# Patient Record
Sex: FEMALE | Race: WHITE | NOT HISPANIC OR LATINO | Employment: UNEMPLOYED | URBAN - NONMETROPOLITAN AREA
[De-identification: names, ages, dates, MRNs, and addresses within clinical notes are randomized per-mention and may not be internally consistent; named-entity substitution may affect disease eponyms.]

---

## 2017-01-13 ENCOUNTER — OFFICE VISIT (OUTPATIENT)
Dept: INTERNAL MEDICINE | Facility: CLINIC | Age: 27
End: 2017-01-13

## 2017-01-13 VITALS
BODY MASS INDEX: 36 KG/M2 | SYSTOLIC BLOOD PRESSURE: 120 MMHG | OXYGEN SATURATION: 96 % | WEIGHT: 203.19 LBS | HEART RATE: 106 BPM | TEMPERATURE: 99.7 F | DIASTOLIC BLOOD PRESSURE: 80 MMHG | RESPIRATION RATE: 16 BRPM | HEIGHT: 63 IN

## 2017-01-13 DIAGNOSIS — J02.0 STREP PHARYNGITIS: Primary | ICD-10-CM

## 2017-01-13 LAB
EXPIRATION DATE: ABNORMAL
EXPIRATION DATE: NORMAL
FLUAV AG NPH QL: NORMAL
FLUBV AG NPH QL: NORMAL
INTERNAL CONTROL: ABNORMAL
INTERNAL CONTROL: NORMAL
Lab: ABNORMAL
Lab: NORMAL
S PYO AG THROAT QL: POSITIVE

## 2017-01-13 PROCEDURE — 99213 OFFICE O/P EST LOW 20 MIN: CPT | Performed by: PHYSICIAN ASSISTANT

## 2017-01-13 PROCEDURE — 87804 INFLUENZA ASSAY W/OPTIC: CPT | Performed by: PHYSICIAN ASSISTANT

## 2017-01-13 PROCEDURE — 87880 STREP A ASSAY W/OPTIC: CPT | Performed by: PHYSICIAN ASSISTANT

## 2017-01-13 RX ORDER — FLUCONAZOLE 150 MG/1
150 TABLET ORAL ONCE
Qty: 1 TABLET | Refills: 0 | Status: SHIPPED | OUTPATIENT
Start: 2017-01-13 | End: 2017-01-13

## 2017-01-13 RX ORDER — CEPHALEXIN 500 MG/1
500 CAPSULE ORAL 2 TIMES DAILY
Qty: 20 CAPSULE | Refills: 0 | Status: SHIPPED | OUTPATIENT
Start: 2017-01-13 | End: 2017-01-20

## 2017-01-13 NOTE — MR AVS SNAPSHOT
Marilu Godoy   1/13/2017 1:45 PM   Office Visit    Provider:  STEVE Toribio   Department:  [facility] PRIMARY CARE   Dept Phone:  [phone]                Your Full Care Plan              Today's Medication Changes          These changes are accurate as of: 1/13/17  3:06 PM.  If you have any questions, ask your nurse or doctor.               New Medication(s)Ordered:     cephalexin 500 MG capsule   Commonly known as:  KEFLEX   Take 1 capsule by mouth 2 (Two) Times a Day for 10 days.   Started by:  STEVE Toribio            Where to Get Your Medications      These medications were sent to [id number] - [place], [place] - [address] - [phone]  - [phone] FX  [address] [place] [address]     Phone:  [phone]     cephalexin 500 MG capsule                  Your Updated Medication List          This list is accurate as of: 1/13/17  3:06 PM.  Always use your most recent med list.                apixaban 5 MG tablet tablet   Commonly known as:  ELIQUIS   Take 1 tablet by mouth Every 12 (Twelve) Hours for 180 days.       cephalexin 500 MG capsule   Commonly known as:  KEFLEX   Take 1 capsule by mouth 2 (Two) Times a Day for 10 days.       cyclobenzaprine 10 MG tablet   Commonly known as:  FLEXERIL       midodrine 5 MG tablet   Commonly known as:  PROAMATINE   Take 1 tablet by mouth 3 (Three) Times a Day.               You Were Diagnosed With        Codes Comments    Strep pharyngitis    -  Primary ICD-10-CM: J02.0  ICD-9-CM: 034.0       Instructions     None    Patient Instructions History      MyChart Signup     Our records indicate that your [facility] [employer] account has been deactivated. If you would like to reactivate your account, please email [email or web address] or call [phone] to talk to our [employer] staff.             Other Info from Your Visit           Your Appointments     Jan 27,  "2017 10:45 AM EST   New Patient with Art Duvall MD   [facility] PULMONARY CRITICAL CARE AND SLEEP (--)    [address]  [facility] [address] [phone]           Bring all previous medical records and films, along with current medications and insurance information.            Feb 06, 2017 11:00 AM EST   Consult with Omar Koo DO   [facility] CARDIOLOGY (--)    [address]  [facility] [address] [phone]           Please bring all current insurance documents. Bring list of current medications.            Feb 21, 2017  9:00 AM EST   Follow Up with Deidre Barker MD   [facility] PRIMARY CARE (--)    [address]  [facility] [address] [phone]           Arrive 15 minutes prior to appointment.            Jun 14, 2017 10:45 AM EDT   FOLLOW UP with Jenifer Calderon MD   [facility] HEMATOLOGY  AND ONCOLOGY ([place])    [address]  [facility] [address] [phone]              Allergies     Amoxicillin  Rash      Reason for Visit     Fever up to 101, off and on x 2 weeks    Cough     Vomiting     Shortness of Breath     Diarrhea           Vital Signs     Blood Pressure Pulse Temperature Respirations Height Weight    120/80 106 99.7 °F (37.6 °C) 16 63\" (160 cm) 203 lb 3 oz (92.2 kg)    Oxygen Saturation Body Mass Index Smoking Status             96% 35.99 kg/m2 Never Smoker         Problems and Diagnoses Noted     Strep throat      "

## 2017-01-13 NOTE — PROGRESS NOTES
Marilu Godoy is a 26 y.o. female.     Subjective   History of Present Illness   Fever off and on for 2 weeks with diarrhea, abdominal bloating, nausea with vomiting a few days ago, and cough with SOA. Fever up to 101 most days. Abdominal pain worse after eating most of the time. Appetite decreased. Drinking plenty of fluids. Also reports postnasal drip and sore throat. Last antibiotic use in November. No recent travel.       The following portions of the patient's history were reviewed and updated as appropriate: allergies, current medications, past family history, past medical history, past social history, past surgical history and problem list.    Review of Systems    Constitutional: Fever.  Negative for appetite change, chills, fatigue and unexpected weight change.   HENT:postnasal drip, rhinorrhea, sore throat, congestion.  Negative for ear pain, hearing loss, nosebleeds, tinnitus and trouble swallowing.    Eyes: Negative for photophobia, discharge and visual disturbance.   Respiratory: Negative for cough, chest tightness, shortness of breath and wheezing.    Cardiovascular: Negative for chest pain, palpitations and leg swelling.   Gastrointestinal:  Abdominal pain, bloating, diarrhea, nausea and vomiting. Negative for blood in stool, constipation  Endocrine: Negative for cold intolerance, heat intolerance, polydipsia, polyphagia and polyuria.   Musculoskeletal: Negative for arthralgias, back pain, joint swelling, myalgias, neck pain and neck stiffness.   Skin: Negative for color change, pallor, rash and wound.   Allergic/Immunologic: Negative for environmental allergies, food allergies and immunocompromised state.   Neurological: Negative for dizziness, tremors, seizures, weakness, numbness and headaches.   Hematological: Negative for adenopathy. Does not bruise/bleed easily.   Psychiatric/Behavioral: Negative for agitation, behavioral problems, confusion, hallucinations, self-injury and suicidal ideas.  "The patient is not nervous/anxious.      Objective    Physical Exam  Constitutional: Oriented to person, place, and time. Appears well-developed and well-nourished.   HENT: Moderate OP erythema without exudate. visible white PND, bilateral TM effusions.   Head: Maxillary sinus tenderness bilaterally. Normocephalic and atraumatic.   Eyes: EOM are normal. Pupils are equal, round, and reactive to light.   Neck: Normal range of motion. Neck supple.   Cardiovascular: Normal rate, regular rhythm and normal heart sounds.    Pulmonary/Chest: Effort normal and breath sounds normal. No respiratory distress.  Has no wheezes or rales. Exhibits no chest wall tenderness.   Abdominal: Periumbilical abdominal tenderness. Soft. Bowel sounds are normal. Exhibits no distension and no mass.   Musculoskeletal: Normal range of motion. Exhibits no tenderness.   Neurological: Alert and oriented to person, place, and time.   Skin: Skin is warm and dry.   Psychiatric: Has a normal mood and affect. Behavior is normal. Judgment and thought content normal.       Visit Vitals   • /80   • Pulse 106   • Temp 99.7 °F (37.6 °C)   • Resp 16   • Ht 63\" (160 cm)   • Wt 203 lb 3 oz (92.2 kg)   • SpO2 96%   • BMI 35.99 kg/m2       Nursing note and vitals reviewed.          Assessment/Plan   Marilu was seen today for fever, cough, vomiting, shortness of breath and diarrhea.    Diagnoses and all orders for this visit:    Strep pharyngitis  -     cephalexin (KEFLEX) 500 MG capsule; Take 1 capsule by mouth 2 (Two) Times a Day for 10 days.  -     POC Influenza A / B        Rapid strep positive.     Recommended daily probiotic for 1 month. If diarrhea worsens or fails to resolve return for evaluation. Suspect related to duration of strep infection.        "

## 2017-01-20 ENCOUNTER — OFFICE VISIT (OUTPATIENT)
Dept: INTERNAL MEDICINE | Facility: CLINIC | Age: 27
End: 2017-01-20

## 2017-01-20 ENCOUNTER — TELEPHONE (OUTPATIENT)
Dept: INTERNAL MEDICINE | Facility: CLINIC | Age: 27
End: 2017-01-20

## 2017-01-20 ENCOUNTER — HOSPITAL ENCOUNTER (OUTPATIENT)
Dept: GENERAL RADIOLOGY | Facility: HOSPITAL | Age: 27
Discharge: HOME | End: 2017-01-20
Admitting: PHYSICIAN ASSISTANT

## 2017-01-20 VITALS
HEART RATE: 96 BPM | BODY MASS INDEX: 36.39 KG/M2 | TEMPERATURE: 99.4 F | DIASTOLIC BLOOD PRESSURE: 72 MMHG | OXYGEN SATURATION: 98 % | SYSTOLIC BLOOD PRESSURE: 106 MMHG | RESPIRATION RATE: 16 BRPM | HEIGHT: 63 IN | WEIGHT: 205.38 LBS

## 2017-01-20 DIAGNOSIS — R05.3 PERSISTENT COUGH: ICD-10-CM

## 2017-01-20 DIAGNOSIS — R50.9 FEVER, UNSPECIFIED FEVER CAUSE: Primary | ICD-10-CM

## 2017-01-20 DIAGNOSIS — R19.7 DIARRHEA OF PRESUMED INFECTIOUS ORIGIN: ICD-10-CM

## 2017-01-20 LAB
EXPIRATION DATE: NORMAL
HETEROPH AB SER QL LA: NEGATIVE
INTERNAL CONTROL: NORMAL
Lab: NORMAL

## 2017-01-20 PROCEDURE — 99213 OFFICE O/P EST LOW 20 MIN: CPT | Performed by: PHYSICIAN ASSISTANT

## 2017-01-20 PROCEDURE — 87046 STOOL CULTR AEROBIC BACT EA: CPT | Performed by: PHYSICIAN ASSISTANT

## 2017-01-20 PROCEDURE — 71020 HC CHEST PA AND LATERAL: CPT

## 2017-01-20 PROCEDURE — 87493 C DIFF AMPLIFIED PROBE: CPT | Performed by: PHYSICIAN ASSISTANT

## 2017-01-20 PROCEDURE — 87045 FECES CULTURE AEROBIC BACT: CPT | Performed by: PHYSICIAN ASSISTANT

## 2017-01-20 PROCEDURE — 87329 GIARDIA AG IA: CPT | Performed by: PHYSICIAN ASSISTANT

## 2017-01-20 PROCEDURE — 86308 HETEROPHILE ANTIBODY SCREEN: CPT | Performed by: PHYSICIAN ASSISTANT

## 2017-01-20 RX ORDER — AZITHROMYCIN 250 MG/1
TABLET, FILM COATED ORAL
Qty: 6 TABLET | Refills: 0 | Status: SHIPPED | OUTPATIENT
Start: 2017-01-20 | End: 2017-02-06

## 2017-01-20 NOTE — PROGRESS NOTES
Marilu Godoy is a 26 y.o. female.     Subjective   History of Present Illness   Here today with continued complaint of fatigue, diarrhea RUQ abdominal pain, headaches, cough productive of white sputum and some SOA. Has continued to have fevers of about 100 daily. Diarrhea occurring 6-7 times per day. RUQ pain sometimes worse after eating but unsure if caused by any certain foods. Currently being treated with Keflex due to positive strep 1 week ago. Denies sore throat today. Diarrhea has not worsened since being on Keflex. Cholecystectomy nearly 2 years ago. Diarrhea fluctuates from watery to loose.           The following portions of the patient's history were reviewed and updated as appropriate: allergies, current medications, past family history, past medical history, past social history, past surgical history and problem list.    Review of Systems    Constitutional:  fatigue, feverNegative for appetite change, chills and unexpected weight change.   HENT: Negative for congestion, ear pain, hearing loss, nosebleeds, postnasal drip, rhinorrhea, sore throat, tinnitus and trouble swallowing.    Eyes: Negative for photophobia, discharge and visual disturbance.   Respiratory: SOA, cough. Negative for chest tightness and wheezing.    Cardiovascular: Negative for chest pain, palpitations and leg swelling.   Gastrointestinal: RUQ abdominal pain, diarrhea.Negative for abdominal distention, blood in stool, constipation, nausea and vomiting.   Endocrine: Negative for cold intolerance, heat intolerance, polydipsia, polyphagia and polyuria.   Musculoskeletal: Negative for arthralgias, back pain, joint swelling, myalgias, neck pain and neck stiffness.   Skin: Negative for color change, pallor, rash and wound.   Allergic/Immunologic: Negative for environmental allergies, food allergies and immunocompromised state.   Neurological: Negative for dizziness, tremors, seizures, weakness, numbness and headaches.   Hematological:  "Negative for adenopathy. Does not bruise/bleed easily.   Psychiatric/Behavioral: Negative for agitation, behavioral problems, confusion, hallucinations, self-injury and suicidal ideas. The patient is not nervous/anxious.      Objective    Physical Exam  Constitutional: Oriented to person, place, and time. Appears well-developed and well-nourished.   HENT: OP normal. TMs normal.   Head: No sinus tenderness. Normocephalic and atraumatic.   Eyes: EOM are normal. Pupils are equal, round, and reactive to light.   Neck: Normal range of motion. Neck supple.   Cardiovascular: Normal rate, regular rhythm and normal heart sounds.    Pulmonary/Chest: Effort normal and breath sounds normal. No respiratory distress.  Has no wheezes or rales. Exhibits no chest wall tenderness.   Abdominal: RUQ mild tenderness. Soft. Bowel sounds are normal. Exhibits no distension and no mass.   Musculoskeletal: Normal range of motion. Exhibits no tenderness.   Neurological: Alert and oriented to person, place, and time.   Skin: Skin is warm and dry.   Psychiatric: Has a normal mood and affect. Behavior is normal. Judgment and thought content normal.       Visit Vitals   • /72   • Pulse 96   • Temp 99.4 °F (37.4 °C)   • Resp 16   • Ht 63\" (160 cm)   • Wt 205 lb 6 oz (93.2 kg)   • SpO2 98%   • BMI 36.38 kg/m2       Nursing note and vitals reviewed.        Assessment/Plan   Marilu was seen today for fever, cough, diarrhea and shortness of breath.    Diagnoses and all orders for this visit:    Fever, unspecified fever cause  -     Dao-Barr Virus VCA Antibody Panel  -     Hepatitis Panel, Acute  -     Stool Culture With Yersinia  -     Clostridium Difficile Toxin, PCR  -     CBC & Differential  -     Comprehensive Metabolic Panel  -     TSH  -     Giardia Antigen    Persistent cough bilateral lower lobe atelectasis noted during abdominal CT on 1/7)  -     XR Chest PA & Lateral    Diarrhea of presumed infectious origin  -     Dao-Myers " Virus VCA Antibody Panel  -     Hepatitis Panel, Acute  -     Stool Culture With Yersinia  -     Clostridium Difficile Toxin, PCR  -     CBC & Differential  -     Comprehensive Metabolic Panel  -     TSH  -     Giardia Antigen  Abdominal CT done on 1/7 in ER. Negative other than right ovarian cyst.   S/p cholecystectomy in 2015.     Continue Keflex for strep. Continue daily probiotic.     Mono spot: negative.

## 2017-01-20 NOTE — TELEPHONE ENCOUNTER
----- Message from STEVE Toribio sent at 1/20/2017  1:12 PM EST -----  Please let her know that her chest x-ray was negative but I would like her to switch to Zithromax as well and have sent it to [facility]. Stop Keflex.

## 2017-01-20 NOTE — MR AVS SNAPSHOT
Marilu Godoy   1/20/2017 11:30 AM   Office Visit    Provider:  STEVE Toribio   Department:  [facility] PRIMARY CARE   Dept Phone:  [phone]                Your Full Care Plan              Your Updated Medication List          This list is accurate as of: 1/20/17 12:18 PM.  Always use your most recent med list.                apixaban 5 MG tablet tablet   Commonly known as:  ELIQUIS   Take 1 tablet by mouth Every 12 (Twelve) Hours for 180 days.       cephalexin 500 MG capsule   Commonly known as:  KEFLEX   Take 1 capsule by mouth 2 (Two) Times a Day for 10 days.       cyclobenzaprine 10 MG tablet   Commonly known as:  FLEXERIL       midodrine 5 MG tablet   Commonly known as:  PROAMATINE   Take 1 tablet by mouth 3 (Three) Times a Day.               We Performed the Following     CBC & Differential     Comprehensive Metabolic Panel     Dao-Barr Virus VCA Antibody Panel     Hepatitis Panel, Acute     POC Infectious Mononucleosis Antibody     TSH     XR Chest PA & Lateral       You Were Diagnosed With        Codes Comments    Fever, unspecified fever cause    -  Primary ICD-10-CM: R50.9  ICD-9-CM: 780.60     Persistent cough     ICD-10-CM: R05  ICD-9-CM: 786.2     Diarrhea of presumed infectious origin     ICD-10-CM: A09  ICD-9-CM: 009.3       Instructions     None    Patient Instructions History      MyChart Signup     Our records indicate that you have declined [facility] MyChart signup. If you would like to sign up for [employer], please email [email or web address] or call [phone] to obtain an activation code.             Other Info from Your Visit           Your Appointments     Jan 27, 2017 10:45 AM EST   New Patient with Art Duvall MD   [facility] PULMONARY CRITICAL CARE AND SLEEP (--)    [address]  [address]  [facility] [address] [phone]           Bring all previous medical records and  "films, along with current medications and insurance information.            Feb 06, 2017 11:00 AM EST   Consult with Omar Koo DO   [facility] CARDIOLOGY (--)    [address]  [facility] [address] [phone]           Please bring all current insurance documents. Bring list of current medications.            Feb 21, 2017  9:00 AM EST   Follow Up with Deidre Barker MD   [facility] PRIMARY CARE (--)    [address] [place] [address] [address]  [facility] [address] [phone]           Arrive 15 minutes prior to appointment.            Jun 14, 2017 10:45 AM EDT   FOLLOW UP with Jenifer Calderon MD   [facility] HEMATOLOGY  AND ONCOLOGY ([place])    [address]  [facility] [address] [phone]              Allergies     Amoxicillin  Rash      Reason for Visit     Fever 100-101    Cough productive, white mucus    Diarrhea     Shortness of Breath           Vital Signs     Blood Pressure Pulse Temperature Respirations Height Weight    106/72 96 99.4 °F (37.4 °C) 16 63\" (160 cm) 205 lb 6 oz (93.2 kg)    Oxygen Saturation Body Mass Index Smoking Status             98% 36.38 kg/m2 Never Smoker         Problems and Diagnoses Noted     Diarrhea    Fever    -  Primary    Persistent cough          Results     POC Infectious Mononucleosis Antibody      Component Value Standard Range & Units    Monospot Negative Negative    Internal Control Passed Passed    Lot Number [id number]     Expiration Date 3/31/18                   "

## 2017-01-23 LAB
BACTERIA STL CULT: NORMAL
C DIFF TOX GENS STL QL NAA+PROBE: NOT DETECTED
G LAMBLIA AG STL QL IA: NEGATIVE

## 2017-01-26 DIAGNOSIS — R11.0 NAUSEA: ICD-10-CM

## 2017-01-26 DIAGNOSIS — R19.7 DIARRHEA, UNSPECIFIED TYPE: Primary | ICD-10-CM

## 2017-01-27 ENCOUNTER — OFFICE VISIT (OUTPATIENT)
Dept: PULMONOLOGY | Facility: CLINIC | Age: 27
End: 2017-01-27

## 2017-01-27 VITALS
BODY MASS INDEX: 36.14 KG/M2 | DIASTOLIC BLOOD PRESSURE: 58 MMHG | HEART RATE: 103 BPM | SYSTOLIC BLOOD PRESSURE: 118 MMHG | HEIGHT: 63 IN | WEIGHT: 204 LBS | RESPIRATION RATE: 18 BRPM | OXYGEN SATURATION: 99 %

## 2017-01-27 DIAGNOSIS — I26.99 OTHER PULMONARY EMBOLISM WITHOUT ACUTE COR PULMONALE, UNSPECIFIED CHRONICITY (HCC): Primary | ICD-10-CM

## 2017-01-27 DIAGNOSIS — G47.33 OBSTRUCTIVE SLEEP APNEA: ICD-10-CM

## 2017-01-27 DIAGNOSIS — G47.19 EXCESSIVE DAYTIME SLEEPINESS: ICD-10-CM

## 2017-01-27 DIAGNOSIS — R06.83 SNORING: ICD-10-CM

## 2017-01-27 PROCEDURE — 99245 OFF/OP CONSLTJ NEW/EST HI 55: CPT | Performed by: INTERNAL MEDICINE

## 2017-02-06 ENCOUNTER — CONSULT (OUTPATIENT)
Dept: CARDIOLOGY | Facility: CLINIC | Age: 27
End: 2017-02-06

## 2017-02-06 ENCOUNTER — DOCUMENTATION (OUTPATIENT)
Dept: CARDIOLOGY | Facility: CLINIC | Age: 27
End: 2017-02-06

## 2017-02-06 VITALS
SYSTOLIC BLOOD PRESSURE: 130 MMHG | HEART RATE: 107 BPM | HEIGHT: 63 IN | DIASTOLIC BLOOD PRESSURE: 74 MMHG | BODY MASS INDEX: 35.93 KG/M2 | WEIGHT: 202.8 LBS

## 2017-02-06 DIAGNOSIS — R00.0 SINUS TACHYCARDIA: Primary | ICD-10-CM

## 2017-02-06 DIAGNOSIS — I47.1 SVT (SUPRAVENTRICULAR TACHYCARDIA) (HCC): Primary | ICD-10-CM

## 2017-02-06 DIAGNOSIS — I26.99 PULMONARY EMBOLISM ON RIGHT (HCC): ICD-10-CM

## 2017-02-06 PROCEDURE — 99243 OFF/OP CNSLTJ NEW/EST LOW 30: CPT | Performed by: INTERNAL MEDICINE

## 2017-02-06 PROCEDURE — 93270 REMOTE 30 DAY ECG REV/REPORT: CPT | Performed by: INTERNAL MEDICINE

## 2017-02-06 PROCEDURE — 93000 ELECTROCARDIOGRAM COMPLETE: CPT | Performed by: INTERNAL MEDICINE

## 2017-02-06 RX ORDER — MIDODRINE HYDROCHLORIDE 5 MG/1
7.5 TABLET ORAL 3 TIMES DAILY
Qty: 140 TABLET | Refills: 6 | Status: SHIPPED | OUTPATIENT
Start: 2017-02-06 | End: 2017-07-14

## 2017-02-06 RX ORDER — MIDODRINE HYDROCHLORIDE 5 MG/1
5 TABLET ORAL 3 TIMES DAILY
Qty: 90 TABLET | Refills: 3 | Status: SHIPPED | OUTPATIENT
Start: 2017-02-06 | End: 2017-02-14

## 2017-02-06 NOTE — PROGRESS NOTES
Marilu Godoy  1990  [phone]      02/06/2017    [facility] CARDIOLOGY    Deidre Barker MD  [address] [place], [place] [id number]    REFERRING DOCTOR : Marc Byrnes MD         Patient ID: Marilu Godoy is a 26 y.o. female.    Chief Complaint:   Chief Complaint   Patient presents with   • Palpitations   • Rapid Heart Rate       Allergies   Allergen Reactions   • Amoxicillin Rash       Current Outpatient Prescriptions:   •  apixaban (ELIQUIS) 5 MG tablet tablet, Take 1 tablet by mouth Every 12 (Twelve) Hours for 180 days., Disp: 60 tablet, Rfl: 4  •  midodrine (PROAMATINE) 5 MG tablet, Take 1 tablet by mouth 3 (Three) Times a Day., Disp: 90 tablet, Rfl: 3    History of Present Illness  Patient presents today for consultation referred by Dr. Byrnes regarding tachycardia. She has fairly consistently had rates over 100 and sometimes resting rates into the 150s. She wore a 24 hour holter that only showed sinus tachycardia. She was attempted on low dose BB but had hypotension and presyncope, even with initiation of BP support. She had palpitations and racing heart prior to her blood clot, but it is significantly worse now and rates always seem to be in the 90s to 100s.  Mostly in the 100s to 120 range.  Her blood pressure is been variable from 50 systolic on telemetry up to 190s however recently more in the low 100s and high 90s.  She's also having shortness of breath she says mostly when she lays flat.  His been treated still of blood thinners for her pulmonary embolus.. Previously, she had intermittent episodes of tachypalpitations that came on suddenly and would stop suddenly after about 15 minutes. Since her PE, she stated her heart is always going fast and will race more often. She is very soa and fatigued on a daily a basis. She has a normal LVEF. No chest pain, edema, pnd, orthopnea, PND, fevers, chills.     The following portions of the patient's  history were reviewed and updated as appropriate: allergies, current medications, past family history, past medical history, past social history, past surgical history and problem list.    Past Medical History   Diagnosis Date   • Asthma    • Clotting disorder      factor 5   • Gallstone    • H/O blood clots    • Hypertension    • Kidney infection    • Obesity    • Ovarian cyst    • Protein S deficiency 2016     labs from hospitalization for PE   • Pulmonary embolism    • Tattoo          Past Surgical History   Procedure Laterality Date   • Cholecystectomy     •  section        and    • Upper gastrointestinal endoscopy         Social History     Social History   • Marital status:      Spouse name: N/A   • Number of children: N/A   • Years of education: N/A     Occupational History   • Not on file.     Social History Main Topics   • Smoking status: Never Smoker   • Smokeless tobacco: Never Used   • Alcohol use No   • Drug use: No   • Sexual activity: Yes     Partners: Male     Birth control/ protection: None      Comment: PE while on birth control patch     Other Topics Concern   • Not on file     Social History Narrative     Family History   Problem Relation Age of Onset   • Arthritis Mother    • COPD Mother    • Asthma Mother    • Arthritis Father    • Diabetes Father    • Hypertension Father    • Hyperlipidemia Father    • Kidney disease Father    • Heart attack Father    • No Known Problems Son    • Colon cancer Neg Hx    • Liver cancer Neg Hx    • Liver disease Neg Hx    • Stomach cancer Neg Hx    • Esophageal cancer Neg Hx          REVIEW OF SYSTEMS:   CONSTITUTIONAL: No weight loss, fever, chills  HEENT: Eyes: No visual loss, blurred vision, double vision or yellow sclerae. Ears, Nose, Throat: No hearing loss, sneezing, congestion, runny nose or sore throat.   SKIN: No rash or itching.     RESPIRATORY: No hemoptysis, cough or sputum.   GASTROINTESTINAL: No anorexia, nausea,  "vomiting or diarrhea. No abdominal pain, bright red blood per rectum or melena.  GENITOURINARY: No burning on urination, hematuria or increased frequency.  NEUROLOGICAL: No headache, dizziness, syncope, paralysis, ataxia, numbness or tingling in the extremities. No change in bowel or bladder control.   MUSCULOSKELETAL: No muscle, back pain, joint pain or stiffness.   HEMATOLOGIC: No anemia, bleeding or bruising.   LYMPHATICS: No enlarged nodes. No history of splenectomy.   PSYCHIATRIC: No history of depression, anxiety, hallucinations.   ENDOCRINOLOGIC: No reports of sweating, cold or heat intolerance. No polyuria or polydipsia.   Ext: No edema or bruising      The patient's old records including ambulatory rhythm recordings (ECGs, Holter/event monitor) were reviewed and discussed.           Objective:       Vitals:    02/06/17 1050   BP: 130/74   BP Location: Left arm   Patient Position: Sitting   Pulse: 107   Weight: 202 lb 12.8 oz (92 kg)   Height: 63\" (160 cm)       Constitutional: oriented to person, place, and time.  well-developed and well-nourished. No distress.   HENT: Normocephalic.   Eyes: Conjunctivae are normal. No scleral icterus.   Neck: Normal carotid pulses, no hepatojugular reflux and no JVD present. Carotid bruit is not present. No tracheal deviation, no edema and no erythema present. No thyromegaly present.   Cardiovascular: Normal rate, regular rhythm, S1 normal, S2 normal, normal heart sounds and intact distal pulses.   No extrasystoles are present. PMI is not displaced.  Exam reveals no gallop, no distant heart sounds and no friction rub.    No murmur heard.  Pulses:       Radial pulses are 2+ on the right side, and 2+ on the left side.       Dorsalis pedis pulses are 2+ on the right side, and 2+ on the left side.   Pulmonary/Chest: Effort normal and breath sounds normal. No respiratory distress. She has no decreased breath sounds.  no wheezes,  Rhonchi or rales.  no tenderness.   Abdominal: " Soft. Bowel sounds are normal. She exhibits no distension and no mass. There is no hepatosplenomegaly. There is no tenderness. There is no rebound and no guarding.   Musculoskeletal:  exhibits no edema, tenderness or deformity.   Neurological: is alert and oriented to person, place, and time.   Skin: Skin is warm and dry. No rash noted. No diaphoretic. No cyanosis or erythema. No pallor. Nails show no clubbing.   Psychiatric: Normal mood and affect.Speech is normal and behavior is normal.    Lab Review:         ECG 12 Lead  Date/Time: 2/6/2017 11:54 AM  Performed by: JUDAH ZARAGOZA  Authorized by: JUDAH ZARAGOZA   Comparison: compared with previous ECG   Rhythm: sinus tachycardia  Rate: tachycardic  BPM: 107  Conduction: conduction normal  ST Segments: ST segments normal  T Waves: T waves normal  QRS axis: normal  Other: no other findings  Clinical impression: non-specific ECG  Comments: Short HI          bpm        Diagnosis:   1. Sinus tachycardia  2. Pulmonary embolism on right  3. Labile BP  4. SOB luis while laying flat      Assessment & Plan:     1. Sinus tachycardia (? If truly inappropriate) that has worsened since diagnosis of PE. She wore a 24 hour holter that revealed only sinus tach.  Patient seems to be having a sinus tachycardia at all times and I'm not sure truly if this is an appropriate nature with the patient's history and recent pulmonary embolus.  Seems that all of her sinus tachycardia got worse after pulmonary embolus and also now she's doing with shortness of breath especially at night with lying flat which seems to be worsen.  She is unable to tolerate AV aubree agents due to her variable blood pressure and also fatigue.  I think she also still needs to see Dr. delgado for these worsening short venous of breath and labile blood pressure.  We will get the patient a 30 day event monitor to further rule out any arrhythmia like SVT. Would recommend continuing to treat PE as this has  exacerbated her symptoms and also will increase her Midodrine to 7.5 mg TID and encouraged increased PO intake and salt tablets.  Seems her pressures recently have been high 90s to low 100s systolic and at times highest 128 systolic in the past couple weeks.  This is a complex case however I do think the pulmonary embolus has made a lot of her symptoms worse. Normal EF.  Once her acute issues are resolved and still having tachycardia and thought that the sinus tachycardia was inappropriate and at that time consideration for a sinus node modification.  However at the present time I think she has some reasons to have some increasing sinus tachycardia with her symptoms of shortness of breath on a daily basis and fatigue ever since her pulmonary embolus.  Will continue follow-up pulmonology Dr. delgado and myself.    2. PE on Eliquis following with pulm with worsening SOB luis at night laying flat.       3. Labile BP --> I think it be important to patient follow-up with Dr. delgado for these labile blood pressures however now her better with pressures mostly in the range of 90 to 120s systolic.  We'll adjust Midrin as noted.    4. Follow up in 3 months    CC: Dr Marc Fu acting as a Scribe for STEVE Campos  02/06/17  11:51 AM    I, Omar Koo DO, personally performed the services described in this documentation as scribed by the above named individual in my presence, and it is both accurate and complete.  2/6/2017  12:15 PM    Omar Koo DO  12:15 PM  02/06/17          EMR Dragon/Transcription disclaimer:  Much of this encounter note is an electronic transcription/translation of spoken language to printed text. Electronic translation of spoken language may permit erroneous, or at times, nonsensical words or phrases to be inadvertently transcribed. Although I have reviewed the note for such errors, some may still exist.

## 2017-02-06 NOTE — PROGRESS NOTES
I called [facility] in [place], [place] to make sure they received the dosing of Midodrine as 7.5 mg TID. They did.They d'cd the 5 mg dosing.

## 2017-02-13 ENCOUNTER — HOSPITAL ENCOUNTER (OUTPATIENT)
Dept: SLEEP MEDICINE | Facility: HOSPITAL | Age: 27
Setting detail: THERAPIES SERIES
End: 2017-02-13
Attending: INTERNAL MEDICINE

## 2017-02-13 DIAGNOSIS — G47.33 OBSTRUCTIVE SLEEP APNEA: ICD-10-CM

## 2017-02-13 DIAGNOSIS — G47.19 EXCESSIVE DAYTIME SLEEPINESS: ICD-10-CM

## 2017-02-13 PROCEDURE — 95806 SLEEP STUDY UNATT&RESP EFFT: CPT | Performed by: INTERNAL MEDICINE

## 2017-02-13 PROCEDURE — 95806 SLEEP STUDY UNATT&RESP EFFT: CPT

## 2017-02-14 ENCOUNTER — OFFICE VISIT (OUTPATIENT)
Dept: INTERNAL MEDICINE | Facility: CLINIC | Age: 27
End: 2017-02-14

## 2017-02-14 VITALS
OXYGEN SATURATION: 95 % | TEMPERATURE: 98.2 F | HEART RATE: 117 BPM | SYSTOLIC BLOOD PRESSURE: 130 MMHG | DIASTOLIC BLOOD PRESSURE: 85 MMHG | HEIGHT: 63 IN | WEIGHT: 204 LBS | BODY MASS INDEX: 36.14 KG/M2

## 2017-02-14 DIAGNOSIS — R68.89 FLU-LIKE SYMPTOMS: Primary | ICD-10-CM

## 2017-02-14 LAB
EXPIRATION DATE: NORMAL
FLUAV AG NPH QL: NEGATIVE
FLUBV AG NPH QL: NEGATIVE
INTERNAL CONTROL: NORMAL
Lab: NORMAL

## 2017-02-14 PROCEDURE — 87804 INFLUENZA ASSAY W/OPTIC: CPT | Performed by: FAMILY MEDICINE

## 2017-02-14 PROCEDURE — 99213 OFFICE O/P EST LOW 20 MIN: CPT | Performed by: FAMILY MEDICINE

## 2017-02-14 RX ORDER — APIXABAN 5 MG/1
TABLET, FILM COATED ORAL
Refills: 4 | COMMUNITY
Start: 2017-02-06 | End: 2017-02-23

## 2017-02-15 NOTE — PROGRESS NOTES
Manny Godoy is a 26 y.o. female.     History of Present Illness   Sick since Friday (4 days) with sore throat, body aches, nasal and chest congestion. Has also had some diarrhea and nausea. Son has same symptoms, they both became ill around same time. Daughter and  not. No shortness of air.    The following portions of the patient's history were reviewed and updated as appropriate: allergies, current medications, past family history, past medical history, past social history, past surgical history and problem list.    Review of Systems   Constitutional: Positive for activity change, appetite change, chills, fatigue and fever.   HENT: Positive for congestion.    Respiratory: Positive for cough. Negative for wheezing.        Objective   Physical Exam   Constitutional: She is oriented to person, place, and time. Vital signs are normal. She appears well-developed and well-nourished. She does not have a sickly appearance. She appears ill.   HENT:   Head: Normocephalic and atraumatic.   Right Ear: Hearing, tympanic membrane, external ear and ear canal normal.   Left Ear: Hearing, tympanic membrane, external ear and ear canal normal.   Nose: Rhinorrhea present.   Mouth/Throat: Uvula is midline, oropharynx is clear and moist and mucous membranes are normal. Normal dentition.   Eyes: EOM and lids are normal. Pupils are equal, round, and reactive to light.   Neck: Neck supple.   Cardiovascular: Normal rate, regular rhythm and normal heart sounds.    Pulmonary/Chest: Effort normal and breath sounds normal. No stridor.   Neurological: She is alert and oriented to person, place, and time. No cranial nerve deficit.   Skin: Skin is warm. She is not diaphoretic.   Psychiatric: She has a normal mood and affect. Her speech is normal and behavior is normal.   Nursing note and vitals reviewed.    Office Visit on 02/14/2017   Component Date Value Ref Range Status   • Rapid Influenza A Ag 02/14/2017 negative    Final   • Rapid Influenza B Ag 02/14/2017 negative   Final   • Internal Control 02/14/2017 Passed  Passed Final   • Lot Number 02/14/2017 [id number]   Final   • Expiration Date 02/14/2017 10/30/2017   Final       Assessment/Plan   Marilu was seen today for sore throat, fever, nasal congestion, diarrhea and nausea.    Diagnoses and all orders for this visit:    Flu-like symptoms  -     POCT Influenza A/B    outside of window for Tamiflu and test negative, though I cannot rule out that this is late course of flu. Conservative management, fluids, rest.

## 2017-02-16 ENCOUNTER — TELEPHONE (OUTPATIENT)
Dept: INTERNAL MEDICINE | Facility: CLINIC | Age: 27
End: 2017-02-16

## 2017-02-16 DIAGNOSIS — G47.33 OSA (OBSTRUCTIVE SLEEP APNEA): Primary | ICD-10-CM

## 2017-02-17 ENCOUNTER — TELEPHONE (OUTPATIENT)
Dept: CARDIOLOGY | Facility: CLINIC | Age: 27
End: 2017-02-17

## 2017-02-18 ENCOUNTER — APPOINTMENT (OUTPATIENT)
Dept: CT IMAGING | Facility: HOSPITAL | Age: 27
End: 2017-02-18

## 2017-02-18 ENCOUNTER — HOSPITAL ENCOUNTER (EMERGENCY)
Facility: HOSPITAL | Age: 27
Discharge: HOME | End: 2017-02-18
Attending: EMERGENCY MEDICINE | Admitting: EMERGENCY MEDICINE

## 2017-02-18 VITALS
BODY MASS INDEX: 36.14 KG/M2 | HEART RATE: 93 BPM | TEMPERATURE: 98.3 F | HEIGHT: 63 IN | RESPIRATION RATE: 20 BRPM | DIASTOLIC BLOOD PRESSURE: 85 MMHG | SYSTOLIC BLOOD PRESSURE: 119 MMHG | OXYGEN SATURATION: 99 % | WEIGHT: 204 LBS

## 2017-02-18 DIAGNOSIS — R06.00 DYSPNEA, UNSPECIFIED TYPE: Primary | ICD-10-CM

## 2017-02-18 LAB
ANION GAP SERPL CALCULATED.3IONS-SCNC: 15.1 MMOL/L
BASOPHILS # BLD AUTO: 0.05 10*3/MM3 (ref 0–0.2)
BASOPHILS NFR BLD AUTO: 0.7 % (ref 0–2.5)
BUN BLD-MCNC: 9 MG/DL (ref 7–20)
BUN/CREAT SERPL: 12.9 (ref 7.1–23.5)
CALCIUM SPEC-SCNC: 9.8 MG/DL (ref 8.4–10.2)
CHLORIDE SERPL-SCNC: 106 MMOL/L (ref 98–107)
CO2 SERPL-SCNC: 29 MMOL/L (ref 26–30)
CREAT BLD-MCNC: 0.7 MG/DL (ref 0.6–1.3)
DEPRECATED RDW RBC AUTO: 40 FL (ref 37–54)
EOSINOPHIL # BLD AUTO: 0.18 10*3/MM3 (ref 0–0.7)
EOSINOPHIL NFR BLD AUTO: 2.4 % (ref 0–7)
ERYTHROCYTE [DISTWIDTH] IN BLOOD BY AUTOMATED COUNT: 12 % (ref 11.5–14.5)
GFR SERPL CREATININE-BSD FRML MDRD: 101 ML/MIN/1.73
GLUCOSE BLD-MCNC: 96 MG/DL (ref 74–98)
HCT VFR BLD AUTO: 42.7 % (ref 37–47)
HGB BLD-MCNC: 14.5 G/DL (ref 12–16)
HOLD SPECIMEN: NORMAL
HOLD SPECIMEN: NORMAL
IMM GRANULOCYTES # BLD: 0.01 10*3/MM3 (ref 0–0.06)
IMM GRANULOCYTES NFR BLD: 0.1 % (ref 0–0.6)
LYMPHOCYTES # BLD AUTO: 2.39 10*3/MM3 (ref 0.6–3.4)
LYMPHOCYTES NFR BLD AUTO: 32.4 % (ref 10–50)
MCH RBC QN AUTO: 30.7 PG (ref 27–31)
MCHC RBC AUTO-ENTMCNC: 34 G/DL (ref 30–37)
MCV RBC AUTO: 90.5 FL (ref 81–99)
MONOCYTES # BLD AUTO: 0.29 10*3/MM3 (ref 0–0.9)
MONOCYTES NFR BLD AUTO: 3.9 % (ref 0–12)
NEUTROPHILS # BLD AUTO: 4.45 10*3/MM3 (ref 2–6.9)
NEUTROPHILS NFR BLD AUTO: 60.5 % (ref 37–80)
NRBC BLD MANUAL-RTO: 0 /100 WBC (ref 0–0)
PLATELET # BLD AUTO: 363 10*3/MM3 (ref 130–400)
PMV BLD AUTO: 9.7 FL (ref 6–12)
POTASSIUM BLD-SCNC: 4.1 MMOL/L (ref 3.5–5.1)
RBC # BLD AUTO: 4.72 10*6/MM3 (ref 4.2–5.4)
SODIUM BLD-SCNC: 146 MMOL/L (ref 137–145)
TROPONIN I SERPL-MCNC: <0.012 NG/ML (ref 0–0.03)
WBC NRBC COR # BLD: 7.37 10*3/MM3 (ref 4.8–10.8)
WHOLE BLOOD HOLD SPECIMEN: NORMAL
WHOLE BLOOD HOLD SPECIMEN: NORMAL

## 2017-02-18 PROCEDURE — 96360 HYDRATION IV INFUSION INIT: CPT

## 2017-02-18 PROCEDURE — 0 IOPAMIDOL 61 % SOLUTION: Performed by: EMERGENCY MEDICINE

## 2017-02-18 PROCEDURE — 93005 ELECTROCARDIOGRAM TRACING: CPT | Performed by: PHYSICIAN ASSISTANT

## 2017-02-18 PROCEDURE — 99284 EMERGENCY DEPT VISIT MOD MDM: CPT

## 2017-02-18 PROCEDURE — 96361 HYDRATE IV INFUSION ADD-ON: CPT

## 2017-02-18 PROCEDURE — 93005 ELECTROCARDIOGRAM TRACING: CPT

## 2017-02-18 PROCEDURE — 71275 CT ANGIOGRAPHY CHEST: CPT

## 2017-02-18 PROCEDURE — 84484 ASSAY OF TROPONIN QUANT: CPT | Performed by: PHYSICIAN ASSISTANT

## 2017-02-18 PROCEDURE — 80048 BASIC METABOLIC PNL TOTAL CA: CPT | Performed by: PHYSICIAN ASSISTANT

## 2017-02-18 PROCEDURE — 85025 COMPLETE CBC W/AUTO DIFF WBC: CPT | Performed by: PHYSICIAN ASSISTANT

## 2017-02-18 RX ORDER — SODIUM CHLORIDE 0.9 % (FLUSH) 0.9 %
10 SYRINGE (ML) INJECTION AS NEEDED
Status: DISCONTINUED | OUTPATIENT
Start: 2017-02-18 | End: 2017-02-18 | Stop reason: HOSPADM

## 2017-02-18 RX ADMIN — SODIUM CHLORIDE 1000 ML: 9 INJECTION, SOLUTION INTRAVENOUS at 16:13

## 2017-02-18 RX ADMIN — IOPAMIDOL 100 ML: 612 INJECTION, SOLUTION INTRAVENOUS at 18:21

## 2017-02-18 NOTE — ED PROVIDER NOTES
Subjective   HPI Comments: 26-year-old female with a history of a pulmonary embolism presents with chest pain.  She is on Eliquis.  She has a history of factor V and protein S deficiency.  She is diagnosed with a pulmonary embolism last year.  She states that she has sharp chest pain on the right.  Radiates into her mid back at times.  Worse with a deep breath.    Patient is a 26 y.o. female presenting with chest pain.   History provided by:  Patient   used: No    Chest Pain   Pain location:  R chest  Pain quality: sharp    Pain radiates to:  Mid back  Pain severity:  Mild  Onset quality:  Sudden  Duration:  1 day  Timing:  Constant  Progression:  Unchanged  Chronicity:  Recurrent  Context comment:  H/o Pulmonary embolism  Relieved by:  Nothing  Worsened by:  Deep breathing  Ineffective treatments:  None tried      Review of Systems   Cardiovascular: Positive for chest pain.   All other systems reviewed and are negative.      Past Medical History   Diagnosis Date   • Asthma    • Clotting disorder      factor 5   • Gallstone    • H/O blood clots    • Hypertension    • Kidney infection    • Obesity    • Ovarian cyst    • Protein S deficiency 2016     labs from hospitalization for PE   • Pulmonary embolism    • Tattoo        Allergies   Allergen Reactions   • Amoxicillin Rash       Past Surgical History   Procedure Laterality Date   • Cholecystectomy     •  section        and    • Upper gastrointestinal endoscopy         Family History   Problem Relation Age of Onset   • Arthritis Mother    • COPD Mother    • Asthma Mother    • Arthritis Father    • Diabetes Father    • Hypertension Father    • Hyperlipidemia Father    • Kidney disease Father    • Heart attack Father    • No Known Problems Son    • Colon cancer Neg Hx    • Liver cancer Neg Hx    • Liver disease Neg Hx    • Stomach cancer Neg Hx    • Esophageal cancer Neg Hx        Social History     Social History   •  Marital status:      Spouse name: N/A   • Number of children: N/A   • Years of education: N/A     Social History Main Topics   • Smoking status: Never Smoker   • Smokeless tobacco: Never Used   • Alcohol use No   • Drug use: No   • Sexual activity: Yes     Partners: Male     Birth control/ protection: None      Comment: PE while on birth control patch     Other Topics Concern   • None     Social History Narrative           Objective   Physical Exam   Constitutional: She is oriented to person, place, and time. She appears well-developed and well-nourished.   Eyes: EOM are normal.   Neck: Normal range of motion. Neck supple.   Cardiovascular: Normal rate and regular rhythm.    Pulmonary/Chest: Effort normal and breath sounds normal.   Abdominal: Soft. Bowel sounds are normal.   Musculoskeletal: Normal range of motion.   Neurological: She is alert and oriented to person, place, and time. She has normal reflexes.   Skin: Skin is warm and dry.   Psychiatric: She has a normal mood and affect.   Nursing note and vitals reviewed.      Procedures         ED Course  ED Course                  MDM  Number of Diagnoses or Management Options  Dyspnea, unspecified type: established and improving     Amount and/or Complexity of Data Reviewed  Clinical lab tests: ordered and reviewed  Tests in the radiology section of CPT®: ordered and reviewed  Review and summarize past medical records: yes    Patient Progress  Patient progress: stable      Final diagnoses:   Dyspnea, unspecified type            Calvin Monzon Jr., PA-C  02/18/17 1933

## 2017-02-20 ENCOUNTER — TELEPHONE (OUTPATIENT)
Dept: INTERNAL MEDICINE | Facility: CLINIC | Age: 27
End: 2017-02-20

## 2017-02-20 NOTE — TELEPHONE ENCOUNTER
----- Message from Nataliia Wade sent at 2/20/2017  9:08 AM EST -----  Contact: Patient   Patient went to [facility] ER this past Saturday night/ She said that she went for shortness of breath and feeling tight in her chest. She said that she was needing to schedule an ER follow up tomorrow and it has to be before 3:30 and I was unable to see a spot for 30 min to put her. Is there any way that we could see her tomorrow. Her number is [phone].

## 2017-02-21 ENCOUNTER — OFFICE VISIT (OUTPATIENT)
Dept: GASTROENTEROLOGY | Facility: CLINIC | Age: 27
End: 2017-02-21

## 2017-02-21 VITALS
DIASTOLIC BLOOD PRESSURE: 87 MMHG | TEMPERATURE: 99.1 F | RESPIRATION RATE: 20 BRPM | HEART RATE: 95 BPM | HEIGHT: 63 IN | SYSTOLIC BLOOD PRESSURE: 133 MMHG | WEIGHT: 204 LBS | BODY MASS INDEX: 36.14 KG/M2

## 2017-02-21 DIAGNOSIS — R10.33 PERIUMBILICAL ABDOMINAL PAIN: ICD-10-CM

## 2017-02-21 DIAGNOSIS — R79.89 ABNORMAL LIVER FUNCTION TESTS: ICD-10-CM

## 2017-02-21 DIAGNOSIS — R19.7 DIARRHEA, UNSPECIFIED TYPE: Primary | ICD-10-CM

## 2017-02-21 DIAGNOSIS — R11.0 NAUSEA: ICD-10-CM

## 2017-02-21 PROCEDURE — 99215 OFFICE O/P EST HI 40 MIN: CPT | Performed by: INTERNAL MEDICINE

## 2017-02-21 NOTE — PROGRESS NOTES
[address] [facility] [address]    (H) [phone]  (W)     Chief Complaint   Patient presents with   • Follow-up       History of Present Illness  The patient came back for follow visit today.  She is been having recurrent bouts of diarrhea, abdominal pain and nausea.    The patient has history of diarrhea off-and-on for the last several years perhaps 12-15 years.  However, more recently for the last 1 year her symptoms have worsened.  The diarrhea is moderate to severe.  Frequency of bowel movements is perhaps 5-6 times per day.  The stools are described as loose and watery.  There is history of tenesmus.  The patient also gets up at night with diarrheal stools.  She denies bright red blood per rectum.  The patient denies recent antibiotics.    There is history of nausea off and on for the last 2 years or so.  Nausea is described as mild.  Frequency being several times a week.  The patient feels nauseated specially in the morning.  There is no associated emesis.  Nauseous feeling may last for several minutes to an hour or so.  There is no association of nausea with eating.    There is history of right periumbilical and adjacent lower abdominal pain off and on for the last year or so.  The pain is sudden in onset and described as mild to moderate cramping and aching sensation.  Frequency being several times a week.  The pain at times improved after a bowel movement.  There are no definite aggravating factors of abdominal pain.    There is no history of overt GI bleed (Hematemesis, melena or hematochezia).  The patient denies reflux.  There is no dysphagia or odynophagia.  There is no recent weight loss.  The patient was noted to have abnormal liver function tests about 6 months or so ago.  The patient had pulmonary embolism last year which required hospitalization.  Currently the patient is on Eliquis twice a day.  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  This  has necessitated an indefinite commitment for anticoagulation.    Review of Systems   Constitutional: Positive for fatigue. Negative for appetite change, chills, fever and unexpected weight change.   HENT: Negative for mouth sores, nosebleeds and trouble swallowing.    Eyes: Negative for discharge and redness.   Respiratory: Negative for apnea, cough and shortness of breath.    Cardiovascular: Negative for chest pain, palpitations and leg swelling.   Gastrointestinal: Positive for abdominal pain, diarrhea and nausea. Negative for abdominal distention, anal bleeding, blood in stool, constipation and vomiting.   Endocrine: Negative for cold intolerance, heat intolerance and polydipsia.   Genitourinary: Negative for dysuria, hematuria and urgency.   Musculoskeletal: Negative for arthralgias, joint swelling and myalgias.   Skin: Negative for rash.   Allergic/Immunologic: Negative for food allergies and immunocompromised state.   Neurological: Positive for dizziness and headaches. Negative for seizures and syncope.   Hematological: Negative for adenopathy. Bruises/bleeds easily.   Psychiatric/Behavioral: Negative for dysphoric mood. The patient is not nervous/anxious and is not hyperactive.      Patient Active Problem List   Diagnosis   • Abnormal CT scan, colon   • Elevated liver function tests   • Diarrhea   • Right upper quadrant pain   • Nausea   • Sinus tachycardia   • Pulmonary embolism on right   • Obesity (BMI 30-39.9)   • Heterozygous factor V Leiden mutation   • Protein S deficiency   • Strep pharyngitis   • Labile hypertension   • Excessive daytime sleepiness   • Obstructive sleep apnea     Past Medical History   Diagnosis Date   • Asthma 2015   • Clotting disorder      factor 5   • Gallstone    • H/O blood clots    • Hypertension    • Kidney infection    • Obesity    • Ovarian cyst    • Protein S deficiency 11/14/2016     labs from hospitalization for PE   • Pulmonary embolism    • Sleep apnea    • Tattoo   "    Past Surgical History   Procedure Laterality Date   • Cholecystectomy     •  section        and    • Upper gastrointestinal endoscopy       Family History   Problem Relation Age of Onset   • Arthritis Mother    • COPD Mother    • Asthma Mother    • Arthritis Father    • Diabetes Father    • Hypertension Father    • Hyperlipidemia Father    • Kidney disease Father    • Heart attack Father    • No Known Problems Son    • Colon cancer Neg Hx    • Liver cancer Neg Hx    • Liver disease Neg Hx    • Stomach cancer Neg Hx    • Esophageal cancer Neg Hx      Social History   Substance Use Topics   • Smoking status: Never Smoker   • Smokeless tobacco: Never Used   • Alcohol use No       Current Outpatient Prescriptions:   •  ELIQUIS 5 MG tablet tablet, TAKE 1 TABLET BY MOUTH EVERY TWELVE HOURS, Disp: , Rfl: 4  •  midodrine (PROAMATINE) 5 MG tablet, Take 1.5 tablets by mouth 3 (Three) Times a Day., Disp: 140 tablet, Rfl: 6  Allergies   Allergen Reactions   • Amoxicillin Rash     Visit Vitals   • /87   • Pulse 95   • Temp 99.1 °F (37.3 °C)   • Resp 20   • Ht 63\" (160 cm)   • Wt 204 lb (92.5 kg)   • LMP 2017   • BMI 36.14 kg/m2     Physical Exam   Constitutional: She is oriented to person, place, and time. She appears well-developed and well-nourished. No distress.   HENT:   Head: Normocephalic and atraumatic.   Right Ear: Hearing and external ear normal.   Left Ear: Hearing and external ear normal.   Nose: Nose normal.   Mouth/Throat: Oropharynx is clear and moist and mucous membranes are normal. Mucous membranes are not pale, not dry and not cyanotic. No oral lesions. No oropharyngeal exudate.   Eyes: Conjunctivae and EOM are normal. Right eye exhibits no discharge. Left eye exhibits no discharge. No scleral icterus.   Neck: Trachea normal. Neck supple. No JVD present. No edema present. No thyroid mass and no thyromegaly present.   Cardiovascular: Normal rate, regular rhythm, S2 normal and " normal heart sounds.  Exam reveals no gallop, no S3 and no friction rub.    No murmur heard.  Pulmonary/Chest: Effort normal and breath sounds normal. No respiratory distress. She has no wheezes. She has no rales. She exhibits no tenderness.   Abdominal: Soft. Normal appearance and bowel sounds are normal. She exhibits no distension, no ascites and no mass. There is no splenomegaly or hepatomegaly. There is tenderness in the right lower quadrant. There is no rigidity, no rebound and no guarding. No hernia.   Musculoskeletal: She exhibits no tenderness or deformity.       Vascular Status -  Her exam exhibits no right foot edema. Her exam exhibits no left foot edema.  Lymphadenopathy:     She has no cervical adenopathy.        Left: No supraclavicular adenopathy present.   Neurological: She is alert and oriented to person, place, and time. She has normal strength. No cranial nerve deficit or sensory deficit. She exhibits normal muscle tone. Coordination normal.   Skin: No rash noted. She is not diaphoretic. No cyanosis. No pallor. Nails show no clubbing.   Psychiatric: She has a normal mood and affect. Her behavior is normal. Judgment and thought content normal.   Nursing note and vitals reviewed.    Laboratory Tests:    Upon review of medical records:    Dated 8/31/2016 sodium 146 potassium 3.5 chloride 105 CO2 24 BUN 11 creatinine 0.7 glucose 98 calcium 9.7 total bilirubin 0.7 AST 1:15  albumin 4.5 alkaline phosphatase 92 lipase 75 WBC 6.0 hemoglobin 15.0 hematocrit 43 platelet count 288 MCV 88.7  Urine nitrite negative, leukocyte esterase urine negative, bacteria 2+ urine   Stool for C. difficile: Negative for Clostridium difficile toxin A/B  Stool for ONP: No ova, cysts, or parasites seen. Fecal leukocytes: No WBCs seen. Stool culture: No enteric pathogens isolated. No yersinia enterocolitica, Campylobacter jejuni, or Escherichia coli isolated.    Dated September 22, 2016 sodium 141 potassium 4.4 chloride  102 CO2 26 BUN 13 serum creatinine 0.7 glucose 80.  Calcium 9.6.  Albumin 4.5.  T bili 0.8.  AST 19 ALT 35 alkaline phosphatase 81.  WBC 10.1 hemoglobin 14.3 hematocrit 42 MCV 91.5 and platelet count 291.  Hepatitis A IgM negative.  Hepatitis A total antibody negative.  Hepatitis B surface antigen negative.  Hepatitis B core IgM negative.  Hepatitis B core total antibody negative.  Hepatitis C antibody negative at < 0.1.     Dated November 13, 2016 sodium 140 potassium 3.6 chloride 102 CO2 27 BUN 12 serum creatinine 0.8 glucose 96.  Calcium 9.6.  Albumin 4.2.  T bili 0.5.  AST 21 ALT 26 alkaline phosphatase 76.  Lipase 68.  Magnesium 1.9.  TSH 1.49.  PT 11.4.  INR 1.0.  PTT 26.  WBC 10.2 hemoglobin 13.8 hematocrit 40 MCV 89.8 and platelet count 306.  Dated November 14, 2016 total cholesterol 204.  Triglycerides 285.    Dated February 18, 2017 sodium 146 potassium 4.1 chloride 106 CO2 29 BUN 9 serum creatinine 0.70 and glucose 96.  Calcium 9.8.  WBC 7.37 hemoglobin 14.5 hematocrit 42.7 MCV 90.5 and platelet count 363.     Abdominal Imaging:   Upon review of records:     X-ray of abdomen 2 view reveals flat and upright views of the abdomen demonstrate an unremarkable bowel gas pattern. There is no free air or bowel dilatation. Note is made of prior cholecystectomy.     CT of abdomen and pelvis with contrast dated 8/31/2016 reveals bilateral lower lobe atelectasis. The liver, spleen, pancreas, adrenal glands and kidneys are unremarkable. The bowel gas pattern is nonobstructive. The appendix appears normal. There is long segment adenomatous wall thickening of the ileum suggesting inflammatory bowel disease. Uterus and ovaries are unremarkable with minimal free fluid in the cul-de-sac. There are shotty mesenteric lymph nodes.    Dated January 7, 2017 the patient underwent a CT of the abdomen and pelvis with contrast which revealed: Bilateral lower lobe atelectasis.  Liver, spleen, pancreas, adrenal glands and kidneys are  unremarkable.  Bowel gas pattern is nonobstructive.  There is no wall thickening or mesenteric inflammatory stranding.  The appendix appears normal.  Uterus and left ovary are unremarkable.  There is a 2 cm partially collapsed right ovarian cyst.  The bladder is normal in contour.  There is no free fluid, free air or adenopathy.     Notes:     1. No history of IVDA.  2. There is a history of tattoos.  3. No history of blood transfusions.  4. No history of alcohol use.      Assessment and Plan:      Marilu was seen today for follow-up.    Diagnoses and all orders for this visit:    Diarrhea, unspecified type  Comments:  Recurrent chronic diarrhea associated with tenesmus.  Bile acid diarrhea is a possibility, however with abnormal CT Crohn's disease is of concern.      Periumbilical abdominal pain  Comments:  Likely secondary to terminal ileitis.    Nausea  Comments:  Likely multifactorial  Including terminal ileitis.    Abnormal liver function tests  Comments:  History of significant elevation of AST and ALT in 200 range in the past.  More recent laboratory tests are normal.  Passage of her small stone is a possibility          Discussion:       Plan     Patient Instructions       Low lactose-low-fat-low residue diet.  It is recommended that the patient should undergo endoscopic GI evaluation including colonoscopy and upper endoscopy.    Colonoscopy and upper endoscopy: Description of the procedures, risks benefits alternatives and options including non-operative options were discussed with the patient and her  in detail.  She will need to hold Eliquis (Apixaban) for at least 24 hours prior to and perhaps sometime after the procedures specially after the biopsies.  We will discuss with pulmonary service regarding the issue folding the anticoagulation and whether if a bridge to undergo a short acting medications such as heparin is needed.  The patient has an appointment with Dr. Duvall from pulmonary service  in the future.  The patient has been advised to call back in one week if she does not hear from us.       Patient Care Team:  Deidre Barker MD as PCP - General (Family Medicine)    Jignesh Selby MD

## 2017-02-22 ENCOUNTER — TELEPHONE (OUTPATIENT)
Dept: INTERNAL MEDICINE | Facility: CLINIC | Age: 27
End: 2017-02-22

## 2017-02-22 NOTE — TELEPHONE ENCOUNTER
SPOKE WITH PATIENT'S INSURANCE ABOUT PA FOR ELIQUIS, ADVISED THEM THIS WAS URGENT. THEY HAVE MARKED IT AS URGENT, AND STILL TURN AROUND TIME COULD BE 24HOURS. WE DO NOT HAVE SAMPLES. OBTAINED SAMPLES FROM CARDIOLOGY OF ELIQUIS 5MG (2 BOXES - 28 TABLETS).    PATIENT NOTIFIED. SHE IS GOING TO SEE IF HER  CAN LEAVE WORK EARLY ([facility]) AND  THE SAMPLES BEFORE 5.

## 2017-02-22 NOTE — TELEPHONE ENCOUNTER
----- Message from Alana Bojorquez sent at 2/22/2017 12:12 PM EST -----  Contact: PATIENT  Patient called today stating that the pharmacy is requiring a PA for her Eloquis and they sent over a fax for this a few minutes ago. Patient states she will be out of it tonight. Patient would like an update, when possible.[phone]

## 2017-02-22 NOTE — PATIENT INSTRUCTIONS
Low lactose-low-fat-low residue diet.  It is recommended that the patient should undergo endoscopic GI evaluation including colonoscopy and upper endoscopy.    Colonoscopy and upper endoscopy: Description of the procedures, risks benefits alternatives and options including non-operative options were discussed with the patient and her  in detail.  She will need to hold Eliquis (Apixaban) for at least 24 hours prior to and perhaps sometime after the procedures specially after the biopsies.  We will discuss with pulmonary service regarding the issue folding the anticoagulation and whether if a bridge to undergo a short acting medications such as heparin is needed.  The patient has an appointment with Dr. Duvall from pulmonary service in the future.  The patient has been advised to call back in one week if she does not hear from us.

## 2017-02-23 ENCOUNTER — TELEPHONE (OUTPATIENT)
Dept: INTERNAL MEDICINE | Facility: CLINIC | Age: 27
End: 2017-02-23

## 2017-02-23 NOTE — TELEPHONE ENCOUNTER
Please let this patient know that her insurance denied the PA for Eliquis, they prefer Xarelto. I've sent that to her pharmacy.

## 2017-02-24 ENCOUNTER — OFFICE VISIT (OUTPATIENT)
Dept: INTERNAL MEDICINE | Facility: CLINIC | Age: 27
End: 2017-02-24

## 2017-02-24 VITALS
OXYGEN SATURATION: 96 % | DIASTOLIC BLOOD PRESSURE: 80 MMHG | BODY MASS INDEX: 35.65 KG/M2 | HEIGHT: 63 IN | HEART RATE: 92 BPM | TEMPERATURE: 98.8 F | WEIGHT: 201.2 LBS | SYSTOLIC BLOOD PRESSURE: 132 MMHG

## 2017-02-24 DIAGNOSIS — R93.3 ABNORMAL CT SCAN, COLON: ICD-10-CM

## 2017-02-24 DIAGNOSIS — R06.02 SHORTNESS OF BREATH: ICD-10-CM

## 2017-02-24 DIAGNOSIS — D68.59 PROTEIN S DEFICIENCY (HCC): Primary | ICD-10-CM

## 2017-02-24 DIAGNOSIS — R09.89 LABILE HYPERTENSION: ICD-10-CM

## 2017-02-24 PROCEDURE — 99214 OFFICE O/P EST MOD 30 MIN: CPT | Performed by: FAMILY MEDICINE

## 2017-02-24 NOTE — PROGRESS NOTES
Subjective   Marilu Godoy is a 26 y.o. female.     History of Present Illness   Patient here to follow-up.  Since seen her she has been evaluated by gastroenterology who is wanting to do a colonoscopy with biopsies to assess for Crohn's disease.  That specialist is awaiting word from pulmonary whether patient needs to be bridged for anticoagulation.  Patient unsure she wants to proceed with biopsy.  She was in the ER recently for chest pain.  She also had some shortness of breath.  She felt a tightness in her chest.  She underwent scan for blood clot which was negative.  She was told she may need breathing studies done.  While she is at the ER and placed on oxygen she felt her breathing was much improved.  Since discharge from the ER she continues to have shortness of breath at times.  She continues to take Midrin to keep her blood pressure up.  She is taking Eliquis with samples, insurance is requiring a PA for Xarelto even though that is the desired drug for them.  She is frustrated that she has to change and still have a prior authorization.     The following portions of the patient's history were reviewed and updated as appropriate: allergies, current medications, past family history, past medical history, past social history, past surgical history and problem list.    Review of Systems   Constitutional: Negative for activity change.   Respiratory: Positive for shortness of breath.    Cardiovascular: Positive for chest pain.       Objective   Physical Exam   Constitutional: She is oriented to person, place, and time. Vital signs are normal. She appears well-developed and well-nourished. She is active. She does not appear ill.   Appears stated age. Well groomed. Obese     HENT:   Head: Normocephalic and atraumatic.   Right Ear: Hearing normal.   Left Ear: Hearing normal.   Nose: Nose normal.   Eyes: EOM and lids are normal. Pupils are equal, round, and reactive to light.   Cardiovascular: Normal rate,  regular rhythm and normal heart sounds.    Pulmonary/Chest: Effort normal and breath sounds normal.   Neurological: She is alert and oriented to person, place, and time. She displays no tremor. No cranial nerve deficit. Gait normal.   Skin: Skin is warm. No bruising noted. She is not diaphoretic. No cyanosis.   Psychiatric: She has a normal mood and affect. Her speech is normal and behavior is normal. Judgment and thought content normal.   Nursing note and vitals reviewed.      I personally reviewed her lab work and CT PE protocol from her ER visit in the last week.  CT PE protocol was negative.  Troponin negative.    Assessment/Plan   Marilu was seen today for chest pain.    Diagnoses and all orders for this visit:    Protein S deficiency    Shortness of breath    Abnormal CT scan, colon    Labile hypertension      Discussed patient's conditions with her.  Needs to stay on medicine similar to several toe lifelong due to protein S deficiency.  She is also heterozygous for factor V Leiden deficiency.  Discussed with her Crohn's disease and the biopsy is required to formally diagnose.  She is scheduled to see Dr. Duvall in April, I'm going to ask if we can get her in sooner as she is having some breathing issues.  Continue Eliquis samples, Xarelto prior authorization will be completed.  Monitor for side effects.  Continue pressor for blood pressure she continues to have episodes of dropping.

## 2017-03-01 ENCOUNTER — TELEPHONE (OUTPATIENT)
Dept: INTERNAL MEDICINE | Facility: CLINIC | Age: 27
End: 2017-03-01

## 2017-03-03 ENCOUNTER — OFFICE VISIT (OUTPATIENT)
Dept: INTERNAL MEDICINE | Facility: CLINIC | Age: 27
End: 2017-03-03

## 2017-03-03 VITALS
BODY MASS INDEX: 35.9 KG/M2 | OXYGEN SATURATION: 98 % | HEART RATE: 103 BPM | SYSTOLIC BLOOD PRESSURE: 122 MMHG | TEMPERATURE: 98.8 F | HEIGHT: 63 IN | DIASTOLIC BLOOD PRESSURE: 72 MMHG | WEIGHT: 202.6 LBS

## 2017-03-03 DIAGNOSIS — J02.9 SORE THROAT: Primary | ICD-10-CM

## 2017-03-03 DIAGNOSIS — J02.0 STREP PHARYNGITIS: ICD-10-CM

## 2017-03-03 LAB
EXPIRATION DATE: ABNORMAL
INTERNAL CONTROL: ABNORMAL
Lab: ABNORMAL
S PYO AG THROAT QL: POSITIVE

## 2017-03-03 PROCEDURE — 99213 OFFICE O/P EST LOW 20 MIN: CPT | Performed by: FAMILY MEDICINE

## 2017-03-03 PROCEDURE — 87880 STREP A ASSAY W/OPTIC: CPT | Performed by: FAMILY MEDICINE

## 2017-03-03 RX ORDER — FLUCONAZOLE 150 MG/1
150 TABLET ORAL ONCE
Qty: 1 TABLET | Refills: 1 | Status: SHIPPED | OUTPATIENT
Start: 2017-03-03 | End: 2017-03-03

## 2017-03-03 RX ORDER — AZITHROMYCIN 250 MG/1
TABLET, FILM COATED ORAL
Qty: 6 TABLET | Refills: 0 | Status: SHIPPED | OUTPATIENT
Start: 2017-03-03 | End: 2017-03-10

## 2017-03-03 RX ORDER — OFLOXACIN 3 MG/ML
5 SOLUTION AURICULAR (OTIC) DAILY
Qty: 5 ML | Refills: 0 | Status: SHIPPED | OUTPATIENT
Start: 2017-03-03 | End: 2017-03-08

## 2017-03-03 NOTE — PROGRESS NOTES
Subjective   Marilu Godoy is a 26 y.o. female.     Sore Throat    This is a recurrent problem. The current episode started yesterday. The problem has been gradually worsening. The pain is worse on the left side. The maximum temperature recorded prior to her arrival was 102 - 102.9 F. The fever has been present for 1 to 2 days. The pain is at a severity of 7/10. Associated symptoms include coughing and headaches. She has had exposure to strep. She has had no exposure to mono.   Fatigue   Associated symptoms include coughing, fatigue, headaches and a sore throat.   Cough   Associated symptoms include headaches and a sore throat.        The following portions of the patient's history were reviewed and updated as appropriate: allergies, current medications, past family history, past medical history, past social history, past surgical history and problem list.    Review of Systems   Constitutional: Positive for fatigue.   HENT: Positive for sore throat.    Respiratory: Positive for cough.    Neurological: Positive for headaches.       Objective   Physical Exam   Constitutional: She is oriented to person, place, and time. Vital signs are normal. She appears well-developed and well-nourished. She appears ill.   HENT:   Head: Normocephalic and atraumatic.   Right Ear: Hearing, tympanic membrane, external ear and ear canal normal.   Left Ear: Hearing, external ear and ear canal normal. There is drainage (debris in canal). Tympanic membrane is retracted.   Nose: Nose normal.   Mouth/Throat: Uvula is midline and mucous membranes are normal. Normal dentition. Posterior oropharyngeal erythema (petechiae) present. No oropharyngeal exudate.   Eyes: EOM and lids are normal. Pupils are equal, round, and reactive to light.   Neck: Neck supple.   Cardiovascular: Normal rate, regular rhythm and normal heart sounds.    Pulmonary/Chest: Effort normal and breath sounds normal. No stridor.   Lymphadenopathy:     She has cervical  adenopathy.        Left cervical: Superficial cervical (tender) adenopathy present.   Neurological: She is alert and oriented to person, place, and time. No cranial nerve deficit.   Skin: Skin is warm. She is not diaphoretic.   Psychiatric: She has a normal mood and affect. Her speech is normal and behavior is normal.   Nursing note and vitals reviewed.    Office Visit on 03/03/2017   Component Date Value Ref Range Status   • Rapid Strep A Screen 03/03/2017 Positive* Negative, VALID, INVALID, Not Performed Final   • Internal Control 03/03/2017 Passed  Passed Final   • Lot Number 03/03/2017 [id number]   Final   • Expiration Date 03/03/2017 6/2018   Final         Assessment/Plan   Marilu was seen today for sore throat, fatigue and cough.    Diagnoses and all orders for this visit:    Sore throat  -     POCT rapid strep A    Strep pharyngitis  -     azithromycin (ZITHROMAX) 250 MG tablet; Take 2 tablets the first day, then 1 tablet daily for 4 days.  -     fluconazole (DIFLUCAN) 150 MG tablet; Take 1 tablet by mouth 1 (One) Time for 1 dose.  -     ofloxacin (FLOXIN OTIC) 0.3 % otic solution; Administer 5 drops into the left ear Daily for 5 days.      I encourage patient to consider ear, nose, throat referral.  Discussed the problem of her anticoagulation, would have to, off for tonsillectomy and likely would require hospitalization.  She will consider.

## 2017-03-10 ENCOUNTER — TELEPHONE (OUTPATIENT)
Dept: INTERNAL MEDICINE | Facility: CLINIC | Age: 27
End: 2017-03-10

## 2017-03-10 ENCOUNTER — OFFICE VISIT (OUTPATIENT)
Dept: INTERNAL MEDICINE | Facility: CLINIC | Age: 27
End: 2017-03-10

## 2017-03-10 ENCOUNTER — HOSPITAL ENCOUNTER (OUTPATIENT)
Dept: CT IMAGING | Facility: HOSPITAL | Age: 27
Discharge: HOME | End: 2017-03-10
Admitting: FAMILY MEDICINE

## 2017-03-10 VITALS
OXYGEN SATURATION: 98 % | HEART RATE: 105 BPM | HEIGHT: 63 IN | TEMPERATURE: 98.9 F | BODY MASS INDEX: 35.86 KG/M2 | SYSTOLIC BLOOD PRESSURE: 122 MMHG | DIASTOLIC BLOOD PRESSURE: 85 MMHG | WEIGHT: 202.4 LBS

## 2017-03-10 DIAGNOSIS — R06.02 SHORTNESS OF BREATH: Primary | ICD-10-CM

## 2017-03-10 DIAGNOSIS — R07.81 PLEURITIC PAIN: ICD-10-CM

## 2017-03-10 DIAGNOSIS — I26.99 PULMONARY EMBOLISM ON RIGHT (HCC): ICD-10-CM

## 2017-03-10 DIAGNOSIS — D68.51 HETEROZYGOUS FACTOR V LEIDEN MUTATION (HCC): ICD-10-CM

## 2017-03-10 DIAGNOSIS — R93.3 ABNORMAL CT SCAN, COLON: ICD-10-CM

## 2017-03-10 DIAGNOSIS — R19.7 DIARRHEA, UNSPECIFIED TYPE: ICD-10-CM

## 2017-03-10 DIAGNOSIS — R06.02 SHORTNESS OF BREATH: ICD-10-CM

## 2017-03-10 DIAGNOSIS — D68.59 PROTEIN S DEFICIENCY (HCC): ICD-10-CM

## 2017-03-10 DIAGNOSIS — R11.0 NAUSEA: ICD-10-CM

## 2017-03-10 DIAGNOSIS — R10.11 RIGHT UPPER QUADRANT PAIN: ICD-10-CM

## 2017-03-10 PROCEDURE — 0 IOPAMIDOL 61 % SOLUTION: Performed by: FAMILY MEDICINE

## 2017-03-10 PROCEDURE — 99214 OFFICE O/P EST MOD 30 MIN: CPT | Performed by: FAMILY MEDICINE

## 2017-03-10 PROCEDURE — 71275 CT ANGIOGRAPHY CHEST: CPT

## 2017-03-10 PROCEDURE — 93000 ELECTROCARDIOGRAM COMPLETE: CPT | Performed by: FAMILY MEDICINE

## 2017-03-10 RX ORDER — APIXABAN 5 MG/1
5 TABLET, FILM COATED ORAL EVERY 12 HOURS SCHEDULED
Qty: 60 TABLET | Refills: 4 | Status: SHIPPED | OUTPATIENT
Start: 2017-03-10 | End: 2017-03-24

## 2017-03-10 RX ORDER — METHYLPREDNISOLONE 4 MG/1
TABLET ORAL
Qty: 1 EACH | Refills: 0 | Status: SHIPPED | OUTPATIENT
Start: 2017-03-10 | End: 2017-03-24

## 2017-03-10 RX ADMIN — IOPAMIDOL 95 ML: 612 INJECTION, SOLUTION INTRAVENOUS at 17:15

## 2017-03-10 NOTE — PROGRESS NOTES
Subjective   Marilu Godoy is a 26 y.o. female.     Chest Pain    This is a recurrent problem. The current episode started 1 to 4 weeks ago. The problem has been gradually worsening. The quality of the pain is described as burning. The pain radiates to the mid back. Associated symptoms include abdominal pain, a cough, a fever, headaches and shortness of breath. The cough is non-productive.   Her past medical history is significant for PE. Prior diagnostic workup includes echocardiogram.   Sore Throat    This is a recurrent problem. The current episode started 1 to 4 weeks ago. The problem has been unchanged. The pain is worse on the left side. The maximum temperature recorded prior to her arrival was 100 - 100.9 F. The fever has been present for 5 days or more. The pain is at a severity of 6/10. Associated symptoms include abdominal pain, coughing, diarrhea, headaches, neck pain and shortness of breath. She has had exposure to strep. She has had no exposure to mono. Treatments tried: azithromycin. The treatment provided no relief.   Fever    This is a recurrent problem. The problem has been waxing and waning. The maximum temperature noted was 101 to 101.9 F. Associated symptoms include abdominal pain, chest pain, coughing, diarrhea, headaches and a sore throat.   Risk factors: no immunosuppression    Diarrhea    This is a recurrent problem. Diarrhea characteristics: stools loosser than normal. Associated symptoms include abdominal pain, coughing, a fever and headaches. Risk factors include recent antibiotic use.    patient worried as symptoms are the same as when she was diagnosed with a pulmonary embolism.  Patient developed PE while using birth control patch but was found to have a protein S deficiency and is heterozygous for factor V Leiden mutation.  Was on Eliquis but insurance would not pay for it so it was switched to Xarelto.  Since switching to Xarelto she has had more upset stomach and diarrhea and  feels more tired.  Patient was seen in the ER on February 18 for similar symptoms but they have worsened since then.  She was not having the burning between her shoulder blades as she is now.  Has a history of asthma and is not on inhalers at this time.  In the last year so she was treated for bronchitis with albuterol, it helped with that symptoms are different at this time.  She is also having a lot of pain on the right side of her abdomen.  She is status post cholecystectomy.  No late periods, due to start her period next week.  She was supposed to be set up for a colonoscopy to check for inflammatory bowel disease but has been reluctant to do so due to her anticoagulate use, need for admission.    The following portions of the patient's history were reviewed and updated as appropriate: allergies, current medications, past family history, past medical history, past social history, past surgical history and problem list.    Review of Systems   Constitutional: Positive for fever.   HENT: Positive for sore throat.    Respiratory: Positive for cough and shortness of breath.    Cardiovascular: Positive for chest pain. Negative for leg swelling.   Gastrointestinal: Positive for abdominal pain and diarrhea. Negative for blood in stool.   Musculoskeletal: Positive for neck pain.   Neurological: Positive for headaches.       Objective     Physical Exam   Constitutional: She is oriented to person, place, and time. Vital signs are normal. She appears well-developed and well-nourished. She is active. She does not appear ill.   Appears stated age. Well groomed. Obese     HENT:   Head: Normocephalic and atraumatic.   Right Ear: Hearing normal.   Left Ear: Hearing normal.   Nose: Nose normal.   Eyes: EOM and lids are normal. Pupils are equal, round, and reactive to light.   Cardiovascular: Normal rate, regular rhythm and normal heart sounds.  Exam reveals no distant heart sounds.    No murmur heard.  No murmur appreciated with  patient sitting and lying.  No pitting edema noted to the legs.   Pulmonary/Chest: Effort normal and breath sounds normal.   Abdominal: Soft. Bowel sounds are normal. She exhibits no mass. There is no hepatosplenomegaly. There is tenderness in the right upper quadrant and right lower quadrant. There is no rigidity, no rebound and no guarding.   Neurological: She is alert and oriented to person, place, and time. She displays no tremor. No cranial nerve deficit. Gait normal.   Skin: Skin is warm. No bruising noted. She is not diaphoretic. No cyanosis.   Psychiatric: Her speech is normal and behavior is normal. Judgment and thought content normal. Her mood appears anxious.   Nursing note and vitals reviewed.      ECG 12 Lead  Date/Time: 3/10/2017 2:33 PM  Performed by: THANH GIRON  Authorized by: THANH GIRON   Comparison: compared with previous ECG from 2/18/2017  Comparison to previous ECG: Little change.  On our EKG today aVF does not have as higher voltage that this could be lead placement.  Rhythm: sinus rhythm  Rate: normal  BPM: 96  Conduction: conduction normal  ST Segments: ST segments normal  T Waves: T waves normal  QRS axis: normal  Other: no other findings  Clinical impression: normal ECG  Comments: No Q waves, no changes that support pulmonary embolism.            Assessment/Plan   Marilu was seen today for chest pain, sore throat, fever, diarrhea and follow-up.    Diagnoses and all orders for this visit:    Shortness of breath  -     CT Chest Pulmonary Embolism With Contrast; Future    Diarrhea, unspecified type    Nausea    Right upper quadrant pain    Heterozygous factor V Leiden mutation  -     CT Chest Pulmonary Embolism With Contrast; Future  -     ELIQUIS 5 MG tablet tablet; Take 1 tablet by mouth Every 12 (Twelve) Hours.    Protein S deficiency  -     CT Chest Pulmonary Embolism With Contrast; Future  -     ELIQUIS 5 MG tablet tablet; Take 1 tablet by mouth Every 12 (Twelve)  Hours.    Abnormal CT scan, colon    Pleuritic pain  -     CT Chest Pulmonary Embolism With Contrast; Future    Pulmonary embolism on right  -     ELIQUIS 5 MG tablet tablet; Take 1 tablet by mouth Every 12 (Twelve) Hours.      Discussed situation with patient and .  Concerning that her symptoms are the exact same as when she was diagnosed with her pulmonary embolism.  Although she was scanned for PE on February 18, her symptoms have worsened.  She expresses desire to have scan to rule out ulnar embolism, will feel more comfortable having ruled out.  She is on Xarelto as insurance would not cover Eliquis.  Since starting Xarelto she admits she has not felt this good and possible she would like to go back to Eliquis.  Abdominal issues may be related to possible inflammatory bowel disease.  She has been seen by Dr. Sebly who was going to discuss with Dr. Duvall the ability to stop anticoagulation for scope.  Patient has not heard anything from Dr. Selby's office and I encouraged her to give them a call.

## 2017-03-10 NOTE — TELEPHONE ENCOUNTER
CT PE protocol negative.  I called patient to let her know this.  I'm wondering if her burning could be pleurisy.  Expanders no test for this.  She cannot do NSAIDs so I'm sending a Medrol Dosepak for her to try.  She is to keep me informed if she is not getting better.

## 2017-03-13 ENCOUNTER — OFFICE VISIT (OUTPATIENT)
Dept: CARDIOLOGY | Facility: CLINIC | Age: 27
End: 2017-03-13

## 2017-03-13 DIAGNOSIS — R00.0 SINUS TACHYCARDIA: ICD-10-CM

## 2017-03-13 PROCEDURE — 93272 ECG/REVIEW INTERPRET ONLY: CPT | Performed by: INTERNAL MEDICINE

## 2017-03-15 ENCOUNTER — OFFICE VISIT (OUTPATIENT)
Dept: PULMONOLOGY | Facility: CLINIC | Age: 27
End: 2017-03-15

## 2017-03-15 VITALS
BODY MASS INDEX: 35.79 KG/M2 | RESPIRATION RATE: 16 BRPM | WEIGHT: 202 LBS | HEART RATE: 89 BPM | DIASTOLIC BLOOD PRESSURE: 66 MMHG | OXYGEN SATURATION: 99 % | SYSTOLIC BLOOD PRESSURE: 112 MMHG | HEIGHT: 63 IN

## 2017-03-15 DIAGNOSIS — G47.33 OSA (OBSTRUCTIVE SLEEP APNEA): Primary | ICD-10-CM

## 2017-03-15 DIAGNOSIS — E66.9 OBESITY (BMI 30-39.9): ICD-10-CM

## 2017-03-15 DIAGNOSIS — R09.1 PLEURISY: ICD-10-CM

## 2017-03-15 DIAGNOSIS — I26.99 OTHER PULMONARY EMBOLISM WITHOUT ACUTE COR PULMONALE, UNSPECIFIED CHRONICITY (HCC): ICD-10-CM

## 2017-03-15 PROCEDURE — 99214 OFFICE O/P EST MOD 30 MIN: CPT | Performed by: INTERNAL MEDICINE

## 2017-03-15 NOTE — PROGRESS NOTES
"Chief Complaint   Patient presents with   • Follow-up         Subjective   Marilu Godoy is a 26 y.o. female.     History of Present Illness   Comes in today before her scheduled appointment due to issues with \"burning sensation on deep breathing\".  She actually developed symptoms late in February and came to the emergency room where a CT of the chest was performed which is negative for pulmonary embolism.  She later on went to her primary care provider with the same symptoms and another CT of the chest was performed which was once again negative for pulmonary embolism.    She describes her pain as 2 in the shoulder blades which is usually present on deep inspiration.  She also describes significant improvement in her symptoms since she was started on Medrol Dosepak by her primary care provider.  She actually just finished the Dosepak today.    Patient comes back today to discuss the results of Sleep study.     I discussed the results of sleep study with the patient, in detail. I have told the patient that the she had mild sleep apnea based on the home study.    Patient does report some improvement in daytime sleepiness and tiredness since she started using the auto Pap with nasal mask, although she is taking the mask off in her sleep.    The following portions of the patient's history were reviewed and updated as appropriate: allergies, current medications, past family history, past medical history, past social history and past surgical history.    Review of Systems   HENT: Negative for sinus pressure, sneezing and sore throat.    Respiratory: Positive for chest tightness. Negative for cough, shortness of breath and wheezing.    Cardiovascular: Negative for palpitations and leg swelling.       Objective   Visit Vitals   • /66   • Pulse 89   • Resp 16   • Ht 63\" (160 cm)   • Wt 202 lb (91.6 kg)   • LMP 02/18/2017   • SpO2 99%   • BMI 35.78 kg/m2     Physical Exam  Constitutional:           General: No " acute distress noted.     ENT:           Mallempati III/IV. Crowded oropharynx.            Tonsils not erythematous or enlarged.    Neck:           Increased adipose tissue noted.     Cardiovascular:              S1 + S2.  Regular    Respiratory:          Respiratory effort was normal          Normal to percussion.          Good Air Entry Bilaterally with no wheezing or crackles heard.    Musculoskeletal:           Normal Gait.            No significant edema noted     Neurologic:           AAO x 3.            Affect was appropriate      Assessment/Plan   Marilu was seen today for follow-up.    Diagnoses and all orders for this visit:    AMABR (obstructive sleep apnea)    Other pulmonary embolism without acute cor pulmonale, unspecified chronicity    Pleurisy  -     KIERA Screen; Future    Obesity (BMI 30-39.9)         Return in about 6 weeks (around 4/26/2017) for Recheck, Labs.    DISCUSSION (if any):  I had a very long discussion with the patient and reviewed the CT scan images with her and her family members.  It is quite evident based on the CT reviews, that there is no further identification of pulmonary embolism.  She was advised to continue her current anticoagulant therapy.    Her symptoms of pleurisy could be from multiple etiologies including viral infection versus autoimmune process.  Although she had some basic workup in November, I would repeat KIERA with reflex.    I told the patient to call this office if she develops any further episodes of pleurisy.  I also instructed the patient to try NSAIDs if the symptoms recur.  She was advised to drink plenty of water while taking NSAIDs.    She might need another prednisone taper, if she has symptoms which are refractory to NSAIDs.    She clearly has obstructive sleep apnea along with symptoms and therefore will benefit from use of auto Pap.  Advised her to try to use the nasal mask during the daytime, so as to get used to it.    As far as the lung nodule is  concerned, it is only 5 mm and she is a low risk patient and if necessary, a repeat CT can be considered in 1 year time.  It should be noted however, that a CT scan only 20 days ago, did not reveal any lung nodule whatsoever.       Errors in dictation may reflect use of voice recognition software and not all errors in transcription may have been detected prior to signing    This document was electronically signed by Art Duvall MD March 15, 2017  11:05 AM

## 2017-03-16 ENCOUNTER — TELEPHONE (OUTPATIENT)
Dept: INTERNAL MEDICINE | Facility: CLINIC | Age: 27
End: 2017-03-16

## 2017-03-24 ENCOUNTER — OFFICE VISIT (OUTPATIENT)
Dept: INTERNAL MEDICINE | Facility: CLINIC | Age: 27
End: 2017-03-24

## 2017-03-24 VITALS
HEART RATE: 116 BPM | WEIGHT: 202 LBS | BODY MASS INDEX: 35.79 KG/M2 | HEIGHT: 63 IN | SYSTOLIC BLOOD PRESSURE: 110 MMHG | TEMPERATURE: 98.7 F | DIASTOLIC BLOOD PRESSURE: 68 MMHG | OXYGEN SATURATION: 100 %

## 2017-03-24 DIAGNOSIS — K21.9 GASTROESOPHAGEAL REFLUX DISEASE, ESOPHAGITIS PRESENCE NOT SPECIFIED: Primary | ICD-10-CM

## 2017-03-24 DIAGNOSIS — R10.31 RIGHT LOWER QUADRANT ABDOMINAL PAIN: ICD-10-CM

## 2017-03-24 DIAGNOSIS — R10.13 INTERMITTENT EPIGASTRIC ABDOMINAL PAIN: ICD-10-CM

## 2017-03-24 PROCEDURE — 99213 OFFICE O/P EST LOW 20 MIN: CPT | Performed by: PHYSICIAN ASSISTANT

## 2017-03-24 RX ORDER — PANTOPRAZOLE SODIUM 40 MG/1
40 TABLET, DELAYED RELEASE ORAL DAILY
Qty: 30 TABLET | Refills: 5 | Status: SHIPPED | OUTPATIENT
Start: 2017-03-24 | End: 2017-10-14 | Stop reason: SDUPTHER

## 2017-03-24 NOTE — PROGRESS NOTES
Marilu Godoy is a 26 y.o. female.     Subjective   History of Present Illness   Here today with concern of nausea, diarrhea, abdominal pain and pain in the chest which radiates to the back between her shoulder blades for the past month or more. Also reports fatigue. Burning in chest is worsening because is becoming more frequent but not worsening in intensity. Has appointment with Dr. Selby next month. Treated for acid reflux in the past.         The following portions of the patient's history were reviewed and updated as appropriate: allergies, current medications, past family history, past medical history, past social history, past surgical history and problem list.    Review of Systems    Constitutional: fatigue. Negative for appetite change, chills, fever and unexpected weight change.   HENT: Negative for congestion, ear pain, hearing loss, nosebleeds, postnasal drip, rhinorrhea, sore throat, tinnitus and trouble swallowing.    Eyes: Negative for photophobia, discharge and visual disturbance.   Respiratory: Negative for cough, chest tightness, shortness of breath and wheezing.    Cardiovascular: chest pain radiating to the back. Negative for palpitations and leg swelling.   Gastrointestinal: abdominal pain, nausea, diarrhea. Negative for abdominal distention, blood in stool, constipation.  Endocrine: Negative for cold intolerance, heat intolerance, polydipsia, polyphagia and polyuria.   Musculoskeletal: Negative for arthralgias, back pain, joint swelling, myalgias, neck pain and neck stiffness.   Skin: Negative for color change, pallor, rash and wound.   Allergic/Immunologic: Negative for environmental allergies, food allergies and immunocompromised state.   Neurological: Negative for dizziness, tremors, seizures, weakness, numbness and headaches.   Hematological: Negative for adenopathy. Does not bruise/bleed easily.   Psychiatric/Behavioral: Negative for sleep disturbances, agitation, behavioral problems,  "confusion, hallucinations, self-injury and suicidal ideas. The patient is not nervous/anxious.      Objective    Physical Exam  Constitutional: Oriented to person, place, and time. Appears well-developed and well-nourished.   HENT:   Head: Normocephalic and atraumatic.   Eyes: EOM are normal. Pupils are equal, round, and reactive to light.   Neck: Normal range of motion. Neck supple.   Cardiovascular: Normal rate, regular rhythm and normal heart sounds.    Pulmonary/Chest: Effort normal and breath sounds normal. No respiratory distress.  Has no wheezes or rales. Exhibits no chest wall tenderness.   Abdominal: right lower abdominal tenderness, epigastric tenderness. Soft. Bowel sounds are normal. Exhibits no distension and no mass.   Musculoskeletal: Normal range of motion. Exhibits no tenderness.   Neurological: Alert and oriented to person, place, and time.   Skin: Skin is warm and dry.   Psychiatric: Has a normal mood and affect. Behavior is normal. Judgment and thought content normal.       /68  Pulse 116  Temp 98.7 °F (37.1 °C)  Ht 63\" (160 cm)  Wt 202 lb (91.6 kg)  SpO2 100%  BMI 35.78 kg/m2    Nursing note and vitals reviewed.        Assessment/Plan   Marilu was seen today for fatigue, nausea, diarrhea, gi problem and burning between shoulder blades to chest.    Diagnoses and all orders for this visit:    Gastroesophageal reflux disease, esophagitis presence not specified  -     pantoprazole (PROTONIX) 40 MG EC tablet; Take 1 tablet by mouth Daily.    Right lower quadrant abdominal pain    Intermittent epigastric abdominal pain  -     pantoprazole (PROTONIX) 40 MG EC tablet; Take 1 tablet by mouth Daily.        Follow up with Dr. Selby as scheduled.            "

## 2017-04-05 ENCOUNTER — PREP FOR SURGERY (OUTPATIENT)
Dept: GASTROENTEROLOGY | Facility: CLINIC | Age: 27
End: 2017-04-05

## 2017-04-05 RX ORDER — SODIUM CHLORIDE 0.9 % (FLUSH) 0.9 %
1-10 SYRINGE (ML) INJECTION AS NEEDED
Status: CANCELLED | OUTPATIENT
Start: 2017-04-05

## 2017-04-05 RX ORDER — SODIUM CHLORIDE 9 MG/ML
70 INJECTION, SOLUTION INTRAVENOUS CONTINUOUS PRN
Status: CANCELLED | OUTPATIENT
Start: 2017-04-20

## 2017-04-05 RX ORDER — SODIUM CHLORIDE 0.9 % (FLUSH) 0.9 %
1-10 SYRINGE (ML) INJECTION AS NEEDED
Status: CANCELLED | OUTPATIENT
Start: 2017-04-20

## 2017-04-10 ENCOUNTER — OFFICE VISIT (OUTPATIENT)
Dept: CARDIOLOGY | Facility: CLINIC | Age: 27
End: 2017-04-10

## 2017-04-10 ENCOUNTER — OFFICE VISIT (OUTPATIENT)
Dept: INTERNAL MEDICINE | Facility: CLINIC | Age: 27
End: 2017-04-10

## 2017-04-10 VITALS
SYSTOLIC BLOOD PRESSURE: 130 MMHG | HEIGHT: 63 IN | BODY MASS INDEX: 39.87 KG/M2 | WEIGHT: 225 LBS | DIASTOLIC BLOOD PRESSURE: 70 MMHG | OXYGEN SATURATION: 98 % | HEART RATE: 108 BPM | TEMPERATURE: 99.1 F

## 2017-04-10 VITALS
HEART RATE: 104 BPM | WEIGHT: 205.8 LBS | SYSTOLIC BLOOD PRESSURE: 128 MMHG | DIASTOLIC BLOOD PRESSURE: 84 MMHG | HEIGHT: 63 IN | BODY MASS INDEX: 36.46 KG/M2

## 2017-04-10 DIAGNOSIS — G47.33 OBSTRUCTIVE SLEEP APNEA: ICD-10-CM

## 2017-04-10 DIAGNOSIS — J45.21 MILD INTERMITTENT ASTHMA WITH ACUTE EXACERBATION: ICD-10-CM

## 2017-04-10 DIAGNOSIS — R00.0 SINUS TACHYCARDIA: Primary | ICD-10-CM

## 2017-04-10 DIAGNOSIS — R06.02 SHORTNESS OF BREATH: Primary | ICD-10-CM

## 2017-04-10 DIAGNOSIS — G90.3 NEUROGENIC ORTHOSTATIC HYPOTENSION (HCC): ICD-10-CM

## 2017-04-10 DIAGNOSIS — N92.6 ABNORMAL MENSES: ICD-10-CM

## 2017-04-10 DIAGNOSIS — I26.99 PULMONARY EMBOLISM ON RIGHT (HCC): ICD-10-CM

## 2017-04-10 PROBLEM — I95.9 HYPOTENSION: Status: ACTIVE | Noted: 2017-04-10

## 2017-04-10 LAB — HCG INTACT+B SERPL-ACNC: <2.39 MIU/ML

## 2017-04-10 PROCEDURE — 99214 OFFICE O/P EST MOD 30 MIN: CPT | Performed by: FAMILY MEDICINE

## 2017-04-10 PROCEDURE — 99213 OFFICE O/P EST LOW 20 MIN: CPT | Performed by: INTERNAL MEDICINE

## 2017-04-10 RX ORDER — FLUCONAZOLE 150 MG/1
TABLET ORAL
Qty: 2 TABLET | Refills: 0 | OUTPATIENT
Start: 2017-04-10 | End: 2017-04-18

## 2017-04-10 RX ORDER — FLUDROCORTISONE ACETATE 0.1 MG/1
0.1 TABLET ORAL DAILY
Qty: 30 TABLET | Refills: 5 | Status: SHIPPED | OUTPATIENT
Start: 2017-04-10 | End: 2017-04-17

## 2017-04-10 RX ORDER — METHYLPREDNISOLONE 4 MG/1
TABLET ORAL
Qty: 21 TABLET | Refills: 0 | Status: SHIPPED | OUTPATIENT
Start: 2017-04-10 | End: 2017-04-17

## 2017-04-10 RX ORDER — MONTELUKAST SODIUM 10 MG/1
10 TABLET ORAL NIGHTLY
Qty: 30 TABLET | Refills: 3 | Status: SHIPPED | OUTPATIENT
Start: 2017-04-10 | End: 2017-08-04 | Stop reason: SDUPTHER

## 2017-04-10 RX ORDER — ALBUTEROL SULFATE 90 UG/1
2 AEROSOL, METERED RESPIRATORY (INHALATION) EVERY 6 HOURS PRN
Qty: 1 INHALER | Refills: 3 | Status: SHIPPED | OUTPATIENT
Start: 2017-04-10 | End: 2018-05-15

## 2017-04-10 NOTE — PROGRESS NOTES
Subjective:   Marilu Godoy  1990  [phone]      04/10/2017    [facility] CARDIOLOGY    Deidre Barker MD  [address] [place], [place] [id number]    REFERRING DOCTOR: Dr Byrnes      Patient ID: Marilu Godoy is a 26 y.o. female.    Chief Complaint: Sinus Tachycardia (IAST), Hypotension    Problem List:    Allergies   Allergen Reactions   • Amoxicillin Rash       Current Outpatient Prescriptions:   •  albuterol (PROVENTIL HFA;VENTOLIN HFA) 108 (90 BASE) MCG/ACT inhaler, Inhale 2 puffs Every 6 (Six) Hours As Needed for Wheezing or Shortness of Air., Disp: 1 inhaler, Rfl: 3  •  apixaban (ELIQUIS) 5 MG tablet tablet, Take 5 mg by mouth 2 (Two) Times a Day., Disp: , Rfl:   •  fluconazole (DIFLUCAN) 150 MG tablet, TAKE ONE TAB PO X ONCE, CAN REPEAT IN 4 DAYS IF NEEDED, Disp: 2 tablet, Rfl: 0  •  MethylPREDNISolone (MEDROL, MILLY,) 4 MG tablet, Take as directed on package instructions., Disp: 21 tablet, Rfl: 0  •  midodrine (PROAMATINE) 5 MG tablet, Take 1.5 tablets by mouth 3 (Three) Times a Day., Disp: 140 tablet, Rfl: 6  •  montelukast (SINGULAIR) 10 MG tablet, Take 1 tablet by mouth Every Night for 30 days., Disp: 30 tablet, Rfl: 3  •  pantoprazole (PROTONIX) 40 MG EC tablet, Take 1 tablet by mouth Daily., Disp: 30 tablet, Rfl: 5    History of Present Illness  Patient is a [age] female here today for follow-up visit for her inappropriate sinus tachycardia and hypotension.  Patient has been tried on Lopressor in the past but intolerable due to hypotension.  She also had being treated for pulmonary embolus.  She was recently treated for some asthma exacerbation.  She is currently been maintained on Midrin 7.5 mg 3 times a day.  Overall states she's feeling dealing with issues of lightheadedness at times especially with blood pressure going down into the 70-80s and also dealing with some palpitations heart rate of about 130 bpm at times.  Today heart rate 105-110  "bpm.    No issues with chest pain, shortness of breath, fevers, chills, night sweats, PND, orthopnea or palpitations. No recent ER visits or hospital stays.      The following portions of the patient's history were reviewed and updated as appropriate: allergies, current medications, past family history, past medical history, past social history, past surgical history and problem list.    ROS   14 point ROS negative except as outlined in problem list, HPI and other parts of the note.    Procedures       Objective:       Vitals:    04/10/17 1550   BP: 128/84   BP Location: Left arm   Patient Position: Sitting   Pulse: 104   Weight: 205 lb 12.8 oz (93.4 kg)   Height: 63\" (160 cm)       GENERAL: Well-developed, well-nourished patient in no acute distress.  HEENT: Normocephalic, atraumatic, PERRLA. Moist mucous membranes.  NECK: No JVD present at 30°. No carotid bruits auscultated.  LUNGS: Clear to auscultation.  CARDIOVASCULAR: reg tachy No murmurs, gallops or rubs noted.   ABDOMEN: Soft, nontender. Positive bowel sounds.  MUSCULOSKELETAL: No gross deformities. No clubbing, cyanosis, or lower extremity edema.  SKIN: Pink, warm  Neuro: Nonfocal exam. Gait intact  Ext: No edema or bruising    The patient's old records including ambulatory rhythm recordings (ECGs, Holter/event monitor) were reviewed and discussed.      Lab Review:   Results for orders placed or performed in visit on 03/03/17   POCT rapid strep A   Result Value Ref Range    Rapid Strep A Screen Positive (A) Negative, VALID, INVALID, Not Performed    Internal Control Passed Passed    Lot Number [id number]     Expiration Date 6/2018            Diagnosis:   1. Sinus tachycardia (IAST)  2. Obstructive sleep apnea  3. Pulmonary embolism on right  4. Neurogenic orthostatic hypotension      Assessment & Plan:   1.  Midodrine will be decreased to 5 mg 3 times a day and add Florinef 0.1 mg daily.  2.  Long discussion with the patient about life style changes " including increasing fluid intake.  She currently drinks 3 bottles a lot of the day but needs to deathly increase to at least 7 possibly also using Gatorade.  3.  Unable to use any AV aubree agents for now due to some hypotension.  I think some of the inappropriate sinus tachycardia may be due to some volume depletion as well.  4.  If in the long run no benefit then at that time consideration for sending to Dr. Boyle however only as last resort.  5. Follow up in 3-4 mths         CC: Dr Marc Koo,   04/10/17  4:50 PM      EMR Dragon/Transcription disclaimer:  Much of this encounter note is an electronic transcription/translation of spoken language to printed text. Electronic translation of spoken language may permit erroneous, or at times, nonsensical words or phrases to be inadvertently transcribed. Although I have reviewed the note for such errors, some may still exist.

## 2017-04-10 NOTE — PROGRESS NOTES
Subjective   Marilu Godoy is a 26 y.o. female.     History of Present Illness   Patient comes in as she is feeling badly again.  She is having some pain with breathing and it feels like something is squeezing her lungs.  Symptoms had resolved for a time after seeing Dr. Duvall but now have returned last few days.  She has a history of asthma but does not know where her inhaler is.  Has a history of allergies but typically they don't bother her much.  She is never tried Singulair.  Continues to take Eliquis due to her coagulopathy.  Mentions her periods are normally 4-5 days, very brief, this time she's had abnormal bleeding in between periods that lasted longer than a period stopped briefly then spotted.  She did the same thing when she was pregnant with her son.  She took a pregnancy test this morning it was negative.  She is not due to start until the 15th, but due to the medicine she is on she would like to go ahead and do a blood test to make sure she is not pregnant.    The following portions of the patient's history were reviewed and updated as appropriate: allergies, current medications, past family history, past medical history, past social history, past surgical history and problem list.    Review of Systems   Constitutional: Positive for activity change. Negative for fever.   Respiratory: Positive for chest tightness and shortness of breath. Negative for wheezing.    Cardiovascular: Positive for chest pain.   Genitourinary: Positive for menstrual problem and vaginal bleeding.   Allergic/Immunologic: Positive for environmental allergies.       Objective   Physical Exam   Constitutional: She is oriented to person, place, and time. Vital signs are normal. She appears well-developed and well-nourished. She is active. She does not have a sickly appearance. She does not appear ill.   Appears stated age. Well groomed. Obese     HENT:   Head: Normocephalic and atraumatic.   Right Ear: Hearing normal.   Left  Ear: Hearing normal.   Nose: Nose normal.   Eyes: EOM and lids are normal. Pupils are equal, round, and reactive to light. No scleral icterus.   Neck: Phonation normal. Neck supple.   Cardiovascular: Normal rate, regular rhythm and normal heart sounds.    Pulmonary/Chest: Effort normal and breath sounds normal.   Neurological: She is alert and oriented to person, place, and time. No cranial nerve deficit. Gait normal.   Skin: No bruising and no ecchymosis noted. She is not diaphoretic. No cyanosis. Nails show no clubbing.   Psychiatric: She has a normal mood and affect. Her behavior is normal. Judgment and thought content normal.   Nursing note and vitals reviewed.    Reviewed amaya ordered by dr. Duvall, negative    Assessment/Plan   Marilu was seen today for cough.    Diagnoses and all orders for this visit:    Shortness of breath  -     MethylPREDNISolone (MEDROL, MILLY,) 4 MG tablet; Take as directed on package instructions.  -     montelukast (SINGULAIR) 10 MG tablet; Take 1 tablet by mouth Every Night for 30 days.  -     albuterol (PROVENTIL HFA;VENTOLIN HFA) 108 (90 BASE) MCG/ACT inhaler; Inhale 2 puffs Every 6 (Six) Hours As Needed for Wheezing or Shortness of Air.    Mild intermittent asthma with acute exacerbation  -     MethylPREDNISolone (MEDROL, MILLY,) 4 MG tablet; Take as directed on package instructions.  -     montelukast (SINGULAIR) 10 MG tablet; Take 1 tablet by mouth Every Night for 30 days.  -     albuterol (PROVENTIL HFA;VENTOLIN HFA) 108 (90 BASE) MCG/ACT inhaler; Inhale 2 puffs Every 6 (Six) Hours As Needed for Wheezing or Shortness of Air.    Abnormal menses  -     HCG, B-subunit, Quantitative    Other orders  -     fluconazole (DIFLUCAN) 150 MG tablet; TAKE ONE TAB PO X ONCE, CAN REPEAT IN 4 DAYS IF NEEDED

## 2017-04-11 ENCOUNTER — HOSPITAL ENCOUNTER (EMERGENCY)
Facility: HOSPITAL | Age: 27
Discharge: HOME | End: 2017-04-11
Attending: EMERGENCY MEDICINE | Admitting: EMERGENCY MEDICINE

## 2017-04-11 ENCOUNTER — APPOINTMENT (OUTPATIENT)
Dept: GENERAL RADIOLOGY | Facility: HOSPITAL | Age: 27
End: 2017-04-11

## 2017-04-11 VITALS
TEMPERATURE: 98.4 F | BODY MASS INDEX: 35.79 KG/M2 | RESPIRATION RATE: 20 BRPM | SYSTOLIC BLOOD PRESSURE: 122 MMHG | HEART RATE: 112 BPM | HEIGHT: 63 IN | WEIGHT: 202 LBS | DIASTOLIC BLOOD PRESSURE: 76 MMHG | OXYGEN SATURATION: 95 %

## 2017-04-11 DIAGNOSIS — G90.A POTS (POSTURAL ORTHOSTATIC TACHYCARDIA SYNDROME): ICD-10-CM

## 2017-04-11 DIAGNOSIS — R00.0 TACHYCARDIA: Primary | ICD-10-CM

## 2017-04-11 LAB
ALBUMIN SERPL-MCNC: 5.1 G/DL (ref 3.5–5)
ALBUMIN/GLOB SERPL: 1.3 G/DL (ref 1–2)
ALP SERPL-CCNC: 71 U/L (ref 38–126)
ALT SERPL W P-5'-P-CCNC: 25 U/L (ref 13–69)
ANION GAP SERPL CALCULATED.3IONS-SCNC: 17.8 MMOL/L
AST SERPL-CCNC: 24 U/L (ref 15–46)
BASOPHILS # BLD AUTO: 0.04 10*3/MM3 (ref 0–0.2)
BASOPHILS NFR BLD AUTO: 0.3 % (ref 0–2.5)
BILIRUB SERPL-MCNC: 1 MG/DL (ref 0.2–1.3)
BUN BLD-MCNC: 7 MG/DL (ref 7–20)
BUN/CREAT SERPL: 10 (ref 7.1–23.5)
CALCIUM SPEC-SCNC: 9.8 MG/DL (ref 8.4–10.2)
CHLORIDE SERPL-SCNC: 105 MMOL/L (ref 98–107)
CO2 SERPL-SCNC: 24 MMOL/L (ref 26–30)
CREAT BLD-MCNC: 0.7 MG/DL (ref 0.6–1.3)
DEPRECATED RDW RBC AUTO: 40.5 FL (ref 37–54)
EOSINOPHIL # BLD AUTO: 0.02 10*3/MM3 (ref 0–0.7)
EOSINOPHIL NFR BLD AUTO: 0.1 % (ref 0–7)
ERYTHROCYTE [DISTWIDTH] IN BLOOD BY AUTOMATED COUNT: 12.3 % (ref 11.5–14.5)
GFR SERPL CREATININE-BSD FRML MDRD: 101 ML/MIN/1.73
GLOBULIN UR ELPH-MCNC: 4 GM/DL
GLUCOSE BLD-MCNC: 107 MG/DL (ref 74–98)
HCG SERPL QL: NEGATIVE
HCT VFR BLD AUTO: 43.2 % (ref 37–47)
HGB BLD-MCNC: 14.9 G/DL (ref 12–16)
IMM GRANULOCYTES # BLD: 0.05 10*3/MM3 (ref 0–0.06)
IMM GRANULOCYTES NFR BLD: 0.4 % (ref 0–0.6)
LYMPHOCYTES # BLD AUTO: 1.97 10*3/MM3 (ref 0.6–3.4)
LYMPHOCYTES NFR BLD AUTO: 14.6 % (ref 10–50)
MCH RBC QN AUTO: 31.3 PG (ref 27–31)
MCHC RBC AUTO-ENTMCNC: 34.5 G/DL (ref 30–37)
MCV RBC AUTO: 90.8 FL (ref 81–99)
MONOCYTES # BLD AUTO: 0.74 10*3/MM3 (ref 0–0.9)
MONOCYTES NFR BLD AUTO: 5.5 % (ref 0–12)
NEUTROPHILS # BLD AUTO: 10.63 10*3/MM3 (ref 2–6.9)
NEUTROPHILS NFR BLD AUTO: 79.1 % (ref 37–80)
NRBC BLD MANUAL-RTO: 0 /100 WBC (ref 0–0)
PLATELET # BLD AUTO: 379 10*3/MM3 (ref 130–400)
PMV BLD AUTO: 10 FL (ref 6–12)
POTASSIUM BLD-SCNC: 3.8 MMOL/L (ref 3.5–5.1)
PROT SERPL-MCNC: 9.1 G/DL (ref 6.3–8.2)
RBC # BLD AUTO: 4.76 10*6/MM3 (ref 4.2–5.4)
SODIUM BLD-SCNC: 143 MMOL/L (ref 137–145)
TROPONIN I SERPL-MCNC: 0 NG/ML (ref 0–0.05)
TROPONIN I SERPL-MCNC: <0.012 NG/ML (ref 0–0.03)
WBC NRBC COR # BLD: 13.45 10*3/MM3 (ref 4.8–10.8)

## 2017-04-11 PROCEDURE — 93005 ELECTROCARDIOGRAM TRACING: CPT | Performed by: EMERGENCY MEDICINE

## 2017-04-11 PROCEDURE — 99283 EMERGENCY DEPT VISIT LOW MDM: CPT

## 2017-04-11 PROCEDURE — 71010 HC CHEST PA OR AP: CPT

## 2017-04-11 PROCEDURE — 80053 COMPREHEN METABOLIC PANEL: CPT | Performed by: EMERGENCY MEDICINE

## 2017-04-11 PROCEDURE — 84703 CHORIONIC GONADOTROPIN ASSAY: CPT | Performed by: EMERGENCY MEDICINE

## 2017-04-11 PROCEDURE — 85025 COMPLETE CBC W/AUTO DIFF WBC: CPT | Performed by: EMERGENCY MEDICINE

## 2017-04-11 PROCEDURE — 84484 ASSAY OF TROPONIN QUANT: CPT | Performed by: EMERGENCY MEDICINE

## 2017-04-11 RX ORDER — SODIUM CHLORIDE 0.9 % (FLUSH) 0.9 %
10 SYRINGE (ML) INJECTION AS NEEDED
Status: DISCONTINUED | OUTPATIENT
Start: 2017-04-11 | End: 2017-04-12 | Stop reason: HOSPADM

## 2017-04-12 ENCOUNTER — TELEPHONE (OUTPATIENT)
Dept: INTERNAL MEDICINE | Facility: CLINIC | Age: 27
End: 2017-04-12

## 2017-04-12 LAB
WHOLE BLOOD HOLD SPECIMEN: NORMAL
WHOLE BLOOD HOLD SPECIMEN: NORMAL

## 2017-04-12 NOTE — ED PROVIDER NOTES
Subjective   HPI Comments: 26-year-old female presents to the emergency department with rapid heart beat chest pain and elevated blood pressure.  She has a history of pots syndrome.  She has been on Midrin for some time now was recently started on fludrocortisone and has taken 1 dose.  She states that she began to have elevated blood pressures and a fast heart rate today.  She states that she feels some chest palpitations and discomfort with these symptoms.      History provided by:  Patient   used: No        Review of Systems   Cardiovascular: Positive for chest pain and palpitations.   All other systems reviewed and are negative.      Past Medical History:   Diagnosis Date   • Allergic    • Asthma    • Clotting disorder     factor 5   • Factor 5 Leiden mutation, heterozygous    • Gallstone    • H/O blood clots    • Hypertension    • Kidney infection    • Obesity    • Orthostatic hypotension    • Ovarian cyst    • Protein S deficiency 2016    labs from hospitalization for PE   • Pulmonary embolism    • Sleep apnea    • Tattoo        Allergies   Allergen Reactions   • Amoxicillin Rash       Past Surgical History:   Procedure Laterality Date   •  SECTION       and    • CHOLECYSTECTOMY     • UPPER GASTROINTESTINAL ENDOSCOPY         Family History   Problem Relation Age of Onset   • Arthritis Mother    • COPD Mother    • Asthma Mother    • Arthritis Father    • Diabetes Father    • Hypertension Father    • Hyperlipidemia Father    • Kidney disease Father    • Heart attack Father    • No Known Problems Son    • Colon cancer Neg Hx    • Liver cancer Neg Hx    • Liver disease Neg Hx    • Stomach cancer Neg Hx    • Esophageal cancer Neg Hx        Social History     Social History   • Marital status:      Spouse name: N/A   • Number of children: N/A   • Years of education: N/A     Social History Main Topics   • Smoking status: Never Smoker   • Smokeless tobacco: Never  Used   • Alcohol use No   • Drug use: No   • Sexual activity: Yes     Partners: Male     Birth control/ protection: None      Comment: PE while on birth control patch     Other Topics Concern   • None     Social History Narrative   • None           Objective   Physical Exam   Constitutional: She is oriented to person, place, and time. She appears well-developed and well-nourished.   Eyes: EOM are normal.   Neck: Normal range of motion. Neck supple.   Cardiovascular: Normal rate.    Tachy     Pulmonary/Chest: Effort normal and breath sounds normal.   Abdominal: Soft. Bowel sounds are normal.   Musculoskeletal: Normal range of motion.   Neurological: She is alert and oriented to person, place, and time. She has normal reflexes.   Skin: Skin is warm and dry.   Psychiatric: She has a normal mood and affect. Her behavior is normal. Judgment and thought content normal.   Nursing note and vitals reviewed.      ECG 12 Lead    Date/Time: 4/11/2017 10:03 PM  Performed by: HERACLIO SWANSON JR  Authorized by: CLARISA ESPINOSA   Interpreted by physician  Comparison: compared with previous ECG   Rhythm: sinus tachycardia  Rate: tachycardic  QRS axis: normal  Conduction: conduction normal  ST Segments: ST segments normal  T Waves: T waves normal  Clinical impression comment: sinus tachy                 ED Course  ED Course   Comment By Time   Discussed with Dr. Lizama he is a partner of Dr. Monge he recommended holding the fludrocortisone observing.  Would not recommend any beta blockers Heraclio Swanson Jr., PA-C 04/11 2153                  [facility]    Final diagnoses:   Tachycardia   POTS (postural orthostatic tachycardia syndrome)            Heraclio Swanson Jr., PA-C  04/11/17 4703

## 2017-04-12 NOTE — ED NOTES
Paged cardiology at [facility] per Calvin Monzon.     April Jonna Brito  04/11/17 0401

## 2017-04-14 ENCOUNTER — OFFICE VISIT (OUTPATIENT)
Dept: INTERNAL MEDICINE | Facility: CLINIC | Age: 27
End: 2017-04-14

## 2017-04-14 VITALS
HEART RATE: 103 BPM | TEMPERATURE: 98.7 F | WEIGHT: 206.8 LBS | HEIGHT: 63 IN | OXYGEN SATURATION: 98 % | DIASTOLIC BLOOD PRESSURE: 70 MMHG | SYSTOLIC BLOOD PRESSURE: 122 MMHG | BODY MASS INDEX: 36.64 KG/M2

## 2017-04-14 DIAGNOSIS — J02.9 SORE THROAT: ICD-10-CM

## 2017-04-14 DIAGNOSIS — K04.7 TOOTH ABSCESS: ICD-10-CM

## 2017-04-14 DIAGNOSIS — J10.1 INFLUENZA B: Primary | ICD-10-CM

## 2017-04-14 LAB
EXPIRATION DATE: NORMAL
EXPIRATION DATE: NORMAL
FLUAV AG NPH QL: NEGATIVE
FLUBV AG NPH QL: POSITIVE
INTERNAL CONTROL: NORMAL
INTERNAL CONTROL: NORMAL
Lab: NORMAL
Lab: NORMAL
S PYO AG THROAT QL: NEGATIVE

## 2017-04-14 PROCEDURE — 87804 INFLUENZA ASSAY W/OPTIC: CPT | Performed by: FAMILY MEDICINE

## 2017-04-14 PROCEDURE — 99213 OFFICE O/P EST LOW 20 MIN: CPT | Performed by: FAMILY MEDICINE

## 2017-04-14 PROCEDURE — 87880 STREP A ASSAY W/OPTIC: CPT | Performed by: FAMILY MEDICINE

## 2017-04-14 RX ORDER — OSELTAMIVIR PHOSPHATE 75 MG/1
75 CAPSULE ORAL 2 TIMES DAILY
Qty: 10 CAPSULE | Refills: 0 | Status: SHIPPED | OUTPATIENT
Start: 2017-04-14 | End: 2017-04-17

## 2017-04-14 RX ORDER — HYDROCODONE BITARTRATE AND ACETAMINOPHEN 5; 325 MG/1; MG/1
1 TABLET ORAL EVERY 8 HOURS PRN
Qty: 10 TABLET | Refills: 0 | Status: SHIPPED | OUTPATIENT
Start: 2017-04-14 | End: 2017-05-04

## 2017-04-14 RX ORDER — DOXYCYCLINE HYCLATE 100 MG/1
100 CAPSULE ORAL 2 TIMES DAILY
Qty: 20 CAPSULE | Refills: 0 | Status: SHIPPED | OUTPATIENT
Start: 2017-04-14 | End: 2017-04-14

## 2017-04-14 RX ORDER — CLINDAMYCIN HYDROCHLORIDE 300 MG/1
300 CAPSULE ORAL 3 TIMES DAILY
Qty: 30 CAPSULE | Refills: 0 | Status: SHIPPED | OUTPATIENT
Start: 2017-04-14 | End: 2017-04-24

## 2017-04-17 ENCOUNTER — HOSPITAL ENCOUNTER (OUTPATIENT)
Dept: ULTRASOUND IMAGING | Facility: HOSPITAL | Age: 27
Discharge: HOME | End: 2017-04-17
Admitting: FAMILY MEDICINE

## 2017-04-17 ENCOUNTER — OFFICE VISIT (OUTPATIENT)
Dept: INTERNAL MEDICINE | Facility: CLINIC | Age: 27
End: 2017-04-17

## 2017-04-17 VITALS
OXYGEN SATURATION: 98 % | TEMPERATURE: 98.9 F | DIASTOLIC BLOOD PRESSURE: 80 MMHG | SYSTOLIC BLOOD PRESSURE: 128 MMHG | HEART RATE: 120 BPM | BODY MASS INDEX: 36.71 KG/M2 | HEIGHT: 63 IN | WEIGHT: 207.2 LBS

## 2017-04-17 DIAGNOSIS — R06.01 ORTHOPNEA: Primary | ICD-10-CM

## 2017-04-17 DIAGNOSIS — G89.29 CHRONIC PAIN OF BOTH KNEES: ICD-10-CM

## 2017-04-17 DIAGNOSIS — R77.8 ABNORMAL SERUM PROTEIN TEST: ICD-10-CM

## 2017-04-17 DIAGNOSIS — R60.0 BILATERAL EDEMA OF LOWER EXTREMITY: ICD-10-CM

## 2017-04-17 DIAGNOSIS — M25.562 CHRONIC PAIN OF BOTH KNEES: ICD-10-CM

## 2017-04-17 DIAGNOSIS — M25.561 CHRONIC PAIN OF BOTH KNEES: ICD-10-CM

## 2017-04-17 DIAGNOSIS — R06.01 ORTHOPNEA: ICD-10-CM

## 2017-04-17 PROCEDURE — 99214 OFFICE O/P EST MOD 30 MIN: CPT | Performed by: FAMILY MEDICINE

## 2017-04-17 PROCEDURE — 93970 EXTREMITY STUDY: CPT

## 2017-04-18 LAB — C DIFF TOX GENS STL QL NAA+PROBE: NEGATIVE

## 2017-04-18 NOTE — PROGRESS NOTES
Subjective   Marilu Godoy is a 26 y.o. female.     History of Present Illness   Patient comes in due to worsening pain in both legs. Legs feel swollen from knees down. Patient worries about clots based on past experience with PE though she's on her anticoagulant. Having pain in knee joints as well as ankles. No known RA but mother and grandmother had arthritic condition, possibly auto-immune. Patient's KIERA previously tested negative when assessed recently by Dr. Duvall. She continues to have some shortness of breath which is worse when she lies down. She feels like she's smothering. She has to use three pillows, gets short of breath when she's flat. Denies waking suddenly short of breath at night. No known heart failure. Scheduled for colonoscopy later this week due to suspicious for inflammatory bowel disease.    The following portions of the patient's history were reviewed and updated as appropriate: allergies, current medications, past family history, past medical history, past social history, past surgical history and problem list.    Review of Systems   Constitutional: Positive for activity change and fatigue.   HENT: Positive for congestion.    Respiratory: Positive for shortness of breath.    Cardiovascular: Positive for leg swelling.   Musculoskeletal: Positive for arthralgias, gait problem and joint swelling.   Hematological: Bruises/bleeds easily.       Objective   Physical Exam   Constitutional: She is oriented to person, place, and time. Vital signs are normal. She appears well-developed and well-nourished. She is active. She does not have a sickly appearance. She does not appear ill.   Appears stated age. Well groomed. Obese     HENT:   Head: Normocephalic and atraumatic.   Right Ear: Hearing normal.   Left Ear: Hearing normal.   Nose: Nose normal.   Eyes: EOM and lids are normal. Pupils are equal, round, and reactive to light.   Neck: Neck supple.   Cardiovascular: Normal rate, regular rhythm and  normal heart sounds.    Pulmonary/Chest: Effort normal and breath sounds normal. No stridor. She has no rales.   Musculoskeletal: She exhibits edema (bilateral lower extremities, trace, to mid-lower leg). She exhibits no deformity.   Neurological: She is alert and oriented to person, place, and time. No cranial nerve deficit. Gait normal.   Skin: She is not diaphoretic. No cyanosis. Nails show no clubbing.   Psychiatric: Her speech is normal and behavior is normal. Judgment and thought content normal. Her mood appears anxious. Cognition and memory are normal.   Nursing note and vitals reviewed.       KIERA tested at outside lab 3/15/17 by Dr. Duvall, see scanned result, but found to be negative.    CMP from 4/11/17 discussed. Total protein was high at 9.1, albumin 5.10 (I explained to patient that normally with edema the abnormality with protein would be low levels.)    ECHO done 12/14/16. All left ventricular wall segments contracted normally with LVSF estimated 61%. No pericardial effusion noted.    Assessment/Plan   Marilu was seen today for leg swelling.    Diagnoses and all orders for this visit:    Orthopnea  -     US venous doppler lower extremity bilateral (duplex); Future  -     BNP    Bilateral edema of lower extremity  -     CBC & Differential  -     Comprehensive Metabolic Panel  -     US venous doppler lower extremity bilateral (duplex); Future    Chronic pain of both knees  -     Rheumatoid Factor, Quant  -     Cyclic Citrul Peptide Antibody, IgG / IgA  -     Sedimentation Rate  -     C-reactive Protein    Abnormal serum protein test  -     Protein Elec + Interp, Serum    Extremely low risk for clot but not zero chance, for reassurance will image. Needs to follow up with cardiology and pulmonary, follow through with scheduled scope this week as vital for diagnosis of likely IBD. Testing a bit further for auto-immune issues, but if she indeed has IBD she could have some joint manifestations from that.  Symptoms somewhat worrisome for CHF, may need new ECHO, but will start with BNP.

## 2017-04-19 ENCOUNTER — PREP FOR SURGERY (OUTPATIENT)
Dept: GASTROENTEROLOGY | Facility: CLINIC | Age: 27
End: 2017-04-19

## 2017-04-19 DIAGNOSIS — R11.0 NAUSEA: Primary | ICD-10-CM

## 2017-04-19 DIAGNOSIS — R10.33 PERIUMBILICAL ABDOMINAL PAIN: ICD-10-CM

## 2017-04-19 DIAGNOSIS — R19.7 DIARRHEA, UNSPECIFIED TYPE: Primary | ICD-10-CM

## 2017-04-19 LAB
ALBUMIN SERPL ELPH-MCNC: 4.1 G/DL (ref 2.9–4.4)
ALBUMIN SERPL-MCNC: 4.6 G/DL (ref 3.5–5)
ALBUMIN/GLOB SERPL: 1.1 {RATIO} (ref 0.7–1.7)
ALBUMIN/GLOB SERPL: 1.4 G/DL (ref 1–2)
ALP SERPL-CCNC: 70 U/L (ref 38–126)
ALPHA1 GLOB SERPL ELPH-MCNC: 0.3 G/DL (ref 0–0.4)
ALPHA2 GLOB SERPL ELPH-MCNC: 1 G/DL (ref 0.4–1)
ALT SERPL-CCNC: 31 U/L (ref 13–69)
AST SERPL-CCNC: 16 U/L (ref 15–46)
B-GLOBULIN SERPL ELPH-MCNC: 1.5 G/DL (ref 0.7–1.3)
BASOPHILS # BLD AUTO: 0.05 10*3/MM3 (ref 0–0.2)
BASOPHILS NFR BLD AUTO: 0.6 % (ref 0–2.5)
BILIRUB SERPL-MCNC: 0.8 MG/DL (ref 0.2–1.3)
BNP SERPL-MCNC: 21.2 PG/ML (ref 0–100)
BUN SERPL-MCNC: 9 MG/DL (ref 7–20)
BUN/CREAT SERPL: 12.9 (ref 7.1–23.5)
CALCIUM SERPL-MCNC: 9.7 MG/DL (ref 8.4–10.2)
CCP IGA+IGG SERPL IA-ACNC: 9 UNITS (ref 0–19)
CHLORIDE SERPL-SCNC: 104 MMOL/L (ref 98–107)
CO2 SERPL-SCNC: 27 MMOL/L (ref 26–30)
CREAT SERPL-MCNC: 0.7 MG/DL (ref 0.6–1.3)
CRP SERPL-MCNC: 0.9 MG/DL (ref 0–1)
EOSINOPHIL # BLD AUTO: 0.08 10*3/MM3 (ref 0–0.7)
EOSINOPHIL NFR BLD AUTO: 0.9 % (ref 0–7)
ERYTHROCYTE [DISTWIDTH] IN BLOOD BY AUTOMATED COUNT: 12.4 % (ref 11.5–14.5)
ERYTHROCYTE [SEDIMENTATION RATE] IN BLOOD BY WESTERGREN METHOD: 22 MM/HR (ref 0–20)
GAMMA GLOB SERPL ELPH-MCNC: 1.1 G/DL (ref 0.4–1.8)
GLOBULIN SER CALC-MCNC: 3.4 GM/DL
GLOBULIN SER CALC-MCNC: 3.9 G/DL (ref 2.2–3.9)
GLUCOSE SERPL-MCNC: 98 MG/DL (ref 74–98)
HCT VFR BLD AUTO: 37.8 % (ref 37–47)
HGB BLD-MCNC: 12.8 G/DL (ref 12–16)
IMM GRANULOCYTES # BLD: 0.03 10*3/MM3 (ref 0–0.06)
IMM GRANULOCYTES NFR BLD: 0.3 % (ref 0–0.6)
LYMPHOCYTES # BLD AUTO: 1.82 10*3/MM3 (ref 0.6–3.4)
LYMPHOCYTES NFR BLD AUTO: 21 % (ref 10–50)
Lab: ABNORMAL
M PROTEIN SERPL ELPH-MCNC: ABNORMAL G/DL
MCH RBC QN AUTO: 31 PG (ref 27–31)
MCHC RBC AUTO-ENTMCNC: 33.9 G/DL (ref 30–37)
MCV RBC AUTO: 91.5 FL (ref 81–99)
MONOCYTES # BLD AUTO: 0.48 10*3/MM3 (ref 0–0.9)
MONOCYTES NFR BLD AUTO: 5.5 % (ref 0–12)
NEUTROPHILS # BLD AUTO: 6.22 10*3/MM3 (ref 2–6.9)
NEUTROPHILS NFR BLD AUTO: 71.7 % (ref 37–80)
NRBC BLD AUTO-RTO: 0 /100 WBC (ref 0–0)
PLATELET # BLD AUTO: 328 10*3/MM3 (ref 130–400)
POTASSIUM SERPL-SCNC: 4.3 MMOL/L (ref 3.5–5.1)
PROT PATTERN SERPL ELPH-IMP: ABNORMAL
PROT SERPL-MCNC: 8 G/DL (ref 6.3–8.2)
RBC # BLD AUTO: 4.13 10*6/MM3 (ref 4.2–5.4)
RHEUMATOID FACT SERPL-ACNC: <10 IU/ML (ref 0–13.9)
SODIUM SERPL-SCNC: 142 MMOL/L (ref 137–145)
WBC # BLD AUTO: 8.68 10*3/MM3 (ref 4.8–10.8)

## 2017-04-19 RX ORDER — BISACODYL 5 MG/1
TABLET, DELAYED RELEASE ORAL
Qty: 5 TABLET | Refills: 0 | OUTPATIENT
Start: 2017-04-19

## 2017-04-19 RX ORDER — SODIUM CHLORIDE 9 MG/ML
70 INJECTION, SOLUTION INTRAVENOUS CONTINUOUS PRN
Status: CANCELLED | OUTPATIENT
Start: 2017-04-19

## 2017-04-19 RX ORDER — SODIUM CHLORIDE 0.9 % (FLUSH) 0.9 %
1-10 SYRINGE (ML) INJECTION AS NEEDED
Status: CANCELLED | OUTPATIENT
Start: 2017-04-19

## 2017-04-20 ENCOUNTER — ANESTHESIA (OUTPATIENT)
Dept: GASTROENTEROLOGY | Facility: HOSPITAL | Age: 27
End: 2017-04-20

## 2017-04-20 ENCOUNTER — ANESTHESIA EVENT (OUTPATIENT)
Dept: GASTROENTEROLOGY | Facility: HOSPITAL | Age: 27
End: 2017-04-20

## 2017-04-20 ENCOUNTER — HOSPITAL ENCOUNTER (OUTPATIENT)
Facility: HOSPITAL | Age: 27
Setting detail: HOSPITAL OUTPATIENT SURGERY
Discharge: HOME | End: 2017-04-20
Attending: INTERNAL MEDICINE | Admitting: INTERNAL MEDICINE

## 2017-04-20 VITALS
HEART RATE: 85 BPM | BODY MASS INDEX: 35.79 KG/M2 | DIASTOLIC BLOOD PRESSURE: 80 MMHG | HEIGHT: 63 IN | WEIGHT: 202 LBS | OXYGEN SATURATION: 98 % | SYSTOLIC BLOOD PRESSURE: 115 MMHG | TEMPERATURE: 97.8 F | RESPIRATION RATE: 16 BRPM

## 2017-04-20 DIAGNOSIS — R10.33 PERIUMBILICAL ABDOMINAL PAIN: ICD-10-CM

## 2017-04-20 DIAGNOSIS — R11.0 NAUSEA: ICD-10-CM

## 2017-04-20 DIAGNOSIS — R19.7 DIARRHEA, UNSPECIFIED TYPE: ICD-10-CM

## 2017-04-20 LAB — B-HCG UR QL: NEGATIVE

## 2017-04-20 PROCEDURE — 45380 COLONOSCOPY AND BIOPSY: CPT | Performed by: INTERNAL MEDICINE

## 2017-04-20 PROCEDURE — 25010000002 FENTANYL CITRATE (PF) 100 MCG/2ML SOLUTION: Performed by: NURSE ANESTHETIST, CERTIFIED REGISTERED

## 2017-04-20 PROCEDURE — 25010000002 PROPOFOL 10 MG/ML EMULSION: Performed by: NURSE ANESTHETIST, CERTIFIED REGISTERED

## 2017-04-20 PROCEDURE — 43239 EGD BIOPSY SINGLE/MULTIPLE: CPT | Performed by: INTERNAL MEDICINE

## 2017-04-20 PROCEDURE — 81025 URINE PREGNANCY TEST: CPT | Performed by: NURSE PRACTITIONER

## 2017-04-20 PROCEDURE — 45382 COLONOSCOPY W/CONTROL BLEED: CPT | Performed by: INTERNAL MEDICINE

## 2017-04-20 PROCEDURE — S0260 H&P FOR SURGERY: HCPCS | Performed by: INTERNAL MEDICINE

## 2017-04-20 PROCEDURE — 25010000002 MIDAZOLAM PER 1 MG: Performed by: NURSE ANESTHETIST, CERTIFIED REGISTERED

## 2017-04-20 RX ORDER — SODIUM CHLORIDE 9 MG/ML
70 INJECTION, SOLUTION INTRAVENOUS CONTINUOUS PRN
Status: DISCONTINUED | OUTPATIENT
Start: 2017-04-20 | End: 2017-04-20 | Stop reason: HOSPADM

## 2017-04-20 RX ORDER — CHOLESTYRAMINE LIGHT 4 G/5.7G
4 POWDER, FOR SUSPENSION ORAL DAILY
Qty: 239.4 G | Refills: 1 | Status: SHIPPED | OUTPATIENT
Start: 2017-04-20 | End: 2017-04-24 | Stop reason: SINTOL

## 2017-04-20 RX ORDER — SODIUM CHLORIDE 0.9 % (FLUSH) 0.9 %
1-10 SYRINGE (ML) INJECTION AS NEEDED
Status: DISCONTINUED | OUTPATIENT
Start: 2017-04-20 | End: 2017-04-20 | Stop reason: HOSPADM

## 2017-04-20 RX ORDER — FENTANYL CITRATE 50 UG/ML
INJECTION, SOLUTION INTRAMUSCULAR; INTRAVENOUS AS NEEDED
Status: DISCONTINUED | OUTPATIENT
Start: 2017-04-20 | End: 2017-04-20 | Stop reason: SURG

## 2017-04-20 RX ORDER — MIDAZOLAM HYDROCHLORIDE 1 MG/ML
INJECTION INTRAMUSCULAR; INTRAVENOUS AS NEEDED
Status: DISCONTINUED | OUTPATIENT
Start: 2017-04-20 | End: 2017-04-20 | Stop reason: SURG

## 2017-04-20 RX ORDER — PROPOFOL 10 MG/ML
VIAL (ML) INTRAVENOUS AS NEEDED
Status: DISCONTINUED | OUTPATIENT
Start: 2017-04-20 | End: 2017-04-20 | Stop reason: SURG

## 2017-04-20 RX ADMIN — PROPOFOL 20 MG: 10 INJECTION, EMULSION INTRAVENOUS at 09:21

## 2017-04-20 RX ADMIN — PROPOFOL 50 MG: 10 INJECTION, EMULSION INTRAVENOUS at 09:06

## 2017-04-20 RX ADMIN — PROPOFOL 50 MG: 10 INJECTION, EMULSION INTRAVENOUS at 09:17

## 2017-04-20 RX ADMIN — PROPOFOL 50 MG: 10 INJECTION, EMULSION INTRAVENOUS at 09:27

## 2017-04-20 RX ADMIN — PROPOFOL 50 MG: 10 INJECTION, EMULSION INTRAVENOUS at 09:00

## 2017-04-20 RX ADMIN — PROPOFOL 20 MG: 10 INJECTION, EMULSION INTRAVENOUS at 08:53

## 2017-04-20 RX ADMIN — LIDOCAINE HYDROCHLORIDE 50 MG: 20 INJECTION, SOLUTION INTRAVENOUS at 09:28

## 2017-04-20 RX ADMIN — PROPOFOL 30 MG: 10 INJECTION, EMULSION INTRAVENOUS at 08:55

## 2017-04-20 RX ADMIN — PROPOFOL 50 MG: 10 INJECTION, EMULSION INTRAVENOUS at 09:12

## 2017-04-20 RX ADMIN — SODIUM CHLORIDE 70 ML/HR: 9 INJECTION, SOLUTION INTRAVENOUS at 08:35

## 2017-04-20 RX ADMIN — MIDAZOLAM HYDROCHLORIDE 2 MG: 1 INJECTION, SOLUTION INTRAMUSCULAR; INTRAVENOUS at 08:50

## 2017-04-20 RX ADMIN — FENTANYL CITRATE 50 MCG: 50 INJECTION, SOLUTION INTRAMUSCULAR; INTRAVENOUS at 08:53

## 2017-04-20 RX ADMIN — FENTANYL CITRATE 50 MCG: 50 INJECTION, SOLUTION INTRAMUSCULAR; INTRAVENOUS at 08:51

## 2017-04-20 RX ADMIN — LIDOCAINE HYDROCHLORIDE 50 MG: 20 INJECTION, SOLUTION INTRAVENOUS at 09:21

## 2017-04-20 NOTE — OP NOTE
PROCEDURE:  Upper Endoscopy with biopsies.    DATE OF PROCEDURE: April 20, 2017.    REFERRING PROVIDER:  Deidre Barker MD.     INSTRUMENT:   Olympus GIF H180 J video endoscope.      INDICATIONS OF THE PROCEDURE:   This is a 26-year-old white female with history of recurrent nausea, and upper abdominal pain.  Currently undergoing upper endoscopy for further evaluation.      BIOPSIES: Second portion of duodenum.  Gastric antrum, angularis and body of the stomach.  5 colon biopsy polypectomies were performed at the body and the fundus of the stomach.     MEDICATIONS:  MAC.     PHOTOGRAPHS:  Photographs were included in the medical records.     CONSENT/PREPROCEDURE EVALUATION:  Risks, benefits, alternatives and options of the procedure including risks of anesthesia/sedation were discussed and informed consent was obtained prior to the procedure. History and physical examination were performed and nothing precluded the test.     REPORT:  The patient was placed in left lateral decubitus position. Once under the influence of IV sedation, the instrument was inserted into the mouth and esophagus was intubated under direct vision without difficulty.     Esophagus:  Z line was noted to be around  36  cm.  A small sliding hiatal hernia less than 3 cm was noted.  No Chacko's esophagus was seen.     Stomach:  Antrum:  Erythematous-erosive gastritis.  Angulus, lesser and greater curves: Erythematous-erosive.  Retroflex examination: Sliding hiatal hernia.  Cardia and fundus:  Normal.     Body of the stomach: Erythematous-erosive gastritis.  Evidence of healed ulceration was seen.  Small 3-4 mm sessile polyps were noted 3 at the body of the stomach and 2 at the fundus were removed with cold biopsy forceps.  Good distensibility of the stomach was achieved no giant folds were noted.   Biopsies were obtained from the gastric antrum, angularis and body of the stomach.    Pylorus and pyloric channel:  normal.     Duodenum:  Bulb:  Normal.  Second portion: normal.  No scalloping was seen in the second portion of duodenum.  Biopsies were obtained from the second portion of duodenum.    Intervention:  None.       The upper GI tract was decompressed and the scope was pulled out of the patient. The patient tolerated the procedure well.     DIAGNOSES:     Small sliding hiatal hernia less than 3 cm.  Erythematous-erosive gastritis with evidence of old healed ulceration.  Small sessile gastric polyps.      RECOMMENDATIONS:  1.  Dietary instructions.  2.  Pantoprazole 40 mg 1 p.o. q.a.m. 1/2 hour before breakfast.  3.  Follow biopsies.  4.  Follow up in office.  5.  Instructions regarding Eliquis (Apixaban).  The patient will be bridged by Lovenox.     Thank you very much for letting me participate in the care of this patient. Please do not hesitate to call me if you have any questions.

## 2017-04-20 NOTE — PLAN OF CARE
Problem: GI Endoscopy (Adult)  Goal: Signs and Symptoms of Listed Potential Problems Will be Absent or Manageable (GI Endoscopy)  Outcome: Ongoing (interventions implemented as appropriate)    04/20/17 0822   GI Endoscopy   Problems Assessed (GI Endoscopy) pain   Problems Present (GI Endoscopy) none

## 2017-04-20 NOTE — ANESTHESIA POSTPROCEDURE EVALUATION
Patient: Marilu Godoy    Procedure Summary     Date Anesthesia Start Anesthesia Stop Room / Location    04/20/17 0846  [facility] ENDOSCOPY 2 / [facility] ENDOSCOPY       Procedure Diagnosis Surgeon Provider    COLONOSCOPY WITH BIOPSIES AND ARGON THERMAL ABLATION (N/A Anus); ESOPHAGOGASTRODUODENOSCOPY WITH BIOPSIES AND COLD BIOPSY POLYPECTOMIES (N/A Esophagus) Periumbilical abdominal pain; Diarrhea, unspecified type  (Periumbilical abdominal pain [R10.33]; Diarrhea, unspecified type [R19.7]) MD Evin Medrano CRNA          Anesthesia Type: MAC  Last vitals  BP      Temp      Pulse    Resp      SpO2        Post Anesthesia Care and Evaluation    Patient location during evaluation: PHASE II  Patient participation: complete - patient participated  Level of consciousness: awake  Pain score: 0  Pain management: adequate  Airway patency: patent  Anesthetic complications: No anesthetic complications  PONV Status: none  Cardiovascular status: acceptable  Respiratory status: acceptable and nasal cannula  Hydration status: acceptable

## 2017-04-20 NOTE — ANESTHESIA PREPROCEDURE EVALUATION
Anesthesia Evaluation     Patient summary reviewed and Nursing notes reviewed   no history of anesthetic complications:  NPO Status: > 8 hours   Airway   possible difficult intubation  Dental      Pulmonary    (+) pulmonary embolism, asthma, shortness of breath, sleep apnea, decreased breath sounds,   Cardiovascular - normal exam  Exercise tolerance: poor (<4 METS)    (+) hypertension, dysrhythmias Tachycardia, orthopnea, SMITH, PVD ( le edema),       Neuro/Psych  (+) psychiatric history Anxiety,    GI/Hepatic/Renal/Endo    (+) obesity, morbid obesity, GERD, liver disease (inc lft), chronic renal disease,     Musculoskeletal     (+) arthralgias, back pain, chronic pain, myalgias,   Abdominal   (+) obese,    Substance History      OB/GYN    (-)  Pregnant ( neg urine preg)        Other   (+) arthritis     ROS/Med Hx Other: Protein s deficiency, heterozygous factor v leiden mutation                          Anesthesia Plan    ASA 3     MAC   (Discussed risk with pt., pt extremely high intraop and postop risk for cv, resp, and neuro events,Risks and benefits discussed including risk of aspiration, recall and dental damage. All patient questions answered. Will continue with POC.)  intravenous induction   Anesthetic plan and risks discussed with patient.    Plan discussed with CRNA.

## 2017-04-20 NOTE — OP NOTE
PROCEDURE:  Colonoscopy to the terminal ileum with  thermal ablation therapy using argon plasma coagulator of colonic vascular ectasia, and biopsies.    DATE OF PROCEDURE:  April 20, 2017    REFERRING PROVIDER:  Deidre Barker MD     INSTRUMENT USED:  Olympus PCF H180 AL videocolonoscope.      INDICATIONS OF THE PROCEDURE:   This is a 26-year-old white female with history of recurrent diarrhea and abdominal pain.  Currently undergoing colonoscopy for further evaluation especially for inflammatory bowel disease.     BIOPSIES:  Biopsies were obtained from the terminal ileum.  Random biopsies were obtained from the colon including rectum.      PHOTOGRAPHS:  Photographs were included in the medical records.     MEDICATIONS:  MAC.       CONSENT/PREPROCEDURE EVALUATION:  Risks, benefits, alternatives and options of the procedure including risks of sedation/anesthesia were discussed with the patient and informed consent was obtained prior to the procedure.  History and physical examination were performed and nothing precluded the test.      REPORT:  The patient was placed in left lateral decubitus position and a digital examination was performed.  Once under the influence of IV sedation, the instrument was inserted into the rectum and advanced under direct vision to cecum which was identified by the ileocecal valve, triradiate folds and appendiceal orifice. The scope was then maneuvered into the terminal ileum.        FINDINGS:      Digital rectal examination:  Good anal tone.  No perianal pathology.  No mass.        Terminal ileum:  7-8 cm.  Mucosa was noted to be rather flat.  This was biopsied.     Cecum and ascending colon: Nonbleeding Vascular ectasias were noted involving the ascending colon.  These were treated with thermal ablation using argon plasma coagulator(Colon APC mode/ERBE/Side firing probe).     Hepatic flexure, transverse colon, splenic flexure:  Normal.         Descending colon, sigmoid colon and  rectum: Scant early diverticular change was seen. No endoscopic evidence of colitis was seen.  Random biopsies were obtained from the colon upon withdrawal of the scope. A retroflex examination within the rectum revealed internal hemorrhoids.        The scope was then straightened, the lower GI tract was decompressed, and the scope was pulled out of the patient.  The patient tolerated the procedure well.  There were no immediate complications and the patient was transferred in stable condition for post procedure observation.      TECHNICAL DATA:   1. [place] prep score: 8 (3+2+3).     2. Difficulty of examination:  Average.     3. Withdrawal time: 8 min.    4. Retroflex examination in right colon: Yes.    5. Second look Rectum to cecum with decompression: Yes.    DIAGNOSES:   1. 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  Of note THE patient does not have anemia however of interest she is on anticoagulation and is at increased risk for bleeding from vascular ectasias.  2. Scant early diverticular change in the left colon.    3. Internal hemorrhoids.  4. No endoscopic evidence of ileitis or colitis was seen.  Random biopsies were obtained from the colon upon withdrawal of the scope.    RECOMMENDATIONS:     1. Follow biopsies.    2. Follow-up:  In 3-4 weeks.    3. Followup colonoscopy: Not applicable.       4. Dietary instructions.     Thank you very much for letting me participate in the care of this patient. Please do not hesitate to call me if you have any questions.

## 2017-04-20 NOTE — DISCHARGE INSTRUCTIONS
Follow the instructions below marked X upon discharge.    Diet:     x___  Avoid Dairy Products.  x___  Fiber One Cereal-40% Nutty Clusters 1/4 cup daily with Soy or [place] milk.  x___  Loretta's All Bran-Bran Buds 1/4 cup daily.    Blood Thinner Directions:    x___  Avoid blood thinners-Eliquis (Apixaban) for 7 days if no bleeding or black color stools the patient may resumeEliquis (Apixaban) on 4/27/201 in7..     x___  if no bleeding the patient may start Lovenox- 80 mg subcutaneous daily starting 4/21/2017 for 6 days.      x___  Other Instructions:    Call [facility] at [phone] or come to the Emergency   Department if you experience the following: Chest pain, abdominal pain, bleeding  (vomiting of blood or coffee colored material, black stools or junaid blood in stools),   fever/chills, nausea and vomiting or dizziness.      Follow-up:  DR. TODD HERRERA in 2-3 weeks.Office phone # [phone].

## 2017-04-20 NOTE — H&P
History of Present Illness     The patient has history of diarrhea off-and-on for the last several years perhaps 12-15 years. However, more recently for the last 1 year her symptoms have worsened. The diarrhea is moderate to severe. Frequency of bowel movements is perhaps 5-6 times per day. The stools are described as loose and watery. There is history of tenesmus. The patient also gets up at night with diarrheal stools. She denies bright red blood per rectum. The patient denies recent antibiotics.     There is history of nausea off and on for the last 2 years or so. Nausea is described as mild. Frequency being several times a week. The patient feels nauseated specially in the morning. There is no associated emesis. Nauseous feeling may last for several minutes to an hour or so. There is no association of nausea with eating.     There is history of right periumbilical and adjacent lower abdominal pain off and on for the last year or so. The pain is sudden in onset and described as mild to moderate cramping and aching sensation. Frequency being several times a week. The pain at times improved after a bowel movement. There are no definite aggravating factors of abdominal pain.     There is no history of overt GI bleed (Hematemesis, melena or hematochezia). The patient denies reflux. There is no dysphagia or odynophagia. There is no recent weight loss. The patient was noted to have abnormal liver function tests about 6 months or so ago. The patient had pulmonary embolism last year which required hospitalization. Currently the patient is on Eliquis twice a day. 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 This has necessitated an indefinite commitment for anticoagulation.     Review of Systems   Constitutional: Positive for fatigue. Negative for appetite change, chills, fever and unexpected weight change.   HENT: Negative for mouth sores, nosebleeds and trouble swallowing.    Eyes: Negative for discharge and redness.   Respiratory: Negative for apnea, cough and shortness of breath.   Cardiovascular: Negative for chest pain, palpitations and leg swelling.   Gastrointestinal: Positive for abdominal pain, diarrhea and nausea. Negative for abdominal distention, anal bleeding, blood in stool, constipation and vomiting.   Endocrine: Negative for cold intolerance, heat intolerance and polydipsia.   Genitourinary: Negative for dysuria, hematuria and urgency.   Musculoskeletal: Negative for arthralgias, joint swelling and myalgias.   Skin: Negative for rash.   Allergic/Immunologic: Negative for food allergies and immunocompromised state.   Neurological: Positive for dizziness and headaches. Negative for seizures and syncope.   Hematological: Negative for adenopathy. Bruises/bleeds easily.   Psychiatric/Behavioral: Negative for dysphoric mood. The patient is not nervous/anxious and is not hyperactive.          Patient Active Problem List   Diagnosis   • Abnormal CT scan, colon   • Elevated liver function tests   • Diarrhea   • Right upper quadrant pain   • Nausea   • Sinus tachycardia   • Pulmonary embolism on right   • Obesity (BMI 30-39.9)   • Heterozygous factor V Leiden mutation   • Protein S deficiency   • Strep pharyngitis   • Labile hypertension   • Excessive daytime sleepiness   • Obstructive sleep apnea       Medical History          Past Medical History   Diagnosis Date   • Asthma    • Clotting disorder         factor 5   • Gallstone     • H/O blood clots     • Hypertension     • Kidney infection     • Obesity     • Ovarian cyst     • Protein S deficiency 2016       labs from hospitalization for PE   • Pulmonary embolism     • Sleep apnea     • Tattoo            Surgical History           Past Surgical History   Procedure Laterality Date   • Cholecystectomy      •  section            and    • Upper gastrointestinal endoscopy                   Family  "History   Problem Relation Age of Onset   • Arthritis Mother     • COPD Mother     • Asthma Mother     • Arthritis Father     • Diabetes Father     • Hypertension Father     • Hyperlipidemia Father     • Kidney disease Father     • Heart attack Father     • No Known Problems Son     • Colon cancer Neg Hx     • Liver cancer Neg Hx     • Liver disease Neg Hx     • Stomach cancer Neg Hx     • Esophageal cancer Neg Hx             Social History   Substance Use Topics   • Smoking status: Never Smoker   • Smokeless tobacco: Never Used   • Alcohol use No         Current Outpatient Prescriptions:   • ELIQUIS 5 MG tablet tablet, TAKE 1 TABLET BY MOUTH EVERY TWELVE HOURS, Disp: , Rfl: 4  • midodrine (PROAMATINE) 5 MG tablet, Take 1.5 tablets by mouth 3 (Three) Times a Day., Disp: 140 tablet, Rfl: 6       Allergies   Allergen Reactions   • Amoxicillin Rash     Blood pressure 155/92, pulse (!) 122, temperature 97.9 °F (36.6 °C), temperature source Temporal Artery , resp. rate 16, height 63\" (160 cm), weight 202 lb (91.6 kg), last menstrual period 04/18/2017, SpO2 98 %.       Physical Exam   Constitutional: She is oriented to person, place, and time. She appears well-developed and well-nourished. No distress.   HENT:   Head: Normocephalic and atraumatic.   Right Ear: Hearing and external ear normal.   Left Ear: Hearing and external ear normal.   Nose: Nose normal.   Mouth/Throat: Oropharynx is clear and moist and mucous membranes are normal. Mucous membranes are not pale, not dry and not cyanotic. No oral lesions. No oropharyngeal exudate.   Eyes: Conjunctivae and EOM are normal. Right eye exhibits no discharge. Left eye exhibits no discharge. No scleral icterus.   Neck: Trachea normal. Neck supple. No JVD present. No edema present. No thyroid mass and no thyromegaly present.   Cardiovascular: Normal rate, regular rhythm, S2 normal and normal heart sounds. Exam reveals no gallop, no S3 and no friction rub.   No murmur " heard.  Pulmonary/Chest: Effort normal and breath sounds normal. No respiratory distress. She has no wheezes. She has no rales. She exhibits no tenderness.   Abdominal: Soft. Normal appearance and bowel sounds are normal. She exhibits no distension, no ascites and no mass. There is no splenomegaly or hepatomegaly. There is tenderness in the right lower quadrant. There is no rigidity, no rebound and no guarding. No hernia.   Musculoskeletal: She exhibits no tenderness or deformity.     Vascular Status - Her exam exhibits no right foot edema. Her exam exhibits no left foot edema.  Lymphadenopathy:   She has no cervical adenopathy.   Left: No supraclavicular adenopathy present.   Neurological: She is alert and oriented to person, place, and time. She has normal strength. No cranial nerve deficit or sensory deficit. She exhibits normal muscle tone. Coordination normal.   Skin: No rash noted. She is not diaphoretic. No cyanosis. No pallor. Nails show no clubbing.   Psychiatric: She has a normal mood and affect. Her behavior is normal. Judgment and thought content normal.   Nursing note and vitals reviewed.     Laboratory Tests:   Upon review of medical records:     Dated 8/31/2016 sodium 146 potassium 3.5 chloride 105 CO2 24 BUN 11 creatinine 0.7 glucose 98 calcium 9.7 total bilirubin 0.7 AST 1:15  albumin 4.5 alkaline phosphatase 92 lipase 75 WBC 6.0 hemoglobin 15.0 hematocrit 43 platelet count 288 MCV 88.7  Urine nitrite negative, leukocyte esterase urine negative, bacteria 2+ urine   Stool for C. difficile: Negative for Clostridium difficile toxin A/B  Stool for ONP: No ova, cysts, or parasites seen. Fecal leukocytes: No WBCs seen. Stool culture: No enteric pathogens isolated. No yersinia enterocolitica, Campylobacter jejuni, or Escherichia coli isolated.     Dated September 22, 2016 sodium 141 potassium 4.4 chloride 102 CO2 26 BUN 13 serum creatinine 0.7 glucose 80. Calcium 9.6. Albumin 4.5. T bili 0.8. AST 19  ALT 35 alkaline phosphatase 81. WBC 10.1 hemoglobin 14.3 hematocrit 42 MCV 91.5 and platelet count 291.  Hepatitis A IgM negative. Hepatitis A total antibody negative. Hepatitis B surface antigen negative. Hepatitis B core IgM negative. Hepatitis B core total antibody negative. Hepatitis C antibody negative at < 0.1.      Dated November 13, 2016 sodium 140 potassium 3.6 chloride 102 CO2 27 BUN 12 serum creatinine 0.8 glucose 96. Calcium 9.6. Albumin 4.2. T bili 0.5. AST 21 ALT 26 alkaline phosphatase 76. Lipase 68. Magnesium 1.9. TSH 1.49. PT 11.4. INR 1.0. PTT 26. WBC 10.2 hemoglobin 13.8 hematocrit 40 MCV 89.8 and platelet count 306.  Dated November 14, 2016 total cholesterol 204. Triglycerides 285.     Dated February 18, 2017 sodium 146 potassium 4.1 chloride 106 CO2 29 BUN 9 serum creatinine 0.70 and glucose 96. Calcium 9.8. WBC 7.37 hemoglobin 14.5 hematocrit 42.7 MCV 90.5 and platelet count 363.     Abdominal Imaging:   Upon review of records:     X-ray of abdomen 2 view reveals flat and upright views of the abdomen demonstrate an unremarkable bowel gas pattern. There is no free air or bowel dilatation. Note is made of prior cholecystectomy.     CT of abdomen and pelvis with contrast dated 8/31/2016 reveals bilateral lower lobe atelectasis. The liver, spleen, pancreas, adrenal glands and kidneys are unremarkable. The bowel gas pattern is nonobstructive. The appendix appears normal. There is long segment adenomatous wall thickening of the ileum suggesting inflammatory bowel disease. Uterus and ovaries are unremarkable with minimal free fluid in the cul-de-sac. There are shotty mesenteric lymph nodes.     Dated January 7, 2017 the patient underwent a CT of the abdomen and pelvis with contrast which revealed: Bilateral lower lobe atelectasis. Liver, spleen, pancreas, adrenal glands and kidneys are unremarkable. Bowel gas pattern is nonobstructive. There is no wall thickening or mesenteric inflammatory stranding.  The appendix appears normal. Uterus and left ovary are unremarkable. There is a 2 cm partially collapsed right ovarian cyst. The bladder is normal in contour. There is no free fluid, free air or adenopathy.     Notes:      1. No history of IVDA.  2. There is a history of tattoos.  3. No history of blood transfusions.  4. No history of alcohol use.        Assessment and Plan:      Marilu was seen today for follow-up.     Diagnoses and all orders for this visit:     Diarrhea, unspecified type  Comments:  Recurrent chronic diarrhea associated with tenesmus. Bile acid diarrhea is a possibility, however with abnormal CT Crohn's disease is of concern.      Periumbilical abdominal pain  Comments:  Likely secondary to terminal ileitis.     Nausea  Comments:  Likely multifactorial Including terminal ileitis.     Abnormal liver function tests  Comments:  History of significant elevation of AST and ALT in 200 range in the past. More recent laboratory tests are normal. Passage of her small stone is a possibility          Recommendations:    1.  Colonoscopy and EGD: Description of the procedures, risks benefits alternatives and options including non-operative options were discussed with the patient in detail.  The patient understood and wished to proceed.

## 2017-04-24 ENCOUNTER — OFFICE VISIT (OUTPATIENT)
Dept: INTERNAL MEDICINE | Facility: CLINIC | Age: 27
End: 2017-04-24

## 2017-04-24 ENCOUNTER — PATIENT MESSAGE (OUTPATIENT)
Dept: INTERNAL MEDICINE | Facility: CLINIC | Age: 27
End: 2017-04-24

## 2017-04-24 VITALS
TEMPERATURE: 99 F | SYSTOLIC BLOOD PRESSURE: 112 MMHG | WEIGHT: 208.2 LBS | HEIGHT: 63 IN | OXYGEN SATURATION: 99 % | BODY MASS INDEX: 36.89 KG/M2 | HEART RATE: 123 BPM | DIASTOLIC BLOOD PRESSURE: 72 MMHG

## 2017-04-24 DIAGNOSIS — R00.0 SINUS TACHYCARDIA: ICD-10-CM

## 2017-04-24 DIAGNOSIS — G89.29 CHRONIC MIDLINE THORACIC BACK PAIN: ICD-10-CM

## 2017-04-24 DIAGNOSIS — R19.7 DIARRHEA, UNSPECIFIED TYPE: Primary | ICD-10-CM

## 2017-04-24 DIAGNOSIS — R77.8 ABNORMAL SPEP: ICD-10-CM

## 2017-04-24 DIAGNOSIS — R07.81 PLEURITIC PAIN: ICD-10-CM

## 2017-04-24 DIAGNOSIS — M54.6 CHRONIC MIDLINE THORACIC BACK PAIN: ICD-10-CM

## 2017-04-24 DIAGNOSIS — D68.59 PROTEIN S DEFICIENCY (HCC): ICD-10-CM

## 2017-04-24 LAB
BASOPHILS # BLD AUTO: 0.04 10*3/MM3 (ref 0–0.2)
BASOPHILS NFR BLD AUTO: 0.4 % (ref 0–2.5)
EOSINOPHIL # BLD AUTO: 0.08 10*3/MM3 (ref 0–0.7)
EOSINOPHIL NFR BLD AUTO: 0.8 % (ref 0–7)
ERYTHROCYTE [DISTWIDTH] IN BLOOD BY AUTOMATED COUNT: 12.1 % (ref 11.5–14.5)
HCT VFR BLD AUTO: 43 % (ref 37–47)
HGB BLD-MCNC: 14.4 G/DL (ref 12–16)
IMM GRANULOCYTES # BLD: 0.02 10*3/MM3 (ref 0–0.06)
IMM GRANULOCYTES NFR BLD: 0.2 % (ref 0–0.6)
LYMPHOCYTES # BLD AUTO: 1.94 10*3/MM3 (ref 0.6–3.4)
LYMPHOCYTES NFR BLD AUTO: 20.2 % (ref 10–50)
MCH RBC QN AUTO: 31 PG (ref 27–31)
MCHC RBC AUTO-ENTMCNC: 33.5 G/DL (ref 30–37)
MCV RBC AUTO: 92.7 FL (ref 81–99)
MONOCYTES # BLD AUTO: 0.52 10*3/MM3 (ref 0–0.9)
MONOCYTES NFR BLD AUTO: 5.4 % (ref 0–12)
NEUTROPHILS # BLD AUTO: 7 10*3/MM3 (ref 2–6.9)
NEUTROPHILS NFR BLD AUTO: 73 % (ref 37–80)
NRBC BLD AUTO-RTO: 0 /100 WBC (ref 0–0)
PLATELET # BLD AUTO: 359 10*3/MM3 (ref 130–400)
RBC # BLD AUTO: 4.64 10*6/MM3 (ref 4.2–5.4)
WBC # BLD AUTO: 9.6 10*3/MM3 (ref 4.8–10.8)

## 2017-04-24 PROCEDURE — 99214 OFFICE O/P EST MOD 30 MIN: CPT | Performed by: FAMILY MEDICINE

## 2017-04-24 RX ORDER — APIXABAN 5 MG/1
1 TABLET, FILM COATED ORAL 2 TIMES DAILY
Refills: 4 | COMMUNITY
Start: 2017-04-07 | End: 2017-05-04

## 2017-04-24 RX ORDER — MONTELUKAST SODIUM 4 MG/1
2 TABLET, CHEWABLE ORAL DAILY
Qty: 60 TABLET | Refills: 0 | Status: SHIPPED | OUTPATIENT
Start: 2017-04-24 | End: 2017-05-04

## 2017-04-25 DIAGNOSIS — N83.201 CYST OF RIGHT OVARY: ICD-10-CM

## 2017-04-25 DIAGNOSIS — E05.90 SUBCLINICAL HYPERTHYROIDISM: Primary | ICD-10-CM

## 2017-04-25 PROBLEM — G89.29 CHRONIC MIDLINE THORACIC BACK PAIN: Status: ACTIVE | Noted: 2017-04-25

## 2017-04-25 PROBLEM — M54.6 CHRONIC MIDLINE THORACIC BACK PAIN: Status: ACTIVE | Noted: 2017-04-25

## 2017-04-25 LAB
HBA1C MFR BLD: 4.9 %
T4 FREE SERPL-MCNC: 1.04 NG/DL (ref 0.78–2.19)
THYROGLOB AB SERPL-ACNC: <1 IU/ML (ref 0–0.9)
THYROPEROXIDASE AB SERPL-ACNC: 11 IU/ML (ref 0–34)
TSH SERPL DL<=0.005 MIU/L-ACNC: 0.38 MIU/ML (ref 0.47–4.68)

## 2017-04-26 ENCOUNTER — RESULTS ENCOUNTER (OUTPATIENT)
Dept: INTERNAL MEDICINE | Facility: CLINIC | Age: 27
End: 2017-04-26

## 2017-04-26 DIAGNOSIS — E05.90 SUBCLINICAL HYPERTHYROIDISM: ICD-10-CM

## 2017-04-26 LAB
LAB AP CASE REPORT: NORMAL
Lab: NORMAL
PATH REPORT.FINAL DX SPEC: NORMAL

## 2017-04-26 NOTE — PROGRESS NOTES
Subjective   Marilu Godoy is a 26 y.o. female.     History of Present Illness     Patient comes in to follow up. Notes she's still on Lovenox from her EGD/colonoscopy last week, will call GI to see if she can resume normal anticoagulant. Mentions hematology is questioning accuracy of protein S testing, plans to recheck, thinks she may be able to come off Eliquis but Dr. Duvall of pulmonary disagrees, she says. Has not been told results of GI studies, has follow up with GI next week. She was given an Rx for a powder after the procedure but it was not explained to her. She is not tolerating it well, makes her sick, and she does not feel it's helped her at all. She continues to have pain with breathing and shortness of breath with exertion. Has burning pain in between shoulder blades. Has pain on left side of chest. Cough, not productive. Achy all over.     The following portions of the patient's history were reviewed and updated as appropriate: allergies, current medications, past family history, past medical history, past social history, past surgical history and problem list.    Review of Systems   Constitutional: Positive for activity change, appetite change and fatigue.   Respiratory: Positive for chest tightness and shortness of breath.    Cardiovascular: Positive for chest pain.   Gastrointestinal: Positive for abdominal pain and diarrhea.   Musculoskeletal: Positive for arthralgias, back pain and myalgias.   Neurological: Positive for headaches.       Objective   Physical Exam   Constitutional: She is oriented to person, place, and time. Vital signs are normal. She appears well-developed and well-nourished. She is active. She does not have a sickly appearance. She does not appear ill.   Appears stated age. Well groomed. Obese. Appears uncomfortable.     HENT:   Head: Normocephalic and atraumatic. Hair is normal.   Right Ear: Hearing normal.   Left Ear: Hearing normal.   Nose: Nose normal.   Mouth/Throat:  Oropharynx is clear and moist.   Eyes: EOM and lids are normal. Pupils are equal, round, and reactive to light. No scleral icterus.   Neck: Phonation normal. Neck supple. No thyroid mass and no thyromegaly present.   Cardiovascular: Regular rhythm and normal heart sounds.  Tachycardia present.    Pulmonary/Chest: Effort normal and breath sounds normal. No stridor. She has no rales.   Musculoskeletal: She exhibits no deformity.        Thoracic back: She exhibits no tenderness and no bony tenderness.   Neurological: She is alert and oriented to person, place, and time. No cranial nerve deficit. Gait normal.   Skin: She is not diaphoretic. No cyanosis. Nails show no clubbing.   Psychiatric: Her speech is normal and behavior is normal. Judgment and thought content normal. Her mood appears anxious. Cognition and memory are normal.   Nursing note and vitals reviewed.    Reviewed labs. Negative pregnancy test again on 4/20/17. Reviewed pathology for scopes dated 4/20, see under labs, but no evidence of celiac, H pylori, metaplasia. Had some changes to colon that could bleed but no active bleeding noted. No obvious signs of inflammatory bowel.     BNP on 4/17 was normal. SPEP showed elevation in beta globulin which requires further testing as I discussed with patient in My Chart, see patient message history.     Previously negative KIERA, tested further for autoimmune conditions on 4/17 and RF normal, low anti-CCP. ESR slightly elevated at 22.    Protein S activity low at 42% (normal %) on 11/14/16.   On 11/14/16 found to be heterozygous for Factor V Leiden.    CT PE protocol on 3/10/17 shows no thoracic aortic aneurysm or dissection on top of negative result for PE.       Assessment/Plan   Marilu was seen today for cough, muscle pain, nausea and headache.    Diagnoses and all orders for this visit:    Diarrhea, unspecified type  -     colestipol (COLESTID) 1 G tablet; Take 2 tablets by mouth Daily.    Protein S  deficiency  -     Protein S Panel; Future    Abnormal SPEP  -     TSH  -     T4, Free  -     Thyroid Antibodies  -     Iron Profile; Future  -     Hemoglobin A1c    Sinus tachycardia  -     CBC & Differential    Pleuritic pain    Chronic midline thoracic back pain    Likely GI is wanting to treat bile acid diarrhea, possibly secondary to cholecystectomy, changed to a more tolerable agent. Needs to follow up with GI as scheduled, discuss anticoagulation with them. Will consider testing with MRI T spine if symptoms persist, could be radicular pain. Patient notes steroids did not help her much.

## 2017-04-28 ENCOUNTER — HOSPITAL ENCOUNTER (OUTPATIENT)
Dept: ULTRASOUND IMAGING | Facility: HOSPITAL | Age: 27
Discharge: HOME | End: 2017-04-28
Attending: FAMILY MEDICINE | Admitting: FAMILY MEDICINE

## 2017-04-28 DIAGNOSIS — N83.201 CYST OF RIGHT OVARY: ICD-10-CM

## 2017-04-28 PROCEDURE — 76830 TRANSVAGINAL US NON-OB: CPT

## 2017-04-29 ENCOUNTER — RESULTS ENCOUNTER (OUTPATIENT)
Dept: INTERNAL MEDICINE | Facility: CLINIC | Age: 27
End: 2017-04-29

## 2017-04-29 DIAGNOSIS — D68.59 PROTEIN S DEFICIENCY (HCC): ICD-10-CM

## 2017-04-29 DIAGNOSIS — R77.8 ABNORMAL SPEP: ICD-10-CM

## 2017-04-29 LAB
T3 SERPL-MCNC: 149 NG/DL (ref 71–180)
T3FREE SERPL-MCNC: 3.2 PG/ML (ref 2–4.4)
T4 FREE SERPL-MCNC: 0.99 NG/DL (ref 0.78–2.19)
TSH SERPL DL<=0.005 MIU/L-ACNC: 0.68 MIU/ML (ref 0.47–4.68)

## 2017-05-01 DIAGNOSIS — G89.29 CHRONIC MIDLINE THORACIC BACK PAIN: Primary | ICD-10-CM

## 2017-05-01 DIAGNOSIS — M54.6 CHRONIC MIDLINE THORACIC BACK PAIN: Primary | ICD-10-CM

## 2017-05-01 LAB
IRON SATN MFR SERPL: 28 % (ref 11–46)
IRON SERPL-MCNC: 104 MCG/DL (ref 37–181)
PROT S ACT/NOR PPP: 45 % (ref 63–140)
PROT S AG ACT/NOR PPP IA: 111 % (ref 60–150)
PROT S FREE AG ACT/NOR PPP IA: 109 % (ref 57–157)
TIBC SERPL-MCNC: 374 MCG/DL (ref 261–497)
UIBC SERPL-MCNC: 270 MCG/DL

## 2017-05-04 ENCOUNTER — OFFICE VISIT (OUTPATIENT)
Dept: INTERNAL MEDICINE | Facility: CLINIC | Age: 27
End: 2017-05-04

## 2017-05-04 ENCOUNTER — HOSPITAL ENCOUNTER (OUTPATIENT)
Dept: MRI IMAGING | Facility: HOSPITAL | Age: 27
Discharge: HOME | End: 2017-05-04
Admitting: FAMILY MEDICINE

## 2017-05-04 ENCOUNTER — OFFICE VISIT (OUTPATIENT)
Dept: GASTROENTEROLOGY | Facility: CLINIC | Age: 27
End: 2017-05-04

## 2017-05-04 VITALS
OXYGEN SATURATION: 96 % | BODY MASS INDEX: 36.86 KG/M2 | WEIGHT: 208 LBS | SYSTOLIC BLOOD PRESSURE: 124 MMHG | HEART RATE: 119 BPM | TEMPERATURE: 99.7 F | DIASTOLIC BLOOD PRESSURE: 80 MMHG | HEIGHT: 63 IN | RESPIRATION RATE: 12 BRPM

## 2017-05-04 VITALS
TEMPERATURE: 99 F | HEART RATE: 110 BPM | HEIGHT: 63 IN | BODY MASS INDEX: 36.86 KG/M2 | RESPIRATION RATE: 12 BRPM | WEIGHT: 208 LBS | SYSTOLIC BLOOD PRESSURE: 131 MMHG | DIASTOLIC BLOOD PRESSURE: 82 MMHG

## 2017-05-04 DIAGNOSIS — R11.0 NAUSEA: ICD-10-CM

## 2017-05-04 DIAGNOSIS — M54.6 CHRONIC MIDLINE THORACIC BACK PAIN: ICD-10-CM

## 2017-05-04 DIAGNOSIS — R19.7 DIARRHEA, UNSPECIFIED TYPE: Primary | ICD-10-CM

## 2017-05-04 DIAGNOSIS — R10.11 RIGHT UPPER QUADRANT PAIN: ICD-10-CM

## 2017-05-04 DIAGNOSIS — K55.20 ANGIODYSPLASIA OF THE COLON: ICD-10-CM

## 2017-05-04 DIAGNOSIS — G89.29 CHRONIC MIDLINE THORACIC BACK PAIN: ICD-10-CM

## 2017-05-04 DIAGNOSIS — K29.60 EROSIVE GASTRITIS: ICD-10-CM

## 2017-05-04 DIAGNOSIS — J02.9 PHARYNGITIS, UNSPECIFIED ETIOLOGY: Primary | ICD-10-CM

## 2017-05-04 DIAGNOSIS — E66.3 OVER WEIGHT: ICD-10-CM

## 2017-05-04 DIAGNOSIS — R79.89 ABNORMAL LIVER FUNCTION TESTS: ICD-10-CM

## 2017-05-04 LAB
EXPIRATION DATE: NORMAL
INTERNAL CONTROL: NORMAL
Lab: NORMAL
S PYO AG THROAT QL: NEGATIVE

## 2017-05-04 PROCEDURE — 99213 OFFICE O/P EST LOW 20 MIN: CPT | Performed by: NURSE PRACTITIONER

## 2017-05-04 PROCEDURE — 72146 MRI CHEST SPINE W/O DYE: CPT

## 2017-05-04 PROCEDURE — 99214 OFFICE O/P EST MOD 30 MIN: CPT | Performed by: INTERNAL MEDICINE

## 2017-05-04 PROCEDURE — 87880 STREP A ASSAY W/OPTIC: CPT | Performed by: NURSE PRACTITIONER

## 2017-05-04 RX ORDER — AZITHROMYCIN 250 MG/1
TABLET, FILM COATED ORAL
Qty: 6 TABLET | Refills: 0 | Status: SHIPPED | OUTPATIENT
Start: 2017-05-04 | End: 2017-05-16

## 2017-05-05 ENCOUNTER — HOSPITAL ENCOUNTER (OUTPATIENT)
Dept: CT IMAGING | Facility: HOSPITAL | Age: 27
Discharge: HOME | End: 2017-05-05
Admitting: PHYSICIAN ASSISTANT

## 2017-05-05 ENCOUNTER — OFFICE VISIT (OUTPATIENT)
Dept: INTERNAL MEDICINE | Facility: CLINIC | Age: 27
End: 2017-05-05

## 2017-05-05 VITALS
TEMPERATURE: 98.4 F | OXYGEN SATURATION: 98 % | WEIGHT: 208 LBS | DIASTOLIC BLOOD PRESSURE: 90 MMHG | HEIGHT: 63 IN | BODY MASS INDEX: 36.86 KG/M2 | HEART RATE: 87 BPM | SYSTOLIC BLOOD PRESSURE: 128 MMHG

## 2017-05-05 DIAGNOSIS — R05.9 COUGH: ICD-10-CM

## 2017-05-05 DIAGNOSIS — Z86.711 HISTORY OF PULMONARY EMBOLISM: ICD-10-CM

## 2017-05-05 DIAGNOSIS — D68.51 FACTOR V LEIDEN (HCC): ICD-10-CM

## 2017-05-05 DIAGNOSIS — R06.02 SHORTNESS OF BREATH: ICD-10-CM

## 2017-05-05 DIAGNOSIS — R07.81 PLEURITIC CHEST PAIN: Primary | ICD-10-CM

## 2017-05-05 PROCEDURE — 99214 OFFICE O/P EST MOD 30 MIN: CPT | Performed by: PHYSICIAN ASSISTANT

## 2017-05-05 PROCEDURE — 0 IOPAMIDOL 61 % SOLUTION: Performed by: PHYSICIAN ASSISTANT

## 2017-05-05 PROCEDURE — 71275 CT ANGIOGRAPHY CHEST: CPT

## 2017-05-05 RX ADMIN — IOPAMIDOL 100 ML: 612 INJECTION, SOLUTION INTRAVENOUS at 12:45

## 2017-05-16 ENCOUNTER — OFFICE VISIT (OUTPATIENT)
Dept: INTERNAL MEDICINE | Facility: CLINIC | Age: 27
End: 2017-05-16

## 2017-05-16 ENCOUNTER — OFFICE VISIT (OUTPATIENT)
Dept: PULMONOLOGY | Facility: CLINIC | Age: 27
End: 2017-05-16

## 2017-05-16 ENCOUNTER — OFFICE VISIT (OUTPATIENT)
Dept: CARDIOLOGY | Facility: CLINIC | Age: 27
End: 2017-05-16

## 2017-05-16 VITALS
BODY MASS INDEX: 37.07 KG/M2 | WEIGHT: 209.2 LBS | HEART RATE: 98 BPM | DIASTOLIC BLOOD PRESSURE: 72 MMHG | SYSTOLIC BLOOD PRESSURE: 118 MMHG | HEIGHT: 63 IN | RESPIRATION RATE: 16 BRPM | OXYGEN SATURATION: 98 %

## 2017-05-16 VITALS
HEIGHT: 63 IN | WEIGHT: 208.4 LBS | OXYGEN SATURATION: 98 % | HEART RATE: 86 BPM | TEMPERATURE: 99.2 F | BODY MASS INDEX: 36.93 KG/M2 | SYSTOLIC BLOOD PRESSURE: 124 MMHG | DIASTOLIC BLOOD PRESSURE: 82 MMHG

## 2017-05-16 VITALS
HEIGHT: 63 IN | DIASTOLIC BLOOD PRESSURE: 78 MMHG | OXYGEN SATURATION: 98 % | RESPIRATION RATE: 14 BRPM | SYSTOLIC BLOOD PRESSURE: 122 MMHG | HEART RATE: 94 BPM | WEIGHT: 208.2 LBS | BODY MASS INDEX: 36.89 KG/M2

## 2017-05-16 DIAGNOSIS — J45.40 MODERATE PERSISTENT ASTHMA WITHOUT COMPLICATION: ICD-10-CM

## 2017-05-16 DIAGNOSIS — J30.89 OTHER ALLERGIC RHINITIS: ICD-10-CM

## 2017-05-16 DIAGNOSIS — G90.3 NEUROGENIC ORTHOSTATIC HYPOTENSION (HCC): ICD-10-CM

## 2017-05-16 DIAGNOSIS — G47.33 OSA (OBSTRUCTIVE SLEEP APNEA): Primary | ICD-10-CM

## 2017-05-16 DIAGNOSIS — D68.51 HETEROZYGOUS FACTOR V LEIDEN MUTATION (HCC): ICD-10-CM

## 2017-05-16 DIAGNOSIS — R00.0 SINUS TACHYCARDIA: Primary | ICD-10-CM

## 2017-05-16 DIAGNOSIS — R07.2 PRECORDIAL PAIN: ICD-10-CM

## 2017-05-16 DIAGNOSIS — M54.5 ACUTE LOW BACK PAIN, UNSPECIFIED BACK PAIN LATERALITY, WITH SCIATICA PRESENCE UNSPECIFIED: ICD-10-CM

## 2017-05-16 DIAGNOSIS — H61.899 DEBRIS IN EAR CANAL: ICD-10-CM

## 2017-05-16 DIAGNOSIS — D68.59 PROTEIN S DEFICIENCY (HCC): ICD-10-CM

## 2017-05-16 DIAGNOSIS — J02.9 SORE THROAT: Primary | ICD-10-CM

## 2017-05-16 DIAGNOSIS — I26.99 OTHER PULMONARY EMBOLISM WITHOUT ACUTE COR PULMONALE, UNSPECIFIED CHRONICITY (HCC): ICD-10-CM

## 2017-05-16 LAB
B-HCG UR QL: NEGATIVE
EXPIRATION DATE: NORMAL
INTERNAL CONTROL: NORMAL
INTERNAL NEGATIVE CONTROL: NEGATIVE
INTERNAL POSITIVE CONTROL: POSITIVE
Lab: NORMAL
Lab: NORMAL
S PYO AG THROAT QL: NEGATIVE

## 2017-05-16 PROCEDURE — 95012 NITRIC OXIDE EXP GAS DETER: CPT | Performed by: INTERNAL MEDICINE

## 2017-05-16 PROCEDURE — 81025 URINE PREGNANCY TEST: CPT | Performed by: FAMILY MEDICINE

## 2017-05-16 PROCEDURE — 87880 STREP A ASSAY W/OPTIC: CPT | Performed by: FAMILY MEDICINE

## 2017-05-16 PROCEDURE — 99213 OFFICE O/P EST LOW 20 MIN: CPT | Performed by: FAMILY MEDICINE

## 2017-05-16 PROCEDURE — 99214 OFFICE O/P EST MOD 30 MIN: CPT | Performed by: INTERNAL MEDICINE

## 2017-05-16 RX ORDER — TIZANIDINE 4 MG/1
4 TABLET ORAL EVERY 8 HOURS PRN
Qty: 30 TABLET | Refills: 1 | Status: SHIPPED | OUTPATIENT
Start: 2017-05-16 | End: 2017-07-17 | Stop reason: SDUPTHER

## 2017-05-16 RX ORDER — MONTELUKAST SODIUM 10 MG/1
10 TABLET ORAL NIGHTLY
COMMUNITY
End: 2017-08-04

## 2017-05-16 RX ORDER — FLUNISOLIDE 0.25 MG/ML
1 SOLUTION NASAL EVERY 12 HOURS
Qty: 1 BOTTLE | Refills: 5 | Status: SHIPPED | OUTPATIENT
Start: 2017-05-16 | End: 2018-05-15

## 2017-05-16 RX ORDER — AZELASTINE 1 MG/ML
1 SPRAY, METERED NASAL 2 TIMES DAILY PRN
Qty: 1 EACH | Refills: 5 | Status: SHIPPED | OUTPATIENT
Start: 2017-05-16 | End: 2018-05-15

## 2017-05-19 LAB
BACTERIA SPEC RESP CULT: ABNORMAL
BACTERIA SPEC RESP CULT: ABNORMAL

## 2017-05-22 RX ORDER — FLUCONAZOLE 150 MG/1
TABLET ORAL
Qty: 2 TABLET | Refills: 0 | Status: SHIPPED | OUTPATIENT
Start: 2017-05-22 | End: 2017-06-09

## 2017-05-24 ENCOUNTER — TELEPHONE (OUTPATIENT)
Dept: INTERNAL MEDICINE | Facility: CLINIC | Age: 27
End: 2017-05-24

## 2017-05-24 ENCOUNTER — OFFICE VISIT (OUTPATIENT)
Dept: INTERNAL MEDICINE | Facility: CLINIC | Age: 27
End: 2017-05-24

## 2017-05-24 ENCOUNTER — HOSPITAL ENCOUNTER (OUTPATIENT)
Dept: CT IMAGING | Facility: HOSPITAL | Age: 27
Discharge: HOME | End: 2017-05-24
Attending: FAMILY MEDICINE | Admitting: FAMILY MEDICINE

## 2017-05-24 VITALS
WEIGHT: 204 LBS | HEART RATE: 99 BPM | OXYGEN SATURATION: 98 % | BODY MASS INDEX: 36.14 KG/M2 | TEMPERATURE: 98.3 F | SYSTOLIC BLOOD PRESSURE: 122 MMHG | HEIGHT: 63 IN | DIASTOLIC BLOOD PRESSURE: 82 MMHG

## 2017-05-24 DIAGNOSIS — R10.31 RLQ ABDOMINAL PAIN: ICD-10-CM

## 2017-05-24 DIAGNOSIS — R10.31 RLQ ABDOMINAL PAIN: Primary | ICD-10-CM

## 2017-05-24 DIAGNOSIS — A49.1 GROUP C STREPTOCOCCAL INFECTION: ICD-10-CM

## 2017-05-24 LAB
B-HCG UR QL: NEGATIVE
INTERNAL NEGATIVE CONTROL: NEGATIVE
INTERNAL POSITIVE CONTROL: POSITIVE
Lab: NORMAL

## 2017-05-24 PROCEDURE — 74176 CT ABD & PELVIS W/O CONTRAST: CPT

## 2017-05-24 PROCEDURE — 96372 THER/PROPH/DIAG INJ SC/IM: CPT | Performed by: FAMILY MEDICINE

## 2017-05-24 PROCEDURE — 99214 OFFICE O/P EST MOD 30 MIN: CPT | Performed by: FAMILY MEDICINE

## 2017-05-24 PROCEDURE — 81025 URINE PREGNANCY TEST: CPT | Performed by: FAMILY MEDICINE

## 2017-05-24 RX ORDER — CEFTRIAXONE 1 G/1
1 INJECTION, POWDER, FOR SOLUTION INTRAMUSCULAR; INTRAVENOUS EVERY 24 HOURS
Status: DISCONTINUED | OUTPATIENT
Start: 2017-05-24 | End: 2017-06-09

## 2017-05-24 RX ORDER — TRAMADOL HYDROCHLORIDE 50 MG/1
50 TABLET ORAL EVERY 8 HOURS PRN
Qty: 15 TABLET | Refills: 0 | Status: SHIPPED | OUTPATIENT
Start: 2017-05-24 | End: 2017-08-18 | Stop reason: SDUPTHER

## 2017-05-24 RX ADMIN — CEFTRIAXONE 1 G: 1 INJECTION, POWDER, FOR SOLUTION INTRAMUSCULAR; INTRAVENOUS at 11:28

## 2017-05-26 ENCOUNTER — HOSPITAL ENCOUNTER (OUTPATIENT)
Dept: CT IMAGING | Facility: HOSPITAL | Age: 27
Discharge: HOME | End: 2017-05-26
Attending: INTERNAL MEDICINE | Admitting: INTERNAL MEDICINE

## 2017-05-26 ENCOUNTER — TELEPHONE (OUTPATIENT)
Dept: INTERNAL MEDICINE | Facility: CLINIC | Age: 27
End: 2017-05-26

## 2017-05-26 VITALS
OXYGEN SATURATION: 99 % | HEIGHT: 63 IN | TEMPERATURE: 98.1 F | RESPIRATION RATE: 18 BRPM | WEIGHT: 205.91 LBS | SYSTOLIC BLOOD PRESSURE: 110 MMHG | HEART RATE: 89 BPM | DIASTOLIC BLOOD PRESSURE: 72 MMHG | BODY MASS INDEX: 36.48 KG/M2

## 2017-05-26 PROCEDURE — 0 IOPAMIDOL PER 1 ML: Performed by: INTERNAL MEDICINE

## 2017-05-26 PROCEDURE — 75572 CT HRT W/3D IMAGE: CPT

## 2017-05-26 RX ORDER — SODIUM CHLORIDE 0.9 % (FLUSH) 0.9 %
1-10 SYRINGE (ML) INJECTION AS NEEDED
Status: DISCONTINUED | OUTPATIENT
Start: 2017-05-26 | End: 2017-05-26 | Stop reason: HOSPADM

## 2017-05-26 RX ORDER — METOPROLOL TARTRATE 50 MG/1
50 TABLET, FILM COATED ORAL ONCE AS NEEDED
Status: COMPLETED | OUTPATIENT
Start: 2017-05-26 | End: 2017-05-26

## 2017-05-26 RX ORDER — PROMETHAZINE HYDROCHLORIDE 25 MG/1
25 TABLET ORAL EVERY 6 HOURS PRN
Qty: 20 TABLET | Refills: 0 | Status: SHIPPED | OUTPATIENT
Start: 2017-05-26 | End: 2017-10-04 | Stop reason: SDUPTHER

## 2017-05-26 RX ORDER — ALPRAZOLAM 0.5 MG/1
0.5 TABLET ORAL
Status: DISCONTINUED | OUTPATIENT
Start: 2017-05-26 | End: 2017-05-26 | Stop reason: HOSPADM

## 2017-05-26 RX ORDER — NITROGLYCERIN 0.4 MG/1
0.4 TABLET SUBLINGUAL
Status: COMPLETED | OUTPATIENT
Start: 2017-05-26 | End: 2017-05-26

## 2017-05-26 RX ADMIN — NITROGLYCERIN 0.4 MG: 0.4 TABLET SUBLINGUAL at 11:24

## 2017-05-26 RX ADMIN — IOPAMIDOL 65 ML: 755 INJECTION, SOLUTION INTRAVENOUS at 11:25

## 2017-05-26 RX ADMIN — METOPROLOL TARTRATE 50 MG: 50 TABLET ORAL at 09:48

## 2017-05-26 RX ADMIN — ALPRAZOLAM 0.5 MG: 0.5 TABLET ORAL at 09:45

## 2017-05-30 ENCOUNTER — TELEPHONE (OUTPATIENT)
Dept: INTERNAL MEDICINE | Facility: CLINIC | Age: 27
End: 2017-05-30

## 2017-05-30 ENCOUNTER — PATIENT MESSAGE (OUTPATIENT)
Dept: CARDIOLOGY | Facility: CLINIC | Age: 27
End: 2017-05-30

## 2017-05-30 ENCOUNTER — TELEPHONE (OUTPATIENT)
Dept: INFUSION THERAPY | Facility: HOSPITAL | Age: 27
End: 2017-05-30

## 2017-06-02 ENCOUNTER — APPOINTMENT (OUTPATIENT)
Dept: LAB | Facility: HOSPITAL | Age: 27
End: 2017-06-02

## 2017-06-02 PROCEDURE — 86235 NUCLEAR ANTIGEN ANTIBODY: CPT | Performed by: FAMILY MEDICINE

## 2017-06-02 PROCEDURE — 86225 DNA ANTIBODY NATIVE: CPT | Performed by: FAMILY MEDICINE

## 2017-06-02 PROCEDURE — 87040 BLOOD CULTURE FOR BACTERIA: CPT | Performed by: FAMILY MEDICINE

## 2017-06-02 PROCEDURE — 85598 HEXAGNAL PHOSPH PLTLT NEUTRL: CPT | Performed by: FAMILY MEDICINE

## 2017-06-02 PROCEDURE — 82550 ASSAY OF CK (CPK): CPT | Performed by: FAMILY MEDICINE

## 2017-06-02 PROCEDURE — 85613 RUSSELL VIPER VENOM DILUTED: CPT | Performed by: FAMILY MEDICINE

## 2017-06-02 PROCEDURE — 85732 THROMBOPLASTIN TIME PARTIAL: CPT | Performed by: FAMILY MEDICINE

## 2017-06-02 PROCEDURE — 36415 COLL VENOUS BLD VENIPUNCTURE: CPT | Performed by: FAMILY MEDICINE

## 2017-06-02 PROCEDURE — 85610 PROTHROMBIN TIME: CPT | Performed by: FAMILY MEDICINE

## 2017-06-02 PROCEDURE — 85730 THROMBOPLASTIN TIME PARTIAL: CPT | Performed by: FAMILY MEDICINE

## 2017-06-02 PROCEDURE — 83516 IMMUNOASSAY NONANTIBODY: CPT | Performed by: FAMILY MEDICINE

## 2017-06-02 PROCEDURE — 85670 THROMBIN TIME PLASMA: CPT | Performed by: FAMILY MEDICINE

## 2017-06-05 ENCOUNTER — TELEPHONE (OUTPATIENT)
Dept: CARDIOLOGY | Facility: CLINIC | Age: 27
End: 2017-06-05

## 2017-06-05 NOTE — TELEPHONE ENCOUNTER
----- Message from Marilu Godoy sent at 5/30/2017 11:11 AM EDT -----  Regarding: Test Results Question  Contact: [phone]  Have the results from the CT scan I had done Friday came back yet?

## 2017-06-07 RX ORDER — DROXIDOPA 100 MG/1
1 CAPSULE ORAL 3 TIMES DAILY
Qty: 450 CAPSULE | Refills: 0 | COMMUNITY
Start: 2017-06-07 | End: 2017-07-07 | Stop reason: SDUPTHER

## 2017-06-07 NOTE — TELEPHONE ENCOUNTER
Patient notified that [facility] Would be calling her. To get the medication shipped.Patient states understanding.

## 2017-06-09 ENCOUNTER — OFFICE VISIT (OUTPATIENT)
Dept: INTERNAL MEDICINE | Facility: CLINIC | Age: 27
End: 2017-06-09

## 2017-06-09 VITALS
BODY MASS INDEX: 36.32 KG/M2 | HEART RATE: 94 BPM | TEMPERATURE: 99.4 F | DIASTOLIC BLOOD PRESSURE: 80 MMHG | SYSTOLIC BLOOD PRESSURE: 110 MMHG | WEIGHT: 205 LBS | HEIGHT: 63 IN | OXYGEN SATURATION: 99 %

## 2017-06-09 DIAGNOSIS — R50.9 FEVER, UNSPECIFIED FEVER CAUSE: ICD-10-CM

## 2017-06-09 DIAGNOSIS — J02.9 ACUTE PHARYNGITIS, UNSPECIFIED ETIOLOGY: Primary | ICD-10-CM
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
## 2017-06-09 DIAGNOSIS — M25.50 ARTHRALGIA, UNSPECIFIED JOINT: ICD-10-CM

## 2017-06-09 PROCEDURE — 99213 OFFICE O/P EST LOW 20 MIN: CPT | Performed by: PHYSICIAN ASSISTANT

## 2017-06-09 RX ORDER — CEPHALEXIN 500 MG/1
500 CAPSULE ORAL 3 TIMES DAILY
Qty: 30 CAPSULE | Refills: 0 | Status: SHIPPED | OUTPATIENT
Start: 2017-06-09 | End: 2017-06-14

## 2017-06-09 RX ORDER — KETOCONAZOLE 20 MG/G
CREAM TOPICAL
Refills: 0 | COMMUNITY
Start: 2017-06-05 | End: 2017-06-16

## 2017-06-09 RX ORDER — FLUCONAZOLE 150 MG/1
150 TABLET ORAL ONCE
Qty: 1 TABLET | Refills: 0 | Status: SHIPPED | OUTPATIENT
Start: 2017-06-09 | End: 2017-06-09

## 2017-06-09 NOTE — PROGRESS NOTES
Marilu Godoy is a 26 y.o. female.     Subjective   History of Present Illness   Here today with concern of sore throat, cough, SOA, congestion, headaches and fatigue for the last several days. Temperature has been running 100-101 for the last 102 days. Taking Tylenol which helps headache and fever only. Cough is not productive. Has also had some nausea but no vomiting. Has had some diarrhea. Reports some soreness in neck and shoulder muscles but denies stiffness.     Bit by a tick about 10 days ago and was treated with a single dose of Doxycycline 200 mg by urgent care.   Throat culture from 3 weeks ago was positive for strep group C and was treated with a single dose of Rocephin IM. She reports sore throat never fully resolved.         The following portions of the patient's history were reviewed and updated as appropriate: allergies, current medications, past family history, past medical history, past social history, past surgical history and problem list.    Review of Systems    Constitutional: fatigue, fever. Negative for appetite change, chills and unexpected weight change.   HENT: congestion, sore thraot. Negative for ear pain, hearing loss, nosebleeds, postnasal drip, rhinorrhea, tinnitus and trouble swallowing.    Eyes: Negative for photophobia, discharge and visual disturbance.   Respiratory: cough. Negative for chest tightness, shortness of breath and wheezing.    Cardiovascular: Negative for chest pain, palpitations and leg swelling.   Gastrointestinal: diarrhea, nausea.   Negative for abdominal distention, abdominal pain, blood in stool, constipation and vomiting.   Endocrine: Negative for cold intolerance, heat intolerance, polydipsia, polyphagia and polyuria.   Musculoskeletal: neck and shoulder myalgias. Negative for arthralgias, back pain, joint swelling and neck stiffness.   Skin: Negative for color change, pallor, rash and wound.   Allergic/Immunologic: Negative for environmental allergies,  "food allergies and immunocompromised state.   Neurological: headaches.  Negative for dizziness, tremors, seizures, weakness, numbness.  Hematological: Negative for adenopathy. Does not bruise/bleed easily.   Psychiatric/Behavioral: Negative for sleep disturbances, agitation, behavioral problems, confusion, hallucinations, self-injury and suicidal ideas. The patient is not nervous/anxious.      Objective    Physical Exam  Constitutional: Oriented to person, place, and time. Appears well-developed and well-nourished.   HENT: mild OP erythema, TMs normal.   Head: no sinus tenderness. Normocephalic and atraumatic.   Eyes: EOM are normal. Pupils are equal, round, and reactive to light.   Neck: Normal range of motion. Neck supple.   Cardiovascular: Normal rate, regular rhythm and normal heart sounds.    Pulmonary/Chest: Effort normal and breath sounds normal. No respiratory distress.  Has no wheezes or rales. Exhibits no chest wall tenderness.   Abdominal: Soft. Bowel sounds are normal. Exhibits no distension and no mass. There is no tenderness.   Musculoskeletal: Normal range of motion. Exhibits no tenderness.   Neurological: CNII-XII intact. Alert and oriented to person, place, and time.   Skin: Skin is warm and dry.   Psychiatric: Has a normal mood and affect. Behavior is normal. Judgment and thought content normal.       /80  Pulse 94  Temp 99.4 °F (37.4 °C)  Ht 63\" (160 cm)  Wt 205 lb (93 kg)  LMP 04/22/2017  SpO2 99%  BMI 36.31 kg/m2    Nursing note and vitals reviewed.         Assessment/Plan   Marilu was seen today for sore throat, cough, nasal congestion and fatigue.    Diagnoses and all orders for this visit:    Acute pharyngitis, unspecified etiology  -     Lyme, Total Antibody Test / Reflex  -     Dao-Barr Virus VCA Antibody Panel  -     cephalexin (KEFLEX) 500 MG capsule; Take 1 capsule by mouth 3 (Three) Times a Day for 10 days.    Tick bite, subsequent encounter  -     Lyme, Total " Antibody Test / Reflex    Fever, unspecified fever cause  -     Lyme, Total Antibody Test / Reflex  -     Dao-Barr Virus VCA Antibody Panel    Arthralgia, unspecified joint  -     Lyme, Total Antibody Test / Reflex  -     Dao-Barr Virus VCA Antibody Panel    Other orders  -     fluconazole (DIFLUCAN) 150 MG tablet; Take 1 tablet by mouth 1 (One) Time for 1 dose.

## 2017-06-10 LAB
B BURGDOR IGG+IGM SER-ACNC: <0.91 ISR (ref 0–0.9)
EBV EA IGG SER-ACNC: 11.5 U/ML (ref 0–8.9)
EBV NA IGG SER IA-ACNC: 201 U/ML (ref 0–17.9)
EBV VCA IGG SER IA-ACNC: 213 U/ML (ref 0–17.9)
EBV VCA IGM SER IA-ACNC: <36 U/ML (ref 0–35.9)
SERVICE CMNT-IMP: ABNORMAL

## 2017-06-14 ENCOUNTER — OFFICE VISIT (OUTPATIENT)
Dept: ONCOLOGY | Facility: CLINIC | Age: 27
End: 2017-06-14

## 2017-06-14 VITALS
HEART RATE: 104 BPM | BODY MASS INDEX: 36.49 KG/M2 | DIASTOLIC BLOOD PRESSURE: 72 MMHG | WEIGHT: 206 LBS | TEMPERATURE: 97.7 F | SYSTOLIC BLOOD PRESSURE: 131 MMHG | RESPIRATION RATE: 18 BRPM

## 2017-06-14 DIAGNOSIS — I26.99 PULMONARY EMBOLISM ON RIGHT (HCC): ICD-10-CM

## 2017-06-14 DIAGNOSIS — D68.51 HETEROZYGOUS FACTOR V LEIDEN MUTATION (HCC): Primary | ICD-10-CM

## 2017-06-14 PROCEDURE — 99213 OFFICE O/P EST LOW 20 MIN: CPT | Performed by: NURSE PRACTITIONER

## 2017-06-14 NOTE — PROGRESS NOTES
DATE OF VISIT: 6/14/2017    REASON FOR VISIT: Followup for Right pulmonary embolism      HISTORY OF PRESENT ILLNESS: The patient is a very pleasant 26 y.o. Female who was in her usual state of health until approximately 1 month. The patient presented with chest pain, right lower chest, radiating to her back, gait worse with deep breath, improves with rest, never had this pain before, comes and goes, started to become more persistent, 7 out of 10 severity. Patient denied any chest trauma denied any fever or chills no recent travel or ill contacts. Never had similar pain before. Patient went to local emergency room at [facility] November 13, 2016 she had a CAT scan chest that revealed right lower lobe pulmonary embolism. Patient had venous Dopplers bilateral lower extremities that failed to show any evidence of deep or superficial venous thrombosis. She had thrombophilia workup that revealed heterozygous factor V Leiden and low protein S activity at 42%. Patient was discharged home on Eliquis. Patient was referred to me for father recommendations. Patient is here today for follow up.     SUBJECTIVE: The patient has been doing fairly well. she was able to tolerate Eliquis  without any serious side effects. she denied any fever or chills, no night sweats, denied any headaches. Denies any chest pain and bleeding    PAST MEDICAL HISTORY/SOCIAL HISTORY/FAMILY HISTORY: Unchanged from my prior documentation done on 12/14/2016    Review of Systems      Current Outpatient Prescriptions:   •  albuterol (PROVENTIL HFA;VENTOLIN HFA) 108 (90 BASE) MCG/ACT inhaler, Inhale 2 puffs Every 6 (Six) Hours As Needed for Wheezing or Shortness of Air., Disp: 1 inhaler, Rfl: 3  •  apixaban (ELIQUIS) 5 MG tablet tablet, Take 5 mg by mouth 2 (Two) Times a Day., Disp: , Rfl:   •  azelastine (ASTELIN) 0.1 % nasal spray, 1 spray into each nostril 2 (Two) Times a Day As Needed for Rhinitis or Allergies. Use in each nostril as  directed, Disp: 1 each, Rfl: 5  •  Droxidopa (NORTHERA) 100 MG capsule, Take 1 tablet by mouth 3 (Three) Times a Day. titration up on the dose as instructed., Disp: 450 capsule, Rfl: 0  •  flunisolide (NASALIDE) 25 MCG/ACT (0.025%) solution nasal spray, Inhale 1 spray Every 12 (Twelve) Hours., Disp: 1 bottle, Rfl: 5  •  ketoconazole (NIZORAL) 2 % cream, apply to both ears externally three times a day for 10 days, Disp: , Rfl: 0  •  midodrine (PROAMATINE) 5 MG tablet, Take 1.5 tablets by mouth 3 (Three) Times a Day. (Patient taking differently: Take 5 mg by mouth 3 (Three) Times a Day.), Disp: 140 tablet, Rfl: 6  •  mometasone-formoterol (DULERA 200) 200-5 MCG/ACT inhaler, Inhale 2 puffs 2 (Two) Times a Day. Rinse mouth with water after use., Disp: 1 g, Rfl: 5  •  montelukast (SINGULAIR) 10 MG tablet, Take 10 mg by mouth Every Night., Disp: , Rfl:   •  pantoprazole (PROTONIX) 40 MG EC tablet, Take 1 tablet by mouth Daily., Disp: 30 tablet, Rfl: 5  •  promethazine (PHENERGAN) 25 MG tablet, Take 1 tablet by mouth Every 6 (Six) Hours As Needed for Nausea or Vomiting., Disp: 20 tablet, Rfl: 0  •  tiZANidine (ZANAFLEX) 4 MG tablet, Take 1 tablet by mouth Every 8 (Eight) Hours As Needed for Muscle Spasms., Disp: 30 tablet, Rfl: 1  •  traMADol (ULTRAM) 50 MG tablet, Take 1 tablet by mouth Every 8 (Eight) Hours As Needed for Severe Pain (7-10)., Disp: 15 tablet, Rfl: 0    PHYSICAL EXAMINATION:   /72  Pulse 104  Temp 97.7 °F (36.5 °C) (Temporal Artery )   Resp 18  Wt 206 lb (93.4 kg)  LMP 04/22/2017  BMI 36.49 kg/m2   ECOG Performance Status: 0 - Asymptomatic  General Appearance:  alert, cooperative, no apparent distress and appears stated age   Neurologic/Psychiatric: A&O x 3, gait steady, appropriate affect, strength 5/5 in all muscle groups   HEENT:  Normocephalic, without obvious abnormality, mucous membranes moist   Neck: Supple, symmetrical, trachea midline, no adenopathy;  No thyromegaly, masses, or  tenderness   Lungs:   Clear to auscultation bilaterally; respirations regular, even, and unlabored bilaterally   Heart:  Regular rate and rhythm, no murmurs appreciated   Abdomen:   Soft, non-tender, non-distended and no organomegaly   Lymph nodes: No cervical, supraclavicular, inguinal or axillary adenopathy noted   Extremities: Normal, atraumatic; no clubbing, cyanosis, or edema    Skin: No rashes, ulcers, or suspicious lesions noted     Office Visit on 06/09/2017   Component Date Value Ref Range Status   • Lyme Ab IgG/IgM 06/09/2017 <0.91  0.00 - 0.90 ISR Final    Comment:                                 Negative         <0.91                                  Equivocal  0.91 - 1.09                                  Positive         >1.09     • EBV VCA IgM 06/09/2017 <36.0  0.0 - 35.9 U/mL Final    Comment:                                  Negative        <36.0                                   Equivocal 36.0 - 43.9                                   Positive        >43.9     • EBV Early Antigen Ab, IgG 06/09/2017 11.5* 0.0 - 8.9 U/mL Final    Comment: Hepatitis A, Hepatitis C and HIV antibodies may cross-react  with this assay.                                   Negative        < 9.0                                   Equivocal  9.0 - 10.9                                   Positive        >10.9     • EBV VCA IgG 06/09/2017 213.0* 0.0 - 17.9 U/mL Final    Comment:                                  Negative        <18.0                                   Equivocal 18.0 - 21.9                                   Positive        >21.9     • EBV Nuclear Antigen Ab, IgG 06/09/2017 201.0* 0.0 - 17.9 U/mL Final    Comment:                                  Negative        <18.0                                   Equivocal 18.0 - 21.9                                   Positive        >21.9     • Interpretation 06/09/2017 Comment   Final    Comment:                EBV Interpretation Chart  Interpretation   EBV-IgM  EA(D)-IgG   VCA-IgG  EBNA-IgG  EBV Seronegative    -        -         -          -  Early Phase         +        -         -          -  Acute Primary       +       +or-       +          -  Infection  Convalescence/Past  -       +or-       +          +  Infection  Reactivated        +or-      +         +          +  Infection         + Antibody Present      - Antibody Absent          Ct Abdomen Pelvis Without Contrast    Result Date: 5/24/2017  Narrative: EXAMINATION: CT ABDOMEN AND PELVIS WO CONTRAST-  INDICATION: Anorexia, pain worse with movement, riding in car; R10.31-Right lower quadrant pain.  TECHNIQUE: 5 mm unenhanced images through the abdomen and pelvis.  The radiation dose reduction device was turned on For each scan per the ALARA (As Low as Reasonably Achievable) protocol.  COMPARISON: No previous abdominal scans. Previous pelvic ultrasound of 04/28/2017.  FINDINGS: By report, previous pelvic ultrasound was unremarkable. History today indicates right-sided abdominal pain, nausea and decreased appetite.  The included lower lungs appear grossly clear. No pleural effusion is seen. Clips are seen in the gallbladder fossa. No significant abnormalities are noted of the liver, spleen, pancreas, adrenal glands, or kidneys for a non-IV contrast enhanced exam. No upper abdominal free air, ascites, adenopathy or acute inflammatory focus is seen. Large and small bowel loops are normal in caliber and normal in appearance.  Regarding the lower abdomen and pelvis, the uterus is mildly prominent, between 6.5 and 7.0 cm in diameter. No uterine masses are seen. Both ovaries are rather elongated in appearance.  The left ovary may contain a 15 mm cyst. No ascites, adenopathy or acute inflammatory focus is seen. The terminal ileum, cecum and appendix appear grossly normal. Incidental note is made of subcutaneous fat stranding and a few air bubbles in the right upper gluteal region, presumably recent medicine injection.      Impression:  1. Mildly prominent size of the uterus and probable 15 mm left ovarian cyst. By history, pelvic ultrasound was performed on April 28th. 2. No evidence of acute inflammatory process or other acute intraabdominal or intrapelvic disease is seen. 3. Air bubbles and fat stranding in the right upper gluteal subcutaneous fat presumably due to medicine injection..  D:  05/24/2017 E:  05/24/2017   This report was finalized on 5/24/2017 10:05 PM by DR. Hany Eldridge MD.      Ct Cardiac Morph With 3d Image (performed At External Facility)    Result Date: 5/27/2017  Narrative: EXAMINATION: CT CARDIAC MORPHOLOGY WITH 3D IMAGE-05/26/2017:  INDICATION: Angina with orthostatic hypotension; R00.0-Tachycardia, unspecified; R07.2-Precordial pain; D68.51-Activated protein C resistance; D68.59-Other primary thrombophilia; G90.3-Multi-system degeneration of the autonomic nervous system, chest pain.  TECHNIQUE: Serial axial CT imaging was obtained of the heart at 3 mm thickness without contrast for calcium scoring. Subsequently following intravenous contrast, serial axial CT imaging was obtained of the heart at 1 mm thickness utilizing retrospective gating. Images were obtained after premedication with 50 mg of Metoprolol by mouth. Immediately prior to the contrast portion of the study a sublingual single nitroglycerin spray was administered. Full field-of-view reconstructed images were used for evaluation of the extracardiac tissues.  The radiation dose reduction device was turned on for each scan per the ALARA (As Low as Reasonably Achievable) protocol.  COMPARISON: NONE.  FINDINGS: Total calcium score was 10.2. This correlates to minimal identifiable calcification.  CTA of the coronary vessels reveals excellent visualization of the left main, LAD, diagonal branches, circumflex and right coronary artery. The patient is right-sided dominant.  The left main reveals no soft tissue plaque or stenosis.  The left anterior descending artery  reveals calcification present after the origin of the first diagonal branch. The calcified plaque reveals no significant stenosis of the vessel. The circumflex reveals no soft tissue plaque or stenosis.  The right coronary artery reveals no soft tissue plaque or stenosis. Both the PDA and lateral branches are well opacified with contrast without evidence of stenosis.  The myocardium is homogeneous in appearance with no evidence of apical thinning. There is a tricuspid aortic valve. No chamber enlargement. No pericardial effusion.  No myocardial bridging is identified.  Extracardiac tissues reveal mediastinum to be grossly unremarkable. No mediastinal mass or adenopathy. Cardiac chambers are within normal limits. No pericardial effusion. No bulky hilar adenopathy. The visualized lung parenchyma is grossly clear. Minimal degenerative changes seen within the spine.      Impression: 1.  Small calcified plaque identified just beyond the origin of the first diagonal branch revealing no evidence of occlusion. The remainder of the CTA reveals no soft tissue plaque or stenosis. 2.  Total calcium score is 10.2 correlating to minimal identifiable calcification.  D:  05/26/2017 E:  05/26/2017    This report was finalized on 5/27/2017 10:29 AM by Dr. Sandra Cruz MD.        ASSESSMENT: The patient is a very pleasant 26 y.o. female with a history of right pulmonary embolism    PROBLEM LIST:  1. Right pulmonary embolism diagnosed November 13, 2016  2. On Eliquis 5 mg by mouth twice a day  3. Inherited thrombophilia with heterozygous factor V Leiden and low protein S activity at 42%.  4.  Multiple acquired risk factors for Venothrombotic events including obesity and immobility.    PLAN:  1. Patient was readvised to exercise and lose weight as she has multiple acquired risks, such as immobility and obesity. However, the patient does have inherited risk factors, her thrombophilia workup showed heterozygous state for factor V 5  Leiden as well as low protein S activity. I reviewed with the patient and her  that her protein S activity could be falsely low secondary to acute clot. We will repeat this prior to return.  4. Regarding treatment of the pulmonary embolism: We would like to decrease her Eliquis to 2.5mg because studies show after 6 months of 5mg Eliquis, 2.5 mg has the same efficacy and has less chance of bleeding.  Patient was told she needs to stay on the 5mg BID dose, we will send our note and our recommendations to  Dr. Barker.  5. Regarding how long  treatment: Patient will need to be treated for at least 1 year and maybe lifelong if she does have two inherited risk factors on the other and if her repeat blood work shows only heterozygous factor V Leiden and her protein S activity was back to normal and she does eliminate her acquired risk factors patient might be able to come off blood thinners.   6. Patient come back and see me in 6 month with repeat labs.    Nataliia Daugherty, APRN   6/14/2017

## 2017-06-16 ENCOUNTER — OFFICE VISIT (OUTPATIENT)
Dept: CARDIOLOGY | Facility: CLINIC | Age: 27
End: 2017-06-16

## 2017-06-16 ENCOUNTER — OFFICE VISIT (OUTPATIENT)
Dept: INTERNAL MEDICINE | Facility: CLINIC | Age: 27
End: 2017-06-16

## 2017-06-16 VITALS
BODY MASS INDEX: 36.14 KG/M2 | TEMPERATURE: 99.7 F | WEIGHT: 204 LBS | HEART RATE: 98 BPM | OXYGEN SATURATION: 98 % | HEIGHT: 63 IN | SYSTOLIC BLOOD PRESSURE: 110 MMHG | DIASTOLIC BLOOD PRESSURE: 72 MMHG

## 2017-06-16 VITALS
OXYGEN SATURATION: 96 % | RESPIRATION RATE: 16 BRPM | BODY MASS INDEX: 36.25 KG/M2 | WEIGHT: 204.6 LBS | DIASTOLIC BLOOD PRESSURE: 68 MMHG | HEART RATE: 98 BPM | SYSTOLIC BLOOD PRESSURE: 102 MMHG | HEIGHT: 63 IN

## 2017-06-16 DIAGNOSIS — D68.51 FACTOR V LEIDEN (HCC): ICD-10-CM

## 2017-06-16 DIAGNOSIS — J01.40 ACUTE NON-RECURRENT PANSINUSITIS: Primary | ICD-10-CM

## 2017-06-16 DIAGNOSIS — D68.51 HETEROZYGOUS FACTOR V LEIDEN MUTATION (HCC): ICD-10-CM

## 2017-06-16 DIAGNOSIS — R09.89 LABILE HYPERTENSION: ICD-10-CM

## 2017-06-16 DIAGNOSIS — R00.2 PALPITATIONS: ICD-10-CM

## 2017-06-16 DIAGNOSIS — D68.59 PROTEIN S DEFICIENCY (HCC): ICD-10-CM

## 2017-06-16 DIAGNOSIS — R53.82 CHRONIC FATIGUE: ICD-10-CM

## 2017-06-16 DIAGNOSIS — I26.99 PULMONARY EMBOLISM ON RIGHT (HCC): Primary | ICD-10-CM

## 2017-06-16 DIAGNOSIS — L29.9 PRURITUS: ICD-10-CM

## 2017-06-16 DIAGNOSIS — I25.10 CORONARY ARTERY DISEASE INVOLVING NATIVE CORONARY ARTERY OF NATIVE HEART WITHOUT ANGINA PECTORIS: ICD-10-CM

## 2017-06-16 DIAGNOSIS — G90.3 NEUROGENIC ORTHOSTATIC HYPOTENSION (HCC): ICD-10-CM

## 2017-06-16 DIAGNOSIS — R07.2 PRECORDIAL PAIN: ICD-10-CM

## 2017-06-16 DIAGNOSIS — M79.10 MYALGIA: ICD-10-CM

## 2017-06-16 DIAGNOSIS — E05.90 SUBCLINICAL HYPERTHYROIDISM: ICD-10-CM

## 2017-06-16 PROCEDURE — 99214 OFFICE O/P EST MOD 30 MIN: CPT | Performed by: INTERNAL MEDICINE

## 2017-06-16 PROCEDURE — 99213 OFFICE O/P EST LOW 20 MIN: CPT | Performed by: PHYSICIAN ASSISTANT

## 2017-06-16 PROCEDURE — 99395 PREV VISIT EST AGE 18-39: CPT | Performed by: PHYSICIAN ASSISTANT

## 2017-06-16 RX ORDER — ATORVASTATIN CALCIUM 80 MG/1
80 TABLET, FILM COATED ORAL DAILY
Qty: 30 TABLET | Refills: 11 | Status: SHIPPED | OUTPATIENT
Start: 2017-06-16 | End: 2018-05-11 | Stop reason: SDUPTHER

## 2017-06-16 RX ORDER — CEFDINIR 300 MG/1
300 CAPSULE ORAL 2 TIMES DAILY
Qty: 14 CAPSULE | Refills: 0 | Status: SHIPPED | OUTPATIENT
Start: 2017-06-16 | End: 2017-06-23

## 2017-06-16 NOTE — PROGRESS NOTES
Subjective   Marilu Godoy is a 26 y.o. female and is here for a comprehensive physical exam. The patient reports problems - sore throat, fever, pleuritic chest pain, headaches, nausea.    Continues to have sore throat, fever up to 102, pleuritic substernal pain with little SOA, frontal headaches, nausea and some cough. Symptoms vary in intensity throughout the day. Cough is worse in the morning and at night.  Denies rhinorrhea or postnasal drip.  Was seen here on 6/9 and started on Keflex despite negative strep test which was discontinued once mono test resulted.  Mono testing showed probable reactivated mono. Had mild myalgias in neck and shoulders prior to recent illness which have become worse and constant. Had intermittent weakness in legs and feet since having pulmonary embolism in November 2016 which has worsened and become nearly constant with current illness.  Has had a little bit of bilateral ankle edema new in the last 3-4 months which does not seem to be worsening. Denies rash but has been itching frequently for the last 2 months. Denies any other symptoms. Using Flonase daily and Astelin prn for allergies. Feels her allergies have been worse than usual recently.     Saw Dr. Ramirez office 2 days ago who intended to reduce her Eliquis dosing to 2.5 mg bid which she was resistant to based on her discussions with Dr. Barker and Dr. Duvall. Dr. Duvall's last note indicates he is in favor of reducing Eliquis dose to 2.5 mg bid which she is apprehensive about doing.     Saw Dr. Byrnes today who started her on ASA 81 mg daily and Lipitor 80 mg nightly due to CAD.       Do you take any herbs or supplements that were not prescribed by a doctor? no  Are you taking calcium supplements? No  Are you taking aspirin daily? Yes, starting today as directed by Dr. Byrnes.      History:  LMP: Patient's last menstrual period was 04/22/2017.  Menopause: No  Last pap date: December, 2016  Abnormal pap? yes, HPV  positive.         OB History    Para Term  AB SAB TAB Ectopic Multiple Living   3    2 2          # Outcome Date GA Lbr Omkar/2nd Weight Sex Delivery Anes PTL Lv   3             2 SAB            1 SAB                     The following portions of the patient's history were reviewed and updated as appropriate: allergies, current medications, past family history, past medical history, past social history, past surgical history and problem list.    Review of Systems  Do you have pain that bothers you in your daily life? yes, myalgias    Review of Systems   Constitutional: Positive for fatigue and fever. Negative for appetite change, chills and unexpected weight change.   HENT: Positive for sinus pressure and sore throat. Negative for congestion, ear pain, hearing loss, nosebleeds, postnasal drip, rhinorrhea, tinnitus and trouble swallowing.    Eyes: Negative for photophobia, discharge and visual disturbance.   Respiratory: Positive for cough and shortness of breath. Negative for chest tightness and wheezing.    Cardiovascular: Positive for chest pain (substernal. pleuritic) and leg swelling. Negative for palpitations.   Gastrointestinal: Positive for nausea. Negative for abdominal distention, abdominal pain, blood in stool, constipation, diarrhea and vomiting.   Endocrine: Negative for cold intolerance, heat intolerance, polydipsia, polyphagia and polyuria.   Genitourinary: Negative.    Musculoskeletal: Positive for myalgias. Negative for arthralgias, back pain, joint swelling, neck pain and neck stiffness.   Skin: Negative for color change, pallor, rash and wound.   Allergic/Immunologic: Positive for environmental allergies. Negative for food allergies and immunocompromised state.   Neurological: Positive for weakness, numbness (arms and legs with prolonged sitting) and headaches. Negative for dizziness, tremors, seizures, syncope and speech difficulty.   Hematological: Negative for adenopathy. Does  "not bruise/bleed easily.   Psychiatric/Behavioral: Negative for agitation, behavioral problems, confusion, hallucinations, self-injury and suicidal ideas. The patient is not nervous/anxious.          Objective   /72  Pulse 98  Temp 99.7 °F (37.6 °C)  Ht 63\" (160 cm)  Wt 204 lb (92.5 kg)  LMP 04/22/2017  SpO2 98%  BMI 36.14 kg/m2    Physical Exam   Constitutional: She is oriented to person, place, and time. She appears well-developed and well-nourished. No distress.   HENT:   Head: Normocephalic and atraumatic.   Nose: Nose normal.   Mild OP erythema, right TM bulging with effusion.   Mild frontal and maxillary sinus tenderness.    Eyes: EOM are normal. Pupils are equal, round, and reactive to light. No scleral icterus.   Neck: Normal range of motion. Neck supple. No thyromegaly present.   Cardiovascular: Normal rate, regular rhythm, normal heart sounds and intact distal pulses.  Exam reveals no gallop and no friction rub.    No murmur heard.  Trace bilateral non-pitting edema of ankles     Pulmonary/Chest: Effort normal and breath sounds normal. No respiratory distress. She has no wheezes. She has no rales. She exhibits no tenderness.   Abdominal: Soft. Bowel sounds are normal. She exhibits no distension and no mass. There is no tenderness. There is no rebound and no guarding. No hernia.   Musculoskeletal: Normal range of motion. She exhibits no edema, tenderness or deformity.   Lymphadenopathy:     She has no cervical adenopathy.   Neurological: She is alert and oriented to person, place, and time. She has normal reflexes. She displays normal reflexes. No cranial nerve deficit. She exhibits normal muscle tone. Coordination normal.   Skin: Skin is warm and dry. No rash noted. She is not diaphoretic.   Psychiatric: She has a normal mood and affect. Her behavior is normal. Judgment and thought content normal.   Nursing note and vitals reviewed.         Assessment/Plan   Healthy female exam.     1. 1. " Acute non-recurrent pansinusitis  - cefdinir (OMNICEF) 300 MG capsule; Take 1 capsule by mouth 2 (Two) Times a Day for 7 days.  Dispense: 14 capsule; Refill: 0    2. Protein S deficiency  - Protein S, Total & Free  - CBC & Differential  - Peripheral Blood Smear    3. Factor V Leiden  - CBC & Differential  - Peripheral Blood Smear    4. Chronic fatigue  - CBC & Differential  - Peripheral Blood Smear  - Comprehensive Metabolic Panel  - Ferritin  - TSH  - KIERA  - Lupus Anticoagulant    5. Subclinical hyperthyroidism  - TSH  - Thyroid Stimulating Immunoglobulin  - T4  - T3, free  - Thyroid Peroxidase Antibody    6. Myalgia  - KIERA  - Lupus Anticoagulant    7. Pruritus  - CBC & Differential  - Peripheral Blood Smear  - Ferritin  - Lupus Anticoagulant    2. Patient Counseling:  --Nutrition: Stressed importance of moderation in sodium/caffeine intake, saturated fat and cholesterol, caloric balance, sufficient intake of fresh fruits, vegetables, fiber, calcium, iron, and 1 g folate supplementation if of childbearing age.   --Discussed the issue of calcium supplement, and the daily use of baby aspirin if applicable.  --Exercise: Stressed the importance of regular exercise.   --Substance Abuse: Discussed cessation/primary prevention of tobacco (if applicable), alcohol, or other drug use (if applicable); driving or other dangerous activities under the influence; availability of treatment for abuse.    --Sexuality: Discussed sexually transmitted diseases, partner selection, use of condoms, avoidance of unintended pregnancy  and contraceptive alternatives.   --Injury prevention: Discussed safety belts, safety helmets, smoke detector, smoking near bedding or upholstery.   --Dental health: Discussed importance of regular tooth brushing, flossing, and dental visits.  --Immunizations reviewed.  --Discussed benefits of screening colonoscopy (if applicable).  --After hours service discussed with patient.    3. Discussed the patient's  BMI with her.  The BMI is above average; BMI management plan is completed  4. Follow up next physical in 1 year

## 2017-06-20 LAB — SPECIMEN STATUS: NORMAL

## 2017-06-21 LAB — Lab: NORMAL

## 2017-06-22 LAB
APTT HEX PL PPP: 4 SEC
APTT IMM NP PPP: NORMAL SEC
APTT PPP 1:1 SALINE: NORMAL SEC
APTT PPP: 26.9 SEC
B2 GLYCOPROT1 IGA SER-ACNC: <10 SAU
B2 GLYCOPROT1 IGG SER-ACNC: <10 SGU
B2 GLYCOPROT1 IGM SER-ACNC: <10 SMU
BACTERIA BLD CULT: NORMAL
BACTERIA BLD CULT: NORMAL
CARDIOLIPIN IGG SER IA-ACNC: <10 GPL
CARDIOLIPIN IGM SER IA-ACNC: <10 MPL
CK SERPL-CCNC: 42 U/L (ref 30–170)
CONFIRM DRVVT: NORMAL SEC
DRVVT SCREEN TO CONFIRM RATIO: NORMAL RATIO
DSDNA AB SER-ACNC: <1 IU/ML (ref 0–9)
EJ AB SER QL: NEGATIVE
ENA JO1 AB SER QL: NEGATIVE
ENA PM/SCL AB SER QL: NEGATIVE
ENA PM/SCL AB SER QL: NEGATIVE
ENA RNP AB SER-ACNC: 107.5 EU/ML
ENA RNP AB SER-ACNC: <0.2 AI (ref 0–0.9)
ENA SM AB SER-ACNC: <0.2 AI (ref 0–0.9)
ENA SS-A AB SER-ACNC: <0.2 AI (ref 0–0.9)
ENA SS-B AB SER-ACNC: <0.2 AI (ref 0–0.9)
INR PPP: 1 RATIO
KU AB SER QL: NEGATIVE
LA PPP-IMP: NORMAL
MI2 AB SER QL: NEGATIVE
OJ AB SER QL: NEGATIVE
PL12 AB SER QL: NEGATIVE
PL7 AB SER QL: NEGATIVE
PROTHROMBIN TIME: 10.8 SEC
RIBOSOMAL P AB SER-ACNC: <0.2 AI (ref 0–0.9)
SCREEN DRVVT: 42.3 SEC
SJOGRENS SYNDROME-A EXTRACTABLE NUCLEAR 52KD AB [PRESENCE] IN SERUM: NEGATIVE
SRP AB SERPL QL: NEGATIVE
THROMBIN TIME: 17.8 SEC

## 2017-06-23 ENCOUNTER — OFFICE VISIT (OUTPATIENT)
Dept: INTERNAL MEDICINE | Facility: CLINIC | Age: 27
End: 2017-06-23

## 2017-06-23 VITALS
TEMPERATURE: 99.1 F | SYSTOLIC BLOOD PRESSURE: 102 MMHG | HEIGHT: 63 IN | BODY MASS INDEX: 36.5 KG/M2 | OXYGEN SATURATION: 98 % | HEART RATE: 95 BPM | DIASTOLIC BLOOD PRESSURE: 70 MMHG | WEIGHT: 206 LBS

## 2017-06-23 DIAGNOSIS — R07.81 PLEURITIC PAIN: ICD-10-CM

## 2017-06-23 DIAGNOSIS — R76.8 ANTI-RNP ANTIBODIES PRESENT: ICD-10-CM

## 2017-06-23 DIAGNOSIS — D68.59 PROTEIN S DEFICIENCY (HCC): ICD-10-CM

## 2017-06-23 DIAGNOSIS — R19.7 WATERY DIARRHEA: ICD-10-CM

## 2017-06-23 DIAGNOSIS — Z86.19 FREQUENT INFECTIONS: Primary | ICD-10-CM

## 2017-06-23 DIAGNOSIS — R39.9 URINARY SYMPTOM OR SIGN: ICD-10-CM

## 2017-06-23 LAB
BASOPHILS # BLD AUTO: 0.04 10*3/MM3 (ref 0–0.2)
BASOPHILS NFR BLD AUTO: 0.6 % (ref 0–2.5)
BILIRUB BLD-MCNC: NEGATIVE MG/DL
CLARITY, POC: CLEAR
COLOR UR: YELLOW
EOSINOPHIL # BLD AUTO: 0.09 10*3/MM3 (ref 0–0.7)
EOSINOPHIL NFR BLD AUTO: 1.4 % (ref 0–7)
ERYTHROCYTE [DISTWIDTH] IN BLOOD BY AUTOMATED COUNT: 12.1 % (ref 11.5–14.5)
GLUCOSE UR STRIP-MCNC: NEGATIVE MG/DL
HCT VFR BLD AUTO: 38.9 % (ref 37–47)
HGB BLD-MCNC: 12.8 G/DL (ref 12–16)
IMM GRANULOCYTES # BLD: 0.01 10*3/MM3 (ref 0–0.06)
IMM GRANULOCYTES NFR BLD: 0.2 % (ref 0–0.6)
KETONES UR QL: NEGATIVE
LEUKOCYTE EST, POC: NEGATIVE
LYMPHOCYTES # BLD AUTO: 1.56 10*3/MM3 (ref 0.6–3.4)
LYMPHOCYTES NFR BLD AUTO: 23.9 % (ref 10–50)
MCH RBC QN AUTO: 29.9 PG (ref 27–31)
MCHC RBC AUTO-ENTMCNC: 32.9 G/DL (ref 30–37)
MCV RBC AUTO: 90.9 FL (ref 81–99)
MONOCYTES # BLD AUTO: 0.33 10*3/MM3 (ref 0–0.9)
MONOCYTES NFR BLD AUTO: 5.1 % (ref 0–12)
NEUTROPHILS # BLD AUTO: 4.49 10*3/MM3 (ref 2–6.9)
NEUTROPHILS NFR BLD AUTO: 68.8 % (ref 37–80)
NITRITE UR-MCNC: NEGATIVE MG/ML
NRBC BLD AUTO-RTO: 0 /100 WBC (ref 0–0)
PATH REV BLD -IMP: NORMAL
PATHOLOGIST NAME: NORMAL
PH UR: 6 [PH] (ref 5–8)
PLATELET # BLD AUTO: 294 10*3/MM3 (ref 130–400)
PROT UR STRIP-MCNC: NEGATIVE MG/DL
RBC # BLD AUTO: 4.28 10*6/MM3 (ref 4.2–5.4)
RBC # UR STRIP: ABNORMAL /UL
SP GR UR: 1.02 (ref 1–1.03)
UROBILINOGEN UR QL: NORMAL
WBC # BLD AUTO: 6.52 10*3/MM3 (ref 4.8–10.8)

## 2017-06-23 PROCEDURE — 81003 URINALYSIS AUTO W/O SCOPE: CPT | Performed by: FAMILY MEDICINE

## 2017-06-23 PROCEDURE — 99214 OFFICE O/P EST MOD 30 MIN: CPT | Performed by: FAMILY MEDICINE

## 2017-06-23 NOTE — PROGRESS NOTES
Manny Godoy is a 26 y.o. female.     History of Present Illness   Finishing up Cefdinir rx but has recurrence of sore throat, nasal congestion, cough with diarrhea and vomiting. Diarrhea is watery and occurs 3-4 times a day. No well water nor stream water intake.  and son are not having symptoms. No blood in stool nor black stools. Stools flush easily. She continues to have joint pains and back pain. No rashes noted. Ears are itchy. She sometimes has some burning with urination. On period at this time.    The following portions of the patient's history were reviewed and updated as appropriate: allergies, current medications, past family history, past medical history, past social history, past surgical history and problem list.    Review of Systems   Constitutional: Positive for activity change, appetite change, fatigue and fever.   HENT: Positive for congestion and sore throat.    Respiratory: Positive for cough.    Gastrointestinal: Positive for abdominal pain and diarrhea. Negative for blood in stool.   Genitourinary: Negative for menstrual problem (on period at this time).   Musculoskeletal: Positive for arthralgias.   Skin: Negative for rash.   Neurological: Positive for numbness and headaches.       Vitals:    06/23/17 1151   BP: 102/70   Pulse: 95   Temp: 99.1 °F (37.3 °C)   SpO2: 98%       Objective   Physical Exam   Constitutional: She is oriented to person, place, and time. Vital signs are normal. She appears well-developed and well-nourished. She is active.  Non-toxic appearance. She does not have a sickly appearance. She appears ill.   Appears stated age. Well groomed. Obese. Appears tired.     HENT:   Head: Normocephalic and atraumatic. Hair is normal.   Right Ear: Hearing, external ear and ear canal normal. Tympanic membrane is erythematous and bulging.   Left Ear: Hearing, external ear and ear canal normal. Tympanic membrane is bulging.   Nose: Nose normal.   Mouth/Throat:  Uvula is midline, oropharynx is clear and moist and mucous membranes are normal.   Eyes: Conjunctivae, EOM and lids are normal. Pupils are equal, round, and reactive to light.   Neck: Phonation normal. Neck supple. No thyromegaly present.   Cardiovascular: Normal rate, regular rhythm and normal heart sounds.    No murmur heard.  Pulmonary/Chest: Effort normal and breath sounds normal.   Abdominal: Soft. Bowel sounds are normal. She exhibits no distension and no mass. There is generalized tenderness. There is no rebound and no guarding.   Lymphadenopathy:        Head (right side): Submandibular adenopathy present.        Head (left side): No submandibular adenopathy present.     She has no cervical adenopathy.        Right cervical: No posterior cervical adenopathy present.       Left cervical: No posterior cervical adenopathy present.   Neurological: She is alert and oriented to person, place, and time. She has normal strength. She displays no tremor. No cranial nerve deficit. Gait normal.   Skin: Skin is warm and dry. No rash noted. She is not diaphoretic. No cyanosis. Nails show no clubbing.   Psychiatric: Her speech is normal and behavior is normal. Judgment and thought content normal. Cognition and memory are normal. She exhibits a depressed mood.   Nursing note and vitals reviewed.       Office Visit on 06/23/2017   Component Date Value Ref Range Status   • Color 06/23/2017 Yellow  Yellow, Straw, Dark Yellow, Adele Final   • Clarity, UA 06/23/2017 Clear  Clear Final   • Glucose, UA 06/23/2017 Negative  Negative, 1000 mg/dL (3+) mg/dL Final   • Bilirubin 06/23/2017 Negative  Negative Final   • Ketones, UA 06/23/2017 Negative  Negative Final   • Specific Gravity  06/23/2017 1.020  1.005 - 1.030 Final   • Blood, UA 06/23/2017 250 Iggy/ul* Negative Final   • pH, Urine 06/23/2017 6.0  5.0 - 8.0 Final   • Protein, POC 06/23/2017 Negative  Negative mg/dL Final   • Urobilinogen, UA 06/23/2017 Normal  Normal Final   •  "Leukocytes 06/23/2017 Negative  Negative Final   • Nitrite, UA 06/23/2017 Negative  Negative Final     Patient has had an extensive lab work up to try to solve her puzzling diagnosis. On 6/16/17 her KIERA was retested and was again negatlve as it was when Dr. Duvall checked it.  All thyroid tests from that day were negative/normal though thyroid stimulating immunoglobulin is still pending.  Protein S function again low at 52%, protein S Ag free normal and protein S Ag total normal. Ribosomal P antibodies negative. Negative lupus anticoagulant profile. Negative Lyme antibody on 6/9/17. Blood cultures negative x 2 6/2/17     Her myositis panel was accidentally redrawn 6/16/17 due to lab confusion but this time showed an abnormal RNP, elevated at 107.5, normal is <20. \"Antibodies to RNP occur in 35-45% of SLE patients and in over 95% of patients with Mixed Connective Tissue Disease.\"    EGD 4/20/17:  DIAGNOSES:   Small sliding hiatal hernia less than 3 cm.  Erythematous-erosive gastritis with evidence of old healed ulceration.  Small sessile gastric polyps.    Colonoscopy 4/20/17:  DIAGNOSES:   1. 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 Of note THE patient does not have anemia however of interest she is on anticoagulation and is at increased risk for bleeding from vascular ectasias.  2. Scant early diverticular change in the left colon.   3. Internal hemorrhoids.  4. No endoscopic evidence of ileitis or colitis was seen. Random biopsies were obtained from the colon upon withdrawal of the scope.      Pathology negative for metaplasia, findings of IBD, celiac, H pylroi    Assessment/Plan   Marilu was seen today for diarrhea, vomiting, nasal congestion, cough and sore throat.    Diagnoses and all orders for this visit:    Frequent infections  -     IgG, IgA, IgM  -     IgE  -     POC Urinalysis Dipstick, Automated  -     Ambulatory Referral to " Rheumatology    Watery diarrhea  -     Tropheryma Whipplei PCR  -     POC Urinalysis Dipstick, Automated    Urinary symptom or sign  -     Urine Culture    Anti-RNP antibodies present  -     Ambulatory Referral to Rheumatology    Protein S deficiency  -     Ambulatory Referral to Rheumatology    Pleuritic pain  -     Ambulatory Referral to Rheumatology    Not treating for URI with antibiotic as patient is finishing up Cefdinir, possibly viral illness. I explained to Mrs. Godoy it is difficult to know when she is having issues with her underlying undiagnosed condition vs. Commonplace viral/bacterial illnesses. Push fluids, rest.            Deidre Barker MD

## 2017-06-24 ENCOUNTER — APPOINTMENT (OUTPATIENT)
Dept: CT IMAGING | Facility: HOSPITAL | Age: 27
End: 2017-06-24

## 2017-06-24 ENCOUNTER — HOSPITAL ENCOUNTER (EMERGENCY)
Facility: HOSPITAL | Age: 27
Discharge: HOME | End: 2017-06-24
Attending: EMERGENCY MEDICINE | Admitting: EMERGENCY MEDICINE

## 2017-06-24 VITALS
SYSTOLIC BLOOD PRESSURE: 106 MMHG | DIASTOLIC BLOOD PRESSURE: 75 MMHG | HEIGHT: 63 IN | OXYGEN SATURATION: 99 % | WEIGHT: 206 LBS | RESPIRATION RATE: 18 BRPM | HEART RATE: 94 BPM | BODY MASS INDEX: 36.5 KG/M2 | TEMPERATURE: 98.8 F

## 2017-06-24 DIAGNOSIS — R07.9 CHEST PAIN, UNSPECIFIED TYPE: Primary | ICD-10-CM

## 2017-06-24 PROBLEM — R76.8 ANTI-RNP ANTIBODIES PRESENT: Status: ACTIVE | Noted: 2017-06-24

## 2017-06-24 PROBLEM — H61.899 DEBRIS IN EAR CANAL: Status: RESOLVED | Noted: 2017-05-16 | Resolved: 2017-06-24

## 2017-06-24 LAB
ALBUMIN SERPL-MCNC: 4.7 G/DL (ref 3.5–5)
ALBUMIN/GLOB SERPL: 1.3 G/DL (ref 1–2)
ALP SERPL-CCNC: 86 U/L (ref 38–126)
ALT SERPL W P-5'-P-CCNC: 44 U/L (ref 13–69)
ANION GAP SERPL CALCULATED.3IONS-SCNC: 15.1 MMOL/L
AST SERPL-CCNC: 29 U/L (ref 15–46)
BASOPHILS # BLD AUTO: 0.03 10*3/MM3 (ref 0–0.2)
BASOPHILS NFR BLD AUTO: 0.3 % (ref 0–2.5)
BILIRUB SERPL-MCNC: 0.9 MG/DL (ref 0.2–1.3)
BUN BLD-MCNC: 10 MG/DL (ref 7–20)
BUN/CREAT SERPL: 14.3 (ref 7.1–23.5)
CALCIUM SPEC-SCNC: 9.7 MG/DL (ref 8.4–10.2)
CHLORIDE SERPL-SCNC: 107 MMOL/L (ref 98–107)
CO2 SERPL-SCNC: 27 MMOL/L (ref 26–30)
CREAT BLD-MCNC: 0.7 MG/DL (ref 0.6–1.3)
DEPRECATED RDW RBC AUTO: 38.3 FL (ref 37–54)
EOSINOPHIL # BLD AUTO: 0.11 10*3/MM3 (ref 0–0.7)
EOSINOPHIL NFR BLD AUTO: 1.2 % (ref 0–7)
ERYTHROCYTE [DISTWIDTH] IN BLOOD BY AUTOMATED COUNT: 11.9 % (ref 11.5–14.5)
GFR SERPL CREATININE-BSD FRML MDRD: 101 ML/MIN/1.73
GLOBULIN UR ELPH-MCNC: 3.6 GM/DL
GLUCOSE BLD-MCNC: 98 MG/DL (ref 74–98)
HCG SERPL QL: NEGATIVE
HCT VFR BLD AUTO: 40.4 % (ref 37–47)
HGB BLD-MCNC: 14 G/DL (ref 12–16)
IMM GRANULOCYTES # BLD: 0.02 10*3/MM3 (ref 0–0.06)
IMM GRANULOCYTES NFR BLD: 0.2 % (ref 0–0.6)
LYMPHOCYTES # BLD AUTO: 2.52 10*3/MM3 (ref 0.6–3.4)
LYMPHOCYTES NFR BLD AUTO: 26.7 % (ref 10–50)
MCH RBC QN AUTO: 31 PG (ref 27–31)
MCHC RBC AUTO-ENTMCNC: 34.7 G/DL (ref 30–37)
MCV RBC AUTO: 89.4 FL (ref 81–99)
MONOCYTES # BLD AUTO: 0.57 10*3/MM3 (ref 0–0.9)
MONOCYTES NFR BLD AUTO: 6 % (ref 0–12)
NEUTROPHILS # BLD AUTO: 6.19 10*3/MM3 (ref 2–6.9)
NEUTROPHILS NFR BLD AUTO: 65.6 % (ref 37–80)
NRBC BLD MANUAL-RTO: 0 /100 WBC (ref 0–0)
PLATELET # BLD AUTO: 315 10*3/MM3 (ref 130–400)
PMV BLD AUTO: 10.1 FL (ref 6–12)
POTASSIUM BLD-SCNC: 4.1 MMOL/L (ref 3.5–5.1)
PROT SERPL-MCNC: 8.3 G/DL (ref 6.3–8.2)
RBC # BLD AUTO: 4.52 10*6/MM3 (ref 4.2–5.4)
SODIUM BLD-SCNC: 145 MMOL/L (ref 137–145)
TROPONIN I SERPL-MCNC: 0 NG/ML (ref 0–0.05)
TROPONIN I SERPL-MCNC: <0.012 NG/ML (ref 0–0.03)
TROPONIN I SERPL-MCNC: <0.012 NG/ML (ref 0–0.03)
WBC NRBC COR # BLD: 9.44 10*3/MM3 (ref 4.8–10.8)
WHOLE BLOOD HOLD SPECIMEN: NORMAL
WHOLE BLOOD HOLD SPECIMEN: NORMAL

## 2017-06-24 PROCEDURE — 84484 ASSAY OF TROPONIN QUANT: CPT | Performed by: EMERGENCY MEDICINE

## 2017-06-24 PROCEDURE — 25010000002 MORPHINE PER 10 MG: Performed by: EMERGENCY MEDICINE

## 2017-06-24 PROCEDURE — 71275 CT ANGIOGRAPHY CHEST: CPT

## 2017-06-24 PROCEDURE — 85025 COMPLETE CBC W/AUTO DIFF WBC: CPT | Performed by: EMERGENCY MEDICINE

## 2017-06-24 PROCEDURE — 99284 EMERGENCY DEPT VISIT MOD MDM: CPT

## 2017-06-24 PROCEDURE — 84703 CHORIONIC GONADOTROPIN ASSAY: CPT | Performed by: EMERGENCY MEDICINE

## 2017-06-24 PROCEDURE — 0 IOPAMIDOL 61 % SOLUTION: Performed by: EMERGENCY MEDICINE

## 2017-06-24 PROCEDURE — 96361 HYDRATE IV INFUSION ADD-ON: CPT

## 2017-06-24 PROCEDURE — 96374 THER/PROPH/DIAG INJ IV PUSH: CPT

## 2017-06-24 PROCEDURE — 93005 ELECTROCARDIOGRAM TRACING: CPT | Performed by: EMERGENCY MEDICINE

## 2017-06-24 PROCEDURE — 80053 COMPREHEN METABOLIC PANEL: CPT | Performed by: EMERGENCY MEDICINE

## 2017-06-24 RX ORDER — SODIUM CHLORIDE 0.9 % (FLUSH) 0.9 %
10 SYRINGE (ML) INJECTION AS NEEDED
Status: DISCONTINUED | OUTPATIENT
Start: 2017-06-24 | End: 2017-06-24 | Stop reason: HOSPADM

## 2017-06-24 RX ORDER — MORPHINE SULFATE 4 MG/ML
4 INJECTION, SOLUTION INTRAMUSCULAR; INTRAVENOUS ONCE
Status: COMPLETED | OUTPATIENT
Start: 2017-06-24 | End: 2017-06-24

## 2017-06-24 RX ORDER — ASPIRIN 325 MG
325 TABLET ORAL ONCE
Status: DISCONTINUED | OUTPATIENT
Start: 2017-06-24 | End: 2017-06-24

## 2017-06-24 RX ORDER — ASPIRIN 81 MG/1
243 TABLET, CHEWABLE ORAL ONCE
Status: COMPLETED | OUTPATIENT
Start: 2017-06-24 | End: 2017-06-24

## 2017-06-24 RX ADMIN — SODIUM CHLORIDE 1000 ML: 9 INJECTION, SOLUTION INTRAVENOUS at 17:09

## 2017-06-24 RX ADMIN — MORPHINE SULFATE 4 MG: 4 INJECTION, SOLUTION INTRAMUSCULAR; INTRAVENOUS at 17:09

## 2017-06-24 RX ADMIN — ASPIRIN 81 MG 243 MG: 81 TABLET ORAL at 17:09

## 2017-06-24 RX ADMIN — IOPAMIDOL 100 ML: 612 INJECTION, SOLUTION INTRAVENOUS at 18:23

## 2017-06-24 NOTE — ED PROVIDER NOTES
Subjective   History of Present Illness   26F w/ hx of factor V Leiden disease w/ prior PE on eliquis, suspicion for CAD p/w chest pain. Describes 4 hours of sharp, left-sided pain with intermittent tightness and associated shortness of breath.  Denies other symptoms including fevers, chills, productive cough, leg swelling.  States she has not missed any doses of her eliquis.    Review of Systems   Respiratory: Positive for cough (dry) and shortness of breath.    Cardiovascular: Positive for chest pain.   All other systems reviewed and are negative.      Past Medical History:   Diagnosis Date   • Abdominal pain    • Allergic    • Asthma    • Chest pain    • Clotting disorder     factor 5   • Coronary artery disease involving native coronary artery of native heart without angina pectoris 2017   • Diarrhea    • Factor 5 Leiden mutation, heterozygous    • Gallstone    • GERD (gastroesophageal reflux disease)    • H/O blood clots    • Headache    • Hypertension    • Kidney infection    • Nausea & vomiting    • Obesity    • Orthostatic hypotension    • Ovarian cyst    • Protein S deficiency 2016    labs from hospitalization for PE   • Pulmonary embolism    • Sleep apnea    • Subclinical hyperthyroidism    • Tattoo        Allergies   Allergen Reactions   • Amoxicillin Rash       Past Surgical History:   Procedure Laterality Date   •  SECTION       and    • CHOLECYSTECTOMY     • COLONOSCOPY N/A 2017    Procedure: COLONOSCOPY WITH BIOPSIES AND ARGON THERMAL ABLATION;  Surgeon: Jignesh Selby MD;  Location: [facility] ENDOSCOPY;  Service:    • ENDOSCOPY N/A 2017    Procedure: ESOPHAGOGASTRODUODENOSCOPY WITH BIOPSIES AND COLD BIOPSY POLYPECTOMIES;  Surgeon: Jignesh Selby MD;  Location: [facility] ENDOSCOPY;  Service:        Family History   Problem Relation Age of Onset   • Arthritis Mother    • COPD Mother    • Asthma Mother    • Arthritis Father    • Diabetes Father    • Hypertension  Father    • Hyperlipidemia Father    • Kidney disease Father    • Heart attack Father    • No Known Problems Son    • Colon cancer Neg Hx    • Liver cancer Neg Hx    • Liver disease Neg Hx    • Stomach cancer Neg Hx    • Esophageal cancer Neg Hx        Social History     Social History   • Marital status:      Spouse name: N/A   • Number of children: N/A   • Years of education: N/A     Social History Main Topics   • Smoking status: Never Smoker   • Smokeless tobacco: Never Used   • Alcohol use No   • Drug use: No   • Sexual activity: Yes     Partners: Male     Birth control/ protection: None      Comment: PE while on birth control patch     Other Topics Concern   • None     Social History Narrative           Objective   Physical Exam   Constitutional: She is oriented to person, place, and time. She appears well-developed and well-nourished. No distress.   HENT:   Head: Normocephalic.   Mouth/Throat: Oropharynx is clear and moist. No oropharyngeal exudate.   Eyes: Conjunctivae are normal. No scleral icterus.   Neck: Neck supple. No tracheal deviation present.   Cardiovascular: Regular rhythm, normal heart sounds and intact distal pulses.  Exam reveals no gallop and no friction rub.    No murmur heard.  tachycardic   Pulmonary/Chest: Effort normal and breath sounds normal. No stridor. No respiratory distress. She has no wheezes. She has no rales. She exhibits no tenderness.   Abdominal: Soft. She exhibits no distension and no mass. There is no tenderness. There is no rebound and no guarding. No hernia.   Musculoskeletal: She exhibits no edema or deformity.   Neurological: She is alert and oriented to person, place, and time.   Skin: Skin is warm and dry. She is not diaphoretic. No erythema. No pallor.   Psychiatric: She has a normal mood and affect. Her behavior is normal.   Nursing note and vitals reviewed.      Procedures         ED Course  ED Course                  MDM   26-year-old female here with chest  pain.  Afebrile, tachycardic with vital signs otherwise stable.  Lungs sound clear, no swelling of the legs suggestive of DVT.  High concern though for the given her prior history of PE though she is on out was.  Otherwise, some concern for ACS though this is an atypical pain pattern for ACS.  Will get cardiac enzymes, CBC, CMP, chest x-ray, EKG, chest x-ray, CT scan of the chest further evaluate for PE.  If reassuring will repeat cardiac enzymes.    6:05 PM Labs reassuring. Awaiting CT PE. Will repeat troponin at 730pm.     6:54 PM Awaiting CT PE and repeat troponin. Discussed with oncoming team who will assume care of patient.      Final diagnoses:   Chest pain, unspecified type            Gómez Brown MD  06/24/17 1915

## 2017-06-25 LAB
BACTERIA UR CULT: NORMAL
BACTERIA UR CULT: NORMAL

## 2017-06-25 NOTE — ED PROVIDER NOTES
8:30 PM I received patient in sign out.  Repeat troponin and CT scan are both negative.  We'll discharge home with cardiology follow-up as discussed in sign out.       Francois Kline MD  06/24/17 2030

## 2017-06-28 ENCOUNTER — OFFICE VISIT (OUTPATIENT)
Dept: INTERNAL MEDICINE | Facility: CLINIC | Age: 27
End: 2017-06-28

## 2017-06-28 VITALS
BODY MASS INDEX: 36.25 KG/M2 | SYSTOLIC BLOOD PRESSURE: 110 MMHG | HEART RATE: 94 BPM | OXYGEN SATURATION: 98 % | DIASTOLIC BLOOD PRESSURE: 80 MMHG | TEMPERATURE: 99.3 F | WEIGHT: 204.6 LBS | HEIGHT: 63 IN

## 2017-06-28 DIAGNOSIS — F32.0 MILD SINGLE CURRENT EPISODE OF MAJOR DEPRESSIVE DISORDER (HCC): ICD-10-CM

## 2017-06-28 DIAGNOSIS — G89.29 CHRONIC JOINT PAIN: ICD-10-CM

## 2017-06-28 DIAGNOSIS — M25.50 CHRONIC JOINT PAIN: ICD-10-CM

## 2017-06-28 DIAGNOSIS — I73.9 CLAUDICATION (HCC): ICD-10-CM

## 2017-06-28 DIAGNOSIS — R76.8 ELEVATED IGE LEVEL: ICD-10-CM

## 2017-06-28 DIAGNOSIS — R09.89 DECREASED PEDAL PULSES: Primary | ICD-10-CM

## 2017-06-28 LAB
ALBUMIN SERPL-MCNC: 4.5 G/DL (ref 3.5–5)
ALBUMIN/GLOB SERPL: 1.5 G/DL (ref 1–2)
ALP SERPL-CCNC: 79 U/L (ref 38–126)
ALT SERPL-CCNC: 28 U/L (ref 13–69)
ANA SER QL: NEGATIVE
AST SERPL-CCNC: 19 U/L (ref 15–46)
BILIRUB SERPL-MCNC: 0.6 MG/DL (ref 0.2–1.3)
BUN SERPL-MCNC: 7 MG/DL (ref 7–20)
BUN/CREAT SERPL: 10 (ref 7.1–23.5)
CALCIUM SERPL-MCNC: 9.9 MG/DL (ref 8.4–10.2)
CHLORIDE SERPL-SCNC: 106 MMOL/L (ref 98–107)
CO2 SERPL-SCNC: 21 MMOL/L (ref 26–30)
CREAT SERPL-MCNC: 0.7 MG/DL (ref 0.6–1.3)
FERRITIN SERPL-MCNC: 30.7 NG/ML (ref 6.24–137)
GLOBULIN SER CALC-MCNC: 3.1 GM/DL
GLUCOSE SERPL-MCNC: 99 MG/DL (ref 74–98)
POTASSIUM SERPL-SCNC: 4.4 MMOL/L (ref 3.5–5.1)
PROT S ACT/NOR PPP: 52 % (ref 63–140)
PROT S AG ACT/NOR PPP IA: 125 % (ref 60–150)
PROT S FREE AG ACT/NOR PPP IA: 116 % (ref 57–157)
PROT SERPL-MCNC: 7.6 G/DL (ref 6.3–8.2)
SODIUM SERPL-SCNC: 142 MMOL/L (ref 137–145)
T3FREE SERPL-MCNC: 3.6 PG/ML (ref 2–4.4)
T4 SERPL-MCNC: 7.7 UG/DL (ref 4.5–12)
THYROPEROXIDASE AB SERPL-ACNC: 10 IU/ML (ref 0–34)
TSH SERPL DL<=0.005 MIU/L-ACNC: 1.25 MIU/ML (ref 0.47–4.68)
TSI ACT/NOR SER: 50 % (ref 0–139)
WRITTEN AUTHORIZATION: NORMAL

## 2017-06-28 PROCEDURE — 96127 BRIEF EMOTIONAL/BEHAV ASSMT: CPT | Performed by: FAMILY MEDICINE

## 2017-06-28 PROCEDURE — 99214 OFFICE O/P EST MOD 30 MIN: CPT | Performed by: FAMILY MEDICINE

## 2017-06-28 RX ORDER — DULOXETIN HYDROCHLORIDE 20 MG/1
20 CAPSULE, DELAYED RELEASE ORAL DAILY
Qty: 30 CAPSULE | Refills: 1 | Status: SHIPPED | OUTPATIENT
Start: 2017-06-28 | End: 2017-08-06 | Stop reason: SDUPTHER

## 2017-06-30 ENCOUNTER — HOSPITAL ENCOUNTER (OUTPATIENT)
Dept: ULTRASOUND IMAGING | Facility: HOSPITAL | Age: 27
Discharge: HOME | End: 2017-06-30
Admitting: FAMILY MEDICINE

## 2017-06-30 DIAGNOSIS — R09.89 DECREASED PEDAL PULSES: ICD-10-CM

## 2017-06-30 DIAGNOSIS — I73.9 CLAUDICATION (HCC): ICD-10-CM

## 2017-06-30 PROCEDURE — 93923 UPR/LXTR ART STDY 3+ LVLS: CPT

## 2017-07-01 LAB
IGA SERPL-MCNC: 239 MG/DL (ref 87–352)
IGE SERPL-ACNC: 624 IU/ML (ref 0–100)
IGG SERPL-MCNC: 981 MG/DL (ref 700–1600)
IGM SERPL-MCNC: 54 MG/DL (ref 26–217)
T WHIPPLEI BY PCR, SOURCE: NORMAL
TROPHERYMA WHIPPLEI PCR: NOT DETECTED

## 2017-07-02 PROBLEM — R76.8 ELEVATED IGE LEVEL: Status: ACTIVE | Noted: 2017-07-02

## 2017-07-02 NOTE — PROGRESS NOTES
Subjective   Marilu Godoy is a 26 y.o. female.     History of Present Illness   Patient comes in as she continues to feel poorly with body aches, abdominal pain, diarrhea on occasion. Fatigued, chest pain. Was seen in ER since last visit, underwent scan again for PE which was negative. Was seen by rheumatology at  yesterday, did not feel it was a good experience, but had a lot of labs done. Doctor felt her pulses in her feet were weak, suggested she follow up with me on that. Legs ache, worse with walking, and she has to take breaks. Mood is down with all of this going on, just wants some relief of pain and answers.     PHQ-9 Depression Screening 7/2/2017   Little interest or pleasure in doing things 1   Feeling down, depressed, or hopeless 1   Trouble falling or staying asleep, or sleeping too much 2   Feeling tired or having little energy 3   Poor appetite or overeating 0   Feeling bad about yourself - or that you are a failure or have let yourself or your family down 0   Trouble concentrating on things, such as reading the newspaper or watching television 0   Moving or speaking so slowly that other people could have noticed. Or the opposite - being so fidgety or restless that you have been moving around a lot more than usual 0   Thoughts that you would be better off dead, or of hurting yourself in some way 0   PHQ-9 Total Score 7   If you checked off any problems, how difficult have these problems made it for you to do your work, take care of things at home, or get along with other people? Somewhat difficult       The following portions of the patient's history were reviewed and updated as appropriate: allergies, current medications, past family history, past medical history, past social history, past surgical history and problem list.    Review of Systems   Constitutional: Positive for activity change, fatigue and fever.   Cardiovascular: Positive for chest pain.   Gastrointestinal: Positive for abdominal  pain and diarrhea.   Musculoskeletal: Positive for arthralgias and myalgias.   Psychiatric/Behavioral: Positive for dysphoric mood.       Vitals:    06/28/17 0943   BP: 110/80   Pulse: 94   Temp: 99.3 °F (37.4 °C)   SpO2: 98%       Objective   Physical Exam   Constitutional: She is oriented to person, place, and time. Vital signs are normal. She appears well-developed and well-nourished. She is active. She does not appear ill.   Appears stated age. Well groomed. Obese     HENT:   Head: Normocephalic and atraumatic.   Right Ear: Hearing normal.   Left Ear: Hearing normal.   Nose: Nose normal.   Eyes: EOM and lids are normal. Pupils are equal, round, and reactive to light. No scleral icterus.   Cardiovascular: Normal rate, regular rhythm and normal heart sounds.    Pedal pulses are difficult to palpate at DP and PT bilaterally. Toes are cool to touch but no cyanosis nor gangrene noted.    Pulmonary/Chest: Effort normal and breath sounds normal.   Musculoskeletal: She exhibits edema (trace pitting lower extremities). She exhibits no deformity.   Neurological: She is alert and oriented to person, place, and time. No cranial nerve deficit. Gait normal.   Skin: Skin is warm. She is not diaphoretic. No cyanosis. No pallor. Nails show no clubbing.   Psychiatric: Her speech is normal and behavior is normal. Judgment and thought content normal. Cognition and memory are normal. She exhibits a depressed mood.   Nursing note and vitals reviewed.    [facility] imaging reviewed: L spine xray showed mild OA to sacroiliac joints, left > right. No fracture.     [facility] labs:  TSH 2.09, free T4 1.0  ESR 18  Normal CBC with diff  Urine creatinine random 137  CK 75  CRP 0.3  Total protein 8.2 (high)      Assessment/Plan   Marilu was seen today for vomiting, diarrhea, fatigue, abdominal pain and chest pain.    Diagnoses and all orders for this visit:    Decreased pedal pulses  -     US Ankle / Brachial Indices Extremity Complete;  Future    Claudication  -     US Ankle / Brachial Indices Extremity Complete; Future    Mild single current episode of major depressive disorder  -     DULoxetine (CYMBALTA) 20 MG capsule; Take 1 capsule by mouth Daily.    Chronic joint pain  -     DULoxetine (CYMBALTA) 20 MG capsule; Take 1 capsule by mouth Daily.    Elevated IgE level    Awaiting results of T. mahadlei and [facility] records. Will see back in two weeks. Encouraged patient to stop SSRI if develops any suicidal, homicidal thoughts.             Deidre Barker MD

## 2017-07-06 ENCOUNTER — TELEPHONE (OUTPATIENT)
Dept: INTERNAL MEDICINE | Facility: CLINIC | Age: 27
End: 2017-07-06

## 2017-07-06 DIAGNOSIS — R77.8 ABNORMAL SPEP: ICD-10-CM

## 2017-07-06 DIAGNOSIS — R76.8 ELEVATED IGE LEVEL: ICD-10-CM

## 2017-07-06 DIAGNOSIS — Z86.19 FREQUENT INFECTIONS: ICD-10-CM

## 2017-07-06 DIAGNOSIS — D84.1 COMPLEMENT ABNORMALITY (HCC): Primary | ICD-10-CM

## 2017-07-06 DIAGNOSIS — Z11.4 ENCOUNTER FOR SCREENING FOR HIV: ICD-10-CM

## 2017-07-06 DIAGNOSIS — R74.8 ABNORMAL LIVER ENZYMES: ICD-10-CM

## 2017-07-06 DIAGNOSIS — R77.8 ELEVATED TOTAL PROTEIN: ICD-10-CM

## 2017-07-07 RX ORDER — DROXIDOPA 100 MG/1
CAPSULE ORAL
Qty: 450 CAPSULE | Refills: 3 | Status: SHIPPED | OUTPATIENT
Start: 2017-07-07 | End: 2017-07-14

## 2017-07-14 ENCOUNTER — OFFICE VISIT (OUTPATIENT)
Dept: INTERNAL MEDICINE | Facility: CLINIC | Age: 27
End: 2017-07-14

## 2017-07-14 VITALS
HEIGHT: 63 IN | SYSTOLIC BLOOD PRESSURE: 118 MMHG | OXYGEN SATURATION: 96 % | DIASTOLIC BLOOD PRESSURE: 78 MMHG | HEART RATE: 92 BPM | BODY MASS INDEX: 36.5 KG/M2 | TEMPERATURE: 98.1 F | WEIGHT: 206 LBS

## 2017-07-14 DIAGNOSIS — J45.20 MILD INTERMITTENT ASTHMA WITHOUT COMPLICATION: ICD-10-CM

## 2017-07-14 DIAGNOSIS — Z79.899 HIGH RISK MEDICATION USE: ICD-10-CM

## 2017-07-14 DIAGNOSIS — Z86.711 HISTORY OF PULMONARY EMBOLUS (PE): ICD-10-CM

## 2017-07-14 DIAGNOSIS — R76.8 ELEVATED IGE LEVEL: ICD-10-CM

## 2017-07-14 DIAGNOSIS — D68.59 PROTEIN S DEFICIENCY (HCC): ICD-10-CM

## 2017-07-14 DIAGNOSIS — R07.81 PLEURITIC PAIN: ICD-10-CM

## 2017-07-14 DIAGNOSIS — R07.89 CHEST TIGHTNESS: Primary | ICD-10-CM

## 2017-07-14 DIAGNOSIS — R11.0 NAUSEA: ICD-10-CM

## 2017-07-14 DIAGNOSIS — R07.2 PRECORDIAL PAIN: ICD-10-CM

## 2017-07-14 DIAGNOSIS — Z30.09 CONTRACEPTIVE EDUCATION: ICD-10-CM

## 2017-07-14 DIAGNOSIS — D68.51 HETEROZYGOUS FACTOR V LEIDEN MUTATION (HCC): ICD-10-CM

## 2017-07-14 DIAGNOSIS — I25.10 NONOCCLUSIVE CORONARY ATHEROSCLEROSIS OF NATIVE CORONARY ARTERY: ICD-10-CM

## 2017-07-14 PROCEDURE — 99214 OFFICE O/P EST MOD 30 MIN: CPT | Performed by: FAMILY MEDICINE

## 2017-07-14 RX ORDER — FLUOCINONIDE TOPICAL SOLUTION USP, 0.05% 0.5 MG/ML
SOLUTION TOPICAL
Refills: 0 | COMMUNITY
Start: 2017-07-10 | End: 2017-08-31

## 2017-07-14 RX ORDER — PREDNISONE 20 MG/1
TABLET ORAL
Qty: 30 TABLET | Refills: 0 | Status: SHIPPED | OUTPATIENT
Start: 2017-07-14 | End: 2017-08-31

## 2017-07-14 RX ORDER — LORAZEPAM 0.5 MG/1
0.5 TABLET ORAL EVERY 8 HOURS PRN
Qty: 9 TABLET | Refills: 0 | Status: SHIPPED | OUTPATIENT
Start: 2017-07-14 | End: 2018-05-15

## 2017-07-15 PROBLEM — J45.20 MILD INTERMITTENT ASTHMA: Status: ACTIVE | Noted: 2017-04-10

## 2017-07-15 PROBLEM — I25.10 NONOCCLUSIVE CORONARY ATHEROSCLEROSIS OF NATIVE CORONARY ARTERY: Status: ACTIVE | Noted: 2017-07-15

## 2017-07-15 NOTE — PROGRESS NOTES
Subjective    Marilu Godoy is a 26 y.o. female here for:  Chief Complaint   Patient presents with   • Vomiting     NAUSEA/VOMITING; HAS SEEN SOME BLOOD   • Fatigue   • Chest Pain     CHEST DISCOMFORT; FEELS LIKE SOMETHING SITTING ON CHEST   • Headache   • Dizziness     History of Present Illness   She comes in today as she's still feeling poorly. She continues to have shortness of breath and chest pain. She has chest tightness and it feels like something is sitting on her chest. She has a headache and has felt dizzy on occasion. She stopped the medicines that raised her blood pressure as they caused side effects, blood pressure has not dropped since she stopped them. She has had nausea and vomiting and on one occasion she saw some blood in her emesis. She thinks it could be from straining. She denies sinus congestion. She has not missed any periods. She continues to feel tired. She has not heard anything else from [facility]; she was supposed to get a call when all the labs came back but she's not heard from them. They wanted her to have some sort of scan on her legs to look for clots, she says it was supposed to be different type that could catch clots that may have been missed. It's not scheduled, though, until sometime in August. She goes to see Dr. Koo on Monday. Patient had endoscopy and colonoscopy by Dr. Selby earlier this year. Dr. Byrnes feels chest pain and pleuritic pain is not heart related. He started her on high dose Lipitor due to mild atherosclerosis noted in the LAD (per his note which I've reviewed.) She is not currently on birth control. Due to her PE she's a poor candidate for standard contraceptive methods, her gynecologist was going to place a copper IUD but she's not made it back there. She developed the PE on the birth control patch.  Transportation can be a problem for her. She is being treated for asthma by Dr. Duvall with Dulera and albuterol. She does not feel those medicines  have done much for her but she continues to use them as she just wants to feel better. At her last visit with me I started Cymbalta to try to help with her widespread chronic pain, it has not helped much. Since she was diagnosed with the blood clot her health has taken a general decline.      The following portions of the patient's history were reviewed and updated as appropriate: allergies, current medications, past family history, past medical history, past social history, past surgical history and problem list.    Review of Systems   Constitutional: Positive for activity change, appetite change (poor), fatigue and fever. Negative for unexpected weight change.   HENT: Positive for sore throat.    Respiratory: Positive for chest tightness, shortness of breath and wheezing.    Cardiovascular: Positive for chest pain and leg swelling.   Genitourinary: Negative for menstrual problem.   Musculoskeletal: Positive for arthralgias, back pain and myalgias.   Skin: Negative for rash.   Neurological: Positive for headaches.   Hematological: Bruises/bleeds easily.       Vitals:    07/14/17 0812   BP: 118/78   Pulse: 92   Temp: 98.1 °F (36.7 °C)   SpO2: 96%       Objective   Physical Exam   Constitutional: She is oriented to person, place, and time. Vital signs are normal. She appears well-developed and well-nourished. She is active. She does not have a sickly appearance. She does not appear ill.   Appears stated age. Well groomed. Obese. Appears tired.   HENT:   Head: Normocephalic and atraumatic. Hair is normal.   Right Ear: Hearing, tympanic membrane, external ear and ear canal normal.   Left Ear: Hearing, tympanic membrane, external ear and ear canal normal.   Nose: Nose normal.   Mouth/Throat: Uvula is midline, oropharynx is clear and moist and mucous membranes are normal. She does not have dentures. No oral lesions. No trismus in the jaw. Abnormal dentition. No uvula swelling.   Eyes: EOM and lids are normal. Pupils are  equal, round, and reactive to light. No scleral icterus.   Neck: Phonation normal. Neck supple.   Cardiovascular: Normal rate, regular rhythm and normal heart sounds.    Pulmonary/Chest: Effort normal and breath sounds normal.   Musculoskeletal: She exhibits no deformity.        Right hand: She exhibits no deformity.        Left hand: She exhibits no deformity.   Lymphadenopathy:     She has no cervical adenopathy.   Neurological: She is alert and oriented to person, place, and time. She displays no tremor. No cranial nerve deficit. Gait normal.   Skin: Skin is warm. No rash noted. She is not diaphoretic. No cyanosis. No pallor. Nails show no clubbing.   Psychiatric: Her speech is normal and behavior is normal. Judgment and thought content normal. Cognition and memory are normal. She exhibits a depressed mood.   Nursing note and vitals reviewed.    IgE 577 on 7/7/17, 624 on 6/23/17    Normal ARMANDO 6/30/17    CT Cardiac 5/26: no mediastinal mass or adenopathy. No pericardial effusion. No bulky hilar adenopathy. Total calcium score 10.2, indicating minimal identifiable calcification.     MRI T spine normal 5/4/17 8/31/16 ct ab/pelvis showed:There is long segment edematous wall thickening of the ileum suggesting inflammatory bowel disease.Impression: Terminal ileitis consistent with inflammatory bowel disease without obstruction.    EGD 4/20/17:Small sliding hiatal hernia less than 3 cm.  Erythematous-erosive gastritis with evidence of old healed ulceration.  Small sessile gastric polyps.    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 Of note the patient does not have anemia however of interest she is on anticoagulation and is at increased risk for bleeding from vascular ectasias.Scant early diverticular change in the left colon. Internal hemorrhoids.  No endoscopic evidence of ileitis or colitis was seen. Random biopsies  were obtained from the colon upon withdrawal of the scope.    Pathology: reactive gastropathy with minimal chronic gastritis in antrum. No pathologic alterations to terminal ileum, colon.     Assessment/Plan   Marilu was seen today for vomiting, fatigue, chest pain, headache and dizziness.    Diagnoses and all orders for this visit:    Chest tightness  Comments:  As we've discussed before, is this part of her overall unsolved problem or an acute illness superimposed? Treating for possible bronchitis.   Orders:  -     predniSONE (DELTASONE) 20 MG tablet; TAKE 4 TAB PO QDAY X 3 DAYS, 3 TAB PO QDAY X 3 DAYS, 2 TAB PO QDAY X 3 DAYS, 1 TAB PO QDAY X 2 DAYS, 1/2 TAB PO QDAY X 2 DAYS.  -     LORazepam (ATIVAN) 0.5 MG tablet; Take 1 tablet by mouth Every 8 (Eight) Hours As Needed for Anxiety.    Pleuritic pain    Precordial pain  Comments:  Follow up with Dr. Koo on Monday as scheduled.    Nausea  Comments:  No missed menses. Gastritis on past EGD. She continues PPI.     Mild intermittent asthma without complication  Comments:  Managed by Dr. Duvall. Continue inhalers. IgE elevation is likely related to this but other causes have not been completely ruled out.     Elevated IgE level    Nonocclusive coronary atherosclerosis of native coronary artery  Comments:  Lipitor 80 mg.    High risk medication use  Comments:  I discussed the teratogenic nature of statin therapy in pregnancy. She was not aware of the risks.    Contraceptive education  Comments:  I stressed the need to go back to gynecology to discuss placement of copper IUD due to statin use.     History of pulmonary embolus (PE)  Comments:  Patient is taking Eliquis as directed. Her symptoms do not correlate with side effects necessarily, this medicine is likely not the etiology.    Heterozygous factor V Leiden mutation    Protein S deficiency  Comments:  Questionable issue, needs to follow up with hematology regarding her status. Labs vary on whether this is truly  a problem.      Unfortunately the list of things she does not have is growing as we investigate her symptoms and various abnormal labs, which is frustrating to her and myself as we want to figure out what is wrong and why she feels so poorly. She needs to follow up with her specialists as scheduled and I'd like her to continue care with rheumatology. She has an appointment with hematology in the winter, I may investigate the need to move that up sooner. Continue Cymbalta for now, she's only been on it two weeks and I gave her a small supply of Ativan that she can try when her chest heaviness worsens to see if that possibly helps. With all that's been going on with her I would be very surprised if there were no anxiety in play.     A brief list of illnesses/issues that have been ruled out:  1. Lupus or other rheumatologic issue: Most likely not cause. She had one lab come back abnormal (RNP) but otherwise labs have all been negative despite multiple checks. She still needs to follow up with [facility].    2. HIV: negative test.    3. Viral hepatitis: negative tests    4. Inflammatory bowel: negative ANCA and negative pathology on EGD/colonoscopy.    5. EBV: possible reactivation per lab but not acute case    6. Churg-Freeman: no eosinophilia on last labs    7. Thyroid disease: abnormal labs one time but all other rechecks have been normal    8. Immunodeficiency: Not likely based on Ig levels, no rashes    9. Wiskott-Noa syndrome: no thrombocytopenia.     10. Myeloma: normal SPEP last check 7/2017. Kappa and lambda light chains both mildly elevated.     11. Complement deficiency: elevated C3 and C4. C5 pending but total complement elevated. All suggestive of chronic inflammation.     12. T. whipplei infection    13. Lyme infection    14. Diabetes, hyperglycemia    15. Sarcoidosis: no evidence in hilum on imaging\    This is a brief list, not all inclusive of all labs and exams she's had.          Deidre Barker MD

## 2017-07-16 ENCOUNTER — HOSPITAL ENCOUNTER (EMERGENCY)
Facility: HOSPITAL | Age: 27
Discharge: HOME | End: 2017-07-17
Attending: STUDENT IN AN ORGANIZED HEALTH CARE EDUCATION/TRAINING PROGRAM | Admitting: STUDENT IN AN ORGANIZED HEALTH CARE EDUCATION/TRAINING PROGRAM

## 2017-07-16 ENCOUNTER — APPOINTMENT (OUTPATIENT)
Dept: CT IMAGING | Facility: HOSPITAL | Age: 27
End: 2017-07-16

## 2017-07-16 DIAGNOSIS — R73.9 HYPERGLYCEMIA: ICD-10-CM

## 2017-07-16 DIAGNOSIS — R07.89 CHEST PAIN, ATYPICAL: ICD-10-CM

## 2017-07-16 DIAGNOSIS — J20.9 ACUTE BRONCHITIS, UNSPECIFIED ORGANISM: Primary | ICD-10-CM

## 2017-07-16 LAB
ALBUMIN SERPL-MCNC: 4.7 G/DL (ref 3.5–5)
ALBUMIN/GLOB SERPL: 1.3 G/DL (ref 1–2)
ALP SERPL-CCNC: 98 U/L (ref 38–126)
ALT SERPL W P-5'-P-CCNC: 35 U/L (ref 13–69)
ANION GAP SERPL CALCULATED.3IONS-SCNC: 16.8 MMOL/L
AST SERPL-CCNC: 23 U/L (ref 15–46)
B-HCG UR QL: NEGATIVE
BASOPHILS # BLD AUTO: 0.02 10*3/MM3 (ref 0–0.2)
BASOPHILS NFR BLD AUTO: 0.1 % (ref 0–2.5)
BILIRUB SERPL-MCNC: 0.6 MG/DL (ref 0.2–1.3)
BUN BLD-MCNC: 11 MG/DL (ref 7–20)
BUN/CREAT SERPL: 15.7 (ref 7.1–23.5)
CALCIUM SPEC-SCNC: 9.8 MG/DL (ref 8.4–10.2)
CHLORIDE SERPL-SCNC: 106 MMOL/L (ref 98–107)
CO2 SERPL-SCNC: 23 MMOL/L (ref 26–30)
CREAT BLD-MCNC: 0.7 MG/DL (ref 0.6–1.3)
DEPRECATED RDW RBC AUTO: 39.5 FL (ref 37–54)
EOSINOPHIL # BLD AUTO: 0 10*3/MM3 (ref 0–0.7)
EOSINOPHIL NFR BLD AUTO: 0 % (ref 0–7)
ERYTHROCYTE [DISTWIDTH] IN BLOOD BY AUTOMATED COUNT: 12.3 % (ref 11.5–14.5)
GFR SERPL CREATININE-BSD FRML MDRD: 101 ML/MIN/1.73
GLOBULIN UR ELPH-MCNC: 3.5 GM/DL
GLUCOSE BLD-MCNC: 174 MG/DL (ref 74–98)
HCT VFR BLD AUTO: 40.6 % (ref 37–47)
HGB BLD-MCNC: 13.6 G/DL (ref 12–16)
HOLD SPECIMEN: NORMAL
HOLD SPECIMEN: NORMAL
IMM GRANULOCYTES # BLD: 0.08 10*3/MM3 (ref 0–0.06)
IMM GRANULOCYTES NFR BLD: 0.6 % (ref 0–0.6)
LYMPHOCYTES # BLD AUTO: 1.06 10*3/MM3 (ref 0.6–3.4)
LYMPHOCYTES NFR BLD AUTO: 7.4 % (ref 10–50)
MCH RBC QN AUTO: 30.1 PG (ref 27–31)
MCHC RBC AUTO-ENTMCNC: 33.5 G/DL (ref 30–37)
MCV RBC AUTO: 89.8 FL (ref 81–99)
MONOCYTES # BLD AUTO: 0.23 10*3/MM3 (ref 0–0.9)
MONOCYTES NFR BLD AUTO: 1.6 % (ref 0–12)
NEUTROPHILS # BLD AUTO: 12.87 10*3/MM3 (ref 2–6.9)
NEUTROPHILS NFR BLD AUTO: 90.3 % (ref 37–80)
NRBC BLD MANUAL-RTO: 0 /100 WBC (ref 0–0)
PLATELET # BLD AUTO: 348 10*3/MM3 (ref 130–400)
PMV BLD AUTO: 10.2 FL (ref 6–12)
POTASSIUM BLD-SCNC: 3.8 MMOL/L (ref 3.5–5.1)
PROT SERPL-MCNC: 8.2 G/DL (ref 6.3–8.2)
RBC # BLD AUTO: 4.52 10*6/MM3 (ref 4.2–5.4)
SODIUM BLD-SCNC: 142 MMOL/L (ref 137–145)
TROPONIN I SERPL-MCNC: <0.012 NG/ML (ref 0–0.03)
WBC NRBC COR # BLD: 14.26 10*3/MM3 (ref 4.8–10.8)
WHOLE BLOOD HOLD SPECIMEN: NORMAL
WHOLE BLOOD HOLD SPECIMEN: NORMAL

## 2017-07-16 PROCEDURE — 0 IOPAMIDOL 61 % SOLUTION: Performed by: STUDENT IN AN ORGANIZED HEALTH CARE EDUCATION/TRAINING PROGRAM

## 2017-07-16 PROCEDURE — 99284 EMERGENCY DEPT VISIT MOD MDM: CPT

## 2017-07-16 PROCEDURE — 84484 ASSAY OF TROPONIN QUANT: CPT | Performed by: PHYSICIAN ASSISTANT

## 2017-07-16 PROCEDURE — 85025 COMPLETE CBC W/AUTO DIFF WBC: CPT | Performed by: PHYSICIAN ASSISTANT

## 2017-07-16 PROCEDURE — 71275 CT ANGIOGRAPHY CHEST: CPT

## 2017-07-16 PROCEDURE — 81025 URINE PREGNANCY TEST: CPT | Performed by: PHYSICIAN ASSISTANT

## 2017-07-16 PROCEDURE — 93005 ELECTROCARDIOGRAM TRACING: CPT | Performed by: PHYSICIAN ASSISTANT

## 2017-07-16 PROCEDURE — 80053 COMPREHEN METABOLIC PANEL: CPT | Performed by: PHYSICIAN ASSISTANT

## 2017-07-16 PROCEDURE — 93005 ELECTROCARDIOGRAM TRACING: CPT | Performed by: STUDENT IN AN ORGANIZED HEALTH CARE EDUCATION/TRAINING PROGRAM

## 2017-07-16 RX ORDER — AZITHROMYCIN 250 MG/1
TABLET, FILM COATED ORAL
Qty: 4 TABLET | Refills: 0 | Status: SHIPPED | OUTPATIENT
Start: 2017-07-16 | End: 2017-08-19

## 2017-07-16 RX ORDER — AZITHROMYCIN 250 MG/1
500 TABLET, FILM COATED ORAL ONCE
Status: COMPLETED | OUTPATIENT
Start: 2017-07-16 | End: 2017-07-17

## 2017-07-16 RX ORDER — SODIUM CHLORIDE 0.9 % (FLUSH) 0.9 %
10 SYRINGE (ML) INJECTION AS NEEDED
Status: DISCONTINUED | OUTPATIENT
Start: 2017-07-16 | End: 2017-07-17 | Stop reason: HOSPADM

## 2017-07-16 RX ORDER — ALBUTEROL SULFATE 90 UG/1
2 AEROSOL, METERED RESPIRATORY (INHALATION) EVERY 4 HOURS PRN
Qty: 1 INHALER | Refills: 0 | Status: SHIPPED | OUTPATIENT
Start: 2017-07-16 | End: 2017-08-18

## 2017-07-16 RX ADMIN — IOPAMIDOL 100 ML: 612 INJECTION, SOLUTION INTRAVENOUS at 21:15

## 2017-07-16 NOTE — ED PROVIDER NOTES
Subjective   HPI Comments: 26-year-old female with a history of protein C deficiency and factor 5 with a history of pulmonary embolus in 11 of 16, who is on eliquis.  Also has a history of asthma and does not smoke cigarettes or use birth control pills.  Here with a several day history of productive cough of white sputum with some shortness of breath.  Also here with a nonradiating, cramping or burning like discomfort in her sternum and left chest present off and on since around 2 PM today.  The chest discomfort is somewhat worse with deep breathing and coughing.  Was seen here on Friday and diagnosed with bronchitis and sent home with prednisone.  Negative workup at that time.  Aggravating factors: Coughing or deep breathing.  Alleviating factors: None.  Treatment prior to arrival: Prednisone.      LMP 1 month ago.  Denies pregnancy.    She has noticed she's been tachycardic all day long today.      History provided by:  Patient  History limited by: nothing.   used: No        Review of Systems   Constitutional: Negative.    HENT: Negative.    Eyes: Negative.    Respiratory: Positive for cough and shortness of breath.    Cardiovascular: Positive for chest pain and palpitations.   Gastrointestinal: Negative.  Negative for abdominal pain.   Endocrine: Negative.    Genitourinary: Negative for dysuria.   Musculoskeletal: Negative.    Skin: Negative.    Allergic/Immunologic: Negative.    Neurological: Negative.    Hematological: Negative.    Psychiatric/Behavioral: Negative.    All other systems reviewed and are negative.      Past Medical History:   Diagnosis Date   • Abdominal pain    • Allergic    • Asthma 2015   • Chest pain    • Clotting disorder     factor 5   • Coronary artery disease involving native coronary artery of native heart without angina pectoris 6/16/2017   • Diarrhea    • Factor 5 Leiden mutation, heterozygous    • Gallstone    • GERD (gastroesophageal reflux disease)    • H/O blood  clots    • Headache    • Hypertension    • Kidney infection    • Nausea & vomiting    • Obesity    • Orthostatic hypotension    • Ovarian cyst    • Protein S deficiency 2016    labs from hospitalization for PE   • Pulmonary embolism    • Sleep apnea    • Subclinical hyperthyroidism    • Tattoo        Allergies   Allergen Reactions   • Amoxicillin Rash       Past Surgical History:   Procedure Laterality Date   •  SECTION       and    • CHOLECYSTECTOMY     • COLONOSCOPY N/A 2017    Procedure: COLONOSCOPY WITH BIOPSIES AND ARGON THERMAL ABLATION;  Surgeon: Jignesh Selby MD;  Location: [facility] ENDOSCOPY;  Service:    • ENDOSCOPY N/A 2017    Procedure: ESOPHAGOGASTRODUODENOSCOPY WITH BIOPSIES AND COLD BIOPSY POLYPECTOMIES;  Surgeon: Jignesh Selby MD;  Location: [facility] ENDOSCOPY;  Service:        Family History   Problem Relation Age of Onset   • Arthritis Mother    • COPD Mother    • Asthma Mother    • Arthritis Father    • Diabetes Father    • Hypertension Father    • Hyperlipidemia Father    • Kidney disease Father    • Heart attack Father    • No Known Problems Son    • Colon cancer Neg Hx    • Liver cancer Neg Hx    • Liver disease Neg Hx    • Stomach cancer Neg Hx    • Esophageal cancer Neg Hx        Social History     Social History   • Marital status:      Spouse name: N/A   • Number of children: N/A   • Years of education: N/A     Social History Main Topics   • Smoking status: Never Smoker   • Smokeless tobacco: Never Used   • Alcohol use No   • Drug use: No   • Sexual activity: Yes     Partners: Male     Birth control/ protection: None      Comment: PE while on birth control patch     Other Topics Concern   • None     Social History Narrative           Objective   Physical Exam   Constitutional: She is oriented to person, place, and time. She appears well-developed and well-nourished. No distress.   HENT:   Head: Normocephalic and atraumatic.   Right Ear: External ear  normal.   Left Ear: External ear normal.   Eyes: EOM are normal. Pupils are equal, round, and reactive to light.   Neck: Normal range of motion. Neck supple.   Cardiovascular: Regular rhythm and normal heart sounds.    no calf tenderness or ankle edema.  The patient has tachycardia.   Pulmonary/Chest: Effort normal and breath sounds normal. No stridor. She has no wheezes. She exhibits no tenderness.   Musculoskeletal: Normal range of motion. She exhibits no edema.   Neurological: She is alert and oriented to person, place, and time.   Skin: Skin is warm and dry. No rash noted. She is not diaphoretic.   Psychiatric: She has a normal mood and affect. Her behavior is normal. Judgment and thought content normal.   Nursing note and vitals reviewed.      ECG 12 Lead    Date/Time: 7/16/2017 8:46 PM  Performed by: OSORIO HUNTER  Authorized by: NAT HDZ   Comments: EKG: Sinus tachycardia rate of 114.  Nonspecific ST and T-wave changes.  No ischemia or infarction.  Normal axis and intervals.  No ectopy.               ED Course  ED Course                  MDM  Number of Diagnoses or Management Options  Acute bronchitis, unspecified organism: new and requires workup  Chest pain, atypical: new and requires workup  Hyperglycemia: new and requires workup  Diagnosis management comments: Case discussed with Dr. Hdz.  One set of cardiac enzymes is sufficient to rule out a myocardial infarction.  Pulmonary study is negative for PE.  We will treat for bronchitis.       Amount and/or Complexity of Data Reviewed  Clinical lab tests: reviewed and ordered  Tests in the radiology section of CPT®: ordered and reviewed  Tests in the medicine section of CPT®: ordered and reviewed  Discuss the patient with other providers: yes    Risk of Complications, Morbidity, and/or Mortality  Presenting problems: moderate  Diagnostic procedures: low  Management options: moderate    Patient Progress  Patient progress: stable      Final diagnoses:    Acute bronchitis, unspecified organism   Chest pain, atypical   Hyperglycemia            Gale Luciano PA-C  07/16/17 0636

## 2017-07-17 ENCOUNTER — OFFICE VISIT (OUTPATIENT)
Dept: CARDIOLOGY | Facility: CLINIC | Age: 27
End: 2017-07-17

## 2017-07-17 ENCOUNTER — TELEPHONE (OUTPATIENT)
Dept: INTERNAL MEDICINE | Facility: CLINIC | Age: 27
End: 2017-07-17

## 2017-07-17 ENCOUNTER — TELEPHONE (OUTPATIENT)
Dept: CARDIOLOGY | Facility: CLINIC | Age: 27
End: 2017-07-17

## 2017-07-17 ENCOUNTER — OFFICE VISIT (OUTPATIENT)
Dept: INTERNAL MEDICINE | Facility: CLINIC | Age: 27
End: 2017-07-17

## 2017-07-17 VITALS
DIASTOLIC BLOOD PRESSURE: 80 MMHG | HEIGHT: 63 IN | OXYGEN SATURATION: 98 % | HEART RATE: 97 BPM | BODY MASS INDEX: 35.97 KG/M2 | SYSTOLIC BLOOD PRESSURE: 124 MMHG | WEIGHT: 203 LBS | TEMPERATURE: 98.3 F

## 2017-07-17 VITALS
HEART RATE: 101 BPM | HEIGHT: 63 IN | BODY MASS INDEX: 36.14 KG/M2 | RESPIRATION RATE: 20 BRPM | TEMPERATURE: 98.3 F | SYSTOLIC BLOOD PRESSURE: 112 MMHG | WEIGHT: 204 LBS | DIASTOLIC BLOOD PRESSURE: 79 MMHG | OXYGEN SATURATION: 99 %

## 2017-07-17 VITALS
HEIGHT: 63 IN | HEART RATE: 92 BPM | WEIGHT: 205 LBS | BODY MASS INDEX: 36.32 KG/M2 | SYSTOLIC BLOOD PRESSURE: 116 MMHG | DIASTOLIC BLOOD PRESSURE: 76 MMHG

## 2017-07-17 DIAGNOSIS — M54.5 ACUTE LOW BACK PAIN, UNSPECIFIED BACK PAIN LATERALITY, WITH SCIATICA PRESENCE UNSPECIFIED: ICD-10-CM

## 2017-07-17 DIAGNOSIS — G90.3 NEUROGENIC ORTHOSTATIC HYPOTENSION (HCC): ICD-10-CM

## 2017-07-17 DIAGNOSIS — R00.0 SINUS TACHYCARDIA: Primary | ICD-10-CM

## 2017-07-17 DIAGNOSIS — R07.89 CHEST TIGHTNESS: ICD-10-CM

## 2017-07-17 DIAGNOSIS — R68.89 MULTIPLE COMPLAINTS: ICD-10-CM

## 2017-07-17 DIAGNOSIS — J45.901 ASTHMA EXACERBATION: ICD-10-CM

## 2017-07-17 DIAGNOSIS — J20.9 ACUTE BRONCHITIS, UNSPECIFIED ORGANISM: Primary | ICD-10-CM

## 2017-07-17 PROCEDURE — 99213 OFFICE O/P EST LOW 20 MIN: CPT | Performed by: FAMILY MEDICINE

## 2017-07-17 PROCEDURE — 99213 OFFICE O/P EST LOW 20 MIN: CPT | Performed by: PHYSICIAN ASSISTANT

## 2017-07-17 RX ORDER — ALBUTEROL SULFATE 2.5 MG/3ML
2.5 SOLUTION RESPIRATORY (INHALATION) EVERY 4 HOURS PRN
Qty: 60 VIAL | Refills: 3 | Status: SHIPPED | OUTPATIENT
Start: 2017-07-17 | End: 2018-05-15

## 2017-07-17 RX ORDER — MIDODRINE HYDROCHLORIDE 5 MG/1
TABLET ORAL
Refills: 6 | COMMUNITY
Start: 2017-07-07 | End: 2017-07-17 | Stop reason: SINTOL

## 2017-07-17 RX ORDER — FLUCONAZOLE 150 MG/1
TABLET ORAL
Qty: 2 TABLET | Refills: 2 | Status: SHIPPED | OUTPATIENT
Start: 2017-07-17 | End: 2017-08-31

## 2017-07-17 RX ORDER — ALBUTEROL SULFATE 1.25 MG/3ML
1 SOLUTION RESPIRATORY (INHALATION) EVERY 6 HOURS PRN
Qty: 30 VIAL | Refills: 11 | Status: SHIPPED | OUTPATIENT
Start: 2017-07-17 | End: 2017-07-17

## 2017-07-17 RX ORDER — TIZANIDINE 4 MG/1
4 TABLET ORAL EVERY 8 HOURS PRN
Qty: 30 TABLET | Refills: 1 | Status: SHIPPED | OUTPATIENT
Start: 2017-07-17 | End: 2017-10-04 | Stop reason: SDUPTHER

## 2017-07-17 RX ORDER — DIGOXIN 250 MCG
TABLET ORAL
Qty: 30 TABLET | Refills: 11 | Status: SHIPPED | OUTPATIENT
Start: 2017-07-17 | End: 2017-08-18 | Stop reason: SINTOL

## 2017-07-17 RX ADMIN — AZITHROMYCIN MONOHYDRATE 500 MG: 250 TABLET ORAL at 00:02

## 2017-07-17 NOTE — DISCHARGE INSTRUCTIONS
Return to the ER for worsening chest pain, shortness of breath, passing out, new or worsening symptoms.      Finish prednisone.      Follow-up with your doctor in the morning for further workup, treatment and evaluation.  As mentioned to you in the ER today, your white blood cell count was high, follow-up with Dr. Calderon for workup.  Follow-up with your family doctor for workup of your high sugar.  Your sugar may be high from prednisone, as mentioned to you in the ER this evening.

## 2017-07-17 NOTE — PROGRESS NOTES
Subjective:   Marilu Godoy  1990  [phone]      07/17/2017    [facility] CARDIOLOGY    Deidre Barker MD  [address] [place], [place] [id number]    REFERRING DOCTOR: Dr. Byrnes       Patient ID: Marilu Godoy is a 26 y.o. female.    Chief Complaint: sinus tachycardia     Allergies   Allergen Reactions   • Amoxicillin Rash       Current Outpatient Prescriptions:   •  albuterol (ACCUNEB) 1.25 MG/3ML nebulizer solution, Take 3 mL by nebulization Every 6 (Six) Hours As Needed for Wheezing., Disp: 30 vial, Rfl: 11  •  albuterol (PROVENTIL HFA;VENTOLIN HFA) 108 (90 BASE) MCG/ACT inhaler, Inhale 2 puffs Every 6 (Six) Hours As Needed for Wheezing or Shortness of Air., Disp: 1 inhaler, Rfl: 3  •  albuterol (PROVENTIL HFA;VENTOLIN HFA) 108 (90 BASE) MCG/ACT inhaler, Inhale 2 puffs Every 4 (Four) Hours As Needed for Wheezing., Disp: 1 inhaler, Rfl: 0  •  apixaban (ELIQUIS) 5 MG tablet tablet, Take 5 mg by mouth 2 (Two) Times a Day., Disp: , Rfl:   •  aspirin 81 MG tablet, Take 1 tablet by mouth Daily., Disp: 30 tablet, Rfl: 11  •  atorvastatin (LIPITOR) 80 MG tablet, Take 1 tablet by mouth Daily., Disp: 30 tablet, Rfl: 11  •  azelastine (ASTELIN) 0.1 % nasal spray, 1 spray into each nostril 2 (Two) Times a Day As Needed for Rhinitis or Allergies. Use in each nostril as directed, Disp: 1 each, Rfl: 5  •  azithromycin (ZITHROMAX) 250 MG tablet, Take  1 tablet daily for 4 days start on 7/17/17, Disp: 4 tablet, Rfl: 0  •  DULoxetine (CYMBALTA) 20 MG capsule, Take 1 capsule by mouth Daily., Disp: 30 capsule, Rfl: 1  •  fluconazole (DIFLUCAN) 150 MG tablet, TAKE ONE TAB PO X ONCE, CAN REPEAT IN 4 DAYS IF NEEDED, Disp: 2 tablet, Rfl: 2  •  flunisolide (NASALIDE) 25 MCG/ACT (0.025%) solution nasal spray, Inhale 1 spray Every 12 (Twelve) Hours., Disp: 1 bottle, Rfl: 5  •  fluocinonide (LIDEX) 0.05 % external solution, APPLY 3 DROPS TO BOTH EARS (EXTERNALLY) FOUR TIMES A DAY  FOR 10 DAYS, Disp: , Rfl: 0  •  LORazepam (ATIVAN) 0.5 MG tablet, Take 1 tablet by mouth Every 8 (Eight) Hours As Needed for Anxiety., Disp: 9 tablet, Rfl: 0  •  mometasone-formoterol (DULERA 200) 200-5 MCG/ACT inhaler, Inhale 2 puffs 2 (Two) Times a Day. Rinse mouth with water after use., Disp: 1 g, Rfl: 5  •  montelukast (SINGULAIR) 10 MG tablet, Take 10 mg by mouth Every Night., Disp: , Rfl:   •  pantoprazole (PROTONIX) 40 MG EC tablet, Take 1 tablet by mouth Daily., Disp: 30 tablet, Rfl: 5  •  predniSONE (DELTASONE) 20 MG tablet, TAKE 4 TAB PO QDAY X 3 DAYS, 3 TAB PO QDAY X 3 DAYS, 2 TAB PO QDAY X 3 DAYS, 1 TAB PO QDAY X 2 DAYS, 1/2 TAB PO QDAY X 2 DAYS., Disp: 30 tablet, Rfl: 0  •  promethazine (PHENERGAN) 25 MG tablet, Take 1 tablet by mouth Every 6 (Six) Hours As Needed for Nausea or Vomiting., Disp: 20 tablet, Rfl: 0  •  tiZANidine (ZANAFLEX) 4 MG tablet, Take 1 tablet by mouth Every 8 (Eight) Hours As Needed for Muscle Spasms., Disp: 30 tablet, Rfl: 1  •  traMADol (ULTRAM) 50 MG tablet, Take 1 tablet by mouth Every 8 (Eight) Hours As Needed for Severe Pain (7-10)., Disp: 15 tablet, Rfl: 0  No current facility-administered medications for this visit.     History of Present Illness    Patient presents today for further follow-up and management of inappropriate sinus tachycardia. On last visit, she was started on florinef in addition to the midodrine. This caused her BP to become too elevated and she ended up in ER. Also tried Northera and it made her HR increase too much. Still having palpitations on a daily basis with any activity. No issues with chest pain, shortness of breath, fevers, chills, night sweats, PND, orthopnea or palpitations. No recent ER visits or hospital stays.      The following portions of the patient's history were reviewed and updated as appropriate: allergies, current medications, past family history, past medical history, past social history, past surgical history and problem  "list.    ROS   14 point ROS negative except as outlined in problem list, HPI and other parts of the note.    Procedures       Objective:       Vitals:    07/17/17 1600   BP: 116/76   BP Location: Right arm   Patient Position: Sitting   Pulse: 92   Weight: 205 lb (93 kg)   Height: 63\" (160 cm)       GENERAL: Well-developed, well-nourished patient in no acute distress.  HEENT: Normocephalic, atraumatic, PERRLA. Moist mucous membranes.  NECK: No JVD present at 30°. No carotid bruits auscultated.  LUNGS: Clear to auscultation.  CARDIOVASCULAR: Heart has a regular rate and rhythm. No murmurs, gallops or rubs noted.   ABDOMEN: Soft, nontender. Positive bowel sounds.  MUSCULOSKELETAL: No gross deformities. No clubbing, cyanosis, or lower extremity edema.  SKIN: Pink, warm  Neuro: Nonfocal exam. Gait intact  Ext: No edema or bruising    The patient's old records including ambulatory rhythm recordings (ECGs, Holter/event monitor) were reviewed and discussed.      Lab Review:           Diagnosis:   1. Sinus tachycardia  2. Neurogenic orthostatic hypotension      Assessment & Plan:     1. Inappropriate Sinus Tachycardia with symptoms on daily basis. Unable to tolerate BB or CCB due to hypotension. Will start Digoxin for rate control. Instructed patient to take 250mcg for the first 2 days then 125mcg daily. In long run can consider sinus node modification but only as a last resort.     2. Neurogenic orthostatic hypotension- now off all BP support meds and has not had anymore issues with dizziness or syncope.        STEVE Roberts  07/17/17  4:18 PM      "

## 2017-07-17 NOTE — PROGRESS NOTES
Subjective    Marilu Godoy is a 26 y.o. female here for:  Chief Complaint   Patient presents with   • Shortness of Breath     ER F/U [facility] D/C 07/16; DX BRONCHITIS   • Chest Pain     History of Present Illness   She had to go to the ER this weekend as it got to where she felt she could not breathe. It felt like something was squeezing in her chest. She tried a breathing treatment (her son's) and it did not help. In the past she's been able to use them and get some relief, but not this time. Was on prednisone as I'd prescribed Friday, wasn't improving. Was worked up again for clot in ER with CT chest, negative. She was prescribed azithromycin, given first dose in ER, and she's going to  Rx this morning. She asks for medicine for yeast infection just in case. She feels better than she did prior to the ER visit but overall still feels poorly. She goes to see Dr. Koo this afternoon. She is not taking the medicines to raise blood pressure and she feels okay, has not had issues with hypotension since stopping them. Has had some pain in the neck on the right side that was worse with movement, like a crick in her neck. She'd like me to refill the muscle relaxer in case she's out. She's not yet tried the ativan.    The following portions of the patient's history were reviewed and updated as appropriate: allergies, current medications, past family history, past medical history, past social history, past surgical history and problem list.    Review of Systems   Constitutional: Positive for fatigue.   Respiratory: Positive for chest tightness and shortness of breath.    Cardiovascular: Positive for chest pain.   Musculoskeletal: Positive for arthralgias, back pain and myalgias.       Vitals:    07/17/17 0916   BP: 124/80   Pulse: 97   Temp: 98.3 °F (36.8 °C)   SpO2: 98%       Objective   Physical Exam   Constitutional: She is oriented to person, place, and time. Vital signs are normal. She appears well-developed  and well-nourished. She is active.   HENT:   Head: Normocephalic and atraumatic.   Right Ear: Hearing normal.   Left Ear: Hearing normal.   Nose: Nose normal.   Eyes: EOM and lids are normal. Pupils are equal, round, and reactive to light.   Cardiovascular: Normal rate, regular rhythm and normal heart sounds.    Pulmonary/Chest: Effort normal and breath sounds normal.   Musculoskeletal: She exhibits no deformity.   Neurological: She is alert and oriented to person, place, and time. No cranial nerve deficit. Gait normal.   Skin: She is not diaphoretic. No cyanosis. Nails show no clubbing.   Psychiatric: She has a normal mood and affect. Her behavior is normal. Judgment and thought content normal.   Nursing note and vitals reviewed.      Assessment/Plan   Marilu was seen today for shortness of breath and chest pain.    Diagnoses and all orders for this visit:    Acute bronchitis, unspecified organism  -     albuterol (ACCUNEB) 1.25 MG/3ML nebulizer solution; Take 3 mL by nebulization Every 6 (Six) Hours As Needed for Wheezing.  -     fluconazole (DIFLUCAN) 150 MG tablet; TAKE ONE TAB PO X ONCE, CAN REPEAT IN 4 DAYS IF NEEDED    Chest tightness  -     albuterol (ACCUNEB) 1.25 MG/3ML nebulizer solution; Take 3 mL by nebulization Every 6 (Six) Hours As Needed for Wheezing.    Asthma exacerbation    Acute low back pain, unspecified back pain laterality, with sciatica presence unspecified  -     tiZANidine (ZANAFLEX) 4 MG tablet; Take 1 tablet by mouth Every 8 (Eight) Hours As Needed for Muscle Spasms.    Multiple complaints  Comments:  Thus far negative work up for her many issues, see my last note. Likely not lupus or similar. IgE elevation may be due to asthma but unclear. Sees specialists.      Follow up with Dr. Koo today as scheduled. I may have another physician in my office peruse her chart to get her opinion.  Hold statin 2-3 days if she takes fluconazole.         Deidre Barker MD

## 2017-07-17 NOTE — TELEPHONE ENCOUNTER
----- Message from Marilu Godoy sent at 7/7/2017  8:30 PM EDT -----  Regarding: Prescription Question  Contact: [phone]  I had lost my bottle of Midodrine for 2-3 days and couldn't take it.  My blood pressure is remaining good so I haven't taken it since.  I've been monitoring it close.

## 2017-07-17 NOTE — TELEPHONE ENCOUNTER
INSURANCE IS REJECTING ALBUTEROL 1.25, BUT SUGGESTING ALBUTEROL 0.0 3%. CAN THIS BE CHANGED? THANK YOU.

## 2017-07-18 ENCOUNTER — TELEPHONE (OUTPATIENT)
Dept: CARDIOLOGY | Facility: CLINIC | Age: 27
End: 2017-07-18

## 2017-07-18 NOTE — TELEPHONE ENCOUNTER
[facility] needs maintenance dosing for Northera medication. For a NEW Rx.  Please review and advise.   Ryann Huerta MA

## 2017-07-19 LAB
ALBUMIN SERPL ELPH-MCNC: 3.9 G/DL (ref 2.9–4.4)
ALBUMIN SERPL-MCNC: 4.5 G/DL (ref 3.5–5.5)
ALBUMIN/GLOB SERPL: 1.1 {RATIO} (ref 0.7–1.7)
ALP SERPL-CCNC: 105 IU/L (ref 39–117)
ALPHA1 GLOB SERPL ELPH-MCNC: 0.3 G/DL (ref 0–0.4)
ALPHA2 GLOB SERPL ELPH-MCNC: 0.9 G/DL (ref 0.4–1)
ALT SERPL-CCNC: 25 IU/L (ref 0–32)
AST SERPL-CCNC: 24 IU/L (ref 0–40)
B-GLOBULIN SERPL ELPH-MCNC: 1.3 G/DL (ref 0.7–1.3)
BASOPHILS # BLD MANUAL: 0.1 X10E3/UL (ref 0–0.2)
BASOPHILS NFR BLD MANUAL: 1 %
BILIRUB DIRECT SERPL-MCNC: 0.19 MG/DL (ref 0–0.4)
BILIRUB SERPL-MCNC: 0.7 MG/DL (ref 0–1.2)
C-ANCA TITR SER IF: NORMAL TITER
C3 SERPL-MCNC: 173 MG/DL (ref 82–167)
C4 SERPL-MCNC: 73 MG/DL (ref 14–44)
C5 SERPL-MCNC: NORMAL UNITS/ML (ref 13700–37900)
CH50 SERPL-ACNC: 59 U/ML (ref 42–60)
EOSINOPHIL # BLD AUTO: 0.1 X10E3/UL (ref 0–0.4)
EOSINOPHIL # BLD MANUAL: 0.1 X10E3/UL (ref 0–0.4)
EOSINOPHIL NFR BLD MANUAL: 1 %
ERYTHROCYTE [DISTWIDTH] IN BLOOD BY AUTOMATED COUNT: 13 % (ref 12.3–15.4)
FERRITIN SERPL-MCNC: 55 NG/ML (ref 15–150)
GAMMA GLOB SERPL ELPH-MCNC: 1.1 G/DL (ref 0.4–1.8)
GLOBULIN SER CALC-MCNC: 3.5 G/DL (ref 2.2–3.9)
HBV CORE AB SERPL QL IA: NEGATIVE
HBV SURFACE AB SER-ACNC: 12 MIU/ML
HCT VFR BLD AUTO: 39.1 % (ref 34–46.6)
HGB BLD-MCNC: 13.3 G/DL (ref 11.1–15.9)
HIV 1+2 AB+HIV1 P24 AG SERPL QL IA: NON REACTIVE
IGE SERPL-ACNC: 577 IU/ML (ref 0–100)
KAPPA LC FREE SER-MCNC: 21.9 MG/L (ref 3.3–19.4)
KAPPA LC FREE/LAMBDA FREE SER: 0.48 {RATIO} (ref 0.26–1.65)
LAMBDA LC FREE SERPL-MCNC: 45.3 MG/L (ref 5.7–26.3)
LYMPHOCYTES # BLD MANUAL: 1.9 X10E3/UL (ref 0.7–3.1)
LYMPHOCYTES NFR BLD MANUAL: 25 %
Lab: NORMAL
M PROTEIN SERPL ELPH-MCNC: NORMAL G/DL
MCH RBC QN AUTO: 30.3 PG (ref 26.6–33)
MCHC RBC AUTO-ENTMCNC: 34 G/DL (ref 31.5–35.7)
MCV RBC AUTO: 89 FL (ref 79–97)
MONOCYTES # BLD MANUAL: 0.4 X10E3/UL (ref 0.1–0.9)
MONOCYTES NFR BLD MANUAL: 5 %
MYELOPEROXIDASE AB SER IA-ACNC: <9 U/ML (ref 0–9)
NEUTROPHILS # BLD MANUAL: 5.2 X10E3/UL (ref 1.4–7)
NEUTROPHILS NFR BLD MANUAL: 68 %
P-ANCA ATYPICAL TITR SER IF: NORMAL TITER
P-ANCA TITR SER IF: NORMAL TITER
PLATELET # BLD AUTO: 311 X10E3/UL (ref 150–379)
PLATELET BLD QL SMEAR: ADEQUATE
PROT PATTERN SERPL ELPH-IMP: NORMAL
PROT SERPL-MCNC: 7.4 G/DL (ref 6–8.5)
PROTEINASE3 AB SER IA-ACNC: <3.5 U/ML (ref 0–3.5)
RBC # BLD AUTO: 4.39 X10E6/UL (ref 3.77–5.28)
RBC MORPH BLD: NORMAL
WBC # BLD AUTO: 7.6 X10E3/UL (ref 3.4–10.8)

## 2017-07-19 NOTE — TELEPHONE ENCOUNTER
REF# [id number]  [facility] [phone] opt3, then opt 4.  Dacia russell w/ [facility] given maintenance dose RX Northera 200mg TID #270/3 RF's.

## 2017-07-27 ENCOUNTER — TELEPHONE (OUTPATIENT)
Dept: INTERNAL MEDICINE | Facility: CLINIC | Age: 27
End: 2017-07-27

## 2017-07-27 DIAGNOSIS — M25.561 CHRONIC PAIN OF BOTH KNEES: ICD-10-CM

## 2017-07-27 DIAGNOSIS — R07.81 PLEURITIC PAIN: Primary | ICD-10-CM

## 2017-07-27 DIAGNOSIS — G89.29 CHRONIC MIDLINE THORACIC BACK PAIN: ICD-10-CM

## 2017-07-27 DIAGNOSIS — M25.562 CHRONIC PAIN OF BOTH KNEES: ICD-10-CM

## 2017-07-27 DIAGNOSIS — M54.6 CHRONIC MIDLINE THORACIC BACK PAIN: ICD-10-CM

## 2017-07-27 DIAGNOSIS — N92.6 LATE MENSES: ICD-10-CM

## 2017-07-27 DIAGNOSIS — G89.29 CHRONIC PAIN OF BOTH KNEES: ICD-10-CM

## 2017-07-27 DIAGNOSIS — R76.8 ANTI-RNP ANTIBODIES PRESENT: ICD-10-CM

## 2017-07-27 DIAGNOSIS — R76.8 ELEVATED IGE LEVEL: ICD-10-CM

## 2017-07-27 NOTE — TELEPHONE ENCOUNTER
----- Message from Marilu Godoy sent at 7/27/2017  1:10 PM EDT -----  Regarding: RE: Test Results Question  Contact: [phone]  I would be fine with another rheumatology appt. is there any in [place]? Also my period still hasn't started.     ----- Message -----  From: Deidre Barker MD  Sent: 7/27/17, 8:15 AM  To: Marilu Godoy  Subject: RE: Test Results Question    What are your thoughts on a second opinion rheumatology visit at another place? Dr. Vermeesch looked over your chart very thoroughly and she also feels this is something rheumatologic such as mixed connective tissue disorder. She also agreed with repeating the clotting labs down the road to see if you really need the anticoagulant.      ----- Message -----     From: Marilu Godoy     Sent: 7/26/2017 12:45 PM EDT       To: Deirde Barker MD  Subject: RE: Test Results Question    U.K. Still has that test scheduled.     ----- Message -----  From: Deidre Barker MD  Sent: 7/24/17, 5:15 PM  To: Marilu Godoy  Subject: RE: Test Results Question    Dr. Vermeesch is looking over your chart. Stay tuned for more. I'm going to wait to see what she says before I order further. It's good just to have an outside pair of eyes looking for something we may have missed.    You could apply for disability but I'm not convinced you'll get it. Most are turned down on first attempt.      had ordered the circulation study which we'd already done, so I think the one they had ordered has been cancelled.     ----- Message -----     From: Marilu Godoy     Sent: 7/23/2017  8:50 PM EDT       To: Deidre Barker MD  Subject: Test Results Question    I saw that the test results you were waiting on had came back. What's the next step to figuring out what's going? Also I was wondering if anything that has showed up that's wrong with me could possibly qualify for SSI or Disability? I've got a  that's gonna check on it but from  your point do you see anything that could possibly qualify?

## 2017-08-04 DIAGNOSIS — J45.21 MILD INTERMITTENT ASTHMA WITH ACUTE EXACERBATION: ICD-10-CM

## 2017-08-04 DIAGNOSIS — R06.02 SHORTNESS OF BREATH: ICD-10-CM

## 2017-08-04 RX ORDER — MONTELUKAST SODIUM 10 MG/1
TABLET ORAL
Qty: 30 TABLET | Refills: 2 | Status: SHIPPED | OUTPATIENT
Start: 2017-08-04 | End: 2017-11-20

## 2017-08-04 NOTE — TELEPHONE ENCOUNTER
----- Message from Marilu Godoy sent at 8/4/2017  9:48 AM EDT -----  Regarding: Prescription Question  Contact: [phone]  I need refills on my Singulair. Thanks.

## 2017-08-06 DIAGNOSIS — M25.50 CHRONIC JOINT PAIN: ICD-10-CM

## 2017-08-06 DIAGNOSIS — F32.0 MILD SINGLE CURRENT EPISODE OF MAJOR DEPRESSIVE DISORDER (HCC): ICD-10-CM

## 2017-08-06 DIAGNOSIS — G89.29 CHRONIC JOINT PAIN: ICD-10-CM

## 2017-08-07 RX ORDER — DULOXETIN HYDROCHLORIDE 20 MG/1
CAPSULE, DELAYED RELEASE ORAL
Qty: 30 CAPSULE | Refills: 0 | Status: SHIPPED | OUTPATIENT
Start: 2017-08-07 | End: 2017-08-31

## 2017-08-18 ENCOUNTER — OFFICE VISIT (OUTPATIENT)
Dept: INTERNAL MEDICINE | Facility: CLINIC | Age: 27
End: 2017-08-18

## 2017-08-18 VITALS
OXYGEN SATURATION: 99 % | BODY MASS INDEX: 36.32 KG/M2 | SYSTOLIC BLOOD PRESSURE: 122 MMHG | HEIGHT: 63 IN | DIASTOLIC BLOOD PRESSURE: 80 MMHG | HEART RATE: 123 BPM | TEMPERATURE: 98.8 F | WEIGHT: 205 LBS

## 2017-08-18 DIAGNOSIS — R23.2 FLUSHING: ICD-10-CM

## 2017-08-18 DIAGNOSIS — N92.6 MENSTRUAL PERIOD LATE: Primary | ICD-10-CM

## 2017-08-18 DIAGNOSIS — K52.9 CHRONIC DIARRHEA: ICD-10-CM

## 2017-08-18 DIAGNOSIS — R51.9 HEADACHE, UNSPECIFIED HEADACHE TYPE: ICD-10-CM

## 2017-08-18 DIAGNOSIS — R10.9 ABDOMINAL PAIN, UNSPECIFIED LOCATION: ICD-10-CM

## 2017-08-18 DIAGNOSIS — R00.0 TACHYCARDIA: ICD-10-CM

## 2017-08-18 DIAGNOSIS — R10.31 RLQ ABDOMINAL PAIN: ICD-10-CM

## 2017-08-18 DIAGNOSIS — Z30.09 GENERAL COUNSELING AND ADVICE ON FEMALE CONTRACEPTION: ICD-10-CM

## 2017-08-18 LAB
B-HCG UR QL: NEGATIVE
BILIRUB BLD-MCNC: NEGATIVE MG/DL
CLARITY, POC: ABNORMAL
COLOR UR: ABNORMAL
GLUCOSE UR STRIP-MCNC: NEGATIVE MG/DL
INTERNAL NEGATIVE CONTROL: NEGATIVE
INTERNAL POSITIVE CONTROL: POSITIVE
KETONES UR QL: NEGATIVE
LEUKOCYTE EST, POC: NEGATIVE
Lab: NORMAL
NITRITE UR-MCNC: NEGATIVE MG/ML
PH UR: 5 [PH] (ref 5–8)
PROT UR STRIP-MCNC: NEGATIVE MG/DL
RBC # UR STRIP: NEGATIVE /UL
SP GR UR: 1.03 (ref 1–1.03)
UROBILINOGEN UR QL: ABNORMAL

## 2017-08-18 PROCEDURE — 81025 URINE PREGNANCY TEST: CPT | Performed by: FAMILY MEDICINE

## 2017-08-18 PROCEDURE — 99214 OFFICE O/P EST MOD 30 MIN: CPT | Performed by: FAMILY MEDICINE

## 2017-08-18 PROCEDURE — 93000 ELECTROCARDIOGRAM COMPLETE: CPT | Performed by: FAMILY MEDICINE

## 2017-08-18 RX ORDER — TRAMADOL HYDROCHLORIDE 50 MG/1
50 TABLET ORAL EVERY 8 HOURS PRN
Qty: 15 TABLET | Refills: 0 | Status: SHIPPED | OUTPATIENT
Start: 2017-08-18 | End: 2018-01-19 | Stop reason: SDUPTHER

## 2017-08-18 NOTE — PROGRESS NOTES
Subjective    Marilu Godoy is a 26 y.o. female here for:  Chief Complaint   Patient presents with   • Abdominal Pain     1WK; LOWER ABDOMINAL PAIN, NAUSEA, AND DIARRHEA.   • Late Period     4 DAYS LATE   • Fatigue   • Muscle Pain   • Headache     History of Present Illness   Pain on right side of abdomen that radiates to back. Feels like somebody squeezing and times and feels tensed up. Also has burning and cramping. Bowel movements are sometimes greasy yellow looking and other times it's hard to go, constipation. She had diarrhea just now when she gave urine. No diet changes, not eating a lot of greasy food. Breathing okay. Nauseous. No vomiting. No blood nor black in bowel movements. Nobody else is sick. Not camping, not drinking any stream water. No measured fevers. Period 4 days late. Was late last month as well. Had some spotting last week. Continues to take Eliquis. She has not yet seen gynecology to discuss copper IUD placement. Cardiology has mentioned doing an ablation but she's not sure if she wants to do it.    The following portions of the patient's history were reviewed and updated as appropriate: allergies, current medications, past family history, past medical history, past social history, past surgical history and problem list.    Review of Systems   Constitutional: Positive for fatigue. Negative for activity change and fever.   Gastrointestinal: Positive for abdominal pain, diarrhea and nausea.   Genitourinary: Positive for menstrual problem.   Neurological: Positive for headaches.       Vitals:    08/18/17 1302   BP: 122/80   Pulse: (!) 123   Temp: 98.8 °F (37.1 °C)   SpO2: 99%       Objective   Physical Exam   Constitutional: She is oriented to person, place, and time. Vital signs are normal. She appears well-developed and well-nourished. She is active. She does not have a sickly appearance. She does not appear ill.   Appears stated age. Well groomed. obese.     HENT:   Head: Normocephalic  "and atraumatic. Hair is normal.   Right Ear: Hearing normal.   Left Ear: Hearing normal.   Nose: Nose normal.   Mouth/Throat: Mucous membranes are not dry. Abnormal dentition.   Eyes: EOM and lids are normal. Pupils are equal, round, and reactive to light. No scleral icterus.   Neck: Neck supple.   Cardiovascular: Regular rhythm and normal heart sounds.  Tachycardia present.    Pulmonary/Chest: Effort normal and breath sounds normal.   Abdominal: Soft. Bowel sounds are normal. She exhibits no mass. There is no hepatosplenomegaly. There is tenderness in the right lower quadrant and left lower quadrant. There is no rigidity, no rebound and no guarding.   Musculoskeletal: She exhibits no deformity.   Neurological: She is alert and oriented to person, place, and time. She displays no tremor. No cranial nerve deficit. Gait normal.   Skin: Skin is warm. She is not diaphoretic. No cyanosis. No pallor. Nails show no clubbing.   Psychiatric: Her speech is normal and behavior is normal. Judgment and thought content normal. Her mood appears anxious. Cognition and memory are normal.   Nursing note and vitals reviewed.        ECG 12 Lead  Date/Time: 8/18/2017 2:12 PM  Performed by: THANH GIRON  Authorized by: THANH GIRON   Comparison: compared with previous ECG from 6/24/2017  Similar to previous ECG  Comparison to previous ECG: Similar to 7/16/17 as well though leads II and III appear reversed on it. Read on today's EKG states \"ST junctional depression is nonspecific.\" I do not appreciate any ST abnormalities.  Rhythm: sinus rhythm  Rate: normal  BPM: 97  T Waves: T waves normal  QRS axis: normal  Clinical impression: non-specific ECG        UA in office:  Color Yellow, Straw, Dark Yellow, Adele Dark Yellow   Clarity, UA Clear Cloudy (A)   Glucose, UA Negative, 1000 mg/dL (3+) mg/dL Negative   Bilirubin Negative Negative   Ketones, UA Negative Negative   Specific Gravity  1.005 - 1.030 1.030   Blood, UA " Negative Negative   pH, Urine 5.0 - 8.0 5.0   Protein, POC Negative mg/dL Negative   Urobilinogen, UA Normal 1 E.U./dL (A)   Leukocytes Negative Negative   Nitrite, UA Negative Negative     UPT negative    Assessment/Plan   Marilu was seen today for abdominal pain, late period, fatigue, muscle pain and headache.    Diagnoses and all orders for this visit:    Menstrual period late  -     POCT pregnancy, urine    Abdominal pain, unspecified location  -     POC Urinalysis Dipstick, Automated    Tachycardia  -     Metanephrines, Frac. Free, Plasma  -     Chromogranin A  -     5 HIAA, Urine, Quantitative, 24 Hour  -     ECG 12 Lead    Chronic diarrhea  -     Metanephrines, Frac. Free, Plasma  -     Chromogranin A  -     5 HIAA, Urine, Quantitative, 24 Hour    Headache, unspecified headache type  -     Metanephrines, Frac. Free, Plasma  -     Chromogranin A  -     5 HIAA, Urine, Quantitative, 24 Hour    Flushing  -     Metanephrines, Frac. Free, Plasma  -     Chromogranin A  -     5 HIAA, Urine, Quantitative, 24 Hour    RLQ abdominal pain  -     traMADol (ULTRAM) 50 MG tablet; Take 1 tablet by mouth Every 8 (Eight) Hours As Needed for Severe Pain .    General counseling and advice on female contraception  Comments:  Strongly encouraged discussion of copper IUD with gynecology due to history of PE.    seeing rheumatology on Monday, needs to keep that appointment. This is the second opinion visit.  Contact me in a week if still has not started period, will order hcg quantitative for her to come in and have drawn  Still needs to discuss IUD with gynecology  testing for carcinoid based on her various symptoms. Has had inflammatory bowel disease ruled out with labs and colonoscopy  Refilled tramadol as she suspects when period starts it will be heavy with strong cramping, tylenol doesn't help and she cannot take NSAIDs. KARELY requested.         Deidre Barker MD

## 2017-08-24 ENCOUNTER — HOSPITAL ENCOUNTER (EMERGENCY)
Facility: HOSPITAL | Age: 27
Discharge: HOME | End: 2017-08-25
Attending: EMERGENCY MEDICINE | Admitting: EMERGENCY MEDICINE

## 2017-08-24 DIAGNOSIS — R10.11 ABDOMINAL PAIN, RIGHT UPPER QUADRANT: Primary | ICD-10-CM

## 2017-08-24 DIAGNOSIS — R11.2 NAUSEA, VOMITING AND DIARRHEA: ICD-10-CM

## 2017-08-24 DIAGNOSIS — R19.7 NAUSEA, VOMITING AND DIARRHEA: ICD-10-CM

## 2017-08-24 LAB
ALBUMIN SERPL-MCNC: 4.5 G/DL (ref 3.5–5)
ALBUMIN/GLOB SERPL: 1.5 G/DL (ref 1–2)
ALP SERPL-CCNC: 92 U/L (ref 38–126)
ALT SERPL W P-5'-P-CCNC: 36 U/L (ref 13–69)
ANION GAP SERPL CALCULATED.3IONS-SCNC: 17.9 MMOL/L
AST SERPL-CCNC: 19 U/L (ref 15–46)
B-HCG UR QL: NEGATIVE
BASOPHILS # BLD AUTO: 0.04 10*3/MM3 (ref 0–0.2)
BASOPHILS NFR BLD AUTO: 0.4 % (ref 0–2.5)
BILIRUB SERPL-MCNC: 0.8 MG/DL (ref 0.2–1.3)
BILIRUB UR QL STRIP: NEGATIVE
BUN BLD-MCNC: 12 MG/DL (ref 7–20)
BUN/CREAT SERPL: 17.1 (ref 7.1–23.5)
CALCIUM SPEC-SCNC: 9.3 MG/DL (ref 8.4–10.2)
CHLORIDE SERPL-SCNC: 103 MMOL/L (ref 98–107)
CLARITY UR: CLEAR
CO2 SERPL-SCNC: 24 MMOL/L (ref 26–30)
COLOR UR: YELLOW
CREAT BLD-MCNC: 0.7 MG/DL (ref 0.6–1.3)
DEPRECATED RDW RBC AUTO: 40 FL (ref 37–54)
EOSINOPHIL # BLD AUTO: 0.12 10*3/MM3 (ref 0–0.7)
EOSINOPHIL NFR BLD AUTO: 1.2 % (ref 0–7)
ERYTHROCYTE [DISTWIDTH] IN BLOOD BY AUTOMATED COUNT: 12.2 % (ref 11.5–14.5)
GFR SERPL CREATININE-BSD FRML MDRD: 101 ML/MIN/1.73
GLOBULIN UR ELPH-MCNC: 3.1 GM/DL
GLUCOSE BLD-MCNC: 96 MG/DL (ref 74–98)
GLUCOSE UR STRIP-MCNC: NEGATIVE MG/DL
HCT VFR BLD AUTO: 38.7 % (ref 37–47)
HGB BLD-MCNC: 13.2 G/DL (ref 12–16)
HGB UR QL STRIP.AUTO: NEGATIVE
IMM GRANULOCYTES # BLD: 0.03 10*3/MM3 (ref 0–0.06)
IMM GRANULOCYTES NFR BLD: 0.3 % (ref 0–0.6)
KETONES UR QL STRIP: NEGATIVE
LEUKOCYTE ESTERASE UR QL STRIP.AUTO: NEGATIVE
LIPASE SERPL-CCNC: 67 U/L (ref 23–300)
LYMPHOCYTES # BLD AUTO: 2.44 10*3/MM3 (ref 0.6–3.4)
LYMPHOCYTES NFR BLD AUTO: 23.9 % (ref 10–50)
MCH RBC QN AUTO: 30.5 PG (ref 27–31)
MCHC RBC AUTO-ENTMCNC: 34.1 G/DL (ref 30–37)
MCV RBC AUTO: 89.4 FL (ref 81–99)
MONOCYTES # BLD AUTO: 0.59 10*3/MM3 (ref 0–0.9)
MONOCYTES NFR BLD AUTO: 5.8 % (ref 0–12)
NEUTROPHILS # BLD AUTO: 7 10*3/MM3 (ref 2–6.9)
NEUTROPHILS NFR BLD AUTO: 68.4 % (ref 37–80)
NITRITE UR QL STRIP: NEGATIVE
NRBC BLD MANUAL-RTO: 0 /100 WBC (ref 0–0)
PH UR STRIP.AUTO: 5.5 [PH] (ref 5–8)
PLATELET # BLD AUTO: 320 10*3/MM3 (ref 130–400)
PMV BLD AUTO: 9.9 FL (ref 6–12)
POTASSIUM BLD-SCNC: 3.9 MMOL/L (ref 3.5–5.1)
PROT SERPL-MCNC: 7.6 G/DL (ref 6.3–8.2)
PROT UR QL STRIP: NEGATIVE
RBC # BLD AUTO: 4.33 10*6/MM3 (ref 4.2–5.4)
SODIUM BLD-SCNC: 141 MMOL/L (ref 137–145)
SP GR UR STRIP: >=1.03 (ref 1–1.03)
UROBILINOGEN UR QL STRIP: NORMAL
WBC NRBC COR # BLD: 10.22 10*3/MM3 (ref 4.8–10.8)

## 2017-08-24 PROCEDURE — 25010000002 KETOROLAC TROMETHAMINE PER 15 MG: Performed by: PHYSICIAN ASSISTANT

## 2017-08-24 PROCEDURE — 83690 ASSAY OF LIPASE: CPT | Performed by: PHYSICIAN ASSISTANT

## 2017-08-24 PROCEDURE — 81025 URINE PREGNANCY TEST: CPT | Performed by: PHYSICIAN ASSISTANT

## 2017-08-24 PROCEDURE — 81003 URINALYSIS AUTO W/O SCOPE: CPT | Performed by: PHYSICIAN ASSISTANT

## 2017-08-24 PROCEDURE — 99284 EMERGENCY DEPT VISIT MOD MDM: CPT

## 2017-08-24 PROCEDURE — 96374 THER/PROPH/DIAG INJ IV PUSH: CPT

## 2017-08-24 PROCEDURE — 80053 COMPREHEN METABOLIC PANEL: CPT | Performed by: PHYSICIAN ASSISTANT

## 2017-08-24 PROCEDURE — 85025 COMPLETE CBC W/AUTO DIFF WBC: CPT | Performed by: PHYSICIAN ASSISTANT

## 2017-08-24 PROCEDURE — 96361 HYDRATE IV INFUSION ADD-ON: CPT

## 2017-08-24 RX ORDER — KETOROLAC TROMETHAMINE 30 MG/ML
30 INJECTION, SOLUTION INTRAMUSCULAR; INTRAVENOUS ONCE
Status: COMPLETED | OUTPATIENT
Start: 2017-08-24 | End: 2017-08-24

## 2017-08-24 RX ORDER — ALUMINA, MAGNESIA, AND SIMETHICONE 2400; 2400; 240 MG/30ML; MG/30ML; MG/30ML
15 SUSPENSION ORAL ONCE
Status: COMPLETED | OUTPATIENT
Start: 2017-08-24 | End: 2017-08-24

## 2017-08-24 RX ORDER — SODIUM CHLORIDE 0.9 % (FLUSH) 0.9 %
10 SYRINGE (ML) INJECTION AS NEEDED
Status: DISCONTINUED | OUTPATIENT
Start: 2017-08-24 | End: 2017-08-25 | Stop reason: HOSPADM

## 2017-08-24 RX ADMIN — ALUMINUM HYDROXIDE, MAGNESIUM HYDROXIDE, AND DIMETHICONE 15 ML: 400; 400; 40 SUSPENSION ORAL at 23:13

## 2017-08-24 RX ADMIN — KETOROLAC TROMETHAMINE 30 MG: 30 INJECTION, SOLUTION INTRAMUSCULAR at 23:13

## 2017-08-24 RX ADMIN — LIDOCAINE HYDROCHLORIDE 15 ML: 20 SOLUTION ORAL; TOPICAL at 23:13

## 2017-08-24 RX ADMIN — SODIUM CHLORIDE 1000 ML: 9 INJECTION, SOLUTION INTRAVENOUS at 22:12

## 2017-08-25 VITALS
BODY MASS INDEX: 35.97 KG/M2 | HEART RATE: 89 BPM | TEMPERATURE: 98.7 F | SYSTOLIC BLOOD PRESSURE: 106 MMHG | DIASTOLIC BLOOD PRESSURE: 68 MMHG | WEIGHT: 203 LBS | OXYGEN SATURATION: 98 % | RESPIRATION RATE: 16 BRPM | HEIGHT: 63 IN

## 2017-08-25 LAB
5OH-INDOLEACETATE 24H UR-MCNC: 2.5 MG/L
5OH-INDOLEACETATE 24H UR-MRATE: 2.5 MG/24 HR (ref 0–14.9)
CGA SERPL-SCNC: <1 NMOL/L (ref 0–5)
METANEPH FREE SERPL-MCNC: <10 PG/ML (ref 0–62)
NORMETANEPHRINE SERPL-MCNC: 23 PG/ML (ref 0–145)

## 2017-08-25 RX ORDER — ONDANSETRON 4 MG/1
4 TABLET, ORALLY DISINTEGRATING ORAL EVERY 6 HOURS PRN
Qty: 10 TABLET | Refills: 0 | Status: SHIPPED | OUTPATIENT
Start: 2017-08-25 | End: 2017-11-03

## 2017-08-25 RX ORDER — LOPERAMIDE HYDROCHLORIDE 2 MG/1
2 CAPSULE ORAL 4 TIMES DAILY PRN
Qty: 12 CAPSULE | Refills: 0 | Status: SHIPPED | OUTPATIENT
Start: 2017-08-25 | End: 2017-10-04

## 2017-08-25 NOTE — DISCHARGE INSTRUCTIONS
Return to the ER for worsening abdominal pain, fever, vomiting blood, passing blood through your bowels or having right lower quadrant pain.  Follow-up with your family doctor tomorrow for abdomen reexamination, further workup, treatment and evaluation..      Continue Protonix daily.    Increase fluids at home.

## 2017-08-25 NOTE — ED PROVIDER NOTES
Subjective   HPI Comments: 26-year-old female who has had her gallbladder removed in the past.  She has no history of liver disease.  She is here with a four-day history of atraumatic crampy pain that is severe in her right upper quadrant radiating around to her right flank and right upper back.  Also describes intermittent nausea, vomiting and diarrhea without blood in the vomitus or stool.  No ill contacts, recent antibiotics, foreign travel, [place] or well water use.  No tainted food.  She does have dysuria at times.  LMP 6 weeks ago.  Unclear as to whether or not she might be pregnant.      Aggravating factors: Palpation.  Alleviating factors: None.  Treatment prior to arrival: Tylenol.      History provided by:  Patient  History limited by: Nothing.   used: No        Review of Systems   Constitutional: Negative.    HENT: Negative.    Eyes: Negative.    Respiratory: Negative.    Cardiovascular: Negative.  Negative for chest pain.   Gastrointestinal: Positive for abdominal pain, diarrhea, nausea and vomiting.   Endocrine: Negative.    Genitourinary: Positive for flank pain. Negative for dysuria.   Musculoskeletal: Positive for back pain.   Skin: Negative.    Allergic/Immunologic: Negative.    Neurological: Negative.    Hematological: Negative.    Psychiatric/Behavioral: Negative.    All other systems reviewed and are negative.      Past Medical History:   Diagnosis Date   • Abdominal pain    • Allergic    • Asthma 2015   • Chest pain    • Clotting disorder     factor 5   • Coronary artery disease involving native coronary artery of native heart without angina pectoris 6/16/2017   • Diarrhea    • Factor 5 Leiden mutation, heterozygous    • Gallstone    • GERD (gastroesophageal reflux disease)    • H/O blood clots    • Headache    • Hypertension    • Kidney infection    • Nausea & vomiting    • Obesity    • Orthostatic hypotension    • Ovarian cyst    • Protein S deficiency 11/14/2016    labs  from hospitalization for PE   • Pulmonary embolism    • Sleep apnea    • Subclinical hyperthyroidism    • Tattoo        Allergies   Allergen Reactions   • Amoxicillin Rash       Past Surgical History:   Procedure Laterality Date   •  SECTION       and    • CHOLECYSTECTOMY     • COLONOSCOPY N/A 2017    Procedure: COLONOSCOPY WITH BIOPSIES AND ARGON THERMAL ABLATION;  Surgeon: Jignesh Selby MD;  Location: [facility] ENDOSCOPY;  Service:    • ENDOSCOPY N/A 2017    Procedure: ESOPHAGOGASTRODUODENOSCOPY WITH BIOPSIES AND COLD BIOPSY POLYPECTOMIES;  Surgeon: Jignesh Selby MD;  Location: [facility] ENDOSCOPY;  Service:        Family History   Problem Relation Age of Onset   • Arthritis Mother    • COPD Mother    • Asthma Mother    • Arthritis Father    • Diabetes Father    • Hypertension Father    • Hyperlipidemia Father    • Kidney disease Father    • Heart attack Father    • No Known Problems Son    • Colon cancer Neg Hx    • Liver cancer Neg Hx    • Liver disease Neg Hx    • Stomach cancer Neg Hx    • Esophageal cancer Neg Hx        Social History     Social History   • Marital status:      Spouse name: N/A   • Number of children: N/A   • Years of education: N/A     Social History Main Topics   • Smoking status: Never Smoker   • Smokeless tobacco: Never Used   • Alcohol use No   • Drug use: No   • Sexual activity: Yes     Partners: Male     Birth control/ protection: None      Comment: PE while on birth control patch     Other Topics Concern   • None     Social History Narrative           Objective   Physical Exam   Constitutional: She is oriented to person, place, and time. She appears well-developed and well-nourished. No distress.   HENT:   Head: Normocephalic and atraumatic.   Right Ear: External ear normal.   Left Ear: External ear normal.   Eyes: EOM are normal. Pupils are equal, round, and reactive to light.   Neck: Normal range of motion. Neck supple.   Cardiovascular: Normal rate,  regular rhythm and normal heart sounds.    Pulmonary/Chest: Effort normal and breath sounds normal. No stridor. She has no wheezes. She exhibits no tenderness.   Abdominal: Soft. She exhibits no distension. There is tenderness (right upper quadrant is tender to palpation.  Right CVA tenderness noted.). There is no rebound and no guarding.   Musculoskeletal: Normal range of motion. She exhibits no edema.   Neurological: She is alert and oriented to person, place, and time.   Skin: Skin is warm and dry. No rash noted. She is not diaphoretic.   Psychiatric: She has a normal mood and affect. Her behavior is normal. Judgment and thought content normal.   Nursing note and vitals reviewed.      Procedures         ED Course  ED Course   Comment By Time   Abdominal pain is nearly gone at this time.  8-12 hour reexamination explained to the patient.  She verbalized understanding.  We will switch her Zantac to Prilosec.  We will place her on Zofran and Imodium for the diarrhea and vomiting.  She understands she needs to see her family doctor for further workup, treatment and evaluation.  Laboratory evaluation does not reveal the cause of her pain.  I offered her a CT scan but given the fact that she had one a month and half ago showing only left ovarian cyst, she refused. Gale Luciano PA-C 08/25 0002                  MDM  Number of Diagnoses or Management Options  Abdominal pain, right upper quadrant: new and requires workup  Nausea, vomiting and diarrhea: new and requires workup     Amount and/or Complexity of Data Reviewed  Clinical lab tests: ordered and reviewed  Decide to obtain previous medical records or to obtain history from someone other than the patient: yes  Review and summarize past medical records: yes    Risk of Complications, Morbidity, and/or Mortality  Presenting problems: moderate  Diagnostic procedures: low  Management options: moderate    Patient Progress  Patient progress: stable      Final diagnoses:    Abdominal pain, right upper quadrant   Nausea, vomiting and diarrhea            Gale Luciano PA-C  08/25/17 0018

## 2017-08-31 ENCOUNTER — OFFICE VISIT (OUTPATIENT)
Dept: INTERNAL MEDICINE | Facility: CLINIC | Age: 27
End: 2017-08-31

## 2017-08-31 VITALS
TEMPERATURE: 99.5 F | BODY MASS INDEX: 36.64 KG/M2 | WEIGHT: 206.8 LBS | SYSTOLIC BLOOD PRESSURE: 118 MMHG | DIASTOLIC BLOOD PRESSURE: 72 MMHG | HEIGHT: 63 IN | OXYGEN SATURATION: 96 % | HEART RATE: 117 BPM

## 2017-08-31 DIAGNOSIS — G89.4 CHRONIC PAIN SYNDROME: ICD-10-CM

## 2017-08-31 DIAGNOSIS — J02.9 SORE THROAT: ICD-10-CM

## 2017-08-31 DIAGNOSIS — J10.1 INFLUENZA A: Primary | ICD-10-CM

## 2017-08-31 LAB
EXPIRATION DATE: ABNORMAL
EXPIRATION DATE: NORMAL
FLUAV AG NPH QL: POSITIVE
FLUBV AG NPH QL: NEGATIVE
INTERNAL CONTROL: ABNORMAL
INTERNAL CONTROL: NORMAL
Lab: ABNORMAL
Lab: NORMAL
S PYO AG THROAT QL: NEGATIVE

## 2017-08-31 PROCEDURE — 99214 OFFICE O/P EST MOD 30 MIN: CPT | Performed by: FAMILY MEDICINE

## 2017-08-31 PROCEDURE — 87880 STREP A ASSAY W/OPTIC: CPT | Performed by: FAMILY MEDICINE

## 2017-08-31 PROCEDURE — 87804 INFLUENZA ASSAY W/OPTIC: CPT | Performed by: FAMILY MEDICINE

## 2017-08-31 RX ORDER — OSELTAMIVIR PHOSPHATE 75 MG/1
75 CAPSULE ORAL 2 TIMES DAILY
Qty: 10 CAPSULE | Refills: 0 | Status: SHIPPED | OUTPATIENT
Start: 2017-08-31 | End: 2017-09-05

## 2017-08-31 RX ORDER — CEFDINIR 300 MG/1
300 CAPSULE ORAL 2 TIMES DAILY
Qty: 20 CAPSULE | Refills: 0 | Status: CANCELLED | OUTPATIENT
Start: 2017-08-31 | End: 2017-09-10

## 2017-08-31 RX ORDER — DULOXETIN HYDROCHLORIDE 30 MG/1
30 CAPSULE, DELAYED RELEASE ORAL DAILY
Qty: 30 CAPSULE | Refills: 5 | Status: SHIPPED | OUTPATIENT
Start: 2017-08-31 | End: 2018-05-15

## 2017-09-01 ENCOUNTER — HOSPITAL ENCOUNTER (EMERGENCY)
Facility: HOSPITAL | Age: 27
Discharge: HOME | End: 2017-09-01
Attending: EMERGENCY MEDICINE | Admitting: EMERGENCY MEDICINE

## 2017-09-01 ENCOUNTER — APPOINTMENT (OUTPATIENT)
Dept: GENERAL RADIOLOGY | Facility: HOSPITAL | Age: 27
End: 2017-09-01

## 2017-09-01 VITALS
HEIGHT: 63 IN | TEMPERATURE: 99.4 F | DIASTOLIC BLOOD PRESSURE: 64 MMHG | RESPIRATION RATE: 20 BRPM | BODY MASS INDEX: 36.14 KG/M2 | SYSTOLIC BLOOD PRESSURE: 112 MMHG | HEART RATE: 103 BPM | OXYGEN SATURATION: 97 % | WEIGHT: 204 LBS

## 2017-09-01 DIAGNOSIS — R68.89 FLU-LIKE SYMPTOMS: Primary | ICD-10-CM

## 2017-09-01 LAB
ANION GAP SERPL CALCULATED.3IONS-SCNC: 14.4 MMOL/L
B-HCG UR QL: NEGATIVE
BASOPHILS # BLD AUTO: 0.03 10*3/MM3 (ref 0–0.2)
BASOPHILS NFR BLD AUTO: 0.2 % (ref 0–2.5)
BUN BLD-MCNC: 10 MG/DL (ref 7–20)
BUN/CREAT SERPL: 12.5 (ref 7.1–23.5)
CALCIUM SPEC-SCNC: 9.4 MG/DL (ref 8.4–10.2)
CHLORIDE SERPL-SCNC: 102 MMOL/L (ref 98–107)
CO2 SERPL-SCNC: 27 MMOL/L (ref 26–30)
CREAT BLD-MCNC: 0.8 MG/DL (ref 0.6–1.3)
DEPRECATED RDW RBC AUTO: 39.9 FL (ref 37–54)
EOSINOPHIL # BLD AUTO: 0 10*3/MM3 (ref 0–0.7)
EOSINOPHIL NFR BLD AUTO: 0 % (ref 0–7)
ERYTHROCYTE [DISTWIDTH] IN BLOOD BY AUTOMATED COUNT: 12.2 % (ref 11.5–14.5)
GFR SERPL CREATININE-BSD FRML MDRD: 87 ML/MIN/1.73
GLUCOSE BLD-MCNC: 111 MG/DL (ref 74–98)
HCT VFR BLD AUTO: 39 % (ref 37–47)
HGB BLD-MCNC: 13.5 G/DL (ref 12–16)
HOLD SPECIMEN: NORMAL
HOLD SPECIMEN: NORMAL
IMM GRANULOCYTES # BLD: 0.06 10*3/MM3 (ref 0–0.06)
IMM GRANULOCYTES NFR BLD: 0.4 % (ref 0–0.6)
LYMPHOCYTES # BLD AUTO: 1.21 10*3/MM3 (ref 0.6–3.4)
LYMPHOCYTES NFR BLD AUTO: 9 % (ref 10–50)
MCH RBC QN AUTO: 30.8 PG (ref 27–31)
MCHC RBC AUTO-ENTMCNC: 34.6 G/DL (ref 30–37)
MCV RBC AUTO: 88.8 FL (ref 81–99)
MONOCYTES # BLD AUTO: 0.83 10*3/MM3 (ref 0–0.9)
MONOCYTES NFR BLD AUTO: 6.2 % (ref 0–12)
NEUTROPHILS # BLD AUTO: 11.26 10*3/MM3 (ref 2–6.9)
NEUTROPHILS NFR BLD AUTO: 84.2 % (ref 37–80)
NRBC BLD MANUAL-RTO: 0 /100 WBC (ref 0–0)
PLATELET # BLD AUTO: 274 10*3/MM3 (ref 130–400)
PMV BLD AUTO: 10.1 FL (ref 6–12)
POTASSIUM BLD-SCNC: 3.4 MMOL/L (ref 3.5–5.1)
RBC # BLD AUTO: 4.39 10*6/MM3 (ref 4.2–5.4)
SODIUM BLD-SCNC: 140 MMOL/L (ref 137–145)
WBC NRBC COR # BLD: 13.39 10*3/MM3 (ref 4.8–10.8)
WHOLE BLOOD HOLD SPECIMEN: NORMAL
WHOLE BLOOD HOLD SPECIMEN: NORMAL

## 2017-09-01 PROCEDURE — 81025 URINE PREGNANCY TEST: CPT | Performed by: EMERGENCY MEDICINE

## 2017-09-01 PROCEDURE — 96361 HYDRATE IV INFUSION ADD-ON: CPT

## 2017-09-01 PROCEDURE — 85025 COMPLETE CBC W/AUTO DIFF WBC: CPT | Performed by: EMERGENCY MEDICINE

## 2017-09-01 PROCEDURE — 99283 EMERGENCY DEPT VISIT LOW MDM: CPT

## 2017-09-01 PROCEDURE — 96374 THER/PROPH/DIAG INJ IV PUSH: CPT

## 2017-09-01 PROCEDURE — 25010000002 ONDANSETRON PER 1 MG: Performed by: EMERGENCY MEDICINE

## 2017-09-01 PROCEDURE — 96375 TX/PRO/DX INJ NEW DRUG ADDON: CPT

## 2017-09-01 PROCEDURE — 71020 HC CHEST PA AND LATERAL: CPT

## 2017-09-01 PROCEDURE — 25010000002 KETOROLAC TROMETHAMINE PER 15 MG: Performed by: EMERGENCY MEDICINE

## 2017-09-01 PROCEDURE — 80048 BASIC METABOLIC PNL TOTAL CA: CPT | Performed by: EMERGENCY MEDICINE

## 2017-09-01 RX ORDER — SODIUM CHLORIDE 0.9 % (FLUSH) 0.9 %
10 SYRINGE (ML) INJECTION AS NEEDED
Status: DISCONTINUED | OUTPATIENT
Start: 2017-09-01 | End: 2017-09-01 | Stop reason: HOSPADM

## 2017-09-01 RX ORDER — ACETAMINOPHEN 325 MG/1
650 TABLET ORAL ONCE
Status: COMPLETED | OUTPATIENT
Start: 2017-09-01 | End: 2017-09-01

## 2017-09-01 RX ORDER — ONDANSETRON 2 MG/ML
4 INJECTION INTRAMUSCULAR; INTRAVENOUS ONCE
Status: COMPLETED | OUTPATIENT
Start: 2017-09-01 | End: 2017-09-01

## 2017-09-01 RX ORDER — KETOROLAC TROMETHAMINE 30 MG/ML
15 INJECTION, SOLUTION INTRAMUSCULAR; INTRAVENOUS ONCE
Status: COMPLETED | OUTPATIENT
Start: 2017-09-01 | End: 2017-09-01

## 2017-09-01 RX ADMIN — ONDANSETRON 4 MG: 2 INJECTION INTRAMUSCULAR; INTRAVENOUS at 11:43

## 2017-09-01 RX ADMIN — SODIUM CHLORIDE 1000 ML: 9 INJECTION, SOLUTION INTRAVENOUS at 11:42

## 2017-09-01 RX ADMIN — ACETAMINOPHEN 650 MG: 325 TABLET, FILM COATED ORAL at 12:53

## 2017-09-01 RX ADMIN — KETOROLAC TROMETHAMINE 15 MG: 30 INJECTION, SOLUTION INTRAMUSCULAR at 11:42

## 2017-09-01 NOTE — ED PROVIDER NOTES
Subjective   History of Present Illness   26F w/ FV Leiden and protein S d/o on Eliquis, asthma p/w n/v over last 24 hours in setting of recent diagnosis of influenza and start of Tamiflu. Pt has had body aches, nausea, sore throat for last 2d. Seen at PCP and had negative strep test and positive flu A tests. Started Tamiflu at 1am and this morning started to have n/v and unable to keep down fluids. Endorses associated mild chest pain and continued sore throat. Denies other specific symptoms.     Review of Systems   Constitutional: Positive for fever.   HENT: Positive for sore throat.    Gastrointestinal: Positive for nausea and vomiting.   All other systems reviewed and are negative.      Past Medical History:   Diagnosis Date   • Abdominal pain    • Allergic    • Asthma    • Chest pain    • Clotting disorder     factor 5   • Coronary artery disease involving native coronary artery of native heart without angina pectoris 2017   • Diarrhea    • Factor 5 Leiden mutation, heterozygous    • Gallstone    • GERD (gastroesophageal reflux disease)    • H/O blood clots    • Headache    • Hypertension    • Kidney infection    • Nausea & vomiting    • Obesity    • Orthostatic hypotension    • Ovarian cyst    • Protein S deficiency 2016    labs from hospitalization for PE   • Pulmonary embolism    • Sleep apnea    • Subclinical hyperthyroidism    • Tattoo        Allergies   Allergen Reactions   • Amoxicillin Rash       Past Surgical History:   Procedure Laterality Date   •  SECTION       and    • CHOLECYSTECTOMY     • COLONOSCOPY N/A 2017    Procedure: COLONOSCOPY WITH BIOPSIES AND ARGON THERMAL ABLATION;  Surgeon: Jignesh Selby MD;  Location: [facility] ENDOSCOPY;  Service:    • ENDOSCOPY N/A 2017    Procedure: ESOPHAGOGASTRODUODENOSCOPY WITH BIOPSIES AND COLD BIOPSY POLYPECTOMIES;  Surgeon: Jignesh Selby MD;  Location: [facility] ENDOSCOPY;  Service:        Family History   Problem  Relation Age of Onset   • Arthritis Mother    • COPD Mother    • Asthma Mother    • Arthritis Father    • Diabetes Father    • Hypertension Father    • Hyperlipidemia Father    • Kidney disease Father    • Heart attack Father    • No Known Problems Son    • Colon cancer Neg Hx    • Liver cancer Neg Hx    • Liver disease Neg Hx    • Stomach cancer Neg Hx    • Esophageal cancer Neg Hx        Social History     Social History   • Marital status:      Spouse name: N/A   • Number of children: N/A   • Years of education: N/A     Social History Main Topics   • Smoking status: Never Smoker   • Smokeless tobacco: Never Used   • Alcohol use No   • Drug use: No   • Sexual activity: Yes     Partners: Male     Birth control/ protection: None      Comment: PE while on birth control patch     Other Topics Concern   • None     Social History Narrative           Objective   Physical Exam   Constitutional: She is oriented to person, place, and time. She appears well-developed and well-nourished. No distress.   HENT:   Head: Normocephalic.   Mouth/Throat: Oropharynx is clear and moist. No oropharyngeal exudate.   Eyes: No scleral icterus.   Neck: Neck supple. No tracheal deviation present.   Cardiovascular: Regular rhythm, normal heart sounds and intact distal pulses.  Exam reveals no gallop and no friction rub.    No murmur heard.  Tachycardic   Pulmonary/Chest: Effort normal and breath sounds normal. No stridor. No respiratory distress. She has no wheezes. She has no rales.   Abdominal: Soft. She exhibits no distension and no mass. There is no tenderness. There is no rebound and no guarding.   Musculoskeletal: She exhibits no edema or deformity.   Neurological: She is alert and oriented to person, place, and time.   Skin: Skin is warm and dry. She is not diaphoretic. No erythema. No pallor.   Psychiatric: She has a normal mood and affect. Her behavior is normal.   Nursing note and vitals reviewed.      Procedures         ED  Course  ED Course                  MDM   26F w/ n/v in setting of known flu A positive. Low grade fever, tachycardia. Lungs clear. Abdomen benign. Throat w/o erythema nor swelling. Will send cbc, bmp, cxr, give ivf, antiemetics and toradol and reassess.     12:46 PM Labs reassuring. Pt feeling improved. CXR (my read) appears clear. Will PO challenge and reassess.     12:59 PM Temperature and HR improved. Will d/c after IVF complete. Discussed return to care precautions.     Final diagnoses:   Flu-like symptoms            Gómez Brown MD  09/01/17 2698

## 2017-09-02 NOTE — PROGRESS NOTES
Subjective    Marilu Godoy is a 26 y.o. female here for:  Chief Complaint   Patient presents with   • Sore Throat     STARTED THIS MORNING. SWOLLEN TONSILS   • Fever     FEVER/CHILLS   • Generalized Body Aches     History of Present Illness   Fever up to 101-102 last night. More aches than normal. Sore throat, tonsils swollen. Kids had been sick, may have brought something home from school.    Saw rheumatology second opinion, had more labs. He suggested increasing Cymbalta, she's open to that.    The following portions of the patient's history were reviewed and updated as appropriate: allergies, current medications, past family history, past medical history, past social history, past surgical history and problem list.    Review of Systems   Constitutional: Positive for chills, fatigue and fever.   HENT: Positive for sore throat.    Genitourinary: Positive for menstrual problem (cycle was late again but she has now started).   Musculoskeletal: Positive for arthralgias.   Neurological: Positive for headaches.       Vitals:    08/31/17 1209   BP: 118/72   Pulse: 117   Temp: 99.5 °F (37.5 °C)   SpO2: 96%       Objective   Physical Exam   Constitutional: She is oriented to person, place, and time. Vital signs are normal. She appears well-developed and well-nourished. She is active. She does not have a sickly appearance. She appears ill.   Appears stated age. Well groomed. obese.     HENT:   Head: Normocephalic and atraumatic. Hair is normal.   Right Ear: Hearing, tympanic membrane, external ear and ear canal normal.   Left Ear: Hearing, tympanic membrane, external ear and ear canal normal.   Nose: Nose normal.   Mouth/Throat: Uvula is midline and mucous membranes are normal. Abnormal dentition. No uvula swelling. Posterior oropharyngeal erythema (appears to have some petechiae to uvula) present. No oropharyngeal exudate.   Eyes: EOM and lids are normal. Pupils are equal, round, and reactive to light. No scleral  icterus.   Neck: Phonation normal. Neck supple.   Cardiovascular: Normal rate, regular rhythm and normal heart sounds.  Exam reveals no gallop and no friction rub.    No murmur heard.  Pulmonary/Chest: Effort normal and breath sounds normal.   Musculoskeletal: She exhibits no deformity.   Lymphadenopathy:     She has cervical adenopathy (tender bilaterally).   Neurological: She is alert and oriented to person, place, and time. She displays no tremor. No cranial nerve deficit. Gait normal.   Skin: Skin is warm. No rash noted. She is not diaphoretic. No cyanosis. Nails show no clubbing.   Psychiatric: She has a normal mood and affect. Her speech is normal and behavior is normal. Judgment and thought content normal. Cognition and memory are normal.   Nursing note and vitals reviewed.    Office Visit on 08/31/2017   Component Date Value Ref Range Status   • Rapid Strep A Screen 08/31/2017 Negative  Negative, VALID, INVALID, Not Performed Final   • Internal Control 08/31/2017 Passed  Passed Final   • Lot Number 08/31/2017 [id number]   Final   • Expiration Date 08/31/2017 10/31/2018   Final   • Rapid Influenza A Ag 08/31/2017 POSITIVE   Final   • Rapid Influenza B Ag 08/31/2017 NEGATIVE   Final   • Internal Control 08/31/2017 Passed  Passed Final   • Lot Number 08/31/2017 [id number]   Final   • Expiration Date 08/31/2017 2/2019   Final         Assessment/Plan   Marilu was seen today for sore throat, fever and generalized body aches.    Diagnoses and all orders for this visit:    Influenza A  -     POC Influenza A / B  -     oseltamivir (TAMIFLU) 75 MG capsule; Take 1 capsule by mouth 2 (Two) Times a Day for 5 days.    Sore throat  -     POCT rapid strep A    Chronic pain syndrome  -     DULoxetine (CYMBALTA) 30 MG capsule; Take 1 capsule by mouth Daily.    Other orders  -     Cancel: cefdinir (OMNICEF) 300 MG capsule; Take 1 capsule by mouth 2 (Two) Times a Day for 10 days.      Had considered treating for Strep based on  symptoms but then flu test positive. Sent exposure doses for son and daughter who are also [employer] patients. Stay hydrated         Deidre Barker MD

## 2017-09-10 ENCOUNTER — HOSPITAL ENCOUNTER (EMERGENCY)
Facility: HOSPITAL | Age: 27
Discharge: HOME | End: 2017-09-10
Attending: EMERGENCY MEDICINE | Admitting: EMERGENCY MEDICINE

## 2017-09-10 VITALS
BODY MASS INDEX: 36.14 KG/M2 | OXYGEN SATURATION: 99 % | DIASTOLIC BLOOD PRESSURE: 85 MMHG | TEMPERATURE: 98.4 F | HEIGHT: 63 IN | SYSTOLIC BLOOD PRESSURE: 133 MMHG | WEIGHT: 204 LBS | HEART RATE: 99 BPM | RESPIRATION RATE: 18 BRPM

## 2017-09-10 DIAGNOSIS — N12 PYELONEPHRITIS: Primary | ICD-10-CM

## 2017-09-10 LAB
B-HCG UR QL: NEGATIVE
BACTERIA UR QL AUTO: ABNORMAL /HPF
BILIRUB UR QL STRIP: NEGATIVE
CLARITY UR: ABNORMAL
COLOR UR: YELLOW
GLUCOSE UR STRIP-MCNC: NEGATIVE MG/DL
HGB UR QL STRIP.AUTO: ABNORMAL
HYALINE CASTS UR QL AUTO: ABNORMAL /LPF
KETONES UR QL STRIP: NEGATIVE
LEUKOCYTE ESTERASE UR QL STRIP.AUTO: ABNORMAL
MUCOUS THREADS URNS QL MICRO: ABNORMAL /HPF
NITRITE UR QL STRIP: NEGATIVE
PH UR STRIP.AUTO: 6 [PH] (ref 5–8)
PROT UR QL STRIP: ABNORMAL
RBC # UR: ABNORMAL /HPF
REF LAB TEST METHOD: ABNORMAL
SP GR UR STRIP: >=1.03 (ref 1–1.03)
SQUAMOUS #/AREA URNS HPF: ABNORMAL /HPF
UROBILINOGEN UR QL STRIP: ABNORMAL
WBC CLUMPS # UR AUTO: ABNORMAL /HPF
WBC UR QL AUTO: ABNORMAL /HPF

## 2017-09-10 PROCEDURE — 99283 EMERGENCY DEPT VISIT LOW MDM: CPT

## 2017-09-10 PROCEDURE — 87086 URINE CULTURE/COLONY COUNT: CPT | Performed by: PHYSICIAN ASSISTANT

## 2017-09-10 PROCEDURE — 81001 URINALYSIS AUTO W/SCOPE: CPT | Performed by: PHYSICIAN ASSISTANT

## 2017-09-10 PROCEDURE — 81025 URINE PREGNANCY TEST: CPT | Performed by: PHYSICIAN ASSISTANT

## 2017-09-10 RX ORDER — CIPROFLOXACIN 500 MG/1
500 TABLET, FILM COATED ORAL 2 TIMES DAILY
Qty: 14 TABLET | Refills: 0 | Status: SHIPPED | OUTPATIENT
Start: 2017-09-10 | End: 2017-09-19

## 2017-09-10 RX ORDER — PHENAZOPYRIDINE HYDROCHLORIDE 100 MG/1
200 TABLET, FILM COATED ORAL ONCE
Status: COMPLETED | OUTPATIENT
Start: 2017-09-10 | End: 2017-09-10

## 2017-09-10 RX ORDER — PHENAZOPYRIDINE HYDROCHLORIDE 200 MG/1
200 TABLET, FILM COATED ORAL 3 TIMES DAILY PRN
Qty: 6 TABLET | Refills: 0 | Status: SHIPPED | OUTPATIENT
Start: 2017-09-10 | End: 2017-10-09

## 2017-09-10 RX ORDER — CIPROFLOXACIN 500 MG/1
500 TABLET, FILM COATED ORAL ONCE
Status: COMPLETED | OUTPATIENT
Start: 2017-09-10 | End: 2017-09-10

## 2017-09-10 RX ADMIN — CIPROFLOXACIN HYDROCHLORIDE 500 MG: 500 TABLET, FILM COATED ORAL at 23:41

## 2017-09-10 RX ADMIN — PHENAZOPYRIDINE HYDROCHLORIDE 200 MG: 100 TABLET ORAL at 23:41

## 2017-09-11 NOTE — ED PROVIDER NOTES
Subjective   HPI Comments: Patient is 26-year-old female who is here today complaining of dysuria.  Patient states that she started having pain with urination yesterday along with some diarrhea, now complains of right groin pain, right low back pain.  Patient denies any fevers, other abdominal pain, patient denies vaginal discharge.  Patient has had UTIs prior and this feels consistent      History provided by:  Patient      Review of Systems   Genitourinary: Positive for dysuria.   All other systems reviewed and are negative.      Past Medical History:   Diagnosis Date   • Abdominal pain    • Allergic    • Asthma    • Chest pain    • Clotting disorder     factor 5   • Coronary artery disease involving native coronary artery of native heart without angina pectoris 2017   • Diarrhea    • Factor 5 Leiden mutation, heterozygous    • Gallstone    • GERD (gastroesophageal reflux disease)    • H/O blood clots    • Headache    • Hypertension    • Kidney infection    • Nausea & vomiting    • Obesity    • Orthostatic hypotension    • Ovarian cyst    • Protein S deficiency 2016    labs from hospitalization for PE   • Pulmonary embolism    • Sleep apnea    • Subclinical hyperthyroidism    • Tattoo        Allergies   Allergen Reactions   • Amoxicillin Rash       Past Surgical History:   Procedure Laterality Date   •  SECTION       and    • CHOLECYSTECTOMY     • COLONOSCOPY N/A 2017    Procedure: COLONOSCOPY WITH BIOPSIES AND ARGON THERMAL ABLATION;  Surgeon: Jignesh Selby MD;  Location: [facility] ENDOSCOPY;  Service:    • ENDOSCOPY N/A 2017    Procedure: ESOPHAGOGASTRODUODENOSCOPY WITH BIOPSIES AND COLD BIOPSY POLYPECTOMIES;  Surgeon: Jignesh Selby MD;  Location: [facility] ENDOSCOPY;  Service:        Family History   Problem Relation Age of Onset   • Arthritis Mother    • COPD Mother    • Asthma Mother    • Arthritis Father    • Diabetes Father    • Hypertension Father    •  Hyperlipidemia Father    • Kidney disease Father    • Heart attack Father    • No Known Problems Son    • Colon cancer Neg Hx    • Liver cancer Neg Hx    • Liver disease Neg Hx    • Stomach cancer Neg Hx    • Esophageal cancer Neg Hx        Social History     Social History   • Marital status:      Spouse name: N/A   • Number of children: N/A   • Years of education: N/A     Social History Main Topics   • Smoking status: Never Smoker   • Smokeless tobacco: Never Used   • Alcohol use No   • Drug use: No   • Sexual activity: Yes     Partners: Male     Birth control/ protection: None      Comment: PE while on birth control patch     Other Topics Concern   • None     Social History Narrative           Objective   Physical Exam   Constitutional: She is oriented to person, place, and time. She appears well-developed and well-nourished.   HENT:   Head: Normocephalic and atraumatic.   Eyes: Conjunctivae and EOM are normal.   Cardiovascular: Normal rate and regular rhythm.    Pulmonary/Chest: Effort normal and breath sounds normal. No respiratory distress. She has no wheezes.   Abdominal: Soft. Bowel sounds are normal. There is tenderness (right groin and suprapubic).   Musculoskeletal: Normal range of motion.   Neurological: She is alert and oriented to person, place, and time.   Skin: Skin is warm and dry.   Psychiatric: She has a normal mood and affect. Her behavior is normal.   Nursing note and vitals reviewed.      Procedures         ED Course  ED Course      Urine pregnancy test was normal, now since was grossly positive for signs of urinary tract infection.  As patient has back pain I will diagnose her with pyelonephritis and give her ciprofloxacin.  Patient will follow with primary care.  Patient given Pyridium also.  Return as needed for worsening symptoms            MDM    Final diagnoses:   Pyelonephritis            Ling Peters PA-C  09/11/17 0003

## 2017-09-12 DIAGNOSIS — M25.50 CHRONIC JOINT PAIN: ICD-10-CM

## 2017-09-12 DIAGNOSIS — F32.0 MILD SINGLE CURRENT EPISODE OF MAJOR DEPRESSIVE DISORDER (HCC): ICD-10-CM

## 2017-09-12 DIAGNOSIS — G89.29 CHRONIC JOINT PAIN: ICD-10-CM

## 2017-09-12 LAB — BACTERIA SPEC AEROBE CULT: NORMAL

## 2017-09-12 RX ORDER — DULOXETIN HYDROCHLORIDE 20 MG/1
CAPSULE, DELAYED RELEASE ORAL
Qty: 30 CAPSULE | Refills: 0 | Status: SHIPPED | OUTPATIENT
Start: 2017-09-12 | End: 2017-10-04 | Stop reason: DRUGHIGH

## 2017-09-19 ENCOUNTER — OFFICE VISIT (OUTPATIENT)
Dept: INTERNAL MEDICINE | Facility: CLINIC | Age: 27
End: 2017-09-19

## 2017-09-19 VITALS — OXYGEN SATURATION: 98 % | HEART RATE: 109 BPM | TEMPERATURE: 99.5 F

## 2017-09-19 DIAGNOSIS — R39.9 URINARY SYMPTOM OR SIGN: ICD-10-CM

## 2017-09-19 DIAGNOSIS — R68.89 FLU-LIKE SYMPTOMS: Primary | ICD-10-CM

## 2017-09-19 DIAGNOSIS — J10.1 INFLUENZA A: ICD-10-CM

## 2017-09-19 LAB
BILIRUB BLD-MCNC: NEGATIVE MG/DL
CLARITY, POC: CLEAR
COLOR UR: YELLOW
EXPIRATION DATE: NORMAL
FLUAV AG NPH QL: NORMAL
FLUBV AG NPH QL: NORMAL
GLUCOSE UR STRIP-MCNC: NEGATIVE MG/DL
INTERNAL CONTROL: NORMAL
KETONES UR QL: NEGATIVE
LEUKOCYTE EST, POC: NEGATIVE
Lab: NORMAL
NITRITE UR-MCNC: NEGATIVE MG/ML
PH UR: 5 [PH] (ref 5–8)
PROT UR STRIP-MCNC: NEGATIVE MG/DL
RBC # UR STRIP: NEGATIVE /UL
SP GR UR: 1.02 (ref 1–1.03)
UROBILINOGEN UR QL: NORMAL

## 2017-09-19 PROCEDURE — 99213 OFFICE O/P EST LOW 20 MIN: CPT | Performed by: INTERNAL MEDICINE

## 2017-09-19 PROCEDURE — 87804 INFLUENZA ASSAY W/OPTIC: CPT | Performed by: INTERNAL MEDICINE

## 2017-09-19 RX ORDER — OSELTAMIVIR PHOSPHATE 75 MG/1
75 CAPSULE ORAL 2 TIMES DAILY
Qty: 10 CAPSULE | Refills: 0 | Status: SHIPPED | OUTPATIENT
Start: 2017-09-19 | End: 2017-09-26

## 2017-09-19 NOTE — PROGRESS NOTES
Chief Complaint   Patient presents with   • Cough     congestion, diarrhea, and muscle aches. Pt would like to do UA.      Subjective   Marilu Godoy is a 26 y.o. female.     Cough   This is a new problem. The current episode started in the past 7 days. The problem has been gradually worsening. The problem occurs every few minutes. The cough is productive of sputum. Associated symptoms include chills, a fever, headaches, myalgias, nasal congestion, postnasal drip, rhinorrhea and a sore throat. The symptoms are aggravated by lying down. Treatments tried: tylenol. The treatment provided mild relief. Her past medical history is significant for asthma and environmental allergies.        The following portions of the patient's history were reviewed and updated as appropriate: allergies, current medications, past family history, past medical history, past social history, past surgical history and problem list.    Review of Systems   Constitutional: Positive for chills and fever.   HENT: Positive for postnasal drip, rhinorrhea and sore throat.    Respiratory: Positive for cough.    Musculoskeletal: Positive for myalgias.   Allergic/Immunologic: Positive for environmental allergies.   Neurological: Positive for headaches.       Objective   Pulse 109  Temp 99.5 °F (37.5 °C)  LMP 08/30/2017  SpO2 98%  There is no height or weight on file to calculate BMI.  Physical Exam   Constitutional: She is oriented to person, place, and time. She appears well-developed and well-nourished.   HENT:   Head: Normocephalic and atraumatic.   Mouth/Throat: Oropharynx is clear and moist.   Tympanic membranes dull, turbinates red and inflamed with clear rhinorrhea and clear postnasal drip   Eyes: Conjunctivae and EOM are normal. Pupils are equal, round, and reactive to light.   Neck: Normal range of motion. Neck supple. No thyromegaly present.   Cardiovascular: Normal rate, regular rhythm and normal heart sounds.    No murmur  heard.  Pulmonary/Chest: Effort normal and breath sounds normal. No respiratory distress.   Lymphadenopathy:     She has cervical adenopathy.   Neurological: She is alert and oriented to person, place, and time. No cranial nerve deficit.   Psychiatric: She has a normal mood and affect. Judgment normal.   Nursing note and vitals reviewed.      Assessment/Plan   Marilu Godoy is here today and the following problems have been addressed:      Marilu was seen today for cough.    Diagnoses and all orders for this visit:    Flu-like symptoms  -     POCT Influenza A/B    Influenza A    Other orders  -     oseltamivir (TAMIFLU) 75 MG capsule; Take 1 capsule by mouth 2 (Two) Times a Day.    Influenza A positive, B negative  Patient given Tamiflu 75 mg twice a day for 5 days  Encourage rest, increase hydration  Stay home and avoid public exposure until all symptoms are resolved  May take Tylenol or ibuprofen for fever and aches  Will also obtain urinalysis due to recent UTI and call patient if she needs treatment    Return to clinic if symptoms worsen or persist      Please note that portions of this note were completed with a voice recognition program.  Efforts were made to edit dictation, but occasionally words are mistranscribed.

## 2017-09-26 ENCOUNTER — OFFICE VISIT (OUTPATIENT)
Dept: INTERNAL MEDICINE | Facility: CLINIC | Age: 27
End: 2017-09-26

## 2017-09-26 VITALS
HEART RATE: 85 BPM | SYSTOLIC BLOOD PRESSURE: 118 MMHG | HEIGHT: 63 IN | BODY MASS INDEX: 35.26 KG/M2 | OXYGEN SATURATION: 100 % | DIASTOLIC BLOOD PRESSURE: 80 MMHG | WEIGHT: 199 LBS | TEMPERATURE: 98.3 F

## 2017-09-26 DIAGNOSIS — R50.81 FEVER IN OTHER DISEASES: Primary | ICD-10-CM

## 2017-09-26 LAB
EXPIRATION DATE: ABNORMAL
FLUAV AG NPH QL: POSITIVE
FLUBV AG NPH QL: NEGATIVE
INTERNAL CONTROL: ABNORMAL
Lab: ABNORMAL

## 2017-09-26 PROCEDURE — 99213 OFFICE O/P EST LOW 20 MIN: CPT | Performed by: PHYSICIAN ASSISTANT

## 2017-09-26 PROCEDURE — 87804 INFLUENZA ASSAY W/OPTIC: CPT | Performed by: PHYSICIAN ASSISTANT

## 2017-09-26 RX ORDER — DOXYCYCLINE 100 MG/1
100 CAPSULE ORAL 2 TIMES DAILY
Qty: 14 CAPSULE | Refills: 0 | Status: SHIPPED | OUTPATIENT
Start: 2017-09-26 | End: 2017-10-03

## 2017-09-26 NOTE — PROGRESS NOTES
Marilu Godoy is a 26 y.o. female.     Subjective   History of Present Illness   Here today with concern of fever and cough since last night.  Has been seen here twice in the last 3 weeks with similar symptoms.  Initially diagnosed with Influenza A on 8/31 then was positive again on 9/19.  Has had 2 rounds of Tamiflu, last being 1 week ago.  Had a Z pack sent in on 9/5 by Dr. Barker when sore throat was worsening.  Cough never resolved and is productive for green sputum.  Symptoms were improving then last night began to worsen again and included fever of 100-102.  Denies SOA. Having some rhinorrhea as well as some diarrhea. Denies sore throat but has had some postnasal drip.         The following portions of the patient's history were reviewed and updated as appropriate: allergies, current medications, past family history, past medical history, past social history, past surgical history and problem list.    Review of Systems    Constitutional: fever. Negative for appetite change, chills, fatigue and unexpected weight change.   HENT: postnasal drip. Negative for congestion, ear pain, hearing loss, nosebleeds, rhinorrhea, sore throat, tinnitus and trouble swallowing.    Eyes: Negative for photophobia, discharge and visual disturbance.   Respiratory: cough. Negative for chest tightness, shortness of breath and wheezing.    Cardiovascular: Negative for chest pain, palpitations and leg swelling.   Gastrointestinal: diarrhea. Negative for abdominal distention, abdominal pain, blood in stool, constipation, nausea and vomiting.   Endocrine: Negative for cold intolerance, heat intolerance, polydipsia, polyphagia and polyuria.   Musculoskeletal: Negative for arthralgias, back pain, joint swelling, myalgias, neck pain and neck stiffness.   Skin: Negative for color change, pallor, rash and wound.   Allergic/Immunologic: Negative for environmental allergies, food allergies and immunocompromised state.   Neurological:  "Negative for dizziness, tremors, seizures, weakness, numbness and headaches.   Hematological: Negative for adenopathy. Does not bruise/bleed easily.   Psychiatric/Behavioral: Negative for sleep disturbances, agitation, behavioral problems, confusion, hallucinations, self-injury and suicidal ideas. The patient is not nervous/anxious.      Objective    Physical Exam  Constitutional: Oriented to person, place, and time. Appears well-developed and well-nourished.   HENT: mild OP erythema without exudate or lesions.   Head: No sinus tenderness. Normocephalic and atraumatic.   Eyes: EOM are normal. Pupils are equal, round, and reactive to light.   Neck: Normal range of motion. Neck supple.   Cardiovascular: Normal rate, regular rhythm and normal heart sounds.    Pulmonary/Chest: coarse breath sounds diffusely. Effort normal. No respiratory distress.  Has no wheezes or rales. Exhibits no chest wall tenderness.   Abdominal: Soft. Bowel sounds are normal. Exhibits no distension and no mass. There is no tenderness.   Musculoskeletal: Normal range of motion. Exhibits no tenderness.   Neurological: Alert and oriented to person, place, and time.   Skin: Skin is warm and dry.   Psychiatric: Has a normal mood and affect. Behavior is normal. Judgment and thought content normal.       /80  Pulse 85  Temp 98.3 °F (36.8 °C)  Ht 63\" (160 cm)  Wt 199 lb (90.3 kg)  LMP 08/30/2017  SpO2 100%  BMI 35.25 kg/m2    Nursing note and vitals reviewed.        Assessment/Plan   Marilu was seen today for cough, fever and diarrhea.    Diagnoses and all orders for this visit:    Fever in other diseases  -     POCT Influenza A/B- positive for influenza A again today. Third positive test in the last 3 weeks. Has had 2 rounds of Tamiflu.     Concerned with possible influenza pneumonia.   -     Begin: doxycycline (MONODOX) 100 MG capsule; Take 1 capsule by mouth 2 (Two) Times a Day for 7 days.    Call with persistent or worsened symptoms. "

## 2017-10-04 ENCOUNTER — PATIENT MESSAGE (OUTPATIENT)
Dept: PULMONOLOGY | Facility: CLINIC | Age: 27
End: 2017-10-04

## 2017-10-04 ENCOUNTER — OFFICE VISIT (OUTPATIENT)
Dept: INTERNAL MEDICINE | Facility: CLINIC | Age: 27
End: 2017-10-04

## 2017-10-04 VITALS
HEART RATE: 106 BPM | TEMPERATURE: 99.5 F | BODY MASS INDEX: 33.59 KG/M2 | HEIGHT: 63 IN | DIASTOLIC BLOOD PRESSURE: 72 MMHG | SYSTOLIC BLOOD PRESSURE: 108 MMHG | OXYGEN SATURATION: 97 % | WEIGHT: 189.6 LBS

## 2017-10-04 DIAGNOSIS — G89.29 CHRONIC JOINT PAIN: ICD-10-CM

## 2017-10-04 DIAGNOSIS — M25.50 CHRONIC JOINT PAIN: ICD-10-CM

## 2017-10-04 DIAGNOSIS — B08.4 HAND, FOOT AND MOUTH DISEASE: Primary | ICD-10-CM

## 2017-10-04 DIAGNOSIS — M25.50 POLYARTHRALGIA: ICD-10-CM

## 2017-10-04 DIAGNOSIS — R50.81 FEVER IN OTHER DISEASES: ICD-10-CM

## 2017-10-04 DIAGNOSIS — R68.89 FLU-LIKE SYMPTOMS: ICD-10-CM

## 2017-10-04 DIAGNOSIS — B99.9 RECURRENT INFECTIONS: ICD-10-CM

## 2017-10-04 DIAGNOSIS — R76.8 ANTI-RNP ANTIBODIES PRESENT: ICD-10-CM

## 2017-10-04 DIAGNOSIS — M54.5 ACUTE LOW BACK PAIN, UNSPECIFIED BACK PAIN LATERALITY, WITH SCIATICA PRESENCE UNSPECIFIED: ICD-10-CM

## 2017-10-04 PROCEDURE — 99214 OFFICE O/P EST MOD 30 MIN: CPT | Performed by: FAMILY MEDICINE

## 2017-10-04 PROCEDURE — 87804 INFLUENZA ASSAY W/OPTIC: CPT | Performed by: FAMILY MEDICINE

## 2017-10-04 RX ORDER — PROMETHAZINE HYDROCHLORIDE 25 MG/1
25 TABLET ORAL EVERY 6 HOURS PRN
Qty: 30 TABLET | Refills: 0 | Status: SHIPPED | OUTPATIENT
Start: 2017-10-04 | End: 2017-12-19 | Stop reason: SDUPTHER

## 2017-10-04 RX ORDER — TIZANIDINE 4 MG/1
4 TABLET ORAL EVERY 8 HOURS PRN
Qty: 30 TABLET | Refills: 3 | Status: SHIPPED | OUTPATIENT
Start: 2017-10-04 | End: 2018-05-15

## 2017-10-04 NOTE — PROGRESS NOTES
Subjective    Marilu Godoy is a 26 y.o. female here for:  Chief Complaint   Patient presents with   • Flu Symptoms     STARTED SUNDAY. PATIENT HAS TESTED POSITIVE 3 TIMES SINCE 08/31. COUGH, VOMITING/NAUSEA, SORE THROAT, CONGESTION, DIARRHEA, MUSCLE ACHES, FEVER, AND FEVER.   • Hand, Foot and Mouth     NOTICED MONDAY MORNING. SPOTS ON HANDS, FEET AND POSSIBLY THROAT.     History of Present Illness   Patient continues to feel poorly like she has the flu. She has had fever up to 104, myalgias, nausea, diarrhea. It hurts to walk, she has red spots on hands and feet. Throat is sore and has blisters. Son has hand, foot, mouth.     Saw rheumatology, may have mixed connective tissue disease but not for sure yet. Continues to hurt all over. Muscle relaxer helps some, she would like to have a refill on that if she can. Continue Cymbalta 30 mg daily, rheumatologist said that dose may need to be increased.     Was seen within few weeks and tested positive again for influenza. She has been in office many times this year for infections, usually recurrent.     The following portions of the patient's history were reviewed and updated as appropriate: allergies, current medications, past family history, past medical history, past social history, past surgical history and problem list.    Review of Systems   Constitutional: Positive for fatigue.   Respiratory: Positive for cough.    Gastrointestinal: Positive for abdominal pain, diarrhea and nausea.   Genitourinary: Positive for menstrual problem (late again but has started).   Musculoskeletal: Positive for arthralgias and myalgias.       Vitals:    10/04/17 1356   BP: 108/72   Pulse: 106   Temp: 99.5 °F (37.5 °C)   SpO2: 97%       Objective   Physical Exam   Constitutional: She is oriented to person, place, and time. Vital signs are normal. She appears well-developed and well-nourished. She is active. She does not have a sickly appearance. She appears ill.   Appears stated age.  Well groomed. obese.   HENT:   Head: Normocephalic and atraumatic. Hair is normal.   Right Ear: Hearing normal.   Left Ear: Hearing normal.   Nose: Nose normal.   Mouth/Throat: Mucous membranes are not dry.   Vesicles noted to tonsillar pillars bilaterally no petechiae   Eyes: EOM and lids are normal. Pupils are equal, round, and reactive to light. No scleral icterus.   Neck: Phonation normal. Neck supple.   Cardiovascular: Normal rate, regular rhythm and normal heart sounds.  Exam reveals no gallop and no friction rub.    No murmur heard.  Pulmonary/Chest: Effort normal and breath sounds normal.   Musculoskeletal: She exhibits no deformity.   Neurological: She is alert and oriented to person, place, and time. She displays no tremor. No cranial nerve deficit. Gait normal.   Skin: Skin is warm. Rash (erythematous macules that are round and small on soles of feet, palms, and some slight rash to forearms. no facial lesions.) noted. She is not diaphoretic. No cyanosis. Nails show no clubbing.   Psychiatric: She has a normal mood and affect. Her speech is normal and behavior is normal. Judgment and thought content normal. Cognition and memory are normal.   Nursing note and vitals reviewed.         Office Visit on 10/04/2017   Component Date Value Ref Range Status   • Rapid Influenza A Ag 10/04/2017 NEGATIVE   Final   • Rapid Influenza B Ag 10/04/2017 NEGATIVE   Final   • Internal Control 10/04/2017 Passed  Passed Final   • Lot Number 10/04/2017 [id number]   Final   • Expiration Date 10/04/2017 02/01/2019   Final       Assessment/Plan   Marilu was seen today for flu symptoms and hand, foot and mouth.    Diagnoses and all orders for this visit:    Hand, foot and mouth disease  Comments:  Likely secondary to Coxsackie virus A6.    Flu-like symptoms  -     POCT Influenza A/B    Fever in other diseases    Chronic joint pain  -     KIERA Comprehensive Plus Profile    Acute low back pain, unspecified back pain laterality, with  sciatica presence unspecified  -     tiZANidine (ZANAFLEX) 4 MG tablet; Take 1 tablet by mouth Every 8 (Eight) Hours As Needed for Muscle Spasms.    Anti-RNP antibodies present  -     KIERA Comprehensive Plus Profile    Recurrent infections  -     Mannose Binding Lectin (MBL)  -     T- & B-Lymphocyte / Daniela Killer  -     [place] / Suppress / Natural Killer    Polyarthralgia  -     KIERA Comprehensive Plus Profile    Other orders  -     promethazine (PHENERGAN) 25 MG tablet; Take 1 tablet by mouth Every 6 (Six) Hours As Needed for Nausea or Vomiting.               Deidre Barker MD

## 2017-10-07 PROBLEM — J10.1 INFLUENZA A: Status: RESOLVED | Noted: 2017-09-19 | Resolved: 2017-10-07

## 2017-10-09 ENCOUNTER — OFFICE VISIT (OUTPATIENT)
Dept: INTERNAL MEDICINE | Facility: CLINIC | Age: 27
End: 2017-10-09

## 2017-10-09 ENCOUNTER — HOSPITAL ENCOUNTER (OUTPATIENT)
Dept: GENERAL RADIOLOGY | Facility: HOSPITAL | Age: 27
Discharge: HOME | End: 2017-10-09
Attending: NURSE PRACTITIONER | Admitting: NURSE PRACTITIONER

## 2017-10-09 VITALS
OXYGEN SATURATION: 98 % | HEIGHT: 63 IN | DIASTOLIC BLOOD PRESSURE: 74 MMHG | TEMPERATURE: 100.7 F | BODY MASS INDEX: 34.05 KG/M2 | SYSTOLIC BLOOD PRESSURE: 108 MMHG | WEIGHT: 192.19 LBS | RESPIRATION RATE: 14 BRPM | HEART RATE: 114 BPM

## 2017-10-09 DIAGNOSIS — R51.9 HEADACHE, UNSPECIFIED HEADACHE TYPE: ICD-10-CM

## 2017-10-09 DIAGNOSIS — J06.9 VIRAL UPPER RESPIRATORY TRACT INFECTION WITH COUGH: ICD-10-CM

## 2017-10-09 DIAGNOSIS — R07.89 CHEST WALL TENDERNESS: Primary | ICD-10-CM

## 2017-10-09 LAB
EXPIRATION DATE: NORMAL
FLUAV AG NPH QL: NORMAL
FLUBV AG NPH QL: NORMAL
INTERNAL CONTROL: NORMAL
Lab: NORMAL

## 2017-10-09 PROCEDURE — 99213 OFFICE O/P EST LOW 20 MIN: CPT | Performed by: NURSE PRACTITIONER

## 2017-10-09 PROCEDURE — 87804 INFLUENZA ASSAY W/OPTIC: CPT | Performed by: NURSE PRACTITIONER

## 2017-10-09 PROCEDURE — 71020 HC CHEST PA AND LATERAL: CPT

## 2017-10-09 NOTE — PROGRESS NOTES
Chief Complaint / Reason:      Chief Complaint   Patient presents with   • Fever     up to 102, onset has been sick off and on x 1 mo. Worse again starting yesterday.    • Cough     chest pain and congestion, Tried some Bromfed cough syrup yesterday that she had on hand and it did not help.    • Influenza     has had 3 pos. flu tests since 8/31 an has had 2 rounds of Tamiflu.        Subjective     HPI  Patient presents today with complaints of cough, fever, chest pain and congestion. She has been sick on and off for over a month now. It progressively worsened yesterday, has tried bromfed,nasal spray, inhaler and increasing fluid intake and it has provided no relief. Since 8/31 she has had 3 positive flu and 2 rounds of Tamiflu. She low grade fever today and increased HR. She appears to be in no distress at this time but states that she feels awful. Her oxygen saturation is 98%. History of PE and is currently on Eliquis daily. Denies in blood in sputum.   History taken from: patient    PMH/FH/Social History were reviewed and updated appropriately in the electronic medical record.     Review of Systems:   Review of Systems   Constitutional: Positive for chills and fever.   HENT: Positive for congestion, postnasal drip, rhinorrhea and sore throat.    Respiratory: Positive for cough.    Musculoskeletal: Positive for myalgias.   Allergic/Immunologic: Positive for environmental allergies.   Neurological: Positive for headaches.     All other systems were reviewed and are negative.  Exceptions are noted in the subjective or above.      Objective     Vital Signs  Vitals:    10/09/17 1346   BP: 108/74   Pulse: 114   Resp: 14   Temp: (!) 100.7 °F (38.2 °C)   SpO2: 98%       Body mass index is 34.04 kg/(m^2).    Physical Exam   Constitutional: She is oriented to person, place, and time. Vital signs are normal. She appears well-developed and well-nourished. She is active.   HENT:   Head: Normocephalic and atraumatic.   Right  Ear: Hearing and external ear normal. Tympanic membrane is erythematous and bulging.   Left Ear: Hearing and external ear normal. Tympanic membrane is erythematous and bulging.   Nose: Mucosal edema present. Right sinus exhibits maxillary sinus tenderness. Left sinus exhibits maxillary sinus tenderness.   Mouth/Throat: Mucous membranes are dry. Posterior oropharyngeal erythema present.   Eyes: EOM and lids are normal. Pupils are equal, round, and reactive to light.   Cardiovascular: Regular rhythm, normal heart sounds and intact distal pulses.  Tachycardia present.    Pulmonary/Chest: Effort normal and breath sounds normal. She has no wheezes.   Abdominal: Soft. Bowel sounds are normal.   Musculoskeletal: She exhibits no deformity.   Lymphadenopathy:     She has no cervical adenopathy.   Neurological: She is alert and oriented to person, place, and time. No cranial nerve deficit. Gait normal.   Skin: Skin is warm and dry. She is not diaphoretic. No cyanosis. Nails show no clubbing.   Psychiatric: She has a normal mood and affect. Her behavior is normal. Judgment and thought content normal.   Nursing note and vitals reviewed.       Results Review:    I reviewed the patient's new clinical results.   Office Visit on 10/09/2017   Component Date Value Ref Range Status   • Rapid Influenza A Ag 10/09/2017 neg.   Final   • Rapid Influenza B Ag 10/09/2017 neg.   Final   • Internal Control 10/09/2017 Passed  Passed Final   • Lot Number 10/09/2017 [id number]   Final   • Expiration Date 10/09/2017 2/20/19   Final         Medication Review:     Current Outpatient Prescriptions:   •  albuterol (PROVENTIL HFA;VENTOLIN HFA) 108 (90 BASE) MCG/ACT inhaler, Inhale 2 puffs Every 6 (Six) Hours As Needed for Wheezing or Shortness of Air., Disp: 1 inhaler, Rfl: 3  •  albuterol (PROVENTIL) (2.5 MG/3ML) 0.083% nebulizer solution, Take 2.5 mg by nebulization Every 4 (Four) Hours As Needed for Wheezing or Shortness of Air., Disp: 60 vial, Rfl:  3  •  apixaban (ELIQUIS) 5 MG tablet tablet, Take 5 mg by mouth 2 (Two) Times a Day., Disp: , Rfl:   •  aspirin 81 MG tablet, Take 1 tablet by mouth Daily., Disp: 30 tablet, Rfl: 11  •  atorvastatin (LIPITOR) 80 MG tablet, Take 1 tablet by mouth Daily., Disp: 30 tablet, Rfl: 11  •  azelastine (ASTELIN) 0.1 % nasal spray, 1 spray into each nostril 2 (Two) Times a Day As Needed for Rhinitis or Allergies. Use in each nostril as directed, Disp: 1 each, Rfl: 5  •  DULoxetine (CYMBALTA) 30 MG capsule, Take 1 capsule by mouth Daily., Disp: 30 capsule, Rfl: 5  •  flunisolide (NASALIDE) 25 MCG/ACT (0.025%) solution nasal spray, Inhale 1 spray Every 12 (Twelve) Hours., Disp: 1 bottle, Rfl: 5  •  LORazepam (ATIVAN) 0.5 MG tablet, Take 1 tablet by mouth Every 8 (Eight) Hours As Needed for Anxiety., Disp: 9 tablet, Rfl: 0  •  mometasone-formoterol (DULERA 200) 200-5 MCG/ACT inhaler, Inhale 2 puffs 2 (Two) Times a Day. Rinse mouth with water after use., Disp: 1 g, Rfl: 5  •  montelukast (SINGULAIR) 10 MG tablet, TAKE 1 TABLET BY MOUTH IN THE EVENING , Disp: 30 tablet, Rfl: 2  •  ondansetron ODT (ZOFRAN-ODT) 4 MG disintegrating tablet, Take 1 tablet by mouth Every 6 (Six) Hours As Needed for Nausea or Vomiting., Disp: 10 tablet, Rfl: 0  •  pantoprazole (PROTONIX) 40 MG EC tablet, Take 1 tablet by mouth Daily., Disp: 30 tablet, Rfl: 5  •  promethazine (PHENERGAN) 25 MG tablet, Take 1 tablet by mouth Every 6 (Six) Hours As Needed for Nausea or Vomiting., Disp: 30 tablet, Rfl: 0  •  tiZANidine (ZANAFLEX) 4 MG tablet, Take 1 tablet by mouth Every 8 (Eight) Hours As Needed for Muscle Spasms., Disp: 30 tablet, Rfl: 3  •  traMADol (ULTRAM) 50 MG tablet, Take 1 tablet by mouth Every 8 (Eight) Hours As Needed for Severe Pain ., Disp: 15 tablet, Rfl: 0    Assessment/Plan   Marilu was seen today for fever, cough and influenza.    Diagnoses and all orders for this visit:    Chest wall tenderness  -     XR Chest PA & Lateral  -     POCT  Influenza A/B  Collaborated with Dr. Barker regarding patient's condition and complaints.   Viral upper respiratory tract infection with cough  Treat symptomatically.  Keep throat moisten. May use chloraseptic spray for discomfort.  Gargle with warm saltwater or use throat lozenges to relieve the sore throat.   Avoid talking a lot or trying to whisper.  Worsening signs and symptoms discussed.  Rest, fluids and humidification.  Try eating soft, easy-to-swallow foods.    Headache, unspecified headache type  Increase water intake and rest  Discussed worsening s/s    Return if symptoms worsen or fail to improve.    Barby Garcia, APRN  10/09/2017

## 2017-10-11 ENCOUNTER — HOSPITAL ENCOUNTER (EMERGENCY)
Facility: HOSPITAL | Age: 27
Discharge: HOME | End: 2017-10-11
Attending: EMERGENCY MEDICINE | Admitting: EMERGENCY MEDICINE

## 2017-10-11 VITALS
HEART RATE: 111 BPM | HEIGHT: 63 IN | WEIGHT: 189 LBS | SYSTOLIC BLOOD PRESSURE: 117 MMHG | BODY MASS INDEX: 33.49 KG/M2 | RESPIRATION RATE: 20 BRPM | DIASTOLIC BLOOD PRESSURE: 70 MMHG | TEMPERATURE: 98.5 F | OXYGEN SATURATION: 96 %

## 2017-10-11 DIAGNOSIS — J40 BRONCHITIS: Primary | ICD-10-CM

## 2017-10-11 LAB
BASOPHILS # BLD AUTO: 0.02 10*3/MM3 (ref 0–0.2)
BASOPHILS NFR BLD AUTO: 0.2 % (ref 0–2.5)
DEPRECATED RDW RBC AUTO: 41.1 FL (ref 37–54)
EOSINOPHIL # BLD AUTO: 0.07 10*3/MM3 (ref 0–0.7)
EOSINOPHIL NFR BLD AUTO: 0.7 % (ref 0–7)
ERYTHROCYTE [DISTWIDTH] IN BLOOD BY AUTOMATED COUNT: 12.6 % (ref 11.5–14.5)
HCT VFR BLD AUTO: 39.3 % (ref 37–47)
HGB BLD-MCNC: 13.2 G/DL (ref 12–16)
IMM GRANULOCYTES # BLD: 0.03 10*3/MM3 (ref 0–0.06)
IMM GRANULOCYTES NFR BLD: 0.3 % (ref 0–0.6)
LYMPHOCYTES # BLD AUTO: 1.48 10*3/MM3 (ref 0.6–3.4)
LYMPHOCYTES NFR BLD AUTO: 13.9 % (ref 10–50)
MCH RBC QN AUTO: 29.5 PG (ref 27–31)
MCHC RBC AUTO-ENTMCNC: 33.6 G/DL (ref 30–37)
MCV RBC AUTO: 87.9 FL (ref 81–99)
MONOCYTES # BLD AUTO: 0.63 10*3/MM3 (ref 0–0.9)
MONOCYTES NFR BLD AUTO: 5.9 % (ref 0–12)
NEUTROPHILS # BLD AUTO: 8.4 10*3/MM3 (ref 2–6.9)
NEUTROPHILS NFR BLD AUTO: 79 % (ref 37–80)
NRBC BLD MANUAL-RTO: 0 /100 WBC (ref 0–0)
PLATELET # BLD AUTO: 265 10*3/MM3 (ref 130–400)
PMV BLD AUTO: 9.8 FL (ref 6–12)
RBC # BLD AUTO: 4.47 10*6/MM3 (ref 4.2–5.4)
WBC NRBC COR # BLD: 10.63 10*3/MM3 (ref 4.8–10.8)

## 2017-10-11 PROCEDURE — 96361 HYDRATE IV INFUSION ADD-ON: CPT

## 2017-10-11 PROCEDURE — 96374 THER/PROPH/DIAG INJ IV PUSH: CPT

## 2017-10-11 PROCEDURE — 96375 TX/PRO/DX INJ NEW DRUG ADDON: CPT

## 2017-10-11 PROCEDURE — 94799 UNLISTED PULMONARY SVC/PX: CPT

## 2017-10-11 PROCEDURE — 99283 EMERGENCY DEPT VISIT LOW MDM: CPT

## 2017-10-11 PROCEDURE — 25010000002 ONDANSETRON PER 1 MG: Performed by: NURSE PRACTITIONER

## 2017-10-11 PROCEDURE — 25010000002 METHYLPREDNISOLONE PER 125 MG: Performed by: NURSE PRACTITIONER

## 2017-10-11 PROCEDURE — 85025 COMPLETE CBC W/AUTO DIFF WBC: CPT | Performed by: NURSE PRACTITIONER

## 2017-10-11 PROCEDURE — 94640 AIRWAY INHALATION TREATMENT: CPT

## 2017-10-11 RX ORDER — IPRATROPIUM BROMIDE AND ALBUTEROL SULFATE 2.5; .5 MG/3ML; MG/3ML
3 SOLUTION RESPIRATORY (INHALATION) ONCE
Status: COMPLETED | OUTPATIENT
Start: 2017-10-11 | End: 2017-10-11

## 2017-10-11 RX ORDER — ONDANSETRON 2 MG/ML
4 INJECTION INTRAMUSCULAR; INTRAVENOUS ONCE
Status: COMPLETED | OUTPATIENT
Start: 2017-10-11 | End: 2017-10-11

## 2017-10-11 RX ORDER — METHYLPREDNISOLONE SODIUM SUCCINATE 125 MG/2ML
125 INJECTION, POWDER, LYOPHILIZED, FOR SOLUTION INTRAMUSCULAR; INTRAVENOUS ONCE
Status: COMPLETED | OUTPATIENT
Start: 2017-10-11 | End: 2017-10-11

## 2017-10-11 RX ORDER — PROMETHAZINE HYDROCHLORIDE AND CODEINE PHOSPHATE 6.25; 1 MG/5ML; MG/5ML
5 SYRUP ORAL EVERY 6 HOURS PRN
Qty: 118 ML | Refills: 0 | Status: SHIPPED | OUTPATIENT
Start: 2017-10-11 | End: 2017-11-06

## 2017-10-11 RX ORDER — PREDNISONE 50 MG/1
50 TABLET ORAL DAILY
Qty: 5 TABLET | Refills: 0 | Status: SHIPPED | OUTPATIENT
Start: 2017-10-11 | End: 2017-11-06

## 2017-10-11 RX ADMIN — SODIUM CHLORIDE 1000 ML: 9 INJECTION, SOLUTION INTRAVENOUS at 12:21

## 2017-10-11 RX ADMIN — ONDANSETRON 4 MG: 2 INJECTION INTRAMUSCULAR; INTRAVENOUS at 12:21

## 2017-10-11 RX ADMIN — METHYLPREDNISOLONE SODIUM SUCCINATE 125 MG: 125 INJECTION, POWDER, FOR SOLUTION INTRAMUSCULAR; INTRAVENOUS at 12:21

## 2017-10-11 RX ADMIN — IPRATROPIUM BROMIDE AND ALBUTEROL SULFATE 3 ML: .5; 3 SOLUTION RESPIRATORY (INHALATION) at 11:46

## 2017-10-12 NOTE — ED PROVIDER NOTES
Subjective   History of Present Illness  Patient presents with cough, runny nose, chest tightness, she reports fevers at 104.  She was recently seen by her primary care provider.  Influenza was negative.  They feel that she has bronchitis.  Chest x-ray was negative yesterday.  She states that her nebulizers are not helping at home.  She states that she has Bromfed cough syrup at home and that is not helping.  She states the cough is the worst because is keeping her up.  She does have some nausea.  Review of Systems   All other systems reviewed and are negative.      Past Medical History:   Diagnosis Date   • Abdominal pain    • Allergic    • Asthma    • Chest pain    • Clotting disorder     factor 5   • Coronary artery disease involving native coronary artery of native heart without angina pectoris 2017   • Diarrhea    • Factor 5 Leiden mutation, heterozygous    • Gallstone    • GERD (gastroesophageal reflux disease)    • H/O blood clots    • Headache    • Hypertension    • Kidney infection    • Nausea & vomiting    • Obesity    • Orthostatic hypotension    • Ovarian cyst    • Protein S deficiency 2016    labs from hospitalization for PE   • Pulmonary embolism    • Sleep apnea    • Subclinical hyperthyroidism    • Tattoo        Allergies   Allergen Reactions   • Amoxicillin Rash       Past Surgical History:   Procedure Laterality Date   •  SECTION       and    • CHOLECYSTECTOMY     • COLONOSCOPY N/A 2017    Procedure: COLONOSCOPY WITH BIOPSIES AND ARGON THERMAL ABLATION;  Surgeon: Jignesh Selby MD;  Location: [facility] ENDOSCOPY;  Service:    • ENDOSCOPY N/A 2017    Procedure: ESOPHAGOGASTRODUODENOSCOPY WITH BIOPSIES AND COLD BIOPSY POLYPECTOMIES;  Surgeon: Jignesh Selby MD;  Location: [facility] ENDOSCOPY;  Service:        Family History   Problem Relation Age of Onset   • Arthritis Mother    • COPD Mother    • Asthma Mother    • Arthritis Father    • Diabetes Father    •  Hypertension Father    • Hyperlipidemia Father    • Kidney disease Father    • Heart attack Father    • No Known Problems Son    • Colon cancer Neg Hx    • Liver cancer Neg Hx    • Liver disease Neg Hx    • Stomach cancer Neg Hx    • Esophageal cancer Neg Hx        Social History     Social History   • Marital status:      Spouse name: N/A   • Number of children: N/A   • Years of education: N/A     Social History Main Topics   • Smoking status: Never Smoker   • Smokeless tobacco: Never Used   • Alcohol use No   • Drug use: No   • Sexual activity: Yes     Partners: Male     Birth control/ protection: None      Comment: PE while on birth control patch     Other Topics Concern   • None     Social History Narrative           Objective   Physical Exam   Constitutional: She is oriented to person, place, and time. She appears well-developed.   Eyes: Conjunctivae and EOM are normal. Pupils are equal, round, and reactive to light.   Neck: Normal range of motion. Neck supple.   Cardiovascular: Normal rate, regular rhythm, normal heart sounds and intact distal pulses.    Initially slightly tachycardic.   Pulmonary/Chest: Effort normal and breath sounds normal.   Breath sounds slightly diminished throughout.   Abdominal: Soft. Bowel sounds are normal.   Musculoskeletal: Normal range of motion.   Neurological: She is alert and oriented to person, place, and time.   Skin: Skin is warm and dry.   Nursing note and vitals reviewed.      Procedures         ED Course  ED Course      CBC is normal.  Gave her some normal saline.  I told her that she has a viral upper respiratory infection that she should follow her current plan of care.  She indicated that the Bromfed cough syrup was not helping so I ordered her some promethazine with codeine.  Force fluids rest and follow-up with primary care provider.            MDM    Final diagnoses:   Bronchitis            Tamar Harrington, APRN  10/12/17 0013

## 2017-10-13 DIAGNOSIS — I26.99 PULMONARY EMBOLISM ON RIGHT (HCC): ICD-10-CM

## 2017-10-13 DIAGNOSIS — D68.59 PROTEIN S DEFICIENCY (HCC): ICD-10-CM

## 2017-10-13 DIAGNOSIS — D68.51 HETEROZYGOUS FACTOR V LEIDEN MUTATION (HCC): ICD-10-CM

## 2017-10-13 LAB
BASOPHILS # BLD AUTO: 0 X10E3/UL (ref 0–0.2)
BASOPHILS NFR BLD AUTO: 0 %
CD19 CELLS # BLD: 135 /UL (ref 12–645)
CD19 CELLS NFR BLD: 10.4 % (ref 3.3–25.4)
CD3 CELLS # BLD: 991 /UL (ref 622–2402)
CD3 CELLS # BLD: 992 /UL (ref 622–2402)
CD3 CELLS NFR BLD: 76.2 % (ref 57.5–86.2)
CD3 CELLS NFR BLD: 76.3 % (ref 57.5–86.2)
CD3+CD16+CD56+ CELLS # BLD: 153 /UL (ref 24–406)
CD3+CD16+CD56+ CELLS NFR BLD: 11.8 % (ref 1.4–19.4)
CD3+CD4+ CELLS # BLD: 699 /UL (ref 359–1519)
CD3+CD4+ CELLS # BLD: 699 /UL (ref 359–1519)
CD3+CD4+ CELLS NFR BLD: 53.8 % (ref 30.8–58.5)
CD3+CD4+ CELLS NFR BLD: 53.8 % (ref 30.8–58.5)
CD3+CD4+ CELLS/CD3+CD8+ CLL BLD: 2.65 % (ref 0.92–3.72)
CD3+CD4+ CELLS/CD3+CD8+ CLL BLD: 2.65 % (ref 0.92–3.72)
CD3+CD8+ CELLS # BLD: 264 /UL (ref 109–897)
CD3+CD8+ CELLS # BLD: 264 /UL (ref 109–897)
CD3+CD8+ CELLS NFR BLD: 20.3 % (ref 12–35.5)
CD3+CD8+ CELLS NFR BLD: 20.3 % (ref 12–35.5)
CENTROMERE B AB SER-ACNC: <0.2 AI (ref 0–0.9)
CHROMATIN AB SERPL-ACNC: <0.2 AI (ref 0–0.9)
DSDNA AB SER-ACNC: <1 IU/ML (ref 0–9)
ENA JO1 AB SER-ACNC: <0.2 AI (ref 0–0.9)
ENA RNP AB SER-ACNC: <0.2 AI (ref 0–0.9)
ENA SCL70 AB SER-ACNC: <0.2 AI (ref 0–0.9)
ENA SM AB SER-ACNC: <0.2 AI (ref 0–0.9)
ENA SM+RNP AB SER-ACNC: <0.2 AI (ref 0–0.9)
ENA SS-A AB SER-ACNC: <0.2 AI (ref 0–0.9)
ENA SS-B AB SER-ACNC: <0.2 AI (ref 0–0.9)
EOSINOPHIL # BLD AUTO: 0.2 X10E3/UL (ref 0–0.4)
EOSINOPHIL NFR BLD AUTO: 3 %
ERYTHROCYTE [DISTWIDTH] IN BLOOD BY AUTOMATED COUNT: 13.6 % (ref 12.3–15.4)
HCT VFR BLD AUTO: 38.8 % (ref 34–46.6)
HGB BLD-MCNC: 13.1 G/DL (ref 11.1–15.9)
IMM GRANULOCYTES # BLD: 0 X10E3/UL (ref 0–0.1)
IMM GRANULOCYTES NFR BLD: 0 %
LYMPHOCYTES # BLD AUTO: 1.3 X10E3/UL (ref 0.7–3.1)
LYMPHOCYTES NFR BLD AUTO: 25 %
Lab: NORMAL
MANNOSE-BP SER-MCNC: 282 NG/ML
MCH RBC QN AUTO: 29.3 PG (ref 26.6–33)
MCHC RBC AUTO-ENTMCNC: 33.8 G/DL (ref 31.5–35.7)
MCV RBC AUTO: 87 FL (ref 79–97)
MONOCYTES # BLD AUTO: 0.7 X10E3/UL (ref 0.1–0.9)
MONOCYTES NFR BLD AUTO: 14 %
NEUTROPHILS # BLD AUTO: 2.8 X10E3/UL (ref 1.4–7)
NEUTROPHILS NFR BLD AUTO: 58 %
PLATELET # BLD AUTO: 313 X10E3/UL (ref 150–379)
RBC # BLD AUTO: 4.47 X10E6/UL (ref 3.77–5.28)
RIBOSOMAL P AB SER-ACNC: <0.2 AI (ref 0–0.9)
WBC # BLD AUTO: 5 X10E3/UL (ref 3.4–10.8)

## 2017-10-13 RX ORDER — APIXABAN 5 MG/1
TABLET, FILM COATED ORAL
Qty: 60 TABLET | Refills: 3 | Status: SHIPPED | OUTPATIENT
Start: 2017-10-13 | End: 2017-10-23

## 2017-10-14 DIAGNOSIS — R10.13 INTERMITTENT EPIGASTRIC ABDOMINAL PAIN: ICD-10-CM

## 2017-10-14 DIAGNOSIS — G89.29 CHRONIC JOINT PAIN: ICD-10-CM

## 2017-10-14 DIAGNOSIS — K21.9 GASTROESOPHAGEAL REFLUX DISEASE, ESOPHAGITIS PRESENCE NOT SPECIFIED: ICD-10-CM

## 2017-10-14 DIAGNOSIS — M25.50 CHRONIC JOINT PAIN: ICD-10-CM

## 2017-10-14 DIAGNOSIS — F32.0 MILD SINGLE CURRENT EPISODE OF MAJOR DEPRESSIVE DISORDER (HCC): ICD-10-CM

## 2017-10-16 RX ORDER — DULOXETIN HYDROCHLORIDE 20 MG/1
CAPSULE, DELAYED RELEASE ORAL
Qty: 30 CAPSULE | Refills: 5 | Status: SHIPPED | OUTPATIENT
Start: 2017-10-16 | End: 2017-10-23

## 2017-10-16 RX ORDER — PANTOPRAZOLE SODIUM 40 MG/1
TABLET, DELAYED RELEASE ORAL
Qty: 30 TABLET | Refills: 4 | Status: SHIPPED | OUTPATIENT
Start: 2017-10-16 | End: 2018-05-15

## 2017-10-23 ENCOUNTER — OFFICE VISIT (OUTPATIENT)
Dept: INTERNAL MEDICINE | Facility: CLINIC | Age: 27
End: 2017-10-23

## 2017-10-23 ENCOUNTER — OFFICE VISIT (OUTPATIENT)
Dept: CARDIOLOGY | Facility: CLINIC | Age: 27
End: 2017-10-23

## 2017-10-23 VITALS
WEIGHT: 193.6 LBS | BODY MASS INDEX: 34.3 KG/M2 | HEIGHT: 63 IN | SYSTOLIC BLOOD PRESSURE: 106 MMHG | HEART RATE: 125 BPM | DIASTOLIC BLOOD PRESSURE: 68 MMHG

## 2017-10-23 VITALS
WEIGHT: 192 LBS | DIASTOLIC BLOOD PRESSURE: 78 MMHG | BODY MASS INDEX: 34.02 KG/M2 | OXYGEN SATURATION: 97 % | HEIGHT: 63 IN | TEMPERATURE: 98.2 F | SYSTOLIC BLOOD PRESSURE: 124 MMHG | HEART RATE: 87 BPM

## 2017-10-23 DIAGNOSIS — H10.33 ACUTE BACTERIAL CONJUNCTIVITIS OF BOTH EYES: Primary | ICD-10-CM

## 2017-10-23 DIAGNOSIS — G90.3 NEUROGENIC ORTHOSTATIC HYPOTENSION (HCC): ICD-10-CM

## 2017-10-23 DIAGNOSIS — R76.8 ELEVATED IGE LEVEL: Primary | ICD-10-CM

## 2017-10-23 DIAGNOSIS — Q99.8 MANNOSE-BINDING LECTIN DEFICIENCY: ICD-10-CM

## 2017-10-23 DIAGNOSIS — R00.0 SINUS TACHYCARDIA: Primary | ICD-10-CM

## 2017-10-23 PROCEDURE — 99213 OFFICE O/P EST LOW 20 MIN: CPT | Performed by: INTERNAL MEDICINE

## 2017-10-23 PROCEDURE — 99213 OFFICE O/P EST LOW 20 MIN: CPT | Performed by: PHYSICIAN ASSISTANT

## 2017-10-23 PROCEDURE — 93000 ELECTROCARDIOGRAM COMPLETE: CPT | Performed by: INTERNAL MEDICINE

## 2017-10-23 RX ORDER — MIDODRINE HYDROCHLORIDE 5 MG/1
5 TABLET ORAL 3 TIMES DAILY
Qty: 90 TABLET | Refills: 5 | Status: SHIPPED | OUTPATIENT
Start: 2017-10-23 | End: 2018-05-14

## 2017-10-23 RX ORDER — POLYMYXIN B SULFATE AND TRIMETHOPRIM 1; 10000 MG/ML; [USP'U]/ML
1 SOLUTION OPHTHALMIC EVERY 4 HOURS
Qty: 10 ML | Refills: 0 | Status: SHIPPED | OUTPATIENT
Start: 2017-10-23 | End: 2017-10-30

## 2017-10-23 NOTE — PROGRESS NOTES
Marilu Godoy is a 26 y.o. female.     Subjective   History of Present Illness   Eyes itching, watery and burning for the last 2 days and worsening. Began in the left eye. Wakes with yellow-green mucus on lashes and has some discharge throughout the day. Visine eye drops not helping. Denies visual disturbances.       The following portions of the patient's history were reviewed and updated as appropriate: allergies, current medications, past family history, past medical history, past social history, past surgical history and problem list.    Review of Systems    Constitutional: Negative for appetite change, chills, fatigue, fever and unexpected weight change.   HENT: Negative for congestion, ear pain, hearing loss, nosebleeds, postnasal drip, rhinorrhea, sore throat, tinnitus and trouble swallowing.    Eyes:  Discharge, itching, burning, matted lashes.  Negative for photophobia and visual disturbance.   Respiratory: Negative for cough, chest tightness, shortness of breath and wheezing.    Cardiovascular: Negative for chest pain, palpitations and leg swelling.   Gastrointestinal: Negative for abdominal distention, abdominal pain, blood in stool, constipation, diarrhea, nausea and vomiting.   Endocrine: Negative for cold intolerance, heat intolerance, polydipsia, polyphagia and polyuria.   Musculoskeletal: Negative for arthralgias, back pain, joint swelling, myalgias, neck pain and neck stiffness.   Skin: Negative for color change, pallor, rash and wound.   Allergic/Immunologic: Negative for environmental allergies, food allergies and immunocompromised state.   Neurological: Negative for dizziness, tremors, seizures, weakness, numbness and headaches.   Hematological: Negative for adenopathy. Does not bruise/bleed easily.   Psychiatric/Behavioral: Negative for sleep disturbances, agitation, behavioral problems, confusion, hallucinations, self-injury and suicidal ideas. The patient is not nervous/anxious.   "    Objective    Physical Exam  Constitutional: Oriented to person, place, and time. Appears well-developed and well-nourished.   HENT:   Head: Normocephalic and atraumatic.   Eyes: moderately erythematous conjunctiva bilaterally. EOM are normal. Pupils are equal, round, and reactive to light.    Neck: Normal range of motion. Neck supple.   Cardiovascular: Normal rate, regular rhythm and normal heart sounds.    Pulmonary/Chest: Effort normal and breath sounds normal. No respiratory distress.  Has no wheezes or rales. Exhibits no chest wall tenderness.   Abdominal: Soft. Bowel sounds are normal. Exhibits no distension and no mass. There is no tenderness.   Musculoskeletal: Normal range of motion. Exhibits no tenderness.   Neurological: Alert and oriented to person, place, and time.   Skin: Skin is warm and dry.   Psychiatric: Has a normal mood and affect. Behavior is normal. Judgment and thought content normal.       /78  Pulse 87  Temp 98.2 °F (36.8 °C)  Ht 63\" (160 cm)  Wt 192 lb (87.1 kg)  LMP 10/04/2017 (Approximate)  SpO2 97%  BMI 34.01 kg/m2    Nursing note and vitals reviewed.        Assessment/Plan   Marilu was seen today for eye problem.    Diagnoses and all orders for this visit:    Acute bacterial conjunctivitis of both eyes  -     trimethoprim-polymyxin b (POLYTRIM) 94975-2.1 UNIT/ML-% ophthalmic solution; Administer 1 drop to both eyes Every 4 (Four) Hours for 7 days.    Clean eyes with warm, wet, clean washcloth throughout the day. Wash hands frequently to avoid spread to others.     F/u if symptoms worsen or persist.           "

## 2017-10-23 NOTE — PROGRESS NOTES
Subjective:   Marilu Godoy  1990  [phone]      10/23/2017    [facility] CARDIOLOGY    Deidre Barker MD  [address] [place], [place] [id number]    REFERRING DOCTOR: Marc Byrnes      Patient ID: Marilu Godoy is a 26 y.o. female.    Chief Complaint: IAST      Allergies   Allergen Reactions   • Amoxicillin Rash       Current Outpatient Prescriptions:   •  albuterol (PROVENTIL HFA;VENTOLIN HFA) 108 (90 BASE) MCG/ACT inhaler, Inhale 2 puffs Every 6 (Six) Hours As Needed for Wheezing or Shortness of Air., Disp: 1 inhaler, Rfl: 3  •  albuterol (PROVENTIL) (2.5 MG/3ML) 0.083% nebulizer solution, Take 2.5 mg by nebulization Every 4 (Four) Hours As Needed for Wheezing or Shortness of Air., Disp: 60 vial, Rfl: 3  •  apixaban (ELIQUIS) 5 MG tablet tablet, Take 5 mg by mouth 2 (Two) Times a Day., Disp: , Rfl:   •  aspirin 81 MG tablet, Take 1 tablet by mouth Daily., Disp: 30 tablet, Rfl: 11  •  atorvastatin (LIPITOR) 80 MG tablet, Take 1 tablet by mouth Daily., Disp: 30 tablet, Rfl: 11  •  azelastine (ASTELIN) 0.1 % nasal spray, 1 spray into each nostril 2 (Two) Times a Day As Needed for Rhinitis or Allergies. Use in each nostril as directed, Disp: 1 each, Rfl: 5  •  DULoxetine (CYMBALTA) 30 MG capsule, Take 1 capsule by mouth Daily., Disp: 30 capsule, Rfl: 5  •  flunisolide (NASALIDE) 25 MCG/ACT (0.025%) solution nasal spray, Inhale 1 spray Every 12 (Twelve) Hours., Disp: 1 bottle, Rfl: 5  •  LORazepam (ATIVAN) 0.5 MG tablet, Take 1 tablet by mouth Every 8 (Eight) Hours As Needed for Anxiety., Disp: 9 tablet, Rfl: 0  •  mometasone-formoterol (DULERA 200) 200-5 MCG/ACT inhaler, Inhale 2 puffs 2 (Two) Times a Day. Rinse mouth with water after use., Disp: 1 g, Rfl: 5  •  montelukast (SINGULAIR) 10 MG tablet, TAKE 1 TABLET BY MOUTH IN THE EVENING , Disp: 30 tablet, Rfl: 2  •  ondansetron ODT (ZOFRAN-ODT) 4 MG disintegrating tablet, Take 1 tablet by mouth Every 6 (Six)  Hours As Needed for Nausea or Vomiting., Disp: 10 tablet, Rfl: 0  •  pantoprazole (PROTONIX) 40 MG EC tablet, TAKE 1 TABLET BY MOUTH ONE TIME A DAY , Disp: 30 tablet, Rfl: 4  •  predniSONE (DELTASONE) 50 MG tablet, Take 1 tablet by mouth Daily., Disp: 5 tablet, Rfl: 0  •  promethazine (PHENERGAN) 25 MG tablet, Take 1 tablet by mouth Every 6 (Six) Hours As Needed for Nausea or Vomiting., Disp: 30 tablet, Rfl: 0  •  promethazine-codeine (PHENERGAN with CODEINE) 6.25-10 MG/5ML syrup, Take 5 mL by mouth Every 6 (Six) Hours As Needed for Cough., Disp: 118 mL, Rfl: 0  •  tiZANidine (ZANAFLEX) 4 MG tablet, Take 1 tablet by mouth Every 8 (Eight) Hours As Needed for Muscle Spasms., Disp: 30 tablet, Rfl: 3  •  traMADol (ULTRAM) 50 MG tablet, Take 1 tablet by mouth Every 8 (Eight) Hours As Needed for Severe Pain ., Disp: 15 tablet, Rfl: 0  •  trimethoprim-polymyxin b (POLYTRIM) 68487-0.1 UNIT/ML-% ophthalmic solution, Administer 1 drop to both eyes Every 4 (Four) Hours for 7 days., Disp: 10 mL, Rfl: 0    History of Present Illness  Patient is a 26-year-old female here today for follow-up visit for her inappropriate sinus tachycardia as well as orthostatic hypotension.  She had failed AV aubree agent due to hypotension in the past as well as Northera are due to higher heart rates.  He, still having faster rates and still with significant amount palpitations despite the use of digoxin which she has now seemed to have stopped due to not working well. On Eliquis due to factor V and protein S def with a PE likely on lifelong therapy. Still having palpitations on a daily basis which she says are annoying but not truly that symptomatic. Also now with dizziness. Had tried florinef in the past but stopped again due to faster rates. Midodrine stopped due to BPs ok since stopping and stopped since she ran out. Dr Barker told her to not take Midodrine since BP ok and stop Digoxin since not working.     No issues with chest pain, shortness  "of breath, fevers, chills, night sweats, PND, orthopnea or palpitations. No recent ER visits or hospital stays.      The following portions of the patient's history were reviewed and updated as appropriate: allergies, current medications, past family history, past medical history, past social history, past surgical history and problem list.    ROS   14 point ROS negative except as outlined in problem list, HPI and other parts of the note.      ECG 12 Lead  Date/Time: 10/23/2017 4:11 PM  Performed by: JUDAH ZARAGOZA  Authorized by: JUDAH ZARAGOZA   Rhythm: sinus tachycardia  Comments:  bpm.                Objective:       Vitals:    10/23/17 1602   BP: 106/68   BP Location: Right arm   Patient Position: Sitting   Pulse: (!) 125   Weight: 193 lb 9.6 oz (87.8 kg)   Height: 63\" (160 cm)       GENERAL: Well-developed, well-nourished patient in no acute distress.  HEENT: Normocephalic, atraumatic, PERRLA. Moist mucous membranes.  NECK: No JVD present at 30°. No carotid bruits auscultated.  LUNGS: Clear to auscultation.  CARDIOVASCULAR:reg tachy No murmurs, gallops or rubs noted.   ABDOMEN: Soft, nontender. Positive bowel sounds.  MUSCULOSKELETAL: No gross deformities. No clubbing, cyanosis, or lower extremity edema.  SKIN: Pink, warm  Neuro: Nonfocal exam. Gait intact  Ext: No edema or bruising    The patient's old records including ambulatory rhythm recordings (ECGs, Holter/event monitor) were reviewed and discussed.      Lab Review:   Results for orders placed or performed during the hospital encounter of 10/11/17   CBC Auto Differential   Result Value Ref Range    WBC 10.63 4.80 - 10.80 10*3/mm3    RBC 4.47 4.20 - 5.40 10*6/mm3    Hemoglobin 13.2 12.0 - 16.0 g/dL    Hematocrit 39.3 37.0 - 47.0 %    MCV 87.9 81.0 - 99.0 fL    MCH 29.5 27.0 - 31.0 pg    MCHC 33.6 30.0 - 37.0 g/dL    RDW 12.6 11.5 - 14.5 %    RDW-SD 41.1 37.0 - 54.0 fl    MPV 9.8 6.0 - 12.0 fL    Platelets 265 130 - 400 10*3/mm3    Neutrophil % " 79.0 37.0 - 80.0 %    Lymphocyte % 13.9 10.0 - 50.0 %    Monocyte % 5.9 0.0 - 12.0 %    Eosinophil % 0.7 0.0 - 7.0 %    Basophil % 0.2 0.0 - 2.5 %    Immature Grans % 0.3 0.0 - 0.6 %    Neutrophils, Absolute 8.40 (H) 2.00 - 6.90 10*3/mm3    Lymphocytes, Absolute 1.48 0.60 - 3.40 10*3/mm3    Monocytes, Absolute 0.63 0.00 - 0.90 10*3/mm3    Eosinophils, Absolute 0.07 0.00 - 0.70 10*3/mm3    Basophils, Absolute 0.02 0.00 - 0.20 10*3/mm3    Immature Grans, Absolute 0.03 0.00 - 0.06 10*3/mm3    nRBC 0.0 0.0 - 0.0 /100 WBC           Diagnosis:   1. Sinus tachycardia  2. Neurogenic orthostatic hypotension      Assessment & Plan:   1. Inappropriate Sinus Tachycardia with symptoms on daily basis. Unable to tolerate BB or CCB due to hypotension and cant tolerate Northera due to increased BP. On digoxin but still with increased HRs and symptoms and therefore stopped. Intolerable as well to Florinef. Stopped Midodrine since she ran out and at that time Bp was ok so told not to continue per her primary. Restart on midodrine 5 mg TID and hope this will not only keep BP ok but also help with lowering HRs. Keep very hydrated and needs to do more then she is doing. If continues to have faster symptomatic despite these therapies then consider sinus node modification. Patient says only as a last resort.  The risks, benefits, and alternatives of the procedure have been reviewed and the patient wishes to proceed.   HRs 113 on EKG today and 125 bpm on checking pulse.      2. Neurogenic orthostatic hypotension -- Midodrine restart, keep very hydrated. No recurrent syncope but occ dizziness.     3. Follow up in 3 mths or sooner as needed.          CC: Dr Marc Koo,   10/23/17  4:09 PM      EMR Dragon/Transcription disclaimer:  Much of this encounter note is an electronic transcription/translation of spoken language to printed text. Electronic translation of spoken language may permit erroneous, or at times,  nonsensical words or phrases to be inadvertently transcribed. Although I have reviewed the note for such errors, some may still exist.

## 2017-10-26 RX ORDER — CLINDAMYCIN HYDROCHLORIDE 300 MG/1
300 CAPSULE ORAL 3 TIMES DAILY
Qty: 30 CAPSULE | Refills: 0 | Status: SHIPPED | OUTPATIENT
Start: 2017-10-26 | End: 2017-11-06

## 2017-11-02 ENCOUNTER — HOSPITAL ENCOUNTER (EMERGENCY)
Facility: HOSPITAL | Age: 27
Discharge: HOME | End: 2017-11-03
Attending: EMERGENCY MEDICINE | Admitting: EMERGENCY MEDICINE

## 2017-11-02 ENCOUNTER — APPOINTMENT (OUTPATIENT)
Dept: CT IMAGING | Facility: HOSPITAL | Age: 27
End: 2017-11-02

## 2017-11-02 DIAGNOSIS — R10.31 RIGHT LOWER QUADRANT ABDOMINAL PAIN: Primary | ICD-10-CM

## 2017-11-02 LAB
ALBUMIN SERPL-MCNC: 4.4 G/DL (ref 3.5–5)
ALBUMIN/GLOB SERPL: 1.4 G/DL (ref 1–2)
ALP SERPL-CCNC: 78 U/L (ref 38–126)
ALT SERPL W P-5'-P-CCNC: 30 U/L (ref 13–69)
ANION GAP SERPL CALCULATED.3IONS-SCNC: 15.2 MMOL/L
AST SERPL-CCNC: 17 U/L (ref 15–46)
B-HCG UR QL: NEGATIVE
BACTERIA UR QL AUTO: ABNORMAL /HPF
BASOPHILS # BLD AUTO: 0.04 10*3/MM3 (ref 0–0.2)
BASOPHILS NFR BLD AUTO: 0.5 % (ref 0–2.5)
BILIRUB SERPL-MCNC: 0.5 MG/DL (ref 0.2–1.3)
BILIRUB UR QL STRIP: NEGATIVE
BUN BLD-MCNC: 11 MG/DL (ref 7–20)
BUN/CREAT SERPL: 15.7 (ref 7.1–23.5)
CALCIUM SPEC-SCNC: 9.3 MG/DL (ref 8.4–10.2)
CHLORIDE SERPL-SCNC: 105 MMOL/L (ref 98–107)
CLARITY UR: ABNORMAL
CO2 SERPL-SCNC: 26 MMOL/L (ref 26–30)
COLOR UR: YELLOW
CREAT BLD-MCNC: 0.7 MG/DL (ref 0.6–1.3)
DEPRECATED RDW RBC AUTO: 42.8 FL (ref 37–54)
EOSINOPHIL # BLD AUTO: 0.16 10*3/MM3 (ref 0–0.7)
EOSINOPHIL NFR BLD AUTO: 2 % (ref 0–7)
ERYTHROCYTE [DISTWIDTH] IN BLOOD BY AUTOMATED COUNT: 13.2 % (ref 11.5–14.5)
GFR SERPL CREATININE-BSD FRML MDRD: 101 ML/MIN/1.73
GLOBULIN UR ELPH-MCNC: 3.2 GM/DL
GLUCOSE BLD-MCNC: 100 MG/DL (ref 74–98)
GLUCOSE UR STRIP-MCNC: NEGATIVE MG/DL
HCT VFR BLD AUTO: 36.8 % (ref 37–47)
HGB BLD-MCNC: 12.1 G/DL (ref 12–16)
HGB UR QL STRIP.AUTO: ABNORMAL
HYALINE CASTS UR QL AUTO: ABNORMAL /LPF
IMM GRANULOCYTES # BLD: 0.01 10*3/MM3 (ref 0–0.06)
IMM GRANULOCYTES NFR BLD: 0.1 % (ref 0–0.6)
KETONES UR QL STRIP: NEGATIVE
LEUKOCYTE ESTERASE UR QL STRIP.AUTO: NEGATIVE
LIPASE SERPL-CCNC: 79 U/L (ref 23–300)
LYMPHOCYTES # BLD AUTO: 2.75 10*3/MM3 (ref 0.6–3.4)
LYMPHOCYTES NFR BLD AUTO: 35 % (ref 10–50)
MCH RBC QN AUTO: 29.2 PG (ref 27–31)
MCHC RBC AUTO-ENTMCNC: 32.9 G/DL (ref 30–37)
MCV RBC AUTO: 88.7 FL (ref 81–99)
MONOCYTES # BLD AUTO: 0.44 10*3/MM3 (ref 0–0.9)
MONOCYTES NFR BLD AUTO: 5.6 % (ref 0–12)
MUCOUS THREADS URNS QL MICRO: ABNORMAL /HPF
NEUTROPHILS # BLD AUTO: 4.46 10*3/MM3 (ref 2–6.9)
NEUTROPHILS NFR BLD AUTO: 56.8 % (ref 37–80)
NITRITE UR QL STRIP: NEGATIVE
NRBC BLD MANUAL-RTO: 0 /100 WBC (ref 0–0)
PH UR STRIP.AUTO: 6.5 [PH] (ref 5–8)
PLATELET # BLD AUTO: 321 10*3/MM3 (ref 130–400)
PMV BLD AUTO: 10.3 FL (ref 6–12)
POTASSIUM BLD-SCNC: 4.2 MMOL/L (ref 3.5–5.1)
PROT SERPL-MCNC: 7.6 G/DL (ref 6.3–8.2)
PROT UR QL STRIP: NEGATIVE
RBC # BLD AUTO: 4.15 10*6/MM3 (ref 4.2–5.4)
RBC # UR: ABNORMAL /HPF
REF LAB TEST METHOD: ABNORMAL
SODIUM BLD-SCNC: 142 MMOL/L (ref 137–145)
SP GR UR STRIP: 1.02 (ref 1–1.03)
SQUAMOUS #/AREA URNS HPF: ABNORMAL /HPF
UROBILINOGEN UR QL STRIP: ABNORMAL
WBC NRBC COR # BLD: 7.86 10*3/MM3 (ref 4.8–10.8)
WBC UR QL AUTO: ABNORMAL /HPF

## 2017-11-02 PROCEDURE — 99283 EMERGENCY DEPT VISIT LOW MDM: CPT

## 2017-11-02 PROCEDURE — 85025 COMPLETE CBC W/AUTO DIFF WBC: CPT | Performed by: NURSE PRACTITIONER

## 2017-11-02 PROCEDURE — 81025 URINE PREGNANCY TEST: CPT | Performed by: NURSE PRACTITIONER

## 2017-11-02 PROCEDURE — 81001 URINALYSIS AUTO W/SCOPE: CPT | Performed by: NURSE PRACTITIONER

## 2017-11-02 PROCEDURE — 0 IOPAMIDOL 61 % SOLUTION: Performed by: EMERGENCY MEDICINE

## 2017-11-02 PROCEDURE — 74177 CT ABD & PELVIS W/CONTRAST: CPT

## 2017-11-02 PROCEDURE — 96374 THER/PROPH/DIAG INJ IV PUSH: CPT

## 2017-11-02 PROCEDURE — 96361 HYDRATE IV INFUSION ADD-ON: CPT

## 2017-11-02 PROCEDURE — 80053 COMPREHEN METABOLIC PANEL: CPT | Performed by: NURSE PRACTITIONER

## 2017-11-02 PROCEDURE — 83690 ASSAY OF LIPASE: CPT | Performed by: NURSE PRACTITIONER

## 2017-11-02 PROCEDURE — 25010000002 ONDANSETRON PER 1 MG: Performed by: NURSE PRACTITIONER

## 2017-11-02 RX ORDER — SODIUM CHLORIDE 0.9 % (FLUSH) 0.9 %
10 SYRINGE (ML) INJECTION AS NEEDED
Status: DISCONTINUED | OUTPATIENT
Start: 2017-11-02 | End: 2017-11-03 | Stop reason: HOSPADM

## 2017-11-02 RX ORDER — ONDANSETRON 2 MG/ML
4 INJECTION INTRAMUSCULAR; INTRAVENOUS ONCE
Status: COMPLETED | OUTPATIENT
Start: 2017-11-02 | End: 2017-11-02

## 2017-11-02 RX ADMIN — SODIUM CHLORIDE 1000 ML: 9 INJECTION, SOLUTION INTRAVENOUS at 23:09

## 2017-11-02 RX ADMIN — IOPAMIDOL 100 ML: 612 INJECTION, SOLUTION INTRAVENOUS at 23:31

## 2017-11-02 RX ADMIN — ONDANSETRON 4 MG: 2 INJECTION INTRAMUSCULAR; INTRAVENOUS at 23:10

## 2017-11-03 VITALS
RESPIRATION RATE: 18 BRPM | HEART RATE: 87 BPM | TEMPERATURE: 98.6 F | OXYGEN SATURATION: 99 % | WEIGHT: 187 LBS | BODY MASS INDEX: 33.13 KG/M2 | HEIGHT: 63 IN | DIASTOLIC BLOOD PRESSURE: 75 MMHG | SYSTOLIC BLOOD PRESSURE: 128 MMHG

## 2017-11-03 RX ORDER — ONDANSETRON 4 MG/1
4 TABLET, ORALLY DISINTEGRATING ORAL EVERY 6 HOURS PRN
Qty: 10 TABLET | Refills: 0 | Status: SHIPPED | OUTPATIENT
Start: 2017-11-03 | End: 2017-12-29

## 2017-11-03 NOTE — ED PROVIDER NOTES
Subjective   History of Present Illness  This is a 26-year-old female who comes in today complaining of right lower quadrant pain that started this afternoon.  She denies any fever.  She does complain of nausea and diarrhea with this episode.  She denies any other symptoms.  Review of Systems   Constitutional: Negative.    HENT: Negative.    Eyes: Negative.    Respiratory: Negative.    Cardiovascular: Negative.    Gastrointestinal: Positive for abdominal pain, diarrhea and nausea.   Genitourinary: Positive for vaginal bleeding. Negative for decreased urine volume, difficulty urinating, dyspareunia, dysuria, flank pain, frequency, menstrual problem, pelvic pain, urgency, vaginal discharge and vaginal pain.   Skin: Negative.    Neurological: Negative.    Psychiatric/Behavioral: Negative.    All other systems reviewed and are negative.      Past Medical History:   Diagnosis Date   • Abdominal pain    • Allergic    • Asthma    • Chest pain    • Clotting disorder     factor 5   • Coronary artery disease involving native coronary artery of native heart without angina pectoris 2017   • Diarrhea    • Factor 5 Leiden mutation, heterozygous    • Gallstone    • GERD (gastroesophageal reflux disease)    • H/O blood clots    • Headache    • Hypertension    • Kidney infection    • Nausea & vomiting    • Obesity    • Orthostatic hypotension    • Ovarian cyst    • Protein S deficiency 2016    labs from hospitalization for PE   • Pulmonary embolism    • Sleep apnea    • Subclinical hyperthyroidism    • Tattoo        Allergies   Allergen Reactions   • Amoxicillin Rash       Past Surgical History:   Procedure Laterality Date   •  SECTION       and    • CHOLECYSTECTOMY     • COLONOSCOPY N/A 2017    Procedure: COLONOSCOPY WITH BIOPSIES AND ARGON THERMAL ABLATION;  Surgeon: Jignesh Selby MD;  Location: [facility] ENDOSCOPY;  Service:    • ENDOSCOPY N/A 2017    Procedure:  ESOPHAGOGASTRODUODENOSCOPY WITH BIOPSIES AND COLD BIOPSY POLYPECTOMIES;  Surgeon: Jignesh Selby MD;  Location: [facility] ENDOSCOPY;  Service:        Family History   Problem Relation Age of Onset   • Arthritis Mother    • COPD Mother    • Asthma Mother    • Arthritis Father    • Diabetes Father    • Hypertension Father    • Hyperlipidemia Father    • Kidney disease Father    • Heart attack Father    • No Known Problems Son    • Colon cancer Neg Hx    • Liver cancer Neg Hx    • Liver disease Neg Hx    • Stomach cancer Neg Hx    • Esophageal cancer Neg Hx        Social History     Social History   • Marital status:      Spouse name: N/A   • Number of children: N/A   • Years of education: N/A     Social History Main Topics   • Smoking status: Never Smoker   • Smokeless tobacco: Never Used   • Alcohol use No   • Drug use: No   • Sexual activity: Yes     Partners: Male     Birth control/ protection: None      Comment: PE while on birth control patch     Other Topics Concern   • None     Social History Narrative           Objective   Physical Exam   Constitutional: She appears well-developed and well-nourished.   Nursing note and vitals reviewed.  GEN: No acute distress  Head: Normocephalic, atraumatic  Eyes: Pupils equal round reactive to light  ENT: Posterior pharynx normal in appearance, oral mucosa is moist  Chest: Nontender to palpation  Cardiovascular: Regular rate  Lungs: Clear to auscultation bilaterally  Abdomen: Soft, tender right lower quadrant, nondistended, no peritoneal signs  Extremities: No edema, normal appearance  Neuro: GCS 15  Psych: Mood and affect are appropriate      Procedures         ED Course  ED Course      12:46 AM I assumed care of patient. Her imaging is negative. Will discharge home with primary follow up.              MDM  Number of Diagnoses or Management Options     Amount and/or Complexity of Data Reviewed  Clinical lab tests: ordered and reviewed  Tests in the radiology section of  CPT®: ordered and reviewed  Review and summarize past medical records: yes  Discuss the patient with other providers: yes    Risk of Complications, Morbidity, and/or Mortality  Presenting problems: moderate  Diagnostic procedures: moderate  Management options: moderate        Final diagnoses:   Right lower quadrant abdominal pain            Francois Kline MD  11/03/17 0046

## 2017-11-06 ENCOUNTER — OFFICE VISIT (OUTPATIENT)
Dept: INTERNAL MEDICINE | Facility: CLINIC | Age: 27
End: 2017-11-06

## 2017-11-06 VITALS
OXYGEN SATURATION: 97 % | TEMPERATURE: 99.2 F | SYSTOLIC BLOOD PRESSURE: 108 MMHG | HEIGHT: 63 IN | DIASTOLIC BLOOD PRESSURE: 70 MMHG | HEART RATE: 87 BPM | WEIGHT: 196 LBS | RESPIRATION RATE: 14 BRPM | BODY MASS INDEX: 34.73 KG/M2

## 2017-11-06 DIAGNOSIS — K04.7 TOOTH ABSCESS: ICD-10-CM

## 2017-11-06 DIAGNOSIS — R68.84 JAW PAIN: ICD-10-CM

## 2017-11-06 DIAGNOSIS — M54.2 NECK PAIN: Primary | ICD-10-CM

## 2017-11-06 PROCEDURE — 99213 OFFICE O/P EST LOW 20 MIN: CPT | Performed by: FAMILY MEDICINE

## 2017-11-06 RX ORDER — OXYCODONE AND ACETAMINOPHEN 7.5; 325 MG/1; MG/1
1 TABLET ORAL EVERY 4 HOURS PRN
Qty: 18 TABLET | Refills: 0 | Status: SHIPPED | OUTPATIENT
Start: 2017-11-06 | End: 2017-11-17 | Stop reason: SDUPTHER

## 2017-11-06 NOTE — PROGRESS NOTES
Subjective    Marilu Godoy is a 26 y.o. female here for:  Chief Complaint   Patient presents with   • Jaw Pain     radiates down into neck and into back of head, was given antibiotics for abscess tooth about a week ago, no relief.      History of Present Illness   Recently treated for tooth abscess with antibiotic but symptoms not improved. Normally antibiotic helps. She continues to have swelling and a knot on the right lower jaw. There's still pieces of a tooth in that area on lower right that she was supposed to see an oral surgeon about but did not get to go. Pain has moved on now to include the right side of her neck and there is some radiation of pain down the right arm. She's had low grade temps of 100. Scheduled to see immunology tomorrow over the abnormal MBL level.    The following portions of the patient's history were reviewed and updated as appropriate: allergies, current medications, past family history, past medical history, past social history, past surgical history and problem list.    Review of Systems   Constitutional: Positive for activity change, fatigue and fever.   Musculoskeletal: Positive for arthralgias, myalgias and neck pain.       Vitals:    11/06/17 1505   BP: 108/70   Pulse: 87   Resp: 14   Temp: 99.2 °F (37.3 °C)   SpO2: 97%       Objective   Physical Exam   Constitutional: She is oriented to person, place, and time. Vital signs are normal. She appears well-developed and well-nourished. She is active.  Non-toxic appearance. She does not appear ill. No distress.   Appears stated age. Well groomed. obese.   HENT:   Head: Normocephalic and atraumatic.       Right Ear: Hearing and external ear normal.   Left Ear: Hearing and external ear normal.   Mouth/Throat:       Eyes: EOM are normal. Pupils are equal, round, and reactive to light. No scleral icterus.   Neck: Phonation normal. Neck supple. Muscular tenderness (right sided ) present. No rigidity. No edema and no erythema present.    Pulmonary/Chest: Effort normal. No stridor.   Neurological: She is alert and oriented to person, place, and time. Gait normal.   Skin: Skin is warm. She is not diaphoretic. No cyanosis. No pallor.   Psychiatric: She has a normal mood and affect. Her speech is normal and behavior is normal. Judgment normal.   Nursing note and vitals reviewed.      Assessment/Plan   Marilu was seen today for jaw pain.    Diagnoses and all orders for this visit:    Neck pain  -     CT Soft Tissue Neck With Contrast; Future  -     oxyCODONE-acetaminophen (PERCOCET) 7.5-325 MG per tablet; Take 1 tablet by mouth Every 4 (Four) Hours As Needed for Severe Pain  for up to 3 days.    Jaw pain  -     CT Soft Tissue Neck With Contrast; Future  -     oxyCODONE-acetaminophen (PERCOCET) 7.5-325 MG per tablet; Take 1 tablet by mouth Every 4 (Four) Hours As Needed for Severe Pain  for up to 3 days.    Tooth abscess  -     CT Soft Tissue Neck With Contrast; Future  -     oxyCODONE-acetaminophen (PERCOCET) 7.5-325 MG per tablet; Take 1 tablet by mouth Every 4 (Four) Hours As Needed for Severe Pain  for up to 3 days.  -     Ambulatory Referral to Oral Maxillofacial Surgery      Discussed pain medicine. Use only as absolutely needed. Can be habit forming. Can cause constipation. She cannot safely take NSAIDs and I don't see tylenol helping much with this. Must get tooth addressed before this issue will be resolved.         Deidre Barker MD

## 2017-11-09 ENCOUNTER — HOSPITAL ENCOUNTER (OUTPATIENT)
Dept: CT IMAGING | Facility: HOSPITAL | Age: 27
Discharge: HOME | End: 2017-11-09
Attending: FAMILY MEDICINE | Admitting: FAMILY MEDICINE

## 2017-11-09 DIAGNOSIS — M54.2 NECK PAIN: ICD-10-CM

## 2017-11-09 DIAGNOSIS — R68.84 JAW PAIN: ICD-10-CM

## 2017-11-09 DIAGNOSIS — K04.7 TOOTH ABSCESS: ICD-10-CM

## 2017-11-09 PROCEDURE — 70491 CT SOFT TISSUE NECK W/DYE: CPT

## 2017-11-09 PROCEDURE — 0 IOPAMIDOL PER 1 ML: Performed by: FAMILY MEDICINE

## 2017-11-09 RX ADMIN — IOPAMIDOL 95 ML: 755 INJECTION, SOLUTION INTRAVENOUS at 11:45

## 2017-11-17 DIAGNOSIS — F32.0 MILD SINGLE CURRENT EPISODE OF MAJOR DEPRESSIVE DISORDER (HCC): ICD-10-CM

## 2017-11-17 DIAGNOSIS — G89.29 CHRONIC JOINT PAIN: ICD-10-CM

## 2017-11-17 DIAGNOSIS — M25.50 CHRONIC JOINT PAIN: ICD-10-CM

## 2017-11-17 DIAGNOSIS — J45.21 MILD INTERMITTENT ASTHMA WITH ACUTE EXACERBATION: ICD-10-CM

## 2017-11-17 DIAGNOSIS — R06.02 SHORTNESS OF BREATH: ICD-10-CM

## 2017-11-17 RX ORDER — DULOXETIN HYDROCHLORIDE 20 MG/1
CAPSULE, DELAYED RELEASE ORAL
Qty: 30 CAPSULE | Refills: 0 | Status: SHIPPED | OUTPATIENT
Start: 2017-11-17 | End: 2017-12-13

## 2017-11-17 RX ORDER — MONTELUKAST SODIUM 10 MG/1
TABLET ORAL
Qty: 30 TABLET | Refills: 2 | Status: SHIPPED | OUTPATIENT
Start: 2017-11-17 | End: 2018-05-15

## 2017-11-20 ENCOUNTER — OFFICE VISIT (OUTPATIENT)
Dept: INTERNAL MEDICINE | Facility: CLINIC | Age: 27
End: 2017-11-20

## 2017-11-20 VITALS
OXYGEN SATURATION: 98 % | WEIGHT: 194 LBS | DIASTOLIC BLOOD PRESSURE: 74 MMHG | SYSTOLIC BLOOD PRESSURE: 124 MMHG | HEIGHT: 63 IN | BODY MASS INDEX: 34.38 KG/M2 | HEART RATE: 132 BPM | TEMPERATURE: 98.4 F

## 2017-11-20 DIAGNOSIS — J06.9 ACUTE URI: Primary | ICD-10-CM

## 2017-11-20 PROCEDURE — 99213 OFFICE O/P EST LOW 20 MIN: CPT | Performed by: PHYSICIAN ASSISTANT

## 2017-11-20 PROCEDURE — 87804 INFLUENZA ASSAY W/OPTIC: CPT | Performed by: PHYSICIAN ASSISTANT

## 2017-11-20 RX ORDER — AZITHROMYCIN 250 MG/1
TABLET, FILM COATED ORAL
Qty: 6 TABLET | Refills: 0 | Status: SHIPPED | OUTPATIENT
Start: 2017-11-20 | End: 2017-11-27

## 2017-11-20 NOTE — PROGRESS NOTES
Marilu Godoy is a 26 y.o. female.     Subjective   History of Present Illness   Here today with concern of a few days of fever up to 101, headache, myalgias and cough productive of green sputum. Denies sore throat. Little SOA with coughing. Has not had an influenza vaccine yet this year.         The following portions of the patient's history were reviewed and updated as appropriate: allergies, current medications, past family history, past medical history, past social history, past surgical history and problem list.    Review of Systems    Constitutional: fatigue, fever.  Negative for appetite change, chills and unexpected weight change.   HENT: postnasal drip, congestion.  Negative for ear pain, hearing loss, nosebleeds, rhinorrhea, sore throat, tinnitus and trouble swallowing.    Eyes: Negative for photophobia, discharge and visual disturbance.   Respiratory: cough, SOA. Negative for chest tightness and wheezing.    Cardiovascular: Negative for chest pain, palpitations and leg swelling.   Gastrointestinal: Negative for abdominal distention, abdominal pain, blood in stool, constipation, diarrhea, nausea and vomiting.   Endocrine: Negative for cold intolerance, heat intolerance, polydipsia, polyphagia and polyuria.   Musculoskeletal: Negative for arthralgias, back pain, joint swelling, myalgias, neck pain and neck stiffness.   Skin: Negative for color change, pallor, rash and wound.   Allergic/Immunologic: Negative for environmental allergies, food allergies and immunocompromised state.   Neurological: headaches. Negative for dizziness, tremors, seizures, weakness, numbness.  Hematological: Negative for adenopathy. Does not bruise/bleed easily.   Psychiatric/Behavioral: Negative for sleep disturbances, agitation, behavioral problems, confusion, hallucinations, self-injury and suicidal ideas. The patient is not nervous/anxious.      Objective    Physical Exam  Constitutional: Oriented to person, place, and  "time. Appears well-developed and well-nourished.   HENT: minimal OP erythema, TMs normal.   Head: no sinus tenderness. Normocephalic and atraumatic.   Eyes: EOM are normal. Pupils are equal, round, and reactive to light.   Neck: Normal range of motion. Neck supple.   Cardiovascular: Normal rate, regular rhythm and normal heart sounds.    Pulmonary/Chest: Effort normal and breath sounds normal. No respiratory distress.  Has no wheezes or rales. Exhibits no chest wall tenderness.   Abdominal: Soft. Bowel sounds are normal. Exhibits no distension and no mass. There is no tenderness.   Musculoskeletal: Normal range of motion. Exhibits no tenderness.   Neurological: Alert and oriented to person, place, and time.   Skin: Skin is warm and dry.   Psychiatric: Has a normal mood and affect. Behavior is normal. Judgment and thought content normal.       /74  Pulse (!) 132  Temp 98.4 °F (36.9 °C)  Ht 63\" (160 cm)  Wt 194 lb (88 kg)  SpO2 98%  BMI 34.37 kg/m2    Nursing note and vitals reviewed.        Assessment/Plan   Marilu was seen today for cough and fever.    Diagnoses and all orders for this visit:    Acute URI  -     azithromycin (ZITHROMAX Z-MILLY) 250 MG tablet; Take 2 tablets the first day, then 1 tablet daily for 4 days.    Z-pack sent to pharmacy due to upcoming holiday. Encouraged her to wait 2-3 days prior to starting Z pack and only use if symptoms have worsened.     Influenza negative.        "

## 2017-11-27 ENCOUNTER — OFFICE VISIT (OUTPATIENT)
Dept: INTERNAL MEDICINE | Facility: CLINIC | Age: 27
End: 2017-11-27

## 2017-11-27 VITALS
HEIGHT: 63 IN | TEMPERATURE: 99 F | DIASTOLIC BLOOD PRESSURE: 82 MMHG | HEART RATE: 129 BPM | OXYGEN SATURATION: 99 % | WEIGHT: 196 LBS | BODY MASS INDEX: 34.73 KG/M2 | SYSTOLIC BLOOD PRESSURE: 130 MMHG

## 2017-11-27 DIAGNOSIS — R11.0 NAUSEA: ICD-10-CM

## 2017-11-27 DIAGNOSIS — B37.9 CANDIDIASIS: ICD-10-CM

## 2017-11-27 DIAGNOSIS — R30.0 DYSURIA: ICD-10-CM

## 2017-11-27 DIAGNOSIS — N92.6 MISSED MENSES: ICD-10-CM

## 2017-11-27 DIAGNOSIS — R19.7 DIARRHEA, UNSPECIFIED TYPE: Primary | ICD-10-CM

## 2017-11-27 LAB
B-HCG UR QL: NEGATIVE
BILIRUB BLD-MCNC: NEGATIVE MG/DL
CLARITY, POC: ABNORMAL
COLOR UR: YELLOW
GLUCOSE UR STRIP-MCNC: NEGATIVE MG/DL
INTERNAL NEGATIVE CONTROL: NEGATIVE
INTERNAL POSITIVE CONTROL: POSITIVE
KETONES UR QL: NEGATIVE
LEUKOCYTE EST, POC: NEGATIVE
Lab: NORMAL
NITRITE UR-MCNC: NEGATIVE MG/ML
PH UR: 6.5 [PH] (ref 5–8)
PROT UR STRIP-MCNC: NEGATIVE MG/DL
RBC # UR STRIP: NEGATIVE /UL
SP GR UR: 1.01 (ref 1–1.03)
UROBILINOGEN UR QL: NORMAL

## 2017-11-27 PROCEDURE — 99214 OFFICE O/P EST MOD 30 MIN: CPT | Performed by: PHYSICIAN ASSISTANT

## 2017-11-27 PROCEDURE — 81025 URINE PREGNANCY TEST: CPT | Performed by: PHYSICIAN ASSISTANT

## 2017-11-27 RX ORDER — FLUCONAZOLE 150 MG/1
150 TABLET ORAL ONCE
Qty: 1 TABLET | Refills: 0 | Status: SHIPPED | OUTPATIENT
Start: 2017-11-27 | End: 2017-11-27

## 2017-11-27 NOTE — PROGRESS NOTES
Marilu Godoy is a 27 y.o. female.     Subjective   History of Present Illness   Cough, few headaches and little dizziness worsened since she was seen here 1 week ago and treated with Zithromax for URI. Has had fever up to 102 for the last week which reduces with ibuprofen. Has also had diarrhea with increasing frequency over the last week, now occurring at least 5 times per day and watery. Has had similar episodes of diarrhea in the past which last 2-4 weeks before resolving.   Denies vomiting but has had a little intermittent nausea.  Denies blood in stool.  Has had a few days of dysuria, urinary frequency and urgency. Denies hematuria. About 10 days overdue for menses, took a home pregnancy test which was negative. Also feels she may be getting a yeast infection due to a little vaginal itching.         The following portions of the patient's history were reviewed and updated as appropriate: allergies, current medications, past family history, past medical history, past social history, past surgical history and problem list.    Review of Systems    Constitutional: fever, fatigue. Negative for appetite change, chills and unexpected weight change.   HENT: Negative for congestion, ear pain, hearing loss, nosebleeds, postnasal drip, rhinorrhea, sore throat, tinnitus and trouble swallowing.    Eyes: Negative for photophobia, discharge and visual disturbance.   Respiratory: cough. Negative for chest tightness, shortness of breath and wheezing.    Cardiovascular: Negative for chest pain, palpitations and leg swelling.   Gastrointestinal: diarrhea, nausea.  Negative for abdominal distention, abdominal pain, blood in stool, constipation and vomiting.   Endocrine: Missed menses. Negative for cold intolerance, heat intolerance, polydipsia, polyphagia and polyuria.   Musculoskeletal: Negative for arthralgias, back pain, joint swelling, myalgias, neck pain and neck stiffness.   Skin: Negative for color change, pallor, rash  "and wound.   Allergic/Immunologic: Negative for environmental allergies, food allergies and immunocompromised state.   Neurological: headaches, dizziness. Negative for tremors, seizures, weakness, numbness.  Hematological: Negative for adenopathy. Does not bruise/bleed easily.   Psychiatric/Behavioral: Negative for sleep disturbances, agitation, behavioral problems, confusion, hallucinations, self-injury and suicidal ideas. The patient is not nervous/anxious.    : dysuria, frequency, urgency, vaginal itching. No hematuria.     Objective    Physical Exam  Constitutional: Oriented to person, place, and time. Appears well-developed and well-nourished.   HENT: little OP erythema. TMs normal.   Head: no sinus tenderness. Normocephalic and atraumatic.   Eyes: EOM are normal. Pupils are equal, round, and reactive to light.   Neck: Normal range of motion. Neck supple.   Cardiovascular: Normal rate, regular rhythm and normal heart sounds.    Pulmonary/Chest: Effort normal and breath sounds normal. No respiratory distress.  Has no wheezes or rales. Exhibits no chest wall tenderness.   Abdominal: mild suprapubic tenderness. Soft. Bowel sounds are normal. Exhibits no distension and no mass.   Musculoskeletal: Normal range of motion. Exhibits no tenderness.   Neurological: Alert and oriented to person, place, and time.   Skin: Skin is warm and dry.   Psychiatric: Has a normal mood and affect. Behavior is normal. Judgment and thought content normal.       /82  Pulse (!) 129  Temp 99 °F (37.2 °C)  Ht 63\" (160 cm)  Wt 196 lb (88.9 kg)  SpO2 99%  BMI 34.72 kg/m2    Nursing note and vitals reviewed.        Assessment/Plan   Marilu was seen today for cough, headache, dizziness, fatigue, diarrhea and urinary tract infection.    Diagnoses and all orders for this visit:    Diarrhea, unspecified type  -     Inflammatory Bowel Disease Panel  -     Hepatitis Panel, Acute  -     CBC & Differential  -     Comprehensive " Metabolic Panel    Missed menses  -     HCG, B-subunit, Quantitative  -     POCT pregnancy, urine    Nausea  -     Hepatitis Panel, Acute  -     CBC & Differential  -     Comprehensive Metabolic Panel    Dysuria  -     Urine Culture - Urine, Urine, Clean Catch  -     POCT urinalysis dipstick, automated    Candidiasis  -     fluconazole (DIFLUCAN) 150 MG tablet; Take 1 tablet by mouth 1 (One) Time for 1 dose.      UA- normal. Sending for urine culture due to symptoms and suprapubic tenderness.   Urine hcg- negative.

## 2017-11-29 LAB
ALBUMIN SERPL-MCNC: 4.4 G/DL (ref 3.5–5)
ALBUMIN/GLOB SERPL: 1.3 G/DL (ref 1–2)
ALP SERPL-CCNC: 87 U/L (ref 38–126)
ALT SERPL-CCNC: 27 U/L (ref 13–69)
AST SERPL-CCNC: 18 U/L (ref 15–46)
BACTERIA UR CULT: NORMAL
BACTERIA UR CULT: NORMAL
BAKER'S YEAST IGA QN: 26.6 UNITS (ref 0–24.9)
BAKER'S YEAST IGG QN: <20 UNITS (ref 0–24.9)
BASOPHILS # BLD AUTO: 0.06 10*3/MM3 (ref 0–0.2)
BASOPHILS NFR BLD AUTO: 0.6 % (ref 0–2.5)
BILIRUB SERPL-MCNC: 0.5 MG/DL (ref 0.2–1.3)
BUN SERPL-MCNC: 9 MG/DL (ref 7–20)
BUN/CREAT SERPL: 15 (ref 7.1–23.5)
CALCIUM SERPL-MCNC: 10 MG/DL (ref 8.4–10.2)
CHLORIDE SERPL-SCNC: 104 MMOL/L (ref 98–107)
CO2 SERPL-SCNC: 24 MMOL/L (ref 26–30)
CREAT SERPL-MCNC: 0.6 MG/DL (ref 0.6–1.3)
EOSINOPHIL # BLD AUTO: 0.22 10*3/MM3 (ref 0–0.7)
EOSINOPHIL NFR BLD AUTO: 2.3 % (ref 0–7)
ERYTHROCYTE [DISTWIDTH] IN BLOOD BY AUTOMATED COUNT: 13.5 % (ref 11.5–14.5)
GFR SERPLBLD CREATININE-BSD FMLA CKD-EPI: 120 ML/MIN/1.73
GFR SERPLBLD CREATININE-BSD FMLA CKD-EPI: 145 ML/MIN/1.73
GLOBULIN SER CALC-MCNC: 3.3 GM/DL
GLUCOSE SERPL-MCNC: 103 MG/DL (ref 74–98)
HAV IGM SERPL QL IA: NEGATIVE
HBV CORE IGM SERPL QL IA: NEGATIVE
HBV SURFACE AG SERPL QL IA: NEGATIVE
HCG INTACT+B SERPL-ACNC: <2.39 MIU/ML
HCT VFR BLD AUTO: 39.8 % (ref 37–47)
HCV AB S/CO SERPL IA: 0.1 S/CO RATIO (ref 0–0.9)
HGB BLD-MCNC: 12.9 G/DL (ref 12–16)
IMM GRANULOCYTES # BLD: 0.04 10*3/MM3 (ref 0–0.06)
IMM GRANULOCYTES NFR BLD: 0.4 % (ref 0–0.6)
LYMPHOCYTES # BLD AUTO: 2.06 10*3/MM3 (ref 0.6–3.4)
LYMPHOCYTES NFR BLD AUTO: 21.9 % (ref 10–50)
MCH RBC QN AUTO: 29.2 PG (ref 27–31)
MCHC RBC AUTO-ENTMCNC: 32.4 G/DL (ref 30–37)
MCV RBC AUTO: 90 FL (ref 81–99)
MONOCYTES # BLD AUTO: 0.53 10*3/MM3 (ref 0–0.9)
MONOCYTES NFR BLD AUTO: 5.6 % (ref 0–12)
NEUTROPHILS # BLD AUTO: 6.49 10*3/MM3 (ref 2–6.9)
NEUTROPHILS NFR BLD AUTO: 69.2 % (ref 37–80)
NRBC BLD AUTO-RTO: 0 /100 WBC (ref 0–0)
P-ANCA ATYPICAL TITR SER IF: ABNORMAL TITER
PLATELET # BLD AUTO: 317 10*3/MM3 (ref 130–400)
POTASSIUM SERPL-SCNC: 4.3 MMOL/L (ref 3.5–5.1)
PROT SERPL-MCNC: 7.7 G/DL (ref 6.3–8.2)
RBC # BLD AUTO: 4.42 10*6/MM3 (ref 4.2–5.4)
SODIUM SERPL-SCNC: 141 MMOL/L (ref 137–145)
WBC # BLD AUTO: 9.4 10*3/MM3 (ref 4.8–10.8)

## 2017-11-30 RX ORDER — BUDESONIDE 3 MG/1
CAPSULE, COATED PELLETS ORAL
Qty: 84 CAPSULE | Refills: 0 | Status: SHIPPED | OUTPATIENT
Start: 2017-11-30 | End: 2017-11-30

## 2017-11-30 RX ORDER — CIPROFLOXACIN 500 MG/1
500 TABLET, FILM COATED ORAL 2 TIMES DAILY
Qty: 14 TABLET | Refills: 0 | Status: SHIPPED | OUTPATIENT
Start: 2017-11-30 | End: 2017-12-06

## 2017-11-30 RX ORDER — METRONIDAZOLE 500 MG/1
500 TABLET ORAL 3 TIMES DAILY
Qty: 21 TABLET | Refills: 0 | Status: SHIPPED | OUTPATIENT
Start: 2017-11-30 | End: 2017-12-06

## 2017-12-01 DIAGNOSIS — B99.9 RECURRENT INFECTIONS: ICD-10-CM

## 2017-12-01 DIAGNOSIS — R50.81 FEVER IN OTHER DISEASES: Primary | ICD-10-CM

## 2017-12-01 DIAGNOSIS — G89.29 CHRONIC JOINT PAIN: ICD-10-CM

## 2017-12-01 DIAGNOSIS — R11.0 NAUSEA: ICD-10-CM

## 2017-12-01 DIAGNOSIS — M25.50 CHRONIC JOINT PAIN: ICD-10-CM

## 2017-12-01 DIAGNOSIS — K21.9 GASTROESOPHAGEAL REFLUX DISEASE, ESOPHAGITIS PRESENCE NOT SPECIFIED: ICD-10-CM

## 2017-12-01 DIAGNOSIS — R19.7 DIARRHEA, UNSPECIFIED TYPE: ICD-10-CM

## 2017-12-03 ENCOUNTER — HOSPITAL ENCOUNTER (EMERGENCY)
Facility: HOSPITAL | Age: 27
Discharge: HOME | End: 2017-12-03
Attending: STUDENT IN AN ORGANIZED HEALTH CARE EDUCATION/TRAINING PROGRAM | Admitting: STUDENT IN AN ORGANIZED HEALTH CARE EDUCATION/TRAINING PROGRAM

## 2017-12-03 VITALS
SYSTOLIC BLOOD PRESSURE: 121 MMHG | HEART RATE: 112 BPM | BODY MASS INDEX: 33.49 KG/M2 | WEIGHT: 189 LBS | HEIGHT: 63 IN | RESPIRATION RATE: 20 BRPM | DIASTOLIC BLOOD PRESSURE: 81 MMHG | TEMPERATURE: 98.9 F | OXYGEN SATURATION: 99 %

## 2017-12-03 DIAGNOSIS — L04.9 LYMPHADENITIS, ACUTE: Primary | ICD-10-CM

## 2017-12-03 PROCEDURE — 99282 EMERGENCY DEPT VISIT SF MDM: CPT

## 2017-12-03 RX ORDER — SENNOSIDES 8.6 MG
650 CAPSULE ORAL EVERY 8 HOURS PRN
Qty: 12 TABLET | Refills: 0 | OUTPATIENT
Start: 2017-12-03 | End: 2018-01-20

## 2017-12-03 NOTE — ED PROVIDER NOTES
Subjective   HPI Comments: Pt is here with complaint of knot area possible lymph node left groin,.. No fevers chills no systemic complaints otherwise no chest pain or shortness of air reported symptoms for the past couple of days presents here for further evaluation  Patient denies pregnancy states she just had a blood test testing for this a few days ago was negative  She has recently been started on antibiotics for possible Crohn's        Review of Systems   Constitutional: Negative.  Negative for fever.   HENT: Negative.    Eyes: Negative.    Respiratory: Negative.  Negative for shortness of breath.    Cardiovascular: Negative.  Negative for chest pain.   Gastrointestinal: Positive for nausea. Negative for vomiting.   Genitourinary: Negative.    Musculoskeletal: Positive for arthralgias.   Skin: Negative.    Neurological: Negative.  Negative for weakness.   Psychiatric/Behavioral: The patient is nervous/anxious.    All other systems reviewed and are negative.      Past Medical History:   Diagnosis Date   • Abdominal pain    • Allergic    • Asthma    • Chest pain    • Clotting disorder     factor 5   • Coronary artery disease involving native coronary artery of native heart without angina pectoris 2017   • Diarrhea    • Factor 5 Leiden mutation, heterozygous    • Gallstone    • GERD (gastroesophageal reflux disease)    • H/O blood clots    • Headache    • Hypertension    • Kidney infection    • Nausea & vomiting    • Obesity    • Orthostatic hypotension    • Ovarian cyst    • Protein S deficiency 2016    labs from hospitalization for PE   • Pulmonary embolism    • Sleep apnea    • Subclinical hyperthyroidism    • Tattoo        Allergies   Allergen Reactions   • Amoxicillin Rash       Past Surgical History:   Procedure Laterality Date   •  SECTION       and    • CHOLECYSTECTOMY     • COLONOSCOPY N/A 2017    Procedure: COLONOSCOPY WITH BIOPSIES AND ARGON THERMAL ABLATION;   Surgeon: Jignesh Selby MD;  Location: [facility] ENDOSCOPY;  Service:    • ENDOSCOPY N/A 4/20/2017    Procedure: ESOPHAGOGASTRODUODENOSCOPY WITH BIOPSIES AND COLD BIOPSY POLYPECTOMIES;  Surgeon: Jignesh Selby MD;  Location: [facility] ENDOSCOPY;  Service:        Family History   Problem Relation Age of Onset   • Arthritis Mother    • COPD Mother    • Asthma Mother    • Arthritis Father    • Diabetes Father    • Hypertension Father    • Hyperlipidemia Father    • Kidney disease Father    • Heart attack Father    • No Known Problems Son    • Colon cancer Neg Hx    • Liver cancer Neg Hx    • Liver disease Neg Hx    • Stomach cancer Neg Hx    • Esophageal cancer Neg Hx        Social History     Social History   • Marital status:      Spouse name: N/A   • Number of children: N/A   • Years of education: N/A     Social History Main Topics   • Smoking status: Never Smoker   • Smokeless tobacco: Never Used   • Alcohol use No   • Drug use: No   • Sexual activity: Yes     Partners: Male     Birth control/ protection: None      Comment: PE while on birth control patch     Other Topics Concern   • None     Social History Narrative           Objective   Physical Exam   Constitutional: She is oriented to person, place, and time. She appears well-developed and well-nourished.   afvss     HENT:   Head: Normocephalic.   Eyes: Conjunctivae and EOM are normal. Pupils are equal, round, and reactive to light.   Neck: Normal range of motion.   Cardiovascular: Regular rhythm and intact distal pulses.    Pulmonary/Chest: Effort normal and breath sounds normal.   Abdominal: Soft. Bowel sounds are normal. There is no tenderness.   Musculoskeletal: Normal range of motion. She exhibits no edema.   No lower extremity edema to either lower extremity  She has mild tenderness left inguinal ring some palpable lymph node... No redness no streaking neurovascular intact   Neurological: She is alert and oriented to person, place, and time. No cranial  nerve deficit. She exhibits normal muscle tone. Coordination normal.   Skin: Skin is warm and dry. No rash noted. No erythema. No pallor.   Psychiatric: Her behavior is normal. Judgment and thought content normal.   Nursing note and vitals reviewed.      Procedures         ED Course  ED Course   Comment By Time   pt seen and examined by myself and Dr. Hdz we'll treat patient with anti-inflammatory medication she currently is on antibiotics, Cipro and Flagyl will treat this for lymphadenitis  pt to follow-up with PCP within the next 2 days Chapo Melendez PA-C 12/03 1407                  [facility]    Final diagnoses:   Lymphadenitis, acute            Chapo Melendez PA-C  12/03/17 3840

## 2017-12-06 ENCOUNTER — OFFICE VISIT (OUTPATIENT)
Dept: INTERNAL MEDICINE | Facility: CLINIC | Age: 27
End: 2017-12-06

## 2017-12-06 VITALS
SYSTOLIC BLOOD PRESSURE: 120 MMHG | TEMPERATURE: 98.8 F | OXYGEN SATURATION: 98 % | HEART RATE: 82 BPM | DIASTOLIC BLOOD PRESSURE: 80 MMHG | HEIGHT: 63 IN | BODY MASS INDEX: 34.73 KG/M2 | WEIGHT: 196 LBS

## 2017-12-06 DIAGNOSIS — I88.9 LYMPHADENITIS: ICD-10-CM

## 2017-12-06 DIAGNOSIS — K52.9 INFLAMMATORY BOWEL DISEASE: Primary | ICD-10-CM

## 2017-12-06 DIAGNOSIS — B88.9 MITE INFESTATION: ICD-10-CM

## 2017-12-06 DIAGNOSIS — R19.7 DIARRHEA, UNSPECIFIED TYPE: ICD-10-CM

## 2017-12-06 PROCEDURE — 99214 OFFICE O/P EST MOD 30 MIN: CPT | Performed by: PHYSICIAN ASSISTANT

## 2017-12-06 RX ORDER — PERMETHRIN 50 MG/G
CREAM TOPICAL
Qty: 60 G | Refills: 0 | Status: SHIPPED | OUTPATIENT
Start: 2017-12-06 | End: 2017-12-13

## 2017-12-06 RX ORDER — BUDESONIDE 3 MG/1
CAPSULE, COATED PELLETS ORAL
Qty: 84 CAPSULE | Refills: 0 | Status: SHIPPED | OUTPATIENT
Start: 2017-12-06 | End: 2017-12-13

## 2017-12-06 RX ORDER — SULFASALAZINE 500 MG/1
500 TABLET ORAL 3 TIMES DAILY
Qty: 90 TABLET | Refills: 0 | Status: SHIPPED | OUTPATIENT
Start: 2017-12-06 | End: 2018-01-05

## 2017-12-06 NOTE — PROGRESS NOTES
Marilu Godoy is a 27 y.o. female.     Subjective   History of Present Illness   Here today for follow up from ED visit on 12/3/17 where she presented with a knot in the groin. She reports the knot in the groin has decreased in size but is still sore. Taking Cipro and Flagyl as directed for suspected Crohn's and she reports diarrhea has not been improving.  Has an appointment with new gastroenterologist for 1/15.  Does not believe she has had fever in the last few days. Has been nauseous but denies vomiting. Has had a rash on her back and arms for the last 3 days which is very itchy. Her toddler son has similar rash which started 2 days 3 days prior to her rash.         The following portions of the patient's history were reviewed and updated as appropriate: allergies, current medications, past family history, past medical history, past social history, past surgical history and problem list.    Review of Systems    Constitutional: Negative for appetite change, chills, fatigue, fever and unexpected weight change.   HENT: Negative for congestion, ear pain, hearing loss, nosebleeds, postnasal drip, rhinorrhea, sore throat, tinnitus and trouble swallowing.    Eyes: Negative for photophobia, discharge and visual disturbance.   Respiratory: Negative for cough, chest tightness, shortness of breath and wheezing.    Cardiovascular: Negative for chest pain, palpitations and leg swelling.   Gastrointestinal: diarrhea, nausea. Negative for abdominal distention, abdominal pain, blood in stool, constipation and vomiting.   Endocrine: Negative for cold intolerance, heat intolerance, polydipsia, polyphagia and polyuria.   Musculoskeletal: Negative for arthralgias, back pain, joint swelling, myalgias, neck pain and neck stiffness.   Skin: rash. Negative for color change, pallor and wound.   Allergic/Immunologic: Negative for environmental allergies, food allergies and immunocompromised state.   Neurological: Negative for  "dizziness, tremors, seizures, weakness, numbness and headaches.   Hematological: Groin adenopathy. Does not bruise/bleed easily.   Psychiatric/Behavioral: Negative for sleep disturbances, agitation, behavioral problems, confusion, hallucinations, self-injury and suicidal ideas. The patient is not nervous/anxious.      Objective    Physical Exam  Constitutional: Oriented to person, place, and time. Appears well-developed and well-nourished.   HENT: OP normal.   Head: Normocephalic and atraumatic.   Eyes: EOM are normal. Pupils are equal, round, and reactive to light.   Neck: Normal range of motion. Neck supple.   Cardiovascular: Normal rate, regular rhythm and normal heart sounds.    Pulmonary/Chest: Effort normal and breath sounds normal. No respiratory distress.  Has no wheezes or rales. Exhibits no chest wall tenderness.   Abdominal: Soft. Bowel sounds are normal. Exhibits no distension and no mass. There is no tenderness.   Musculoskeletal: Normal range of motion. Exhibits no tenderness.   Neurological: Alert and oriented to person, place, and time.   Skin: maculopapular rash of the low back, waistline, forearms and scattered on lower legs. Some burrowing noted on low back. Skin is warm and dry.   Psychiatric: Has a normal mood and affect. Behavior is normal. Judgment and thought content normal.       /80  Pulse 82  Temp 98.8 °F (37.1 °C)  Ht 160 cm (62.99\")  Wt 88.9 kg (196 lb)  SpO2 98%  BMI 34.73 kg/m2    Nursing note and vitals reviewed.        Assessment/Plan   Marilu was seen today for lymphadenitis and follow-up.    Diagnoses and all orders for this visit:    Inflammatory bowel disease  -     Budesonide (ENTOCORT EC) 3 MG 24 hr capsule; Take 9 mg daily for 14 days, then 6 mg daily for 14 days, followed by 3 mg daily for 14 days.  Discontinue Flagyl and Cipro due to lack of efficacy. Will attempt prior authorization for Entocort again due to antibiotic failure. If denied, may try oral " corticosteroids.   Diarrhea, unspecified type  -     Budesonide (ENTOCORT EC) 3 MG 24 hr capsule; Take 9 mg daily for 14 days, then 6 mg daily for 14 days, followed by 3 mg daily for 14 days.    Mite infestation  -     permethrin (ACTICIN) 5 % cream; Apply topically from the neck to toes and rub in. Wait 10 hours then rinse off thoroughly.  Rash: scabies vs. Bed bugs. Rash had characteristics similar to both etiologies. Elected to treat for scabies (treating her son as well) and reevaluate if rash fails to improve. Encouraged her to check her home well for any potential bed bugs or other causes.     Lymphadenitis  Improving. ED record reviewed.

## 2017-12-07 ENCOUNTER — TELEPHONE (OUTPATIENT)
Dept: INTERNAL MEDICINE | Facility: CLINIC | Age: 27
End: 2017-12-07

## 2017-12-07 NOTE — TELEPHONE ENCOUNTER
PATIENT INFORMED VIA DETAILED VM OF MEDICATION CHANGE.    I ADVISED HER TO START TAKING IT AS SOON AS POSSIBLE AND TO GIVE US A CALL IF SHE HAS ANY QUESTIONS

## 2017-12-09 ENCOUNTER — LAB (OUTPATIENT)
Dept: LAB | Facility: HOSPITAL | Age: 27
End: 2017-12-09

## 2017-12-09 DIAGNOSIS — I26.99 PULMONARY EMBOLISM ON RIGHT (HCC): ICD-10-CM

## 2017-12-09 DIAGNOSIS — D68.51 HETEROZYGOUS FACTOR V LEIDEN MUTATION (HCC): ICD-10-CM

## 2017-12-09 LAB
BASOPHILS # BLD AUTO: 0.06 10*3/MM3 (ref 0–0.2)
BASOPHILS NFR BLD AUTO: 0.7 % (ref 0–2.5)
DEPRECATED RDW RBC AUTO: 42 FL (ref 37–54)
EOSINOPHIL # BLD AUTO: 0.23 10*3/MM3 (ref 0–0.7)
EOSINOPHIL NFR BLD AUTO: 2.8 % (ref 0–7)
ERYTHROCYTE [DISTWIDTH] IN BLOOD BY AUTOMATED COUNT: 12.8 % (ref 11.5–14.5)
HCT VFR BLD AUTO: 40.7 % (ref 37–47)
HGB BLD-MCNC: 13.3 G/DL (ref 12–16)
IMM GRANULOCYTES # BLD: 0.02 10*3/MM3 (ref 0–0.06)
IMM GRANULOCYTES NFR BLD: 0.2 % (ref 0–0.6)
LYMPHOCYTES # BLD AUTO: 2.23 10*3/MM3 (ref 0.6–3.4)
LYMPHOCYTES NFR BLD AUTO: 27.4 % (ref 10–50)
MCH RBC QN AUTO: 29.2 PG (ref 27–31)
MCHC RBC AUTO-ENTMCNC: 32.7 G/DL (ref 30–37)
MCV RBC AUTO: 89.3 FL (ref 81–99)
MONOCYTES # BLD AUTO: 0.28 10*3/MM3 (ref 0–0.9)
MONOCYTES NFR BLD AUTO: 3.4 % (ref 0–12)
NEUTROPHILS # BLD AUTO: 5.32 10*3/MM3 (ref 2–6.9)
NEUTROPHILS NFR BLD AUTO: 65.5 % (ref 37–80)
NRBC BLD MANUAL-RTO: 0 /100 WBC (ref 0–0)
PLATELET # BLD AUTO: 348 10*3/MM3 (ref 130–400)
PMV BLD AUTO: 9.6 FL (ref 6–12)
RBC # BLD AUTO: 4.56 10*6/MM3 (ref 4.2–5.4)
WBC NRBC COR # BLD: 8.14 10*3/MM3 (ref 4.8–10.8)

## 2017-12-09 PROCEDURE — 85302 CLOT INHIBIT PROT C ANTIGEN: CPT

## 2017-12-09 PROCEDURE — 85025 COMPLETE CBC W/AUTO DIFF WBC: CPT

## 2017-12-09 PROCEDURE — 85305 CLOT INHIBIT PROT S TOTAL: CPT

## 2017-12-09 PROCEDURE — 36415 COLL VENOUS BLD VENIPUNCTURE: CPT

## 2017-12-13 ENCOUNTER — OFFICE VISIT (OUTPATIENT)
Dept: GASTROENTEROLOGY | Facility: CLINIC | Age: 27
End: 2017-12-13

## 2017-12-13 VITALS
DIASTOLIC BLOOD PRESSURE: 78 MMHG | TEMPERATURE: 98.6 F | SYSTOLIC BLOOD PRESSURE: 120 MMHG | HEIGHT: 63 IN | OXYGEN SATURATION: 98 % | WEIGHT: 194.8 LBS | BODY MASS INDEX: 34.52 KG/M2 | HEART RATE: 118 BPM

## 2017-12-13 DIAGNOSIS — K58.0 IRRITABLE BOWEL SYNDROME WITH DIARRHEA: Primary | ICD-10-CM

## 2017-12-13 LAB
PROT C AG PPP IA-ACNC: 102 % (ref 60–150)
PROT S FREE PPP-ACNC: 115 % (ref 57–157)
PROT S PPP-ACNC: 91 % (ref 60–150)

## 2017-12-13 PROCEDURE — 99204 OFFICE O/P NEW MOD 45 MIN: CPT | Performed by: NURSE PRACTITIONER

## 2017-12-13 RX ORDER — TRIAMCINOLONE ACETONIDE 55 UG/1
2 SPRAY, METERED NASAL DAILY
COMMUNITY
End: 2018-05-15

## 2017-12-13 NOTE — PATIENT INSTRUCTIONS
"Gluten-Free Diet for Celiac Disease  Gluten is a protein found in wheat, rye, barley, and triticale (a cross between wheat and rye) grains. People with celiac disease need to have a gluten-free diet. With celiac disease, gluten interferes with the absorption of food and may also cause intestinal injury.   Strict compliance is important even during symptom-free periods. This means eliminating all foods with gluten from your diet permanently. This requires some significant changes but is very manageable.  WHAT DO I NEED TO KNOW ABOUT A GLUTEN-FREE DIET?  · Look for items labeled with \"GF.\" Looking for GF will make it easier to identify products that are safe to eat.  · Read all labels. Gluten may have been added as a minor ingredient where least expected, such as in shredded cheeses or ice creams. Always check food labels and investigate questionable ingredients. Talk to your dietitian or health care provider if you have questions about certain foods or need help finding GF foods.  · Check when in doubt. If you are not sure whether an ingredient contains gluten, check with the . Note that some manufacturers may change ingredients without notice. Always read labels.    · Know how food is prepared. Since flour and cereal products are often used in the preparation of foods, it is important to be aware of the methods of preparation used, as well as the ingredients in the foods themselves. This is especially true when you are dining out. Ask restaurants if they have a gluten-free menu.  · Watch for cross-contamination. Cross-contamination occurs when gluten-free foods come into contact with foods that contain gluten. It often happens during the manufacturing process. Always check the ingredient list and for warnings on packages, such as \"may contain gluten.\"  · Eat a balanced diet. It is important to still get enough fiber, iron, and B vitamins in your diet. Look for enriched whole grain gluten-free products " and continue to eat a well-balanced diet of the important non-grain items, such as vegetables, fruit, lean proteins, legumes, and dairy.  · Consider taking a gluten-free multivitamin and mineral supplement. Discuss this with your health care provider.  WHAT KEY WORDS HELP IDENTIFY GLUTEN?  Know key words to help identify gluten. A dietitian can help you identify possible harmful ingredients in the foods you normally eat. Words to check for on food labels include:   · Flour, enriched flour, bromated flour, white flour, durum flour, jesus flour, phosphated flour, self-rising flour, semolina, or farina.  · Starch, dextrin, modified food starch, or cereal.  · Thickening, fillers, or emulsifiers.  · Any kind of malt flavoring, extract, or syrup (malt is made from barley and includes malt vinegar, malted milk, and malted beverages).  · Hydrolyzed vegetable protein.  WHAT FOODS CAN I EAT?  Below is a list of common foods that are allowed with a gluten-free diet.   Grains  Products made from the following flours or grains: amaranth, bean flours, 100% buckwheat flour, corn, millet, nut flours or meals, GF oats, quinoa, rice, sorghum, teff, any all-purpose 100% GF flour mix, rice wafers, pure cornmeal tortillas, popcorn, some crackers, some chips, and hot cereals made from cornmeal. Ask your dietitian which specific hot and cold cereals are allowed. [place], rice or wild rice, and special GF pasta. Some Asian rice noodles or bean noodles. Arrowroot starch, corn bran, corn flour, corn germ, cornmeal, corn starch, potato flour, potato starch flour, and rice bran. Rice flours: plain, brown, and sweet. Rice polish, soy flour, tapioca starch.  Vegetables   All plain, fresh, frozen, or canned vegetables.    Fruits   All fresh, frozen, canned, dried fruits, and fruit juices.   Meats and Other Protein Foods  Meat, fish, poultry, or eggs prepared without added wheat, rye, barley, or triticale. Some luncheon meat and some frankfurters.  Pure meat. All aged cheese, most processed cheese products, some cottage cheese, and some cream cheese. Dried beans, dried peas, and lentils.    Dairy   Milk and yogurt made with allowed ingredients.   Beverages   Coffee (regular or decaffeinated), tea, herbal tea (read label to be sure that no wheat flour has been added). Carbonated beverages and some root beers. Wine, sake, and distilled spirits, such as gin, vodka, and whiskey. GF beers and GF ciders.   Sweets and Desserts  Sugar, honey, some syrups, molasses, jelly, jam, plain hard candy, marshmallows, gumdrops, homemade candies free of wheat, rye, barley, or triticale. Coconut. Custard, some pudding mixes, and homemade puddings from cornstarch, rice, and tapioca. Gelatin desserts, sorbets, frozen ice pops, and sherbet. Cake, cookies, and other desserts prepared with allowed flours. Some commercial ice creams. Ask your dietitian about specific brands of dessert that are allowed.   Fats and Oils   Butter, margarine, vegetable oil, sour cream not containing modified food starch, whipping cream, shortening, lard, cream, and some mayonnaise. Some commercial salad dressings. Peanut butter.   Other  Homemade broth and soups made with allowed ingredients; some canned or frozen soups. Any other combination or prepared foods that do not contain gluten. Monosodium glutamate (MSG). Cider, rice, and wine vinegar. Baking soda and baking powder. Certain soy sauces (Tamari). Ask your dietitian about specific brands that are allowed. Nuts, coconut, chocolate, and pure cocoa powder. Salt, pepper, herbs, spices, extracts, and food colorings.  The items listed above may not be a complete list of allowed foods or beverages. Contact your dietitian for more options.   WHAT FOODS CAN I NOT EAT?  Below is a list of common foods that are not allowed with a gluten-free diet.   Grains  Barley, bran, bulgur, cracked wheat, jesus, malt, matzo, wheat germ, and all wheat and rye cereals  including spelt and kamut. Avoid cereals containing malt as a flavoring, such as rice cereal. Also avoid regular noodles, spaghetti, macaroni, and most packaged rice mixes, and all others containing wheat, rye, barley, or triticale.   Vegetables   Most creamed vegetables, most vegetables canned in sauces, and any vegetables prepared with wheat, rye, barley, or triticale.   Fruits   Thickened or prepared fruits and some pie fillings.   Meats and Other Protein Sources  Any meat or meat alternative containing wheat, rye, barley, or gluten stabilizers (such as some hot dogs, salami, cold cuts, or sausage). Bread-containing products, such as Swiss steak, croquettes, and meatloaf. Most tuna canned in vegetable broth, turkey with hydrolyzed vegetable protein (HVP) injected as part of the basting, and any cheese product containing oat gum as an ingredient. [place]. Imitation fish.  Dairy   Commercial chocolate milk, which may have cereal added, and malted milk.  Beverages   Certain cereal beverages. Beer and ciders (unless GF), tuan, malted milk, and some root beers.  Sweets and Desserts  Commercial candies containing wheat, rye, barley, or triticale. Certain toffees are dusted with wheat flour. Chocolate-coated nuts, which are often rolled in flour. Cakes, cookies, doughnuts, and pastries that are prepared with wheat, barley, rye, or triticale flour. Some commercial ice creams, ice cream flavors which contain cookies, crumbs, or cheesecake. Ice cream cones. Commercially prepared mixes for cakes, cookies, and other desserts unless marked GF. Bread pudding and other puddings thickened with flour.  Fats and Oils   Some commercial salad dressings and sour cream containing modified food starch.   Condiments  Some roman powder, some dry seasoning mixes, some gravy extracts, some meat sauces, some ketchup, some prepared mustard, horseradish.  Other  All soups containing wheat, rye, barley, or triticale flour. Bouillon and bouillon  cubes that contain HVP. Combination or prepared foods that contain gluten. Some soy sauce, some chip dips, and some chewing gum. Yeast extract (contains barley). Caramel color (may contain malt).  The items listed above may not be a complete list of foods and beverages to avoid. Contact your dietitian for more information.     This information is not intended to replace advice given to you by your health care provider. Make sure you discuss any questions you have with your health care provider.     Document Released: 12/18/2006 Document Revised: 01/08/2016 Document Reviewed: 10/22/2014  [employer] Interactive Patient Education ©2017 [employer]

## 2017-12-13 NOTE — PROGRESS NOTES
OUTPATIENT NEW PATIENT NOTE  Patient: CHIQUITA GODOY : 1990  Date of Service: 2017  Dear Dr.Tabitha Keturah Barker MD   Thank you for your referral of this patient for evaluation of heartburn   CC: Heartburn  Assessment/Plan                                             ASSESSMENT & PLANS     Chiquita was seen today for heartburn.    Diagnoses and all orders for this visit:    Irritable bowel syndrome with diarrhea    continue sulfaSALAzine for now  Get records fr dr solano's office    Follow Up:Return in about 3 months (around 3/13/2018) for Recheck.      DISCUSSION: The above plan was delineated in details with patient and boyfriend and all questions and concerns were answered.  Patient is also given contact information.  Patient is to return as scheduled or sooner if new problems arise  Subjective                                                     SUBJECTIVE   History of Present Illness  Ms. Chiquita Godoy is a 27 y.o. female presents to the clinic today for an evaluation of Heartburn, abd pain and diarrhea    Onset For yrs  Sx wax and wane    Location RLQ   Triggers  Worsens by Eating, luis milk and oatmeal   After about 10-15 mints     Severity/Quality/  Frequency    Relieving factors  Having a BM      Associating Sx  + and - Stools are watery.  Still able to have some formed stools occasional   Mostly daytime.  Frequency is about 5-10 x a day         Prior Dx workup Had both EGD and colonoscopy. Results WNL No longer sees dr solano b/c pcp wants 2nd opinion   Prior Tx Cholestyramine not effective  entocort (Mirlande WILSON) was not approved  Pt is given Azylfidine instead     Pertinent Hx No fam hx of IBD   Risk factors  Contributing meds (NSAIDS, abx, iron, pain meds)?--   Pt  reports that she has never smoked. She has never used smokeless tobacco.  Body mass index is 34.51 kg/(m^2).  plavix for PE  abx     ROS:Review of Systems   Constitutional: Positive for fatigue. Negative for activity  change, appetite change, fever and unexpected weight change.   HENT: Negative for hearing loss, trouble swallowing and voice change.    Eyes: Negative for visual disturbance.   Respiratory: Negative for cough, choking, chest tightness and shortness of breath.    Cardiovascular: Negative for chest pain.   Gastrointestinal: Positive for abdominal pain, diarrhea and nausea. Negative for abdominal distention, anal bleeding, blood in stool, constipation, rectal pain and vomiting.        Heartburn   Endocrine: Negative for polydipsia and polyphagia.   Genitourinary: Negative.    Musculoskeletal: Negative for gait problem and joint swelling.   Skin: Negative for color change and rash.   Allergic/Immunologic: Negative for food allergies.   Neurological: Negative for dizziness, seizures and speech difficulty.   Hematological: Negative for adenopathy.   Psychiatric/Behavioral: Negative for confusion.     Subjective   PAST MED HX: Pt  has a past medical history of Abdominal pain; Allergic; Asthma (); Chest pain; Clotting disorder; Coronary artery disease involving native coronary artery of native heart without angina pectoris (2017); Diarrhea; Factor 5 Leiden mutation, heterozygous; Gallstone; GERD (gastroesophageal reflux disease); H/O blood clots; Headache; Hypertension; Kidney infection; Nausea & vomiting; Obesity; Orthostatic hypotension; Ovarian cyst; Protein S deficiency (2016); Pulmonary embolism; Sleep apnea; Subclinical hyperthyroidism; and Tattoo.  PAST SURG HX: Pt  has a past surgical history that includes Cholecystectomy ();  section; Colonoscopy (N/A, 2017); and Esophagogastroduodenoscopy (N/A, 2017).  FAM HX: family history includes Arthritis in her father and mother; Asthma in her mother; COPD in her mother; Diabetes in her father; Heart attack in her father; Hyperlipidemia in her father; Hypertension in her father; Kidney disease in her father; No Known Problems in her son.  There is no history of Colon cancer, Liver cancer, Liver disease, Stomach cancer, or Esophageal cancer.  SOC HX: Pt  reports that she has never smoked. She has never used smokeless tobacco. She reports that she does not drink alcohol or use illicit drugs.  Objective                                                           OBJECTIVE   Allergy: Pt is allergic to amoxicillin.  MEDS: •  acetaminophen (TYLENOL 8 HOUR) 650 MG 8 hr tablet, Take 1 tablet by mouth Every 8 (Eight) Hours As Needed for Mild Pain ., Disp: 12 tablet, Rfl: 0  •  albuterol (PROVENTIL HFA;VENTOLIN HFA) 108 (90 BASE) MCG/ACT inhaler, Inhale 2 puffs Every 6 (Six) Hours As Needed for Wheezing or Shortness of Air., Disp: 1 inhaler, Rfl: 3  •  albuterol (PROVENTIL) (2.5 MG/3ML) 0.083% nebulizer solution, Take 2.5 mg by nebulization Every 4 (Four) Hours As Needed for Wheezing or Shortness of Air., Disp: 60 vial, Rfl: 3  •  apixaban (ELIQUIS) 5 MG tablet tablet, Take 5 mg by mouth 2 (Two) Times a Day., Disp: , Rfl:   •  aspirin 81 MG tablet, Take 1 tablet by mouth Daily., Disp: 30 tablet, Rfl: 11  •  atorvastatin (LIPITOR) 80 MG tablet, Take 1 tablet by mouth Daily., Disp: 30 tablet, Rfl: 11  •  azelastine (ASTELIN) 0.1 % nasal spray, 1 spray into each nostril 2 (Two) Times a Day As Needed for Rhinitis or Allergies. Use in each nostril as directed, Disp: 1 each, Rfl: 5  •  DULoxetine (CYMBALTA) 30 MG capsule, Take 1 capsule by mouth Daily., Disp: 30 capsule, Rfl: 5  •  flunisolide (NASALIDE) 25 MCG/ACT (0.025%) solution nasal spray, Inhale 1 spray Every 12 (Twelve) Hours., Disp: 1 bottle, Rfl: 5  •  Fluticasone Furoate-Vilanterol (BREO ELLIPTA) 100-25 MCG/INH aerosol powder , Inhale 1 puff Daily., Disp: , Rfl:   •  LORazepam (ATIVAN) 0.5 MG tablet, Take 1 tablet by mouth Every 8 (Eight) Hours As Needed for Anxiety., Disp: 9 tablet, Rfl: 0  •  midodrine (PROAMATINE) 5 MG tablet, Take 1 tablet by mouth 3 (Three) Times a Day. Last dose no later that 5 pm,  Disp: 90 tablet, Rfl: 5  •  montelukast (SINGULAIR) 10 MG tablet, TAKE 1 TABLET BY MOUTH IN THE EVENING , Disp: 30 tablet, Rfl: 2  •  ondansetron ODT (ZOFRAN-ODT) 4 MG disintegrating tablet, Take 1 tablet by mouth Every 6 (Six) Hours As Needed for Nausea or Vomiting., Disp: 10 tablet, Rfl: 0  •  pantoprazole (PROTONIX) 40 MG EC tablet, TAKE 1 TABLET BY MOUTH ONE TIME A DAY , Disp: 30 tablet, Rfl: 4  •  promethazine (PHENERGAN) 25 MG tablet, Take 1 tablet by mouth Every 6 (Six) Hours As Needed for Nausea or Vomiting., Disp: 30 tablet, Rfl: 0  •  sulfaSALAzine (AZULFIDINE) 500 MG tablet, Take 1 tablet by mouth 3 (Three) Times a Day for 30 days., Disp: 90 tablet, Rfl: 0  •  tiZANidine (ZANAFLEX) 4 MG tablet, Take 1 tablet by mouth Every 8 (Eight) Hours As Needed for Muscle Spasms., Disp: 30 tablet, Rfl: 3  •  traMADol (ULTRAM) 50 MG tablet, Take 1 tablet by mouth Every 8 (Eight) Hours As Needed for Severe Pain ., Disp: 15 tablet, Rfl: 0  •  Triamcinolone Acetonide (NASACORT) 55 MCG/ACT nasal inhaler, 2 sprays into each nostril Daily., Disp: , Rfl:     Stools Cx  Lab Results   Component Value Date    CDIFFPCR Not Detected 01/20/2017    CDIFFTOXNAT Negative 04/17/2017    STOOLCXY  01/20/2017     No Salmonella, Shigella, Campylobacter, E. coli O157, Vibrio, or Yersinia isolated at 48 hours    FECLEU NO WBC'S SEEN 08/31/2016     Lab Results   Component Value Date    WBC 8.14 12/09/2017    HGB 13.3 12/09/2017    HGB 12.9 11/27/2017    HGB 12.1 11/02/2017    HCT 40.7 12/09/2017    HCT 39.8 11/27/2017    HCT 36.8 (L) 11/02/2017    MCV 89.3 12/09/2017    MCHC 32.7 12/09/2017     12/09/2017     11/27/2017     11/02/2017     Lab Results   Component Value Date     11/27/2017    K 4.3 11/27/2017     11/27/2017    CO2 24.0 (L) 11/27/2017    BUN 9 11/27/2017    CREATININE 0.60 11/27/2017    GLUCOSE 100 (H) 11/02/2017    CALCIUM 10.0 11/27/2017    ANIONGAP 15.2 11/02/2017    HGBA1C 4.90 04/24/2017      Lab Results   Component Value Date    AST 18 11/27/2017    AST 17 11/02/2017    AST 19 08/24/2017    ALT 27 11/27/2017    ALT 30 11/02/2017    ALT 36 08/24/2017    ALKPHOS 87 11/27/2017    ALKPHOS 78 11/02/2017    ALKPHOS 92 08/24/2017    BILITOT 0.5 11/27/2017    PROTEINTOT 7.6 11/02/2017    ALBUMIN 4.40 11/27/2017    LIPASE 79 11/02/2017    LIPASE 67 08/24/2017    LIPASE 68 11/13/2016    LIPASE 75 08/31/2016        Lab Results   Component Value Date    HGBA1C 4.90 04/24/2017     Lab Results   Component Value Date    HEPAIGM Negative 11/27/2017    HEPBSAG Negative 11/27/2017    HEPBIGMCORE Negative 11/27/2017    HEPCVIRUSABY 0.1 11/27/2017   PROCEDURE: CT ABDOMEN PELVIS W CONTRAST-      HISTORY:  right lower quadrant pain      COMPARISON:  None .      TECHNIQUE: Multiple axial CT images were obtained from the lung bases  through the pubic symphysis following the administration of Isovue 300   contrast.       FINDINGS:       ABDOMEN: The lung bases are clear. The heart is normal in size. The  liver is normal. The patient is status post cholecystectomy. The spleen  is unremarkable. No adrenal mass is present.  The pancreas is normal.  The kidneys are normal. The aorta is normal in caliber. There is no free  fluid or adenopathy. The abdominal portions of the GI tract are  unremarkable with no evidence of obstruction.      PELVIS: The appendix is normal.  The urinary bladder is unremarkable.  There is no significant free fluid or adenopathy.          IMPRESSION:  No evidence of acute intra-abdominal process.      1609.96 mGy.cm          This study was performed with techniques to keep radiation doses as low  as reasonably achievable (ALARA). Individualized dose reduction  techniques using automated exposure control or adjustment of mA and/or  kV according to the patient size were employed.   Wt Readings from Last 5 Encounters:   12/13/17 88.4 kg (194 lb 12.8 oz)   12/06/17 88.9 kg (196 lb)   12/03/17 85.7 kg (189  lb)   11/27/17 88.9 kg (196 lb)   11/20/17 88 kg (194 lb)   body mass index is 34.51 kg/(m^2).,Temp: 98.6 °F (37 °C),BP: 120/78,Heart Rate: 118   Physical Exam  General Well developed; well nourished; no acute distress.   ENT Oral mucosa pink and moist without thrush or lesions.    Neck Neck supple; trachea midline. No thyromegaly   Resp CTA; no rhonchi, rales, or wheezes.  Respiration effort normal  CV RRR; normal S1, S2; no M/R/G. No lower extremity edema  GI Abd soft, NT, ND, normal active bowel sounds.  No HSM.  No abd hernia  Skin No rash; no lesions; no bruises.  Skin turgor normal  Musc No clubbing; no cyanosis.  Gait steady  Psych Oriented to time, place, and person.  Appropriate affect  Thank you kindly for allowing me to be part of this patient’s care.    Pt care team: Snehal GAITAN [facility]--Gastroenterology  [phone]    CC: Dr.Tabitha Keturah Barker MD  [address] [place] [address] / [facility] [id number] FAX:[phone]

## 2017-12-15 ENCOUNTER — OFFICE VISIT (OUTPATIENT)
Dept: INTERNAL MEDICINE | Facility: CLINIC | Age: 27
End: 2017-12-15

## 2017-12-15 VITALS
HEART RATE: 91 BPM | TEMPERATURE: 98.7 F | SYSTOLIC BLOOD PRESSURE: 112 MMHG | WEIGHT: 195 LBS | OXYGEN SATURATION: 95 % | BODY MASS INDEX: 34.55 KG/M2 | DIASTOLIC BLOOD PRESSURE: 70 MMHG | HEIGHT: 63 IN

## 2017-12-15 DIAGNOSIS — R50.9 FEVER, UNSPECIFIED FEVER CAUSE: Primary | ICD-10-CM

## 2017-12-15 DIAGNOSIS — J06.9 ACUTE URI: ICD-10-CM

## 2017-12-15 PROCEDURE — 87804 INFLUENZA ASSAY W/OPTIC: CPT | Performed by: PHYSICIAN ASSISTANT

## 2017-12-15 PROCEDURE — 99213 OFFICE O/P EST LOW 20 MIN: CPT | Performed by: PHYSICIAN ASSISTANT

## 2017-12-15 NOTE — PROGRESS NOTES
"Subjective     Chief Complaint: sore throat, body aches    History of Present Illness     Marilu Godoy is a 27 y.o. female complaining of body aches, low-grade fevers, sore throat, congestion, mild cough ×2 days.  She has been using Tylenol OTC at home.  Denies shortness of air.  She did not receive a flu vaccine this year.    The following portions of the patient's history were reviewed and updated as appropriate: current medications, allergies, PMH.    Review of Systems   Constitutional: Positive for chills, fatigue and fever.   HENT: Positive for congestion, ear pain, rhinorrhea and sore throat. Negative for voice change.    Respiratory: Positive for cough. Negative for shortness of breath and wheezing.    Cardiovascular: Negative.    Gastrointestinal: Positive for diarrhea.   Musculoskeletal: Positive for myalgias.       Objective     Vitals:    12/15/17 0932   BP: 112/70   Pulse: 91   Temp: 98.7 °F (37.1 °C)   SpO2: 95%   Weight: 88.5 kg (195 lb)   Height: 160 cm (62.99\")       Physical Exam   Constitutional: No distress.   HENT:   Head: Normocephalic and atraumatic.   Right Ear: Tympanic membrane normal.   Left Ear: Tympanic membrane normal.   Nose: Rhinorrhea present. Right sinus exhibits no maxillary sinus tenderness and no frontal sinus tenderness. Left sinus exhibits no maxillary sinus tenderness and no frontal sinus tenderness.   Mouth/Throat: Posterior oropharyngeal erythema present. No oropharyngeal exudate.   Cardiovascular: Normal rate, regular rhythm and normal heart sounds.    Pulmonary/Chest: Effort normal and breath sounds normal. No respiratory distress. She has no wheezes.   Lymphadenopathy:     She has no cervical adenopathy.        Assessment/Plan     Diagnoses and all orders for this visit:    Fever, unspecified fever cause  -     POCT Influenza A/B    Acute URI      Influenza negative.  Treat symptomatically at this time.  Salt water gargles, Chloraseptic spray, Tylenol for throat.  " Stay hydrated and rest.  Will consider antibiotic if she is not feeling better in a few days.      Jenniffer Berger PA-C  12/15/2017         Please note that portions of this note were completed with a voice recognition program. Efforts were made to edit dictation, but occasionally words are mistranscribed.

## 2017-12-19 ENCOUNTER — OFFICE VISIT (OUTPATIENT)
Dept: INTERNAL MEDICINE | Facility: CLINIC | Age: 27
End: 2017-12-19

## 2017-12-19 VITALS
HEART RATE: 126 BPM | BODY MASS INDEX: 34.2 KG/M2 | OXYGEN SATURATION: 99 % | WEIGHT: 193 LBS | DIASTOLIC BLOOD PRESSURE: 84 MMHG | SYSTOLIC BLOOD PRESSURE: 110 MMHG | TEMPERATURE: 100.1 F | HEIGHT: 63 IN

## 2017-12-19 DIAGNOSIS — J02.0 STREP PHARYNGITIS: Primary | ICD-10-CM

## 2017-12-19 LAB
EXPIRATION DATE: ABNORMAL
INTERNAL CONTROL: ABNORMAL
Lab: ABNORMAL
S PYO AG THROAT QL: POSITIVE

## 2017-12-19 PROCEDURE — 99213 OFFICE O/P EST LOW 20 MIN: CPT | Performed by: PHYSICIAN ASSISTANT

## 2017-12-19 PROCEDURE — 87880 STREP A ASSAY W/OPTIC: CPT | Performed by: PHYSICIAN ASSISTANT

## 2017-12-19 RX ORDER — PROMETHAZINE HYDROCHLORIDE 25 MG/1
25 TABLET ORAL EVERY 6 HOURS PRN
Qty: 30 TABLET | Refills: 0 | Status: SHIPPED | OUTPATIENT
Start: 2017-12-19 | End: 2018-05-15

## 2017-12-19 RX ORDER — CEPHALEXIN 500 MG/1
500 CAPSULE ORAL 3 TIMES DAILY
Qty: 30 CAPSULE | Refills: 0 | Status: SHIPPED | OUTPATIENT
Start: 2017-12-19 | End: 2017-12-29

## 2017-12-19 NOTE — PROGRESS NOTES
Marilu Godoy is a 27 y.o. female.     Subjective   History of Present Illness   Here today with concern of nearly 1 week of sore throat, congestion, headache, cough, little SOA, fever and headache. She saw Jenniffer last week and had negative flu test but reports feeling worse since then. Fever up to 104 which Tylenol reduces.         The following portions of the patient's history were reviewed and updated as appropriate: allergies, current medications, past family history, past medical history, past social history, past surgical history and problem list.    Review of Systems    Constitutional: fever. Negative for appetite change, chills, fatigue and unexpected weight change.   HENT: congestion, sore throat.  Negative for ear pain, hearing loss, nosebleeds, postnasal drip, rhinorrhea, tinnitus and trouble swallowing.    Eyes: Negative for photophobia, discharge and visual disturbance.   Respiratory: cough, SOA. Negative for chest tightness and wheezing.    Cardiovascular: Negative for chest pain, palpitations and leg swelling.   Gastrointestinal: Negative for abdominal distention, abdominal pain, blood in stool, constipation, diarrhea, nausea and vomiting.   Endocrine: Negative for cold intolerance, heat intolerance, polydipsia, polyphagia and polyuria.   Musculoskeletal: Negative for arthralgias, back pain, joint swelling, myalgias, neck pain and neck stiffness.   Skin: Negative for color change, pallor, rash and wound.   Allergic/Immunologic: Negative for environmental allergies, food allergies and immunocompromised state.   Neurological: headaches. Negative for dizziness, tremors, seizures, weakness, numbness.  Hematological: Negative for adenopathy. Does not bruise/bleed easily.   Psychiatric/Behavioral: Negative for sleep disturbances, agitation, behavioral problems, confusion, hallucinations, self-injury and suicidal ideas. The patient is not nervous/anxious.      Objective    Physical Exam  Constitutional:  "Oriented to person, place, and time. Appears well-developed and well-nourished.   HENT: op erythema with exudate.   Head: Normocephalic and atraumatic.   Eyes: EOM are normal. Pupils are equal, round, and reactive to light.   Neck: Normal range of motion. Neck supple.   Enlarged and tender anterior cervical lymphadenopathy.   Cardiovascular: Normal rate, regular rhythm and normal heart sounds.    Pulmonary/Chest: Effort normal and breath sounds normal. No respiratory distress.  Has no wheezes or rales. Exhibits no chest wall tenderness.   Abdominal: Soft. Bowel sounds are normal. Exhibits no distension and no mass. There is no tenderness.   Musculoskeletal: Normal range of motion. Exhibits no tenderness.   Neurological: Alert and oriented to person, place, and time.   Skin: Skin is warm and dry.   Psychiatric: Has a normal mood and affect. Behavior is normal. Judgment and thought content normal.       /84  Pulse (!) 126  Temp 100.1 °F (37.8 °C)  Ht 160 cm (62.99\")  Wt 87.5 kg (193 lb)  SpO2 99%  BMI 34.2 kg/m2    Nursing note and vitals reviewed.          Assessment/Plan   Marilu was seen today for headache, fever, sore throat, cough and shortness of breath.    Diagnoses and all orders for this visit:    Strep pharyngitis  -     cephalexin (KEFLEX) 500 MG capsule; Take 1 capsule by mouth 3 (Three) Times a Day for 10 days.    Use new toothbrushes after the 2nd day of antibiotics.     Call or RTC if symptoms worsen or persist.          "

## 2017-12-20 ENCOUNTER — CLINICAL SUPPORT (OUTPATIENT)
Dept: INTERNAL MEDICINE | Facility: CLINIC | Age: 27
End: 2017-12-20

## 2017-12-20 DIAGNOSIS — J02.0 STREP PHARYNGITIS: Primary | ICD-10-CM

## 2017-12-20 DIAGNOSIS — J02.0 STREP THROAT: Primary | ICD-10-CM

## 2017-12-20 PROCEDURE — 96372 THER/PROPH/DIAG INJ SC/IM: CPT | Performed by: PHYSICIAN ASSISTANT

## 2017-12-20 RX ORDER — ACETAMINOPHEN 325 MG/1
650 TABLET ORAL ONCE
Status: COMPLETED | OUTPATIENT
Start: 2017-12-20 | End: 2017-12-20

## 2017-12-20 RX ORDER — CEFTRIAXONE 1 G/1
1 INJECTION, POWDER, FOR SOLUTION INTRAMUSCULAR; INTRAVENOUS ONCE
Status: COMPLETED | OUTPATIENT
Start: 2017-12-20 | End: 2017-12-20

## 2017-12-20 RX ADMIN — ACETAMINOPHEN 650 MG: 325 TABLET ORAL at 14:56

## 2017-12-20 RX ADMIN — CEFTRIAXONE 1 G: 1 INJECTION, POWDER, FOR SOLUTION INTRAMUSCULAR; INTRAVENOUS at 14:58

## 2017-12-21 ENCOUNTER — HOSPITAL ENCOUNTER (EMERGENCY)
Facility: HOSPITAL | Age: 27
Discharge: HOME | End: 2017-12-21
Attending: EMERGENCY MEDICINE | Admitting: EMERGENCY MEDICINE

## 2017-12-21 VITALS
BODY MASS INDEX: 34.38 KG/M2 | HEART RATE: 110 BPM | DIASTOLIC BLOOD PRESSURE: 82 MMHG | HEIGHT: 63 IN | RESPIRATION RATE: 14 BRPM | WEIGHT: 194 LBS | OXYGEN SATURATION: 97 % | SYSTOLIC BLOOD PRESSURE: 129 MMHG | TEMPERATURE: 99.7 F

## 2017-12-21 DIAGNOSIS — J02.0 STREP PHARYNGITIS: ICD-10-CM

## 2017-12-21 DIAGNOSIS — R11.2 NAUSEA AND VOMITING IN ADULT: Primary | ICD-10-CM

## 2017-12-21 DIAGNOSIS — R19.7 DIARRHEA, UNSPECIFIED TYPE: ICD-10-CM

## 2017-12-21 PROCEDURE — 99283 EMERGENCY DEPT VISIT LOW MDM: CPT

## 2017-12-21 PROCEDURE — 25010000002 DEXAMETHASONE SODIUM PHOSPHATE 10 MG/ML SOLUTION: Performed by: EMERGENCY MEDICINE

## 2017-12-21 PROCEDURE — 96372 THER/PROPH/DIAG INJ SC/IM: CPT

## 2017-12-21 RX ORDER — ONDANSETRON 4 MG/1
4 TABLET, ORALLY DISINTEGRATING ORAL ONCE
Status: COMPLETED | OUTPATIENT
Start: 2017-12-21 | End: 2017-12-21

## 2017-12-21 RX ORDER — CLARITHROMYCIN 500 MG/1
500 TABLET, COATED ORAL ONCE
Status: COMPLETED | OUTPATIENT
Start: 2017-12-21 | End: 2017-12-21

## 2017-12-21 RX ORDER — CLARITHROMYCIN 500 MG/1
500 TABLET, COATED ORAL EVERY 12 HOURS SCHEDULED
Qty: 20 TABLET | Refills: 0 | Status: SHIPPED | OUTPATIENT
Start: 2017-12-21 | End: 2018-01-02

## 2017-12-21 RX ORDER — DEXAMETHASONE SODIUM PHOSPHATE 10 MG/ML
10 INJECTION, SOLUTION INTRAMUSCULAR; INTRAVENOUS ONCE
Status: DISCONTINUED | OUTPATIENT
Start: 2017-12-21 | End: 2017-12-21

## 2017-12-21 RX ORDER — IBUPROFEN 600 MG/1
600 TABLET ORAL
Qty: 90 TABLET | Refills: 0 | Status: SHIPPED | OUTPATIENT
Start: 2017-12-21 | End: 2018-04-02 | Stop reason: HOSPADM

## 2017-12-21 RX ORDER — ONDANSETRON 4 MG/1
4 TABLET, ORALLY DISINTEGRATING ORAL EVERY 6 HOURS PRN
Qty: 12 TABLET | Refills: 0 | Status: SHIPPED | OUTPATIENT
Start: 2017-12-21 | End: 2018-05-15

## 2017-12-21 RX ORDER — DEXAMETHASONE SODIUM PHOSPHATE 10 MG/ML
10 INJECTION, SOLUTION INTRAMUSCULAR; INTRAVENOUS ONCE
Status: DISCONTINUED | OUTPATIENT
Start: 2017-12-21 | End: 2017-12-21 | Stop reason: HOSPADM

## 2017-12-21 RX ADMIN — CLARITHROMYCIN 500 MG: 500 TABLET ORAL at 21:11

## 2017-12-21 RX ADMIN — DEXAMETHASONE SODIUM PHOSPHATE 10 MG: 10 INJECTION, SOLUTION INTRAMUSCULAR; INTRAVENOUS at 21:11

## 2017-12-21 RX ADMIN — ONDANSETRON 4 MG: 4 TABLET, ORALLY DISINTEGRATING ORAL at 21:06

## 2017-12-22 DIAGNOSIS — M25.50 CHRONIC JOINT PAIN: ICD-10-CM

## 2017-12-22 DIAGNOSIS — G89.29 CHRONIC JOINT PAIN: ICD-10-CM

## 2017-12-22 DIAGNOSIS — F32.0 MILD SINGLE CURRENT EPISODE OF MAJOR DEPRESSIVE DISORDER (HCC): ICD-10-CM

## 2017-12-22 RX ORDER — DULOXETIN HYDROCHLORIDE 20 MG/1
CAPSULE, DELAYED RELEASE ORAL
Qty: 30 CAPSULE | Refills: 5 | Status: SHIPPED | OUTPATIENT
Start: 2017-12-22 | End: 2017-12-29

## 2017-12-22 NOTE — ED PROVIDER NOTES
Subjective   HPI Comments: This is a 27-year-old female comes in with chief complaint sore throat, nausea, vomiting, fevers past several days.  Patient states that she was recently diagnosed with strep throat.  Patient states she has been unable to tolerate the antibiotic by mouth that was prescribed.  Patient states she's had nausea, vomiting and diarrhea.  Patient also reports persistent fever of 102 and body aches.     Patient is a 27 y.o. female presenting with sore throat.   History provided by:  Patient   used: No    Sore Throat   Location:  Generalized  Severity:  Moderate  Onset quality:  Sudden  Duration:  4 days  Timing:  Constant  Progression:  Worsening  Chronicity:  New  Relieved by:  Nothing  Worsened by:  Nothing  Associated symptoms: chills and fever    Associated symptoms: no abdominal pain, no adenopathy, no chest pain, no ear discharge, no night sweats, no plugged ear sensation, no stridor and no trouble swallowing    Risk factors: exposure to strep    Risk factors: no exposure to mono, no sick contacts, no recent dental procedure and no recent endoscopy        Review of Systems   Constitutional: Positive for chills, fatigue and fever. Negative for night sweats.   HENT: Positive for congestion and sore throat. Negative for dental problem, ear discharge and trouble swallowing.    Respiratory: Negative for stridor.    Cardiovascular: Negative for chest pain.   Gastrointestinal: Negative for abdominal pain.   Hematological: Negative for adenopathy.   All other systems reviewed and are negative.      Past Medical History:   Diagnosis Date   • Abdominal pain    • Allergic    • Asthma 2015   • Chest pain    • Clotting disorder     factor 5   • Coronary artery disease involving native coronary artery of native heart without angina pectoris 6/16/2017   • Diarrhea    • Factor 5 Leiden mutation, heterozygous    • Gallstone    • GERD (gastroesophageal reflux disease)    • H/O blood clots     • Headache    • Hypertension    • Kidney infection    • Nausea & vomiting    • Obesity    • Orthostatic hypotension    • Ovarian cyst    • Protein S deficiency 2016    labs from hospitalization for PE   • Pulmonary embolism    • Sleep apnea    • Subclinical hyperthyroidism    • Tattoo        Allergies   Allergen Reactions   • Amoxicillin Rash       Past Surgical History:   Procedure Laterality Date   •  SECTION       and    • CHOLECYSTECTOMY     • COLONOSCOPY N/A 2017    Procedure: COLONOSCOPY WITH BIOPSIES AND ARGON THERMAL ABLATION;  Surgeon: Jignesh Selby MD;  Location: [facility] ENDOSCOPY;  Service:    • ENDOSCOPY N/A 2017    Procedure: ESOPHAGOGASTRODUODENOSCOPY WITH BIOPSIES AND COLD BIOPSY POLYPECTOMIES;  Surgeon: Jignesh Selby MD;  Location: [facility] ENDOSCOPY;  Service:        Family History   Problem Relation Age of Onset   • Arthritis Mother    • COPD Mother    • Asthma Mother    • Arthritis Father    • Diabetes Father    • Hypertension Father    • Hyperlipidemia Father    • Kidney disease Father    • Heart attack Father    • No Known Problems Son    • Colon cancer Neg Hx    • Liver cancer Neg Hx    • Liver disease Neg Hx    • Stomach cancer Neg Hx    • Esophageal cancer Neg Hx        Social History     Social History   • Marital status:      Spouse name: N/A   • Number of children: N/A   • Years of education: N/A     Social History Main Topics   • Smoking status: Never Smoker   • Smokeless tobacco: Never Used   • Alcohol use No   • Drug use: No   • Sexual activity: Yes     Partners: Male     Birth control/ protection: None      Comment: PE while on birth control patch     Other Topics Concern   • None     Social History Narrative           Objective   Physical Exam   Constitutional: She is oriented to person, place, and time. She appears well-developed and well-nourished.   HENT:   Head: Normocephalic.   Right Ear: External ear normal.   Left Ear: External ear  normal.   Nose: Nose normal.   Mouth/Throat: Posterior oropharyngeal erythema present. No tonsillar abscesses. Tonsils are 1+ on the right. Tonsils are 1+ on the left. Tonsillar exudate.   Eyes: Conjunctivae and EOM are normal. Pupils are equal, round, and reactive to light.   Neck: Normal range of motion. Neck supple. No tracheal deviation present. No thyromegaly present.   Cardiovascular: Normal rate, regular rhythm, normal heart sounds and intact distal pulses.    Pulmonary/Chest: Effort normal and breath sounds normal.   Abdominal: Soft. Bowel sounds are normal.   Musculoskeletal: Normal range of motion.   Neurological: She is alert and oriented to person, place, and time. She has normal reflexes.   Skin: Skin is warm and dry.   Psychiatric: She has a normal mood and affect. Her behavior is normal. Judgment and thought content normal.   Nursing note and vitals reviewed.      Procedures         ED Course  ED Course                  MDM  Number of Diagnoses or Management Options  Nausea and vomiting in adult: new and requires workup  Strep pharyngitis: new and requires workup  Risk of Complications, Morbidity, and/or Mortality  Presenting problems: moderate  Diagnostic procedures: moderate  Management options: moderate    Patient Progress  Patient progress: stable      Final diagnoses:   Nausea and vomiting in adult   Strep pharyngitis   Diarrhea, unspecified type            Kwasi Waller PA-C  12/21/17 6831

## 2017-12-29 ENCOUNTER — OFFICE VISIT (OUTPATIENT)
Dept: INTERNAL MEDICINE | Facility: CLINIC | Age: 27
End: 2017-12-29

## 2017-12-29 VITALS
DIASTOLIC BLOOD PRESSURE: 80 MMHG | HEIGHT: 63 IN | HEART RATE: 82 BPM | TEMPERATURE: 98.7 F | BODY MASS INDEX: 33.66 KG/M2 | OXYGEN SATURATION: 99 % | WEIGHT: 190 LBS | SYSTOLIC BLOOD PRESSURE: 124 MMHG

## 2017-12-29 DIAGNOSIS — J02.9 ACUTE PHARYNGITIS, UNSPECIFIED ETIOLOGY: Primary | ICD-10-CM

## 2017-12-29 DIAGNOSIS — R30.0 DYSURIA: ICD-10-CM

## 2017-12-29 DIAGNOSIS — N92.6 MISSED MENSES: ICD-10-CM

## 2017-12-29 LAB
B-HCG UR QL: NEGATIVE
BILIRUB BLD-MCNC: ABNORMAL MG/DL
CLARITY, POC: ABNORMAL
COLOR UR: YELLOW
GLUCOSE UR STRIP-MCNC: NEGATIVE MG/DL
INTERNAL NEGATIVE CONTROL: NEGATIVE
INTERNAL POSITIVE CONTROL: POSITIVE
KETONES UR QL: NEGATIVE
LEUKOCYTE EST, POC: ABNORMAL
Lab: NORMAL
NITRITE UR-MCNC: NEGATIVE MG/ML
PH UR: 5 [PH] (ref 5–8)
PROT UR STRIP-MCNC: NEGATIVE MG/DL
RBC # UR STRIP: NEGATIVE /UL
SP GR UR: 1.02 (ref 1–1.03)
UROBILINOGEN UR QL: ABNORMAL

## 2017-12-29 PROCEDURE — 99213 OFFICE O/P EST LOW 20 MIN: CPT | Performed by: PHYSICIAN ASSISTANT

## 2017-12-29 PROCEDURE — 81025 URINE PREGNANCY TEST: CPT | Performed by: PHYSICIAN ASSISTANT

## 2017-12-29 RX ORDER — FLUCONAZOLE 150 MG/1
150 TABLET ORAL ONCE
Qty: 1 TABLET | Refills: 0 | Status: SHIPPED | OUTPATIENT
Start: 2017-12-29 | End: 2017-12-29

## 2017-12-29 NOTE — PROGRESS NOTES
Marilu Godoy is a 27 y.o. female.     Subjective   History of Present Illness   Here today with continue concern of cough, little SOA, nasal congestion, sore throat, fatigue and body aches. Fever resolved about 1 week ago. Diarrhea has improved to soft stools. Nausea has continued but vomiting stopped about a week ago.      Also concerned with missed menses- she normally starts between the 15th and 20th each month and has not yet started. The previous month she only spotted for 1 day which is not typical of her.         The following portions of the patient's history were reviewed and updated as appropriate: allergies, current medications, past family history, past medical history, past social history, past surgical history and problem list.    Review of Systems    Constitutional: fatigue. Negative for appetite change, chills, fever and unexpected weight change.   HENT: sore throat, congestion. Negative for ear pain, hearing loss, nosebleeds, postnasal drip, rhinorrhea, tinnitus and trouble swallowing.    Eyes: Negative for photophobia, discharge and visual disturbance.   Respiratory: cough, soa. Negative for chest tightness and wheezing.    Cardiovascular: Negative for chest pain, palpitations and leg swelling.   Gastrointestinal: loose stools, nausea. Negative for abdominal distention, abdominal pain, blood in stool, constipation, diarrhea and vomiting.   Endocrine: Negative for cold intolerance, heat intolerance, polydipsia, polyphagia and polyuria.   Musculoskeletal: myalgias. Negative for arthralgias, back pain, joint swelling, neck pain and neck stiffness.   Skin: Negative for color change, pallor, rash and wound.   Allergic/Immunologic: Negative for environmental allergies, food allergies and immunocompromised state.   Neurological: Negative for dizziness, tremors, seizures, weakness, numbness and headaches.   Hematological: Negative for adenopathy. Does not bruise/bleed easily.   Psychiatric/Behavioral:  "Negative for sleep disturbances, agitation, behavioral problems, confusion, hallucinations, self-injury and suicidal ideas. The patient is not nervous/anxious.    : missed menses.     Objective    Physical Exam  Constitutional: Oriented to person, place, and time. Appears well-developed and well-nourished.   HENT: mild OP erythema. TMs normal.   Head: no sinus tenderness. Normocephalic and atraumatic.   Eyes: EOM are normal. Pupils are equal, round, and reactive to light.   Neck: Normal range of motion. Neck supple.   Cardiovascular: Normal rate, regular rhythm and normal heart sounds.    Pulmonary/Chest: Effort normal and breath sounds normal. No respiratory distress.  Has no wheezes or rales. Exhibits no chest wall tenderness.   Abdominal: mild RLQ tenderness. Soft. Bowel sounds are normal. Exhibits no distension and no mass.   Musculoskeletal: Normal range of motion. Exhibits no tenderness.   Neurological: Alert and oriented to person, place, and time.   Skin: Skin is warm and dry.   Psychiatric: Has a normal mood and affect. Behavior is normal. Judgment and thought content normal.       /80  Pulse 82  Temp 98.7 °F (37.1 °C)  Ht 160 cm (62.99\")  Wt 86.2 kg (190 lb)  SpO2 99%  BMI 33.67 kg/m2    Nursing note and vitals reviewed.        Assessment/Plan   Marilu was seen today for cough, fever and generalized body aches.    Diagnoses and all orders for this visit:    Acute pharyngitis, unspecified etiology  -     Dao-Barr Virus VCA Antibody Panel    Missed menses  -     HCG, B-subunit, Quantitative    Dysuria  -     Urine Culture - Urine, Urine, Clean Catch    Other orders  -     fluconazole (DIFLUCAN) 150 MG tablet; Take 1 tablet by mouth 1 (One) Time for 1 dose.      UA: 500 leukocytes.   Urine hcg: negative.     Strep resolved with antibiotics though she is still feeling poorly. Has had mono in her teenage years and again about 6 months ago labs were positive. Recheck today.        "

## 2017-12-30 LAB
EBV EA IGG SER-ACNC: 11.9 U/ML (ref 0–8.9)
EBV NA IGG SER IA-ACNC: 306 U/ML (ref 0–17.9)
EBV VCA IGG SER IA-ACNC: 195 U/ML (ref 0–17.9)
EBV VCA IGM SER IA-ACNC: <36 U/ML (ref 0–35.9)
HCG INTACT+B SERPL-ACNC: <2.39 MIU/ML
SERVICE CMNT-IMP: ABNORMAL

## 2018-01-02 ENCOUNTER — OFFICE VISIT (OUTPATIENT)
Dept: INTERNAL MEDICINE | Facility: CLINIC | Age: 28
End: 2018-01-02

## 2018-01-02 VITALS
TEMPERATURE: 99.5 F | BODY MASS INDEX: 33.34 KG/M2 | HEART RATE: 96 BPM | RESPIRATION RATE: 16 BRPM | DIASTOLIC BLOOD PRESSURE: 64 MMHG | HEIGHT: 63 IN | OXYGEN SATURATION: 98 % | SYSTOLIC BLOOD PRESSURE: 108 MMHG | WEIGHT: 188.19 LBS

## 2018-01-02 DIAGNOSIS — R82.998 LEUKOCYTES IN URINE: ICD-10-CM

## 2018-01-02 DIAGNOSIS — R19.7 DIARRHEA, UNSPECIFIED TYPE: Primary | ICD-10-CM

## 2018-01-02 LAB
BILIRUB BLD-MCNC: ABNORMAL MG/DL
CLARITY, POC: ABNORMAL
COLOR UR: ABNORMAL
EXPIRATION DATE: NORMAL
FLUAV AG NPH QL: NORMAL
FLUBV AG NPH QL: NORMAL
GLUCOSE UR STRIP-MCNC: NEGATIVE MG/DL
INTERNAL CONTROL: NORMAL
KETONES UR QL: NEGATIVE
LEUKOCYTE EST, POC: ABNORMAL
Lab: NORMAL
NITRITE UR-MCNC: NEGATIVE MG/ML
PH UR: 5 [PH] (ref 5–8)
PROT UR STRIP-MCNC: ABNORMAL MG/DL
RBC # UR STRIP: ABNORMAL /UL
SP GR UR: 1.02 (ref 1–1.03)
UROBILINOGEN UR QL: ABNORMAL

## 2018-01-02 PROCEDURE — 99214 OFFICE O/P EST MOD 30 MIN: CPT | Performed by: NURSE PRACTITIONER

## 2018-01-02 PROCEDURE — 87804 INFLUENZA ASSAY W/OPTIC: CPT | Performed by: NURSE PRACTITIONER

## 2018-01-02 RX ORDER — NITROFURANTOIN 25; 75 MG/1; MG/1
100 CAPSULE ORAL EVERY 12 HOURS SCHEDULED
Qty: 14 CAPSULE | Refills: 0 | Status: SHIPPED | OUTPATIENT
Start: 2018-01-02 | End: 2018-01-09

## 2018-01-02 RX ORDER — FLUCONAZOLE 150 MG/1
150 TABLET ORAL ONCE
Qty: 1 TABLET | Refills: 0 | Status: SHIPPED | OUTPATIENT
Start: 2018-01-02 | End: 2018-01-02

## 2018-01-02 NOTE — PROGRESS NOTES
Chief Complaint / Reason:      Chief Complaint   Patient presents with   • Diarrhea     onset Sat. or Sunday. Just finished Biaxin for strep.    • Fever     up to 102.    • Headache   • Vomiting   • Generalized Body Aches   • Chest Pain     discomfort       Subjective     HPI  Patient presents today with complaints of diarrhea fever up to 102, headache vomiting, generalized body aches and chest discomfort.  She states that the onset of diarrhea was Saturday or Sunday and she had recently finished Biaxin for strep.  She has been on multiple antibiotics previously and had been having ongoing gastrointestinal issues and what she has saw to GI doctors along with having an EGD and colonoscopy.  Patient is currently on sulfasalazine. She states that she does not feel better at all and she keeps getting sick all the time.  She states that she did have a positive urinalysis previously and still continues to have symptoms.  She was last seen in the clinic clinic on 12/29/17.  She states that she has right lower quadrant pain that radiates to her back.  She is currently on period.  Vital signs are stable with exception of low-grade fever.  Patient states that she has tried over-the-counter medication but has provided minimal relief only.   History taken from: patient    PMH/FH/Social History were reviewed and updated appropriately in the electronic medical record.     Review of Systems:   Review of Systems   Constitutional: Positive for fatigue and fever.   Respiratory: Negative.    Cardiovascular: Positive for chest pain.   Gastrointestinal: Positive for abdominal pain, diarrhea and nausea.   Genitourinary: Positive for difficulty urinating, dysuria, frequency and urgency.   Musculoskeletal: Positive for arthralgias and myalgias.     All other systems were reviewed and are negative.  Exceptions are noted in the subjective or above.      Objective     Vital Signs  Vitals:    01/02/18 1006   BP: 108/64   Pulse: 96   Resp: 16    Temp: 99.5 °F (37.5 °C)   SpO2: 98%       Body mass index is 33.35 kg/(m^2).    Physical Exam   Constitutional: No distress.   HENT:   Head: Normocephalic and atraumatic.   Right Ear: Tympanic membrane normal.   Left Ear: Tympanic membrane normal.   Nose: Rhinorrhea present. Right sinus exhibits no maxillary sinus tenderness and no frontal sinus tenderness. Left sinus exhibits no maxillary sinus tenderness and no frontal sinus tenderness.   Mouth/Throat: Posterior oropharyngeal erythema present. No oropharyngeal exudate.   Cardiovascular: Normal rate, regular rhythm and normal heart sounds.    Pulmonary/Chest: Effort normal and breath sounds normal. No respiratory distress. She has no wheezes.   Abdominal: Soft. Bowel sounds are normal. There is tenderness.   Lymphadenopathy:     She has no cervical adenopathy.   Nursing note and vitals reviewed.       Results Review:    I reviewed the patient's new and previous clinical results.   Office Visit on 01/02/2018   Component Date Value Ref Range Status   • Rapid Influenza A Ag 01/02/2018 neg.   Final   • Rapid Influenza B Ag 01/02/2018 neg.   Final   • Internal Control 01/02/2018 Passed  Passed Final   • Lot Number 01/02/2018 [id number]   Final   • Expiration Date 01/02/2018 2/10/20   Final   • Color 01/02/2018 Dark Yellow  Yellow, Straw, Dark Yellow, Adele Final   • Clarity, UA 01/02/2018 Cloudy* Clear Final   • Glucose, UA 01/02/2018 Negative  Negative, 1000 mg/dL (3+) mg/dL Final   • Bilirubin 01/02/2018 1 mg/dL* Negative Final   • Ketones, UA 01/02/2018 Negative  Negative Final   • Specific Gravity  01/02/2018 1.025  1.005 - 1.030 Final   • Blood, UA 01/02/2018 250 Iggy/ul* Negative Final   • pH, Urine 01/02/2018 5.0  5.0 - 8.0 Final   • Protein, POC 01/02/2018 30 mg/dL* Negative mg/dL Final   • Urobilinogen, UA 01/02/2018 1 E.U./dL * Normal Final   • Leukocytes 01/02/2018 25 Jim/ul* Negative Final   • Nitrite, UA 01/02/2018 Negative  Negative Final         Medication  Review:     Current Outpatient Prescriptions:   •  acetaminophen (TYLENOL 8 HOUR) 650 MG 8 hr tablet, Take 1 tablet by mouth Every 8 (Eight) Hours As Needed for Mild Pain ., Disp: 12 tablet, Rfl: 0  •  albuterol (PROVENTIL HFA;VENTOLIN HFA) 108 (90 BASE) MCG/ACT inhaler, Inhale 2 puffs Every 6 (Six) Hours As Needed for Wheezing or Shortness of Air., Disp: 1 inhaler, Rfl: 3  •  albuterol (PROVENTIL) (2.5 MG/3ML) 0.083% nebulizer solution, Take 2.5 mg by nebulization Every 4 (Four) Hours As Needed for Wheezing or Shortness of Air., Disp: 60 vial, Rfl: 3  •  apixaban (ELIQUIS) 5 MG tablet tablet, Take 5 mg by mouth 2 (Two) Times a Day., Disp: , Rfl:   •  aspirin 81 MG tablet, Take 1 tablet by mouth Daily., Disp: 30 tablet, Rfl: 11  •  atorvastatin (LIPITOR) 80 MG tablet, Take 1 tablet by mouth Daily., Disp: 30 tablet, Rfl: 11  •  azelastine (ASTELIN) 0.1 % nasal spray, 1 spray into each nostril 2 (Two) Times a Day As Needed for Rhinitis or Allergies. Use in each nostril as directed, Disp: 1 each, Rfl: 5  •  DULoxetine (CYMBALTA) 30 MG capsule, Take 1 capsule by mouth Daily., Disp: 30 capsule, Rfl: 5  •  flunisolide (NASALIDE) 25 MCG/ACT (0.025%) solution nasal spray, Inhale 1 spray Every 12 (Twelve) Hours., Disp: 1 bottle, Rfl: 5  •  Fluticasone Furoate-Vilanterol (BREO ELLIPTA) 100-25 MCG/INH aerosol powder , Inhale 1 puff Daily., Disp: , Rfl:   •  ibuprofen (ADVIL,MOTRIN) 600 MG tablet, Take 1 tablet by mouth 3 (Three) Times a Day With Meals., Disp: 90 tablet, Rfl: 0  •  LORazepam (ATIVAN) 0.5 MG tablet, Take 1 tablet by mouth Every 8 (Eight) Hours As Needed for Anxiety., Disp: 9 tablet, Rfl: 0  •  midodrine (PROAMATINE) 5 MG tablet, Take 1 tablet by mouth 3 (Three) Times a Day. Last dose no later that 5 pm, Disp: 90 tablet, Rfl: 5  •  montelukast (SINGULAIR) 10 MG tablet, TAKE 1 TABLET BY MOUTH IN THE EVENING , Disp: 30 tablet, Rfl: 2  •  ondansetron ODT (ZOFRAN-ODT) 4 MG disintegrating tablet, Take 1 tablet by  mouth Every 6 (Six) Hours As Needed for Nausea or Vomiting., Disp: 12 tablet, Rfl: 0  •  pantoprazole (PROTONIX) 40 MG EC tablet, TAKE 1 TABLET BY MOUTH ONE TIME A DAY , Disp: 30 tablet, Rfl: 4  •  promethazine (PHENERGAN) 25 MG tablet, Take 1 tablet by mouth Every 6 (Six) Hours As Needed for Nausea or Vomiting., Disp: 30 tablet, Rfl: 0  •  sulfaSALAzine (AZULFIDINE) 500 MG tablet, Take 1 tablet by mouth 3 (Three) Times a Day for 30 days., Disp: 90 tablet, Rfl: 0  •  tiZANidine (ZANAFLEX) 4 MG tablet, Take 1 tablet by mouth Every 8 (Eight) Hours As Needed for Muscle Spasms., Disp: 30 tablet, Rfl: 3  •  traMADol (ULTRAM) 50 MG tablet, Take 1 tablet by mouth Every 8 (Eight) Hours As Needed for Severe Pain ., Disp: 15 tablet, Rfl: 0  •  Triamcinolone Acetonide (NASACORT) 55 MCG/ACT nasal inhaler, 2 sprays into each nostril Daily., Disp: , Rfl:   •  fluconazole (DIFLUCAN) 150 MG tablet, Take 1 tablet by mouth 1 (One) Time for 1 dose., Disp: 1 tablet, Rfl: 0  •  nitrofurantoin, macrocrystal-monohydrate, (MACROBID) 100 MG capsule, Take 1 capsule by mouth Every 12 (Twelve) Hours for 7 days., Disp: 14 capsule, Rfl: 0    Assessment/Plan   Marilu was seen today for diarrhea, fever, headache, vomiting, generalized body aches and chest pain.    Diagnoses and all orders for this visit:    Diarrhea, unspecified type  -     Clostridium Difficile Toxin, PCR - Stool, Per Rectum; Future  -     POCT Influenza A/B  Advised patient to wait a week following completion of antibiotics for urinary symptoms before providing stool sample.  She had been tested previously and C. difficile was negative.  Concern is regarding frequent antibiotic administrations and abdominal issues.  Leukocytes in urine  -     Cancel: Urine Culture - Urine, Urine, Clean Catch; Future  -     nitrofurantoin, macrocrystal-monohydrate, (MACROBID) 100 MG capsule; Take 1 capsule by mouth Every 12 (Twelve) Hours for 7 days.  -     fluconazole (DIFLUCAN) 150 MG tablet;  Take 1 tablet by mouth 1 (One) Time for 1 dose.  -     Urine Culture - Urine, Urine, Clean Catch  -     Cancel: POCT urinalysis dipstick, automated  Since patient continues to be symptomatic for urinary symptoms will treat at this time but explained to patient the risk associated with continue antibiotics and will culture urine to make sure patient is on correct antibiotic.    Return if symptoms worsen or fail to improve.    Barby Garcia, ARNIE  01/02/2018

## 2018-01-04 LAB
BACTERIA UR CULT: ABNORMAL
BACTERIA UR CULT: ABNORMAL
BACTERIA UR CULT: NORMAL
BACTERIA UR CULT: NORMAL
OTHER ANTIBIOTIC SUSC ISLT: ABNORMAL

## 2018-01-07 ENCOUNTER — APPOINTMENT (OUTPATIENT)
Dept: CT IMAGING | Facility: HOSPITAL | Age: 28
End: 2018-01-07

## 2018-01-07 ENCOUNTER — HOSPITAL ENCOUNTER (EMERGENCY)
Facility: HOSPITAL | Age: 28
Discharge: HOME | End: 2018-01-07
Attending: EMERGENCY MEDICINE | Admitting: EMERGENCY MEDICINE

## 2018-01-07 ENCOUNTER — APPOINTMENT (OUTPATIENT)
Dept: ULTRASOUND IMAGING | Facility: HOSPITAL | Age: 28
End: 2018-01-07

## 2018-01-07 VITALS
DIASTOLIC BLOOD PRESSURE: 74 MMHG | HEIGHT: 63 IN | SYSTOLIC BLOOD PRESSURE: 110 MMHG | OXYGEN SATURATION: 97 % | WEIGHT: 188 LBS | HEART RATE: 87 BPM | BODY MASS INDEX: 33.31 KG/M2 | RESPIRATION RATE: 18 BRPM | TEMPERATURE: 98.4 F

## 2018-01-07 DIAGNOSIS — R10.30 LOWER ABDOMINAL PAIN OF UNKNOWN ETIOLOGY: Primary | ICD-10-CM

## 2018-01-07 LAB
ANION GAP SERPL CALCULATED.3IONS-SCNC: 11 MMOL/L
B-HCG UR QL: NEGATIVE
BASOPHILS # BLD AUTO: 0.05 10*3/MM3 (ref 0–0.2)
BASOPHILS NFR BLD AUTO: 0.7 % (ref 0–2.5)
BILIRUB UR QL STRIP: NEGATIVE
BUN BLD-MCNC: 9 MG/DL (ref 7–20)
BUN/CREAT SERPL: 15 (ref 7.1–23.5)
CALCIUM SPEC-SCNC: 9.6 MG/DL (ref 8.4–10.2)
CHLORIDE SERPL-SCNC: 106 MMOL/L (ref 98–107)
CLARITY UR: ABNORMAL
CO2 SERPL-SCNC: 27 MMOL/L (ref 26–30)
COLOR UR: YELLOW
CREAT BLD-MCNC: 0.6 MG/DL (ref 0.6–1.3)
DEPRECATED RDW RBC AUTO: 40.3 FL (ref 37–54)
EOSINOPHIL # BLD AUTO: 0.06 10*3/MM3 (ref 0–0.7)
EOSINOPHIL NFR BLD AUTO: 0.9 % (ref 0–7)
ERYTHROCYTE [DISTWIDTH] IN BLOOD BY AUTOMATED COUNT: 12.4 % (ref 11.5–14.5)
GFR SERPL CREATININE-BSD FRML MDRD: 120 ML/MIN/1.73
GLUCOSE BLD-MCNC: 88 MG/DL (ref 74–98)
GLUCOSE UR STRIP-MCNC: NEGATIVE MG/DL
HCT VFR BLD AUTO: 40 % (ref 37–47)
HGB BLD-MCNC: 13.3 G/DL (ref 12–16)
HGB UR QL STRIP.AUTO: NEGATIVE
IMM GRANULOCYTES # BLD: 0.02 10*3/MM3 (ref 0–0.06)
IMM GRANULOCYTES NFR BLD: 0.3 % (ref 0–0.6)
KETONES UR QL STRIP: NEGATIVE
LEUKOCYTE ESTERASE UR QL STRIP.AUTO: NEGATIVE
LYMPHOCYTES # BLD AUTO: 2.46 10*3/MM3 (ref 0.6–3.4)
LYMPHOCYTES NFR BLD AUTO: 35.8 % (ref 10–50)
MCH RBC QN AUTO: 29.5 PG (ref 27–31)
MCHC RBC AUTO-ENTMCNC: 33.3 G/DL (ref 30–37)
MCV RBC AUTO: 88.7 FL (ref 81–99)
MONOCYTES # BLD AUTO: 0.32 10*3/MM3 (ref 0–0.9)
MONOCYTES NFR BLD AUTO: 4.7 % (ref 0–12)
NEUTROPHILS # BLD AUTO: 3.96 10*3/MM3 (ref 2–6.9)
NEUTROPHILS NFR BLD AUTO: 57.6 % (ref 37–80)
NITRITE UR QL STRIP: NEGATIVE
NRBC BLD MANUAL-RTO: 0 /100 WBC (ref 0–0)
PH UR STRIP.AUTO: 6 [PH] (ref 5–8)
PLATELET # BLD AUTO: 325 10*3/MM3 (ref 130–400)
PMV BLD AUTO: 9.4 FL (ref 6–12)
POTASSIUM BLD-SCNC: 4 MMOL/L (ref 3.5–5.1)
PROT UR QL STRIP: NEGATIVE
RBC # BLD AUTO: 4.51 10*6/MM3 (ref 4.2–5.4)
SODIUM BLD-SCNC: 140 MMOL/L (ref 137–145)
SP GR UR STRIP: 1.02 (ref 1–1.03)
UROBILINOGEN UR QL STRIP: ABNORMAL
WBC NRBC COR # BLD: 6.87 10*3/MM3 (ref 4.8–10.8)

## 2018-01-07 PROCEDURE — 99284 EMERGENCY DEPT VISIT MOD MDM: CPT

## 2018-01-07 PROCEDURE — 76830 TRANSVAGINAL US NON-OB: CPT

## 2018-01-07 PROCEDURE — 80048 BASIC METABOLIC PNL TOTAL CA: CPT | Performed by: PHYSICIAN ASSISTANT

## 2018-01-07 PROCEDURE — 81025 URINE PREGNANCY TEST: CPT | Performed by: PHYSICIAN ASSISTANT

## 2018-01-07 PROCEDURE — 85025 COMPLETE CBC W/AUTO DIFF WBC: CPT | Performed by: PHYSICIAN ASSISTANT

## 2018-01-07 PROCEDURE — 74176 CT ABD & PELVIS W/O CONTRAST: CPT

## 2018-01-07 PROCEDURE — 81003 URINALYSIS AUTO W/O SCOPE: CPT | Performed by: PHYSICIAN ASSISTANT

## 2018-01-07 RX ORDER — ONDANSETRON 4 MG/1
4 TABLET, ORALLY DISINTEGRATING ORAL ONCE
Status: COMPLETED | OUTPATIENT
Start: 2018-01-07 | End: 2018-01-07

## 2018-01-07 RX ORDER — ACETAMINOPHEN 500 MG
500 TABLET ORAL EVERY 6 HOURS PRN
Qty: 12 TABLET | Refills: 0 | Status: SHIPPED | OUTPATIENT
Start: 2018-01-07 | End: 2018-04-02 | Stop reason: HOSPADM

## 2018-01-07 RX ORDER — OXYCODONE HYDROCHLORIDE AND ACETAMINOPHEN 5; 325 MG/1; MG/1
0.5 TABLET ORAL ONCE
Status: COMPLETED | OUTPATIENT
Start: 2018-01-07 | End: 2018-01-07

## 2018-01-07 RX ADMIN — OXYCODONE AND ACETAMINOPHEN 0.5 TABLET: 5; 325 TABLET ORAL at 14:34

## 2018-01-07 RX ADMIN — ONDANSETRON 4 MG: 4 TABLET, ORALLY DISINTEGRATING ORAL at 14:34

## 2018-01-07 NOTE — ED PROVIDER NOTES
Subjective   HPI Comments: Patient is here with complaint of some intermittent right flank pain symptoms for the past several days diagnosed with urinary tract infection did have some vomiting diarrhea a few days ago none since no chest pain or shortness of air reported  Patient concerned about possibility of kidney stone  She denies any vaginal discharge or complaints      Review of Systems   Constitutional: Negative.    HENT: Negative.    Eyes: Negative.    Respiratory: Negative.    Cardiovascular: Negative.  Negative for chest pain and leg swelling.   Gastrointestinal: Positive for abdominal pain. Negative for vomiting.   Genitourinary: Positive for flank pain. Negative for pelvic pain and vaginal discharge.   Musculoskeletal: Positive for back pain.   Skin: Negative.    Neurological: Negative.    Psychiatric/Behavioral: The patient is nervous/anxious.    All other systems reviewed and are negative.      Past Medical History:   Diagnosis Date   • Abdominal pain    • Allergic    • Asthma    • Chest pain    • Clotting disorder     factor 5   • Coronary artery disease involving native coronary artery of native heart without angina pectoris 2017   • Diarrhea    • Factor 5 Leiden mutation, heterozygous    • Gallstone    • GERD (gastroesophageal reflux disease)    • H/O blood clots    • Headache    • Hypertension    • Kidney infection    • Nausea & vomiting    • Obesity    • Orthostatic hypotension    • Ovarian cyst    • Protein S deficiency 2016    labs from hospitalization for PE   • Pulmonary embolism    • Sleep apnea    • Subclinical hyperthyroidism    • Tattoo        Allergies   Allergen Reactions   • Amoxicillin Rash       Past Surgical History:   Procedure Laterality Date   •  SECTION       and    • CHOLECYSTECTOMY     • COLONOSCOPY N/A 2017    Procedure: COLONOSCOPY WITH BIOPSIES AND ARGON THERMAL ABLATION;  Surgeon: Jignesh Selby MD;  Location: [facility] ENDOSCOPY;   Service:    • ENDOSCOPY N/A 4/20/2017    Procedure: ESOPHAGOGASTRODUODENOSCOPY WITH BIOPSIES AND COLD BIOPSY POLYPECTOMIES;  Surgeon: Jignesh Selby MD;  Location: [facility] ENDOSCOPY;  Service:        Family History   Problem Relation Age of Onset   • Arthritis Mother    • COPD Mother    • Asthma Mother    • Arthritis Father    • Diabetes Father    • Hypertension Father    • Hyperlipidemia Father    • Kidney disease Father    • Heart attack Father    • No Known Problems Son    • Colon cancer Neg Hx    • Liver cancer Neg Hx    • Liver disease Neg Hx    • Stomach cancer Neg Hx    • Esophageal cancer Neg Hx        Social History     Social History   • Marital status:      Spouse name: N/A   • Number of children: N/A   • Years of education: N/A     Social History Main Topics   • Smoking status: Never Smoker   • Smokeless tobacco: Never Used   • Alcohol use No   • Drug use: No   • Sexual activity: Yes     Partners: Male     Birth control/ protection: None      Comment: PE while on birth control patch     Other Topics Concern   • None     Social History Narrative           Objective   Physical Exam   Constitutional: She is oriented to person, place, and time. She appears well-developed and well-nourished.   Afebrile nontoxic no acute distress   HENT:   Head: Normocephalic.   Mouth/Throat: Oropharynx is clear and moist.   Eyes: Conjunctivae and EOM are normal. Pupils are equal, round, and reactive to light.   Neck: Normal range of motion. Neck supple.   Cardiovascular: Normal rate and regular rhythm.    Pulmonary/Chest: Effort normal and breath sounds normal.   Abdominal: Soft. Bowel sounds are normal. There is tenderness. There is no rebound and no guarding.   Mild tenderness right CVA right lower quadrant... No rebound or guarding   Musculoskeletal: Normal range of motion. She exhibits no edema.   Neurological: She is alert and oriented to person, place, and time. No cranial nerve deficit. She exhibits normal muscle  tone.   Skin: Skin is warm and dry. No rash noted.   Psychiatric: Her behavior is normal. Judgment and thought content normal.   Nursing note and vitals reviewed.      Procedures         ED Course  ED Course   Comment By Time   Patient resting pending CT imaging report Chapo Melendez PA-C 01/07 1531   Patient resting comfortably no distress CT imaging did not demonstrate any acute abnormalities we'll proceed with ultrasound after discussed with patient evaluate ovaries also possibility of endometriosis patient apparently has had similar pain in the past and told this could be a possibility so she is familiar with this dx Chapo Melendez PA-C 01/07 1551   Patient resting pending ultrasound report no acute distress Chapo Melendez PA-C 01/07 1748   Ultrasound per radiology no significant abnormality... Again we'll have patient follow-up with gynecology return here for sudden changes worsening pain or concerns otherwise Chapo Melendez PA-C 01/07 1821   Patient resting comfortably no distress Chapo Melendez PA-C 01/07 1915                  [facility]    Final diagnoses:   Lower abdominal pain of unknown etiology            Chapo Melendez PA-C  01/07/18 1918

## 2018-01-08 ENCOUNTER — OFFICE VISIT (OUTPATIENT)
Dept: INTERNAL MEDICINE | Facility: CLINIC | Age: 28
End: 2018-01-08

## 2018-01-08 VITALS
HEIGHT: 63 IN | OXYGEN SATURATION: 97 % | RESPIRATION RATE: 14 BRPM | TEMPERATURE: 98.2 F | WEIGHT: 189 LBS | SYSTOLIC BLOOD PRESSURE: 120 MMHG | DIASTOLIC BLOOD PRESSURE: 80 MMHG | HEART RATE: 105 BPM | BODY MASS INDEX: 33.49 KG/M2

## 2018-01-08 DIAGNOSIS — K59.00 CONSTIPATION, UNSPECIFIED CONSTIPATION TYPE: ICD-10-CM

## 2018-01-08 DIAGNOSIS — N85.2 UTERINE ENLARGEMENT: ICD-10-CM

## 2018-01-08 DIAGNOSIS — R10.31 ABDOMINAL PAIN, RIGHT LOWER QUADRANT: Primary | ICD-10-CM

## 2018-01-08 DIAGNOSIS — R63.4 WEIGHT LOSS, NON-INTENTIONAL: ICD-10-CM

## 2018-01-08 DIAGNOSIS — R19.4 CHANGE IN BOWEL HABITS: ICD-10-CM

## 2018-01-08 PROCEDURE — 99214 OFFICE O/P EST MOD 30 MIN: CPT | Performed by: FAMILY MEDICINE

## 2018-01-08 NOTE — PROGRESS NOTES
Subjective    Marilu Godoy is a 27 y.o. female here for:  Chief Complaint   Patient presents with   • Abdominal Pain     ON RIGHT SIDE, RADIATES AROUND TO BACK, IN ER LAST NIGHT     History of Present Illness     Last period started 1/2/18. Having pain on right lower abdomen that radiates around toward back. Was seen in ER yesterday and had labs and imaging. She has seen Nikole Palafox for gyn care previously. Her ParaGard was in, she says, but she's not followed up yet for placement. Has irregular menses so she has a hard time getting in there when cervical os open. Was scheduled for GI follow up, but [facility] shows her appointment has been cancelled, not scheduled now until March or so. She has had constipation today, had a hard to pass bowel movement this morning. She's normally dealing with diarrhea. She has also noticed decreased appetite and weight loss that is unintentional. She's not tried the FODMAP diet as we've discussed previously.    The following portions of the patient's history were reviewed and updated as appropriate: allergies, current medications, past family history, past medical history, past social history, past surgical history and problem list.    Review of Systems   Constitutional: Positive for activity change and fatigue.   Gastrointestinal: Positive for abdominal pain.   Genitourinary: Positive for menstrual problem and pelvic pain.   Musculoskeletal: Positive for arthralgias.       Vitals:    01/08/18 1138   BP: 120/80   Pulse: 105   Resp: 14   Temp: 98.2 °F (36.8 °C)   SpO2: 97%         Objective   Physical Exam   Constitutional: She is oriented to person, place, and time. Vital signs are normal. She appears well-developed and well-nourished. She is active. She does not have a sickly appearance. She does not appear ill.   Appears stated age. Well groomed.    HENT:   Head: Normocephalic and atraumatic. Hair is normal.   Right Ear: Hearing normal.   Left Ear: Hearing normal.   Nose: Nose  normal.   Mouth/Throat: Abnormal dentition (decay between top two middle teeth).   Eyes: EOM and lids are normal. Pupils are equal, round, and reactive to light. No scleral icterus.   Neck: Phonation normal. Neck supple.   Cardiovascular: Normal rate, regular rhythm and normal heart sounds.  Exam reveals no gallop and no friction rub.    No murmur heard.  Pulmonary/Chest: Effort normal and breath sounds normal.   Abdominal: Soft. Bowel sounds are normal. She exhibits no distension and no mass. There is tenderness in the right lower quadrant and suprapubic area. There is no rigidity, no rebound and no guarding.   Musculoskeletal: She exhibits no deformity.   Neurological: She is alert and oriented to person, place, and time. She displays no tremor. No cranial nerve deficit. Gait normal.   Skin: Skin is warm. No rash noted. She is not diaphoretic. No cyanosis. Nails show no clubbing.   Psychiatric: She has a normal mood and affect. Her speech is normal and behavior is normal. Judgment and thought content normal. Cognition and memory are normal.   Nursing note and vitals reviewed.      Reviewed ED note from 1/7/18. Reviewed labs, within normal limits. Normal TVUS. CT abdomen report has addendum stating uterine enlargement likely due to fibroids. I personally reviewed the scan and it does seem to show a stool burden in colon especially on right. No nephrolithiasis.    Assessment/Plan       Marilu was seen today for abdominal pain.    Diagnoses and all orders for this visit:    Abdominal pain, right lower quadrant    Uterine enlargement    Change in bowel habits    Constipation, unspecified constipation type    Weight loss, non-intentional        · Stressed to patient need to follow up with both gyn and GI. Needs to call GI to inquire into her appointment and call gyn to schedule follow up. May need laparoscopy to further evaluate for endometriosis.   · I stressed to her the importance of trying FODMAP diet prior to GI  follow up.  · Monitor weight. Synopsis shows she's declined from her previous baseline and has seemed to set up a new baseline weight.      Deidre Barker MD    Please note that portions of this note were completed with a voice recognition program. Efforts were made to edit dictation, but occasionally words are mistranscribed.

## 2018-01-12 ENCOUNTER — TELEPHONE (OUTPATIENT)
Dept: INTERNAL MEDICINE | Facility: CLINIC | Age: 28
End: 2018-01-12

## 2018-01-12 NOTE — TELEPHONE ENCOUNTER
"Pt. told Mitzy while seeing her child that she wanted to see if her Crohn's med. has been covered. She thinks that it starts with an \"s\". I cannot find anything on it nor could Sofia for our providers. Pt. stated that it was an effort between Mirlande Forrester and Dr. Barker. I thought it would be a specialist. Anyway, do you know anything about a PA for her?   "

## 2018-01-16 ENCOUNTER — TELEPHONE (OUTPATIENT)
Dept: INTERNAL MEDICINE | Facility: CLINIC | Age: 28
End: 2018-01-16

## 2018-01-19 ENCOUNTER — HOSPITAL ENCOUNTER (OUTPATIENT)
Dept: GENERAL RADIOLOGY | Facility: HOSPITAL | Age: 28
Discharge: HOME | End: 2018-01-19
Attending: FAMILY MEDICINE | Admitting: FAMILY MEDICINE

## 2018-01-19 ENCOUNTER — OFFICE VISIT (OUTPATIENT)
Dept: INTERNAL MEDICINE | Facility: CLINIC | Age: 28
End: 2018-01-19

## 2018-01-19 VITALS
BODY MASS INDEX: 33.31 KG/M2 | HEIGHT: 63 IN | TEMPERATURE: 99.6 F | DIASTOLIC BLOOD PRESSURE: 78 MMHG | RESPIRATION RATE: 14 BRPM | SYSTOLIC BLOOD PRESSURE: 118 MMHG | WEIGHT: 188 LBS | OXYGEN SATURATION: 98 % | HEART RATE: 108 BPM

## 2018-01-19 DIAGNOSIS — R85.7 ABNORMAL INFLAMMATORY BOWEL DISEASE PANEL: ICD-10-CM

## 2018-01-19 DIAGNOSIS — M25.50 CHRONIC JOINT PAIN: ICD-10-CM

## 2018-01-19 DIAGNOSIS — M54.50 CHRONIC MIDLINE LOW BACK PAIN WITHOUT SCIATICA: ICD-10-CM

## 2018-01-19 DIAGNOSIS — M53.3 SACROILIAC DYSFUNCTION: ICD-10-CM

## 2018-01-19 DIAGNOSIS — G89.29 CHRONIC MIDLINE LOW BACK PAIN WITHOUT SCIATICA: ICD-10-CM

## 2018-01-19 DIAGNOSIS — R19.4 CHANGE IN BOWEL HABITS: ICD-10-CM

## 2018-01-19 DIAGNOSIS — J02.9 SORE THROAT: Primary | ICD-10-CM

## 2018-01-19 DIAGNOSIS — G89.29 CHRONIC JOINT PAIN: ICD-10-CM

## 2018-01-19 LAB
EXPIRATION DATE: ABNORMAL
INTERNAL CONTROL: ABNORMAL
Lab: ABNORMAL
S PYO AG THROAT QL: POSITIVE

## 2018-01-19 PROCEDURE — 72202 X-RAY EXAM SI JOINTS 3/> VWS: CPT

## 2018-01-19 PROCEDURE — 87880 STREP A ASSAY W/OPTIC: CPT | Performed by: FAMILY MEDICINE

## 2018-01-19 PROCEDURE — 99214 OFFICE O/P EST MOD 30 MIN: CPT | Performed by: FAMILY MEDICINE

## 2018-01-19 PROCEDURE — 72110 X-RAY EXAM L-2 SPINE 4/>VWS: CPT

## 2018-01-19 RX ORDER — AZITHROMYCIN 250 MG/1
TABLET, FILM COATED ORAL
Qty: 6 TABLET | Refills: 0 | Status: SHIPPED | OUTPATIENT
Start: 2018-01-19 | End: 2018-01-29

## 2018-01-19 RX ORDER — TRAMADOL HYDROCHLORIDE 50 MG/1
50 TABLET ORAL EVERY 8 HOURS PRN
Qty: 15 TABLET | Refills: 0 | Status: SHIPPED | OUTPATIENT
Start: 2018-01-19 | End: 2018-04-02 | Stop reason: HOSPADM

## 2018-01-19 RX ORDER — FLUCONAZOLE 150 MG/1
TABLET ORAL
Qty: 2 TABLET | Refills: 0 | Status: SHIPPED | OUTPATIENT
Start: 2018-01-19 | End: 2018-03-01

## 2018-01-19 RX ORDER — SULFASALAZINE 500 MG/1
500 TABLET ORAL 2 TIMES DAILY
Qty: 60 TABLET | Refills: 1 | Status: SHIPPED | OUTPATIENT
Start: 2018-01-19 | End: 2018-05-15

## 2018-01-19 NOTE — PROGRESS NOTES
Subjective    Marilu Godoy is a 27 y.o. female here for:  Chief Complaint   Patient presents with   • Cough     cough with chest congestion, causing discomfort in chest that is worse when taking deep breath     History of Present Illness     She has a sore throat, started this morning. Also more tired than normal with congestion, chest burning, muscle aches. Chest hurts worse when she breathes. Still on Eliquis.     The following portions of the patient's history were reviewed and updated as appropriate: allergies, current medications, past family history, past medical history, past social history, past surgical history and problem list.    Review of Systems   Constitutional: Positive for activity change and fatigue.   HENT: Positive for congestion, rhinorrhea and sore throat.    Respiratory: Positive for cough.        Vitals:    01/19/18 1437   BP: 118/78   Pulse: 108   Resp: 14   Temp: 99.6 °F (37.6 °C)   SpO2: 98%         Objective   Physical Exam   Constitutional: She is oriented to person, place, and time. Vital signs are normal. She appears well-developed and well-nourished. She is active. She does not have a sickly appearance. She does not appear ill.   Appears stated age. Well groomed.    HENT:   Head: Normocephalic and atraumatic. Hair is normal.   Right Ear: Hearing, tympanic membrane, external ear and ear canal normal.   Left Ear: Hearing, tympanic membrane, external ear and ear canal normal.   Nose: Nose normal.   Mouth/Throat: Abnormal dentition (decay between top two middle teeth). Posterior oropharyngeal erythema present. No oropharyngeal exudate.   Eyes: EOM and lids are normal. Pupils are equal, round, and reactive to light. No scleral icterus.   Neck: Phonation normal. Neck supple.   Cardiovascular: Normal rate, regular rhythm and normal heart sounds.  Exam reveals no gallop and no friction rub.    No murmur heard.  Pulmonary/Chest: Effort normal and breath sounds normal.   Abdominal: She  exhibits no distension and no mass.   Musculoskeletal: She exhibits no deformity.        Lumbar back: She exhibits tenderness and pain.   Lymphadenopathy:     She has cervical adenopathy.        Right cervical: Superficial cervical adenopathy present.   Neurological: She is alert and oriented to person, place, and time. She displays no tremor. No cranial nerve deficit. Gait normal.   Skin: Skin is warm. No rash noted. She is not diaphoretic. No cyanosis. Nails show no clubbing.   Psychiatric: She has a normal mood and affect. Her speech is normal and behavior is normal. Judgment and thought content normal. Cognition and memory are normal.   Nursing note and vitals reviewed.      Lab Results   Component Value Date    RAPSCRN Positive (A) 01/19/2018         Assessment/Plan       Marilu was seen today for cough.    Diagnoses and all orders for this visit:    Sore throat  -     POC Rapid Strep A  -     azithromycin (ZITHROMAX Z-MILLY) 250 MG tablet; Take 2 tablets the first day, then 1 tablet daily for 4 days.  -     fluconazole (DIFLUCAN) 150 MG tablet; TAKE ONE TAB PO X ONCE, CAN REPEAT IN 4 DAYS IF NEEDED    Chronic midline low back pain without sciatica  -     XR spine lumbar 4+ vw    Sacroiliac dysfunction  -     XR sacroiliac joints 3+ vw    Chronic joint pain  -     sulfaSALAzine (AZULFIDINE) 500 MG tablet; Take 1 tablet by mouth 2 (Two) Times a Day.  -     traMADol (ULTRAM) 50 MG tablet; Take 1 tablet by mouth Every 8 (Eight) Hours As Needed for Severe Pain .    Change in bowel habits  -     sulfaSALAzine (AZULFIDINE) 500 MG tablet; Take 1 tablet by mouth 2 (Two) Times a Day.    Abnormal inflammatory bowel disease panel  -     sulfaSALAzine (AZULFIDINE) 500 MG tablet; Take 1 tablet by mouth 2 (Two) Times a Day.        · Follow up with GI regarding chronic issues, suspicion for autoimmune etiology  · Penicillin allergy.  ·       Deidre Barker MD    Please note that portions of this note were completed with  a voice recognition program. Efforts were made to edit dictation, but occasionally words are mistranscribed.

## 2018-01-29 ENCOUNTER — OFFICE VISIT (OUTPATIENT)
Dept: INTERNAL MEDICINE | Facility: CLINIC | Age: 28
End: 2018-01-29

## 2018-01-29 VITALS
OXYGEN SATURATION: 96 % | HEART RATE: 92 BPM | TEMPERATURE: 98.8 F | RESPIRATION RATE: 14 BRPM | DIASTOLIC BLOOD PRESSURE: 74 MMHG | SYSTOLIC BLOOD PRESSURE: 106 MMHG | BODY MASS INDEX: 33.41 KG/M2 | HEIGHT: 63 IN | WEIGHT: 188.56 LBS

## 2018-01-29 DIAGNOSIS — M51.36 DDD (DEGENERATIVE DISC DISEASE), LUMBAR: ICD-10-CM

## 2018-01-29 DIAGNOSIS — G89.29 OTHER CHRONIC PAIN: ICD-10-CM

## 2018-01-29 DIAGNOSIS — J02.9 ACUTE PHARYNGITIS, UNSPECIFIED ETIOLOGY: ICD-10-CM

## 2018-01-29 DIAGNOSIS — H57.11 PAIN OF RIGHT EYE: ICD-10-CM

## 2018-01-29 DIAGNOSIS — R39.9 URINARY SYMPTOM OR SIGN: Primary | ICD-10-CM

## 2018-01-29 LAB
BILIRUB BLD-MCNC: ABNORMAL MG/DL
CLARITY, POC: ABNORMAL
COLOR UR: YELLOW
GLUCOSE UR STRIP-MCNC: NEGATIVE MG/DL
KETONES UR QL: NEGATIVE
LEUKOCYTE EST, POC: NEGATIVE
NITRITE UR-MCNC: NEGATIVE MG/ML
PH UR: 6.5 [PH] (ref 5–8)
PROT UR STRIP-MCNC: NEGATIVE MG/DL
RBC # UR STRIP: ABNORMAL /UL
SP GR UR: 1.02 (ref 1–1.03)
UROBILINOGEN UR QL: NORMAL

## 2018-01-29 PROCEDURE — 99214 OFFICE O/P EST MOD 30 MIN: CPT | Performed by: NURSE PRACTITIONER

## 2018-01-29 RX ORDER — DOXYCYCLINE HYCLATE 100 MG/1
100 TABLET, DELAYED RELEASE ORAL 2 TIMES DAILY
Qty: 14 TABLET | Refills: 0 | Status: SHIPPED | OUTPATIENT
Start: 2018-01-29 | End: 2018-02-05

## 2018-01-29 NOTE — PROGRESS NOTES
Chief Complaint / Reason:      Chief Complaint   Patient presents with   • Sore Throat     took a Zpak for strep and throat is only slightly better. ? fever.    • Abdominal Pain     and side pain, right.    • Headache     muscle aches and fatigue.        Subjective     Sore Throat    Associated symptoms include abdominal pain, diarrhea and headaches. Pertinent negatives include no vomiting.   Abdominal Pain   This is a recurrent problem. The current episode started in the past 7 days. The onset quality is sudden. The problem occurs constantly. The most recent episode lasted 2 weeks. The problem has been gradually worsening. The pain is located in the RLQ. The pain is at a severity of 6/10. The quality of the pain is aching, burning and cramping. The abdominal pain radiates to the RUQ. Associated symptoms include diarrhea, dysuria, a fever, headaches, myalgias and weight loss. Pertinent negatives include no anorexia, arthralgias, belching, flatus, frequency, hematochezia, hematuria, melena, nausea or vomiting. The pain is aggravated by bowel movement, certain positions, coughing, movement and urination. The pain is relieved by being still, recumbency and standing. Prior diagnostic workup includes CT scan, lower endoscopy, ultrasound and upper endoscopy.   Headache    Associated symptoms include abdominal pain, a fever, a sore throat and weight loss. Pertinent negatives include no anorexia, nausea or vomiting.       History taken from: patient    PMH/FH/Social History were reviewed and updated appropriately in the electronic medical record.     Review of Systems:   Review of Systems   Constitutional: Positive for fever and weight loss.   HENT: Positive for sore throat.    Gastrointestinal: Positive for abdominal pain and diarrhea. Negative for anorexia, flatus, hematochezia, melena, nausea and vomiting.   Genitourinary: Positive for dysuria. Negative for frequency and hematuria.   Musculoskeletal: Positive for  myalgias. Negative for arthralgias.   Neurological: Positive for headaches.     All other systems were reviewed and are negative.  Exceptions are noted in the subjective or above.      Objective     Vital Signs  Vitals:    01/29/18 1307   BP: 106/74   Pulse: 92   Resp: 14   Temp: 98.8 °F (37.1 °C)   SpO2: 96%       Body mass index is 33.4 kg/(m^2).    Physical Exam   Constitutional: She is oriented to person, place, and time. Vital signs are normal. She appears well-developed and well-nourished. She is active. She does not have a sickly appearance. She does not appear ill. No distress.   HENT:   Head: Normocephalic and atraumatic. Hair is normal.   Right Ear: Hearing, tympanic membrane, external ear and ear canal normal.   Left Ear: Hearing, tympanic membrane, external ear and ear canal normal.   Nose: Rhinorrhea present. Right sinus exhibits no maxillary sinus tenderness and no frontal sinus tenderness. Left sinus exhibits no maxillary sinus tenderness and no frontal sinus tenderness.   Mouth/Throat: Abnormal dentition (decay between top two middle teeth). Posterior oropharyngeal erythema present. No oropharyngeal exudate.   Eyes: EOM and lids are normal. Pupils are equal, round, and reactive to light. No scleral icterus.   Neck: Phonation normal. Neck supple.   Cardiovascular: Normal rate, regular rhythm, normal heart sounds and intact distal pulses.  Exam reveals no gallop and no friction rub.    No murmur heard.  Pulmonary/Chest: Effort normal and breath sounds normal. No respiratory distress. She has no wheezes. She exhibits no tenderness.   Abdominal: Soft. Bowel sounds are normal. She exhibits no distension and no mass. There is tenderness.   Musculoskeletal: She exhibits tenderness. She exhibits no deformity.        Lumbar back: She exhibits tenderness and pain.   Lymphadenopathy:     She has cervical adenopathy.   Neurological: She is alert and oriented to person, place, and time. She displays no tremor.  Gait normal.   Skin: Skin is warm and dry. No rash noted. She is not diaphoretic. No cyanosis or erythema. No pallor. Nails show no clubbing.   Psychiatric: Her speech is normal and behavior is normal. Judgment and thought content normal. Cognition and memory are normal. She exhibits a depressed mood.   Nursing note and vitals reviewed.       Results Review:    I reviewed the patient's new clinical results and previous results.   Office Visit on 01/29/2018   Component Date Value Ref Range Status   • Color 01/29/2018 Yellow  Yellow, Straw, Dark Yellow, Adele Final   • Clarity, UA 01/29/2018 Cloudy* Clear Final   • Glucose, UA 01/29/2018 Negative  Negative, 1000 mg/dL (3+) mg/dL Final   • Bilirubin 01/29/2018 1 mg/dL* Negative Final   • Ketones, UA 01/29/2018 Negative  Negative Final   • Specific Gravity  01/29/2018 1.020  1.005 - 1.030 Final   • Blood, UA 01/29/2018 50 Iggy/ul* Negative Final   • pH, Urine 01/29/2018 6.5  5.0 - 8.0 Final   • Protein, POC 01/29/2018 Negative  Negative mg/dL Final   • Urobilinogen, UA 01/29/2018 Normal  Normal Final   • Leukocytes 01/29/2018 Negative  Negative Final   • Nitrite, UA 01/29/2018 Negative  Negative Final       Medication Review:     Current Outpatient Prescriptions:   •  acetaminophen (TYLENOL) 500 MG tablet, Take 1 tablet by mouth Every 6 (Six) Hours As Needed for Mild Pain ., Disp: 12 tablet, Rfl: 0  •  albuterol (PROVENTIL HFA;VENTOLIN HFA) 108 (90 BASE) MCG/ACT inhaler, Inhale 2 puffs Every 6 (Six) Hours As Needed for Wheezing or Shortness of Air., Disp: 1 inhaler, Rfl: 3  •  albuterol (PROVENTIL) (2.5 MG/3ML) 0.083% nebulizer solution, Take 2.5 mg by nebulization Every 4 (Four) Hours As Needed for Wheezing or Shortness of Air., Disp: 60 vial, Rfl: 3  •  apixaban (ELIQUIS) 5 MG tablet tablet, Take 5 mg by mouth 2 (Two) Times a Day., Disp: , Rfl:   •  aspirin 81 MG tablet, Take 1 tablet by mouth Daily., Disp: 30 tablet, Rfl: 11  •  atorvastatin (LIPITOR) 80 MG tablet,  Take 1 tablet by mouth Daily., Disp: 30 tablet, Rfl: 11  •  azelastine (ASTELIN) 0.1 % nasal spray, 1 spray into each nostril 2 (Two) Times a Day As Needed for Rhinitis or Allergies. Use in each nostril as directed, Disp: 1 each, Rfl: 5  •  DULoxetine (CYMBALTA) 30 MG capsule, Take 1 capsule by mouth Daily., Disp: 30 capsule, Rfl: 5  •  fluconazole (DIFLUCAN) 150 MG tablet, TAKE ONE TAB PO X ONCE, CAN REPEAT IN 4 DAYS IF NEEDED, Disp: 2 tablet, Rfl: 0  •  flunisolide (NASALIDE) 25 MCG/ACT (0.025%) solution nasal spray, Inhale 1 spray Every 12 (Twelve) Hours., Disp: 1 bottle, Rfl: 5  •  Fluticasone Furoate-Vilanterol (BREO ELLIPTA) 100-25 MCG/INH aerosol powder , Inhale 1 puff Daily., Disp: , Rfl:   •  ibuprofen (ADVIL,MOTRIN) 600 MG tablet, Take 1 tablet by mouth 3 (Three) Times a Day With Meals., Disp: 90 tablet, Rfl: 0  •  lactulose (CHRONULAC) 10 GM/15ML solution, Take 30 mL by mouth 2 (Two) Times a Day As Needed (constipation)., Disp: 500 mL, Rfl: 0  •  LORazepam (ATIVAN) 0.5 MG tablet, Take 1 tablet by mouth Every 8 (Eight) Hours As Needed for Anxiety., Disp: 9 tablet, Rfl: 0  •  midodrine (PROAMATINE) 5 MG tablet, Take 1 tablet by mouth 3 (Three) Times a Day. Last dose no later that 5 pm, Disp: 90 tablet, Rfl: 5  •  montelukast (SINGULAIR) 10 MG tablet, TAKE 1 TABLET BY MOUTH IN THE EVENING , Disp: 30 tablet, Rfl: 2  •  ondansetron ODT (ZOFRAN-ODT) 4 MG disintegrating tablet, Take 1 tablet by mouth Every 6 (Six) Hours As Needed for Nausea or Vomiting., Disp: 12 tablet, Rfl: 0  •  pantoprazole (PROTONIX) 40 MG EC tablet, TAKE 1 TABLET BY MOUTH ONE TIME A DAY , Disp: 30 tablet, Rfl: 4  •  promethazine (PHENERGAN) 25 MG tablet, Take 1 tablet by mouth Every 6 (Six) Hours As Needed for Nausea or Vomiting., Disp: 30 tablet, Rfl: 0  •  sulfaSALAzine (AZULFIDINE) 500 MG tablet, Take 1 tablet by mouth 2 (Two) Times a Day., Disp: 60 tablet, Rfl: 1  •  tiZANidine (ZANAFLEX) 4 MG tablet, Take 1 tablet by mouth Every 8  (Eight) Hours As Needed for Muscle Spasms., Disp: 30 tablet, Rfl: 3  •  traMADol (ULTRAM) 50 MG tablet, Take 1 tablet by mouth Every 8 (Eight) Hours As Needed for Severe Pain ., Disp: 15 tablet, Rfl: 0  •  Triamcinolone Acetonide (NASACORT) 55 MCG/ACT nasal inhaler, 2 sprays into each nostril Daily., Disp: , Rfl:   •  doxycycline (DORYX) 100 MG enteric coated tablet, Take 1 tablet by mouth 2 (Two) Times a Day for 7 days., Disp: 14 tablet, Rfl: 0    Assessment/Plan   Marilu was seen today for sore throat, abdominal pain and headache.    Diagnoses and all orders for this visit:    Urinary symptom or sign  -     POCT urinalysis dipstick, automated  Increase water intake and discussed ways to prevent  DDD (degenerative disc disease), lumbar  -     MRI Lumbar Spine Without Contrast; Future    Other chronic pain  -     MRI Lumbar Spine Without Contrast; Future  Keep appointment as scheduled with rheumatology and contact office following visit to see what they advise.   Collaborated with Dr. Barker regarding HLA testing and advised patient to talk to rheumatology regarding this testing also and if they do not order it will place order   Pain of right eye  Advised patient previously to have vision screening year and instructed the importance again to both patient and significant other  Acute pharyngitis, unspecified etiology  -     doxycycline (DORYX) 100 MG enteric coated tablet; Take 1 tablet by mouth 2 (Two) Times a Day for 7 days.        Return in about 4 weeks (around 2/26/2018), or if symptoms worsen or fail to improve.    Barby Garcia, APRN  01/29/2018

## 2018-02-05 ENCOUNTER — HOSPITAL ENCOUNTER (OUTPATIENT)
Dept: MRI IMAGING | Facility: HOSPITAL | Age: 28
Discharge: HOME | End: 2018-02-05
Admitting: NURSE PRACTITIONER

## 2018-02-05 ENCOUNTER — OFFICE VISIT (OUTPATIENT)
Dept: INTERNAL MEDICINE | Facility: CLINIC | Age: 28
End: 2018-02-05

## 2018-02-05 VITALS
HEIGHT: 63 IN | WEIGHT: 195.19 LBS | RESPIRATION RATE: 16 BRPM | BODY MASS INDEX: 34.59 KG/M2 | DIASTOLIC BLOOD PRESSURE: 76 MMHG | HEART RATE: 102 BPM | SYSTOLIC BLOOD PRESSURE: 114 MMHG | TEMPERATURE: 98.9 F | OXYGEN SATURATION: 98 %

## 2018-02-05 DIAGNOSIS — G89.29 OTHER CHRONIC PAIN: ICD-10-CM

## 2018-02-05 DIAGNOSIS — R53.83 FATIGUE, UNSPECIFIED TYPE: Primary | ICD-10-CM

## 2018-02-05 DIAGNOSIS — M51.36 DDD (DEGENERATIVE DISC DISEASE), LUMBAR: ICD-10-CM

## 2018-02-05 DIAGNOSIS — M53.3 SACROILIAC DYSFUNCTION: ICD-10-CM

## 2018-02-05 DIAGNOSIS — H57.12 PAIN OF LEFT EYE: ICD-10-CM

## 2018-02-05 PROCEDURE — 72148 MRI LUMBAR SPINE W/O DYE: CPT

## 2018-02-05 PROCEDURE — 99214 OFFICE O/P EST MOD 30 MIN: CPT | Performed by: NURSE PRACTITIONER

## 2018-02-05 NOTE — PROGRESS NOTES
Chief Complaint / Reason:      Chief Complaint   Patient presents with   • Fatigue     recheck, sx are getting worse. Chest tenderness with deep breaths, hurting into left shoulder.    • Headache   • Fatigue       Subjective     HPI  Patient presents today with complaints of fatigue, headache and chest tenderness.  She states that her fatigue symptoms are worsening.  She has seen the rheumatologist and is stating for the labs that he ordered to come back.  She did have an MRI performed that revealed no abnormalities or stenosis.  She states that she still has left eye pain and she sleeps on her left side and has been congested recently.  She was advised to have an eye exam but has not yet.  She denies chest pain, shortness of breath or heart palpitations.  Vital signs are stable with exception of elevated heart rate.  She states that she is tired even after sleeping at night.  She sometimes does wake up with a headache.  Denies any blurred vision or dizziness.  She states she does get nauseous sometimes when she has a headache or sinus pressure.  She is accompanied by her significant other.  History taken from: patient    PMH/FH/Social History were reviewed and updated appropriately in the electronic medical record.     Review of Systems:   Review of Systems   Constitutional: Positive for activity change and fatigue.   HENT: Positive for rhinorrhea and sore throat. Negative for voice change.    Eyes: Positive for pain.   Respiratory: Positive for cough. Negative for shortness of breath and wheezing.    Cardiovascular: Negative.         Chest tenderness      Gastrointestinal: Positive for diarrhea.   Musculoskeletal: Positive for myalgias.   Neurological: Positive for headaches.     All other systems were reviewed and are negative.  Exceptions are noted in the subjective or above.      Objective     Vital Signs  Vitals:    02/05/18 1444   BP: 114/76   Pulse: 102   Resp: 16   Temp: 98.9 °F (37.2 °C)   SpO2: 98%        Body mass index is 34.58 kg/(m^2).    Physical Exam   Constitutional: She is oriented to person, place, and time. She appears well-developed and well-nourished. No distress.   HENT:   Head: Normocephalic.   Right Ear: External ear normal. Tympanic membrane is erythematous and bulging.   Left Ear: External ear normal. Tympanic membrane is erythematous and bulging.   Nose: Sinus tenderness present.   Mouth/Throat: Mucous membranes are dry. Posterior oropharyngeal erythema present.   Eyes: Pupils are equal, round, and reactive to light.   Cardiovascular: Normal rate, regular rhythm, normal heart sounds and intact distal pulses.    Pulmonary/Chest: Effort normal and breath sounds normal. She has no wheezes. She exhibits no tenderness.   Abdominal: Soft. Bowel sounds are normal.   Lymphadenopathy:     She has no cervical adenopathy.   Neurological: She is alert and oriented to person, place, and time.   Skin: Skin is warm and dry. No rash noted. No erythema. No pallor.   Psychiatric: She has a normal mood and affect. Her behavior is normal. Judgment and thought content normal.   Nursing note and vitals reviewed.       Results Review:    I reviewed the patient's previous clinical results and discussed with patient and significant other.   Results for orders placed during the hospital encounter of 02/05/18   MRI Lumbar Spine Without Contrast    Narrative PROCEDURE: MRI LUMBAR SPINE WO CONTRAST-     HISTORY: Abnormal xray, lumbar spine, DJD     TECHNIQUE: Multiplanar multisequence imaging of the lumbar spine was  performed without intravenous contrast.     FINDINGS: The lumbar vertebral bodies maintain a normal height,  alignment and signal intensity. The posterior elements are intact  throughout. The intervertebral discs are maintained. There is no spinal  or neural foraminal stenosis. The spinal cord terminates at the T12  level. No conus lesions are present. The paraspinal soft tissues are  unremarkable. Note that  the sacroiliac joints are not included on this  exam.       Impression Unremarkable MRI of the lumbar spine.     This report was finalized on 2/5/2018 12:37 PM by Jorge Alberto Sargent M.D..           Medication Review:     Current Outpatient Prescriptions:   •  acetaminophen (TYLENOL) 500 MG tablet, Take 1 tablet by mouth Every 6 (Six) Hours As Needed for Mild Pain ., Disp: 12 tablet, Rfl: 0  •  albuterol (PROVENTIL HFA;VENTOLIN HFA) 108 (90 BASE) MCG/ACT inhaler, Inhale 2 puffs Every 6 (Six) Hours As Needed for Wheezing or Shortness of Air., Disp: 1 inhaler, Rfl: 3  •  albuterol (PROVENTIL) (2.5 MG/3ML) 0.083% nebulizer solution, Take 2.5 mg by nebulization Every 4 (Four) Hours As Needed for Wheezing or Shortness of Air., Disp: 60 vial, Rfl: 3  •  apixaban (ELIQUIS) 5 MG tablet tablet, Take 5 mg by mouth 2 (Two) Times a Day., Disp: , Rfl:   •  aspirin 81 MG tablet, Take 1 tablet by mouth Daily., Disp: 30 tablet, Rfl: 11  •  atorvastatin (LIPITOR) 80 MG tablet, Take 1 tablet by mouth Daily., Disp: 30 tablet, Rfl: 11  •  azelastine (ASTELIN) 0.1 % nasal spray, 1 spray into each nostril 2 (Two) Times a Day As Needed for Rhinitis or Allergies. Use in each nostril as directed, Disp: 1 each, Rfl: 5  •  DULoxetine (CYMBALTA) 30 MG capsule, Take 1 capsule by mouth Daily., Disp: 30 capsule, Rfl: 5  •  fluconazole (DIFLUCAN) 150 MG tablet, TAKE ONE TAB PO X ONCE, CAN REPEAT IN 4 DAYS IF NEEDED, Disp: 2 tablet, Rfl: 0  •  flunisolide (NASALIDE) 25 MCG/ACT (0.025%) solution nasal spray, Inhale 1 spray Every 12 (Twelve) Hours., Disp: 1 bottle, Rfl: 5  •  Fluticasone Furoate-Vilanterol (BREO ELLIPTA) 100-25 MCG/INH aerosol powder , Inhale 1 puff Daily., Disp: , Rfl:   •  ibuprofen (ADVIL,MOTRIN) 600 MG tablet, Take 1 tablet by mouth 3 (Three) Times a Day With Meals., Disp: 90 tablet, Rfl: 0  •  lactulose (CHRONULAC) 10 GM/15ML solution, Take 30 mL by mouth 2 (Two) Times a Day As Needed (constipation)., Disp: 500 mL, Rfl: 0  •   LORazepam (ATIVAN) 0.5 MG tablet, Take 1 tablet by mouth Every 8 (Eight) Hours As Needed for Anxiety., Disp: 9 tablet, Rfl: 0  •  midodrine (PROAMATINE) 5 MG tablet, Take 1 tablet by mouth 3 (Three) Times a Day. Last dose no later that 5 pm, Disp: 90 tablet, Rfl: 5  •  montelukast (SINGULAIR) 10 MG tablet, TAKE 1 TABLET BY MOUTH IN THE EVENING , Disp: 30 tablet, Rfl: 2  •  ondansetron ODT (ZOFRAN-ODT) 4 MG disintegrating tablet, Take 1 tablet by mouth Every 6 (Six) Hours As Needed for Nausea or Vomiting., Disp: 12 tablet, Rfl: 0  •  pantoprazole (PROTONIX) 40 MG EC tablet, TAKE 1 TABLET BY MOUTH ONE TIME A DAY , Disp: 30 tablet, Rfl: 4  •  promethazine (PHENERGAN) 25 MG tablet, Take 1 tablet by mouth Every 6 (Six) Hours As Needed for Nausea or Vomiting., Disp: 30 tablet, Rfl: 0  •  sulfaSALAzine (AZULFIDINE) 500 MG tablet, Take 1 tablet by mouth 2 (Two) Times a Day., Disp: 60 tablet, Rfl: 1  •  tiZANidine (ZANAFLEX) 4 MG tablet, Take 1 tablet by mouth Every 8 (Eight) Hours As Needed for Muscle Spasms., Disp: 30 tablet, Rfl: 3  •  traMADol (ULTRAM) 50 MG tablet, Take 1 tablet by mouth Every 8 (Eight) Hours As Needed for Severe Pain ., Disp: 15 tablet, Rfl: 0  •  Triamcinolone Acetonide (NASACORT) 55 MCG/ACT nasal inhaler, 2 sprays into each nostril Daily., Disp: , Rfl:   •  clindamycin (CLEOCIN) 300 MG capsule, Take 1 capsule by mouth 3 (Three) Times a Day for 10 days., Disp: 30 capsule, Rfl: 0    Assessment/Plan   Marilu was seen today for fatigue, headache and fatigue.    Diagnoses and all orders for this visit:    Fatigue, unspecified type  Discussed nonpharmacological interventions  Sacroiliac dysfunction   Discussed possible MRI with patient and will collaborate with Dr. Barker.  Recommend weight loss and increasing activity and stretches.  Avoid sitting or standing for long periods of time.  Take medication as prescribed.   Pain of left eye  Advised patient to have eye exam.      Return in about 4 weeks  (around 3/5/2018), or if symptoms worsen or fail to improve.    Barby Garcia, APRN  02/05/2018

## 2018-02-08 ENCOUNTER — OFFICE VISIT (OUTPATIENT)
Dept: INTERNAL MEDICINE | Facility: CLINIC | Age: 28
End: 2018-02-08

## 2018-02-08 VITALS
WEIGHT: 189.19 LBS | TEMPERATURE: 100.5 F | HEIGHT: 63 IN | BODY MASS INDEX: 33.52 KG/M2 | RESPIRATION RATE: 16 BRPM | HEART RATE: 120 BPM | OXYGEN SATURATION: 98 % | SYSTOLIC BLOOD PRESSURE: 116 MMHG | DIASTOLIC BLOOD PRESSURE: 74 MMHG

## 2018-02-08 DIAGNOSIS — J02.9 ACUTE PHARYNGITIS, UNSPECIFIED ETIOLOGY: Primary | ICD-10-CM

## 2018-02-08 DIAGNOSIS — M53.3 SCLEROSIS OF SACROILIAC JOINT: ICD-10-CM

## 2018-02-08 DIAGNOSIS — M46.1 SACROILIAC INFLAMMATION (HCC): ICD-10-CM

## 2018-02-08 DIAGNOSIS — M53.3 SACROILIAC JOINT PAIN: Primary | ICD-10-CM

## 2018-02-08 LAB
EXPIRATION DATE: NORMAL
INTERNAL CONTROL: NORMAL
Lab: NORMAL
S PYO AG THROAT QL: NEGATIVE

## 2018-02-08 PROCEDURE — 87880 STREP A ASSAY W/OPTIC: CPT | Performed by: NURSE PRACTITIONER

## 2018-02-08 PROCEDURE — 99213 OFFICE O/P EST LOW 20 MIN: CPT | Performed by: NURSE PRACTITIONER

## 2018-02-08 RX ORDER — CLINDAMYCIN HYDROCHLORIDE 300 MG/1
300 CAPSULE ORAL 3 TIMES DAILY
Qty: 30 CAPSULE | Refills: 0 | Status: SHIPPED | OUTPATIENT
Start: 2018-02-08 | End: 2018-02-12

## 2018-02-08 RX ORDER — FLUCONAZOLE 150 MG/1
150 TABLET ORAL ONCE
Qty: 1 TABLET | Refills: 0 | Status: SHIPPED | OUTPATIENT
Start: 2018-02-08 | End: 2018-02-08

## 2018-02-08 NOTE — PROGRESS NOTES
Chief Complaint / Reason:      Chief Complaint   Patient presents with   • Fever     up to 102   • Sore Throat     white patches, hurting side of neck up into the ear on the left   • Headache     fatigue       Subjective   Patient presents today with multiple complaints and states that she is not getting any better and feels worse.  She has been sending my chart messages regarding her symptoms along with interventions.  She has had strep throat mono and the flu multiple times and her immune system does not seem to be fighting off infections.  She has taken multiple rounds of antibiotics.  She does have a tongue ring that she states she has been cleaning with peroxide but she only cleans it maybe once a week.  She does not.nails nor has she been around anyone that has strep throat recently.  I advised her to thoroughly clean tongue ring she should use mouthwash nightly.  She denies chest pain, shortness of breath or heart palpitations.  Vital signs are stable with the exception of elevated heart rate and low grade fever.  She states that her fever goes back up every time that Tylenol wears off.   Sore Throat    This is a recurrent problem. The current episode started in the past 7 days. The problem has been gradually worsening. The pain is worse on the left side. The maximum temperature recorded prior to her arrival was 102 - 102.9 F. The fever has been present for 1 to 2 days. The pain is moderate. Associated symptoms include ear pain, headaches, swollen glands and trouble swallowing. Associated symptoms comments: Fatigue, fever, white patches in the back of her throat, and headache. She has tried cool liquids and acetaminophen for the symptoms. The treatment provided mild relief.       History taken from: patient    PMH/FH/Social History were reviewed and updated appropriately in the electronic medical record.     Review of Systems:   Review of Systems   Constitutional: Positive for fatigue and fever.   HENT:  Positive for ear pain, sore throat and trouble swallowing.    Respiratory: Negative.    Cardiovascular: Negative.    Musculoskeletal: Positive for arthralgias.   Neurological: Positive for headaches.   Hematological: Positive for adenopathy.     All other systems were reviewed and are negative.  Exceptions are noted in the subjective or above.      Objective     Vital Signs  Vitals:    02/08/18 1121   BP: 116/74   Pulse: 120   Resp: 16   Temp: 100.5 °F (38.1 °C)   SpO2: 98%       Body mass index is 33.51 kg/(m^2).    Physical Exam   Constitutional: She is oriented to person, place, and time. She appears well-developed and well-nourished.   HENT:   Head: Normocephalic.   Right Ear: External ear normal. Tympanic membrane is erythematous and bulging.   Left Ear: External ear normal. Tympanic membrane is erythematous and bulging.   Nose: Mucosal edema present. Right sinus exhibits maxillary sinus tenderness. Right sinus exhibits no frontal sinus tenderness. Left sinus exhibits maxillary sinus tenderness. Left sinus exhibits no frontal sinus tenderness.   Mouth/Throat: Mucous membranes are dry. Oropharyngeal exudate, posterior oropharyngeal edema and posterior oropharyngeal erythema present.   Cardiovascular: Regular rhythm, normal heart sounds and normal pulses.  Tachycardia present.    Pulmonary/Chest: Effort normal and breath sounds normal.   Abdominal: Soft. Bowel sounds are normal.   Lymphadenopathy:     She has cervical adenopathy.   Neurological: She is alert and oriented to person, place, and time.   Skin: Skin is warm and dry.   Psychiatric: She has a normal mood and affect. Her behavior is normal. Judgment and thought content normal.   Nursing note and vitals reviewed.       Results Review:    I reviewed the patient's new clinical results.   Office Visit on 02/08/2018   Component Date Value Ref Range Status   • Rapid Strep A Screen 02/08/2018 Negative  Negative, VALID, INVALID, Not Performed Final   • Internal  Control 02/08/2018 Passed  Passed Final   • Lot Number 02/08/2018 [id number]   Final   • Expiration Date 02/08/2018 2/10/20   Final       Medication Review:     Current Outpatient Prescriptions:   •  acetaminophen (TYLENOL) 500 MG tablet, Take 1 tablet by mouth Every 6 (Six) Hours As Needed for Mild Pain ., Disp: 12 tablet, Rfl: 0  •  albuterol (PROVENTIL HFA;VENTOLIN HFA) 108 (90 BASE) MCG/ACT inhaler, Inhale 2 puffs Every 6 (Six) Hours As Needed for Wheezing or Shortness of Air., Disp: 1 inhaler, Rfl: 3  •  albuterol (PROVENTIL) (2.5 MG/3ML) 0.083% nebulizer solution, Take 2.5 mg by nebulization Every 4 (Four) Hours As Needed for Wheezing or Shortness of Air., Disp: 60 vial, Rfl: 3  •  apixaban (ELIQUIS) 5 MG tablet tablet, Take 5 mg by mouth 2 (Two) Times a Day., Disp: , Rfl:   •  aspirin 81 MG tablet, Take 1 tablet by mouth Daily., Disp: 30 tablet, Rfl: 11  •  atorvastatin (LIPITOR) 80 MG tablet, Take 1 tablet by mouth Daily., Disp: 30 tablet, Rfl: 11  •  azelastine (ASTELIN) 0.1 % nasal spray, 1 spray into each nostril 2 (Two) Times a Day As Needed for Rhinitis or Allergies. Use in each nostril as directed, Disp: 1 each, Rfl: 5  •  DULoxetine (CYMBALTA) 30 MG capsule, Take 1 capsule by mouth Daily., Disp: 30 capsule, Rfl: 5  •  fluconazole (DIFLUCAN) 150 MG tablet, TAKE ONE TAB PO X ONCE, CAN REPEAT IN 4 DAYS IF NEEDED, Disp: 2 tablet, Rfl: 0  •  flunisolide (NASALIDE) 25 MCG/ACT (0.025%) solution nasal spray, Inhale 1 spray Every 12 (Twelve) Hours., Disp: 1 bottle, Rfl: 5  •  Fluticasone Furoate-Vilanterol (BREO ELLIPTA) 100-25 MCG/INH aerosol powder , Inhale 1 puff Daily., Disp: , Rfl:   •  ibuprofen (ADVIL,MOTRIN) 600 MG tablet, Take 1 tablet by mouth 3 (Three) Times a Day With Meals., Disp: 90 tablet, Rfl: 0  •  lactulose (CHRONULAC) 10 GM/15ML solution, Take 30 mL by mouth 2 (Two) Times a Day As Needed (constipation)., Disp: 500 mL, Rfl: 0  •  LORazepam (ATIVAN) 0.5 MG tablet, Take 1 tablet by mouth Every 8  (Eight) Hours As Needed for Anxiety., Disp: 9 tablet, Rfl: 0  •  midodrine (PROAMATINE) 5 MG tablet, Take 1 tablet by mouth 3 (Three) Times a Day. Last dose no later that 5 pm, Disp: 90 tablet, Rfl: 5  •  montelukast (SINGULAIR) 10 MG tablet, TAKE 1 TABLET BY MOUTH IN THE EVENING , Disp: 30 tablet, Rfl: 2  •  ondansetron ODT (ZOFRAN-ODT) 4 MG disintegrating tablet, Take 1 tablet by mouth Every 6 (Six) Hours As Needed for Nausea or Vomiting., Disp: 12 tablet, Rfl: 0  •  pantoprazole (PROTONIX) 40 MG EC tablet, TAKE 1 TABLET BY MOUTH ONE TIME A DAY , Disp: 30 tablet, Rfl: 4  •  promethazine (PHENERGAN) 25 MG tablet, Take 1 tablet by mouth Every 6 (Six) Hours As Needed for Nausea or Vomiting., Disp: 30 tablet, Rfl: 0  •  sulfaSALAzine (AZULFIDINE) 500 MG tablet, Take 1 tablet by mouth 2 (Two) Times a Day., Disp: 60 tablet, Rfl: 1  •  tiZANidine (ZANAFLEX) 4 MG tablet, Take 1 tablet by mouth Every 8 (Eight) Hours As Needed for Muscle Spasms., Disp: 30 tablet, Rfl: 3  •  traMADol (ULTRAM) 50 MG tablet, Take 1 tablet by mouth Every 8 (Eight) Hours As Needed for Severe Pain ., Disp: 15 tablet, Rfl: 0  •  Triamcinolone Acetonide (NASACORT) 55 MCG/ACT nasal inhaler, 2 sprays into each nostril Daily., Disp: , Rfl:   •  clindamycin (CLEOCIN) 300 MG capsule, Take 1 capsule by mouth 3 (Three) Times a Day for 10 days., Disp: 30 capsule, Rfl: 0  •  fluconazole (DIFLUCAN) 150 MG tablet, Take 1 tablet by mouth 1 (One) Time for 1 dose., Disp: 1 tablet, Rfl: 0    Assessment/Plan   Marilu was seen today for fever, sore throat and headache.    Diagnoses and all orders for this visit:    Acute pharyngitis, unspecified etiology  -     clindamycin (CLEOCIN) 300 MG capsule; Take 1 capsule by mouth 3 (Three) Times a Day for 10 days.  -     POCT rapid strep A    Other orders  -     fluconazole (DIFLUCAN) 150 MG tablet; Take 1 tablet by mouth 1 (One) Time for 1 dose.    Recommend patient take an over-the-counter probiotic and advised her to  thoroughly clean tongue ring frequently.  Recommend patient replace toothbrush longterm through antibiotics and then again once antibiotic is finished.  Discussed good oral hygiene and strep throat prevention.    Return if symptoms worsen or fail to improve.    Barby Garcia, APRN  02/08/2018

## 2018-02-12 ENCOUNTER — HOSPITAL ENCOUNTER (EMERGENCY)
Facility: HOSPITAL | Age: 28
Discharge: HOME | End: 2018-02-12
Attending: EMERGENCY MEDICINE | Admitting: EMERGENCY MEDICINE

## 2018-02-12 VITALS
OXYGEN SATURATION: 100 % | WEIGHT: 183 LBS | BODY MASS INDEX: 32.43 KG/M2 | HEART RATE: 87 BPM | HEIGHT: 63 IN | TEMPERATURE: 99 F | DIASTOLIC BLOOD PRESSURE: 78 MMHG | SYSTOLIC BLOOD PRESSURE: 121 MMHG | RESPIRATION RATE: 18 BRPM

## 2018-02-12 DIAGNOSIS — J02.9 ACUTE PHARYNGITIS, UNSPECIFIED ETIOLOGY: ICD-10-CM

## 2018-02-12 DIAGNOSIS — K02.9 DENTAL CARIES: ICD-10-CM

## 2018-02-12 DIAGNOSIS — K04.7 DENTAL ABSCESS: Primary | ICD-10-CM

## 2018-02-12 PROCEDURE — 99283 EMERGENCY DEPT VISIT LOW MDM: CPT

## 2018-02-12 RX ORDER — INDOMETHACIN 25 MG/1
50 CAPSULE ORAL ONCE
Status: COMPLETED | OUTPATIENT
Start: 2018-02-12 | End: 2018-02-12

## 2018-02-12 RX ORDER — CLINDAMYCIN HYDROCHLORIDE 300 MG/1
300 CAPSULE ORAL 3 TIMES DAILY
Qty: 30 CAPSULE | Refills: 0 | Status: SHIPPED | OUTPATIENT
Start: 2018-02-12 | End: 2018-02-22

## 2018-02-12 RX ORDER — INDOMETHACIN 25 MG/1
25 CAPSULE ORAL 3 TIMES DAILY PRN
Qty: 30 CAPSULE | Refills: 0 | Status: SHIPPED | OUTPATIENT
Start: 2018-02-12 | End: 2018-03-01

## 2018-02-12 RX ORDER — CLINDAMYCIN HYDROCHLORIDE 150 MG/1
300 CAPSULE ORAL ONCE
Status: COMPLETED | OUTPATIENT
Start: 2018-02-12 | End: 2018-02-12

## 2018-02-12 RX ADMIN — CLINDAMYCIN HYDROCHLORIDE 300 MG: 150 CAPSULE ORAL at 12:45

## 2018-02-12 RX ADMIN — TOPICAL ANESTHETIC 1 SPRAY: 200 SPRAY DENTAL; PERIODONTAL at 12:46

## 2018-02-12 RX ADMIN — INDOMETHACIN 50 MG: 25 CAPSULE ORAL at 12:45

## 2018-02-12 NOTE — ED PROVIDER NOTES
Subjective   HPI Comments: This is a 27-year-old female comes in with chief complaint dental pain the right lower tooth for past 2 days. Patient states that she has had increased pain and swelling.  Patient denies any associated fever, chills, headache, neck stiffness.    Patient is a 27 y.o. female presenting with tooth pain.   History provided by:  Patient   used: No    Dental Pain   Location:  Lower  Lower teeth location:  31/RL 2nd molar  Quality:  Aching  Severity:  Moderate  Onset quality:  Sudden  Duration:  2 days  Timing:  Intermittent  Progression:  Worsening  Chronicity:  New  Context: abscess and dental fracture    Context: cap still on, not crown fracture and normal dentition    Relieved by:  Nothing  Worsened by:  Nothing  Ineffective treatments:  None tried  Associated symptoms: no congestion, no difficulty swallowing, no fever, no gum swelling, no neck swelling, no oral bleeding and no oral lesions    Risk factors: no alcohol problem, no cancer, no diabetes and no periodontal disease        Review of Systems   Constitutional: Negative for fever.   HENT: Positive for dental problem. Negative for congestion and mouth sores.    All other systems reviewed and are negative.      Past Medical History:   Diagnosis Date   • Abdominal pain    • Allergic    • Asthma 2015   • Chest pain    • Clotting disorder     factor 5   • Coronary artery disease involving native coronary artery of native heart without angina pectoris 6/16/2017   • Diarrhea    • Factor 5 Leiden mutation, heterozygous    • Gallstone    • GERD (gastroesophageal reflux disease)    • H/O blood clots    • Headache    • Hypertension    • Kidney infection    • Nausea & vomiting    • Obesity    • Orthostatic hypotension    • Ovarian cyst    • Protein S deficiency 11/14/2016    labs from hospitalization for PE   • Pulmonary embolism    • Sleep apnea    • Subclinical hyperthyroidism    • Tattoo        Allergies   Allergen Reactions    • Amoxicillin Rash       Past Surgical History:   Procedure Laterality Date   •  SECTION       and    • CHOLECYSTECTOMY     • COLONOSCOPY N/A 2017    Procedure: COLONOSCOPY WITH BIOPSIES AND ARGON THERMAL ABLATION;  Surgeon: Jignesh Selby MD;  Location: [facility] ENDOSCOPY;  Service:    • ENDOSCOPY N/A 2017    Procedure: ESOPHAGOGASTRODUODENOSCOPY WITH BIOPSIES AND COLD BIOPSY POLYPECTOMIES;  Surgeon: Jignesh Sleby MD;  Location: [facility] ENDOSCOPY;  Service:        Family History   Problem Relation Age of Onset   • Arthritis Mother    • COPD Mother    • Asthma Mother    • Arthritis Father    • Diabetes Father    • Hypertension Father    • Hyperlipidemia Father    • Kidney disease Father    • Heart attack Father    • No Known Problems Son    • Colon cancer Neg Hx    • Liver cancer Neg Hx    • Liver disease Neg Hx    • Stomach cancer Neg Hx    • Esophageal cancer Neg Hx        Social History     Social History   • Marital status:      Spouse name: N/A   • Number of children: N/A   • Years of education: N/A     Social History Main Topics   • Smoking status: Never Smoker   • Smokeless tobacco: Never Used   • Alcohol use No   • Drug use: No   • Sexual activity: Yes     Partners: Male     Birth control/ protection: None      Comment: PE while on birth control patch     Other Topics Concern   • None     Social History Narrative           Objective   Physical Exam   Constitutional: She is oriented to person, place, and time. She appears well-developed and well-nourished.   HENT:   Head: Normocephalic.   Right Ear: External ear normal.   Left Ear: External ear normal.   Nose: Nose normal.   Mouth/Throat: Oropharynx is clear and moist. Dental abscesses and dental caries present.       Eyes: Conjunctivae and EOM are normal. Pupils are equal, round, and reactive to light.   Neck: Normal range of motion. Neck supple. No tracheal deviation present. No thyromegaly present.   Cardiovascular:  Normal rate, regular rhythm, normal heart sounds and intact distal pulses.    Pulmonary/Chest: Effort normal and breath sounds normal.   Abdominal: Soft. Bowel sounds are normal.   Musculoskeletal: Normal range of motion.   Neurological: She is alert and oriented to person, place, and time. She has normal reflexes.   Skin: Skin is warm and dry.   Psychiatric: She has a normal mood and affect. Her behavior is normal. Judgment and thought content normal.   Nursing note and vitals reviewed.      Procedures         ED Course  ED Course   Comment By Time   Patient will be started on oral antibics for tooth abscess, anti-inflammatory and tooth balls for pain control. Advised to return if condition worsens. Kwasi Waller PA-C 02/12 1233                  [facility]  Number of Diagnoses or Management Options  Dental abscess: new and requires workup  Dental caries: new and requires workup  Risk of Complications, Morbidity, and/or Mortality  Presenting problems: moderate  Diagnostic procedures: moderate  Management options: moderate    Patient Progress  Patient progress: stable      Final diagnoses:   Dental abscess   Dental caries            Kwasi Waller PA-C  02/12/18 1235

## 2018-02-19 ENCOUNTER — OFFICE VISIT (OUTPATIENT)
Dept: INTERNAL MEDICINE | Facility: CLINIC | Age: 28
End: 2018-02-19

## 2018-02-19 ENCOUNTER — TELEPHONE (OUTPATIENT)
Dept: INTERNAL MEDICINE | Facility: CLINIC | Age: 28
End: 2018-02-19

## 2018-02-19 VITALS
HEIGHT: 63 IN | DIASTOLIC BLOOD PRESSURE: 70 MMHG | TEMPERATURE: 99.7 F | BODY MASS INDEX: 33.13 KG/M2 | OXYGEN SATURATION: 98 % | HEART RATE: 80 BPM | WEIGHT: 187 LBS | SYSTOLIC BLOOD PRESSURE: 110 MMHG

## 2018-02-19 DIAGNOSIS — N92.6 MENSTRUAL IRREGULARITY: ICD-10-CM

## 2018-02-19 DIAGNOSIS — L53.9 OROPHARYNX ERYTHEMATOUS: Primary | ICD-10-CM

## 2018-02-19 DIAGNOSIS — K02.9 INFECTED DENTAL CARRIES: ICD-10-CM

## 2018-02-19 DIAGNOSIS — K04.7 DENTAL ABSCESS: ICD-10-CM

## 2018-02-19 DIAGNOSIS — R53.82 CHRONIC FATIGUE: ICD-10-CM

## 2018-02-19 DIAGNOSIS — K04.7 INFECTED DENTAL CARRIES: ICD-10-CM

## 2018-02-19 LAB
ALBUMIN SERPL-MCNC: 4.4 G/DL (ref 3.5–5)
ALBUMIN/GLOB SERPL: 1.3 G/DL (ref 1–2)
ALP SERPL-CCNC: 90 U/L (ref 38–126)
ALT SERPL-CCNC: 36 U/L (ref 13–69)
AST SERPL-CCNC: 23 U/L (ref 15–46)
B-HCG UR QL: NEGATIVE
BACTERIA SPEC AEROBE CULT: NORMAL
BASOPHILS # BLD AUTO: 0.03 10*3/MM3 (ref 0–0.2)
BASOPHILS NFR BLD AUTO: 0.4 % (ref 0–2.5)
BILIRUB BLD-MCNC: NEGATIVE MG/DL
BILIRUB SERPL-MCNC: 0.7 MG/DL (ref 0.2–1.3)
BUN SERPL-MCNC: 7 MG/DL (ref 7–20)
BUN/CREAT SERPL: 11.7 (ref 7.1–23.5)
CALCIUM SERPL-MCNC: 9.9 MG/DL (ref 8.4–10.2)
CHLORIDE SERPL-SCNC: 103 MMOL/L (ref 98–107)
CLARITY, POC: CLEAR
CO2 SERPL-SCNC: 25 MMOL/L (ref 26–30)
COLOR UR: YELLOW
CREAT SERPL-MCNC: 0.6 MG/DL (ref 0.6–1.3)
EOSINOPHIL # BLD AUTO: 0.07 10*3/MM3 (ref 0–0.7)
EOSINOPHIL NFR BLD AUTO: 0.8 % (ref 0–7)
ERYTHROCYTE [DISTWIDTH] IN BLOOD BY AUTOMATED COUNT: 12.7 % (ref 11.5–14.5)
GFR SERPLBLD CREATININE-BSD FMLA CKD-EPI: 120 ML/MIN/1.73
GFR SERPLBLD CREATININE-BSD FMLA CKD-EPI: 145 ML/MIN/1.73
GLOBULIN SER CALC-MCNC: 3.5 GM/DL
GLUCOSE SERPL-MCNC: 89 MG/DL (ref 74–98)
GLUCOSE UR STRIP-MCNC: NEGATIVE MG/DL
HCT VFR BLD AUTO: 40.1 % (ref 37–47)
HGB BLD-MCNC: 13.4 G/DL (ref 12–16)
IMM GRANULOCYTES # BLD: 0.02 10*3/MM3 (ref 0–0.06)
IMM GRANULOCYTES NFR BLD: 0.2 % (ref 0–0.6)
INTERNAL NEGATIVE CONTROL: NEGATIVE
INTERNAL POSITIVE CONTROL: POSITIVE
KETONES UR QL: NEGATIVE
LEUKOCYTE EST, POC: NEGATIVE
LYMPHOCYTES # BLD AUTO: 2.22 10*3/MM3 (ref 0.6–3.4)
LYMPHOCYTES NFR BLD AUTO: 26.9 % (ref 10–50)
Lab: NORMAL
MCH RBC QN AUTO: 30 PG (ref 27–31)
MCHC RBC AUTO-ENTMCNC: 33.4 G/DL (ref 30–37)
MCV RBC AUTO: 89.7 FL (ref 81–99)
MONOCYTES # BLD AUTO: 0.32 10*3/MM3 (ref 0–0.9)
MONOCYTES NFR BLD AUTO: 3.9 % (ref 0–12)
NEUTROPHILS # BLD AUTO: 5.59 10*3/MM3 (ref 2–6.9)
NEUTROPHILS NFR BLD AUTO: 67.8 % (ref 37–80)
NITRITE UR-MCNC: NEGATIVE MG/ML
NRBC BLD AUTO-RTO: 0 /100 WBC (ref 0–0)
PH UR: 7 [PH] (ref 5–8)
PLATELET # BLD AUTO: 395 10*3/MM3 (ref 130–400)
POTASSIUM SERPL-SCNC: 5 MMOL/L (ref 3.5–5.1)
PROT SERPL-MCNC: 7.9 G/DL (ref 6.3–8.2)
PROT UR STRIP-MCNC: NEGATIVE MG/DL
RBC # BLD AUTO: 4.47 10*6/MM3 (ref 4.2–5.4)
RBC # UR STRIP: NEGATIVE /UL
SODIUM SERPL-SCNC: 144 MMOL/L (ref 137–145)
SP GR UR: 1.01 (ref 1–1.03)
TSH SERPL DL<=0.005 MIU/L-ACNC: 0.53 MIU/ML (ref 0.47–4.68)
UROBILINOGEN UR QL: NORMAL
WBC # BLD AUTO: 8.25 10*3/MM3 (ref 4.8–10.8)

## 2018-02-19 PROCEDURE — 81025 URINE PREGNANCY TEST: CPT | Performed by: PHYSICIAN ASSISTANT

## 2018-02-19 PROCEDURE — 99214 OFFICE O/P EST MOD 30 MIN: CPT | Performed by: PHYSICIAN ASSISTANT

## 2018-02-19 NOTE — PROGRESS NOTES
Marilu Godoy is a 27 y.o. female.     Subjective   History of Present Illness   Here today with concern of headaches, nausea, fatigue and menstrual irregularity.  Treated for strep pharyngitis on 2/8 with Clindamycin which resolved sore throat but headaches and fatigue remained.  4 days later she ended up in the ED due to dental abscess and she was advised to stay on Clindamycin and it should resolve. She admits that she had to poke a hole in the abscess and drain the discharge herself before it would start resolving.  She feels her abscess has nearly resolved.   Menses should have started around 2/2 but she only had about 36 hours of spotting. Last month she had a normal menses around 1/2 then started again around the 25th with about 3 days of bleeding. Not using anything for contraception.          The following portions of the patient's history were reviewed and updated as appropriate: allergies, current medications, past family history, past medical history, past social history, past surgical history and problem list.    Review of Systems    Constitutional: fatigue. Negative for appetite change, chills, fever and unexpected weight change.   HENT: dental abscess. Negative for congestion, ear pain, hearing loss, nosebleeds, postnasal drip, rhinorrhea, sore throat, tinnitus and trouble swallowing.    Eyes: Negative for photophobia, discharge and visual disturbance.   Respiratory: Negative for cough, chest tightness, shortness of breath and wheezing.    Cardiovascular: Negative for chest pain, palpitations and leg swelling.   Gastrointestinal: nausea.  Negative for abdominal distention, abdominal pain, blood in stool, constipation, diarrhea and vomiting.   Endocrine: menstrual irregularity.  Negative for cold intolerance, heat intolerance, polydipsia, polyphagia and polyuria.   Musculoskeletal: Negative for arthralgias, back pain, joint swelling, myalgias, neck pain and neck stiffness.   Skin: Negative for  "color change, pallor, rash and wound.   Allergic/Immunologic: Negative for environmental allergies, food allergies and immunocompromised state.   Neurological: headache. Negative for dizziness, tremors, seizures, weakness, numbness.  Hematological: Negative for adenopathy. Does not bruise/bleed easily.   Psychiatric/Behavioral: Negative for sleep disturbances, agitation, behavioral problems, confusion, hallucinations, self-injury and suicidal ideas. The patient is not nervous/anxious.        Objective    Physical Exam  Constitutional: Oriented to person, place, and time. Appears well-developed and well-nourished.   HENT: right lower dental abscess, nearly resolved. Very poor dentition. Tongue-ring in place. Mild OP erythema.   Head: No sinus tenderness. Normocephalic and atraumatic.   Eyes: EOM are normal. Pupils are equal, round, and reactive to light.   Neck: Normal range of motion. Neck supple.   Cardiovascular: Normal rate, regular rhythm and normal heart sounds.    Pulmonary/Chest: Effort normal and breath sounds normal. No respiratory distress.  Has no wheezes or rales. Exhibits no chest wall tenderness.   Abdominal: Soft. Bowel sounds are normal. Exhibits no distension and no mass. There is no tenderness.   Musculoskeletal: Normal range of motion. Exhibits no tenderness.   Neurological: Alert and oriented to person, place, and time.   Skin: Skin is warm and dry.   Psychiatric: Has a normal mood and affect. Behavior is normal. Judgment and thought content normal.       /70  Pulse 80  Temp 99.7 °F (37.6 °C)  Ht 160 cm (62.99\")  Wt 84.8 kg (187 lb)  LMP 01/26/2018  SpO2 98%  BMI 33.13 kg/m2    Nursing note and vitals reviewed.        Assessment/Plan   Marilu was seen today for fatigue, headache and menstrual problem.    Diagnoses and all orders for this visit:    Oropharynx erythematous  -     Culture, Routine - Swab, Oropharynx    Chronic fatigue  -     Culture, Routine - Swab, Oropharynx  -     " CBC & Differential  -     Comprehensive Metabolic Panel  -     TSH    Dental abscess  -     Culture, Routine - Swab, Oropharynx  -     Ambulatory Referral to Oral Maxillofacial Surgery  -     CBC & Differential    Menstrual irregularity  -     HCG, B-subunit, Quantitative- negative  -     Ambulatory Referral to Gynecology  -     CBC & Differential  -     Comprehensive Metabolic Panel  -     TSH  UA negative.     Infected dental carries  -     Ambulatory Referral to Oral Maxillofacial Surgery      Will call with lab results and any necessary treatment.

## 2018-02-20 DIAGNOSIS — M53.3 SACROILIAC JOINT PAIN: Primary | ICD-10-CM

## 2018-02-20 DIAGNOSIS — M46.1 SACROILIAC INFLAMMATION (HCC): ICD-10-CM

## 2018-02-20 DIAGNOSIS — M53.3 SCLEROSIS OF SACROILIAC JOINT: ICD-10-CM

## 2018-02-21 ENCOUNTER — OFFICE VISIT (OUTPATIENT)
Dept: INTERNAL MEDICINE | Facility: CLINIC | Age: 28
End: 2018-02-21

## 2018-02-21 VITALS
HEIGHT: 63 IN | WEIGHT: 185.38 LBS | BODY MASS INDEX: 32.85 KG/M2 | SYSTOLIC BLOOD PRESSURE: 128 MMHG | OXYGEN SATURATION: 98 % | DIASTOLIC BLOOD PRESSURE: 80 MMHG | TEMPERATURE: 98.1 F | RESPIRATION RATE: 16 BRPM | HEART RATE: 106 BPM

## 2018-02-21 DIAGNOSIS — R10.32 ABDOMINAL PAIN, LEFT LOWER QUADRANT: Primary | ICD-10-CM

## 2018-02-21 PROCEDURE — 99213 OFFICE O/P EST LOW 20 MIN: CPT | Performed by: NURSE PRACTITIONER

## 2018-02-21 NOTE — PROGRESS NOTES
Chief Complaint / Reason:      Chief Complaint   Patient presents with   • Hip Pain     sharp, started a couple of days ago.        Subjective     HPI  Patient presents today with hip pain and an abdominal pain worse on left.  She states onset of symptoms was a couple of days ago and has progressively worsened.  She has described the pain as sharp and has associated nausea.  She denies any fever or blood in stool or urine. No burning with urination.  Vital signs are stable with exception of increased heart rate.  Denies chest pain, shortness of breath or heart palpitations.  She states that she is more tired and she denies any chance of pregnancy.  She is accompanied by her significant other.  She was previously checked for pregnancy and was negative.  History taken from: patient    PMH/FH/Social History were reviewed and updated appropriately in the electronic medical record.     Review of Systems:   Review of Systems   Constitutional: Positive for fatigue.   Respiratory: Negative.    Cardiovascular: Negative.    Gastrointestinal: Positive for abdominal pain.   Neurological: Negative.      All other systems were reviewed and are negative.  Exceptions are noted in the subjective or above.      Objective     Vital Signs  Vitals:    02/21/18 1406   BP: 128/80   Pulse: 106   Resp: 16   Temp: 98.1 °F (36.7 °C)   SpO2: 98%       Body mass index is 32.84 kg/(m^2).    Physical Exam   Constitutional: She is oriented to person, place, and time. She appears well-developed and well-nourished. No distress.   Cardiovascular: Regular rhythm, normal heart sounds and intact distal pulses.  Tachycardia present.    Pulmonary/Chest: Effort normal and breath sounds normal. She has no wheezes. She exhibits no tenderness.   Abdominal: Soft. Bowel sounds are normal. There is tenderness in the suprapubic area and left lower quadrant. There is no CVA tenderness.   Neurological: She is alert and oriented to person, place, and time.   Skin:  Skin is warm and dry. No rash noted. No erythema. No pallor.   Psychiatric: She has a normal mood and affect. Her behavior is normal. Judgment and thought content normal.   Nursing note and vitals reviewed.       Results Review:    I reviewed the patient's previous clinical results.       Medication Review:     Current Outpatient Prescriptions:   •  acetaminophen (TYLENOL) 500 MG tablet, Take 1 tablet by mouth Every 6 (Six) Hours As Needed for Mild Pain ., Disp: 12 tablet, Rfl: 0  •  albuterol (PROVENTIL HFA;VENTOLIN HFA) 108 (90 BASE) MCG/ACT inhaler, Inhale 2 puffs Every 6 (Six) Hours As Needed for Wheezing or Shortness of Air., Disp: 1 inhaler, Rfl: 3  •  albuterol (PROVENTIL) (2.5 MG/3ML) 0.083% nebulizer solution, Take 2.5 mg by nebulization Every 4 (Four) Hours As Needed for Wheezing or Shortness of Air., Disp: 60 vial, Rfl: 3  •  apixaban (ELIQUIS) 5 MG tablet tablet, Take 5 mg by mouth 2 (Two) Times a Day., Disp: , Rfl:   •  aspirin 81 MG tablet, Take 1 tablet by mouth Daily., Disp: 30 tablet, Rfl: 11  •  atorvastatin (LIPITOR) 80 MG tablet, Take 1 tablet by mouth Daily., Disp: 30 tablet, Rfl: 11  •  azelastine (ASTELIN) 0.1 % nasal spray, 1 spray into each nostril 2 (Two) Times a Day As Needed for Rhinitis or Allergies. Use in each nostril as directed, Disp: 1 each, Rfl: 5  •  DULoxetine (CYMBALTA) 30 MG capsule, Take 1 capsule by mouth Daily., Disp: 30 capsule, Rfl: 5  •  fluconazole (DIFLUCAN) 150 MG tablet, TAKE ONE TAB PO X ONCE, CAN REPEAT IN 4 DAYS IF NEEDED, Disp: 2 tablet, Rfl: 0  •  flunisolide (NASALIDE) 25 MCG/ACT (0.025%) solution nasal spray, Inhale 1 spray Every 12 (Twelve) Hours., Disp: 1 bottle, Rfl: 5  •  Fluticasone Furoate-Vilanterol (BREO ELLIPTA) 100-25 MCG/INH aerosol powder , Inhale 1 puff Daily., Disp: , Rfl:   •  ibuprofen (ADVIL,MOTRIN) 600 MG tablet, Take 1 tablet by mouth 3 (Three) Times a Day With Meals., Disp: 90 tablet, Rfl: 0  •  indomethacin (INDOCIN) 25 MG capsule, Take 1  capsule by mouth 3 (Three) Times a Day As Needed for Mild Pain ., Disp: 30 capsule, Rfl: 0  •  lactulose (CHRONULAC) 10 GM/15ML solution, Take 30 mL by mouth 2 (Two) Times a Day As Needed (constipation)., Disp: 500 mL, Rfl: 0  •  LORazepam (ATIVAN) 0.5 MG tablet, Take 1 tablet by mouth Every 8 (Eight) Hours As Needed for Anxiety., Disp: 9 tablet, Rfl: 0  •  midodrine (PROAMATINE) 5 MG tablet, Take 1 tablet by mouth 3 (Three) Times a Day. Last dose no later that 5 pm, Disp: 90 tablet, Rfl: 5  •  montelukast (SINGULAIR) 10 MG tablet, TAKE 1 TABLET BY MOUTH IN THE EVENING , Disp: 30 tablet, Rfl: 2  •  ondansetron ODT (ZOFRAN-ODT) 4 MG disintegrating tablet, Take 1 tablet by mouth Every 6 (Six) Hours As Needed for Nausea or Vomiting., Disp: 12 tablet, Rfl: 0  •  pantoprazole (PROTONIX) 40 MG EC tablet, TAKE 1 TABLET BY MOUTH ONE TIME A DAY , Disp: 30 tablet, Rfl: 4  •  promethazine (PHENERGAN) 25 MG tablet, Take 1 tablet by mouth Every 6 (Six) Hours As Needed for Nausea or Vomiting., Disp: 30 tablet, Rfl: 0  •  sulfaSALAzine (AZULFIDINE) 500 MG tablet, Take 1 tablet by mouth 2 (Two) Times a Day., Disp: 60 tablet, Rfl: 1  •  tiZANidine (ZANAFLEX) 4 MG tablet, Take 1 tablet by mouth Every 8 (Eight) Hours As Needed for Muscle Spasms., Disp: 30 tablet, Rfl: 3  •  traMADol (ULTRAM) 50 MG tablet, Take 1 tablet by mouth Every 8 (Eight) Hours As Needed for Severe Pain ., Disp: 15 tablet, Rfl: 0  •  Triamcinolone Acetonide (NASACORT) 55 MCG/ACT nasal inhaler, 2 sprays into each nostril Daily., Disp: , Rfl:   •  ELIQUIS 5 MG tablet tablet, TAKE 1 TABLET BY MOUTH EVERY TWELVE HOURS , Disp: 60 tablet, Rfl: 2  •  pantoprazole (PROTONIX) 40 MG EC tablet, TAKE 1 TABLET BY MOUTH ONE TIME A DAY , Disp: 30 tablet, Rfl: 4    Assessment/Plan   Marilu was seen today for hip pain.    Diagnoses and all orders for this visit:    Abdominal pain, left lower quadrant   recommend maintaining hydration avoid changes in bowel habits.  Discussed  worsening signs and symptoms recommend patient use heating pad for pain relief along with other prescribed medications.  Has appointment with GYN in March and is scheduled for a transvaginal ultrasound.  Return if symptoms worsen or fail to improve.    Barby Garcia, APRN  02/21/2018

## 2018-02-22 DIAGNOSIS — I26.99 PULMONARY EMBOLISM ON RIGHT (HCC): ICD-10-CM

## 2018-02-22 DIAGNOSIS — D68.51 HETEROZYGOUS FACTOR V LEIDEN MUTATION (HCC): ICD-10-CM

## 2018-02-22 DIAGNOSIS — D68.59 PROTEIN S DEFICIENCY (HCC): ICD-10-CM

## 2018-02-22 LAB
BACTERIA SPEC RESP CULT: NORMAL
BACTERIA SPEC RESP CULT: NORMAL
HCG INTACT+B SERPL-ACNC: <2.39 MIU/ML
Lab: NORMAL

## 2018-02-22 RX ORDER — APIXABAN 5 MG/1
TABLET, FILM COATED ORAL
Qty: 60 TABLET | Refills: 2 | Status: SHIPPED | OUTPATIENT
Start: 2018-02-22 | End: 2018-03-14 | Stop reason: DRUGHIGH

## 2018-02-23 ENCOUNTER — OFFICE VISIT (OUTPATIENT)
Dept: INTERNAL MEDICINE | Facility: CLINIC | Age: 28
End: 2018-02-23

## 2018-02-23 VITALS
TEMPERATURE: 98.5 F | HEIGHT: 63 IN | HEART RATE: 101 BPM | OXYGEN SATURATION: 99 % | DIASTOLIC BLOOD PRESSURE: 78 MMHG | SYSTOLIC BLOOD PRESSURE: 112 MMHG | WEIGHT: 187 LBS | BODY MASS INDEX: 33.13 KG/M2

## 2018-02-23 DIAGNOSIS — N39.0 URINARY TRACT INFECTION WITH HEMATURIA, SITE UNSPECIFIED: ICD-10-CM

## 2018-02-23 DIAGNOSIS — K21.9 GASTROESOPHAGEAL REFLUX DISEASE, ESOPHAGITIS PRESENCE NOT SPECIFIED: ICD-10-CM

## 2018-02-23 DIAGNOSIS — R10.32 LLQ ABDOMINAL PAIN: Primary | ICD-10-CM

## 2018-02-23 DIAGNOSIS — R10.13 INTERMITTENT EPIGASTRIC ABDOMINAL PAIN: ICD-10-CM

## 2018-02-23 DIAGNOSIS — R31.9 URINARY TRACT INFECTION WITH HEMATURIA, SITE UNSPECIFIED: ICD-10-CM

## 2018-02-23 LAB
BILIRUB BLD-MCNC: NEGATIVE MG/DL
CLARITY, POC: ABNORMAL
COLOR UR: YELLOW
GLUCOSE UR STRIP-MCNC: NEGATIVE MG/DL
KETONES UR QL: NEGATIVE
LEUKOCYTE EST, POC: ABNORMAL
NITRITE UR-MCNC: POSITIVE MG/ML
PH UR: 5 [PH] (ref 5–8)
PROT UR STRIP-MCNC: ABNORMAL MG/DL
RBC # UR STRIP: ABNORMAL /UL
SP GR UR: 1.02 (ref 1–1.03)
UROBILINOGEN UR QL: ABNORMAL

## 2018-02-23 PROCEDURE — 99213 OFFICE O/P EST LOW 20 MIN: CPT | Performed by: PHYSICIAN ASSISTANT

## 2018-02-23 RX ORDER — PHENAZOPYRIDINE HYDROCHLORIDE 100 MG/1
100 TABLET, FILM COATED ORAL 3 TIMES DAILY PRN
Qty: 9 TABLET | Refills: 0 | Status: SHIPPED | OUTPATIENT
Start: 2018-02-23 | End: 2018-04-16

## 2018-02-23 RX ORDER — PANTOPRAZOLE SODIUM 40 MG/1
TABLET, DELAYED RELEASE ORAL
Qty: 30 TABLET | Refills: 4 | Status: SHIPPED | OUTPATIENT
Start: 2018-02-23 | End: 2018-03-08 | Stop reason: SDUPTHER

## 2018-02-23 RX ORDER — NITROFURANTOIN 25; 75 MG/1; MG/1
100 CAPSULE ORAL EVERY 12 HOURS SCHEDULED
Qty: 14 CAPSULE | Refills: 0 | Status: SHIPPED | OUTPATIENT
Start: 2018-02-23 | End: 2018-03-01

## 2018-02-23 RX ORDER — FLUCONAZOLE 150 MG/1
150 TABLET ORAL ONCE
Qty: 1 TABLET | Refills: 0 | Status: SHIPPED | OUTPATIENT
Start: 2018-02-23 | End: 2018-02-23

## 2018-02-23 NOTE — PROGRESS NOTES
Marilu Godoy is a 27 y.o. female.     Subjective   History of Present Illness   LLQ abdominal/pelvic pain is worsening since onset 4 days ago.  She describes the pain as sharp and intermittent with some cramping which just began today.  Worse with movement but little less bothersome when laying still.  She has been nauseous but denies vomiting, constipation or diarrhea.  She also denies burning with urination, increased frequency or urgency.   She began menstrual spotting this morning. She has a history of ovarian cysts.  Has an appointment with GYN scheduled in 1 month.       The following portions of the patient's history were reviewed and updated as appropriate: allergies, current medications, past family history, past medical history, past social history, past surgical history and problem list.    Review of Systems    Constitutional: Negative for appetite change, chills, fatigue, fever and unexpected weight change.   HENT: Negative for congestion, ear pain, hearing loss, nosebleeds, postnasal drip, rhinorrhea, sore throat, tinnitus and trouble swallowing.    Eyes: Negative for photophobia, discharge and visual disturbance.   Respiratory: Negative for cough, chest tightness, shortness of breath and wheezing.    Cardiovascular: Negative for chest pain, palpitations and leg swelling.   Gastrointestinal: left lower abdominal pain and cramping, nausea. Negative for abdominal distention, blood in stool, constipation, diarrhea and vomiting.   Endocrine: Negative for cold intolerance, heat intolerance, polydipsia, polyphagia and polyuria.   Musculoskeletal: Negative for arthralgias, back pain, joint swelling, myalgias, neck pain and neck stiffness.   Skin: Negative for color change, pallor, rash and wound.   Allergic/Immunologic: Negative for environmental allergies, food allergies and immunocompromised state.   Neurological: Negative for dizziness, tremors, seizures, weakness, numbness and headaches.  "  Hematological: Negative for adenopathy. Does not bruise/bleed easily.   Psychiatric/Behavioral: Negative for sleep disturbances, agitation, behavioral problems, confusion, hallucinations, self-injury and suicidal ideas. The patient is not nervous/anxious.      Objective    Physical Exam  Constitutional: Oriented to person, place, and time. Appears well-developed and well-nourished.   HENT:   Head: Normocephalic and atraumatic.   Eyes: EOM are normal. Pupils are equal, round, and reactive to light.   Neck: Normal range of motion. Neck supple.   Cardiovascular: Normal rate, regular rhythm and normal heart sounds.    Pulmonary/Chest: Effort normal and breath sounds normal. No respiratory distress.  Has no wheezes or rales. Exhibits no chest wall tenderness.   Abdominal: diffuse lower abdominal tenderness. Soft. Bowel sounds are normal. Exhibits no distension and no mass.  Musculoskeletal: Normal range of motion. Exhibits no tenderness.   Neurological: Alert and oriented to person, place, and time.   Skin: Skin is warm and dry.   Psychiatric: Has a normal mood and affect. Behavior is normal. Judgment and thought content normal.       /78  Pulse 101  Temp 98.5 °F (36.9 °C)  Ht 160 cm (62.99\")  Wt 84.8 kg (187 lb)  LMP 01/26/2018  SpO2 99%  BMI 33.13 kg/m2    Nursing note and vitals reviewed.        Assessment/Plan   Marilu was seen today for lower left quadrant pain.    Diagnoses and all orders for this visit:    LLQ abdominal pain  -     nitrofurantoin, macrocrystal-monohydrate, (MACROBID) 100 MG capsule; Take 1 capsule by mouth Every 12 (Twelve) Hours for 7 days.  -     phenazopyridine (PYRIDIUM) 100 MG tablet; Take 1 tablet by mouth 3 (Three) Times a Day As Needed for bladder spasms.  -     POCT urinalysis dipstick, automated    Urinary tract infection with hematuria, site unspecified  -     nitrofurantoin, macrocrystal-monohydrate, (MACROBID) 100 MG capsule; Take 1 capsule by mouth Every 12 (Twelve) " Hours for 7 days.  -     phenazopyridine (PYRIDIUM) 100 MG tablet; Take 1 tablet by mouth 3 (Three) Times a Day As Needed for bladder spasms.  -     POCT urinalysis dipstick, automated  -     Urine Culture - Urine, Urine, Clean Catch- 500 leukocytes, nitrite positive, heavy blood presence.     Other orders  -     fluconazole (DIFLUCAN) 150 MG tablet; Take 1 tablet by mouth 1 (One) Time for 1 dose.      If symptoms fail to improve in the next 4 days will order transvaginal ultrasound for further evaluation.

## 2018-02-24 ENCOUNTER — PATIENT MESSAGE (OUTPATIENT)
Dept: INTERNAL MEDICINE | Facility: CLINIC | Age: 28
End: 2018-02-24

## 2018-02-26 ENCOUNTER — TELEPHONE (OUTPATIENT)
Dept: INTERNAL MEDICINE | Facility: CLINIC | Age: 28
End: 2018-02-26

## 2018-02-26 ENCOUNTER — HOSPITAL ENCOUNTER (EMERGENCY)
Facility: HOSPITAL | Age: 28
Discharge: HOME | End: 2018-02-27
Attending: EMERGENCY MEDICINE | Admitting: EMERGENCY MEDICINE

## 2018-02-26 ENCOUNTER — HOSPITAL ENCOUNTER (OUTPATIENT)
Dept: ULTRASOUND IMAGING | Facility: HOSPITAL | Age: 28
Discharge: HOME | End: 2018-02-26
Admitting: PHYSICIAN ASSISTANT

## 2018-02-26 ENCOUNTER — APPOINTMENT (OUTPATIENT)
Dept: CT IMAGING | Facility: HOSPITAL | Age: 28
End: 2018-02-26

## 2018-02-26 DIAGNOSIS — R10.9 LEFT FLANK PAIN: ICD-10-CM

## 2018-02-26 DIAGNOSIS — R31.0 GROSS HEMATURIA: ICD-10-CM

## 2018-02-26 DIAGNOSIS — R10.32 LLQ ABDOMINAL PAIN: Primary | ICD-10-CM

## 2018-02-26 DIAGNOSIS — R10.12 LUQ ABDOMINAL PAIN: ICD-10-CM

## 2018-02-26 DIAGNOSIS — R10.9 ABDOMINAL PAIN, UNSPECIFIED ABDOMINAL LOCATION: Primary | ICD-10-CM

## 2018-02-26 DIAGNOSIS — R10.2 SUPRAPUBIC PAIN: ICD-10-CM

## 2018-02-26 DIAGNOSIS — N92.6 IRREGULAR MENSES: ICD-10-CM

## 2018-02-26 LAB
ALBUMIN SERPL-MCNC: 4.4 G/DL (ref 3.5–5)
ALBUMIN/GLOB SERPL: 1.2 G/DL (ref 1–2)
ALP SERPL-CCNC: 74 U/L (ref 38–126)
ALT SERPL W P-5'-P-CCNC: 30 U/L (ref 13–69)
ANION GAP SERPL CALCULATED.3IONS-SCNC: 18.8 MMOL/L
AST SERPL-CCNC: 23 U/L (ref 15–46)
B-HCG UR QL: NEGATIVE
BACTERIA UR CULT: NORMAL
BACTERIA UR CULT: NORMAL
BACTERIA UR QL AUTO: ABNORMAL /HPF
BASOPHILS # BLD AUTO: 0.06 10*3/MM3 (ref 0–0.2)
BASOPHILS NFR BLD AUTO: 0.6 % (ref 0–2.5)
BILIRUB SERPL-MCNC: 0.7 MG/DL (ref 0.2–1.3)
BILIRUB UR QL STRIP: NEGATIVE
BUN BLD-MCNC: 12 MG/DL (ref 7–20)
BUN/CREAT SERPL: 20 (ref 7.1–23.5)
CALCIUM SPEC-SCNC: 9.6 MG/DL (ref 8.4–10.2)
CHLORIDE SERPL-SCNC: 105 MMOL/L (ref 98–107)
CLARITY UR: ABNORMAL
CO2 SERPL-SCNC: 27 MMOL/L (ref 26–30)
COLOR UR: YELLOW
CREAT BLD-MCNC: 0.6 MG/DL (ref 0.6–1.3)
DEPRECATED RDW RBC AUTO: 41.4 FL (ref 37–54)
EOSINOPHIL # BLD AUTO: 0.06 10*3/MM3 (ref 0–0.7)
EOSINOPHIL NFR BLD AUTO: 0.6 % (ref 0–7)
ERYTHROCYTE [DISTWIDTH] IN BLOOD BY AUTOMATED COUNT: 12.7 % (ref 11.5–14.5)
GFR SERPL CREATININE-BSD FRML MDRD: 120 ML/MIN/1.73
GLOBULIN UR ELPH-MCNC: 3.6 GM/DL
GLUCOSE BLD-MCNC: 94 MG/DL (ref 74–98)
GLUCOSE UR STRIP-MCNC: NEGATIVE MG/DL
HCT VFR BLD AUTO: 37.6 % (ref 37–47)
HGB BLD-MCNC: 12.7 G/DL (ref 12–16)
HGB UR QL STRIP.AUTO: ABNORMAL
HYALINE CASTS UR QL AUTO: ABNORMAL /LPF
IMM GRANULOCYTES # BLD: 0.02 10*3/MM3 (ref 0–0.06)
IMM GRANULOCYTES NFR BLD: 0.2 % (ref 0–0.6)
KETONES UR QL STRIP: NEGATIVE
LEUKOCYTE ESTERASE UR QL STRIP.AUTO: NEGATIVE
LIPASE SERPL-CCNC: 74 U/L (ref 23–300)
LYMPHOCYTES # BLD AUTO: 2.71 10*3/MM3 (ref 0.6–3.4)
LYMPHOCYTES NFR BLD AUTO: 28.6 % (ref 10–50)
MCH RBC QN AUTO: 30.1 PG (ref 27–31)
MCHC RBC AUTO-ENTMCNC: 33.8 G/DL (ref 30–37)
MCV RBC AUTO: 89.1 FL (ref 81–99)
MONOCYTES # BLD AUTO: 0.56 10*3/MM3 (ref 0–0.9)
MONOCYTES NFR BLD AUTO: 5.9 % (ref 0–12)
MUCOUS THREADS URNS QL MICRO: ABNORMAL /HPF
NEUTROPHILS # BLD AUTO: 6.07 10*3/MM3 (ref 2–6.9)
NEUTROPHILS NFR BLD AUTO: 64.1 % (ref 37–80)
NITRITE UR QL STRIP: NEGATIVE
NRBC BLD MANUAL-RTO: 0 /100 WBC (ref 0–0)
PH UR STRIP.AUTO: 5.5 [PH] (ref 5–8)
PLATELET # BLD AUTO: 342 10*3/MM3 (ref 130–400)
PMV BLD AUTO: 9.5 FL (ref 6–12)
POTASSIUM BLD-SCNC: 3.8 MMOL/L (ref 3.5–5.1)
PROT SERPL-MCNC: 8 G/DL (ref 6.3–8.2)
PROT UR QL STRIP: NEGATIVE
RBC # BLD AUTO: 4.22 10*6/MM3 (ref 4.2–5.4)
RBC # UR: ABNORMAL /HPF
REF LAB TEST METHOD: ABNORMAL
SODIUM BLD-SCNC: 147 MMOL/L (ref 137–145)
SP GR UR STRIP: >=1.03 (ref 1–1.03)
SQUAMOUS #/AREA URNS HPF: ABNORMAL /HPF
UROBILINOGEN UR QL STRIP: ABNORMAL
WBC NRBC COR # BLD: 9.48 10*3/MM3 (ref 4.8–10.8)
WBC UR QL AUTO: ABNORMAL /HPF

## 2018-02-26 PROCEDURE — 96374 THER/PROPH/DIAG INJ IV PUSH: CPT

## 2018-02-26 PROCEDURE — 74177 CT ABD & PELVIS W/CONTRAST: CPT

## 2018-02-26 PROCEDURE — 83690 ASSAY OF LIPASE: CPT | Performed by: PHYSICIAN ASSISTANT

## 2018-02-26 PROCEDURE — 99284 EMERGENCY DEPT VISIT MOD MDM: CPT

## 2018-02-26 PROCEDURE — 76830 TRANSVAGINAL US NON-OB: CPT

## 2018-02-26 PROCEDURE — 96375 TX/PRO/DX INJ NEW DRUG ADDON: CPT

## 2018-02-26 PROCEDURE — 85025 COMPLETE CBC W/AUTO DIFF WBC: CPT | Performed by: PHYSICIAN ASSISTANT

## 2018-02-26 PROCEDURE — 81001 URINALYSIS AUTO W/SCOPE: CPT | Performed by: PHYSICIAN ASSISTANT

## 2018-02-26 PROCEDURE — 81025 URINE PREGNANCY TEST: CPT | Performed by: PHYSICIAN ASSISTANT

## 2018-02-26 PROCEDURE — 96361 HYDRATE IV INFUSION ADD-ON: CPT

## 2018-02-26 PROCEDURE — 0 IOPAMIDOL 61 % SOLUTION: Performed by: EMERGENCY MEDICINE

## 2018-02-26 PROCEDURE — 80053 COMPREHEN METABOLIC PANEL: CPT | Performed by: PHYSICIAN ASSISTANT

## 2018-02-26 RX ORDER — SODIUM CHLORIDE 0.9 % (FLUSH) 0.9 %
10 SYRINGE (ML) INJECTION AS NEEDED
Status: DISCONTINUED | OUTPATIENT
Start: 2018-02-26 | End: 2018-02-27 | Stop reason: HOSPADM

## 2018-02-26 RX ORDER — ONDANSETRON 2 MG/ML
4 INJECTION INTRAMUSCULAR; INTRAVENOUS ONCE
Status: COMPLETED | OUTPATIENT
Start: 2018-02-27 | End: 2018-02-27

## 2018-02-26 RX ORDER — KETOROLAC TROMETHAMINE 30 MG/ML
15 INJECTION, SOLUTION INTRAMUSCULAR; INTRAVENOUS ONCE
Status: COMPLETED | OUTPATIENT
Start: 2018-02-27 | End: 2018-02-27

## 2018-02-26 RX ADMIN — IOPAMIDOL 100 ML: 612 INJECTION, SOLUTION INTRAVENOUS at 23:35

## 2018-02-26 RX ADMIN — SODIUM CHLORIDE 1000 ML: 9 INJECTION, SOLUTION INTRAVENOUS at 22:57

## 2018-02-27 ENCOUNTER — APPOINTMENT (OUTPATIENT)
Dept: ULTRASOUND IMAGING | Facility: HOSPITAL | Age: 28
End: 2018-02-27

## 2018-02-27 VITALS
SYSTOLIC BLOOD PRESSURE: 117 MMHG | DIASTOLIC BLOOD PRESSURE: 65 MMHG | BODY MASS INDEX: 33.13 KG/M2 | HEART RATE: 88 BPM | RESPIRATION RATE: 18 BRPM | WEIGHT: 187 LBS | HEIGHT: 63 IN | TEMPERATURE: 98.9 F | OXYGEN SATURATION: 96 %

## 2018-02-27 PROCEDURE — 25010000002 ONDANSETRON PER 1 MG: Performed by: EMERGENCY MEDICINE

## 2018-02-27 PROCEDURE — 25010000002 KETOROLAC TROMETHAMINE PER 15 MG: Performed by: EMERGENCY MEDICINE

## 2018-02-27 PROCEDURE — 96374 THER/PROPH/DIAG INJ IV PUSH: CPT

## 2018-02-27 PROCEDURE — 96375 TX/PRO/DX INJ NEW DRUG ADDON: CPT

## 2018-02-27 RX ORDER — CEPHALEXIN 250 MG/1
500 CAPSULE ORAL ONCE
Status: COMPLETED | OUTPATIENT
Start: 2018-02-27 | End: 2018-02-27

## 2018-02-27 RX ORDER — CEPHALEXIN 500 MG/1
500 CAPSULE ORAL 2 TIMES DAILY
Qty: 10 CAPSULE | Refills: 0 | Status: SHIPPED | OUTPATIENT
Start: 2018-02-27 | End: 2018-03-08

## 2018-02-27 RX ADMIN — ONDANSETRON HYDROCHLORIDE 4 MG: 2 INJECTION, SOLUTION INTRAMUSCULAR; INTRAVENOUS at 00:06

## 2018-02-27 RX ADMIN — CEPHALEXIN 500 MG: 250 CAPSULE ORAL at 01:27

## 2018-02-27 RX ADMIN — KETOROLAC TROMETHAMINE 15 MG: 30 INJECTION, SOLUTION INTRAMUSCULAR at 00:06

## 2018-03-01 ENCOUNTER — OFFICE VISIT (OUTPATIENT)
Dept: INTERNAL MEDICINE | Facility: CLINIC | Age: 28
End: 2018-03-01

## 2018-03-01 VITALS
BODY MASS INDEX: 33.45 KG/M2 | OXYGEN SATURATION: 98 % | SYSTOLIC BLOOD PRESSURE: 114 MMHG | HEIGHT: 63 IN | TEMPERATURE: 99.4 F | HEART RATE: 96 BPM | DIASTOLIC BLOOD PRESSURE: 76 MMHG | WEIGHT: 188.75 LBS | RESPIRATION RATE: 14 BRPM

## 2018-03-01 DIAGNOSIS — Z09 FOLLOW UP: Primary | ICD-10-CM

## 2018-03-01 DIAGNOSIS — J06.9 VIRAL URI: ICD-10-CM

## 2018-03-01 DIAGNOSIS — R10.32 LLQ ABDOMINAL PAIN: ICD-10-CM

## 2018-03-01 DIAGNOSIS — R10.12 LUQ ABDOMINAL PAIN: ICD-10-CM

## 2018-03-01 LAB
EXPIRATION DATE: NORMAL
EXPIRATION DATE: NORMAL
FLUAV AG NPH QL: NORMAL
FLUBV AG NPH QL: NORMAL
INTERNAL CONTROL: NORMAL
INTERNAL CONTROL: NORMAL
Lab: NORMAL
Lab: NORMAL
S PYO AG THROAT QL: NEGATIVE

## 2018-03-01 PROCEDURE — 87804 INFLUENZA ASSAY W/OPTIC: CPT | Performed by: NURSE PRACTITIONER

## 2018-03-01 PROCEDURE — 99214 OFFICE O/P EST MOD 30 MIN: CPT | Performed by: NURSE PRACTITIONER

## 2018-03-01 PROCEDURE — 87880 STREP A ASSAY W/OPTIC: CPT | Performed by: NURSE PRACTITIONER

## 2018-03-01 RX ORDER — FLUCONAZOLE 150 MG/1
150 TABLET ORAL ONCE
Qty: 1 TABLET | Refills: 0 | Status: SHIPPED | OUTPATIENT
Start: 2018-03-01 | End: 2018-03-01

## 2018-03-01 NOTE — PROGRESS NOTES
Chief Complaint / Reason:      Chief Complaint   Patient presents with   • Abdominal Pain     f/u-ER, also sore throat, nasal congestion, dizzy spells, hot flashes and temp. around 100. Weakness and fatigue, body aches.        Subjective     HPI  Patient presents today for ER follow-up regarding abdominal pain.  Also she complains about sore throat, nasal congestion, dizzy spells, hot flashes and temperature around 100.  She states that she is also having weakness and fatigue along with body aches.  Symptoms have been going on for several days.  Denies chest pain, shortness of breath or heart palpitations.  She was seen at [facility] on 2/26/18 with left lower quadrant pain that radiates to left upper quadrant and left flank pain.  She denied nausea and diarrhea.  She states that her last menstrual period was current and that she also had a pelvic ultrasound that showed no abnormalities.  She had been taking Tylenol Macrobid and Pyridium which provided minimal relief.  She had a CT scan performed at the ER which revealed fibroids and possible bladder wall thickness.  She denies any abnormal bleeding but her periods are not regular.  She is scheduled with Dr. shaikh but it is not until next month and I advised her to contact the office and let them know that she would take any openings that were available.  She is also scheduled for another ultrasound at that time.  History taken from: patient    PMH/FH/Social History were reviewed and updated appropriately in the electronic medical record.     Review of Systems:   Review of Systems   Constitutional: Positive for fatigue and fever.   Respiratory: Negative.    Cardiovascular: Negative.    Gastrointestinal: Positive for abdominal pain.   Genitourinary: Negative.    Neurological: Negative.      All other systems were reviewed and are negative.  Exceptions are noted in the subjective or above.      Objective     Vital Signs  Vitals:    03/01/18 0823   BP:  114/76   Pulse: 96   Resp: 14   Temp: 99.4 °F (37.4 °C)   SpO2: 98%       Body mass index is 33.44 kg/(m^2).    Physical Exam   Constitutional: She is oriented to person, place, and time. She appears well-developed and well-nourished. No distress.   HENT:   Head: Normocephalic.   Right Ear: External ear normal.   Left Ear: External ear normal.   Mouth/Throat: Mucous membranes are dry. Posterior oropharyngeal erythema present.   Cardiovascular: Normal rate, regular rhythm, normal heart sounds and intact distal pulses.    Pulmonary/Chest: Effort normal and breath sounds normal. She has no wheezes. She exhibits no tenderness.   Abdominal: Soft. Bowel sounds are normal. There is tenderness in the suprapubic area, left upper quadrant and left lower quadrant. There is no CVA tenderness.   Lymphadenopathy:     She has cervical adenopathy.   Neurological: She is alert and oriented to person, place, and time.   Skin: Skin is warm and dry. No rash noted. No erythema. No pallor.   Psychiatric: She has a normal mood and affect. Her behavior is normal. Judgment and thought content normal.   Nursing note and vitals reviewed.       Results Review:    I reviewed the patient's new/previous clinical results.   Office Visit on 03/01/2018   Component Date Value Ref Range Status   • Rapid Strep A Screen 03/01/2018 Negative  Negative, VALID, INVALID, Not Performed Final   • Internal Control 03/01/2018 Passed  Passed Final   • Lot Number 03/01/2018 [id number]   Final   • Expiration Date 03/01/2018 9/3/19   Final   • Rapid Influenza A Ag 03/01/2018 neg.   Final   • Rapid Influenza B Ag 03/01/2018 neg.   Final   • Internal Control 03/01/2018 Passed  Passed Final   • Lot Number 03/01/2018 [id number]   Final   • Expiration Date 03/01/2018 2/10/20   Final     Results for orders placed during the hospital encounter of 02/26/18   CT Abdomen Pelvis With Contrast    Narrative CT ABDOMEN PELVIS WITH CONTRAST-     TECHNIQUE: IV contrast enhanced exam.      HISTORY: Left lower quadrant pain into left upper quadrant, left flank.     COMPARISON: 01/07/2018.     FINDINGS:     ABDOMEN: Solid organs are normal. Postcholecystectomy changes are noted.  No bowel obstruction or free air is seen. There is no free fluid.     PELVIS: Uterus is retroverted. Appendix is normal. Mild uterine  enlargement is probably due to fibroids. There is no pelvic mass or free  fluid.       Impression No acute findings.        This study was performed with techniques to keep radiation doses as low  as reasonably achievable (ALARA). Individualized dose reduction  techniques using automated exposure control or adjustment of vA and/or  kV according to the patient size were employed.      This report was finalized on 2/27/2018 9:41 AM by Gregg Sarkar MD.         Medication Review:     Current Outpatient Prescriptions:   •  acetaminophen (TYLENOL) 500 MG tablet, Take 1 tablet by mouth Every 6 (Six) Hours As Needed for Mild Pain ., Disp: 12 tablet, Rfl: 0  •  albuterol (PROVENTIL HFA;VENTOLIN HFA) 108 (90 BASE) MCG/ACT inhaler, Inhale 2 puffs Every 6 (Six) Hours As Needed for Wheezing or Shortness of Air., Disp: 1 inhaler, Rfl: 3  •  albuterol (PROVENTIL) (2.5 MG/3ML) 0.083% nebulizer solution, Take 2.5 mg by nebulization Every 4 (Four) Hours As Needed for Wheezing or Shortness of Air., Disp: 60 vial, Rfl: 3  •  aspirin 81 MG tablet, Take 1 tablet by mouth Daily., Disp: 30 tablet, Rfl: 11  •  atorvastatin (LIPITOR) 80 MG tablet, Take 1 tablet by mouth Daily., Disp: 30 tablet, Rfl: 11  •  azelastine (ASTELIN) 0.1 % nasal spray, 1 spray into each nostril 2 (Two) Times a Day As Needed for Rhinitis or Allergies. Use in each nostril as directed, Disp: 1 each, Rfl: 5  •  cephalexin (KEFLEX) 500 MG capsule, Take 1 capsule by mouth 2 (Two) Times a Day., Disp: 10 capsule, Rfl: 0  •  DULoxetine (CYMBALTA) 30 MG capsule, Take 1 capsule by mouth Daily., Disp: 30 capsule, Rfl: 5  •  ELIQUIS 5 MG tablet tablet,  TAKE 1 TABLET BY MOUTH EVERY TWELVE HOURS , Disp: 60 tablet, Rfl: 2  •  flunisolide (NASALIDE) 25 MCG/ACT (0.025%) solution nasal spray, Inhale 1 spray Every 12 (Twelve) Hours., Disp: 1 bottle, Rfl: 5  •  Fluticasone Furoate-Vilanterol (BREO ELLIPTA) 100-25 MCG/INH aerosol powder , Inhale 1 puff Daily., Disp: , Rfl:   •  ibuprofen (ADVIL,MOTRIN) 600 MG tablet, Take 1 tablet by mouth 3 (Three) Times a Day With Meals., Disp: 90 tablet, Rfl: 0  •  lactulose (CHRONULAC) 10 GM/15ML solution, Take 30 mL by mouth 2 (Two) Times a Day As Needed (constipation)., Disp: 500 mL, Rfl: 0  •  LORazepam (ATIVAN) 0.5 MG tablet, Take 1 tablet by mouth Every 8 (Eight) Hours As Needed for Anxiety., Disp: 9 tablet, Rfl: 0  •  midodrine (PROAMATINE) 5 MG tablet, Take 1 tablet by mouth 3 (Three) Times a Day. Last dose no later that 5 pm, Disp: 90 tablet, Rfl: 5  •  montelukast (SINGULAIR) 10 MG tablet, TAKE 1 TABLET BY MOUTH IN THE EVENING , Disp: 30 tablet, Rfl: 2  •  ondansetron ODT (ZOFRAN-ODT) 4 MG disintegrating tablet, Take 1 tablet by mouth Every 6 (Six) Hours As Needed for Nausea or Vomiting., Disp: 12 tablet, Rfl: 0  •  pantoprazole (PROTONIX) 40 MG EC tablet, TAKE 1 TABLET BY MOUTH ONE TIME A DAY , Disp: 30 tablet, Rfl: 4  •  pantoprazole (PROTONIX) 40 MG EC tablet, TAKE 1 TABLET BY MOUTH ONE TIME A DAY , Disp: 30 tablet, Rfl: 4  •  phenazopyridine (PYRIDIUM) 100 MG tablet, Take 1 tablet by mouth 3 (Three) Times a Day As Needed for bladder spasms., Disp: 9 tablet, Rfl: 0  •  promethazine (PHENERGAN) 25 MG tablet, Take 1 tablet by mouth Every 6 (Six) Hours As Needed for Nausea or Vomiting., Disp: 30 tablet, Rfl: 0  •  sulfaSALAzine (AZULFIDINE) 500 MG tablet, Take 1 tablet by mouth 2 (Two) Times a Day., Disp: 60 tablet, Rfl: 1  •  tiZANidine (ZANAFLEX) 4 MG tablet, Take 1 tablet by mouth Every 8 (Eight) Hours As Needed for Muscle Spasms., Disp: 30 tablet, Rfl: 3  •  traMADol (ULTRAM) 50 MG tablet, Take 1 tablet by mouth Every 8  (Eight) Hours As Needed for Severe Pain ., Disp: 15 tablet, Rfl: 0  •  Triamcinolone Acetonide (NASACORT) 55 MCG/ACT nasal inhaler, 2 sprays into each nostril Daily., Disp: , Rfl:   •  fluconazole (DIFLUCAN) 150 MG tablet, Take 1 tablet by mouth 1 (One) Time for 1 dose., Disp: 1 tablet, Rfl: 0    Assessment/Plan   Marilu was seen today for abdominal pain.    Diagnoses and all orders for this visit:    Follow up    Viral URI  -     POCT rapid strep A  -     POCT Influenza A/B  Recommend patient treat symptomatically at this time.  LLQ abdominal pain & LUQ abdominal pain  Take Keflex as prescribed and complete all antibiotics.  Avoid changes in bowel habits and recommend she contact Dr. shaikh's office to see if they have any cancellations and she can get an at a earlier date.  Other orders  -     fluconazole (DIFLUCAN) 150 MG tablet; Take 1 tablet by mouth 1 (One) Time for 1 dose.        Return if symptoms worsen or fail to improve.    Barby Garcia, APRN  03/01/2018

## 2018-03-05 ENCOUNTER — OFFICE VISIT (OUTPATIENT)
Dept: INTERNAL MEDICINE | Facility: CLINIC | Age: 28
End: 2018-03-05

## 2018-03-05 ENCOUNTER — TELEPHONE (OUTPATIENT)
Dept: INTERNAL MEDICINE | Facility: CLINIC | Age: 28
End: 2018-03-05

## 2018-03-05 VITALS
TEMPERATURE: 97.6 F | DIASTOLIC BLOOD PRESSURE: 64 MMHG | HEIGHT: 63 IN | BODY MASS INDEX: 33.49 KG/M2 | OXYGEN SATURATION: 98 % | RESPIRATION RATE: 12 BRPM | HEART RATE: 94 BPM | SYSTOLIC BLOOD PRESSURE: 112 MMHG | WEIGHT: 189 LBS

## 2018-03-05 DIAGNOSIS — R10.31 RIGHT LOWER QUADRANT ABDOMINAL PAIN: ICD-10-CM

## 2018-03-05 DIAGNOSIS — K04.7 DENTAL ABSCESS: ICD-10-CM

## 2018-03-05 DIAGNOSIS — J02.9 ACUTE PHARYNGITIS, UNSPECIFIED ETIOLOGY: Primary | ICD-10-CM

## 2018-03-05 PROCEDURE — 99213 OFFICE O/P EST LOW 20 MIN: CPT | Performed by: NURSE PRACTITIONER

## 2018-03-05 NOTE — PROGRESS NOTES
Chief Complaint / Reason:      Chief Complaint   Patient presents with   • Sore Throat   • Generalized Body Aches   • Fatigue   • Headache   • Fever     around 100       Subjective     HPI  Patient presents today with complaints of sore throat, generalized body aches, fatigue, headache, fever and abdominal pain.  She states that she has had a low-grade fever ongoing for quite some time.  She also states that her stool seems to be difficult to pass at times and is kind of cloudy looking.  She has seen Dr. Selby in the past and had colonoscopy and EGD.  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 Small sliding hiatal hernia less than 3 cm.  Erythematous-erosive gastritis with evidence of old healed ulceration.  Small sessile gastric polyps noted on EGD.  Patient was having left sided abdominal pain and now she states the pain is over on the right side.  Fibroids were noted on the most recent imaging.  Patient has appointment with GYN.  Patient states that she has had blood in her stool before in the past and it has been a while since she has had stool cultures performed.  She also states that her teeth are starting to hurt again and she is wondering when the oral surgeon referral will go through.  It appears to be pending at this time.  She still is on Keflex and is advised to complete antibiotic as prescribed.  Denies chest pain, shortness of breath or heart palpitations.  History taken from: patient    PMH/FH/Social History were reviewed and updated appropriately in the electronic medical record.     Review of Systems:   Review of Systems   Constitutional: Positive for fatigue and fever.   HENT: Positive for dental problem.    Respiratory: Negative.    Cardiovascular: Negative.    Gastrointestinal: Positive for abdominal pain.   Genitourinary: Negative.    Musculoskeletal: Positive for  myalgias.   Neurological: Positive for weakness and headaches.     All other systems were reviewed and are negative.  Exceptions are noted in the subjective or above.      Objective     Vital Signs  Vitals:    03/05/18 1027   BP: 112/64   Pulse: 94   Resp: 12   Temp: 97.6 °F (36.4 °C)   SpO2: 98%       Body mass index is 33.48 kg/(m^2).    Physical Exam   Constitutional: She is oriented to person, place, and time. She appears well-developed and well-nourished. No distress.   HENT:   Head: Normocephalic.   Right Ear: External ear normal.   Left Ear: External ear normal.   Mouth/Throat: Mucous membranes are dry. Oral lesions present. Abnormal dentition. Dental caries present. Posterior oropharyngeal erythema present.       Cardiovascular: Normal rate, regular rhythm, normal heart sounds and intact distal pulses.    Pulmonary/Chest: Effort normal and breath sounds normal. She has no wheezes. She exhibits no tenderness.   Abdominal: Soft. Bowel sounds are normal. There is tenderness in the right lower quadrant, suprapubic area and left lower quadrant. There is no CVA tenderness.   Neurological: She is alert and oriented to person, place, and time.   Skin: Skin is warm and dry. No rash noted. No erythema. No pallor.   Psychiatric: She has a normal mood and affect. Her behavior is normal. Judgment and thought content normal.   Nursing note and vitals reviewed.       Results Review:    I reviewed the patient's previous clinical results.       Medication Review:     Current Outpatient Prescriptions:   •  acetaminophen (TYLENOL) 500 MG tablet, Take 1 tablet by mouth Every 6 (Six) Hours As Needed for Mild Pain ., Disp: 12 tablet, Rfl: 0  •  albuterol (PROVENTIL HFA;VENTOLIN HFA) 108 (90 BASE) MCG/ACT inhaler, Inhale 2 puffs Every 6 (Six) Hours As Needed for Wheezing or Shortness of Air., Disp: 1 inhaler, Rfl: 3  •  albuterol (PROVENTIL) (2.5 MG/3ML) 0.083% nebulizer solution, Take 2.5 mg by nebulization Every 4 (Four) Hours  As Needed for Wheezing or Shortness of Air., Disp: 60 vial, Rfl: 3  •  aspirin 81 MG tablet, Take 1 tablet by mouth Daily., Disp: 30 tablet, Rfl: 11  •  atorvastatin (LIPITOR) 80 MG tablet, Take 1 tablet by mouth Daily., Disp: 30 tablet, Rfl: 11  •  azelastine (ASTELIN) 0.1 % nasal spray, 1 spray into each nostril 2 (Two) Times a Day As Needed for Rhinitis or Allergies. Use in each nostril as directed, Disp: 1 each, Rfl: 5  •  DULoxetine (CYMBALTA) 30 MG capsule, Take 1 capsule by mouth Daily., Disp: 30 capsule, Rfl: 5  •  ELIQUIS 5 MG tablet tablet, TAKE 1 TABLET BY MOUTH EVERY TWELVE HOURS , Disp: 60 tablet, Rfl: 2  •  flunisolide (NASALIDE) 25 MCG/ACT (0.025%) solution nasal spray, Inhale 1 spray Every 12 (Twelve) Hours., Disp: 1 bottle, Rfl: 5  •  Fluticasone Furoate-Vilanterol (BREO ELLIPTA) 100-25 MCG/INH aerosol powder , Inhale 1 puff Daily., Disp: , Rfl:   •  ibuprofen (ADVIL,MOTRIN) 600 MG tablet, Take 1 tablet by mouth 3 (Three) Times a Day With Meals., Disp: 90 tablet, Rfl: 0  •  lactulose (CHRONULAC) 10 GM/15ML solution, Take 30 mL by mouth 2 (Two) Times a Day As Needed (constipation)., Disp: 500 mL, Rfl: 0  •  LORazepam (ATIVAN) 0.5 MG tablet, Take 1 tablet by mouth Every 8 (Eight) Hours As Needed for Anxiety., Disp: 9 tablet, Rfl: 0  •  midodrine (PROAMATINE) 5 MG tablet, Take 1 tablet by mouth 3 (Three) Times a Day. Last dose no later that 5 pm, Disp: 90 tablet, Rfl: 5  •  montelukast (SINGULAIR) 10 MG tablet, TAKE 1 TABLET BY MOUTH IN THE EVENING , Disp: 30 tablet, Rfl: 2  •  ondansetron ODT (ZOFRAN-ODT) 4 MG disintegrating tablet, Take 1 tablet by mouth Every 6 (Six) Hours As Needed for Nausea or Vomiting., Disp: 12 tablet, Rfl: 0  •  pantoprazole (PROTONIX) 40 MG EC tablet, TAKE 1 TABLET BY MOUTH ONE TIME A DAY , Disp: 30 tablet, Rfl: 4  •  promethazine (PHENERGAN) 25 MG tablet, Take 1 tablet by mouth Every 6 (Six) Hours As Needed for Nausea or Vomiting., Disp: 30 tablet, Rfl: 0  •  sulfaSALAzine  (AZULFIDINE) 500 MG tablet, Take 1 tablet by mouth 2 (Two) Times a Day., Disp: 60 tablet, Rfl: 1  •  tiZANidine (ZANAFLEX) 4 MG tablet, Take 1 tablet by mouth Every 8 (Eight) Hours As Needed for Muscle Spasms., Disp: 30 tablet, Rfl: 3  •  traMADol (ULTRAM) 50 MG tablet, Take 1 tablet by mouth Every 8 (Eight) Hours As Needed for Severe Pain ., Disp: 15 tablet, Rfl: 0  •  Triamcinolone Acetonide (NASACORT) 55 MCG/ACT nasal inhaler, 2 sprays into each nostril Daily., Disp: , Rfl:   •  cephalexin (KEFLEX) 500 MG capsule, Take 1 capsule by mouth 2 (Two) Times a Day., Disp: 10 capsule, Rfl: 0  •  pantoprazole (PROTONIX) 40 MG EC tablet, TAKE 1 TABLET BY MOUTH ONE TIME A DAY , Disp: 30 tablet, Rfl: 4  •  phenazopyridine (PYRIDIUM) 100 MG tablet, Take 1 tablet by mouth 3 (Three) Times a Day As Needed for bladder spasms., Disp: 9 tablet, Rfl: 0    Assessment/Plan   Marilu was seen today for sore throat, generalized body aches, fatigue, headache and fever.    Diagnoses and all orders for this visit:    Acute pharyngitis, unspecified etiology  Recommend patient continue to take Keflex as prescribed and treat symptomatically at this time  Right lower quadrant abdominal pain  Recommend patient take over-the-counter MiraLAX to keep stool regular and increase water intake  Dental abscess  Recommend patient complete Keflex as prescribed and will collaborate with referrals regarding oral surgeon referral.  Take over-the-counter analgesics for pain relief.        Return in about 4 weeks (around 4/2/2018), or if symptoms worsen or fail to improve, for Dr. Barker .    Barby Garcia, APRN  03/05/2018

## 2018-03-06 ENCOUNTER — HOSPITAL ENCOUNTER (EMERGENCY)
Facility: HOSPITAL | Age: 28
Discharge: HOME | End: 2018-03-06
Attending: EMERGENCY MEDICINE | Admitting: EMERGENCY MEDICINE

## 2018-03-06 VITALS
SYSTOLIC BLOOD PRESSURE: 116 MMHG | OXYGEN SATURATION: 98 % | RESPIRATION RATE: 16 BRPM | HEIGHT: 65 IN | BODY MASS INDEX: 30.49 KG/M2 | WEIGHT: 183 LBS | TEMPERATURE: 98.5 F | HEART RATE: 100 BPM | DIASTOLIC BLOOD PRESSURE: 79 MMHG

## 2018-03-06 DIAGNOSIS — G89.29 CHRONIC ABDOMINAL PAIN: Primary | ICD-10-CM

## 2018-03-06 DIAGNOSIS — R10.9 CHRONIC ABDOMINAL PAIN: Primary | ICD-10-CM

## 2018-03-06 LAB
ALBUMIN SERPL-MCNC: 4.6 G/DL (ref 3.5–5)
ALBUMIN/GLOB SERPL: 1.3 G/DL (ref 1–2)
ALP SERPL-CCNC: 87 U/L (ref 38–126)
ALT SERPL W P-5'-P-CCNC: 28 U/L (ref 13–69)
ANION GAP SERPL CALCULATED.3IONS-SCNC: 17.8 MMOL/L
AST SERPL-CCNC: 19 U/L (ref 15–46)
BASOPHILS # BLD AUTO: 0.05 10*3/MM3 (ref 0–0.2)
BASOPHILS NFR BLD AUTO: 0.5 % (ref 0–2.5)
BILIRUB SERPL-MCNC: 0.7 MG/DL (ref 0.2–1.3)
BILIRUB UR QL STRIP: NEGATIVE
BUN BLD-MCNC: 7 MG/DL (ref 7–20)
BUN/CREAT SERPL: 11.7 (ref 7.1–23.5)
CALCIUM SPEC-SCNC: 9.5 MG/DL (ref 8.4–10.2)
CHLORIDE SERPL-SCNC: 102 MMOL/L (ref 98–107)
CLARITY UR: ABNORMAL
CO2 SERPL-SCNC: 27 MMOL/L (ref 26–30)
COLOR UR: YELLOW
CREAT BLD-MCNC: 0.6 MG/DL (ref 0.6–1.3)
D-LACTATE SERPL-SCNC: 1.2 MMOL/L (ref 0.5–2)
DEPRECATED RDW RBC AUTO: 41.6 FL (ref 37–54)
EOSINOPHIL # BLD AUTO: 0.09 10*3/MM3 (ref 0–0.7)
EOSINOPHIL NFR BLD AUTO: 0.8 % (ref 0–7)
ERYTHROCYTE [DISTWIDTH] IN BLOOD BY AUTOMATED COUNT: 13.1 % (ref 11.5–14.5)
GFR SERPL CREATININE-BSD FRML MDRD: 120 ML/MIN/1.73
GLOBULIN UR ELPH-MCNC: 3.5 GM/DL
GLUCOSE BLD-MCNC: 92 MG/DL (ref 74–98)
GLUCOSE UR STRIP-MCNC: NEGATIVE MG/DL
HCT VFR BLD AUTO: 39.7 % (ref 37–47)
HGB BLD-MCNC: 13.7 G/DL (ref 12–16)
HGB UR QL STRIP.AUTO: NEGATIVE
IMM GRANULOCYTES # BLD: 0.04 10*3/MM3 (ref 0–0.06)
IMM GRANULOCYTES NFR BLD: 0.4 % (ref 0–0.6)
KETONES UR QL STRIP: NEGATIVE
LEUKOCYTE ESTERASE UR QL STRIP.AUTO: NEGATIVE
LIPASE SERPL-CCNC: 71 U/L (ref 23–300)
LYMPHOCYTES # BLD AUTO: 2.74 10*3/MM3 (ref 0.6–3.4)
LYMPHOCYTES NFR BLD AUTO: 25.7 % (ref 10–50)
MCH RBC QN AUTO: 30.3 PG (ref 27–31)
MCHC RBC AUTO-ENTMCNC: 34.5 G/DL (ref 30–37)
MCV RBC AUTO: 87.8 FL (ref 81–99)
MONOCYTES # BLD AUTO: 0.65 10*3/MM3 (ref 0–0.9)
MONOCYTES NFR BLD AUTO: 6.1 % (ref 0–12)
NEUTROPHILS # BLD AUTO: 7.09 10*3/MM3 (ref 2–6.9)
NEUTROPHILS NFR BLD AUTO: 66.5 % (ref 37–80)
NITRITE UR QL STRIP: NEGATIVE
NRBC BLD MANUAL-RTO: 0 /100 WBC (ref 0–0)
PH UR STRIP.AUTO: 5.5 [PH] (ref 5–8)
PLATELET # BLD AUTO: 317 10*3/MM3 (ref 130–400)
PMV BLD AUTO: 9.6 FL (ref 6–12)
POTASSIUM BLD-SCNC: 3.8 MMOL/L (ref 3.5–5.1)
PROT SERPL-MCNC: 8.1 G/DL (ref 6.3–8.2)
PROT UR QL STRIP: NEGATIVE
RBC # BLD AUTO: 4.52 10*6/MM3 (ref 4.2–5.4)
SODIUM BLD-SCNC: 143 MMOL/L (ref 137–145)
SP GR UR STRIP: >=1.03 (ref 1–1.03)
UROBILINOGEN UR QL STRIP: ABNORMAL
WBC NRBC COR # BLD: 10.66 10*3/MM3 (ref 4.8–10.8)

## 2018-03-06 PROCEDURE — 83690 ASSAY OF LIPASE: CPT | Performed by: EMERGENCY MEDICINE

## 2018-03-06 PROCEDURE — 96375 TX/PRO/DX INJ NEW DRUG ADDON: CPT

## 2018-03-06 PROCEDURE — 81003 URINALYSIS AUTO W/O SCOPE: CPT | Performed by: EMERGENCY MEDICINE

## 2018-03-06 PROCEDURE — 96374 THER/PROPH/DIAG INJ IV PUSH: CPT

## 2018-03-06 PROCEDURE — 25010000002 KETOROLAC TROMETHAMINE PER 15 MG: Performed by: EMERGENCY MEDICINE

## 2018-03-06 PROCEDURE — 80053 COMPREHEN METABOLIC PANEL: CPT | Performed by: EMERGENCY MEDICINE

## 2018-03-06 PROCEDURE — 93005 ELECTROCARDIOGRAM TRACING: CPT | Performed by: EMERGENCY MEDICINE

## 2018-03-06 PROCEDURE — 99283 EMERGENCY DEPT VISIT LOW MDM: CPT

## 2018-03-06 PROCEDURE — 96361 HYDRATE IV INFUSION ADD-ON: CPT

## 2018-03-06 PROCEDURE — 85025 COMPLETE CBC W/AUTO DIFF WBC: CPT | Performed by: EMERGENCY MEDICINE

## 2018-03-06 PROCEDURE — 83605 ASSAY OF LACTIC ACID: CPT | Performed by: EMERGENCY MEDICINE

## 2018-03-06 RX ORDER — FAMOTIDINE 10 MG/ML
20 INJECTION, SOLUTION INTRAVENOUS ONCE
Status: COMPLETED | OUTPATIENT
Start: 2018-03-06 | End: 2018-03-06

## 2018-03-06 RX ORDER — KETOROLAC TROMETHAMINE 30 MG/ML
30 INJECTION, SOLUTION INTRAMUSCULAR; INTRAVENOUS ONCE
Status: COMPLETED | OUTPATIENT
Start: 2018-03-06 | End: 2018-03-06

## 2018-03-06 RX ORDER — SODIUM CHLORIDE 0.9 % (FLUSH) 0.9 %
10 SYRINGE (ML) INJECTION AS NEEDED
Status: DISCONTINUED | OUTPATIENT
Start: 2018-03-06 | End: 2018-03-07 | Stop reason: HOSPADM

## 2018-03-06 RX ADMIN — KETOROLAC TROMETHAMINE 30 MG: 30 INJECTION, SOLUTION INTRAMUSCULAR at 22:06

## 2018-03-06 RX ADMIN — FAMOTIDINE 20 MG: 10 INJECTION, SOLUTION INTRAVENOUS at 22:06

## 2018-03-06 RX ADMIN — SODIUM CHLORIDE 1000 ML: 9 INJECTION, SOLUTION INTRAVENOUS at 22:06

## 2018-03-07 NOTE — ED PROVIDER NOTES
Subjective   History of Present Illness  TRIAGE CHIEF COMPLAINT:   Chief Complaint   Patient presents with   • Abdominal Pain         HPI: Marilu Godoy   is a 27 y.o. female   who presents to the emergency department complaining of abdominal pain.  Patient with a history of chronic abdominal pain, hypertension, factor V, PE, GERD, cholecystectomy.  Patient reports moderate, constant right-sided abdominal discomfort ongoing since yesterday evening.  Reports associated constipation and nausea.  Patient also states she has felt dizzy today and describes presyncope.  Denies chest pain, palpitations, shortness of breath, fever, chills, hematochezia, melena, dysuria, rash.  Has not been to see her doctor.  Has not taken anything for pain prior to arrival.  Denies any recent illness or trauma.    ER visit #14 in the past 12 months.   9 CT scans during the same period of time including 4 CT A/P's which were unremarkable.          Review of Systems   All other systems reviewed and are negative.      Past Medical History:   Diagnosis Date   • Abdominal pain    • Allergic    • Asthma    • Chest pain    • Clotting disorder     factor 5   • Coronary artery disease involving native coronary artery of native heart without angina pectoris 2017   • Diarrhea    • Factor 5 Leiden mutation, heterozygous    • Gallstone    • GERD (gastroesophageal reflux disease)    • H/O blood clots    • Headache    • Hypertension    • Kidney infection    • Nausea & vomiting    • Obesity    • Orthostatic hypotension    • Ovarian cyst    • Protein S deficiency 2016    labs from hospitalization for PE   • Pulmonary embolism    • Sleep apnea    • Subclinical hyperthyroidism    • Tattoo        Allergies   Allergen Reactions   • Amoxicillin Rash       Past Surgical History:   Procedure Laterality Date   •  SECTION       and    • CHOLECYSTECTOMY     • COLONOSCOPY N/A 2017    Procedure: COLONOSCOPY WITH BIOPSIES  AND ARGON THERMAL ABLATION;  Surgeon: Jignesh Selby MD;  Location: [facility] ENDOSCOPY;  Service:    • ENDOSCOPY N/A 4/20/2017    Procedure: ESOPHAGOGASTRODUODENOSCOPY WITH BIOPSIES AND COLD BIOPSY POLYPECTOMIES;  Surgeon: Jignesh Selby MD;  Location: [facility] ENDOSCOPY;  Service:        Family History   Problem Relation Age of Onset   • Arthritis Mother    • COPD Mother    • Asthma Mother    • Arthritis Father    • Diabetes Father    • Hypertension Father    • Hyperlipidemia Father    • Kidney disease Father    • Heart attack Father    • No Known Problems Son    • Colon cancer Neg Hx    • Liver cancer Neg Hx    • Liver disease Neg Hx    • Stomach cancer Neg Hx    • Esophageal cancer Neg Hx        Social History     Social History   • Marital status:      Social History Main Topics   • Smoking status: Never Smoker   • Smokeless tobacco: Never Used   • Alcohol use No   • Drug use: No   • Sexual activity: Yes     Partners: Male     Birth control/ protection: None      Comment: PE while on birth control patch           Objective   Physical Exam    CONSTITUTIONAL: Awake, oriented, appears non-toxic   HENT: Atraumatic, normocephalic, oral mucosa pink and moist, airway patent. Nares patent without drainage. External ears normal.   EYES: Conjunctiva clear, EOMI, PERRL   NECK: Trachea midline, non-tender, supple   CARDIOVASCULAR: Normal heart rate, Normal rhythm, No murmurs, rubs, gallops   PULMONARY/CHEST: Clear to auscultation, no rhonchi, wheezes, or rales. Symmetrical breath sounds. Non-tender.   ABDOMINAL: Non-distended, soft, non-tender - no rebound or guarding. BS normal.   NEUROLOGIC: Non-focal, moving all four extremities, no gross sensory or motor deficits.   EXTREMITIES: No clubbing, cyanosis, or edema   SKIN: Warm, Dry, No erythema, No rash     No orders to display           EKG:  NSR, rate 93, no acute ST changes        Procedures         ED Course  ED Course          ED COURSE / MEDICAL DECISION MAKING:    Nursing notes reviewed.    ER visit #14 in the past 12 months.   9 CT scans during the same period of time including 4 CT A/P's which were unremarkable.    Workup unremarkable, abdomen is soft with no appreciable tenderness, guarding, or rebound.  Do not feel repeat imaging and further exposure to ionizing radiation and IV dye is indicated at this time.  Advised to follow up with PCP tomorrow for reevaluation versus return if worse.    DECISION TO DISCHARGE/ADMIT: see ED care timeline       Electronically signed by: Richard Siddiqui MD, 3/6/2018 11:13 PM              [facility]    Final diagnoses:   Chronic abdominal pain            Richard Siddiqui MD  03/06/18 2783

## 2018-03-08 ENCOUNTER — APPOINTMENT (OUTPATIENT)
Dept: PREADMISSION TESTING | Facility: HOSPITAL | Age: 28
End: 2018-03-08

## 2018-03-08 ENCOUNTER — HOSPITAL ENCOUNTER (OUTPATIENT)
Dept: GENERAL RADIOLOGY | Facility: HOSPITAL | Age: 28
Discharge: HOME | End: 2018-03-08
Admitting: OBSTETRICS & GYNECOLOGY

## 2018-03-08 ENCOUNTER — PREP FOR SURGERY (OUTPATIENT)
Dept: OTHER | Facility: HOSPITAL | Age: 28
End: 2018-03-08

## 2018-03-08 ENCOUNTER — OFFICE VISIT (OUTPATIENT)
Dept: INTERNAL MEDICINE | Facility: CLINIC | Age: 28
End: 2018-03-08

## 2018-03-08 ENCOUNTER — OFFICE VISIT (OUTPATIENT)
Dept: OBSTETRICS AND GYNECOLOGY | Facility: CLINIC | Age: 28
End: 2018-03-08

## 2018-03-08 VITALS
OXYGEN SATURATION: 99 % | HEART RATE: 95 BPM | DIASTOLIC BLOOD PRESSURE: 82 MMHG | HEIGHT: 63 IN | WEIGHT: 194.12 LBS | TEMPERATURE: 100.1 F | BODY MASS INDEX: 34.39 KG/M2 | SYSTOLIC BLOOD PRESSURE: 132 MMHG

## 2018-03-08 VITALS
WEIGHT: 196 LBS | HEIGHT: 63 IN | SYSTOLIC BLOOD PRESSURE: 132 MMHG | DIASTOLIC BLOOD PRESSURE: 74 MMHG | BODY MASS INDEX: 34.73 KG/M2

## 2018-03-08 VITALS — HEIGHT: 63 IN | BODY MASS INDEX: 33.13 KG/M2 | WEIGHT: 187 LBS

## 2018-03-08 DIAGNOSIS — R42 DIZZINESS: ICD-10-CM

## 2018-03-08 DIAGNOSIS — R10.2 PELVIC PAIN: ICD-10-CM

## 2018-03-08 DIAGNOSIS — R10.2 PELVIC PAIN: Primary | ICD-10-CM

## 2018-03-08 DIAGNOSIS — N94.6 DYSMENORRHEA: ICD-10-CM

## 2018-03-08 DIAGNOSIS — N93.9 ABNORMAL UTERINE BLEEDING (AUB): Primary | ICD-10-CM

## 2018-03-08 DIAGNOSIS — R11.0 NAUSEA: ICD-10-CM

## 2018-03-08 DIAGNOSIS — R10.31 RIGHT LOWER QUADRANT ABDOMINAL PAIN: Primary | ICD-10-CM

## 2018-03-08 LAB
ANION GAP SERPL CALCULATED.3IONS-SCNC: 15.2 MMOL/L
B-HCG UR QL: NEGATIVE
BASOPHILS # BLD AUTO: 0.04 10*3/MM3 (ref 0–0.2)
BASOPHILS NFR BLD AUTO: 0.4 % (ref 0–2.5)
BILIRUB UR QL STRIP: NEGATIVE
BUN BLD-MCNC: 11 MG/DL (ref 7–20)
BUN/CREAT SERPL: 18.3 (ref 7.1–23.5)
CALCIUM SPEC-SCNC: 9.5 MG/DL (ref 8.4–10.2)
CHLORIDE SERPL-SCNC: 107 MMOL/L (ref 98–107)
CLARITY UR: ABNORMAL
CO2 SERPL-SCNC: 25 MMOL/L (ref 26–30)
COLOR UR: ABNORMAL
CREAT BLD-MCNC: 0.6 MG/DL (ref 0.6–1.3)
DEPRECATED RDW RBC AUTO: 44.2 FL (ref 37–54)
EOSINOPHIL # BLD AUTO: 0.11 10*3/MM3 (ref 0–0.7)
EOSINOPHIL NFR BLD AUTO: 1.2 % (ref 0–7)
ERYTHROCYTE [DISTWIDTH] IN BLOOD BY AUTOMATED COUNT: 13.4 % (ref 11.5–14.5)
EXPIRATION DATE 2: ABNORMAL
EXPIRATION DATE 3: ABNORMAL
EXPIRATION DATE: ABNORMAL
GASTROCULT GAST QL: NEGATIVE
GFR SERPL CREATININE-BSD FRML MDRD: 120 ML/MIN/1.73
GLUCOSE BLD-MCNC: 104 MG/DL (ref 74–98)
GLUCOSE UR STRIP-MCNC: NEGATIVE MG/DL
HCT VFR BLD AUTO: 38.9 % (ref 37–47)
HEMOCCULT SP2 STL QL: NEGATIVE
HEMOCCULT SP3 STL QL: POSITIVE
HGB BLD-MCNC: 13 G/DL (ref 12–16)
HGB UR QL STRIP.AUTO: NEGATIVE
IMM GRANULOCYTES # BLD: 0.03 10*3/MM3 (ref 0–0.06)
IMM GRANULOCYTES NFR BLD: 0.3 % (ref 0–0.6)
INTERNAL NEGATIVE CONTROL: NEGATIVE
INTERNAL POSITIVE CONTROL: POSITIVE
KETONES UR QL STRIP: NEGATIVE
LEUKOCYTE ESTERASE UR QL STRIP.AUTO: NEGATIVE
LYMPHOCYTES # BLD AUTO: 1.96 10*3/MM3 (ref 0.6–3.4)
LYMPHOCYTES NFR BLD AUTO: 20.6 % (ref 10–50)
Lab: ABNORMAL
Lab: NORMAL
MCH RBC QN AUTO: 30.5 PG (ref 27–31)
MCHC RBC AUTO-ENTMCNC: 33.4 G/DL (ref 30–37)
MCV RBC AUTO: 91.3 FL (ref 81–99)
MONOCYTES # BLD AUTO: 0.39 10*3/MM3 (ref 0–0.9)
MONOCYTES NFR BLD AUTO: 4.1 % (ref 0–12)
NEUTROPHILS # BLD AUTO: 6.97 10*3/MM3 (ref 2–6.9)
NEUTROPHILS NFR BLD AUTO: 73.4 % (ref 37–80)
NITRITE UR QL STRIP: NEGATIVE
NRBC BLD MANUAL-RTO: 0 /100 WBC (ref 0–0)
PH UR STRIP.AUTO: 8.5 [PH] (ref 5–8)
PLATELET # BLD AUTO: 299 10*3/MM3 (ref 130–400)
PMV BLD AUTO: 9.8 FL (ref 6–12)
POTASSIUM BLD-SCNC: 4.2 MMOL/L (ref 3.5–5.1)
PROT UR QL STRIP: NEGATIVE
RBC # BLD AUTO: 4.26 10*6/MM3 (ref 4.2–5.4)
SODIUM BLD-SCNC: 143 MMOL/L (ref 137–145)
SP GR UR STRIP: 1.02 (ref 1–1.03)
UROBILINOGEN UR QL STRIP: ABNORMAL
WBC NRBC COR # BLD: 9.5 10*3/MM3 (ref 4.8–10.8)

## 2018-03-08 PROCEDURE — 82274 ASSAY TEST FOR BLOOD FECAL: CPT | Performed by: NURSE PRACTITIONER

## 2018-03-08 PROCEDURE — 80048 BASIC METABOLIC PNL TOTAL CA: CPT | Performed by: OBSTETRICS & GYNECOLOGY

## 2018-03-08 PROCEDURE — 81003 URINALYSIS AUTO W/O SCOPE: CPT | Performed by: MIDWIFE

## 2018-03-08 PROCEDURE — 71045 X-RAY EXAM CHEST 1 VIEW: CPT

## 2018-03-08 PROCEDURE — 36415 COLL VENOUS BLD VENIPUNCTURE: CPT

## 2018-03-08 PROCEDURE — 99214 OFFICE O/P EST MOD 30 MIN: CPT | Performed by: MIDWIFE

## 2018-03-08 PROCEDURE — 81025 URINE PREGNANCY TEST: CPT | Performed by: NURSE PRACTITIONER

## 2018-03-08 PROCEDURE — 99214 OFFICE O/P EST MOD 30 MIN: CPT | Performed by: NURSE PRACTITIONER

## 2018-03-08 PROCEDURE — 85025 COMPLETE CBC W/AUTO DIFF WBC: CPT | Performed by: MIDWIFE

## 2018-03-08 RX ORDER — SODIUM CHLORIDE 0.9 % (FLUSH) 0.9 %
1-10 SYRINGE (ML) INJECTION AS NEEDED
Status: CANCELLED | OUTPATIENT
Start: 2018-03-08

## 2018-03-08 NOTE — PROGRESS NOTES
Subjective   Chief Complaint   Patient presents with   • Fibroids     referral from Deidre Barker, pt states she was having abdominal pain and had scan done that showed uterine fibroids.      Marilu Godoy is a 27 y.o. year old  presenting to be seen for pelvic pain, irregular menses, and occasional dyspareunia.  She has had irregular menses for several months. They last 3-4 days. She has a lot of cramping during this time and cramping increases based on blood flow.  She tries to take Tylenol 2-3x per day during this time. She can't take Ibuprofen because she is on blood thinners for Factor V. She was also given Tramadol which she takes occasionally.  She has had several CT Scans and a TVS. She was told she has fibroids in her uterus and also ? thickening of her bladder wall. She also saw Dr Palafox January of this year and he did a TVS.  She has had 2 previous CSections  She is not using any birth control but not trying for pregnancy. She was told by some Dr that she couldn't use birth control due to her clotting disorders.    History  Past Medical History:   Diagnosis Date   • Abdominal pain    • Anxiety    • Asthma    • Body piercing     TONGUE, NOSE, TWO IN EACH EAR   • Chest pain    • Clotting disorder     factor 5   • Constipation    • Coronary artery disease involving native coronary artery of native heart without angina pectoris 2017   • Diarrhea    • Diarrhea    • Factor 5 Leiden mutation, heterozygous    • Gallstone    • GERD (gastroesophageal reflux disease)    • H/O blood clots    • Headache    • Hiatal hernia    • High cholesterol    • History of recurrent UTIs    • Hypertension    • Kidney infection    • Nausea    • Nausea & vomiting    • Obesity    • Orthostatic hypotension    • Ovarian cyst    • Pollen allergies    • Protein S deficiency 2016    labs from hospitalization for PE   • Pulmonary embolism    • Sleep apnea     CPAP ASKED TO BRING DOS   • Subclinical  "hyperthyroidism    • Tachycardia    • Tattoo     LOWER BACK   ,   Past Surgical History:   Procedure Laterality Date   •  SECTION      X2  and    • CHOLECYSTECTOMY     • COLONOSCOPY N/A 2017    Procedure: COLONOSCOPY WITH BIOPSIES AND ARGON THERMAL ABLATION;  Surgeon: Jignesh Selby MD;  Location: [facility] ENDOSCOPY;  Service:    • ENDOSCOPY N/A 2017    Procedure: ESOPHAGOGASTRODUODENOSCOPY WITH BIOPSIES AND COLD BIOPSY POLYPECTOMIES;  Surgeon: Jignesh Selby MD;  Location: [facility] ENDOSCOPY;  Service:    ,   Family History   Problem Relation Age of Onset   • Arthritis Mother    • COPD Mother    • Asthma Mother    • Thyroid disease Mother    • Arthritis Father    • Diabetes Father    • Hypertension Father    • Hyperlipidemia Father    • Kidney disease Father    • Heart attack Father    • No Known Problems Son    • Colon cancer Neg Hx    • Liver cancer Neg Hx    • Liver disease Neg Hx    • Stomach cancer Neg Hx    • Esophageal cancer Neg Hx    ,   Social History   Substance Use Topics   • Smoking status: Never Smoker   • Smokeless tobacco: Never Used   • Alcohol use No   ,   (Not in a hospital admission) and Allergies:  Amoxicillin    Smoking status: Never Smoker                                                              Smokeless status: Never Used                          Review of Systems  Pertinent items are noted in HPI, all other systems reviewed and negative       Objective   /74  Ht 160 cm (63\")  Wt 88.9 kg (196 lb)  LMP 2018  Breastfeeding? No  BMI 34.72 kg/m2    Physical Exam:  General Appearance: alert and cooperative  Neck: trachea midline and no thyromegaly  Lungs: clear to auscultation and respirations regular  Heart: regular rhythm and normal rate and no murmur, gallop, or rubs  Skin: color normal and no lesions noted  Neurologic: Mental Status orientated to person, place, time and situation and mood/affect flat, Speech normal content and execusion  Psych: " thought content organized and appropriate judgment    Lab Review   No data reviewed    Imaging   reviewed previous CT scans and TVS         Assessment /Plan    Marilu was seen today for fibroids.    Diagnoses and all orders for this visit:    Abnormal uterine bleeding (AUB)    Dysmenorrhea    Pelvic pain    Other orders  -     Cancel: US Non-ob Transvaginal    Request records/recent TVS from Dr Palafox  Discussed options of IUD or Nexplanon which she isn't interested in  Discussed Diagnostic Laparoscopy which she wants to schedule  Follow up for  surgery .           This note was electronically signed.  Bethany Vargas CNM  3/8/2018

## 2018-03-08 NOTE — DISCHARGE INSTRUCTIONS
Patient instructed on PAT PASS information.  Patient/family member verbalized understanding of instruction/education.Patient to apply Chlorhexadine wipes  to surgical area (as instructed) the night before procedure and the AM of procedure. Wipes provided.Chlorhexidine wipes given to patient with verification sheet. Patient/Family member verbalized understanding to all teaching and instruction.DO NOT EAT, DRINK, SMOKE, USE SMOKELESS TOBACCO OR CHEW GUM AFTER MIDNIGHT THE NIGHT BEFORE SURGERY.  THIS ALSO INCLUDES HARD CANDIES AND MINTS.    DO NOT SHAVE THE AREA TO BE OPERATED ON AT LEAST 48 HOURS PRIOR TO THE PROCEDURE.  DO NOT WEAR MAKE UP OR NAIL POLISH.  DO NOT LEAVE IN ANY PIERCING OR WEAR JEWELRY THE DAY OF SURGERY.      DO NOT USE ADHESIVES IF YOU WEAR DENTURES.    DO NOT WEAR EYE CONTACTS; BRING IN YOUR GLASSES.    ONLY TAKE MEDICATION THE MORNING OF YOUR PROCEDURE IF INSTRUCTED BY YOUR SURGEON WITH ENOUGH WATER TO SWALLOW THE MEDICATION.  IF YOUR SURGEON DID NOT SPECIFY WHICH MEDICATIONS TO TAKE, YOU WILL NEED TO CALL THEIR OFFICE FOR FURTHER INSTRUCTIONS AND DO AS THEY INSTRUCT.    LEAVE ANYTHING YOU CONSIDER VALUABLE AT HOME.    YOU WILL NEED TO ARRANGE FOR SOMEONE TO DRIVE YOU HOME AFTER SURGERY.  IT IS RECOMMENDED THAT YOU DO NOT DRIVE, WORK, DRINK ALCOHOL OR MAKE MAJOR DECISIONS FOR AT LEAST 24 HOURS AFTER YOUR PROCEDURE IS COMPLETE.      THE DAY OF YOUR PROCEDURE, BRING IN THE FOLLOWING IF APPLICABLE:   PICTURE ID AND INSURANCE/MEDICARE OR MEDICAID CARDS   COPY OF ADVANCED DIRECTIVE/LIVING WILL/POWER OR    CPAP/BIPAP/INHALERS   SKIN PREP SHEET   YOUR PREADMISSION TESTING PASS (IF NOT A PHONE HISTORY)

## 2018-03-08 NOTE — PROGRESS NOTES
Chief Complaint / Reason:      Chief Complaint   Patient presents with   • Abdominal Pain     Rt lower quadrant.   • Nausea   • Dizziness     Feeling of passing out. Last episode was last evening. Patient having some dizziness today.       Subjective     HPI  Patient presents today for right lower abdominal pain, nausea, dizziness.  She states she feels like she is going to pass out and her last episode was last evening.  Denies chest pain, shortness of breath or heart palpitations.  She was seen on 3/6/18 [facility] ER with similar complaints and according to the note based on symptoms and clinical assessment the providers did not feel repeating imaging and further exposure to ionizing radiation and IV dye is indicated at this time.  Also within the note it contains the number of ER visit and imaging patient has had which reveal ER visit #14 in the past 12 months  9 CT scans during the same period of time including 4 CT A/P's which were unremarkable.  She was seen at Dr. Boyer's office today where she had labs and they did a UA test which was negative.  Her pregnancy test was negative today. She is scheduled with Dr. Zamudio on 4/2/18 for diagnostic laparoscopy and is scheduled with Dr. Bragg (GI) on 3/21/18.  She has not brought the stool samples back but she states that her and her vehicle and she will return them as soon as possible once she leaves the office.  She has a low-grade fever otherwise vital signs are stable.  History taken from: patient    PMH/FH/Social History were reviewed and updated appropriately in the electronic medical record.     Review of Systems:   Review of Systems   Constitutional: Positive for fatigue.   Respiratory: Negative.    Cardiovascular: Negative.    Gastrointestinal: Positive for abdominal pain and nausea.   Neurological: Positive for dizziness and weakness.     All other systems were reviewed and are negative.  Exceptions are noted in the subjective or above.      Objective      Vital Signs  Vitals:    03/08/18 1121   BP: 132/82   Pulse: 95   Temp: 100.1 °F (37.8 °C)   SpO2: 99%       Body mass index is 34.39 kg/m².    Physical Exam   Constitutional: She is oriented to person, place, and time. She appears well-developed and well-nourished. No distress.   HENT:   Head: Normocephalic.   Right Ear: External ear normal.   Left Ear: External ear normal.   Mouth/Throat: Mucous membranes are dry. Posterior oropharyngeal erythema present.   Cardiovascular: Normal rate, regular rhythm, normal heart sounds and intact distal pulses.    Pulmonary/Chest: Effort normal and breath sounds normal. She has no wheezes. She exhibits no tenderness.   Abdominal: Soft. Bowel sounds are normal. There is tenderness in the suprapubic area, left upper quadrant and left lower quadrant. There is no CVA tenderness.   Lymphadenopathy:     She has no cervical adenopathy.   Neurological: She is alert and oriented to person, place, and time.   Skin: Skin is warm and dry. No rash noted. No erythema. No pallor.   Psychiatric: She has a normal mood and affect. Her behavior is normal. Judgment and thought content normal.   Nursing note and vitals reviewed.       Results Review:    I reviewed the patient's new/previous clinical results.   Office Visit on 03/08/2018   Component Date Value Ref Range Status   • HCG, Urine, QL 03/08/2018 Negative  Negative Final   • Lot Number 03/08/2018 [id number]   Final   • Internal Positive Control 03/08/2018 Positive   Final   • Internal Negative Control 03/08/2018 Negative   Final   Appointment on 03/08/2018   Component Date Value Ref Range Status   • Glucose 03/08/2018 104* 74 - 98 mg/dL Final   • BUN 03/08/2018 11  7 - 20 mg/dL Final   • Creatinine 03/08/2018 0.60  0.60 - 1.30 mg/dL Final   • Sodium 03/08/2018 143  137 - 145 mmol/L Final   • Potassium 03/08/2018 4.2  3.5 - 5.1 mmol/L Final   • Chloride 03/08/2018 107  98 - 107 mmol/L Final   • CO2 03/08/2018 25.0* 26.0 - 30.0 mmol/L  Final   • Calcium 03/08/2018 9.5  8.4 - 10.2 mg/dL Final   • eGFR Non African Amer 03/08/2018 120  >60 mL/min/1.73 Final   • BUN/Creatinine Ratio 03/08/2018 18.3  7.1 - 23.5 Final   • Anion Gap 03/08/2018 15.2  mmol/L Final   • Color, UA 03/08/2018 Straw  Yellow, Straw Final   • Appearance, UA 03/08/2018 Cloudy* Clear Final   • pH, UA 03/08/2018 8.5* 5.0 - 8.0 Final   • Specific Gravity, UA 03/08/2018 1.025  1.005 - 1.030 Final   • Glucose, UA 03/08/2018 Negative  Negative Final   • Ketones, UA 03/08/2018 Negative  Negative Final   • Bilirubin, UA 03/08/2018 Negative  Negative Final   • Blood, UA 03/08/2018 Negative  Negative Final   • Protein, UA 03/08/2018 Negative  Negative Final   • Leuk Esterase, UA 03/08/2018 Negative  Negative Final   • Nitrite, UA 03/08/2018 Negative  Negative Final   • Urobilinogen, UA 03/08/2018 0.2 E.U./dL  0.2 - 1.0 E.U./dL Final   • WBC 03/08/2018 9.50  4.80 - 10.80 10*3/mm3 Final   • RBC 03/08/2018 4.26  4.20 - 5.40 10*6/mm3 Final   • Hemoglobin 03/08/2018 13.0  12.0 - 16.0 g/dL Final   • Hematocrit 03/08/2018 38.9  37.0 - 47.0 % Final   • MCV 03/08/2018 91.3  81.0 - 99.0 fL Final   • MCH 03/08/2018 30.5  27.0 - 31.0 pg Final   • MCHC 03/08/2018 33.4  30.0 - 37.0 g/dL Final   • RDW 03/08/2018 13.4  11.5 - 14.5 % Final   • RDW-SD 03/08/2018 44.2  37.0 - 54.0 fl Final   • MPV 03/08/2018 9.8  6.0 - 12.0 fL Final   • Platelets 03/08/2018 299  130 - 400 10*3/mm3 Final   • Neutrophil % 03/08/2018 73.4  37.0 - 80.0 % Final   • Lymphocyte % 03/08/2018 20.6  10.0 - 50.0 % Final   • Monocyte % 03/08/2018 4.1  0.0 - 12.0 % Final   • Eosinophil % 03/08/2018 1.2  0.0 - 7.0 % Final   • Basophil % 03/08/2018 0.4  0.0 - 2.5 % Final   • Immature Grans % 03/08/2018 0.3  0.0 - 0.6 % Final   • Neutrophils, Absolute 03/08/2018 6.97* 2.00 - 6.90 10*3/mm3 Final   • Lymphocytes, Absolute 03/08/2018 1.96  0.60 - 3.40 10*3/mm3 Final   • Monocytes, Absolute 03/08/2018 0.39  0.00 - 0.90 10*3/mm3 Final   •  Eosinophils, Absolute 03/08/2018 0.11  0.00 - 0.70 10*3/mm3 Final   • Basophils, Absolute 03/08/2018 0.04  0.00 - 0.20 10*3/mm3 Final   • Immature Grans, Absolute 03/08/2018 0.03  0.00 - 0.06 10*3/mm3 Final   • nRBC 03/08/2018 0.0  0.0 - 0.0 /100 WBC Final         Medication Review:     Current Outpatient Prescriptions:   •  acetaminophen (TYLENOL) 500 MG tablet, Take 1 tablet by mouth Every 6 (Six) Hours As Needed for Mild Pain ., Disp: 12 tablet, Rfl: 0  •  albuterol (PROVENTIL HFA;VENTOLIN HFA) 108 (90 BASE) MCG/ACT inhaler, Inhale 2 puffs Every 6 (Six) Hours As Needed for Wheezing or Shortness of Air., Disp: 1 inhaler, Rfl: 3  •  albuterol (PROVENTIL) (2.5 MG/3ML) 0.083% nebulizer solution, Take 2.5 mg by nebulization Every 4 (Four) Hours As Needed for Wheezing or Shortness of Air., Disp: 60 vial, Rfl: 3  •  aspirin 81 MG tablet, Take 1 tablet by mouth Daily., Disp: 30 tablet, Rfl: 11  •  atorvastatin (LIPITOR) 80 MG tablet, Take 1 tablet by mouth Daily., Disp: 30 tablet, Rfl: 11  •  azelastine (ASTELIN) 0.1 % nasal spray, 1 spray into each nostril 2 (Two) Times a Day As Needed for Rhinitis or Allergies. Use in each nostril as directed, Disp: 1 each, Rfl: 5  •  DULoxetine (CYMBALTA) 30 MG capsule, Take 1 capsule by mouth Daily., Disp: 30 capsule, Rfl: 5  •  ELIQUIS 5 MG tablet tablet, TAKE 1 TABLET BY MOUTH EVERY TWELVE HOURS , Disp: 60 tablet, Rfl: 2  •  flunisolide (NASALIDE) 25 MCG/ACT (0.025%) solution nasal spray, Inhale 1 spray Every 12 (Twelve) Hours., Disp: 1 bottle, Rfl: 5  •  Fluticasone Furoate-Vilanterol (BREO ELLIPTA) 100-25 MCG/INH aerosol powder , Inhale 1 puff Daily., Disp: , Rfl:   •  ibuprofen (ADVIL,MOTRIN) 600 MG tablet, Take 1 tablet by mouth 3 (Three) Times a Day With Meals., Disp: 90 tablet, Rfl: 0  •  lactulose (CHRONULAC) 10 GM/15ML solution, Take 30 mL by mouth 2 (Two) Times a Day As Needed (constipation)., Disp: 500 mL, Rfl: 0  •  LORazepam (ATIVAN) 0.5 MG tablet, Take 1 tablet by  mouth Every 8 (Eight) Hours As Needed for Anxiety., Disp: 9 tablet, Rfl: 0  •  midodrine (PROAMATINE) 5 MG tablet, Take 1 tablet by mouth 3 (Three) Times a Day. Last dose no later that 5 pm, Disp: 90 tablet, Rfl: 5  •  montelukast (SINGULAIR) 10 MG tablet, TAKE 1 TABLET BY MOUTH IN THE EVENING , Disp: 30 tablet, Rfl: 2  •  ondansetron ODT (ZOFRAN-ODT) 4 MG disintegrating tablet, Take 1 tablet by mouth Every 6 (Six) Hours As Needed for Nausea or Vomiting., Disp: 12 tablet, Rfl: 0  •  pantoprazole (PROTONIX) 40 MG EC tablet, TAKE 1 TABLET BY MOUTH ONE TIME A DAY , Disp: 30 tablet, Rfl: 4  •  phenazopyridine (PYRIDIUM) 100 MG tablet, Take 1 tablet by mouth 3 (Three) Times a Day As Needed for bladder spasms., Disp: 9 tablet, Rfl: 0  •  promethazine (PHENERGAN) 25 MG tablet, Take 1 tablet by mouth Every 6 (Six) Hours As Needed for Nausea or Vomiting., Disp: 30 tablet, Rfl: 0  •  sulfaSALAzine (AZULFIDINE) 500 MG tablet, Take 1 tablet by mouth 2 (Two) Times a Day., Disp: 60 tablet, Rfl: 1  •  tiZANidine (ZANAFLEX) 4 MG tablet, Take 1 tablet by mouth Every 8 (Eight) Hours As Needed for Muscle Spasms., Disp: 30 tablet, Rfl: 3  •  traMADol (ULTRAM) 50 MG tablet, Take 1 tablet by mouth Every 8 (Eight) Hours As Needed for Severe Pain ., Disp: 15 tablet, Rfl: 0  •  Triamcinolone Acetonide (NASACORT) 55 MCG/ACT nasal inhaler, 2 sprays into each nostril Daily., Disp: , Rfl:     Assessment/Plan   Marilu was seen today for abdominal pain, nausea and dizziness.    Diagnoses and all orders for this visit:    Right lower quadrant abdominal pain  -     POCT pregnancy, urine  return stool samples and avoid changes in bowel habits  Increase water intake and avoid straining.   Avoid heavy lifting or pulling.   Keep appointment with GYN and GI as scheduled    Nausea  Take Zofran as needed  Discussed dietary modifications  Dizziness  Recommend patient maintain hydration and adequate rest, use caution when changing positions.    Return if  symptoms worsen or fail to improve.    Barby Garcia, APRN  03/08/2018

## 2018-03-14 ENCOUNTER — OFFICE VISIT (OUTPATIENT)
Dept: ONCOLOGY | Facility: CLINIC | Age: 28
End: 2018-03-14

## 2018-03-14 VITALS
RESPIRATION RATE: 18 BRPM | BODY MASS INDEX: 34.38 KG/M2 | HEIGHT: 63 IN | TEMPERATURE: 98 F | SYSTOLIC BLOOD PRESSURE: 130 MMHG | DIASTOLIC BLOOD PRESSURE: 77 MMHG | HEART RATE: 115 BPM | WEIGHT: 194 LBS

## 2018-03-14 DIAGNOSIS — D68.51 HETEROZYGOUS FACTOR V LEIDEN MUTATION (HCC): ICD-10-CM

## 2018-03-14 DIAGNOSIS — Z86.711 HISTORY OF PULMONARY EMBOLUS (PE): Primary | ICD-10-CM

## 2018-03-14 PROCEDURE — 99214 OFFICE O/P EST MOD 30 MIN: CPT | Performed by: NURSE PRACTITIONER

## 2018-03-14 NOTE — PROGRESS NOTES
DATE OF VISIT: 3/14/2018    REASON FOR VISIT: Followup for Right pulmonary embolism      HISTORY OF PRESENT ILLNESS: The patient is a very pleasant 27 y.o. Female who was in her usual state of health until approximately 1 month. The patient presented with chest pain, right lower chest, radiating to her back, gait worse with deep breath, improves with rest, never had this pain before, comes and goes, started to become more persistent, 7 out of 10 severity. Patient denied any chest trauma denied any fever or chills no recent travel or ill contacts. Never had similar pain before. Patient went to local emergency room at [facility] November 13, 2016 she had a CAT scan chest that revealed right lower lobe pulmonary embolism. Patient had venous Dopplers bilateral lower extremities that failed to show any evidence of deep or superficial venous thrombosis. She had thrombophilia workup that revealed heterozygous factor V Leiden and low protein S activity at 42%. Patient was discharged home on Eliquis. Patient was referred to me for father recommendations. Patient is here today for follow up.     SUBJECTIVE: The patient has been doing fairly well. She has been taking her Eliquis twice a day with no bleeding issues. She is having increased pelvis pain and is scheduled for diagnostic laparoscopy in April with Dr. Zamudio. She requests instructions on what to do with her blood thinner before surgery. She is having some mild shortness of breath with activity, but has had no acute chest pain or worsening shortness of breath. She denies swelling in her lowe extremities. She was told by her lung doctor and primary care doctor that they would like her to stay on lifelong anticoagulation secondary to history of PE. She is worried about developing another clot if she stops it.     PAST MEDICAL HISTORY/SOCIAL HISTORY/FAMILY HISTORY: Unchanged from my prior documentation done on 12/14/2016    Review of Systems    Constitutional: Positive for fatigue. Negative for activity change, appetite change, chills, fever and unexpected weight change.   HENT: Negative for hearing loss, mouth sores, nosebleeds, sore throat and trouble swallowing.    Eyes: Negative for visual disturbance.   Respiratory: Negative for cough, chest tightness, shortness of breath and wheezing.    Cardiovascular: Negative for chest pain, palpitations and leg swelling.   Gastrointestinal: Negative for abdominal distention, abdominal pain, blood in stool, constipation, diarrhea, nausea, rectal pain and vomiting.   Endocrine: Negative for cold intolerance and heat intolerance.   Genitourinary: Positive for menstrual problem and pelvic pain. Negative for difficulty urinating, dysuria, frequency and urgency.   Musculoskeletal: Negative for arthralgias, back pain, gait problem, joint swelling and myalgias.   Skin: Negative for rash.   Neurological: Negative for dizziness, tremors, syncope, weakness, light-headedness, numbness and headaches.   Hematological: Negative for adenopathy. Does not bruise/bleed easily.   Psychiatric/Behavioral: Negative for confusion, sleep disturbance and suicidal ideas. The patient is not nervous/anxious.          Current Outpatient Prescriptions:   •  acetaminophen (TYLENOL) 500 MG tablet, Take 1 tablet by mouth Every 6 (Six) Hours As Needed for Mild Pain ., Disp: 12 tablet, Rfl: 0  •  albuterol (PROVENTIL HFA;VENTOLIN HFA) 108 (90 BASE) MCG/ACT inhaler, Inhale 2 puffs Every 6 (Six) Hours As Needed for Wheezing or Shortness of Air., Disp: 1 inhaler, Rfl: 3  •  albuterol (PROVENTIL) (2.5 MG/3ML) 0.083% nebulizer solution, Take 2.5 mg by nebulization Every 4 (Four) Hours As Needed for Wheezing or Shortness of Air., Disp: 60 vial, Rfl: 3  •  aspirin 81 MG tablet, Take 1 tablet by mouth Daily., Disp: 30 tablet, Rfl: 11  •  atorvastatin (LIPITOR) 80 MG tablet, Take 1 tablet by mouth Daily., Disp: 30 tablet, Rfl: 11  •  azelastine (ASTELIN)  0.1 % nasal spray, 1 spray into each nostril 2 (Two) Times a Day As Needed for Rhinitis or Allergies. Use in each nostril as directed, Disp: 1 each, Rfl: 5  •  DULoxetine (CYMBALTA) 30 MG capsule, Take 1 capsule by mouth Daily., Disp: 30 capsule, Rfl: 5  •  ELIQUIS 5 MG tablet tablet, TAKE 1 TABLET BY MOUTH EVERY TWELVE HOURS , Disp: 60 tablet, Rfl: 2  •  flunisolide (NASALIDE) 25 MCG/ACT (0.025%) solution nasal spray, Inhale 1 spray Every 12 (Twelve) Hours., Disp: 1 bottle, Rfl: 5  •  Fluticasone Furoate-Vilanterol (BREO ELLIPTA) 100-25 MCG/INH aerosol powder , Inhale 1 puff Daily., Disp: , Rfl:   •  ibuprofen (ADVIL,MOTRIN) 600 MG tablet, Take 1 tablet by mouth 3 (Three) Times a Day With Meals., Disp: 90 tablet, Rfl: 0  •  lactulose (CHRONULAC) 10 GM/15ML solution, Take 30 mL by mouth 2 (Two) Times a Day As Needed (constipation)., Disp: 500 mL, Rfl: 0  •  LORazepam (ATIVAN) 0.5 MG tablet, Take 1 tablet by mouth Every 8 (Eight) Hours As Needed for Anxiety., Disp: 9 tablet, Rfl: 0  •  midodrine (PROAMATINE) 5 MG tablet, Take 1 tablet by mouth 3 (Three) Times a Day. Last dose no later that 5 pm, Disp: 90 tablet, Rfl: 5  •  montelukast (SINGULAIR) 10 MG tablet, TAKE 1 TABLET BY MOUTH IN THE EVENING , Disp: 30 tablet, Rfl: 2  •  ondansetron ODT (ZOFRAN-ODT) 4 MG disintegrating tablet, Take 1 tablet by mouth Every 6 (Six) Hours As Needed for Nausea or Vomiting., Disp: 12 tablet, Rfl: 0  •  pantoprazole (PROTONIX) 40 MG EC tablet, TAKE 1 TABLET BY MOUTH ONE TIME A DAY , Disp: 30 tablet, Rfl: 4  •  phenazopyridine (PYRIDIUM) 100 MG tablet, Take 1 tablet by mouth 3 (Three) Times a Day As Needed for bladder spasms., Disp: 9 tablet, Rfl: 0  •  promethazine (PHENERGAN) 25 MG tablet, Take 1 tablet by mouth Every 6 (Six) Hours As Needed for Nausea or Vomiting., Disp: 30 tablet, Rfl: 0  •  sulfaSALAzine (AZULFIDINE) 500 MG tablet, Take 1 tablet by mouth 2 (Two) Times a Day., Disp: 60 tablet, Rfl: 1  •  tiZANidine (ZANAFLEX) 4 MG  "tablet, Take 1 tablet by mouth Every 8 (Eight) Hours As Needed for Muscle Spasms., Disp: 30 tablet, Rfl: 3  •  traMADol (ULTRAM) 50 MG tablet, Take 1 tablet by mouth Every 8 (Eight) Hours As Needed for Severe Pain ., Disp: 15 tablet, Rfl: 0  •  Triamcinolone Acetonide (NASACORT) 55 MCG/ACT nasal inhaler, 2 sprays into each nostril Daily., Disp: , Rfl:     PHYSICAL EXAMINATION:   /77   Pulse 115   Temp 98 °F (36.7 °C) (Temporal Artery )   Resp 18   Ht 160 cm (63\")   Wt 88 kg (194 lb)   BMI 34.37 kg/m²    ECOG Performance Status: 0 - Asymptomatic  General Appearance:  alert, cooperative, no apparent distress and appears stated age   Neurologic/Psychiatric: A&O x 3, gait steady, appropriate affect, strength 5/5 in all muscle groups   HEENT:  Normocephalic, without obvious abnormality, mucous membranes moist   Neck: Supple, symmetrical, trachea midline, no adenopathy;  No thyromegaly, masses, or tenderness   Lungs:   Clear to auscultation bilaterally; respirations regular, even, and unlabored bilaterally   Heart:  Regular rate and rhythm, no murmurs appreciated   Abdomen:   deferred   Lymph nodes: No cervical, supraclavicular, inguinal or axillary adenopathy noted   Extremities: Normal, atraumatic; no clubbing, cyanosis, or edema    Skin: No rashes, ulcers, or suspicious lesions noted     Office Visit on 03/08/2018   Component Date Value Ref Range Status   • HCG, Urine, QL 03/08/2018 Negative  Negative Final   • Lot Number 03/08/2018 [id number]   Final   • Internal Positive Control 03/08/2018 Positive   Final   • Internal Negative Control 03/08/2018 Negative   Final   Appointment on 03/08/2018   Component Date Value Ref Range Status   • Glucose 03/08/2018 104* 74 - 98 mg/dL Final   • BUN 03/08/2018 11  7 - 20 mg/dL Final   • Creatinine 03/08/2018 0.60  0.60 - 1.30 mg/dL Final   • Sodium 03/08/2018 143  137 - 145 mmol/L Final   • Potassium 03/08/2018 4.2  3.5 - 5.1 mmol/L Final   • Chloride 03/08/2018 107  98 " - 107 mmol/L Final   • CO2 03/08/2018 25.0* 26.0 - 30.0 mmol/L Final   • Calcium 03/08/2018 9.5  8.4 - 10.2 mg/dL Final   • eGFR Non African Amer 03/08/2018 120  >60 mL/min/1.73 Final   • BUN/Creatinine Ratio 03/08/2018 18.3  7.1 - 23.5 Final   • Anion Gap 03/08/2018 15.2  mmol/L Final   • Color, UA 03/08/2018 Straw  Yellow, Straw Final   • Appearance, UA 03/08/2018 Cloudy* Clear Final   • pH, UA 03/08/2018 8.5* 5.0 - 8.0 Final   • Specific Gravity, UA 03/08/2018 1.025  1.005 - 1.030 Final   • Glucose, UA 03/08/2018 Negative  Negative Final   • Ketones, UA 03/08/2018 Negative  Negative Final   • Bilirubin, UA 03/08/2018 Negative  Negative Final   • Blood, UA 03/08/2018 Negative  Negative Final   • Protein, UA 03/08/2018 Negative  Negative Final   • Leuk Esterase, UA 03/08/2018 Negative  Negative Final   • Nitrite, UA 03/08/2018 Negative  Negative Final   • Urobilinogen, UA 03/08/2018 0.2 E.U./dL  0.2 - 1.0 E.U./dL Final   • WBC 03/08/2018 9.50  4.80 - 10.80 10*3/mm3 Final   • RBC 03/08/2018 4.26  4.20 - 5.40 10*6/mm3 Final   • Hemoglobin 03/08/2018 13.0  12.0 - 16.0 g/dL Final   • Hematocrit 03/08/2018 38.9  37.0 - 47.0 % Final   • MCV 03/08/2018 91.3  81.0 - 99.0 fL Final   • MCH 03/08/2018 30.5  27.0 - 31.0 pg Final   • MCHC 03/08/2018 33.4  30.0 - 37.0 g/dL Final   • RDW 03/08/2018 13.4  11.5 - 14.5 % Final   • RDW-SD 03/08/2018 44.2  37.0 - 54.0 fl Final   • MPV 03/08/2018 9.8  6.0 - 12.0 fL Final   • Platelets 03/08/2018 299  130 - 400 10*3/mm3 Final   • Neutrophil % 03/08/2018 73.4  37.0 - 80.0 % Final   • Lymphocyte % 03/08/2018 20.6  10.0 - 50.0 % Final   • Monocyte % 03/08/2018 4.1  0.0 - 12.0 % Final   • Eosinophil % 03/08/2018 1.2  0.0 - 7.0 % Final   • Basophil % 03/08/2018 0.4  0.0 - 2.5 % Final   • Immature Grans % 03/08/2018 0.3  0.0 - 0.6 % Final   • Neutrophils, Absolute 03/08/2018 6.97* 2.00 - 6.90 10*3/mm3 Final   • Lymphocytes, Absolute 03/08/2018 1.96  0.60 - 3.40 10*3/mm3 Final   • Monocytes,  Absolute 03/08/2018 0.39  0.00 - 0.90 10*3/mm3 Final   • Eosinophils, Absolute 03/08/2018 0.11  0.00 - 0.70 10*3/mm3 Final   • Basophils, Absolute 03/08/2018 0.04  0.00 - 0.20 10*3/mm3 Final   • Immature Grans, Absolute 03/08/2018 0.03  0.00 - 0.06 10*3/mm3 Final   • nRBC 03/08/2018 0.0  0.0 - 0.0 /100 WBC Final   Admission on 03/06/2018, Discharged on 03/06/2018   Component Date Value Ref Range Status   • Glucose 03/06/2018 92  74 - 98 mg/dL Final   • BUN 03/06/2018 7  7 - 20 mg/dL Final   • Creatinine 03/06/2018 0.60  0.60 - 1.30 mg/dL Final   • Sodium 03/06/2018 143  137 - 145 mmol/L Final   • Potassium 03/06/2018 3.8  3.5 - 5.1 mmol/L Final   • Chloride 03/06/2018 102  98 - 107 mmol/L Final   • CO2 03/06/2018 27.0  26.0 - 30.0 mmol/L Final   • Calcium 03/06/2018 9.5  8.4 - 10.2 mg/dL Final   • Total Protein 03/06/2018 8.1  6.3 - 8.2 g/dL Final   • Albumin 03/06/2018 4.60  3.50 - 5.00 g/dL Final   • ALT (SGPT) 03/06/2018 28  13 - 69 U/L Final   • AST (SGOT) 03/06/2018 19  15 - 46 U/L Final   • Alkaline Phosphatase 03/06/2018 87  38 - 126 U/L Final   • Total Bilirubin 03/06/2018 0.7  0.2 - 1.3 mg/dL Final   • eGFR Non African Amer 03/06/2018 120  >60 mL/min/1.73 Final   • Globulin 03/06/2018 3.5  gm/dL Final   • A/G Ratio 03/06/2018 1.3  1.0 - 2.0 g/dL Final   • BUN/Creatinine Ratio 03/06/2018 11.7  7.1 - 23.5 Final   • Anion Gap 03/06/2018 17.8  mmol/L Final   • Lipase 03/06/2018 71  23 - 300 U/L Final   • Color, UA 03/06/2018 Yellow  Yellow, Straw Final   • Appearance, UA 03/06/2018 Slightly Cloudy* Clear Final   • pH, UA 03/06/2018 5.5  5.0 - 8.0 Final   • Specific Gravity, UA 03/06/2018 >=1.030  1.005 - 1.030 Final   • Glucose, UA 03/06/2018 Negative  Negative Final   • Ketones, UA 03/06/2018 Negative  Negative Final   • Bilirubin, UA 03/06/2018 Negative  Negative Final   • Blood, UA 03/06/2018 Negative  Negative Final   • Protein, UA 03/06/2018 Negative  Negative Final   • Leuk Esterase, UA 03/06/2018  Negative  Negative Final   • Nitrite, UA 03/06/2018 Negative  Negative Final   • Urobilinogen, UA 03/06/2018 0.2 E.U./dL  0.2 - 1.0 E.U./dL Final   • Lactate 03/06/2018 1.2  0.5 - 2.0 mmol/L Final   • WBC 03/06/2018 10.66  4.80 - 10.80 10*3/mm3 Final   • RBC 03/06/2018 4.52  4.20 - 5.40 10*6/mm3 Final   • Hemoglobin 03/06/2018 13.7  12.0 - 16.0 g/dL Final   • Hematocrit 03/06/2018 39.7  37.0 - 47.0 % Final   • MCV 03/06/2018 87.8  81.0 - 99.0 fL Final   • MCH 03/06/2018 30.3  27.0 - 31.0 pg Final   • MCHC 03/06/2018 34.5  30.0 - 37.0 g/dL Final   • RDW 03/06/2018 13.1  11.5 - 14.5 % Final   • RDW-SD 03/06/2018 41.6  37.0 - 54.0 fl Final   • MPV 03/06/2018 9.6  6.0 - 12.0 fL Final   • Platelets 03/06/2018 317  130 - 400 10*3/mm3 Final   • Neutrophil % 03/06/2018 66.5  37.0 - 80.0 % Final   • Lymphocyte % 03/06/2018 25.7  10.0 - 50.0 % Final   • Monocyte % 03/06/2018 6.1  0.0 - 12.0 % Final   • Eosinophil % 03/06/2018 0.8  0.0 - 7.0 % Final   • Basophil % 03/06/2018 0.5  0.0 - 2.5 % Final   • Immature Grans % 03/06/2018 0.4  0.0 - 0.6 % Final   • Neutrophils, Absolute 03/06/2018 7.09* 2.00 - 6.90 10*3/mm3 Final   • Lymphocytes, Absolute 03/06/2018 2.74  0.60 - 3.40 10*3/mm3 Final   • Monocytes, Absolute 03/06/2018 0.65  0.00 - 0.90 10*3/mm3 Final   • Eosinophils, Absolute 03/06/2018 0.09  0.00 - 0.70 10*3/mm3 Final   • Basophils, Absolute 03/06/2018 0.05  0.00 - 0.20 10*3/mm3 Final   • Immature Grans, Absolute 03/06/2018 0.04  0.00 - 0.06 10*3/mm3 Final   • nRBC 03/06/2018 0.0  0.0 - 0.0 /100 WBC Final   Office Visit on 03/05/2018   Component Date Value Ref Range Status   • Occult Blood 03/08/2018 Negative  Negative Final   • Expiration Date 03/08/2018 8/1/18   Final   • Lot Number 1 03/08/2018 [id number]   Final   • Occult Blood 2 03/08/2018 Negative  Negative Final   • Expiration Date 2 03/08/2018 8/31/18   Final   • Lot Number 2 03/08/2018 [id number]   Final   • Occult Blood 3 03/08/2018 Positive* Negative Final   •  Expiration Date 3 03/08/2018 8/31/18   Final   • Lot Number 3 03/08/2018 [id number]   Final   Office Visit on 03/01/2018   Component Date Value Ref Range Status   • Rapid Strep A Screen 03/01/2018 Negative  Negative, VALID, INVALID, Not Performed Final   • Internal Control 03/01/2018 Passed  Passed Final   • Lot Number 03/01/2018 [id number]   Final   • Expiration Date 03/01/2018 9/3/19   Final   • Rapid Influenza A Ag 03/01/2018 neg.   Final   • Rapid Influenza B Ag 03/01/2018 neg.   Final   • Internal Control 03/01/2018 Passed  Passed Final   • Lot Number 03/01/2018 [id number]   Final   • Expiration Date 03/01/2018 2/10/20   Final        No results found.    ASSESSMENT: The patient is a very pleasant 27 y.o. female with a history of right pulmonary embolism    PROBLEM LIST:  1. Right pulmonary embolism diagnosed November 13, 2016  2. On Eliquis 5 mg by mouth twice a day  3. Inherited thrombophilia with heterozygous factor V Leiden and low protein S activity at 42% from 11/2016.  4.  Multiple acquired risk factors for venothrombotic events including obesity, immobility, and prior combined birth control use.  5. Repeat protein C & S antigens normal on 12/9/2017.   6. Pelvic pain    PLAN:  1. I discussed with the patient the risks and benefits of prolonged anticoagulation. The patient continues to have acquired risk factors including obesity and immobility. Her protein S activity was normal on repeat exam, however she does have heterozygous state for factor V Leiden.  The patient is anxious regarding the risk of developing subsequent clots if off her Eliquis.   2. At this point the patient has completed over 1 year of full dose anticoagulation for her pulmonary embolism. We will change her therapy at this time from 5 mg twice a day to 2.5 mg twice a day. She was given a new prescription for this today.   3. I advised the patient to try to modify her acquired risk factors including obesity and inactivity. I asked her to avoid  use of estrogen products including OCPs as these can increase the risk for developing blood clots.   4. Regarding precautions for her upcoming surgery, I advised the patient stop her Eliquis 2 days prior to her procedure, and begin therapy again the day following her surgery. She was given a note regarding these instructions, and we will send a copy to Dr. Zamudio from today's visit.   5. The patient's CBC from 3/8/2018 was normal with no evidence of anemia. We will check this on return.   6. We will see the patient back in 6 months with repeat labs including CBC.       Ashely Ross, APRN   3/14/2018

## 2018-03-15 ENCOUNTER — HOSPITAL ENCOUNTER (EMERGENCY)
Facility: HOSPITAL | Age: 28
Discharge: HOME | End: 2018-03-15
Attending: EMERGENCY MEDICINE | Admitting: EMERGENCY MEDICINE

## 2018-03-15 VITALS
WEIGHT: 187 LBS | DIASTOLIC BLOOD PRESSURE: 79 MMHG | HEIGHT: 63 IN | HEART RATE: 99 BPM | BODY MASS INDEX: 33.13 KG/M2 | OXYGEN SATURATION: 100 % | TEMPERATURE: 98.4 F | SYSTOLIC BLOOD PRESSURE: 118 MMHG | RESPIRATION RATE: 18 BRPM

## 2018-03-15 DIAGNOSIS — J02.9 SORE THROAT: Primary | ICD-10-CM

## 2018-03-15 LAB
FLUAV AG NPH QL: NEGATIVE
FLUBV AG NPH QL IA: NEGATIVE
S PYO AG THROAT QL: NEGATIVE

## 2018-03-15 PROCEDURE — 87804 INFLUENZA ASSAY W/OPTIC: CPT | Performed by: EMERGENCY MEDICINE

## 2018-03-15 PROCEDURE — 87880 STREP A ASSAY W/OPTIC: CPT | Performed by: EMERGENCY MEDICINE

## 2018-03-15 PROCEDURE — 87081 CULTURE SCREEN ONLY: CPT | Performed by: EMERGENCY MEDICINE

## 2018-03-15 PROCEDURE — 99283 EMERGENCY DEPT VISIT LOW MDM: CPT

## 2018-03-16 NOTE — ED PROVIDER NOTES
Subjective   27-year-old female presents with a sore throat.  She states that her children recently had strep throat 2 days ago, now she has a sore throat she is concerned she may have strep throat as well.            Review of Systems   HENT: Positive for sore throat.    All other systems reviewed and are negative.      Past Medical History:   Diagnosis Date   • Abdominal pain    • Anxiety    • Asthma    • Body piercing     TONGUE, NOSE, TWO IN EACH EAR   • Chest pain    • Clotting disorder     factor 5   • Constipation    • Coronary artery disease involving native coronary artery of native heart without angina pectoris 2017   • Diarrhea    • Diarrhea    • Factor 5 Leiden mutation, heterozygous    • Gallstone    • GERD (gastroesophageal reflux disease)    • H/O blood clots    • Headache    • Hiatal hernia    • High cholesterol    • History of recurrent UTIs    • Hypertension    • Kidney infection    • Nausea    • Nausea & vomiting    • Obesity    • Orthostatic hypotension    • Ovarian cyst    • Pollen allergies    • Protein S deficiency 2016    labs from hospitalization for PE   • Pulmonary embolism    • Sleep apnea     CPAP ASKED TO BRING DOS   • Subclinical hyperthyroidism    • Tachycardia    • Tattoo     LOWER BACK       Allergies   Allergen Reactions   • Amoxicillin Rash       Past Surgical History:   Procedure Laterality Date   •  SECTION      X2  and    • CHOLECYSTECTOMY     • COLONOSCOPY N/A 2017    Procedure: COLONOSCOPY WITH BIOPSIES AND ARGON THERMAL ABLATION;  Surgeon: Jignesh Selby MD;  [facility];  Service:    • ENDOSCOPY N/A 2017    Procedure: ESOPHAGOGASTRODUODENOSCOPY WITH BIOPSIES AND COLD BIOPSY POLYPECTOMIES;  Surgeon: Jignesh Selby MD;  Location[facility];  Service:        Family History   Problem Relation Age of Onset   • Arthritis Mother    • COPD Mother    • Asthma Mother    • Thyroid disease Mother    • Arthritis Father     • Diabetes Father    • Hypertension Father    • Hyperlipidemia Father    • Kidney disease Father    • Heart attack Father    • No Known Problems Son    • Colon cancer Neg Hx    • Liver cancer Neg Hx    • Liver disease Neg Hx    • Stomach cancer Neg Hx    • Esophageal cancer Neg Hx        Social History     Social History   • Marital status:      Social History Main Topics   • Smoking status: Never Smoker   • Smokeless tobacco: Never Used   • Alcohol use No   • Drug use: No   • Sexual activity: Defer      Comment: PE while on birth control patch     Other Topics Concern   • Not on file           Objective   Physical Exam   Constitutional: She is oriented to person, place, and time. She appears well-developed and well-nourished.   HENT:   Head: Atraumatic.   Eyes: EOM are normal.   Neck: Normal range of motion. Neck supple.   Cardiovascular: Normal rate and regular rhythm.    Pulmonary/Chest: Effort normal and breath sounds normal.   Abdominal: Soft. Bowel sounds are normal.   Musculoskeletal: Normal range of motion.   Neurological: She is alert and oriented to person, place, and time. She has normal reflexes.   Skin: Skin is warm and dry.   Psychiatric: She has a normal mood and affect. Her behavior is normal.   Nursing note and vitals reviewed.      Procedures         ED Course  ED Course                  MDM    Final diagnoses:   Sore throat            Calvin Monzon Jr., PA-C  03/15/18 [id number]

## 2018-03-17 LAB — BACTERIA SPEC AEROBE CULT: NORMAL

## 2018-04-01 NOTE — H&P
Jeovany  Marilu Godoy  : 1990  MRN: [id number]  CSN: [id number]    History and Physical    Subjective   Marilu Godoy is a 27 y.o. year old  who present for surgery due to dysmenorrhea and dyspareunia.    Past Medical History:   Diagnosis Date   • Abdominal pain    • Anxiety    • Asthma    • Body piercing     TONGUE, NOSE, TWO IN EACH EAR   • Chest pain    • Clotting disorder     factor 5   • Constipation    • Coronary artery disease involving native coronary artery of native heart without angina pectoris 2017   • Diarrhea    • Diarrhea    • Factor 5 Leiden mutation, heterozygous    • Gallstone    • GERD (gastroesophageal reflux disease)    • H/O blood clots    • Headache    • Hiatal hernia    • High cholesterol    • History of recurrent UTIs    • Hypertension    • Kidney infection    • Nausea    • Nausea & vomiting    • Obesity    • Orthostatic hypotension    • Ovarian cyst    • Pollen allergies    • Protein S deficiency 2016    labs from hospitalization for PE   • Pulmonary embolism    • Sleep apnea     CPAP ASKED TO BRING DOS   • Subclinical hyperthyroidism    • Tachycardia    • Tattoo     LOWER BACK     Past Surgical History:   Procedure Laterality Date   •  SECTION      X2  and    • CHOLECYSTECTOMY     • COLONOSCOPY N/A 2017    Procedure: COLONOSCOPY WITH BIOPSIES AND ARGON THERMAL ABLATION;  Surgeon: Jignesh Selby MD;  Location: [facility] ENDOSCOPY;  Service:    • ENDOSCOPY N/A 2017    Procedure: ESOPHAGOGASTRODUODENOSCOPY WITH BIOPSIES AND COLD BIOPSY POLYPECTOMIES;  Surgeon: Jignesh Selby MD;  Location: [facility] ENDOSCOPY;  Service:      Smoking status: Never Smoker                                                              Smokeless tobacco: Never Used                        No current facility-administered medications for this encounter.     Current Outpatient Prescriptions:   •  acetaminophen (TYLENOL) 500 MG tablet, Take 1  tablet by mouth Every 6 (Six) Hours As Needed for Mild Pain ., Disp: 12 tablet, Rfl: 0  •  albuterol (PROVENTIL HFA;VENTOLIN HFA) 108 (90 BASE) MCG/ACT inhaler, Inhale 2 puffs Every 6 (Six) Hours As Needed for Wheezing or Shortness of Air., Disp: 1 inhaler, Rfl: 3  •  albuterol (PROVENTIL) (2.5 MG/3ML) 0.083% nebulizer solution, Take 2.5 mg by nebulization Every 4 (Four) Hours As Needed for Wheezing or Shortness of Air., Disp: 60 vial, Rfl: 3  •  apixaban (ELIQUIS) 2.5 MG tablet tablet, Take 1 tablet by mouth Every 12 (Twelve) Hours., Disp: 60 tablet, Rfl: 5  •  aspirin 81 MG tablet, Take 1 tablet by mouth Daily., Disp: 30 tablet, Rfl: 11  •  atorvastatin (LIPITOR) 80 MG tablet, Take 1 tablet by mouth Daily., Disp: 30 tablet, Rfl: 11  •  azelastine (ASTELIN) 0.1 % nasal spray, 1 spray into each nostril 2 (Two) Times a Day As Needed for Rhinitis or Allergies. Use in each nostril as directed, Disp: 1 each, Rfl: 5  •  DULoxetine (CYMBALTA) 30 MG capsule, Take 1 capsule by mouth Daily., Disp: 30 capsule, Rfl: 5  •  flunisolide (NASALIDE) 25 MCG/ACT (0.025%) solution nasal spray, Inhale 1 spray Every 12 (Twelve) Hours., Disp: 1 bottle, Rfl: 5  •  Fluticasone Furoate-Vilanterol (BREO ELLIPTA) 100-25 MCG/INH aerosol powder , Inhale 1 puff Daily., Disp: , Rfl:   •  ibuprofen (ADVIL,MOTRIN) 600 MG tablet, Take 1 tablet by mouth 3 (Three) Times a Day With Meals., Disp: 90 tablet, Rfl: 0  •  lactulose (CHRONULAC) 10 GM/15ML solution, Take 30 mL by mouth 2 (Two) Times a Day As Needed (constipation)., Disp: 500 mL, Rfl: 0  •  LORazepam (ATIVAN) 0.5 MG tablet, Take 1 tablet by mouth Every 8 (Eight) Hours As Needed for Anxiety., Disp: 9 tablet, Rfl: 0  •  midodrine (PROAMATINE) 5 MG tablet, Take 1 tablet by mouth 3 (Three) Times a Day. Last dose no later that 5 pm, Disp: 90 tablet, Rfl: 5  •  montelukast (SINGULAIR) 10 MG tablet, TAKE 1 TABLET BY MOUTH IN THE EVENING , Disp: 30 tablet, Rfl: 2  •  ondansetron ODT (ZOFRAN-ODT) 4 MG  disintegrating tablet, Take 1 tablet by mouth Every 6 (Six) Hours As Needed for Nausea or Vomiting., Disp: 12 tablet, Rfl: 0  •  pantoprazole (PROTONIX) 40 MG EC tablet, TAKE 1 TABLET BY MOUTH ONE TIME A DAY , Disp: 30 tablet, Rfl: 4  •  phenazopyridine (PYRIDIUM) 100 MG tablet, Take 1 tablet by mouth 3 (Three) Times a Day As Needed for bladder spasms., Disp: 9 tablet, Rfl: 0  •  promethazine (PHENERGAN) 25 MG tablet, Take 1 tablet by mouth Every 6 (Six) Hours As Needed for Nausea or Vomiting., Disp: 30 tablet, Rfl: 0  •  sulfaSALAzine (AZULFIDINE) 500 MG tablet, Take 1 tablet by mouth 2 (Two) Times a Day., Disp: 60 tablet, Rfl: 1  •  tiZANidine (ZANAFLEX) 4 MG tablet, Take 1 tablet by mouth Every 8 (Eight) Hours As Needed for Muscle Spasms., Disp: 30 tablet, Rfl: 3  •  traMADol (ULTRAM) 50 MG tablet, Take 1 tablet by mouth Every 8 (Eight) Hours As Needed for Severe Pain ., Disp: 15 tablet, Rfl: 0  •  Triamcinolone Acetonide (NASACORT) 55 MCG/ACT nasal inhaler, 2 sprays into each nostril Daily., Disp: , Rfl:     Allergies   Allergen Reactions   • Amoxicillin Rash       Review of Systems   Constitutional: Negative.    HENT: Negative.    Respiratory: Negative.    Cardiovascular: Negative.          Objective   There were no vitals taken for this visit.  General: well developed; well nourished  no acute distress  obese - There is no height or weight on file to calculate BMI.   Heart: regular rate and rhythm, S1, S2 normal, no murmur, click, rub or gallop   Lungs: breathing is unlabored  clear to auscultation bilaterally   Abdomen: soft, non-tender; no masses  no umbilical or inginual hernias are present  no hepato-splenomegaly   Pelvis:: Not performed.   Labs  Lab Results   Component Value Date     03/08/2018    HGB 13.0 03/08/2018    HCT 38.9 03/08/2018    WBC 9.50 03/08/2018     03/08/2018    K 4.2 03/08/2018     03/08/2018    CO2 25.0 (L) 03/08/2018    BUN 11 03/08/2018    CREATININE 0.60  03/08/2018    GLUCOSE 104 (H) 03/08/2018    ALBUMIN 4.60 03/06/2018    CALCIUM 9.5 03/08/2018    AST 19 03/06/2018    ALT 28 03/06/2018    BILITOT 0.7 03/06/2018        Assessment   1. Dysmenorrhea  2. Dyspareunia     Plan   1. Dx LPS, possible ablation of endometriosis    Evin Zamudio MD  4/1/2018  9:16 AM

## 2018-04-02 ENCOUNTER — ANESTHESIA (OUTPATIENT)
Dept: PERIOP | Facility: HOSPITAL | Age: 28
End: 2018-04-02

## 2018-04-02 ENCOUNTER — ANESTHESIA EVENT (OUTPATIENT)
Dept: PERIOP | Facility: HOSPITAL | Age: 28
End: 2018-04-02

## 2018-04-02 ENCOUNTER — HOSPITAL ENCOUNTER (OUTPATIENT)
Facility: HOSPITAL | Age: 28
Setting detail: HOSPITAL OUTPATIENT SURGERY
Discharge: HOME | End: 2018-04-02
Attending: OBSTETRICS & GYNECOLOGY | Admitting: OBSTETRICS & GYNECOLOGY

## 2018-04-02 VITALS
DIASTOLIC BLOOD PRESSURE: 66 MMHG | BODY MASS INDEX: 33.13 KG/M2 | WEIGHT: 187 LBS | OXYGEN SATURATION: 97 % | TEMPERATURE: 97.4 F | SYSTOLIC BLOOD PRESSURE: 110 MMHG | HEIGHT: 63 IN | RESPIRATION RATE: 18 BRPM | HEART RATE: 87 BPM

## 2018-04-02 DIAGNOSIS — R10.2 PELVIC PAIN: ICD-10-CM

## 2018-04-02 PROCEDURE — 25010000002 SUCCINYLCHOLINE PER 20 MG: Performed by: NURSE ANESTHETIST, CERTIFIED REGISTERED

## 2018-04-02 PROCEDURE — 25010000002 MIDAZOLAM PER 1 MG: Performed by: NURSE ANESTHETIST, CERTIFIED REGISTERED

## 2018-04-02 PROCEDURE — 25010000002 PROPOFOL 200 MG/20ML EMULSION: Performed by: NURSE ANESTHETIST, CERTIFIED REGISTERED

## 2018-04-02 PROCEDURE — 25010000002 FENTANYL CITRATE (PF) 250 MCG/5ML SOLUTION: Performed by: NURSE ANESTHETIST, CERTIFIED REGISTERED

## 2018-04-02 PROCEDURE — 25010000002 HYDROMORPHONE PER 4 MG: Performed by: NURSE ANESTHETIST, CERTIFIED REGISTERED

## 2018-04-02 PROCEDURE — 25010000002 HYDROMORPHONE PER 4 MG

## 2018-04-02 PROCEDURE — 25010000002 KETOROLAC TROMETHAMINE PER 15 MG: Performed by: NURSE ANESTHETIST, CERTIFIED REGISTERED

## 2018-04-02 PROCEDURE — 25010000002 ONDANSETRON PER 1 MG: Performed by: NURSE ANESTHETIST, CERTIFIED REGISTERED

## 2018-04-02 PROCEDURE — 58662 LAPAROSCOPY EXCISE LESIONS: CPT | Performed by: OBSTETRICS & GYNECOLOGY

## 2018-04-02 PROCEDURE — 25010000002 DEXAMETHASONE PER 1 MG: Performed by: NURSE ANESTHETIST, CERTIFIED REGISTERED

## 2018-04-02 RX ORDER — SODIUM CHLORIDE, SODIUM LACTATE, POTASSIUM CHLORIDE, CALCIUM CHLORIDE 600; 310; 30; 20 MG/100ML; MG/100ML; MG/100ML; MG/100ML
1000 INJECTION, SOLUTION INTRAVENOUS CONTINUOUS
Status: DISCONTINUED | OUTPATIENT
Start: 2018-04-02 | End: 2018-04-02 | Stop reason: HOSPADM

## 2018-04-02 RX ORDER — NEOSTIGMINE METHYLSULFATE 5 MG/5 ML
SYRINGE (ML) INTRAVENOUS AS NEEDED
Status: DISCONTINUED | OUTPATIENT
Start: 2018-04-02 | End: 2018-04-02 | Stop reason: SURG

## 2018-04-02 RX ORDER — HYDROCODONE BITARTRATE AND ACETAMINOPHEN 5; 325 MG/1; MG/1
1 TABLET ORAL ONCE AS NEEDED
Status: DISCONTINUED | OUTPATIENT
Start: 2018-04-02 | End: 2018-04-02 | Stop reason: HOSPADM

## 2018-04-02 RX ORDER — GLYCOPYRROLATE 0.2 MG/ML
INJECTION INTRAMUSCULAR; INTRAVENOUS AS NEEDED
Status: DISCONTINUED | OUTPATIENT
Start: 2018-04-02 | End: 2018-04-02 | Stop reason: SURG

## 2018-04-02 RX ORDER — HYDROCODONE BITARTRATE AND ACETAMINOPHEN 5; 325 MG/1; MG/1
1 TABLET ORAL EVERY 8 HOURS PRN
Qty: 10 TABLET | Refills: 0 | Status: SHIPPED | OUTPATIENT
Start: 2018-04-02 | End: 2018-05-14

## 2018-04-02 RX ORDER — FENTANYL CITRATE 50 UG/ML
INJECTION, SOLUTION INTRAMUSCULAR; INTRAVENOUS AS NEEDED
Status: DISCONTINUED | OUTPATIENT
Start: 2018-04-02 | End: 2018-04-02 | Stop reason: SURG

## 2018-04-02 RX ORDER — SUCCINYLCHOLINE CHLORIDE 20 MG/ML
INJECTION INTRAMUSCULAR; INTRAVENOUS AS NEEDED
Status: DISCONTINUED | OUTPATIENT
Start: 2018-04-02 | End: 2018-04-02 | Stop reason: SURG

## 2018-04-02 RX ORDER — ROCURONIUM BROMIDE 10 MG/ML
INJECTION, SOLUTION INTRAVENOUS AS NEEDED
Status: DISCONTINUED | OUTPATIENT
Start: 2018-04-02 | End: 2018-04-02 | Stop reason: SURG

## 2018-04-02 RX ORDER — MAGNESIUM HYDROXIDE 1200 MG/15ML
LIQUID ORAL AS NEEDED
Status: DISCONTINUED | OUTPATIENT
Start: 2018-04-02 | End: 2018-04-02 | Stop reason: HOSPADM

## 2018-04-02 RX ORDER — KETOROLAC TROMETHAMINE 30 MG/ML
INJECTION, SOLUTION INTRAMUSCULAR; INTRAVENOUS AS NEEDED
Status: DISCONTINUED | OUTPATIENT
Start: 2018-04-02 | End: 2018-04-02 | Stop reason: SURG

## 2018-04-02 RX ORDER — SODIUM CHLORIDE 0.9 % (FLUSH) 0.9 %
1-10 SYRINGE (ML) INJECTION AS NEEDED
Status: DISCONTINUED | OUTPATIENT
Start: 2018-04-02 | End: 2018-04-02 | Stop reason: HOSPADM

## 2018-04-02 RX ORDER — DEXAMETHASONE SODIUM PHOSPHATE 4 MG/ML
INJECTION, SOLUTION INTRA-ARTICULAR; INTRALESIONAL; INTRAMUSCULAR; INTRAVENOUS; SOFT TISSUE AS NEEDED
Status: DISCONTINUED | OUTPATIENT
Start: 2018-04-02 | End: 2018-04-02 | Stop reason: SURG

## 2018-04-02 RX ORDER — MIDAZOLAM HYDROCHLORIDE 1 MG/ML
INJECTION INTRAMUSCULAR; INTRAVENOUS AS NEEDED
Status: DISCONTINUED | OUTPATIENT
Start: 2018-04-02 | End: 2018-04-02 | Stop reason: SURG

## 2018-04-02 RX ORDER — SODIUM CHLORIDE 0.9 % (FLUSH) 0.9 %
3 SYRINGE (ML) INJECTION AS NEEDED
Status: DISCONTINUED | OUTPATIENT
Start: 2018-04-02 | End: 2018-04-02 | Stop reason: HOSPADM

## 2018-04-02 RX ORDER — ONDANSETRON 2 MG/ML
4 INJECTION INTRAMUSCULAR; INTRAVENOUS ONCE AS NEEDED
Status: DISCONTINUED | OUTPATIENT
Start: 2018-04-02 | End: 2018-04-02 | Stop reason: HOSPADM

## 2018-04-02 RX ORDER — IBUPROFEN 600 MG/1
600 TABLET ORAL EVERY 6 HOURS PRN
Qty: 30 TABLET | Refills: 1 | Status: SHIPPED | OUTPATIENT
Start: 2018-04-02 | End: 2018-05-15

## 2018-04-02 RX ORDER — HYDROMORPHONE HCL 110MG/55ML
PATIENT CONTROLLED ANALGESIA SYRINGE INTRAVENOUS AS NEEDED
Status: DISCONTINUED | OUTPATIENT
Start: 2018-04-02 | End: 2018-04-02 | Stop reason: SURG

## 2018-04-02 RX ORDER — IBUPROFEN 600 MG/1
600 TABLET ORAL EVERY 6 HOURS PRN
Status: DISCONTINUED | OUTPATIENT
Start: 2018-04-02 | End: 2018-04-02 | Stop reason: HOSPADM

## 2018-04-02 RX ORDER — PROPOFOL 10 MG/ML
INJECTION, EMULSION INTRAVENOUS AS NEEDED
Status: DISCONTINUED | OUTPATIENT
Start: 2018-04-02 | End: 2018-04-02 | Stop reason: SURG

## 2018-04-02 RX ORDER — ONDANSETRON 2 MG/ML
INJECTION INTRAMUSCULAR; INTRAVENOUS AS NEEDED
Status: DISCONTINUED | OUTPATIENT
Start: 2018-04-02 | End: 2018-04-02 | Stop reason: SURG

## 2018-04-02 RX ADMIN — ONDANSETRON 4 MG: 2 INJECTION INTRAMUSCULAR; INTRAVENOUS at 11:23

## 2018-04-02 RX ADMIN — PROPOFOL 200 MG: 10 INJECTION, EMULSION INTRAVENOUS at 11:17

## 2018-04-02 RX ADMIN — Medication 4 MG: at 11:43

## 2018-04-02 RX ADMIN — DEXAMETHASONE SODIUM PHOSPHATE 8 MG: 4 INJECTION, SOLUTION INTRAMUSCULAR; INTRAVENOUS at 11:23

## 2018-04-02 RX ADMIN — SUCCINYLCHOLINE CHLORIDE 140 MG: 20 INJECTION, SOLUTION INTRAMUSCULAR; INTRAVENOUS at 11:17

## 2018-04-02 RX ADMIN — HYDROMORPHONE HYDROCHLORIDE 0.5 MG: 1 INJECTION, SOLUTION INTRAMUSCULAR; INTRAVENOUS; SUBCUTANEOUS at 12:24

## 2018-04-02 RX ADMIN — GLYCOPYRROLATE 0.4 MG: 0.2 INJECTION, SOLUTION INTRAMUSCULAR; INTRAVENOUS at 11:43

## 2018-04-02 RX ADMIN — SODIUM CHLORIDE, POTASSIUM CHLORIDE, SODIUM LACTATE AND CALCIUM CHLORIDE: 600; 310; 30; 20 INJECTION, SOLUTION INTRAVENOUS at 11:48

## 2018-04-02 RX ADMIN — FENTANYL CITRATE 100 MCG: 50 INJECTION, SOLUTION INTRAMUSCULAR; INTRAVENOUS at 11:37

## 2018-04-02 RX ADMIN — SODIUM CHLORIDE, POTASSIUM CHLORIDE, SODIUM LACTATE AND CALCIUM CHLORIDE: 600; 310; 30; 20 INJECTION, SOLUTION INTRAVENOUS at 11:10

## 2018-04-02 RX ADMIN — KETOROLAC TROMETHAMINE 30 MG: 30 INJECTION, SOLUTION INTRAMUSCULAR at 11:23

## 2018-04-02 RX ADMIN — FENTANYL CITRATE 100 MCG: 50 INJECTION, SOLUTION INTRAMUSCULAR; INTRAVENOUS at 11:17

## 2018-04-02 RX ADMIN — ROCURONIUM BROMIDE 20 MG: 10 INJECTION INTRAVENOUS at 11:37

## 2018-04-02 RX ADMIN — ROCURONIUM BROMIDE 10 MG: 10 INJECTION INTRAVENOUS at 11:17

## 2018-04-02 RX ADMIN — MIDAZOLAM HYDROCHLORIDE 2 MG: 1 INJECTION, SOLUTION INTRAMUSCULAR; INTRAVENOUS at 11:13

## 2018-04-02 RX ADMIN — Medication 0.5 MG: at 12:24

## 2018-04-02 RX ADMIN — SUGAMMADEX 200 MG: 100 INJECTION, SOLUTION INTRAVENOUS at 11:56

## 2018-04-02 RX ADMIN — ROCURONIUM BROMIDE 10 MG: 10 INJECTION INTRAVENOUS at 11:25

## 2018-04-02 RX ADMIN — LIDOCAINE HYDROCHLORIDE 80 MG: 20 INJECTION, SOLUTION INTRAVENOUS at 11:17

## 2018-04-02 RX ADMIN — HYDROMORPHONE HYDROCHLORIDE 0.5 MG: 2 INJECTION, SOLUTION INTRAMUSCULAR; INTRAVENOUS; SUBCUTANEOUS at 12:00

## 2018-04-02 NOTE — DISCHARGE INSTRUCTIONS
Bridion (Sugammadex) is a medication used for the reversal of neuromuscular blockades. (medications utilized during your anesthetic)    Bridion reduces the effectiveness of hormonal contraceptives, therefore for the next 7 days an additional non-hormonal contraceptive should be utilized (i.e. Condoms, etc.)     No pushing, pulling, tugging,  heavy lifting, or strenuous activity.  No major decision making, driving, or drinking alcoholic beverages for 24 hours. ( due to the medications you have  received)  Always use good hand hygiene/washing techniques.  NO driving while taking pain medications.    To assist you in voiding:  Drink plenty of fluids  Listen to running water while attempting to void.    If you are unable to urinate and you have an uncomfortable urge to void or it has been   6 hours since you were discharged, return to the Emergency Room

## 2018-04-02 NOTE — ANESTHESIA POSTPROCEDURE EVALUATION
Patient: Marilu Godoy    Procedure Summary     Date:  04/02/18 Room / Location:  [facility] OR 2 /  [facility] OR    Anesthesia Start:  1110 Anesthesia Stop:      Procedure:  DIAGNOSTIC LAPAROSCOPY AND ABLATION OF ENDOMETRIOSIS (N/A Abdomen) Diagnosis:       Pelvic pain      (Pelvic pain [R10.2])    Surgeon:  Evin Zamudio MD Provider:  Kwasi Vargas CRNA    Anesthesia Type:  general ASA Status:  3          Anesthesia Type: general  Last vitals  BP   128/83   Temp   97.3   Pulse   90   Resp   17   SpO2   99     Post Anesthesia Care and Evaluation    Patient location during evaluation: PACU  Patient participation: complete - patient participated  Level of consciousness: awake and alert  Pain score: 2  Pain management: satisfactory to patient  Airway patency: patent  Anesthetic complications: No anesthetic complications  PONV Status: none  Cardiovascular status: acceptable and stable  Respiratory status: acceptable and face mask  Hydration status: acceptable

## 2018-04-02 NOTE — ANESTHESIA PREPROCEDURE EVALUATION
Anesthesia Evaluation     Patient summary reviewed and Nursing notes reviewed   no history of anesthetic complications:               Airway   Mallampati: II  TM distance: >3 FB  Possible difficult intubation  Dental    (+) poor dentition    Pulmonary    (+) pulmonary embolism, asthma, shortness of breath, sleep apnea, decreased breath sounds,   Cardiovascular - normal exam  Exercise tolerance: poor (<4 METS)    (+) hypertension, CAD, dysrhythmias Tachycardia, orthopnea, SMITH, PVD ( le edema), hyperlipidemia,       Neuro/Psych  (+) headaches, psychiatric history Anxiety,     GI/Hepatic/Renal/Endo    (+) obesity, morbid obesity, hiatal hernia, GERD,  liver disease (inc lft), hyperthyroidism    Musculoskeletal     (+) arthralgias, back pain, chronic pain, myalgias,   Abdominal   (+) obese,    Substance History      OB/GYN    (-)  Pregnant ( neg urine preg)        Other   (+) arthritis     ROS/Med Hx Other: Protein s deficiency, heterozygous factor v leiden mutation  Labs reviewed  ekg sr  cxr nad  echoLeft ventricular function is normal. Estimated EF appears to be in the range of 61 - 65%. Estimated EF = 61%. Normal left ventricular cavity size and wall thickness noted. All left ventricular wall segments contract normally. Left ventricular diastolic function is normal. There is no evidence of a left ventricular mass or thrombus present.    Right Ventricle Normal cavity size, wall thickness, systolic function and septal motion noted.    Left Atrium Normal left atrial size and volume noted.    Right Atrium Normal right atrial size noted.    Aortic Valve The aortic valve is structurally normal. The valve appears trileaflet. No aortic valve regurgitation is present. No aortic valve stenosis is present.    Mitral Valve The mitral valve is normal in structure. Trace mitral valve regurgitation is present.    Tricuspid Valve The tricuspid valve is normal. Trace tricuspid valve regurgitation is present. Estimated right  ventricular systolic pressure from tricuspid regurgitation is normal (<35 mmHg).    Pulmonic Valve The pulmonic valve is structurally normal. There is no significant pulmonic valve stenosis present. There is no pulmonic valve regurgitation present.    Greater Vessels No dilation of the aortic root is present. No dilation of the sinuses of Valsalva is present.    Pericardium The pericardium is normal. There is no evidence of pericardial effusion.      LV Measurements                       Anesthesia Plan    ASA 3     general   (Discussed risk with pt., pt extremely high intraop and postop risk for cv, resp, and neuro events,Risks and benefits discussed including risk of aspiration, recall and dental damage. All patient questions answered. Will continue with POC.)  intravenous induction   Anesthetic plan and risks discussed with patient.    Plan discussed with CRNA.

## 2018-04-02 NOTE — ANESTHESIA PROCEDURE NOTES
Airway  Urgency: elective    Airway not difficult    General Information and Staff    Patient location during procedure: OR  CRNA: POLY BARNHART    Indications and Patient Condition  Indications for airway management: airway protection    Preoxygenated: yes  Mask difficulty assessment: 0 - not attempted    Final Airway Details  Final airway type: endotracheal airway      Successful airway: ETT  Cuffed: yes   Successful intubation technique: direct laryngoscopy  Facilitating devices/methods: intubating stylet  Endotracheal tube insertion site: oral  Blade: Moses  Blade size: #2  ETT size: 7.5 mm  Cormack-Lehane Classification: grade I - full view of glottis  Placement verified by: chest auscultation and capnometry   Measured from: lips  ETT to lips (cm): 21  Number of attempts at approach: 1    Additional Comments  Dentition and Lips as preoperative assesment

## 2018-04-02 NOTE — OP NOTE
DAVE Godoy  : 1990  MRN: [id number]  CSN: [id number]  Date: 2018    Operative Report        Pre-op Diagnosis:  Pelvic pain [R10.2]   Post-op Diagnosis:  Post-Op Diagnosis Codes:     * Pelvic pain [R10.2]   Procedure: Procedure(s):  DIAGNOSTIC LAPAROSCOPY AND ABLATION OF ENDOMETRIOSIS   Surgeon: TOMÁS Zamudio M.D.   Assist: none   Anesthesia: Choice   Estimated Blood Loss: <5 mls   ABx: none   Drains:   Blevins   Specimens:  None   Findings: Endometriosis implants posterior cul-de-sac as well as the right gutter.  Normal ovaries.  Normal uterus exception of some mild anatomic distortion consistent with prior  sections, no significant adhesions noted.     Complications: none  I     Indications: Chronic pelvic pain/dysmenorrhea/occasional dyspareunia      Description of Procedure:  After the appropriate time out and after adequate dosing of anesthesia, the patient was prepped and draped in the usual sterile fashion.  She was placed in the dorsal lithotomy position using Gera stirrups.  A weighted speculum was placed in the vagina.  The anterior lip of the cervix was grasped with a single tooth tenaculum.  An acorn cannula was placed through the cervix to be used for manipulation during the procedure.  A blevins catheter had been placed per nursing for drainage during the procedure.  A 2 cm infraumbilical skin incision was made with the knife.  A 10 mm trocar was inserted under direct visualization with the Optiview without any complications.  The abdomen was insufflated with carbon dioxide being sure to keep the pressure less than 15 mmHg.  The patient was placed in Trendelenburg position.  Two ancillary 5 mm trocars were placed in both the right and left lower quadrants, lateral to the epigastric vessels, under direct visualization without any complications.  The pelvis was explored with the above findings noted.  Endometriotic implants were cauterized with monopolar cautery  without complication in the midline as well as in the right gutter.  Vessels as well as ureters were identified preceding this.  Patient was in steep Trendelenburg and bowel was well out of the way.  Irrigation and aspiration was carried out.    The abdomen was released of carbon dioxide and repeat inspection of the surgical site and ancillary trocar sites revealed adequate hemostasis.  The trocar sleeves had all been removed.  The skin was approximated with 4-0 nylon in an interrupted fashion. The acorn cannula was removed.  The single tooth tenaculum was removed and the cervix was noted to be hemostatic.  All sponge and instrument counts were correct at the end of the procedure.  The patient tolerated the procedure well.  There were no complications.  She was taken to the postoperative recovery room in stable condition.    TOMÁS Zamudio M.D.  4/2/2018  11:52 AM

## 2018-04-09 ENCOUNTER — APPOINTMENT (OUTPATIENT)
Dept: CT IMAGING | Facility: HOSPITAL | Age: 28
End: 2018-04-09

## 2018-04-09 ENCOUNTER — HOSPITAL ENCOUNTER (EMERGENCY)
Facility: HOSPITAL | Age: 28
Discharge: HOME | End: 2018-04-09
Attending: STUDENT IN AN ORGANIZED HEALTH CARE EDUCATION/TRAINING PROGRAM | Admitting: STUDENT IN AN ORGANIZED HEALTH CARE EDUCATION/TRAINING PROGRAM

## 2018-04-09 VITALS
DIASTOLIC BLOOD PRESSURE: 80 MMHG | OXYGEN SATURATION: 99 % | RESPIRATION RATE: 18 BRPM | HEART RATE: 105 BPM | WEIGHT: 195 LBS | TEMPERATURE: 98.7 F | BODY MASS INDEX: 34.55 KG/M2 | HEIGHT: 63 IN | SYSTOLIC BLOOD PRESSURE: 121 MMHG

## 2018-04-09 DIAGNOSIS — Z86.2 HISTORY OF BLOOD CLOTTING DISORDER: ICD-10-CM

## 2018-04-09 DIAGNOSIS — R07.89 ATYPICAL CHEST PAIN: Primary | ICD-10-CM

## 2018-04-09 LAB
ALBUMIN SERPL-MCNC: 4.4 G/DL (ref 3.5–5)
ALBUMIN/GLOB SERPL: 1.2 G/DL (ref 1–2)
ALP SERPL-CCNC: 97 U/L (ref 38–126)
ALT SERPL W P-5'-P-CCNC: 20 U/L (ref 13–69)
ANION GAP SERPL CALCULATED.3IONS-SCNC: 14.1 MMOL/L (ref 10–20)
AST SERPL-CCNC: 16 U/L (ref 15–46)
B-HCG UR QL: NEGATIVE
BASOPHILS # BLD AUTO: 0.03 10*3/MM3 (ref 0–0.2)
BASOPHILS NFR BLD AUTO: 0.3 % (ref 0–2.5)
BILIRUB SERPL-MCNC: 0.7 MG/DL (ref 0.2–1.3)
BILIRUB UR QL STRIP: NEGATIVE
BUN BLD-MCNC: 13 MG/DL (ref 7–20)
BUN/CREAT SERPL: 18.6 (ref 7.1–23.5)
CALCIUM SPEC-SCNC: 9.2 MG/DL (ref 8.4–10.2)
CHLORIDE SERPL-SCNC: 103 MMOL/L (ref 98–107)
CLARITY UR: ABNORMAL
CO2 SERPL-SCNC: 26 MMOL/L (ref 26–30)
COLOR UR: YELLOW
CREAT BLD-MCNC: 0.7 MG/DL (ref 0.6–1.3)
DEPRECATED RDW RBC AUTO: 39.8 FL (ref 37–54)
EOSINOPHIL # BLD AUTO: 0.11 10*3/MM3 (ref 0–0.7)
EOSINOPHIL NFR BLD AUTO: 1.3 % (ref 0–7)
ERYTHROCYTE [DISTWIDTH] IN BLOOD BY AUTOMATED COUNT: 12.4 % (ref 11.5–14.5)
GFR SERPL CREATININE-BSD FRML MDRD: 100 ML/MIN/1.73
GLOBULIN UR ELPH-MCNC: 3.7 GM/DL
GLUCOSE BLD-MCNC: 87 MG/DL (ref 74–98)
GLUCOSE UR STRIP-MCNC: NEGATIVE MG/DL
HCT VFR BLD AUTO: 41.5 % (ref 37–47)
HGB BLD-MCNC: 14.3 G/DL (ref 12–16)
HGB UR QL STRIP.AUTO: NEGATIVE
HOLD SPECIMEN: NORMAL
HOLD SPECIMEN: NORMAL
IMM GRANULOCYTES # BLD: 0.02 10*3/MM3 (ref 0–0.06)
IMM GRANULOCYTES NFR BLD: 0.2 % (ref 0–0.6)
KETONES UR QL STRIP: NEGATIVE
LEUKOCYTE ESTERASE UR QL STRIP.AUTO: NEGATIVE
LYMPHOCYTES # BLD AUTO: 2.71 10*3/MM3 (ref 0.6–3.4)
LYMPHOCYTES NFR BLD AUTO: 30.9 % (ref 10–50)
MCH RBC QN AUTO: 30.5 PG (ref 27–31)
MCHC RBC AUTO-ENTMCNC: 34.5 G/DL (ref 30–37)
MCV RBC AUTO: 88.5 FL (ref 81–99)
MONOCYTES # BLD AUTO: 0.51 10*3/MM3 (ref 0–0.9)
MONOCYTES NFR BLD AUTO: 5.8 % (ref 0–12)
NEUTROPHILS # BLD AUTO: 5.38 10*3/MM3 (ref 2–6.9)
NEUTROPHILS NFR BLD AUTO: 61.5 % (ref 37–80)
NITRITE UR QL STRIP: NEGATIVE
NRBC BLD MANUAL-RTO: 0 /100 WBC (ref 0–0)
PH UR STRIP.AUTO: 5.5 [PH] (ref 5–8)
PLATELET # BLD AUTO: 323 10*3/MM3 (ref 130–400)
PMV BLD AUTO: 9.5 FL (ref 6–12)
POTASSIUM BLD-SCNC: 4.1 MMOL/L (ref 3.5–5.1)
PROT SERPL-MCNC: 8.1 G/DL (ref 6.3–8.2)
PROT UR QL STRIP: NEGATIVE
RBC # BLD AUTO: 4.69 10*6/MM3 (ref 4.2–5.4)
SODIUM BLD-SCNC: 139 MMOL/L (ref 137–145)
SP GR UR STRIP: 1.02 (ref 1–1.03)
TROPONIN I SERPL-MCNC: <0.012 NG/ML (ref 0–0.03)
TROPONIN I SERPL-MCNC: <0.012 NG/ML (ref 0–0.03)
UROBILINOGEN UR QL STRIP: ABNORMAL
WBC NRBC COR # BLD: 8.76 10*3/MM3 (ref 4.8–10.8)
WHOLE BLOOD HOLD SPECIMEN: NORMAL
WHOLE BLOOD HOLD SPECIMEN: NORMAL

## 2018-04-09 PROCEDURE — 71275 CT ANGIOGRAPHY CHEST: CPT

## 2018-04-09 PROCEDURE — 85025 COMPLETE CBC W/AUTO DIFF WBC: CPT | Performed by: PHYSICIAN ASSISTANT

## 2018-04-09 PROCEDURE — 99284 EMERGENCY DEPT VISIT MOD MDM: CPT

## 2018-04-09 PROCEDURE — 81003 URINALYSIS AUTO W/O SCOPE: CPT | Performed by: PHYSICIAN ASSISTANT

## 2018-04-09 PROCEDURE — 80053 COMPREHEN METABOLIC PANEL: CPT | Performed by: PHYSICIAN ASSISTANT

## 2018-04-09 PROCEDURE — 36415 COLL VENOUS BLD VENIPUNCTURE: CPT

## 2018-04-09 PROCEDURE — 81025 URINE PREGNANCY TEST: CPT | Performed by: PHYSICIAN ASSISTANT

## 2018-04-09 PROCEDURE — 84484 ASSAY OF TROPONIN QUANT: CPT | Performed by: PHYSICIAN ASSISTANT

## 2018-04-09 PROCEDURE — 25010000002 IOPAMIDOL 61 % SOLUTION: Performed by: STUDENT IN AN ORGANIZED HEALTH CARE EDUCATION/TRAINING PROGRAM

## 2018-04-09 PROCEDURE — 93005 ELECTROCARDIOGRAM TRACING: CPT | Performed by: STUDENT IN AN ORGANIZED HEALTH CARE EDUCATION/TRAINING PROGRAM

## 2018-04-09 RX ORDER — PANTOPRAZOLE SODIUM 40 MG/1
40 TABLET, DELAYED RELEASE ORAL ONCE
Status: COMPLETED | OUTPATIENT
Start: 2018-04-09 | End: 2018-04-09

## 2018-04-09 RX ORDER — SODIUM CHLORIDE 0.9 % (FLUSH) 0.9 %
10 SYRINGE (ML) INJECTION AS NEEDED
Status: DISCONTINUED | OUTPATIENT
Start: 2018-04-09 | End: 2018-04-09 | Stop reason: HOSPADM

## 2018-04-09 RX ORDER — ALUMINA, MAGNESIA, AND SIMETHICONE 2400; 2400; 240 MG/30ML; MG/30ML; MG/30ML
10 SUSPENSION ORAL ONCE
Status: COMPLETED | OUTPATIENT
Start: 2018-04-09 | End: 2018-04-09

## 2018-04-09 RX ORDER — CYCLOBENZAPRINE HCL 5 MG
5 TABLET ORAL 3 TIMES DAILY PRN
Qty: 20 TABLET | Refills: 0 | Status: SHIPPED | OUTPATIENT
Start: 2018-04-09 | End: 2018-05-15

## 2018-04-09 RX ADMIN — ALUMINUM HYDROXIDE, MAGNESIUM HYDROXIDE, AND DIMETHICONE 10 ML: 400; 400; 40 SUSPENSION ORAL at 16:27

## 2018-04-09 RX ADMIN — IOPAMIDOL 100 ML: 612 INJECTION, SOLUTION INTRAVENOUS at 17:04

## 2018-04-09 RX ADMIN — PANTOPRAZOLE SODIUM 40 MG: 40 TABLET, DELAYED RELEASE ORAL at 16:27

## 2018-04-09 NOTE — ED PROVIDER NOTES
Subjective   27-year-old female with a history of clotting disorder, on eliquis.  Also has a history of calcified coronary arteries by a CT scan of her heart, according to her report.  No history of coronary artery disease.  She sees Dr. Byrnes.  The patient is here with left sided burning chest discomfort radiating to her left scapula and neck since around 11:30 this morning, intermittent in nature.  The pain is not pleuritic.  Some associated shortness of breath and nausea.  No diaphoresis.  Symptoms are progressively worsening.  LMP 3 weeks ago.  Denies pregnancy.    Aggravating factors: None.  Alleviating factors and treatment prior to arrival: Tylenol.        History provided by:  Patient  History limited by:  Acuity of condition   used: No        Review of Systems   Constitutional: Negative.    HENT: Negative.    Eyes: Negative.    Respiratory: Positive for shortness of breath.    Cardiovascular: Positive for chest pain.   Gastrointestinal: Negative.  Negative for abdominal pain.   Endocrine: Negative.    Genitourinary: Negative for dysuria.   Musculoskeletal: Negative.    Skin: Negative.    Allergic/Immunologic: Negative.    Neurological: Negative.    Hematological: Negative.    Psychiatric/Behavioral: Negative.    All other systems reviewed and are negative.      Past Medical History:   Diagnosis Date   • Abdominal pain    • Anxiety    • Asthma 2015   • Body piercing     TONGUE, NOSE, TWO IN EACH EAR   • Chest pain    • Clotting disorder     factor 5   • Constipation    • Coronary artery disease involving native coronary artery of native heart without angina pectoris 6/16/2017   • Diarrhea    • Diarrhea    • Factor 5 Leiden mutation, heterozygous    • Gallstone    • GERD (gastroesophageal reflux disease)    • H/O blood clots    • Headache    • Hiatal hernia    • High cholesterol    • History of recurrent UTIs    • Hypertension    • Kidney infection    • Nausea    • Nausea & vomiting     • Obesity    • Orthostatic hypotension    • Ovarian cyst    • Pollen allergies    • Protein S deficiency 2016    labs from hospitalization for PE   • Pulmonary embolism    • Sleep apnea     CPAP ASKED TO BRING DOS   • Subclinical hyperthyroidism    • Tachycardia    • Tattoo     LOWER BACK       Allergies   Allergen Reactions   • Amoxicillin Rash       Past Surgical History:   Procedure Laterality Date   •  SECTION      X2  and    • CHOLECYSTECTOMY     • COLONOSCOPY N/A 2017    Procedure: COLONOSCOPY WITH BIOPSIES AND ARGON THERMAL ABLATION;  Surgeon: Jignesh Selby MD;  Location: [facility] ENDOSCOPY;  Service:    • DIAGNOSTIC LAPAROSCOPY N/A 2018    Procedure: DIAGNOSTIC LAPAROSCOPY AND ABLATION OF ENDOMETRIOSIS;  Surgeon: Evin Zamudio MD;  Location: [facility] OR;  Service: Obstetrics/Gynecology   • ENDOSCOPY N/A 2017    Procedure: ESOPHAGOGASTRODUODENOSCOPY WITH BIOPSIES AND COLD BIOPSY POLYPECTOMIES;  Surgeon: Jignesh Selby MD;  Location: [facility] ENDOSCOPY;  Service:        Family History   Problem Relation Age of Onset   • Arthritis Mother    • COPD Mother    • Asthma Mother    • Thyroid disease Mother    • Arthritis Father    • Diabetes Father    • Hypertension Father    • Hyperlipidemia Father    • Kidney disease Father    • Heart attack Father    • No Known Problems Son    • Colon cancer Neg Hx    • Liver cancer Neg Hx    • Liver disease Neg Hx    • Stomach cancer Neg Hx    • Esophageal cancer Neg Hx        Social History     Social History   • Marital status:      Social History Main Topics   • Smoking status: Never Smoker   • Smokeless tobacco: Never Used   • Alcohol use No   • Drug use: No   • Sexual activity: Defer      Comment: PE while on birth control patch     Other Topics Concern   • Not on file           Objective   Physical Exam   Constitutional: She is oriented to person, place, and time. She appears well-developed and well-nourished. No  distress.   HENT:   Head: Normocephalic and atraumatic.   Right Ear: External ear normal.   Left Ear: External ear normal.   Eyes: EOM are normal. Pupils are equal, round, and reactive to light.   Neck: Normal range of motion. Neck supple.   Cardiovascular: Normal rate, regular rhythm and normal heart sounds.    No calf tenderness or ankle edema.   Pulmonary/Chest: Effort normal and breath sounds normal. No stridor. She has no wheezes. She exhibits no tenderness.   Abdominal: Soft. She exhibits no distension. There is no tenderness. There is no guarding.   Musculoskeletal: Normal range of motion. She exhibits no edema.   Neurological: She is alert and oriented to person, place, and time.   Skin: Skin is warm and dry. No rash noted. She is not diaphoretic.   Psychiatric: She has a normal mood and affect. Her behavior is normal. Judgment and thought content normal.   Nursing note and vitals reviewed.      ECG 12 Lead    Date/Time: 4/9/2018 4:08 PM  Performed by: OSORIO HUNTER  Authorized by: NAT HDZ   Comments: EKG: Sinus rhythm at rate of 98.  Poor anterior R-wave progression.  No ST elevation.  No ectopy.      ECG 12 Lead    Date/Time: 4/9/2018 7:00 PM  Performed by: OSORIO HUNTER  Authorized by: NAT HDZ   Comments: EKG: Sinus rhythm at a rate of 96.  Poor anterior R-wave progression.  No ST elevation.  No ectopy.               ED Course  ED Course   Comment By Time   Case discussed with Dr. Hdz.  CT chest, PE protocol. Osorio Hunter PA-C 04/09 1612                  MDM  Number of Diagnoses or Management Options  Atypical chest pain: new and requires workup  History of blood clotting disorder: new and requires workup     Amount and/or Complexity of Data Reviewed  Clinical lab tests: ordered and reviewed  Tests in the radiology section of CPT®: reviewed and ordered  Tests in the medicine section of CPT®: ordered and reviewed  Discuss the patient with other providers: yes    Risk of  Complications, Morbidity, and/or Mortality  Presenting problems: moderate  Diagnostic procedures: low  Management options: moderate    Patient Progress  Patient progress: stable      Final diagnoses:   Atypical chest pain   History of blood clotting disorder            Gale Luciano PA-C  04/09/18 1902       Gale Luciano PA-C  04/09/18 1908

## 2018-04-09 NOTE — DISCHARGE INSTRUCTIONS
Continue pantoprazole daily.  Return to the ER for markedly worsening chest pain, sustained heart racing, shortness of breath, passing out, new or worsening symptoms.    Follow-up with your doctor and Dr. Byrnes, heart specialist, in one to 2 days for further workup, treatment and evaluation.    Over-the-counter extra strength Tylenol as directed on the bottle as needed for muscular soreness in the chest or shoulder.

## 2018-04-10 ENCOUNTER — OFFICE VISIT (OUTPATIENT)
Dept: INTERNAL MEDICINE | Facility: CLINIC | Age: 28
End: 2018-04-10

## 2018-04-10 ENCOUNTER — OFFICE VISIT (OUTPATIENT)
Dept: OBSTETRICS AND GYNECOLOGY | Facility: CLINIC | Age: 28
End: 2018-04-10

## 2018-04-10 VITALS
TEMPERATURE: 99.2 F | HEART RATE: 126 BPM | BODY MASS INDEX: 34.55 KG/M2 | SYSTOLIC BLOOD PRESSURE: 116 MMHG | HEIGHT: 63 IN | DIASTOLIC BLOOD PRESSURE: 60 MMHG | WEIGHT: 195 LBS | OXYGEN SATURATION: 98 %

## 2018-04-10 VITALS
HEIGHT: 63 IN | SYSTOLIC BLOOD PRESSURE: 146 MMHG | DIASTOLIC BLOOD PRESSURE: 74 MMHG | WEIGHT: 197 LBS | BODY MASS INDEX: 34.91 KG/M2

## 2018-04-10 DIAGNOSIS — J02.9 SORE THROAT: ICD-10-CM

## 2018-04-10 DIAGNOSIS — Z09 POSTOP CHECK: Primary | ICD-10-CM

## 2018-04-10 DIAGNOSIS — R00.0 TACHYCARDIA: ICD-10-CM

## 2018-04-10 DIAGNOSIS — R00.2 PALPITATIONS: ICD-10-CM

## 2018-04-10 DIAGNOSIS — R50.9 FEVER, UNSPECIFIED FEVER CAUSE: Primary | ICD-10-CM

## 2018-04-10 LAB
BILIRUB BLD-MCNC: NEGATIVE MG/DL
CLARITY, POC: ABNORMAL
COLOR UR: ABNORMAL
EXPIRATION DATE: NORMAL
EXPIRATION DATE: NORMAL
FLUAV AG NPH QL: NEGATIVE
FLUBV AG NPH QL: NEGATIVE
GLUCOSE UR STRIP-MCNC: NEGATIVE MG/DL
INTERNAL CONTROL: NORMAL
INTERNAL CONTROL: NORMAL
KETONES UR QL: NEGATIVE
LEUKOCYTE EST, POC: NEGATIVE
Lab: NORMAL
Lab: NORMAL
NITRITE UR-MCNC: NEGATIVE MG/ML
PH UR: 5 [PH] (ref 5–8)
PROT UR STRIP-MCNC: NEGATIVE MG/DL
RBC # UR STRIP: NEGATIVE /UL
S PYO AG THROAT QL: NEGATIVE
SP GR UR: 1.02 (ref 1–1.03)
UROBILINOGEN UR QL: ABNORMAL

## 2018-04-10 PROCEDURE — 87804 INFLUENZA ASSAY W/OPTIC: CPT | Performed by: PHYSICIAN ASSISTANT

## 2018-04-10 PROCEDURE — 99214 OFFICE O/P EST MOD 30 MIN: CPT | Performed by: PHYSICIAN ASSISTANT

## 2018-04-10 PROCEDURE — 87880 STREP A ASSAY W/OPTIC: CPT | Performed by: PHYSICIAN ASSISTANT

## 2018-04-10 PROCEDURE — 99024 POSTOP FOLLOW-UP VISIT: CPT | Performed by: MIDWIFE

## 2018-04-10 NOTE — PROGRESS NOTES
Marilu Godoy is a 27 y.o. female.     Subjective   History of Present Illness   8 days ago she had laparoscopic exploratory surgery with removal of some endometriosis with Dr. Zamudio. She had temp of 99 prior to the procedure but was not feeling sick at that time. That evening she began feeling poorly with cough, headache, body aches and sore throat. The next day she developed fever up to 103 and cough and sore throat worsened for the next 2-3 days. She has also had diarrhea 3-4 times per day for the last few days. She is now having temps of  and cough has improved some but sore throat is still as bad as previously. Last night she developed chest pain, SOA and rapid heart rate so she went to the ED.   Has some burning with urination, urgency and frequency in the last 2-3 days.         The following portions of the patient's history were reviewed and updated as appropriate: allergies, current medications, past family history, past medical history, past social history, past surgical history and problem list.    Review of Systems    Constitutional: fever, fatigue. Negative for appetite change, chills and unexpected weight change.   HENT: sore throat. Negative for congestion, ear pain, hearing loss, nosebleeds, postnasal drip, rhinorrhea, tinnitus and trouble swallowing.    Eyes: Negative for photophobia, discharge and visual disturbance.   Respiratory: cough, SOA. Negative for chest tightness and wheezing.    Cardiovascular: chest pain, palpitations.  Negative for leg swelling.   Gastrointestinal: Negative for abdominal distention, abdominal pain, blood in stool, constipation, diarrhea, nausea and vomiting.   Endocrine: Negative for cold intolerance, heat intolerance, polydipsia, polyphagia and polyuria.   Musculoskeletal: body aches. Negative for back pain, joint swelling, myalgias, neck pain and neck stiffness.   Skin: Negative for color change, pallor, rash and wound.   Allergic/Immunologic: Negative  "for environmental allergies, food allergies and immunocompromised state.   Neurological: headache. Negative for dizziness, tremors, seizures, weakness, numbness.  Hematological: Negative for adenopathy. Does not bruise/bleed easily.   Psychiatric/Behavioral: Negative for sleep disturbances, agitation, behavioral problems, confusion, hallucinations, self-injury and suicidal ideas. The patient is not nervous/anxious.    : burning with urination, urgency, frequency.     Objective    Physical Exam  Constitutional: Oriented to person, place, and time. Appears well-developed and well-nourished.   HENT: OP erythema without tonsillar enlargement, exudate or petechiae. Poor dentition with dental carries throughout mouth.   Head: No sinus tenderness. Normocephalic and atraumatic.   Eyes: EOM are normal. Pupils are equal, round, and reactive to light.   Neck: Normal range of motion. Neck supple.   Cardiovascular: tachycardia. Regular rhythm and normal heart sounds.    Pulmonary/Chest: Effort normal and breath sounds normal. No respiratory distress.  Has no wheezes or rales. Exhibits no chest wall tenderness.   Abdominal: Soft. Bowel sounds are normal. Exhibits no distension and no mass. There is no tenderness.   Musculoskeletal: No CVA tenderness. Normal range of motion. Exhibits no tenderness.   Neurological: Alert and oriented to person, place, and time.   Skin: 3 - 1 cm surgical incisions on abdomen with single simple suture in place, no sign of infection.  Skin is warm and dry.   Psychiatric: Has a normal mood and affect. Behavior is normal. Judgment and thought content normal.       /60   Pulse (!) 126   Temp 99.2 °F (37.3 °C)   Ht 160 cm (62.99\")   Wt 88.5 kg (195 lb)   SpO2 98%   BMI 34.55 kg/m²     Nursing note and vitals reviewed.        Assessment/Plan   Marilu was seen today for cough, fever and follow-up.    Diagnoses and all orders for this visit:    Fever, unspecified fever cause  -     POCT " urinalysis dipstick, automated  -     POC Influenza A / B  -     POC Rapid Strep A  -     Urine Culture - Urine, Urine, Clean Catch  -     Culture, Routine - Swab, Throat    Palpitations    Tachycardia    Other orders  -     Throat / Upper Respiratory Culture  -     Test Code Change          UA normal, Flu negative, Strep negative. Will notify of urine culture results and any necessary treatment.     ED record reviewed. Labs normal, CT with PE protocol was negative. EKG was slightly abnormal in ED, she has f/u scheduled with cardiology PA on 4/19.

## 2018-04-10 NOTE — PROGRESS NOTES
"Subjective   Chief Complaint   Patient presents with   • Post-op     1 week post DX LAP, stitches removed, no complaints      Marilu Godoy is a 27 y.o. year old  presenting to be seen for her post-operative visit.  Currently she reports no problems with eating, bowel movements, voiding, or wound drainage and pain is well controlled.    There was no pathology result associated with Marilu's recent procedure.      OTHER COMPLAINTS:  Nothing else    The following portions of the patient's history were reviewed and updated as appropriate:current medications and allergies       Objective   /74   Ht 160 cm (63\")   Wt 89.4 kg (197 lb)   Breastfeeding? No   BMI 34.90 kg/m²     General:  well developed; well nourished  no acute distress   Abdomen: soft, non-tender; no masses  no umbilical or inginual hernias are present  incision is healing   Pelvis: Not performed.          Assessment   1. S/P laparoscopy and laser of endometriosis     Plan   1. May return to full activity with no restrictions  2. Meds were prescribed - no  3. Follow up 1 year   4. Discussed options of birth control and she declined.    No orders of the defined types were placed in this encounter.         This note was electronically signed.  Bethany Vargas, APRN  April 10, 2018  "

## 2018-04-10 NOTE — PROGRESS NOTES
[facility] - Office Note  Marilu Godoy         [address] [facility] [address]  1990   [phone] (home)      LOCATION:  [facility] office.  Visit Type: Follow Up.    PCP:  Deidre Barker MD    04/19/18   Marilu Godoy is a 27 y.o.  female  currently employed.      Chief Complaint: FU on ST, HTN.    PROBLEM LIST/Past Medical History:  1.  Sinus Tachycardia  A.  Echo 12/15/16, Dr. Byrnes:  All left ventricular wall segments contract normally. Left ventricular function is normal. Estimated EF = 61%.  B.  Holter 12/13/16:  Premature atrial contractions occured rarely. There was no evidence of atrial arrhythmias. There were no episodes of supraventricular tachycardia. Premature ventricular contractions occured rarely. There were no episodes of ventricular tachycardia. Sinoatrial node conduction was normal. No atrioventricular block noted.  2.  Labile Hypertension   A.  On midodrine  3.  Heterozygous factor V Leiden mutation   A.  also protein S deficiency.     B.  November 2016: dx with  right-sided pulmonary embolus. This was the first venous thrombosis the patient had experienced.  Bilateral lower extremity compression ultrasound and color Doppler   demonstrated no evidence of DVT.      C.  On chronic Eliquis therapy.   D.  Chest Pain, dyspnea 4/9/18:  Presents to local ED.  CT Chest negative for PE.  4.  Obesity            Allergies   Allergen Reactions   • Amoxicillin Rash         Current Outpatient Prescriptions:   •  albuterol (PROVENTIL HFA;VENTOLIN HFA) 108 (90 BASE) MCG/ACT inhaler, Inhale 2 puffs Every 6 (Six) Hours As Needed for Wheezing or Shortness of Air., Disp: 1 inhaler, Rfl: 3  •  albuterol (PROVENTIL) (2.5 MG/3ML) 0.083% nebulizer solution, Take 2.5 mg by nebulization Every 4 (Four) Hours As Needed for Wheezing or Shortness of Air., Disp: 60 vial, Rfl: 3  •  apixaban (ELIQUIS) 2.5 MG tablet tablet, Take 1 tablet by mouth Every 12  (Twelve) Hours., Disp: 60 tablet, Rfl: 5  •  aspirin 81 MG tablet, Take 1 tablet by mouth Daily., Disp: 30 tablet, Rfl: 11  •  atenolol (TENORMIN) 25 MG tablet, Take 0.5 tablets by mouth 2 (Two) Times a Day., Disp: 30 tablet, Rfl: 11  •  atorvastatin (LIPITOR) 80 MG tablet, Take 1 tablet by mouth Daily., Disp: 30 tablet, Rfl: 11  •  azelastine (ASTELIN) 0.1 % nasal spray, 1 spray into each nostril 2 (Two) Times a Day As Needed for Rhinitis or Allergies. Use in each nostril as directed, Disp: 1 each, Rfl: 5  •  clindamycin (CLEOCIN) 300 MG capsule, Take 1 capsule by mouth 3 (Three) Times a Day for 10 days., Disp: 30 capsule, Rfl: 0  •  cyclobenzaprine (FLEXERIL) 5 MG tablet, Take 1 tablet by mouth 3 (Three) Times a Day As Needed for Muscle Spasms., Disp: 20 tablet, Rfl: 0  •  DULoxetine (CYMBALTA) 30 MG capsule, Take 1 capsule by mouth Daily. (Patient taking differently: Take 60 mg by mouth Daily.), Disp: 30 capsule, Rfl: 5  •  fludrocortisone 0.1 MG tablet, Take 1 tablet by mouth Daily., Disp: 30 tablet, Rfl: 11  •  flunisolide (NASALIDE) 25 MCG/ACT (0.025%) solution nasal spray, Inhale 1 spray Every 12 (Twelve) Hours., Disp: 1 bottle, Rfl: 5  •  Fluticasone Furoate-Vilanterol (BREO ELLIPTA) 100-25 MCG/INH aerosol powder , Inhale 1 puff Daily., Disp: , Rfl:   •  HYDROcodone-acetaminophen (LORTAB) 5-325 MG per tablet, Take 1 tablet by mouth Every 8 (Eight) Hours As Needed for Severe Pain ., Disp: 10 tablet, Rfl: 0  •  ibuprofen (ADVIL,MOTRIN) 600 MG tablet, Take 1 tablet by mouth Every 6 (Six) Hours As Needed for Moderate Pain ., Disp: 30 tablet, Rfl: 1  •  lactulose (CHRONULAC) 10 GM/15ML solution, Take 30 mL by mouth 2 (Two) Times a Day As Needed (constipation)., Disp: 500 mL, Rfl: 0  •  LORazepam (ATIVAN) 0.5 MG tablet, Take 1 tablet by mouth Every 8 (Eight) Hours As Needed for Anxiety., Disp: 9 tablet, Rfl: 0  •  midodrine (PROAMATINE) 5 MG tablet, Take 1 tablet by mouth 3 (Three) Times a Day. Last dose no later  that 5 pm, Disp: 90 tablet, Rfl: 5  •  montelukast (SINGULAIR) 10 MG tablet, TAKE 1 TABLET BY MOUTH IN THE EVENING , Disp: 30 tablet, Rfl: 2  •  nitrofurantoin, macrocrystal-monohydrate, (MACROBID) 100 MG capsule, Take 1 capsule by mouth 2 (Two) Times a Day for 7 days., Disp: 14 capsule, Rfl: 0  •  ondansetron ODT (ZOFRAN-ODT) 4 MG disintegrating tablet, Take 1 tablet by mouth Every 6 (Six) Hours As Needed for Nausea or Vomiting., Disp: 12 tablet, Rfl: 0  •  pantoprazole (PROTONIX) 40 MG EC tablet, TAKE 1 TABLET BY MOUTH ONE TIME A DAY , Disp: 30 tablet, Rfl: 4  •  promethazine (PHENERGAN) 25 MG tablet, Take 1 tablet by mouth Every 6 (Six) Hours As Needed for Nausea or Vomiting., Disp: 30 tablet, Rfl: 0  •  sulfaSALAzine (AZULFIDINE) 500 MG tablet, Take 1 tablet by mouth 2 (Two) Times a Day., Disp: 60 tablet, Rfl: 1  •  tiZANidine (ZANAFLEX) 4 MG tablet, Take 1 tablet by mouth Every 8 (Eight) Hours As Needed for Muscle Spasms., Disp: 30 tablet, Rfl: 3  •  Triamcinolone Acetonide (NASACORT) 55 MCG/ACT nasal inhaler, 2 sprays into each nostril Daily., Disp: , Rfl:     HPI    Marilu Godoy is here today for routine follow-up on history of sinus tachycardia, labile hypertension.  She also has a history of factor V Leiden.  She is on chronic Eliquis for this and has been doing fairly well.  She did undergo a diagnostic laparoscopy a few weeks ago without any bleeding issues.  A fecal occult blood test did show positive findings and she has an appointment with Dr. Bragg next week for further evaluation.  As far as her heart is concerned, she does state that she's had worsening episodes of palpitations and tachycardia.  They've been recurrent for the last several weeks.  They occur 2-3 times a day unprovoked by activity exertion.  She also has some chest pain described as a stabbing and burning sensation in her left pectoral region.  The pain is not always associated with the tachycardia palpitations.  She did see  "Dr. Byrnes last week but cannot recall if she mentioned this to him.  However, I do see an order for a stress test and echocardiogram for the end of April.  In EKG 2 days ago showed sinus tachycardia with a heart rate of 124.        The following portions of the patient's history were reviewed in the chart and updated as appropriate: allergies, current medications, past family history, past medical history, past social history, past surgical history and problem list.    Review of Systems   Constitution: Positive for weakness and malaise/fatigue.   Cardiovascular: Positive for chest pain, dyspnea on exertion and palpitations. Negative for leg swelling.   Respiratory: Negative for cough and shortness of breath.    Gastrointestinal: Positive for change in bowel habit.   All other systems reviewed and are negative.            height is 160 cm (63\") and weight is 87.5 kg (193 lb). Her blood pressure is 98/62 and her pulse is 78.   Physical Exam   Constitutional: Vital signs are normal. She appears well-developed and well-nourished.   Cardiovascular: Normal rate, regular rhythm, S1 normal, S2 normal, normal heart sounds, intact distal pulses and normal pulses.    Pulmonary/Chest: Effort normal and breath sounds normal. She has no wheezes. She has no rhonchi. She has no rales.   Abdominal: Soft. Normal appearance and bowel sounds are normal. There is no hepatosplenomegaly.   Neurological: She is alert.           ECG 12 Lead  Date/Time: 4/19/2018 11:43 AM  Performed by: CHIQUITA CAMPUZANO  Authorized by: CHIQUITA CAMPUZANO   Comparison: compared with previous ECG from 4/17/2018  Rhythm: sinus rhythm  Rate: normal  QRS axis: normal  Clinical impression: normal ECG             Assessment/ Plan   Sinus tachycardia:  Today, EKG performed and reviewed shows normal sinus rhythm.  Heart rate 78.  Because of her recent complaints of increased palpitations and her EKG 48 hours ago showing a heart rate of 124, I would like to place a " monitor (48 hour holter is all we have available today) once again to evaluate her rate variation and rhythm.  We will call her with results and she should follow-up with Dr. Byrnes.    Labile hypertension:  Blood pressure low today and has been for the last few weeks.  I've encouraged her to continue with volume support and hydration.  She is to keep a blood pressure log at home.    Heterozygous factor V Leiden mutation:  Continue on Eliquis and follow routinely with PCP.  She does have an appointment next week with GI for further evaluation for a + FOBT      Marilu Richards PA-C functioning independently  4/19/2018 11:21 AM      EMR Dragon/Transcription disclaimer:   Much of this encounter note is an electronic transcription/translation of spoken language to printed text. The electronic translation of spoken language may permit erroneous, or at times, nonsensical words or phrases to be inadvertently transcribed; Although I have reviewed the note for such errors, some may still exist.

## 2018-04-14 LAB
BACTERIA SPEC RESP CULT: NORMAL
BACTERIA SPEC RESP CULT: NORMAL
BACTERIA UR CULT: ABNORMAL
BACTERIA UR CULT: ABNORMAL
Lab: NORMAL
OTHER ANTIBIOTIC SUSC ISLT: ABNORMAL

## 2018-04-16 ENCOUNTER — OFFICE VISIT (OUTPATIENT)
Dept: CARDIOLOGY | Facility: CLINIC | Age: 28
End: 2018-04-16

## 2018-04-16 VITALS
HEART RATE: 129 BPM | SYSTOLIC BLOOD PRESSURE: 92 MMHG | HEIGHT: 63 IN | DIASTOLIC BLOOD PRESSURE: 62 MMHG | WEIGHT: 195.8 LBS | OXYGEN SATURATION: 98 % | BODY MASS INDEX: 34.69 KG/M2 | RESPIRATION RATE: 16 BRPM

## 2018-04-16 DIAGNOSIS — R07.2 PRECORDIAL PAIN: Primary | ICD-10-CM

## 2018-04-16 DIAGNOSIS — R09.89 LABILE HYPERTENSION: ICD-10-CM

## 2018-04-16 DIAGNOSIS — R00.2 PALPITATIONS: ICD-10-CM

## 2018-04-16 DIAGNOSIS — I47.1 SVT (SUPRAVENTRICULAR TACHYCARDIA) (HCC): ICD-10-CM

## 2018-04-16 DIAGNOSIS — R00.0 INAPPROPRIATE SINUS TACHYCARDIA: ICD-10-CM

## 2018-04-16 DIAGNOSIS — D68.59 PROTEIN S DEFICIENCY (HCC): ICD-10-CM

## 2018-04-16 PROBLEM — I47.10 SVT (SUPRAVENTRICULAR TACHYCARDIA): Status: ACTIVE | Noted: 2018-04-16

## 2018-04-16 PROBLEM — I47.11 INAPPROPRIATE SINUS TACHYCARDIA: Status: ACTIVE | Noted: 2018-04-16

## 2018-04-16 PROCEDURE — 93000 ELECTROCARDIOGRAM COMPLETE: CPT | Performed by: INTERNAL MEDICINE

## 2018-04-16 PROCEDURE — 99214 OFFICE O/P EST MOD 30 MIN: CPT | Performed by: INTERNAL MEDICINE

## 2018-04-16 RX ORDER — ATENOLOL 25 MG/1
12.5 TABLET ORAL 2 TIMES DAILY
Qty: 30 TABLET | Refills: 11 | Status: SHIPPED | OUTPATIENT
Start: 2018-04-16 | End: 2018-05-14

## 2018-04-16 RX ORDER — FLUDROCORTISONE ACETATE 0.1 MG/1
0.1 TABLET ORAL DAILY
Qty: 30 TABLET | Refills: 11 | Status: SHIPPED | OUTPATIENT
Start: 2018-04-16 | End: 2018-05-14

## 2018-04-16 NOTE — PROGRESS NOTES
Subjective:     Encounter Date:04/16/2018      Patient ID: Marilu Godoy is a 27 y.o. female.    Chief Complaint:Fast heart rate, chest pain, syncope and swelling  HPI  This is a 27-year-old female patient who is been experiencing increased heart rate.  The patient recently had a 12-lead electrocardiogram performed which showed sinus tachycardia.  Telemetry monitoring showed heart rates up to 140 bpm but only a sinus mechanism.  The patient has previously worn a outpatient heart monitor on 2 occasions which showed only sinus tachycardia.  She has a history of AV node reentry tachycardia.  There has been no clinically documented recurrence of PSVT.  The patient reports having chest pain above her left clavicle and into her left shoulder.  This tends to occur when her heart is racing.  This has been occurring on a daily basis.  It does not radiate.  She cannot identify any precipitating aggravating or alleviating features.  It typically has a 4-5/10 in intensity.  She was seen in the emergency room recently and a 12-lead electrocardiogram showed sinus tachycardia.  Cardiac troponins were serially normal.  She reports palpitations with a sense that her heart is racing.  She had a time frame of about 6 months where her symptoms improved with medications but her symptoms have gotten much worse over the last 2-3 months.  She has had one syncopal event which had no prodrome of nausea vomiting diaphoresis or shortness of breath.  She has no shortness of breath at rest or with activity.  There is no orthopnea or PND.  She has intermittent swelling of her feet and ankles.  She reports dizziness in conjunction with her palpitations.  Her palpitations have been occurring on a daily basis and multiple times per day lasting for most of her waking hours.  Her heart rate seems to be worse when she is doing physical activities.  She remains anticoagulated on a subtherapeutic dose of Eliquis.  She has a history of protein S  "deficiency and pulmonary embolus.  The patient indicates that she was unable to tolerate beta blocker therapy as her \"heart rate got to slow\".  She has been unable to tolerate a higher dose of midodrine.  She reports that after each dose of midodrine she will develop tingling in her fingers and hands.  The following portions of the patient's history were reviewed and updated as appropriate: allergies, current medications, past family history, past medical history, past social history, past surgical history and problem  Review of Systems   Constitution: Positive for weakness. Negative for chills, diaphoresis, fever, weight gain and weight loss.   HENT: Negative for ear discharge, hearing loss, hoarse voice and nosebleeds.    Eyes: Negative for discharge, double vision, pain and photophobia.   Cardiovascular: Positive for chest pain, leg swelling, palpitations and syncope. Negative for claudication, cyanosis, dyspnea on exertion, irregular heartbeat, near-syncope, orthopnea and paroxysmal nocturnal dyspnea.   Respiratory: Negative for cough, hemoptysis, shortness of breath, sputum production and wheezing.    Endocrine: Negative for cold intolerance, heat intolerance, polydipsia, polyphagia and polyuria.   Hematologic/Lymphatic: Negative for adenopathy and bleeding problem. Does not bruise/bleed easily.   Skin: Negative for color change, flushing, itching and rash.   Musculoskeletal: Negative for muscle cramps, muscle weakness, myalgias and stiffness.   Gastrointestinal: Negative for abdominal pain, diarrhea, hematemesis, hematochezia, nausea and vomiting.   Genitourinary: Negative for dysuria, frequency and nocturia.   Neurological: Positive for dizziness. Negative for focal weakness, loss of balance, numbness, paresthesias and seizures.   Psychiatric/Behavioral: Negative for altered mental status, hallucinations and suicidal ideas.   Allergic/Immunologic: Negative for HIV exposure, hives and persistent infections. "           Current Outpatient Prescriptions:   •  albuterol (PROVENTIL HFA;VENTOLIN HFA) 108 (90 BASE) MCG/ACT inhaler, Inhale 2 puffs Every 6 (Six) Hours As Needed for Wheezing or Shortness of Air., Disp: 1 inhaler, Rfl: 3  •  albuterol (PROVENTIL) (2.5 MG/3ML) 0.083% nebulizer solution, Take 2.5 mg by nebulization Every 4 (Four) Hours As Needed for Wheezing or Shortness of Air., Disp: 60 vial, Rfl: 3  •  apixaban (ELIQUIS) 2.5 MG tablet tablet, Take 1 tablet by mouth Every 12 (Twelve) Hours., Disp: 60 tablet, Rfl: 5  •  aspirin 81 MG tablet, Take 1 tablet by mouth Daily., Disp: 30 tablet, Rfl: 11  •  atorvastatin (LIPITOR) 80 MG tablet, Take 1 tablet by mouth Daily., Disp: 30 tablet, Rfl: 11  •  azelastine (ASTELIN) 0.1 % nasal spray, 1 spray into each nostril 2 (Two) Times a Day As Needed for Rhinitis or Allergies. Use in each nostril as directed, Disp: 1 each, Rfl: 5  •  cyclobenzaprine (FLEXERIL) 5 MG tablet, Take 1 tablet by mouth 3 (Three) Times a Day As Needed for Muscle Spasms., Disp: 20 tablet, Rfl: 0  •  DULoxetine (CYMBALTA) 30 MG capsule, Take 1 capsule by mouth Daily. (Patient taking differently: Take 60 mg by mouth Daily.), Disp: 30 capsule, Rfl: 5  •  flunisolide (NASALIDE) 25 MCG/ACT (0.025%) solution nasal spray, Inhale 1 spray Every 12 (Twelve) Hours., Disp: 1 bottle, Rfl: 5  •  Fluticasone Furoate-Vilanterol (BREO ELLIPTA) 100-25 MCG/INH aerosol powder , Inhale 1 puff Daily., Disp: , Rfl:   •  HYDROcodone-acetaminophen (LORTAB) 5-325 MG per tablet, Take 1 tablet by mouth Every 8 (Eight) Hours As Needed for Severe Pain ., Disp: 10 tablet, Rfl: 0  •  ibuprofen (ADVIL,MOTRIN) 600 MG tablet, Take 1 tablet by mouth Every 6 (Six) Hours As Needed for Moderate Pain ., Disp: 30 tablet, Rfl: 1  •  lactulose (CHRONULAC) 10 GM/15ML solution, Take 30 mL by mouth 2 (Two) Times a Day As Needed (constipation)., Disp: 500 mL, Rfl: 0  •  LORazepam (ATIVAN) 0.5 MG tablet, Take 1 tablet by mouth Every 8 (Eight) Hours  As Needed for Anxiety., Disp: 9 tablet, Rfl: 0  •  midodrine (PROAMATINE) 5 MG tablet, Take 1 tablet by mouth 3 (Three) Times a Day. Last dose no later that 5 pm, Disp: 90 tablet, Rfl: 5  •  montelukast (SINGULAIR) 10 MG tablet, TAKE 1 TABLET BY MOUTH IN THE EVENING , Disp: 30 tablet, Rfl: 2  •  ondansetron ODT (ZOFRAN-ODT) 4 MG disintegrating tablet, Take 1 tablet by mouth Every 6 (Six) Hours As Needed for Nausea or Vomiting., Disp: 12 tablet, Rfl: 0  •  pantoprazole (PROTONIX) 40 MG EC tablet, TAKE 1 TABLET BY MOUTH ONE TIME A DAY , Disp: 30 tablet, Rfl: 4  •  promethazine (PHENERGAN) 25 MG tablet, Take 1 tablet by mouth Every 6 (Six) Hours As Needed for Nausea or Vomiting., Disp: 30 tablet, Rfl: 0  •  sulfaSALAzine (AZULFIDINE) 500 MG tablet, Take 1 tablet by mouth 2 (Two) Times a Day., Disp: 60 tablet, Rfl: 1  •  tiZANidine (ZANAFLEX) 4 MG tablet, Take 1 tablet by mouth Every 8 (Eight) Hours As Needed for Muscle Spasms., Disp: 30 tablet, Rfl: 3  •  Triamcinolone Acetonide (NASACORT) 55 MCG/ACT nasal inhaler, 2 sprays into each nostril Daily., Disp: , Rfl:   •  atenolol (TENORMIN) 25 MG tablet, Take 0.5 tablets by mouth 2 (Two) Times a Day., Disp: 30 tablet, Rfl: 11  •  fludrocortisone 0.1 MG tablet, Take 1 tablet by mouth Daily., Disp: 30 tablet, Rfl: 11     Objective:     Physical Exam   Constitutional: She is oriented to person, place, and time. She appears well-developed and well-nourished.   HENT:   Head: Normocephalic and atraumatic.   Eyes: Conjunctivae are normal. No scleral icterus.   Neck: No JVD present.   Cardiovascular: Normal rate, regular rhythm, normal heart sounds and intact distal pulses.  Exam reveals no gallop and no friction rub.    No murmur heard.  Pulmonary/Chest: Effort normal and breath sounds normal. No respiratory distress.   Abdominal: Soft. Bowel sounds are normal. There is no tenderness.   Musculoskeletal: She exhibits no edema.   Neurological: She is alert and oriented to person,  "place, and time.   Skin: Skin is warm and dry. No rash noted.   Psychiatric: She has a normal mood and affect. Her behavior is normal.     Blood pressure 92/62, pulse (!) 129, resp. rate 16, height 160 cm (62.99\"), weight 88.8 kg (195 lb 12.8 oz), SpO2 98 %, not currently breastfeeding.   Lab Review:       Assessment:         1. Precordial pain  Atypical and probably an associated symptom to palpitations. No ischemic ST-T wave changes recorded despite heart rates to 150 bpm.  - Treadmill Stress Test  - Adult Transthoracic Echo Complete W/ Cont if Necessary Per Protocol    2. Palpitations  At the present time all of the patient's cardiac tracings have demonstrated only sinus tachycardia.    3. Inappropriate sinus tachycardia  The patient almost certainly has inappropriate sinus tachycardia.  Thyroid function studies as well as routine lab work have been normal.    4. SVT (supraventricular tachycardia)  There have been no clinically documented recurrences of AV node reentry tachycardia.  She has seen an electrophysiologist in [place] in the past and discussions were entertained regarding possible ablation.  - Adult Transthoracic Echo Complete W/ Cont if Necessary Per Protocol    5. Protein S deficiency  She has had no clinically documented recurrences of DVT or pulmonary embolus.  She is currently subtherapeutic on her direct oral anticoagulant.  Her dose should be 5 mg orally twice per day.    6. Labile hypertension  Her blood pressure is low at today's visit.  This will certainly limit the ability to start rate control measures with a beta blocker, verapamil or diltiazem.  - Adult Transthoracic Echo Complete W/ Cont if Necessary Per Protocol      ECG 12 Lead  Date/Time: 4/17/2018 9:15 AM  Performed by: RAHUL VELARDE  Authorized by: RAHUL VELARDE   Rhythm: sinus tachycardia  Rate: tachycardic  BPM: 124  QRS axis: normal  Clinical impression: non-specific ECG             Plan:       We have discussed 3 " "different medication strategies including the pros and cons of each approach.  The first included increasing her Midodrine to 10-15 mg orally 3 times per day and use this to \"crutch up\" her blood pressure so that low-dose beta blocker therapy could be started.  The patient does not wish to risk the worsening of the current side effects she is experiencing as numbness and tingling in her fingers and hands after each dose of Midodrine. The second includes keeping all of her other medications the same and starting a trial of off label use of Corlanor. The patient indicates that she would not be able to afford this medication out of pocket.  What we have decided is to start Florinef and continue all other medications the same.  At the same time we will initiate very low-dose atenolol at 12.5 mg orally twice per day.  The patient is due to see the cardiac electrophysiologist in [facility] this coming Thursday.  There may be ablation options.  It is my understanding that sinus node modification has \"fallen out of favor\".  The patient has been warned that she will have increased swelling of her feet and ankles after starting the mineralocorticoid therapy.  There has been suggestion that she might benefit from diuretic therapy.  The patient has been cautioned to avoid diuretic therapy at all cost.  This will be extremely counterproductive.  It will lead to lowering of her blood pressure and subsequent worsening of her heart rate.  In the absence of documented congestive heart failure she absolutely should not take diuretic therapy.  In fact she should be doing just opposite which is fluid and salt supplementation.  The patient has been educated that all of her therapies will, at the cost of increased pedal and ankle edema.  She has frankly been educated that this is the cost of treating her other conditions.         "

## 2018-04-17 PROCEDURE — 93000 ELECTROCARDIOGRAM COMPLETE: CPT | Performed by: INTERNAL MEDICINE

## 2018-04-17 RX ORDER — NITROFURANTOIN 25; 75 MG/1; MG/1
100 CAPSULE ORAL 2 TIMES DAILY
Qty: 14 CAPSULE | Refills: 0 | Status: SHIPPED | OUTPATIENT
Start: 2018-04-17 | End: 2018-04-24

## 2018-04-17 RX ORDER — FLUCONAZOLE 150 MG/1
150 TABLET ORAL ONCE
Qty: 1 TABLET | Refills: 0 | Status: SHIPPED | OUTPATIENT
Start: 2018-04-17 | End: 2018-04-17

## 2018-04-19 ENCOUNTER — OFFICE VISIT (OUTPATIENT)
Dept: CARDIOLOGY | Facility: CLINIC | Age: 28
End: 2018-04-19

## 2018-04-19 VITALS
DIASTOLIC BLOOD PRESSURE: 62 MMHG | BODY MASS INDEX: 34.2 KG/M2 | WEIGHT: 193 LBS | HEART RATE: 78 BPM | HEIGHT: 63 IN | SYSTOLIC BLOOD PRESSURE: 98 MMHG

## 2018-04-19 DIAGNOSIS — R00.0 SINUS TACHYCARDIA: Primary | ICD-10-CM

## 2018-04-19 DIAGNOSIS — R00.2 HEART PALPITATIONS: ICD-10-CM

## 2018-04-19 DIAGNOSIS — D68.51 HETEROZYGOUS FACTOR V LEIDEN MUTATION (HCC): ICD-10-CM

## 2018-04-19 DIAGNOSIS — R09.89 LABILE HYPERTENSION: ICD-10-CM

## 2018-04-19 PROCEDURE — 93000 ELECTROCARDIOGRAM COMPLETE: CPT | Performed by: PHYSICIAN ASSISTANT

## 2018-04-19 PROCEDURE — 99214 OFFICE O/P EST MOD 30 MIN: CPT | Performed by: PHYSICIAN ASSISTANT

## 2018-04-24 ENCOUNTER — OFFICE VISIT (OUTPATIENT)
Dept: INTERNAL MEDICINE | Facility: CLINIC | Age: 28
End: 2018-04-24

## 2018-04-24 VITALS
HEART RATE: 104 BPM | DIASTOLIC BLOOD PRESSURE: 72 MMHG | RESPIRATION RATE: 12 BRPM | TEMPERATURE: 98.6 F | SYSTOLIC BLOOD PRESSURE: 104 MMHG | BODY MASS INDEX: 34.02 KG/M2 | OXYGEN SATURATION: 99 % | WEIGHT: 192 LBS | HEIGHT: 63 IN

## 2018-04-24 DIAGNOSIS — K02.9 DENTAL CARIES: ICD-10-CM

## 2018-04-24 DIAGNOSIS — K04.7 ABSCESSED TOOTH: ICD-10-CM

## 2018-04-24 DIAGNOSIS — Z79.01 CHRONIC ANTICOAGULATION: ICD-10-CM

## 2018-04-24 DIAGNOSIS — J02.9 SORE THROAT: Primary | ICD-10-CM

## 2018-04-24 DIAGNOSIS — R09.89 LABILE HYPERTENSION: ICD-10-CM

## 2018-04-24 DIAGNOSIS — L29.9 EAR ITCHING: ICD-10-CM

## 2018-04-24 LAB
EXPIRATION DATE: NORMAL
INTERNAL CONTROL: NORMAL
Lab: NORMAL
S PYO AG THROAT QL: NEGATIVE

## 2018-04-24 PROCEDURE — 87880 STREP A ASSAY W/OPTIC: CPT | Performed by: FAMILY MEDICINE

## 2018-04-24 PROCEDURE — 99214 OFFICE O/P EST MOD 30 MIN: CPT | Performed by: FAMILY MEDICINE

## 2018-04-24 RX ORDER — FLUCONAZOLE 150 MG/1
150 TABLET ORAL ONCE
Qty: 1 TABLET | Refills: 0 | Status: SHIPPED | OUTPATIENT
Start: 2018-04-24 | End: 2018-04-24

## 2018-04-24 NOTE — PROGRESS NOTES
"Subjective    Marilu Fiorella Godoy is a 27 y.o. female here for:  Chief Complaint   Patient presents with   • Cough   • Headache   • URI   • Sore Throat     History of Present Illness     Sore throat, headache, cough and low grade fever with chills for a couple of days. Son and daughter have also been sick.     Has followed up with hematology. They were initially going to stop Eliquis but with her anxiety about recurrent clot, plus the recommendation from several providers to stay on medicine, they elected to treat with 2.5 mg.     She also notes some itching in the ears, worse on the left. She's taking all of her allergy medicine as directed, including nose sprays.     She notes the tooth abscess is not bothersome at this time. Something happened with her last referral and she was not able to go. She has been treated repetitively for an abscess over the last year with antibiotics.    Scheduled for stress test with cardiology. She was started on atenolol recently as well as another medicine (\"it starts with F\") as blood pressure was a bit low at that visit and the next cardiology visit. She is compliant with these medicines.     The following portions of the patient's history were reviewed and updated as appropriate: allergies, current medications, past family history, past medical history, past social history, past surgical history and problem list.    Review of Systems   Constitutional: Positive for fatigue.   HENT: Positive for sore throat.    Respiratory: Positive for cough.    Gastrointestinal: Positive for abdominal pain.   Neurological: Positive for headaches.       Vitals:    04/24/18 1431   BP: 104/72   Pulse: 104   Resp: 12   Temp: 98.6 °F (37 °C)   SpO2: 99%   Weight: 87.1 kg (192 lb)   Height: 160 cm (62.99\")         Objective   Physical Exam   Constitutional: She is oriented to person, place, and time. Vital signs are normal. She appears well-developed and well-nourished. She is active.  Non-toxic " appearance. She does not have a sickly appearance. She does not appear ill. No distress. She is obese.  HENT:   Head: Normocephalic and atraumatic. Hair is normal.   Right Ear: Hearing, tympanic membrane and external ear normal. No drainage.   Left Ear: Hearing, tympanic membrane and external ear normal. No drainage.   Nose: Nose normal.   Mouth/Throat: Uvula is midline and mucous membranes are normal. Mucous membranes are not dry. No oral lesions. Abnormal dentition. No uvula swelling. Posterior oropharyngeal erythema present. No oropharyngeal exudate.   Both canals are dry, flaky a bit   Eyes: EOM and lids are normal. Pupils are equal, round, and reactive to light. No scleral icterus.   Neck: Neck supple.   Cardiovascular: Normal rate, regular rhythm and normal heart sounds.    Pulmonary/Chest: Effort normal and breath sounds normal.   Musculoskeletal: She exhibits no edema or deformity.   Neurological: She is alert and oriented to person, place, and time. She displays no tremor. No cranial nerve deficit. Gait normal.   Skin: Skin is warm and dry. No rash noted. She is not diaphoretic. No cyanosis. Nails show no clubbing.   Psychiatric: She has a normal mood and affect. Her speech is normal and behavior is normal. Judgment and thought content normal. Cognition and memory are normal.   Nursing note and vitals reviewed.    Lab Results   Component Value Date    RAPSCRN Negative 04/24/2018       Assessment/Plan     Problem List Items Addressed This Visit        Cardiovascular and Mediastinum    Labile hypertension       Other    Chronic anticoagulation      Other Visit Diagnoses     Sore throat    -  Primary    Relevant Orders    POCT rapid strep A (Completed)    Abscessed tooth        Relevant Orders    Ambulatory Referral to Oral Maxillofacial Surgery    Dental caries        Relevant Orders    Ambulatory Referral to Oral Maxillofacial Surgery    Ear itching              · Can try small amount of hydrocortisone over  the counter on q-tip to outer canal, not to stick qtip far into canal.  · Follow up with cardiology as scheduled, blood pressure and heart rate acceptable today  · She likely has a viral illness causing the URI symptoms, symptomatic management  · Discussed referral with referral team.  · Continue Eliquis, can take the low dose indefinitely    Return for As Needed.    Deidre Barker MD    Please note that portions of this note may have been completed with a voice recognition program. Efforts were made to edit dictation, but occasionally words are mistranscribed.

## 2018-04-25 ENCOUNTER — HOSPITAL ENCOUNTER (EMERGENCY)
Facility: HOSPITAL | Age: 28
Discharge: HOME | End: 2018-04-26
Attending: STUDENT IN AN ORGANIZED HEALTH CARE EDUCATION/TRAINING PROGRAM | Admitting: STUDENT IN AN ORGANIZED HEALTH CARE EDUCATION/TRAINING PROGRAM

## 2018-04-25 DIAGNOSIS — R07.9 CHEST PAIN, UNSPECIFIED TYPE: Primary | ICD-10-CM

## 2018-04-25 LAB
ALBUMIN SERPL-MCNC: 4.4 G/DL (ref 3.5–5)
ALBUMIN/GLOB SERPL: 1.2 G/DL (ref 1–2)
ALP SERPL-CCNC: 108 U/L (ref 38–126)
ALT SERPL W P-5'-P-CCNC: 32 U/L (ref 13–69)
ANION GAP SERPL CALCULATED.3IONS-SCNC: 16 MMOL/L (ref 10–20)
APTT PPP: 29.6 SECONDS (ref 25–36)
AST SERPL-CCNC: 21 U/L (ref 15–46)
BASOPHILS # BLD AUTO: 0.04 10*3/MM3 (ref 0–0.2)
BASOPHILS NFR BLD AUTO: 0.6 % (ref 0–2.5)
BILIRUB SERPL-MCNC: 1 MG/DL (ref 0.2–1.3)
BUN BLD-MCNC: 8 MG/DL (ref 7–20)
BUN/CREAT SERPL: 11.4 (ref 7.1–23.5)
CALCIUM SPEC-SCNC: 9.2 MG/DL (ref 8.4–10.2)
CHLORIDE SERPL-SCNC: 102 MMOL/L (ref 98–107)
CO2 SERPL-SCNC: 27 MMOL/L (ref 26–30)
CREAT BLD-MCNC: 0.7 MG/DL (ref 0.6–1.3)
DEPRECATED RDW RBC AUTO: 40.1 FL (ref 37–54)
EOSINOPHIL # BLD AUTO: 0.17 10*3/MM3 (ref 0–0.7)
EOSINOPHIL NFR BLD AUTO: 2.6 % (ref 0–7)
ERYTHROCYTE [DISTWIDTH] IN BLOOD BY AUTOMATED COUNT: 12.2 % (ref 11.5–14.5)
GFR SERPL CREATININE-BSD FRML MDRD: 100 ML/MIN/1.73
GLOBULIN UR ELPH-MCNC: 3.7 GM/DL
GLUCOSE BLD-MCNC: 96 MG/DL (ref 74–98)
HCT VFR BLD AUTO: 40.1 % (ref 37–47)
HGB BLD-MCNC: 13.7 G/DL (ref 12–16)
IMM GRANULOCYTES # BLD: 0.02 10*3/MM3 (ref 0–0.06)
IMM GRANULOCYTES NFR BLD: 0.3 % (ref 0–0.6)
INR PPP: 1.31 (ref 0.9–1.1)
LYMPHOCYTES # BLD AUTO: 1.46 10*3/MM3 (ref 0.6–3.4)
LYMPHOCYTES NFR BLD AUTO: 22.4 % (ref 10–50)
MCH RBC QN AUTO: 30.4 PG (ref 27–31)
MCHC RBC AUTO-ENTMCNC: 34.2 G/DL (ref 30–37)
MCV RBC AUTO: 89.1 FL (ref 81–99)
MONOCYTES # BLD AUTO: 0.66 10*3/MM3 (ref 0–0.9)
MONOCYTES NFR BLD AUTO: 10.1 % (ref 0–12)
NEUTROPHILS # BLD AUTO: 4.16 10*3/MM3 (ref 2–6.9)
NEUTROPHILS NFR BLD AUTO: 64 % (ref 37–80)
NRBC BLD MANUAL-RTO: 0 /100 WBC (ref 0–0)
PLATELET # BLD AUTO: 304 10*3/MM3 (ref 130–400)
PMV BLD AUTO: 9.5 FL (ref 6–12)
POTASSIUM BLD-SCNC: 4 MMOL/L (ref 3.5–5.1)
PROT SERPL-MCNC: 8.1 G/DL (ref 6.3–8.2)
PROTHROMBIN TIME: 14.6 SECONDS (ref 9.3–12.1)
RBC # BLD AUTO: 4.5 10*6/MM3 (ref 4.2–5.4)
SODIUM BLD-SCNC: 141 MMOL/L (ref 137–145)
TROPONIN I SERPL-MCNC: <0.012 NG/ML (ref 0–0.03)
WBC NRBC COR # BLD: 6.51 10*3/MM3 (ref 4.8–10.8)

## 2018-04-25 PROCEDURE — 84484 ASSAY OF TROPONIN QUANT: CPT | Performed by: PHYSICIAN ASSISTANT

## 2018-04-25 PROCEDURE — 85610 PROTHROMBIN TIME: CPT | Performed by: PHYSICIAN ASSISTANT

## 2018-04-25 PROCEDURE — 80053 COMPREHEN METABOLIC PANEL: CPT | Performed by: PHYSICIAN ASSISTANT

## 2018-04-25 PROCEDURE — 85025 COMPLETE CBC W/AUTO DIFF WBC: CPT | Performed by: PHYSICIAN ASSISTANT

## 2018-04-25 PROCEDURE — 93005 ELECTROCARDIOGRAM TRACING: CPT | Performed by: STUDENT IN AN ORGANIZED HEALTH CARE EDUCATION/TRAINING PROGRAM

## 2018-04-25 PROCEDURE — 99284 EMERGENCY DEPT VISIT MOD MDM: CPT

## 2018-04-25 PROCEDURE — 85730 THROMBOPLASTIN TIME PARTIAL: CPT | Performed by: PHYSICIAN ASSISTANT

## 2018-04-26 VITALS
SYSTOLIC BLOOD PRESSURE: 126 MMHG | RESPIRATION RATE: 18 BRPM | HEIGHT: 63 IN | BODY MASS INDEX: 33.88 KG/M2 | OXYGEN SATURATION: 99 % | DIASTOLIC BLOOD PRESSURE: 79 MMHG | TEMPERATURE: 99 F | WEIGHT: 191.2 LBS | HEART RATE: 99 BPM

## 2018-04-27 ENCOUNTER — OFFICE VISIT (OUTPATIENT)
Dept: GASTROENTEROLOGY | Facility: CLINIC | Age: 28
End: 2018-04-27

## 2018-04-27 ENCOUNTER — LAB (OUTPATIENT)
Dept: LAB | Facility: HOSPITAL | Age: 28
End: 2018-04-27

## 2018-04-27 VITALS
TEMPERATURE: 97.6 F | HEART RATE: 127 BPM | SYSTOLIC BLOOD PRESSURE: 126 MMHG | BODY MASS INDEX: 34.02 KG/M2 | DIASTOLIC BLOOD PRESSURE: 84 MMHG | WEIGHT: 192 LBS | OXYGEN SATURATION: 98 % | HEIGHT: 63 IN

## 2018-04-27 DIAGNOSIS — R19.7 DIARRHEA, UNSPECIFIED TYPE: Primary | ICD-10-CM

## 2018-04-27 DIAGNOSIS — R19.7 DIARRHEA, UNSPECIFIED TYPE: ICD-10-CM

## 2018-04-27 LAB — CRP SERPL-MCNC: 1.97 MG/DL (ref 0–1)

## 2018-04-27 PROCEDURE — 86140 C-REACTIVE PROTEIN: CPT | Performed by: INTERNAL MEDICINE

## 2018-04-27 PROCEDURE — 99214 OFFICE O/P EST MOD 30 MIN: CPT | Performed by: INTERNAL MEDICINE

## 2018-04-27 PROCEDURE — 83516 IMMUNOASSAY NONANTIBODY: CPT

## 2018-04-27 PROCEDURE — 36415 COLL VENOUS BLD VENIPUNCTURE: CPT | Performed by: INTERNAL MEDICINE

## 2018-04-27 PROCEDURE — 82784 ASSAY IGA/IGD/IGG/IGM EACH: CPT

## 2018-04-27 NOTE — PROGRESS NOTES
PCP: Deidre Barker MD    Chief Complaint   Patient presents with   • Follow-up     3 month       History of Present Illness:   HPI  Ms. Godoy returns to the office for a follow-up visit.  The patient recently had a laparoscopy performed and there was evidence for endometriosis.  She still has issues with loose stool usually early in the day.  The patient has not been aware of any gross blood in the stool.  She had an upper endoscopy and colonoscopy performed by Dr. Selby in April 2017.  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  She denies any difficult or painful swallowing.  The patient does admit to some bloating intermittently.  There is no history of unexplained weight loss. She denies night sweats, fever or chills.  Ms. Godoy will occasionally have a stool late in the evening.  No history of new medication.  The patient denies any recent camping or travel outside the [place].  Ms. Godoy can have some abdominal cramping prior to the loose stool but this is resolved after going to the restroom.  Past Medical History:   Diagnosis Date   • Abdominal pain    • Anxiety    • Asthma 2015   • Body piercing     TONGUE, NOSE, TWO IN EACH EAR   • Chest pain    • Clotting disorder     factor 5   • Constipation    • Coronary artery disease involving native coronary artery of native heart without angina pectoris 6/16/2017   • Diarrhea    • Diarrhea    • Factor 5 Leiden mutation, heterozygous    • Gallstone    • GERD (gastroesophageal reflux disease)    • H/O blood clots    • Headache    • Hiatal hernia    • High cholesterol    • History of recurrent UTIs    • Hypertension    • Kidney infection    • Nausea    • Nausea & vomiting    • Obesity    • Orthostatic hypotension    • Ovarian cyst    • Pollen allergies    • Protein S deficiency 11/14/2016    labs from hospitalization for PE   • Pulmonary embolism    • Sleep apnea     CPAP ASKED TO BRING DOS   • Subclinical hyperthyroidism    •  Tachycardia    • Tattoo     LOWER BACK       Past Surgical History:   Procedure Laterality Date   •  SECTION      X2  and    • CHOLECYSTECTOMY     • COLONOSCOPY N/A 2017    Procedure: COLONOSCOPY WITH BIOPSIES AND ARGON THERMAL ABLATION;  Surgeon: Jignesh Selby MD;  Location: [facility];  Service:    • DIAGNOSTIC LAPAROSCOPY N/A 2018    Procedure: DIAGNOSTIC LAPAROSCOPY AND ABLATION OF ENDOMETRIOSIS;  Surgeon: Evin Zamudio MD;  Location: [facility] OR;  Service: Obstetrics/Gynecology   • ENDOSCOPY N/A 2017    Procedure: ESOPHAGOGASTRODUODENOSCOPY WITH BIOPSIES AND COLD BIOPSY POLYPECTOMIES;  Surgeon: Jignesh Selby MD;  Location: [facility];  Service:          Current Outpatient Prescriptions:   •  albuterol (PROVENTIL HFA;VENTOLIN HFA) 108 (90 BASE) MCG/ACT inhaler, Inhale 2 puffs Every 6 (Six) Hours As Needed for Wheezing or Shortness of Air., Disp: 1 inhaler, Rfl: 3  •  albuterol (PROVENTIL) (2.5 MG/3ML) 0.083% nebulizer solution, Take 2.5 mg by nebulization Every 4 (Four) Hours As Needed for Wheezing or Shortness of Air., Disp: 60 vial, Rfl: 3  •  apixaban (ELIQUIS) 2.5 MG tablet tablet, Take 1 tablet by mouth Every 12 (Twelve) Hours., Disp: 60 tablet, Rfl: 5  •  aspirin 81 MG tablet, Take 1 tablet by mouth Daily., Disp: 30 tablet, Rfl: 11  •  atenolol (TENORMIN) 25 MG tablet, Take 0.5 tablets by mouth 2 (Two) Times a Day., Disp: 30 tablet, Rfl: 11  •  atorvastatin (LIPITOR) 80 MG tablet, Take 1 tablet by mouth Daily., Disp: 30 tablet, Rfl: 11  •  azelastine (ASTELIN) 0.1 % nasal spray, 1 spray into each nostril 2 (Two) Times a Day As Needed for Rhinitis or Allergies. Use in each nostril as directed, Disp: 1 each, Rfl: 5  •  cyclobenzaprine (FLEXERIL) 5 MG tablet, Take 1 tablet by mouth 3 (Three) Times a Day As Needed for Muscle Spasms., Disp: 20 tablet, Rfl: 0  •  DULoxetine (CYMBALTA) 30 MG capsule, Take 1 capsule by mouth Daily. (Patient taking  differently: Take 60 mg by mouth Daily.), Disp: 30 capsule, Rfl: 5  •  fludrocortisone 0.1 MG tablet, Take 1 tablet by mouth Daily., Disp: 30 tablet, Rfl: 11  •  flunisolide (NASALIDE) 25 MCG/ACT (0.025%) solution nasal spray, Inhale 1 spray Every 12 (Twelve) Hours., Disp: 1 bottle, Rfl: 5  •  Fluticasone Furoate-Vilanterol (BREO ELLIPTA) 100-25 MCG/INH aerosol powder , Inhale 1 puff Daily., Disp: , Rfl:   •  HYDROcodone-acetaminophen (LORTAB) 5-325 MG per tablet, Take 1 tablet by mouth Every 8 (Eight) Hours As Needed for Severe Pain ., Disp: 10 tablet, Rfl: 0  •  ibuprofen (ADVIL,MOTRIN) 600 MG tablet, Take 1 tablet by mouth Every 6 (Six) Hours As Needed for Moderate Pain ., Disp: 30 tablet, Rfl: 1  •  lactulose (CHRONULAC) 10 GM/15ML solution, Take 30 mL by mouth 2 (Two) Times a Day As Needed (constipation)., Disp: 500 mL, Rfl: 0  •  LORazepam (ATIVAN) 0.5 MG tablet, Take 1 tablet by mouth Every 8 (Eight) Hours As Needed for Anxiety., Disp: 9 tablet, Rfl: 0  •  midodrine (PROAMATINE) 5 MG tablet, Take 1 tablet by mouth 3 (Three) Times a Day. Last dose no later that 5 pm, Disp: 90 tablet, Rfl: 5  •  montelukast (SINGULAIR) 10 MG tablet, TAKE 1 TABLET BY MOUTH IN THE EVENING , Disp: 30 tablet, Rfl: 2  •  ondansetron ODT (ZOFRAN-ODT) 4 MG disintegrating tablet, Take 1 tablet by mouth Every 6 (Six) Hours As Needed for Nausea or Vomiting., Disp: 12 tablet, Rfl: 0  •  pantoprazole (PROTONIX) 40 MG EC tablet, TAKE 1 TABLET BY MOUTH ONE TIME A DAY , Disp: 30 tablet, Rfl: 4  •  promethazine (PHENERGAN) 25 MG tablet, Take 1 tablet by mouth Every 6 (Six) Hours As Needed for Nausea or Vomiting., Disp: 30 tablet, Rfl: 0  •  sulfaSALAzine (AZULFIDINE) 500 MG tablet, Take 1 tablet by mouth 2 (Two) Times a Day., Disp: 60 tablet, Rfl: 1  •  tiZANidine (ZANAFLEX) 4 MG tablet, Take 1 tablet by mouth Every 8 (Eight) Hours As Needed for Muscle Spasms., Disp: 30 tablet, Rfl: 3  •  Triamcinolone Acetonide (NASACORT) 55 MCG/ACT nasal  inhaler, 2 sprays into each nostril Daily., Disp: , Rfl:     Allergies   Allergen Reactions   • Amoxicillin Rash       Family History   Problem Relation Age of Onset   • Arthritis Mother    • COPD Mother    • Asthma Mother    • Thyroid disease Mother    • Arthritis Father    • Diabetes Father    • Hypertension Father    • Hyperlipidemia Father    • Kidney disease Father    • Heart attack Father    • No Known Problems Son    • Colon cancer Neg Hx    • Liver cancer Neg Hx    • Liver disease Neg Hx    • Stomach cancer Neg Hx    • Esophageal cancer Neg Hx        Social History     Social History   • Marital status:      Spouse name: N/A   • Number of children: N/A   • Years of education: N/A     Occupational History   • Not on file.     Social History Main Topics   • Smoking status: Never Smoker   • Smokeless tobacco: Never Used   • Alcohol use No   • Drug use: No   • Sexual activity: Defer      Comment: PE while on birth control patch     Other Topics Concern   • Not on file     Social History Narrative   • No narrative on file       Review of Systems   Constitutional: Positive for fatigue. Negative for activity change, appetite change, fever and unexpected weight change.   HENT: Negative for dental problem, hearing loss, mouth sores, postnasal drip, sneezing, trouble swallowing and voice change.    Eyes: Negative for pain, redness, itching and visual disturbance.   Respiratory: Positive for cough and shortness of breath. Negative for choking and wheezing.    Cardiovascular: Positive for chest pain. Negative for palpitations and leg swelling.   Gastrointestinal: Positive for abdominal distention, abdominal pain and diarrhea. Negative for anal bleeding, blood in stool, constipation, nausea, rectal pain and vomiting.   Endocrine: Negative for cold intolerance, heat intolerance, polydipsia, polyphagia and polyuria.   Genitourinary: Negative.  Negative for dysuria, enuresis, flank pain, hematuria and urgency.    Musculoskeletal: Negative for arthralgias, back pain, gait problem, joint swelling and myalgias.   Skin: Negative for color change, pallor and rash.   Allergic/Immunologic: Negative for environmental allergies, food allergies and immunocompromised state.   Neurological: Positive for dizziness. Negative for tremors, seizures, facial asymmetry, speech difficulty, numbness and headaches.   Hematological: Negative for adenopathy.   Psychiatric/Behavioral: Negative for behavioral problems, confusion, dysphoric mood, hallucinations and self-injury.       Vitals:    04/27/18 1038   BP: 126/84   Pulse: (!) 127   Temp: 97.6 °F (36.4 °C)   SpO2: 98%       Physical Exam   Constitutional: She is oriented to person, place, and time. She appears well-nourished. No distress.   HENT:   Head: Normocephalic and atraumatic.   Mouth/Throat: Oropharynx is clear and moist. No oropharyngeal exudate.   Eyes: EOM are normal. No scleral icterus.   Neck: Neck supple.   Cardiovascular: Normal rate, regular rhythm and normal heart sounds.  Exam reveals no gallop.    No murmur heard.  Pulmonary/Chest: Effort normal and breath sounds normal. She has no wheezes. She has no rales.   Abdominal: Soft. Bowel sounds are normal. There is no tenderness. There is no rebound and no guarding.   Musculoskeletal: Normal range of motion. She exhibits no tenderness.   Lymphadenopathy:     She has no cervical adenopathy.   Neurological: She is alert and oriented to person, place, and time. She exhibits normal muscle tone.   Skin: Skin is dry. No rash noted. No erythema.   Psychiatric: She has a normal mood and affect. Her behavior is normal. Thought content normal.   Vitals reviewed.      Marilu was seen today for follow-up.    Diagnoses and all orders for this visit:    Diarrhea, unspecified type  -     C-reactive Protein  -     Celiac Ab tTG DGP TIgA; Future    The patient has Jimi criteria for irritable bowel syndrome diarrhea predominance.  The  differential diagnosis also could include non-celiac gluten sensitivity.  The patient reports a blood test that was suggestive of Crohn's disease.  The terminal ileal biopsy performed by Dr. Trotter was negative for ileitis. The imaging studies that have been reported are negative for any obvious bowel wall abnormalities.      Plan: Will give the patient a trial of IB Bev to take twice daily.           Will obtain C-reactive protein and celiac panel.            Consider trial of Xifaxan.           Follow-up in 6 months.    I spent over 50% of the office visit counseling and answering questions from the patient.

## 2018-04-28 LAB
GLIADIN PEPTIDE IGA SER-ACNC: 14 UNITS (ref 0–19)
GLIADIN PEPTIDE IGG SER-ACNC: 2 UNITS (ref 0–19)
IGA SERPL-MCNC: 323 MG/DL (ref 87–352)
TTG IGA SER-ACNC: <2 U/ML (ref 0–3)
TTG IGG SER-ACNC: 2 U/ML (ref 0–5)

## 2018-04-30 ENCOUNTER — TELEPHONE (OUTPATIENT)
Dept: GASTROENTEROLOGY | Facility: CLINIC | Age: 28
End: 2018-04-30

## 2018-04-30 NOTE — TELEPHONE ENCOUNTER
----- Message from Elizabeth Noland MA sent at 4/30/2018  8:14 AM EDT -----  Regarding: FW: Test Results Question  Contact: [phone]      ----- Message -----  From: Marilu Godoy  Sent: 4/29/2018   8:16 AM  To: [facility]  Subject: Test Results Question                            I have a question about C-REACTIVE PROTEIN resulted on 4/27/18 at 12:12 PM.    What does this mean? What's the next thing that needs to be done?     Marilu Godoy

## 2018-04-30 NOTE — TELEPHONE ENCOUNTER
----- Message from Oswaldo Bragg MD sent at 4/30/2018  1:19 PM EDT -----  Let Ms. Godoy know the celiac blood test was normal.  The C-reactive protein was slightly above the range of normal.  If no improvement with IBgard will give the patient a trial of Xifaxan.  Thank you,  JUDITH

## 2018-05-01 ENCOUNTER — TELEPHONE (OUTPATIENT)
Dept: GASTROENTEROLOGY | Facility: CLINIC | Age: 28
End: 2018-05-01

## 2018-05-01 NOTE — TELEPHONE ENCOUNTER
Patient called back. Gave her Celiac blood test results. Also explained to her what the C- reactive protein test means. Patient voiced understanding.

## 2018-05-07 ENCOUNTER — OFFICE VISIT (OUTPATIENT)
Dept: INTERNAL MEDICINE | Facility: CLINIC | Age: 28
End: 2018-05-07

## 2018-05-07 VITALS
DIASTOLIC BLOOD PRESSURE: 76 MMHG | WEIGHT: 196.4 LBS | SYSTOLIC BLOOD PRESSURE: 118 MMHG | BODY MASS INDEX: 34.8 KG/M2 | HEART RATE: 118 BPM | TEMPERATURE: 99.1 F | OXYGEN SATURATION: 99 % | HEIGHT: 63 IN

## 2018-05-07 DIAGNOSIS — K04.7 INFECTED DENTAL CARRIES: Primary | ICD-10-CM

## 2018-05-07 DIAGNOSIS — R68.84 JAW PAIN: ICD-10-CM

## 2018-05-07 DIAGNOSIS — K02.9 INFECTED DENTAL CARRIES: Primary | ICD-10-CM

## 2018-05-07 LAB
BH CV STRESS DURATION MIN STAGE 1: 3
BH CV STRESS DURATION SEC STAGE 1: 0
BH CV STRESS GRADE STAGE 1: 10
BH CV STRESS METS STAGE 1: 5
BH CV STRESS PROTOCOL 1: NORMAL
BH CV STRESS RECOVERY BP: NORMAL MMHG
BH CV STRESS RECOVERY HR: 127 BPM
BH CV STRESS RECOVERY O2: 99 %
BH CV STRESS SPEED STAGE 1: 1.7
BH CV STRESS STAGE 1: 1
MAXIMAL PREDICTED HEART RATE: 193 BPM
PERCENT MAX PREDICTED HR: 93.26 %
STRESS BASELINE BP: NORMAL MMHG
STRESS BASELINE HR: 102 BPM
STRESS O2 SAT REST: 99 %
STRESS PERCENT HR: 110 %
STRESS POST ESTIMATED WORKLOAD: 10.1 METS
STRESS POST EXERCISE DUR MIN: 7 MIN
STRESS POST O2 SAT PEAK: 99 %
STRESS POST PEAK BP: NORMAL MMHG
STRESS POST PEAK HR: 180 BPM
STRESS TARGET HR: 164 BPM

## 2018-05-07 PROCEDURE — 99213 OFFICE O/P EST LOW 20 MIN: CPT | Performed by: PHYSICIAN ASSISTANT

## 2018-05-07 RX ORDER — CEPHALEXIN 500 MG/1
500 CAPSULE ORAL 3 TIMES DAILY
Qty: 30 CAPSULE | Refills: 0 | Status: SHIPPED | OUTPATIENT
Start: 2018-05-07 | End: 2018-05-14

## 2018-05-07 NOTE — PROGRESS NOTES
Marilu Godoy is a 27 y.o. female.     Subjective   History of Present Illness   Here today with concern of left facial pain which extends into the ear and jaw. The pain began a couple of days ago and has worsened a little since onset. She has also had nasal congestion, intermittent cough and low-grade fever intermittently for the last day.  She denies SOA, chest tightness or wheezing. She has had trouble with dental caries causing pain for the last several months and is awaiting an appointment with an oral surgeon.         The following portions of the patient's history were reviewed and updated as appropriate: allergies, current medications, past family history, past medical history, past social history, past surgical history and problem list.    Review of Systems    Constitutional: fever. Negative for appetite change, chills, fatigue and unexpected weight change.   HENT: congestion, ear pain, jaw pain.  Negative for hearing loss, nosebleeds, postnasal drip, rhinorrhea, sore throat, tinnitus and trouble swallowing.    Eyes: Negative for photophobia, discharge and visual disturbance.   Respiratory: cough. Negative for chest tightness, shortness of breath and wheezing.    Cardiovascular: Negative for chest pain, palpitations and leg swelling.   Gastrointestinal: Negative for abdominal distention, abdominal pain, blood in stool, constipation, diarrhea, nausea and vomiting.   Endocrine: Negative for cold intolerance, heat intolerance, polydipsia, polyphagia and polyuria.   Musculoskeletal: Negative for arthralgias, back pain, joint swelling, myalgias, neck pain and neck stiffness.   Skin: Negative for color change, pallor, rash and wound.   Allergic/Immunologic: Negative for environmental allergies, food allergies and immunocompromised state.   Neurological: Negative for dizziness, tremors, seizures, weakness, numbness and headaches.   Hematological: Negative for adenopathy. Does not bruise/bleed easily.  "  Psychiatric/Behavioral: Negative for sleep disturbances, agitation, behavioral problems, confusion, hallucinations, self-injury and suicidal ideas. The patient is not nervous/anxious.      Objective    Physical Exam  Constitutional: Oriented to person, place, and time. Appears well-developed and well-nourished.   HENT: eroded dental caries of many teeth, particularly molars upper and lower bilaterally. Tenderness and erythema of left lower and upper gums bilaterally.   Head: no sinus tenderness. Normocephalic and atraumatic.   Eyes: EOM are normal. Pupils are equal, round, and reactive to light.   Neck: Normal range of motion. Neck supple.   Cardiovascular: Normal rate, regular rhythm and normal heart sounds.    Pulmonary/Chest: Effort normal and breath sounds normal. No respiratory distress.  Has no wheezes or rales. Exhibits no chest wall tenderness.   Abdominal: Soft. Bowel sounds are normal. Exhibits no distension and no mass. There is no tenderness.   Musculoskeletal: Normal range of motion. Exhibits no tenderness.   Neurological: Alert and oriented to person, place, and time.   Skin: Skin is warm and dry.   Psychiatric: Has a normal mood and affect. Behavior is normal. Judgment and thought content normal.       /76   Pulse 118   Temp 99.1 °F (37.3 °C)   Ht 160 cm (62.99\")   Wt 89.1 kg (196 lb 6.4 oz)   SpO2 99%   BMI 34.80 kg/m²     Nursing note and vitals reviewed.        Assessment/Plan   Marilu was seen today for earache and jaw pain.    Diagnoses and all orders for this visit:    Infected dental carries  -     cephalexin (KEFLEX) 500 MG capsule; Take 1 capsule by mouth 3 (Three) Times a Day for 10 days.    Jaw pain  -     cephalexin (KEFLEX) 500 MG capsule; Take 1 capsule by mouth 3 (Three) Times a Day for 10 days.      She is unable to see an oral surgeon at  until August. Our referrals coordinators are working on finding an alternate location where she can be seen sooner.            "

## 2018-05-08 LAB
BH CV ECHO MEAS - % IVS THICK: 33.3 %
BH CV ECHO MEAS - % LVPW THICK: 33.3 %
BH CV ECHO MEAS - AO MAX PG (FULL): 5.1 MMHG
BH CV ECHO MEAS - AO MAX PG: 11 MMHG
BH CV ECHO MEAS - AO MEAN PG (FULL): 2 MMHG
BH CV ECHO MEAS - AO MEAN PG: 5 MMHG
BH CV ECHO MEAS - AO ROOT AREA: 4.5 CM^2
BH CV ECHO MEAS - AO ROOT DIAM: 2.4 CM
BH CV ECHO MEAS - AO V2 MAX: 168.5 CM/SEC
BH CV ECHO MEAS - AO V2 MEAN: 103 CM/SEC
BH CV ECHO MEAS - AO V2 VTI: 25.2 CM
BH CV ECHO MEAS - AVA(I,A): 2.3 CM^2
BH CV ECHO MEAS - AVA(I,D): 2.3 CM^2
BH CV ECHO MEAS - AVA(V,A): 2.3 CM^2
BH CV ECHO MEAS - AVA(V,D): 2.3 CM^2
BH CV ECHO MEAS - CONTRAST EF 4CH: 61.9 ML/M^2
BH CV ECHO MEAS - EDV(CUBED): 171 ML
BH CV ECHO MEAS - EDV(MOD-SP4): 134 ML
BH CV ECHO MEAS - EDV(TEICH): 150.5 ML
BH CV ECHO MEAS - EF(CUBED): 73.8 %
BH CV ECHO MEAS - EF(MOD-SP4): 61.9 %
BH CV ECHO MEAS - EF(TEICH): 65 %
BH CV ECHO MEAS - ESV(CUBED): 44.7 ML
BH CV ECHO MEAS - ESV(MOD-SP4): 51 ML
BH CV ECHO MEAS - ESV(TEICH): 52.6 ML
BH CV ECHO MEAS - FS: 36 %
BH CV ECHO MEAS - IVS/LVPW: 1.5
BH CV ECHO MEAS - IVSD: 1 CM
BH CV ECHO MEAS - IVSS: 1.8 CM
BH CV ECHO MEAS - LA DIMENSION: 3.1 CM
BH CV ECHO MEAS - LA/AO: 1.3
BH CV ECHO MEAS - LV IVRT: 0.09 SEC
BH CV ECHO MEAS - LV MASS(C)D: 253.2 GRAMS
BH CV ECHO MEAS - LV MASS(C)S: 197.2 GRAMS
BH CV ECHO MEAS - LV MAX PG: 5.9 MMHG
BH CV ECHO MEAS - LV MEAN PG: 3 MMHG
BH CV ECHO MEAS - LV V1 MAX: 121.5 CM/SEC
BH CV ECHO MEAS - LV V1 MEAN: 73.9 CM/SEC
BH CV ECHO MEAS - LV V1 VTI: 18.8 CM
BH CV ECHO MEAS - LVIDD: 5.6 CM
BH CV ECHO MEAS - LVIDS: 3.6 CM
BH CV ECHO MEAS - LVLD AP4: 8.5 CM
BH CV ECHO MEAS - LVLS AP4: 6.8 CM
BH CV ECHO MEAS - LVOT AREA (M): 3.1 CM^2
BH CV ECHO MEAS - LVOT AREA: 3.1 CM^2
BH CV ECHO MEAS - LVOT DIAM: 2 CM
BH CV ECHO MEAS - LVPWD: 0.9 CM
BH CV ECHO MEAS - LVPWS: 1.2 CM
BH CV ECHO MEAS - MV A MAX VEL: 72.2 CM/SEC
BH CV ECHO MEAS - MV DEC SLOPE: 632.5 CM/SEC^2
BH CV ECHO MEAS - MV DEC TIME: 0.18 SEC
BH CV ECHO MEAS - MV E MAX VEL: 84.6 CM/SEC
BH CV ECHO MEAS - MV E/A: 1.2
BH CV ECHO MEAS - MV P1/2T MAX VEL: 88 CM/SEC
BH CV ECHO MEAS - MV P1/2T: 40.8 MSEC
BH CV ECHO MEAS - MVA P1/2T LCG: 2.5 CM^2
BH CV ECHO MEAS - MVA(P1/2T): 5.4 CM^2
BH CV ECHO MEAS - PA MAX PG: 4 MMHG
BH CV ECHO MEAS - PA V2 MAX: 99.7 CM/SEC
BH CV ECHO MEAS - RAP SYSTOLE: 10 MMHG
BH CV ECHO MEAS - RVSP: 20 MMHG
BH CV ECHO MEAS - SV(AO): 114 ML
BH CV ECHO MEAS - SV(CUBED): 126.2 ML
BH CV ECHO MEAS - SV(LVOT): 59.1 ML
BH CV ECHO MEAS - SV(MOD-SP4): 83 ML
BH CV ECHO MEAS - SV(TEICH): 97.9 ML
BH CV ECHO MEAS - TR MAX VEL: 155 CM/SEC
BH CV ECHO MEAS - TV MAX PG: 0.95 MMHG
BH CV ECHO MEAS - TV V2 MAX: 48.8 CM/SEC
LV EF 2D ECHO EST: 65 %

## 2018-05-11 RX ORDER — ATORVASTATIN CALCIUM 80 MG/1
TABLET, FILM COATED ORAL
Qty: 90 TABLET | Refills: 3 | Status: SHIPPED | OUTPATIENT
Start: 2018-05-11 | End: 2018-05-14

## 2018-05-14 ENCOUNTER — OFFICE VISIT (OUTPATIENT)
Dept: INTERNAL MEDICINE | Facility: CLINIC | Age: 28
End: 2018-05-14

## 2018-05-14 ENCOUNTER — OFFICE VISIT (OUTPATIENT)
Dept: CARDIOLOGY | Facility: CLINIC | Age: 28
End: 2018-05-14

## 2018-05-14 ENCOUNTER — TELEPHONE (OUTPATIENT)
Dept: OBSTETRICS AND GYNECOLOGY | Facility: CLINIC | Age: 28
End: 2018-05-14

## 2018-05-14 VITALS
OXYGEN SATURATION: 98 % | HEIGHT: 63 IN | WEIGHT: 194 LBS | HEART RATE: 97 BPM | BODY MASS INDEX: 34.38 KG/M2 | DIASTOLIC BLOOD PRESSURE: 72 MMHG | SYSTOLIC BLOOD PRESSURE: 124 MMHG | TEMPERATURE: 97.9 F

## 2018-05-14 VITALS
DIASTOLIC BLOOD PRESSURE: 62 MMHG | SYSTOLIC BLOOD PRESSURE: 118 MMHG | HEIGHT: 62 IN | OXYGEN SATURATION: 98 % | RESPIRATION RATE: 16 BRPM | BODY MASS INDEX: 35.51 KG/M2 | WEIGHT: 193 LBS

## 2018-05-14 DIAGNOSIS — Z32.01 POSITIVE PREGNANCY TEST: Primary | ICD-10-CM

## 2018-05-14 DIAGNOSIS — Z86.711 HISTORY OF PULMONARY EMBOLUS (PE): ICD-10-CM

## 2018-05-14 DIAGNOSIS — R00.0 INAPPROPRIATE SINUS TACHYCARDIA: ICD-10-CM

## 2018-05-14 DIAGNOSIS — R00.2 PALPITATIONS: ICD-10-CM

## 2018-05-14 DIAGNOSIS — I47.1 SVT (SUPRAVENTRICULAR TACHYCARDIA) (HCC): ICD-10-CM

## 2018-05-14 DIAGNOSIS — D68.51 HETEROZYGOUS FACTOR V LEIDEN MUTATION (HCC): ICD-10-CM

## 2018-05-14 DIAGNOSIS — R07.2 PRECORDIAL PAIN: Primary | ICD-10-CM

## 2018-05-14 DIAGNOSIS — R09.89 LABILE HYPERTENSION: ICD-10-CM

## 2018-05-14 LAB
B-HCG UR QL: POSITIVE
HCG INTACT+B SERPL-ACNC: 67.81 MIU/ML
INTERNAL NEGATIVE CONTROL: NEGATIVE
INTERNAL POSITIVE CONTROL: POSITIVE
Lab: ABNORMAL

## 2018-05-14 PROCEDURE — 99213 OFFICE O/P EST LOW 20 MIN: CPT | Performed by: INTERNAL MEDICINE

## 2018-05-14 PROCEDURE — 81025 URINE PREGNANCY TEST: CPT | Performed by: PHYSICIAN ASSISTANT

## 2018-05-14 PROCEDURE — 99214 OFFICE O/P EST MOD 30 MIN: CPT | Performed by: PHYSICIAN ASSISTANT

## 2018-05-14 NOTE — PROGRESS NOTES
Subjective:     Encounter Date:05/14/2018      Patient ID: Marilu Godoy is a 27 y.o. female.    Chief Complaint:Chest pain, palpitations, shortness of breath and edema  HPI  This is a 27-year-old female patient who has been experiencing palpitations chest discomfort and shortness of breath for the last several years.  She has previously undergone a heart catheterization showing no significant coronary artery disease.  The patient recently underwent a treadmill exercise stress test which was clinically and electrocardiographically negative for ischemia.  The patient exercise to target heart rate without exertional chest arm neck jaw shoulder or back discomfort.  There was no exercise-induced arrhythmia or ischemic EKG changes.  Heart rate and blood pressure response to exercise was normal.  The patient also underwent an echocardiogram which showed normal cardiac chamber dimensions muscle thickness.  Left ventricular systolic and diastolic function was normal.  The ejection fraction was normal and there were no regional wall motion abnormalities.  There was no valvular abnormalities of significance, pericardial disease, pulmonary hypertension or evidence of intracardiac shunting.  The patient indicates that she is recently discovered to be pregnant.  She has stopped all of her cardiac medications.  She has stopped her enteric-coated baby aspirin as well as a request.  She will not be a candidate for antiarrhythmic drug therapy while she is pregnant.  She is off both Florinef as well as midodrine.  She continues to have palpitations which she describes as a sense that her heart is racing and beating rapidly.  Her blood pressure tends to be labile.  She has had no syncopal episodes.  Her chest discomfort and shortness of breath remains unchanged compared to what was described at her previous visit.  She is a nonsmoker.  The following portions of the patient's history were reviewed and updated as appropriate:  allergies, current medications, past family history, past medical history, past social history, past surgical history and problem  Review of Systems   Constitution: Negative for chills, diaphoresis, fever, weakness, malaise/fatigue, night sweats, weight gain and weight loss.   HENT: Negative for ear discharge, hearing loss, hoarse voice and nosebleeds.    Eyes: Negative for discharge, double vision, pain and photophobia.   Cardiovascular: Positive for chest pain, dyspnea on exertion, leg swelling and palpitations. Negative for claudication, cyanosis, irregular heartbeat, near-syncope, orthopnea, paroxysmal nocturnal dyspnea and syncope.   Respiratory: Positive for shortness of breath. Negative for cough, hemoptysis, sputum production and wheezing.    Endocrine: Negative for cold intolerance, heat intolerance, polydipsia, polyphagia and polyuria.   Hematologic/Lymphatic: Negative for adenopathy and bleeding problem. Does not bruise/bleed easily.   Skin: Negative for color change, flushing, itching and rash.   Musculoskeletal: Negative for muscle cramps, muscle weakness, myalgias and stiffness.   Gastrointestinal: Negative for abdominal pain, diarrhea, hematemesis, hematochezia, nausea and vomiting.   Genitourinary: Negative for dysuria, frequency and nocturia.   Neurological: Negative for focal weakness, loss of balance, numbness, paresthesias and seizures.   Psychiatric/Behavioral: Negative for altered mental status, hallucinations and suicidal ideas.   Allergic/Immunologic: Negative for HIV exposure, hives and persistent infections.           Current Outpatient Prescriptions:   •  albuterol (PROVENTIL HFA;VENTOLIN HFA) 108 (90 BASE) MCG/ACT inhaler, Inhale 2 puffs Every 6 (Six) Hours As Needed for Wheezing or Shortness of Air., Disp: 1 inhaler, Rfl: 3  •  albuterol (PROVENTIL) (2.5 MG/3ML) 0.083% nebulizer solution, Take 2.5 mg by nebulization Every 4 (Four) Hours As Needed for Wheezing or Shortness of Air., Disp:  60 vial, Rfl: 3  •  azelastine (ASTELIN) 0.1 % nasal spray, 1 spray into each nostril 2 (Two) Times a Day As Needed for Rhinitis or Allergies. Use in each nostril as directed, Disp: 1 each, Rfl: 5  •  cyclobenzaprine (FLEXERIL) 5 MG tablet, Take 1 tablet by mouth 3 (Three) Times a Day As Needed for Muscle Spasms., Disp: 20 tablet, Rfl: 0  •  DULoxetine (CYMBALTA) 30 MG capsule, Take 1 capsule by mouth Daily. (Patient taking differently: Take 60 mg by mouth Daily.), Disp: 30 capsule, Rfl: 5  •  flunisolide (NASALIDE) 25 MCG/ACT (0.025%) solution nasal spray, Inhale 1 spray Every 12 (Twelve) Hours., Disp: 1 bottle, Rfl: 5  •  Fluticasone Furoate-Vilanterol (BREO ELLIPTA) 100-25 MCG/INH aerosol powder , Inhale 1 puff Daily., Disp: , Rfl:   •  ibuprofen (ADVIL,MOTRIN) 600 MG tablet, Take 1 tablet by mouth Every 6 (Six) Hours As Needed for Moderate Pain ., Disp: 30 tablet, Rfl: 1  •  lactulose (CHRONULAC) 10 GM/15ML solution, Take 30 mL by mouth 2 (Two) Times a Day As Needed (constipation)., Disp: 500 mL, Rfl: 0  •  LORazepam (ATIVAN) 0.5 MG tablet, Take 1 tablet by mouth Every 8 (Eight) Hours As Needed for Anxiety., Disp: 9 tablet, Rfl: 0  •  montelukast (SINGULAIR) 10 MG tablet, TAKE 1 TABLET BY MOUTH IN THE EVENING , Disp: 30 tablet, Rfl: 2  •  ondansetron ODT (ZOFRAN-ODT) 4 MG disintegrating tablet, Take 1 tablet by mouth Every 6 (Six) Hours As Needed for Nausea or Vomiting., Disp: 12 tablet, Rfl: 0  •  pantoprazole (PROTONIX) 40 MG EC tablet, TAKE 1 TABLET BY MOUTH ONE TIME A DAY , Disp: 30 tablet, Rfl: 4  •  promethazine (PHENERGAN) 25 MG tablet, Take 1 tablet by mouth Every 6 (Six) Hours As Needed for Nausea or Vomiting., Disp: 30 tablet, Rfl: 0  •  sulfaSALAzine (AZULFIDINE) 500 MG tablet, Take 1 tablet by mouth 2 (Two) Times a Day., Disp: 60 tablet, Rfl: 1  •  tiZANidine (ZANAFLEX) 4 MG tablet, Take 1 tablet by mouth Every 8 (Eight) Hours As Needed for Muscle Spasms., Disp: 30 tablet, Rfl: 3  •  Triamcinolone  "Acetonide (NASACORT) 55 MCG/ACT nasal inhaler, 2 sprays into each nostril Daily., Disp: , Rfl:      Objective:     Physical Exam   Constitutional: She is oriented to person, place, and time. She appears well-developed and well-nourished.   HENT:   Head: Normocephalic and atraumatic.   Mouth/Throat: Oropharynx is clear and moist.   Eyes: Conjunctivae and EOM are normal. Pupils are equal, round, and reactive to light. No scleral icterus.   Neck: Normal range of motion. Neck supple. No JVD present. No tracheal deviation present. No thyromegaly present.   Cardiovascular: Normal rate, regular rhythm, S1 normal, S2 normal, normal heart sounds, intact distal pulses and normal pulses.  PMI is not displaced.  Exam reveals no gallop and no friction rub.    No murmur heard.  Pulmonary/Chest: Effort normal and breath sounds normal. No respiratory distress. She has no wheezes. She has no rales.   Abdominal: Soft. Bowel sounds are normal. She exhibits no distension and no mass. There is no tenderness. There is no rebound and no guarding.   Musculoskeletal: Normal range of motion. She exhibits no edema or deformity.   Neurological: She is alert and oriented to person, place, and time. She displays normal reflexes. No cranial nerve deficit. Coordination normal.   Skin: Skin is warm and dry. No rash noted. No erythema.   Psychiatric: She has a normal mood and affect. Her behavior is normal. Thought content normal.     Blood pressure 118/62, resp. rate 16, height 157.5 cm (62\"), weight 87.5 kg (193 lb), SpO2 98 %, not currently breastfeeding.   Lab Review:       Assessment:         1. Precordial pain  Noncardiac chest pain.    2. History of pulmonary embolus (PE)  The patient has a history of hypercoagulable state.  She is no longer a candidate for direct oral anticoagulation therapy.  She may require Coumadin versus low molecular weight heparin.  I will defer this decision to her primary care provider and/or obstetrician.    3. SVT " (supraventricular tachycardia)  No evidence of documented recurrence.    4. Heterozygous factor V Leiden mutation  In addition to this hypercoagulable state the patient also has a history of protein S deficiency.  She is at high risk of developing recurrent vein thrombosis and/or pulmonary embolus.  She is currently off of her direct oral anticoagulant.  Her management strategy for the duration of her pregnancy is outside of my area of expertise.  I suspect she will require Lovenox throughout the duration of her pregnancy.    5. Labile hypertension  Her blood pressure is stable at the time being.  She is currently off of both midodrine as well as Florinef.    6. Palpitations  No change in symptom frequency duration or intensity.    7. Inappropriate sinus tachycardia  This appears to be her primary culprit at the present time.  She has previously been evaluated by the electrophysiologist in [facility] but has not yet had a follow-up appointment.  Her recent diagnosis of pregnancy will postpone any attempt at ablation.    Procedures     Plan:       No changes in her medication therapy have been made at today's visit.  A decision will have to be made regarding the optimal modality for her anticoagulation therapy.  She may require the input from a high risk obstetrician and/or a hematologist.  This is outside of my area of expertise and I do not feel comfortable making recommendations.  I will defer this back to her primary care provider.  No additional cardiovascular testing is indicated from my standpoint.  The patient has been advised that her heart rate will tend to accelerate as her pregnancy progresses.  I suspect she will have ongoing issues with labile blood pressures.

## 2018-05-14 NOTE — TELEPHONE ENCOUNTER
----- Message from Marilu Godoy sent at 5/14/2018 11:21 AM EDT -----  Regarding: Prescription Question  Contact: [phone]  My family doctor wanted me to check and see if I should continue my eliquis blood thinner or if it has to be changed now that I'm pregnant? Also can you look thru my medicine list and let me know which ones are safe to take and which need to stopped. I haven't took any medicine in about a week because I didnt want to risk causing the baby any harm.      Marilu Godoy

## 2018-05-14 NOTE — PROGRESS NOTES
Subjective     Chief Complaint: positive pregnancy test    History of Present Illness     Marilu Godoy is a 27 y.o. female presenting to follow up on several home pregnancy tests that were positive.  She believes the first day of her last period was 4/8/18, has not had another period since.  Patient states she's been very nauseous and vomiting at times.  Feels more tired than normal.  Patient is on multiple medications due to extensive history.  She has stopped all of her medications over the last week because of the positive home pregnancy tests.  She has also stopped Eliquis, patient has a history of protein S deficiency, factor V Leiden mutation. Denies any LE edema, SOA, palpitations.     Patient has had 2 miscarriages in the past, she believes due to the clotting disorders that were undiagnosed at the time.    The following portions of the patient's history were reviewed and updated as appropriate: current medications, allergies, PMH.    Review of Systems   Constitutional: Positive for fatigue. Negative for appetite change, chills, fever and unexpected weight change.   HENT: Negative for congestion, ear pain, hearing loss, nosebleeds, sinus pressure, sore throat, tinnitus and trouble swallowing.    Eyes: Negative for pain, discharge, redness, itching and visual disturbance.   Respiratory: Negative for cough, chest tightness, shortness of breath and wheezing.    Cardiovascular: Negative for chest pain, palpitations and leg swelling.   Gastrointestinal: Positive for nausea and vomiting. Negative for abdominal pain, blood in stool, constipation and diarrhea.   Endocrine: Negative for cold intolerance, heat intolerance, polydipsia, polyphagia and polyuria.   Genitourinary: Negative for decreased urine volume, dysuria, flank pain, frequency and hematuria.   Musculoskeletal: Negative for arthralgias, back pain, gait problem, joint swelling, myalgias, neck pain and neck stiffness.   Skin: Negative for color  "change and rash.   Allergic/Immunologic: Negative for environmental allergies, food allergies and immunocompromised state.   Neurological: Negative for dizziness, syncope, weakness, light-headedness and headaches.   Hematological: Negative for adenopathy. Does not bruise/bleed easily.   Psychiatric/Behavioral: Negative for dysphoric mood, sleep disturbance and suicidal ideas. The patient is not nervous/anxious.      Objective     Vitals:    05/14/18 0843   BP: 124/72   Pulse: 97   Temp: 97.9 °F (36.6 °C)   SpO2: 98%   Weight: 88 kg (194 lb)   Height: 160 cm (62.99\")     Physical Exam   Constitutional: Appears well-developed and well-nourished. VSS.  Cardiovascular: Normal rate, regular rhythm and normal heart sounds.    Pulmonary/Chest: Effort normal and breath sounds normal.   Abdominal: Soft. Bowel sounds are normal.   Musculoskeletal: Normal range of motion. No lower extremity edema noted.  Lymphadenopathy: No cervical adenopathy noted.   Neurological: Alert and oriented to person, place, and time.   Skin: Skin is warm and dry.   Psychiatric: Exhibits a normal mood and affect.     Assessment/Plan     Diagnoses and all orders for this visit:    Positive pregnancy test  -     POCT pregnancy, urine  -     HCG, B-subunit, Quantitative  -     Ambulatory Referral to Obstetrics / Gynecology    Patient encouraged to call OB regarding use of her Eliquis- I am worried about her being off of this with her history of clotting disorders. She has an appt with them on 5/22/ 18. I have also told her to contact Dr. Calderon's office. May be a safer option such as Lovenox? She does have an appointment with Dr. Byrnes this afternoon, encouraged patient to speak with him about which her medications may need adjusted due to pregnancy.     Patient to stay off of all other medications for the meantime until deemed safe in pregnancy. She verbalized understanding.    Jenniffer Berger PA-C  05/14/2018         Please note that portions of " this note were completed with a voice recognition program. Efforts were made to edit dictation, but occasionally words are mistranscribed.

## 2018-05-15 ENCOUNTER — HOSPITAL ENCOUNTER (EMERGENCY)
Facility: HOSPITAL | Age: 28
Discharge: HOME | End: 2018-05-16
Attending: EMERGENCY MEDICINE | Admitting: EMERGENCY MEDICINE

## 2018-05-15 VITALS
DIASTOLIC BLOOD PRESSURE: 72 MMHG | BODY MASS INDEX: 33.95 KG/M2 | WEIGHT: 191.6 LBS | OXYGEN SATURATION: 100 % | RESPIRATION RATE: 20 BRPM | SYSTOLIC BLOOD PRESSURE: 113 MMHG | TEMPERATURE: 98.9 F | HEIGHT: 63 IN | HEART RATE: 95 BPM

## 2018-05-15 DIAGNOSIS — Z3A.01 LESS THAN 8 WEEKS GESTATION OF PREGNANCY: Primary | ICD-10-CM

## 2018-05-15 DIAGNOSIS — R19.7 DIARRHEA, UNSPECIFIED TYPE: ICD-10-CM

## 2018-05-15 LAB
ALBUMIN SERPL-MCNC: 4.2 G/DL (ref 3.5–5)
ALBUMIN/GLOB SERPL: 1.2 G/DL (ref 1–2)
ALP SERPL-CCNC: 77 U/L (ref 38–126)
ALT SERPL W P-5'-P-CCNC: 18 U/L (ref 13–69)
ANION GAP SERPL CALCULATED.3IONS-SCNC: 13.6 MMOL/L (ref 10–20)
AST SERPL-CCNC: 18 U/L (ref 15–46)
BASOPHILS # BLD AUTO: 0.03 10*3/MM3 (ref 0–0.2)
BASOPHILS NFR BLD AUTO: 0.3 % (ref 0–2.5)
BILIRUB SERPL-MCNC: 0.8 MG/DL (ref 0.2–1.3)
BILIRUB UR QL STRIP: NEGATIVE
BUN BLD-MCNC: 6 MG/DL (ref 7–20)
BUN/CREAT SERPL: 8.6 (ref 7.1–23.5)
CALCIUM SPEC-SCNC: 9.3 MG/DL (ref 8.4–10.2)
CHLORIDE SERPL-SCNC: 105 MMOL/L (ref 98–107)
CLARITY UR: CLEAR
CO2 SERPL-SCNC: 26 MMOL/L (ref 26–30)
COLOR UR: YELLOW
CREAT BLD-MCNC: 0.7 MG/DL (ref 0.6–1.3)
DEPRECATED RDW RBC AUTO: 40.5 FL (ref 37–54)
EOSINOPHIL # BLD AUTO: 0.34 10*3/MM3 (ref 0–0.7)
EOSINOPHIL NFR BLD AUTO: 3.2 % (ref 0–7)
ERYTHROCYTE [DISTWIDTH] IN BLOOD BY AUTOMATED COUNT: 12.8 % (ref 11.5–14.5)
GFR SERPL CREATININE-BSD FRML MDRD: 100 ML/MIN/1.73
GLOBULIN UR ELPH-MCNC: 3.4 GM/DL
GLUCOSE BLD-MCNC: 98 MG/DL (ref 74–98)
GLUCOSE UR STRIP-MCNC: NEGATIVE MG/DL
HCG INTACT+B SERPL-ACNC: 190.17 MIU/ML
HCT VFR BLD AUTO: 36.8 % (ref 37–47)
HGB BLD-MCNC: 12.9 G/DL (ref 12–16)
HGB UR QL STRIP.AUTO: NEGATIVE
IMM GRANULOCYTES # BLD: 0.02 10*3/MM3 (ref 0–0.06)
IMM GRANULOCYTES NFR BLD: 0.2 % (ref 0–0.6)
KETONES UR QL STRIP: NEGATIVE
LEUKOCYTE ESTERASE UR QL STRIP.AUTO: NEGATIVE
LIPASE SERPL-CCNC: 46 U/L (ref 23–300)
LYMPHOCYTES # BLD AUTO: 1.96 10*3/MM3 (ref 0.6–3.4)
LYMPHOCYTES NFR BLD AUTO: 18.4 % (ref 10–50)
MAGNESIUM SERPL-MCNC: 2.1 MG/DL (ref 1.6–2.3)
MCH RBC QN AUTO: 30.8 PG (ref 27–31)
MCHC RBC AUTO-ENTMCNC: 35.1 G/DL (ref 30–37)
MCV RBC AUTO: 87.8 FL (ref 81–99)
MONOCYTES # BLD AUTO: 0.51 10*3/MM3 (ref 0–0.9)
MONOCYTES NFR BLD AUTO: 4.8 % (ref 0–12)
NEUTROPHILS # BLD AUTO: 7.78 10*3/MM3 (ref 2–6.9)
NEUTROPHILS NFR BLD AUTO: 73.1 % (ref 37–80)
NITRITE UR QL STRIP: NEGATIVE
NRBC BLD MANUAL-RTO: 0 /100 WBC (ref 0–0)
PH UR STRIP.AUTO: 7 [PH] (ref 5–8)
PLATELET # BLD AUTO: 307 10*3/MM3 (ref 130–400)
PMV BLD AUTO: 9.3 FL (ref 6–12)
POTASSIUM BLD-SCNC: 3.6 MMOL/L (ref 3.5–5.1)
PROT SERPL-MCNC: 7.6 G/DL (ref 6.3–8.2)
PROT UR QL STRIP: NEGATIVE
RBC # BLD AUTO: 4.19 10*6/MM3 (ref 4.2–5.4)
SODIUM BLD-SCNC: 141 MMOL/L (ref 137–145)
SP GR UR STRIP: 1.02 (ref 1–1.03)
TSH SERPL DL<=0.05 MIU/L-ACNC: 1.05 MIU/ML (ref 0.47–4.68)
UROBILINOGEN UR QL STRIP: NORMAL
WBC NRBC COR # BLD: 10.64 10*3/MM3 (ref 4.8–10.8)

## 2018-05-15 PROCEDURE — 81003 URINALYSIS AUTO W/O SCOPE: CPT | Performed by: PHYSICIAN ASSISTANT

## 2018-05-15 PROCEDURE — 84443 ASSAY THYROID STIM HORMONE: CPT | Performed by: PHYSICIAN ASSISTANT

## 2018-05-15 PROCEDURE — 83690 ASSAY OF LIPASE: CPT | Performed by: PHYSICIAN ASSISTANT

## 2018-05-15 PROCEDURE — 80053 COMPREHEN METABOLIC PANEL: CPT | Performed by: PHYSICIAN ASSISTANT

## 2018-05-15 PROCEDURE — 85025 COMPLETE CBC W/AUTO DIFF WBC: CPT | Performed by: PHYSICIAN ASSISTANT

## 2018-05-15 PROCEDURE — 83735 ASSAY OF MAGNESIUM: CPT | Performed by: PHYSICIAN ASSISTANT

## 2018-05-15 PROCEDURE — 99283 EMERGENCY DEPT VISIT LOW MDM: CPT

## 2018-05-15 PROCEDURE — 84702 CHORIONIC GONADOTROPIN TEST: CPT | Performed by: PHYSICIAN ASSISTANT

## 2018-05-15 RX ORDER — PROMETHAZINE HYDROCHLORIDE 25 MG/1
25 TABLET ORAL EVERY 6 HOURS PRN
Qty: 12 TABLET | Refills: 0 | Status: SHIPPED | OUTPATIENT
Start: 2018-05-15 | End: 2018-06-13

## 2018-05-15 RX ORDER — SODIUM CHLORIDE 0.9 % (FLUSH) 0.9 %
10 SYRINGE (ML) INJECTION AS NEEDED
Status: DISCONTINUED | OUTPATIENT
Start: 2018-05-15 | End: 2018-05-15

## 2018-05-15 RX ADMIN — SODIUM CHLORIDE 1000 ML: 9 INJECTION, SOLUTION INTRAVENOUS at 21:45

## 2018-05-15 NOTE — TELEPHONE ENCOUNTER
Spoke with patient and informed, also sent message to patient through my chart to discuss with PCP>

## 2018-05-15 NOTE — PROGRESS NOTES
We discussed how OBs aren't really using zofran and phenergan anymore, have moved toward diclegis. With her multiple health problems I would much prefer OB make that call when she sees them later this week, just to make sure it is safe for her to use. I will check with Dr. Barker regarding other options but I do not feel comfortable with that at this time.

## 2018-05-15 NOTE — TELEPHONE ENCOUNTER
She will need to stop Eliquis. It will need to be changed to Lovenox. Stop Ibuprofen, Azulfidine. She needs to check with PCP regarding which meds are safe to stop and what are alternative drugs she can use for her conditions.

## 2018-05-16 DIAGNOSIS — Z3A.01 LESS THAN 8 WEEKS GESTATION OF PREGNANCY: ICD-10-CM

## 2018-05-16 DIAGNOSIS — D68.59 PROTEIN S DEFICIENCY (HCC): Primary | ICD-10-CM

## 2018-05-16 RX ORDER — FLUNISOLIDE 0.25 MG/ML
2 SOLUTION NASAL EVERY 12 HOURS
Qty: 1 BOTTLE | Refills: 3 | Status: SHIPPED | OUTPATIENT
Start: 2018-05-16 | End: 2019-04-22

## 2018-05-16 RX ORDER — RANITIDINE 150 MG/1
150 TABLET ORAL AS NEEDED
Qty: 30 TABLET | Refills: 3 | Status: SHIPPED | OUTPATIENT
Start: 2018-05-16 | End: 2018-12-04

## 2018-05-16 NOTE — DISCHARGE INSTRUCTIONS
Please make sure you  follow-up with your family physician within the next few days and also with the OB/GYN-nurse midwife next Tuesday as scheduled.  Return to the ER anytime if symptoms change or worsen significantly.  Also, follow up with the cardiologist as scheduled

## 2018-05-16 NOTE — ED NOTES
Called Dr. Zamudio. Call was transferred @ this time to STEVE Larios.      Katie Teeers  05/15/18 6579

## 2018-05-16 NOTE — TELEPHONE ENCOUNTER
I talked with Marilu. PCP Jenniffer Berger sent in Rx for Lovenox 80 mg SC BID. She will continue that until seen in office next week.

## 2018-05-16 NOTE — ED PROVIDER NOTES
Subjective   The patient is a 27-year-old female,  5 para 2 AB 2,  presenting to the ER [facility] complaining of diffuse lower abdominal crampy pain as well as multiple episodes of diarrhea which started earlier today.  She also tells me that she recently found out she was pregnant by home pregnancy tests and one in the office of her PCP yesterday.  The patient has a very complicated past medical history including but not limited to hypercoagulable state, protein S deficiency and factor V Leiden mutation.  She was seen in her PCPs office yesterday and did have a home pregnancy test.  She had told the physician assistant that when she found out she was pregnant by home pregnancy tests this past week she stopped all of her cardiac meds including Eliquis.  She also saw her cardiologist yesterday, , and he noted that her blood pressure would certainly be labile during her pregnancy and she would tend to have more problems with tachycardia as well.  Given her complicated history and now, current pregnancy, the cardiologist said he would defer anticoagulation to her OB physician or even hematology.    The patient tells me that she developed the diarrhea and diffuse crampy abdominal pain today and didn't have these symptoms yesterday.  She's having some nausea but no vomiting.  He denies any vaginal bleeding, discharge, UTI symptoms, fever, chills or blood with the diarrhea.  Patient tells me she believes the first day of her last menstrual period was approximately  through April 15, 2018            Review of Systems   All other systems reviewed and are negative.      Past Medical History:   Diagnosis Date   • Abdominal pain    • Anxiety    • Asthma    • Body piercing     TONGUE, NOSE, TWO IN EACH EAR   • Chest pain    • Clotting disorder     factor 5   • Constipation    • Coronary artery disease involving native coronary artery of native heart without angina pectoris 2017   • Diarrhea    •  Diarrhea    • Factor 5 Leiden mutation, heterozygous    • Gallstone    • GERD (gastroesophageal reflux disease)    • H/O blood clots    • Headache    • Hiatal hernia    • High cholesterol    • History of recurrent UTIs    • Hypertension    • Kidney infection    • Nausea    • Nausea & vomiting    • Obesity    • Orthostatic hypotension    • Ovarian cyst    • Pollen allergies    • Protein S deficiency 2016    labs from hospitalization for PE   • Pulmonary embolism    • Sleep apnea     CPAP ASKED TO BRING DOS   • Subclinical hyperthyroidism    • Tachycardia    • Tattoo     LOWER BACK       Allergies   Allergen Reactions   • Amoxicillin Rash       Past Surgical History:   Procedure Laterality Date   •  SECTION      X2  and    • CHOLECYSTECTOMY     • COLONOSCOPY N/A 2017    Procedure: COLONOSCOPY WITH BIOPSIES AND ARGON THERMAL ABLATION;  Surgeon: Jignesh Selby MD;  Location: [facility] ENDOSCOPY;  Service:    • DIAGNOSTIC LAPAROSCOPY N/A 2018    Procedure: DIAGNOSTIC LAPAROSCOPY AND ABLATION OF ENDOMETRIOSIS;  Surgeon: Evin Zamudio MD;  Location: [facility] OR;  Service: Obstetrics/Gynecology   • ENDOSCOPY N/A 2017    Procedure: ESOPHAGOGASTRODUODENOSCOPY WITH BIOPSIES AND COLD BIOPSY POLYPECTOMIES;  Surgeon: Jignesh Selby MD;  Location: [facility] ENDOSCOPY;  Service:        Family History   Problem Relation Age of Onset   • Arthritis Mother    • COPD Mother    • Asthma Mother    • Thyroid disease Mother    • Arthritis Father    • Diabetes Father    • Hypertension Father    • Hyperlipidemia Father    • Kidney disease Father    • Heart attack Father    • No Known Problems Son    • Colon cancer Neg Hx    • Liver cancer Neg Hx    • Liver disease Neg Hx    • Stomach cancer Neg Hx    • Esophageal cancer Neg Hx        Social History     Social History   • Marital status:      Social History Main Topics   • Smoking status: Never Smoker   • Smokeless tobacco: Never Used   •  Alcohol use No   • Drug use: No   • Sexual activity: Defer      Comment: PE while on birth control patch     Other Topics Concern   • Not on file           Objective   Physical Exam   Constitutional: She appears well-developed and well-nourished. No distress.   27-year-old  female who does not appear to be in distress or acutely ill   HENT:   Head: Normocephalic and atraumatic.   Nose: Nose normal.   Mouth/Throat: Oropharynx is clear and moist.   Eyes: Conjunctivae and EOM are normal. Right eye exhibits no discharge. Left eye exhibits no discharge. No scleral icterus.   Neck: Normal range of motion.   Cardiovascular: Normal rate, regular rhythm, normal heart sounds and intact distal pulses.    No murmur heard.  Pulmonary/Chest: Effort normal and breath sounds normal. No respiratory distress. She has no wheezes. She has no rales.   Abdominal: Soft. Bowel sounds are normal. She exhibits no mass. There is no rebound and no guarding. No hernia.   Mild tenderness bilateral lower abdomen   Musculoskeletal: Normal range of motion. She exhibits no edema.   Neurological: She is alert. No cranial nerve deficit. She exhibits normal muscle tone. Coordination normal.   Skin: Skin is warm. Capillary refill takes less than 2 seconds. No rash noted. She is not diaphoretic.   Psychiatric: She has a normal mood and affect. Her behavior is normal. Thought content normal.   Vitals reviewed.      Procedures           ED Course  ED Course   Comment By Time   Patient's lab studies here tonight are very reassuring.  Quantitative hCG yesterday was 67, today 190.17 CBC and CMP showed no significant abnormalities lipase is normal as well magnesium normal. Ric Taylor PA-C 05/15 8242   Patient has received fluid boluses and has had no vomiting or diarrhea her entire ED stay.  She has been resting comfortably and at no time did she seem to be in pain or any distress.  Her kids have slept in the bed with her the entire ED stay.  I  spoke to the OB on call Dr. Robb vergara, regarding selection of antiemetic.  He indicated Phenergan or Reglan would be appropriate.  I'm going to have the patient follow back with her PCP for guidance regarding further treatment during her pregnancy. Ric Taylor PA-C 05/15 4979   In order to complete the medication reconciliation for this chart the electronic medical record forces me to choose modify, discontinue or continue to complete the chart.  The patient understands that I have not adjusted or advised her to change any of her existing home medications.  This is up to her PCP, cardiologist and OB/GYN.  The medication reconciliation on this chart does show that I have told her to discontinue everything which is not accurate.  This simply is stated this way in order to complete the chart. Ric Taylor PA-C 05/15 2346   The patient also understands that she needs to have extremely close follow-up with her PCP, our urologist and OB/GYN due to her hypercoagulable state.  He will be seeing the nurse midwife next Tuesday and she said that her PCP is trying to get her in with high risk OB as soon as possible. Ric Taylor PA-C 05/15 2350                  [facility]      Final diagnoses:   Less than 8 weeks gestation of pregnancy   Diarrhea, unspecified type            Ric Taylor PA-C  05/15/18 2349       Ric Taylor PA-C  05/15/18 2356

## 2018-05-17 ENCOUNTER — HOSPITAL ENCOUNTER (EMERGENCY)
Facility: HOSPITAL | Age: 28
Discharge: HOME | End: 2018-05-18
Attending: EMERGENCY MEDICINE | Admitting: EMERGENCY MEDICINE

## 2018-05-17 ENCOUNTER — APPOINTMENT (OUTPATIENT)
Dept: ULTRASOUND IMAGING | Facility: HOSPITAL | Age: 28
End: 2018-05-17

## 2018-05-17 DIAGNOSIS — Z34.90 EARLY STAGE OF PREGNANCY: ICD-10-CM

## 2018-05-17 DIAGNOSIS — R10.9 ABDOMINAL PAIN, UNSPECIFIED ABDOMINAL LOCATION: Primary | ICD-10-CM

## 2018-05-17 LAB
ALBUMIN SERPL-MCNC: 4.2 G/DL (ref 3.5–5)
ALBUMIN/GLOB SERPL: 1.3 G/DL (ref 1–2)
ALP SERPL-CCNC: 77 U/L (ref 38–126)
ALT SERPL W P-5'-P-CCNC: 28 U/L (ref 13–69)
ANION GAP SERPL CALCULATED.3IONS-SCNC: 13.9 MMOL/L (ref 10–20)
AST SERPL-CCNC: 18 U/L (ref 15–46)
BASOPHILS # BLD AUTO: 0.03 10*3/MM3 (ref 0–0.2)
BASOPHILS NFR BLD AUTO: 0.4 % (ref 0–2.5)
BILIRUB SERPL-MCNC: 0.5 MG/DL (ref 0.2–1.3)
BILIRUB UR QL STRIP: NEGATIVE
BUN BLD-MCNC: 7 MG/DL (ref 7–20)
BUN/CREAT SERPL: 11.7 (ref 7.1–23.5)
CALCIUM SPEC-SCNC: 9.2 MG/DL (ref 8.4–10.2)
CHLORIDE SERPL-SCNC: 102 MMOL/L (ref 98–107)
CLARITY UR: ABNORMAL
CO2 SERPL-SCNC: 28 MMOL/L (ref 26–30)
COLOR UR: YELLOW
CREAT BLD-MCNC: 0.6 MG/DL (ref 0.6–1.3)
DEPRECATED RDW RBC AUTO: 41.6 FL (ref 37–54)
EOSINOPHIL # BLD AUTO: 0.38 10*3/MM3 (ref 0–0.7)
EOSINOPHIL NFR BLD AUTO: 5 % (ref 0–7)
ERYTHROCYTE [DISTWIDTH] IN BLOOD BY AUTOMATED COUNT: 12.8 % (ref 11.5–14.5)
GFR SERPL CREATININE-BSD FRML MDRD: 120 ML/MIN/1.73
GLOBULIN UR ELPH-MCNC: 3.3 GM/DL
GLUCOSE BLD-MCNC: 96 MG/DL (ref 74–98)
GLUCOSE UR STRIP-MCNC: NEGATIVE MG/DL
HCG INTACT+B SERPL-ACNC: 674.78 MIU/ML
HCT VFR BLD AUTO: 36.5 % (ref 37–47)
HGB BLD-MCNC: 12.5 G/DL (ref 12–16)
HGB UR QL STRIP.AUTO: NEGATIVE
IMM GRANULOCYTES # BLD: 0.03 10*3/MM3 (ref 0–0.06)
IMM GRANULOCYTES NFR BLD: 0.4 % (ref 0–0.6)
KETONES UR QL STRIP: NEGATIVE
LEUKOCYTE ESTERASE UR QL STRIP.AUTO: NEGATIVE
LIPASE SERPL-CCNC: 55 U/L (ref 23–300)
LYMPHOCYTES # BLD AUTO: 2.3 10*3/MM3 (ref 0.6–3.4)
LYMPHOCYTES NFR BLD AUTO: 30.5 % (ref 10–50)
MCH RBC QN AUTO: 30.5 PG (ref 27–31)
MCHC RBC AUTO-ENTMCNC: 34.2 G/DL (ref 30–37)
MCV RBC AUTO: 89 FL (ref 81–99)
MONOCYTES # BLD AUTO: 0.5 10*3/MM3 (ref 0–0.9)
MONOCYTES NFR BLD AUTO: 6.6 % (ref 0–12)
NEUTROPHILS # BLD AUTO: 4.31 10*3/MM3 (ref 2–6.9)
NEUTROPHILS NFR BLD AUTO: 57.1 % (ref 37–80)
NITRITE UR QL STRIP: NEGATIVE
NRBC BLD MANUAL-RTO: 0 /100 WBC (ref 0–0)
PH UR STRIP.AUTO: 6.5 [PH] (ref 5–8)
PLATELET # BLD AUTO: 301 10*3/MM3 (ref 130–400)
PMV BLD AUTO: 9.4 FL (ref 6–12)
POTASSIUM BLD-SCNC: 3.9 MMOL/L (ref 3.5–5.1)
PROT SERPL-MCNC: 7.5 G/DL (ref 6.3–8.2)
PROT UR QL STRIP: NEGATIVE
RBC # BLD AUTO: 4.1 10*6/MM3 (ref 4.2–5.4)
SODIUM BLD-SCNC: 140 MMOL/L (ref 137–145)
SP GR UR STRIP: >=1.03 (ref 1–1.03)
UROBILINOGEN UR QL STRIP: ABNORMAL
WBC NRBC COR # BLD: 7.55 10*3/MM3 (ref 4.8–10.8)

## 2018-05-17 PROCEDURE — 80053 COMPREHEN METABOLIC PANEL: CPT | Performed by: EMERGENCY MEDICINE

## 2018-05-17 PROCEDURE — 83690 ASSAY OF LIPASE: CPT | Performed by: EMERGENCY MEDICINE

## 2018-05-17 PROCEDURE — 81003 URINALYSIS AUTO W/O SCOPE: CPT | Performed by: EMERGENCY MEDICINE

## 2018-05-17 PROCEDURE — 85025 COMPLETE CBC W/AUTO DIFF WBC: CPT | Performed by: EMERGENCY MEDICINE

## 2018-05-17 PROCEDURE — 84702 CHORIONIC GONADOTROPIN TEST: CPT | Performed by: EMERGENCY MEDICINE

## 2018-05-17 PROCEDURE — 36415 COLL VENOUS BLD VENIPUNCTURE: CPT

## 2018-05-17 PROCEDURE — 76817 TRANSVAGINAL US OBSTETRIC: CPT

## 2018-05-17 PROCEDURE — 99283 EMERGENCY DEPT VISIT LOW MDM: CPT

## 2018-05-18 VITALS
BODY MASS INDEX: 34.16 KG/M2 | HEART RATE: 95 BPM | WEIGHT: 192.8 LBS | OXYGEN SATURATION: 97 % | TEMPERATURE: 98.2 F | SYSTOLIC BLOOD PRESSURE: 117 MMHG | DIASTOLIC BLOOD PRESSURE: 68 MMHG | RESPIRATION RATE: 16 BRPM | HEIGHT: 63 IN

## 2018-05-18 NOTE — ED PROVIDER NOTES
TRIAGE CHIEF COMPLAINT:     Nursing and triage notes reviewed    Chief Complaint   Patient presents with   • Abdominal Pain      HPI: Marilu Godoy is a 27 y.o. female who presents to the emergency department complaining of Abdominal discomfort, back pain, coughing.  Patient states she was here a few days ago with some crampy right side pain and right lower back pain.  Patient is currently around 5 weeks pregnant.  She states the pain has persisted and wanted to be reevaluated.  She has not seen her OB physician as of yet.  She states she's had a productive cough over the past several days as well.  Denies having fevers or chills.  States she's been a little nauseated but denies vomiting.  Denies any diarrhea currently.  Denies vaginal bleeding or discharge.     REVIEW OF SYSTEMS: All other systems reviewed and are negative     PAST MEDICAL HISTORY:   Past Medical History:   Diagnosis Date   • Abdominal pain    • Anxiety    • Asthma 2015   • Body piercing     TONGUE, NOSE, TWO IN EACH EAR   • Chest pain    • Clotting disorder     factor 5   • Constipation    • Coronary artery disease involving native coronary artery of native heart without angina pectoris 6/16/2017   • Diarrhea    • Diarrhea    • Factor 5 Leiden mutation, heterozygous    • Gallstone    • GERD (gastroesophageal reflux disease)    • H/O blood clots    • Headache    • Hiatal hernia    • High cholesterol    • History of recurrent UTIs    • Hypertension    • Kidney infection    • Nausea    • Nausea & vomiting    • Obesity    • Orthostatic hypotension    • Ovarian cyst    • Pollen allergies    • Protein S deficiency 11/14/2016    labs from hospitalization for PE   • Pulmonary embolism    • Sleep apnea     CPAP ASKED TO BRING DOS   • Subclinical hyperthyroidism    • Tachycardia    • Tattoo     LOWER BACK        FAMILY HISTORY:   Family History   Problem Relation Age of Onset   • Arthritis Mother    • COPD Mother    • Asthma Mother    • Thyroid  disease Mother    • Arthritis Father    • Diabetes Father    • Hypertension Father    • Hyperlipidemia Father    • Kidney disease Father    • Heart attack Father    • No Known Problems Son    • Colon cancer Neg Hx    • Liver cancer Neg Hx    • Liver disease Neg Hx    • Stomach cancer Neg Hx    • Esophageal cancer Neg Hx         SOCIAL HISTORY:   Social History     Social History   • Marital status:      Spouse name: N/A   • Number of children: N/A   • Years of education: N/A     Occupational History   • Not on file.     Social History Main Topics   • Smoking status: Never Smoker   • Smokeless tobacco: Never Used   • Alcohol use No   • Drug use: No   • Sexual activity: Defer      Comment: PE while on birth control patch     Other Topics Concern   • Not on file     Social History Narrative   • No narrative on file        SURGICAL HISTORY:   Past Surgical History:   Procedure Laterality Date   •  SECTION      X2  and    • CHOLECYSTECTOMY     • COLONOSCOPY N/A 2017    Procedure: COLONOSCOPY WITH BIOPSIES AND ARGON THERMAL ABLATION;  Surgeon: Jignesh Selby MD;  Location: [facility] ENDOSCOPY;  Service:    • DIAGNOSTIC LAPAROSCOPY N/A 2018    Procedure: DIAGNOSTIC LAPAROSCOPY AND ABLATION OF ENDOMETRIOSIS;  Surgeon: Evin Zamudio MD;  Location: [facility] OR;  Service: Obstetrics/Gynecology   • ENDOSCOPY N/A 2017    Procedure: ESOPHAGOGASTRODUODENOSCOPY WITH BIOPSIES AND COLD BIOPSY POLYPECTOMIES;  Surgeon: Jignesh Selby MD;  Location: [facility] ENDOSCOPY;  Service:         CURRENT MEDICATIONS:      Medication List      ASK your doctor about these medications    enoxaparin 80 MG/0.8ML solution syringe  Commonly known as:  LOVENOX  Inject 0.8 mL under the skin Every 12 (Twelve) Hours.     flunisolide 25 MCG/ACT (0.025%) solution nasal spray  Commonly known as:  NASALIDE  Inhale 2 sprays Every 12 (Twelve) Hours.     promethazine 25 MG tablet  Commonly known as:  PHENERGAN  Take 1  tablet by mouth Every 6 (Six) Hours As Needed for Nausea or   Vomiting.     raNITIdine 150 MG tablet  Commonly known as:  ZANTAC  Take 1 tablet by mouth As Needed for Heartburn or Indigestion.           ALLERGIES: Amoxicillin     PHYSICAL EXAM:   VITAL SIGNS:   Vitals:    05/17/18 2147   BP: 111/68   Pulse: 98   Resp: 16   Temp: 98.6 °F (37 °C)   SpO2: 100%      CONSTITUTIONAL: Awake, oriented, appears non-toxic   HENT: Atraumatic, normocephalic, oral mucosa pink and moist, airway patent.   EYES: Conjunctiva clear   NECK: Trachea midline, non-tender, supple  BACK: No midline tenderness.   CARDIOVASCULAR: Normal heart rate, Normal rhythm, No murmurs, rubs, gallops   PULMONARY/CHEST: Clear to auscultation, no rhonchi, wheezes, or rales. Symmetrical breath sounds.  ABDOMINAL: Non-distended, soft, non-tender - no rebound or guarding.  NEUROLOGIC: Non-focal, moving all four extremities, no gross sensory or motor deficits.   EXTREMITIES: No clubbing, cyanosis, or edema   SKIN: Warm, Dry, No erythema, No rash     ED COURSE / MEDICAL DECISION MAKING:   Marilu Godoy is a 27 y.o. female who presents to the emergency department for evaluation of abdominal and back discomfort as well as a cough.  Patient's nondistressed on arrival in the emergency department.  Physical exam is largely unremarkable.  Patient has normal lung sounds with no respiratory distress.  Don't feel imaging is indicated given that patient is pregnant.  I will obtain an ultrasound as well as basic labs for further evaluation.  Labs were largely unremarkable.  Ultrasound reveals no abnormalities but also no sign of intraoral or extrauterine pregnancy.  Patient's hormone level is increasing appropriately.  Would not necessarily expect to see anything at this point in time.  We will have patient have her hormone levels rechecked in 48 hours as well as repeat ultrasound in the near future.  Patient has an upcoming appointment with her OB/GYN  physician.    DECISION TO DISCHARGE/ADMIT: see ED care timeline     FINAL IMPRESSION:   1 -- abdominal pain   2 -- early pregnancy  3 --     Electronically signed by: Kezia Shirley MD, 5/17/2018 10:19 PM       Kezia Shirley MD  05/17/18 [id number]

## 2018-05-22 ENCOUNTER — INITIAL PRENATAL (OUTPATIENT)
Dept: OBSTETRICS AND GYNECOLOGY | Facility: CLINIC | Age: 28
End: 2018-05-22

## 2018-05-22 VITALS — SYSTOLIC BLOOD PRESSURE: 124 MMHG | BODY MASS INDEX: 34.19 KG/M2 | DIASTOLIC BLOOD PRESSURE: 76 MMHG | WEIGHT: 193 LBS

## 2018-05-22 DIAGNOSIS — O09.91 HIGH-RISK PREGNANCY IN FIRST TRIMESTER: Primary | ICD-10-CM

## 2018-05-22 PROCEDURE — 99214 OFFICE O/P EST MOD 30 MIN: CPT | Performed by: MIDWIFE

## 2018-05-22 NOTE — PROGRESS NOTES
Subjective     Chief Complaint   Patient presents with   • Initial Prenatal Visit     LMP 18, LAST PAP DEC OR  WITH DR RODRIGUEZ, HAS QUESTIONS ABOUT BLOOD THINNERS       Marilu Fiorella Godoy is a 27 y.o. year old .  Patient's last menstrual period was 2018..  She presents to be seen to initiate prenatal care with our practice. She  Has been to ED x 2 with cramping. HCG levels have increased from 60s to over 600. She has + breast tenderness. She has no pain now. She was on Eliquis which states she was told to stop with + pregnancy test. She hasn't had any blood thinners for several weeks. She had a PE in 2016 and was also found to have Protein S def, factor V leiden, and hypercoaguable state. She had a TVS in ED but was too early to see IUP. She denies any bleeding.  She has an appt with high risk doctor at [facility] on .      The following portions of the patient's history were reviewed and updated as appropriate:vital signs, allergies, current medications, past family history, past medical history, past social history, past surgical history and problem list.    Review of Systems -   General: pleasant  Gastrointestinal: minimal nausea and vomiting, denies constipation   Genitourinary: Frequency - denies urgency or burning with urination  All other systems reviewed and are negative    Objective     Physical Exam  Constitutional   The patient is alert, well developed & well nourished.   Neck   The neck is supple and the trachea is midline. The thyroid is not enlarged and there are no palpable nodules.   Respiratory  The patient is relaxed and breathes without effort. Lungs CTAB  Cardiovascular  Regular rate and rhythm without murmur -  Negative LE pitting edema  Gastrointestinal   The abdomen is soft and non tender. No hepatosplenomegaly  Genitourinary   - External Genitalia without erythema, lesions, or masses  -Vagina - There is no abnormal vaginal discharge.   -Cervix without  discharge  Negative cervical motion tenderness   Uterus - uterine body size is approximate to dates  Adnexa structures are nontender and without masses  Perineum is without inflammation or lesion  Skin  Normal color. No rashes or lesions  Extremities  Full ROM. No rashes or edema  Psychiatric  The patient is oriented to person, place, and time. Speech is fluent and words are clear    Imaging   No data reviewed  Reviewed TVS from ED  3.8 cm ovarian cyst      Assessment/Plan     ASSESSMENT  1. IUP at 5w1d   2. First trimester discomforts of pregnancy.   3. High risk pregnancy   4. Ovarian cyst    PLAN  1. Tests ordered today:  Orders Placed This Encounter   Procedures   • NuSwab VG+ - Swab, Vagina   • US Ob Transvaginal     Standing Status:   Future     Standing Expiration Date:   5/22/2019     Order Specific Question:   Reason for Exam:     Answer:   high risk, viability of pregnancy   • OB Panel With HIV   • HCG, B-subunit, Quantitative     2. Medications prescribed today:  New Medications Ordered This Visit   Medications   • enoxaparin (LOVENOX) 40 MG/0.4ML solution syringe     Sig: Inject 0.4 mL under the skin Daily.     Dispense:  11.2 mL     Refill:  6     3. Information reviewed: exercise in pregnancy, nutrition in pregnancy, weight gain in pregnancy, work and travel restrictions during pregnancy, list of OTC medications acceptable in pregnancy and call coverage groups  4. Will refer to [facility] for high risk after US, she will cancel appt with [facility] high risk.      Follow up: 2 week(s)         This note was electronically signed.    Bethany Vargas CNM  5/22/2018

## 2018-05-24 ENCOUNTER — TELEPHONE (OUTPATIENT)
Dept: OBSTETRICS AND GYNECOLOGY | Facility: CLINIC | Age: 28
End: 2018-05-24

## 2018-05-24 ENCOUNTER — OFFICE VISIT (OUTPATIENT)
Dept: INTERNAL MEDICINE | Facility: CLINIC | Age: 28
End: 2018-05-24

## 2018-05-24 VITALS
WEIGHT: 193.19 LBS | OXYGEN SATURATION: 98 % | HEIGHT: 63 IN | TEMPERATURE: 99.2 F | BODY MASS INDEX: 34.23 KG/M2 | DIASTOLIC BLOOD PRESSURE: 66 MMHG | HEART RATE: 100 BPM | SYSTOLIC BLOOD PRESSURE: 112 MMHG | RESPIRATION RATE: 16 BRPM

## 2018-05-24 DIAGNOSIS — R50.9 LOW GRADE FEVER: ICD-10-CM

## 2018-05-24 DIAGNOSIS — R30.0 DYSURIA: ICD-10-CM

## 2018-05-24 DIAGNOSIS — J30.2 ACUTE SEASONAL ALLERGIC RHINITIS, UNSPECIFIED TRIGGER: Primary | ICD-10-CM

## 2018-05-24 LAB
BILIRUB BLD-MCNC: ABNORMAL MG/DL
CLARITY, POC: ABNORMAL
COLOR UR: YELLOW
GLUCOSE UR STRIP-MCNC: NEGATIVE MG/DL
KETONES UR QL: NEGATIVE
LEUKOCYTE EST, POC: ABNORMAL
NITRITE UR-MCNC: NEGATIVE MG/ML
PH UR: 6 [PH] (ref 5–8)
PROT UR STRIP-MCNC: ABNORMAL MG/DL
RBC # UR STRIP: NEGATIVE /UL
SP GR UR: 1.02 (ref 1–1.03)
UROBILINOGEN UR QL: ABNORMAL

## 2018-05-24 PROCEDURE — 99213 OFFICE O/P EST LOW 20 MIN: CPT | Performed by: NURSE PRACTITIONER

## 2018-05-24 NOTE — TELEPHONE ENCOUNTER
----- Message from Marilu Godoy sent at 5/24/2018 10:24 AM EDT -----  Regarding: Prescription Question  Contact: [phone]  Can you get Monica Vargas to call me about the Lovenox injections?     Marilu Godoy

## 2018-05-24 NOTE — PROGRESS NOTES
Chief Complaint / Reason:      Chief Complaint   Patient presents with   • Fever     low grade   • Cough     sometimes productive, sometimes dry-onset 2 weeks ago   • Difficulty Urinating       Subjective     HPI  She presents today with complaints of low-grade fever, productive cough and difficulty urinating.  She states onset of the cough was about 2 weeks ago and has progressively worsened.  She states that she feels like it is barking at times.  Vital signs are stable heart rate is slightly elevated and she has a low-grade fever.  Denies chest pain, shortness of breath or heart palpitations.  Has not tried any medications for symptom relief and she found out recently that she was pregnant.  She is accompanied by her significant other.  History taken from: patient    PMH/FH/Social History were reviewed and updated appropriately in the electronic medical record.     Review of Systems:   Review of Systems   Constitutional: Positive for fatigue and fever.   HENT: Positive for congestion.    Respiratory: Positive for cough.    Cardiovascular: Negative.    Gastrointestinal: Negative.    Genitourinary: Positive for difficulty urinating.   Skin: Negative.    Hematological: Negative for adenopathy.     All other systems were reviewed and are negative.  Exceptions are noted in the subjective or above.      Objective     Vital Signs  Vitals:    05/24/18 0940   BP: 112/66   Pulse: 100   Resp: 16   Temp: 99.2 °F (37.3 °C)   SpO2: 98%       Body mass index is 34.22 kg/m².    Physical Exam   Constitutional: She is oriented to person, place, and time. She appears well-developed and well-nourished.   HENT:   Head: Normocephalic.   Right Ear: External ear normal. Tympanic membrane is bulging. Tympanic membrane is not erythematous.   Left Ear: External ear normal. Tympanic membrane is bulging. Tympanic membrane is not erythematous.   Nose: Right sinus exhibits no maxillary sinus tenderness and no frontal sinus tenderness. Left  sinus exhibits no maxillary sinus tenderness and no frontal sinus tenderness.   Mouth/Throat: Mucous membranes are dry. Posterior oropharyngeal erythema present.   Cardiovascular: Normal rate, regular rhythm, normal heart sounds and intact distal pulses.    Pulmonary/Chest: Effort normal and breath sounds normal.   Abdominal: Soft. Bowel sounds are normal.   Lymphadenopathy:     She has no cervical adenopathy.   Neurological: She is alert and oriented to person, place, and time.   Skin: Skin is warm and dry.   Psychiatric: She has a normal mood and affect. Her behavior is normal. Judgment and thought content normal.   Nursing note and vitals reviewed.       Results Review:    I reviewed the patient's new clinical results.   Office Visit on 05/24/2018   Component Date Value Ref Range Status   • Color 05/24/2018 Yellow  Yellow, Straw, Dark Yellow, Adele Final   • Clarity, UA 05/24/2018 Cloudy* Clear Final   • Glucose, UA 05/24/2018 Negative  Negative, 1000 mg/dL (3+) mg/dL Final   • Bilirubin 05/24/2018 1 mg/dL* Negative Final   • Ketones, UA 05/24/2018 Negative  Negative Final   • Specific Gravity  05/24/2018 1.020  1.005 - 1.030 Final   • Blood, UA 05/24/2018 Negative  Negative Final   • pH, Urine 05/24/2018 6.0  5.0 - 8.0 Final   • Protein, POC 05/24/2018 Trace* Negative mg/dL Final   • Urobilinogen, UA 05/24/2018 1 E.U./dL * Normal Final   • Leukocytes 05/24/2018 25 Jim/ul* Negative Final   • Nitrite, UA 05/24/2018 Negative  Negative Final         Medication Review:     Current Outpatient Prescriptions:   •  enoxaparin (LOVENOX) 40 MG/0.4ML solution syringe, Inject 0.4 mL under the skin Daily. (Patient taking differently: Inject 40 mg under the skin Daily. Has not been able to start yet.), Disp: 11.2 mL, Rfl: 6  •  flunisolide (NASALIDE) 25 MCG/ACT (0.025%) solution nasal spray, Inhale 2 sprays Every 12 (Twelve) Hours., Disp: 1 bottle, Rfl: 3  •  promethazine (PHENERGAN) 25 MG tablet, Take 1 tablet by mouth Every  6 (Six) Hours As Needed for Nausea or Vomiting., Disp: 12 tablet, Rfl: 0  •  raNITIdine (ZANTAC) 150 MG tablet, Take 1 tablet by mouth As Needed for Heartburn or Indigestion., Disp: 30 tablet, Rfl: 3    Assessment/Plan   Marilu was seen today for fever, cough and difficulty urinating.    Diagnoses and all orders for this visit:    Acute seasonal allergic rhinitis, unspecified trigger  Recommend patient treat symptomatically at this time and increase water intake.  Use nasal spray as directed.  Dysuria  -     POCT urinalysis dipstick, automated    Low grade fever  Use Tylenol for fever or pain relief.  Discussed worsening signs and symptoms and advised her to call office if symptoms worsen      Return if symptoms worsen or fail to improve.    Barby Garcia, APRN  05/24/2018

## 2018-05-26 LAB
A VAGINAE DNA VAG QL NAA+PROBE: NORMAL SCORE
ABO GROUP BLD: (no result)
BASOPHILS # BLD AUTO: 0 X10E3/UL (ref 0–0.2)
BASOPHILS NFR BLD AUTO: 0 %
BLD GP AB SCN SERPL QL: NEGATIVE
BVAB2 DNA VAG QL NAA+PROBE: NORMAL SCORE
C ALBICANS DNA VAG QL NAA+PROBE: NEGATIVE
C GLABRATA DNA VAG QL NAA+PROBE: NEGATIVE
C TRACH RRNA SPEC QL NAA+PROBE: NEGATIVE
EOSINOPHIL # BLD AUTO: 0.1 X10E3/UL (ref 0–0.4)
EOSINOPHIL NFR BLD AUTO: 1 %
ERYTHROCYTE [DISTWIDTH] IN BLOOD BY AUTOMATED COUNT: 14.1 % (ref 12.3–15.4)
HBV SURFACE AG SERPL QL IA: NEGATIVE
HCG INTACT+B SERPL-ACNC: NORMAL MIU/ML
HCT VFR BLD AUTO: 41.2 % (ref 34–46.6)
HGB BLD-MCNC: 13.7 G/DL (ref 11.1–15.9)
HIV 1+2 AB+HIV1 P24 AG SERPL QL IA: NON REACTIVE
IMM GRANULOCYTES # BLD: 0 X10E3/UL (ref 0–0.1)
IMM GRANULOCYTES NFR BLD: 0 %
LYMPHOCYTES # BLD AUTO: 1.6 X10E3/UL (ref 0.7–3.1)
LYMPHOCYTES NFR BLD AUTO: 17 %
MCH RBC QN AUTO: 30.1 PG (ref 26.6–33)
MCHC RBC AUTO-ENTMCNC: 33.3 G/DL (ref 31.5–35.7)
MCV RBC AUTO: 91 FL (ref 79–97)
MEGA1 DNA VAG QL NAA+PROBE: NORMAL SCORE
MONOCYTES # BLD AUTO: 0.6 X10E3/UL (ref 0.1–0.9)
MONOCYTES NFR BLD AUTO: 6 %
N GONORRHOEA RRNA SPEC QL NAA+PROBE: NEGATIVE
NEUTROPHILS # BLD AUTO: 6.8 X10E3/UL (ref 1.4–7)
NEUTROPHILS NFR BLD AUTO: 76 %
PLATELET # BLD AUTO: 337 X10E3/UL (ref 150–379)
RBC # BLD AUTO: 4.55 X10E6/UL (ref 3.77–5.28)
RH BLD: POSITIVE
RPR SER QL: NON REACTIVE
RUBV IGG SERPL IA-ACNC: 1.38 INDEX
T VAGINALIS RRNA SPEC QL NAA+PROBE: NEGATIVE
WBC # BLD AUTO: 9.1 X10E3/UL (ref 3.4–10.8)

## 2018-05-28 NOTE — TELEPHONE ENCOUNTER
I tried to call her and left a message. Lovenox 40mg sq daily needs to be prior authorized for her. She has to have this medication.

## 2018-05-29 RX ORDER — PROMETHAZINE HYDROCHLORIDE 25 MG/1
25 TABLET ORAL EVERY 6 HOURS PRN
Qty: 20 TABLET | Refills: 0 | Status: SHIPPED | OUTPATIENT
Start: 2018-05-29 | End: 2018-06-13

## 2018-05-29 NOTE — TELEPHONE ENCOUNTER
Called and spoke with patient, insurance will only let her have 8 injections, going to get PA, patient requested refill on Phenergan.

## 2018-05-29 NOTE — TELEPHONE ENCOUNTER
Patient informed through my chart, Lovenox approved through 6 weeks post partum, going to fax approval, PA # [phone]

## 2018-05-30 ENCOUNTER — HOSPITAL ENCOUNTER (EMERGENCY)
Facility: HOSPITAL | Age: 28
Discharge: HOME | End: 2018-05-30
Attending: EMERGENCY MEDICINE | Admitting: EMERGENCY MEDICINE

## 2018-05-30 ENCOUNTER — APPOINTMENT (OUTPATIENT)
Dept: ULTRASOUND IMAGING | Facility: HOSPITAL | Age: 28
End: 2018-05-30

## 2018-05-30 VITALS
OXYGEN SATURATION: 100 % | DIASTOLIC BLOOD PRESSURE: 76 MMHG | RESPIRATION RATE: 16 BRPM | BODY MASS INDEX: 34.2 KG/M2 | HEART RATE: 96 BPM | HEIGHT: 63 IN | SYSTOLIC BLOOD PRESSURE: 118 MMHG | WEIGHT: 193 LBS | TEMPERATURE: 98.2 F

## 2018-05-30 DIAGNOSIS — Z34.90 PREGNANCY, UNSPECIFIED GESTATIONAL AGE: Primary | ICD-10-CM

## 2018-05-30 LAB
ALBUMIN SERPL-MCNC: 4.4 G/DL (ref 3.5–5)
ALBUMIN/GLOB SERPL: 1.3 G/DL (ref 1–2)
ALP SERPL-CCNC: 78 U/L (ref 38–126)
ALT SERPL W P-5'-P-CCNC: 26 U/L (ref 13–69)
ANION GAP SERPL CALCULATED.3IONS-SCNC: 17.9 MMOL/L (ref 10–20)
AST SERPL-CCNC: 21 U/L (ref 15–46)
BASOPHILS # BLD AUTO: 0.04 10*3/MM3 (ref 0–0.2)
BASOPHILS NFR BLD AUTO: 0.4 % (ref 0–2.5)
BILIRUB SERPL-MCNC: 0.5 MG/DL (ref 0.2–1.3)
BUN BLD-MCNC: 10 MG/DL (ref 7–20)
BUN/CREAT SERPL: 16.7 (ref 7.1–23.5)
CALCIUM SPEC-SCNC: 9.4 MG/DL (ref 8.4–10.2)
CHLORIDE SERPL-SCNC: 100 MMOL/L (ref 98–107)
CO2 SERPL-SCNC: 26 MMOL/L (ref 26–30)
CREAT BLD-MCNC: 0.6 MG/DL (ref 0.6–1.3)
DEPRECATED RDW RBC AUTO: 41.1 FL (ref 37–54)
EOSINOPHIL # BLD AUTO: 0.1 10*3/MM3 (ref 0–0.7)
EOSINOPHIL NFR BLD AUTO: 1 % (ref 0–7)
ERYTHROCYTE [DISTWIDTH] IN BLOOD BY AUTOMATED COUNT: 12.9 % (ref 11.5–14.5)
GFR SERPL CREATININE-BSD FRML MDRD: 120 ML/MIN/1.73
GLOBULIN UR ELPH-MCNC: 3.5 GM/DL
GLUCOSE BLD-MCNC: 88 MG/DL (ref 74–98)
HCG INTACT+B SERPL-ACNC: NORMAL MIU/ML
HCT VFR BLD AUTO: 37.7 % (ref 37–47)
HGB BLD-MCNC: 13.1 G/DL (ref 12–16)
IMM GRANULOCYTES # BLD: 0.02 10*3/MM3 (ref 0–0.06)
IMM GRANULOCYTES NFR BLD: 0.2 % (ref 0–0.6)
LYMPHOCYTES # BLD AUTO: 2.39 10*3/MM3 (ref 0.6–3.4)
LYMPHOCYTES NFR BLD AUTO: 24 % (ref 10–50)
MCH RBC QN AUTO: 30.8 PG (ref 27–31)
MCHC RBC AUTO-ENTMCNC: 34.7 G/DL (ref 30–37)
MCV RBC AUTO: 88.7 FL (ref 81–99)
MONOCYTES # BLD AUTO: 0.59 10*3/MM3 (ref 0–0.9)
MONOCYTES NFR BLD AUTO: 5.9 % (ref 0–12)
NEUTROPHILS # BLD AUTO: 6.8 10*3/MM3 (ref 2–6.9)
NEUTROPHILS NFR BLD AUTO: 68.5 % (ref 37–80)
NRBC BLD MANUAL-RTO: 0 /100 WBC (ref 0–0)
PLATELET # BLD AUTO: 286 10*3/MM3 (ref 130–400)
PMV BLD AUTO: 9.4 FL (ref 6–12)
POTASSIUM BLD-SCNC: 3.9 MMOL/L (ref 3.5–5.1)
PROT SERPL-MCNC: 7.9 G/DL (ref 6.3–8.2)
RBC # BLD AUTO: 4.25 10*6/MM3 (ref 4.2–5.4)
SODIUM BLD-SCNC: 140 MMOL/L (ref 137–145)
WBC NRBC COR # BLD: 9.94 10*3/MM3 (ref 4.8–10.8)

## 2018-05-30 PROCEDURE — 99283 EMERGENCY DEPT VISIT LOW MDM: CPT

## 2018-05-30 PROCEDURE — 99282 EMERGENCY DEPT VISIT SF MDM: CPT

## 2018-05-30 PROCEDURE — 96360 HYDRATION IV INFUSION INIT: CPT

## 2018-05-30 PROCEDURE — 76817 TRANSVAGINAL US OBSTETRIC: CPT

## 2018-05-30 PROCEDURE — 84702 CHORIONIC GONADOTROPIN TEST: CPT | Performed by: PHYSICIAN ASSISTANT

## 2018-05-30 PROCEDURE — 80053 COMPREHEN METABOLIC PANEL: CPT | Performed by: PHYSICIAN ASSISTANT

## 2018-05-30 PROCEDURE — 85025 COMPLETE CBC W/AUTO DIFF WBC: CPT | Performed by: PHYSICIAN ASSISTANT

## 2018-05-30 RX ORDER — SODIUM CHLORIDE 0.9 % (FLUSH) 0.9 %
10 SYRINGE (ML) INJECTION AS NEEDED
Status: DISCONTINUED | OUTPATIENT
Start: 2018-05-30 | End: 2018-05-31 | Stop reason: HOSPADM

## 2018-05-30 RX ADMIN — SODIUM CHLORIDE 1000 ML: 9 INJECTION, SOLUTION INTRAVENOUS at 22:29

## 2018-06-01 ENCOUNTER — TELEPHONE (OUTPATIENT)
Dept: CARDIOLOGY | Facility: CLINIC | Age: 28
End: 2018-06-01

## 2018-06-04 ENCOUNTER — ROUTINE PRENATAL (OUTPATIENT)
Dept: OBSTETRICS AND GYNECOLOGY | Facility: CLINIC | Age: 28
End: 2018-06-04

## 2018-06-04 VITALS — BODY MASS INDEX: 33.66 KG/M2 | SYSTOLIC BLOOD PRESSURE: 134 MMHG | WEIGHT: 190 LBS | DIASTOLIC BLOOD PRESSURE: 80 MMHG

## 2018-06-04 DIAGNOSIS — Z34.91 PREGNANT AND NOT YET DELIVERED IN FIRST TRIMESTER: Primary | ICD-10-CM

## 2018-06-04 PROCEDURE — 99214 OFFICE O/P EST MOD 30 MIN: CPT | Performed by: NURSE PRACTITIONER

## 2018-06-04 NOTE — PROGRESS NOTES
[id number]  Chief Complaint   Patient presents with   • Routine Prenatal Visit     seen in ED last week for RLQ pain, had scan with IUP at 6w3d, states had episode of brown colored spotting yesterday morning         HPI  , 7w1d reports c/o 1st trimester discomforts of pregnancy - able to tolerate fluids sometimes.    Was in the hospital the other day for Rt side pain - states is suppose to have TVS today         ROS:  GI: Nausea - YES; Constipation - No; Diarrhea - yes     Neuro: Headache - No; Visual change - No        EXAM  General Appearance:  Lungs: Breathing unlabored  Abdomen:  See flow sheet for Fundal ht, FM, FHT's  LE: Neg edema    MDM  Impression:  Problems/Risk: High Risk Pregnancy  Noted previous visit Protein S def, factor V leiden and hypercoaguable state  Hx of multiple medical problems   U/s today noted bifucated high in fundus - No problems with previous pregnancies   Tests done today: TVS & rev'd results     Topics discussed: will schedule with [facility]   Continue plan of care as noted previous visit  encouraged questions - call prn    :      OB History      Para Term  AB Living    5 2 2   2 2    SAB TAB Ectopic Molar Multiple Live Births    2         2          Past Medical History:   Diagnosis Date   • Abdominal pain    • Anxiety    • Asthma    • Body piercing     TONGUE, NOSE, TWO IN EACH EAR   • Chest pain    • Clotting disorder     factor 5   • Constipation    • Coronary artery disease involving native coronary artery of native heart without angina pectoris 2017   • Diarrhea    • Diarrhea    • Factor 5 Leiden mutation, heterozygous    • Gallstone    • GERD (gastroesophageal reflux disease)    • H/O blood clots    • Headache    • Hiatal hernia    • High cholesterol    • History of recurrent UTIs    • Hypertension    • Kidney infection    • Nausea    • Nausea & vomiting    • Obesity    • Orthostatic hypotension    • Ovarian cyst    • Pollen allergies    • Protein S deficiency  2016    labs from hospitalization for PE   • Pulmonary embolism    • Sleep apnea     CPAP ASKED TO BRING DOS   • Subclinical hyperthyroidism    • Tachycardia    • Tattoo     LOWER BACK       Past Surgical History:   Procedure Laterality Date   •  SECTION      X2  and    • CHOLECYSTECTOMY     • COLONOSCOPY N/A 2017    Procedure: COLONOSCOPY WITH BIOPSIES AND ARGON THERMAL ABLATION;  Surgeon: Jignesh Selby MD;  Location: [facility] ENDOSCOPY;  Service:    • DIAGNOSTIC LAPAROSCOPY N/A 2018    Procedure: DIAGNOSTIC LAPAROSCOPY AND ABLATION OF ENDOMETRIOSIS;  Surgeon: Evin Zamudio MD;  Location: [facility] OR;  Service: Obstetrics/Gynecology   • ENDOSCOPY N/A 2017    Procedure: ESOPHAGOGASTRODUODENOSCOPY WITH BIOPSIES AND COLD BIOPSY POLYPECTOMIES;  Surgeon: Jignesh Selby MD;  Location: [facility] ENDOSCOPY;  Service:        Family History   Problem Relation Age of Onset   • Arthritis Mother    • COPD Mother    • Asthma Mother    • Thyroid disease Mother    • Arthritis Father    • Diabetes Father    • Hypertension Father    • Hyperlipidemia Father    • Kidney disease Father    • Heart attack Father    • No Known Problems Son    • Colon cancer Neg Hx    • Liver cancer Neg Hx    • Liver disease Neg Hx    • Stomach cancer Neg Hx    • Esophageal cancer Neg Hx        Social History     Social History   • Marital status:      Spouse name: N/A   • Number of children: N/A   • Years of education: N/A     Occupational History   • Not on file.     Social History Main Topics   • Smoking status: Never Smoker   • Smokeless tobacco: Never Used   • Alcohol use No   • Drug use: No   • Sexual activity: Yes     Partners: Male     Birth control/ protection: None      Comment: PE while on birth control patch     Other Topics Concern   • Not on file     Social History Narrative   • No narrative on file

## 2018-06-05 ENCOUNTER — HOSPITAL ENCOUNTER (EMERGENCY)
Facility: HOSPITAL | Age: 28
Discharge: HOME | End: 2018-06-06
Attending: STUDENT IN AN ORGANIZED HEALTH CARE EDUCATION/TRAINING PROGRAM | Admitting: STUDENT IN AN ORGANIZED HEALTH CARE EDUCATION/TRAINING PROGRAM

## 2018-06-05 DIAGNOSIS — O21.0 HYPEREMESIS GRAVIDARUM: Primary | ICD-10-CM

## 2018-06-05 DIAGNOSIS — R82.71 BACTERIA IN URINE: ICD-10-CM

## 2018-06-05 LAB
ALBUMIN SERPL-MCNC: 4.3 G/DL (ref 3.5–5)
ALBUMIN/GLOB SERPL: 1.3 G/DL (ref 1–2)
ALP SERPL-CCNC: 67 U/L (ref 38–126)
ALT SERPL W P-5'-P-CCNC: 26 U/L (ref 13–69)
ANION GAP SERPL CALCULATED.3IONS-SCNC: 15.6 MMOL/L (ref 10–20)
AST SERPL-CCNC: 21 U/L (ref 15–46)
BACTERIA UR QL AUTO: ABNORMAL /HPF
BASOPHILS # BLD AUTO: 0.03 10*3/MM3 (ref 0–0.2)
BASOPHILS NFR BLD AUTO: 0.3 % (ref 0–2.5)
BILIRUB SERPL-MCNC: 0.4 MG/DL (ref 0.2–1.3)
BILIRUB UR QL STRIP: NEGATIVE
BUN BLD-MCNC: 7 MG/DL (ref 7–20)
BUN/CREAT SERPL: 11.7 (ref 7.1–23.5)
CALCIUM SPEC-SCNC: 9 MG/DL (ref 8.4–10.2)
CHLORIDE SERPL-SCNC: 104 MMOL/L (ref 98–107)
CLARITY UR: ABNORMAL
CO2 SERPL-SCNC: 25 MMOL/L (ref 26–30)
COLOR UR: YELLOW
CREAT BLD-MCNC: 0.6 MG/DL (ref 0.6–1.3)
DEPRECATED RDW RBC AUTO: 40 FL (ref 37–54)
EOSINOPHIL # BLD AUTO: 0.07 10*3/MM3 (ref 0–0.7)
EOSINOPHIL NFR BLD AUTO: 0.8 % (ref 0–7)
ERYTHROCYTE [DISTWIDTH] IN BLOOD BY AUTOMATED COUNT: 12.5 % (ref 11.5–14.5)
GFR SERPL CREATININE-BSD FRML MDRD: 120 ML/MIN/1.73
GLOBULIN UR ELPH-MCNC: 3.2 GM/DL
GLUCOSE BLD-MCNC: 96 MG/DL (ref 74–98)
GLUCOSE UR STRIP-MCNC: NEGATIVE MG/DL
HCT VFR BLD AUTO: 36.4 % (ref 37–47)
HGB BLD-MCNC: 12.7 G/DL (ref 12–16)
HGB UR QL STRIP.AUTO: ABNORMAL
HYALINE CASTS UR QL AUTO: ABNORMAL /LPF
IMM GRANULOCYTES # BLD: 0.02 10*3/MM3 (ref 0–0.06)
IMM GRANULOCYTES NFR BLD: 0.2 % (ref 0–0.6)
KETONES UR QL STRIP: NEGATIVE
LEUKOCYTE ESTERASE UR QL STRIP.AUTO: NEGATIVE
LYMPHOCYTES # BLD AUTO: 2.57 10*3/MM3 (ref 0.6–3.4)
LYMPHOCYTES NFR BLD AUTO: 28.8 % (ref 10–50)
MCH RBC QN AUTO: 30.7 PG (ref 27–31)
MCHC RBC AUTO-ENTMCNC: 34.9 G/DL (ref 30–37)
MCV RBC AUTO: 87.9 FL (ref 81–99)
MONOCYTES # BLD AUTO: 0.49 10*3/MM3 (ref 0–0.9)
MONOCYTES NFR BLD AUTO: 5.5 % (ref 0–12)
MUCOUS THREADS URNS QL MICRO: ABNORMAL /HPF
NEUTROPHILS # BLD AUTO: 5.73 10*3/MM3 (ref 2–6.9)
NEUTROPHILS NFR BLD AUTO: 64.4 % (ref 37–80)
NITRITE UR QL STRIP: NEGATIVE
NRBC BLD MANUAL-RTO: 0 /100 WBC (ref 0–0)
PH UR STRIP.AUTO: 7 [PH] (ref 5–8)
PLATELET # BLD AUTO: 304 10*3/MM3 (ref 130–400)
PMV BLD AUTO: 9.4 FL (ref 6–12)
POTASSIUM BLD-SCNC: 3.6 MMOL/L (ref 3.5–5.1)
PROT SERPL-MCNC: 7.5 G/DL (ref 6.3–8.2)
PROT UR QL STRIP: NEGATIVE
RBC # BLD AUTO: 4.14 10*6/MM3 (ref 4.2–5.4)
RBC # UR: ABNORMAL /HPF
REF LAB TEST METHOD: ABNORMAL
SODIUM BLD-SCNC: 141 MMOL/L (ref 137–145)
SP GR UR STRIP: 1.02 (ref 1–1.03)
SQUAMOUS #/AREA URNS HPF: ABNORMAL /HPF
UROBILINOGEN UR QL STRIP: ABNORMAL
WBC NRBC COR # BLD: 8.91 10*3/MM3 (ref 4.8–10.8)
WBC UR QL AUTO: ABNORMAL /HPF

## 2018-06-05 PROCEDURE — 80053 COMPREHEN METABOLIC PANEL: CPT | Performed by: PHYSICIAN ASSISTANT

## 2018-06-05 PROCEDURE — 96361 HYDRATE IV INFUSION ADD-ON: CPT

## 2018-06-05 PROCEDURE — 99283 EMERGENCY DEPT VISIT LOW MDM: CPT

## 2018-06-05 PROCEDURE — 96374 THER/PROPH/DIAG INJ IV PUSH: CPT

## 2018-06-05 PROCEDURE — 85025 COMPLETE CBC W/AUTO DIFF WBC: CPT | Performed by: PHYSICIAN ASSISTANT

## 2018-06-05 PROCEDURE — 25010000002 PROMETHAZINE PER 50 MG: Performed by: PHYSICIAN ASSISTANT

## 2018-06-05 PROCEDURE — 81001 URINALYSIS AUTO W/SCOPE: CPT | Performed by: PHYSICIAN ASSISTANT

## 2018-06-05 RX ORDER — PROMETHAZINE HYDROCHLORIDE 25 MG/ML
12.5 INJECTION, SOLUTION INTRAMUSCULAR; INTRAVENOUS ONCE
Status: COMPLETED | OUTPATIENT
Start: 2018-06-05 | End: 2018-06-05

## 2018-06-05 RX ORDER — SODIUM CHLORIDE 0.9 % (FLUSH) 0.9 %
10 SYRINGE (ML) INJECTION AS NEEDED
Status: DISCONTINUED | OUTPATIENT
Start: 2018-06-05 | End: 2018-06-06 | Stop reason: HOSPADM

## 2018-06-05 RX ADMIN — SODIUM CHLORIDE 1000 ML: 9 INJECTION, SOLUTION INTRAVENOUS at 23:33

## 2018-06-05 RX ADMIN — PROMETHAZINE HYDROCHLORIDE 12.5 MG: 25 INJECTION INTRAMUSCULAR; INTRAVENOUS at 23:34

## 2018-06-06 VITALS
TEMPERATURE: 99 F | BODY MASS INDEX: 33.81 KG/M2 | HEART RATE: 77 BPM | HEIGHT: 63 IN | DIASTOLIC BLOOD PRESSURE: 77 MMHG | RESPIRATION RATE: 18 BRPM | OXYGEN SATURATION: 100 % | WEIGHT: 190.8 LBS | SYSTOLIC BLOOD PRESSURE: 125 MMHG

## 2018-06-06 RX ORDER — PROMETHAZINE HYDROCHLORIDE 25 MG/1
25 SUPPOSITORY RECTAL EVERY 6 HOURS PRN
Qty: 6 SUPPOSITORY | Refills: 0 | Status: SHIPPED | OUTPATIENT
Start: 2018-06-06 | End: 2018-09-07

## 2018-06-06 RX ORDER — NITROFURANTOIN 25; 75 MG/1; MG/1
100 CAPSULE ORAL 2 TIMES DAILY
Qty: 14 CAPSULE | Refills: 0 | Status: SHIPPED | OUTPATIENT
Start: 2018-06-06 | End: 2018-06-20

## 2018-06-06 RX ORDER — NITROFURANTOIN 25; 75 MG/1; MG/1
100 CAPSULE ORAL ONCE
Status: COMPLETED | OUTPATIENT
Start: 2018-06-06 | End: 2018-06-06

## 2018-06-06 RX ORDER — PROMETHAZINE HYDROCHLORIDE 25 MG/1
25 TABLET ORAL EVERY 6 HOURS PRN
Qty: 12 TABLET | Refills: 0 | Status: SHIPPED | OUTPATIENT
Start: 2018-06-06 | End: 2018-06-27 | Stop reason: SDUPTHER

## 2018-06-06 RX ORDER — DOXYLAMINE SUCCINATE AND PYRIDOXINE HYDROCHLORIDE, DELAYED RELEASE TABLETS 10 MG/10 MG 10; 10 MG/1; MG/1
2 TABLET, DELAYED RELEASE ORAL NIGHTLY PRN
Qty: 20 TABLET | Refills: 0 | Status: SHIPPED | OUTPATIENT
Start: 2018-06-06 | End: 2018-06-19 | Stop reason: SDUPTHER

## 2018-06-06 RX ADMIN — NITROFURANTOIN MONOHYDRATE/MACROCRYSTALLINE 100 MG: 25; 75 CAPSULE ORAL at 00:45

## 2018-06-06 NOTE — DISCHARGE INSTRUCTIONS
Return to the ER for intractable vomiting, lack of urine output in any 12 hour period, severe abdominal pain, vaginal bleeding, new or worsening symptoms.    Follow-up with Dr. Zamudio in the morning for further workup, treatment and evaluation.    Increase fluids at home.

## 2018-06-06 NOTE — ED PROVIDER NOTES
Subjective   27-year-old female that is 7 weeks pregnant.  States she had a within normal limits ultrasound at Dr. Zamudio's office yesterday.  The patient is here for vomiting, present since she was diagnosed as being pregnant that worsened yesterday.  States he has vomited 8 times and had diarrhea 4 times since yesterday.  No blood in the vomitus or stool.  No urinary complaints, abdominal pain, vaginal bleeding.    Aggravating factors: Eating or drinking.  Alleviating factors: None.  Treatment prior to arrival: Zofran and Phenergan.        History provided by:  Patient  History limited by: nothing.   used: No        Review of Systems   Constitutional: Negative.    HENT: Negative.    Eyes: Negative.    Respiratory: Negative.    Cardiovascular: Negative.  Negative for chest pain.   Gastrointestinal: Positive for diarrhea, nausea and vomiting. Negative for abdominal pain.   Endocrine: Negative.    Genitourinary: Negative for dysuria.   Musculoskeletal: Negative.    Skin: Negative.    Allergic/Immunologic: Negative.    Neurological: Negative.    Hematological: Negative.    Psychiatric/Behavioral: Negative.    All other systems reviewed and are negative.      Past Medical History:   Diagnosis Date   • Abdominal pain    • Anxiety    • Asthma 2015   • Body piercing     TONGUE, NOSE, TWO IN EACH EAR   • Chest pain    • Clotting disorder     factor 5   • Constipation    • Coronary artery disease involving native coronary artery of native heart without angina pectoris 6/16/2017   • Diarrhea    • Diarrhea    • Factor 5 Leiden mutation, heterozygous    • Gallstone    • GERD (gastroesophageal reflux disease)    • H/O blood clots    • Headache    • Hiatal hernia    • High cholesterol    • History of recurrent UTIs    • Hypertension    • Kidney infection    • Nausea    • Nausea & vomiting    • Obesity    • Orthostatic hypotension    • Ovarian cyst    • Pollen allergies    • Protein S deficiency 11/14/2016     labs from hospitalization for PE   • Pulmonary embolism    • Sleep apnea     CPAP ASKED TO BRING DOS   • Subclinical hyperthyroidism    • Tachycardia    • Tattoo     LOWER BACK       Allergies   Allergen Reactions   • Amoxicillin Rash       Past Surgical History:   Procedure Laterality Date   •  SECTION      X2  and    • CHOLECYSTECTOMY     • COLONOSCOPY N/A 2017    Procedure: COLONOSCOPY WITH BIOPSIES AND ARGON THERMAL ABLATION;  Surgeon: Jignesh Selby MD;  Location: [facility] ENDOSCOPY;  Service:    • DIAGNOSTIC LAPAROSCOPY N/A 2018    Procedure: DIAGNOSTIC LAPAROSCOPY AND ABLATION OF ENDOMETRIOSIS;  Surgeon: Evin Zamudio MD;  Location: [facility] OR;  Service: Obstetrics/Gynecology   • ENDOSCOPY N/A 2017    Procedure: ESOPHAGOGASTRODUODENOSCOPY WITH BIOPSIES AND COLD BIOPSY POLYPECTOMIES;  Surgeon: Jignesh Selby MD;  Location: [facility] ENDOSCOPY;  Service:        Family History   Problem Relation Age of Onset   • Arthritis Mother    • COPD Mother    • Asthma Mother    • Thyroid disease Mother    • Arthritis Father    • Diabetes Father    • Hypertension Father    • Hyperlipidemia Father    • Kidney disease Father    • Heart attack Father    • No Known Problems Son    • Colon cancer Neg Hx    • Liver cancer Neg Hx    • Liver disease Neg Hx    • Stomach cancer Neg Hx    • Esophageal cancer Neg Hx        Social History     Social History   • Marital status:      Social History Main Topics   • Smoking status: Never Smoker   • Smokeless tobacco: Never Used   • Alcohol use No   • Drug use: No   • Sexual activity: Yes     Partners: Male     Birth control/ protection: None      Comment: PE while on birth control patch     Other Topics Concern   • Not on file           Objective   Physical Exam   Constitutional: She is oriented to person, place, and time. She appears well-developed and well-nourished. No distress.   HENT:   Head: Normocephalic and atraumatic.   Right Ear:  External ear normal.   Left Ear: External ear normal.   Oral mucosa is dry.   Eyes: EOM are normal. Pupils are equal, round, and reactive to light.   Neck: Normal range of motion. Neck supple.   Cardiovascular: Normal rate, regular rhythm and normal heart sounds.    Pulmonary/Chest: Effort normal and breath sounds normal. No stridor. She has no wheezes. She exhibits no tenderness.   Abdominal: Soft. She exhibits no distension. There is no tenderness. There is no guarding.   Musculoskeletal: Normal range of motion. She exhibits no edema.   Neurological: She is alert and oriented to person, place, and time.   Skin: Skin is warm and dry. No rash noted. She is not diaphoretic.   Psychiatric: She has a normal mood and affect. Her behavior is normal. Judgment and thought content normal.   Nursing note and vitals reviewed.      Procedures           ED Course                  MDM  Number of Diagnoses or Management Options  Bacteria in urine: new and requires workup  Hyperemesis gravidarum: new and requires workup     Amount and/or Complexity of Data Reviewed  Clinical lab tests: reviewed and ordered    Risk of Complications, Morbidity, and/or Mortality  Presenting problems: moderate  Diagnostic procedures: low  Management options: moderate    Patient Progress  Patient progress: stable        Final diagnoses:   Hyperemesis gravidarum   Bacteria in urine            Gale Luciano PA-C  06/06/18 0010

## 2018-06-13 ENCOUNTER — HOSPITAL ENCOUNTER (OUTPATIENT)
Dept: WOMENS IMAGING | Facility: HOSPITAL | Age: 28
Discharge: HOME | End: 2018-06-13
Admitting: NURSE PRACTITIONER

## 2018-06-13 ENCOUNTER — OFFICE VISIT (OUTPATIENT)
Dept: OBSTETRICS AND GYNECOLOGY | Facility: HOSPITAL | Age: 28
End: 2018-06-13

## 2018-06-13 VITALS — DIASTOLIC BLOOD PRESSURE: 75 MMHG | WEIGHT: 187 LBS | SYSTOLIC BLOOD PRESSURE: 119 MMHG | BODY MASS INDEX: 33.13 KG/M2

## 2018-06-13 DIAGNOSIS — Z86.711 HISTORY OF PULMONARY EMBOLUS (PE): ICD-10-CM

## 2018-06-13 DIAGNOSIS — D68.51 HETEROZYGOUS FACTOR V LEIDEN MUTATION (HCC): ICD-10-CM

## 2018-06-13 DIAGNOSIS — Z34.91 PREGNANT AND NOT YET DELIVERED IN FIRST TRIMESTER: ICD-10-CM

## 2018-06-13 DIAGNOSIS — D68.59 PROTEIN S DEFICIENCY (HCC): ICD-10-CM

## 2018-06-13 DIAGNOSIS — E05.90 SUBCLINICAL HYPERTHYROIDISM: ICD-10-CM

## 2018-06-13 DIAGNOSIS — E66.9 OBESITY (BMI 30-39.9): Primary | ICD-10-CM

## 2018-06-13 PROCEDURE — 76801 OB US < 14 WKS SINGLE FETUS: CPT

## 2018-06-13 PROCEDURE — 99241 PR OFFICE CONSULTATION NEW/ESTAB PATIENT 15 MIN: CPT | Performed by: OBSTETRICS & GYNECOLOGY

## 2018-06-13 PROCEDURE — 76801 OB US < 14 WKS SINGLE FETUS: CPT | Performed by: OBSTETRICS & GYNECOLOGY

## 2018-06-13 NOTE — PROGRESS NOTES
Documentation of the ultasound findings, images, and interpretations as well as consultation note will be available in the patient's Viewpoint report located in the Chart Review Imaging tab in [employer].

## 2018-06-18 ENCOUNTER — TELEPHONE (OUTPATIENT)
Dept: OBSTETRICS AND GYNECOLOGY | Facility: CLINIC | Age: 28
End: 2018-06-18

## 2018-06-18 NOTE — TELEPHONE ENCOUNTER
Patient advised nothing else is helping with nausea and wanted to see if we can resent Diclegis to pharmacy and try to get authorized through insurance.

## 2018-06-19 RX ORDER — DOXYLAMINE SUCCINATE AND PYRIDOXINE HYDROCHLORIDE, DELAYED RELEASE TABLETS 10 MG/10 MG 10; 10 MG/1; MG/1
2 TABLET, DELAYED RELEASE ORAL NIGHTLY PRN
Qty: 20 TABLET | Refills: 0 | Status: SHIPPED | OUTPATIENT
Start: 2018-06-19 | End: 2018-06-20

## 2018-06-20 ENCOUNTER — OFFICE VISIT (OUTPATIENT)
Dept: INTERNAL MEDICINE | Facility: CLINIC | Age: 28
End: 2018-06-20

## 2018-06-20 ENCOUNTER — APPOINTMENT (OUTPATIENT)
Dept: ULTRASOUND IMAGING | Facility: HOSPITAL | Age: 28
End: 2018-06-20

## 2018-06-20 ENCOUNTER — HOSPITAL ENCOUNTER (EMERGENCY)
Facility: HOSPITAL | Age: 28
Discharge: HOME | End: 2018-06-21
Attending: EMERGENCY MEDICINE | Admitting: EMERGENCY MEDICINE

## 2018-06-20 VITALS
SYSTOLIC BLOOD PRESSURE: 111 MMHG | DIASTOLIC BLOOD PRESSURE: 66 MMHG | OXYGEN SATURATION: 98 % | HEART RATE: 89 BPM | RESPIRATION RATE: 16 BRPM | TEMPERATURE: 98.7 F | WEIGHT: 185.2 LBS | HEIGHT: 63 IN | BODY MASS INDEX: 32.82 KG/M2

## 2018-06-20 VITALS
WEIGHT: 184.19 LBS | HEIGHT: 63 IN | TEMPERATURE: 98.9 F | DIASTOLIC BLOOD PRESSURE: 72 MMHG | SYSTOLIC BLOOD PRESSURE: 114 MMHG | RESPIRATION RATE: 16 BRPM | HEART RATE: 76 BPM | BODY MASS INDEX: 32.64 KG/M2 | OXYGEN SATURATION: 98 %

## 2018-06-20 DIAGNOSIS — N83.202 LEFT OVARIAN CYST: ICD-10-CM

## 2018-06-20 DIAGNOSIS — R11.0 NAUSEA: ICD-10-CM

## 2018-06-20 DIAGNOSIS — R10.32 ABDOMINAL PAIN, LEFT LOWER QUADRANT: Primary | ICD-10-CM

## 2018-06-20 DIAGNOSIS — R39.9 URINARY SYMPTOM OR SIGN: Primary | ICD-10-CM

## 2018-06-20 DIAGNOSIS — R19.7 DIARRHEA, UNSPECIFIED TYPE: ICD-10-CM

## 2018-06-20 DIAGNOSIS — O21.0 HYPEREMESIS GRAVIDARUM: ICD-10-CM

## 2018-06-20 LAB
ALBUMIN SERPL-MCNC: 4.3 G/DL (ref 3.5–5)
ALBUMIN/GLOB SERPL: 1.3 G/DL (ref 1–2)
ALP SERPL-CCNC: 67 U/L (ref 38–126)
ALT SERPL W P-5'-P-CCNC: 25 U/L (ref 13–69)
ANION GAP SERPL CALCULATED.3IONS-SCNC: 11.8 MMOL/L (ref 10–20)
AST SERPL-CCNC: 19 U/L (ref 15–46)
BASOPHILS # BLD AUTO: 0.04 10*3/MM3 (ref 0–0.2)
BASOPHILS NFR BLD AUTO: 0.4 % (ref 0–2.5)
BILIRUB BLD-MCNC: ABNORMAL MG/DL
BILIRUB SERPL-MCNC: 0.6 MG/DL (ref 0.2–1.3)
BILIRUB UR QL STRIP: NEGATIVE
BUN BLD-MCNC: 10 MG/DL (ref 7–20)
BUN/CREAT SERPL: 16.7 (ref 7.1–23.5)
CALCIUM SPEC-SCNC: 9.4 MG/DL (ref 8.4–10.2)
CHLORIDE SERPL-SCNC: 100 MMOL/L (ref 98–107)
CLARITY UR: ABNORMAL
CLARITY, POC: ABNORMAL
CO2 SERPL-SCNC: 29 MMOL/L (ref 26–30)
COLOR UR: ABNORMAL
COLOR UR: YELLOW
CREAT BLD-MCNC: 0.6 MG/DL (ref 0.6–1.3)
DEPRECATED RDW RBC AUTO: 39.5 FL (ref 37–54)
EOSINOPHIL # BLD AUTO: 0.08 10*3/MM3 (ref 0–0.7)
EOSINOPHIL NFR BLD AUTO: 0.8 % (ref 0–7)
ERYTHROCYTE [DISTWIDTH] IN BLOOD BY AUTOMATED COUNT: 12.4 % (ref 11.5–14.5)
GFR SERPL CREATININE-BSD FRML MDRD: 120 ML/MIN/1.73
GLOBULIN UR ELPH-MCNC: 3.2 GM/DL
GLUCOSE BLD-MCNC: 90 MG/DL (ref 74–98)
GLUCOSE UR STRIP-MCNC: NEGATIVE MG/DL
GLUCOSE UR STRIP-MCNC: NEGATIVE MG/DL
HCT VFR BLD AUTO: 35.5 % (ref 37–47)
HGB BLD-MCNC: 12.6 G/DL (ref 12–16)
HGB UR QL STRIP.AUTO: NEGATIVE
IMM GRANULOCYTES # BLD: 0.02 10*3/MM3 (ref 0–0.06)
IMM GRANULOCYTES NFR BLD: 0.2 % (ref 0–0.6)
KETONES UR QL STRIP: ABNORMAL
KETONES UR QL: NEGATIVE
LEUKOCYTE EST, POC: NEGATIVE
LEUKOCYTE ESTERASE UR QL STRIP.AUTO: NEGATIVE
LYMPHOCYTES # BLD AUTO: 2.86 10*3/MM3 (ref 0.6–3.4)
LYMPHOCYTES NFR BLD AUTO: 29.3 % (ref 10–50)
MCH RBC QN AUTO: 31 PG (ref 27–31)
MCHC RBC AUTO-ENTMCNC: 35.5 G/DL (ref 30–37)
MCV RBC AUTO: 87.4 FL (ref 81–99)
MONOCYTES # BLD AUTO: 0.59 10*3/MM3 (ref 0–0.9)
MONOCYTES NFR BLD AUTO: 6 % (ref 0–12)
NEUTROPHILS # BLD AUTO: 6.17 10*3/MM3 (ref 2–6.9)
NEUTROPHILS NFR BLD AUTO: 63.3 % (ref 37–80)
NITRITE UR QL STRIP: NEGATIVE
NITRITE UR-MCNC: NEGATIVE MG/ML
NRBC BLD MANUAL-RTO: 0 /100 WBC (ref 0–0)
PH UR STRIP.AUTO: 5.5 [PH] (ref 5–8)
PH UR: 5 [PH] (ref 5–8)
PLATELET # BLD AUTO: 281 10*3/MM3 (ref 130–400)
PMV BLD AUTO: 9.7 FL (ref 6–12)
POTASSIUM BLD-SCNC: 3.8 MMOL/L (ref 3.5–5.1)
PROT SERPL-MCNC: 7.5 G/DL (ref 6.3–8.2)
PROT UR QL STRIP: NEGATIVE
PROT UR STRIP-MCNC: ABNORMAL MG/DL
RBC # BLD AUTO: 4.06 10*6/MM3 (ref 4.2–5.4)
RBC # UR STRIP: NEGATIVE /UL
SODIUM BLD-SCNC: 137 MMOL/L (ref 137–145)
SP GR UR STRIP: >=1.03 (ref 1–1.03)
SP GR UR: 1.03 (ref 1–1.03)
UROBILINOGEN UR QL STRIP: ABNORMAL
UROBILINOGEN UR QL: ABNORMAL
WBC NRBC COR # BLD: 9.76 10*3/MM3 (ref 4.8–10.8)

## 2018-06-20 PROCEDURE — 96361 HYDRATE IV INFUSION ADD-ON: CPT

## 2018-06-20 PROCEDURE — 81003 URINALYSIS AUTO W/O SCOPE: CPT | Performed by: PHYSICIAN ASSISTANT

## 2018-06-20 PROCEDURE — 99214 OFFICE O/P EST MOD 30 MIN: CPT | Performed by: NURSE PRACTITIONER

## 2018-06-20 PROCEDURE — 85025 COMPLETE CBC W/AUTO DIFF WBC: CPT | Performed by: PHYSICIAN ASSISTANT

## 2018-06-20 PROCEDURE — 25010000002 PROMETHAZINE PER 50 MG: Performed by: PHYSICIAN ASSISTANT

## 2018-06-20 PROCEDURE — 99283 EMERGENCY DEPT VISIT LOW MDM: CPT

## 2018-06-20 PROCEDURE — 76817 TRANSVAGINAL US OBSTETRIC: CPT

## 2018-06-20 PROCEDURE — 96374 THER/PROPH/DIAG INJ IV PUSH: CPT

## 2018-06-20 PROCEDURE — 80053 COMPREHEN METABOLIC PANEL: CPT | Performed by: PHYSICIAN ASSISTANT

## 2018-06-20 RX ORDER — DIPHENHYDRAMINE HYDROCHLORIDE 25 MG/1
12.5 CAPSULE ORAL EVERY 6 HOURS PRN
Qty: 15 TABLET | Refills: 0 | Status: SHIPPED | OUTPATIENT
Start: 2018-06-20 | End: 2019-01-17 | Stop reason: HOSPADM

## 2018-06-20 RX ORDER — PROMETHAZINE HYDROCHLORIDE 25 MG/ML
12.5 INJECTION, SOLUTION INTRAMUSCULAR; INTRAVENOUS ONCE
Status: COMPLETED | OUTPATIENT
Start: 2018-06-20 | End: 2018-06-20

## 2018-06-20 RX ORDER — SODIUM CHLORIDE 0.9 % (FLUSH) 0.9 %
10 SYRINGE (ML) INJECTION AS NEEDED
Status: DISCONTINUED | OUTPATIENT
Start: 2018-06-20 | End: 2018-06-21 | Stop reason: HOSPADM

## 2018-06-20 RX ORDER — PROMETHAZINE HYDROCHLORIDE 25 MG/1
25 TABLET ORAL EVERY 6 HOURS PRN
Qty: 12 TABLET | Refills: 0 | Status: SHIPPED | OUTPATIENT
Start: 2018-06-20 | End: 2018-08-24

## 2018-06-20 RX ADMIN — SODIUM CHLORIDE 1000 ML: 9 INJECTION, SOLUTION INTRAVENOUS at 22:47

## 2018-06-20 RX ADMIN — PROMETHAZINE HYDROCHLORIDE 12.5 MG: 25 INJECTION INTRAMUSCULAR; INTRAVENOUS at 22:47

## 2018-06-20 NOTE — PROGRESS NOTES
Chief Complaint / Reason:      Chief Complaint   Patient presents with   • Vomiting During Pregnancy     ER f/u-vomiting, sore throat, headache, very fatigued.        Subjective     HPI  Patient presents today for ER follow-up regarding vomiting, sore throat headache and very fatigued.She is 9 weeks gestation.  She denies chest pain, shortness of breath or heart palpitations.  She states that she is not able to keep anything down and she has started having diarrhea.  Denies any blood in urine, stool or emesis.  She states that her stool is dark liquidy and does adhere to the side of the toilet.  Patient is currently on Lovenox.  She states that her symptoms worsened 2-3 days ago phenergan and Zofran are not helping.  She states that she had a low-grade fever the other night and was having chills.  She denies any sick contacts.  She was seen at [facility] ER on 18 for hyperemesis  and UTI.  They did prescribe diclegis but insurance was not covering it and she is trying to get it approved through her OB.Patient does have a ovarian cyst on left side which was noted through transvaginal ultrasound and she did see high risk OB.  She also has a bicornuate uterus. She denies vaginal bleeding or urinary symptoms but reports frequency. Reports leg cramps and unable to keep anything down.  I'll signs are stable.    History taken from: patient    PMH/FH/Social History were reviewed and updated appropriately in the electronic medical record.     Review of Systems:   Review of Systems   Constitutional: Positive for fatigue and fever.   Respiratory: Positive for cough.    Cardiovascular: Negative.    Gastrointestinal: Positive for diarrhea, nausea and vomiting.   Genitourinary: Positive for frequency.   Skin: Negative.    Hematological: Negative for adenopathy.     All other systems were reviewed and are negative.  Exceptions are noted in the subjective or above.      Objective     Vital Signs  Vitals:     06/20/18 0814   BP: 114/72   Pulse: 76   Resp: 16   Temp: 98.9 °F (37.2 °C)   SpO2: 98%       Body mass index is 32.63 kg/m².    Physical Exam   Constitutional: She is oriented to person, place, and time. She appears well-developed and well-nourished. No distress.   HENT:   Mouth/Throat: Mucous membranes are dry. Posterior oropharyngeal erythema present.   Cardiovascular: Normal rate, regular rhythm, normal heart sounds and intact distal pulses.    Pulmonary/Chest: Effort normal and breath sounds normal. She has no wheezes. She exhibits no tenderness.   Neurological: She is alert and oriented to person, place, and time.   Skin: Skin is warm and dry. No rash noted. No erythema. No pallor.   Psychiatric: She has a normal mood and affect. Her behavior is normal. Judgment and thought content normal.   Nursing note and vitals reviewed.       Results Review:    I reviewed the patient's new clinical results.   Office Visit on 06/20/2018   Component Date Value Ref Range Status   • Color 06/20/2018 Dark Yellow  Yellow, Straw, Dark Yellow, Adele Final   • Clarity, UA 06/20/2018 Cloudy* Clear Final   • Specific Gravity  06/20/2018 1.030  1.005 - 1.030 Final   • pH, Urine 06/20/2018 5.0  5.0 - 8.0 Final   • Leukocytes 06/20/2018 Negative  Negative Final   • Nitrite, UA 06/20/2018 Negative  Negative Final   • Protein, POC 06/20/2018 Trace* Negative mg/dL Final   • Glucose, UA 06/20/2018 Negative  Negative, 1000 mg/dL (3+) mg/dL Final   • Ketones, UA 06/20/2018 Negative  Negative Final   • Urobilinogen, UA 06/20/2018 4 E.U./dL * Normal Final   • Bilirubin 06/20/2018 1 mg/dL* Negative Final   • Blood, UA 06/20/2018 Negative  Negative Final         Medication Review:     Current Outpatient Prescriptions:   •  enoxaparin (LOVENOX) 40 MG/0.4ML solution syringe, Inject 0.4 mL under the skin Daily. (Patient taking differently: Inject 40 mg under the skin Daily. Has not been able to start yet.), Disp: 11.2 mL, Rfl: 6  •  flunisolide  (NASALIDE) 25 MCG/ACT (0.025%) solution nasal spray, Inhale 2 sprays Every 12 (Twelve) Hours., Disp: 1 bottle, Rfl: 3  •  promethazine (PHENERGAN) 25 MG suppository, Insert 1 suppository into the rectum Every 6 (Six) Hours As Needed for Nausea or Vomiting., Disp: 6 suppository, Rfl: 0  •  promethazine (PHENERGAN) 25 MG tablet, Take 1 tablet by mouth Every 6 (Six) Hours As Needed for Nausea or Vomiting., Disp: 12 tablet, Rfl: 0  •  raNITIdine (ZANTAC) 150 MG tablet, Take 1 tablet by mouth As Needed for Heartburn or Indigestion., Disp: 30 tablet, Rfl: 3    Assessment/Plan   Marilu was seen today for vomiting during pregnancy.    Diagnoses and all orders for this visit:    Urinary symptom or sign  -     POCT urinalysis dipstick, automated  Nausea   Discussed oral rehydration, reintroduction of solid foods, signs of dehydration.  Let your stomach settle. Stop eating solid foods for a few hours.  Try sucking on ice chips or taking small sips of water. You might also try drinking clear soda, clear broths or noncaffeinated sports drinks. Drink plenty of liquid every day, taking small, frequent sips.  Ease back into eating. Gradually begin to eat bland, easy-to-digest foods, such as soda crackers, toast, gelatin, bananas, rice and chicken. Stop eating if your nausea returns.  Avoid certain foods and substances until you feel better. These include dairy products, caffeine, and fatty or highly seasoned foods.  Get plenty of rest. The illness and dehydration may have made you weak and tired.  Practice good hand hygiene.  Take antiemetics as prescribed.    Diarrhea unspecified type   Maintain hydration   Left ovarian cyst  Stable follow up with OB as scheduled     Follow up TARIK Garcia, ARNIE  06/20/2018

## 2018-06-21 PROCEDURE — 96372 THER/PROPH/DIAG INJ SC/IM: CPT

## 2018-06-21 PROCEDURE — 25010000002 PROMETHAZINE PER 50 MG: Performed by: PHYSICIAN ASSISTANT

## 2018-06-21 RX ORDER — METOCLOPRAMIDE HYDROCHLORIDE 5 MG/ML
5 INJECTION INTRAMUSCULAR; INTRAVENOUS ONCE
Status: DISCONTINUED | OUTPATIENT
Start: 2018-06-21 | End: 2018-06-21

## 2018-06-21 RX ORDER — PROMETHAZINE HYDROCHLORIDE 25 MG/ML
25 INJECTION, SOLUTION INTRAMUSCULAR; INTRAVENOUS ONCE
Status: COMPLETED | OUTPATIENT
Start: 2018-06-21 | End: 2018-06-21

## 2018-06-21 RX ADMIN — PROMETHAZINE HYDROCHLORIDE 25 MG: 25 INJECTION INTRAMUSCULAR; INTRAVENOUS at 00:17

## 2018-06-22 RX ORDER — DOXYLAMINE SUCCINATE AND PYRIDOXINE HYDROCHLORIDE, DELAYED RELEASE TABLETS 10 MG/10 MG 10; 10 MG/1; MG/1
2 TABLET, DELAYED RELEASE ORAL NIGHTLY PRN
Qty: 60 TABLET | Refills: 0 | Status: SHIPPED | OUTPATIENT
Start: 2018-06-22 | End: 2018-08-24

## 2018-06-25 ENCOUNTER — ROUTINE PRENATAL (OUTPATIENT)
Dept: OBSTETRICS AND GYNECOLOGY | Facility: CLINIC | Age: 28
End: 2018-06-25

## 2018-06-25 VITALS — WEIGHT: 187 LBS | SYSTOLIC BLOOD PRESSURE: 126 MMHG | DIASTOLIC BLOOD PRESSURE: 72 MMHG | BODY MASS INDEX: 33.13 KG/M2

## 2018-06-25 DIAGNOSIS — Z34.01 ENCOUNTER FOR SUPERVISION OF NORMAL FIRST PREGNANCY IN FIRST TRIMESTER: Primary | ICD-10-CM

## 2018-06-25 PROCEDURE — 99213 OFFICE O/P EST LOW 20 MIN: CPT | Performed by: OBSTETRICS & GYNECOLOGY

## 2018-06-25 NOTE — PROGRESS NOTES
Chief Complaint   Patient presents with   • Routine Prenatal Visit     PATIENT COMPLAINS OF NAUSEA AND VOMITING.         HPI:   , 10w0d gestation reports doing well  PDC scan reviewed within normal limits first trimester.  Patient's doing well with Lovenox 40 mg subcutaneous daily.  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  Patient to have plus years ago had DVT PE while on OCP patch.    ROS:  See Prenatal Episode/Flowsheet  /72   Wt 84.8 kg (187 lb)   LMP 2018   BMI 33.13 kg/m²      EXAM:  EXTREMITIES:  No swelling-See Prenatal Episode/Flowsheet    ABDOMEN:  FHTs/Movement noted-See Prenatal Episode/Flowsheet    URINE GLUCOSE/PROTEIN:  See Prenatal Episode/Flowsheet    PELVIC EXAM:  See Prenatal Episode/Flowsheet  CV:  Lungs:    MDM:    Lab Results   Component Value Date    HGB 12.6 2018    RUBELLAABIGG 1.38 2018    HEPBSAG Negative 2018    ABO O 2018    RH Positive 2018    ABSCRN Negative 2018    NET5ULF0 Non Reactive 2018    HEPCVIRUSABY 0.1 2017    URINECX Final report (A) 04/10/2018       U/S:    1. IUP 10w0d  2. Routine care   3.  Probable feelings in pregnancy.  Patient's continue Lovenox 40 mg a day through 36 weeks at which time we'll change her over to heparin.  4.  CBC next time

## 2018-06-27 ENCOUNTER — HOSPITAL ENCOUNTER (EMERGENCY)
Facility: HOSPITAL | Age: 28
Discharge: HOME | End: 2018-06-27
Attending: EMERGENCY MEDICINE | Admitting: EMERGENCY MEDICINE

## 2018-06-27 ENCOUNTER — OFFICE VISIT (OUTPATIENT)
Dept: INTERNAL MEDICINE | Facility: CLINIC | Age: 28
End: 2018-06-27

## 2018-06-27 VITALS
RESPIRATION RATE: 16 BRPM | OXYGEN SATURATION: 98 % | DIASTOLIC BLOOD PRESSURE: 70 MMHG | TEMPERATURE: 97.7 F | BODY MASS INDEX: 32.03 KG/M2 | WEIGHT: 180.75 LBS | HEIGHT: 63 IN | SYSTOLIC BLOOD PRESSURE: 110 MMHG | HEART RATE: 90 BPM

## 2018-06-27 VITALS
HEIGHT: 63 IN | SYSTOLIC BLOOD PRESSURE: 114 MMHG | BODY MASS INDEX: 32.14 KG/M2 | OXYGEN SATURATION: 100 % | WEIGHT: 181.4 LBS | DIASTOLIC BLOOD PRESSURE: 72 MMHG | TEMPERATURE: 98.1 F | HEART RATE: 76 BPM | RESPIRATION RATE: 16 BRPM

## 2018-06-27 DIAGNOSIS — O21.9 NAUSEA/VOMITING IN PREGNANCY: Primary | ICD-10-CM

## 2018-06-27 DIAGNOSIS — R11.10 COMBINED ABDOMINAL PAIN, VOMITING, AND DIARRHEA: ICD-10-CM

## 2018-06-27 DIAGNOSIS — R19.7 COMBINED ABDOMINAL PAIN, VOMITING, AND DIARRHEA: ICD-10-CM

## 2018-06-27 DIAGNOSIS — R10.9 ABDOMINAL PAIN, UNSPECIFIED ABDOMINAL LOCATION: Primary | ICD-10-CM

## 2018-06-27 DIAGNOSIS — R10.9 COMBINED ABDOMINAL PAIN, VOMITING, AND DIARRHEA: ICD-10-CM

## 2018-06-27 DIAGNOSIS — R82.4 URINE KETONES: ICD-10-CM

## 2018-06-27 DIAGNOSIS — Z3A.10 10 WEEKS GESTATION OF PREGNANCY: ICD-10-CM

## 2018-06-27 LAB
ALBUMIN SERPL-MCNC: 4.7 G/DL (ref 3.5–5)
ALBUMIN/GLOB SERPL: 1.4 G/DL (ref 1–2)
ALP SERPL-CCNC: 73 U/L (ref 38–126)
ALT SERPL W P-5'-P-CCNC: 23 U/L (ref 13–69)
ANION GAP SERPL CALCULATED.3IONS-SCNC: 16.9 MMOL/L (ref 10–20)
AST SERPL-CCNC: 21 U/L (ref 15–46)
BACTERIA UR QL AUTO: ABNORMAL /HPF
BASOPHILS # BLD AUTO: 0.04 10*3/MM3 (ref 0–0.2)
BASOPHILS NFR BLD AUTO: 0.5 % (ref 0–2.5)
BILIRUB BLD-MCNC: ABNORMAL MG/DL
BILIRUB SERPL-MCNC: 1.1 MG/DL (ref 0.2–1.3)
BILIRUB UR QL STRIP: ABNORMAL
BUN BLD-MCNC: 8 MG/DL (ref 7–20)
BUN/CREAT SERPL: 16 (ref 7.1–23.5)
CALCIUM SPEC-SCNC: 9.7 MG/DL (ref 8.4–10.2)
CHLORIDE SERPL-SCNC: 100 MMOL/L (ref 98–107)
CLARITY UR: CLEAR
CLARITY, POC: ABNORMAL
CO2 SERPL-SCNC: 26 MMOL/L (ref 26–30)
COLOR UR: ABNORMAL
COLOR UR: YELLOW
CREAT BLD-MCNC: 0.5 MG/DL (ref 0.6–1.3)
DEPRECATED RDW RBC AUTO: 38.8 FL (ref 37–54)
EOSINOPHIL # BLD AUTO: 0.03 10*3/MM3 (ref 0–0.7)
EOSINOPHIL NFR BLD AUTO: 0.4 % (ref 0–7)
ERYTHROCYTE [DISTWIDTH] IN BLOOD BY AUTOMATED COUNT: 12.2 % (ref 11.5–14.5)
GFR SERPL CREATININE-BSD FRML MDRD: 148 ML/MIN/1.73
GLOBULIN UR ELPH-MCNC: 3.4 GM/DL
GLUCOSE BLD-MCNC: 93 MG/DL (ref 74–98)
GLUCOSE UR STRIP-MCNC: NEGATIVE MG/DL
GLUCOSE UR STRIP-MCNC: NEGATIVE MG/DL
HCT VFR BLD AUTO: 38.3 % (ref 37–47)
HGB BLD-MCNC: 13.5 G/DL (ref 12–16)
HGB UR QL STRIP.AUTO: NEGATIVE
HYALINE CASTS UR QL AUTO: ABNORMAL /LPF
IMM GRANULOCYTES # BLD: 0.02 10*3/MM3 (ref 0–0.06)
IMM GRANULOCYTES NFR BLD: 0.2 % (ref 0–0.6)
KETONES UR QL STRIP: ABNORMAL
KETONES UR QL: ABNORMAL
LEUKOCYTE EST, POC: NEGATIVE
LEUKOCYTE ESTERASE UR QL STRIP.AUTO: NEGATIVE
LIPASE SERPL-CCNC: 41 U/L (ref 23–300)
LYMPHOCYTES # BLD AUTO: 1.35 10*3/MM3 (ref 0.6–3.4)
LYMPHOCYTES NFR BLD AUTO: 16.5 % (ref 10–50)
MCH RBC QN AUTO: 30.6 PG (ref 27–31)
MCHC RBC AUTO-ENTMCNC: 35.2 G/DL (ref 30–37)
MCV RBC AUTO: 86.8 FL (ref 81–99)
MONOCYTES # BLD AUTO: 0.41 10*3/MM3 (ref 0–0.9)
MONOCYTES NFR BLD AUTO: 5 % (ref 0–12)
NEUTROPHILS # BLD AUTO: 6.33 10*3/MM3 (ref 2–6.9)
NEUTROPHILS NFR BLD AUTO: 77.4 % (ref 37–80)
NITRITE UR QL STRIP: NEGATIVE
NITRITE UR-MCNC: NEGATIVE MG/ML
NRBC BLD MANUAL-RTO: 0 /100 WBC (ref 0–0)
PH UR STRIP.AUTO: 6 [PH] (ref 5–8)
PH UR: 5 [PH] (ref 5–8)
PLATELET # BLD AUTO: 276 10*3/MM3 (ref 130–400)
PMV BLD AUTO: 9.7 FL (ref 6–12)
POTASSIUM BLD-SCNC: 3.9 MMOL/L (ref 3.5–5.1)
PROT SERPL-MCNC: 8.1 G/DL (ref 6.3–8.2)
PROT UR QL STRIP: ABNORMAL
PROT UR STRIP-MCNC: ABNORMAL MG/DL
RBC # BLD AUTO: 4.41 10*6/MM3 (ref 4.2–5.4)
RBC # UR STRIP: NEGATIVE /UL
RBC # UR: ABNORMAL /HPF
REF LAB TEST METHOD: ABNORMAL
SODIUM BLD-SCNC: 139 MMOL/L (ref 137–145)
SP GR UR STRIP: >=1.03 (ref 1–1.03)
SP GR UR: 1.02 (ref 1–1.03)
SQUAMOUS #/AREA URNS HPF: ABNORMAL /HPF
UROBILINOGEN UR QL STRIP: ABNORMAL
UROBILINOGEN UR QL: ABNORMAL
WBC NRBC COR # BLD: 8.18 10*3/MM3 (ref 4.8–10.8)
WBC UR QL AUTO: ABNORMAL /HPF

## 2018-06-27 PROCEDURE — 25010000002 PROMETHAZINE PER 50 MG: Performed by: EMERGENCY MEDICINE

## 2018-06-27 PROCEDURE — 80053 COMPREHEN METABOLIC PANEL: CPT | Performed by: EMERGENCY MEDICINE

## 2018-06-27 PROCEDURE — 36415 COLL VENOUS BLD VENIPUNCTURE: CPT

## 2018-06-27 PROCEDURE — 99213 OFFICE O/P EST LOW 20 MIN: CPT | Performed by: NURSE PRACTITIONER

## 2018-06-27 PROCEDURE — 81001 URINALYSIS AUTO W/SCOPE: CPT | Performed by: EMERGENCY MEDICINE

## 2018-06-27 PROCEDURE — 85025 COMPLETE CBC W/AUTO DIFF WBC: CPT | Performed by: EMERGENCY MEDICINE

## 2018-06-27 PROCEDURE — 83690 ASSAY OF LIPASE: CPT | Performed by: EMERGENCY MEDICINE

## 2018-06-27 PROCEDURE — 96374 THER/PROPH/DIAG INJ IV PUSH: CPT

## 2018-06-27 PROCEDURE — 99284 EMERGENCY DEPT VISIT MOD MDM: CPT

## 2018-06-27 RX ORDER — PROMETHAZINE HYDROCHLORIDE 25 MG/ML
25 INJECTION, SOLUTION INTRAMUSCULAR; INTRAVENOUS ONCE
Status: COMPLETED | OUTPATIENT
Start: 2018-06-27 | End: 2018-06-27

## 2018-06-27 RX ORDER — NITROFURANTOIN 25; 75 MG/1; MG/1
100 CAPSULE ORAL 2 TIMES DAILY
Qty: 10 CAPSULE | Refills: 0 | Status: SHIPPED | OUTPATIENT
Start: 2018-06-27 | End: 2018-07-02

## 2018-06-27 RX ADMIN — PROMETHAZINE HYDROCHLORIDE 25 MG: 25 INJECTION INTRAMUSCULAR; INTRAVENOUS at 09:58

## 2018-06-27 NOTE — PROGRESS NOTES
Chief Complaint / Reason:      Chief Complaint   Patient presents with   • Fever     ongoing, but the last few days have been worse. Dizzy spells.    • Abdominal Pain   • Diarrhea   • Vomiting   • Headache       Subjective     HPI  Patient presents today with complaints of ongoing fever and states that the last 2 days symptoms have been worse she has had dizzy spells abdominal pain diarrhea vomiting and headache.  She states that she is not able to keep anything down and has tried taking Phenergan and Zofran.  Denies chest pain, shortness of breath or heart palpitations.  She is 2 weeks pregnant and she denies any vaginal bleeding.  No blood was noted in urinalysis today but she does have trace protein and ketones noted.  History taken from: patient    PMH/FH/Social History were reviewed and updated appropriately in the electronic medical record.     Review of Systems:   Review of Systems   Constitutional: Positive for chills, fatigue and fever.   Respiratory: Negative.    Cardiovascular: Negative.    Gastrointestinal: Positive for abdominal pain.   Genitourinary: Negative for vaginal bleeding.   Neurological: Negative.      All other systems were reviewed and are negative.  Exceptions are noted in the subjective or above.      Objective     Vital Signs  Vitals:    06/27/18 0818   BP: 110/70   Pulse: 90   Resp: 16   Temp: 97.7 °F (36.5 °C)   SpO2: 98%       Body mass index is 32.02 kg/m².    Physical Exam   Constitutional: She is oriented to person, place, and time. She appears well-developed and well-nourished. No distress.   HENT:   Mouth/Throat: Mucous membranes are dry. Posterior oropharyngeal erythema present.   Cardiovascular: Normal rate, regular rhythm, normal heart sounds and intact distal pulses.    Pulmonary/Chest: Effort normal and breath sounds normal. She has no wheezes. She exhibits no tenderness.   Abdominal: There is generalized tenderness.   Neurological: She is alert and oriented to person,  place, and time.   Skin: Skin is warm and dry. Capillary refill takes less than 2 seconds. No rash noted. No erythema. No pallor.   Psychiatric: She has a normal mood and affect. Her behavior is normal. Judgment and thought content normal.   Nursing note and vitals reviewed.       Results Review:    I reviewed the patient's new clinical results.   Office Visit on 06/27/2018   Component Date Value Ref Range Status   • Color 06/27/2018 Dark Yellow  Yellow, Straw, Dark Yellow, Adele Final   • Clarity, UA 06/27/2018 Cloudy* Clear Final   • Specific Gravity  06/27/2018 1.025  1.005 - 1.030 Final   • pH, Urine 06/27/2018 5.0  5.0 - 8.0 Final   • Leukocytes 06/27/2018 Negative  Negative Final   • Nitrite, UA 06/27/2018 Negative  Negative Final   • Protein, POC 06/27/2018 Trace* Negative mg/dL Final   • Glucose, UA 06/27/2018 Negative  Negative, 1000 mg/dL (3+) mg/dL Final   • Ketones, UA 06/27/2018 50 mg/dL* Negative Final   • Urobilinogen, UA 06/27/2018 1 E.U./dL * Normal Final   • Bilirubin 06/27/2018 1 mg/dL* Negative Final   • Blood, UA 06/27/2018 Negative  Negative Final         Medication Review:     Current Outpatient Prescriptions:   •  doxylamine-pyridoxine (DICLEGIS) 10-10 MG tablet delayed-release EC tablet, Take 2 tablets by mouth At Night As Needed (for nausea). (Patient taking differently: Take 2 tablets by mouth At Night As Needed (for nausea). Hasn't been able to start yet), Disp: 60 tablet, Rfl: 0  •  enoxaparin (LOVENOX) 40 MG/0.4ML solution syringe, Inject 0.4 mL under the skin Daily. (Patient taking differently: Inject 40 mg under the skin Daily. Has not been able to start yet.), Disp: 11.2 mL, Rfl: 6  •  flunisolide (NASALIDE) 25 MCG/ACT (0.025%) solution nasal spray, Inhale 2 sprays Every 12 (Twelve) Hours., Disp: 1 bottle, Rfl: 3  •  promethazine (PHENERGAN) 25 MG suppository, Insert 1 suppository into the rectum Every 6 (Six) Hours As Needed for Nausea or Vomiting., Disp: 6 suppository, Rfl: 0  •   promethazine (PHENERGAN) 25 MG tablet, Take 1 tablet by mouth Every 6 (Six) Hours As Needed for Nausea or Vomiting., Disp: 12 tablet, Rfl: 0  •  raNITIdine (ZANTAC) 150 MG tablet, Take 1 tablet by mouth As Needed for Heartburn or Indigestion., Disp: 30 tablet, Rfl: 3  •  vitamin B-6 (PYRIDOXINE) 25 MG tablet, Take 0.5 tablets by mouth Every 6 (Six) Hours As Needed (nausea)., Disp: 15 tablet, Rfl: 0    Assessment/Plan   Marilu was seen today for fever, abdominal pain, diarrhea, vomiting and headache.    Diagnoses and all orders for this visit:    Abdominal pain, unspecified abdominal location  -     POCT urinalysis dipstick, automated    10-week cessation of pregnancy     Combined abdominal pain, vomiting, and diarrhea    Urine ketones  recommend patient increase water intake and maintain nutrition      Collaborated with Dr. Barker and also called Dr. shaikh's office regarding patient's current health status recommend patient go to ER.  Contacted [facility] ER and let them know patient was coming.   Return if symptoms worsen or fail to improve.    Barby Garcia, APRN  06/27/2018

## 2018-07-01 ENCOUNTER — HOSPITAL ENCOUNTER (EMERGENCY)
Facility: HOSPITAL | Age: 28
Discharge: HOME | End: 2018-07-01
Attending: EMERGENCY MEDICINE | Admitting: EMERGENCY MEDICINE

## 2018-07-01 VITALS
WEIGHT: 183.8 LBS | HEIGHT: 63 IN | OXYGEN SATURATION: 100 % | TEMPERATURE: 98.7 F | RESPIRATION RATE: 18 BRPM | HEART RATE: 94 BPM | BODY MASS INDEX: 32.57 KG/M2 | DIASTOLIC BLOOD PRESSURE: 67 MMHG | SYSTOLIC BLOOD PRESSURE: 105 MMHG

## 2018-07-01 DIAGNOSIS — K59.00 CONSTIPATION, UNSPECIFIED CONSTIPATION TYPE: Primary | ICD-10-CM

## 2018-07-01 DIAGNOSIS — Z3A.11 11 WEEKS GESTATION OF PREGNANCY: ICD-10-CM

## 2018-07-01 LAB
# FETUSES US: 1
ALBUMIN SERPL-MCNC: 3.8 G/DL (ref 3.5–5)
ALBUMIN/GLOB SERPL: 1.3 G/DL (ref 1–2)
ALP SERPL-CCNC: 59 U/L (ref 38–126)
ALT SERPL W P-5'-P-CCNC: 19 U/L (ref 13–69)
ANION GAP SERPL CALCULATED.3IONS-SCNC: 11.5 MMOL/L (ref 10–20)
AST SERPL-CCNC: 14 U/L (ref 15–46)
BASOPHILS # BLD AUTO: 0.02 10*3/MM3 (ref 0–0.2)
BASOPHILS NFR BLD AUTO: 0.2 % (ref 0–2.5)
BILIRUB SERPL-MCNC: 0.6 MG/DL (ref 0.2–1.3)
BILIRUB UR QL STRIP: NEGATIVE
BUN BLD-MCNC: 5 MG/DL (ref 7–20)
BUN/CREAT SERPL: 10 (ref 7.1–23.5)
CALCIUM SPEC-SCNC: 8.8 MG/DL (ref 8.4–10.2)
CFDNA.FET/CFDNA.TOTAL SFR FETUS: 6.3 %
CHLORIDE SERPL-SCNC: 103 MMOL/L (ref 98–107)
CHR X + Y ANEUP PLAS.CFDNA: NORMAL
CITATION REF LAB TEST: NORMAL
CLARITY UR: ABNORMAL
CO2 SERPL-SCNC: 28 MMOL/L (ref 26–30)
COLOR UR: YELLOW
CREAT BLD-MCNC: 0.5 MG/DL (ref 0.6–1.3)
CYTOGENETICS STUDY: NORMAL
DEPRECATED RDW RBC AUTO: 39 FL (ref 37–54)
EOSINOPHIL # BLD AUTO: 0.04 10*3/MM3 (ref 0–0.7)
EOSINOPHIL NFR BLD AUTO: 0.4 % (ref 0–7)
ERYTHROCYTE [DISTWIDTH] IN BLOOD BY AUTOMATED COUNT: 12.2 % (ref 11.5–14.5)
FET 13+18+21+X+Y ANEUP PLAS.CFDNA: NORMAL
FET CHR 13 TS PLAS.CFDNA QL: NORMAL
FET CHR 13 TS PLAS.CFDNA QL: NORMAL
FET CHR 18 TS PLAS.CFDNA QL: NORMAL
FET CHR 18 TS PLAS.CFDNA QL: NORMAL
FET CHR 21 TS PLAS.CFDNA QL: NORMAL
FET CHR 21 TS PLAS.CFDNA QL: NORMAL
FET CHROM X + Y ANEUP CFDNA IMP: NORMAL
GA: 10 WEEKS
GENETIC ALGORITHM SENSITIVITY: NORMAL %
GFR SERPL CREATININE-BSD FRML MDRD: 148 ML/MIN/1.73
GLOBULIN UR ELPH-MCNC: 2.9 GM/DL
GLUCOSE BLD-MCNC: 118 MG/DL (ref 74–98)
GLUCOSE UR STRIP-MCNC: NEGATIVE MG/DL
HCT VFR BLD AUTO: 36 % (ref 37–47)
HGB BLD-MCNC: 12.7 G/DL (ref 12–16)
HGB UR QL STRIP.AUTO: NEGATIVE
IMM GRANULOCYTES # BLD: 0.03 10*3/MM3 (ref 0–0.06)
IMM GRANULOCYTES NFR BLD: 0.3 % (ref 0–0.6)
KETONES UR QL STRIP: NEGATIVE
LAB DIRECTOR NAME PROVIDER: NORMAL
LEUKOCYTE ESTERASE UR QL STRIP.AUTO: NEGATIVE
LYMPHOCYTES # BLD AUTO: 1.53 10*3/MM3 (ref 0.6–3.4)
LYMPHOCYTES NFR BLD AUTO: 15.8 % (ref 10–50)
Lab: NORMAL
MCH RBC QN AUTO: 30.8 PG (ref 27–31)
MCHC RBC AUTO-ENTMCNC: 35.3 G/DL (ref 30–37)
MCV RBC AUTO: 87.2 FL (ref 81–99)
MONOCYTES # BLD AUTO: 0.45 10*3/MM3 (ref 0–0.9)
MONOCYTES NFR BLD AUTO: 4.6 % (ref 0–12)
NEUTROPHILS # BLD AUTO: 7.62 10*3/MM3 (ref 2–6.9)
NEUTROPHILS NFR BLD AUTO: 78.7 % (ref 37–80)
NITRITE UR QL STRIP: NEGATIVE
NRBC BLD MANUAL-RTO: 0 /100 WBC (ref 0–0)
PH UR STRIP.AUTO: 6 [PH] (ref 5–8)
PLATELET # BLD AUTO: 259 10*3/MM3 (ref 130–400)
PMV BLD AUTO: 9.5 FL (ref 6–12)
POTASSIUM BLD-SCNC: 3.5 MMOL/L (ref 3.5–5.1)
PROT SERPL-MCNC: 6.7 G/DL (ref 6.3–8.2)
PROT UR QL STRIP: NEGATIVE
RBC # BLD AUTO: 4.13 10*6/MM3 (ref 4.2–5.4)
REASON FOR REFERRAL (NARRATIVE): NORMAL
REF LAB TEST METHOD: NORMAL
SERVICE CMNT 02-IMP: NORMAL
SERVICE CMNT-IMP: NORMAL
SODIUM BLD-SCNC: 139 MMOL/L (ref 137–145)
SP GR UR STRIP: >=1.03 (ref 1–1.03)
UROBILINOGEN UR QL STRIP: ABNORMAL
WBC NRBC COR # BLD: 9.69 10*3/MM3 (ref 4.8–10.8)

## 2018-07-01 PROCEDURE — 85025 COMPLETE CBC W/AUTO DIFF WBC: CPT | Performed by: PHYSICIAN ASSISTANT

## 2018-07-01 PROCEDURE — 80053 COMPREHEN METABOLIC PANEL: CPT | Performed by: PHYSICIAN ASSISTANT

## 2018-07-01 PROCEDURE — 81003 URINALYSIS AUTO W/O SCOPE: CPT | Performed by: PHYSICIAN ASSISTANT

## 2018-07-01 PROCEDURE — 99284 EMERGENCY DEPT VISIT MOD MDM: CPT

## 2018-07-01 RX ORDER — SODIUM CHLORIDE 0.9 % (FLUSH) 0.9 %
10 SYRINGE (ML) INJECTION AS NEEDED
Status: DISCONTINUED | OUTPATIENT
Start: 2018-07-01 | End: 2018-07-01 | Stop reason: HOSPADM

## 2018-07-01 RX ADMIN — SODIUM CHLORIDE 1000 ML: 9 INJECTION, SOLUTION INTRAVENOUS at 17:59

## 2018-07-01 NOTE — ED PROVIDER NOTES
Subjective   28 y/o female that comes in with c/c right lower quadrant abdominal pain, constipation for the past day.  Patient does report she is 11 weeks pregnant.  Does state she's had normal prenatal care up to this point with ultrasound confirms intrauterine pregnancy.  Patient denies any associated fever, chills, diarrhea, nausea, vomiting, dysuria.        History provided by:  Patient   used: No    Pregnancy Problem   The current episode started today. Episode is moderate. Gestational age is 11 weeks.   Primary symptoms include abdominal pain.   Associated symptoms include no dysuria, no fever, no headaches, no malaise, no nausea, no syncope, no vomiting and no weakness. Prior pregnancy complications do not include history of gestational diabetes, miscarriage, history of placental abruption, history of pre-eclampsia or history of  labor.   Risk factors do not include diabetes, gestational diabetes, multiple pregnancy, obesity, placenta previa or pre-eclampsia.       Review of Systems   Constitutional: Negative for appetite change, chills, diaphoresis and fever.   Respiratory: Negative for apnea, cough, choking and chest tightness.    Cardiovascular: Negative for syncope.   Gastrointestinal: Positive for abdominal distention and abdominal pain. Negative for diarrhea, nausea and vomiting.   Genitourinary: Negative for difficulty urinating, dyspareunia, dysuria, enuresis and frequency.   Musculoskeletal: Negative for arthralgias, back pain, gait problem and joint swelling.   Neurological: Negative for weakness and headaches.   All other systems reviewed and are negative.      Past Medical History:   Diagnosis Date   • Abdominal pain    • Abnormal Pap smear of cervix    • Anxiety    • Asthma    • Bleeding disorder 16   • Body piercing     TONGUE, NOSE, TWO IN EACH EAR   • Chest pain    • Clotting disorder     factor 5   • Constipation    • Coronary artery disease involving  native coronary artery of native heart without angina pectoris 2017   • Deep vein thrombosis 16   • Diarrhea    • Diarrhea    • Endometriosis    • Factor 5 Leiden mutation, heterozygous    • Fibroid    • Gallstone    • GERD (gastroesophageal reflux disease)    • H/O blood clots    • Headache    • Hiatal hernia    • High cholesterol    • History of recurrent UTIs    • HPV (human papilloma virus) infection    • Hypertension    • Kidney infection    • Migraine    • Nausea    • Nausea & vomiting    • Obesity    • Orthostatic hypotension    • Osteoarthritis    • Ovarian cyst    • Pollen allergies    • Protein S deficiency 2016    labs from hospitalization for PE   • Pulmonary embolism    • Recurrent pregnancy loss, antepartum condition or complication    • Sleep apnea     CPAP ASKED TO BRING DOS   • Subclinical hyperthyroidism    • Tachycardia    • Tattoo     LOWER BACK   • Varicella        Allergies   Allergen Reactions   • Amoxicillin Rash       Past Surgical History:   Procedure Laterality Date   •  SECTION      X2  and    • CHOLECYSTECTOMY     • COLONOSCOPY N/A 2017    Procedure: COLONOSCOPY WITH BIOPSIES AND ARGON THERMAL ABLATION;  Surgeon: Jignesh Selby MD;  Location: [facility] ENDOSCOPY;  Service:    • DIAGNOSTIC LAPAROSCOPY N/A 2018    Procedure: DIAGNOSTIC LAPAROSCOPY AND ABLATION OF ENDOMETRIOSIS;  Surgeon: Evin Zamudio MD;  Location: [facility] OR;  Service: Obstetrics/Gynecology   • ENDOMETRIAL ABLATION     • ENDOSCOPY N/A 2017    Procedure: ESOPHAGOGASTRODUODENOSCOPY WITH BIOPSIES AND COLD BIOPSY POLYPECTOMIES;  Surgeon: Jignesh Selby MD;  Location: [facility] ENDOSCOPY;  Service:    • PELVIC LAPAROSCOPY         Family History   Problem Relation Age of Onset   • Arthritis Mother    • COPD Mother    • Asthma Mother    • Thyroid disease Mother    • Arthritis Father    • Diabetes Father    • Hypertension Father    • Hyperlipidemia Father    • Kidney disease  Father    • Heart attack Father    • Coronary artery disease Father    • No Known Problems Son    • Colon cancer Neg Hx    • Liver cancer Neg Hx    • Liver disease Neg Hx    • Stomach cancer Neg Hx    • Esophageal cancer Neg Hx        Social History     Social History   • Marital status:      Social History Main Topics   • Smoking status: Never Smoker   • Smokeless tobacco: Never Used   • Alcohol use No   • Drug use: No   • Sexual activity: Yes     Partners: Male     Birth control/ protection: None      Comment: PE while on birth control patch     Other Topics Concern   • Not on file           Objective   Physical Exam   Constitutional: She is oriented to person, place, and time. She appears well-developed and well-nourished.   HENT:   Head: Normocephalic and atraumatic.   Right Ear: External ear normal.   Left Ear: External ear normal.   Nose: Nose normal.   Mouth/Throat: Oropharynx is clear and moist.   Eyes: Conjunctivae and EOM are normal. Pupils are equal, round, and reactive to light.   Neck: Normal range of motion. Neck supple. No JVD present. No tracheal deviation present. No thyromegaly present.   Cardiovascular: Normal rate, regular rhythm, normal heart sounds and intact distal pulses.  Exam reveals no friction rub.    No murmur heard.  Pulmonary/Chest: Effort normal and breath sounds normal. No stridor. No respiratory distress. She has no wheezes.   Abdominal: Soft. Normal appearance and bowel sounds are normal. She exhibits distension. There is generalized tenderness and tenderness in the right lower quadrant. There is guarding.   Musculoskeletal: Normal range of motion.   Neurological: She is alert and oriented to person, place, and time. She displays normal reflexes. No cranial nerve deficit. Coordination normal.   Skin: Skin is warm and dry. Capillary refill takes less than 2 seconds. No rash noted. No erythema. No pallor.   Psychiatric: She has a normal mood and affect. Her behavior is normal.  Judgment and thought content normal.   Nursing note and vitals reviewed.      Procedures           ED Course  ED Course as of Jul 01 1906   Sun Jul 01, 2018   1858 Discussed care with patient. Advised to follow-up with her ob/gyn. Patient will be given Milk of magnesium for constipation.   []      ED Course User Index  [] Kwasi Waller PA-C                  [facility]      Final diagnoses:   Constipation, unspecified constipation type   11 weeks gestation of pregnancy            Kwasi Waller PA-C  07/01/18 1901       Kwasi Waller PA-C  07/01/18 1906

## 2018-07-09 ENCOUNTER — OFFICE VISIT (OUTPATIENT)
Dept: INTERNAL MEDICINE | Facility: CLINIC | Age: 28
End: 2018-07-09

## 2018-07-09 VITALS
DIASTOLIC BLOOD PRESSURE: 78 MMHG | HEART RATE: 103 BPM | OXYGEN SATURATION: 99 % | SYSTOLIC BLOOD PRESSURE: 132 MMHG | HEIGHT: 63 IN | BODY MASS INDEX: 32.78 KG/M2 | WEIGHT: 185 LBS | TEMPERATURE: 99.2 F

## 2018-07-09 DIAGNOSIS — R68.84 JAW PAIN: Primary | ICD-10-CM

## 2018-07-09 DIAGNOSIS — K02.9 INFECTED DENTAL CARIES: ICD-10-CM

## 2018-07-09 DIAGNOSIS — K04.7 INFECTED DENTAL CARIES: ICD-10-CM

## 2018-07-09 PROCEDURE — 99213 OFFICE O/P EST LOW 20 MIN: CPT | Performed by: PHYSICIAN ASSISTANT

## 2018-07-09 RX ORDER — CEPHALEXIN 500 MG/1
500 CAPSULE ORAL 3 TIMES DAILY
Qty: 30 CAPSULE | Refills: 0 | Status: SHIPPED | OUTPATIENT
Start: 2018-07-09 | End: 2018-07-19

## 2018-07-09 NOTE — PROGRESS NOTES
"Subjective     Chief Complaint: jaw pain    History of Present Illness     Marilu Godoy is a 27 y.o. female presenting with complaints of jaw pain on the left side ×2 days, radiates into ear and up towards left eye.  Sore throat began this morning.  Patient does have a history of infected dental caries.  Was referred to oral maxillofacial surgery to have these removed, however has not been able to deal with this since she's gotten pregnant.  Low-grade fever today.  She has taken Tylenol but has not seemed to help.  Denies any cough, shortness of breath, chest pain, nausea, vomiting, diarrhea at this time.    The following portions of the patient's history were reviewed and updated as appropriate: current medications, allergies, PMH.    Review of Systems   Constitutional: Positive for fever. Negative for chills and fatigue.   HENT: Positive for dental problem, facial swelling and sore throat.         Jaw pain.   Respiratory: Negative for cough, chest tightness, shortness of breath and wheezing.    Cardiovascular: Negative for chest pain, palpitations and leg swelling.   Neurological: Negative for dizziness, weakness, light-headedness, numbness and headaches.     Objective     Vitals:    07/09/18 1318   BP: 132/78   Pulse: 103   Temp: 99.2 °F (37.3 °C)   SpO2: 99%   Weight: 83.9 kg (185 lb)   Height: 160 cm (62.99\")     Physical Exam   Constitutional: She appears well-developed and well-nourished. No distress.   Low-grade fever.   HENT:   Right Ear: Tympanic membrane and external ear normal.   Left Ear: Tympanic membrane and external ear normal.   Mouth/Throat: Mucous membranes are normal. Dental caries present.   Eroded dental caries to upper and lower molars. Gums tender to palpation bilaterally.   Eyes: EOM are normal. Pupils are equal, round, and reactive to light.   Neck: Normal range of motion. Neck supple.   Cardiovascular: Normal rate and regular rhythm.    Pulmonary/Chest: Effort normal and breath " sounds normal.   Lymphadenopathy:     She has cervical adenopathy.       Assessment/Plan     Diagnoses and all orders for this visit:    Jaw pain  -     cephalexin (KEFLEX) 500 MG capsule; Take 1 capsule by mouth 3 (Three) Times a Day for 10 days.    Infected dental caries  -     cephalexin (KEFLEX) 500 MG capsule; Take 1 capsule by mouth 3 (Three) Times a Day for 10 days.      Encouraged patient to stay well-hydrated, may apply ice to outside of jaw.  Continue with Tylenol.  Patient was referred to oral maxillofacial surgery, but canceled her appointment due to pregnancy.  Likely will need to have these teeth pulled.    Jenniffer Berger PA-C  07/09/2018         Please note that portions of this note were completed with a voice recognition program. Efforts were made to edit dictation, but occasionally words are mistranscribed.

## 2018-07-17 ENCOUNTER — HOSPITAL ENCOUNTER (EMERGENCY)
Facility: HOSPITAL | Age: 28
Discharge: HOME | End: 2018-07-18
Attending: STUDENT IN AN ORGANIZED HEALTH CARE EDUCATION/TRAINING PROGRAM | Admitting: STUDENT IN AN ORGANIZED HEALTH CARE EDUCATION/TRAINING PROGRAM

## 2018-07-17 DIAGNOSIS — R11.2 NAUSEA AND VOMITING, INTRACTABILITY OF VOMITING NOT SPECIFIED, UNSPECIFIED VOMITING TYPE: Primary | ICD-10-CM

## 2018-07-17 DIAGNOSIS — Z3A.13 13 WEEKS GESTATION OF PREGNANCY: ICD-10-CM

## 2018-07-17 PROCEDURE — 96374 THER/PROPH/DIAG INJ IV PUSH: CPT

## 2018-07-17 PROCEDURE — 96361 HYDRATE IV INFUSION ADD-ON: CPT

## 2018-07-17 PROCEDURE — 99284 EMERGENCY DEPT VISIT MOD MDM: CPT

## 2018-07-17 RX ORDER — PROMETHAZINE HYDROCHLORIDE 25 MG/ML
12.5 INJECTION, SOLUTION INTRAMUSCULAR; INTRAVENOUS ONCE
Status: COMPLETED | OUTPATIENT
Start: 2018-07-17 | End: 2018-07-18

## 2018-07-18 VITALS
SYSTOLIC BLOOD PRESSURE: 99 MMHG | DIASTOLIC BLOOD PRESSURE: 66 MMHG | BODY MASS INDEX: 31.5 KG/M2 | HEIGHT: 63 IN | OXYGEN SATURATION: 97 % | RESPIRATION RATE: 16 BRPM | TEMPERATURE: 98.3 F | HEART RATE: 81 BPM | WEIGHT: 177.8 LBS

## 2018-07-18 LAB
ANION GAP SERPL CALCULATED.3IONS-SCNC: 11.6 MMOL/L (ref 10–20)
BASOPHILS # BLD AUTO: 0.03 10*3/MM3 (ref 0–0.2)
BASOPHILS NFR BLD AUTO: 0.3 % (ref 0–2.5)
BILIRUB UR QL STRIP: ABNORMAL
BUN BLD-MCNC: 4 MG/DL (ref 7–20)
BUN/CREAT SERPL: 8 (ref 7.1–23.5)
CALCIUM SPEC-SCNC: 9.5 MG/DL (ref 8.4–10.2)
CHLORIDE SERPL-SCNC: 102 MMOL/L (ref 98–107)
CLARITY UR: ABNORMAL
CO2 SERPL-SCNC: 26 MMOL/L (ref 26–30)
COLOR UR: ABNORMAL
CREAT BLD-MCNC: 0.5 MG/DL (ref 0.6–1.3)
DEPRECATED RDW RBC AUTO: 39.7 FL (ref 37–54)
EOSINOPHIL # BLD AUTO: 0.06 10*3/MM3 (ref 0–0.7)
EOSINOPHIL NFR BLD AUTO: 0.7 % (ref 0–7)
ERYTHROCYTE [DISTWIDTH] IN BLOOD BY AUTOMATED COUNT: 12.4 % (ref 11.5–14.5)
GFR SERPL CREATININE-BSD FRML MDRD: 148 ML/MIN/1.73
GLUCOSE BLD-MCNC: 89 MG/DL (ref 74–98)
GLUCOSE UR STRIP-MCNC: NEGATIVE MG/DL
HCT VFR BLD AUTO: 36.3 % (ref 37–47)
HGB BLD-MCNC: 12.9 G/DL (ref 12–16)
HGB UR QL STRIP.AUTO: NEGATIVE
HOLD SPECIMEN: NORMAL
HOLD SPECIMEN: NORMAL
IMM GRANULOCYTES # BLD: 0.03 10*3/MM3 (ref 0–0.06)
IMM GRANULOCYTES NFR BLD: 0.3 % (ref 0–0.6)
KETONES UR QL STRIP: ABNORMAL
LEUKOCYTE ESTERASE UR QL STRIP.AUTO: NEGATIVE
LYMPHOCYTES # BLD AUTO: 2.18 10*3/MM3 (ref 0.6–3.4)
LYMPHOCYTES NFR BLD AUTO: 24.4 % (ref 10–50)
MCH RBC QN AUTO: 31.2 PG (ref 27–31)
MCHC RBC AUTO-ENTMCNC: 35.5 G/DL (ref 30–37)
MCV RBC AUTO: 87.9 FL (ref 81–99)
MONOCYTES # BLD AUTO: 0.44 10*3/MM3 (ref 0–0.9)
MONOCYTES NFR BLD AUTO: 4.9 % (ref 0–12)
NEUTROPHILS # BLD AUTO: 6.2 10*3/MM3 (ref 2–6.9)
NEUTROPHILS NFR BLD AUTO: 69.4 % (ref 37–80)
NITRITE UR QL STRIP: NEGATIVE
NRBC BLD MANUAL-RTO: 0 /100 WBC (ref 0–0)
PH UR STRIP.AUTO: 5.5 [PH] (ref 5–8)
PLATELET # BLD AUTO: 268 10*3/MM3 (ref 130–400)
PMV BLD AUTO: 9.6 FL (ref 6–12)
POTASSIUM BLD-SCNC: 3.6 MMOL/L (ref 3.5–5.1)
PROT UR QL STRIP: NEGATIVE
RBC # BLD AUTO: 4.13 10*6/MM3 (ref 4.2–5.4)
SODIUM BLD-SCNC: 136 MMOL/L (ref 137–145)
SP GR UR STRIP: >=1.03 (ref 1–1.03)
UROBILINOGEN UR QL STRIP: ABNORMAL
WBC NRBC COR # BLD: 8.94 10*3/MM3 (ref 4.8–10.8)
WHOLE BLOOD HOLD SPECIMEN: NORMAL

## 2018-07-18 PROCEDURE — 96374 THER/PROPH/DIAG INJ IV PUSH: CPT

## 2018-07-18 PROCEDURE — 85025 COMPLETE CBC W/AUTO DIFF WBC: CPT | Performed by: STUDENT IN AN ORGANIZED HEALTH CARE EDUCATION/TRAINING PROGRAM

## 2018-07-18 PROCEDURE — 80048 BASIC METABOLIC PNL TOTAL CA: CPT | Performed by: STUDENT IN AN ORGANIZED HEALTH CARE EDUCATION/TRAINING PROGRAM

## 2018-07-18 PROCEDURE — 96361 HYDRATE IV INFUSION ADD-ON: CPT

## 2018-07-18 PROCEDURE — 81003 URINALYSIS AUTO W/O SCOPE: CPT | Performed by: STUDENT IN AN ORGANIZED HEALTH CARE EDUCATION/TRAINING PROGRAM

## 2018-07-18 PROCEDURE — 25010000002 PROMETHAZINE PER 50 MG: Performed by: STUDENT IN AN ORGANIZED HEALTH CARE EDUCATION/TRAINING PROGRAM

## 2018-07-18 RX ADMIN — PROMETHAZINE HYDROCHLORIDE 12.5 MG: 25 INJECTION INTRAMUSCULAR; INTRAVENOUS at 00:13

## 2018-07-18 RX ADMIN — SODIUM CHLORIDE 1000 ML: 9 INJECTION, SOLUTION INTRAVENOUS at 00:12

## 2018-07-23 ENCOUNTER — OFFICE VISIT (OUTPATIENT)
Dept: INTERNAL MEDICINE | Facility: CLINIC | Age: 28
End: 2018-07-23

## 2018-07-23 ENCOUNTER — ROUTINE PRENATAL (OUTPATIENT)
Dept: OBSTETRICS AND GYNECOLOGY | Facility: CLINIC | Age: 28
End: 2018-07-23

## 2018-07-23 VITALS — SYSTOLIC BLOOD PRESSURE: 136 MMHG | BODY MASS INDEX: 32.07 KG/M2 | DIASTOLIC BLOOD PRESSURE: 70 MMHG | WEIGHT: 181 LBS

## 2018-07-23 VITALS
WEIGHT: 180 LBS | OXYGEN SATURATION: 98 % | HEIGHT: 63 IN | BODY MASS INDEX: 31.89 KG/M2 | TEMPERATURE: 98.6 F | SYSTOLIC BLOOD PRESSURE: 110 MMHG | HEART RATE: 96 BPM | DIASTOLIC BLOOD PRESSURE: 72 MMHG

## 2018-07-23 DIAGNOSIS — J02.0 STREP PHARYNGITIS: Primary | ICD-10-CM

## 2018-07-23 DIAGNOSIS — R11.2 NAUSEA AND VOMITING, INTRACTABILITY OF VOMITING NOT SPECIFIED, UNSPECIFIED VOMITING TYPE: ICD-10-CM

## 2018-07-23 DIAGNOSIS — O09.91 HIGH-RISK PREGNANCY IN FIRST TRIMESTER: Primary | ICD-10-CM

## 2018-07-23 LAB
BILIRUB BLD-MCNC: ABNORMAL MG/DL
CLARITY, POC: ABNORMAL
COLOR UR: YELLOW
EXPIRATION DATE: ABNORMAL
GLUCOSE UR STRIP-MCNC: NEGATIVE MG/DL
INTERNAL CONTROL: ABNORMAL
KETONES UR QL: ABNORMAL
LEUKOCYTE EST, POC: ABNORMAL
Lab: ABNORMAL
NITRITE UR-MCNC: NEGATIVE MG/ML
PH UR: 5 [PH] (ref 5–8)
PROT UR STRIP-MCNC: NEGATIVE MG/DL
RBC # UR STRIP: NEGATIVE /UL
S PYO AG THROAT QL: POSITIVE
SP GR UR: 1.03 (ref 1–1.03)
UROBILINOGEN UR QL: ABNORMAL

## 2018-07-23 PROCEDURE — 87880 STREP A ASSAY W/OPTIC: CPT | Performed by: PHYSICIAN ASSISTANT

## 2018-07-23 PROCEDURE — 99213 OFFICE O/P EST LOW 20 MIN: CPT | Performed by: PHYSICIAN ASSISTANT

## 2018-07-23 PROCEDURE — 99214 OFFICE O/P EST MOD 30 MIN: CPT | Performed by: NURSE PRACTITIONER

## 2018-07-23 RX ORDER — LORATADINE 10 MG/1
10 TABLET ORAL DAILY
Qty: 30 TABLET | Refills: 11 | Status: SHIPPED | OUTPATIENT
Start: 2018-07-23 | End: 2018-12-18 | Stop reason: SDUPTHER

## 2018-07-23 RX ORDER — CEPHALEXIN 500 MG/1
500 CAPSULE ORAL 2 TIMES DAILY
Qty: 20 CAPSULE | Refills: 0 | Status: SHIPPED | OUTPATIENT
Start: 2018-07-23 | End: 2018-08-02

## 2018-07-23 NOTE — PROGRESS NOTES
Marilu Godoy is a 27 y.o. female.     Subjective   History of Present Illness   Here today with concern of diarrhea, vomiting, headaches and little sore throat in the last 3-4 days. She is 14 weeks pregnant and has had nausea and vomiting but worse in the last few days.  She has also had fever up to 102 intermittently but typically worse in the evenings. She denies abdominal pain. She has also had some chest pains in the left side for last few days which she feels may be due to straining from vomiting.           The following portions of the patient's history were reviewed and updated as appropriate: allergies, current medications, past family history, past medical history, past social history, past surgical history and problem list.    Review of Systems    Constitutional: fever. Negative for appetite change, chills, fatigue and unexpected weight change.   HENT: sore throat. Negative for congestion, ear pain, hearing loss, nosebleeds, postnasal drip, rhinorrhea, tinnitus and trouble swallowing.    Eyes: Negative for photophobia, discharge and visual disturbance.   Respiratory: Negative for cough, chest tightness, shortness of breath and wheezing.    Cardiovascular: Negative for chest pain, palpitations and leg swelling.   Gastrointestinal: diarrhea, nausea and vomiting. Negative for abdominal distention, abdominal pain, blood in stool, constipation.   Endocrine: Negative for cold intolerance, heat intolerance, polydipsia, polyphagia and polyuria.   Musculoskeletal: Negative for arthralgias, back pain, joint swelling, myalgias, neck pain and neck stiffness.   Skin: Negative for color change, pallor, rash and wound.   Allergic/Immunologic: Negative for environmental allergies, food allergies and immunocompromised state.   Neurological: headache.  Negative for dizziness, tremors, seizures, weakness, numbness.  Hematological: Negative for adenopathy. Does not bruise/bleed easily.   Psychiatric/Behavioral:  "Negative for sleep disturbances, agitation, behavioral problems, confusion, hallucinations, self-injury and suicidal ideas. The patient is not nervous/anxious.      Objective    Physical Exam  Constitutional: Oriented to person, place, and time. Appears well-developed and well-nourished.   HENT: OP erythema without petechiae or exudate. TMs normal.   Head: No sinus tenderness. Normocephalic and atraumatic.   Eyes: EOM are normal. Pupils are equal, round, and reactive to light.   Neck: mild anterior cervical lymphadenopathy bilaterally. Normal range of motion. Neck supple.   Cardiovascular: Normal rate, regular rhythm and normal heart sounds.    Pulmonary/Chest: Effort normal and breath sounds normal. No respiratory distress.  Has no wheezes or rales. Exhibits no chest wall tenderness.   Abdominal: mild lower abdominal tenderness. Soft. Bowel sounds are normal. Exhibits no distension and no mass.   Musculoskeletal: Normal range of motion. Exhibits no tenderness.   Neurological: Alert and oriented to person, place, and time.   Skin: Skin is warm and dry.   Psychiatric: Has a normal mood and affect. Behavior is normal. Judgment and thought content normal.       /72   Pulse 96   Temp 98.6 °F (37 °C)   Ht 160 cm (62.99\")   Wt 81.6 kg (180 lb)   LMP 04/16/2018   SpO2 98%   BMI 31.89 kg/m²     Nursing note and vitals reviewed.        Assessment/Plan   Marilu was seen today for vomiting, diarrhea and migraine.    Diagnoses and all orders for this visit:    Strep pharyngitis  -     POC Rapid Strep A - positive  -     cephalexin (KEFLEX) 500 MG capsule; Take 1 capsule by mouth 2 (Two) Times a Day for 10 days.    Increase PO fluid intake. Ibuprofen and/or Tylenol prn for pain and fever control.   Begin using new toothbrush after 2 days of antibiotic use.     Nausea and vomiting, intractability of vomiting not specified, unspecified vomiting type  -     POCT urinalysis dipstick, automated  -     Urine Culture - " Urine, Urine, Clean Catch      Call or RTC if symptoms worsen or persist.

## 2018-07-23 NOTE — PROGRESS NOTES
[id number]  Chief Complaint   Patient presents with   • Routine Prenatal Visit     c/o low back pain, seen at PCP this AM and urine sent off for culture, started on antibiotic         HPI  , 14w0d reports some diarrhea x 3-4 days - having some n/v 2 x / day - bad.  Has been to hospital several x for fluids and evaluation.  Recent visit was 5 days ago.   Some migraines - once a day - last 2-3 hours - no aura prior to migraine - lays down to rest and takes tylenol.  Started 2-3 wks ago - received antibiotic for tooth infection - seems to have resolved - but h/a continues - thinks may be r/t n/v.   States occasionally have have spots in left eye with h/a - but not always  - Does not have h/a today.    States she tested + for strept throat today at PCP.  Continues lovenox as prescribed      ROS:  GI: Nausea - No; Constipation - No; Diarrhea - No    Neuro: Headache - No; Visual change - No     Chart Review :    6 days ago - CBC /BMP - results wnl   Today + rapid strept        EXAM  General Appearance: pleasant - no appearance of distress or pain - no n/v at present   Eyes: pupils equal and reactive to light   Lungs: Breathing unlabored  Abdomen:  See flow sheet for Fundal ht, FM, FHT's  LE: Neg edema    MDM  Impression:  Problems/Risk: High Risk Pregnancy  Hx P/E      N/V/D     + strept throat   Tests done today: none   Topics discussed: adequate rest and fluids  may try diclegis and 2 samples provided  - imodium for diarrhea - bland diet / pedialyte  If unable to keep fluids down to go to ER  Take antibiotic as prescribed  Anticipate h/a's to resolve - may do tylenol and sips of caffeine -  if h/a's  Worsens/visual disturbance or no improvement - needs to be seen.   encouraged questions - call prn    Tests next visit: none     Late entry - 2000 mg calcium daily     OB History      Para Term  AB Living    5 2 2 0 2 2    SAB TAB Ectopic Molar Multiple Live Births    2 0 0 0 0 2          Past Medical  History:   Diagnosis Date   • Abdominal pain    • Abnormal Pap smear of cervix    • Anxiety    • Asthma    • Bleeding disorder (CMS/HCC) 16   • Body piercing     TONGUE, NOSE, TWO IN EACH EAR   • Chest pain    • Clotting disorder (CMS/HCC)     factor 5   • Constipation    • Coronary artery disease involving native coronary artery of native heart without angina pectoris 2017   • Deep vein thrombosis (CMS/HCC) 16   • Diarrhea    • Diarrhea    • Endometriosis    • Factor 5 Leiden mutation, heterozygous (CMS/HCC)    • Fibroid    • Gallstone    • GERD (gastroesophageal reflux disease)    • H/O blood clots    • Headache    • Hiatal hernia    • High cholesterol    • History of recurrent UTIs    • HPV (human papilloma virus) infection    • Hypertension    • Kidney infection    • Migraine    • Nausea    • Nausea & vomiting    • Obesity    • Orthostatic hypotension    • Osteoarthritis    • Ovarian cyst    • Pollen allergies    • Protein S deficiency (CMS/HCC) 2016    labs from hospitalization for PE   • Pulmonary embolism (CMS/HCC)    • Recurrent pregnancy loss, antepartum condition or complication    • Sleep apnea     CPAP ASKED TO BRING DOS   • Subclinical hyperthyroidism    • Tachycardia    • Tattoo     LOWER BACK   • Varicella        Past Surgical History:   Procedure Laterality Date   •  SECTION      X2  and    • CHOLECYSTECTOMY     • COLONOSCOPY N/A 2017    Procedure: COLONOSCOPY WITH BIOPSIES AND ARGON THERMAL ABLATION;  Surgeon: Jignesh Selby MD;  Location: [facility] ENDOSCOPY;  Service:    • DIAGNOSTIC LAPAROSCOPY N/A 2018    Procedure: DIAGNOSTIC LAPAROSCOPY AND ABLATION OF ENDOMETRIOSIS;  Surgeon: Evin Zamudio MD;  Location: [facility] OR;  Service: Obstetrics/Gynecology   • ENDOMETRIAL ABLATION     • ENDOSCOPY N/A 2017    Procedure: ESOPHAGOGASTRODUODENOSCOPY WITH BIOPSIES AND COLD BIOPSY POLYPECTOMIES;  Surgeon: Jignesh Selby MD;  Location:   [facility] ENDOSCOPY;  Service:    • PELVIC LAPAROSCOPY         Family History   Problem Relation Age of Onset   • Arthritis Mother    • COPD Mother    • Asthma Mother    • Thyroid disease Mother    • Arthritis Father    • Diabetes Father    • Hypertension Father    • Hyperlipidemia Father    • Kidney disease Father    • Heart attack Father    • Coronary artery disease Father    • No Known Problems Son    • Colon cancer Neg Hx    • Liver cancer Neg Hx    • Liver disease Neg Hx    • Stomach cancer Neg Hx    • Esophageal cancer Neg Hx        Social History     Social History   • Marital status:      Spouse name: N/A   • Number of children: N/A   • Years of education: N/A     Occupational History   • Not on file.     Social History Main Topics   • Smoking status: Never Smoker   • Smokeless tobacco: Never Used   • Alcohol use No   • Drug use: No   • Sexual activity: Yes     Partners: Male     Birth control/ protection: None      Comment: PE while on birth control patch     Other Topics Concern   • Not on file     Social History Narrative   • No narrative on file

## 2018-07-24 ENCOUNTER — TELEPHONE (OUTPATIENT)
Dept: OBSTETRICS AND GYNECOLOGY | Facility: CLINIC | Age: 28
End: 2018-07-24

## 2018-07-24 NOTE — TELEPHONE ENCOUNTER
Radha   I saw pt yesterday - forgot to confirm - she should be takin mg calcium daily.  Could you ck if she is taking - ?  If she is not - she does need this.  Thanks RH

## 2018-07-25 LAB
BACTERIA UR CULT: NORMAL
BACTERIA UR CULT: NORMAL

## 2018-07-31 ENCOUNTER — APPOINTMENT (OUTPATIENT)
Dept: GENERAL RADIOLOGY | Facility: HOSPITAL | Age: 28
End: 2018-07-31

## 2018-07-31 ENCOUNTER — APPOINTMENT (OUTPATIENT)
Dept: ULTRASOUND IMAGING | Facility: HOSPITAL | Age: 28
End: 2018-07-31

## 2018-07-31 ENCOUNTER — HOSPITAL ENCOUNTER (EMERGENCY)
Facility: HOSPITAL | Age: 28
Discharge: HOME | End: 2018-07-31
Attending: STUDENT IN AN ORGANIZED HEALTH CARE EDUCATION/TRAINING PROGRAM | Admitting: STUDENT IN AN ORGANIZED HEALTH CARE EDUCATION/TRAINING PROGRAM

## 2018-07-31 VITALS
HEART RATE: 104 BPM | OXYGEN SATURATION: 100 % | HEIGHT: 63 IN | WEIGHT: 184.6 LBS | RESPIRATION RATE: 16 BRPM | BODY MASS INDEX: 32.71 KG/M2 | SYSTOLIC BLOOD PRESSURE: 111 MMHG | TEMPERATURE: 98 F | DIASTOLIC BLOOD PRESSURE: 72 MMHG

## 2018-07-31 DIAGNOSIS — R07.9 CHEST PAIN, UNSPECIFIED TYPE: Primary | ICD-10-CM

## 2018-07-31 LAB
ALBUMIN SERPL-MCNC: 3.8 G/DL (ref 3.5–5)
ALBUMIN/GLOB SERPL: 1.1 G/DL (ref 1–2)
ALP SERPL-CCNC: 77 U/L (ref 38–126)
ALT SERPL W P-5'-P-CCNC: 23 U/L (ref 13–69)
ANION GAP SERPL CALCULATED.3IONS-SCNC: 11.1 MMOL/L (ref 10–20)
AST SERPL-CCNC: 23 U/L (ref 15–46)
BASOPHILS # BLD AUTO: 0.03 10*3/MM3 (ref 0–0.2)
BASOPHILS NFR BLD AUTO: 0.3 % (ref 0–2.5)
BILIRUB SERPL-MCNC: 0.5 MG/DL (ref 0.2–1.3)
BUN BLD-MCNC: 6 MG/DL (ref 7–20)
BUN/CREAT SERPL: 15 (ref 7.1–23.5)
CALCIUM SPEC-SCNC: 9.2 MG/DL (ref 8.4–10.2)
CHLORIDE SERPL-SCNC: 106 MMOL/L (ref 98–107)
CO2 SERPL-SCNC: 24 MMOL/L (ref 26–30)
CREAT BLD-MCNC: 0.4 MG/DL (ref 0.6–1.3)
DEPRECATED RDW RBC AUTO: 41.4 FL (ref 37–54)
EOSINOPHIL # BLD AUTO: 0.1 10*3/MM3 (ref 0–0.7)
EOSINOPHIL NFR BLD AUTO: 0.8 % (ref 0–7)
ERYTHROCYTE [DISTWIDTH] IN BLOOD BY AUTOMATED COUNT: 12.6 % (ref 11.5–14.5)
GFR SERPL CREATININE-BSD FRML MDRD: >150 ML/MIN/1.73
GLOBULIN UR ELPH-MCNC: 3.6 GM/DL
GLUCOSE BLD-MCNC: 84 MG/DL (ref 74–98)
HCT VFR BLD AUTO: 38.6 % (ref 37–47)
HGB BLD-MCNC: 13.2 G/DL (ref 12–16)
HOLD SPECIMEN: NORMAL
IMM GRANULOCYTES # BLD: 0.05 10*3/MM3 (ref 0–0.06)
IMM GRANULOCYTES NFR BLD: 0.4 % (ref 0–0.6)
LYMPHOCYTES # BLD AUTO: 2.12 10*3/MM3 (ref 0.6–3.4)
LYMPHOCYTES NFR BLD AUTO: 17.9 % (ref 10–50)
MCH RBC QN AUTO: 30.6 PG (ref 27–31)
MCHC RBC AUTO-ENTMCNC: 34.2 G/DL (ref 30–37)
MCV RBC AUTO: 89.4 FL (ref 81–99)
MONOCYTES # BLD AUTO: 0.62 10*3/MM3 (ref 0–0.9)
MONOCYTES NFR BLD AUTO: 5.2 % (ref 0–12)
NEUTROPHILS # BLD AUTO: 8.9 10*3/MM3 (ref 2–6.9)
NEUTROPHILS NFR BLD AUTO: 75.4 % (ref 37–80)
NRBC BLD MANUAL-RTO: 0 /100 WBC (ref 0–0)
PLATELET # BLD AUTO: 273 10*3/MM3 (ref 130–400)
PMV BLD AUTO: 9.8 FL (ref 6–12)
POTASSIUM BLD-SCNC: 4.1 MMOL/L (ref 3.5–5.1)
PROT SERPL-MCNC: 7.4 G/DL (ref 6.3–8.2)
RBC # BLD AUTO: 4.32 10*6/MM3 (ref 4.2–5.4)
SODIUM BLD-SCNC: 137 MMOL/L (ref 137–145)
WBC NRBC COR # BLD: 11.82 10*3/MM3 (ref 4.8–10.8)
WHOLE BLOOD HOLD SPECIMEN: NORMAL

## 2018-07-31 PROCEDURE — 80053 COMPREHEN METABOLIC PANEL: CPT | Performed by: STUDENT IN AN ORGANIZED HEALTH CARE EDUCATION/TRAINING PROGRAM

## 2018-07-31 PROCEDURE — 93005 ELECTROCARDIOGRAM TRACING: CPT | Performed by: STUDENT IN AN ORGANIZED HEALTH CARE EDUCATION/TRAINING PROGRAM

## 2018-07-31 PROCEDURE — 93970 EXTREMITY STUDY: CPT

## 2018-07-31 PROCEDURE — 85025 COMPLETE CBC W/AUTO DIFF WBC: CPT | Performed by: STUDENT IN AN ORGANIZED HEALTH CARE EDUCATION/TRAINING PROGRAM

## 2018-07-31 PROCEDURE — 99284 EMERGENCY DEPT VISIT MOD MDM: CPT

## 2018-07-31 RX ORDER — ALUMINA, MAGNESIA, AND SIMETHICONE 2400; 2400; 240 MG/30ML; MG/30ML; MG/30ML
15 SUSPENSION ORAL ONCE
Status: COMPLETED | OUTPATIENT
Start: 2018-07-31 | End: 2018-07-31

## 2018-07-31 RX ORDER — SODIUM CHLORIDE 0.9 % (FLUSH) 0.9 %
10 SYRINGE (ML) INJECTION AS NEEDED
Status: DISCONTINUED | OUTPATIENT
Start: 2018-07-31 | End: 2018-07-31 | Stop reason: HOSPADM

## 2018-07-31 RX ADMIN — ALUMINUM HYDROXIDE, MAGNESIUM HYDROXIDE, AND DIMETHICONE 15 ML: 400; 400; 40 SUSPENSION ORAL at 16:05

## 2018-07-31 RX ADMIN — LIDOCAINE HYDROCHLORIDE 15 ML: 20 SOLUTION ORAL; TOPICAL at 16:06

## 2018-08-03 RX ORDER — CEFUROXIME AXETIL 250 MG/1
250 TABLET ORAL 2 TIMES DAILY
Qty: 14 TABLET | Refills: 0 | Status: SHIPPED | OUTPATIENT
Start: 2018-08-03 | End: 2018-08-06 | Stop reason: ALTCHOICE

## 2018-08-06 ENCOUNTER — OFFICE VISIT (OUTPATIENT)
Dept: INTERNAL MEDICINE | Facility: CLINIC | Age: 28
End: 2018-08-06

## 2018-08-06 ENCOUNTER — ROUTINE PRENATAL (OUTPATIENT)
Dept: OBSTETRICS AND GYNECOLOGY | Facility: CLINIC | Age: 28
End: 2018-08-06

## 2018-08-06 VITALS
OXYGEN SATURATION: 98 % | TEMPERATURE: 100.2 F | RESPIRATION RATE: 14 BRPM | HEART RATE: 96 BPM | WEIGHT: 180 LBS | DIASTOLIC BLOOD PRESSURE: 60 MMHG | BODY MASS INDEX: 31.89 KG/M2 | SYSTOLIC BLOOD PRESSURE: 100 MMHG | HEIGHT: 63 IN

## 2018-08-06 VITALS — DIASTOLIC BLOOD PRESSURE: 74 MMHG | WEIGHT: 181 LBS | SYSTOLIC BLOOD PRESSURE: 134 MMHG | BODY MASS INDEX: 32.06 KG/M2

## 2018-08-06 DIAGNOSIS — J02.9 ACUTE PHARYNGITIS, UNSPECIFIED ETIOLOGY: ICD-10-CM

## 2018-08-06 DIAGNOSIS — K04.7 INFECTED DENTAL CARIES: Primary | ICD-10-CM

## 2018-08-06 DIAGNOSIS — O09.92 SUPERVISION OF HIGH RISK PREGNANCY IN SECOND TRIMESTER: Primary | ICD-10-CM

## 2018-08-06 DIAGNOSIS — K02.9 INFECTED DENTAL CARIES: Primary | ICD-10-CM

## 2018-08-06 DIAGNOSIS — Z79.01 CHRONIC ANTICOAGULATION: ICD-10-CM

## 2018-08-06 LAB
EXPIRATION DATE: NORMAL
INTERNAL CONTROL: NORMAL
Lab: NORMAL
S PYO AG THROAT QL: NEGATIVE

## 2018-08-06 PROCEDURE — 99213 OFFICE O/P EST LOW 20 MIN: CPT | Performed by: MIDWIFE

## 2018-08-06 PROCEDURE — 99213 OFFICE O/P EST LOW 20 MIN: CPT | Performed by: PHYSICIAN ASSISTANT

## 2018-08-06 PROCEDURE — 87880 STREP A ASSAY W/OPTIC: CPT | Performed by: PHYSICIAN ASSISTANT

## 2018-08-06 RX ORDER — CEPHALEXIN 500 MG/1
500 CAPSULE ORAL 2 TIMES DAILY
Qty: 14 CAPSULE | Refills: 0 | Status: SHIPPED | OUTPATIENT
Start: 2018-08-06 | End: 2018-08-20

## 2018-08-06 NOTE — PROGRESS NOTES
Subjective     Chief Complaint: sore throat     History of Present Illness     Marilu Godoy is a 27 y.o. female presenting with complaints of sore throat, swollen lymph nodes, nausea, fevers, diarrhea.  Patient to 16 weeks pregnant.  She was started on Ceftin 2 days ago due to dental abscesses.  Patient is needing to have several teeth pulled, however they are not willing to operate as she is currently pregnant and on Lovenox.  Prior to this, patient had been treated with Keflex several weeks ago due to strep throat.  Patient tolerated the Keflex fine.  She was seen by OB/GYN this morning but told to follow-up with PCP regarding current symptoms.  She is still eating and drinking well. Denies any CP, cough, SOA, abdominal pain, urinary symptoms. She has not taken any tylenol today.    The following portions of the patient's history were reviewed and updated as appropriate: current medications, allergies, PMH.    Review of Systems   Constitutional: Positive for chills, fatigue and fever. Negative for appetite change and unexpected weight change.   HENT: Positive for sore throat. Negative for congestion, ear pain, hearing loss, nosebleeds, sinus pressure, tinnitus and trouble swallowing.    Eyes: Negative for pain, discharge, redness, itching and visual disturbance.   Respiratory: Negative for cough, chest tightness, shortness of breath and wheezing.    Cardiovascular: Negative for chest pain, palpitations and leg swelling.   Gastrointestinal: Positive for diarrhea and nausea. Negative for abdominal pain, blood in stool, constipation and vomiting.   Endocrine: Negative for cold intolerance, heat intolerance, polydipsia, polyphagia and polyuria.   Genitourinary: Negative for decreased urine volume, dysuria, flank pain, frequency and hematuria.   Musculoskeletal: Negative for arthralgias, back pain, gait problem, joint swelling, myalgias, neck pain and neck stiffness.   Skin: Negative for color change and rash.  "  Allergic/Immunologic: Negative for environmental allergies, food allergies and immunocompromised state.   Neurological: Negative for dizziness, syncope, weakness, light-headedness and headaches.   Hematological: Positive for adenopathy. Does not bruise/bleed easily.   Psychiatric/Behavioral: Negative for dysphoric mood, sleep disturbance and suicidal ideas. The patient is not nervous/anxious.        Objective     Vitals:    08/06/18 1321   BP: 100/60   Pulse: 96   Resp: 14   Temp: 100.2 °F (37.9 °C)   SpO2: 98%   Weight: 81.6 kg (180 lb)   Height: 160 cm (63\")     Physical Exam   Constitutional: She is oriented to person, place, and time. She appears well-developed and well-nourished. No distress.   HENT:   Right Ear: External ear normal.   Left Ear: External ear normal.   Mouth/Throat: Mucous membranes are normal. Abnormal dentition. Dental caries present. Posterior oropharyngeal edema and posterior oropharyngeal erythema present. No oropharyngeal exudate.   Dental caries of multiple teeth, particularly molars upper and lower bilaterally. Worse to lower right teeth. Tenderness and erythema of upper gums bilaterally.    Cardiovascular: Normal rate and regular rhythm.    Pulmonary/Chest: Effort normal and breath sounds normal.   Lymphadenopathy:     She has cervical adenopathy.   Neurological: She is alert and oriented to person, place, and time.   Skin: Skin is warm and dry.   Psychiatric: She has a normal mood and affect.       Assessment/Plan     Diagnoses and all orders for this visit:    Infected dental caries  -     cephalexin (KEFLEX) 500 MG capsule; Take 1 capsule by mouth 2 (Two) Times a Day.  -     POCT rapid strep A    Acute pharyngitis, unspecified etiology  -     cephalexin (KEFLEX) 500 MG capsule; Take 1 capsule by mouth 2 (Two) Times a Day.  -     POCT rapid strep A    Rapid strep negative. Concern for infected dental caries vs. Viral infection.  Will switch to keflex as she typically tolerates this " well.  Tylenol, push fluids. This may continue to happy until teeth are pulled.  RTC or UTC/ED if needed due to worsening fever. Contact OB/ GYN as well.      Jenniffer Berger PA-C  08/06/2018         Please note that portions of this note were completed with a voice recognition program. Efforts were made to edit dictation, but occasionally words are mistranscribed.

## 2018-08-06 NOTE — PROGRESS NOTES
Chief Complaint   Patient presents with   • Routine Prenatal Visit     no complaints        HPI: Marilu is a  currently at 16w0d who today reports the following:   Leaking - No; Heartburn - No. She is taking Lovenox 40mg sq daily. Nausea is improving and she hasn't had any emesis for 2 days. She is taking Bonjesta samples at HS which isn't covered by insurance.    ROS:   GI:   Nausea - improved as compared to the prior visit; Constipation - No;    Neuro:  Headache - No; Visual disturbances - No.    EXAM:   Vitals:  See prenatal flowsheet, /74, Wt no change   Abdomen:   See prenatal flowsheet, soft, nontender, fundus 4FB below U   Pelvic:  See prenatal flowsheet   Urine:  See prenatal flowsheet    Lab Results   Component Value Date    ABO O 2018    RH Positive 2018    ABSCRN Negative 2018       MDM:  Impression: Supervision of high risk pregnancy  Previous C/S with planned repeat C/S  Hx clotting disorder/PE   Tests done today: declined quad screen   Topics discussed: Continue Lovenox. May use Unisom and Vit B6 once or twice daily   Tests next visit: U/S for anatomic screening                RTO:                        2 weeks    This note was electronically signed.  Bethany Vargas, APRN  2018

## 2018-08-20 ENCOUNTER — ROUTINE PRENATAL (OUTPATIENT)
Dept: OBSTETRICS AND GYNECOLOGY | Facility: CLINIC | Age: 28
End: 2018-08-20

## 2018-08-20 VITALS — SYSTOLIC BLOOD PRESSURE: 136 MMHG | BODY MASS INDEX: 32.95 KG/M2 | DIASTOLIC BLOOD PRESSURE: 74 MMHG | WEIGHT: 186 LBS

## 2018-08-20 DIAGNOSIS — Z86.711 HISTORY OF PULMONARY EMBOLUS (PE): Primary | ICD-10-CM

## 2018-08-20 PROCEDURE — 99213 OFFICE O/P EST LOW 20 MIN: CPT | Performed by: NURSE PRACTITIONER

## 2018-08-20 NOTE — PROGRESS NOTES
[id number]  Chief Complaint   Patient presents with   • Routine Prenatal Visit     anatomy scan today, no complaints         HPI  , 18w0d reports continues lovenox as prescribed   Doing well - no c/o - Vit B6 and unisom has helped with nausea / vomiting      ROS  /74   Wt 84.4 kg (186 lb)   LMP 2018   BMI 32.95 kg/m²  -See Prenatal Assessment    ROS:  GI: Nausea - No; Constipation - No; Diarrhea - No    Neuro: Headache - No; Visual change - No      EXAM  General Appearance:  Pleasant  Lungs: Breathing unlabored  Abdomen:  See flow sheet for Fundal ht, FM, FHT's  LE: Neg edema    MDM  Impression:  Problems/Risk High Risk Pregnancy  Hx P/E  Hx C/S and plans rept   Tests done today: option for ONTD screening - wanted and done  U/s was done today - normal female      Topics discussed: continue lovenox as prescribed     keep appt with [facility]   encouraged questions - call prn    Tests next visit: none     OB History      Para Term  AB Living    5 2 2 0 2 2    SAB TAB Ectopic Molar Multiple Live Births    2 0 0 0 0 2          Past Medical History:   Diagnosis Date   • Abdominal pain    • Abnormal Pap smear of cervix    • Anxiety    • Asthma    • Bleeding disorder (CMS/[facility]) 16   • Body piercing     TONGUE, NOSE, TWO IN EACH EAR   • Chest pain    • Clotting disorder (CMS/[facility])     factor 5   • Constipation    • Coronary artery disease involving native coronary artery of native heart without angina pectoris 2017   • Deep vein thrombosis (CMS/[facility]) 16   • Diarrhea    • Diarrhea    • Endometriosis    • Factor 5 Leiden mutation, heterozygous (CMS/[facility])    • Fibroid    • Gallstone    • GERD (gastroesophageal reflux disease)    • H/O blood clots    • Headache    • Hiatal hernia    • High cholesterol    • History of recurrent UTIs    • HPV (human papilloma virus) infection    • Hypertension    • Kidney infection    • Migraine    • Nausea    • Nausea & vomiting    • Obesity    •  Orthostatic hypotension    • Osteoarthritis    • Ovarian cyst    • Pollen allergies    • Protein S deficiency (CMS/HCC) 2016    labs from hospitalization for PE   • Pulmonary embolism (CMS/HCC)    • Recurrent pregnancy loss, antepartum condition or complication    • Sleep apnea     CPAP ASKED TO BRING DOS   • Subclinical hyperthyroidism    • Tachycardia    • Tattoo     LOWER BACK   • Varicella        Past Surgical History:   Procedure Laterality Date   •  SECTION      X2  and    • CHOLECYSTECTOMY     • COLONOSCOPY N/A 2017    Procedure: COLONOSCOPY WITH BIOPSIES AND ARGON THERMAL ABLATION;  Surgeon: Jignesh Selby MD;  Location: [facility] ENDOSCOPY;  Service:    • DIAGNOSTIC LAPAROSCOPY N/A 2018    Procedure: DIAGNOSTIC LAPAROSCOPY AND ABLATION OF ENDOMETRIOSIS;  Surgeon: Evin Zamudio MD;  Location: [facility] OR;  Service: Obstetrics/Gynecology   • ENDOMETRIAL ABLATION     • ENDOSCOPY N/A 2017    Procedure: ESOPHAGOGASTRODUODENOSCOPY WITH BIOPSIES AND COLD BIOPSY POLYPECTOMIES;  Surgeon: Jignesh Selby MD;  Location: [facility] ENDOSCOPY;  Service:    • PELVIC LAPAROSCOPY         Family History   Problem Relation Age of Onset   • Arthritis Mother    • COPD Mother    • Asthma Mother    • Thyroid disease Mother    • Arthritis Father    • Diabetes Father    • Hypertension Father    • Hyperlipidemia Father    • Kidney disease Father    • Heart attack Father    • Coronary artery disease Father    • No Known Problems Son    • Colon cancer Neg Hx    • Liver cancer Neg Hx    • Liver disease Neg Hx    • Stomach cancer Neg Hx    • Esophageal cancer Neg Hx        Social History     Social History   • Marital status:      Spouse name: N/A   • Number of children: N/A   • Years of education: N/A     Occupational History   • Not on file.     Social History Main Topics   • Smoking status: Never Smoker   • Smokeless tobacco: Never Used   • Alcohol use No   • Drug use: No   • Sexual  activity: Yes     Partners: Male     Birth control/ protection: None      Comment: PE while on birth control patch     Other Topics Concern   • Not on file     Social History Narrative   • No narrative on file

## 2018-08-23 LAB
2ND TRIMESTER 4 SCREEN SERPL-IMP: NORMAL
2ND TRIMESTER 4 SCREEN SERPL-IMP: NORMAL
AFP ADJ MOM SERPL: 0.62
AFP SERPL-MCNC: 23.1 NG/ML
AGE AT DELIVERY: 28.1 YR
FET TS 18 RISK FROM MAT AGE: NORMAL
FET TS 21 RISK FROM MAT AGE: 850
GA METHOD: NORMAL
GA: 18 WEEKS
HCG ADJ MOM SERPL: 0.77
HCG SERPL-ACNC: NORMAL MIU/ML
IDDM PATIENT QL: NO
INHIBIN A ADJ MOM SERPL: 0.82
INHIBIN A SERPL-MCNC: 121.4 PG/ML
LABORATORY COMMENT REPORT: NORMAL
MULTIPLE PREGNANCY: NO
NEURAL TUBE DEFECT RISK FETUS: NORMAL %
RESULT: NORMAL
TS 18 RISK FETUS: NORMAL
TS 21 RISK FETUS: 2519
U ESTRIOL ADJ MOM SERPL: 0.59
U ESTRIOL SERPL-MCNC: 0.7 NG/ML

## 2018-08-24 ENCOUNTER — OFFICE VISIT (OUTPATIENT)
Dept: INTERNAL MEDICINE | Facility: CLINIC | Age: 28
End: 2018-08-24

## 2018-08-24 VITALS
WEIGHT: 182.5 LBS | SYSTOLIC BLOOD PRESSURE: 120 MMHG | TEMPERATURE: 98.2 F | HEIGHT: 63 IN | OXYGEN SATURATION: 100 % | BODY MASS INDEX: 32.34 KG/M2 | RESPIRATION RATE: 14 BRPM | HEART RATE: 94 BPM | DIASTOLIC BLOOD PRESSURE: 78 MMHG

## 2018-08-24 DIAGNOSIS — R11.2 NAUSEA AND VOMITING, INTRACTABILITY OF VOMITING NOT SPECIFIED, UNSPECIFIED VOMITING TYPE: ICD-10-CM

## 2018-08-24 DIAGNOSIS — Z3A.18 18 WEEKS GESTATION OF PREGNANCY: ICD-10-CM

## 2018-08-24 DIAGNOSIS — R30.0 DYSURIA: Primary | ICD-10-CM

## 2018-08-24 LAB
BILIRUB BLD-MCNC: ABNORMAL MG/DL
CLARITY, POC: ABNORMAL
COLOR UR: ABNORMAL
GLUCOSE UR STRIP-MCNC: NEGATIVE MG/DL
KETONES UR QL: ABNORMAL
LEUKOCYTE EST, POC: NEGATIVE
NITRITE UR-MCNC: NEGATIVE MG/ML
PH UR: 5 [PH] (ref 5–8)
PROT UR STRIP-MCNC: NEGATIVE MG/DL
RBC # UR STRIP: NEGATIVE /UL
SP GR UR: 1.02 (ref 1–1.03)
UROBILINOGEN UR QL: ABNORMAL

## 2018-08-24 PROCEDURE — 99213 OFFICE O/P EST LOW 20 MIN: CPT | Performed by: NURSE PRACTITIONER

## 2018-08-24 NOTE — PROGRESS NOTES
Chief Complaint / Reason:      Chief Complaint   Patient presents with   • Vomiting     onset 3 days ago.    • Nausea   • Headache   • Back Pain     cramping in back   • Difficulty Urinating       Subjective     HPI   Patient presents today with complaints of nausea vomiting suprapubic pain, back pain and difficulty urinating. She reports symptoms have been present for 3 days. Patient is currently 18 weeks and 4 days pregnant.  Reports she has been taking vitamin B6 and Unisom for morning sickness which has helped and she has not had any symptoms for several weeks.  She reports she seen her OB/GYN on Monday and they did an ultrasound and everything was normal.  Reports difficulty urinating accompanied by pressure but denies burning itching and foul smelling urine.  She reports she has ran a fever up to 101 and she is taking Tylenol for symptoms previous night.  She denies being around anyone with sickness but does report her kids to started school back and she was at the doctor's office Monday.  She denies blood in her urine or vaginal bleeding or spotting.  Reports she has been drinking water and has been able to eat.  She states she has an appointment with high risk OB this upcoming Monday. Patient denies chest pain, dyspnea, orthopnea, palpitations, lower extremity edema, weakness, visual disturbances or confusion.       History taken from: patient    PMH/FH/Social History were reviewed and updated appropriately in the electronic medical record.     Review of Systems:   Review of Systems   Constitutional: Positive for fatigue.   Respiratory: Negative.    Cardiovascular: Negative.    Gastrointestinal: Positive for abdominal pain, nausea and vomiting. Negative for diarrhea.   Genitourinary: Positive for difficulty urinating and urgency. Negative for vaginal bleeding and vaginal discharge.   Musculoskeletal: Positive for back pain.   Neurological: Positive for headaches.   Psychiatric/Behavioral: Negative.       All other systems were reviewed and are negative.  Exceptions are noted in the subjective or above.      Objective     Vital Signs  Vitals:    08/24/18 0930   BP: 120/78   Pulse: 94   Resp: 14   Temp: 98.2 °F (36.8 °C)   SpO2: 100%       Body mass index is 32.33 kg/m².    Physical Exam   Constitutional: She is oriented to person, place, and time. She appears well-developed and well-nourished. No distress.   Cardiovascular: Normal rate, regular rhythm, normal heart sounds and intact distal pulses.    Pulmonary/Chest: Effort normal and breath sounds normal. She has no wheezes. She exhibits no tenderness.   Abdominal: Soft. Bowel sounds are normal.   Neurological: She is alert and oriented to person, place, and time.   Skin: Skin is warm and dry. No rash noted. No erythema. No pallor.   Psychiatric: She has a normal mood and affect. Her behavior is normal. Judgment and thought content normal.   Nursing note and vitals reviewed.       Results Review:    I reviewed the patient's new clinical results.   Office Visit on 08/24/2018   Component Date Value Ref Range Status   • Color 08/24/2018 Dark Yellow  Yellow, Straw, Dark Yellow, Adele Final   • Clarity, UA 08/24/2018 Cloudy* Clear Final   • Specific Gravity  08/24/2018 1.025  1.005 - 1.030 Final   • pH, Urine 08/24/2018 5.0  5.0 - 8.0 Final   • Leukocytes 08/24/2018 Negative  Negative Final   • Nitrite, UA 08/24/2018 Negative  Negative Final   • Protein, POC 08/24/2018 Negative  Negative mg/dL Final   • Glucose, UA 08/24/2018 Negative  Negative, 1000 mg/dL (3+) mg/dL Final   • Ketones, UA 08/24/2018 15 mg/dL* Negative Final   • Urobilinogen, UA 08/24/2018 1 E.U./dL * Normal Final   • Bilirubin 08/24/2018 1 mg/dL* Negative Final   • Blood, UA 08/24/2018 Negative  Negative Final         Medication Review:     Current Outpatient Prescriptions:   •  Doxylamine Succinate, Sleep, (UNISOM PO), Take  by mouth As Needed., Disp: , Rfl:   •  enoxaparin (LOVENOX) 40 MG/0.4ML  solution syringe, Inject 0.4 mL under the skin Daily. (Patient taking differently: Inject 40 mg under the skin Daily. Has not been able to start yet.), Disp: 11.2 mL, Rfl: 6  •  flunisolide (NASALIDE) 25 MCG/ACT (0.025%) solution nasal spray, Inhale 2 sprays Every 12 (Twelve) Hours., Disp: 1 bottle, Rfl: 3  •  loratadine (CLARITIN) 10 MG tablet, Take 1 tablet by mouth Daily., Disp: 30 tablet, Rfl: 11  •  promethazine (PHENERGAN) 25 MG suppository, Insert 1 suppository into the rectum Every 6 (Six) Hours As Needed for Nausea or Vomiting., Disp: 6 suppository, Rfl: 0  •  raNITIdine (ZANTAC) 150 MG tablet, Take 1 tablet by mouth As Needed for Heartburn or Indigestion., Disp: 30 tablet, Rfl: 3  •  vitamin B-6 (PYRIDOXINE) 25 MG tablet, Take 0.5 tablets by mouth Every 6 (Six) Hours As Needed (nausea)., Disp: 15 tablet, Rfl: 0    Assessment/Plan   Marilu was seen today for vomiting, nausea, headache, back pain and difficulty urinating.    Diagnoses and all orders for this visit:    Dysuria  -     POCT urinalysis dipstick, automated    Nausea and vomiting, intractability of vomiting not specified, unspecified vomiting type  Currently on Vit b6 and unisom per OB/GYN      18 weeks gestation of pregnancy  Advised patient if abdominal pain worsens or vaginal bleeding occurs advised patient she needs to report to the ER immediately.  Discussed with patient the need to keep high risk OB appointment on Monday.    Currently on Vit b6 and unisom per OB/GYN    Recommend patient get adequate rest and increase water intake and follow bland diet, with small frequent meals.   Return if symptoms worsen or fail to improve.    Barby Garcia, APRN  08/24/2018

## 2018-08-27 ENCOUNTER — HOSPITAL ENCOUNTER (OUTPATIENT)
Dept: WOMENS IMAGING | Facility: HOSPITAL | Age: 28
Discharge: HOME | End: 2018-08-27
Attending: OBSTETRICS & GYNECOLOGY | Admitting: OBSTETRICS & GYNECOLOGY

## 2018-08-27 ENCOUNTER — OFFICE VISIT (OUTPATIENT)
Dept: OBSTETRICS AND GYNECOLOGY | Facility: HOSPITAL | Age: 28
End: 2018-08-27

## 2018-08-27 VITALS — BODY MASS INDEX: 32.06 KG/M2 | SYSTOLIC BLOOD PRESSURE: 109 MMHG | DIASTOLIC BLOOD PRESSURE: 70 MMHG | WEIGHT: 181 LBS

## 2018-08-27 DIAGNOSIS — Z86.711 HISTORY OF PULMONARY EMBOLUS (PE): ICD-10-CM

## 2018-08-27 DIAGNOSIS — D68.51 HETEROZYGOUS FACTOR V LEIDEN MUTATION (HCC): Primary | ICD-10-CM

## 2018-08-27 DIAGNOSIS — D68.51 HETEROZYGOUS FACTOR V LEIDEN MUTATION (HCC): ICD-10-CM

## 2018-08-27 DIAGNOSIS — Z79.01 CHRONIC ANTICOAGULATION: ICD-10-CM

## 2018-08-27 DIAGNOSIS — D68.59 PROTEIN S DEFICIENCY (HCC): ICD-10-CM

## 2018-08-27 DIAGNOSIS — E05.90 SUBCLINICAL HYPERTHYROIDISM: ICD-10-CM

## 2018-08-27 DIAGNOSIS — E66.9 OBESITY (BMI 30-39.9): ICD-10-CM

## 2018-08-27 PROCEDURE — 76811 OB US DETAILED SNGL FETUS: CPT

## 2018-08-27 PROCEDURE — 76811 OB US DETAILED SNGL FETUS: CPT | Performed by: OBSTETRICS & GYNECOLOGY

## 2018-08-27 NOTE — PROGRESS NOTES
Documentation of the ultasound findings, images, and interpretations with be available in the patient's Viewpoint report located in the Chart Review Imaging tab in [email or web address].

## 2018-08-29 ENCOUNTER — OFFICE VISIT (OUTPATIENT)
Dept: INTERNAL MEDICINE | Facility: CLINIC | Age: 28
End: 2018-08-29

## 2018-08-29 VITALS
WEIGHT: 180 LBS | HEIGHT: 63 IN | SYSTOLIC BLOOD PRESSURE: 136 MMHG | BODY MASS INDEX: 31.89 KG/M2 | TEMPERATURE: 97.6 F | OXYGEN SATURATION: 98 % | HEART RATE: 106 BPM | DIASTOLIC BLOOD PRESSURE: 87 MMHG

## 2018-08-29 DIAGNOSIS — R11.2 NON-INTRACTABLE VOMITING WITH NAUSEA, UNSPECIFIED VOMITING TYPE: ICD-10-CM

## 2018-08-29 DIAGNOSIS — J02.0 ACUTE STREPTOCOCCAL PHARYNGITIS: Primary | ICD-10-CM

## 2018-08-29 DIAGNOSIS — Z3A.19 19 WEEKS GESTATION OF PREGNANCY: ICD-10-CM

## 2018-08-29 LAB
BILIRUB BLD-MCNC: NEGATIVE MG/DL
CLARITY, POC: NORMAL
COLOR UR: NORMAL
EXPIRATION DATE: ABNORMAL
GLUCOSE UR STRIP-MCNC: NEGATIVE MG/DL
INTERNAL CONTROL: ABNORMAL
KETONES UR QL: NEGATIVE
LEUKOCYTE EST, POC: NEGATIVE
Lab: ABNORMAL
NITRITE UR-MCNC: NEGATIVE MG/ML
PH UR: 5 [PH] (ref 5–8)
PROT UR STRIP-MCNC: NEGATIVE MG/DL
RBC # UR STRIP: NEGATIVE /UL
S PYO AG THROAT QL: POSITIVE
SP GR UR: 1.02 (ref 1–1.03)
UROBILINOGEN UR QL: NORMAL

## 2018-08-29 PROCEDURE — 87880 STREP A ASSAY W/OPTIC: CPT | Performed by: PHYSICIAN ASSISTANT

## 2018-08-29 PROCEDURE — 99213 OFFICE O/P EST LOW 20 MIN: CPT | Performed by: PHYSICIAN ASSISTANT

## 2018-08-29 RX ORDER — CEPHALEXIN 500 MG/1
500 CAPSULE ORAL 2 TIMES DAILY
Qty: 20 CAPSULE | Refills: 0 | Status: SHIPPED | OUTPATIENT
Start: 2018-08-29 | End: 2018-09-07

## 2018-08-29 NOTE — PROGRESS NOTES
"Subjective     Chief Complaint: sore throat    History of Present Illness     Marilu Godoy is a 27 y.o. female presenting with complaints of sore throat, vomiting, diarrhea, low grade fevers x 2 days. Son was recently diagnosed with strep pharyngitis. She has taken tylenol and is continuing to use unisom to help with nausea. Patient is 19 weeks pregnant, had a normal US with OB last week. Still feeling baby kick.    The following portions of the patient's history were reviewed and updated as appropriate: current medications, allergies, PMH.    Review of Systems   Constitutional: Positive for chills, fatigue and fever.   HENT: Positive for sore throat.    Respiratory: Negative for cough, chest tightness, shortness of breath and wheezing.    Cardiovascular: Negative for chest pain, palpitations and leg swelling.   Gastrointestinal: Positive for diarrhea, nausea and vomiting. Negative for abdominal pain, blood in stool and constipation.   Genitourinary: Negative for dysuria, flank pain, frequency and urgency.   Neurological: Positive for headaches. Negative for dizziness and weakness.     Objective     Vitals:    08/29/18 0925   BP: 136/87   Pulse: 106   Temp: 97.6 °F (36.4 °C)   SpO2: 98%   Weight: 81.6 kg (180 lb)   Height: 160 cm (62.99\")     Physical Exam   Constitutional: She appears well-developed.   HENT:   Right Ear: External ear normal.   Left Ear: External ear normal.   Mouth/Throat: Abnormal dentition. Dental caries present. Oropharyngeal exudate, posterior oropharyngeal edema and posterior oropharyngeal erythema present.   Eyes: Pupils are equal, round, and reactive to light. Conjunctivae and EOM are normal.   Cardiovascular: Normal rate and regular rhythm.    Pulmonary/Chest: Effort normal and breath sounds normal.   Abdominal: Soft. Bowel sounds are normal. There is no tenderness.   Lymphadenopathy:     She has cervical adenopathy.   Psychiatric: She has a normal mood and affect. "     Assessment/Plan     Diagnoses and all orders for this visit:    Acute streptococcal pharyngitis  -     POCT rapid strep A  -     cephalexin (KEFLEX) 500 MG capsule; Take 1 capsule by mouth 2 (Two) Times a Day for 10 days.    Non-intractable vomiting with nausea, unspecified vomiting type  -     POCT urinalysis dipstick, automated  -     POCT rapid strep A  -     Urine Culture - Urine, Urine, Clean Catch    19 weeks gestation of pregnancy    Discussed throwing away toothbrush.  Increase water intake. BRAT diet, advance as tolerated. Continue unisom.  Continue following with OB.    Jenniffer Berger PA-C  08/29/2018         Please note that portions of this note were completed with a voice recognition program. Efforts were made to edit dictation, but occasionally words are mistranscribed.

## 2018-09-10 ENCOUNTER — ROUTINE PRENATAL (OUTPATIENT)
Dept: OBSTETRICS AND GYNECOLOGY | Facility: CLINIC | Age: 28
End: 2018-09-10

## 2018-09-10 VITALS — SYSTOLIC BLOOD PRESSURE: 118 MMHG | DIASTOLIC BLOOD PRESSURE: 68 MMHG | WEIGHT: 192 LBS | BODY MASS INDEX: 34.01 KG/M2

## 2018-09-10 DIAGNOSIS — Z98.891 PREVIOUS CESAREAN SECTION: ICD-10-CM

## 2018-09-10 DIAGNOSIS — Z34.92 PRENATAL CARE IN SECOND TRIMESTER: Primary | ICD-10-CM

## 2018-09-10 DIAGNOSIS — Z86.711 HISTORY OF PULMONARY EMBOLUS (PE): ICD-10-CM

## 2018-09-10 DIAGNOSIS — D68.51 HETEROZYGOUS FACTOR V LEIDEN MUTATION (HCC): ICD-10-CM

## 2018-09-10 DIAGNOSIS — D68.59 PROTEIN S DEFICIENCY (HCC): ICD-10-CM

## 2018-09-10 PROCEDURE — 99213 OFFICE O/P EST LOW 20 MIN: CPT | Performed by: OBSTETRICS & GYNECOLOGY

## 2018-09-10 NOTE — PROGRESS NOTES
Chief Complaint   Patient presents with   • Routine Prenatal Visit     NO COMPLAINTS       HPI:   Marilu is a  currently at 21w0d who today reports the following:  Contractions - No; Leaking - No; Vaginal bleeding -  No; Swelling of extremities - No. Good fetal movement - YES.    ROS:  GI: Nausea - No; Constipation - No; Diarrhea - No. RUQ pain - No    Neuro: Headache - No; Visual disturbances - No.    The following portions of the patient's history were reviewed and updated as appropriate:problem list, current medications, allergies, past family history, past medical history, past social history and past surgical history.    EXAM:  /68   Wt 87.1 kg (192 lb)   LMP 2018   BMI 34.01 kg/m²     Gen: NAD, conversant  Pulm: No use of accessory muscles, normal respirations  Abdomen: Gravid, nontender, size = dates, + fetal cardiac activity  Ext: no edema, no rashes, WWP  Gait: normal for pregnancy  Psych: Mood, insight, judgement intact  SVE: Not performed     Lab Results   Component Value Date    ABO O 2018    RH Positive 2018    ABSCRN Negative 2018       Smoking status: Never Smoker                                                              Smokeless tobacco: Never Used                          I have reviewed the prenatal labs and previous ultrasounds today.    MDM:  Diagnosis: Supervision of high risk pregnancy   Factor V Leiden  Protein S deficiency  Prior PE currently on prophylactic Lovenox  History of PreE  Prior C/S with planned repeat   Tests/Orders today: Orders Placed This Encounter   Procedures   • Gestational Screen 1 Hr ([facility])   • CBC (No Diff)        Topics discussed: Anticoagulation - plan to transition from Lovenox to subcutaneous heparin at 36 weeks.   Continue aspirin 81 mg daily for preeclampsia prophylaxis   Plan for repeat  at 39 weeks   BTL versus IUD    Tests next visit: GCT  HgB   Next visit: 4 week(s)     Jai Lopez MD  Obstetrics and  Gynecology  [facility]

## 2018-09-17 ENCOUNTER — HOSPITAL ENCOUNTER (OUTPATIENT)
Facility: HOSPITAL | Age: 28
Discharge: HOME | End: 2018-09-17
Attending: OBSTETRICS & GYNECOLOGY | Admitting: OBSTETRICS & GYNECOLOGY

## 2018-09-17 VITALS
TEMPERATURE: 99.3 F | HEART RATE: 91 BPM | WEIGHT: 184 LBS | RESPIRATION RATE: 20 BRPM | SYSTOLIC BLOOD PRESSURE: 105 MMHG | DIASTOLIC BLOOD PRESSURE: 62 MMHG | BODY MASS INDEX: 32.59 KG/M2 | OXYGEN SATURATION: 100 %

## 2018-09-17 LAB
BILIRUB BLD-MCNC: NEGATIVE MG/DL
CLARITY, POC: ABNORMAL
COLOR UR: ABNORMAL
GLUCOSE UR STRIP-MCNC: NEGATIVE MG/DL
KETONES UR QL: NEGATIVE
LEUKOCYTE EST, POC: NEGATIVE
NITRITE UR-MCNC: NEGATIVE MG/ML
PH UR: 6 [PH] (ref 5–8)
PROT UR STRIP-MCNC: NEGATIVE MG/DL
RBC # UR STRIP: NEGATIVE /UL
SP GR UR: 1.03 (ref 1–1.03)
UROBILINOGEN UR QL: NORMAL

## 2018-09-17 PROCEDURE — G0463 HOSPITAL OUTPT CLINIC VISIT: HCPCS

## 2018-09-17 PROCEDURE — 81002 URINALYSIS NONAUTO W/O SCOPE: CPT | Performed by: OBSTETRICS & GYNECOLOGY

## 2018-09-24 ENCOUNTER — ROUTINE PRENATAL (OUTPATIENT)
Dept: OBSTETRICS AND GYNECOLOGY | Facility: CLINIC | Age: 28
End: 2018-09-24

## 2018-09-24 VITALS — BODY MASS INDEX: 33.13 KG/M2 | WEIGHT: 187 LBS | DIASTOLIC BLOOD PRESSURE: 60 MMHG | SYSTOLIC BLOOD PRESSURE: 132 MMHG

## 2018-09-24 DIAGNOSIS — R10.9 ABDOMINAL CRAMPING AFFECTING PREGNANCY: Primary | ICD-10-CM

## 2018-09-24 DIAGNOSIS — O26.899 ABDOMINAL CRAMPING AFFECTING PREGNANCY: Primary | ICD-10-CM

## 2018-09-24 PROCEDURE — 99213 OFFICE O/P EST LOW 20 MIN: CPT | Performed by: OBSTETRICS & GYNECOLOGY

## 2018-09-24 NOTE — PROGRESS NOTES
Chief Complaint   Patient presents with   • Routine Prenatal Visit     cramping        HPI:   , 23w0d gestation reports cramping without bleeding  Prior Term C/S x 2      ROS:  See Prenatal Episode/Flowsheet  /60   Wt 84.8 kg (187 lb)   LMP 2018   BMI 33.13 kg/m²      EXAM:  EXTREMITIES:  No swelling-See Prenatal Episode/Flowsheet    ABDOMEN:  FHTs/Movement noted-See Prenatal Episode/Flowsheet    URINE GLUCOSE/PROTEIN:  See Prenatal Episode/Flowsheet    PELVIC EXAM:  See Prenatal Episode/Flowsheet  CV:  Lungs:    MDM:    Lab Results   Component Value Date    HGB 13.2 2018    RUBELLAABIGG 1.38 2018    HEPBSAG Negative 2018    ABO O 2018    RH Positive 2018    ABSCRN Negative 2018    [id number] Non Reactive 2018    HEPCVIRUSABY 0.1 2017    URINECX Final report 2018       U/S: CL 4.4cm, no funnel, Breech, vertex    1. IUP 23w0d  2. Routine care   3. Pelvic cramping with normal U/S. NH thorugh month, precautions  4. NVD: most likely etiology for cramping--no one else sick around her.  RATS diet, avoid triggers.

## 2018-10-08 ENCOUNTER — HOSPITAL ENCOUNTER (OUTPATIENT)
Facility: HOSPITAL | Age: 28
Discharge: HOME | End: 2018-10-08
Attending: MIDWIFE | Admitting: MIDWIFE

## 2018-10-08 ENCOUNTER — OFFICE VISIT (OUTPATIENT)
Dept: INTERNAL MEDICINE | Facility: CLINIC | Age: 28
End: 2018-10-08

## 2018-10-08 ENCOUNTER — ROUTINE PRENATAL (OUTPATIENT)
Dept: OBSTETRICS AND GYNECOLOGY | Facility: CLINIC | Age: 28
End: 2018-10-08

## 2018-10-08 VITALS
OXYGEN SATURATION: 98 % | BODY MASS INDEX: 33.13 KG/M2 | HEIGHT: 63 IN | DIASTOLIC BLOOD PRESSURE: 75 MMHG | WEIGHT: 187 LBS | TEMPERATURE: 98.1 F | SYSTOLIC BLOOD PRESSURE: 112 MMHG | HEART RATE: 87 BPM

## 2018-10-08 VITALS — DIASTOLIC BLOOD PRESSURE: 64 MMHG | WEIGHT: 187 LBS | SYSTOLIC BLOOD PRESSURE: 128 MMHG | BODY MASS INDEX: 33.13 KG/M2

## 2018-10-08 VITALS — TEMPERATURE: 98.5 F | HEART RATE: 100 BPM | OXYGEN SATURATION: 99 % | RESPIRATION RATE: 18 BRPM

## 2018-10-08 DIAGNOSIS — Z86.711 HISTORY OF PULMONARY EMBOLUS (PE): ICD-10-CM

## 2018-10-08 DIAGNOSIS — J06.9 ACUTE URI: Primary | ICD-10-CM

## 2018-10-08 DIAGNOSIS — D68.51 HETEROZYGOUS FACTOR V LEIDEN MUTATION (HCC): ICD-10-CM

## 2018-10-08 LAB
BILIRUB BLD-MCNC: NEGATIVE MG/DL
CLARITY, POC: CLEAR
COLOR UR: YELLOW
ERYTHROCYTE [DISTWIDTH] IN BLOOD BY AUTOMATED COUNT: 13.1 % (ref 11.5–14.5)
EXPIRATION DATE: NORMAL
EXPIRATION DATE: NORMAL
FLUAV AG NPH QL: NEGATIVE
FLUBV AG NPH QL: NEGATIVE
GLUCOSE 1H P 50 G GLC PO SERPL-MCNC: 108 MG/DL
GLUCOSE UR STRIP-MCNC: NEGATIVE MG/DL
HCT VFR BLD AUTO: 33.2 % (ref 37–47)
HGB BLD-MCNC: 11.5 G/DL (ref 12–16)
INTERNAL CONTROL: NORMAL
INTERNAL CONTROL: NORMAL
KETONES UR QL: NEGATIVE
LEUKOCYTE EST, POC: NEGATIVE
Lab: NORMAL
Lab: NORMAL
MCH RBC QN AUTO: 31.8 PG (ref 27–31)
MCHC RBC AUTO-ENTMCNC: 34.6 G/DL (ref 30–37)
MCV RBC AUTO: 91.7 FL (ref 81–99)
NITRITE UR-MCNC: NEGATIVE MG/ML
PH UR: 7.5 [PH] (ref 5–8)
PLATELET # BLD AUTO: 248 10*3/MM3 (ref 130–400)
PROT UR STRIP-MCNC: ABNORMAL MG/DL
RBC # BLD AUTO: 3.62 10*6/MM3 (ref 4.2–5.4)
RBC # UR STRIP: NEGATIVE /UL
S PYO AG THROAT QL: NEGATIVE
SP GR UR: 1.01 (ref 1–1.03)
UROBILINOGEN UR QL: NORMAL
WBC # BLD AUTO: 9.57 10*3/MM3 (ref 4.8–10.8)

## 2018-10-08 PROCEDURE — 87804 INFLUENZA ASSAY W/OPTIC: CPT | Performed by: PHYSICIAN ASSISTANT

## 2018-10-08 PROCEDURE — 87880 STREP A ASSAY W/OPTIC: CPT | Performed by: PHYSICIAN ASSISTANT

## 2018-10-08 PROCEDURE — 99213 OFFICE O/P EST LOW 20 MIN: CPT | Performed by: PHYSICIAN ASSISTANT

## 2018-10-08 PROCEDURE — 99213 OFFICE O/P EST LOW 20 MIN: CPT | Performed by: OBSTETRICS & GYNECOLOGY

## 2018-10-08 PROCEDURE — 81002 URINALYSIS NONAUTO W/O SCOPE: CPT | Performed by: MIDWIFE

## 2018-10-08 PROCEDURE — G0463 HOSPITAL OUTPT CLINIC VISIT: HCPCS

## 2018-10-08 NOTE — PROGRESS NOTES
Subjective     Chief Complaint: sore throat    History of Present Illness     Marilu Godoy is a 27 y.o. female presenting with complaints of fatigue, headaches, body aches, sore throat, congestion, occasional cough.  States her daughter was diagnosed with strep throat last Thursday.  She is still eating and drinking well.  Denies fevers.  Taking Tylenol as needed.  She is 25 weeks pregnant, followed up with OB/GYN today.    The following portions of the patient's history were reviewed and updated as appropriate: current medications, allergies, PMH.    Review of Systems   Constitutional: Positive for fatigue. Negative for appetite change, chills, fever and unexpected weight change.   HENT: Positive for congestion and sore throat. Negative for ear pain, hearing loss, nosebleeds, sinus pressure, tinnitus and trouble swallowing.    Eyes: Negative for pain, discharge, redness, itching and visual disturbance.   Respiratory: Positive for cough. Negative for chest tightness, shortness of breath and wheezing.    Cardiovascular: Negative for chest pain, palpitations and leg swelling.   Gastrointestinal: Negative for abdominal pain, blood in stool, constipation, diarrhea, nausea and vomiting.   Endocrine: Negative for cold intolerance, heat intolerance, polydipsia, polyphagia and polyuria.   Genitourinary: Negative for decreased urine volume, dysuria, flank pain, frequency and hematuria.   Musculoskeletal: Negative for arthralgias, back pain, gait problem, joint swelling, myalgias, neck pain and neck stiffness.   Skin: Negative for color change and rash.   Allergic/Immunologic: Negative for environmental allergies, food allergies and immunocompromised state.   Neurological: Positive for headaches. Negative for dizziness, syncope, weakness and light-headedness.   Hematological: Negative for adenopathy. Does not bruise/bleed easily.   Psychiatric/Behavioral: Negative for dysphoric mood, sleep disturbance and suicidal  "ideas. The patient is not nervous/anxious.      Objective     Vitals:    10/08/18 1103   BP: 112/75   Pulse: 87   Temp: 98.1 °F (36.7 °C)   SpO2: 98%   Weight: 84.8 kg (187 lb)   Height: 160 cm (63\")       Physical Exam   Constitutional: Appears well-developed and well-nourished.   HENT:   Right Ear: Tympanic membrane and external ear normal.   Left Ear: Tympanic membrane and external ear normal.   Nose: Rhinorrhea.  Mouth/Throat: Mild OP erythema.  No edema or exudates.  Eyes: EOM are normal. Pupils are equal, round, and reactive to light.   Neck: Normal range of motion. Neck supple.   Cardiovascular: Normal rate, regular rhythm and normal heart sounds.    Pulmonary/Chest: Effort normal and breath sounds normal.   Abdominal: Soft. Bowel sounds are normal.   Musculoskeletal: Normal range of motion.   Lymphadenopathy: No cervical adenopathy noted.   Neurological: Alert and oriented to person, place, and time.   Skin: Skin is warm and dry.   Psychiatric: Exhibits a normal mood and affect.     Assessment/Plan     Diagnoses and all orders for this visit:    Acute URI  -     POCT rapid strep A  -     POCT Influenza A/B      Rapid strep and influenza both negative.  Patient has been on antibiotics frequently throughout pregnancy.  We will hold off at this time.  Increase water intake, continue nasal spray and Claritin, Tylenol as needed.  RTC if symptoms worsen or fail to improve.    Jenniffer Berger PA-C  10/08/2018         Please note that portions of this note were completed with a voice recognition program. Efforts were made to edit dictation, but occasionally words are mistranscribed.  "

## 2018-10-08 NOTE — PROGRESS NOTES
Chief Complaint   Patient presents with   • Routine Prenatal Visit     Glucola done today, no complaints.         HPI:   , 25w0d gestation reports doing well    ROS:  See Prenatal Episode/Flowsheet  /64   Wt 84.8 kg (187 lb)   LMP 2018   BMI 33.13 kg/m²      EXAM:  EXTREMITIES:  No swelling-See Prenatal Episode/Flowsheet    ABDOMEN:  FHTs/Movement noted-See Prenatal Episode/Flowsheet    URINE GLUCOSE/PROTEIN:  See Prenatal Episode/Flowsheet    PELVIC EXAM:  See Prenatal Episode/Flowsheet  CV:  Lungs:    MDM:    Lab Results   Component Value Date    HGB 13.2 2018    RUBELLAABIGG 1.38 2018    HEPBSAG Negative 2018    ABO O 2018    RH Positive 2018    ABSCRN Negative 2018    [id number] Non Reactive 2018    HEPCVIRUSABY 0.1 2017    URINECX Final report 2018       U/S:    1. IUP 25w0d  2. Routine care   3. Glucola today  4. Proten C & S def-- taking Lovenox 40 daily

## 2018-10-24 NOTE — PROGRESS NOTES
Telephone Triage: Dx: pelvic pain during pregnancy, second trimester  No contractions noted on monitor per RN. Cervix closed thick posterior.  FHT appropriate for gestational age. DC home.

## 2018-10-29 ENCOUNTER — OFFICE VISIT (OUTPATIENT)
Dept: INTERNAL MEDICINE | Facility: CLINIC | Age: 28
End: 2018-10-29

## 2018-10-29 VITALS
HEIGHT: 63 IN | SYSTOLIC BLOOD PRESSURE: 108 MMHG | DIASTOLIC BLOOD PRESSURE: 68 MMHG | OXYGEN SATURATION: 99 % | BODY MASS INDEX: 32.78 KG/M2 | HEART RATE: 110 BPM | WEIGHT: 185 LBS | TEMPERATURE: 98.6 F

## 2018-10-29 DIAGNOSIS — R68.89 FLU-LIKE SYMPTOMS: Primary | ICD-10-CM

## 2018-10-29 PROCEDURE — 99213 OFFICE O/P EST LOW 20 MIN: CPT | Performed by: PHYSICIAN ASSISTANT

## 2018-10-29 PROCEDURE — 87804 INFLUENZA ASSAY W/OPTIC: CPT | Performed by: PHYSICIAN ASSISTANT

## 2018-10-29 RX ORDER — DEXTROMETHORPHAN HYDROBROMIDE AND PROMETHAZINE HYDROCHLORIDE 15; 6.25 MG/5ML; MG/5ML
5 SYRUP ORAL 4 TIMES DAILY PRN
Qty: 180 ML | Refills: 0 | Status: SHIPPED | OUTPATIENT
Start: 2018-10-29 | End: 2018-11-03

## 2018-10-29 NOTE — PROGRESS NOTES
Marilu Godoy is a 27 y.o. female.     Subjective   History of Present Illness   Here today with concern of nearly 1 week of nasal congestion, cough, sore throat and intermittent low-grade fever. Last night symptoms seemed to worsen to include body aches and worsened congestion. She went to an urgent care 4 days ago where she was prescribed Zithromax which has not helped any.       The following portions of the patient's history were reviewed and updated as appropriate: allergies, current medications, past family history, past medical history, past social history, past surgical history and problem list.    Review of Systems    Constitutional: fever. Negative for appetite change, chills, fatigue and unexpected weight change.   HENT: congestion, postnasal drip, rhinorrhea, sore throat.  Negative for ear pain, hearing loss, nosebleeds, tinnitus and trouble swallowing.    Eyes: Negative for photophobia, discharge and visual disturbance.   Respiratory: cough. Negative for chest tightness, shortness of breath and wheezing.    Cardiovascular: Negative for chest pain, palpitations and leg swelling.   Gastrointestinal: Negative for abdominal distention, abdominal pain, blood in stool, constipation, diarrhea, nausea and vomiting.   Endocrine: Negative for cold intolerance, heat intolerance, polydipsia, polyphagia and polyuria.   Musculoskeletal: body aches. Negative for back pain, joint swelling, myalgias, neck pain and neck stiffness.   Skin: Negative for color change, pallor, rash and wound.   Allergic/Immunologic: Negative for environmental allergies, food allergies and immunocompromised state.   Neurological: Negative for dizziness, tremors, seizures, weakness, numbness and headaches.   Hematological: Negative for adenopathy. Does not bruise/bleed easily.   Psychiatric/Behavioral: Negative for sleep disturbances, agitation, behavioral problems, confusion, hallucinations, self-injury and suicidal ideas. The patient  "is not nervous/anxious.      Objective    Physical Exam  Constitutional: Appears well-developed and well-nourished.   HENT: very mild OP erythema. TMs normal.   Head: no sinus tenderness. Normocephalic and atraumatic.   Eyes: EOM are normal. Pupils are equal, round, and reactive to light.   Neck: Normal range of motion. Neck supple.   Cardiovascular: Normal rate, regular rhythm and normal heart sounds.    Pulmonary/Chest: Effort normal and breath sounds normal. No respiratory distress.  Has no wheezes or rales. Exhibits no chest wall tenderness.   Abdominal: Soft. Bowel sounds are normal. Exhibits no distension and no mass. There is no tenderness.   Musculoskeletal: Normal range of motion. Exhibits no tenderness.   Neurological: Alert and oriented to person, place, and time.   Skin: Skin is warm and dry.   Psychiatric: Has a normal mood and affect. Behavior is normal. Judgment and thought content normal.       /68   Pulse 110   Temp 98.6 °F (37 °C)   Ht 160 cm (62.99\")   Wt 83.9 kg (185 lb)   LMP 04/16/2018   SpO2 99%   BMI 32.78 kg/m²     Nursing note and vitals reviewed.        Assessment/Plan   Marilu was seen today for cough and fatigue.    Diagnoses and all orders for this visit:    Flu-like symptoms  -     POCT Influenza A/B- negative  -     promethazine-dextromethorphan (PROMETHAZINE-DM) 6.25-15 MG/5ML syrup; Take 5 mL by mouth 4 (Four) Times a Day As Needed for Cough for up to 5 days.  Finish Zithromax as prescribed at urgent care.     Increase PO fluid intake. Tylenol prn for pain and fever control.       Call or RTC if symptoms worsen or persist.                "

## 2018-11-05 ENCOUNTER — ROUTINE PRENATAL (OUTPATIENT)
Dept: OBSTETRICS AND GYNECOLOGY | Facility: CLINIC | Age: 28
End: 2018-11-05

## 2018-11-05 VITALS — DIASTOLIC BLOOD PRESSURE: 70 MMHG | WEIGHT: 192 LBS | BODY MASS INDEX: 34.01 KG/M2 | SYSTOLIC BLOOD PRESSURE: 134 MMHG

## 2018-11-05 DIAGNOSIS — Z34.93 NORMAL PREGNANCY, THIRD TRIMESTER: Primary | ICD-10-CM

## 2018-11-05 PROCEDURE — 99213 OFFICE O/P EST LOW 20 MIN: CPT | Performed by: NURSE PRACTITIONER

## 2018-11-05 NOTE — PROGRESS NOTES
Chief Complaint   Patient presents with   • Routine Prenatal Visit     seen at Urgent Care last week and on antibiotics for UTI         HPI  , 29w0d reports treatment for bronchitis at urgent care.    Continues lovenox as prescribed   No c/o other than tired  Good FM      ROS:    GI: Nausea - No; Constipation - No; Diarrhea - No       Neuro: Headache - No; Visual change - No      EXAM:    /70   Wt 87.1 kg (192 lb)   LMP 2018   BMI 34.01 kg/m²      General Appearance: pleasant   Lungs: Breathing unlabored  Abdomen:  See flow sheet for Fundal ht, FM, FHT's  LE: Neg edema    MDM  Impression:  Problems/Risks: High Risk Pregnancy  Factor V leiden   Prior C/S    Tests done today:    Topics discussed: Encouraged adeq rest and fluid  Finish antibiotics prescribed   Continue lovenox as prescribed   Continue to note good FM   Encouraged questions and answered   Noted:  Pt to rto in 2 wks - states would prefer to wait until 32 with u/s - will call if any problems    Tests next visit: none     OB History      Para Term  AB Living    5 2 2 0 2 2    SAB TAB Ectopic Molar Multiple Live Births    2 0 0 0 0 2          Past Medical History:   Diagnosis Date   • Abdominal pain    • Abnormal Pap smear of cervix    • Anxiety    • Asthma    • Bleeding disorder (CMS/[facility]) 16   • Body piercing     TONGUE, NOSE, TWO IN EACH EAR   • Chest pain    • Clotting disorder (CMS/[facility])     factor 5   • Constipation    • Coronary artery disease involving native coronary artery of native heart without angina pectoris 2017   • Deep vein thrombosis (CMS/[facility]) 16   • Diarrhea    • Diarrhea    • Endometriosis    • Factor 5 Leiden mutation, heterozygous (CMS/[facility])    • Fibroid    • Gallstone    • GERD (gastroesophageal reflux disease)    • H/O blood clots    • Headache    • Hiatal hernia    • High cholesterol    • History of recurrent UTIs    • HPV (human papilloma virus) infection    • Hypertension    •  Kidney infection    • Migraine    • Nausea    • Nausea & vomiting    • Obesity    • Orthostatic hypotension    • Osteoarthritis    • Ovarian cyst    • Pollen allergies    • Protein S deficiency (CMS/HCC) 2016    labs from hospitalization for PE   • Pulmonary embolism (CMS/HCC)    • Recurrent pregnancy loss, antepartum condition or complication    • Sleep apnea     CPAP ASKED TO BRING DOS   • Subclinical hyperthyroidism    • Tachycardia    • Tattoo     LOWER BACK   • Varicella        Past Surgical History:   Procedure Laterality Date   •  SECTION      X2  and    • CHOLECYSTECTOMY     • COLONOSCOPY N/A 2017    Procedure: COLONOSCOPY WITH BIOPSIES AND ARGON THERMAL ABLATION;  Surgeon: Jignesh Selby MD;  Location: [facility] ENDOSCOPY;  Service:    • DIAGNOSTIC LAPAROSCOPY N/A 2018    Procedure: DIAGNOSTIC LAPAROSCOPY AND ABLATION OF ENDOMETRIOSIS;  Surgeon: Evin Zamudio MD;  Location: [facility] OR;  Service: Obstetrics/Gynecology   • ENDOMETRIAL ABLATION     • ENDOSCOPY N/A 2017    Procedure: ESOPHAGOGASTRODUODENOSCOPY WITH BIOPSIES AND COLD BIOPSY POLYPECTOMIES;  Surgeon: Jignesh Selby MD;  Location: [facility] ENDOSCOPY;  Service:    • PELVIC LAPAROSCOPY         Family History   Problem Relation Age of Onset   • Arthritis Mother    • COPD Mother    • Asthma Mother    • Thyroid disease Mother    • Arthritis Father    • Diabetes Father    • Hypertension Father    • Hyperlipidemia Father    • Kidney disease Father    • Heart attack Father    • Coronary artery disease Father    • No Known Problems Son    • Colon cancer Neg Hx    • Liver cancer Neg Hx    • Liver disease Neg Hx    • Stomach cancer Neg Hx    • Esophageal cancer Neg Hx        Social History     Social History   • Marital status:      Spouse name: N/A   • Number of children: N/A   • Years of education: N/A     Occupational History   • Not on file.     Social History Main Topics   • Smoking status: Never Smoker    • Smokeless tobacco: Never Used   • Alcohol use No   • Drug use: No   • Sexual activity: Yes     Partners: Male     Birth control/ protection: None      Comment: PE while on birth control patch     Other Topics Concern   • Not on file     Social History Narrative   • No narrative on file

## 2018-11-07 ENCOUNTER — HOSPITAL ENCOUNTER (OUTPATIENT)
Facility: HOSPITAL | Age: 28
Discharge: HOME | End: 2018-11-07
Attending: MIDWIFE | Admitting: MIDWIFE

## 2018-11-07 VITALS
OXYGEN SATURATION: 100 % | HEIGHT: 63 IN | BODY MASS INDEX: 34.02 KG/M2 | RESPIRATION RATE: 18 BRPM | WEIGHT: 192 LBS | SYSTOLIC BLOOD PRESSURE: 121 MMHG | HEART RATE: 119 BPM | TEMPERATURE: 98.4 F | DIASTOLIC BLOOD PRESSURE: 76 MMHG

## 2018-11-07 LAB
BILIRUB BLD-MCNC: NEGATIVE MG/DL
CLARITY, POC: ABNORMAL
COLOR UR: ABNORMAL
GLUCOSE UR STRIP-MCNC: NEGATIVE MG/DL
KETONES UR QL: ABNORMAL
LEUKOCYTE EST, POC: NEGATIVE
NITRITE UR-MCNC: NEGATIVE MG/ML
PH UR: 6 [PH] (ref 5–8)
PROT UR STRIP-MCNC: ABNORMAL MG/DL
RBC # UR STRIP: NEGATIVE /UL
SP GR UR: 1.02 (ref 1–1.03)
UROBILINOGEN UR QL: NORMAL

## 2018-11-07 PROCEDURE — 99213 OFFICE O/P EST LOW 20 MIN: CPT | Performed by: MIDWIFE

## 2018-11-07 PROCEDURE — G0463 HOSPITAL OUTPT CLINIC VISIT: HCPCS

## 2018-11-07 PROCEDURE — 81002 URINALYSIS NONAUTO W/O SCOPE: CPT | Performed by: MIDWIFE

## 2018-11-07 NOTE — H&P
[facility]  Marilu Godoy  : 1990  MRN: [id number]  CSN: [id number]    History and Physical    Subjective   Marilu Godoy is a 27 y.o. year old  with an Estimated Date of Delivery: 19 currently at 29w2d presenting with intermittent lower abdominal cramping and low back pain .  Her symptoms started about 0900. She describes the pain as dull and achey. She hasn't tried any comfort measures. She feels a lot of pelvic pressure. Baby has been active. She denies leakage of fluid or vaginal bleeding.    She has not been recently examined.      Prenatal care has been with [facility].  It has been benign.    History  Obstetric History       T2      L2     SAB2   TAB0   Ectopic0   Molar0   Multiple0   Live Births2       # Outcome Date GA Lbr Omkar/2nd Weight Sex Delivery Anes PTL Lv   5 Current            4 SAB 2015 5w0d          3 SAB 2015 5w0d          2 Term 14 39w0d  3657 g (8 lb 1 oz) M CS-LTranv   ISAAC   1 Term 11 40w0d  3771 g (8 lb 5 oz) F CS-LTranv   ISAAC        Past Medical History:   Diagnosis Date   • Abdominal pain    • Abnormal Pap smear of cervix    • Anxiety    • Asthma    • Bleeding disorder (CMS/HCC) 16   • Body piercing     TONGUE, NOSE, TWO IN EACH EAR   • Chest pain    • Clotting disorder (CMS/[facility])     factor 5   • Constipation    • Coronary artery disease involving native coronary artery of native heart without angina pectoris 2017   • Deep vein thrombosis (CMS/HCC) 16   • Diarrhea    • Diarrhea    • Endometriosis    • Factor 5 Leiden mutation, heterozygous (CMS/HCC)    • Fibroid    • Gallstone    • GERD (gastroesophageal reflux disease)    • H/O blood clots    • Headache    • Hiatal hernia    • High cholesterol    • History of recurrent UTIs    • HPV (human papilloma virus) infection    • Hypertension    • Kidney infection    • Migraine    • Nausea    • Nausea & vomiting    • Obesity    • Orthostatic hypotension     • Osteoarthritis    • Ovarian cyst    • Pollen allergies    • Protein S deficiency (CMS/HCC) 2016    labs from hospitalization for PE   • Pulmonary embolism (CMS/HCC)    • Recurrent pregnancy loss, antepartum condition or complication    • Sleep apnea     CPAP ASKED TO BRING DOS   • Subclinical hyperthyroidism    • Tachycardia    • Tattoo     LOWER BACK   • Varicella      No current facility-administered medications on file prior to encounter.      Current Outpatient Prescriptions on File Prior to Encounter   Medication Sig Dispense Refill   • albuterol (PROVENTIL HFA;VENTOLIN HFA) 108 (90 Base) MCG/ACT inhaler Inhale 2 puffs 4 (Four) Times a Day. 1 inhaler 0   • flunisolide (NASALIDE) 25 MCG/ACT (0.025%) solution nasal spray Inhale 2 sprays Every 12 (Twelve) Hours. 1 bottle 3   • loratadine (CLARITIN) 10 MG tablet Take 1 tablet by mouth Daily. 30 tablet 11   • raNITIdine (ZANTAC) 150 MG tablet Take 1 tablet by mouth As Needed for Heartburn or Indigestion. 30 tablet 3   • cephalexin (KEFLEX) 500 MG capsule Take 1 capsule by mouth 3 (Three) Times a Day. 30 capsule 0   • doxylamine (UNISOM) 25 MG tablet Take 25 mg by mouth.     • enoxaparin (LOVENOX) 40 MG/0.4ML solution syringe Inject 0.4 mL under the skin Daily. (Patient taking differently: Inject 40 mg under the skin Daily. Has not been able to start yet.) 11.2 mL 6   • vitamin B-6 (PYRIDOXINE) 25 MG tablet Take 0.5 tablets by mouth Every 6 (Six) Hours As Needed (nausea). 15 tablet 0     Allergies   Allergen Reactions   • Amoxicillin Rash     Past Surgical History:   Procedure Laterality Date   •  SECTION      X2  and    • CHOLECYSTECTOMY     • COLONOSCOPY N/A 2017    Procedure: COLONOSCOPY WITH BIOPSIES AND ARGON THERMAL ABLATION;  Surgeon: Jignesh Selby MD;  Location: [facility] ENDOSCOPY;  Service:    • DIAGNOSTIC LAPAROSCOPY N/A 2018    Procedure: DIAGNOSTIC LAPAROSCOPY AND ABLATION OF ENDOMETRIOSIS;  Surgeon: Evin Arce  "MD Robb;  Location: [facility] OR;  Service: Obstetrics/Gynecology   • ENDOMETRIAL ABLATION     • ENDOSCOPY N/A 4/20/2017    Procedure: ESOPHAGOGASTRODUODENOSCOPY WITH BIOPSIES AND COLD BIOPSY POLYPECTOMIES;  Surgeon: Jignesh Selby MD;  Location: [facility] ENDOSCOPY;  Service:    • PELVIC LAPAROSCOPY       Family History   Problem Relation Age of Onset   • Arthritis Mother    • COPD Mother    • Asthma Mother    • Thyroid disease Mother    • Arthritis Father    • Diabetes Father    • Hypertension Father    • Hyperlipidemia Father    • Kidney disease Father    • Heart attack Father    • Coronary artery disease Father    • No Known Problems Son    • Colon cancer Neg Hx    • Liver cancer Neg Hx    • Liver disease Neg Hx    • Stomach cancer Neg Hx    • Esophageal cancer Neg Hx      Social History     Social History   • Marital status:      Social History Main Topics   • Smoking status: Never Smoker   • Smokeless tobacco: Never Used   • Alcohol use No   • Drug use: No   • Sexual activity: Yes     Partners: Male     Birth control/ protection: None      Comment: PE while on birth control patch     Other Topics Concern   • Not on file     Review of Systems  Pertinent items are noted in HPI, all other systems reviewed and negative     Objective  Vitals:    11/07/18 1449 11/07/18 1450   BP: 121/76 121/76   BP Location: Left arm    Patient Position: Lying    Pulse: 119 119   Resp: 18    Temp: 98.4 °F (36.9 °C)    TempSrc: Oral    SpO2: 100%    Weight: 87.1 kg (192 lb)    Height: 160 cm (63\")      Physical Exam:  General Appearance: alert and cooperative  Abdomen: soft non-tender, no guarding and uterus gravid and nontender  Pelvic: Clinical staff was present for exam  External genitalia:  normal appearance of the external genitalia including Bartholin's and Banquete's glands.  Extremities: moves extremities well, no edema, no cyanosis and no redness  Neurologic: Mental Status orientated to person, place, time and situation, " Speech normal content and execusion    FHT's:  reassuring, appropriate for gestational age and category 1  Cervix:  was checked (by me): 0 cm / 0 % / Floating  Presentation:  cephalic  Contractions:  none - external monitors used    Prenatal Labs  Lab Results   Component Value Date    HGB 11.5 (L) 10/08/2018    HEPBSAG Negative 05/22/2018    ABSCRN Negative 05/22/2018    QKB2MCH8 Non Reactive 05/22/2018    HEPCVIRUSABY 0.1 11/27/2017    URINECX Final report 08/29/2018       Current Labs Reviewed               I reviewed the patient's new clinical results.                  Lab Results (last 24 hours)     Procedure Component Value Units Date/Time    POC Urinalysis Dipstick [[id number]]  (Abnormal) Collected:  11/07/18 1454    Specimen:  Urine Updated:  11/07/18 1456     Color Orange (A)     Clarity, UA Hazy (A)     Glucose, UA Negative mg/dL      Bilirubin Negative     Ketones, UA 15 mg/dL (A)     Specific Gravity  1.020     Blood, UA Negative     pH, Urine 6.0     Protein, POC Trace (A) mg/dL      Urobilinogen, UA Normal     Leukocytes Negative     Nitrite, UA Negative             Assessment   1. IUP at 29w2d  2. Round ligament pain  3. Pelvic pressure  4. False labor     Plan   1. Keep scheduled follow-up as previously scheduled.  2. Return if > 4 contractions/hour or any frequency associated with a change in vaginal discharge, leaking or bleeding.  3. Discussed comfort measures, Tylenol, increase fluids, maternity garments and or belt.    Bethany Vargas CNM  11/7/2018  5:55 PM

## 2018-11-26 ENCOUNTER — PREP FOR SURGERY (OUTPATIENT)
Dept: OTHER | Facility: HOSPITAL | Age: 28
End: 2018-11-26

## 2018-11-26 ENCOUNTER — OFFICE VISIT (OUTPATIENT)
Dept: INTERNAL MEDICINE | Facility: CLINIC | Age: 28
End: 2018-11-26

## 2018-11-26 ENCOUNTER — ROUTINE PRENATAL (OUTPATIENT)
Dept: OBSTETRICS AND GYNECOLOGY | Facility: CLINIC | Age: 28
End: 2018-11-26

## 2018-11-26 VITALS — WEIGHT: 194 LBS | BODY MASS INDEX: 34.37 KG/M2 | SYSTOLIC BLOOD PRESSURE: 132 MMHG | DIASTOLIC BLOOD PRESSURE: 67 MMHG

## 2018-11-26 VITALS
SYSTOLIC BLOOD PRESSURE: 110 MMHG | DIASTOLIC BLOOD PRESSURE: 74 MMHG | RESPIRATION RATE: 16 BRPM | WEIGHT: 188.12 LBS | TEMPERATURE: 99.1 F | OXYGEN SATURATION: 98 % | HEIGHT: 63 IN | BODY MASS INDEX: 33.33 KG/M2 | HEART RATE: 93 BPM

## 2018-11-26 DIAGNOSIS — D68.51 HETEROZYGOUS FACTOR V LEIDEN MUTATION (HCC): ICD-10-CM

## 2018-11-26 DIAGNOSIS — O09.93 SUPERVISION OF HIGH RISK PREGNANCY IN THIRD TRIMESTER: Primary | ICD-10-CM

## 2018-11-26 DIAGNOSIS — D68.59 PROTEIN S DEFICIENCY (HCC): ICD-10-CM

## 2018-11-26 DIAGNOSIS — O34.219 HISTORY OF CESAREAN SECTION COMPLICATING PREGNANCY: Primary | ICD-10-CM

## 2018-11-26 DIAGNOSIS — J02.9 SORE THROAT: Primary | ICD-10-CM

## 2018-11-26 DIAGNOSIS — Z36.89 ENCOUNTER FOR ULTRASOUND TO CHECK FETAL GROWTH: ICD-10-CM

## 2018-11-26 DIAGNOSIS — Z86.711 HISTORY OF PULMONARY EMBOLUS (PE): ICD-10-CM

## 2018-11-26 DIAGNOSIS — Z98.891 PREVIOUS CESAREAN SECTION: ICD-10-CM

## 2018-11-26 LAB
EXPIRATION DATE: NORMAL
INTERNAL CONTROL: NORMAL
Lab: NORMAL
S PYO AG THROAT QL: NEGATIVE

## 2018-11-26 PROCEDURE — 99214 OFFICE O/P EST MOD 30 MIN: CPT | Performed by: OBSTETRICS & GYNECOLOGY

## 2018-11-26 PROCEDURE — 87880 STREP A ASSAY W/OPTIC: CPT | Performed by: PHYSICIAN ASSISTANT

## 2018-11-26 PROCEDURE — 99213 OFFICE O/P EST LOW 20 MIN: CPT | Performed by: PHYSICIAN ASSISTANT

## 2018-11-26 RX ORDER — SODIUM CHLORIDE 0.9 % (FLUSH) 0.9 %
3 SYRINGE (ML) INJECTION EVERY 12 HOURS SCHEDULED
Status: CANCELLED | OUTPATIENT
Start: 2018-11-26

## 2018-11-26 RX ORDER — SODIUM CHLORIDE 0.9 % (FLUSH) 0.9 %
1-10 SYRINGE (ML) INJECTION AS NEEDED
Status: CANCELLED | OUTPATIENT
Start: 2018-11-26

## 2018-11-26 RX ORDER — LIDOCAINE HYDROCHLORIDE 10 MG/ML
5 INJECTION, SOLUTION EPIDURAL; INFILTRATION; INTRACAUDAL; PERINEURAL AS NEEDED
Status: CANCELLED | OUTPATIENT
Start: 2018-11-26

## 2018-11-26 RX ORDER — ONDANSETRON 2 MG/ML
4 INJECTION INTRAMUSCULAR; INTRAVENOUS EVERY 6 HOURS PRN
Status: CANCELLED | OUTPATIENT
Start: 2018-11-26

## 2018-11-26 RX ORDER — PROMETHAZINE HYDROCHLORIDE 25 MG/ML
12.5 INJECTION, SOLUTION INTRAMUSCULAR; INTRAVENOUS EVERY 4 HOURS PRN
Status: CANCELLED | OUTPATIENT
Start: 2018-11-26

## 2018-11-26 RX ORDER — ONDANSETRON 4 MG/1
4 TABLET, ORALLY DISINTEGRATING ORAL EVERY 6 HOURS PRN
Status: CANCELLED | OUTPATIENT
Start: 2018-11-26

## 2018-11-26 RX ORDER — METHYLERGONOVINE MALEATE 0.2 MG/ML
200 INJECTION INTRAVENOUS ONCE AS NEEDED
Status: CANCELLED | OUTPATIENT
Start: 2018-11-26

## 2018-11-26 RX ORDER — CEFAZOLIN SODIUM 2 G/50ML
2 SOLUTION INTRAVENOUS ONCE
Status: CANCELLED | OUTPATIENT
Start: 2018-11-26 | End: 2018-11-26

## 2018-11-26 RX ORDER — MISOPROSTOL 200 UG/1
800 TABLET ORAL AS NEEDED
Status: CANCELLED | OUTPATIENT
Start: 2018-11-26

## 2018-11-26 RX ORDER — PROMETHAZINE HYDROCHLORIDE 12.5 MG/1
12.5 TABLET ORAL EVERY 6 HOURS PRN
Status: CANCELLED | OUTPATIENT
Start: 2018-11-26

## 2018-11-26 RX ORDER — CARBOPROST TROMETHAMINE 250 UG/ML
250 INJECTION, SOLUTION INTRAMUSCULAR AS NEEDED
Status: CANCELLED | OUTPATIENT
Start: 2018-11-26

## 2018-11-26 RX ORDER — PROMETHAZINE HYDROCHLORIDE 25 MG/ML
25 INJECTION, SOLUTION INTRAMUSCULAR; INTRAVENOUS 4 TIMES DAILY PRN
Status: CANCELLED | OUTPATIENT
Start: 2018-11-26

## 2018-11-26 RX ORDER — FAMOTIDINE 10 MG/ML
20 INJECTION, SOLUTION INTRAVENOUS ONCE
Status: CANCELLED | OUTPATIENT
Start: 2018-11-26 | End: 2018-11-26

## 2018-11-26 RX ORDER — SODIUM CHLORIDE, SODIUM LACTATE, POTASSIUM CHLORIDE, CALCIUM CHLORIDE 600; 310; 30; 20 MG/100ML; MG/100ML; MG/100ML; MG/100ML
125 INJECTION, SOLUTION INTRAVENOUS CONTINUOUS
Status: CANCELLED | OUTPATIENT
Start: 2018-11-26

## 2018-11-26 RX ORDER — TRISODIUM CITRATE DIHYDRATE AND CITRIC ACID MONOHYDRATE 500; 334 MG/5ML; MG/5ML
30 SOLUTION ORAL ONCE
Status: CANCELLED | OUTPATIENT
Start: 2018-11-26 | End: 2018-11-26

## 2018-11-26 RX ORDER — PROMETHAZINE HYDROCHLORIDE 12.5 MG/1
12.5 SUPPOSITORY RECTAL EVERY 6 HOURS PRN
Status: CANCELLED | OUTPATIENT
Start: 2018-11-26

## 2018-11-26 RX ORDER — ONDANSETRON 4 MG/1
4 TABLET, FILM COATED ORAL EVERY 6 HOURS PRN
Status: CANCELLED | OUTPATIENT
Start: 2018-11-26

## 2018-11-26 NOTE — PROGRESS NOTES
Chief Complaint   Patient presents with   • Routine Prenatal Visit     Growth scan today, patient complains of pain in hips.        HPI: Marilu is a  currently at 32w0d who today reports the following:  Contractions - No; Leaking - No; Vaginal bleeding -  No; Heartburn - No.  Pt doing well other than complaints of pain in hips.  Pt with no injury; denies sciatica.  Pt denies any cramps or contractions.  Pt has repeat csection scheduled.  Pt wants to sign tubal papers but still uncertain.  Pt is taking lovenox as given.  ROS:   GI:   Nausea - No; Constipation - No; Diarrhea - No.   Neuro:  Headache - No; Visual disturbances - No.    EXAM:   Vitals:  See prenatal flowsheet as noted and reviewed   Abdomen:   See prenatal flowsheet as noted and reviewed   Back:  No point tenderness; no CVAT   Pelvic:  See prenatal flowsheet as noted and reviewed   Urine:  See prenatal flowsheet as noted and reviewed     Lab Results   Component Value Date    ABO O 2018    RH Positive 2018    ABSCRN Negative 2018     Scan today shows infant 37.6% growth; ROD 13.51; vtx; placenta anterior  Operative report from  reviewed Dr. Palafox; no extension of uterine incision noted  MDM:  Impression: Supervision of high risk pregnancy  Previous C/S with planned repeat C/S  Factor V Leiden mutation   Protein S Deficiency  History of PE outside of pregnancy  Low back pain/hip pain - probable musculoskeletal   Tests done today: U/S for EFW - see note above   Topics discussed:  section risks, benefits  kick counts and fetal movement  PIH precautions   labor signs and symptoms  Tubal   Position changes; maternity belt   Tests next visit: Will need to convert to heparin     This note was electronically signed.  Alva Boyer M.D.

## 2018-11-26 NOTE — PROGRESS NOTES
Subjective     Chief Complaint: headache, sore throat    History of Present Illness     Marilu Godoy is a 28 y.o. female presenting with complaints of sore throat and headache x 2 days. She has not taken anything to help with her symptoms. She is 29 weeks pregnant, scheduled for f/u with OB/GYN later today. Her children have been sick recently as well. She denies fevers, chills, cough, congestion, CP, SOA, GI or  symptoms.    The following portions of the patient's history were reviewed and updated as appropriate: current medications, allergies, PMH.    Review of Systems   Constitutional: Positive for fatigue. Negative for appetite change, chills, fever and unexpected weight change.   HENT: Positive for sore throat. Negative for congestion, ear pain, hearing loss, nosebleeds, sinus pressure, tinnitus and trouble swallowing.    Eyes: Negative for pain, discharge, redness, itching and visual disturbance.   Respiratory: Negative for cough, chest tightness, shortness of breath and wheezing.    Cardiovascular: Negative for chest pain, palpitations and leg swelling.   Gastrointestinal: Negative for abdominal pain, blood in stool, constipation, diarrhea, nausea and vomiting.   Endocrine: Negative for cold intolerance, heat intolerance, polydipsia, polyphagia and polyuria.   Genitourinary: Negative for decreased urine volume, dysuria, flank pain, frequency and hematuria.   Musculoskeletal: Negative for arthralgias, back pain, gait problem, joint swelling, myalgias, neck pain and neck stiffness.   Skin: Negative for color change and rash.   Allergic/Immunologic: Negative for environmental allergies, food allergies and immunocompromised state.   Neurological: Positive for headaches. Negative for dizziness, syncope, weakness and light-headedness.   Hematological: Negative for adenopathy. Does not bruise/bleed easily.   Psychiatric/Behavioral: Negative for dysphoric mood, sleep disturbance and suicidal ideas. The  "patient is not nervous/anxious.        Objective     Vitals:    11/26/18 0839   BP: 110/74   Pulse: 93   Resp: 16   Temp: 99.1 °F (37.3 °C)   SpO2: 98%   Weight: 85.3 kg (188 lb 1.9 oz)   Height: 160 cm (63\")     Physical Exam   Constitutional: She is oriented to person, place, and time. She appears well-developed and well-nourished.   HENT:   Right Ear: Tympanic membrane normal.   Left Ear: Tympanic membrane normal.   Nose: Nose normal.   Mouth/Throat: Abnormal dentition. Posterior oropharyngeal erythema present.   Cardiovascular: Normal rate and regular rhythm.   Pulmonary/Chest: Effort normal and breath sounds normal.   Lymphadenopathy:     She has no cervical adenopathy.   Neurological: She is alert and oriented to person, place, and time.   Skin: Skin is warm and dry.   Psychiatric: She has a normal mood and affect.       Assessment/Plan     Diagnoses and all orders for this visit:    Sore throat  -     POCT rapid strep A      RSS negative.  Increase water intake, tylenol prn, throat lozenges if needed. Continue claritin.  May consider antibiotic if still symptomatic in a few days, will hold off at this time.    Jenniffer Berger PA-C  11/26/2018         Please note that portions of this note were completed with a voice recognition program. Efforts were made to edit dictation, but occasionally words are mistranscribed.    "

## 2018-11-29 ENCOUNTER — HOSPITAL ENCOUNTER (OUTPATIENT)
Facility: HOSPITAL | Age: 28
Discharge: HOME | End: 2018-11-29
Attending: NURSE PRACTITIONER | Admitting: NURSE PRACTITIONER

## 2018-11-29 VITALS
DIASTOLIC BLOOD PRESSURE: 77 MMHG | SYSTOLIC BLOOD PRESSURE: 115 MMHG | RESPIRATION RATE: 20 BRPM | HEART RATE: 103 BPM | TEMPERATURE: 98 F | OXYGEN SATURATION: 99 % | WEIGHT: 194 LBS | BODY MASS INDEX: 34.37 KG/M2

## 2018-11-29 LAB
BILIRUB BLD-MCNC: NEGATIVE MG/DL
CLARITY, POC: CLEAR
COLOR UR: ABNORMAL
GLUCOSE UR STRIP-MCNC: NEGATIVE MG/DL
KETONES UR QL: NEGATIVE
LEUKOCYTE EST, POC: NEGATIVE
NITRITE UR-MCNC: NEGATIVE MG/ML
PH UR: 7.5 [PH] (ref 5–8)
PROT UR STRIP-MCNC: NEGATIVE MG/DL
RBC # UR STRIP: NEGATIVE /UL
SP GR UR: 1.01 (ref 1–1.03)
UROBILINOGEN UR QL: NORMAL

## 2018-11-29 PROCEDURE — 59025 FETAL NON-STRESS TEST: CPT

## 2018-11-29 PROCEDURE — 81002 URINALYSIS NONAUTO W/O SCOPE: CPT | Performed by: NURSE PRACTITIONER

## 2018-11-29 PROCEDURE — 59025 FETAL NON-STRESS TEST: CPT | Performed by: NURSE PRACTITIONER

## 2018-11-29 PROCEDURE — G0463 HOSPITAL OUTPT CLINIC VISIT: HCPCS

## 2018-12-03 ENCOUNTER — HOSPITAL ENCOUNTER (EMERGENCY)
Facility: HOSPITAL | Age: 28
Discharge: HOME | End: 2018-12-03
Attending: STUDENT IN AN ORGANIZED HEALTH CARE EDUCATION/TRAINING PROGRAM | Admitting: STUDENT IN AN ORGANIZED HEALTH CARE EDUCATION/TRAINING PROGRAM

## 2018-12-03 VITALS
HEIGHT: 63 IN | WEIGHT: 187.2 LBS | BODY MASS INDEX: 33.17 KG/M2 | DIASTOLIC BLOOD PRESSURE: 72 MMHG | SYSTOLIC BLOOD PRESSURE: 118 MMHG | HEART RATE: 94 BPM | OXYGEN SATURATION: 98 % | TEMPERATURE: 98.7 F | RESPIRATION RATE: 18 BRPM

## 2018-12-03 DIAGNOSIS — B34.9 VIRAL ILLNESS: Primary | ICD-10-CM

## 2018-12-03 LAB
ALBUMIN SERPL-MCNC: 3.8 G/DL (ref 3.5–5)
ALBUMIN/GLOB SERPL: 1.2 G/DL (ref 1–2)
ALP SERPL-CCNC: 116 U/L (ref 38–126)
ALT SERPL W P-5'-P-CCNC: 12 U/L (ref 13–69)
ANION GAP SERPL CALCULATED.3IONS-SCNC: 11.6 MMOL/L (ref 10–20)
AST SERPL-CCNC: 15 U/L (ref 15–46)
BASOPHILS # BLD AUTO: 0.02 10*3/MM3 (ref 0–0.2)
BASOPHILS NFR BLD AUTO: 0.1 % (ref 0–2.5)
BILIRUB SERPL-MCNC: 1.2 MG/DL (ref 0.2–1.3)
BUN BLD-MCNC: 4 MG/DL (ref 7–20)
BUN/CREAT SERPL: 10 (ref 7.1–23.5)
CALCIUM SPEC-SCNC: 9 MG/DL (ref 8.4–10.2)
CHLORIDE SERPL-SCNC: 103 MMOL/L (ref 98–107)
CO2 SERPL-SCNC: 20 MMOL/L (ref 26–30)
CREAT BLD-MCNC: 0.4 MG/DL (ref 0.6–1.3)
DEPRECATED RDW RBC AUTO: 41 FL (ref 37–54)
EOSINOPHIL # BLD AUTO: 0.04 10*3/MM3 (ref 0–0.7)
EOSINOPHIL NFR BLD AUTO: 0.3 % (ref 0–7)
ERYTHROCYTE [DISTWIDTH] IN BLOOD BY AUTOMATED COUNT: 12.4 % (ref 11.5–14.5)
FLUAV AG NPH QL: NEGATIVE
FLUBV AG NPH QL IA: NEGATIVE
GFR SERPL CREATININE-BSD FRML MDRD: >150 ML/MIN/1.73
GLOBULIN UR ELPH-MCNC: 3.3 GM/DL
GLUCOSE BLD-MCNC: 87 MG/DL (ref 74–98)
HCT VFR BLD AUTO: 32.7 % (ref 37–47)
HGB BLD-MCNC: 11.1 G/DL (ref 12–16)
IMM GRANULOCYTES # BLD: 0.05 10*3/MM3 (ref 0–0.06)
IMM GRANULOCYTES NFR BLD: 0.3 % (ref 0–0.6)
LYMPHOCYTES # BLD AUTO: 1.07 10*3/MM3 (ref 0.6–3.4)
LYMPHOCYTES NFR BLD AUTO: 7.4 % (ref 10–50)
MCH RBC QN AUTO: 30.9 PG (ref 27–31)
MCHC RBC AUTO-ENTMCNC: 33.9 G/DL (ref 30–37)
MCV RBC AUTO: 91.1 FL (ref 81–99)
MONOCYTES # BLD AUTO: 0.63 10*3/MM3 (ref 0–0.9)
MONOCYTES NFR BLD AUTO: 4.3 % (ref 0–12)
NEUTROPHILS # BLD AUTO: 12.7 10*3/MM3 (ref 2–6.9)
NEUTROPHILS NFR BLD AUTO: 87.6 % (ref 37–80)
NRBC BLD MANUAL-RTO: 0 /100 WBC (ref 0–0)
PLATELET # BLD AUTO: 231 10*3/MM3 (ref 130–400)
PMV BLD AUTO: 9.8 FL (ref 6–12)
POTASSIUM BLD-SCNC: 3.6 MMOL/L (ref 3.5–5.1)
PROT SERPL-MCNC: 7.1 G/DL (ref 6.3–8.2)
RBC # BLD AUTO: 3.59 10*6/MM3 (ref 4.2–5.4)
S PYO AG THROAT QL: NEGATIVE
SODIUM BLD-SCNC: 131 MMOL/L (ref 137–145)
WBC NRBC COR # BLD: 14.51 10*3/MM3 (ref 4.8–10.8)

## 2018-12-03 PROCEDURE — 96360 HYDRATION IV INFUSION INIT: CPT

## 2018-12-03 PROCEDURE — 87880 STREP A ASSAY W/OPTIC: CPT | Performed by: PHYSICIAN ASSISTANT

## 2018-12-03 PROCEDURE — 85025 COMPLETE CBC W/AUTO DIFF WBC: CPT | Performed by: PHYSICIAN ASSISTANT

## 2018-12-03 PROCEDURE — 87081 CULTURE SCREEN ONLY: CPT | Performed by: PHYSICIAN ASSISTANT

## 2018-12-03 PROCEDURE — 80053 COMPREHEN METABOLIC PANEL: CPT | Performed by: PHYSICIAN ASSISTANT

## 2018-12-03 PROCEDURE — 87804 INFLUENZA ASSAY W/OPTIC: CPT | Performed by: PHYSICIAN ASSISTANT

## 2018-12-03 PROCEDURE — 99283 EMERGENCY DEPT VISIT LOW MDM: CPT

## 2018-12-03 RX ORDER — SODIUM CHLORIDE 0.9 % (FLUSH) 0.9 %
10 SYRINGE (ML) INJECTION AS NEEDED
Status: DISCONTINUED | OUTPATIENT
Start: 2018-12-03 | End: 2018-12-04 | Stop reason: HOSPADM

## 2018-12-03 RX ORDER — ACETAMINOPHEN 325 MG/1
650 TABLET ORAL ONCE
Status: COMPLETED | OUTPATIENT
Start: 2018-12-03 | End: 2018-12-03

## 2018-12-03 RX ADMIN — ACETAMINOPHEN 650 MG: 325 TABLET, FILM COATED ORAL at 20:48

## 2018-12-03 RX ADMIN — SODIUM CHLORIDE 1000 ML: 9 INJECTION, SOLUTION INTRAVENOUS at 20:52

## 2018-12-04 ENCOUNTER — TELEPHONE (OUTPATIENT)
Dept: OBSTETRICS AND GYNECOLOGY | Facility: CLINIC | Age: 28
End: 2018-12-04

## 2018-12-04 RX ORDER — RANITIDINE 150 MG/1
150 TABLET ORAL 2 TIMES DAILY
Qty: 60 TABLET | Refills: 5 | Status: SHIPPED | OUTPATIENT
Start: 2018-12-04 | End: 2019-03-05 | Stop reason: SDUPTHER

## 2018-12-04 NOTE — ED PROVIDER NOTES
Subjective   Pt presents to ED with complaint of sore throat, body aches, low grade fever, cough, congestion, vomiting and diarrhea that started last night. Pt states that she has been around her parents who have both had similar symptoms. Pt is 33 weeks pregnant. She denies any abdominal pain, vaginal bleeding, dysuria, or discharge.         History provided by:  Patient   used: No    Flu Symptoms   Presenting symptoms: cough, diarrhea, fever, myalgias, nausea, sore throat and vomiting    Presenting symptoms: no headaches, no rhinorrhea and no shortness of breath    Severity:  Moderate  Onset quality:  Sudden  Duration:  1 day  Progression:  Unchanged  Chronicity:  New  Relieved by:  None tried  Worsened by:  Nothing  Ineffective treatments:  None tried  Associated symptoms: nasal congestion    Risk factors: pregnancy        Review of Systems   Constitutional: Positive for fever. Negative for activity change.   HENT: Positive for congestion and sore throat. Negative for rhinorrhea.    Eyes: Negative for pain.   Respiratory: Positive for cough. Negative for shortness of breath and wheezing.    Cardiovascular: Negative for chest pain.   Gastrointestinal: Positive for diarrhea, nausea and vomiting. Negative for abdominal distention.   Endocrine: Negative for polyphagia and polyuria.   Genitourinary: Negative for decreased urine volume, difficulty urinating and dysuria.   Musculoskeletal: Positive for myalgias. Negative for arthralgias.   Skin: Negative for rash and wound.   Allergic/Immunologic: Negative for food allergies.   Neurological: Negative for dizziness and headaches.   Hematological: Negative for adenopathy. Does not bruise/bleed easily.   Psychiatric/Behavioral: Negative for agitation and behavioral problems.   All other systems reviewed and are negative.      Past Medical History:   Diagnosis Date   • Abdominal pain    • Abnormal Pap smear of cervix    • Anxiety    • Asthma 2015   •  Bleeding disorder (CMS/HCC) 16   • Body piercing     TONGUE, NOSE, TWO IN EACH EAR   • Chest pain    • Clotting disorder (CMS/HCC)     factor 5   • Constipation    • Coronary artery disease involving native coronary artery of native heart without angina pectoris 2017   • Deep vein thrombosis (CMS/HCC) 16   • Diarrhea    • Diarrhea    • Endometriosis    • Factor 5 Leiden mutation, heterozygous (CMS/HCC)    • Fibroid    • Gallstone    • GERD (gastroesophageal reflux disease)    • H/O blood clots    • Headache    • Hiatal hernia    • High cholesterol    • History of recurrent UTIs    • HPV (human papilloma virus) infection    • Hypertension    • Kidney infection    • Migraine    • Nausea    • Nausea & vomiting    • Obesity    • Orthostatic hypotension    • Osteoarthritis    • Ovarian cyst    • Pollen allergies    • Protein S deficiency (CMS/HCC) 2016    labs from hospitalization for PE   • Pulmonary embolism (CMS/HCC)    • Recurrent pregnancy loss, antepartum condition or complication    • Sleep apnea     CPAP ASKED TO BRING DOS   • Subclinical hyperthyroidism    • Tachycardia    • Tattoo     LOWER BACK   • Varicella        Allergies   Allergen Reactions   • Amoxicillin Rash       Past Surgical History:   Procedure Laterality Date   •  SECTION      X2  and    • CHOLECYSTECTOMY     • ENDOMETRIAL ABLATION     • PELVIC LAPAROSCOPY         Family History   Problem Relation Age of Onset   • Arthritis Mother    • COPD Mother    • Asthma Mother    • Thyroid disease Mother    • Arthritis Father    • Diabetes Father    • Hypertension Father    • Hyperlipidemia Father    • Kidney disease Father    • Heart attack Father    • Coronary artery disease Father    • No Known Problems Son    • Colon cancer Neg Hx    • Liver cancer Neg Hx    • Liver disease Neg Hx    • Stomach cancer Neg Hx    • Esophageal cancer Neg Hx        Social History     Socioeconomic History   • Marital status:       Spouse name: Not on file   • Number of children: Not on file   • Years of education: Not on file   • Highest education level: Not on file   Tobacco Use   • Smoking status: Never Smoker   • Smokeless tobacco: Never Used   Substance and Sexual Activity   • Alcohol use: No   • Drug use: No   • Sexual activity: Yes     Partners: Male     Birth control/protection: None     Comment: PE while on birth control patch           Objective   Physical Exam   Constitutional: She is oriented to person, place, and time. She appears well-developed and well-nourished.   HENT:   Head: Normocephalic and atraumatic.   Eyes: EOM are normal. Pupils are equal, round, and reactive to light.   Neck: Normal range of motion. Neck supple.   Cardiovascular: Normal rate, regular rhythm and normal heart sounds.   Pulmonary/Chest: Effort normal and breath sounds normal.   Abdominal: Soft. Bowel sounds are normal.   Musculoskeletal: Normal range of motion.   Neurological: She is alert and oriented to person, place, and time.   Skin: Skin is warm and dry.   Psychiatric: She has a normal mood and affect. Her behavior is normal. Judgment and thought content normal.   Nursing note and vitals reviewed.      Procedures           ED Course  ED Course as of Dec 03 2255   Mon Dec 03, 2018   2125 Labs are fairly unremarkable. WBC elevated, but patient is 33 weeks pregnant, so would expect this to be elevated some. She has had no vomiting or diarrhea while here. D/w Dr. Hdz, we will discharge patient home, have advised of symptomatic treatment and she will f/u with obgyn as scheduled. Given strict return to care precautions.   [HUA]      ED Course User Index  [HUA] Gabriel Galaviz PA                  MDM  Number of Diagnoses or Management Options     Amount and/or Complexity of Data Reviewed  Clinical lab tests: ordered and reviewed  Tests in the radiology section of CPT®: reviewed and ordered  Tests in the medicine section of CPT®: reviewed and  ordered    Patient Progress  Patient progress: stable        Final diagnoses:   Viral illness            Gabriel Galaviz PA  12/03/18 [id number]

## 2018-12-05 ENCOUNTER — OFFICE VISIT (OUTPATIENT)
Dept: INTERNAL MEDICINE | Facility: CLINIC | Age: 28
End: 2018-12-05

## 2018-12-05 VITALS
HEIGHT: 63 IN | SYSTOLIC BLOOD PRESSURE: 120 MMHG | DIASTOLIC BLOOD PRESSURE: 68 MMHG | OXYGEN SATURATION: 98 % | HEART RATE: 101 BPM | BODY MASS INDEX: 33.31 KG/M2 | WEIGHT: 188 LBS | TEMPERATURE: 99.3 F

## 2018-12-05 DIAGNOSIS — B34.9 ACUTE VIRAL SYNDROME: Primary | ICD-10-CM

## 2018-12-05 LAB — BACTERIA SPEC AEROBE CULT: NORMAL

## 2018-12-05 PROCEDURE — 99213 OFFICE O/P EST LOW 20 MIN: CPT | Performed by: PHYSICIAN ASSISTANT

## 2018-12-05 NOTE — PROGRESS NOTES
Subjective     Chief Complaint: cough, diarrhea    History of Present Illness     Marilu Godoy is a 28 y.o. female presenting with complaints of low-grade fevers, congestion, cough, upset stomach, vomiting, diarrhea ×2 days.  Patient states she feels very fatigued.  She is 33 weeks pregnant.  Her children have been sick recently as well.  She is using Zantac daily.  Uses Unisom and B6 which somewhat helps vomiting.  Has not felt short of breath with her cough.  Tylenol as needed for her body aches.    The following portions of the patient's history were reviewed and updated as appropriate: current medications, allergies, PMH.    Review of Systems   Constitutional: Positive for fatigue. Negative for appetite change, chills, fever and unexpected weight change.   HENT: Positive for congestion. Negative for ear pain, hearing loss, nosebleeds, sinus pressure, sore throat, tinnitus and trouble swallowing.    Eyes: Negative for pain, discharge, redness, itching and visual disturbance.   Respiratory: Positive for cough. Negative for chest tightness, shortness of breath and wheezing.    Cardiovascular: Negative for chest pain, palpitations and leg swelling.   Gastrointestinal: Positive for diarrhea and nausea. Negative for abdominal pain, blood in stool, constipation and vomiting.   Endocrine: Negative for cold intolerance, heat intolerance, polydipsia, polyphagia and polyuria.   Genitourinary: Negative for decreased urine volume, dysuria, flank pain, frequency and hematuria.   Musculoskeletal: Negative for arthralgias, back pain, gait problem, joint swelling, myalgias, neck pain and neck stiffness.   Skin: Negative for color change and rash.   Allergic/Immunologic: Negative for environmental allergies, food allergies and immunocompromised state.   Neurological: Positive for headaches. Negative for dizziness, syncope, weakness and light-headedness.   Hematological: Negative for adenopathy. Does not bruise/bleed  "easily.   Psychiatric/Behavioral: Negative for dysphoric mood, sleep disturbance and suicidal ideas. The patient is not nervous/anxious.        Objective     Vitals:    12/05/18 0853   BP: 120/68   Pulse: 101   Temp: 99.3 °F (37.4 °C)   SpO2: 98%   Weight: 85.3 kg (188 lb)   Height: 160 cm (62.99\")     Physical Exam   Constitutional: She is oriented to person, place, and time. She appears well-developed and well-nourished.   HENT:   Right Ear: A middle ear effusion is present.   Left Ear: Tympanic membrane normal.   Nose: Nose normal.   Mouth/Throat: Posterior oropharyngeal erythema present.   Eyes: EOM are normal. Pupils are equal, round, and reactive to light.   Neck: Normal range of motion. Neck supple.   Cardiovascular: Normal rate and regular rhythm.   Pulmonary/Chest: Effort normal and breath sounds normal.   Abdominal: Soft. Bowel sounds are normal.   Musculoskeletal: Normal range of motion.   Lymphadenopathy:     She has no cervical adenopathy.   Neurological: She is alert and oriented to person, place, and time.   Skin: Skin is warm and dry.   Psychiatric: She has a normal mood and affect.       Assessment/Plan     Diagnoses and all orders for this visit:    Acute viral syndrome    Conservative measures at this time.  Increase water intake, bland diet, may continue to use Unisom and vitamin B6 to help with GI symptoms.  Discussed that her cough and ear pressure is likely related to allergies.  We'll hold off on antibiotics at this time due to pregnancy and patient's recurrent need.  She will let me know if symptoms worsen.    Jenniffer Berger PA-C  12/05/2018         Please note that portions of this note were completed with a voice recognition program. Efforts were made to edit dictation, but occasionally words are mistranscribed.    "

## 2018-12-07 RX ORDER — GUAIFENESIN AND DEXTROMETHORPHAN HYDROBROMIDE 600; 30 MG/1; MG/1
2 TABLET, EXTENDED RELEASE ORAL 2 TIMES DAILY PRN
Qty: 20 TABLET | Refills: 1 | Status: SHIPPED | OUTPATIENT
Start: 2018-12-07 | End: 2018-12-10

## 2018-12-07 RX ORDER — CEFUROXIME AXETIL 250 MG/1
250 TABLET ORAL 2 TIMES DAILY
Qty: 20 TABLET | Refills: 0 | Status: SHIPPED | OUTPATIENT
Start: 2018-12-07 | End: 2018-12-10

## 2018-12-10 ENCOUNTER — ROUTINE PRENATAL (OUTPATIENT)
Dept: OBSTETRICS AND GYNECOLOGY | Facility: CLINIC | Age: 28
End: 2018-12-10

## 2018-12-10 VITALS — DIASTOLIC BLOOD PRESSURE: 72 MMHG | BODY MASS INDEX: 32.78 KG/M2 | SYSTOLIC BLOOD PRESSURE: 128 MMHG | WEIGHT: 185 LBS

## 2018-12-10 DIAGNOSIS — D68.51 HETEROZYGOUS FACTOR V LEIDEN MUTATION (HCC): ICD-10-CM

## 2018-12-10 DIAGNOSIS — Z79.01 CHRONIC ANTICOAGULATION: ICD-10-CM

## 2018-12-10 DIAGNOSIS — Z86.711 HISTORY OF PULMONARY EMBOLUS (PE): ICD-10-CM

## 2018-12-10 DIAGNOSIS — Z34.93 PRENATAL CARE IN THIRD TRIMESTER: Primary | ICD-10-CM

## 2018-12-10 PROCEDURE — 99214 OFFICE O/P EST MOD 30 MIN: CPT | Performed by: OBSTETRICS & GYNECOLOGY

## 2018-12-10 RX ORDER — HEPARIN SODIUM 5000 [USP'U]/ML
5000 INJECTION, SOLUTION INTRAVENOUS; SUBCUTANEOUS EVERY 8 HOURS SCHEDULED
Qty: 10 ML | Refills: 5 | Status: SHIPPED | OUTPATIENT
Start: 2018-12-10 | End: 2018-12-24 | Stop reason: SDUPTHER

## 2018-12-10 NOTE — PROGRESS NOTES
Chief Complaint   Patient presents with   • Routine Prenatal Visit     C/O fatigue and hip pain       HPI:   Marilu is a  currently at 34w0d who today reports the following:  Contractions - No; Leaking - No; Vaginal bleeding -  No; Swelling of extremities - No. Good fetal movement - YES.    ROS:  GI: Nausea - No; Constipation - No; Diarrhea - No. RUQ pain - No    Neuro: Headache - No; Visual disturbances - No.    The following portions of the patient's history were reviewed and updated as appropriate:problem list, current medications, allergies, past family history, past medical history, past social history and past surgical history.    EXAM:  /72   Wt 83.9 kg (185 lb)   LMP 2018   BMI 32.78 kg/m²     Gen: NAD, conversant  Pulm: No use of accessory muscles, normal respirations  Abdomen: Gravid, nontender, size = dates, + fetal cardiac activity  Ext: no edema, no rashes, WWP  Gait: normal for pregnancy  Psych: Mood, insight, judgement intact  SVE: Not performed     Lab Results   Component Value Date    ABO O 2018    RH Positive 2018    ABSCRN Negative 2018       Social History    Tobacco Use      Smoking status: Never Smoker      Smokeless tobacco: Never Used      I have reviewed the prenatal labs and previous ultrasounds today.    MDM:  Diagnosis: Supervision of high risk pregnancy   Factor V Leiden  Protein S deficiency  Prior PE currently on prophylactic Lovenox  History of PreE  Prior C/S with planned repeat   Tests/Orders today: Stop Lovenox. Start prophylactic heparin.   Topics discussed: Anticoagulation   Continue aspirin 81 mg daily for preeclampsia prophylaxis   Plan for repeat  at 39 weeks   BTL    Tests next visit: GBS testing   Next visit: 2 week(s)     Jai Lopez MD  Obstetrics and Gynecology  [facility]

## 2018-12-13 RX ORDER — AZITHROMYCIN 250 MG/1
TABLET, FILM COATED ORAL
Qty: 6 TABLET | Refills: 0 | Status: SHIPPED | OUTPATIENT
Start: 2018-12-13 | End: 2019-01-07

## 2018-12-17 ENCOUNTER — TELEPHONE (OUTPATIENT)
Dept: OBSTETRICS AND GYNECOLOGY | Facility: CLINIC | Age: 28
End: 2018-12-17

## 2018-12-17 NOTE — TELEPHONE ENCOUNTER
Regarding: Prescription Question  Contact: [phone]  ----- Message from [employer], Generic sent at 12/15/2018  6:08 PM EST -----    [facility] said that the prescription that was sent in for the Heparin is only enough to cover 16 or 17 days. Plus they said they need a prescription for the syringes to see if insurance will cover them rather than making me pay out of pocket.

## 2018-12-18 ENCOUNTER — TELEPHONE (OUTPATIENT)
Dept: OBSTETRICS AND GYNECOLOGY | Facility: CLINIC | Age: 28
End: 2018-12-18

## 2018-12-18 RX ORDER — LORATADINE 10 MG/1
10 TABLET ORAL DAILY
Qty: 30 TABLET | Refills: 11 | Status: SHIPPED | OUTPATIENT
Start: 2018-12-18 | End: 2019-03-05 | Stop reason: SDUPTHER

## 2018-12-18 NOTE — TELEPHONE ENCOUNTER
Regarding: RE: RE: RE: RE: Prescription Question  Contact: [phone]  ----- Message from [employer], Generic sent at 12/18/2018 11:02 AM EST -----    [facility] doesn't keep my blood thinner in stock. That's why I'm dealing with [facility] on it. I need it sent to [facility].     ----- Message -----  From: JUAN ANTONIO PLATT  Sent: 12/18/18 10:42 AM  To: Marilu Godoy  Subject: RE: RE: RE: Prescription Question    She did send it to [facility] because that it the preferred pharmacy you had us list in your chart. You can pick it up there since she has already sent it all in.     ----- Message -----     From: Marilu Godoy     Sent: 12/18/2018 10:39 AM EST       To: Evin Zamudio MD  Subject: RE: RE: RE: Prescription Question    [facility] and they said they havent received anything. Make sure they didnt send it to [facility]. All my other medicines go to [facility] but [facility] is handling my blood thinner.     ----- Message -----  From: JUAN ANTONIO PLATT  Sent: 12/18/18 10:32 AM  To: Marilu Godoy  Subject: RE: RE: Prescription Question    Yes, Jared tried to contact you to let you know it was taken care of.     ----- Message -----     From: Marilu Godoy     Sent: 12/18/2018 10:29 AM EST       To: Evin Zamudio MD  Subject: RE: RE: Prescription Question    Did you ever get the prescription sent to [facility] for the rest of the heparin and the prescription sent over for the syringes?     ----- Message -----  From: JUAN ANTONIO DANIEL  Sent: 12/17/18 8:35 AM  To: Marilu Godoy  Subject: RE: Prescription Question    I will let a provider know and we will get the prescription send, I will let you know when they send it.     ----- Message -----     From: Marilu Godoy     Sent: 12/15/2018  6:08 PM EST       To: Evin Zamudio MD  Subject: Prescription Question    [facility] said that the prescription that was sent in for the Heparin is only enough to cover 16 or 17 days. Plus they  said they need a prescription for the syringes to see if insurance will cover them rather than making me pay out of pocket.

## 2018-12-18 NOTE — TELEPHONE ENCOUNTER
Alexandra  Please call [facility] in [place] -  spoke with pharmacist -  Heparin dose was confirmed and syringes were ordered   Please confirm for pt.    Thanks RH

## 2018-12-18 NOTE — TELEPHONE ENCOUNTER
Neither pharmacy has received the RX. She needs this sent to [facility] since [facility] does not keep it in stock.  I can send it back in for you if you would like. Just let know what to send. Thanks

## 2018-12-18 NOTE — TELEPHONE ENCOUNTER
Contacted pharmacy and patient has already picked up Heparin. They have the syringes ready for her to  and patient was informed via [employer].

## 2018-12-24 ENCOUNTER — ROUTINE PRENATAL (OUTPATIENT)
Dept: OBSTETRICS AND GYNECOLOGY | Facility: CLINIC | Age: 28
End: 2018-12-24

## 2018-12-24 VITALS — DIASTOLIC BLOOD PRESSURE: 70 MMHG | WEIGHT: 186 LBS | SYSTOLIC BLOOD PRESSURE: 128 MMHG | BODY MASS INDEX: 32.96 KG/M2

## 2018-12-24 DIAGNOSIS — Z34.93 PRENATAL CARE IN THIRD TRIMESTER: Primary | ICD-10-CM

## 2018-12-24 DIAGNOSIS — O34.219 HISTORY OF CESAREAN SECTION COMPLICATING PREGNANCY: ICD-10-CM

## 2018-12-24 DIAGNOSIS — D68.59 PROTEIN S DEFICIENCY (HCC): ICD-10-CM

## 2018-12-24 DIAGNOSIS — Z86.711 HISTORY OF PULMONARY EMBOLUS (PE): ICD-10-CM

## 2018-12-24 DIAGNOSIS — Z79.01 CHRONIC ANTICOAGULATION: ICD-10-CM

## 2018-12-24 DIAGNOSIS — D68.51 HETEROZYGOUS FACTOR V LEIDEN MUTATION (HCC): ICD-10-CM

## 2018-12-24 DIAGNOSIS — Z36.85 ANTENATAL SCREENING FOR STREPTOCOCCUS B: ICD-10-CM

## 2018-12-24 PROCEDURE — 99213 OFFICE O/P EST LOW 20 MIN: CPT | Performed by: OBSTETRICS & GYNECOLOGY

## 2018-12-24 RX ORDER — HEPARIN SODIUM 5000 [USP'U]/ML
5000 INJECTION, SOLUTION INTRAVENOUS; SUBCUTANEOUS EVERY 8 HOURS SCHEDULED
Qty: 10 ML | Refills: 5 | Status: SHIPPED | OUTPATIENT
Start: 2018-12-24 | End: 2019-01-17 | Stop reason: HOSPADM

## 2018-12-24 NOTE — PROGRESS NOTES
Chief Complaint   Patient presents with   • Routine Prenatal Visit     GBS done today, No complaints       HPI:   Marilu is a  currently at 34w0d who today reports the following:  Contractions - No; Leaking - No; Vaginal bleeding -  No; Swelling of extremities - No. Good fetal movement - YES.    ROS:  GI: Nausea - No; Constipation - No; Diarrhea - No. RUQ pain - No    Neuro: Headache - No; Visual disturbances - No.    The following portions of the patient's history were reviewed and updated as appropriate:problem list, current medications, allergies, past family history, past medical history, past social history and past surgical history.    EXAM:  /70   Wt 84.4 kg (186 lb)   LMP 2018   BMI 32.96 kg/m²     Gen: NAD, conversant  Pulm: No use of accessory muscles, normal respirations  Abdomen: Gravid, nontender, size = dates, + fetal cardiac activity  Ext: no edema, no rashes, WWP  Gait: normal for pregnancy  Psych: Mood, insight, judgement intact  SVE: Not performed     Lab Results   Component Value Date    ABO O 2018    RH Positive 2018    ABSCRN Negative 2018       Social History    Tobacco Use      Smoking status: Never Smoker      Smokeless tobacco: Never Used      I have reviewed the prenatal labs and previous ultrasounds today.    MDM:  Diagnosis: Supervision of high risk pregnancy   Factor V Leiden  Protein S deficiency  Prior PE currently on prophylactic Lovenox  History of PreE  Prior C/S with planned repeat   Tests/Orders today: None. Refill heparin.   Topics discussed: Anticoagulation   Plan for repeat  at 39 weeks   BTL    Tests next visit: none   Next visit: 2 week(s)     Jai Lopez MD  Obstetrics and Gynecology  [facility]

## 2018-12-26 ENCOUNTER — TELEPHONE (OUTPATIENT)
Dept: OBSTETRICS AND GYNECOLOGY | Facility: CLINIC | Age: 28
End: 2018-12-26

## 2018-12-26 NOTE — TELEPHONE ENCOUNTER
Regarding: Prescription Question  Contact: [phone]  ----- Message from Eric[employer]art (WPP)t, Generic sent at 12/24/2018  2:07 PM EST -----    [facility] said that they got the prescription for the Heparin but they didnt receive a prescription for the syringes. I need another 6 days (18 syringes) worth of syringes to do til my due date.

## 2018-12-28 ENCOUNTER — HOSPITAL ENCOUNTER (OUTPATIENT)
Facility: HOSPITAL | Age: 28
Discharge: HOME | End: 2018-12-28
Attending: OBSTETRICS & GYNECOLOGY | Admitting: OBSTETRICS & GYNECOLOGY

## 2018-12-28 VITALS
RESPIRATION RATE: 18 BRPM | SYSTOLIC BLOOD PRESSURE: 123 MMHG | DIASTOLIC BLOOD PRESSURE: 68 MMHG | OXYGEN SATURATION: 98 % | HEART RATE: 130 BPM | TEMPERATURE: 98 F

## 2018-12-28 LAB
BILIRUB BLD-MCNC: NEGATIVE MG/DL
CLARITY, POC: CLEAR
COLOR UR: YELLOW
FLUAV AG NPH QL: NEGATIVE
FLUBV AG NPH QL IA: NEGATIVE
GLUCOSE UR STRIP-MCNC: NEGATIVE MG/DL
KETONES UR QL: ABNORMAL
LEUKOCYTE EST, POC: NEGATIVE
NITRITE UR-MCNC: NEGATIVE MG/ML
PH UR: 6 [PH] (ref 5–8)
PROT UR STRIP-MCNC: NEGATIVE MG/DL
RBC # UR STRIP: NEGATIVE /UL
SP GR UR: 1.02 (ref 1–1.03)
UROBILINOGEN UR QL: NORMAL

## 2018-12-28 PROCEDURE — G0463 HOSPITAL OUTPT CLINIC VISIT: HCPCS

## 2018-12-28 PROCEDURE — 81002 URINALYSIS NONAUTO W/O SCOPE: CPT | Performed by: OBSTETRICS & GYNECOLOGY

## 2018-12-28 PROCEDURE — 59025 FETAL NON-STRESS TEST: CPT | Performed by: OBSTETRICS & GYNECOLOGY

## 2018-12-28 PROCEDURE — 59025 FETAL NON-STRESS TEST: CPT

## 2018-12-28 PROCEDURE — 87804 INFLUENZA ASSAY W/OPTIC: CPT | Performed by: OBSTETRICS & GYNECOLOGY

## 2018-12-28 RX ORDER — LOPERAMIDE HYDROCHLORIDE 2 MG/1
4 CAPSULE ORAL ONCE
Status: COMPLETED | OUTPATIENT
Start: 2018-12-28 | End: 2018-12-28

## 2018-12-28 RX ADMIN — LOPERAMIDE HYDROCHLORIDE 4 MG: 2 CAPSULE ORAL at 21:13

## 2018-12-29 LAB — B-HEM STREP SPEC QL CULT: NEGATIVE

## 2018-12-29 RX ORDER — OSELTAMIVIR PHOSPHATE 75 MG/1
75 CAPSULE ORAL DAILY
Qty: 10 CAPSULE | Refills: 0 | Status: ON HOLD | OUTPATIENT
Start: 2018-12-29 | End: 2019-01-08

## 2019-01-07 ENCOUNTER — ROUTINE PRENATAL (OUTPATIENT)
Dept: OBSTETRICS AND GYNECOLOGY | Facility: CLINIC | Age: 29
End: 2019-01-07

## 2019-01-07 VITALS — BODY MASS INDEX: 33.31 KG/M2 | WEIGHT: 188 LBS | SYSTOLIC BLOOD PRESSURE: 122 MMHG | DIASTOLIC BLOOD PRESSURE: 60 MMHG

## 2019-01-07 DIAGNOSIS — O34.219 HISTORY OF CESAREAN SECTION COMPLICATING PREGNANCY: Primary | ICD-10-CM

## 2019-01-07 PROCEDURE — 99213 OFFICE O/P EST LOW 20 MIN: CPT | Performed by: NURSE PRACTITIONER

## 2019-01-07 NOTE — PROGRESS NOTES
[id number]  Chief Complaint   Patient presents with   • Routine Prenatal Visit     c/o cramping and tightening         HPI  , 38w0d reports some contractions - good FM  No bleeding or fluid leaking - would like exam today   On heparin now - taking as directed       ROS  /60   Wt 85.3 kg (188 lb)   LMP 2018   BMI 33.31 kg/m²  -See Prenatal Assessment    ROS:      GI: Nausea - No; Constipation - No;    Diarrhea - No    Neuro: Headache - No; Visual change - No      EXAM  General Appearance:  Pleasant  Lungs: Breathing unlabored  Abdomen:  See flow sheet for Fundal ht, FM, FHT's  LE: Neg edema  V/E closed thick soft    MDM  Impression:  Problems/Risk High risk    Previous C/S    Factor V Leiden  Protein S deficiency  Prior PE      Tests done today: none   Topics discussed: S/S labor and adeq FM/Kick Counts  Continue heparin as instructed   encouraged questions - call prn    Tests next visit: none     OB History      Para Term  AB Living    5 2 2 0 2 2    SAB TAB Ectopic Molar Multiple Live Births    2 0 0 0 0 2          Past Medical History:   Diagnosis Date   • Abdominal pain    • Abnormal Pap smear of cervix    • Anxiety    • Asthma    • Bleeding disorder (CMS/[facility]) 16   • Body piercing     TONGUE, NOSE, TWO IN EACH EAR   • Chest pain    • Clotting disorder (CMS/[facility])     factor 5   • Constipation    • Coronary artery disease involving native coronary artery of native heart without angina pectoris 2017   • Deep vein thrombosis (CMS/[facility]) 16   • Diarrhea    • Diarrhea    • Endometriosis    • Factor 5 Leiden mutation, heterozygous (CMS/[facility])    • Fibroid    • Gallstone    • GERD (gastroesophageal reflux disease)    • H/O blood clots    • Headache    • Hiatal hernia    • High cholesterol    • History of recurrent UTIs    • HPV (human papilloma virus) infection    • Hypertension    • Kidney infection    • Migraine    • Nausea    • Nausea & vomiting    • Obesity    •  Orthostatic hypotension    • Osteoarthritis    • Ovarian cyst    • Pollen allergies    • Protein S deficiency (CMS/HCC) 2016    labs from hospitalization for PE   • Pulmonary embolism (CMS/HCC)    • Recurrent pregnancy loss, antepartum condition or complication    • Sleep apnea     CPAP ASKED TO BRING DOS   • Subclinical hyperthyroidism    • Tachycardia    • Tattoo     LOWER BACK   • Varicella        Past Surgical History:   Procedure Laterality Date   •  SECTION      X2  and    • CHOLECYSTECTOMY     • COLONOSCOPY N/A 2017    Procedure: COLONOSCOPY WITH BIOPSIES AND ARGON THERMAL ABLATION;  Surgeon: Jignesh Selby MD;  Location: [facility] ENDOSCOPY;  Service:    • DIAGNOSTIC LAPAROSCOPY N/A 2018    Procedure: DIAGNOSTIC LAPAROSCOPY AND ABLATION OF ENDOMETRIOSIS;  Surgeon: Evin Zamudio MD;  Location: [facility] OR;  Service: Obstetrics/Gynecology   • ENDOMETRIAL ABLATION     • ENDOSCOPY N/A 2017    Procedure: ESOPHAGOGASTRODUODENOSCOPY WITH BIOPSIES AND COLD BIOPSY POLYPECTOMIES;  Surgeon: Jignesh Selby MD;  Location: [facility] ENDOSCOPY;  Service:    • PELVIC LAPAROSCOPY         Family History   Problem Relation Age of Onset   • Arthritis Mother    • COPD Mother    • Asthma Mother    • Thyroid disease Mother    • Arthritis Father    • Diabetes Father    • Hypertension Father    • Hyperlipidemia Father    • Kidney disease Father    • Heart attack Father    • Coronary artery disease Father    • No Known Problems Son    • Colon cancer Neg Hx    • Liver cancer Neg Hx    • Liver disease Neg Hx    • Stomach cancer Neg Hx    • Esophageal cancer Neg Hx        Social History     Socioeconomic History   • Marital status:      Spouse name: Not on file   • Number of children: Not on file   • Years of education: Not on file   • Highest education level: Not on file   Social Needs   • Financial resource strain: Not on file   • Food insecurity - worry: Not on file   • Food  insecurity - inability: Not on file   • Transportation needs - medical: Not on file   • Transportation needs - non-medical: Not on file   Occupational History   • Not on file   Tobacco Use   • Smoking status: Never Smoker   • Smokeless tobacco: Never Used   Substance and Sexual Activity   • Alcohol use: No   • Drug use: No   • Sexual activity: Yes     Partners: Male     Birth control/protection: None     Comment: PE while on birth control patch   Other Topics Concern   • Not on file   Social History Narrative   • Not on file

## 2019-01-08 ENCOUNTER — HOSPITAL ENCOUNTER (OUTPATIENT)
Facility: HOSPITAL | Age: 29
Discharge: HOME | End: 2019-01-09
Attending: NURSE PRACTITIONER | Admitting: NURSE PRACTITIONER

## 2019-01-08 LAB
BILIRUB UR QL STRIP: NEGATIVE
CLARITY UR: CLEAR
COLOR UR: YELLOW
GLUCOSE UR STRIP-MCNC: NEGATIVE MG/DL
HGB UR QL STRIP.AUTO: NEGATIVE
KETONES UR QL STRIP: ABNORMAL
LEUKOCYTE ESTERASE UR QL STRIP.AUTO: NEGATIVE
NITRITE UR QL STRIP: NEGATIVE
PH UR STRIP.AUTO: 6.5 [PH] (ref 5–8)
PROT UR QL STRIP: NEGATIVE
SP GR UR STRIP: 1.02 (ref 1–1.03)
UROBILINOGEN UR QL STRIP: ABNORMAL

## 2019-01-08 PROCEDURE — G0463 HOSPITAL OUTPT CLINIC VISIT: HCPCS

## 2019-01-08 PROCEDURE — 81003 URINALYSIS AUTO W/O SCOPE: CPT | Performed by: NURSE PRACTITIONER

## 2019-01-08 PROCEDURE — 59025 FETAL NON-STRESS TEST: CPT

## 2019-01-08 RX ORDER — CYCLOBENZAPRINE HCL 10 MG
10 TABLET ORAL 3 TIMES DAILY
Status: DISCONTINUED | OUTPATIENT
Start: 2019-01-08 | End: 2019-01-09 | Stop reason: HOSPADM

## 2019-01-08 RX ORDER — HYDROXYZINE PAMOATE 50 MG/1
50 CAPSULE ORAL 3 TIMES DAILY PRN
Status: DISCONTINUED | OUTPATIENT
Start: 2019-01-08 | End: 2019-01-09 | Stop reason: HOSPADM

## 2019-01-08 RX ORDER — SODIUM CHLORIDE, SODIUM LACTATE, POTASSIUM CHLORIDE, CALCIUM CHLORIDE 600; 310; 30; 20 MG/100ML; MG/100ML; MG/100ML; MG/100ML
1000 INJECTION, SOLUTION INTRAVENOUS ONCE
Status: COMPLETED | OUTPATIENT
Start: 2019-01-08 | End: 2019-01-08

## 2019-01-08 RX ADMIN — HYDROXYZINE PAMOATE 50 MG: 50 CAPSULE ORAL at 23:53

## 2019-01-08 RX ADMIN — SODIUM CHLORIDE, POTASSIUM CHLORIDE, SODIUM LACTATE AND CALCIUM CHLORIDE 1000 ML: 600; 310; 30; 20 INJECTION, SOLUTION INTRAVENOUS at 22:10

## 2019-01-09 ENCOUNTER — HOSPITAL ENCOUNTER (OUTPATIENT)
Facility: HOSPITAL | Age: 29
Discharge: HOME | End: 2019-01-09
Attending: NURSE PRACTITIONER | Admitting: MIDWIFE

## 2019-01-09 VITALS
WEIGHT: 188 LBS | TEMPERATURE: 98.2 F | SYSTOLIC BLOOD PRESSURE: 120 MMHG | HEIGHT: 63 IN | HEART RATE: 100 BPM | RESPIRATION RATE: 18 BRPM | BODY MASS INDEX: 33.31 KG/M2 | OXYGEN SATURATION: 99 % | DIASTOLIC BLOOD PRESSURE: 78 MMHG

## 2019-01-09 VITALS
TEMPERATURE: 98.1 F | HEART RATE: 96 BPM | DIASTOLIC BLOOD PRESSURE: 76 MMHG | BODY MASS INDEX: 33.3 KG/M2 | RESPIRATION RATE: 20 BRPM | SYSTOLIC BLOOD PRESSURE: 120 MMHG | OXYGEN SATURATION: 100 % | WEIGHT: 188 LBS

## 2019-01-09 LAB
BILIRUB BLD-MCNC: NEGATIVE MG/DL
CLARITY, POC: CLEAR
COLOR UR: ABNORMAL
GLUCOSE UR STRIP-MCNC: NEGATIVE MG/DL
KETONES UR QL: NEGATIVE
LEUKOCYTE EST, POC: NEGATIVE
NITRITE UR-MCNC: NEGATIVE MG/ML
PH UR: 7 [PH] (ref 5–8)
PROT UR STRIP-MCNC: NEGATIVE MG/DL
RBC # UR STRIP: NEGATIVE /UL
SP GR UR: 1.01 (ref 1–1.03)
UROBILINOGEN UR QL: NORMAL

## 2019-01-09 PROCEDURE — 59025 FETAL NON-STRESS TEST: CPT | Performed by: MIDWIFE

## 2019-01-09 PROCEDURE — 81002 URINALYSIS NONAUTO W/O SCOPE: CPT | Performed by: MIDWIFE

## 2019-01-09 PROCEDURE — 59025 FETAL NON-STRESS TEST: CPT

## 2019-01-09 PROCEDURE — G0463 HOSPITAL OUTPT CLINIC VISIT: HCPCS

## 2019-01-09 NOTE — NON STRESS TEST
Triage Note - Nursing Documentation  Labor and Delivery Admission Log    Marilu Godoy  : 1990  MRN: [id number]  CSN: [id number]    Date in / Time in:  2019  Time in:     Date out / Time out:    Time out: 2019  08:30    Nurse: Haleigh Spangler RN    Patient Info: She is a 28 y.o. year old  at 38w2d with an VANDA of 2019, by Last Menstrual Period who was seen on the [facility].    Chief Complaint:   Chief Complaint   Patient presents with   • Contractions       Provider Instructions / Disposition:IVF's, monitoring, hydrate, NST    Patient Active Problem List   Diagnosis   • Abnormal CT scan, colon   • Elevated liver function tests   • Right upper quadrant pain   • Sinus tachycardia   • History of pulmonary embolus (PE)   • Obesity (BMI 30-39.9)   • Heterozygous factor V Leiden mutation (CMS/HCC)   • Protein S deficiency (CMS/HCC)   • Labile hypertension   • Excessive daytime sleepiness   • Obstructive sleep apnea   • Orthopnea   • Pleuritic pain   • Mild intermittent asthma   • Hypotension   • Bilateral edema of lower extremity   • Chronic pain of both knees   • Abnormal SPEP   • Subclinical hyperthyroidism   • Cyst of right ovary   • Chronic midline thoracic back pain   • Precordial pain   • Neurogenic orthostatic hypotension (CMS/HCC)   • Palpitations   • Coronary artery disease involving native coronary artery of native heart without angina pectoris   • Anti-RNP antibodies present   • Decreased pedal pulses   • Claudication (CMS/HCC)   • Elevated IgE level   • Nonocclusive coronary atherosclerosis of native coronary artery   • Pelvic pain   • Inappropriate sinus tachycardia   • Chronic anticoagulation   • History of  section complicating pregnancy       NST Documentation (Only applicable > 32 weeks): Interpretation A  Nonstress Test Interpretation A: Reactive (19 1057 : Haleigh Spangler, RN)

## 2019-01-10 NOTE — NON STRESS TEST
Triage Note - Nursing Documentation  Labor and Delivery Admission Log    Marilu Godoy  : 1990  MRN: [id number]  CSN: [id number]    Date in / Time in:  2019  Time in:     Date out / Time out:    Time out:     Nurse: Gely Mireles RN    Patient Info: She is a 28 y.o. year old  at 38w2d with an VANDA of 2019, by Last Menstrual Period who was seen on the [facility].    Chief Complaint:   Chief Complaint   Patient presents with   • Contractions       Provider Instructions / Disposition:Patient Info: She is a 28 y.o. year old  at 38w2d with an VANDA of 2019, by Last Menstrual Period who was seen on the [facility].        Patient Active Problem List   Diagnosis   • Abnormal CT scan, colon   • Elevated liver function tests   • Right upper quadrant pain   • Sinus tachycardia   • History of pulmonary embolus (PE)   • Obesity (BMI 30-39.9)   • Heterozygous factor V Leiden mutation (CMS/HCC)   • Protein S deficiency (CMS/HCC)   • Labile hypertension   • Excessive daytime sleepiness   • Obstructive sleep apnea   • Orthopnea   • Pleuritic pain   • Mild intermittent asthma   • Hypotension   • Bilateral edema of lower extremity   • Chronic pain of both knees   • Abnormal SPEP   • Subclinical hyperthyroidism   • Cyst of right ovary   • Chronic midline thoracic back pain   • Precordial pain   • Neurogenic orthostatic hypotension (CMS/HCC)   • Palpitations   • Coronary artery disease involving native coronary artery of native heart without angina pectoris   • Anti-RNP antibodies present   • Decreased pedal pulses   • Claudication (CMS/HCC)   • Elevated IgE level   • Nonocclusive coronary atherosclerosis of native coronary artery   • Pelvic pain   • Inappropriate sinus tachycardia   • Chronic anticoagulation   • History of  section complicating pregnancy       NST Documentation (Only applicable > 32 weeks): Interpretation  A  Nonstress Test Interpretation A: Reactive (01/09/19 2039 : Gely Mireles RN)

## 2019-01-14 ENCOUNTER — ROUTINE PRENATAL (OUTPATIENT)
Dept: OBSTETRICS AND GYNECOLOGY | Facility: CLINIC | Age: 29
End: 2019-01-14

## 2019-01-14 ENCOUNTER — HOSPITAL ENCOUNTER (OUTPATIENT)
Facility: HOSPITAL | Age: 29
Discharge: HOME | End: 2019-01-14
Attending: MIDWIFE | Admitting: MIDWIFE

## 2019-01-14 VITALS
WEIGHT: 186 LBS | OXYGEN SATURATION: 100 % | TEMPERATURE: 97.9 F | RESPIRATION RATE: 16 BRPM | HEIGHT: 63 IN | DIASTOLIC BLOOD PRESSURE: 74 MMHG | HEART RATE: 102 BPM | BODY MASS INDEX: 32.96 KG/M2 | SYSTOLIC BLOOD PRESSURE: 131 MMHG

## 2019-01-14 VITALS — WEIGHT: 186 LBS | SYSTOLIC BLOOD PRESSURE: 122 MMHG | DIASTOLIC BLOOD PRESSURE: 72 MMHG | BODY MASS INDEX: 32.95 KG/M2

## 2019-01-14 DIAGNOSIS — D68.51 HETEROZYGOUS FACTOR V LEIDEN MUTATION (HCC): ICD-10-CM

## 2019-01-14 DIAGNOSIS — O34.219 HISTORY OF CESAREAN SECTION COMPLICATING PREGNANCY: ICD-10-CM

## 2019-01-14 DIAGNOSIS — D68.59 PROTEIN S DEFICIENCY (HCC): ICD-10-CM

## 2019-01-14 DIAGNOSIS — O09.93 SUPERVISION OF HIGH RISK PREGNANCY IN THIRD TRIMESTER: Primary | ICD-10-CM

## 2019-01-14 DIAGNOSIS — R10.9 CRAMPING AFFECTING PREGNANCY, ANTEPARTUM: ICD-10-CM

## 2019-01-14 DIAGNOSIS — Z86.711 HISTORY OF PULMONARY EMBOLUS (PE): ICD-10-CM

## 2019-01-14 DIAGNOSIS — O26.899 CRAMPING AFFECTING PREGNANCY, ANTEPARTUM: ICD-10-CM

## 2019-01-14 LAB
ABO GROUP BLD: NORMAL
BILIRUB BLD-MCNC: NEGATIVE MG/DL
BLD GP AB SCN SERPL QL: NEGATIVE
CLARITY, POC: ABNORMAL
COLOR UR: YELLOW
DEPRECATED RDW RBC AUTO: 41.7 FL (ref 37–54)
ERYTHROCYTE [DISTWIDTH] IN BLOOD BY AUTOMATED COUNT: 13.1 % (ref 11.5–14.5)
GLUCOSE UR STRIP-MCNC: NEGATIVE MG/DL
HCT VFR BLD AUTO: 34.5 % (ref 37–47)
HGB BLD-MCNC: 11.6 G/DL (ref 12–16)
KETONES UR QL: NEGATIVE
LEUKOCYTE EST, POC: NEGATIVE
MCH RBC QN AUTO: 29.7 PG (ref 27–31)
MCHC RBC AUTO-ENTMCNC: 33.6 G/DL (ref 30–37)
MCV RBC AUTO: 88.5 FL (ref 81–99)
NITRITE UR-MCNC: NEGATIVE MG/ML
PH UR: 6 [PH] (ref 5–8)
PLATELET # BLD AUTO: 256 10*3/MM3 (ref 130–400)
PMV BLD AUTO: 10.2 FL (ref 6–12)
PROT UR STRIP-MCNC: ABNORMAL MG/DL
RBC # BLD AUTO: 3.9 10*6/MM3 (ref 4.2–5.4)
RBC # UR STRIP: NEGATIVE /UL
RH BLD: POSITIVE
SP GR UR: 10.2 (ref 1–1.03)
T&S EXPIRATION DATE: NORMAL
UROBILINOGEN UR QL: NORMAL
WBC NRBC COR # BLD: 9.92 10*3/MM3 (ref 4.8–10.8)

## 2019-01-14 PROCEDURE — 85027 COMPLETE CBC AUTOMATED: CPT | Performed by: MIDWIFE

## 2019-01-14 PROCEDURE — 86900 BLOOD TYPING SEROLOGIC ABO: CPT | Performed by: MIDWIFE

## 2019-01-14 PROCEDURE — 99213 OFFICE O/P EST LOW 20 MIN: CPT | Performed by: OBSTETRICS & GYNECOLOGY

## 2019-01-14 PROCEDURE — 86850 RBC ANTIBODY SCREEN: CPT | Performed by: MIDWIFE

## 2019-01-14 PROCEDURE — 59025 FETAL NON-STRESS TEST: CPT

## 2019-01-14 PROCEDURE — G0463 HOSPITAL OUTPT CLINIC VISIT: HCPCS

## 2019-01-14 PROCEDURE — 36415 COLL VENOUS BLD VENIPUNCTURE: CPT | Performed by: MIDWIFE

## 2019-01-14 PROCEDURE — 81002 URINALYSIS NONAUTO W/O SCOPE: CPT | Performed by: MIDWIFE

## 2019-01-14 PROCEDURE — 86901 BLOOD TYPING SEROLOGIC RH(D): CPT | Performed by: MIDWIFE

## 2019-01-14 PROCEDURE — 59025 FETAL NON-STRESS TEST: CPT | Performed by: MIDWIFE

## 2019-01-14 NOTE — NON STRESS TEST
Triage Note - Nursing Documentation  Labor and Delivery Admission Log    Marilu Godoy  : 1990  MRN: [id number]  CSN: [id number]    Date in / Time in:  2019  Time in: 1011    Date out / Time out:    Time out: 1230    Nurse: Brenda Victor RN    Patient Info: She is a 28 y.o. year old  at 39w0d with an VANDA of 2019, by Last Menstrual Period who was seen on the [facility].    Chief Complaint:   Chief Complaint   Patient presents with   • Contractions     Sent from office to monitor for contactions       Provider Instructions / Disposition:    Patient Active Problem List   Diagnosis   • Abnormal CT scan, colon   • Elevated liver function tests   • Right upper quadrant pain   • Sinus tachycardia   • History of pulmonary embolus (PE)   • Obesity (BMI 30-39.9)   • Heterozygous factor V Leiden mutation (CMS/HCC)   • Protein S deficiency (CMS/HCC)   • Labile hypertension   • Excessive daytime sleepiness   • Obstructive sleep apnea   • Orthopnea   • Pleuritic pain   • Mild intermittent asthma   • Hypotension   • Bilateral edema of lower extremity   • Chronic pain of both knees   • Abnormal SPEP   • Subclinical hyperthyroidism   • Cyst of right ovary   • Chronic midline thoracic back pain   • Precordial pain   • Neurogenic orthostatic hypotension (CMS/HCC)   • Palpitations   • Coronary artery disease involving native coronary artery of native heart without angina pectoris   • Anti-RNP antibodies present   • Decreased pedal pulses   • Claudication (CMS/HCC)   • Elevated IgE level   • Nonocclusive coronary atherosclerosis of native coronary artery   • Pelvic pain   • Inappropriate sinus tachycardia   • Chronic anticoagulation   • History of  section complicating pregnancy       NST Documentation (Only applicable > 32 weeks): Interpretation A  Nonstress Test Interpretation A: Reactive (19 1200 : Brenda Victor RN)

## 2019-01-14 NOTE — H&P (VIEW-ONLY)
[facility]  Marilu Godoy  : 1990  MRN: [id number]  CSN: [id number]    History and Physical    Subjective   Marilu Godoy is a 28 y.o. year old  with an Estimated Date of Delivery: 19 scheduled for  delivery due to previous C/S - not a  candidate.  Her placental location is anterior.  She is planning for sterilization at the time of the .  Pt's prenatal course is remarkable for history of clotting disorder with Factor V mutation, protein S deficiency with history of DVT and PE.  Pt is on heparin currently.  She was seen in the office with complaints of contractions.  Pt also monitored on labor rincon last week x 2 secondary to pain.  Decision made to proceed with repeat csection and tubal.  Pt to stay off heparin.  Plan Csection as scheduled.    Prenatal care has been with [facility].  It has been complicated by previous C/S - (desires repeat ); Factor V mutation, Protein S deficiency, history of PE    History  Obstetric History       T2      L2     SAB2   TAB0   Ectopic0   Molar0   Multiple0   Live Births2       # Outcome Date GA Lbr Omkar/2nd Weight Sex Delivery Anes PTL Lv   5 Current            4 SAB 2015 5w0d          3 SAB 2015 5w0d          2 Term 14 39w0d  3657 g (8 lb 1 oz) M CS-LTranv   ISAAC   1 Term 11 40w0d  3771 g (8 lb 5 oz) F CS-LTranv   ISAAC        Past Medical History:   Diagnosis Date   • Abdominal pain    • Abnormal Pap smear of cervix    • Anxiety    • Asthma    • Bleeding disorder (CMS/HCC) 16   • Body piercing     TONGUE, NOSE, TWO IN EACH EAR   • Chest pain    • Clotting disorder (CMS/HCC)     factor 5   • Constipation    • Coronary artery disease involving native coronary artery of native heart without angina pectoris 2017   • Deep vein thrombosis (CMS/HCC) 16   • Diarrhea    • Diarrhea    • Endometriosis    • Factor 5 Leiden mutation, heterozygous (CMS/HCC)    • Fibroid     • Gallstone    • GERD (gastroesophageal reflux disease)    • H/O blood clots    • Headache    • Hiatal hernia    • High cholesterol    • History of recurrent UTIs    • HPV (human papilloma virus) infection    • Hypertension    • Kidney infection    • Migraine    • Nausea    • Nausea & vomiting    • Obesity    • Orthostatic hypotension    • Osteoarthritis    • Ovarian cyst    • Pollen allergies    • Protein S deficiency (CMS/HCC) 2016    labs from hospitalization for PE   • Pulmonary embolism (CMS/HCC)    • Recurrent pregnancy loss, antepartum condition or complication    • Sleep apnea     CPAP ASKED TO BRING DOS   • Subclinical hyperthyroidism    • Tachycardia    • Tattoo     LOWER BACK   • Varicella      No current facility-administered medications on file prior to encounter.      Current Outpatient Medications on File Prior to Encounter   Medication Sig Dispense Refill   • albuterol (PROVENTIL HFA;VENTOLIN HFA) 108 (90 Base) MCG/ACT inhaler Inhale 2 puffs 4 (Four) Times a Day. 1 inhaler 0   • doxylamine (UNISOM) 25 MG tablet Take 25 mg by mouth.     • flunisolide (NASALIDE) 25 MCG/ACT (0.025%) solution nasal spray Inhale 2 sprays Every 12 (Twelve) Hours. 1 bottle 3   • vitamin B-6 (PYRIDOXINE) 25 MG tablet Take 0.5 tablets by mouth Every 6 (Six) Hours As Needed (nausea). 15 tablet 0     Allergies   Allergen Reactions   • Ceftin [Cefuroxime Axetil] Other (See Comments)     Numbness in mouth and throat   • Amoxicillin Rash     Past Surgical History:   Procedure Laterality Date   •  SECTION      X2  and    • CHOLECYSTECTOMY     • COLONOSCOPY N/A 2017    Procedure: COLONOSCOPY WITH BIOPSIES AND ARGON THERMAL ABLATION;  Surgeon: Jignesh Selby MD;  Location: [facility] ENDOSCOPY;  Service:    • DIAGNOSTIC LAPAROSCOPY N/A 2018    Procedure: DIAGNOSTIC LAPAROSCOPY AND ABLATION OF ENDOMETRIOSIS;  Surgeon: Evin Zamudio MD;  Location: [facility] OR;  Service: Obstetrics/Gynecology    • ENDOMETRIAL ABLATION     • ENDOSCOPY N/A 4/20/2017    Procedure: ESOPHAGOGASTRODUODENOSCOPY WITH BIOPSIES AND COLD BIOPSY POLYPECTOMIES;  Surgeon: Jignesh Selby MD;  Location: [facility] ENDOSCOPY;  Service:    • PELVIC LAPAROSCOPY       Family History   Problem Relation Age of Onset   • Arthritis Mother    • COPD Mother    • Asthma Mother    • Thyroid disease Mother    • Arthritis Father    • Diabetes Father    • Hypertension Father    • Hyperlipidemia Father    • Kidney disease Father    • Heart attack Father    • Coronary artery disease Father    • No Known Problems Son    • Colon cancer Neg Hx    • Liver cancer Neg Hx    • Liver disease Neg Hx    • Stomach cancer Neg Hx    • Esophageal cancer Neg Hx      Social History     Socioeconomic History   • Marital status:      Spouse name: Not on file   • Number of children: Not on file   • Years of education: Not on file   • Highest education level: Not on file   Tobacco Use   • Smoking status: Never Smoker   • Smokeless tobacco: Never Used   Substance and Sexual Activity   • Alcohol use: No   • Drug use: No   • Sexual activity: Yes     Partners: Male     Birth control/protection: None     Comment: PE while on birth control patch     Review of Systems  All systems were reviewed and negative except for:  Genitourinary: postivie for  pain     Objective  Recent Vitals       11/7/2018 11/26/2018 11/26/2018       BP:  121/76  110/74  132/67     Pulse:  119  93  —     Temp:  —  99.1 °F (37.3 °C)  —     Weight:  —  85.3 kg (188 lb 1.9 oz)  88 kg (194 lb)     BMI (Calculated):  —  33.3  —         Physical Exam:  General Appearance: alert, appears stated age and cooperative  Head: normocephalic, without obvious abnormality and atraumatic  Eyes: lids and lashes normal, conjunctivae and sclerae normal, no icterus, no pallor and corneas clear  Lungs: clear to auscultation, respirations regular, respirations even and respirations unlabored  Heart: regular rhythm and normal  rate and normal S1, S2  Abdomen: no hepatomegaly, no splenomegaly, soft non-tender, no guarding, no rebound tenderness and uterus gravid and nontender  Extremities: moves extremities well, no edema, no cyanosis and no redness  Skin: no bleeding, bruising or rash and no lesions noted  Psych: normal mood and affect, oriented to person, time and place, thought content organized and appropriate judgment    Prenatal Labs  Lab Results   Component Value Date    HGB 11.6 (L) 2019    HEPBSAG Negative 2018    ABSCRN Negative 2019    XYO3OWG6 Non Reactive 2018    HEPCVIRUSABY 0.1 2017    URINECX Final report 2018       Recent Labs  Lab Results   Component Value Date    HGB 11.6 (L) 2019    HCT 34.5 (L) 2019    WBC 9.92 2019     2019           Assessment   1. IUP with an Estimated Date of Delivery: 19.  2. Planned  section for previous C/S - not a  candidate.  3. Desires permanent surgical sterilization  4. Factor V mutation  5. Protein S deficiency  6. History of PE     Plan   1. Repeat  with sterilization.  2. ABx and DVT prophylaxis. Discontinue heparin day prior; last dose early am.  Will resume postop 12-24 hours.  3. Risks, complications, benefits, and other alternatives discussed.  4. All questions answered and pt in agreement with plan.    Alva Boyer M.D.  2019

## 2019-01-14 NOTE — PROGRESS NOTES
Sent from office to monitor for contractions. C/O cramping.  BPs normal. No contractions noted on monitor.   Consulted with Dr Boyer. Change repeat CSection to tomorrow am. Hold Heparin. She had her last dose @ 06 this am.

## 2019-01-14 NOTE — PROGRESS NOTES
Chief Complaint   Patient presents with   • Routine Prenatal Visit     PATIENT COMPLAINS OF CONTRACTIONS AND PRESSURE.        HPI: Marilu is a  currently at 39w0d who today reports the following:  Contractions - YES - but less than 4/hour AND no associated change in vaginal discharge; Leaking - No; Vaginal bleeding -  No; Heartburn - No.  Pt with complaints of cramping and contractions.  Pt was seen on labor rincon x 2 last week; pt kept overnight as note reviewed with episodes of fetal tachycardia.  Pt has been on heparin TID; received last dose this am.  Pt has Csection scheduled for Thursday.  ROS:   GI:   Nausea - No; Constipation - No; Diarrhea - No.   Neuro:  Headache - No; Visual disturbances - No.    EXAM:   Vitals:  See prenatal flowsheet as noted and reviewed   Abdomen:   See prenatal flowsheet as noted and reviewed   Pelvic:  See prenatal flowsheet as noted and reviewed   Urine:  See prenatal flowsheet as noted and reviewed     Lab Results   Component Value Date    ABO O 2018    RH Positive 2018    ABSCRN Negative 2018       MDM:  Impression: Supervision of high risk pregnancy  Previous C/S with planned repeat C/S  Contractions and cramping   History of PE  Protein S Deficiency  Factor V Leiden Mutation   Tests done today: To labor [place] for monitoring - plan pending evaluation   Topics discussed: kick counts and fetal movement  labor signs and symptoms  PIH precautions   Tests next visit: none     This note was electronically signed.  Alva Boyer M.D.

## 2019-01-15 ENCOUNTER — HOSPITAL ENCOUNTER (INPATIENT)
Facility: HOSPITAL | Age: 29
LOS: 2 days | Discharge: HOME | End: 2019-01-17
Attending: OBSTETRICS & GYNECOLOGY | Admitting: OBSTETRICS & GYNECOLOGY

## 2019-01-15 ENCOUNTER — ANESTHESIA EVENT (OUTPATIENT)
Dept: LABOR AND DELIVERY | Facility: HOSPITAL | Age: 29
End: 2019-01-15

## 2019-01-15 ENCOUNTER — ANESTHESIA (OUTPATIENT)
Dept: LABOR AND DELIVERY | Facility: HOSPITAL | Age: 29
End: 2019-01-15

## 2019-01-15 DIAGNOSIS — O34.219 HISTORY OF CESAREAN SECTION COMPLICATING PREGNANCY: ICD-10-CM

## 2019-01-15 PROBLEM — Z34.90 PREGNANT: Status: ACTIVE | Noted: 2019-01-15

## 2019-01-15 PROBLEM — Z34.90 PREGNANCY: Status: ACTIVE | Noted: 2019-01-15

## 2019-01-15 LAB
BILIRUB UR QL STRIP: NEGATIVE
CLARITY UR: CLEAR
COLOR UR: YELLOW
GLUCOSE UR STRIP-MCNC: NEGATIVE MG/DL
HGB UR QL STRIP.AUTO: NEGATIVE
KETONES UR QL STRIP: NEGATIVE
LEUKOCYTE ESTERASE UR QL STRIP.AUTO: NEGATIVE
NITRITE UR QL STRIP: NEGATIVE
PH UR STRIP.AUTO: 7 [PH] (ref 5–8)
PROT UR QL STRIP: NEGATIVE
SP GR UR STRIP: 1.02 (ref 1–1.03)
UROBILINOGEN UR QL STRIP: NORMAL

## 2019-01-15 PROCEDURE — 25010000003 CEFAZOLIN SODIUM-DEXTROSE 2-3 GM-%(50ML) RECONSTITUTED SOLUTION: Performed by: OBSTETRICS & GYNECOLOGY

## 2019-01-15 PROCEDURE — 25010000002 PHENYLEPHRINE PER 1 ML: Performed by: NURSE ANESTHETIST, CERTIFIED REGISTERED

## 2019-01-15 PROCEDURE — 0UL70CZ OCCLUSION OF BILATERAL FALLOPIAN TUBES WITH EXTRALUMINAL DEVICE, OPEN APPROACH: ICD-10-PCS | Performed by: OBSTETRICS & GYNECOLOGY

## 2019-01-15 PROCEDURE — 25010000002 MORPHINE PER 10 MG: Performed by: NURSE ANESTHETIST, CERTIFIED REGISTERED

## 2019-01-15 PROCEDURE — 87086 URINE CULTURE/COLONY COUNT: CPT | Performed by: OBSTETRICS & GYNECOLOGY

## 2019-01-15 PROCEDURE — 25010000002 PROMETHAZINE PER 50 MG

## 2019-01-15 PROCEDURE — 59515 CESAREAN DELIVERY: CPT | Performed by: OBSTETRICS & GYNECOLOGY

## 2019-01-15 PROCEDURE — 51703 INSERT BLADDER CATH COMPLEX: CPT

## 2019-01-15 PROCEDURE — 58611 LIGATE OVIDUCT(S) ADD-ON: CPT | Performed by: OBSTETRICS & GYNECOLOGY

## 2019-01-15 PROCEDURE — 25010000002 ONDANSETRON PER 1 MG: Performed by: NURSE ANESTHETIST, CERTIFIED REGISTERED

## 2019-01-15 PROCEDURE — 25010000002 PROMETHAZINE PER 50 MG: Performed by: OBSTETRICS & GYNECOLOGY

## 2019-01-15 PROCEDURE — 59025 FETAL NON-STRESS TEST: CPT

## 2019-01-15 PROCEDURE — 25010000002 FENTANYL CITRATE (PF) 100 MCG/2ML SOLUTION: Performed by: NURSE ANESTHETIST, CERTIFIED REGISTERED

## 2019-01-15 PROCEDURE — 81003 URINALYSIS AUTO W/O SCOPE: CPT | Performed by: OBSTETRICS & GYNECOLOGY

## 2019-01-15 DEVICE — CLIP FALLOP FILSHIE TI PR STRL BX/20: Type: IMPLANTABLE DEVICE | Site: FALLOPIAN TUBE | Status: FUNCTIONAL

## 2019-01-15 RX ORDER — LIDOCAINE HYDROCHLORIDE 10 MG/ML
5 INJECTION, SOLUTION EPIDURAL; INFILTRATION; INTRACAUDAL; PERINEURAL AS NEEDED
Status: DISCONTINUED | OUTPATIENT
Start: 2019-01-15 | End: 2019-01-15 | Stop reason: HOSPADM

## 2019-01-15 RX ORDER — FENTANYL CITRATE 50 UG/ML
INJECTION, SOLUTION INTRAMUSCULAR; INTRAVENOUS AS NEEDED
Status: DISCONTINUED | OUTPATIENT
Start: 2019-01-15 | End: 2019-01-15 | Stop reason: SURG

## 2019-01-15 RX ORDER — MORPHINE SULFATE 2 MG/ML
4 INJECTION, SOLUTION INTRAMUSCULAR; INTRAVENOUS EVERY 4 HOURS PRN
Status: DISCONTINUED | OUTPATIENT
Start: 2019-01-15 | End: 2019-01-17 | Stop reason: HOSPADM

## 2019-01-15 RX ORDER — SODIUM CHLORIDE, SODIUM LACTATE, POTASSIUM CHLORIDE, CALCIUM CHLORIDE 600; 310; 30; 20 MG/100ML; MG/100ML; MG/100ML; MG/100ML
125 INJECTION, SOLUTION INTRAVENOUS CONTINUOUS
Status: DISCONTINUED | OUTPATIENT
Start: 2019-01-15 | End: 2019-01-15 | Stop reason: HOSPADM

## 2019-01-15 RX ORDER — TRISODIUM CITRATE DIHYDRATE AND CITRIC ACID MONOHYDRATE 500; 334 MG/5ML; MG/5ML
30 SOLUTION ORAL ONCE
Status: COMPLETED | OUTPATIENT
Start: 2019-01-15 | End: 2019-01-15

## 2019-01-15 RX ORDER — NALOXONE HCL 0.4 MG/ML
0.4 VIAL (ML) INJECTION
Status: DISCONTINUED | OUTPATIENT
Start: 2019-01-15 | End: 2019-01-17 | Stop reason: HOSPADM

## 2019-01-15 RX ORDER — SODIUM CHLORIDE 0.9 % (FLUSH) 0.9 %
1-10 SYRINGE (ML) INJECTION AS NEEDED
Status: DISCONTINUED | OUTPATIENT
Start: 2019-01-15 | End: 2019-01-15 | Stop reason: HOSPADM

## 2019-01-15 RX ORDER — PROMETHAZINE HYDROCHLORIDE 12.5 MG/1
25 TABLET ORAL EVERY 6 HOURS PRN
Status: DISCONTINUED | OUTPATIENT
Start: 2019-01-15 | End: 2019-01-17 | Stop reason: HOSPADM

## 2019-01-15 RX ORDER — IBUPROFEN 600 MG/1
600 TABLET ORAL EVERY 6 HOURS PRN
Status: DISCONTINUED | OUTPATIENT
Start: 2019-01-16 | End: 2019-01-17 | Stop reason: HOSPADM

## 2019-01-15 RX ORDER — ONDANSETRON 4 MG/1
4 TABLET, FILM COATED ORAL EVERY 6 HOURS PRN
Status: DISCONTINUED | OUTPATIENT
Start: 2019-01-15 | End: 2019-01-17 | Stop reason: HOSPADM

## 2019-01-15 RX ORDER — ONDANSETRON 2 MG/ML
4 INJECTION INTRAMUSCULAR; INTRAVENOUS EVERY 6 HOURS PRN
Status: DISCONTINUED | OUTPATIENT
Start: 2019-01-15 | End: 2019-01-17 | Stop reason: HOSPADM

## 2019-01-15 RX ORDER — SODIUM CHLORIDE 9 MG/ML
INJECTION, SOLUTION INTRAVENOUS
Status: DISPENSED
Start: 2019-01-15 | End: 2019-01-16

## 2019-01-15 RX ORDER — PROMETHAZINE HYDROCHLORIDE 25 MG/1
25 TABLET ORAL ONCE AS NEEDED
Status: COMPLETED | OUTPATIENT
Start: 2019-01-15 | End: 2019-01-15

## 2019-01-15 RX ORDER — PROMETHAZINE HYDROCHLORIDE 12.5 MG/1
12.5 TABLET ORAL EVERY 6 HOURS PRN
Status: DISCONTINUED | OUTPATIENT
Start: 2019-01-15 | End: 2019-01-17 | Stop reason: HOSPADM

## 2019-01-15 RX ORDER — MEPERIDINE HYDROCHLORIDE 50 MG/ML
12.5 INJECTION INTRAMUSCULAR; INTRAVENOUS; SUBCUTANEOUS
Status: DISCONTINUED | OUTPATIENT
Start: 2019-01-15 | End: 2019-01-16

## 2019-01-15 RX ORDER — ONDANSETRON 4 MG/1
4 TABLET, ORALLY DISINTEGRATING ORAL EVERY 6 HOURS PRN
Status: DISCONTINUED | OUTPATIENT
Start: 2019-01-15 | End: 2019-01-17 | Stop reason: HOSPADM

## 2019-01-15 RX ORDER — DIPHENHYDRAMINE HCL 25 MG
25 CAPSULE ORAL EVERY 4 HOURS PRN
Status: DISCONTINUED | OUTPATIENT
Start: 2019-01-15 | End: 2019-01-17 | Stop reason: HOSPADM

## 2019-01-15 RX ORDER — CARBOPROST TROMETHAMINE 250 UG/ML
250 INJECTION, SOLUTION INTRAMUSCULAR AS NEEDED
Status: DISCONTINUED | OUTPATIENT
Start: 2019-01-15 | End: 2019-01-17 | Stop reason: HOSPADM

## 2019-01-15 RX ORDER — NALOXONE HCL 0.4 MG/ML
0.4 VIAL (ML) INJECTION
Status: ACTIVE | OUTPATIENT
Start: 2019-01-15 | End: 2019-01-16

## 2019-01-15 RX ORDER — ONDANSETRON 4 MG/1
4 TABLET, FILM COATED ORAL EVERY 8 HOURS PRN
Status: DISCONTINUED | OUTPATIENT
Start: 2019-01-15 | End: 2019-01-17 | Stop reason: HOSPADM

## 2019-01-15 RX ORDER — METHYLERGONOVINE MALEATE 0.2 MG/ML
200 INJECTION INTRAVENOUS ONCE AS NEEDED
Status: DISCONTINUED | OUTPATIENT
Start: 2019-01-15 | End: 2019-01-17 | Stop reason: HOSPADM

## 2019-01-15 RX ORDER — PROMETHAZINE HYDROCHLORIDE 25 MG/ML
25 INJECTION, SOLUTION INTRAMUSCULAR; INTRAVENOUS 4 TIMES DAILY PRN
Status: DISCONTINUED | OUTPATIENT
Start: 2019-01-15 | End: 2019-01-17 | Stop reason: HOSPADM

## 2019-01-15 RX ORDER — PROMETHAZINE HYDROCHLORIDE 25 MG/ML
12.5 INJECTION, SOLUTION INTRAMUSCULAR; INTRAVENOUS EVERY 4 HOURS PRN
Status: DISCONTINUED | OUTPATIENT
Start: 2019-01-15 | End: 2019-01-17 | Stop reason: HOSPADM

## 2019-01-15 RX ORDER — BISACODYL 10 MG
10 SUPPOSITORY, RECTAL RECTAL DAILY PRN
Status: DISCONTINUED | OUTPATIENT
Start: 2019-01-15 | End: 2019-01-17 | Stop reason: HOSPADM

## 2019-01-15 RX ORDER — PROMETHAZINE HYDROCHLORIDE 12.5 MG/1
12.5 SUPPOSITORY RECTAL EVERY 6 HOURS PRN
Status: DISCONTINUED | OUTPATIENT
Start: 2019-01-15 | End: 2019-01-17 | Stop reason: HOSPADM

## 2019-01-15 RX ORDER — SIMETHICONE 80 MG
80 TABLET,CHEWABLE ORAL 4 TIMES DAILY PRN
Status: DISCONTINUED | OUTPATIENT
Start: 2019-01-15 | End: 2019-01-17 | Stop reason: HOSPADM

## 2019-01-15 RX ORDER — DEXTROSE AND SODIUM CHLORIDE 5; .9 G/100ML; G/100ML
125 INJECTION, SOLUTION INTRAVENOUS CONTINUOUS
Status: DISCONTINUED | OUTPATIENT
Start: 2019-01-15 | End: 2019-01-17 | Stop reason: HOSPADM

## 2019-01-15 RX ORDER — LANOLIN
CREAM (GRAM) TOPICAL
Status: DISCONTINUED | OUTPATIENT
Start: 2019-01-15 | End: 2019-01-17 | Stop reason: HOSPADM

## 2019-01-15 RX ORDER — ONDANSETRON 2 MG/ML
4 INJECTION INTRAMUSCULAR; INTRAVENOUS ONCE AS NEEDED
Status: ACTIVE | OUTPATIENT
Start: 2019-01-15 | End: 2019-01-16

## 2019-01-15 RX ORDER — MORPHINE SULFATE 2 MG/ML
6 INJECTION, SOLUTION INTRAMUSCULAR; INTRAVENOUS EVERY 4 HOURS PRN
Status: DISCONTINUED | OUTPATIENT
Start: 2019-01-15 | End: 2019-01-17 | Stop reason: HOSPADM

## 2019-01-15 RX ORDER — CEFAZOLIN SODIUM 2 G/50ML
2 SOLUTION INTRAVENOUS ONCE
Status: COMPLETED | OUTPATIENT
Start: 2019-01-15 | End: 2019-01-15

## 2019-01-15 RX ORDER — NALBUPHINE HCL 10 MG/ML
2.5 AMPUL (ML) INJECTION ONCE AS NEEDED
Status: DISCONTINUED | OUTPATIENT
Start: 2019-01-15 | End: 2019-01-17 | Stop reason: HOSPADM

## 2019-01-15 RX ORDER — ONDANSETRON 2 MG/ML
4 INJECTION INTRAMUSCULAR; INTRAVENOUS ONCE AS NEEDED
Status: DISCONTINUED | OUTPATIENT
Start: 2019-01-15 | End: 2019-01-17 | Stop reason: HOSPADM

## 2019-01-15 RX ORDER — MISOPROSTOL 200 UG/1
800 TABLET ORAL AS NEEDED
Status: DISCONTINUED | OUTPATIENT
Start: 2019-01-15 | End: 2019-01-17 | Stop reason: HOSPADM

## 2019-01-15 RX ORDER — FAMOTIDINE 10 MG/ML
20 INJECTION, SOLUTION INTRAVENOUS ONCE
Status: COMPLETED | OUTPATIENT
Start: 2019-01-15 | End: 2019-01-15

## 2019-01-15 RX ORDER — SODIUM CHLORIDE 0.9 % (FLUSH) 0.9 %
3 SYRINGE (ML) INJECTION EVERY 12 HOURS SCHEDULED
Status: DISCONTINUED | OUTPATIENT
Start: 2019-01-15 | End: 2019-01-15 | Stop reason: HOSPADM

## 2019-01-15 RX ORDER — PROMETHAZINE HYDROCHLORIDE 25 MG/ML
12.5 INJECTION, SOLUTION INTRAMUSCULAR; INTRAVENOUS ONCE AS NEEDED
Status: COMPLETED | OUTPATIENT
Start: 2019-01-15 | End: 2019-01-15

## 2019-01-15 RX ORDER — DOCUSATE SODIUM 100 MG/1
100 CAPSULE, LIQUID FILLED ORAL 2 TIMES DAILY PRN
Status: DISCONTINUED | OUTPATIENT
Start: 2019-01-15 | End: 2019-01-17 | Stop reason: HOSPADM

## 2019-01-15 RX ORDER — OXYCODONE HYDROCHLORIDE AND ACETAMINOPHEN 5; 325 MG/1; MG/1
1 TABLET ORAL EVERY 4 HOURS PRN
Status: DISCONTINUED | OUTPATIENT
Start: 2019-01-15 | End: 2019-01-17 | Stop reason: HOSPADM

## 2019-01-15 RX ORDER — BUPIVACAINE HYDROCHLORIDE 7.5 MG/ML
INJECTION, SOLUTION INTRASPINAL AS NEEDED
Status: DISCONTINUED | OUTPATIENT
Start: 2019-01-15 | End: 2019-01-15 | Stop reason: SURG

## 2019-01-15 RX ORDER — MORPHINE SULFATE 1 MG/ML
INJECTION, SOLUTION EPIDURAL; INTRATHECAL; INTRAVENOUS AS NEEDED
Status: DISCONTINUED | OUTPATIENT
Start: 2019-01-15 | End: 2019-01-15 | Stop reason: SURG

## 2019-01-15 RX ORDER — ZOLPIDEM TARTRATE 5 MG/1
5 TABLET ORAL NIGHTLY PRN
Status: DISCONTINUED | OUTPATIENT
Start: 2019-01-16 | End: 2019-01-17 | Stop reason: HOSPADM

## 2019-01-15 RX ORDER — BISACODYL 5 MG/1
10 TABLET, DELAYED RELEASE ORAL DAILY PRN
Status: DISCONTINUED | OUTPATIENT
Start: 2019-01-15 | End: 2019-01-17 | Stop reason: HOSPADM

## 2019-01-15 RX ORDER — OXYTOCIN 10 [USP'U]/ML
INJECTION, SOLUTION INTRAMUSCULAR; INTRAVENOUS AS NEEDED
Status: DISCONTINUED | OUTPATIENT
Start: 2019-01-15 | End: 2019-01-15 | Stop reason: SURG

## 2019-01-15 RX ORDER — PROMETHAZINE HYDROCHLORIDE 25 MG/ML
INJECTION, SOLUTION INTRAMUSCULAR; INTRAVENOUS
Status: COMPLETED
Start: 2019-01-15 | End: 2019-01-15

## 2019-01-15 RX ORDER — PROMETHAZINE HYDROCHLORIDE 25 MG/ML
12.5 INJECTION, SOLUTION INTRAMUSCULAR; INTRAVENOUS EVERY 6 HOURS PRN
Status: DISCONTINUED | OUTPATIENT
Start: 2019-01-15 | End: 2019-01-17 | Stop reason: HOSPADM

## 2019-01-15 RX ORDER — NALBUPHINE HCL 10 MG/ML
2.5 AMPUL (ML) INJECTION EVERY 4 HOURS PRN
Status: ACTIVE | OUTPATIENT
Start: 2019-01-15 | End: 2019-01-16

## 2019-01-15 RX ORDER — PROMETHAZINE HYDROCHLORIDE 25 MG/1
25 SUPPOSITORY RECTAL ONCE AS NEEDED
Status: COMPLETED | OUTPATIENT
Start: 2019-01-15 | End: 2019-01-15

## 2019-01-15 RX ORDER — MEPERIDINE HYDROCHLORIDE 50 MG/ML
50 INJECTION INTRAMUSCULAR; INTRAVENOUS; SUBCUTANEOUS ONCE AS NEEDED
Status: ACTIVE | OUTPATIENT
Start: 2019-01-15 | End: 2019-01-15

## 2019-01-15 RX ORDER — OXYCODONE HYDROCHLORIDE AND ACETAMINOPHEN 5; 325 MG/1; MG/1
2 TABLET ORAL EVERY 4 HOURS PRN
Status: DISCONTINUED | OUTPATIENT
Start: 2019-01-15 | End: 2019-01-17 | Stop reason: HOSPADM

## 2019-01-15 RX ORDER — ONDANSETRON 2 MG/ML
INJECTION INTRAMUSCULAR; INTRAVENOUS AS NEEDED
Status: DISCONTINUED | OUTPATIENT
Start: 2019-01-15 | End: 2019-01-15 | Stop reason: SURG

## 2019-01-15 RX ADMIN — PROMETHAZINE HYDROCHLORIDE 12.5 MG: 25 INJECTION, SOLUTION INTRAMUSCULAR; INTRAVENOUS at 09:00

## 2019-01-15 RX ADMIN — SODIUM CHLORIDE, POTASSIUM CHLORIDE, SODIUM LACTATE AND CALCIUM CHLORIDE 1000 ML: 600; 310; 30; 20 INJECTION, SOLUTION INTRAVENOUS at 07:29

## 2019-01-15 RX ADMIN — OXYTOCIN 20 UNITS: 10 INJECTION INTRAVENOUS at 08:00

## 2019-01-15 RX ADMIN — DEXTROSE AND SODIUM CHLORIDE 125 ML/HR: 5; 900 INJECTION, SOLUTION INTRAVENOUS at 09:23

## 2019-01-15 RX ADMIN — SODIUM CHLORIDE, POTASSIUM CHLORIDE, SODIUM LACTATE AND CALCIUM CHLORIDE 1000 ML: 600; 310; 30; 20 INJECTION, SOLUTION INTRAVENOUS at 07:11

## 2019-01-15 RX ADMIN — PHENYLEPHRINE HYDROCHLORIDE 100 MCG: 10 INJECTION INTRAVENOUS at 07:48

## 2019-01-15 RX ADMIN — SIMETHICONE CHEW TAB 80 MG 80 MG: 80 TABLET ORAL at 21:38

## 2019-01-15 RX ADMIN — ONDANSETRON 4 MG: 2 INJECTION INTRAMUSCULAR; INTRAVENOUS at 07:46

## 2019-01-15 RX ADMIN — DEXTROSE AND SODIUM CHLORIDE 125 ML/HR: 5; 900 INJECTION, SOLUTION INTRAVENOUS at 18:04

## 2019-01-15 RX ADMIN — SODIUM CHLORIDE, POTASSIUM CHLORIDE, SODIUM LACTATE AND CALCIUM CHLORIDE 125 ML/HR: 600; 310; 30; 20 INJECTION, SOLUTION INTRAVENOUS at 06:10

## 2019-01-15 RX ADMIN — PHENYLEPHRINE HYDROCHLORIDE 100 MCG: 10 INJECTION INTRAVENOUS at 07:46

## 2019-01-15 RX ADMIN — CEFAZOLIN SODIUM 2 G: 2 SOLUTION INTRAVENOUS at 07:41

## 2019-01-15 RX ADMIN — SODIUM CITRATE AND CITRIC ACID MONOHYDRATE 30 ML: 500; 334 SOLUTION ORAL at 07:13

## 2019-01-15 RX ADMIN — PROMETHAZINE HYDROCHLORIDE 12.5 MG: 25 INJECTION INTRAMUSCULAR; INTRAVENOUS at 13:40

## 2019-01-15 RX ADMIN — MORPHINE SULFATE 0.2 MG: 1 INJECTION EPIDURAL; INTRATHECAL; INTRAVENOUS at 07:39

## 2019-01-15 RX ADMIN — FAMOTIDINE 20 MG: 10 INJECTION, SOLUTION INTRAVENOUS at 07:13

## 2019-01-15 RX ADMIN — SODIUM CHLORIDE, POTASSIUM CHLORIDE, SODIUM LACTATE AND CALCIUM CHLORIDE: 600; 310; 30; 20 INJECTION, SOLUTION INTRAVENOUS at 07:49

## 2019-01-15 RX ADMIN — OXYTOCIN 20 UNITS: 10 INJECTION INTRAVENOUS at 08:18

## 2019-01-15 RX ADMIN — SODIUM CHLORIDE, POTASSIUM CHLORIDE, SODIUM LACTATE AND CALCIUM CHLORIDE: 600; 310; 30; 20 INJECTION, SOLUTION INTRAVENOUS at 08:15

## 2019-01-15 RX ADMIN — PROMETHAZINE HYDROCHLORIDE 12.5 MG: 25 INJECTION INTRAMUSCULAR; INTRAVENOUS at 09:00

## 2019-01-15 RX ADMIN — BUPIVACAINE HYDROCHLORIDE IN DEXTROSE 1.2 ML: 7.5 INJECTION, SOLUTION SUBARACHNOID at 07:39

## 2019-01-15 RX ADMIN — OXYCODONE HYDROCHLORIDE AND ACETAMINOPHEN 2 TABLET: 5; 325 TABLET ORAL at 21:38

## 2019-01-15 RX ADMIN — FENTANYL CITRATE 10 MCG: 50 INJECTION, SOLUTION INTRAMUSCULAR; INTRAVENOUS at 07:39

## 2019-01-15 NOTE — PLAN OF CARE
Problem: Patient Care Overview  Goal: Plan of Care Review  Outcome: Ongoing (interventions implemented as appropriate)   01/15/19 1747   Plan of Care Review   Progress no change     Goal: Individualization and Mutuality  Outcome: Ongoing (interventions implemented as appropriate)   01/15/19 1747   Individualization   Patient Specific Preferences Bottlefeeding   Mutuality/Individual Preferences   What Anxieties, Fears, Concerns, or Questions Do You Have About Your Care? None   How Would You and/or Your Support Person Like to Participate in Your Care? Help with infant care     Goal: Discharge Needs Assessment   01/15/19 1747   Discharge Needs Assessment   Readmission Within the Last 30 Days no previous admission in last 30 days   Concerns to be Addressed no discharge needs identified   Patient/Family Anticipates Transition to home   Patient/Family Anticipated Services at Transition none   Anticipated Changes Related to Illness none   Equipment Needed After Discharge none   Discharge Coordination/Progress Patient stable at this time with post op surgery   Disability   Equipment Currently Used at Home none

## 2019-01-15 NOTE — ANESTHESIA POSTPROCEDURE EVALUATION
Patient: Marilu Godoy    Procedure Summary     Date:  01/15/19 Room / Location:  [facility] LABOR DELIVERY 2 / [facility] LABOR DELIVERY    Anesthesia Start:  734 Anesthesia Stop:  842    Procedure:   SECTION REPEAT WITH TUBAL (N/A Abdomen) Diagnosis:       History of  section complicating pregnancy      (History of  section complicating pregnancy [O34.219])    Surgeon:  Alva Boyer MD Provider:  Claude Hauser CRNA    Anesthesia Type:  spinal ASA Status:  3          Anesthesia Type: spinal  Last vitals  BP   104/76 (01/15/19 0852)   Temp   97.2 °F (36.2 °C) (01/15/19 0852)   Pulse   (P) 66 (01/15/19 0855)   Resp   (P) 23 (01/15/19 0855)     SpO2   97 % (01/15/19 0955)     Post Anesthesia Care and Evaluation    Patient location during evaluation: PACU  Patient participation: complete - patient participated  Level of consciousness: awake and alert and awake  Pain score: 3  Pain management: adequate  Airway patency: patent  Anesthetic complications: No anesthetic complications  PONV Status: controlled  Cardiovascular status: acceptable, hemodynamically stable and stable  Respiratory status: acceptable and nasal cannula  Hydration status: acceptable  Post Neuraxial Block status: Motor and sensory function returned to baseline and No signs or symptoms of PDPH

## 2019-01-15 NOTE — NON STRESS TEST
"Triage Note - Nursing Documentation  Labor and Delivery Admission Log    Marilu Godoy  : 1990  MRN: [id number]  CSN: [id number]    Date in / Time in:  1/15/2019  Time in: 520      Date out / Time out:    Time out:606      Nurse: Maryanne Medley RN    Patient Info: She is a 28 y.o. year old  at 39w1d with an VANDA of 2019, by Last Menstrual Period who was seen on the [facility].    Chief Complaint:   Chief Complaint   Patient presents with   • Scheduled      \"I am here for my \"       Provider Instructions / Disposition:scheculed c/s    Patient Active Problem List   Diagnosis   • Abnormal CT scan, colon   • Elevated liver function tests   • Right upper quadrant pain   • Sinus tachycardia   • History of pulmonary embolus (PE)   • Obesity (BMI 30-39.9)   • Heterozygous factor V Leiden mutation (CMS/HCC)   • Protein S deficiency (CMS/HCC)   • Labile hypertension   • Excessive daytime sleepiness   • Obstructive sleep apnea   • Orthopnea   • Pleuritic pain   • Mild intermittent asthma   • Hypotension   • Bilateral edema of lower extremity   • Chronic pain of both knees   • Abnormal SPEP   • Subclinical hyperthyroidism   • Cyst of right ovary   • Chronic midline thoracic back pain   • Precordial pain   • Neurogenic orthostatic hypotension (CMS/HCC)   • Palpitations   • Coronary artery disease involving native coronary artery of native heart without angina pectoris   • Anti-RNP antibodies present   • Decreased pedal pulses   • Claudication (CMS/HCC)   • Elevated IgE level   • Nonocclusive coronary atherosclerosis of native coronary artery   • Pelvic pain   • Inappropriate sinus tachycardia   • Chronic anticoagulation   • History of  section complicating pregnancy   • Pregnancy       NST Documentation (Only applicable > 32 weeks): Interpretation A  Nonstress Test Interpretation A: Reactive (01/15/19 0536 : Maryanne Medley, TERRENCE)    "

## 2019-01-15 NOTE — ANESTHESIA PROCEDURE NOTES
Spinal Block    Pre-sedation assessment completed: 1/15/2019 7:35 AM    Patient reassessed immediately prior to procedure    Patient location during procedure: OR  Indication:at surgeon's request and procedure for pain  Performed By  CRNA: Claude Hauser CRNA  Preanesthetic Checklist  Completed: patient identified, site marked, surgical consent, pre-op evaluation, timeout performed, IV checked, risks and benefits discussed and monitors and equipment checked  Spinal Block Prep:  Patient Position:sitting  Sterile Tech:cap, gloves, mask and sterile barriers  Prep:Chloraprep  Patient Monitoring:blood pressure monitoring, continuous pulse oximetry and EKG  Spinal Block Procedure  Approach:midline  Guidance:landmark technique and palpation technique  Location:L3-L4  Needle Type:Pencan  Needle Gauge:25 G  Placement of Spinal needle event:cerebrospinal fluid aspirated  Paresthesia: no  Fluid Appearance:clear     Post Assessment  Patient Tolerance:patient tolerated the procedure well with no apparent complications  Complications no  Additional Notes  Risks of spinal anesthesia discussed , including but not limited to:  Headache, itching, nausea and vomiting, infection, failure of the block, decreased BP, permanent chronic back pain, nerve damage, paralysis, etc.    Skin localized with lidocaine skin wheal.

## 2019-01-15 NOTE — ANESTHESIA PREPROCEDURE EVALUATION
Anesthesia Evaluation     Patient summary reviewed and Nursing notes reviewed   no history of anesthetic complications:  NPO Solid Status: > 8 hours  NPO Liquid Status: > 8 hours           Airway   Mallampati: II  TM distance: >3 FB  Possible difficult intubation  Dental    (+) poor dentition    Pulmonary    (+) pulmonary embolism, asthma, shortness of breath, sleep apnea (does not use CPAP), decreased breath sounds,   Cardiovascular - normal exam  Exercise tolerance: poor (<4 METS)    (+) hypertension, CAD, dysrhythmias Tachycardia, orthopnea, SMITH, PVD ( le edema), DVT resolved, hyperlipidemia,       Neuro/Psych  (+) headaches, psychiatric history Anxiety,     GI/Hepatic/Renal/Endo    (+)  hiatal hernia, GERD well controlled,  liver disease (inc lft), hyperthyroidism    ROS Comment: Factor Five deficiency     Musculoskeletal     (+) arthralgias, back pain, chronic pain, myalgias,   Abdominal   (+) obese,    Substance History      OB/GYN    (-)  Pregnant ( neg urine preg)        Other   (+) arthritis     ROS/Med Hx Other: Protein s deficiency, heterozygous factor v leiden mutation  Labs reviewed  ekg sr  cxr nad  echoLeft ventricular function is normal. Estimated EF appears to be in the range of 61 - 65%. Estimated EF = 61%. Normal left ventricular cavity size and wall thickness noted. All left ventricular wall segments contract normally. Left ventricular diastolic function is normal. There is no evidence of a left ventricular mass or thrombus present.    Right Ventricle Normal cavity size, wall thickness, systolic function and septal motion noted.    Left Atrium Normal left atrial size and volume noted.    Right Atrium Normal right atrial size noted.    Aortic Valve The aortic valve is structurally normal. The valve appears trileaflet. No aortic valve regurgitation is present. No aortic valve stenosis is present.    Mitral Valve The mitral valve is normal in structure. Trace mitral valve regurgitation is  present.    Tricuspid Valve The tricuspid valve is normal. Trace tricuspid valve regurgitation is present. Estimated right ventricular systolic pressure from tricuspid regurgitation is normal (<35 mmHg).    Pulmonic Valve The pulmonic valve is structurally normal. There is no significant pulmonic valve stenosis present. There is no pulmonic valve regurgitation present.    Greater Vessels No dilation of the aortic root is present. No dilation of the sinuses of Valsalva is present.    Pericardium The pericardium is normal. There is no evidence of pericardial effusion.      LV Measurements                         Anesthesia Plan    ASA 3     spinal   (Risk and benefits discussed including risk of PDPH, bruising, failed spinal, need for GETA, infection, continue with POC )  intravenous induction   Anesthetic plan, all risks, benefits, and alternatives have been provided, discussed and informed consent has been obtained with: patient.    Plan discussed with CRNA.

## 2019-01-15 NOTE — OP NOTE
Jeovany Godoy  : 1990  MRN: [id number]  CSN: [id number]    Operative Report    Pre-Operative Dx:   1. IUP at 39w1d weeks   2. Previous  section - not a  candidate   3. Protein S Deficiency  4. Factor V mutation  5. History of PE  6. Desires tubal  7. Uterine contractions      Postoperative dx:    1. Same as above     Procedure: Repeat  (LTCS) with bilateral placement of filshie clips       Surgeon: Alva Boyer M.D.   Assistant: None       Anesthesia:   Spinal       EBL: 1000 mls.       Antibiotics: cefazolin 2 gms     Drains: Blevins     Findings: Thin lower uterine segment; viable female infant     Indications:       See H&P    Procedure Details:   After the appropriate time out, the patient was prepped and draped in the usual sterile fashion.  She had been placed in the dorsal supine left lateral tilt position.  A blevins catheter had been placed in the bladder for drainage during the procedure. A pfannenstiel skin incision was made with a knife and carried down to the fascia.  The fascia was nicked with a scalpel and the incision was extended bilaterally with curved Walker scissors. The superior aspect of the fascia was grasped with Kocher clamps x 2 and bluntly as well as sharply dissected away from the underlying rectus muscles.  A similar process was repeated inferiorly. The rectus muscles were  and the peritoneal cavity was entered sharply without complications. The bladder flap was created with Metzenbaum scissors and pickups with teeth.  The  retractor was placed and after checking for no entrapment, was retracted down.  A transverse uterine incision was made with the knife and extended bilaterally.  The infant was delivered from the vertex presentation.  The mouth and nose were bulb suctioned.  The cord was doubly clamped and cut and the infant handed to the pediatric nurse in attendance.  Cord blood was obtained.  The placenta was manually  extracted from the uterus.  The uterus was then elevated from the abdomen and wiped free of remaining membranes.  The uterine incision was closed with #1 chromic in a running locking fashion with a second imbricating stitch.  The left fallopian tube was identified by its fimbriated end and a Filshie clip was placed perpendicular to the isthmic portion of the tube x 2.  Likewise, the same procedure was performed on the contralateral side. The uterus had been returned to the abdomen.  The lateral gutters were wiped free of remaining clots.  The site of surgical incision was inspected and noted to be hemostatic.  Repeat inspection revealed bilateral clip placement to be intact. The  retractor was removed.  The subfascial tissue was inspected and noted to be hemostatic.  The fascia was closed with 0 PDS in a running non-locking fashion.  Subcutaneous tissue was inspected and noted to be hemostatic with minimal bovie electrocautery.  Sterling's fascia was closed with 3-0 chromic in a running non-locking fashion.  The skin was approximated with staples.  Dressings were placed.  All instrument and sponge counts were correct at the end of the procedure. The patient tolerated the procedure well.  There were no complications.  She was taken to postoperative recovery room in stable condition.     Complications:   None      Disposition:   Mother to Mother Baby/Postpartum  in stable condition currently.   Baby to NBN  in stable condition currently.     Alva Boyer M.D.  1/15/2019  8:49 AM

## 2019-01-15 NOTE — PLAN OF CARE
Problem: Postpartum ( Delivery) (Adult,Obstetrics,Pediatric)  Goal: Signs and Symptoms of Listed Potential Problems Will be Absent, Minimized or Managed (Postpartum)  Outcome: Ongoing (interventions implemented as appropriate)   01/15/19 1747   Goal/Outcome Evaluation   Problems Present (Postpartum ) none     Goal: Anesthesia/Sedation Recovery  Outcome: Ongoing (interventions implemented as appropriate)   01/15/19 1747   Goal/Outcome Evaluation   Anesthesia/Sedation Recovery ongoing sedation/anesthesia

## 2019-01-16 PROBLEM — Z34.90 PREGNANCY: Status: RESOLVED | Noted: 2019-01-15 | Resolved: 2019-01-16

## 2019-01-16 PROBLEM — Z34.90 PREGNANT: Status: RESOLVED | Noted: 2019-01-15 | Resolved: 2019-01-16

## 2019-01-16 LAB
BASOPHILS # BLD AUTO: 0.03 10*3/MM3 (ref 0–0.2)
BASOPHILS NFR BLD AUTO: 0.3 % (ref 0–2.5)
DEPRECATED RDW RBC AUTO: 41.8 FL (ref 37–54)
EOSINOPHIL # BLD AUTO: 0.06 10*3/MM3 (ref 0–0.7)
EOSINOPHIL NFR BLD AUTO: 0.5 % (ref 0–7)
ERYTHROCYTE [DISTWIDTH] IN BLOOD BY AUTOMATED COUNT: 13.1 % (ref 11.5–14.5)
HCT VFR BLD AUTO: 28.5 % (ref 37–47)
HGB BLD-MCNC: 9.5 G/DL (ref 12–16)
IMM GRANULOCYTES # BLD AUTO: 0.04 10*3/MM3 (ref 0–0.06)
IMM GRANULOCYTES NFR BLD AUTO: 0.3 % (ref 0–0.6)
LYMPHOCYTES # BLD AUTO: 2.12 10*3/MM3 (ref 0.6–3.4)
LYMPHOCYTES NFR BLD AUTO: 18.5 % (ref 10–50)
MCH RBC QN AUTO: 29.5 PG (ref 27–31)
MCHC RBC AUTO-ENTMCNC: 33.3 G/DL (ref 30–37)
MCV RBC AUTO: 88.5 FL (ref 81–99)
MONOCYTES # BLD AUTO: 0.67 10*3/MM3 (ref 0–0.9)
MONOCYTES NFR BLD AUTO: 5.8 % (ref 0–12)
NEUTROPHILS # BLD AUTO: 8.57 10*3/MM3 (ref 2–6.9)
NEUTROPHILS NFR BLD AUTO: 74.6 % (ref 37–80)
NRBC BLD AUTO-RTO: 0 /100 WBC (ref 0–0)
PLATELET # BLD AUTO: 197 10*3/MM3 (ref 130–400)
PMV BLD AUTO: 10.4 FL (ref 6–12)
RBC # BLD AUTO: 3.22 10*6/MM3 (ref 4.2–5.4)
WBC NRBC COR # BLD: 11.49 10*3/MM3 (ref 4.8–10.8)

## 2019-01-16 PROCEDURE — 90661 CCIIV3 VAC ABX FR 0.5 ML IM: CPT | Performed by: OBSTETRICS & GYNECOLOGY

## 2019-01-16 PROCEDURE — 99024 POSTOP FOLLOW-UP VISIT: CPT | Performed by: MIDWIFE

## 2019-01-16 PROCEDURE — 90471 IMMUNIZATION ADMIN: CPT | Performed by: OBSTETRICS & GYNECOLOGY

## 2019-01-16 PROCEDURE — 25010000002 INFLUENZA VAC SUBUNIT QUAD 0.5 ML SUSPENSION PREFILLED SYRINGE: Performed by: OBSTETRICS & GYNECOLOGY

## 2019-01-16 PROCEDURE — 25010000002 TDAP 5-2.5-18.5 LF-MCG/0.5 SUSPENSION: Performed by: OBSTETRICS & GYNECOLOGY

## 2019-01-16 PROCEDURE — 90715 TDAP VACCINE 7 YRS/> IM: CPT | Performed by: OBSTETRICS & GYNECOLOGY

## 2019-01-16 PROCEDURE — 85025 COMPLETE CBC W/AUTO DIFF WBC: CPT | Performed by: OBSTETRICS & GYNECOLOGY

## 2019-01-16 PROCEDURE — 25010000002 ENOXAPARIN PER 10 MG: Performed by: MIDWIFE

## 2019-01-16 PROCEDURE — G0008 ADMIN INFLUENZA VIRUS VAC: HCPCS | Performed by: OBSTETRICS & GYNECOLOGY

## 2019-01-16 RX ORDER — FERROUS SULFATE TAB EC 324 MG (65 MG FE EQUIVALENT) 324 (65 FE) MG
324 TABLET DELAYED RESPONSE ORAL 2 TIMES DAILY WITH MEALS
Status: DISCONTINUED | OUTPATIENT
Start: 2019-01-16 | End: 2019-01-17 | Stop reason: HOSPADM

## 2019-01-16 RX ADMIN — ONDANSETRON 4 MG: 4 TABLET, FILM COATED ORAL at 12:43

## 2019-01-16 RX ADMIN — ENOXAPARIN SODIUM 40 MG: 40 INJECTION SUBCUTANEOUS at 10:05

## 2019-01-16 RX ADMIN — OXYCODONE HYDROCHLORIDE AND ACETAMINOPHEN 2 TABLET: 5; 325 TABLET ORAL at 05:38

## 2019-01-16 RX ADMIN — OXYCODONE HYDROCHLORIDE AND ACETAMINOPHEN 2 TABLET: 5; 325 TABLET ORAL at 12:43

## 2019-01-16 RX ADMIN — TETANUS TOXOID, REDUCED DIPHTHERIA TOXOID AND ACELLULAR PERTUSSIS VACCINE, ADSORBED 0.5 ML: 5; 2.5; 8; 8; 2.5 SUSPENSION INTRAMUSCULAR at 16:46

## 2019-01-16 RX ADMIN — IBUPROFEN 600 MG: 600 TABLET ORAL at 05:37

## 2019-01-16 RX ADMIN — INFLUENZA A VIRUS A/SINGAPORE/GP1908/2015 IVR-180 (H1N1) ANTIGEN (MDCK CELL DERIVED, PROPIOLACTONE INACTIVATED), INFLUENZA A VIRUS A/NORTH CAROLINA/04/2016 (H3N2) HEMAGGLUTININ ANTIGEN (MDCK CELL DERIVED, PROPIOLACTONE INACTIVATED), INFLUENZA B VIRUS B/IOWA/06/2017 HEMAGGLUTININ ANTIGEN (MDCK CELL DERIVED, PROPIOLACTONE INACTIVATED), INFLUENZA B VIRUS B/SINGAPORE/INFTT-16-0610/2016 HEMAGGLUTININ ANTIGEN (MDCK CELL DERIVED, PROPIOLACTONE INACTIVATED) 0.5 ML: 15; 15; 15; 15 INJECTION, SUSPENSION INTRAMUSCULAR at 16:51

## 2019-01-16 RX ADMIN — OXYCODONE HYDROCHLORIDE AND ACETAMINOPHEN 2 TABLET: 5; 325 TABLET ORAL at 16:50

## 2019-01-16 RX ADMIN — FERROUS SULFATE TAB EC 324 MG (65 MG FE EQUIVALENT) 324 MG: 324 (65 FE) TABLET DELAYED RESPONSE at 15:00

## 2019-01-16 RX ADMIN — IBUPROFEN 600 MG: 600 TABLET ORAL at 15:01

## 2019-01-16 NOTE — PLAN OF CARE
Problem: Patient Care Overview  Goal: Plan of Care Review  Outcome: Ongoing (interventions implemented as appropriate)   19 1411   Plan of Care Review   Progress improving   Coping/Psychosocial   Plan of Care Reviewed With patient;spouse   OTHER   Outcome Summary Patient continues to improve      Goal: Individualization and Mutuality  Outcome: Ongoing (interventions implemented as appropriate)   19 1411   Individualization   Patient Specific Preferences Bottlefeeding   Patient Specific Goals (Include Timeframe) Pain control   Patient Specific Interventions Educate patient on feeding cues and interventions   Mutuality/Individual Preferences   How Would You and/or Your Support Person Like to Participate in Your Care? Help with infant care     Goal: Discharge Needs Assessment  Outcome: Ongoing (interventions implemented as appropriate)   19 1411   Discharge Needs Assessment   Readmission Within the Last 30 Days no previous admission in last 30 days   Concerns to be Addressed no discharge needs identified   Patient/Family Anticipates Transition to home;home with family   Patient/Family Anticipated Services at Transition none   Transportation Concerns car, none   Anticipated Changes Related to Illness none   Equipment Needed After Discharge none   Discharge Coordination/Progress Patient is stable at this time following Repeat  Section and Bilateral Tubal on 1/15/19   Disability   Equipment Currently Used at Home none     Goal: Interprofessional Rounds/Family Conf  Outcome: Ongoing (interventions implemented as appropriate)   19 1411   Interdisciplinary Rounds/Family Conf   Summary Review plan of care   Participants nursing;physician;patient       Problem: Postpartum ( Delivery) (Adult,Obstetrics,Pediatric)  Goal: Signs and Symptoms of Listed Potential Problems Will be Absent, Minimized or Managed (Postpartum)  Outcome: Ongoing (interventions implemented as appropriate)   19  1411   Goal/Outcome Evaluation   Problems Assessed (Postpartum ) all   Problems Present (Postpartum ) pain;other (see comments)     Goal: Anesthesia/Sedation Recovery  Outcome: Outcome(s) achieved Date Met: 19 1411   Goal/Outcome Evaluation   Anesthesia/Sedation Recovery recovered to baseline

## 2019-01-16 NOTE — PROGRESS NOTES
Jeovany Godoy  : 1990  MRN: [id number]  CSN: [id number]    Post-operative Day #1  Subjective   Her pain is well controlled.  Vaginal bleeding is appropriate amount.  She is voiding without difficulty. She is not passing gas and has not had a bowel movement. She is tolerating ambulation. Upon review of her respiratory system, she has no respiratory symptoms and and denies SOB, cough, wheezing, chest pain. She is bottle feeding and it is going well.     Objective     Min/max vitals past 24 hours:   Temp  Min: 97.2 °F (36.2 °C)  Max: 99.1 °F (37.3 °C)  BP  Min: 90/58  Max: 132/107  Pulse  Min: 55  Max: 98  Resp  Min: 16  Max: 30        General: well developed; well nourished  no acute distress   Resp: breathing is unlabored  clear to auscultation bilaterally   CV: Not performed.   Abdomen: soft, non-tender; no masses  no umbilical or inguinal hernias are present  incision is clean, intact and without drainage  fundus firm and non-tender   Pelvic: Not performed   Ext: normal appearance with no cyanosis or edema and no calf tenderness     Last 3 values   Results from last 7 days   Lab Units 19  0626 19  1157   WBC 10*3/mm3 11.49* 9.92   HEMOGLOBIN g/dL 9.5* 11.6*   HEMATOCRIT % 28.5* 34.5*   PLATELETS 10*3/mm3 197 256     Lab Results   Component Value Date    ABO O 2019    RH Positive 2019    RUBELLAABIGG 1.38 2018    HEPBSAG Negative 2018          Assessment   1. POD #1 S/P Repeat  (LTCS) with BTL  2. Factor V and hx of PE  3. Postpartum anemia     Plan   1. Continue routine postpartum care  2. Ferrous Sulfate 325mg BID  3. Lovenox 40 sq daily    Bethany Vargas, ARNIE  2019  8:43 AM

## 2019-01-16 NOTE — PLAN OF CARE
Problem: Patient Care Overview  Goal: Plan of Care Review  Outcome: Ongoing (interventions implemented as appropriate)   01/15/19 1000 01/15/19 1415 01/15/19 1747   Plan of Care Review   Progress --  --  no change   Coping/Psychosocial   Plan of Care Reviewed With --  patient;spouse --    OTHER   Outcome Summary criteria met for transfer to floor. --  --      Goal: Individualization and Mutuality  Outcome: Ongoing (interventions implemented as appropriate)   01/15/19 0550 01/15/19 1747   Individualization   Patient Specific Preferences --  Bottlefeeding   Mutuality/Individual Preferences   What Anxieties, Fears, Concerns, or Questions Do You Have About Your Care? --  None   What Information Would Help Us Give You More Personalized Care? none --    How Would You and/or Your Support Person Like to Participate in Your Care? --  Help with infant care     Goal: Discharge Needs Assessment  Outcome: Ongoing (interventions implemented as appropriate)   01/15/19 0544 01/15/19 1747   Discharge Needs Assessment   Readmission Within the Last 30 Days --  no previous admission in last 30 days   Concerns to be Addressed --  no discharge needs identified   Patient/Family Anticipates Transition to --  home   Patient/Family Anticipated Services at Transition --  none   Transportation Concerns car, none --    Transportation Anticipated car, drives self;family or friend will provide --    Anticipated Changes Related to Illness --  none   Equipment Needed After Discharge --  none   Offered/Gave Vendor List no --    Discharge Coordination/Progress --  Patient stable at this time with post op surgery   Disability   Equipment Currently Used at Home --  none     Goal: Interprofessional Rounds/Family Conf  Outcome: Ongoing (interventions implemented as appropriate)   19 0421   Interdisciplinary Rounds/Family Conf   Participants nursing;patient;physician       Problem: Postpartum ( Delivery)  (Adult,Obstetrics,Pediatric)  Intervention: Support Surgical and Anesthesia Recovery   01/15/19 1600 19 0400 19 042   Incentive Spirometer (IS)   Incentive Spirometer Predicted Level (mL) 2500 --  --    Administration (IS) self-administered --  --    Number of Repetitions (IS) --  --  5   Level Incentive Spirometer (mL) --  --  3000   Patient Tolerance (IS) --  --  good   Safety Management   Safety Promotion/Fall Prevention --  safety round/check completed --    Respiratory Interventions   Airway/Ventilation Management airway patency maintained --  --        Goal: Signs and Symptoms of Listed Potential Problems Will be Absent, Minimized or Managed (Postpartum)  Outcome: Ongoing (interventions implemented as appropriate)   19   Goal/Outcome Evaluation   Problems Assessed (Postpartum ) all   Problems Present (Postpartum ) none     Goal: Anesthesia/Sedation Recovery  Outcome: Ongoing (interventions implemented as appropriate)

## 2019-01-16 NOTE — CONSULTS
Patient: Marilu Godoy  Procedure(s):   SECTION REPEAT WITH TUBAL  Anesthesia type: [unfilled]    Patient location: labor and delivery  Last vitals:   Vitals:    19 0536   BP: 111/56   Pulse: 98   Resp: 20   Temp: 98.3 °F (36.8 °C)   SpO2: 98%     Level of consciousness: awake, alert and oriented    Post-anesthesia pain: adequate analgesia  Airway patency: patent  Respiratory: unassisted  Cardiovascular: stable and blood pressure at baseline  Hydration: euvolemic    Anesthetic complications: no,  Patient has normal gait, no residual numbness, PAUL, denies headache, voiding well

## 2019-01-17 VITALS
RESPIRATION RATE: 22 BRPM | BODY MASS INDEX: 32.96 KG/M2 | TEMPERATURE: 98.4 F | WEIGHT: 186 LBS | HEIGHT: 63 IN | DIASTOLIC BLOOD PRESSURE: 59 MMHG | OXYGEN SATURATION: 98 % | SYSTOLIC BLOOD PRESSURE: 120 MMHG | HEART RATE: 94 BPM

## 2019-01-17 LAB — BACTERIA SPEC AEROBE CULT: NO GROWTH

## 2019-01-17 PROCEDURE — 99024 POSTOP FOLLOW-UP VISIT: CPT | Performed by: NURSE PRACTITIONER

## 2019-01-17 PROCEDURE — 25010000002 ENOXAPARIN PER 10 MG: Performed by: MIDWIFE

## 2019-01-17 RX ORDER — OXYCODONE HYDROCHLORIDE AND ACETAMINOPHEN 5; 325 MG/1; MG/1
1 TABLET ORAL EVERY 4 HOURS PRN
Qty: 30 TABLET | Refills: 0 | Status: SHIPPED | OUTPATIENT
Start: 2019-01-17 | End: 2019-01-20

## 2019-01-17 RX ORDER — DOCUSATE SODIUM 100 MG/1
100 CAPSULE, LIQUID FILLED ORAL 2 TIMES DAILY
Qty: 60 CAPSULE | Refills: 0 | Status: SHIPPED | OUTPATIENT
Start: 2019-01-17 | End: 2019-02-16

## 2019-01-17 RX ORDER — FERROUS SULFATE TAB EC 324 MG (65 MG FE EQUIVALENT) 324 (65 FE) MG
324 TABLET DELAYED RESPONSE ORAL 2 TIMES DAILY WITH MEALS
Qty: 60 TABLET | Refills: 0 | Status: SHIPPED | OUTPATIENT
Start: 2019-01-17 | End: 2019-02-16

## 2019-01-17 RX ADMIN — FERROUS SULFATE TAB EC 324 MG (65 MG FE EQUIVALENT) 324 MG: 324 (65 FE) TABLET DELAYED RESPONSE at 08:31

## 2019-01-17 RX ADMIN — IBUPROFEN 600 MG: 600 TABLET ORAL at 05:40

## 2019-01-17 RX ADMIN — ENOXAPARIN SODIUM 40 MG: 40 INJECTION SUBCUTANEOUS at 10:45

## 2019-01-17 RX ADMIN — OXYCODONE HYDROCHLORIDE AND ACETAMINOPHEN 2 TABLET: 5; 325 TABLET ORAL at 05:41

## 2019-01-17 NOTE — PLAN OF CARE
Problem: Patient Care Overview  Goal: Plan of Care Review  Outcome: Ongoing (interventions implemented as appropriate)   19   Plan of Care Review   Progress improving   Coping/Psychosocial   Plan of Care Reviewed With patient   OTHER   Outcome Summary VSS, adequate pain control, anticipate D/C      19   Plan of Care Review   Progress improving   Coping/Psychosocial   Plan of Care Reviewed With patient   OTHER   Outcome Summary VSS, adequate pain control, anticipate D/C     Goal: Individualization and Mutuality  Outcome: Ongoing (interventions implemented as appropriate)    Goal: Discharge Needs Assessment  Outcome: Ongoing (interventions implemented as appropriate)    Goal: Interprofessional Rounds/Family Conf  Outcome: Ongoing (interventions implemented as appropriate)      Problem: Postpartum ( Delivery) (Adult,Obstetrics,Pediatric)  Goal: Signs and Symptoms of Listed Potential Problems Will be Absent, Minimized or Managed (Postpartum)  Outcome: Ongoing (interventions implemented as appropriate)

## 2019-01-17 NOTE — PROGRESS NOTES
[facility]   PROGRESS NOTE    Post-Op Day 2 S/P   Subjective   Subjective  Patient reports:   Some discomfort below umbilicus -  pain meds are adequate for pain.  Ambulating and voiding without problems.  Taking regular diet and tolerating.      + flatus.  Denies fever or chills.  Lochia light - scant.        Objective    Objective     Vitals: Vital Signs Range for the last 24 hours  Temperature: Temp:  [98.2 °F (36.8 °C)-99.2 °F (37.3 °C)] 99.2 °F (37.3 °C)   Temp Source: Temp src: Oral   BP: BP: (118-120)/(59-69) 120/59   Pulse: Heart Rate:  [91-94] 94   Respirations: Resp:  [18-22] 22   SPO2: SpO2:  [98 %] 98 %   O2 Amount (l/min):     O2 Devices Device (Oxygen Therapy): room air   Weight:                Physical Exam    Psych: Altert and oriented to time, place and person  Mood and affect appropriate  quiet - appears tired   General: well developed; well nourished  no acute distress  Lungs:  breathing is unlabored  clear to auscultation bilaterally  Abdomen:soft-  Incision dry and intact  fundus firm - below the umbilicus  Lower Extremities: LE: Neg edema and no calf tenderness    None    Assessment/Plan        History of  section complicating pregnancy    Assessment & Plan    Assessment:    Marilu Godoy is Day 2  post-partum  , Low Transverse    .  Anemia - hx factor V & PE    Plan: continue lovenox - continue post op care.  Possible home later today      Briseida Singer CNM  19  8:39 AM

## 2019-01-17 NOTE — DISCHARGE SUMMARY
Discharge Summary     Jeovany Godoy  : 1990  MRN: [id number]  CSN: [id number]    Date of Admission: 1/15/2019   Date of Discharge:  2019   Delivering Physician: Alva Boyer MD       Admission Diagnosis: Pregnancy [Z34.90]  Pregnant [Z34.90]   Discharge Diagnosis: 1. Same as above plus  2. Pregnancy at 39w1d - delivered       Procedures: 1.  (LTCS)     Hospital Course  See the completed operative report for details regarding antepartum course and delivery.    Her post-operative course was unremarkable.  On POD # 2 she felt like she ready ready for D/C and requested to go home today.  She denies problems and is tolerating regular diet, ambulating & voiding without difficulty, + flatus.  States lochia is light.  Her wound was healing well & without obvious signs of infections.    Infant  female  fetus weighing 2977 g (6 lb 9 oz)   Apgars -  8  @ 1 minute /  9  @ 5 minutes.    Discharge labs  Lab Results   Component Value Date    WBC 11.49 (H) 2019    HGB 9.5 (L) 2019    HCT 28.5 (L) 2019     2019       Discharge Medications     Discharge Medications      New Medications      Instructions Start Date   docusate sodium 100 MG capsule   100 mg, Oral, 2 Times Daily      enoxaparin 40 MG/0.4ML solution syringe  Commonly known as:  LOVENOX   40 mg, Subcutaneous, Every 24 Hours      ferrous sulfate 324 (65 Fe) MG tablet delayed-release EC tablet   324 mg, Oral, 2 Times Daily With Meals      oxyCODONE-acetaminophen 5-325 MG per tablet  Commonly known as:  PERCOCET   1 tablet, Oral, Every 4 Hours PRN         Continue These Medications      Instructions Start Date   albuterol sulfate  (90 Base) MCG/ACT inhaler  Commonly known as:  PROVENTIL HFA;VENTOLIN HFA;PROAIR HFA   2 puffs, Inhalation, 4 Times Daily - RT      flunisolide 25 MCG/ACT (0.025%) solution nasal spray  Commonly known as:  NASALIDE   2 sprays, Inhalation, Every 12 Hours      loratadine  10 MG tablet  Commonly known as:  CLARITIN   10 mg, Oral, Daily      raNITIdine 150 MG tablet  Commonly known as:  ZANTAC   150 mg, Oral, 2 Times Daily      Syringe (Disposable) 1 ML misc   Use for administration of your heparin three times daily.         Stop These Medications    doxylamine 25 MG tablet  Commonly known as:  UNISOM     heparin (porcine) 5000 UNIT/ML injection     vitamin B-6 25 MG tablet  Commonly known as:  PYRIDOXINE            Discharge Disposition Home or Self Care   Condition on Discharge: good   Follow-up: 1 week    F/U at [facility] office     Briseida Singer CNM  1/17/2019

## 2019-01-21 ENCOUNTER — OFFICE VISIT (OUTPATIENT)
Dept: INTERNAL MEDICINE | Facility: CLINIC | Age: 29
End: 2019-01-21

## 2019-01-21 VITALS
BODY MASS INDEX: 31.89 KG/M2 | TEMPERATURE: 99.8 F | OXYGEN SATURATION: 100 % | SYSTOLIC BLOOD PRESSURE: 102 MMHG | HEART RATE: 83 BPM | DIASTOLIC BLOOD PRESSURE: 72 MMHG | RESPIRATION RATE: 15 BRPM | WEIGHT: 180 LBS | HEIGHT: 63 IN

## 2019-01-21 DIAGNOSIS — R50.9 FEVER, UNSPECIFIED FEVER CAUSE: Primary | ICD-10-CM

## 2019-01-21 DIAGNOSIS — B37.2 YEAST INFECTION OF THE SKIN: ICD-10-CM

## 2019-01-21 DIAGNOSIS — Z98.891 S/P C-SECTION: ICD-10-CM

## 2019-01-21 LAB
ERYTHROCYTE [DISTWIDTH] IN BLOOD BY AUTOMATED COUNT: 12.8 % (ref 11.5–14.5)
HCT VFR BLD AUTO: 35.4 % (ref 37–47)
HGB BLD-MCNC: 11.8 G/DL (ref 12–16)
MCH RBC QN AUTO: 30.2 PG (ref 27–31)
MCHC RBC AUTO-ENTMCNC: 33.3 G/DL (ref 30–37)
MCV RBC AUTO: 90.5 FL (ref 81–99)
PLATELET # BLD AUTO: 347 10*3/MM3 (ref 130–400)
RBC # BLD AUTO: 3.91 10*6/MM3 (ref 4.2–5.4)
WBC # BLD AUTO: 7.46 10*3/MM3 (ref 4.8–10.8)

## 2019-01-21 PROCEDURE — 99214 OFFICE O/P EST MOD 30 MIN: CPT | Performed by: NURSE PRACTITIONER

## 2019-01-21 RX ORDER — FLUCONAZOLE 150 MG/1
150 TABLET ORAL ONCE
Qty: 1 TABLET | Refills: 0 | Status: SHIPPED | OUTPATIENT
Start: 2019-01-21 | End: 2019-01-21

## 2019-01-21 NOTE — PROGRESS NOTES
Chief Complaint / Reason:      Chief Complaint   Patient presents with   • Fever     thinks staples are infected. fatigue.        Subjective     HPI  Patient is 6 days postpartum//tubal and is concerned that staples are infected.  She states that she has a fever and fatigue.  It is noted that patient is iron deficient anemic vital signs are stable with exception of low-grade fever.  She does have some drainage at incision site and area is slightly red.  Her most recent white blood cell count was 11 but that was after delivery.  Patient denies any postpartum depression denies SI or HI.  Patient denies any abnormal bleeding.  History taken from: patient    PMH/FH/Social History were reviewed and updated appropriately in the electronic medical record.     Review of Systems:   Review of Systems   Constitutional: Positive for fatigue and fever.   Respiratory: Negative.    Cardiovascular: Negative.    Gastrointestinal: Negative.    Skin: Positive for wound ( incision).   Neurological: Negative.    Psychiatric/Behavioral: Positive for sleep disturbance.     All other systems were reviewed and are negative.  Exceptions are noted in the subjective or above.      Objective     Vital Signs  Vitals:    19 1345   BP: 102/72   Pulse: 83   Resp: 15   Temp: 99.8 °F (37.7 °C)   SpO2: 100%       Body mass index is 31.89 kg/m².    Physical Exam   Constitutional: She is oriented to person, place, and time. She appears well-developed and well-nourished. No distress.   Cardiovascular: Normal rate, regular rhythm, normal heart sounds and intact distal pulses.   Pulmonary/Chest: Effort normal and breath sounds normal. She has no wheezes. She exhibits no tenderness.   Neurological: She is alert and oriented to person, place, and time.   Skin: Skin is warm and dry. No rash noted. There is erythema. No pallor.        Psychiatric: She has a normal mood and affect. Her behavior is normal. Judgment and thought content  normal.   Nursing note and vitals reviewed.       Results Review:    I reviewed the patient's previous clinical results.       Medication Review:     Current Outpatient Medications:   •  enoxaparin (LOVENOX) 40 MG/0.4ML solution syringe, Inject contents of 1 syringe under the skin in the appropriate area as directed Daily for 42 days post-partum, Disp: 16.8 mL, Rfl: 0  •  ferrous sulfate 324 (65 Fe) MG tablet delayed-release EC tablet, Take 1 tablet by mouth 2 (Two) Times a Day With Meals, Disp: 60 tablet, Rfl: 0  •  Syringe, Disposable, 1 ML misc, Use for administration of your heparin three times daily., Disp: 25 each, Rfl: 0  •  albuterol (PROVENTIL HFA;VENTOLIN HFA) 108 (90 Base) MCG/ACT inhaler, Inhale 2 puffs 4 (Four) Times a Day., Disp: 1 inhaler, Rfl: 0  •  docusate sodium (COLACE) 100 MG capsule, Take 1 capsule by mouth 2 (Two) Times a Day, Disp: 60 capsule, Rfl: 0  •  flunisolide (NASALIDE) 25 MCG/ACT (0.025%) solution nasal spray, Inhale 2 sprays Every 12 (Twelve) Hours., Disp: 1 bottle, Rfl: 3  •  loratadine (CLARITIN) 10 MG tablet, Take 1 tablet by mouth Daily., Disp: 30 tablet, Rfl: 11  •  raNITIdine (ZANTAC) 150 MG tablet, Take 1 tablet by mouth 2 (Two) Times a Day., Disp: 60 tablet, Rfl: 5    Assessment/Plan   Marilu was seen today for fever.    Diagnoses and all orders for this visit:    Fever, unspecified fever cause  -     CBC (No Diff)    S/P   Discussed with patient importance of keeping  incision dry and clean recommend she clean area with antibacterial soap and use hairdryer to dry area and placed pillowcase in between skin folds.  Recommend she do not scrub area or apply lotion or powders.  Recommend she follow up with GYN as scheduled on Thursday.  Yeast infection of the skin  -     fluconazole (DIFLUCAN) 150 MG tablet; Take 1 tablet by mouth 1 (One) Time for 1 dose.  Recommend patient keep skin clean and dry especially in areas where skin folds touch and have ability to  stay moist.  Recommend showering daily    Return if symptoms worsen or fail to improve.    Barby Garcia, APRN  01/21/2019

## 2019-01-24 ENCOUNTER — OFFICE VISIT (OUTPATIENT)
Dept: OBSTETRICS AND GYNECOLOGY | Facility: CLINIC | Age: 29
End: 2019-01-24

## 2019-01-24 VITALS
DIASTOLIC BLOOD PRESSURE: 80 MMHG | WEIGHT: 174.2 LBS | BODY MASS INDEX: 30.87 KG/M2 | HEIGHT: 63 IN | SYSTOLIC BLOOD PRESSURE: 110 MMHG

## 2019-01-24 DIAGNOSIS — Z09 POSTOP CHECK: Primary | ICD-10-CM

## 2019-01-24 PROCEDURE — 0503F POSTPARTUM CARE VISIT: CPT | Performed by: PHYSICIAN ASSISTANT

## 2019-01-24 NOTE — PROGRESS NOTES
Subjective   Chief Complaint   Patient presents with   • Post-op     9 days post-op C/S and tubal; staples removed and steri-strips applied; doing well       Marilu Godoy is a 28 y.o. year old  presenting to be seen for post op check. She is 9 days post op repeat  section and tubal ligation  She is doing well with normal bowel and bladder function  Lochia light  Bottle feeding  No complaints    Past Medical History:   Diagnosis Date   • Abdominal pain    • Abnormal Pap smear of cervix    • Anxiety    • Asthma    • Bleeding disorder (CMS/Formerly [facility]) 16   • Body piercing     TONGUE, NOSE, TWO IN EACH EAR   • Chest pain    • Clotting disorder (CMS/Formerly [facility])     factor 5   • Constipation    • Coronary artery disease involving native coronary artery of native heart without angina pectoris 2017   • Deep vein thrombosis (CMS/Formerly [facility]) 16   • Diarrhea    • Diarrhea    • Endometriosis    • Factor 5 Leiden mutation, heterozygous (CMS/Formerly [facility])    • Fibroid    • Gallstone    • GERD (gastroesophageal reflux disease)    • H/O blood clots    • Headache    • Hiatal hernia    • High cholesterol    • History of recurrent UTIs    • HPV (human papilloma virus) infection    • Hypertension    • Kidney infection    • Migraine    • Nausea    • Nausea & vomiting    • Obesity    • Orthostatic hypotension    • Osteoarthritis    • Ovarian cyst    • Pollen allergies    • Protein S deficiency (CMS/Formerly [facility]) 2016    labs from hospitalization for PE   • Pulmonary embolism (CMS/Formerly [facility])    • Recurrent pregnancy loss, antepartum condition or complication    • Sleep apnea     CPAP ASKED TO BRING DOS   • Subclinical hyperthyroidism    • Tachycardia    • Tattoo     LOWER BACK   • Varicella         Current Outpatient Medications:   •  albuterol (PROVENTIL HFA;VENTOLIN HFA) 108 (90 Base) MCG/ACT inhaler, Inhale 2 puffs 4 (Four) Times a Day., Disp: 1 inhaler, Rfl: 0  •  docusate sodium (COLACE) 100 MG capsule, Take 1 capsule by mouth 2  (Two) Times a Day, Disp: 60 capsule, Rfl: 0  •  enoxaparin (LOVENOX) 40 MG/0.4ML solution syringe, Inject contents of 1 syringe under the skin in the appropriate area as directed Daily for 42 days post-partum, Disp: 16.8 mL, Rfl: 0  •  ferrous sulfate 324 (65 Fe) MG tablet delayed-release EC tablet, Take 1 tablet by mouth 2 (Two) Times a Day With Meals, Disp: 60 tablet, Rfl: 0  •  flunisolide (NASALIDE) 25 MCG/ACT (0.025%) solution nasal spray, Inhale 2 sprays Every 12 (Twelve) Hours., Disp: 1 bottle, Rfl: 3  •  loratadine (CLARITIN) 10 MG tablet, Take 1 tablet by mouth Daily., Disp: 30 tablet, Rfl: 11  •  raNITIdine (ZANTAC) 150 MG tablet, Take 1 tablet by mouth 2 (Two) Times a Day., Disp: 60 tablet, Rfl: 5  •  Syringe, Disposable, 1 ML misc, Use for administration of your heparin three times daily., Disp: 25 each, Rfl: 0   Allergies   Allergen Reactions   • Ceftin [Cefuroxime Axetil] Other (See Comments)     Numbness in mouth and throat   • Amoxicillin Rash      Past Surgical History:   Procedure Laterality Date   •  SECTION      X2  and    •  SECTION WITH TUBAL N/A 1/15/2019    Procedure:  SECTION REPEAT WITH TUBAL;  Surgeon: Alva Boyer MD;  Location: [facility] LABOR DELIVERY;  Service: Obstetrics/Gynecology   • CHOLECYSTECTOMY     • COLONOSCOPY N/A 2017    Procedure: COLONOSCOPY WITH BIOPSIES AND ARGON THERMAL ABLATION;  Surgeon: Jignesh Selby MD;  Location: [facility] ENDOSCOPY;  Service:    • DIAGNOSTIC LAPAROSCOPY N/A 2018    Procedure: DIAGNOSTIC LAPAROSCOPY AND ABLATION OF ENDOMETRIOSIS;  Surgeon: Evin Zamudio MD;  Location: [facility] OR;  Service: Obstetrics/Gynecology   • ENDOMETRIAL ABLATION     • ENDOSCOPY N/A 2017    Procedure: ESOPHAGOGASTRODUODENOSCOPY WITH BIOPSIES AND COLD BIOPSY POLYPECTOMIES;  Surgeon: Jignesh Selby MD;  Location: [facility] ENDOSCOPY;  Service:    • PELVIC LAPAROSCOPY        Social History     Socioeconomic History   • Marital  "status:      Spouse name: Not on file   • Number of children: Not on file   • Years of education: Not on file   • Highest education level: Not on file   Social Needs   • Financial resource strain: Not on file   • Food insecurity - worry: Not on file   • Food insecurity - inability: Not on file   • Transportation needs - medical: Not on file   • Transportation needs - non-medical: Not on file   Occupational History   • Not on file   Tobacco Use   • Smoking status: Never Smoker   • Smokeless tobacco: Never Used   Substance and Sexual Activity   • Alcohol use: No   • Drug use: No   • Sexual activity: Yes     Partners: Male     Birth control/protection: None     Comment: PE while on birth control patch   Other Topics Concern   • Not on file   Social History Narrative   • Not on file      Family History   Problem Relation Age of Onset   • Arthritis Mother    • COPD Mother    • Asthma Mother    • Thyroid disease Mother    • Arthritis Father    • Diabetes Father    • Hypertension Father    • Hyperlipidemia Father    • Kidney disease Father    • Heart attack Father    • Coronary artery disease Father    • No Known Problems Son    • Colon cancer Neg Hx    • Liver cancer Neg Hx    • Liver disease Neg Hx    • Stomach cancer Neg Hx    • Esophageal cancer Neg Hx        Review of Systems        Objective   /80   Ht 160 cm (63\")   Wt 79 kg (174 lb 3.2 oz)   LMP 04/16/2018   Breastfeeding? No   BMI 30.86 kg/m²     Physical Exam   Constitutional: She appears well-developed and well-nourished. She is cooperative. No distress.   Abdominal: Soft. Normal appearance. There is no tenderness. There is no rigidity and no guarding.   Incision healing well   Neurological: She is alert.   Skin: Skin is warm and dry.   Psychiatric: She has a normal mood and affect. Her behavior is normal. Thought content normal.            Assessment and Plan  Marilu was seen today for post-op.    Diagnoses and all orders for this " visit:    Postop check      Patient Instructions   Follow up 5 weeks or prn             This note was electronically signed.    Taylor Carrasquillo PA-C   January 24, 2019

## 2019-01-28 ENCOUNTER — OFFICE VISIT (OUTPATIENT)
Dept: INTERNAL MEDICINE | Facility: CLINIC | Age: 29
End: 2019-01-28

## 2019-01-28 VITALS — HEART RATE: 102 BPM | OXYGEN SATURATION: 98 % | TEMPERATURE: 99 F

## 2019-01-28 DIAGNOSIS — J02.0 STREP PHARYNGITIS: ICD-10-CM

## 2019-01-28 LAB
EXPIRATION DATE: ABNORMAL
INTERNAL CONTROL: ABNORMAL
Lab: ABNORMAL
S PYO AG THROAT QL: POSITIVE

## 2019-01-28 PROCEDURE — 87880 STREP A ASSAY W/OPTIC: CPT | Performed by: PHYSICIAN ASSISTANT

## 2019-01-28 PROCEDURE — 99213 OFFICE O/P EST LOW 20 MIN: CPT | Performed by: PHYSICIAN ASSISTANT

## 2019-01-28 RX ORDER — CEPHALEXIN 500 MG/1
500 CAPSULE ORAL 2 TIMES DAILY
Qty: 20 CAPSULE | Refills: 0 | Status: SHIPPED | OUTPATIENT
Start: 2019-01-28 | End: 2019-02-14

## 2019-01-28 NOTE — PROGRESS NOTES
Subjective     Chief Complaint: sore throat    History of Present Illness     Marilu Godoy is a 28 y.o. female presenting with complaints of 2 days fever, chills, headache, body aches, sore throat, some congestion, nausea.  States her daughter was recently diagnosed with strep pharyngitis.  She's been taking Tylenol to help with her symptoms.  Denies any cough, ear pain, shortness of breath, wheezing, chest pain.    Patient did just recently deliver 13 days ago.  Had a  and tubal ligation.  She is not breast-feeding.    The following portions of the patient's history were reviewed and updated as appropriate: current medications, allergies, PMH.    Review of Systems   Constitutional: Positive for fatigue and fever. Negative for appetite change, chills and unexpected weight change.   HENT: Positive for sore throat and trouble swallowing. Negative for congestion, ear pain, hearing loss, nosebleeds, sinus pressure and tinnitus.    Eyes: Negative for pain, discharge, redness, itching and visual disturbance.   Respiratory: Negative for cough, chest tightness, shortness of breath and wheezing.    Cardiovascular: Negative for chest pain, palpitations and leg swelling.   Gastrointestinal: Positive for nausea. Negative for abdominal pain, blood in stool, constipation, diarrhea and vomiting.   Endocrine: Negative for cold intolerance, heat intolerance, polydipsia, polyphagia and polyuria.   Genitourinary: Negative for decreased urine volume, dysuria, flank pain, frequency and hematuria.   Musculoskeletal: Negative for arthralgias, back pain, gait problem, joint swelling, myalgias, neck pain and neck stiffness.   Skin: Negative for color change and rash.   Allergic/Immunologic: Negative for environmental allergies, food allergies and immunocompromised state.   Neurological: Positive for headaches. Negative for dizziness, syncope, weakness and light-headedness.   Hematological: Negative for adenopathy. Does not  bruise/bleed easily.   Psychiatric/Behavioral: Negative for dysphoric mood, sleep disturbance and suicidal ideas. The patient is not nervous/anxious.        Objective     Vitals:    01/28/19 1102   Pulse: 102   Temp: 99 °F (37.2 °C)   SpO2: 98%       Physical Exam   Constitutional: Appears well-developed and well-nourished.   Right Ear: Tympanic membrane and external ear normal.   Left Ear: Tympanic membrane and external ear normal.   Nose: Nose normal.   Mouth/Throat: Erythema, edema.  Eyes: EOM are normal. Pupils are equal, round, and reactive to light.   Neck: Normal range of motion. Neck supple.   Cardiovascular: Normal rate, regular rhythm and normal heart sounds.    Pulmonary/Chest: Effort normal and breath sounds normal.   Abdominal: Soft. Bowel sounds are normal.   Musculoskeletal: Normal range of motion.   Lymphadenopathy: Cervical adenopathy noted.   Neurological: Alert and oriented to person, place, and time.   Skin: Skin is warm and dry.   Psychiatric: Exhibits a normal mood and affect.     Assessment/Plan     Diagnoses and all orders for this visit:    Strep pharyngitis  -     POCT rapid strep A  -     cephalexin (KEFLEX) 500 MG capsule; Take 1 capsule by mouth 2 (Two) Times a Day.    RSS positive.  Increased water intake, Tylenol as needed, discussed switching toothbrush.      Jenniffer Berger PA-C  01/28/2019         Please note that portions of this note were completed with a voice recognition program. Efforts were made to edit dictation, but occasionally words are mistranscribed.

## 2019-01-29 RX ORDER — MIDODRINE HYDROCHLORIDE 5 MG/1
TABLET ORAL
Qty: 35 TABLET | Refills: 5 | OUTPATIENT
Start: 2019-01-29

## 2019-02-04 ENCOUNTER — OFFICE VISIT (OUTPATIENT)
Dept: INTERNAL MEDICINE | Facility: CLINIC | Age: 29
End: 2019-02-04

## 2019-02-04 VITALS
DIASTOLIC BLOOD PRESSURE: 72 MMHG | BODY MASS INDEX: 30.87 KG/M2 | HEART RATE: 96 BPM | TEMPERATURE: 98.1 F | OXYGEN SATURATION: 98 % | SYSTOLIC BLOOD PRESSURE: 104 MMHG | HEIGHT: 63 IN

## 2019-02-04 DIAGNOSIS — R10.31 RLQ ABDOMINAL PAIN: ICD-10-CM

## 2019-02-04 DIAGNOSIS — J06.9 ACUTE URI: Primary | ICD-10-CM

## 2019-02-04 DIAGNOSIS — K29.70 VIRAL GASTRITIS: ICD-10-CM

## 2019-02-04 PROCEDURE — 99213 OFFICE O/P EST LOW 20 MIN: CPT | Performed by: PHYSICIAN ASSISTANT

## 2019-02-04 PROCEDURE — 87804 INFLUENZA ASSAY W/OPTIC: CPT | Performed by: PHYSICIAN ASSISTANT

## 2019-02-04 NOTE — PROGRESS NOTES
Marilu Godoy is a 28 y.o. female.     Subjective   History of Present Illness   Here today with concern of 3 days of fever, sore throat, congestion, cough, muscle aches, little diarrhea, little nausea and intermittent RLQ abdominal pain.  The abdominal pain does not seem to be related to eating.   She is 3 weeks post . She is s/p cholecystectomy. She has been taking Keflex for strep for the last 7 days. She has been taking Tylenol.  No low back pain, flank pain, dysuria, hematuria, increased frequency or urgency.             The following portions of the patient's history were reviewed and updated as appropriate: allergies, current medications, past family history, past medical history, past social history, past surgical history and problem list.    Review of Systems    Constitutional: fever. Negative for appetite change, chills, fatigue and unexpected weight change.   HENT: sore throat, congestion.  Negative for ear pain, hearing loss, nosebleeds, postnasal drip, rhinorrhea, tinnitus and trouble swallowing.    Eyes: Negative for photophobia, discharge and visual disturbance.   Respiratory: cough. Negative for chest tightness, shortness of breath and wheezing.    Cardiovascular: Negative for chest pain, palpitations and leg swelling.   Gastrointestinal: abdominal pain, diarrhea, nausea.  Negative for abdominal distention, blood in stool, constipation and vomiting.   Endocrine: Negative for cold intolerance, heat intolerance, polydipsia, polyphagia and polyuria.   Musculoskeletal: myalgias.  Negative for arthralgias, back pain, joint swelling, neck pain and neck stiffness.   Skin: Negative for color change, pallor, rash and wound.   Allergic/Immunologic: Negative for environmental allergies, food allergies and immunocompromised state.   Neurological: Negative for dizziness, tremors, seizures, weakness, numbness and headaches.   Hematological: Negative for adenopathy. Does not bruise/bleed easily.  "  Psychiatric/Behavioral: Negative for sleep disturbances, agitation, behavioral problems, confusion, hallucinations, self-injury and suicidal ideas. The patient is not nervous/anxious.    : No hematuria, increased frequency or urgency.    Objective    Physical Exam  Constitutional: Appears well-developed and well-nourished.   HENT: very poor dentition with broken teeth. TMs normal. OP normal.   Head: No sinus tenderness. Normocephalic and atraumatic.   Eyes: EOM are normal. Pupils are equal, round, and reactive to light.   Neck: Normal range of motion. Neck supple.   Cardiovascular: Normal rate, regular rhythm and normal heart sounds.    Pulmonary/Chest: Effort normal and breath sounds normal. No respiratory distress.  Has no wheezes or rales. Exhibits no chest wall tenderness.   Abdominal: moderate lower abdominal tenderness, more prominent RLQ.  Soft. Bowel sounds are normal. Exhibits no distension and no mass.   Musculoskeletal: Normal range of motion. Exhibits no tenderness.   Neurological: Alert and oriented to person, place, and time.   Skin: well healed  incision without sign of infection. Skin is warm and dry.   Psychiatric: Has a normal mood and affect. Behavior is normal. Judgment and thought content normal.       /72   Pulse 96   Temp 98.1 °F (36.7 °C)   Ht 160 cm (62.99\")   LMP 2018   SpO2 98%   BMI 30.87 kg/m²     Nursing note and vitals reviewed.        Assessment/Plan   Marilu was seen today for fever and abdominal pain.    Diagnoses and all orders for this visit:    Acute URI  -     POCT Influenza A/B - negative  Increase PO fluid intake.       Viral gastritis  Increase PO fluid intake.  BRAT diet (bananas, rice, apples and toast) recommended, advance slowly and as tolerated.    Encouraged her to begin a daily probiotic supplement or eat yogurt daily.     RLQ abdominal pain  ER with any worsened abdominal pain.         Finish Keflex from recent strep pharyngitis " diagnosis. Tylenol prn for pain and fever control.        Call or RTC if symptoms worsen or persist.

## 2019-02-14 ENCOUNTER — APPOINTMENT (OUTPATIENT)
Dept: LAB | Facility: HOSPITAL | Age: 29
End: 2019-02-14

## 2019-02-14 ENCOUNTER — OFFICE VISIT (OUTPATIENT)
Dept: INTERNAL MEDICINE | Facility: CLINIC | Age: 29
End: 2019-02-14

## 2019-02-14 VITALS
SYSTOLIC BLOOD PRESSURE: 110 MMHG | HEART RATE: 106 BPM | DIASTOLIC BLOOD PRESSURE: 72 MMHG | BODY MASS INDEX: 30.12 KG/M2 | TEMPERATURE: 99.2 F | HEIGHT: 63 IN | OXYGEN SATURATION: 99 % | WEIGHT: 170 LBS

## 2019-02-14 DIAGNOSIS — R10.2 SUPRAPUBIC ABDOMINAL PAIN: ICD-10-CM

## 2019-02-14 DIAGNOSIS — R53.83 FATIGUE, UNSPECIFIED TYPE: Primary | ICD-10-CM

## 2019-02-14 DIAGNOSIS — R10.31 RLQ ABDOMINAL PAIN: ICD-10-CM

## 2019-02-14 DIAGNOSIS — R63.4 WEIGHT LOSS: ICD-10-CM

## 2019-02-14 DIAGNOSIS — Z98.891 S/P CESAREAN SECTION: ICD-10-CM

## 2019-02-14 LAB
ALBUMIN SERPL-MCNC: 4.6 G/DL (ref 3.5–5)
ALBUMIN/GLOB SERPL: 1.2 G/DL (ref 1–2)
ALP SERPL-CCNC: 123 U/L (ref 38–126)
ALT SERPL W P-5'-P-CCNC: 21 U/L (ref 13–69)
ANION GAP SERPL CALCULATED.3IONS-SCNC: 10.8 MMOL/L (ref 10–20)
AST SERPL-CCNC: 25 U/L (ref 15–46)
BACTERIA UR QL AUTO: ABNORMAL /HPF
BASOPHILS # BLD AUTO: 0.06 10*3/MM3 (ref 0–0.2)
BASOPHILS NFR BLD AUTO: 0.8 % (ref 0–2.5)
BILIRUB SERPL-MCNC: 0.4 MG/DL (ref 0.2–1.3)
BILIRUB UR QL STRIP: ABNORMAL
BUN BLD-MCNC: 7 MG/DL (ref 7–20)
BUN/CREAT SERPL: 11.7 (ref 7.1–23.5)
CALCIUM SPEC-SCNC: 9.5 MG/DL (ref 8.4–10.2)
CHLORIDE SERPL-SCNC: 105 MMOL/L (ref 98–107)
CLARITY UR: CLEAR
CO2 SERPL-SCNC: 30 MMOL/L (ref 26–30)
COLOR UR: YELLOW
CREAT BLD-MCNC: 0.6 MG/DL (ref 0.6–1.3)
DEPRECATED RDW RBC AUTO: 40.7 FL (ref 37–54)
EOSINOPHIL # BLD AUTO: 0.36 10*3/MM3 (ref 0–0.7)
EOSINOPHIL NFR BLD AUTO: 4.6 % (ref 0–7)
ERYTHROCYTE [DISTWIDTH] IN BLOOD BY AUTOMATED COUNT: 12.6 % (ref 11.5–14.5)
FERRITIN SERPL-MCNC: 31.1 NG/ML (ref 6.24–137)
GFR SERPL CREATININE-BSD FRML MDRD: 119 ML/MIN/1.73
GLOBULIN UR ELPH-MCNC: 3.7 GM/DL
GLUCOSE BLD-MCNC: 102 MG/DL (ref 74–98)
GLUCOSE UR STRIP-MCNC: NEGATIVE MG/DL
HCT VFR BLD AUTO: 41.9 % (ref 37–47)
HGB BLD-MCNC: 13.6 G/DL (ref 12–16)
HGB UR QL STRIP.AUTO: ABNORMAL
HYALINE CASTS UR QL AUTO: ABNORMAL /LPF
IMM GRANULOCYTES # BLD AUTO: 0.03 10*3/MM3 (ref 0–0.06)
IMM GRANULOCYTES NFR BLD AUTO: 0.4 % (ref 0–0.6)
KETONES UR QL STRIP: ABNORMAL
LEUKOCYTE ESTERASE UR QL STRIP.AUTO: ABNORMAL
LYMPHOCYTES # BLD AUTO: 1.99 10*3/MM3 (ref 0.6–3.4)
LYMPHOCYTES NFR BLD AUTO: 25.7 % (ref 10–50)
MCH RBC QN AUTO: 28.6 PG (ref 27–31)
MCHC RBC AUTO-ENTMCNC: 32.5 G/DL (ref 30–37)
MCV RBC AUTO: 88 FL (ref 81–99)
MONOCYTES # BLD AUTO: 0.31 10*3/MM3 (ref 0–0.9)
MONOCYTES NFR BLD AUTO: 4 % (ref 0–12)
MUCOUS THREADS URNS QL MICRO: ABNORMAL /HPF
NEUTROPHILS # BLD AUTO: 5 10*3/MM3 (ref 2–6.9)
NEUTROPHILS NFR BLD AUTO: 64.5 % (ref 37–80)
NITRITE UR QL STRIP: NEGATIVE
NRBC BLD AUTO-RTO: 0 /100 WBC (ref 0–0)
PH UR STRIP.AUTO: 5.5 [PH] (ref 5–8)
PLATELET # BLD AUTO: 353 10*3/MM3 (ref 130–400)
PMV BLD AUTO: 9.7 FL (ref 6–12)
POTASSIUM BLD-SCNC: 3.8 MMOL/L (ref 3.5–5.1)
PROT SERPL-MCNC: 8.3 G/DL (ref 6.3–8.2)
PROT UR QL STRIP: ABNORMAL
RBC # BLD AUTO: 4.76 10*6/MM3 (ref 4.2–5.4)
RBC # UR: ABNORMAL /HPF
REF LAB TEST METHOD: ABNORMAL
SODIUM BLD-SCNC: 142 MMOL/L (ref 137–145)
SP GR UR STRIP: >=1.03 (ref 1–1.03)
SQUAMOUS #/AREA URNS HPF: ABNORMAL /HPF
T4 FREE SERPL-MCNC: 0.71 NG/DL (ref 0.78–2.19)
TSH SERPL DL<=0.05 MIU/L-ACNC: 1.07 MIU/ML (ref 0.47–4.68)
UROBILINOGEN UR QL STRIP: ABNORMAL
WBC NRBC COR # BLD: 7.75 10*3/MM3 (ref 4.8–10.8)
WBC UR QL AUTO: ABNORMAL /HPF

## 2019-02-14 PROCEDURE — 36415 COLL VENOUS BLD VENIPUNCTURE: CPT | Performed by: PHYSICIAN ASSISTANT

## 2019-02-14 PROCEDURE — 82728 ASSAY OF FERRITIN: CPT | Performed by: PHYSICIAN ASSISTANT

## 2019-02-14 PROCEDURE — 80053 COMPREHEN METABOLIC PANEL: CPT | Performed by: PHYSICIAN ASSISTANT

## 2019-02-14 PROCEDURE — 81001 URINALYSIS AUTO W/SCOPE: CPT | Performed by: PHYSICIAN ASSISTANT

## 2019-02-14 PROCEDURE — 87086 URINE CULTURE/COLONY COUNT: CPT | Performed by: PHYSICIAN ASSISTANT

## 2019-02-14 PROCEDURE — 84439 ASSAY OF FREE THYROXINE: CPT | Performed by: PHYSICIAN ASSISTANT

## 2019-02-14 PROCEDURE — 84443 ASSAY THYROID STIM HORMONE: CPT | Performed by: PHYSICIAN ASSISTANT

## 2019-02-14 PROCEDURE — 99214 OFFICE O/P EST MOD 30 MIN: CPT | Performed by: PHYSICIAN ASSISTANT

## 2019-02-14 PROCEDURE — 85025 COMPLETE CBC W/AUTO DIFF WBC: CPT | Performed by: PHYSICIAN ASSISTANT

## 2019-02-14 NOTE — PROGRESS NOTES
"Marilu Godoy is a 28 y.o. female.     Subjective   History of Present Illness   Here today with concern of weight loss of around 20 pounds compared to her pre-pregnancy weight.  Upon review of her chart it appears that her pre-pregnancy weight was around 194 pounds and during pregnancy her weight reduced to around 184 pounds at the time of delivery. She followed up with her OB 9 days after delivery at which time she had lost 10 pounds and since then she has lost another 4 pounds.  She is not breastfeeding.  She continues to have RLQ abdominal pain and some intermittent diarrhea. She feels her appetite has not changed.  She does admit to headaches and fatigue since delivery 4 weeks ago.     She also reports \"hives\" on her arms and legs for the last 3-4 days which are very itchy. She has noticed new spots coming up daily. No family members have a similar rash. No previous similar occurrences. They have no pets.  No new cosmetics or detergents.  She has checked for bedbugs which were not present.           The following portions of the patient's history were reviewed and updated as appropriate: allergies, current medications, past family history, past medical history, past social history, past surgical history and problem list.    Review of Systems   Constitutional: Positive for fatigue and unexpected weight change. Negative for appetite change, chills and fever.   HENT: Negative for congestion, ear pain, hearing loss, nosebleeds, postnasal drip, rhinorrhea, sore throat, tinnitus and trouble swallowing.    Eyes: Negative for photophobia, discharge and visual disturbance.   Respiratory: Negative for cough, chest tightness, shortness of breath and wheezing.    Cardiovascular: Negative for chest pain, palpitations and leg swelling.   Gastrointestinal: Positive for abdominal pain and diarrhea. Negative for abdominal distention, blood in stool, constipation, nausea and vomiting.   Endocrine: Negative for cold " intolerance, heat intolerance, polydipsia, polyphagia and polyuria.   Genitourinary: Positive for vaginal bleeding.   Musculoskeletal: Negative for arthralgias, back pain, joint swelling, myalgias, neck pain and neck stiffness.   Skin: Positive for rash. Negative for color change, pallor and wound.   Allergic/Immunologic: Negative for environmental allergies, food allergies and immunocompromised state.   Neurological: Positive for headaches. Negative for dizziness, tremors, seizures, weakness and numbness.   Hematological: Negative for adenopathy. Does not bruise/bleed easily.   Psychiatric/Behavioral: Negative for agitation, behavioral problems, confusion, hallucinations, self-injury and suicidal ideas. The patient is not nervous/anxious.          Objective    Physical Exam   Constitutional: She is oriented to person, place, and time. She appears well-developed and well-nourished. No distress.   HENT:   Head: Normocephalic and atraumatic.   Right Ear: External ear normal.   Left Ear: External ear normal.   Mouth/Throat: Oropharynx is clear and moist.   Eyes: Conjunctivae and EOM are normal. Pupils are equal, round, and reactive to light.   Neck: Normal range of motion. Neck supple. No thyromegaly present.   Cardiovascular: Normal rate, regular rhythm and normal heart sounds. Exam reveals no gallop and no friction rub.   No murmur heard.  Pulmonary/Chest: Effort normal and breath sounds normal. No respiratory distress. She has no wheezes. She has no rales.   Abdominal: Soft. Bowel sounds are normal. She exhibits no distension and no mass. There is tenderness (RLQ and suprapubic). There is no rebound. No hernia.   Musculoskeletal: Normal range of motion. She exhibits no edema, tenderness or deformity.   Lymphadenopathy:     She has no cervical adenopathy.   Neurological: She is alert and oriented to person, place, and time. She displays normal reflexes. No cranial nerve deficit or sensory deficit. She exhibits normal  "muscle tone. Coordination normal.   Skin: Skin is warm and dry. Capillary refill takes less than 2 seconds. Rash noted. She is not diaphoretic.   Erythematous macules with scabs scattered on arms and legs.    Psychiatric: She has a normal mood and affect. Her behavior is normal. Judgment and thought content normal.   Nursing note and vitals reviewed.        /72   Pulse 106   Temp 99.2 °F (37.3 °C)   Ht 160 cm (62.99\")   Wt 77.1 kg (170 lb)   LMP 2018   SpO2 99%   BMI 30.12 kg/m²     Nursing note and vitals reviewed.        Assessment/Plan   Marilu was seen today for rash.    Diagnoses and all orders for this visit:    Fatigue, unspecified type  -     CBC & Differential  -     Comprehensive Metabolic Panel  -     TSH  -     T4, Free  -     Ferritin    Weight loss  -     CBC & Differential  -     TSH  -     T4, Free  -     CBC Auto Differential    RLQ abdominal pain  -     CBC & Differential  -     Comprehensive Metabolic Panel  -     Urinalysis, Microscopic Only - Urine, Clean Catch  -     Urine Culture - Urine, Urine, Clean Catch    S/P  section  -     CBC & Differential  -     Urinalysis, Microscopic Only - Urine, Clean Catch  -     Urine Culture - Urine, Urine, Clean Catch    Suprapubic abdominal pain  -     CBC & Differential  -     Urinalysis, Microscopic Only - Urine, Clean Catch  -     Urine Culture - Urine, Urine, Clean Catch                 "

## 2019-02-15 DIAGNOSIS — R31.9 URINARY TRACT INFECTION WITH HEMATURIA, SITE UNSPECIFIED: Primary | ICD-10-CM

## 2019-02-15 DIAGNOSIS — N39.0 URINARY TRACT INFECTION WITH HEMATURIA, SITE UNSPECIFIED: Primary | ICD-10-CM

## 2019-02-15 RX ORDER — NITROFURANTOIN 25; 75 MG/1; MG/1
100 CAPSULE ORAL 2 TIMES DAILY
Qty: 14 CAPSULE | Refills: 0 | Status: SHIPPED | OUTPATIENT
Start: 2019-02-15 | End: 2019-02-22

## 2019-02-17 LAB — BACTERIA SPEC AEROBE CULT: NO GROWTH

## 2019-02-18 DIAGNOSIS — L98.9 SKIN LESIONS: Primary | ICD-10-CM

## 2019-02-21 ENCOUNTER — OFFICE VISIT (OUTPATIENT)
Dept: INTERNAL MEDICINE | Facility: CLINIC | Age: 29
End: 2019-02-21

## 2019-02-21 VITALS
BODY MASS INDEX: 30.12 KG/M2 | HEIGHT: 63 IN | RESPIRATION RATE: 16 BRPM | TEMPERATURE: 98.2 F | OXYGEN SATURATION: 98 % | WEIGHT: 170 LBS | HEART RATE: 72 BPM | SYSTOLIC BLOOD PRESSURE: 115 MMHG | DIASTOLIC BLOOD PRESSURE: 80 MMHG

## 2019-02-21 DIAGNOSIS — J02.9 ACUTE PHARYNGITIS, UNSPECIFIED ETIOLOGY: Primary | ICD-10-CM

## 2019-02-21 LAB
EXPIRATION DATE: NORMAL
INTERNAL CONTROL: NORMAL
Lab: NORMAL
S PYO AG THROAT QL: NEGATIVE

## 2019-02-21 PROCEDURE — 87880 STREP A ASSAY W/OPTIC: CPT | Performed by: NURSE PRACTITIONER

## 2019-02-21 PROCEDURE — 99213 OFFICE O/P EST LOW 20 MIN: CPT | Performed by: NURSE PRACTITIONER

## 2019-02-21 NOTE — PROGRESS NOTES
Chief Complaint / Reason:      Chief Complaint   Patient presents with   • Sore Throat     both kids have it       Subjective     HPI  Patient presents today with complaints of sore throat.  She states both her kids have had strep.  She denies fever or chills but states that her sore throat started the past few days.  Denies chest pain, shortness of breath or heart palpitations.  History taken from: patient    PMH/FH/Social History were reviewed and updated appropriately in the electronic medical record.     Review of Systems:   Review of Systems   Constitutional: Negative.    HENT: Positive for sore throat.    Respiratory: Negative.    Cardiovascular: Negative.    Gastrointestinal: Negative.    Neurological: Negative.      All other systems were reviewed and are negative.  Exceptions are noted in the subjective or above.      Objective     Vital Signs  Vitals:    02/21/19 0854   BP: 115/80   Pulse: 72   Resp: 16   Temp: 98.2 °F (36.8 °C)   SpO2: 98%       Body mass index is 30.12 kg/m².    Physical Exam   Constitutional: She is oriented to person, place, and time. She appears well-developed and well-nourished.   HENT:   Right Ear: Tympanic membrane is erythematous. Tympanic membrane is not bulging. No middle ear effusion.   Left Ear: Tympanic membrane is erythematous. Tympanic membrane is not bulging.  No middle ear effusion.   Nose: Nose normal.   Mouth/Throat: Posterior oropharyngeal erythema present.   Eyes: EOM are normal. Pupils are equal, round, and reactive to light.   Neck: Normal range of motion. Neck supple.   Cardiovascular: Normal rate and regular rhythm.   Pulmonary/Chest: Effort normal and breath sounds normal.   Abdominal: Soft. Bowel sounds are normal.   Musculoskeletal: Normal range of motion.   Lymphadenopathy:     She has no cervical adenopathy.   Neurological: She is alert and oriented to person, place, and time.   Skin: Skin is warm and dry.   Psychiatric: She has a normal mood and affect.         Results Review:    I reviewed the patient's new clinical results.   Office Visit on 02/21/2019   Component Date Value Ref Range Status   • Rapid Strep A Screen 02/21/2019 Negative  Negative, VALID, INVALID, Not Performed Final   • Internal Control 02/21/2019 Passed  Passed Final   • Lot Number 02/21/2019 [id number]   Final   • Expiration Date 02/21/2019 10/26/2020   Final         Medication Review:     Current Outpatient Medications:   •  albuterol (PROVENTIL HFA;VENTOLIN HFA) 108 (90 Base) MCG/ACT inhaler, Inhale 2 puffs 4 (Four) Times a Day., Disp: 1 inhaler, Rfl: 0  •  enoxaparin (LOVENOX) 40 MG/0.4ML solution syringe, Inject contents of 1 syringe under the skin in the appropriate area as directed Daily for 42 days post-partum, Disp: 16.8 mL, Rfl: 0  •  flunisolide (NASALIDE) 25 MCG/ACT (0.025%) solution nasal spray, Inhale 2 sprays Every 12 (Twelve) Hours., Disp: 1 bottle, Rfl: 3  •  loratadine (CLARITIN) 10 MG tablet, Take 1 tablet by mouth Daily., Disp: 30 tablet, Rfl: 11  •  nitrofurantoin, macrocrystal-monohydrate, (MACROBID) 100 MG capsule, Take 1 capsule by mouth 2 (Two) Times a Day for 7 days., Disp: 14 capsule, Rfl: 0  •  raNITIdine (ZANTAC) 150 MG tablet, Take 1 tablet by mouth 2 (Two) Times a Day., Disp: 60 tablet, Rfl: 5  •  Syringe, Disposable, 1 ML misc, Use for administration of your heparin three times daily., Disp: 25 each, Rfl: 0    Assessment/Plan   Marilu was seen today for sore throat.    Diagnoses and all orders for this visit:    Acute pharyngitis, unspecified etiology  -     POCT rapid strep A    Recommend patient treat symptomatically at this time  Advised patient to avoid biting fingernails increase water intake and do salt water gargles use mouthwash.  Recommend good oral hygiene and handwashing.  discussed worsening signs and symptoms.  Return if symptoms worsen or fail to improve.    Barby Garcia, APRN  02/21/2019

## 2019-02-26 ENCOUNTER — OFFICE VISIT (OUTPATIENT)
Dept: INTERNAL MEDICINE | Facility: CLINIC | Age: 29
End: 2019-02-26

## 2019-02-26 VITALS
HEIGHT: 63 IN | HEART RATE: 113 BPM | OXYGEN SATURATION: 99 % | SYSTOLIC BLOOD PRESSURE: 120 MMHG | DIASTOLIC BLOOD PRESSURE: 70 MMHG | BODY MASS INDEX: 30.97 KG/M2 | WEIGHT: 174.8 LBS | TEMPERATURE: 100.4 F

## 2019-02-26 DIAGNOSIS — R11.2 NON-INTRACTABLE VOMITING WITH NAUSEA, UNSPECIFIED VOMITING TYPE: ICD-10-CM

## 2019-02-26 DIAGNOSIS — R19.7 DIARRHEA OF PRESUMED INFECTIOUS ORIGIN: Primary | ICD-10-CM

## 2019-02-26 PROCEDURE — 99213 OFFICE O/P EST LOW 20 MIN: CPT | Performed by: PHYSICIAN ASSISTANT

## 2019-02-26 RX ORDER — PROMETHAZINE HYDROCHLORIDE 12.5 MG/1
TABLET ORAL
Qty: 40 TABLET | Refills: 0 | Status: SHIPPED | OUTPATIENT
Start: 2019-02-26 | End: 2019-04-16

## 2019-02-26 RX ORDER — TIZANIDINE 4 MG/1
4 TABLET ORAL 2 TIMES DAILY
Refills: 0 | COMMUNITY
Start: 2019-02-21 | End: 2019-03-11 | Stop reason: SDUPTHER

## 2019-02-26 NOTE — PROGRESS NOTES
Marilu Godoy is a 28 y.o. female.     Subjective   History of Present Illness   Here today with concern of 2-3 days of nausea, vomiting, diarrhea, lower abdominal cramping, fatigue, myalgias, nasal congestion and burping.  She has also had low-grade fever up to 100.4 for the last 2 days.  She has been taking Tylenol which helps some.  LMP 1.5-2 weeks ago.  One of her daughter's was diagnosed with Influenza A 3 days ago and the other daughter was diagnosed with Rotavirus the same day.  She denies cough or sore throat.         The following portions of the patient's history were reviewed and updated as appropriate: allergies, current medications, past family history, past medical history, past social history, past surgical history and problem list.    Review of Systems   Constitutional: Positive for fatigue and fever. Negative for appetite change, chills and unexpected weight change.   HENT: Positive for congestion. Negative for ear discharge, facial swelling, hearing loss, nosebleeds, rhinorrhea, sinus pressure, sneezing, sore throat, tinnitus, trouble swallowing and voice change.    Respiratory: Negative for cough, chest tightness, shortness of breath and wheezing.    Cardiovascular: Negative for chest pain, palpitations and leg swelling.   Gastrointestinal: Positive for abdominal pain, diarrhea, nausea and vomiting. Negative for abdominal distention, anal bleeding, blood in stool and rectal pain.        Burping     Genitourinary: Negative for decreased urine volume, difficulty urinating, dysuria, flank pain, genital sores, hematuria, pelvic pain, vaginal discharge and vaginal pain.   Musculoskeletal: Positive for myalgias. Negative for arthralgias, gait problem, joint swelling, neck pain and neck stiffness.   Neurological: Negative for dizziness, numbness and headaches.   Hematological: Negative for adenopathy. Does not bruise/bleed easily.   Psychiatric/Behavioral: Negative for agitation, confusion,  "dysphoric mood, self-injury and suicidal ideas. The patient is not nervous/anxious.          Objective    Physical Exam   Constitutional: She is oriented to person, place, and time. She appears well-developed and well-nourished. No distress.   HENT:   Head: Normocephalic and atraumatic.   Right Ear: External ear normal.   Left Ear: External ear normal.   Mouth/Throat: Oropharynx is clear and moist. No oropharyngeal exudate.   Eyes: Conjunctivae and EOM are normal. Pupils are equal, round, and reactive to light.   Neck: Normal range of motion. Neck supple. No thyromegaly present.   Cardiovascular: Normal rate, regular rhythm and normal heart sounds. Exam reveals no gallop and no friction rub.   No murmur heard.  Pulmonary/Chest: Effort normal and breath sounds normal. No respiratory distress. She has no wheezes. She has no rales.   Abdominal: Soft. Bowel sounds are normal. She exhibits no distension and no mass. There is tenderness (RLQ, RUQ, LLQ, periumbilical). There is no rebound. No hernia.   Musculoskeletal: Normal range of motion. She exhibits no edema, tenderness or deformity.   No nuchal rigidity.    Lymphadenopathy:     She has no cervical adenopathy.   Neurological: She is alert and oriented to person, place, and time. She displays normal reflexes. No cranial nerve deficit or sensory deficit. She exhibits normal muscle tone. Coordination normal.   Skin: Skin is warm and dry. Capillary refill takes less than 2 seconds. No rash noted. She is not diaphoretic.   Normal skin turgor.    Psychiatric: She has a normal mood and affect. Her behavior is normal. Judgment and thought content normal.   Nursing note and vitals reviewed.          /70   Pulse 113   Temp 100.4 °F (38 °C)   Ht 160 cm (62.99\")   Wt 79.3 kg (174 lb 12.8 oz)   LMP 04/16/2018   SpO2 99%   BMI 30.97 kg/m²     Nursing note and vitals reviewed.        Assessment/Plan   Marilu was seen today for follow-up.    Diagnoses and all orders " for this visit:    Diarrhea of presumed infectious origin  -     Gastrointestinal Panel, PCR - Stool, Per Rectum  -     promethazine (PHENERGAN) 12.5 MG tablet; Take 1-2 tablets by mouth every 8 hours as needed for nausea and vomiting.    Non-intractable vomiting with nausea, unspecified vomiting type      Likely has rotavirus which her infant was positive for three days ago. Encouraged rest and aggressive rehydration with water and Pedialyte or Gatorade G2.     Call or RTC if symptoms worse or persist.

## 2019-03-01 LAB
ADV 40+41 DNA STL QL NAA+NON-PROBE: NOT DETECTED
ASTRO TYP 1-8 RNA STL QL NAA+NON-PROBE: DETECTED
C CAYETANENSIS DNA STL QL NAA+NON-PROBE: NOT DETECTED
C COLI+JEJ+UPSA DNA STL QL NAA+NON-PROBE: NOT DETECTED
C DIF TOX TCDA+TCDB STL QL NAA+NON-PROBE: NOT DETECTED
CRYPTOSP DNA STL QL NAA+NON-PROBE: NOT DETECTED
E COLI O157 DNA STL QL NAA+NON-PROBE: ABNORMAL
E HISTOLYT DNA STL QL NAA+NON-PROBE: NOT DETECTED
EAEC PAA PLAS AGGR+AATA ST NAA+NON-PRB: NOT DETECTED
EC STX1+STX2 GENES STL QL NAA+NON-PROBE: NOT DETECTED
EPEC EAE GENE STL QL NAA+NON-PROBE: NOT DETECTED
ETEC LTA+ST1A+ST1B TOX ST NAA+NON-PROBE: NOT DETECTED
G LAMBLIA DNA STL QL NAA+NON-PROBE: NOT DETECTED
NOROVIRUS GI+II RNA STL QL NAA+NON-PROBE: NOT DETECTED
P SHIGELLOIDES DNA STL QL NAA+NON-PROBE: NOT DETECTED
RVA RNA STL QL NAA+NON-PROBE: NOT DETECTED
S ENT+BONG DNA STL QL NAA+NON-PROBE: NOT DETECTED
SAPO I+II+IV+V RNA STL QL NAA+NON-PROBE: NOT DETECTED
SHIGELLA SP+EIEC IPAH ST NAA+NON-PROBE: NOT DETECTED
V CHOL+PARA+VUL DNA STL QL NAA+NON-PROBE: NOT DETECTED
V CHOLERAE DNA STL QL NAA+NON-PROBE: NOT DETECTED
Y ENTEROCOL DNA STL QL NAA+NON-PROBE: NOT DETECTED

## 2019-03-03 ENCOUNTER — PATIENT MESSAGE (OUTPATIENT)
Dept: INTERNAL MEDICINE | Facility: CLINIC | Age: 29
End: 2019-03-03

## 2019-03-03 DIAGNOSIS — D68.59 PROTEIN S DEFICIENCY (HCC): Primary | ICD-10-CM

## 2019-03-03 DIAGNOSIS — D68.51 HETEROZYGOUS FACTOR V LEIDEN MUTATION (HCC): ICD-10-CM

## 2019-03-03 DIAGNOSIS — Z79.01 CHRONIC ANTICOAGULATION: ICD-10-CM

## 2019-03-04 NOTE — TELEPHONE ENCOUNTER
From: Marilu Godoy  To: Deidre Barker MD  Sent: 3/3/2019 11:01 PM EST  Subject: Prescription Question    I need a new script sent [facility] to [facility] for the Eliquis 2.5mg twice a day so I can get started back on it. The Lovenox injections are done.

## 2019-03-05 RX ORDER — LORATADINE 10 MG/1
10 TABLET ORAL DAILY
Qty: 30 TABLET | Refills: 11 | OUTPATIENT
Start: 2019-03-05 | End: 2020-05-03

## 2019-03-05 RX ORDER — RANITIDINE 150 MG/1
150 TABLET ORAL 2 TIMES DAILY
Qty: 60 TABLET | Refills: 5 | Status: SHIPPED | OUTPATIENT
Start: 2019-03-05 | End: 2019-08-28 | Stop reason: SDUPTHER

## 2019-03-06 ENCOUNTER — PRIOR AUTHORIZATION (OUTPATIENT)
Dept: INTERNAL MEDICINE | Facility: CLINIC | Age: 29
End: 2019-03-06

## 2019-03-08 DIAGNOSIS — Z79.01 CHRONIC ANTICOAGULATION: ICD-10-CM

## 2019-03-08 DIAGNOSIS — D68.51 HETEROZYGOUS FACTOR V LEIDEN MUTATION (HCC): ICD-10-CM

## 2019-03-08 DIAGNOSIS — D68.59 PROTEIN S DEFICIENCY (HCC): ICD-10-CM

## 2019-03-08 RX ORDER — POLYMYXIN B SULFATE AND TRIMETHOPRIM 1; 10000 MG/ML; [USP'U]/ML
1 SOLUTION OPHTHALMIC
Qty: 10 ML | Refills: 0 | Status: SHIPPED | OUTPATIENT
Start: 2019-03-08 | End: 2019-03-11

## 2019-03-11 ENCOUNTER — OFFICE VISIT (OUTPATIENT)
Dept: INTERNAL MEDICINE | Facility: CLINIC | Age: 29
End: 2019-03-11

## 2019-03-11 VITALS
RESPIRATION RATE: 16 BRPM | HEIGHT: 63 IN | DIASTOLIC BLOOD PRESSURE: 68 MMHG | WEIGHT: 180.6 LBS | BODY MASS INDEX: 32 KG/M2 | OXYGEN SATURATION: 98 % | HEART RATE: 94 BPM | TEMPERATURE: 98.1 F | SYSTOLIC BLOOD PRESSURE: 110 MMHG

## 2019-03-11 DIAGNOSIS — J06.9 ACUTE URI: Primary | ICD-10-CM

## 2019-03-11 DIAGNOSIS — J45.20 MILD INTERMITTENT ASTHMA WITHOUT COMPLICATION: ICD-10-CM

## 2019-03-11 LAB
EXPIRATION DATE: NORMAL
EXPIRATION DATE: NORMAL
FLUAV RNA RESP QL NAA+PROBE: NEGATIVE
FLUBV RNA RESP QL NAA+PROBE: NEGATIVE
INTERNAL CONTROL: NORMAL
INTERNAL CONTROL: NORMAL
Lab: NORMAL
Lab: NORMAL
S PYO RRNA THROAT QL PROBE: NEGATIVE

## 2019-03-11 PROCEDURE — 99213 OFFICE O/P EST LOW 20 MIN: CPT | Performed by: PHYSICIAN ASSISTANT

## 2019-03-11 PROCEDURE — 87651 STREP A DNA AMP PROBE: CPT | Performed by: PHYSICIAN ASSISTANT

## 2019-03-11 PROCEDURE — 87502 INFLUENZA DNA AMP PROBE: CPT | Performed by: PHYSICIAN ASSISTANT

## 2019-03-11 RX ORDER — AZITHROMYCIN 250 MG/1
TABLET, FILM COATED ORAL
Qty: 6 TABLET | Refills: 0 | Status: SHIPPED | OUTPATIENT
Start: 2019-03-11 | End: 2019-04-04

## 2019-03-11 RX ORDER — TIZANIDINE 4 MG/1
4 TABLET ORAL 2 TIMES DAILY PRN
Qty: 60 TABLET | Refills: 1 | OUTPATIENT
Start: 2019-03-11 | End: 2020-05-03

## 2019-03-11 RX ORDER — FLUCONAZOLE 150 MG/1
150 TABLET ORAL ONCE
Qty: 1 TABLET | Refills: 0 | Status: SHIPPED | OUTPATIENT
Start: 2019-03-11 | End: 2019-03-11

## 2019-03-11 NOTE — PROGRESS NOTES
Subjective     Chief Complaint   Patient presents with   • Sore Throat     x 2 days    • Fever     low grade x 2 days    • Generalized Body Aches     and chills        History of Present Illness     Marilu Godoy is a 28 y.o. female presenting with complaints of several days low grade fevers, fatigue, headaches, body aches, sore throat, congestion, cough. Concerned as she has a  at home and doesn't want to pass anything on to her. She's tried using claritin, tylenol, and her nasal spray. She denies any wheezing, CP, abdominal pain, nausea, vomiting, or diarrhea.     She is also requesting refill of breo inhaler. This was stopped during pregnancy.    The following portions of the patient's history were reviewed and updated as appropriate: current medications, allergies, PMH.    Review of Systems   Constitutional: Positive for chills, fatigue and fever. Negative for appetite change and unexpected weight change.   HENT: Positive for congestion, sinus pressure and sore throat. Negative for ear pain, hearing loss, nosebleeds, tinnitus and trouble swallowing.    Eyes: Negative for pain, discharge, redness, itching and visual disturbance.   Respiratory: Positive for cough. Negative for chest tightness, shortness of breath and wheezing.    Cardiovascular: Negative for chest pain, palpitations and leg swelling.   Gastrointestinal: Negative for abdominal pain, blood in stool, constipation, diarrhea, nausea and vomiting.   Endocrine: Negative for cold intolerance, heat intolerance, polydipsia, polyphagia and polyuria.   Genitourinary: Negative for decreased urine volume, dysuria, flank pain, frequency and hematuria.   Musculoskeletal: Negative for arthralgias, back pain, gait problem, joint swelling, myalgias, neck pain and neck stiffness.   Skin: Negative for color change and rash.   Allergic/Immunologic: Negative for environmental allergies, food allergies and immunocompromised state.   Neurological: Positive  "for headaches. Negative for dizziness, syncope, weakness and light-headedness.   Hematological: Negative for adenopathy. Does not bruise/bleed easily.   Psychiatric/Behavioral: Negative for dysphoric mood, sleep disturbance and suicidal ideas. The patient is not nervous/anxious.        Objective     Vitals:    03/11/19 0829   BP: 110/68   Pulse: 94   Resp: 16   Temp: 98.1 °F (36.7 °C)   TempSrc: Temporal   SpO2: 98%   Weight: 81.9 kg (180 lb 9.6 oz)   Height: 160 cm (62.99\")       Physical Exam   Constitutional: Appears well-developed and well-nourished.   Ears: TMs dull bilaterally.  Nose: Nose normal.   Mouth/Throat: OP erythema and edema, no exudates noted  Eyes: EOM are normal. Pupils are equal, round, and reactive to light.   Neck: Normal range of motion. Neck supple.   Cardiovascular: Normal rate, regular rhythm.  Pulmonary/Chest: Effort normal and breath sounds normal.   Musculoskeletal: Normal range of motion.   Lymphadenopathy: Cervical adenopathy noted.   Neurological: Alert and oriented to person, place, and time.   Skin: Skin is warm and dry.   Psychiatric: Exhibits a normal mood and affect.     Assessment/Plan     Diagnoses and all orders for this visit:    Acute URI  -     azithromycin (ZITHROMAX) 250 MG tablet; Take 2 tablets the first day, then 1 tablet daily for the remaining 4 days.  -     POCT Strep A, molecular  -     POCT Flu A&B, Molecular    Increase water intake, tylenol as needed, warm salt water gargles, OTC DM as needed for cough.    Mild intermittent asthma without complication  -     Fluticasone Furoate-Vilanterol (BREO ELLIPTA) 100-25 MCG/INH inhaler; Inhale 1 puff Daily.    Other orders  -     tiZANidine (ZANAFLEX) 4 MG tablet; Take 1 tablet by mouth 2 (Two) Times a Day As Needed for Muscle Spasms.      Jenniffer Berger PA-C  03/11/2019         Please note that portions of this note were completed with a voice recognition program. Efforts were made to edit dictation, but occasionally " words are mistranscribed.

## 2019-03-28 ENCOUNTER — APPOINTMENT (OUTPATIENT)
Dept: GENERAL RADIOLOGY | Facility: HOSPITAL | Age: 29
End: 2019-03-28

## 2019-03-28 ENCOUNTER — HOSPITAL ENCOUNTER (EMERGENCY)
Facility: HOSPITAL | Age: 29
Discharge: HOME | End: 2019-03-28
Attending: EMERGENCY MEDICINE | Admitting: EMERGENCY MEDICINE

## 2019-03-28 ENCOUNTER — TELEPHONE (OUTPATIENT)
Dept: CARDIOLOGY | Facility: CLINIC | Age: 29
End: 2019-03-28

## 2019-03-28 ENCOUNTER — APPOINTMENT (OUTPATIENT)
Dept: CT IMAGING | Facility: HOSPITAL | Age: 29
End: 2019-03-28

## 2019-03-28 VITALS
HEIGHT: 63 IN | DIASTOLIC BLOOD PRESSURE: 81 MMHG | OXYGEN SATURATION: 98 % | WEIGHT: 183.2 LBS | RESPIRATION RATE: 18 BRPM | BODY MASS INDEX: 32.46 KG/M2 | HEART RATE: 92 BPM | SYSTOLIC BLOOD PRESSURE: 122 MMHG | TEMPERATURE: 99 F

## 2019-03-28 DIAGNOSIS — R20.2 FACIAL PARESTHESIA: Primary | ICD-10-CM

## 2019-03-28 DIAGNOSIS — R20.0 NUMBNESS OF FACE: ICD-10-CM

## 2019-03-28 DIAGNOSIS — R00.2 PALPITATIONS: ICD-10-CM

## 2019-03-28 DIAGNOSIS — R07.9 CHEST PAIN, UNSPECIFIED TYPE: Primary | ICD-10-CM

## 2019-03-28 DIAGNOSIS — R07.9 CHEST PAIN, UNSPECIFIED TYPE: ICD-10-CM

## 2019-03-28 LAB
ALBUMIN SERPL-MCNC: 4.4 G/DL (ref 3.5–5)
ALBUMIN/GLOB SERPL: 1.3 G/DL (ref 1–2)
ALP SERPL-CCNC: 86 U/L (ref 38–126)
ALT SERPL W P-5'-P-CCNC: 18 U/L (ref 13–69)
ANION GAP SERPL CALCULATED.3IONS-SCNC: 11.5 MMOL/L (ref 10–20)
AST SERPL-CCNC: 18 U/L (ref 15–46)
BASOPHILS # BLD AUTO: 0.04 10*3/MM3 (ref 0–0.2)
BASOPHILS NFR BLD AUTO: 0.5 % (ref 0–1.5)
BILIRUB SERPL-MCNC: 0.4 MG/DL (ref 0.2–1.3)
BUN BLD-MCNC: 9 MG/DL (ref 7–20)
BUN/CREAT SERPL: 15 (ref 7.1–23.5)
CALCIUM SPEC-SCNC: 9.5 MG/DL (ref 8.4–10.2)
CHLORIDE SERPL-SCNC: 106 MMOL/L (ref 98–107)
CO2 SERPL-SCNC: 27 MMOL/L (ref 26–30)
CREAT BLD-MCNC: 0.6 MG/DL (ref 0.6–1.3)
DEPRECATED RDW RBC AUTO: 45.4 FL (ref 37–54)
EOSINOPHIL # BLD AUTO: 0.2 10*3/MM3 (ref 0–0.4)
EOSINOPHIL NFR BLD AUTO: 2.4 % (ref 0.3–6.2)
ERYTHROCYTE [DISTWIDTH] IN BLOOD BY AUTOMATED COUNT: 14 % (ref 12.3–15.4)
GFR SERPL CREATININE-BSD FRML MDRD: 119 ML/MIN/1.73
GLOBULIN UR ELPH-MCNC: 3.3 GM/DL
GLUCOSE BLD-MCNC: 76 MG/DL (ref 74–98)
HCT VFR BLD AUTO: 37.5 % (ref 34–46.6)
HGB BLD-MCNC: 12.4 G/DL (ref 12–15.9)
HOLD SPECIMEN: NORMAL
HOLD SPECIMEN: NORMAL
IMM GRANULOCYTES # BLD AUTO: 0.02 10*3/MM3 (ref 0–0.05)
IMM GRANULOCYTES NFR BLD AUTO: 0.2 % (ref 0–0.5)
LYMPHOCYTES # BLD AUTO: 2.18 10*3/MM3 (ref 0.7–3.1)
LYMPHOCYTES NFR BLD AUTO: 26 % (ref 19.6–45.3)
MCH RBC QN AUTO: 29.6 PG (ref 26.6–33)
MCHC RBC AUTO-ENTMCNC: 33.1 G/DL (ref 31.5–35.7)
MCV RBC AUTO: 89.5 FL (ref 79–97)
MONOCYTES # BLD AUTO: 0.48 10*3/MM3 (ref 0.1–0.9)
MONOCYTES NFR BLD AUTO: 5.7 % (ref 5–12)
NEUTROPHILS # BLD AUTO: 5.47 10*3/MM3 (ref 1.4–7)
NEUTROPHILS NFR BLD AUTO: 65.2 % (ref 42.7–76)
NRBC BLD AUTO-RTO: 0 /100 WBC (ref 0–0)
PLATELET # BLD AUTO: 304 10*3/MM3 (ref 140–450)
PMV BLD AUTO: 10 FL (ref 6–12)
POTASSIUM BLD-SCNC: 3.5 MMOL/L (ref 3.5–5.1)
PROT SERPL-MCNC: 7.7 G/DL (ref 6.3–8.2)
RBC # BLD AUTO: 4.19 10*6/MM3 (ref 3.77–5.28)
SODIUM BLD-SCNC: 141 MMOL/L (ref 137–145)
TROPONIN I SERPL-MCNC: <0.012 NG/ML (ref 0–0.03)
TROPONIN I SERPL-MCNC: <0.012 NG/ML (ref 0–0.03)
WBC NRBC COR # BLD: 8.39 10*3/MM3 (ref 3.4–10.8)
WHOLE BLOOD HOLD SPECIMEN: NORMAL
WHOLE BLOOD HOLD SPECIMEN: NORMAL

## 2019-03-28 PROCEDURE — 85025 COMPLETE CBC W/AUTO DIFF WBC: CPT | Performed by: PHYSICIAN ASSISTANT

## 2019-03-28 PROCEDURE — 93005 ELECTROCARDIOGRAM TRACING: CPT | Performed by: EMERGENCY MEDICINE

## 2019-03-28 PROCEDURE — 70450 CT HEAD/BRAIN W/O DYE: CPT

## 2019-03-28 PROCEDURE — 71046 X-RAY EXAM CHEST 2 VIEWS: CPT

## 2019-03-28 PROCEDURE — 93005 ELECTROCARDIOGRAM TRACING: CPT

## 2019-03-28 PROCEDURE — 99284 EMERGENCY DEPT VISIT MOD MDM: CPT

## 2019-03-28 PROCEDURE — 96374 THER/PROPH/DIAG INJ IV PUSH: CPT

## 2019-03-28 PROCEDURE — 80053 COMPREHEN METABOLIC PANEL: CPT | Performed by: PHYSICIAN ASSISTANT

## 2019-03-28 PROCEDURE — 25010000002 KETOROLAC TROMETHAMINE PER 15 MG: Performed by: PHYSICIAN ASSISTANT

## 2019-03-28 PROCEDURE — 84484 ASSAY OF TROPONIN QUANT: CPT | Performed by: PHYSICIAN ASSISTANT

## 2019-03-28 RX ORDER — SODIUM CHLORIDE 0.9 % (FLUSH) 0.9 %
10 SYRINGE (ML) INJECTION AS NEEDED
Status: DISCONTINUED | OUTPATIENT
Start: 2019-03-28 | End: 2019-03-28 | Stop reason: HOSPADM

## 2019-03-28 RX ORDER — KETOROLAC TROMETHAMINE 30 MG/ML
30 INJECTION, SOLUTION INTRAMUSCULAR; INTRAVENOUS ONCE
Status: COMPLETED | OUTPATIENT
Start: 2019-03-28 | End: 2019-03-28

## 2019-03-28 RX ADMIN — KETOROLAC TROMETHAMINE 30 MG: 30 INJECTION, SOLUTION INTRAMUSCULAR; INTRAVENOUS at 13:05

## 2019-03-28 RX ADMIN — SODIUM CHLORIDE 500 ML: 9 INJECTION, SOLUTION INTRAVENOUS at 13:05

## 2019-03-28 NOTE — TELEPHONE ENCOUNTER
That is fine. There is almost certainly nothing going on with her heart. Order an Echo and 30 day MCOT now and we will go over her results at her April 30 follow up appointment.

## 2019-03-28 NOTE — TELEPHONE ENCOUNTER
Regarding: Non-Urgent Medical Question  Contact: [phone]  ----- Message from Mychart, Generic sent at 3/28/2019  4:17 PM EDT -----    I was seen in the er today for chest pains and numbness and tingling in my face. They wanted to maybe see about wearing a holter monitor and a echocardiogram.

## 2019-04-04 ENCOUNTER — OFFICE VISIT (OUTPATIENT)
Dept: INTERNAL MEDICINE | Facility: CLINIC | Age: 29
End: 2019-04-04

## 2019-04-04 VITALS
HEIGHT: 63 IN | SYSTOLIC BLOOD PRESSURE: 120 MMHG | BODY MASS INDEX: 33.13 KG/M2 | TEMPERATURE: 99.5 F | DIASTOLIC BLOOD PRESSURE: 80 MMHG | HEART RATE: 82 BPM | RESPIRATION RATE: 16 BRPM | WEIGHT: 187 LBS | OXYGEN SATURATION: 100 %

## 2019-04-04 DIAGNOSIS — Z00.00 PREVENTATIVE HEALTH CARE: Primary | ICD-10-CM

## 2019-04-04 DIAGNOSIS — J06.9 ACUTE URI: ICD-10-CM

## 2019-04-04 DIAGNOSIS — R21 ATYPICAL EXANTHEM: ICD-10-CM

## 2019-04-04 DIAGNOSIS — R20.0 LEFT FACIAL NUMBNESS: ICD-10-CM

## 2019-04-04 LAB
EXPIRATION DATE: NORMAL
FLUAV RNA RESP QL NAA+PROBE: NEGATIVE
FLUBV RNA RESP QL NAA+PROBE: NEGATIVE
INTERNAL CONTROL: NORMAL
Lab: NORMAL

## 2019-04-04 PROCEDURE — 87502 INFLUENZA DNA AMP PROBE: CPT | Performed by: PHYSICIAN ASSISTANT

## 2019-04-04 PROCEDURE — 99214 OFFICE O/P EST MOD 30 MIN: CPT | Performed by: PHYSICIAN ASSISTANT

## 2019-04-04 NOTE — PROGRESS NOTES
Marilu Godoy is a 28 y.o. female.     Subjective   History of Present Illness   Here today with concern of numbness in the area of the left cheekbone and left temple intermittently for the last 2 weeks with increasing frequency.  She went to the ER one week ago where she had unremarkable head CT and was told to follow up with neurology.  She had some chest pain at the time of the ER visit but has had none since then.  She does have frequent left frontal headaches which is increased in the last month.  No increased nausea and no vomiting. No visual disturbances. No weakness, facial drooping, speech disturbance or confusion.       Today she is also concerned with 2-3 days of fatigue, congestion, cough, sore throat, headache, sinus pressure and few chills. No known fever. She has taken Tylenol prn for headache. She is taking Claritin and using flunisolide nasal spray daily.         The following portions of the patient's history were reviewed and updated as appropriate: allergies, current medications, past family history, past medical history, past social history, past surgical history and problem list.    Review of Systems   Constitutional: Positive for chills and fatigue. Negative for activity change, appetite change, diaphoresis, fever and unexpected weight change.   HENT: Positive for congestion, postnasal drip, rhinorrhea, sinus pressure and sore throat. Negative for dental problem, drooling, ear pain, hearing loss, mouth sores, sinus pain, sneezing and trouble swallowing.    Eyes: Negative for pain, itching and visual disturbance.   Respiratory: Positive for cough. Negative for apnea, choking, chest tightness, shortness of breath, wheezing and stridor.    Cardiovascular: Negative for chest pain, palpitations and leg swelling.   Gastrointestinal: Negative.    Endocrine: Negative.    Genitourinary: Negative.    Musculoskeletal: Negative.    Skin: Positive for rash.   Allergic/Immunologic: Negative.     Neurological: Positive for numbness and headaches. Negative for dizziness, tremors, seizures, syncope, facial asymmetry, speech difficulty, weakness and light-headedness.   Hematological: Negative for adenopathy. Does not bruise/bleed easily.   Psychiatric/Behavioral: Negative for agitation, behavioral problems, confusion, dysphoric mood, hallucinations and sleep disturbance. The patient is not nervous/anxious.          Objective    Physical Exam   Constitutional: She is oriented to person, place, and time. She appears well-developed and well-nourished. No distress.   HENT:   Head: Normocephalic and atraumatic.   Right Ear: External ear normal.   Left Ear: External ear normal.   Nose: Nose normal.   Mouth/Throat: Oropharynx is clear and moist. No oropharyngeal exudate.   Eyes: Conjunctivae and EOM are normal. Pupils are equal, round, and reactive to light. No scleral icterus.   Neck: Normal range of motion. Neck supple. No thyromegaly present.   Cardiovascular: Normal rate, regular rhythm and normal heart sounds. Exam reveals no gallop and no friction rub.   No murmur heard.  Pulmonary/Chest: Effort normal and breath sounds normal. No respiratory distress. She has no wheezes. She has no rales.   Abdominal: Soft. Bowel sounds are normal. There is no tenderness.   Musculoskeletal: Normal range of motion. She exhibits no edema, tenderness or deformity.   Lymphadenopathy:     She has no cervical adenopathy.   Neurological: She is alert and oriented to person, place, and time. She displays normal reflexes. A sensory deficit (decreased sensation in left temple and left cheek only) is present. No cranial nerve deficit. She exhibits normal muscle tone. Coordination normal.   Skin: Skin is warm and dry. Capillary refill takes less than 2 seconds. Rash (  Multiple erythematous lesions of the arms and legs with discoid appearance.  Slight malar has of cheeks.) noted. She is not diaphoretic.   Psychiatric: She has a normal  "mood and affect. Her behavior is normal. Judgment and thought content normal.   Nursing note and vitals reviewed.        /80   Pulse 82   Temp 99.5 °F (37.5 °C) (Temporal)   Resp 16   Ht 160 cm (62.99\")   Wt 84.8 kg (187 lb)   SpO2 100%   BMI 33.14 kg/m²     Nursing note and vitals reviewed.        Assessment/Plan   Marilu was seen today for numbness, fatigue, sore throat and nasal congestion.    Diagnoses and all orders for this visit:    Preventative health care  -     Lipid Panel    Left facial numbness  -     Ambulatory Referral to Neurology  -     Vitamin B12  -     TSH  -     T4, Free  -     KIERA Comprehensive Plus Profile  -     Lupus Anticoagulant Reflex    Atypical exanthem  -     KIERA Comprehensive Plus Profile  -     Lupus Anticoagulant Reflex  Heightened concern for SLE given discoid lesions of arms and legs and slight malar rash. She is seeing dermatology later this month.       Acute URI        -     POCT Flu A&B, Molecular - negative  Supportive care encouraged including increased fluids.       ER record reviewed.        "

## 2019-04-05 LAB
CENTROMERE B AB SER-ACNC: <0.2 AI (ref 0–0.9)
CHOLEST SERPL-MCNC: 217 MG/DL (ref 0–199)
CHROMATIN AB SERPL-ACNC: <0.2 AI (ref 0–0.9)
DSDNA AB SER-ACNC: <1 IU/ML (ref 0–9)
ENA JO1 AB SER-ACNC: <0.2 AI (ref 0–0.9)
ENA RNP AB SER-ACNC: <0.2 AI (ref 0–0.9)
ENA SCL70 AB SER-ACNC: <0.2 AI (ref 0–0.9)
ENA SM AB SER-ACNC: <0.2 AI (ref 0–0.9)
ENA SM+RNP AB SER-ACNC: <0.2 AI (ref 0–0.9)
ENA SS-A AB SER-ACNC: <0.2 AI (ref 0–0.9)
ENA SS-B AB SER-ACNC: <0.2 AI (ref 0–0.9)
HDLC SERPL-MCNC: 46 MG/DL (ref 40–60)
LDLC SERPL CALC-MCNC: 142 MG/DL (ref 0–99)
Lab: NORMAL
RIBOSOMAL P AB SER-ACNC: <0.2 AI (ref 0–0.9)
T4 FREE SERPL-MCNC: 0.87 NG/DL (ref 0.78–2.19)
TRIGL SERPL-MCNC: 144 MG/DL
TSH SERPL DL<=0.005 MIU/L-ACNC: 1.12 MIU/ML (ref 0.47–4.68)
VIT B12 SERPL-MCNC: 363 PG/ML (ref 239–931)
VLDLC SERPL CALC-MCNC: 28.8 MG/DL

## 2019-04-06 LAB
LA 2 SCREEN W REFLEX-IMP: NORMAL
SCREEN APTT: 30.6 SEC (ref 0–51.9)
SCREEN DRVVT: 32.5 SEC (ref 0–47)

## 2019-04-08 ENCOUNTER — OFFICE VISIT (OUTPATIENT)
Dept: INTERNAL MEDICINE | Facility: CLINIC | Age: 29
End: 2019-04-08

## 2019-04-08 VITALS
HEIGHT: 63 IN | BODY MASS INDEX: 32.96 KG/M2 | TEMPERATURE: 98.3 F | DIASTOLIC BLOOD PRESSURE: 86 MMHG | HEART RATE: 117 BPM | OXYGEN SATURATION: 99 % | SYSTOLIC BLOOD PRESSURE: 128 MMHG | WEIGHT: 186 LBS

## 2019-04-08 DIAGNOSIS — J02.9 SORE THROAT: Primary | ICD-10-CM

## 2019-04-08 LAB
EXPIRATION DATE: NORMAL
INTERNAL CONTROL: NORMAL
Lab: NORMAL
S PYO RRNA THROAT QL PROBE: NEGATIVE

## 2019-04-08 PROCEDURE — 99213 OFFICE O/P EST LOW 20 MIN: CPT | Performed by: PHYSICIAN ASSISTANT

## 2019-04-08 PROCEDURE — 87651 STREP A DNA AMP PROBE: CPT | Performed by: PHYSICIAN ASSISTANT

## 2019-04-08 RX ORDER — AZITHROMYCIN 250 MG/1
TABLET, FILM COATED ORAL
Qty: 6 TABLET | Refills: 0 | Status: SHIPPED | OUTPATIENT
Start: 2019-04-08 | End: 2019-04-16

## 2019-04-08 RX ORDER — ATORVASTATIN CALCIUM 80 MG/1
80 TABLET, FILM COATED ORAL DAILY
Refills: 3 | COMMUNITY
Start: 2019-03-31 | End: 2019-07-08

## 2019-04-08 RX ORDER — DULOXETIN HYDROCHLORIDE 60 MG/1
60 CAPSULE, DELAYED RELEASE ORAL DAILY
Refills: 4 | COMMUNITY
Start: 2019-03-18 | End: 2020-05-03

## 2019-04-08 NOTE — PROGRESS NOTES
Marilu Godoy is a 28 y.o. female.     Subjective   History of Present Illness   Here today with concern of around 5 days of productive cough, low-grade fever, sore throat, nasal congestion, headache, little SOA and chest tightness.  No wheezing.  She reportedly has been coughing up dark green sputum which may be blood tinged.  She is not taking anything for symptoms.        The following portions of the patient's history were reviewed and updated as appropriate: allergies, current medications, past family history, past medical history, past social history, past surgical history and problem list.    Review of Systems   Constitutional: Positive for fever. Negative for chills and fatigue.   HENT: Positive for congestion, postnasal drip, rhinorrhea, sinus pressure, sinus pain and sore throat. Negative for ear pain.    Eyes: Negative for pain.   Respiratory: Positive for cough, chest tightness and shortness of breath. Negative for wheezing.    Cardiovascular: Negative for chest pain and palpitations.   Gastrointestinal: Negative for abdominal distention, anal bleeding, blood in stool, diarrhea, rectal pain and vomiting.   Musculoskeletal: Negative for arthralgias, back pain and myalgias.   Neurological: Positive for headaches. Negative for dizziness and light-headedness.   Hematological: Negative for adenopathy. Does not bruise/bleed easily.         Objective    Physical Exam   Constitutional: She is oriented to person, place, and time. She appears well-developed and well-nourished. No distress.   HENT:   Head: Normocephalic and atraumatic.   Right Ear: External ear normal.   Left Ear: External ear normal.   Mouth/Throat: No oropharyngeal exudate.   Erythematous oropharynx without petechiae or exudate.   Eyes: Conjunctivae and EOM are normal. Pupils are equal, round, and reactive to light. Right eye exhibits no discharge. Left eye exhibits no discharge.   Neck: Normal range of motion. Neck supple. No thyromegaly  "present.   Cardiovascular: Normal rate, regular rhythm and normal heart sounds. Exam reveals no gallop and no friction rub.   No murmur heard.  Pulmonary/Chest: Effort normal and breath sounds normal. No stridor. No respiratory distress. She has no wheezes. She has no rales. She exhibits no tenderness.   Abdominal: She exhibits no distension and no mass. There is no tenderness. There is no rebound.   Lymphadenopathy:     She has cervical adenopathy ( Anterior).   Neurological: She is alert and oriented to person, place, and time. No cranial nerve deficit.   Skin: Skin is warm and dry. No rash noted. She is not diaphoretic.   Psychiatric: She has a normal mood and affect. Her behavior is normal. Judgment and thought content normal.   Nursing note and vitals reviewed.        /86   Pulse 117   Temp 98.3 °F (36.8 °C)   Ht 160 cm (62.99\")   Wt 84.4 kg (186 lb)   SpO2 99%   BMI 32.96 kg/m²     Nursing note and vitals reviewed.          Assessment/Plan   Marilu was seen today for uri.    Diagnoses and all orders for this visit:    Sore throat  -     POCT Strep A, molecular - negative  -     azithromycin (ZITHROMAX Z-MILLY) 250 MG tablet; Take 2 tablets the first day, then 1 tablet daily for 4 days.      Increase PO fluid intake. Ibuprofen and/or Tylenol prn for pain and fever control.       Call or RTC if symptoms worsen or persist.              "

## 2019-04-09 ENCOUNTER — OFFICE VISIT (OUTPATIENT)
Dept: NEUROLOGY | Facility: CLINIC | Age: 29
End: 2019-04-09

## 2019-04-09 VITALS
HEIGHT: 63 IN | SYSTOLIC BLOOD PRESSURE: 124 MMHG | BODY MASS INDEX: 32.95 KG/M2 | DIASTOLIC BLOOD PRESSURE: 76 MMHG | HEART RATE: 91 BPM | OXYGEN SATURATION: 99 %

## 2019-04-09 DIAGNOSIS — G35 MS (MULTIPLE SCLEROSIS) (HCC): Primary | ICD-10-CM

## 2019-04-09 PROCEDURE — 99244 OFF/OP CNSLTJ NEW/EST MOD 40: CPT | Performed by: PSYCHIATRY & NEUROLOGY

## 2019-04-09 NOTE — PROGRESS NOTES
[facility] NEUROLOGY [place] CONSULTATION   History of Present Illness     Date: 4/9/2019    Patient Identification  Marilu Godoy is a 28 y.o. female.    Patient information was obtained from patient.  History/Exam limitations: none.    CONSULTATION requested by: Mirlande Ordaz, *      Chief Complaint   Numbness (NP, in office today with c/o facial numbness, tingling in arms,hands,legs and HA x2 months)      History of Present Illness   Patient is a pleasant 28-year-old referred to [facility] neurology [facility] for evaluation of left facial numbness, right lower extremity numbness, tingling and numbness sensation in her left arm.  Patient reported that her symptoms has been going on in the last 2 months.  Patient denies any visual loss or any pain on extraocular movement .  Patient denies any diplopia dysarthria or dysphagia .  Patient denies any bowel or bladder incontinence .  Patient also reports to have persistent headache in her left side .  She described the headache is throbbing in nature associated with photophobia but not phonophobia associated with nausea but not vomiting .  she had a CT scan of the head in March 28, 2019.  And it was reportedly normal .  We will proceed with MRI of the brain with and without contrast to further evaluate for possible demyelinating disease    PMH:   Past Medical History:   Diagnosis Date   • Abdominal pain    • Abnormal Pap smear of cervix    • Anxiety    • Asthma 2015   • Bleeding disorder (CMS/[facility]) 11-13-16   • Body piercing     TONGUE, NOSE, TWO IN EACH EAR   • Chest pain    • Clotting disorder (CMS/[facility])     factor 5   • Constipation    • Coronary artery disease involving native coronary artery of native heart without angina pectoris 6/16/2017   • Deep vein thrombosis (CMS/[facility]) 11-13-16   • Diarrhea    • Diarrhea    • Endometriosis    • Factor 5 Leiden mutation, heterozygous (CMS/HCC)    • Fibroid    • Gallstone    • GERD (gastroesophageal reflux  disease)    • H/O blood clots    • Headache    • Hiatal hernia    • High cholesterol    • History of recurrent UTIs    • HPV (human papilloma virus) infection    • Hypertension    • Kidney infection    • Migraine    • Nausea    • Nausea & vomiting    • Obesity    • Orthostatic hypotension    • Osteoarthritis    • Ovarian cyst    • Pollen allergies    • Protein S deficiency (CMS/HCC) 2016    labs from hospitalization for PE   • Pulmonary embolism (CMS/HCC)    • Recurrent pregnancy loss, antepartum condition or complication    • Sleep apnea     CPAP ASKED TO BRING DOS   • Subclinical hyperthyroidism    • Tachycardia    • Tattoo     LOWER BACK   • Varicella        Past Surgical History:   Past Surgical History:   Procedure Laterality Date   •  SECTION      X2  and    •  SECTION WITH TUBAL N/A 1/15/2019    Procedure:  SECTION REPEAT WITH TUBAL;  Surgeon: Alva Byoer MD;  Location: [facility] LABOR DELIVERY;  Service: Obstetrics/Gynecology   • CHOLECYSTECTOMY     • COLONOSCOPY N/A 2017    Procedure: COLONOSCOPY WITH BIOPSIES AND ARGON THERMAL ABLATION;  Surgeon: Jignesh Selby MD;  Location: [facility] ENDOSCOPY;  Service:    • DIAGNOSTIC LAPAROSCOPY N/A 2018    Procedure: DIAGNOSTIC LAPAROSCOPY AND ABLATION OF ENDOMETRIOSIS;  Surgeon: Evin Zamudio MD;  Location: [facility] OR;  Service: Obstetrics/Gynecology   • ENDOMETRIAL ABLATION     • ENDOSCOPY N/A 2017    Procedure: ESOPHAGOGASTRODUODENOSCOPY WITH BIOPSIES AND COLD BIOPSY POLYPECTOMIES;  Surgeon: Jignesh Selby MD;  Location: [facility] ENDOSCOPY;  Service:    • PELVIC LAPAROSCOPY         Family Hisotry:   Family History   Problem Relation Age of Onset   • Arthritis Mother    • COPD Mother    • Asthma Mother    • Thyroid disease Mother    • Arthritis Father    • Diabetes Father    • Hypertension Father    • Hyperlipidemia Father    • Kidney disease Father    • Heart attack Father    • Coronary artery disease  Father    • Dementia Father    • No Known Problems Son    • Colon cancer Neg Hx    • Liver cancer Neg Hx    • Liver disease Neg Hx    • Stomach cancer Neg Hx    • Esophageal cancer Neg Hx        Social History:   Social History     Socioeconomic History   • Marital status:      Spouse name: Not on file   • Number of children: Not on file   • Years of education: Not on file   • Highest education level: Not on file   Tobacco Use   • Smoking status: Never Smoker   • Smokeless tobacco: Never Used   Substance and Sexual Activity   • Alcohol use: No   • Drug use: No   • Sexual activity: Yes     Partners: Male     Birth control/protection: None     Comment: PE while on birth control patch       Medications:   Current Outpatient Medications   Medication Sig Dispense Refill   • albuterol (PROVENTIL HFA;VENTOLIN HFA) 108 (90 Base) MCG/ACT inhaler Inhale 2 puffs 4 (Four) Times a Day. 1 inhaler 0   • apixaban (ELIQUIS) 5 MG tablet tablet Take 1 tablet by mouth Every 12 (Twelve) Hours. 60 tablet 5   • atorvastatin (LIPITOR) 80 MG tablet Take 80 mg by mouth Daily. 200001  3   • azithromycin (ZITHROMAX Z-MILLY) 250 MG tablet Take 2 tablets the first day, then 1 tablet daily for 4 days. 6 tablet 0   • DULoxetine (CYMBALTA) 60 MG capsule Take 60 mg by mouth Daily.  4   • flunisolide (NASALIDE) 25 MCG/ACT (0.025%) solution nasal spray Inhale 2 sprays Every 12 (Twelve) Hours. 1 bottle 3   • Fluticasone Furoate-Vilanterol (BREO ELLIPTA) 100-25 MCG/INH inhaler Inhale 1 puff Daily. 1 each 5   • loratadine (CLARITIN) 10 MG tablet Take 1 tablet by mouth Daily. 30 tablet 11   • mupirocin (BACTROBAN) 2 % ointment Apply  topically to the appropriate area as directed 3 (Three) Times a Day. 30 g 0   • promethazine (PHENERGAN) 12.5 MG tablet Take 1-2 tablets by mouth every 8 hours as needed for nausea and vomiting. 40 tablet 0   • raNITIdine (ZANTAC) 150 MG tablet Take 1 tablet by mouth 2 (Two) Times a Day. 60 tablet 5   • tiZANidine  "(ZANAFLEX) 4 MG tablet Take 1 tablet by mouth 2 (Two) Times a Day As Needed for Muscle Spasms. 60 tablet 1     No current facility-administered medications for this visit.        Allergy:   Allergies   Allergen Reactions   • Ceftin [Cefuroxime Axetil] Other (See Comments)     Numbness in mouth and throat   • Amoxicillin Rash       Review of Systems:  Review of Systems   Constitutional: Negative for chills and fever.   HENT: Negative for congestion, ear pain, hearing loss, rhinorrhea and sore throat.    Eyes: Negative for pain, discharge and redness.   Respiratory: Negative for cough, shortness of breath, wheezing and stridor.    Cardiovascular: Negative for chest pain, palpitations and leg swelling.   Gastrointestinal: Negative for abdominal pain, constipation, nausea and vomiting.   Endocrine: Negative for cold intolerance, heat intolerance and polyphagia.   Genitourinary: Negative for dysuria, flank pain, frequency and urgency.   Musculoskeletal: Negative for joint swelling, myalgias, neck pain and neck stiffness.   Skin: Negative for pallor, rash and wound.   Allergic/Immunologic: Negative for environmental allergies.   Neurological: Positive for weakness, numbness and headaches. Negative for dizziness, tremors, seizures, syncope, facial asymmetry, speech difficulty and light-headedness.   Hematological: Negative for adenopathy.   Psychiatric/Behavioral: Negative for confusion and hallucinations. The patient is not nervous/anxious.        Physical Exam     Vitals:    04/09/19 1355   BP: 124/76   Pulse: 91   SpO2: 99%   Height: 160 cm (63\")     GENERAL: Patient is pleasant, cooperative, appears to be stated age.  Body habitus is endomorphic.  SKIN AND EXTREMITIES:  No skin rashes or lesions are noted.  No cyanosis, clubbing or edema of the extremities.    HEAD:  Head is normocephalic and atraumatic.    NECK: Nontender without thyromegaly or adenopathy.  Carotid upstrokes are 1+/4.  No cranial or cervical bruits.  " The neck is supple with a full range of motion.   ENT: Palate elevates symmetrically.  No evidence of high arch palate.  Tongue midline.  No erythema in posterior pharynx.  Mallampati Classification Class III   CARDIOVASCULAR:  Regular rate and rhythm with normal S1 and S2 without rub or gallop.  RESPIRATORY:  Clear to auscultation without wheezes or crackle   ABDOMEN:  Soft and nontender, positive bowel sound without hepatosplenomegaly  BACK:  Back is straight without midline defect.    PSYCH:  Higher cortical function/mental status:  The patient is alert.  The patient is oriented x3 to time, place and person.  Recent and the remote memory appear normal.  The patient has a good fund of knowledge.  There is no visual or auditory hallucination or suicidal or homicidal ideation.  SPEECH:There is no gross evidence of aphasia, dysarthria or agnosia.      CRANIAL NERVES:  Pupils are 4mm, equal round reactive to light, reacting briskly to 2mm without afferent pupillary defect.  Visual fields are intact to confrontation testing.  Funduscopic examination reveals sharp disc margins with normal vasculature.  No papilledema, hemorrhages or exudates.  Extraocular movements are full and smooth with normal pursuits and saccades.  No nystagmus noted.  The face is symmetric. Palate elevates symmetrically, Tongue midline, positive gag reflex. The remainder of the cranial nerves are intact and symmetrical.    MOTOR: Strength is 5/5 throughout with normal tone and bulk with the following exceptions, 4/5 intrinsic muscles of the hands and feet.  No involuntary movements noted.    Deep Tendon Reflexes: are 2/4 and symmetrical in the upper extremities, 2/4 and symmetrical at the knees and 1/4 and symmetrical at the Achilles tendon.  Plantar responses were down-going bilaterally.    SENSATION: Decreased light touch sensation in the left face and in the right lower extremities  COORDINATION AND GAIT:  The patient walks with a narrow-based  gait.  Patient is able to heel-toe and tandem walk forward and backwards without difficulty.  Romberg and monopedal  Romberg are negative.    MUSCULOSKELETAL: Range of motion normal, no clubbing, cyanosis, or edema.  No joint swelling.            Review of Imaging: I have personally reviewed the following images and discussed with the patient.  CT scan of head is totally unremarkable    Records Reviewed: I have personally reviewed her previous medical record.    Marilu was seen today for numbness.    Diagnoses and all orders for this visit:    MS (multiple sclerosis) (CMS/[facility])  -     MRI Brain With & Without Contrast; Future      Discussion:  In summary, 28-year-old referred to [facility] neurology [facility] for evaluation of left facial numbness, right lower extremity numbness, tingling and numbness sensation in her left arm.  Patient reported that her symptoms has been going on in the last 2 months.  Patient denies any visual loss or any pain on extraocular movement .  Patient denies any diplopia dysarthria or dysphagia .  Patient denies any bowel or bladder incontinence .  Patient also reports to have persistent headache in her left side .  She described the headache is throbbing in nature associated with photophobia but not phonophobia associated with nausea but not vomiting .  she had a CT scan of the head in March 28, 2019.  And it was reportedly normal .  We will proceed with MRI of the brain with and without contrast to further evaluate for possible demyelinating disease.    I have counseled patients extensively about Multiple Sclerosis.  Multiple Sclerosis is quite common approximately 400,000 patients in the [place].  Women are 3 times more common than male.  Individual with a first-degree relative with MS have a 5-10 fold increase risk for developing the disease.  While monozygotic twin have a 25% concordance rate for MS compared to 0.1% risk in the general population.  However, environmental risk factors,  especially before age 15, including tobacco exposure , infectious agents (EBV) and greater distance from the equator (may be due to decreased light exposure or vitamin D concentration) have been implicated.    I have also discussed at length about the diagnosis of multiple sclerosis requires detail clinical history and examination demonstrating neurologic symptoms persisted for at least 24 hours (such as cerebellar symptoms, Ophthalmologic findings, sensory motor and neurocognitive involvement or spinal cord symptoms) dissemination in space and time.  Magnetic resonance imaging, spinal fluid studies, and neuroelectrophysiology studies are useful tools in conjunction with clinical history in diagnosing and managing patient with MS.  I have also explained to the patient the four clinical phenotypes of multiple sclerosis:  1. Clinically isolated syndrome  2. Relapsing Remitting multiple sclerosis (RR)  3. Primary progressive disease  4. Progressive after initial relapsing course which occur in 50% of RR patient after 10-15 years (90% in 25 years)  Histopathologic process that leads to MS plaques including inflammation, myelin breakdown, astrogliosis oligodendrocyte injury, remyelination, axonal loss, and neuro degeneration.      Patient with radiologically isolated syndrome (RIS) has a 34% risk of developing first clinical event over 5 years and nearly 80% by 15 years.  Factors that increase that risk include:   1. Young age (<37 years old)  2. Male gender  3. Gadolinium enhancement  4. Spinal cord lesion (increase to 55% risk of developing first clinical event in 5 years)  5. Presence of oligoclonal band in children  The highest risk group (men, <37 years old with enhancing spinal cord lesion) has a 90% risk of developing first clinical event by 3 years  The management of multiple sclerosis (MS) has been substantially advanced by the availability of the disease-modifying treatments (DMT):   1. Injectable agents:  including,  glatiramer acetate and interferon beta 1a and 1b. We have discussed their route of administration, Clinical Outcome measures (COM) with annual relapse rate  (ARR) reduced by 30% and their MRI Outcome Measure (MOM) of 33-83% reduction in contrast enhanced lesions (TEX) and their adverse effect and monitoring parameters  2. Oral agents: Including Tecfidera, Gilenya and Aubagio.  We have discussed each of their COM and annual relapse rate (ARR) by 50% and their MOM showed an 80% reduction in TEX  3. Infusion treatments: Including   a. Tysabri : Monthly infusion with a 70% reduction in ARR and a 90% reduction in TEX.  Also discussed the risk for PML and the importance of compliance and REMS monitoring  b. Ocrevus: Every 6 month infusion with a 46% reduction in ARR and 94% reduction in TEX.  Also discussed the risk for infusion reaction and reactivation of hepatitis B.  c. Lemtrade: IV infusion daily for 5 days followed by daily infusion for 3 more days 1 year after initial treatment.  With a 50% reduction in ARR and 63% reduction in TEX.  We have also discussed the risk for thyroid disease ITP Anti GBM and infusion reaction.  With discussed the importance of REMS monitoring  I have also stress that it is non-scientifically valid to compared therapeutic across trials given difference is inpatient populations, diagnostic criteria and other variables.  I have also counseled patient extensively on lifestyle/nonpharmacologic and pharmacologic approaches in the management of MS symptoms including physical therapy, nutritional supplement, Botox injection for spasticity Ditropan and Detrol to increase bladder capacity and voiding control, amantadine and modafinil for fatigue and SSRI for depression and TCA and antiepileptic drug for pain management.    I do appreciate your kind referral and allowing me to participate in the care of this challenging and interesting patient.  If you have any question please feel free  to contact me.        This Document is signed by Luther Velez MD, FAAN, FAASM April 9, 20198:59 PM

## 2019-04-16 ENCOUNTER — OFFICE VISIT (OUTPATIENT)
Dept: INTERNAL MEDICINE | Facility: CLINIC | Age: 29
End: 2019-04-16

## 2019-04-16 VITALS
WEIGHT: 187 LBS | BODY MASS INDEX: 33.13 KG/M2 | OXYGEN SATURATION: 98 % | HEIGHT: 63 IN | SYSTOLIC BLOOD PRESSURE: 118 MMHG | DIASTOLIC BLOOD PRESSURE: 80 MMHG | TEMPERATURE: 99.2 F | HEART RATE: 82 BPM

## 2019-04-16 DIAGNOSIS — J02.9 SORE THROAT: Primary | ICD-10-CM

## 2019-04-16 PROBLEM — M54.50 CHRONIC LOW BACK PAIN: Status: ACTIVE | Noted: 2019-02-21

## 2019-04-16 PROBLEM — M47.816 LUMBAR SPONDYLOSIS: Status: ACTIVE | Noted: 2019-04-08

## 2019-04-16 PROBLEM — G89.29 CHRONIC LOW BACK PAIN: Status: ACTIVE | Noted: 2019-02-21

## 2019-04-16 PROBLEM — M46.1 INFLAMMATION OF BOTH SACROILIAC JOINTS (HCC): Status: ACTIVE | Noted: 2019-04-08

## 2019-04-16 PROBLEM — G89.4 CHRONIC PAIN DISORDER: Status: ACTIVE | Noted: 2019-04-08

## 2019-04-16 PROCEDURE — 99213 OFFICE O/P EST LOW 20 MIN: CPT | Performed by: FAMILY MEDICINE

## 2019-04-16 RX ORDER — DOXYCYCLINE HYCLATE 100 MG/1
100 CAPSULE ORAL 2 TIMES DAILY
Qty: 14 CAPSULE | Refills: 0 | Status: SHIPPED | OUTPATIENT
Start: 2019-04-16 | End: 2019-04-23

## 2019-04-22 ENCOUNTER — HOSPITAL ENCOUNTER (OUTPATIENT)
Dept: MRI IMAGING | Facility: HOSPITAL | Age: 29
Discharge: HOME | End: 2019-04-22
Admitting: PSYCHIATRY & NEUROLOGY

## 2019-04-22 ENCOUNTER — OFFICE VISIT (OUTPATIENT)
Dept: INTERNAL MEDICINE | Facility: CLINIC | Age: 29
End: 2019-04-22

## 2019-04-22 VITALS
OXYGEN SATURATION: 100 % | HEART RATE: 88 BPM | BODY MASS INDEX: 33.49 KG/M2 | SYSTOLIC BLOOD PRESSURE: 131 MMHG | HEIGHT: 63 IN | WEIGHT: 189 LBS | TEMPERATURE: 98.4 F | RESPIRATION RATE: 15 BRPM | DIASTOLIC BLOOD PRESSURE: 72 MMHG

## 2019-04-22 DIAGNOSIS — J02.9 ACUTE PHARYNGITIS, UNSPECIFIED ETIOLOGY: Primary | ICD-10-CM

## 2019-04-22 DIAGNOSIS — R05.9 COUGH: ICD-10-CM

## 2019-04-22 DIAGNOSIS — J30.9 ALLERGIC RHINITIS, UNSPECIFIED SEASONALITY, UNSPECIFIED TRIGGER: ICD-10-CM

## 2019-04-22 DIAGNOSIS — G35 MS (MULTIPLE SCLEROSIS) (HCC): ICD-10-CM

## 2019-04-22 PROCEDURE — 70553 MRI BRAIN STEM W/O & W/DYE: CPT

## 2019-04-22 PROCEDURE — A9577 INJ MULTIHANCE: HCPCS | Performed by: PSYCHIATRY & NEUROLOGY

## 2019-04-22 PROCEDURE — 99213 OFFICE O/P EST LOW 20 MIN: CPT | Performed by: NURSE PRACTITIONER

## 2019-04-22 PROCEDURE — 0 GADOBENATE DIMEGLUMINE 529 MG/ML SOLUTION: Performed by: PSYCHIATRY & NEUROLOGY

## 2019-04-22 RX ORDER — DOXYCYCLINE 100 MG/1
TABLET ORAL
Refills: 0 | COMMUNITY
Start: 2019-04-16 | End: 2019-04-22

## 2019-04-22 RX ORDER — FLUTICASONE PROPIONATE 50 MCG
2 SPRAY, SUSPENSION (ML) NASAL DAILY
Qty: 18.2 ML | Refills: 1 | Status: SHIPPED | OUTPATIENT
Start: 2019-04-22 | End: 2019-05-30 | Stop reason: SDUPTHER

## 2019-04-22 RX ADMIN — GADOBENATE DIMEGLUMINE 15 ML: 529 INJECTION, SOLUTION INTRAVENOUS at 10:53

## 2019-04-22 NOTE — PROGRESS NOTES
Chief Complaint / Reason:      Chief Complaint   Patient presents with   • Cough     green sputum production, fatigue.       Subjective     HPI  Patient presents today with complaints of cough and sputum production.  She states that she has been having a lot of fatigue.  She is currently wearing a cardiac monitor divorce and left subclavian.  She denies chest pain, shortness of breath or heart palpitations.  Patient does see Dr. delgado.  Patient states that she is scheduled for MRI of head for further evaluation of multiple sclerosis which was ordered per Dr. Velez.  Vital signs are stable with exception of slightly elevated blood pressure.  She states that she recently saw Dr. Barker on 4/16/2019 and was prescribed doxycycline for sore throat that have been going on for over a week.  She had been on azithromycin prior to that.  History taken from: patient    PMH/FH/Social History were reviewed and updated appropriately in the electronic medical record.     Review of Systems:   Review of Systems   Constitutional: Positive for fatigue. Negative for chills and fever.   HENT: Positive for congestion, postnasal drip, sinus pressure and sore throat. Negative for ear pain, rhinorrhea and sinus pain.    Eyes: Negative for pain.   Respiratory: Positive for cough. Negative for chest tightness, shortness of breath and wheezing.    Cardiovascular: Negative for chest pain and palpitations.   Gastrointestinal: Negative for abdominal distention, anal bleeding, blood in stool, diarrhea, rectal pain and vomiting.   Musculoskeletal: Negative for arthralgias, back pain and myalgias.   Neurological: Positive for headaches. Negative for dizziness and light-headedness.   Hematological: Negative for adenopathy. Does not bruise/bleed easily.   Psychiatric/Behavioral: Positive for sleep disturbance.     All other systems were reviewed and are negative.  Exceptions are noted in the subjective or above.      Objective     Vital  Signs  Vitals:    04/22/19 0810   BP: 131/72   Pulse: 88   Resp: 15   Temp: 98.4 °F (36.9 °C)   SpO2: 100%       Body mass index is 33.48 kg/m².    Physical Exam   Constitutional: She is oriented to person, place, and time. She appears well-developed and well-nourished.   HENT:   Right Ear: Tympanic membrane is erythematous. Tympanic membrane is not bulging. No middle ear effusion.   Left Ear: Tympanic membrane is erythematous. Tympanic membrane is not bulging.  No middle ear effusion.   Nose: Nose normal.   Mouth/Throat: Posterior oropharyngeal erythema present.   Eyes: EOM are normal. Pupils are equal, round, and reactive to light.   Neck: Normal range of motion. Neck supple.   Cardiovascular: Normal rate, regular rhythm, normal heart sounds and intact distal pulses.   Pulmonary/Chest: Effort normal and breath sounds normal. She exhibits tenderness.   Abdominal: Soft. Bowel sounds are normal.   Musculoskeletal: Normal range of motion.   Lymphadenopathy:     She has no cervical adenopathy.   Neurological: She is alert and oriented to person, place, and time.   Skin: Skin is warm and dry.   Psychiatric: She has a normal mood and affect.        Results Review:    I reviewed the patient's previous clinical results.       Medication Review:     Current Outpatient Medications:   •  albuterol (PROVENTIL HFA;VENTOLIN HFA) 108 (90 Base) MCG/ACT inhaler, Inhale 2 puffs 4 (Four) Times a Day., Disp: 1 inhaler, Rfl: 0  •  apixaban (ELIQUIS) 5 MG tablet tablet, Take 1 tablet by mouth Every 12 (Twelve) Hours., Disp: 60 tablet, Rfl: 5  •  atorvastatin (LIPITOR) 80 MG tablet, Take 80 mg by mouth Daily. 200001, Disp: , Rfl: 3  •  doxycycline (VIBRAMYCIN) 100 MG capsule, Take 1 capsule by mouth 2 (Two) Times a Day for 7 days., Disp: 14 capsule, Rfl: 0  •  DULoxetine (CYMBALTA) 60 MG capsule, Take 60 mg by mouth Daily., Disp: , Rfl: 4  •  Fluticasone Furoate-Vilanterol (BREO ELLIPTA) 100-25 MCG/INH inhaler, Inhale 1 puff Daily.,  Disp: 1 each, Rfl: 5  •  loratadine (CLARITIN) 10 MG tablet, Take 1 tablet by mouth Daily., Disp: 30 tablet, Rfl: 11  •  raNITIdine (ZANTAC) 150 MG tablet, Take 1 tablet by mouth 2 (Two) Times a Day., Disp: 60 tablet, Rfl: 5  •  tiZANidine (ZANAFLEX) 4 MG tablet, Take 1 tablet by mouth 2 (Two) Times a Day As Needed for Muscle Spasms., Disp: 60 tablet, Rfl: 1  •  fluticasone (FLONASE) 50 MCG/ACT nasal spray, 2 sprays into the nostril(s) as directed by provider Daily., Disp: 18.2 mL, Rfl: 1  No current facility-administered medications for this visit.     Assessment/Plan   Marilu was seen today for cough.    Diagnoses and all orders for this visit:    Acute pharyngitis, unspecified etiology  Recommend patient continue taking antibiotics as prescribed by Dr. Barker.  Recommend she do salt water gargles and recommend good oral hygiene.  Cough  Recommend patient treat symptomatically keep mouth moisten and suck on cough drops.  Recommend coughing and deep breathing to prevent worsening signs and symptoms.  Allergic rhinitis, unspecified seasonality, unspecified trigger  -     fluticasone (FLONASE) 50 MCG/ACT nasal spray; 2 sprays into the nostril(s) as directed by provider Daily.        Return if symptoms worsen or fail to improve.    Barby Garcia, APRN  04/22/2019

## 2019-04-29 RX ORDER — ATENOLOL 25 MG/1
TABLET ORAL
Qty: 30 TABLET | Refills: 10 | OUTPATIENT
Start: 2019-04-29

## 2019-04-30 ENCOUNTER — OFFICE VISIT (OUTPATIENT)
Dept: CARDIOLOGY | Facility: CLINIC | Age: 29
End: 2019-04-30

## 2019-04-30 VITALS
SYSTOLIC BLOOD PRESSURE: 116 MMHG | OXYGEN SATURATION: 98 % | HEART RATE: 110 BPM | WEIGHT: 186 LBS | DIASTOLIC BLOOD PRESSURE: 80 MMHG | BODY MASS INDEX: 32.96 KG/M2 | HEIGHT: 63 IN

## 2019-04-30 DIAGNOSIS — I47.1 PSVT (PAROXYSMAL SUPRAVENTRICULAR TACHYCARDIA) (HCC): ICD-10-CM

## 2019-04-30 DIAGNOSIS — R00.2 PALPITATIONS: Primary | ICD-10-CM

## 2019-04-30 PROCEDURE — 99214 OFFICE O/P EST MOD 30 MIN: CPT | Performed by: NURSE PRACTITIONER

## 2019-04-30 RX ORDER — APIXABAN 2.5 MG/1
TABLET, FILM COATED ORAL
Qty: 60 TABLET | Refills: 4 | OUTPATIENT
Start: 2019-04-30

## 2019-04-30 RX ORDER — ATENOLOL 25 MG/1
TABLET ORAL
Qty: 30 TABLET | Refills: 10 | OUTPATIENT
Start: 2019-04-30

## 2019-04-30 NOTE — PROGRESS NOTES
"Marilu Godoy is a 28 y.o. female.  MRN #: [id number]    Referring Provider: Deidre Barker MD      Chief Complaint:   Chief Complaint   Patient presents with   • Follow-up     echo         History of Present Illness:  Patient is a 28-year-old female but presents in follow-up for results of 30-day Holter monitor and transthoracic echocardiogram for complaints of palpitations.  Final Holter monitor reading has not been completed, today is her last day of wearing her Holter monitor.  Patient has had symptoms of palpitations associated with feelings of lightheadedness for the past 2 years.  She had been treated with 2 beta-blockers with no improvement in symptoms.  She states that 1 of the beta-blockers actually made her heart rate dropped too low.  She was then scheduled a follow-up appointment with electrophysiologist, she then got pregnant and had to postpone her evaluation with the electrophysiologist.  Today she states that she does have frequent episodes of \"my heart beating too fast\" and palpitations.  She is unable to correlate when they are most prominent during the day.  She also states that \"sometimes\" they will occur with dizziness and lightheadedness\".  She states that she has approximately 10-12 episodes a day that last 20 to 30 minutes of rapid heart rate and palpitations.  She denies any syncopal episodes.  She denies any chest pain or shortness of breath.  She does not drink coffee in excess, 1 cup/day and she does not drink more than one soda per day.  Patient is on Eliquis daily, she has a prior history of a pulmonary embolus.  She has a history of heterozygous factor V Leiden deficiency.    The patient presents today with their self who contributes to the history of their care.     The following portions of the patient's history were reviewed and updated as appropriate: allergies, current medications, past family history, past medical history, past social history, past surgical history and " problem list.     Review of Systems:     Review of Systems   Constitutional: Negative for activity change, appetite change, diaphoresis and fatigue.   HENT: Negative.    Eyes: Negative for blurred vision, double vision and visual disturbance.   Respiratory: Negative for apnea, chest tightness, shortness of breath and wheezing.         On Eliquis for past history of pulmonary embolus   Cardiovascular: Positive for palpitations. Negative for chest pain and leg swelling.   Gastrointestinal: Negative for nausea, vomiting, GERD and indigestion.   Genitourinary: Negative for decreased libido.   Musculoskeletal: Negative for back pain and neck pain.   Skin: Negative.    Neurological: Positive for dizziness and light-headedness. Negative for tremors, syncope, speech difficulty, weakness, numbness, headache, memory problem and confusion.   Hematological: Bruises/bleeds easily.   Psychiatric/Behavioral: Negative for agitation, sleep disturbance, negative for hyperactivity and stress. The patient is not nervous/anxious.    All other systems reviewed and are negative.         Current Outpatient Medications:   •  albuterol (PROVENTIL HFA;VENTOLIN HFA) 108 (90 Base) MCG/ACT inhaler, Inhale 2 puffs 4 (Four) Times a Day., Disp: 1 inhaler, Rfl: 0  •  apixaban (ELIQUIS) 5 MG tablet tablet, Take 1 tablet by mouth Every 12 (Twelve) Hours., Disp: 60 tablet, Rfl: 5  •  atorvastatin (LIPITOR) 80 MG tablet, Take 80 mg by mouth Daily. 200001, Disp: , Rfl: 3  •  DULoxetine (CYMBALTA) 60 MG capsule, Take 60 mg by mouth Daily., Disp: , Rfl: 4  •  fluticasone (FLONASE) 50 MCG/ACT nasal spray, 2 sprays into the nostril(s) as directed by provider Daily., Disp: 18.2 mL, Rfl: 1  •  Fluticasone Furoate-Vilanterol (BREO ELLIPTA) 100-25 MCG/INH inhaler, Inhale 1 puff Daily., Disp: 1 each, Rfl: 5  •  loratadine (CLARITIN) 10 MG tablet, Take 1 tablet by mouth Daily., Disp: 30 tablet, Rfl: 11  •  raNITIdine (ZANTAC) 150 MG tablet, Take 1 tablet by mouth  "2 (Two) Times a Day., Disp: 60 tablet, Rfl: 5  •  tiZANidine (ZANAFLEX) 4 MG tablet, Take 1 tablet by mouth 2 (Two) Times a Day As Needed for Muscle Spasms., Disp: 60 tablet, Rfl: 1  •  metoprolol tartrate (LOPRESSOR) 25 MG tablet, Take 1 tablet by mouth 2 (Two) Times a Day for 30 days., Disp: 60 tablet, Rfl: 0    Vitals:    04/30/19 1508   BP: 116/80   BP Location: Left arm   Patient Position: Sitting   Cuff Size: Adult   Pulse: 110   SpO2: 98%   Weight: 84.4 kg (186 lb)   Height: 160 cm (62.99\")       Physical Exam:     Physical Exam   Constitutional: She is oriented to person, place, and time. She appears well-developed and well-nourished.   HENT:   Head: Normocephalic and atraumatic.   Eyes: Pupils are equal, round, and reactive to light. No scleral icterus.   Neck: Normal range of motion. Neck supple. No JVD present.   No carotid bruit is auscultated   Cardiovascular: Normal rate, regular rhythm, normal heart sounds and intact distal pulses. PMI is not displaced. Exam reveals no gallop and no friction rub.   No murmur heard.  Pulmonary/Chest: Effort normal and breath sounds normal. No respiratory distress. She has no wheezes. She has no rales.   Abdominal: Soft. Bowel sounds are normal. She exhibits no distension.   Musculoskeletal: Normal range of motion. She exhibits no edema.   Neurological: She is alert and oriented to person, place, and time.   Skin: Skin is warm and dry. Capillary refill takes less than 2 seconds. No rash noted.   Psychiatric: She has a normal mood and affect. Her behavior is normal. Judgment and thought content normal.   Nursing note and vitals reviewed.      Procedures    Results:   Echocardiogram was WNL EF 68%    I reviewed the patient's new clinical results.  I personally viewed and interpreted the patient's EKG/Telemetry data    Assessment/Plan:     Marilu was seen today for follow-up.    Diagnoses and all orders for this visit:    Palpitations  -     Ambulatory Referral to " Cardiology  -     metoprolol tartrate (LOPRESSOR) 25 MG tablet; Take 1 tablet by mouth 2 (Two) Times a Day for 30 days.    PSVT (paroxysmal supraventricular tachycardia) (CMS/HCC)  -     Ambulatory Referral to Cardiology  -     metoprolol tartrate (LOPRESSOR) 25 MG tablet; Take 1 tablet by mouth 2 (Two) Times a Day for 30 days.        Return in about 6 months (around 10/30/2019).    ARNIE Emerson

## 2019-05-01 ENCOUNTER — HOSPITAL ENCOUNTER (EMERGENCY)
Facility: HOSPITAL | Age: 29
Discharge: HOME | End: 2019-05-01
Attending: EMERGENCY MEDICINE | Admitting: EMERGENCY MEDICINE

## 2019-05-01 ENCOUNTER — APPOINTMENT (OUTPATIENT)
Dept: GENERAL RADIOLOGY | Facility: HOSPITAL | Age: 29
End: 2019-05-01

## 2019-05-01 VITALS
HEIGHT: 63 IN | SYSTOLIC BLOOD PRESSURE: 118 MMHG | HEART RATE: 96 BPM | BODY MASS INDEX: 33.13 KG/M2 | WEIGHT: 187 LBS | DIASTOLIC BLOOD PRESSURE: 78 MMHG | OXYGEN SATURATION: 95 % | TEMPERATURE: 98.3 F | RESPIRATION RATE: 18 BRPM

## 2019-05-01 DIAGNOSIS — J40 BRONCHITIS: Primary | ICD-10-CM

## 2019-05-01 DIAGNOSIS — J06.9 UPPER RESPIRATORY TRACT INFECTION, UNSPECIFIED TYPE: ICD-10-CM

## 2019-05-01 PROCEDURE — 71046 X-RAY EXAM CHEST 2 VIEWS: CPT

## 2019-05-01 PROCEDURE — 99283 EMERGENCY DEPT VISIT LOW MDM: CPT

## 2019-05-01 RX ORDER — AZITHROMYCIN 250 MG/1
250 TABLET, FILM COATED ORAL DAILY
Qty: 4 TABLET | Refills: 0 | Status: SHIPPED | OUTPATIENT
Start: 2019-05-01 | End: 2019-05-07

## 2019-05-01 RX ORDER — AZITHROMYCIN 250 MG/1
500 TABLET, FILM COATED ORAL ONCE
Status: COMPLETED | OUTPATIENT
Start: 2019-05-01 | End: 2019-05-01

## 2019-05-01 RX ADMIN — AZITHROMYCIN 500 MG: 250 TABLET, FILM COATED ORAL at 22:57

## 2019-05-02 NOTE — ED PROVIDER NOTES
Subjective   28-year-old female presents to the emergency department with a cough congestion and a reported fever.  She said the symptoms 1 to 2 days.  She states that she has a sharp pain with deep breath, and a sharp pain with cough.        History provided by:  Patient   used: No        Review of Systems   HENT: Positive for congestion.    Respiratory: Positive for cough.    All other systems reviewed and are negative.      Past Medical History:   Diagnosis Date   • Abdominal pain    • Abnormal Pap smear of cervix    • Anxiety    • Asthma    • Bleeding disorder (CMS/[facility]) 16   • Body piercing     TONGUE, NOSE, TWO IN EACH EAR   • Chest pain    • Clotting disorder (CMS/[facility])     factor 5   • Constipation    • Coronary artery disease involving native coronary artery of native heart without angina pectoris 2017   • Deep vein thrombosis (CMS/[facility]) 16   • Diarrhea    • Diarrhea    • Endometriosis    • Factor 5 Leiden mutation, heterozygous (CMS/[facility])    • Fibroid    • Gallstone    • GERD (gastroesophageal reflux disease)    • H/O blood clots    • Headache    • Hiatal hernia    • High cholesterol    • History of recurrent UTIs    • HPV (human papilloma virus) infection    • Hypertension    • Kidney infection    • Migraine    • Nausea    • Nausea & vomiting    • Obesity    • Orthostatic hypotension    • Osteoarthritis    • Ovarian cyst    • Pollen allergies    • Protein S deficiency (CMS/[facility]) 2016    labs from hospitalization for PE   • Pulmonary embolism (CMS/[facility])    • Recurrent pregnancy loss, antepartum condition or complication    • Sleep apnea     CPAP ASKED TO BRING DOS   • Subclinical hyperthyroidism    • Tachycardia    • Tattoo     LOWER BACK   • Varicella        Allergies   Allergen Reactions   • Ceftin [Cefuroxime Axetil] Other (See Comments)     Numbness in mouth and throat   • Amoxicillin Rash       Past Surgical History:   Procedure Laterality Date   •   SECTION      X2  and    •  SECTION WITH TUBAL N/A 1/15/2019    Procedure:  SECTION REPEAT WITH TUBAL;  Surgeon: Alva Boyer MD;  Location: [facility] LABOR DELIVERY;  Service: Obstetrics/Gynecology   • CHOLECYSTECTOMY     • COLONOSCOPY N/A 2017    Procedure: COLONOSCOPY WITH BIOPSIES AND ARGON THERMAL ABLATION;  Surgeon: Jignesh Selby MD;  Location: [facility] ENDOSCOPY;  Service:    • DIAGNOSTIC LAPAROSCOPY N/A 2018    Procedure: DIAGNOSTIC LAPAROSCOPY AND ABLATION OF ENDOMETRIOSIS;  Surgeon: Evin Zamudio MD;  Location: [facility] OR;  Service: Obstetrics/Gynecology   • ENDOMETRIAL ABLATION     • ENDOSCOPY N/A 2017    Procedure: ESOPHAGOGASTRODUODENOSCOPY WITH BIOPSIES AND COLD BIOPSY POLYPECTOMIES;  Surgeon: Jignesh Selby MD;  Location: [facility] ENDOSCOPY;  Service:    • PELVIC LAPAROSCOPY         Family History   Problem Relation Age of Onset   • Arthritis Mother    • COPD Mother    • Asthma Mother    • Thyroid disease Mother    • Arthritis Father    • Diabetes Father    • Hypertension Father    • Hyperlipidemia Father    • Kidney disease Father    • Heart attack Father    • Coronary artery disease Father    • Dementia Father    • No Known Problems Son    • Colon cancer Neg Hx    • Liver cancer Neg Hx    • Liver disease Neg Hx    • Stomach cancer Neg Hx    • Esophageal cancer Neg Hx        Social History     Socioeconomic History   • Marital status:      Spouse name: Not on file   • Number of children: Not on file   • Years of education: Not on file   • Highest education level: Not on file   Tobacco Use   • Smoking status: Never Smoker   • Smokeless tobacco: Never Used   Substance and Sexual Activity   • Alcohol use: No   • Drug use: No   • Sexual activity: Yes     Partners: Male     Birth control/protection: None     Comment: PE while on birth control patch           Objective   Physical Exam   Constitutional: She is oriented to person, place, and time. She  appears well-developed and well-nourished.   Eyes: EOM are normal.   Neck: Normal range of motion. Neck supple.   Cardiovascular: Normal rate and regular rhythm.   Pulmonary/Chest: Effort normal and breath sounds normal.   Abdominal: Soft. Bowel sounds are normal.   Musculoskeletal: Normal range of motion.   Neurological: She is alert and oriented to person, place, and time. She has normal reflexes.   Skin: Skin is warm and dry.   Psychiatric: She has a normal mood and affect.   Nursing note and vitals reviewed.      Procedures           ED Course  ED Course as of May 01 2250   Wed May 01, 2019   2227 She has a history of pulmonary embolism and November 2016, she has factor V Leiden.  She is on Eliquis.  She does not have any outward signs of respiratory distress.  She is not short of breath, not hypoxic, and in no acute distress.  Her symptoms are most consistent with a URI the same as her daughter and she said exposure at home.  I discussed this with the patient, since her initial diagnosis in 2016 she has had 7 CT scans for pulmonary embolism that were negative.  [CS]      ED Course User Index  [CS] Calvin Monzon Jr., PA-C                  MDM  Number of Diagnoses or Management Options  Bronchitis: new and requires workup  Upper respiratory tract infection, unspecified type: new and requires workup     Amount and/or Complexity of Data Reviewed  Tests in the radiology section of CPT®: reviewed  Independent visualization of images, tracings, or specimens: yes    Risk of Complications, Morbidity, and/or Mortality  Presenting problems: minimal  Diagnostic procedures: minimal  Management options: minimal    Patient Progress  Patient progress: stable        Final diagnoses:   Bronchitis   Upper respiratory tract infection, unspecified type            Calvin Monzon Jr., PA-C  05/01/19 3520

## 2019-05-06 ENCOUNTER — TELEPHONE (OUTPATIENT)
Dept: CARDIOLOGY | Facility: CLINIC | Age: 29
End: 2019-05-06

## 2019-05-06 NOTE — TELEPHONE ENCOUNTER
----- Message from Marc Byrnes MD sent at 5/6/2019  1:53 PM EDT -----  Let her know that this was fine

## 2019-05-07 ENCOUNTER — HOSPITAL ENCOUNTER (OUTPATIENT)
Dept: GENERAL RADIOLOGY | Facility: HOSPITAL | Age: 29
Discharge: HOME | End: 2019-05-07
Admitting: PHYSICIAN ASSISTANT

## 2019-05-07 ENCOUNTER — OFFICE VISIT (OUTPATIENT)
Dept: INTERNAL MEDICINE | Facility: CLINIC | Age: 29
End: 2019-05-07

## 2019-05-07 VITALS
HEART RATE: 96 BPM | SYSTOLIC BLOOD PRESSURE: 118 MMHG | HEIGHT: 63 IN | OXYGEN SATURATION: 100 % | BODY MASS INDEX: 33.31 KG/M2 | TEMPERATURE: 98.6 F | DIASTOLIC BLOOD PRESSURE: 70 MMHG | WEIGHT: 188 LBS

## 2019-05-07 DIAGNOSIS — M54.42 ACUTE LEFT-SIDED LOW BACK PAIN WITH LEFT-SIDED SCIATICA: Primary | ICD-10-CM

## 2019-05-07 PROCEDURE — 72202 X-RAY EXAM SI JOINTS 3/> VWS: CPT

## 2019-05-07 PROCEDURE — 99213 OFFICE O/P EST LOW 20 MIN: CPT | Performed by: PHYSICIAN ASSISTANT

## 2019-05-07 RX ORDER — MINOCYCLINE HYDROCHLORIDE 100 MG/1
100 CAPSULE ORAL 2 TIMES DAILY
Refills: 1 | COMMUNITY
Start: 2019-04-30 | End: 2019-09-01 | Stop reason: HOSPADM

## 2019-05-07 RX ORDER — PREDNISONE 20 MG/1
20 TABLET ORAL DAILY
Qty: 3 TABLET | Refills: 0 | Status: SHIPPED | OUTPATIENT
Start: 2019-05-07 | End: 2019-05-20

## 2019-05-07 NOTE — PROGRESS NOTES
Marilu Godoy is a 28 y.o. female.     Subjective   History of Present Illness   Here today with concern of 2-3 days of very bothersome ache in the left leg which spans from the hip to the foot.  No numbness or tingling.  Her low back has bothered her off and on which has been worsening since onset 3 days ago but is also a chronically bothersome issue which she is currently in physical therapy for.  No loss of bowel or bladder control.  She has tried Tylenol which did not help.  A little over 1 year ago she was evaluated with lumbar MRI which was grossly unremarkable but did not include evaluation of the SI joints.        The following portions of the patient's history were reviewed and updated as appropriate: allergies, current medications, past family history, past medical history, past social history, past surgical history and problem list.    Review of Systems   Constitutional: Negative for appetite change, chills, fatigue, fever and unexpected weight change.   Respiratory: Negative for cough, chest tightness, shortness of breath and wheezing.    Cardiovascular: Negative for chest pain, palpitations and leg swelling.   Musculoskeletal: Positive for arthralgias, back pain and myalgias. Negative for gait problem, joint swelling, neck pain and neck stiffness.        Left leg pain.     Neurological: Negative for dizziness, syncope, facial asymmetry, speech difficulty, weakness, light-headedness and numbness (no leg numbness).   Psychiatric/Behavioral: Negative for behavioral problems, confusion, dysphoric mood, self-injury and suicidal ideas. The patient is not nervous/anxious.          Objective    Physical Exam   Constitutional: She is oriented to person, place, and time. She appears well-developed and well-nourished. No distress.   Eyes: Conjunctivae and EOM are normal. Pupils are equal, round, and reactive to light.   Neck: Normal range of motion. Neck supple.   Cardiovascular: Normal rate, regular rhythm  "and normal heart sounds. Exam reveals no gallop and no friction rub.   No murmur heard.  Pulmonary/Chest: Effort normal and breath sounds normal. No respiratory distress. She has no wheezes. She has no rales.   Musculoskeletal: Normal range of motion. She exhibits tenderness ( Mild tenderness L3-L5 vertebrae with prominent tenderness of the left sacroiliac joint.). She exhibits no edema or deformity.   Neurological: She is alert and oriented to person, place, and time. She displays normal reflexes. No cranial nerve deficit or sensory deficit. She exhibits normal muscle tone. Coordination normal.   Skin: Skin is warm and dry. Capillary refill takes less than 2 seconds. No rash noted. She is not diaphoretic.   Psychiatric: She has a normal mood and affect. Her behavior is normal. Judgment and thought content normal.   Nursing note and vitals reviewed.        /70   Pulse 96   Temp 98.6 °F (37 °C)   Ht 160 cm (62.99\")   Wt 85.3 kg (188 lb)   LMP 04/08/2019 (Approximate)   SpO2 100%   BMI 33.31 kg/m²     Nursing note and vitals reviewed.        Assessment/Plan   Marilu was seen today for leg pain.    Diagnoses and all orders for this visit:    Acute left-sided low back pain with left-sided sciatica  -     predniSONE (DELTASONE) 20 MG tablet; Take 1 tablet by mouth Daily.  -     XR sacroiliac joints 3+ vw    Will treat with very short-term prednisone in attempt to reduce inflammation.  She is unable to use NSAIDs due to anticoagulation.  Will evaluate with x-ray of the SI joints.  Encouraged her to discuss current pain with physical therapy during appointment later today.  May consider chiropractic care if physical therapy and prednisone did not help resolve current pain in the left leg.    Call or return to clinic if symptoms worsen or persist.       "

## 2019-05-09 DIAGNOSIS — M46.1 SACROILIAC INFLAMMATION (HCC): Primary | ICD-10-CM

## 2019-05-17 LAB — HLA-B27 QL NAA+PROBE: NEGATIVE

## 2019-05-20 ENCOUNTER — OFFICE VISIT (OUTPATIENT)
Dept: INTERNAL MEDICINE | Facility: CLINIC | Age: 29
End: 2019-05-20

## 2019-05-20 VITALS
OXYGEN SATURATION: 98 % | BODY MASS INDEX: 33.31 KG/M2 | WEIGHT: 188 LBS | HEART RATE: 96 BPM | TEMPERATURE: 98.7 F | DIASTOLIC BLOOD PRESSURE: 74 MMHG | SYSTOLIC BLOOD PRESSURE: 110 MMHG | HEIGHT: 63 IN

## 2019-05-20 DIAGNOSIS — M54.42 ACUTE LEFT-SIDED LOW BACK PAIN WITH LEFT-SIDED SCIATICA: ICD-10-CM

## 2019-05-20 DIAGNOSIS — N76.4 ABSCESS OF LABIA MAJORA: Primary | ICD-10-CM

## 2019-05-20 DIAGNOSIS — M46.1 SACROILIAC INFLAMMATION (HCC): ICD-10-CM

## 2019-05-20 DIAGNOSIS — M53.3 DISORDER OF SI (SACROILIAC) JOINT: Primary | ICD-10-CM

## 2019-05-20 PROCEDURE — 99213 OFFICE O/P EST LOW 20 MIN: CPT | Performed by: PHYSICIAN ASSISTANT

## 2019-05-20 RX ORDER — CEPHALEXIN 500 MG/1
500 CAPSULE ORAL 3 TIMES DAILY
Qty: 30 CAPSULE | Refills: 0 | Status: SHIPPED | OUTPATIENT
Start: 2019-05-20 | End: 2019-05-30

## 2019-05-20 NOTE — PROGRESS NOTES
Marilu Godoy is a 28 y.o. female.     Subjective   History of Present Illness   Here today with concern of around 4 days ago noticing an approximately 1 cm hard, painful knot in the right groin.  She tried squeezing the knot which did not produce anything.  She denies fever or chills.         The following portions of the patient's history were reviewed and updated as appropriate: allergies, current medications, past family history, past medical history, past social history, past surgical history and problem list.    Review of Systems   Constitutional: Negative for appetite change, chills, fatigue, fever and unexpected weight change.   Eyes: Negative for visual disturbance.   Respiratory: Negative for cough, chest tightness, shortness of breath and wheezing.    Cardiovascular: Negative for chest pain, palpitations and leg swelling.   Genitourinary: Negative for difficulty urinating, dyspareunia, enuresis, frequency, menstrual problem, urgency, vaginal bleeding and vaginal pain.   Skin:        Painful knot in groin.    Neurological: Negative for dizziness and weakness.   Psychiatric/Behavioral: Negative for behavioral problems, dysphoric mood, self-injury and suicidal ideas. The patient is not nervous/anxious.          Objective    Physical Exam   Constitutional: She is oriented to person, place, and time. She appears well-developed and well-nourished. No distress.   Pulmonary/Chest: Effort normal.   Abdominal: Hernia confirmed negative in the right inguinal area and confirmed negative in the left inguinal area.   Genitourinary:       Pelvic exam was performed with patient prone. There is tenderness and lesion on the right labia. There is no rash or injury on the right labia. There is no rash, tenderness, lesion or injury on the left labia.   Lymphadenopathy: No inguinal adenopathy noted on the right or left side.   Neurological: She is alert and oriented to person, place, and time. Coordination normal.  "  Skin: She is not diaphoretic.   Psychiatric: She has a normal mood and affect. Her behavior is normal. Judgment and thought content normal.   Nursing note and vitals reviewed.          /74   Pulse 96   Temp 98.7 °F (37.1 °C)   Ht 160 cm (62.99\")   Wt 85.3 kg (188 lb)   SpO2 98%   BMI 33.31 kg/m²     Nursing note and vitals reviewed.        Assessment/Plan   Marilu was seen today for knot in groin area.    Diagnoses and all orders for this visit:    Abscess of labia majora  -     cephalexin (KEFLEX) 500 MG capsule; Take 1 capsule by mouth 3 (Three) Times a Day for 10 days.      Abscess was not able to be incised and drained today due to lack of fluctuance.  She will begin Keflex 3 times daily and has been advised to soak in warm water with Epsom salt 3 times daily until resolved.  If the abscess should increase in size and tenderness she will return for incision and drainage.    Call or return to clinic if symptoms worsen or persist.       "

## 2019-05-30 DIAGNOSIS — J30.9 ALLERGIC RHINITIS, UNSPECIFIED SEASONALITY, UNSPECIFIED TRIGGER: ICD-10-CM

## 2019-05-30 DIAGNOSIS — I47.1 PSVT (PAROXYSMAL SUPRAVENTRICULAR TACHYCARDIA) (HCC): ICD-10-CM

## 2019-05-30 DIAGNOSIS — R00.2 PALPITATIONS: ICD-10-CM

## 2019-05-30 RX ORDER — ATENOLOL 25 MG/1
TABLET ORAL
Qty: 30 TABLET | Refills: 10 | OUTPATIENT
Start: 2019-05-30

## 2019-05-30 RX ORDER — ATORVASTATIN CALCIUM 80 MG/1
TABLET, FILM COATED ORAL
Qty: 90 TABLET | Refills: 10 | OUTPATIENT
Start: 2019-05-30

## 2019-05-30 RX ORDER — APIXABAN 2.5 MG/1
TABLET, FILM COATED ORAL
Qty: 60 TABLET | Refills: 4 | OUTPATIENT
Start: 2019-05-30

## 2019-05-30 RX ORDER — FLUTICASONE PROPIONATE 50 MCG
SPRAY, SUSPENSION (ML) NASAL
Qty: 16 G | Refills: 2 | Status: SHIPPED | OUTPATIENT
Start: 2019-05-30 | End: 2020-05-08

## 2019-05-31 ENCOUNTER — HOSPITAL ENCOUNTER (EMERGENCY)
Facility: HOSPITAL | Age: 29
Discharge: HOME | End: 2019-05-31
Attending: EMERGENCY MEDICINE | Admitting: EMERGENCY MEDICINE

## 2019-05-31 ENCOUNTER — APPOINTMENT (OUTPATIENT)
Dept: GENERAL RADIOLOGY | Facility: HOSPITAL | Age: 29
End: 2019-05-31

## 2019-05-31 VITALS
TEMPERATURE: 98.3 F | BODY MASS INDEX: 34.75 KG/M2 | SYSTOLIC BLOOD PRESSURE: 133 MMHG | WEIGHT: 196.13 LBS | RESPIRATION RATE: 14 BRPM | HEIGHT: 63 IN | OXYGEN SATURATION: 99 % | DIASTOLIC BLOOD PRESSURE: 84 MMHG | HEART RATE: 86 BPM

## 2019-05-31 DIAGNOSIS — S92.535A CLOSED NONDISPLACED FRACTURE OF DISTAL PHALANX OF LESSER TOE OF LEFT FOOT, INITIAL ENCOUNTER: Primary | ICD-10-CM

## 2019-05-31 PROCEDURE — 73660 X-RAY EXAM OF TOE(S): CPT

## 2019-05-31 PROCEDURE — 99283 EMERGENCY DEPT VISIT LOW MDM: CPT

## 2019-05-31 RX ORDER — MELOXICAM 7.5 MG/1
7.5 TABLET ORAL ONCE
Status: COMPLETED | OUTPATIENT
Start: 2019-05-31 | End: 2019-05-31

## 2019-05-31 RX ORDER — MELOXICAM 7.5 MG/1
7.5 TABLET ORAL DAILY
Qty: 14 TABLET | Refills: 0 | Status: SHIPPED | OUTPATIENT
Start: 2019-05-31 | End: 2019-07-01

## 2019-05-31 RX ORDER — MELOXICAM 7.5 MG/1
7.5 TABLET ORAL DAILY
Status: DISCONTINUED | OUTPATIENT
Start: 2019-06-01 | End: 2019-05-31

## 2019-05-31 RX ADMIN — MELOXICAM 7.5 MG: 7.5 TABLET ORAL at 22:12

## 2019-06-01 NOTE — ED PROVIDER NOTES
Subjective   History of Present Illness  Is a 28-year-old female comes in today complaining of left toe injury.  She reports that she stood up and her foot was asleep and she been her fourth toe backwards around 3 hours prior to arrival.  She is able to bear weight.  Review of Systems   Constitutional: Negative.    HENT: Negative.    Eyes: Negative.    Respiratory: Negative.    Cardiovascular: Negative.    Gastrointestinal: Negative.    Endocrine: Negative.    Genitourinary: Negative.    Musculoskeletal: Positive for arthralgias.   Skin: Negative.    Allergic/Immunologic: Negative.    Neurological: Negative.    Hematological: Negative.    Psychiatric/Behavioral: Negative.    All other systems reviewed and are negative.      Past Medical History:   Diagnosis Date   • Abdominal pain    • Abnormal Pap smear of cervix    • Anxiety    • Asthma 2015   • Bleeding disorder (CMS/[facility]) 11-13-16   • Body piercing     TONGUE, NOSE, TWO IN EACH EAR   • Chest pain    • Clotting disorder (CMS/[facility])     factor 5   • Constipation    • Coronary artery disease involving native coronary artery of native heart without angina pectoris 6/16/2017   • Deep vein thrombosis (CMS/[facility]) 11-13-16   • Diarrhea    • Diarrhea    • Endometriosis    • Factor 5 Leiden mutation, heterozygous (CMS/[facility])    • Fibroid    • Gallstone    • GERD (gastroesophageal reflux disease)    • H/O blood clots    • Headache    • Hiatal hernia    • High cholesterol    • History of recurrent UTIs    • HPV (human papilloma virus) infection    • Hypertension    • Kidney infection    • Migraine    • Nausea    • Nausea & vomiting    • Obesity    • Orthostatic hypotension    • Osteoarthritis    • Ovarian cyst    • Pollen allergies    • Protein S deficiency (CMS/[facility]) 11/14/2016    labs from hospitalization for PE   • Pulmonary embolism (CMS/[facility])    • Recurrent pregnancy loss, antepartum condition or complication    • Sleep apnea     CPAP ASKED TO BRING DOS   • Subclinical  hyperthyroidism    • Tachycardia    • Tattoo     LOWER BACK   • Varicella        Allergies   Allergen Reactions   • Ceftin [Cefuroxime Axetil] Other (See Comments)     Numbness in mouth and throat   • Amoxicillin Rash       Past Surgical History:   Procedure Laterality Date   •  SECTION      X2  and    •  SECTION WITH TUBAL N/A 1/15/2019    Procedure:  SECTION REPEAT WITH TUBAL;  Surgeon: Alva Boyer MD;  Location: [facility] LABOR DELIVERY;  Service: Obstetrics/Gynecology   • CHOLECYSTECTOMY     • COLONOSCOPY N/A 2017    Procedure: COLONOSCOPY WITH BIOPSIES AND ARGON THERMAL ABLATION;  Surgeon: Jignesh Selby MD;  Location: [facility] ENDOSCOPY;  Service:    • DIAGNOSTIC LAPAROSCOPY N/A 2018    Procedure: DIAGNOSTIC LAPAROSCOPY AND ABLATION OF ENDOMETRIOSIS;  Surgeon: Evin Zamudio MD;  Location: [facility] OR;  Service: Obstetrics/Gynecology   • ENDOMETRIAL ABLATION     • ENDOSCOPY N/A 2017    Procedure: ESOPHAGOGASTRODUODENOSCOPY WITH BIOPSIES AND COLD BIOPSY POLYPECTOMIES;  Surgeon: Jignesh Selby MD;  Location: [facility] ENDOSCOPY;  Service:    • PELVIC LAPAROSCOPY         Family History   Problem Relation Age of Onset   • Arthritis Mother    • COPD Mother    • Asthma Mother    • Thyroid disease Mother    • Arthritis Father    • Diabetes Father    • Hypertension Father    • Hyperlipidemia Father    • Kidney disease Father    • Heart attack Father    • Coronary artery disease Father    • Dementia Father    • No Known Problems Son    • Colon cancer Neg Hx    • Liver cancer Neg Hx    • Liver disease Neg Hx    • Stomach cancer Neg Hx    • Esophageal cancer Neg Hx        Social History     Socioeconomic History   • Marital status:      Spouse name: Not on file   • Number of children: Not on file   • Years of education: Not on file   • Highest education level: Not on file   Tobacco Use   • Smoking status: Never Smoker   • Smokeless tobacco: Never Used   Substance  and Sexual Activity   • Alcohol use: No   • Drug use: No   • Sexual activity: Yes     Partners: Male     Birth control/protection: None     Comment: PE while on birth control patch           Objective   Physical Exam   Constitutional: She appears well-developed and well-nourished.   Nursing note and vitals reviewed.  GEN: No acute distress  Head: Normocephalic, atraumatic  Eyes: Pupils equal round reactive to light  ENT: Posterior pharynx normal in appearance, oral mucosa is moist  Chest: Nontender to palpation  Cardiovascular: Regular rate  Lungs: Clear to auscultation bilaterally  Abdomen: Soft, nontender, nondistended, no peritoneal signs  Extremities: No edema, normal appearance, tender 4th toe on left foot, limited ROM due to pain    Neuro: GCS 15  Psych: Mood and affect are appropriate      Procedures           ED Course                  MDM  Number of Diagnoses or Management Options     Amount and/or Complexity of Data Reviewed  Tests in the radiology section of CPT®: ordered and reviewed  Review and summarize past medical records: yes  Discuss the patient with other providers: yes  Independent visualization of images, tracings, or specimens: yes    Risk of Complications, Morbidity, and/or Mortality  Presenting problems: low  Diagnostic procedures: low  Management options: low          Final diagnoses:   Closed nondisplaced fracture of distal phalanx of lesser toe of left foot, initial encounter            Meg Casanova, APRN  05/31/19 2426

## 2019-06-20 ENCOUNTER — OFFICE VISIT (OUTPATIENT)
Dept: NEUROLOGY | Facility: CLINIC | Age: 29
End: 2019-06-20

## 2019-06-20 VITALS
DIASTOLIC BLOOD PRESSURE: 84 MMHG | HEART RATE: 90 BPM | BODY MASS INDEX: 34.73 KG/M2 | SYSTOLIC BLOOD PRESSURE: 118 MMHG | WEIGHT: 196 LBS | OXYGEN SATURATION: 97 % | HEIGHT: 63 IN

## 2019-06-20 DIAGNOSIS — R53.1 WEAKNESS: ICD-10-CM

## 2019-06-20 DIAGNOSIS — R20.0 NUMBNESS: Primary | ICD-10-CM

## 2019-06-20 DIAGNOSIS — G43.009 MIGRAINE WITHOUT AURA AND WITHOUT STATUS MIGRAINOSUS, NOT INTRACTABLE: ICD-10-CM

## 2019-06-20 PROCEDURE — 99214 OFFICE O/P EST MOD 30 MIN: CPT | Performed by: PSYCHIATRY & NEUROLOGY

## 2019-06-20 NOTE — PROGRESS NOTES
[facility] NEUROLOGY [place] CONSULTATION   History of Present Illness     Date: 6/29/2019    Patient Identification  Marilu Godoy is a 28 y.o. female.    Patient information was obtained from patient.  History/Exam limitations: none.    CONSULTATION requested by: Deidre Barker MD    Chief Complaint   Follow-up (mri)      History of Present Illness   Patient is a pleasant 28-year-old referred to [facility] neurology [place] for evaluation of multiple sclerosis.  Patient was complaining of left-sided numbness right-sided weakness tingling numbness sensation in the left arm however patient denies any bowel or bladder dysfunction or extraocular movement abnormality or diplopia dysarthria dysphagia patient also complaining of persistent headache.   MRI of the brain is totally unremarkable.      PMH:   Past Medical History:   Diagnosis Date   • Abdominal pain    • Abnormal Pap smear of cervix    • Anxiety    • Asthma 2015   • Bleeding disorder (CMS/[facility]) 11-13-16   • Body piercing     TONGUE, NOSE, TWO IN EACH EAR   • Chest pain    • Clotting disorder (CMS/[facility])     factor 5   • Constipation    • Coronary artery disease involving native coronary artery of native heart without angina pectoris 6/16/2017   • Deep vein thrombosis (CMS/[facility]) 11-13-16   • Diarrhea    • Diarrhea    • Endometriosis    • Factor 5 Leiden mutation, heterozygous (CMS/[facility])    • Fibroid    • Gallstone    • GERD (gastroesophageal reflux disease)    • H/O blood clots    • Headache    • Hiatal hernia    • High cholesterol    • History of recurrent UTIs    • HPV (human papilloma virus) infection    • Hypertension    • Kidney infection    • Migraine    • Nausea    • Nausea & vomiting    • Obesity    • Orthostatic hypotension    • Osteoarthritis    • Ovarian cyst    • Pollen allergies    • Protein S deficiency (CMS/[facility]) 11/14/2016    labs from hospitalization for PE   • Pulmonary embolism (CMS/[facility])    • Recurrent pregnancy loss, antepartum  condition or complication    • Sleep apnea     CPAP ASKED TO BRING DOS   • Subclinical hyperthyroidism    • Tachycardia    • Tattoo     LOWER BACK   • Varicella        Past Surgical History:   Past Surgical History:   Procedure Laterality Date   •  SECTION      X2  and    •  SECTION WITH TUBAL N/A 1/15/2019    Procedure:  SECTION REPEAT WITH TUBAL;  Surgeon: Alva Boyer MD;  Location: [facility] LABOR DELIVERY;  Service: Obstetrics/Gynecology   • CHOLECYSTECTOMY     • COLONOSCOPY N/A 2017    Procedure: COLONOSCOPY WITH BIOPSIES AND ARGON THERMAL ABLATION;  Surgeon: Jignesh Selby MD;  Location: [facility] ENDOSCOPY;  Service:    • DIAGNOSTIC LAPAROSCOPY N/A 2018    Procedure: DIAGNOSTIC LAPAROSCOPY AND ABLATION OF ENDOMETRIOSIS;  Surgeon: Evin Zamudio MD;  Location: [facility] OR;  Service: Obstetrics/Gynecology   • ENDOMETRIAL ABLATION     • ENDOSCOPY N/A 2017    Procedure: ESOPHAGOGASTRODUODENOSCOPY WITH BIOPSIES AND COLD BIOPSY POLYPECTOMIES;  Surgeon: Jignesh Selby MD;  Location: [facility] ENDOSCOPY;  Service:    • PELVIC LAPAROSCOPY         Family Hisotry:   Family History   Problem Relation Age of Onset   • Arthritis Mother    • COPD Mother    • Asthma Mother    • Thyroid disease Mother    • Arthritis Father    • Diabetes Father    • Hypertension Father    • Hyperlipidemia Father    • Kidney disease Father    • Heart attack Father    • Coronary artery disease Father    • Dementia Father    • No Known Problems Son    • Colon cancer Neg Hx    • Liver cancer Neg Hx    • Liver disease Neg Hx    • Stomach cancer Neg Hx    • Esophageal cancer Neg Hx        Social History:   Social History     Socioeconomic History   • Marital status:      Spouse name: Not on file   • Number of children: Not on file   • Years of education: Not on file   • Highest education level: Not on file   Tobacco Use   • Smoking status: Never Smoker   • Smokeless tobacco: Never Used    Substance and Sexual Activity   • Alcohol use: No   • Drug use: No   • Sexual activity: Yes     Partners: Male     Birth control/protection: None     Comment: PE while on birth control patch       Medications:   Current Outpatient Medications   Medication Sig Dispense Refill   • albuterol (PROVENTIL HFA;VENTOLIN HFA) 108 (90 Base) MCG/ACT inhaler Inhale 2 puffs 4 (Four) Times a Day. 1 inhaler 0   • apixaban (ELIQUIS) 5 MG tablet tablet Take 1 tablet by mouth Every 12 (Twelve) Hours. 60 tablet 5   • atorvastatin (LIPITOR) 80 MG tablet Take 80 mg by mouth Daily. 200001  3   • DULoxetine (CYMBALTA) 60 MG capsule Take 60 mg by mouth Daily.  4   • fluocinonide (LIDEX) 0.05 % cream apply twice daily for 2 weeks alternating with betamethasone  1   • fluticasone (FLONASE) 50 MCG/ACT nasal spray INHALE 2 SPRAYS IN EACH NOSTRIL ONE TIME A DAY  16 g 2   • Fluticasone Furoate-Vilanterol (BREO ELLIPTA) 100-25 MCG/INH inhaler Inhale 1 puff Daily. 1 each 5   • loratadine (CLARITIN) 10 MG tablet Take 1 tablet by mouth Daily. 30 tablet 11   • metoprolol tartrate (LOPRESSOR) 25 MG tablet TAKE 1 TABLET BY MOUTH TWO TIMES A DAY FOR 30 DAYS 60 tablet 6   • minocycline (MINOCIN,DYNACIN) 100 MG capsule Take 100 mg by mouth 2 (Two) Times a Day.  1   • raNITIdine (ZANTAC) 150 MG tablet Take 1 tablet by mouth 2 (Two) Times a Day. 60 tablet 5   • tiZANidine (ZANAFLEX) 4 MG tablet Take 1 tablet by mouth 2 (Two) Times a Day As Needed for Muscle Spasms. 60 tablet 1   • meloxicam (MOBIC) 7.5 MG tablet Take 1 tablet by mouth Daily. 14 tablet 0     No current facility-administered medications for this visit.        Allergy:   Allergies   Allergen Reactions   • Ceftin [Cefuroxime Axetil] Other (See Comments)     Numbness in mouth and throat   • Amoxicillin Rash       Review of Systems:  Review of Systems   Constitutional: Negative for chills and fever.   HENT: Negative for congestion, ear pain, hearing loss, rhinorrhea and sore throat.    Eyes:  "Negative for pain, discharge and redness.   Respiratory: Negative for cough, shortness of breath, wheezing and stridor.    Cardiovascular: Negative for chest pain, palpitations and leg swelling.   Gastrointestinal: Negative for abdominal pain, constipation, nausea and vomiting.   Endocrine: Negative for cold intolerance, heat intolerance and polyphagia.   Genitourinary: Negative for dysuria, flank pain, frequency and urgency.   Musculoskeletal: Negative for joint swelling, myalgias, neck pain and neck stiffness.   Skin: Negative for pallor, rash and wound.   Allergic/Immunologic: Negative for environmental allergies.   Neurological: Positive for weakness, numbness and headaches. Negative for dizziness, tremors, seizures, syncope, facial asymmetry, speech difficulty and light-headedness.   Hematological: Negative for adenopathy.   Psychiatric/Behavioral: Negative for confusion and hallucinations. The patient is not nervous/anxious.        Physical Exam     Vitals:    06/20/19 1001   BP: 118/84   Pulse: 90   SpO2: 97%   Weight: 88.9 kg (196 lb)   Height: 160 cm (62.99\")     GENERAL: Patient is pleasant, cooperative, appears to be stated age.  Body habitus is endomorphic.  SKIN AND EXTREMITIES:  No skin rashes or lesions are noted.  No cyanosis, clubbing or edema of the extremities.    HEAD:  Head is normocephalic and atraumatic.    NECK: Nontender without thyromegaly or adenopathy.  Carotid upstrokes are 1+/4.  No cranial or cervical bruits.  The neck is supple with a full range of motion.   ENT: Palate elevates symmetrically.  No evidence of high arch palate.  Tongue midline.  No erythema in posterior pharynx.  Mallampati Classification Class III   CARDIOVASCULAR:  Regular rate and rhythm with normal S1 and S2 without rub or gallop.  RESPIRATORY:  Clear to auscultation without wheezes or crackle   ABDOMEN:  Soft and nontender, positive bowel sound without hepatosplenomegaly  BACK:  Back is straight without midline " defect.    PSYCH:  Higher cortical function/mental status:  The patient is alert.  The patient is oriented x3 to time, place and person.  Recent and the remote memory appear normal.  The patient has a good fund of knowledge.  There is no visual or auditory hallucination or suicidal or homicidal ideation.  SPEECH:There is no gross evidence of aphasia, dysarthria or agnosia.      CRANIAL NERVES:  Pupils are 4mm, equal round reactive to light, reacting briskly to 2mm without afferent pupillary defect.  Visual fields are intact to confrontation testing.  Funduscopic examination reveals sharp disc margins with normal vasculature.  No papilledema, hemorrhages or exudates.  Extraocular movements are full and smooth with normal pursuits and saccades.  No nystagmus noted.  The face is symmetric. Palate elevates symmetrically, Tongue midline, positive gag reflex. The remainder of the cranial nerves are intact and symmetrical.    MOTOR: Strength is 5/5 throughout with normal tone and bulk with the following exceptions, 4/5 intrinsic muscles of the hands and feet.  No involuntary movements noted.    Deep Tendon Reflexes: are 2/4 and symmetrical in the upper extremities, 2/4 and symmetrical at the knees and 1/4 and symmetrical at the Achilles tendon.  Plantar responses were down-going bilaterally.    SENSATION:  Intact to pinprick, light touch, vibration and proprioception.  Coordination:  The patient normally performs finger-nose-finger, heel-to-knee-to-shin and rapid alternating movements in symmetrical fashion.    COORDINATION AND GAIT:  The patient walks with a narrow-based gait.  Patient is able to heel-toe and tandem walk forward and backwards without difficulty.  Romberg and monopedal  Romberg are negative.    MUSCULOSKELETAL: Range of motion normal, no clubbing, cyanosis, or edema.  No joint swelling.            Records Reviewed: I have personally reviewed her previous medical record.    Marilu was seen today for  follow-up.    Diagnoses and all orders for this visit:    Numbness    Weakness    Migraine without aura and without status migrainosus, not intractable          Discussion:  In summary, 28-year-old referred to [facility] neurology [place] for evaluation of multiple sclerosis.  Patient was complaining of left-sided numbness right-sided weakness tingling numbness sensation in the left arm however patient denies any bowel or bladder dysfunction or extraocular movement abnormality or diplopia dysarthria dysphagia patient also complaining of persistent headache.   MRI of the brain is totally unremarkable.        This Document is signed by Luther Velez MD, FAAN, FAASM June 29, 20197:58 PM

## 2019-06-24 DIAGNOSIS — M53.3 DISORDER OF SI (SACROILIAC) JOINT: ICD-10-CM

## 2019-06-24 DIAGNOSIS — M46.1 SACROILIAC INFLAMMATION (HCC): Primary | ICD-10-CM

## 2019-06-24 DIAGNOSIS — M54.42 ACUTE LEFT-SIDED LOW BACK PAIN WITH LEFT-SIDED SCIATICA: ICD-10-CM

## 2019-06-25 ENCOUNTER — OFFICE VISIT (OUTPATIENT)
Dept: INTERNAL MEDICINE | Facility: CLINIC | Age: 29
End: 2019-06-25

## 2019-06-25 VITALS
OXYGEN SATURATION: 99 % | BODY MASS INDEX: 35.61 KG/M2 | HEART RATE: 94 BPM | WEIGHT: 201 LBS | TEMPERATURE: 99.2 F | SYSTOLIC BLOOD PRESSURE: 120 MMHG | DIASTOLIC BLOOD PRESSURE: 76 MMHG | HEIGHT: 63 IN

## 2019-06-25 DIAGNOSIS — J06.9 ACUTE URI: Primary | ICD-10-CM

## 2019-06-25 LAB
EXPIRATION DATE: NORMAL
INTERNAL CONTROL: NORMAL
Lab: NORMAL
S PYO RRNA THROAT QL PROBE: NEGATIVE

## 2019-06-25 PROCEDURE — 99213 OFFICE O/P EST LOW 20 MIN: CPT | Performed by: PHYSICIAN ASSISTANT

## 2019-06-25 PROCEDURE — 87651 STREP A DNA AMP PROBE: CPT | Performed by: PHYSICIAN ASSISTANT

## 2019-06-25 NOTE — PROGRESS NOTES
Marilu Godoy is a 28 y.o. female.     Subjective   History of Present Illness   Here today with concern of 3-4 days of sore throat, swollen lymph nodes in the neck, fever, headaches, myalgias and fatigue.  Fever is up to 101.  She is not taking anything for the symptoms. She denies cough.           The following portions of the patient's history were reviewed and updated as appropriate: allergies, current medications, past family history, past medical history, past social history, past surgical history and problem list.    Review of Systems   Constitutional: Positive for fatigue and fever. Negative for appetite change, chills and unexpected weight change.   HENT: Positive for sore throat. Negative for congestion, drooling, ear pain, mouth sores, postnasal drip, rhinorrhea, sinus pressure, sinus pain and trouble swallowing.    Respiratory: Negative for cough, chest tightness, shortness of breath and wheezing.    Cardiovascular: Negative for chest pain and palpitations.   Gastrointestinal: Negative for abdominal pain, diarrhea, nausea and vomiting.   Musculoskeletal: Positive for myalgias. Negative for arthralgias, neck pain and neck stiffness.   Skin: Negative for rash.   Neurological: Positive for headaches.   Hematological: Positive for adenopathy. Does not bruise/bleed easily.   Psychiatric/Behavioral: Negative for confusion and dysphoric mood. The patient is not nervous/anxious.          Objective    Physical Exam   Constitutional: She is oriented to person, place, and time. She appears well-developed and well-nourished. No distress.   HENT:   Head: Normocephalic and atraumatic.   Nose: Nose normal.   Mild OP erythema. Clear postnasal drip. Mild bilateral TM effusions. No sinus tenderness. Very-poor dentition with dental caries, missing teeth and tooth erosion. Tongue ring in place.    Eyes: Conjunctivae and EOM are normal. Pupils are equal, round, and reactive to light.   Neck: Normal range of motion.  "Neck supple.   Cardiovascular: Normal rate, regular rhythm and normal heart sounds. Exam reveals no gallop and no friction rub.   No murmur heard.  Pulmonary/Chest: Effort normal and breath sounds normal. No respiratory distress. She has no wheezes. She has no rales.   Abdominal: Soft. Bowel sounds are normal. She exhibits no mass. There is no tenderness. No hernia.   Musculoskeletal: Normal range of motion. She exhibits no edema, tenderness or deformity.   Lymphadenopathy:     She has no cervical adenopathy.   Neurological: She is alert and oriented to person, place, and time. She displays normal reflexes. No cranial nerve deficit or sensory deficit. She exhibits normal muscle tone. Coordination normal.   Skin: Skin is warm and dry. Capillary refill takes less than 2 seconds. No rash noted. She is not diaphoretic.   Psychiatric: She has a normal mood and affect. Her behavior is normal. Judgment and thought content normal.   Nursing note and vitals reviewed.        /76   Pulse 94   Temp 99.2 °F (37.3 °C)   Ht 160 cm (62.99\")   Wt 91.2 kg (201 lb)   SpO2 99%   BMI 35.61 kg/m²     Nursing note and vitals reviewed.          Assessment/Plan   Marilu was seen today for fatigue.    Diagnoses and all orders for this visit:    Acute URI  -     POCT Strep A, molecular - negative    Likely viral. Recommended supportive care including increase PO fluid intake, rest and Tylenol prn.       Call or RTC if symptoms worsen or persist.                "

## 2019-07-01 ENCOUNTER — OFFICE VISIT (OUTPATIENT)
Dept: INTERNAL MEDICINE | Facility: CLINIC | Age: 29
End: 2019-07-01

## 2019-07-01 ENCOUNTER — TELEPHONE (OUTPATIENT)
Dept: ONCOLOGY | Facility: CLINIC | Age: 29
End: 2019-07-01

## 2019-07-01 VITALS
HEIGHT: 63 IN | DIASTOLIC BLOOD PRESSURE: 78 MMHG | TEMPERATURE: 99 F | HEART RATE: 88 BPM | OXYGEN SATURATION: 99 % | WEIGHT: 203 LBS | BODY MASS INDEX: 35.97 KG/M2 | SYSTOLIC BLOOD PRESSURE: 114 MMHG

## 2019-07-01 DIAGNOSIS — D68.51 HETEROZYGOUS FACTOR V LEIDEN MUTATION (HCC): ICD-10-CM

## 2019-07-01 DIAGNOSIS — J01.40 ACUTE PANSINUSITIS, RECURRENCE NOT SPECIFIED: Primary | ICD-10-CM

## 2019-07-01 DIAGNOSIS — Z79.01 CHRONIC ANTICOAGULATION: ICD-10-CM

## 2019-07-01 DIAGNOSIS — D68.59 PROTEIN S DEFICIENCY (HCC): ICD-10-CM

## 2019-07-01 DIAGNOSIS — J45.20 MILD INTERMITTENT ASTHMA WITHOUT COMPLICATION: ICD-10-CM

## 2019-07-01 DIAGNOSIS — J20.9 ACUTE BRONCHITIS, UNSPECIFIED ORGANISM: ICD-10-CM

## 2019-07-01 PROCEDURE — 99214 OFFICE O/P EST MOD 30 MIN: CPT | Performed by: PHYSICIAN ASSISTANT

## 2019-07-01 RX ORDER — ATORVASTATIN CALCIUM 80 MG/1
TABLET, FILM COATED ORAL
Qty: 90 TABLET | Refills: 10 | OUTPATIENT
Start: 2019-07-01 | End: 2020-05-03

## 2019-07-01 RX ORDER — ATENOLOL 25 MG/1
TABLET ORAL
Qty: 30 TABLET | Refills: 10 | OUTPATIENT
Start: 2019-07-01

## 2019-07-01 RX ORDER — ALBUTEROL SULFATE 2.5 MG/3ML
2.5 SOLUTION RESPIRATORY (INHALATION) EVERY 4 HOURS PRN
Qty: 60 VIAL | Refills: 12 | OUTPATIENT
Start: 2019-07-01 | End: 2020-05-03

## 2019-07-01 RX ORDER — AZITHROMYCIN 250 MG/1
TABLET, FILM COATED ORAL
Qty: 6 TABLET | Refills: 0 | Status: SHIPPED | OUTPATIENT
Start: 2019-07-01 | End: 2019-07-08

## 2019-07-01 NOTE — PROGRESS NOTES
Marilu Godoy is a 28 y.o. female.     Subjective   History of Present Illness   Patient with history significant for asthma and environmental allergies here today with continued concern of sore throat, congestion and cough.  She was evaluated here 6 days ago for similar complaints at which time she was negative for strep and physical exam was unremarkable.  She reports that she is now coughing up dark sputum.  She still has low-grade fever and chills.  She uses Flonase off and on when she thinks she needs it.  She has heard occasional wheezing and felt a little SOA at times.  She denies chest tightness.  She has continued to have mild but persistent headache.  No sinus pressure or pain.        The following portions of the patient's history were reviewed and updated as appropriate: allergies, current medications, past family history, past medical history, past social history, past surgical history and problem list.    Review of Systems   Constitutional: Positive for chills and fever. Negative for activity change, appetite change, diaphoresis, fatigue and unexpected weight change.   HENT: Positive for congestion, dental problem (chronic), postnasal drip and sore throat. Negative for drooling, ear discharge, ear pain, facial swelling, mouth sores, nosebleeds, rhinorrhea, sinus pressure, sinus pain, sneezing, trouble swallowing and voice change.    Eyes: Negative for visual disturbance.   Respiratory: Positive for cough, shortness of breath and wheezing. Negative for apnea and chest tightness.    Cardiovascular: Negative for chest pain, palpitations and leg swelling.   Allergic/Immunologic: Positive for environmental allergies. Negative for immunocompromised state.   Neurological: Positive for headaches. Negative for dizziness, tremors, syncope, facial asymmetry, weakness and numbness.   Hematological: Positive for adenopathy (neck). Does not bruise/bleed easily.   Psychiatric/Behavioral: Negative for dysphoric  "mood, self-injury and suicidal ideas. The patient is not nervous/anxious.          Objective    Physical Exam   Constitutional: She is oriented to person, place, and time. She appears well-developed and well-nourished. No distress.   HENT:   Head: Normocephalic and atraumatic.   Right Ear: External ear normal.   Left Ear: External ear normal.   Mouth/Throat: No oropharyngeal exudate.   Clear to white postnasal drip.  Mild OP erythema.  Mild diffuse sinus tenderness.  Very poor dentition with erosion of most teeth.   Eyes: Conjunctivae and EOM are normal. Pupils are equal, round, and reactive to light. Right eye exhibits no discharge. Left eye exhibits no discharge.   Neck: Normal range of motion. Neck supple. No thyromegaly present.   Cardiovascular: Normal rate, regular rhythm and normal heart sounds. Exam reveals no gallop and no friction rub.   No murmur heard.  Pulmonary/Chest: Effort normal. No respiratory distress. She has no wheezes. She has no rales.   Coarse breath sounds diffusely without focal abnormality.   Lymphadenopathy:     She has cervical adenopathy ( Mild tonsillar lymphadenopathy bilaterally.).   Neurological: She is alert and oriented to person, place, and time. No cranial nerve deficit.   Skin: Skin is warm and dry. No rash noted. She is not diaphoretic.   Psychiatric: She has a normal mood and affect. Her behavior is normal. Judgment and thought content normal.   Nursing note and vitals reviewed.        /78   Pulse 88   Temp 99 °F (37.2 °C)   Ht 160 cm (62.99\")   Wt 92.1 kg (203 lb)   SpO2 99%   BMI 35.97 kg/m²     Nursing note and vitals reviewed.          Assessment/Plan   Marilu was seen today for uri.    Diagnoses and all orders for this visit:    Acute pansinusitis, recurrence not specified  -     azithromycin (ZITHROMAX Z-MILLY) 250 MG tablet; Take 2 tablets the first day, then 1 tablet daily for 4 days.  She was advised to use Flonase on a daily basis rather than " intermittently.    Acute bronchitis, unspecified organism  -     albuterol (PROVENTIL) (2.5 MG/3ML) 0.083% nebulizer solution; Take 2.5 mg by nebulization Every 4 (Four) Hours As Needed for Wheezing or Shortness of Air.  -     azithromycin (ZITHROMAX Z-MILLY) 250 MG tablet; Take 2 tablets the first day, then 1 tablet daily for 4 days.    Mild intermittent asthma without complication  Nebulizer for home use provided to patient today.      Call or return to clinic if symptoms worsen or persist.

## 2019-07-01 NOTE — TELEPHONE ENCOUNTER
Requested patient call us and update on who is managing Eliquis.  Refill is on hold due to requests for patient to make appt.  Patient instructed to call back.

## 2019-07-02 RX ORDER — APIXABAN 2.5 MG/1
TABLET, FILM COATED ORAL
Qty: 60 TABLET | Refills: 4 | OUTPATIENT
Start: 2019-07-02

## 2019-07-02 NOTE — PROGRESS NOTES
Problem List:  1. Inappropriate sinus tachycardia/palpitations  a. Unable to tolerate BBL or CCB secondary to hypotension, intolerant to Northera secondary to hypertension, intolerant to Florinef  b. Echocardiogram 12/15/2016, Dr. delgado: EF 61%  c. Holter monitor 2016: Rare PAC/PVC, no evidence of atrial arrhythmias, no episodes of SVT/VT, sinoatrial node conduction was normal, no AV block noted  d. Echocardiogram 2018: EF 65%, no significant valvular disease  e. Treadmill stress test 2018: Normal treadmill exercise stress test, baseline heart rate 102 bpm, peak heart rate 180 bpm  f. 48-hour Holter monitor 2018: Predominantly normal sinus rhythm, average heart rate 88 bpm, ( bpm) rare PAC/PVC  g. Echocardiogram 2019: EF 68%, no significant valvular disease  h. 30-day event monitor 2019: Predominantly normal sinus rhythm, episodes of sinus tachycardia  2. Orthostatic hypotension  a. On midodrine  3. Heterozygous factor V Leiden mutation  a. Also protein S deficiency  b. Right sided PE, 2016, no evidence of DVT  c. On chronic Eliquis therapy  d. Chest pain, dyspnea, 2018: Presented to local ED, CT chest negative for recurrent PE  4. Obesity  5. Surgical history   a.  section  b. Tubal ligation  c. Cholecystectomy  d. Diagnostic laparoscopy and ablation of endometriosis      History of Present Illness:

## 2019-07-03 ENCOUNTER — TELEPHONE (OUTPATIENT)
Dept: CARDIOLOGY | Facility: CLINIC | Age: 29
End: 2019-07-03

## 2019-07-03 ENCOUNTER — CONSULT (OUTPATIENT)
Dept: CARDIOLOGY | Facility: CLINIC | Age: 29
End: 2019-07-03

## 2019-07-03 VITALS
SYSTOLIC BLOOD PRESSURE: 142 MMHG | HEIGHT: 63 IN | BODY MASS INDEX: 35.01 KG/M2 | HEART RATE: 110 BPM | DIASTOLIC BLOOD PRESSURE: 80 MMHG | WEIGHT: 197.6 LBS | OXYGEN SATURATION: 97 %

## 2019-07-03 DIAGNOSIS — G90.3 NEUROGENIC ORTHOSTATIC HYPOTENSION (HCC): ICD-10-CM

## 2019-07-03 DIAGNOSIS — R00.2 PALPITATIONS: ICD-10-CM

## 2019-07-03 DIAGNOSIS — R00.0 INAPPROPRIATE SINUS TACHYCARDIA: Primary | ICD-10-CM

## 2019-07-03 PROCEDURE — 93000 ELECTROCARDIOGRAM COMPLETE: CPT | Performed by: INTERNAL MEDICINE

## 2019-07-03 PROCEDURE — 99214 OFFICE O/P EST MOD 30 MIN: CPT | Performed by: INTERNAL MEDICINE

## 2019-07-03 NOTE — TELEPHONE ENCOUNTER
I tried to call the patient to let her know that I have a 3 week supply of Corlanor 5 mg samples for her to . I didn't know if she wanted to just pick them up here or at the [facility] on Monday? Once she starts taking Corlanor she needs to stop taking Metoprolol.She did not answer. I left a message.

## 2019-07-03 NOTE — PROGRESS NOTES
Marilu Godoy  1990    There is no work phone number on file.      2019    [facility] CARDIOLOGY     Deidre Barker MD  [address] [place], [place] [id number]    Chief Complaint   Patient presents with   • Chest Pain     Consult   • Irregular Heart Beat       Problem List:  1. Inappropriate sinus tachycardia/palpitations  a. Unable to tolerate BBL or CCB secondary to hypotension, intolerant to Northera secondary to hypertension, intolerant to Florinef  b. Echocardiogram 12/15/2016, Dr. delgado: EF 61%  c. Holter monitor 2016: Rare PAC/PVC, no evidence of atrial arrhythmias, no episodes of SVT/VT, sinoatrial node conduction was normal, no AV block noted  d. Echocardiogram 2018: EF 65%, no significant valvular disease  e. Treadmill stress test 2018: Normal treadmill exercise stress test, baseline heart rate 102 bpm, peak heart rate 180 bpm  f. 48-hour Holter monitor 2018: Predominantly normal sinus rhythm, average heart rate 88 bpm, ( bpm) rare PAC/PVC  g. Echocardiogram 2019: EF 68%, no significant valvular disease  h. 30-day event monitor 2019: Predominantly normal sinus rhythm, episodes of sinus tachycardia  2. Orthostatic hypotension  a. On midodrine  3. Heterozygous factor V Leiden mutation  a. Also protein S deficiency  b. Right sided PE, 2016, no evidence of DVT  c. On chronic Eliquis therapy  d. Chest pain, dyspnea, 2018: Presented to local ED, CT chest negative for recurrent PE  4. Obesity  5. Surgical history   a.  section  b. Tubal ligation  c. Cholecystectomy  d. Diagnostic laparoscopy and ablation of endometriosis    Allergies  Allergies   Allergen Reactions   • Ceftin [Cefuroxime Axetil] Other (See Comments)     Numbness in mouth and throat   • Amoxicillin Rash       Current Medications    Current Outpatient Medications:   •  albuterol (PROVENTIL HFA;VENTOLIN HFA) 108 (90 Base) MCG/ACT  inhaler, Inhale 2 puffs 4 (Four) Times a Day., Disp: 1 inhaler, Rfl: 0  •  albuterol (PROVENTIL) (2.5 MG/3ML) 0.083% nebulizer solution, Take 2.5 mg by nebulization Every 4 (Four) Hours As Needed for Wheezing or Shortness of Air., Disp: 60 vial, Rfl: 12  •  apixaban (ELIQUIS) 5 MG tablet tablet, Take 1 tablet by mouth Every 12 (Twelve) Hours., Disp: 60 tablet, Rfl: 5  •  atorvastatin (LIPITOR) 80 MG tablet, Take 80 mg by mouth Daily. 200001, Disp: , Rfl: 3  •  atorvastatin (LIPITOR) 80 MG tablet, TAKE 1 TABLET BY MOUTH ONE TIME A DAY , Disp: 90 tablet, Rfl: 10  •  azithromycin (ZITHROMAX Z-MILLY) 250 MG tablet, Take 2 tablets the first day, then 1 tablet daily for 4 days., Disp: 6 tablet, Rfl: 0  •  DULoxetine (CYMBALTA) 60 MG capsule, Take 60 mg by mouth Daily., Disp: , Rfl: 4  •  fluocinonide (LIDEX) 0.05 % cream, apply twice daily for 2 weeks alternating with betamethasone, Disp: , Rfl: 1  •  fluticasone (FLONASE) 50 MCG/ACT nasal spray, INHALE 2 SPRAYS IN EACH NOSTRIL ONE TIME A DAY , Disp: 16 g, Rfl: 2  •  Fluticasone Furoate-Vilanterol (BREO ELLIPTA) 100-25 MCG/INH inhaler, Inhale 1 puff Daily., Disp: 1 each, Rfl: 5  •  loratadine (CLARITIN) 10 MG tablet, Take 1 tablet by mouth Daily., Disp: 30 tablet, Rfl: 11  •  metoprolol tartrate (LOPRESSOR) 25 MG tablet, TAKE 1 TABLET BY MOUTH TWO TIMES A DAY FOR 30 DAYS, Disp: 60 tablet, Rfl: 6  •  minocycline (MINOCIN,DYNACIN) 100 MG capsule, Take 100 mg by mouth 2 (Two) Times a Day., Disp: , Rfl: 1  •  raNITIdine (ZANTAC) 150 MG tablet, Take 1 tablet by mouth 2 (Two) Times a Day., Disp: 60 tablet, Rfl: 5  •  tiZANidine (ZANAFLEX) 4 MG tablet, Take 1 tablet by mouth 2 (Two) Times a Day As Needed for Muscle Spasms., Disp: 60 tablet, Rfl: 1    History of Present Illness   HPI    Pt presents for follow up of IAST, palpitations. Since the pt has seen us, her biggest complaint has been her heart racing associated with tightness in her chest, can occur at rest and does not  "necessarily worsen with exertion.  She feels this everyday.   He has been on metoprolol but does not feel this helps much.  She has been intolerant in the past to higher doses of BBL/CCB.  She does get dizzy/lightheaded a few times a week but denies any recent syncope.  Previously treated with midodrine.  Has had intolerance to Florinef and Northera.  Denies any hospitalizations or ER visits.  Tolerating medications without difficulty.    ROS:  General:  + fatigue, no weight gain or loss  Cardiovascular:  Denies CP, PND, syncope, + near syncope, no edema, + palpitations.  Pulmonary:  Denies SMITH, cough, or wheezing    Vitals:    07/03/19 1044   BP: 142/80   BP Location: Left arm   Patient Position: Sitting   Pulse: 110   SpO2: 97%   Weight: 89.6 kg (197 lb 9.6 oz)   Height: 160 cm (63\")       PE:  General: NAD  Neck: no JVD, no carotid bruits, no TM  Heart RRR, NL S1, S2, no rubs, murmurs  Lungs: CTA, no wheezes, rhonchi, or rales  Abd: soft, non-tender, NL BS  Ext: No musculoskeletal deformities, no edema, cyanosis, or clubbing  Psych: normal mood and affect    Diagnostic Data:    ECG 12 Lead  Date/Time: 7/3/2019 10:55 AM  Performed by: Eddie Vazquez MD  Authorized by: Eddie Vazquez MD   Comparison: compared with previous ECG from 3/28/2019  Similar to previous ECG  Rhythm: sinus rhythm  BPM: 95              1. Inappropriate sinus tachycardia    2. Palpitations    3. Neurogenic orthostatic hypotension (CMS/HCC)        Plan:  1) IAST:  - Longstanding history of IAST, intolerant to many medications.  Currently treated with low dose metoprolol but notes no improvement in her symptoms.  Monitor in April showing no arrhythmias but did have episodes of sinus tachycardia up to 140's-160's.  Would favor a trial of Corlanor to see if this improves her sinus tachycardia.  Will start 5 mg bid.  Can increase to 7.5 mg bid if tolerated.  If she fails Corlanor, can consider sinus node modification.    - Needs to " increase activity/exercise.  2) Syncope:  - Occasional dizziness, no recurrent syncope.  Intolerant to midodrine, florinef and Northera.  - Increase hydration, salt      F/up in 6 months    Scribed for Eddie Vazquez MD by Christie James, ARNIE. 7/3/2019  11:18 AM     I, Eddie Vazquez MD, personally performed the services described in this documentation as scribed by the above named individual in my presence, and it is both accurate and complete.  7/3/2019  11:19 AM

## 2019-07-08 ENCOUNTER — OFFICE VISIT (OUTPATIENT)
Dept: INTERNAL MEDICINE | Facility: CLINIC | Age: 29
End: 2019-07-08

## 2019-07-08 VITALS
TEMPERATURE: 98.9 F | HEIGHT: 63 IN | SYSTOLIC BLOOD PRESSURE: 120 MMHG | BODY MASS INDEX: 35.61 KG/M2 | HEART RATE: 102 BPM | DIASTOLIC BLOOD PRESSURE: 82 MMHG | WEIGHT: 201 LBS | OXYGEN SATURATION: 99 %

## 2019-07-08 DIAGNOSIS — M54.42 CHRONIC BILATERAL LOW BACK PAIN WITH BILATERAL SCIATICA: Primary | ICD-10-CM

## 2019-07-08 DIAGNOSIS — G89.29 CHRONIC BILATERAL LOW BACK PAIN WITH BILATERAL SCIATICA: Primary | ICD-10-CM

## 2019-07-08 DIAGNOSIS — M79.605 LUMBAR PAIN WITH RADIATION DOWN LEFT LEG: ICD-10-CM

## 2019-07-08 DIAGNOSIS — M54.50 LUMBAR PAIN WITH RADIATION DOWN LEFT LEG: ICD-10-CM

## 2019-07-08 DIAGNOSIS — M46.1 INFLAMMATION OF BOTH SACROILIAC JOINTS (HCC): ICD-10-CM

## 2019-07-08 DIAGNOSIS — M54.41 CHRONIC BILATERAL LOW BACK PAIN WITH BILATERAL SCIATICA: Primary | ICD-10-CM

## 2019-07-08 PROCEDURE — 99213 OFFICE O/P EST LOW 20 MIN: CPT | Performed by: PHYSICIAN ASSISTANT

## 2019-07-08 NOTE — PROGRESS NOTES
"Marilu Godoy is a 28 y.o. female.     Subjective   History of Present Illness   Patient with complex medical history including history of pulmonary embolus, protein S deficiency, factor V Leiden, claudication, sinus tachycardia, obesity and CAD here today with concern of 3-4 days of tenderness of the medial aspect of the proximal left lower leg.  She reports that the tenderness feels like a bruise but there is no visible bruising, discoloration or rash.  She does feel as though the area of tenderness is slightly swollen compared to the other leg.  No know injury. She is taking eliquis twice daily as directed due to clotting disorder.  No CP or SOA.  She does have chronic low-back pain with bilateral sciatica which affects the left leg greater than the right.  She has been under the care of physical therapy for greater than 6 weeks without any improvement in symptoms.  She is seeing an orthopedic surgeon who has recently tried injections which were not efficacious.  She has not been evaluated with MRI.  She does experience numbness in the left leg.  Approximately 2 months ago she presented with increased low back pain at which time it appeared as though she was experiencing sacroiliitis which was confirmed with SI joint x-rays with the following impression: \"mild increased density identified and sclerosis within the iliac bones at the sacroiliac joint suggesting possible prior inflammation. There is no evidence of diastases or effusion. No significant osteophyte formation.\"         The following portions of the patient's history were reviewed and updated as appropriate: allergies, current medications, past family history, past medical history, past social history, past surgical history and problem list.    Review of Systems   Constitutional: Negative for appetite change, chills, fatigue, fever and unexpected weight change.   Respiratory: Negative for cough, chest tightness, shortness of breath and wheezing.  "   Cardiovascular: Positive for leg swelling. Negative for chest pain and palpitations.   Musculoskeletal: Positive for arthralgias, back pain, joint swelling (below left knee) and myalgias (left lower leg). Negative for gait problem, neck pain and neck stiffness.   Neurological: Positive for numbness. Negative for dizziness, seizures, syncope, weakness and headaches.   Hematological: Negative for adenopathy. Does not bruise/bleed easily.   Psychiatric/Behavioral: Negative for dysphoric mood, self-injury and suicidal ideas. The patient is not nervous/anxious.          Objective    Physical Exam   Constitutional: She is oriented to person, place, and time. She appears well-developed and well-nourished. No distress.   Eyes: Conjunctivae and EOM are normal. Pupils are equal, round, and reactive to light.   Neck: Normal range of motion. Neck supple.   Cardiovascular: Normal rate, regular rhythm and normal heart sounds. Exam reveals no gallop and no friction rub.   No murmur heard.  Pulmonary/Chest: Effort normal and breath sounds normal. No stridor. No respiratory distress. She has no wheezes. She has no rales. She exhibits no tenderness.   Abdominal: Soft. Bowel sounds are normal. There is no tenderness. There is no rebound. No hernia.   Musculoskeletal: Normal range of motion. She exhibits tenderness. She exhibits no edema or deformity.        Left knee: She exhibits bony tenderness. She exhibits normal range of motion, no swelling, no effusion, no ecchymosis, no deformity, no laceration, no erythema, normal alignment and normal patellar mobility.        Legs:  Lymphadenopathy:     She has no cervical adenopathy.   Neurological: She is alert and oriented to person, place, and time. She displays normal reflexes. No cranial nerve deficit or sensory deficit. She exhibits normal muscle tone. Coordination normal.   Positive straight leg raise bilaterally.    Skin: Skin is warm and dry. Capillary refill takes less than 2  "seconds. No rash noted. She is not diaphoretic. No erythema. No pallor.   Psychiatric: She has a normal mood and affect. Her behavior is normal. Judgment and thought content normal.   Nursing note and vitals reviewed.        /82   Pulse 102   Temp 98.9 °F (37.2 °C)   Ht 160 cm (62.99\")   Wt 91.2 kg (201 lb)   SpO2 99%   BMI 35.61 kg/m²     Nursing note and vitals reviewed.        Assessment/Plan   Marilu was seen today for leg pain.    Diagnoses and all orders for this visit:    Chronic bilateral low back pain with bilateral sciatica  -     MRI Lumbar Spine Without Contrast    Inflammation of both sacroiliac joints (CMS/HCC)    Lumbar pain with radiation down left leg    Current left lower leg pain likely radicular pain.  Will evaluate with MRI.  She has failed physical therapy and injections from orthopedic surgery group.             "

## 2019-07-15 RX ORDER — ERYTHROMYCIN 5 MG/G
OINTMENT OPHTHALMIC EVERY 6 HOURS
Qty: 3.5 G | Refills: 0 | Status: SHIPPED | OUTPATIENT
Start: 2019-07-15 | End: 2019-07-22

## 2019-07-17 ENCOUNTER — PATIENT MESSAGE (OUTPATIENT)
Dept: CARDIOLOGY | Facility: CLINIC | Age: 29
End: 2019-07-17

## 2019-07-17 ENCOUNTER — HOSPITAL ENCOUNTER (OUTPATIENT)
Dept: MRI IMAGING | Facility: HOSPITAL | Age: 29
Discharge: HOME | End: 2019-07-17
Admitting: PHYSICIAN ASSISTANT

## 2019-07-17 PROCEDURE — 72148 MRI LUMBAR SPINE W/O DYE: CPT

## 2019-07-22 ENCOUNTER — OFFICE VISIT (OUTPATIENT)
Dept: INTERNAL MEDICINE | Facility: CLINIC | Age: 29
End: 2019-07-22

## 2019-07-22 VITALS
SYSTOLIC BLOOD PRESSURE: 135 MMHG | BODY MASS INDEX: 35.61 KG/M2 | RESPIRATION RATE: 15 BRPM | DIASTOLIC BLOOD PRESSURE: 83 MMHG | HEART RATE: 95 BPM | WEIGHT: 201 LBS | OXYGEN SATURATION: 100 % | HEIGHT: 63 IN | TEMPERATURE: 98.5 F

## 2019-07-22 DIAGNOSIS — M54.50 LUMBAR PAIN WITH RADIATION DOWN LEFT LEG: ICD-10-CM

## 2019-07-22 DIAGNOSIS — D17.79 EPIDURAL LIPOMATOSIS: Primary | ICD-10-CM

## 2019-07-22 DIAGNOSIS — M47.816 ARTHRITIS OF FACET JOINT OF LUMBAR SPINE: ICD-10-CM

## 2019-07-22 DIAGNOSIS — M79.605 LUMBAR PAIN WITH RADIATION DOWN LEFT LEG: ICD-10-CM

## 2019-07-22 PROCEDURE — 99214 OFFICE O/P EST MOD 30 MIN: CPT | Performed by: NURSE PRACTITIONER

## 2019-07-22 NOTE — PROGRESS NOTES
Chief Complaint / Reason:      Chief Complaint   Patient presents with   • Back Pain     going into legs       Subjective     HPI  Patient presents today with complaints of ongoing back pain.  She states that the pain is radiating into her bilateral legs.  Patient states left is worse than right.  Patient has history includes history of PE protein S deficiency factor V Leiden, claudication, inappropriate sinus tachycardia.  Patient has had an extensive cardiac work-up regarding shortness of breath and heart palpitations.  She recently seen electrophysiologist who started Corlanor but she states she doesnt seem like it helping.  He states she has not picked up the prescription but she is taking the samples that was given in the office.  Vital signs are stable.  Patient denies bladder or bowel incontinence.  She states that she has been to physical therapy and has had injections.  She recently had an MRI that showed mild facet arthritis and minimal epidural lipomatosis.  Patient is scheduled with Dr. Byrne she thinks in around 2 weeks.   History taken from: patient    PMH/FH/Social History were reviewed and updated appropriately in the electronic medical record.   Past Medical History:   Diagnosis Date   • Abdominal pain    • Abnormal Pap smear of cervix    • Anxiety    • Asthma 2015   • Bleeding disorder (CMS/HCC) 11-13-16   • Body piercing     TONGUE, NOSE, TWO IN EACH EAR   • Chest pain    • Clotting disorder (CMS/HCC)     factor 5   • Constipation    • Coronary artery disease involving native coronary artery of native heart without angina pectoris 6/16/2017   • Deep vein thrombosis (CMS/HCC) 11-13-16   • Diarrhea    • Diarrhea    • Endometriosis    • Factor 5 Leiden mutation, heterozygous (CMS/HCC)    • Fibroid    • Gallstone    • GERD (gastroesophageal reflux disease)    • H/O blood clots    • Headache    • Hiatal hernia    • High cholesterol    • History of recurrent UTIs    • HPV (human papilloma virus)  infection    • Hypertension    • Kidney infection    • Migraine    • Nausea    • Nausea & vomiting    • Obesity    • Orthostatic hypotension    • Osteoarthritis    • Ovarian cyst    • Pollen allergies    • Protein S deficiency (CMS/HCC) 2016    labs from hospitalization for PE   • Pulmonary embolism (CMS/HCC)    • Recurrent pregnancy loss, antepartum condition or complication    • Sleep apnea     CPAP ASKED TO BRING DOS   • Subclinical hyperthyroidism    • Tachycardia    • Tattoo     LOWER BACK   • Varicella      Past Surgical History:   Procedure Laterality Date   •  SECTION      X2  and    •  SECTION WITH TUBAL N/A 1/15/2019    Procedure:  SECTION REPEAT WITH TUBAL;  Surgeon: Alva Boyer MD;  Location: [facility] LABOR DELIVERY;  Service: Obstetrics/Gynecology   • CHOLECYSTECTOMY     • COLONOSCOPY N/A 2017    Procedure: COLONOSCOPY WITH BIOPSIES AND ARGON THERMAL ABLATION;  Surgeon: Jignesh Selby MD;  Location: [facility] ENDOSCOPY;  Service:    • DIAGNOSTIC LAPAROSCOPY N/A 2018    Procedure: DIAGNOSTIC LAPAROSCOPY AND ABLATION OF ENDOMETRIOSIS;  Surgeon: Evin Zamudio MD;  Location: [facility] OR;  Service: Obstetrics/Gynecology   • ENDOMETRIAL ABLATION     • ENDOSCOPY N/A 2017    Procedure: ESOPHAGOGASTRODUODENOSCOPY WITH BIOPSIES AND COLD BIOPSY POLYPECTOMIES;  Surgeon: Jignesh Selby MD;  Location: [facility] ENDOSCOPY;  Service:    • PELVIC LAPAROSCOPY       Social History     Socioeconomic History   • Marital status:      Spouse name: Not on file   • Number of children: Not on file   • Years of education: Not on file   • Highest education level: Not on file   Tobacco Use   • Smoking status: Never Smoker   • Smokeless tobacco: Never Used   Substance and Sexual Activity   • Alcohol use: No   • Drug use: No   • Sexual activity: Yes     Partners: Male     Birth control/protection: None     Comment: PE while on birth control patch     Family History    Problem Relation Age of Onset   • Arthritis Mother    • COPD Mother    • Asthma Mother    • Thyroid disease Mother    • Arthritis Father    • Diabetes Father    • Hypertension Father    • Hyperlipidemia Father    • Kidney disease Father    • Heart attack Father    • Coronary artery disease Father    • Dementia Father    • No Known Problems Son    • Colon cancer Neg Hx    • Liver cancer Neg Hx    • Liver disease Neg Hx    • Stomach cancer Neg Hx    • Esophageal cancer Neg Hx        Review of Systems:   Review of Systems   Constitutional: Positive for activity change and fatigue.   HENT: Negative for congestion, sinus pressure and sore throat.    Respiratory: Negative.    Cardiovascular: Negative.    Gastrointestinal: Positive for abdominal pain.   Musculoskeletal: Positive for arthralgias, back pain and neck pain.   Neurological: Positive for weakness and numbness.   Psychiatric/Behavioral: Positive for stress. The patient is nervous/anxious.          All other systems were reviewed and are negative.  Exceptions are noted in the subjective or above.      Objective     Vital Signs  Vitals:    07/22/19 0813   BP: 135/83   Pulse: 95   Resp: 15   Temp: 98.5 °F (36.9 °C)   SpO2: 100%       Body mass index is 35.61 kg/m².    Physical Exam   Constitutional: She is oriented to person, place, and time. She appears well-developed and well-nourished. No distress.   HENT:   Head: Normocephalic.   Neck: No JVD present.   Cardiovascular: Normal rate, regular rhythm, normal heart sounds and intact distal pulses.   No murmur heard.  No edema   Pulmonary/Chest: Effort normal and breath sounds normal. No respiratory distress. She exhibits no tenderness.   Abdominal: Soft. She exhibits distension. There is tenderness in the right lower quadrant. There is no rigidity, no rebound, no guarding and no CVA tenderness.   Currently on period     Musculoskeletal: She exhibits tenderness and deformity.        Lumbar back: She exhibits bony  tenderness and pain.   Neurological: She is alert and oriented to person, place, and time.   Skin: Skin is warm and dry. Capillary refill takes less than 2 seconds. She is not diaphoretic.   Psychiatric: She has a normal mood and affect.   Nursing note and vitals reviewed.           Results Review:    I reviewed the patient's previous clinical results.   Results for orders placed in visit on 07/08/19   MRI Lumbar Spine Without Contrast    Narrative MRI LUMBAR SPINE     INDICATION: Chronic low back pain with bilateral sciatica greater  towards the left. Patient denies injury.     FINDINGS: Multisequence multiplanar MR imaging of the lumbar spine  without contrast.     No compression deformity. No subluxation. Conus has a normal appearance  and level of termination. Lumbar disc spaces are preserved. No marrow  edema or marrow replacement. Minimal epidural lipomatosis is identified  most evident near the lumbosacral junction. This appears very similar to  previous exam from 02/05/2018.     Axial images were obtained through the following levels:     L1-2: No canal or foraminal narrowing.     L2-3: No canal or foraminal narrowing.     L3-4: Minimal facet arthritis. No canal or foraminal narrowing.     L4-5: Mild facet arthritis. No canal or foraminal narrowing.     L5-S1: No canal or foraminal narrowing.       Impression 1. Mild facet arthritis.  2. No significant canal or foraminal narrowing.     This report was finalized on 7/17/2019 11:13 AM by Brando Gary MD.         Medication Review:     Current Outpatient Medications:   •  albuterol (PROVENTIL HFA;VENTOLIN HFA) 108 (90 Base) MCG/ACT inhaler, Inhale 2 puffs 4 (Four) Times a Day., Disp: 1 inhaler, Rfl: 0  •  albuterol (PROVENTIL) (2.5 MG/3ML) 0.083% nebulizer solution, Take 2.5 mg by nebulization Every 4 (Four) Hours As Needed for Wheezing or Shortness of Air., Disp: 60 vial, Rfl: 12  •  apixaban (ELIQUIS) 5 MG tablet tablet, Take 1 tablet by mouth Every 12  (Twelve) Hours., Disp: 60 tablet, Rfl: 5  •  atorvastatin (LIPITOR) 80 MG tablet, TAKE 1 TABLET BY MOUTH ONE TIME A DAY , Disp: 90 tablet, Rfl: 10  •  DULoxetine (CYMBALTA) 60 MG capsule, Take 60 mg by mouth Daily., Disp: , Rfl: 4  •  fluocinonide (LIDEX) 0.05 % cream, apply twice daily for 2 weeks alternating with betamethasone, Disp: , Rfl: 1  •  fluticasone (FLONASE) 50 MCG/ACT nasal spray, INHALE 2 SPRAYS IN EACH NOSTRIL ONE TIME A DAY , Disp: 16 g, Rfl: 2  •  Fluticasone Furoate-Vilanterol (BREO ELLIPTA) 100-25 MCG/INH inhaler, Inhale 1 puff Daily., Disp: 1 each, Rfl: 5  •  ivabradine HCl (CORLANOR) 5 MG tablet tablet, Take 1 tablet by mouth 2 (Two) Times a Day With Meals., Disp: 60 tablet, Rfl: 11  •  loratadine (CLARITIN) 10 MG tablet, Take 1 tablet by mouth Daily., Disp: 30 tablet, Rfl: 11  •  metoprolol tartrate (LOPRESSOR) 25 MG tablet, TAKE 1 TABLET BY MOUTH TWO TIMES A DAY FOR 30 DAYS, Disp: 60 tablet, Rfl: 6  •  minocycline (MINOCIN,DYNACIN) 100 MG capsule, Take 100 mg by mouth 2 (Two) Times a Day., Disp: , Rfl: 1  •  raNITIdine (ZANTAC) 150 MG tablet, Take 1 tablet by mouth 2 (Two) Times a Day., Disp: 60 tablet, Rfl: 5  •  tiZANidine (ZANAFLEX) 4 MG tablet, Take 1 tablet by mouth 2 (Two) Times a Day As Needed for Muscle Spasms., Disp: 60 tablet, Rfl: 1  •  diclofenac (VOLTAREN) 1 % gel gel, Apply 4 g topically to the appropriate area as directed 4 (Four) Times a Day As Needed (joint pain)., Disp: 100 g, Rfl: 0    Assessment/Plan   Marilu was seen today for back pain.    Diagnoses and all orders for this visit:    Epidural lipomatosis  Follow up with Dr. Byrne and PT  Lumbar pain with radiation down left leg  -     diclofenac (VOLTAREN) 1 % gel gel; Apply 4 g topically to the appropriate area as directed 4 (Four) Times a Day As Needed (joint pain).  Take medications as prescribed.  back exercises discussed .  Advised to stay as active as possible, within pain limits. Discussed Cauda equina  syndrome.  Instructed to avoid twisting and bending, particularly when lifting.   Use proper body mechanics to prevent further complications.  Return to normal activities as tolerated.  Advised patient to follow-up with Dr. Byrne as scheduled.    Arthritis of facet joint of lumbar spine (CMS/HCC)  -     diclofenac (VOLTAREN) 1 % gel gel; Apply 4 g topically to the appropriate area as directed 4 (Four) Times a Day As Needed (joint pain).        Return if symptoms worsen or fail to improve.    Barby Garcia, APRN  07/22/2019

## 2019-07-29 RX ORDER — ATENOLOL 25 MG/1
TABLET ORAL
Qty: 30 TABLET | Refills: 10 | OUTPATIENT
Start: 2019-07-29

## 2019-07-29 RX ORDER — APIXABAN 2.5 MG/1
TABLET, FILM COATED ORAL
Qty: 60 TABLET | Refills: 4 | OUTPATIENT
Start: 2019-07-29

## 2019-07-30 ENCOUNTER — OFFICE VISIT (OUTPATIENT)
Dept: INTERNAL MEDICINE | Facility: CLINIC | Age: 29
End: 2019-07-30

## 2019-07-30 ENCOUNTER — HOSPITAL ENCOUNTER (OUTPATIENT)
Dept: GENERAL RADIOLOGY | Facility: HOSPITAL | Age: 29
Discharge: HOME | End: 2019-07-30
Admitting: PHYSICIAN ASSISTANT

## 2019-07-30 ENCOUNTER — DOCUMENTATION (OUTPATIENT)
Dept: CARDIOLOGY | Facility: CLINIC | Age: 29
End: 2019-07-30

## 2019-07-30 VITALS
DIASTOLIC BLOOD PRESSURE: 82 MMHG | SYSTOLIC BLOOD PRESSURE: 110 MMHG | HEIGHT: 63 IN | WEIGHT: 201 LBS | TEMPERATURE: 99.5 F | BODY MASS INDEX: 35.61 KG/M2 | HEART RATE: 107 BPM | OXYGEN SATURATION: 100 %

## 2019-07-30 DIAGNOSIS — G89.29 CHRONIC LEFT HIP PAIN: Primary | ICD-10-CM

## 2019-07-30 DIAGNOSIS — M54.50 LUMBAR PAIN WITH RADIATION DOWN LEFT LEG: ICD-10-CM

## 2019-07-30 DIAGNOSIS — M79.605 LUMBAR PAIN WITH RADIATION DOWN LEFT LEG: ICD-10-CM

## 2019-07-30 DIAGNOSIS — M47.816 ARTHRITIS OF FACET JOINT OF LUMBAR SPINE: ICD-10-CM

## 2019-07-30 DIAGNOSIS — M25.552 CHRONIC LEFT HIP PAIN: Primary | ICD-10-CM

## 2019-07-30 PROCEDURE — 73502 X-RAY EXAM HIP UNI 2-3 VIEWS: CPT

## 2019-07-30 PROCEDURE — 99213 OFFICE O/P EST LOW 20 MIN: CPT | Performed by: PHYSICIAN ASSISTANT

## 2019-07-30 NOTE — PROGRESS NOTES
PA faxed to [employer] [place] at [phone].  Was unable to get PA through cover my meds or [employer].

## 2019-07-30 NOTE — PROGRESS NOTES
Marilu Godoy is a 28 y.o. female.     Subjective   History of Present Illness   Here today with concern of left hip pain ongoing for many months which feels like it is worsening. She also experiences chronic low back pain which radiates to the left hip and laterally down the leg to the ankle.  No numbness or tingling but the left leg feels weak at times as if it will give out.  She had bilateral SI joint injections with her pain specialist nearly 2 months ago which never seemed to help. She has completed at least 6 weeks of physical therapy which never improved the pain. She will establish with orthopedic surgery on 8/8/19.     MRI lumbar on 7/17/19  IMPRESSION:  1. Mild facet arthritis.  2. No significant canal or foraminal narrowing.    XR bilateral sacroiliac joints on 5/7/19  IMPRESSION:  Mild increased density identified and sclerosis within the  iliac bones at the sacroiliac joint suggesting possible prior  inflammation. There is no evidence of diastases or effusion. No  significant osteophyte formation.          The following portions of the patient's history were reviewed and updated as appropriate: allergies, current medications, past family history, past medical history, past social history, past surgical history and problem list.    Review of Systems   Constitutional: Negative for appetite change, chills, fatigue, fever and unexpected weight change.   Eyes: Negative for visual disturbance.   Respiratory: Negative for cough, chest tightness, shortness of breath and wheezing.    Cardiovascular: Negative for chest pain, palpitations and leg swelling.   Musculoskeletal: Positive for arthralgias and back pain. Negative for gait problem, joint swelling, myalgias, neck pain and neck stiffness.   Skin: Negative.    Neurological: Positive for weakness (left hip). Negative for dizziness, tremors, seizures, syncope, facial asymmetry, speech difficulty, light-headedness, numbness and headaches.  "  Hematological: Negative for adenopathy. Does not bruise/bleed easily.   Psychiatric/Behavioral: Negative for agitation, confusion, dysphoric mood, self-injury and suicidal ideas. The patient is not nervous/anxious.          Objective    Physical Exam   Constitutional: She is oriented to person, place, and time. She appears well-developed and well-nourished. No distress.   HENT:   Head: Normocephalic and atraumatic.   Eyes: Conjunctivae and EOM are normal. Pupils are equal, round, and reactive to light.   Neck: Normal range of motion. Neck supple.   Cardiovascular: Normal rate, regular rhythm and normal heart sounds. Exam reveals no gallop and no friction rub.   No murmur heard.  Pulmonary/Chest: Effort normal and breath sounds normal. No respiratory distress. She has no wheezes. She has no rales.   Abdominal: Soft. Bowel sounds are normal. There is no tenderness.   Musculoskeletal: Normal range of motion. She exhibits tenderness (lumbar vertebral, left-sided lumbar paraspinal muscles, bilateral SI joints, left gluteal and left hip tenderness. ). She exhibits no edema or deformity.   Neurological: She is alert and oriented to person, place, and time. She displays normal reflexes. No cranial nerve deficit or sensory deficit. She exhibits normal muscle tone. Coordination normal.   Skin: Skin is warm and dry. Capillary refill takes less than 2 seconds. No rash noted. She is not diaphoretic. No erythema. No pallor.   Psychiatric: She has a normal mood and affect. Her behavior is normal. Judgment and thought content normal.   Nursing note and vitals reviewed.        /82   Pulse 107   Temp 99.5 °F (37.5 °C)   Ht 160 cm (62.99\")   Wt 91.2 kg (201 lb)   SpO2 100%   BMI 35.61 kg/m²     Nursing note and vitals reviewed.        Assessment/Plan   Marilu was seen today for follow-up.    Diagnoses and all orders for this visit:    Chronic left hip pain  -     XR hip w or wo pelvis 2-3 view left    Lumbar pain with " radiation down left leg    Arthritis of facet joint of lumbar spine (CMS/HCC)      She is scheduled to establish care with orthopedic surgery in 1 week regarding lumbar facet arthropathy. Will evaluate the hip with x-ray today.

## 2019-08-02 ENCOUNTER — DOCUMENTATION (OUTPATIENT)
Dept: CARDIOLOGY | Facility: CLINIC | Age: 29
End: 2019-08-02

## 2019-08-12 ENCOUNTER — OFFICE VISIT (OUTPATIENT)
Dept: INTERNAL MEDICINE | Facility: CLINIC | Age: 29
End: 2019-08-12

## 2019-08-12 ENCOUNTER — HOSPITAL ENCOUNTER (OUTPATIENT)
Dept: GENERAL RADIOLOGY | Facility: HOSPITAL | Age: 29
Discharge: HOME | End: 2019-08-12

## 2019-08-12 ENCOUNTER — HOSPITAL ENCOUNTER (OUTPATIENT)
Dept: ULTRASOUND IMAGING | Facility: HOSPITAL | Age: 29
Discharge: HOME | End: 2019-08-12
Admitting: PHYSICIAN ASSISTANT

## 2019-08-12 VITALS
TEMPERATURE: 99.7 F | BODY MASS INDEX: 35.97 KG/M2 | WEIGHT: 203 LBS | HEART RATE: 90 BPM | OXYGEN SATURATION: 99 % | HEIGHT: 63 IN | DIASTOLIC BLOOD PRESSURE: 80 MMHG | SYSTOLIC BLOOD PRESSURE: 114 MMHG

## 2019-08-12 DIAGNOSIS — R20.2 NUMBNESS AND TINGLING OF RIGHT ARM: ICD-10-CM

## 2019-08-12 DIAGNOSIS — M25.511 ACUTE PAIN OF RIGHT SHOULDER: Primary | ICD-10-CM

## 2019-08-12 DIAGNOSIS — R20.0 NUMBNESS AND TINGLING OF RIGHT ARM: ICD-10-CM

## 2019-08-12 DIAGNOSIS — M25.511 ACUTE PAIN OF RIGHT SHOULDER: ICD-10-CM

## 2019-08-12 DIAGNOSIS — R20.2 NUMBNESS AND TINGLING OF RIGHT ARM: Primary | ICD-10-CM

## 2019-08-12 DIAGNOSIS — R20.0 NUMBNESS AND TINGLING OF RIGHT ARM: Primary | ICD-10-CM

## 2019-08-12 PROCEDURE — 99213 OFFICE O/P EST LOW 20 MIN: CPT | Performed by: PHYSICIAN ASSISTANT

## 2019-08-12 PROCEDURE — 73030 X-RAY EXAM OF SHOULDER: CPT

## 2019-08-12 PROCEDURE — 93971 EXTREMITY STUDY: CPT

## 2019-08-12 PROCEDURE — 72040 X-RAY EXAM NECK SPINE 2-3 VW: CPT

## 2019-08-12 NOTE — PROGRESS NOTES
Marilu Godoy is a 28 y.o. female.     Subjective   History of Present Illness   Here today with concern of acute onset sharp pain shooting from the right shoulder to the hand for 3-4 days. 2 days ago she also noticed a bruise on the right forearm with no known injury. The right hand has intermittently been going numb for 2-3 days.  No edema of the arm.  She has also developed chest tightness on the right side for the last 2-3 days which she notices more so with taking deep breaths.  She denies chest pain or palpitations.  She also endorses occasional numbness in the right hand for 2-3 months without any other symptoms until a few days ago.  No bothersome neck pain.  No increased SOA beyond her normal.  She is taking Eliquis twice daily as directed due to history of pulmonary embolism.          The following portions of the patient's history were reviewed and updated as appropriate: allergies, current medications, past family history, past medical history, past social history, past surgical history and problem list.    Review of Systems   Constitutional: Negative for activity change, appetite change, chills, diaphoresis, fatigue, fever and unexpected weight change.   Respiratory: Positive for chest tightness and shortness of breath (chronic, stable). Negative for cough and wheezing.    Cardiovascular: Negative for chest pain, palpitations and leg swelling.   Gastrointestinal: Negative for abdominal pain, blood in stool, diarrhea and vomiting.   Musculoskeletal: Positive for arthralgias, back pain (chronic) and myalgias. Negative for neck pain and neck stiffness.   Neurological: Negative for tremors, seizures, speech difficulty, weakness, numbness and headaches.   Hematological: Negative for adenopathy. Bruises/bleeds easily.   Psychiatric/Behavioral: Negative for agitation, confusion, dysphoric mood, self-injury and suicidal ideas. The patient is not nervous/anxious.          Objective    Physical Exam  "  Constitutional: She is oriented to person, place, and time. She appears well-developed and well-nourished. No distress.   HENT:   Head: Normocephalic and atraumatic.   Eyes: Conjunctivae and EOM are normal. Pupils are equal, round, and reactive to light.   Neck: Normal range of motion. Neck supple.   Cardiovascular: Normal rate, regular rhythm, normal heart sounds and intact distal pulses. Exam reveals no gallop and no friction rub.   No murmur heard.  Pulmonary/Chest: Effort normal and breath sounds normal. No respiratory distress. She has no wheezes. She has no rales. She exhibits tenderness.       Musculoskeletal: Normal range of motion. She exhibits tenderness (right trapezius, right deltoid). She exhibits no edema or deformity.   Neurological: She is alert and oriented to person, place, and time. She displays normal reflexes. No cranial nerve deficit or sensory deficit. She exhibits normal muscle tone. Coordination normal.   Skin: Skin is warm and dry. Capillary refill takes less than 2 seconds. No rash noted. She is not diaphoretic. No erythema. No pallor.        Psychiatric: She has a normal mood and affect. Her behavior is normal. Judgment and thought content normal.   Nursing note and vitals reviewed.        /80   Pulse 90   Temp 99.7 °F (37.6 °C)   Ht 160 cm (62.99\")   Wt 92.1 kg (203 lb)   SpO2 99%   BMI 35.97 kg/m²     Nursing note and vitals reviewed.          Assessment/Plan   Marilu was seen today for arm pain.    Diagnoses and all orders for this visit:    Acute pain of right shoulder  -     US venous doppler upper extremity right (duplex)  -     XR Spine Cervical 2 or 3 View  -     XR Shoulder 2+ View Right    Numbness and tingling of right arm  -     US venous doppler upper extremity right (duplex)  -     XR Spine Cervical 2 or 3 View  -     XR Shoulder 2+ View Right      Will evaluate for possible DVT, though it is unlikely given her stated compliance with Eliquis 5 mg bid.  More " likely cause of symptoms would be radicular pain.  Will also evaluate with x-rays.     ER with any acutely worsened symptoms.

## 2019-08-15 DIAGNOSIS — M25.511 ACUTE PAIN OF RIGHT SHOULDER: ICD-10-CM

## 2019-08-15 DIAGNOSIS — R20.0 NUMBNESS AND TINGLING OF RIGHT ARM: Primary | ICD-10-CM

## 2019-08-15 DIAGNOSIS — R20.2 NUMBNESS AND TINGLING OF RIGHT ARM: Primary | ICD-10-CM

## 2019-08-18 ENCOUNTER — HOSPITAL ENCOUNTER (EMERGENCY)
Facility: HOSPITAL | Age: 29
Discharge: HOME | End: 2019-08-18
Attending: EMERGENCY MEDICINE | Admitting: EMERGENCY MEDICINE

## 2019-08-18 VITALS
DIASTOLIC BLOOD PRESSURE: 86 MMHG | OXYGEN SATURATION: 98 % | WEIGHT: 207 LBS | HEART RATE: 100 BPM | BODY MASS INDEX: 38.09 KG/M2 | SYSTOLIC BLOOD PRESSURE: 133 MMHG | HEIGHT: 62 IN | RESPIRATION RATE: 18 BRPM | TEMPERATURE: 97.8 F

## 2019-08-18 DIAGNOSIS — M79.645 PAIN OF FINGER OF LEFT HAND: Primary | ICD-10-CM

## 2019-08-18 PROCEDURE — 99283 EMERGENCY DEPT VISIT LOW MDM: CPT

## 2019-08-18 NOTE — ED PROVIDER NOTES
Subjective   28-year-old female presents with left middle finger pain, no bite, no injury, she states she was holding her phone and felt pain.         History provided by:  Patient   used: No        Review of Systems   Musculoskeletal:        Left middle finger pain   All other systems reviewed and are negative.      Past Medical History:   Diagnosis Date   • Abdominal pain    • Abnormal Pap smear of cervix    • Anxiety    • Asthma    • Bleeding disorder (CMS/[facility]) 16   • Body piercing     TONGUE, NOSE, TWO IN EACH EAR   • Chest pain    • Clotting disorder (CMS/[facility])     factor 5   • Constipation    • Coronary artery disease involving native coronary artery of native heart without angina pectoris 2017   • Deep vein thrombosis (CMS/[facility]) 16   • Diarrhea    • Diarrhea    • Endometriosis    • Factor 5 Leiden mutation, heterozygous (CMS/[facility])    • Fibroid    • Gallstone    • GERD (gastroesophageal reflux disease)    • H/O blood clots    • Headache    • Hiatal hernia    • High cholesterol    • History of recurrent UTIs    • HPV (human papilloma virus) infection    • Hypertension    • Kidney infection    • Migraine    • Nausea    • Nausea & vomiting    • Obesity    • Orthostatic hypotension    • Osteoarthritis    • Ovarian cyst    • Pollen allergies    • Protein S deficiency (CMS/[facility]) 2016    labs from hospitalization for PE   • Pulmonary embolism (CMS/[facility])    • Recurrent pregnancy loss, antepartum condition or complication    • Sleep apnea     CPAP ASKED TO BRING DOS   • Subclinical hyperthyroidism    • Tachycardia    • Tattoo     LOWER BACK   • Varicella        Allergies   Allergen Reactions   • Ceftin [Cefuroxime Axetil] Other (See Comments)     Numbness in mouth and throat   • Amoxicillin Rash       Past Surgical History:   Procedure Laterality Date   •  SECTION      X2  and    •  SECTION WITH TUBAL N/A 1/15/2019    Procedure:  SECTION REPEAT  WITH TUBAL;  Surgeon: Alva Boyer MD;  Location: [facility] LABOR DELIVERY;  Service: Obstetrics/Gynecology   • CHOLECYSTECTOMY  2015   • COLONOSCOPY N/A 4/20/2017    Procedure: COLONOSCOPY WITH BIOPSIES AND ARGON THERMAL ABLATION;  Surgeon: Jignesh Selby MD;  Location: [facility] ENDOSCOPY;  Service:    • DIAGNOSTIC LAPAROSCOPY N/A 4/2/2018    Procedure: DIAGNOSTIC LAPAROSCOPY AND ABLATION OF ENDOMETRIOSIS;  Surgeon: Evin Zamudio MD;  Location: [facility] OR;  Service: Obstetrics/Gynecology   • ENDOMETRIAL ABLATION     • ENDOSCOPY N/A 4/20/2017    Procedure: ESOPHAGOGASTRODUODENOSCOPY WITH BIOPSIES AND COLD BIOPSY POLYPECTOMIES;  Surgeon: Jignesh Selby MD;  Location: [facility] ENDOSCOPY;  Service:    • PELVIC LAPAROSCOPY         Family History   Problem Relation Age of Onset   • Arthritis Mother    • COPD Mother    • Asthma Mother    • Thyroid disease Mother    • Arthritis Father    • Diabetes Father    • Hypertension Father    • Hyperlipidemia Father    • Kidney disease Father    • Heart attack Father    • Coronary artery disease Father    • Dementia Father    • No Known Problems Son    • Colon cancer Neg Hx    • Liver cancer Neg Hx    • Liver disease Neg Hx    • Stomach cancer Neg Hx    • Esophageal cancer Neg Hx        Social History     Socioeconomic History   • Marital status:      Spouse name: Not on file   • Number of children: Not on file   • Years of education: Not on file   • Highest education level: Not on file   Tobacco Use   • Smoking status: Never Smoker   • Smokeless tobacco: Never Used   Substance and Sexual Activity   • Alcohol use: No   • Drug use: No   • Sexual activity: Yes     Partners: Male     Birth control/protection: None     Comment: PE while on birth control patch           Objective   Physical Exam   Constitutional: She is oriented to person, place, and time. She appears well-developed and well-nourished.   HENT:   Head: Normocephalic.   Eyes: EOM are normal.   Neck: Normal range  of motion. Neck supple.   Cardiovascular: Normal rate and regular rhythm.   Pulmonary/Chest: Effort normal and breath sounds normal.   Abdominal: Soft. Bowel sounds are normal.   Musculoskeletal: Normal range of motion.   Neurological: She is alert and oriented to person, place, and time. She has normal reflexes.   Skin: Skin is warm and dry.   Psychiatric: She has a normal mood and affect.   Nursing note and vitals reviewed.      Procedures           ED Course                  MDM  Number of Diagnoses or Management Options  Pain of finger of left hand: new and requires workup  Risk of Complications, Morbidity, and/or Mortality  Presenting problems: minimal  Management options: minimal    Patient Progress  Patient progress: stable        Final diagnoses:   Pain of finger of left hand            Calvin Monzon Jr., PA-C  08/18/19 1410

## 2019-08-19 ENCOUNTER — OFFICE VISIT (OUTPATIENT)
Dept: INTERNAL MEDICINE | Facility: CLINIC | Age: 29
End: 2019-08-19

## 2019-08-19 VITALS
TEMPERATURE: 99 F | BODY MASS INDEX: 38.28 KG/M2 | RESPIRATION RATE: 15 BRPM | WEIGHT: 208 LBS | DIASTOLIC BLOOD PRESSURE: 80 MMHG | HEART RATE: 119 BPM | HEIGHT: 62 IN | OXYGEN SATURATION: 100 % | SYSTOLIC BLOOD PRESSURE: 132 MMHG

## 2019-08-19 DIAGNOSIS — M79.645 PAIN OF LEFT MIDDLE FINGER: ICD-10-CM

## 2019-08-19 DIAGNOSIS — M79.89 SWELLING OF LEFT MIDDLE FINGER: Primary | ICD-10-CM

## 2019-08-19 PROCEDURE — 99214 OFFICE O/P EST MOD 30 MIN: CPT | Performed by: NURSE PRACTITIONER

## 2019-08-19 NOTE — PROGRESS NOTES
Chief Complaint / Reason:      Chief Complaint   Patient presents with   • Hand Pain     went to ER last night for middle finger pain and bruising.        Subjective     HPI  Patient presents today for ER follow-up and states that she continues having left pain in her middle finger and she states that there has been a color change which is why she went to the ER yesterday.  They suggested possible left finger strain and recommend she put ice.  She denies any injury or trauma that she is aware of and states she was just holding her phone with a pop socket and started having numbness and tingling in her finger along with the pain.  Patient does have a history of blood clots but she states she is taking her medication as prescribed.  Vital signs are stable with exception of increased heart rate low-grade fever and increased blood pressure but she states that she drinks tea before office visit.  She denies any decrease in range of motion but states that she is concerned regarding the discoloration of her finger.  Patient does sleep on left side sometimes.  Patient is also requesting her ears to be checked as they have been itching and giving her some problems.  History taken from: patient    PMH/FH/Social History were reviewed and updated appropriately in the electronic medical record.   Past Medical History:   Diagnosis Date   • Abdominal pain    • Abnormal Pap smear of cervix    • Anxiety    • Asthma 2015   • Bleeding disorder (CMS/HCC) 11-13-16   • Body piercing     TONGUE, NOSE, TWO IN EACH EAR   • Chest pain    • Clotting disorder (CMS/[facility])     factor 5   • Constipation    • Coronary artery disease involving native coronary artery of native heart without angina pectoris 6/16/2017   • Deep vein thrombosis (CMS/[facility]) 11-13-16   • Diarrhea    • Diarrhea    • Endometriosis    • Factor 5 Leiden mutation, heterozygous (CMS/HCC)    • Fibroid    • Gallstone    • GERD (gastroesophageal reflux disease)    • H/O blood clots     • Headache    • Hiatal hernia    • High cholesterol    • History of recurrent UTIs    • HPV (human papilloma virus) infection    • Hypertension    • Kidney infection    • Migraine    • Nausea    • Nausea & vomiting    • Obesity    • Orthostatic hypotension    • Osteoarthritis    • Ovarian cyst    • Pollen allergies    • Protein S deficiency (CMS/HCC) 2016    labs from hospitalization for PE   • Pulmonary embolism (CMS/HCC)    • Recurrent pregnancy loss, antepartum condition or complication    • Sleep apnea     CPAP ASKED TO BRING DOS   • Subclinical hyperthyroidism    • Tachycardia    • Tattoo     LOWER BACK   • Varicella      Past Surgical History:   Procedure Laterality Date   •  SECTION      X2  and    •  SECTION WITH TUBAL N/A 1/15/2019    Procedure:  SECTION REPEAT WITH TUBAL;  Surgeon: Alva Boyer MD;  Location: [facility] LABOR DELIVERY;  Service: Obstetrics/Gynecology   • CHOLECYSTECTOMY     • COLONOSCOPY N/A 2017    Procedure: COLONOSCOPY WITH BIOPSIES AND ARGON THERMAL ABLATION;  Surgeon: Jignesh Selby MD;  Location: [facility] ENDOSCOPY;  Service:    • DIAGNOSTIC LAPAROSCOPY N/A 2018    Procedure: DIAGNOSTIC LAPAROSCOPY AND ABLATION OF ENDOMETRIOSIS;  Surgeon: Evin Zamudio MD;  Location: [facility] OR;  Service: Obstetrics/Gynecology   • ENDOMETRIAL ABLATION     • ENDOSCOPY N/A 2017    Procedure: ESOPHAGOGASTRODUODENOSCOPY WITH BIOPSIES AND COLD BIOPSY POLYPECTOMIES;  Surgeon: Jignesh Selby MD;  Location: [facility] ENDOSCOPY;  Service:    • PELVIC LAPAROSCOPY       Social History     Socioeconomic History   • Marital status:      Spouse name: Not on file   • Number of children: Not on file   • Years of education: Not on file   • Highest education level: Not on file   Tobacco Use   • Smoking status: Never Smoker   • Smokeless tobacco: Never Used   Substance and Sexual Activity   • Alcohol use: No   • Drug use: No   • Sexual activity: Yes      Partners: Male     Birth control/protection: None     Comment: PE while on birth control patch     Family History   Problem Relation Age of Onset   • Arthritis Mother    • COPD Mother    • Asthma Mother    • Thyroid disease Mother    • Arthritis Father    • Diabetes Father    • Hypertension Father    • Hyperlipidemia Father    • Kidney disease Father    • Heart attack Father    • Coronary artery disease Father    • Dementia Father    • No Known Problems Son    • Colon cancer Neg Hx    • Liver cancer Neg Hx    • Liver disease Neg Hx    • Stomach cancer Neg Hx    • Esophageal cancer Neg Hx        Review of Systems:   Review of Systems   Constitutional: Positive for fatigue.   HENT: Positive for ear pain.    Respiratory: Negative.    Cardiovascular: Negative.    Musculoskeletal: Positive for arthralgias and joint swelling.   Neurological: Positive for numbness (And tingling).   Psychiatric/Behavioral: The patient is nervous/anxious.          All other systems were reviewed and are negative.  Exceptions are noted in the subjective or above.      Objective     Vital Signs  Vitals:    08/19/19 1305   BP: 132/80   Pulse: 119   Resp: 15   Temp: 99 °F (37.2 °C)   SpO2: 100%       Body mass index is 38.04 kg/m².    Physical Exam   Constitutional: She is oriented to person, place, and time. She appears well-developed and well-nourished.   HENT:   Right Ear: External ear normal. Tympanic membrane is erythematous.   Left Ear: External ear normal. Tympanic membrane is erythematous.   Cardiovascular: Regular rhythm, normal heart sounds and intact distal pulses. Tachycardia present.   Pulmonary/Chest: Effort normal and breath sounds normal.   Abdominal: Soft. Bowel sounds are normal.   Musculoskeletal: Normal range of motion.        Left hand: She exhibits bony tenderness and swelling. She exhibits normal range of motion, normal two-point discrimination, normal capillary refill and no laceration. Normal sensation noted. Normal  strength noted.        Hands:  Neurological: She is alert and oriented to person, place, and time. Coordination normal.   Skin: Skin is warm and dry. Capillary refill takes less than 2 seconds.   Psychiatric: She has a normal mood and affect. Her behavior is normal. Judgment and thought content normal.   Nursing note and vitals reviewed.           Results Review:    I reviewed the patient's previous clinical results.       Medication Review:     Current Outpatient Medications:   •  albuterol (PROVENTIL HFA;VENTOLIN HFA) 108 (90 Base) MCG/ACT inhaler, Inhale 2 puffs 4 (Four) Times a Day., Disp: 1 inhaler, Rfl: 0  •  albuterol (PROVENTIL) (2.5 MG/3ML) 0.083% nebulizer solution, Take 2.5 mg by nebulization Every 4 (Four) Hours As Needed for Wheezing or Shortness of Air., Disp: 60 vial, Rfl: 12  •  apixaban (ELIQUIS) 5 MG tablet tablet, Take 1 tablet by mouth Every 12 (Twelve) Hours., Disp: 60 tablet, Rfl: 5  •  atorvastatin (LIPITOR) 80 MG tablet, TAKE 1 TABLET BY MOUTH ONE TIME A DAY , Disp: 90 tablet, Rfl: 10  •  diclofenac (VOLTAREN) 1 % gel gel, Apply 4 g topically to the appropriate area as directed 4 (Four) Times a Day As Needed (joint pain)., Disp: 100 g, Rfl: 0  •  DULoxetine (CYMBALTA) 60 MG capsule, Take 60 mg by mouth Daily., Disp: , Rfl: 4  •  fluocinonide (LIDEX) 0.05 % cream, apply twice daily for 2 weeks alternating with betamethasone, Disp: , Rfl: 1  •  fluticasone (FLONASE) 50 MCG/ACT nasal spray, INHALE 2 SPRAYS IN EACH NOSTRIL ONE TIME A DAY , Disp: 16 g, Rfl: 2  •  Fluticasone Furoate-Vilanterol (BREO ELLIPTA) 100-25 MCG/INH inhaler, Inhale 1 puff Daily., Disp: 1 each, Rfl: 5  •  loratadine (CLARITIN) 10 MG tablet, Take 1 tablet by mouth Daily., Disp: 30 tablet, Rfl: 11  •  metoprolol tartrate (LOPRESSOR) 25 MG tablet, TAKE 1 TABLET BY MOUTH TWO TIMES A DAY FOR 30 DAYS, Disp: 60 tablet, Rfl: 6  •  minocycline (MINOCIN,DYNACIN) 100 MG capsule, Take 100 mg by mouth 2 (Two) Times a Day., Disp: , Rfl:  1  •  raNITIdine (ZANTAC) 150 MG tablet, Take 1 tablet by mouth 2 (Two) Times a Day., Disp: 60 tablet, Rfl: 5  •  tiZANidine (ZANAFLEX) 4 MG tablet, Take 1 tablet by mouth 2 (Two) Times a Day As Needed for Muscle Spasms., Disp: 60 tablet, Rfl: 1  •  ivabradine HCl (CORLANOR) 5 MG tablet tablet, Take 1 tablet by mouth 2 (Two) Times a Day With Meals., Disp: 60 tablet, Rfl: 11    Assessment/Plan   Marilu was seen today for hand pain.    Diagnoses and all orders for this visit:    Swelling of left middle finger  -     diclofenac (VOLTAREN) 1 % gel gel; Apply 4 g topically to the appropriate area as directed 4 (Four) Times a Day As Needed (joint pain).    Pain of left middle finger  -     diclofenac (VOLTAREN) 1 % gel gel; Apply 4 g topically to the appropriate area as directed 4 (Four) Times a Day As Needed (joint pain).    Discussed nonpharmacological interventions and recommend patient soak hand in Epson salt with warm water.  Discussed worsening signs and symptoms and advised patient to treat symptomatically at this time.  Recommend she contact office if symptoms worsen such as decreased cap refill worsening numbness and tingling or swelling.  Advised her to closely monitor bumps underneath the skin and may need imaging or further evaluation if symptoms do not resolve.  Advised patient to avoid sleeping on left side.    Return if symptoms worsen or fail to improve.    Barby Garcia, APRN  08/19/2019

## 2019-08-22 ENCOUNTER — HOSPITAL ENCOUNTER (EMERGENCY)
Facility: HOSPITAL | Age: 29
Discharge: HOME | End: 2019-08-22
Attending: EMERGENCY MEDICINE | Admitting: EMERGENCY MEDICINE

## 2019-08-22 VITALS
DIASTOLIC BLOOD PRESSURE: 89 MMHG | RESPIRATION RATE: 18 BRPM | HEART RATE: 105 BPM | OXYGEN SATURATION: 97 % | HEIGHT: 63 IN | BODY MASS INDEX: 35.26 KG/M2 | WEIGHT: 199 LBS | TEMPERATURE: 97.9 F | SYSTOLIC BLOOD PRESSURE: 134 MMHG

## 2019-08-22 DIAGNOSIS — J06.9 UPPER RESPIRATORY TRACT INFECTION, UNSPECIFIED TYPE: Primary | ICD-10-CM

## 2019-08-22 LAB
FLUAV AG NPH QL: NEGATIVE
FLUBV AG NPH QL IA: NEGATIVE
S PYO AG THROAT QL: NEGATIVE

## 2019-08-22 PROCEDURE — 99283 EMERGENCY DEPT VISIT LOW MDM: CPT

## 2019-08-22 PROCEDURE — 87081 CULTURE SCREEN ONLY: CPT | Performed by: PHYSICIAN ASSISTANT

## 2019-08-22 PROCEDURE — 87880 STREP A ASSAY W/OPTIC: CPT | Performed by: PHYSICIAN ASSISTANT

## 2019-08-22 PROCEDURE — 87804 INFLUENZA ASSAY W/OPTIC: CPT | Performed by: PHYSICIAN ASSISTANT

## 2019-08-23 NOTE — ED PROVIDER NOTES
Subjective   This patient states for 2 days she has had URI symptoms to include rhinorrhea, sore throat, body aches, cough and also some diarrhea.  She brings in her 3 younger children with the same complaints to be evaluated.  She states she is feels like the last time when she had influenza.            Review of Systems   Constitutional: Positive for fever.        Body aches   HENT: Positive for sore throat. Negative for ear pain.    Respiratory: Positive for cough.    Gastrointestinal: Positive for diarrhea.   All other systems reviewed and are negative.      Past Medical History:   Diagnosis Date   • Abdominal pain    • Abnormal Pap smear of cervix    • Anxiety    • Asthma 2015   • Bleeding disorder (CMS/[facility]) 11-13-16   • Body piercing     TONGUE, NOSE, TWO IN EACH EAR   • Chest pain    • Clotting disorder (CMS/[facility])     factor 5   • Constipation    • Coronary artery disease involving native coronary artery of native heart without angina pectoris 6/16/2017   • Deep vein thrombosis (CMS/[facility]) 11-13-16   • Diarrhea    • Diarrhea    • Endometriosis    • Factor 5 Leiden mutation, heterozygous (CMS/[facility])    • Fibroid    • Gallstone    • GERD (gastroesophageal reflux disease)    • H/O blood clots    • Headache    • Hiatal hernia    • High cholesterol    • History of recurrent UTIs    • HPV (human papilloma virus) infection    • Hypertension    • Kidney infection    • Migraine    • Nausea    • Nausea & vomiting    • Obesity    • Orthostatic hypotension    • Osteoarthritis    • Ovarian cyst    • Pollen allergies    • Protein S deficiency (CMS/[facility]) 11/14/2016    labs from hospitalization for PE   • Pulmonary embolism (CMS/[facility])    • Recurrent pregnancy loss, antepartum condition or complication    • Sleep apnea     CPAP ASKED TO BRING DOS   • Subclinical hyperthyroidism    • Tachycardia    • Tattoo     LOWER BACK   • Varicella        Allergies   Allergen Reactions   • Ceftin [Cefuroxime Axetil] Other (See Comments)      Numbness in mouth and throat   • Amoxicillin Rash       Past Surgical History:   Procedure Laterality Date   •  SECTION      X2  and    •  SECTION WITH TUBAL N/A 1/15/2019    Procedure:  SECTION REPEAT WITH TUBAL;  Surgeon: Alva Boyer MD;  Location: [facility] LABOR DELIVERY;  Service: Obstetrics/Gynecology   • CHOLECYSTECTOMY     • COLONOSCOPY N/A 2017    Procedure: COLONOSCOPY WITH BIOPSIES AND ARGON THERMAL ABLATION;  Surgeon: Jignesh Selby MD;  Location: [facility] ENDOSCOPY;  Service:    • DIAGNOSTIC LAPAROSCOPY N/A 2018    Procedure: DIAGNOSTIC LAPAROSCOPY AND ABLATION OF ENDOMETRIOSIS;  Surgeon: Evin Zamudio MD;  Location: [facility] OR;  Service: Obstetrics/Gynecology   • ENDOMETRIAL ABLATION     • ENDOSCOPY N/A 2017    Procedure: ESOPHAGOGASTRODUODENOSCOPY WITH BIOPSIES AND COLD BIOPSY POLYPECTOMIES;  Surgeon: Jignesh Selby MD;  Location: [facility] ENDOSCOPY;  Service:    • PELVIC LAPAROSCOPY         Family History   Problem Relation Age of Onset   • Arthritis Mother    • COPD Mother    • Asthma Mother    • Thyroid disease Mother    • Arthritis Father    • Diabetes Father    • Hypertension Father    • Hyperlipidemia Father    • Kidney disease Father    • Heart attack Father    • Coronary artery disease Father    • Dementia Father    • No Known Problems Son    • Colon cancer Neg Hx    • Liver cancer Neg Hx    • Liver disease Neg Hx    • Stomach cancer Neg Hx    • Esophageal cancer Neg Hx        Social History     Socioeconomic History   • Marital status:      Spouse name: Not on file   • Number of children: Not on file   • Years of education: Not on file   • Highest education level: Not on file   Tobacco Use   • Smoking status: Never Smoker   • Smokeless tobacco: Never Used   Substance and Sexual Activity   • Alcohol use: No   • Drug use: No   • Sexual activity: Yes     Partners: Male     Birth control/protection: None     Comment: PE while on birth  control patch           Objective   Physical Exam   Constitutional: She is oriented to person, place, and time. She appears well-developed and well-nourished. No distress.   HENT:   Head: Normocephalic and atraumatic.   Right Ear: External ear normal.   Left Ear: External ear normal.   Mouth/Throat: Oropharynx is clear and moist.   Neck: Normal range of motion.   Cardiovascular: Normal rate and regular rhythm.   Pulmonary/Chest: Effort normal and breath sounds normal.   Abdominal: Soft.   Musculoskeletal: Normal range of motion.   Neurological: She is alert and oriented to person, place, and time.   Skin: Skin is warm and dry. She is not diaphoretic.   Psychiatric: She has a normal mood and affect. Her behavior is normal. Thought content normal.       Procedures           ED Course                  MDM      Final diagnoses:   Upper respiratory tract infection, unspecified type            Stew Sousa PA-C  08/22/19 2342       Stew Sousa PA-C  08/22/19 2342

## 2019-08-25 LAB — BACTERIA SPEC AEROBE CULT: NORMAL

## 2019-08-28 RX ORDER — APIXABAN 2.5 MG/1
TABLET, FILM COATED ORAL
Qty: 60 TABLET | Refills: 4 | OUTPATIENT
Start: 2019-08-28

## 2019-08-28 RX ORDER — RANITIDINE 150 MG/1
TABLET ORAL
Qty: 60 TABLET | Refills: 4 | Status: SHIPPED | OUTPATIENT
Start: 2019-08-28 | End: 2020-05-08

## 2019-08-28 RX ORDER — ATENOLOL 25 MG/1
TABLET ORAL
Qty: 30 TABLET | Refills: 10 | OUTPATIENT
Start: 2019-08-28

## 2019-08-30 ENCOUNTER — TELEPHONE (OUTPATIENT)
Dept: CARDIOLOGY | Facility: CLINIC | Age: 29
End: 2019-08-30

## 2019-08-30 NOTE — TELEPHONE ENCOUNTER
Notifed pt today that we received approval from [facility] for the Corlanor finally.  Refernce #[id number].

## 2019-09-15 ENCOUNTER — APPOINTMENT (OUTPATIENT)
Dept: GENERAL RADIOLOGY | Facility: HOSPITAL | Age: 29
End: 2019-09-15

## 2019-09-15 ENCOUNTER — HOSPITAL ENCOUNTER (EMERGENCY)
Facility: HOSPITAL | Age: 29
Discharge: HOME | End: 2019-09-15
Attending: EMERGENCY MEDICINE | Admitting: EMERGENCY MEDICINE

## 2019-09-15 VITALS
HEIGHT: 63 IN | SYSTOLIC BLOOD PRESSURE: 117 MMHG | BODY MASS INDEX: 35.81 KG/M2 | OXYGEN SATURATION: 99 % | TEMPERATURE: 98.3 F | HEART RATE: 77 BPM | RESPIRATION RATE: 18 BRPM | WEIGHT: 202.13 LBS | DIASTOLIC BLOOD PRESSURE: 70 MMHG

## 2019-09-15 DIAGNOSIS — R05.9 COUGH: ICD-10-CM

## 2019-09-15 DIAGNOSIS — J01.00 ACUTE MAXILLARY SINUSITIS, RECURRENCE NOT SPECIFIED: Primary | ICD-10-CM

## 2019-09-15 PROCEDURE — 71046 X-RAY EXAM CHEST 2 VIEWS: CPT

## 2019-09-15 PROCEDURE — 99283 EMERGENCY DEPT VISIT LOW MDM: CPT

## 2019-09-15 RX ORDER — DOXYCYCLINE 100 MG/1
100 CAPSULE ORAL ONCE
Status: COMPLETED | OUTPATIENT
Start: 2019-09-15 | End: 2019-09-15

## 2019-09-15 RX ORDER — DOXYCYCLINE 100 MG/1
100 CAPSULE ORAL 2 TIMES DAILY
Qty: 14 CAPSULE | Refills: 0 | Status: SHIPPED | OUTPATIENT
Start: 2019-09-15 | End: 2019-09-17

## 2019-09-15 RX ORDER — ALBUTEROL SULFATE 90 UG/1
2 AEROSOL, METERED RESPIRATORY (INHALATION) ONCE
Status: COMPLETED | OUTPATIENT
Start: 2019-09-15 | End: 2019-09-15

## 2019-09-15 RX ADMIN — ALBUTEROL SULFATE 2 PUFF: 90 AEROSOL, METERED RESPIRATORY (INHALATION) at 13:20

## 2019-09-15 RX ADMIN — DOXYCYCLINE 100 MG: 100 CAPSULE ORAL at 13:57

## 2019-09-15 NOTE — DISCHARGE INSTRUCTIONS
You likely had a viral illness that is caused nasal congestion.  Take doxycycline to treat the infection in the sinuses.  Continue over-the-counter medications as directed on the box including nasal spray, cough and cold medication, etc.  May use cool mist vaporizer's to help.  Follow-up with your primary care provider next 1 to 2 days to reevaluate your symptoms.  Return to ER if any change, worsening symptoms, or any additional concerns include relative chest pain, shortness of breath, productive cough with blood or malodorous sputum, fever greater than 100.4, dizziness/syncope.

## 2019-09-15 NOTE — ED PROVIDER NOTES
"Subjective   Patient is a 28-year-old female with a history of anxiety, clotting disorder, CAD, DVT, diarrhea hypertension, migraines, osteoarthritis, and pulmonary embolism on Eliquis presenting to the ER for evaluation of nasal congestion and cough.  Patient states that for the past 2 weeks she has had facial pain, nasal congestion and rhinorrhea as well as chills.  She states for the past 2 days she feels as if the \"cold is in her chest\".  She states she has had a productive cough with yellow phlegm.  She denies any hemoptysis, chest pain, or difficulty breathing.  She states usually uses an albuterol inhaler but has been out.  States she did have an episode of nausea but denies any emesis.  She denies any headache, vision loss or changes, neck pain or stiffness, ear pain, difficulty swallowing, throat pain, abdominal pain, dysuria hematuria, leg pain or swelling, or any other symptoms.  She states she has had family at home with similar symptoms.  She is allergic to Ceftin and amoxicillin.            Review of Systems   Constitutional: Positive for chills. Negative for fever.   HENT: Positive for congestion, rhinorrhea and sinus pain. Negative for ear discharge, ear pain, sore throat and trouble swallowing.    Eyes: Negative for pain, discharge and visual disturbance.   Respiratory: Positive for cough. Negative for shortness of breath.    Cardiovascular: Negative for chest pain and leg swelling.   Gastrointestinal: Positive for nausea. Negative for abdominal pain and vomiting.   Genitourinary: Negative.    Musculoskeletal: Negative.    Skin: Negative.    Neurological: Negative.    Psychiatric/Behavioral: Negative.        Past Medical History:   Diagnosis Date   • Abdominal pain    • Abnormal Pap smear of cervix    • Anxiety    • Asthma 2015   • Bleeding disorder (CMS/[facility]) 11-13-16   • Body piercing     TONGUE, NOSE, TWO IN EACH EAR   • Chest pain    • Clotting disorder (CMS/[facility])     factor 5   • Constipation  "   • Coronary artery disease involving native coronary artery of native heart without angina pectoris 2017   • Deep vein thrombosis (CMS/HCC) 16   • Diarrhea    • Diarrhea    • Endometriosis    • Factor 5 Leiden mutation, heterozygous (CMS/HCC)    • Fibroid    • Gallstone    • GERD (gastroesophageal reflux disease)    • H/O blood clots    • Headache    • Hiatal hernia    • High cholesterol    • History of recurrent UTIs    • HPV (human papilloma virus) infection    • Hypertension    • Kidney infection    • Migraine    • Nausea    • Nausea & vomiting    • Obesity    • Orthostatic hypotension    • Osteoarthritis    • Ovarian cyst    • Pollen allergies    • Protein S deficiency (CMS/HCC) 2016    labs from hospitalization for PE   • Pulmonary embolism (CMS/HCC)    • Recurrent pregnancy loss, antepartum condition or complication    • Sleep apnea     CPAP ASKED TO BRING DOS   • Subclinical hyperthyroidism    • Tachycardia    • Tattoo     LOWER BACK   • Varicella        Allergies   Allergen Reactions   • Ceftin [Cefuroxime Axetil] Other (See Comments)     Numbness in mouth and throat   • Amoxicillin Rash       Past Surgical History:   Procedure Laterality Date   •  SECTION      X2  and    •  SECTION WITH TUBAL N/A 1/15/2019    Procedure:  SECTION REPEAT WITH TUBAL;  Surgeon: Alva Boyer MD;  Location: [facility] LABOR DELIVERY;  Service: Obstetrics/Gynecology   • CHOLECYSTECTOMY     • COLONOSCOPY N/A 2017    Procedure: COLONOSCOPY WITH BIOPSIES AND ARGON THERMAL ABLATION;  Surgeon: Jignesh Selby MD;  Location: [facility] ENDOSCOPY;  Service:    • DIAGNOSTIC LAPAROSCOPY N/A 2018    Procedure: DIAGNOSTIC LAPAROSCOPY AND ABLATION OF ENDOMETRIOSIS;  Surgeon: Evin Zamudio MD;  Location: [facility] OR;  Service: Obstetrics/Gynecology   • ENDOMETRIAL ABLATION     • ENDOSCOPY N/A 2017    Procedure: ESOPHAGOGASTRODUODENOSCOPY WITH BIOPSIES AND COLD BIOPSY  POLYPECTOMIES;  Surgeon: Jignesh Selby MD;  Location: [facility] ENDOSCOPY;  Service:    • PELVIC LAPAROSCOPY         Family History   Problem Relation Age of Onset   • Arthritis Mother    • COPD Mother    • Asthma Mother    • Thyroid disease Mother    • Arthritis Father    • Diabetes Father    • Hypertension Father    • Hyperlipidemia Father    • Kidney disease Father    • Heart attack Father    • Coronary artery disease Father    • Dementia Father    • No Known Problems Son    • Colon cancer Neg Hx    • Liver cancer Neg Hx    • Liver disease Neg Hx    • Stomach cancer Neg Hx    • Esophageal cancer Neg Hx        Social History     Socioeconomic History   • Marital status:      Spouse name: Not on file   • Number of children: Not on file   • Years of education: Not on file   • Highest education level: Not on file   Tobacco Use   • Smoking status: Never Smoker   • Smokeless tobacco: Never Used   Substance and Sexual Activity   • Alcohol use: No   • Drug use: No   • Sexual activity: Yes     Partners: Male     Birth control/protection: None     Comment: PE while on birth control patch           Objective   Physical Exam   Nursing note and vitals reviewed.    GEN: No acute distress, sitting upright in stretcher.  She is awake and alert.  She does not appear toxic.  Head: Normocephalic, atraumatic.  She does have tenderness over the maxillary sinuses bilaterally.  Eyes: Pupils equal round reactive to light, EOM intact. No erythema or drainage  ENT: Posterior pharynx normal in appearance, oral mucosa is moist, tongue midline. Tms unremarkable bilaterally.  Chest: Nontender to palpation  Cardiovascular: Regular rate and rhythm   Lungs: Wheezing is even and nonlabored.  There are no stridor or retractions.  Patient does have mild diffuse expiratory wheezes but no rales.  Abdomen: Soft, nontender, nondistended, no peritoneal signs or guarding.  Extremities: No edema, normal appearance, full ROM without deficits.  Neuro:  GCS 15  Psych: Mood and affect are appropriate    Procedures           ED Course  ED Course as of Sep 15 1735   Sun Sep 15, 2019   1341 PROCEDURE: XR CHEST 2 VW-        HISTORY: Cough, chills     COMPARISON: 05/01/2019.     FINDINGS: The heart is normal in size. The mediastinum is unremarkable.  The lungs are clear. There is no pneumothorax. There are no acute  osseous abnormalities.       Impression    No acute cardiopulmonary process.           This report was finalized on 9/15/2019 1:37 PM by Jorge Alberto Sargent M.D..      [LA]   1350 Dicussed all results with the patient.  Discussed symptomatic treatment, follow-up with PCP and strict return precautions to the ER  [LA]      ED Course User Index  [LA] Antonietta Lopez PA-C                  MDM  Number of Diagnoses or Management Options  Acute maxillary sinusitis, recurrence not specified:   Cough:   Diagnosis management comments: On arrival, patient is afebrile, normotensive, no acute distress.  Differential includes acute sinusitis, otitis media, URI, bronchitis, pneumonia, and other concerns.  Do not believe lab work imaging is warranted at this time.  Patient denies any chest pain or shortness of breath.  Will obtain x-ray.  Given her duration of symptoms, tenderness of the maxillary sinuses, will place on antibiotic for sinus infection.  She was also given albuterol inhaler given she is out of her normal one.  We discussed follow-up with her primary care physician, discussed other symptomatic treatment and strict return precautions to the ER.        Amount and/or Complexity of Data Reviewed  Tests in the radiology section of CPT®: ordered and reviewed  Review and summarize past medical records: yes    Risk of Complications, Morbidity, and/or Mortality  Presenting problems: low  Diagnostic procedures: low  Management options: low    Patient Progress  Patient progress: stable      Final diagnoses:   Acute maxillary sinusitis, recurrence not specified   Cough               Antonietta Lopez PA-C  09/15/19 7661

## 2019-09-17 ENCOUNTER — OFFICE VISIT (OUTPATIENT)
Dept: INTERNAL MEDICINE | Facility: CLINIC | Age: 29
End: 2019-09-17

## 2019-09-17 VITALS
BODY MASS INDEX: 35.79 KG/M2 | SYSTOLIC BLOOD PRESSURE: 118 MMHG | DIASTOLIC BLOOD PRESSURE: 72 MMHG | TEMPERATURE: 98.7 F | WEIGHT: 202 LBS | HEIGHT: 63 IN | HEART RATE: 87 BPM | OXYGEN SATURATION: 100 %

## 2019-09-17 DIAGNOSIS — J01.40 ACUTE NON-RECURRENT PANSINUSITIS: Primary | ICD-10-CM

## 2019-09-17 PROCEDURE — 99214 OFFICE O/P EST MOD 30 MIN: CPT | Performed by: PHYSICIAN ASSISTANT

## 2019-09-17 RX ORDER — AZITHROMYCIN 250 MG/1
TABLET, FILM COATED ORAL
Qty: 6 TABLET | Refills: 0 | Status: SHIPPED | OUTPATIENT
Start: 2019-09-17 | End: 2019-09-24

## 2019-09-17 NOTE — PROGRESS NOTES
Marilu Godoy is a 28 y.o. female.     Subjective   History of Present Illness   Here today with concern of around 2 weeks of gradually worsening nasal congestion, sinus pressure, headache, sore throat, rhinorrhea, postnasal drip, productive cough and chills. No fever, SOA or wheezing. She has been taking yuli seltzer cold, cough and congestion which is not helping.  Her son has similar symptoms currently and she believes his symptoms began before hers.  She went to the ER for the same symptoms 2 days ago where she was diagnosed with a sinus infection and prescribed doxycycline which the pharmacy did not fill because she is currently taking minocycline for acne.  X-ray from the ER visit showed no acute cardiopulmonary process.  No labs were ordered during this ER visit.        The following portions of the patient's history were reviewed and updated as appropriate: allergies, current medications, past family history, past medical history, past social history, past surgical history and problem list.    Review of Systems   Constitutional: Positive for chills. Negative for activity change, appetite change, diaphoresis, fatigue, fever and unexpected weight change.   HENT: Positive for congestion, ear pain, postnasal drip, rhinorrhea, sinus pressure, sinus pain and sore throat. Negative for drooling, ear discharge, facial swelling, hearing loss, mouth sores, nosebleeds, sneezing, tinnitus, trouble swallowing and voice change.    Eyes: Negative for pain and visual disturbance.   Respiratory: Positive for cough. Negative for chest tightness, shortness of breath and wheezing.    Cardiovascular: Negative for chest pain, palpitations and leg swelling.   Skin: Negative.    Neurological: Positive for headaches. Negative for dizziness, tremors, syncope, facial asymmetry, speech difficulty, weakness, light-headedness and numbness.   Hematological: Negative for adenopathy. Does not bruise/bleed easily.  "        Objective    Physical Exam   Constitutional: She is oriented to person, place, and time. She appears well-developed and well-nourished. No distress.   HENT:   Head: Normocephalic and atraumatic.   Mouth/Throat: Mucous membranes are normal. She does not have dentures. No oral lesions. Abnormal dentition. Dental caries present. No uvula swelling or lacerations. Posterior oropharyngeal erythema present. No oropharyngeal exudate or posterior oropharyngeal edema.   Mild bilateral TM effusions.  Mild diffuse sinus tenderness.  Very poor dentition with several missing teeth and several significant dental caries causing erosion.   Eyes: Conjunctivae and EOM are normal. Pupils are equal, round, and reactive to light. Right eye exhibits no discharge. Left eye exhibits no discharge.   Neck: Normal range of motion. Neck supple.   Cardiovascular: Normal rate, regular rhythm and normal heart sounds. Exam reveals no gallop and no friction rub.   No murmur heard.  Pulmonary/Chest: Effort normal and breath sounds normal. No stridor. No respiratory distress. She has no wheezes. She has no rales. She exhibits no tenderness.   Lymphadenopathy:     She has no cervical adenopathy.   Neurological: She is alert and oriented to person, place, and time. No cranial nerve deficit.   Skin: Skin is warm and dry. No rash noted. She is not diaphoretic.   Psychiatric: She has a normal mood and affect. Her behavior is normal. Judgment and thought content normal.   Nursing note and vitals reviewed.        /72   Pulse 87   Temp 98.7 °F (37.1 °C)   Ht 160 cm (62.99\")   Wt 91.6 kg (202 lb)   SpO2 100%   BMI 35.79 kg/m²     Nursing note and vitals reviewed.          Assessment/Plan   Marilu was seen today for uri.    Diagnoses and all orders for this visit:    Acute non-recurrent pansinusitis  -     azithromycin (ZITHROMAX Z-MILLY) 250 MG tablet; Take 2 tablets the first day, then 1 tablet daily for 4 days.    Increase p.o. fluid " intake.  Sip warm fluids to ease cough.  Continue G[place] cold, congestion and cough as needed for symptom control.  ER record reviewed.    Call or return to clinic if symptoms worsen or persist.

## 2019-09-21 ENCOUNTER — HOSPITAL ENCOUNTER (OUTPATIENT)
Dept: GENERAL RADIOLOGY | Facility: HOSPITAL | Age: 29
Discharge: HOME | End: 2019-09-21
Admitting: ORTHOPAEDIC SURGERY

## 2019-09-21 ENCOUNTER — TRANSCRIBE ORDERS (OUTPATIENT)
Dept: GENERAL RADIOLOGY | Facility: HOSPITAL | Age: 29
End: 2019-09-21

## 2019-09-21 DIAGNOSIS — S92.522D CLOSED DISPLACED FRACTURE OF MIDDLE PHALANX OF LESSER TOE OF LEFT FOOT WITH ROUTINE HEALING, SUBSEQUENT ENCOUNTER: Primary | ICD-10-CM

## 2019-09-21 DIAGNOSIS — S92.522D CLOSED DISPLACED FRACTURE OF MIDDLE PHALANX OF LESSER TOE OF LEFT FOOT WITH ROUTINE HEALING, SUBSEQUENT ENCOUNTER: ICD-10-CM

## 2019-09-21 PROCEDURE — 73630 X-RAY EXAM OF FOOT: CPT

## 2019-09-23 ENCOUNTER — HOSPITAL ENCOUNTER (EMERGENCY)
Facility: HOSPITAL | Age: 29
Discharge: HOME | End: 2019-09-23
Attending: EMERGENCY MEDICINE | Admitting: EMERGENCY MEDICINE

## 2019-09-23 ENCOUNTER — APPOINTMENT (OUTPATIENT)
Dept: CT IMAGING | Facility: HOSPITAL | Age: 29
End: 2019-09-23

## 2019-09-23 ENCOUNTER — APPOINTMENT (OUTPATIENT)
Dept: GENERAL RADIOLOGY | Facility: HOSPITAL | Age: 29
End: 2019-09-23

## 2019-09-23 VITALS
HEIGHT: 63 IN | RESPIRATION RATE: 20 BRPM | TEMPERATURE: 97.6 F | DIASTOLIC BLOOD PRESSURE: 86 MMHG | SYSTOLIC BLOOD PRESSURE: 119 MMHG | OXYGEN SATURATION: 99 % | WEIGHT: 205 LBS | BODY MASS INDEX: 36.32 KG/M2 | HEART RATE: 101 BPM

## 2019-09-23 DIAGNOSIS — R07.9 CHEST PAIN, UNSPECIFIED TYPE: ICD-10-CM

## 2019-09-23 DIAGNOSIS — J45.901 EXACERBATION OF ASTHMA, UNSPECIFIED ASTHMA SEVERITY, UNSPECIFIED WHETHER PERSISTENT: Primary | ICD-10-CM

## 2019-09-23 LAB
ALBUMIN SERPL-MCNC: 4.7 G/DL (ref 3.5–5.2)
ALBUMIN/GLOB SERPL: 1.3 G/DL
ALP SERPL-CCNC: 102 U/L (ref 39–117)
ALT SERPL W P-5'-P-CCNC: 19 U/L (ref 1–33)
AMPHET+METHAMPHET UR QL: NEGATIVE
AMPHETAMINES UR QL: NEGATIVE
ANION GAP SERPL CALCULATED.3IONS-SCNC: 15.3 MMOL/L (ref 5–15)
AST SERPL-CCNC: 16 U/L (ref 1–32)
BACTERIA UR QL AUTO: ABNORMAL /HPF
BARBITURATES UR QL SCN: NEGATIVE
BASOPHILS # BLD AUTO: 0.08 10*3/MM3 (ref 0–0.2)
BASOPHILS NFR BLD AUTO: 0.7 % (ref 0–1.5)
BENZODIAZ UR QL SCN: NEGATIVE
BILIRUB SERPL-MCNC: 0.4 MG/DL (ref 0.2–1.2)
BILIRUB UR QL STRIP: NEGATIVE
BUN BLD-MCNC: 8 MG/DL (ref 6–20)
BUN/CREAT SERPL: 12.3 (ref 7–25)
BUPRENORPHINE SERPL-MCNC: NEGATIVE NG/ML
CALCIUM SPEC-SCNC: 9.5 MG/DL (ref 8.6–10.5)
CANNABINOIDS SERPL QL: NEGATIVE
CHLORIDE SERPL-SCNC: 104 MMOL/L (ref 98–107)
CLARITY UR: CLEAR
CO2 SERPL-SCNC: 21.7 MMOL/L (ref 22–29)
COCAINE UR QL: NEGATIVE
COD CRY URNS QL: ABNORMAL /HPF
COLOR UR: YELLOW
CREAT BLD-MCNC: 0.65 MG/DL (ref 0.57–1)
DEPRECATED RDW RBC AUTO: 41.1 FL (ref 37–54)
EOSINOPHIL # BLD AUTO: 0.26 10*3/MM3 (ref 0–0.4)
EOSINOPHIL NFR BLD AUTO: 2.3 % (ref 0.3–6.2)
ERYTHROCYTE [DISTWIDTH] IN BLOOD BY AUTOMATED COUNT: 12.6 % (ref 12.3–15.4)
GFR SERPL CREATININE-BSD FRML MDRD: 109 ML/MIN/1.73
GLOBULIN UR ELPH-MCNC: 3.7 GM/DL
GLUCOSE BLD-MCNC: 102 MG/DL (ref 65–99)
GLUCOSE UR STRIP-MCNC: NEGATIVE MG/DL
HCG SERPL QL: NEGATIVE
HCT VFR BLD AUTO: 40.8 % (ref 34–46.6)
HGB BLD-MCNC: 13.5 G/DL (ref 12–15.9)
HGB UR QL STRIP.AUTO: NEGATIVE
HOLD SPECIMEN: NORMAL
HOLD SPECIMEN: NORMAL
HYALINE CASTS UR QL AUTO: ABNORMAL /LPF
IMM GRANULOCYTES # BLD AUTO: 0.03 10*3/MM3 (ref 0–0.05)
IMM GRANULOCYTES NFR BLD AUTO: 0.3 % (ref 0–0.5)
KETONES UR QL STRIP: ABNORMAL
LEUKOCYTE ESTERASE UR QL STRIP.AUTO: ABNORMAL
LYMPHOCYTES # BLD AUTO: 2.14 10*3/MM3 (ref 0.7–3.1)
LYMPHOCYTES NFR BLD AUTO: 19 % (ref 19.6–45.3)
MCH RBC QN AUTO: 29.5 PG (ref 26.6–33)
MCHC RBC AUTO-ENTMCNC: 33.1 G/DL (ref 31.5–35.7)
MCV RBC AUTO: 89.1 FL (ref 79–97)
METHADONE UR QL SCN: NEGATIVE
MONOCYTES # BLD AUTO: 0.53 10*3/MM3 (ref 0.1–0.9)
MONOCYTES NFR BLD AUTO: 4.7 % (ref 5–12)
MUCOUS THREADS URNS QL MICRO: ABNORMAL /HPF
NEUTROPHILS # BLD AUTO: 8.2 10*3/MM3 (ref 1.7–7)
NEUTROPHILS NFR BLD AUTO: 73 % (ref 42.7–76)
NITRITE UR QL STRIP: NEGATIVE
NRBC BLD AUTO-RTO: 0 /100 WBC (ref 0–0.2)
OPIATES UR QL: POSITIVE
OXYCODONE UR QL SCN: NEGATIVE
PCP UR QL SCN: NEGATIVE
PH UR STRIP.AUTO: 5.5 [PH] (ref 5–8)
PLATELET # BLD AUTO: 348 10*3/MM3 (ref 140–450)
PMV BLD AUTO: 9.9 FL (ref 6–12)
POTASSIUM BLD-SCNC: 3.7 MMOL/L (ref 3.5–5.2)
PROPOXYPH UR QL: NEGATIVE
PROT SERPL-MCNC: 8.4 G/DL (ref 6–8.5)
PROT UR QL STRIP: NEGATIVE
RBC # BLD AUTO: 4.58 10*6/MM3 (ref 3.77–5.28)
RBC # UR: ABNORMAL /HPF
REF LAB TEST METHOD: ABNORMAL
SODIUM BLD-SCNC: 141 MMOL/L (ref 136–145)
SP GR UR STRIP: >=1.03 (ref 1–1.03)
SQUAMOUS #/AREA URNS HPF: ABNORMAL /HPF
TRICYCLICS UR QL SCN: NEGATIVE
TROPONIN I SERPL-MCNC: 0 NG/ML (ref 0–0.05)
TROPONIN T SERPL-MCNC: <0.01 NG/ML (ref 0–0.03)
UROBILINOGEN UR QL STRIP: ABNORMAL
WBC NRBC COR # BLD: 11.24 10*3/MM3 (ref 3.4–10.8)
WBC UR QL AUTO: ABNORMAL /HPF
WHOLE BLOOD HOLD SPECIMEN: NORMAL
WHOLE BLOOD HOLD SPECIMEN: NORMAL

## 2019-09-23 PROCEDURE — 84484 ASSAY OF TROPONIN QUANT: CPT

## 2019-09-23 PROCEDURE — 99284 EMERGENCY DEPT VISIT MOD MDM: CPT

## 2019-09-23 PROCEDURE — 93005 ELECTROCARDIOGRAM TRACING: CPT

## 2019-09-23 PROCEDURE — 84703 CHORIONIC GONADOTROPIN ASSAY: CPT | Performed by: EMERGENCY MEDICINE

## 2019-09-23 PROCEDURE — 85025 COMPLETE CBC W/AUTO DIFF WBC: CPT

## 2019-09-23 PROCEDURE — 71275 CT ANGIOGRAPHY CHEST: CPT

## 2019-09-23 PROCEDURE — 96374 THER/PROPH/DIAG INJ IV PUSH: CPT

## 2019-09-23 PROCEDURE — 81001 URINALYSIS AUTO W/SCOPE: CPT | Performed by: EMERGENCY MEDICINE

## 2019-09-23 PROCEDURE — 25010000002 IOPAMIDOL 61 % SOLUTION: Performed by: EMERGENCY MEDICINE

## 2019-09-23 PROCEDURE — 25010000002 METHYLPREDNISOLONE PER 125 MG: Performed by: EMERGENCY MEDICINE

## 2019-09-23 PROCEDURE — 80053 COMPREHEN METABOLIC PANEL: CPT

## 2019-09-23 PROCEDURE — 80306 DRUG TEST PRSMV INSTRMNT: CPT | Performed by: EMERGENCY MEDICINE

## 2019-09-23 RX ORDER — PREDNISONE 20 MG/1
20 TABLET ORAL 2 TIMES DAILY
Qty: 10 TABLET | Refills: 0 | Status: SHIPPED | OUTPATIENT
Start: 2019-09-23 | End: 2019-09-24

## 2019-09-23 RX ORDER — SODIUM CHLORIDE 0.9 % (FLUSH) 0.9 %
10 SYRINGE (ML) INJECTION AS NEEDED
Status: DISCONTINUED | OUTPATIENT
Start: 2019-09-23 | End: 2019-09-23 | Stop reason: HOSPADM

## 2019-09-23 RX ORDER — METHYLPREDNISOLONE SODIUM SUCCINATE 125 MG/2ML
125 INJECTION, POWDER, LYOPHILIZED, FOR SOLUTION INTRAMUSCULAR; INTRAVENOUS ONCE
Status: COMPLETED | OUTPATIENT
Start: 2019-09-23 | End: 2019-09-23

## 2019-09-23 RX ADMIN — METHYLPREDNISOLONE SODIUM SUCCINATE 125 MG: 125 INJECTION, POWDER, FOR SOLUTION INTRAMUSCULAR; INTRAVENOUS at 14:36

## 2019-09-23 RX ADMIN — IOPAMIDOL 100 ML: 612 INJECTION, SOLUTION INTRAVENOUS at 13:59

## 2019-09-23 NOTE — ED PROVIDER NOTES
Subjective   28-year-old female presents to the ED with chief complaint of chest pain shortness of breath.  The patient states that she has been short of breath and coughing for the last week.  Just finished a Z-Ravin but has not helped her symptoms.  She does state that she has a history of asthma.  Feels like she is having some wheezing in her lungs.  States that her chest pain is sharp and stabbing in nature.  She first noticed this morning when she woke up.  She states that her cough is nonproductive sputum.  No nausea vomiting diarrhea or abdominal pain.  No fever chills.  No other complaints at this time.  Patient does have a history of pulmonary embolism and states she is compliant with her Eliquis.  No other complaints at this time.            Review of Systems   Constitutional: Negative for fatigue and fever.   Respiratory: Positive for cough, shortness of breath and wheezing.    Cardiovascular: Positive for chest pain. Negative for palpitations and leg swelling.   All other systems reviewed and are negative.      Past Medical History:   Diagnosis Date   • Abdominal pain    • Abnormal Pap smear of cervix    • Anxiety    • Asthma 2015   • Bleeding disorder (CMS/[facility]) 11-13-16   • Body piercing     TONGUE, NOSE, TWO IN EACH EAR   • Chest pain    • Clotting disorder (CMS/[facility])     factor 5   • Constipation    • Coronary artery disease involving native coronary artery of native heart without angina pectoris 6/16/2017   • Deep vein thrombosis (CMS/[facility]) 11-13-16   • Diarrhea    • Diarrhea    • Endometriosis    • Factor 5 Leiden mutation, heterozygous (CMS/[facility])    • Fibroid    • Gallstone    • GERD (gastroesophageal reflux disease)    • H/O blood clots    • Headache    • Hiatal hernia    • High cholesterol    • History of recurrent UTIs    • HPV (human papilloma virus) infection    • Hypertension    • Kidney infection    • Migraine    • Nausea    • Nausea & vomiting    • Obesity    • Orthostatic hypotension    •  Osteoarthritis    • Ovarian cyst    • Pollen allergies    • Protein S deficiency (CMS/HCC) 2016    labs from hospitalization for PE   • Pulmonary embolism (CMS/HCC)    • Recurrent pregnancy loss, antepartum condition or complication    • Sleep apnea     CPAP ASKED TO BRING DOS   • Subclinical hyperthyroidism    • Tachycardia    • Tattoo     LOWER BACK   • Varicella        Allergies   Allergen Reactions   • Ceftin [Cefuroxime Axetil] Other (See Comments)     Numbness in mouth and throat   • Amoxicillin Rash       Past Surgical History:   Procedure Laterality Date   •  SECTION      X2  and    •  SECTION WITH TUBAL N/A 1/15/2019    Procedure:  SECTION REPEAT WITH TUBAL;  Surgeon: Alva Boyer MD;  Location: [facility] LABOR DELIVERY;  Service: Obstetrics/Gynecology   • CHOLECYSTECTOMY     • COLONOSCOPY N/A 2017    Procedure: COLONOSCOPY WITH BIOPSIES AND ARGON THERMAL ABLATION;  Surgeon: Jignesh Selby MD;  Location: [facility] ENDOSCOPY;  Service:    • DIAGNOSTIC LAPAROSCOPY N/A 2018    Procedure: DIAGNOSTIC LAPAROSCOPY AND ABLATION OF ENDOMETRIOSIS;  Surgeon: Evin Zamudio MD;  Location: [facility] OR;  Service: Obstetrics/Gynecology   • ENDOMETRIAL ABLATION     • ENDOSCOPY N/A 2017    Procedure: ESOPHAGOGASTRODUODENOSCOPY WITH BIOPSIES AND COLD BIOPSY POLYPECTOMIES;  Surgeon: Jignesh Selby MD;  Location: [facility] ENDOSCOPY;  Service:    • PELVIC LAPAROSCOPY         Family History   Problem Relation Age of Onset   • Arthritis Mother    • COPD Mother    • Asthma Mother    • Thyroid disease Mother    • Arthritis Father    • Diabetes Father    • Hypertension Father    • Hyperlipidemia Father    • Kidney disease Father    • Heart attack Father    • Coronary artery disease Father    • Dementia Father    • No Known Problems Son    • Colon cancer Neg Hx    • Liver cancer Neg Hx    • Liver disease Neg Hx    • Stomach cancer Neg Hx    • Esophageal cancer Neg Hx         Social History     Socioeconomic History   • Marital status:      Spouse name: Not on file   • Number of children: Not on file   • Years of education: Not on file   • Highest education level: Not on file   Tobacco Use   • Smoking status: Never Smoker   • Smokeless tobacco: Never Used   Substance and Sexual Activity   • Alcohol use: No   • Drug use: No   • Sexual activity: Yes     Partners: Male     Birth control/protection: None     Comment: PE while on birth control patch           Objective   Physical Exam   Constitutional: She is oriented to person, place, and time. She appears well-developed and well-nourished. No distress.   HENT:   Head: Normocephalic and atraumatic.   Nose: Nose normal.   Eyes: Conjunctivae and EOM are normal.   Cardiovascular: Regular rhythm and intact distal pulses.   Tachycardic.   Pulmonary/Chest: Effort normal. No respiratory distress. She has wheezes.   Faint wheeze bilateral lung fields   Abdominal: Soft. She exhibits no distension. There is no tenderness. There is no guarding.   Musculoskeletal: She exhibits no edema or deformity.   Neurological: She is alert and oriented to person, place, and time. No cranial nerve deficit.   Skin: She is not diaphoretic.   Nursing note and vitals reviewed.      Procedures       EKG interpreted by me.  Sinus rhythm.  Tachycardic.  Rate of 107.  No ST segment or T wave abnormalities.  Abnormal EKG    ED Course  ED Course as of Sep 23 1653   Mon Sep 23, 2019   1432 Work-up unremarkable.  CT negative for pulmonary embolism or pneumonia.  She does have some slight tachycardia.  Her heart rate is currently 102-105. Pt states she has not had her rate controlling medications today. Follow up as needed.   [CG]      ED Course User Index  [CG] Victor Hugo Ayala,                   MDM  Patient presented with shortness of breath and cough.  Laboratory results unremarkable.  EKG shows tachycardia.  CT chest negative for pulmonary embolism.  She does  have some baseline tachycardia and states that she is supposed to be on medication and her dose was due this morning but she did not take it.  Instructed her to take her medication when she got home.  Afebrile.  Pulse ox of 99% on room air.  No pneumonia on the CT.  She is appropriate for discharge.  Suspect asthma.  Follow-up as needed.  Final diagnoses:   Exacerbation of asthma, unspecified asthma severity, unspecified whether persistent   Chest pain, unspecified type              Victor Hugo Ayala, DO  09/23/19 [id number]

## 2019-09-24 ENCOUNTER — OFFICE VISIT (OUTPATIENT)
Dept: INTERNAL MEDICINE | Facility: CLINIC | Age: 29
End: 2019-09-24

## 2019-09-24 VITALS
SYSTOLIC BLOOD PRESSURE: 120 MMHG | WEIGHT: 204 LBS | OXYGEN SATURATION: 99 % | BODY MASS INDEX: 36.14 KG/M2 | HEIGHT: 63 IN | TEMPERATURE: 98.8 F | HEART RATE: 104 BPM | DIASTOLIC BLOOD PRESSURE: 82 MMHG

## 2019-09-24 DIAGNOSIS — R09.82 ALLERGIC RHINITIS WITH POSTNASAL DRIP: Primary | ICD-10-CM

## 2019-09-24 DIAGNOSIS — R05.9 COUGH: ICD-10-CM

## 2019-09-24 DIAGNOSIS — J30.9 ALLERGIC RHINITIS WITH POSTNASAL DRIP: Primary | ICD-10-CM

## 2019-09-24 DIAGNOSIS — J45.21 MILD INTERMITTENT ASTHMA WITH ACUTE EXACERBATION: ICD-10-CM

## 2019-09-24 PROCEDURE — 99214 OFFICE O/P EST MOD 30 MIN: CPT | Performed by: PHYSICIAN ASSISTANT

## 2019-09-24 RX ORDER — IPRATROPIUM BROMIDE 42 UG/1
2 SPRAY, METERED NASAL 4 TIMES DAILY PRN
Qty: 15 ML | Refills: 12 | OUTPATIENT
Start: 2019-09-24 | End: 2020-05-03

## 2019-09-24 RX ORDER — FLUCONAZOLE 150 MG/1
150 TABLET ORAL ONCE
Qty: 1 TABLET | Refills: 0 | Status: SHIPPED | OUTPATIENT
Start: 2019-09-24 | End: 2019-09-24

## 2019-09-24 NOTE — PROGRESS NOTES
Marilu Godoy is a 28 y.o. female.     Subjective   History of Present Illness   Here today for follow-up from ER visit yesterday where she was diagnosed with acute asthma exacerbation.  She went to the ER due to wheezing, SOA and chest pains.  She was evaluated with CT PE protocol which was unremarkable, EKG which showed sinus tachycardia, and unremarkable CBC, CMP, urinalysis and troponin.  Of note, urine drug screen was positive for opiates with no corresponding prescription on her medication list.  In the ER she was treated with Solu-Medrol 125 mg and was prescribed prednisone 20 mg twice daily x5 days although she has not started the prednisone yet.  SOA and wheezing are a little improved today.  She was seen here 1 week ago for upper respiratory symptoms at which time she was treated with Zithromax although she denies noticing any improvement in symptoms after the antibiotic.  She was afebrile in the ER as well as in office today.  She has continued to have some muscle aches and fatigue.  She is taking Claritin daily.          The following portions of the patient's history were reviewed and updated as appropriate: allergies, current medications, past family history, past medical history, past social history, past surgical history and problem list.    Review of Systems   Constitutional: Positive for fatigue. Negative for activity change, chills, diaphoresis, fever and unexpected weight change.   HENT: Positive for congestion, postnasal drip and rhinorrhea. Negative for drooling, ear discharge, ear pain, mouth sores, sinus pressure, sinus pain, sore throat, trouble swallowing and voice change.    Eyes: Negative for pain and visual disturbance.   Respiratory: Positive for cough, chest tightness, shortness of breath and wheezing.    Cardiovascular: Positive for chest pain. Negative for palpitations and leg swelling.   Gastrointestinal: Negative for abdominal pain, constipation, nausea and vomiting.  "  Musculoskeletal: Positive for myalgias. Negative for arthralgias, joint swelling and neck stiffness.   Allergic/Immunologic: Positive for environmental allergies. Negative for immunocompromised state.   Neurological: Negative for dizziness, tremors, speech difficulty, light-headedness, numbness and headaches.   Hematological: Negative for adenopathy. Does not bruise/bleed easily.   Psychiatric/Behavioral: Negative for agitation and dysphoric mood.         Objective    Physical Exam   Constitutional: She is oriented to person, place, and time. She appears well-developed and well-nourished. No distress.   HENT:   Head: Normocephalic and atraumatic.   Mouth/Throat: No oropharyngeal exudate.   Very poor dentition.  Erosion of teeth due to significant dental caries.  Few missing teeth.  Very mild bilateral TM effusions.  No sinus tenderness.  Clear postnasal drip.   Eyes: Conjunctivae and EOM are normal. Pupils are equal, round, and reactive to light. Right eye exhibits no discharge. Left eye exhibits no discharge.   Neck: Normal range of motion. Neck supple.   Cardiovascular: Normal rate, regular rhythm and normal heart sounds. Exam reveals no gallop and no friction rub.   No murmur heard.  Pulmonary/Chest: Effort normal and breath sounds normal. No stridor. No respiratory distress. She has no wheezes. She has no rales. She exhibits no tenderness.   Lymphadenopathy:     She has no cervical adenopathy.   Neurological: She is alert and oriented to person, place, and time. No cranial nerve deficit or sensory deficit. Coordination normal.   Skin: Skin is warm and dry. Capillary refill takes less than 2 seconds. No rash noted. She is not diaphoretic.   Psychiatric: She has a normal mood and affect. Her behavior is normal. Judgment and thought content normal.   Nursing note and vitals reviewed.        /82   Pulse 104   Temp 98.8 °F (37.1 °C)   Ht 160 cm (62.99\")   Wt 92.5 kg (204 lb)   SpO2 99%   BMI 36.15 kg/m² "     Nursing note and vitals reviewed.          Assessment/Plan   Marilu was seen today for follow-up.    Diagnoses and all orders for this visit:    Allergic rhinitis with postnasal drip  -     Begin: Ipratropium (ATROVENT) 0.06 % nasal spray; 2 sprays into the nostril(s) as directed by provider 4 (Four) Times a Day As Needed for Rhinitis.  Continue Claritin daily.    Cough  Mild intermittent asthma with acute exacerbation  Treated with Solu-Medrol 125 mg in the ER.  No abnormal breath sounds today.  She was encouraged to hold off at least 24 hours before considering beginning prednisone which was also prescribed in the ER due to risk of long-term side effects with corticosteroid use including hyperglycemia and bone loss.  May begin prednisone if breathing fails to continue to improve.    Other orders  -     fluconazole (DIFLUCAN) 150 MG tablet; Take 1 tablet by mouth 1 (One) Time for 1 dose.      ER record reviewed.       Call or return to clinic if symptoms worsen or persist.

## 2019-09-27 ENCOUNTER — HOSPITAL ENCOUNTER (EMERGENCY)
Facility: HOSPITAL | Age: 29
Discharge: HOME | End: 2019-09-27
Attending: EMERGENCY MEDICINE | Admitting: EMERGENCY MEDICINE

## 2019-09-27 ENCOUNTER — TRANSCRIBE ORDERS (OUTPATIENT)
Dept: ADMINISTRATIVE | Facility: HOSPITAL | Age: 29
End: 2019-09-27

## 2019-09-27 ENCOUNTER — APPOINTMENT (OUTPATIENT)
Dept: CT IMAGING | Facility: HOSPITAL | Age: 29
End: 2019-09-27

## 2019-09-27 VITALS
RESPIRATION RATE: 16 BRPM | DIASTOLIC BLOOD PRESSURE: 76 MMHG | WEIGHT: 198.8 LBS | SYSTOLIC BLOOD PRESSURE: 116 MMHG | OXYGEN SATURATION: 98 % | HEIGHT: 63 IN | TEMPERATURE: 99.5 F | HEART RATE: 107 BPM | BODY MASS INDEX: 35.22 KG/M2

## 2019-09-27 DIAGNOSIS — J45.20 MILD INTERMITTENT ASTHMA WITHOUT COMPLICATION: ICD-10-CM

## 2019-09-27 DIAGNOSIS — R19.7 VOMITING AND DIARRHEA: Primary | ICD-10-CM

## 2019-09-27 DIAGNOSIS — M79.672 LEFT FOOT PAIN: Primary | ICD-10-CM

## 2019-09-27 DIAGNOSIS — R11.10 VOMITING AND DIARRHEA: Primary | ICD-10-CM

## 2019-09-27 LAB
ALBUMIN SERPL-MCNC: 4.4 G/DL (ref 3.5–5.2)
ALBUMIN/GLOB SERPL: 1.2 G/DL
ALP SERPL-CCNC: 109 U/L (ref 39–117)
ALT SERPL W P-5'-P-CCNC: 16 U/L (ref 1–33)
ANION GAP SERPL CALCULATED.3IONS-SCNC: 15.2 MMOL/L (ref 5–15)
AST SERPL-CCNC: 16 U/L (ref 1–32)
BASOPHILS # BLD AUTO: 0.03 10*3/MM3 (ref 0–0.2)
BASOPHILS NFR BLD AUTO: 0.4 % (ref 0–1.5)
BILIRUB SERPL-MCNC: 0.7 MG/DL (ref 0.2–1.2)
BILIRUB UR QL STRIP: ABNORMAL
BUN BLD-MCNC: 9 MG/DL (ref 6–20)
BUN/CREAT SERPL: 11.1 (ref 7–25)
CALCIUM SPEC-SCNC: 9.2 MG/DL (ref 8.6–10.5)
CHLORIDE SERPL-SCNC: 98 MMOL/L (ref 98–107)
CLARITY UR: ABNORMAL
CO2 SERPL-SCNC: 22.8 MMOL/L (ref 22–29)
COLOR UR: ABNORMAL
CREAT BLD-MCNC: 0.81 MG/DL (ref 0.57–1)
DEPRECATED RDW RBC AUTO: 40.8 FL (ref 37–54)
EOSINOPHIL # BLD AUTO: 0.05 10*3/MM3 (ref 0–0.4)
EOSINOPHIL NFR BLD AUTO: 0.7 % (ref 0.3–6.2)
ERYTHROCYTE [DISTWIDTH] IN BLOOD BY AUTOMATED COUNT: 12.5 % (ref 12.3–15.4)
FLUAV AG NPH QL: NEGATIVE
FLUBV AG NPH QL IA: NEGATIVE
GFR SERPL CREATININE-BSD FRML MDRD: 84 ML/MIN/1.73
GLOBULIN UR ELPH-MCNC: 3.8 GM/DL
GLUCOSE BLD-MCNC: 97 MG/DL (ref 65–99)
GLUCOSE UR STRIP-MCNC: NEGATIVE MG/DL
HCT VFR BLD AUTO: 44.4 % (ref 34–46.6)
HGB BLD-MCNC: 14.6 G/DL (ref 12–15.9)
HGB UR QL STRIP.AUTO: NEGATIVE
IMM GRANULOCYTES # BLD AUTO: 0.03 10*3/MM3 (ref 0–0.05)
IMM GRANULOCYTES NFR BLD AUTO: 0.4 % (ref 0–0.5)
KETONES UR QL STRIP: ABNORMAL
LEUKOCYTE ESTERASE UR QL STRIP.AUTO: NEGATIVE
LYMPHOCYTES # BLD AUTO: 1.06 10*3/MM3 (ref 0.7–3.1)
LYMPHOCYTES NFR BLD AUTO: 13.9 % (ref 19.6–45.3)
MCH RBC QN AUTO: 29.3 PG (ref 26.6–33)
MCHC RBC AUTO-ENTMCNC: 32.9 G/DL (ref 31.5–35.7)
MCV RBC AUTO: 89 FL (ref 79–97)
MONOCYTES # BLD AUTO: 0.52 10*3/MM3 (ref 0.1–0.9)
MONOCYTES NFR BLD AUTO: 6.8 % (ref 5–12)
NEUTROPHILS # BLD AUTO: 5.92 10*3/MM3 (ref 1.7–7)
NEUTROPHILS NFR BLD AUTO: 77.8 % (ref 42.7–76)
NITRITE UR QL STRIP: NEGATIVE
NRBC BLD AUTO-RTO: 0 /100 WBC (ref 0–0.2)
PH UR STRIP.AUTO: 5.5 [PH] (ref 5–8)
PLATELET # BLD AUTO: 279 10*3/MM3 (ref 140–450)
PMV BLD AUTO: 9.4 FL (ref 6–12)
POTASSIUM BLD-SCNC: 4.1 MMOL/L (ref 3.5–5.2)
PROT SERPL-MCNC: 8.2 G/DL (ref 6–8.5)
PROT UR QL STRIP: ABNORMAL
RBC # BLD AUTO: 4.99 10*6/MM3 (ref 3.77–5.28)
S PYO AG THROAT QL: NEGATIVE
SODIUM BLD-SCNC: 136 MMOL/L (ref 136–145)
SP GR UR STRIP: >=1.03 (ref 1–1.03)
UROBILINOGEN UR QL STRIP: ABNORMAL
WBC NRBC COR # BLD: 7.61 10*3/MM3 (ref 3.4–10.8)

## 2019-09-27 PROCEDURE — 87880 STREP A ASSAY W/OPTIC: CPT | Performed by: PHYSICIAN ASSISTANT

## 2019-09-27 PROCEDURE — 85025 COMPLETE CBC W/AUTO DIFF WBC: CPT | Performed by: PHYSICIAN ASSISTANT

## 2019-09-27 PROCEDURE — 25010000002 IOPAMIDOL 61 % SOLUTION: Performed by: EMERGENCY MEDICINE

## 2019-09-27 PROCEDURE — 99284 EMERGENCY DEPT VISIT MOD MDM: CPT

## 2019-09-27 PROCEDURE — 87081 CULTURE SCREEN ONLY: CPT | Performed by: PHYSICIAN ASSISTANT

## 2019-09-27 PROCEDURE — 81003 URINALYSIS AUTO W/O SCOPE: CPT | Performed by: PHYSICIAN ASSISTANT

## 2019-09-27 PROCEDURE — 80053 COMPREHEN METABOLIC PANEL: CPT | Performed by: PHYSICIAN ASSISTANT

## 2019-09-27 PROCEDURE — 74177 CT ABD & PELVIS W/CONTRAST: CPT

## 2019-09-27 PROCEDURE — 87804 INFLUENZA ASSAY W/OPTIC: CPT | Performed by: PHYSICIAN ASSISTANT

## 2019-09-27 PROCEDURE — 96360 HYDRATION IV INFUSION INIT: CPT

## 2019-09-27 RX ORDER — ATENOLOL 25 MG/1
TABLET ORAL
Qty: 30 TABLET | Refills: 10 | OUTPATIENT
Start: 2019-09-27

## 2019-09-27 RX ORDER — APIXABAN 2.5 MG/1
TABLET, FILM COATED ORAL
Qty: 60 TABLET | Refills: 4 | OUTPATIENT
Start: 2019-09-27

## 2019-09-27 RX ORDER — ONDANSETRON 4 MG/1
4 TABLET, FILM COATED ORAL EVERY 6 HOURS PRN
Qty: 12 TABLET | Refills: 0 | Status: SHIPPED | OUTPATIENT
Start: 2019-09-27 | End: 2020-05-08

## 2019-09-27 RX ORDER — ACETAMINOPHEN 325 MG/1
650 TABLET ORAL ONCE
Status: DISCONTINUED | OUTPATIENT
Start: 2019-09-27 | End: 2019-09-27

## 2019-09-27 RX ADMIN — SODIUM CHLORIDE 1000 ML: 9 INJECTION, SOLUTION INTRAVENOUS at 15:17

## 2019-09-27 RX ADMIN — IOPAMIDOL 100 ML: 612 INJECTION, SOLUTION INTRAVENOUS at 15:56

## 2019-09-29 ENCOUNTER — APPOINTMENT (OUTPATIENT)
Dept: GENERAL RADIOLOGY | Facility: HOSPITAL | Age: 29
End: 2019-09-29

## 2019-09-29 ENCOUNTER — HOSPITAL ENCOUNTER (EMERGENCY)
Facility: HOSPITAL | Age: 29
Discharge: HOME | End: 2019-09-29
Attending: STUDENT IN AN ORGANIZED HEALTH CARE EDUCATION/TRAINING PROGRAM | Admitting: EMERGENCY MEDICINE

## 2019-09-29 VITALS
OXYGEN SATURATION: 99 % | RESPIRATION RATE: 16 BRPM | WEIGHT: 199 LBS | HEIGHT: 63 IN | TEMPERATURE: 98.9 F | DIASTOLIC BLOOD PRESSURE: 69 MMHG | BODY MASS INDEX: 35.26 KG/M2 | HEART RATE: 97 BPM | SYSTOLIC BLOOD PRESSURE: 112 MMHG

## 2019-09-29 DIAGNOSIS — J18.9 COMMUNITY ACQUIRED PNEUMONIA OF RIGHT UPPER LOBE OF LUNG: Primary | ICD-10-CM

## 2019-09-29 LAB
ALBUMIN SERPL-MCNC: 4.4 G/DL (ref 3.5–5.2)
ALBUMIN/GLOB SERPL: 1.2 G/DL
ALP SERPL-CCNC: 90 U/L (ref 39–117)
ALT SERPL W P-5'-P-CCNC: 18 U/L (ref 1–33)
ANION GAP SERPL CALCULATED.3IONS-SCNC: 15.3 MMOL/L (ref 5–15)
AST SERPL-CCNC: 19 U/L (ref 1–32)
B-HCG UR QL: NEGATIVE
BACTERIA SPEC AEROBE CULT: NORMAL
BASOPHILS # BLD AUTO: 0.05 10*3/MM3 (ref 0–0.2)
BASOPHILS NFR BLD AUTO: 0.8 % (ref 0–1.5)
BILIRUB SERPL-MCNC: 0.6 MG/DL (ref 0.2–1.2)
BILIRUB UR QL STRIP: NEGATIVE
BUN BLD-MCNC: 6 MG/DL (ref 6–20)
BUN/CREAT SERPL: 7.3 (ref 7–25)
CALCIUM SPEC-SCNC: 9.2 MG/DL (ref 8.6–10.5)
CHLORIDE SERPL-SCNC: 99 MMOL/L (ref 98–107)
CLARITY UR: CLEAR
CO2 SERPL-SCNC: 23.7 MMOL/L (ref 22–29)
COLOR UR: ABNORMAL
CREAT BLD-MCNC: 0.82 MG/DL (ref 0.57–1)
DEPRECATED RDW RBC AUTO: 40.1 FL (ref 37–54)
EOSINOPHIL # BLD AUTO: 0.11 10*3/MM3 (ref 0–0.4)
EOSINOPHIL NFR BLD AUTO: 1.8 % (ref 0.3–6.2)
ERYTHROCYTE [DISTWIDTH] IN BLOOD BY AUTOMATED COUNT: 12.5 % (ref 12.3–15.4)
FLUAV AG NPH QL: NEGATIVE
FLUBV AG NPH QL IA: NEGATIVE
GFR SERPL CREATININE-BSD FRML MDRD: 83 ML/MIN/1.73
GLOBULIN UR ELPH-MCNC: 3.6 GM/DL
GLUCOSE BLD-MCNC: 109 MG/DL (ref 65–99)
GLUCOSE UR STRIP-MCNC: NEGATIVE MG/DL
HCT VFR BLD AUTO: 38.8 % (ref 34–46.6)
HGB BLD-MCNC: 13 G/DL (ref 12–15.9)
HGB UR QL STRIP.AUTO: NEGATIVE
IMM GRANULOCYTES # BLD AUTO: 0.02 10*3/MM3 (ref 0–0.05)
IMM GRANULOCYTES NFR BLD AUTO: 0.3 % (ref 0–0.5)
KETONES UR QL STRIP: ABNORMAL
LEUKOCYTE ESTERASE UR QL STRIP.AUTO: NEGATIVE
LIPASE SERPL-CCNC: 22 U/L (ref 13–60)
LYMPHOCYTES # BLD AUTO: 1.22 10*3/MM3 (ref 0.7–3.1)
LYMPHOCYTES NFR BLD AUTO: 19.7 % (ref 19.6–45.3)
MCH RBC QN AUTO: 29.3 PG (ref 26.6–33)
MCHC RBC AUTO-ENTMCNC: 33.5 G/DL (ref 31.5–35.7)
MCV RBC AUTO: 87.6 FL (ref 79–97)
MONOCYTES # BLD AUTO: 0.54 10*3/MM3 (ref 0.1–0.9)
MONOCYTES NFR BLD AUTO: 8.7 % (ref 5–12)
NEUTROPHILS # BLD AUTO: 4.25 10*3/MM3 (ref 1.7–7)
NEUTROPHILS NFR BLD AUTO: 68.7 % (ref 42.7–76)
NITRITE UR QL STRIP: NEGATIVE
NRBC BLD AUTO-RTO: 0 /100 WBC (ref 0–0.2)
PH UR STRIP.AUTO: 5.5 [PH] (ref 5–8)
PLATELET # BLD AUTO: 261 10*3/MM3 (ref 140–450)
PMV BLD AUTO: 9.6 FL (ref 6–12)
POTASSIUM BLD-SCNC: 3.8 MMOL/L (ref 3.5–5.2)
PROT SERPL-MCNC: 8 G/DL (ref 6–8.5)
PROT UR QL STRIP: ABNORMAL
RBC # BLD AUTO: 4.43 10*6/MM3 (ref 3.77–5.28)
S PYO AG THROAT QL: NEGATIVE
SODIUM BLD-SCNC: 138 MMOL/L (ref 136–145)
SP GR UR STRIP: 1.03 (ref 1–1.03)
TROPONIN T SERPL-MCNC: <0.01 NG/ML (ref 0–0.03)
UROBILINOGEN UR QL STRIP: ABNORMAL
WBC NRBC COR # BLD: 6.19 10*3/MM3 (ref 3.4–10.8)

## 2019-09-29 PROCEDURE — 87880 STREP A ASSAY W/OPTIC: CPT | Performed by: PHYSICIAN ASSISTANT

## 2019-09-29 PROCEDURE — 81025 URINE PREGNANCY TEST: CPT | Performed by: PHYSICIAN ASSISTANT

## 2019-09-29 PROCEDURE — 87804 INFLUENZA ASSAY W/OPTIC: CPT | Performed by: PHYSICIAN ASSISTANT

## 2019-09-29 PROCEDURE — 99284 EMERGENCY DEPT VISIT MOD MDM: CPT

## 2019-09-29 PROCEDURE — 81003 URINALYSIS AUTO W/O SCOPE: CPT | Performed by: PHYSICIAN ASSISTANT

## 2019-09-29 PROCEDURE — 71046 X-RAY EXAM CHEST 2 VIEWS: CPT

## 2019-09-29 PROCEDURE — 84484 ASSAY OF TROPONIN QUANT: CPT | Performed by: PHYSICIAN ASSISTANT

## 2019-09-29 PROCEDURE — 83690 ASSAY OF LIPASE: CPT | Performed by: PHYSICIAN ASSISTANT

## 2019-09-29 PROCEDURE — 93005 ELECTROCARDIOGRAM TRACING: CPT | Performed by: PHYSICIAN ASSISTANT

## 2019-09-29 PROCEDURE — 87081 CULTURE SCREEN ONLY: CPT | Performed by: PHYSICIAN ASSISTANT

## 2019-09-29 PROCEDURE — 85025 COMPLETE CBC W/AUTO DIFF WBC: CPT | Performed by: PHYSICIAN ASSISTANT

## 2019-09-29 PROCEDURE — 96360 HYDRATION IV INFUSION INIT: CPT

## 2019-09-29 PROCEDURE — 63710000001 PREDNISONE PER 1 MG: Performed by: PHYSICIAN ASSISTANT

## 2019-09-29 PROCEDURE — 80053 COMPREHEN METABOLIC PANEL: CPT | Performed by: PHYSICIAN ASSISTANT

## 2019-09-29 RX ORDER — AZITHROMYCIN 250 MG/1
500 TABLET, FILM COATED ORAL ONCE
Status: COMPLETED | OUTPATIENT
Start: 2019-09-29 | End: 2019-09-29

## 2019-09-29 RX ORDER — PREDNISONE 20 MG/1
40 TABLET ORAL ONCE
Status: COMPLETED | OUTPATIENT
Start: 2019-09-29 | End: 2019-09-29

## 2019-09-29 RX ORDER — DOXYCYCLINE 100 MG/1
100 CAPSULE ORAL 2 TIMES DAILY
Qty: 14 CAPSULE | Refills: 0 | OUTPATIENT
Start: 2019-09-29 | End: 2020-05-03

## 2019-09-29 RX ORDER — DOXYCYCLINE 100 MG/1
100 CAPSULE ORAL ONCE
Status: COMPLETED | OUTPATIENT
Start: 2019-09-29 | End: 2019-09-29

## 2019-09-29 RX ORDER — ACETAMINOPHEN 325 MG/1
650 TABLET ORAL ONCE
Status: COMPLETED | OUTPATIENT
Start: 2019-09-29 | End: 2019-09-29

## 2019-09-29 RX ORDER — ACETAMINOPHEN 325 MG/1
325 TABLET ORAL ONCE
Status: COMPLETED | OUTPATIENT
Start: 2019-09-29 | End: 2019-09-29

## 2019-09-29 RX ORDER — ONDANSETRON 4 MG/1
4 TABLET, ORALLY DISINTEGRATING ORAL ONCE
Status: COMPLETED | OUTPATIENT
Start: 2019-09-29 | End: 2019-09-29

## 2019-09-29 RX ORDER — SODIUM CHLORIDE 0.9 % (FLUSH) 0.9 %
10 SYRINGE (ML) INJECTION AS NEEDED
Status: DISCONTINUED | OUTPATIENT
Start: 2019-09-29 | End: 2019-09-29 | Stop reason: HOSPADM

## 2019-09-29 RX ORDER — AZITHROMYCIN 250 MG/1
TABLET, FILM COATED ORAL
Qty: 4 TABLET | Refills: 0 | Status: SHIPPED | OUTPATIENT
Start: 2019-09-29 | End: 2019-11-01 | Stop reason: SDUPTHER

## 2019-09-29 RX ORDER — FLUCONAZOLE 150 MG/1
150 TABLET ORAL ONCE
Qty: 1 TABLET | Refills: 0 | Status: SHIPPED | OUTPATIENT
Start: 2019-09-29 | End: 2019-09-29

## 2019-09-29 RX ORDER — IBUPROFEN 600 MG/1
600 TABLET ORAL ONCE
Status: DISCONTINUED | OUTPATIENT
Start: 2019-09-29 | End: 2019-09-29

## 2019-09-29 RX ADMIN — SODIUM CHLORIDE 1000 ML: 9 INJECTION, SOLUTION INTRAVENOUS at 20:21

## 2019-09-29 RX ADMIN — ACETAMINOPHEN 650 MG: 325 TABLET, FILM COATED ORAL at 19:00

## 2019-09-29 RX ADMIN — SODIUM CHLORIDE 1000 ML: 9 INJECTION, SOLUTION INTRAVENOUS at 19:00

## 2019-09-29 RX ADMIN — AZITHROMYCIN MONOHYDRATE 500 MG: 250 TABLET ORAL at 20:21

## 2019-09-29 RX ADMIN — ACETAMINOPHEN 325 MG: 325 TABLET, FILM COATED ORAL at 20:21

## 2019-09-29 RX ADMIN — PREDNISONE 40 MG: 20 TABLET ORAL at 20:21

## 2019-09-29 RX ADMIN — ONDANSETRON 4 MG: 4 TABLET, ORALLY DISINTEGRATING ORAL at 19:00

## 2019-09-29 RX ADMIN — DOXYCYCLINE 100 MG: 100 CAPSULE ORAL at 20:21

## 2019-09-30 ENCOUNTER — HOSPITAL ENCOUNTER (OUTPATIENT)
Dept: MRI IMAGING | Facility: HOSPITAL | Age: 29
Discharge: HOME | End: 2019-09-30
Admitting: ORTHOPAEDIC SURGERY

## 2019-09-30 DIAGNOSIS — M79.672 LEFT FOOT PAIN: ICD-10-CM

## 2019-09-30 PROCEDURE — 73718 MRI LOWER EXTREMITY W/O DYE: CPT

## 2019-10-01 LAB — BACTERIA SPEC AEROBE CULT: NORMAL

## 2019-10-04 ENCOUNTER — HOSPITAL ENCOUNTER (EMERGENCY)
Facility: HOSPITAL | Age: 29
Discharge: HOME | End: 2019-10-04
Attending: EMERGENCY MEDICINE | Admitting: EMERGENCY MEDICINE

## 2019-10-04 ENCOUNTER — APPOINTMENT (OUTPATIENT)
Dept: GENERAL RADIOLOGY | Facility: HOSPITAL | Age: 29
End: 2019-10-04

## 2019-10-04 VITALS
DIASTOLIC BLOOD PRESSURE: 67 MMHG | WEIGHT: 197 LBS | BODY MASS INDEX: 34.91 KG/M2 | OXYGEN SATURATION: 98 % | HEART RATE: 118 BPM | TEMPERATURE: 98.8 F | SYSTOLIC BLOOD PRESSURE: 119 MMHG | HEIGHT: 63 IN | RESPIRATION RATE: 16 BRPM

## 2019-10-04 DIAGNOSIS — J18.9 COMMUNITY ACQUIRED PNEUMONIA OF RIGHT UPPER LOBE OF LUNG: Primary | ICD-10-CM

## 2019-10-04 LAB
ALBUMIN SERPL-MCNC: 4.2 G/DL (ref 3.5–5.2)
ALBUMIN/GLOB SERPL: 1.2 G/DL
ALP SERPL-CCNC: 83 U/L (ref 39–117)
ALT SERPL W P-5'-P-CCNC: 18 U/L (ref 1–33)
ANION GAP SERPL CALCULATED.3IONS-SCNC: 15.2 MMOL/L (ref 5–15)
AST SERPL-CCNC: 16 U/L (ref 1–32)
BASOPHILS # BLD AUTO: 0.04 10*3/MM3 (ref 0–0.2)
BASOPHILS NFR BLD AUTO: 0.5 % (ref 0–1.5)
BILIRUB SERPL-MCNC: 0.6 MG/DL (ref 0.2–1.2)
BUN BLD-MCNC: 8 MG/DL (ref 6–20)
BUN/CREAT SERPL: 11 (ref 7–25)
CALCIUM SPEC-SCNC: 9.3 MG/DL (ref 8.6–10.5)
CHLORIDE SERPL-SCNC: 98 MMOL/L (ref 98–107)
CO2 SERPL-SCNC: 21.8 MMOL/L (ref 22–29)
CREAT BLD-MCNC: 0.73 MG/DL (ref 0.57–1)
DEPRECATED RDW RBC AUTO: 40.1 FL (ref 37–54)
EOSINOPHIL # BLD AUTO: 0.31 10*3/MM3 (ref 0–0.4)
EOSINOPHIL NFR BLD AUTO: 4.2 % (ref 0.3–6.2)
ERYTHROCYTE [DISTWIDTH] IN BLOOD BY AUTOMATED COUNT: 12.4 % (ref 12.3–15.4)
GFR SERPL CREATININE-BSD FRML MDRD: 95 ML/MIN/1.73
GLOBULIN UR ELPH-MCNC: 3.6 GM/DL
GLUCOSE BLD-MCNC: 92 MG/DL (ref 65–99)
HCT VFR BLD AUTO: 39.4 % (ref 34–46.6)
HGB BLD-MCNC: 13.1 G/DL (ref 12–15.9)
IMM GRANULOCYTES # BLD AUTO: 0.03 10*3/MM3 (ref 0–0.05)
IMM GRANULOCYTES NFR BLD AUTO: 0.4 % (ref 0–0.5)
LYMPHOCYTES # BLD AUTO: 0.9 10*3/MM3 (ref 0.7–3.1)
LYMPHOCYTES NFR BLD AUTO: 12.1 % (ref 19.6–45.3)
MCH RBC QN AUTO: 29.4 PG (ref 26.6–33)
MCHC RBC AUTO-ENTMCNC: 33.2 G/DL (ref 31.5–35.7)
MCV RBC AUTO: 88.3 FL (ref 79–97)
MONOCYTES # BLD AUTO: 0.43 10*3/MM3 (ref 0.1–0.9)
MONOCYTES NFR BLD AUTO: 5.8 % (ref 5–12)
NEUTROPHILS # BLD AUTO: 5.75 10*3/MM3 (ref 1.7–7)
NEUTROPHILS NFR BLD AUTO: 77 % (ref 42.7–76)
NRBC BLD AUTO-RTO: 0 /100 WBC (ref 0–0.2)
PLATELET # BLD AUTO: 289 10*3/MM3 (ref 140–450)
PMV BLD AUTO: 9.7 FL (ref 6–12)
POTASSIUM BLD-SCNC: 4.1 MMOL/L (ref 3.5–5.2)
PROT SERPL-MCNC: 7.8 G/DL (ref 6–8.5)
RBC # BLD AUTO: 4.46 10*6/MM3 (ref 3.77–5.28)
SODIUM BLD-SCNC: 135 MMOL/L (ref 136–145)
WBC NRBC COR # BLD: 7.46 10*3/MM3 (ref 3.4–10.8)

## 2019-10-04 PROCEDURE — 94799 UNLISTED PULMONARY SVC/PX: CPT

## 2019-10-04 PROCEDURE — 99283 EMERGENCY DEPT VISIT LOW MDM: CPT

## 2019-10-04 PROCEDURE — 85025 COMPLETE CBC W/AUTO DIFF WBC: CPT | Performed by: PHYSICIAN ASSISTANT

## 2019-10-04 PROCEDURE — 80053 COMPREHEN METABOLIC PANEL: CPT | Performed by: PHYSICIAN ASSISTANT

## 2019-10-04 PROCEDURE — 87040 BLOOD CULTURE FOR BACTERIA: CPT | Performed by: PHYSICIAN ASSISTANT

## 2019-10-04 PROCEDURE — 96365 THER/PROPH/DIAG IV INF INIT: CPT

## 2019-10-04 PROCEDURE — 25010000002 CEFTRIAXONE SODIUM-DEXTROSE 1-3.74 GM-%(50ML) RECONSTITUTED SOLUTION: Performed by: PHYSICIAN ASSISTANT

## 2019-10-04 PROCEDURE — 71046 X-RAY EXAM CHEST 2 VIEWS: CPT

## 2019-10-04 PROCEDURE — 94640 AIRWAY INHALATION TREATMENT: CPT

## 2019-10-04 RX ORDER — SODIUM CHLORIDE 0.9 % (FLUSH) 0.9 %
10 SYRINGE (ML) INJECTION AS NEEDED
Status: DISCONTINUED | OUTPATIENT
Start: 2019-10-04 | End: 2019-10-04 | Stop reason: HOSPADM

## 2019-10-04 RX ORDER — ACETAMINOPHEN 325 MG/1
975 TABLET ORAL ONCE
Status: DISCONTINUED | OUTPATIENT
Start: 2019-10-04 | End: 2019-10-04 | Stop reason: HOSPADM

## 2019-10-04 RX ORDER — CEFDINIR 300 MG/1
300 CAPSULE ORAL 2 TIMES DAILY
Qty: 20 CAPSULE | Refills: 0 | Status: SHIPPED | OUTPATIENT
Start: 2019-10-04 | End: 2020-03-16 | Stop reason: SDUPTHER

## 2019-10-04 RX ORDER — IPRATROPIUM BROMIDE AND ALBUTEROL SULFATE 2.5; .5 MG/3ML; MG/3ML
3 SOLUTION RESPIRATORY (INHALATION) ONCE
Status: COMPLETED | OUTPATIENT
Start: 2019-10-04 | End: 2019-10-04

## 2019-10-04 RX ORDER — GUAIFENESIN 200 MG/10ML
200 LIQUID ORAL EVERY 4 HOURS PRN
Qty: 240 ML | Refills: 0 | Status: SHIPPED | OUTPATIENT
Start: 2019-10-04 | End: 2020-04-13 | Stop reason: SDUPTHER

## 2019-10-04 RX ORDER — ALBUTEROL SULFATE 90 UG/1
2 AEROSOL, METERED RESPIRATORY (INHALATION) EVERY 6 HOURS PRN
Qty: 6.7 G | Refills: 0 | Status: SHIPPED | OUTPATIENT
Start: 2019-10-04 | End: 2020-04-13 | Stop reason: SDUPTHER

## 2019-10-04 RX ORDER — CEFTRIAXONE 1 G/50ML
1 INJECTION, SOLUTION INTRAVENOUS ONCE
Status: COMPLETED | OUTPATIENT
Start: 2019-10-04 | End: 2019-10-04

## 2019-10-04 RX ADMIN — CEFTRIAXONE 1 G: 1 INJECTION, SOLUTION INTRAVENOUS at 14:03

## 2019-10-04 RX ADMIN — IPRATROPIUM BROMIDE AND ALBUTEROL SULFATE 3 ML: .5; 3 SOLUTION RESPIRATORY (INHALATION) at 13:15

## 2019-10-04 RX ADMIN — SODIUM CHLORIDE 500 ML: 9 INJECTION, SOLUTION INTRAVENOUS at 13:12

## 2019-10-04 NOTE — ED PROVIDER NOTES
Subjective   28-year-old female who comes in with chief complaint cough, congestion, fever, chills, vomiting for the past 10 days.  Patient does report she was recently seen at this facility and diagnosed with pneumonia 5 days ago. Patient states that she has had continued to have productive cough with scant sputum. Has had post-tussive emesis.   States that she has been taking doxycycline and azithromycin as prescribed, but reports her symptoms has worsened. Patient does have significant history of previous pulmonary embolus currently on eliquis. Patient does have asthma, hypertension, hyperlipidemia.  Denies any associated chest pain, SOA, abdominal pain.         History provided by:  Patient   used: No    Cough   Cough characteristics:  Productive  Sputum characteristics:  Yellow  Severity:  Moderate  Onset quality:  Sudden  Timing:  Intermittent  Progression:  Worsening  Chronicity:  New  Smoker: no    Context: not animal exposure, not exposure to allergens, not fumes, not occupational exposure, not sick contacts, not upper respiratory infection, not weather changes and not with activity    Relieved by:  Nothing  Worsened by:  Nothing  Ineffective treatments:  None tried  Associated symptoms: shortness of breath and wheezing    Associated symptoms: no chest pain, no chills, no ear fullness, no ear pain, no fever, no myalgias, no rash, no sinus congestion and no weight loss    Risk factors: no chemical exposure, no recent infection and no recent travel        Review of Systems   Constitutional: Negative for chills, fever and weight loss.   HENT: Positive for congestion. Negative for dental problem, ear discharge, ear pain, facial swelling, hearing loss and mouth sores.    Eyes: Negative.  Negative for photophobia, pain, redness and itching.   Respiratory: Positive for cough, chest tightness, shortness of breath and wheezing.    Cardiovascular: Negative.  Negative for chest pain and leg  swelling.   Endocrine: Negative.  Negative for cold intolerance and polydipsia.   Genitourinary: Negative for difficulty urinating, dyspareunia, dysuria and frequency.   Musculoskeletal: Negative.  Negative for myalgias.   Skin: Negative for rash.   Hematological: Negative.  Negative for adenopathy. Does not bruise/bleed easily.   Psychiatric/Behavioral: Negative for agitation, behavioral problems and decreased concentration.   All other systems reviewed and are negative.      Past Medical History:   Diagnosis Date   • Abdominal pain    • Abnormal Pap smear of cervix    • Anxiety    • Asthma 2015   • Bleeding disorder (CMS/[facility]) 11-13-16   • Body piercing     TONGUE, NOSE, TWO IN EACH EAR   • Chest pain    • Clotting disorder (CMS/[facility])     factor 5   • Constipation    • Coronary artery disease involving native coronary artery of native heart without angina pectoris 6/16/2017   • Deep vein thrombosis (CMS/[facility]) 11-13-16   • Diarrhea    • Diarrhea    • Endometriosis    • Factor 5 Leiden mutation, heterozygous (CMS/[facility])    • Fibroid    • Gallstone    • GERD (gastroesophageal reflux disease)    • H/O blood clots    • Headache    • Hiatal hernia    • High cholesterol    • History of recurrent UTIs    • HPV (human papilloma virus) infection    • Hypertension    • Kidney infection    • Migraine    • Nausea    • Nausea & vomiting    • Obesity    • Orthostatic hypotension    • Osteoarthritis    • Ovarian cyst    • Pollen allergies    • Protein S deficiency (CMS/[facility]) 11/14/2016    labs from hospitalization for PE   • Pulmonary embolism (CMS/[facility])    • Recurrent pregnancy loss, antepartum condition or complication    • Sleep apnea     CPAP ASKED TO BRING DOS   • Subclinical hyperthyroidism    • Tachycardia    • Tattoo     LOWER BACK   • Varicella        Allergies   Allergen Reactions   • Ceftin [Cefuroxime Axetil] Other (See Comments)     Numbness in mouth and throat   • Amoxicillin Rash       Past Surgical History:   Procedure  Laterality Date   •  SECTION      X2  and    •  SECTION WITH TUBAL N/A 1/15/2019    Procedure:  SECTION REPEAT WITH TUBAL;  Surgeon: Alva Boyer MD;  Location: [facility] LABOR DELIVERY;  Service: Obstetrics/Gynecology   • CHOLECYSTECTOMY     • COLONOSCOPY N/A 2017    Procedure: COLONOSCOPY WITH BIOPSIES AND ARGON THERMAL ABLATION;  Surgeon: Jignesh Selby MD;  Location: [facility] ENDOSCOPY;  Service:    • DIAGNOSTIC LAPAROSCOPY N/A 2018    Procedure: DIAGNOSTIC LAPAROSCOPY AND ABLATION OF ENDOMETRIOSIS;  Surgeon: Evin Zamudio MD;  Location: [facility] OR;  Service: Obstetrics/Gynecology   • ENDOMETRIAL ABLATION     • ENDOSCOPY N/A 2017    Procedure: ESOPHAGOGASTRODUODENOSCOPY WITH BIOPSIES AND COLD BIOPSY POLYPECTOMIES;  Surgeon: Jignesh Selby MD;  Location: [facility] ENDOSCOPY;  Service:    • PELVIC LAPAROSCOPY         Family History   Problem Relation Age of Onset   • Arthritis Mother    • COPD Mother    • Asthma Mother    • Thyroid disease Mother    • Arthritis Father    • Diabetes Father    • Hypertension Father    • Hyperlipidemia Father    • Kidney disease Father    • Heart attack Father    • Coronary artery disease Father    • Dementia Father    • No Known Problems Son    • Colon cancer Neg Hx    • Liver cancer Neg Hx    • Liver disease Neg Hx    • Stomach cancer Neg Hx    • Esophageal cancer Neg Hx        Social History     Socioeconomic History   • Marital status:      Spouse name: Not on file   • Number of children: Not on file   • Years of education: Not on file   • Highest education level: Not on file   Tobacco Use   • Smoking status: Never Smoker   • Smokeless tobacco: Never Used   Substance and Sexual Activity   • Alcohol use: No   • Drug use: No   • Sexual activity: Yes     Partners: Male     Birth control/protection: None     Comment: PE while on birth control patch           Objective   Physical Exam   Constitutional: She is oriented to  person, place, and time. She appears well-developed and well-nourished. She appears ill.   HENT:   Head: Normocephalic and atraumatic.   Right Ear: Hearing, tympanic membrane and ear canal normal. No swelling.   Left Ear: Hearing, tympanic membrane and ear canal normal. No drainage or swelling.   Mouth/Throat: Uvula is midline, oropharynx is clear and moist and mucous membranes are normal. Mucous membranes are not pale, not dry and not cyanotic. No oral lesions. No uvula swelling. No oropharyngeal exudate, posterior oropharyngeal edema or posterior oropharyngeal erythema. No tonsillar exudate.   Eyes: Pupils are equal, round, and reactive to light.   Neck: Normal range of motion. Neck supple.   Cardiovascular: Normal rate, regular rhythm and normal heart sounds. Exam reveals no gallop and no friction rub.   No murmur heard.  Pulmonary/Chest: Effort normal. She has wheezes.   Abdominal: Bowel sounds are normal. She exhibits no distension and no mass. There is no tenderness. There is no rebound and no guarding. No hernia.   Lymphadenopathy:     She has no cervical adenopathy.   Neurological: She is alert and oriented to person, place, and time.   Skin: Skin is warm and dry. Capillary refill takes less than 2 seconds. No rash noted. She is not diaphoretic. No erythema. No pallor.   Psychiatric: She has a normal mood and affect.   Nursing note and vitals reviewed.      Procedures           ED Course  ED Course as of Oct 04 1434   Fri Oct 04, 2019   1404 Improving right perihilar consolidation likely due to  pneumonia. Additional short-term follow-up recommended to ensure  complete resolution.  [BH]   1404 29 y/o female that comes in with c.c worsening cough, congestion over past several days. Patient does have recent diagnosis of pneumonia. Patient repeat x-rays does show improvement in upper lobe pneumonia. Patient has been taking doxy and azithromycin.   []      ED Course User Index  [BH] Kwasi Waller PA-C                   MDM    Final diagnoses:   Community acquired pneumonia of right upper lobe of lung (CMS/HCC)              Kwasi Waller PA-C  10/04/19 7113

## 2019-10-09 LAB
BACTERIA SPEC AEROBE CULT: NORMAL
BACTERIA SPEC AEROBE CULT: NORMAL

## 2019-10-28 RX ORDER — ATENOLOL 25 MG/1
TABLET ORAL
Qty: 30 TABLET | Refills: 10 | OUTPATIENT
Start: 2019-10-28

## 2019-10-28 RX ORDER — APIXABAN 2.5 MG/1
TABLET, FILM COATED ORAL
Qty: 60 TABLET | Refills: 4 | OUTPATIENT
Start: 2019-10-28

## 2019-11-01 RX ORDER — AZITHROMYCIN 250 MG/1
TABLET, FILM COATED ORAL
Qty: 4 TABLET | Refills: 0 | Status: SHIPPED | OUTPATIENT
Start: 2019-11-01 | End: 2020-04-13 | Stop reason: SDUPTHER

## 2019-12-31 ENCOUNTER — HOSPITAL ENCOUNTER (EMERGENCY)
Facility: HOSPITAL | Age: 29
Discharge: HOME | End: 2019-12-31
Attending: EMERGENCY MEDICINE | Admitting: EMERGENCY MEDICINE

## 2019-12-31 VITALS
HEART RATE: 99 BPM | WEIGHT: 195.8 LBS | OXYGEN SATURATION: 98 % | TEMPERATURE: 100 F | DIASTOLIC BLOOD PRESSURE: 78 MMHG | HEIGHT: 63 IN | RESPIRATION RATE: 18 BRPM | SYSTOLIC BLOOD PRESSURE: 114 MMHG | BODY MASS INDEX: 34.69 KG/M2

## 2019-12-31 DIAGNOSIS — J06.9 UPPER RESPIRATORY TRACT INFECTION, UNSPECIFIED TYPE: ICD-10-CM

## 2019-12-31 DIAGNOSIS — J40 BRONCHITIS: Primary | ICD-10-CM

## 2019-12-31 LAB
FLUAV AG NPH QL: NEGATIVE
FLUBV AG NPH QL IA: NEGATIVE
S PYO AG THROAT QL: NEGATIVE

## 2019-12-31 PROCEDURE — 99283 EMERGENCY DEPT VISIT LOW MDM: CPT

## 2019-12-31 PROCEDURE — 87804 INFLUENZA ASSAY W/OPTIC: CPT | Performed by: PHYSICIAN ASSISTANT

## 2019-12-31 PROCEDURE — 87880 STREP A ASSAY W/OPTIC: CPT | Performed by: PHYSICIAN ASSISTANT

## 2019-12-31 PROCEDURE — 87081 CULTURE SCREEN ONLY: CPT | Performed by: PHYSICIAN ASSISTANT

## 2019-12-31 RX ORDER — DOXYCYCLINE 100 MG/1
100 CAPSULE ORAL 2 TIMES DAILY
Qty: 14 CAPSULE | Refills: 0 | Status: SHIPPED | OUTPATIENT
Start: 2019-12-31 | End: 2020-01-07

## 2019-12-31 RX ORDER — AZITHROMYCIN 250 MG/1
500 TABLET, FILM COATED ORAL ONCE
Status: COMPLETED | OUTPATIENT
Start: 2019-12-31 | End: 2019-12-31

## 2019-12-31 RX ORDER — BENZONATATE 100 MG/1
100 CAPSULE ORAL 3 TIMES DAILY PRN
Qty: 12 CAPSULE | Refills: 0 | Status: SHIPPED | OUTPATIENT
Start: 2019-12-31 | End: 2020-03-03

## 2019-12-31 RX ADMIN — AZITHROMYCIN MONOHYDRATE 500 MG: 250 TABLET ORAL at 21:02

## 2020-01-01 NOTE — ED PROVIDER NOTES
Subjective     History provided by:  Patient   used: No    URI   Presenting symptoms: congestion, cough, rhinorrhea and sore throat    Severity:  Mild  Onset quality:  Gradual  Duration:  4 days  Timing:  Constant  Progression:  Waxing and waning  Chronicity:  New  Relieved by:  Nothing  Worsened by:  Nothing      Review of Systems   HENT: Positive for congestion, rhinorrhea and sore throat.    Respiratory: Positive for cough.    All other systems reviewed and are negative.      Past Medical History:   Diagnosis Date   • Abdominal pain    • Abnormal Pap smear of cervix    • Anxiety    • Asthma 2015   • Bleeding disorder (CMS/Formerly [facility]) 11-13-16   • Body piercing     TONGUE, NOSE, TWO IN EACH EAR   • Chest pain    • Clotting disorder (CMS/Formerly [facility])     factor 5   • Constipation    • Coronary artery disease involving native coronary artery of native heart without angina pectoris 6/16/2017   • Deep vein thrombosis (CMS/Formerly [facility]) 11-13-16   • Diarrhea    • Diarrhea    • Endometriosis    • Factor 5 Leiden mutation, heterozygous (CMS/Formerly [facility])    • Fibroid    • Gallstone    • GERD (gastroesophageal reflux disease)    • H/O blood clots    • Headache    • Hiatal hernia    • High cholesterol    • History of recurrent UTIs    • HPV (human papilloma virus) infection    • Hypertension    • Kidney infection    • Migraine    • Nausea    • Nausea & vomiting    • Obesity    • Orthostatic hypotension    • Osteoarthritis    • Ovarian cyst    • Pollen allergies    • Protein S deficiency (CMS/Formerly [facility]) 11/14/2016    labs from hospitalization for PE   • Pulmonary embolism (CMS/Formerly [facility])    • Recurrent pregnancy loss, antepartum condition or complication    • Sleep apnea     CPAP ASKED TO BRING DOS   • Subclinical hyperthyroidism    • Tachycardia    • Tattoo     LOWER BACK   • Varicella        Allergies   Allergen Reactions   • Ceftin [Cefuroxime Axetil] Other (See Comments)     Numbness in mouth and throat   • Amoxicillin Rash       Past Surgical  History:   Procedure Laterality Date   •  SECTION      X2  and    •  SECTION WITH TUBAL N/A 1/15/2019    Procedure:  SECTION REPEAT WITH TUBAL;  Surgeon: Alva Boyer MD;  Location: [facility] LABOR DELIVERY;  Service: Obstetrics/Gynecology   • CHOLECYSTECTOMY     • COLONOSCOPY N/A 2017    Procedure: COLONOSCOPY WITH BIOPSIES AND ARGON THERMAL ABLATION;  Surgeon: Jignesh Selby MD;  Location: [facility] ENDOSCOPY;  Service:    • DIAGNOSTIC LAPAROSCOPY N/A 2018    Procedure: DIAGNOSTIC LAPAROSCOPY AND ABLATION OF ENDOMETRIOSIS;  Surgeon: Evin Zamudio MD;  Location: [facility] OR;  Service: Obstetrics/Gynecology   • ENDOMETRIAL ABLATION     • ENDOSCOPY N/A 2017    Procedure: ESOPHAGOGASTRODUODENOSCOPY WITH BIOPSIES AND COLD BIOPSY POLYPECTOMIES;  Surgeon: Jignesh Selby MD;  Location: [facility] ENDOSCOPY;  Service:    • PELVIC LAPAROSCOPY         Family History   Problem Relation Age of Onset   • Arthritis Mother    • COPD Mother    • Asthma Mother    • Thyroid disease Mother    • Arthritis Father    • Diabetes Father    • Hypertension Father    • Hyperlipidemia Father    • Kidney disease Father    • Heart attack Father    • Coronary artery disease Father    • Dementia Father    • No Known Problems Son    • Colon cancer Neg Hx    • Liver cancer Neg Hx    • Liver disease Neg Hx    • Stomach cancer Neg Hx    • Esophageal cancer Neg Hx        Social History     Socioeconomic History   • Marital status:      Spouse name: Not on file   • Number of children: Not on file   • Years of education: Not on file   • Highest education level: Not on file   Tobacco Use   • Smoking status: Never Smoker   • Smokeless tobacco: Never Used   Substance and Sexual Activity   • Alcohol use: No   • Drug use: No   • Sexual activity: Yes     Partners: Male     Birth control/protection: None     Comment: PE while on birth control patch           Objective   Physical Exam   Constitutional:  She is oriented to person, place, and time. She appears well-developed and well-nourished.   HENT:   Head: Normocephalic.   Eyes: EOM are normal.   Neck: Normal range of motion. Neck supple.   Cardiovascular: Normal rate and regular rhythm.   Pulmonary/Chest: Effort normal and breath sounds normal.   Abdominal: Soft. Bowel sounds are normal.   Musculoskeletal: Normal range of motion.   Neurological: She is alert and oriented to person, place, and time. She has normal reflexes.   Skin: Skin is warm and dry.   Psychiatric: She has a normal mood and affect.   Nursing note and vitals reviewed.      Procedures           ED Course                                               MDM  Number of Diagnoses or Management Options  Bronchitis: new and requires workup  Upper respiratory tract infection, unspecified type: new and requires workup     Amount and/or Complexity of Data Reviewed  Clinical lab tests: reviewed    Risk of Complications, Morbidity, and/or Mortality  Presenting problems: minimal  Management options: minimal    Patient Progress  Patient progress: stable      Final diagnoses:   Bronchitis   Upper respiratory tract infection, unspecified type            Calvin Monzon Jr., PA-C  12/31/19 1277

## 2020-01-02 LAB — BACTERIA SPEC AEROBE CULT: NORMAL

## 2020-02-13 ENCOUNTER — HOSPITAL ENCOUNTER (EMERGENCY)
Facility: HOSPITAL | Age: 30
Discharge: HOME | End: 2020-02-13
Attending: EMERGENCY MEDICINE | Admitting: EMERGENCY MEDICINE

## 2020-02-13 VITALS
DIASTOLIC BLOOD PRESSURE: 78 MMHG | BODY MASS INDEX: 34.05 KG/M2 | HEART RATE: 97 BPM | SYSTOLIC BLOOD PRESSURE: 122 MMHG | TEMPERATURE: 98.1 F | OXYGEN SATURATION: 97 % | WEIGHT: 192.2 LBS | HEIGHT: 63 IN | RESPIRATION RATE: 18 BRPM

## 2020-02-13 DIAGNOSIS — J40 BRONCHITIS: Primary | ICD-10-CM

## 2020-02-13 PROCEDURE — 94640 AIRWAY INHALATION TREATMENT: CPT

## 2020-02-13 PROCEDURE — 99283 EMERGENCY DEPT VISIT LOW MDM: CPT

## 2020-02-13 PROCEDURE — 63710000001 PREDNISONE PER 5 MG: Performed by: PHYSICIAN ASSISTANT

## 2020-02-13 PROCEDURE — 94799 UNLISTED PULMONARY SVC/PX: CPT

## 2020-02-13 RX ORDER — IPRATROPIUM BROMIDE AND ALBUTEROL SULFATE 2.5; .5 MG/3ML; MG/3ML
3 SOLUTION RESPIRATORY (INHALATION) ONCE
Status: COMPLETED | OUTPATIENT
Start: 2020-02-13 | End: 2020-02-13

## 2020-02-13 RX ORDER — ALBUTEROL SULFATE 90 UG/1
2 AEROSOL, METERED RESPIRATORY (INHALATION) EVERY 4 HOURS PRN
Qty: 6.7 G | Refills: 0 | Status: SHIPPED | OUTPATIENT
Start: 2020-02-13 | End: 2020-04-13 | Stop reason: SDUPTHER

## 2020-02-13 RX ORDER — DOXYCYCLINE 100 MG/1
100 CAPSULE ORAL ONCE
Status: COMPLETED | OUTPATIENT
Start: 2020-02-13 | End: 2020-02-13

## 2020-02-13 RX ORDER — METHYLPREDNISOLONE 4 MG/1
TABLET ORAL
Qty: 21 EACH | Refills: 0 | Status: SHIPPED | OUTPATIENT
Start: 2020-02-13 | End: 2020-04-13 | Stop reason: SDUPTHER

## 2020-02-13 RX ORDER — DOXYCYCLINE 100 MG/1
100 CAPSULE ORAL 2 TIMES DAILY
Qty: 14 CAPSULE | Refills: 0 | Status: SHIPPED | OUTPATIENT
Start: 2020-02-13 | End: 2020-02-20

## 2020-02-13 RX ADMIN — DOXYCYCLINE 100 MG: 100 CAPSULE ORAL at 21:19

## 2020-02-13 RX ADMIN — PREDNISONE 60 MG: 10 TABLET ORAL at 21:19

## 2020-02-13 RX ADMIN — IPRATROPIUM BROMIDE AND ALBUTEROL SULFATE 3 ML: .5; 3 SOLUTION RESPIRATORY (INHALATION) at 21:09

## 2020-02-14 NOTE — ED PROVIDER NOTES
Subjective   29-year-old with cough, congestion, and wheezing.  She said the symptoms for 2 to 3 days, previous history of bronchitis and community-acquired pneumonia in the past.  No fever chills but she has main complaint of body aches.      History provided by:  Patient   used: No        Review of Systems   All other systems reviewed and are negative.      Past Medical History:   Diagnosis Date   • Abdominal pain    • Abnormal Pap smear of cervix    • Anxiety    • Asthma    • Bleeding disorder (CMS/Formerly [facility]) 16   • Body piercing     TONGUE, NOSE, TWO IN EACH EAR   • Chest pain    • Clotting disorder (CMS/Formerly [facility])     factor 5   • Constipation    • Coronary artery disease involving native coronary artery of native heart without angina pectoris 2017   • Deep vein thrombosis (CMS/Formerly [facility]) 16   • Diarrhea    • Diarrhea    • Endometriosis    • Factor 5 Leiden mutation, heterozygous (CMS/Formerly [facility])    • Fibroid    • Gallstone    • GERD (gastroesophageal reflux disease)    • H/O blood clots    • Headache    • Hiatal hernia    • High cholesterol    • History of recurrent UTIs    • HPV (human papilloma virus) infection    • Hypertension    • Kidney infection    • Migraine    • Nausea    • Nausea & vomiting    • Obesity    • Orthostatic hypotension    • Osteoarthritis    • Ovarian cyst    • Pollen allergies    • Protein S deficiency (CMS/Formerly [facility]) 2016    labs from hospitalization for PE   • Pulmonary embolism (CMS/Formerly [facility])    • Recurrent pregnancy loss, antepartum condition or complication    • Sleep apnea     CPAP ASKED TO BRING DOS   • Subclinical hyperthyroidism    • Tachycardia    • Tattoo     LOWER BACK   • Varicella        Allergies   Allergen Reactions   • Ceftin [Cefuroxime Axetil] Other (See Comments)     Numbness in mouth and throat   • Amoxicillin Rash       Past Surgical History:   Procedure Laterality Date   •  SECTION      X2  and    •  SECTION WITH TUBAL N/A  1/15/2019    Procedure:  SECTION REPEAT WITH TUBAL;  Surgeon: Alva Boyer MD;  Location: [facility] LABOR DELIVERY;  Service: Obstetrics/Gynecology   • CHOLECYSTECTOMY     • COLONOSCOPY N/A 2017    Procedure: COLONOSCOPY WITH BIOPSIES AND ARGON THERMAL ABLATION;  Surgeon: Jignesh Selby MD;  Location: [facility] ENDOSCOPY;  Service:    • DIAGNOSTIC LAPAROSCOPY N/A 2018    Procedure: DIAGNOSTIC LAPAROSCOPY AND ABLATION OF ENDOMETRIOSIS;  Surgeon: Evin Zamudio MD;  Location: [facility] OR;  Service: Obstetrics/Gynecology   • ENDOMETRIAL ABLATION     • ENDOSCOPY N/A 2017    Procedure: ESOPHAGOGASTRODUODENOSCOPY WITH BIOPSIES AND COLD BIOPSY POLYPECTOMIES;  Surgeon: Jignesh Selby MD;  Location: [facility] ENDOSCOPY;  Service:    • PELVIC LAPAROSCOPY         Family History   Problem Relation Age of Onset   • Arthritis Mother    • COPD Mother    • Asthma Mother    • Thyroid disease Mother    • Arthritis Father    • Diabetes Father    • Hypertension Father    • Hyperlipidemia Father    • Kidney disease Father    • Heart attack Father    • Coronary artery disease Father    • Dementia Father    • No Known Problems Son    • Colon cancer Neg Hx    • Liver cancer Neg Hx    • Liver disease Neg Hx    • Stomach cancer Neg Hx    • Esophageal cancer Neg Hx        Social History     Socioeconomic History   • Marital status:      Spouse name: Not on file   • Number of children: Not on file   • Years of education: Not on file   • Highest education level: Not on file   Tobacco Use   • Smoking status: Never Smoker   • Smokeless tobacco: Never Used   Substance and Sexual Activity   • Alcohol use: No   • Drug use: No   • Sexual activity: Yes     Partners: Male     Birth control/protection: None     Comment: PE while on birth control patch           Objective   Physical Exam   Constitutional: She is oriented to person, place, and time. She appears well-developed and well-nourished.   Eyes: EOM are normal.    Neck: Normal range of motion. Neck supple.   Cardiovascular: Normal rate and regular rhythm.   Pulmonary/Chest: Effort normal. She has wheezes.   Abdominal: Soft. Bowel sounds are normal.   Musculoskeletal: Normal range of motion.   Neurological: She is alert and oriented to person, place, and time. She has normal reflexes.   Skin: Skin is warm and dry.   Psychiatric: She has a normal mood and affect.   Nursing note and vitals reviewed.      Procedures           ED Course                                           MDM  Number of Diagnoses or Management Options  Bronchitis: new and requires workup  Risk of Complications, Morbidity, and/or Mortality  Presenting problems: minimal  Management options: minimal        Final diagnoses:   Bronchitis            Calvin Monzon Jr., PA-KHANH  02/13/20 3910

## 2020-03-02 ENCOUNTER — HOSPITAL ENCOUNTER (EMERGENCY)
Facility: HOSPITAL | Age: 30
Discharge: HOME | End: 2020-03-03
Attending: EMERGENCY MEDICINE | Admitting: EMERGENCY MEDICINE

## 2020-03-02 DIAGNOSIS — R05.9 COUGH: Primary | ICD-10-CM

## 2020-03-02 LAB
ALBUMIN SERPL-MCNC: 4.1 G/DL (ref 3.5–5.2)
ALBUMIN/GLOB SERPL: 1.3 G/DL
ALP SERPL-CCNC: 83 U/L (ref 39–117)
ALT SERPL W P-5'-P-CCNC: 11 U/L (ref 1–33)
ANION GAP SERPL CALCULATED.3IONS-SCNC: 10.9 MMOL/L (ref 5–15)
AST SERPL-CCNC: 10 U/L (ref 1–32)
BASOPHILS # BLD AUTO: 0.06 10*3/MM3 (ref 0–0.2)
BASOPHILS NFR BLD AUTO: 0.7 % (ref 0–1.5)
BILIRUB SERPL-MCNC: 0.3 MG/DL (ref 0.2–1.2)
BUN BLD-MCNC: 9 MG/DL (ref 6–20)
BUN/CREAT SERPL: 13.2 (ref 7–25)
CALCIUM SPEC-SCNC: 9.8 MG/DL (ref 8.6–10.5)
CHLORIDE SERPL-SCNC: 104 MMOL/L (ref 98–107)
CO2 SERPL-SCNC: 26.1 MMOL/L (ref 22–29)
CREAT BLD-MCNC: 0.68 MG/DL (ref 0.57–1)
D DIMER PPP FEU-MCNC: 0.76 MCGFEU/ML (ref 0–0.57)
DEPRECATED RDW RBC AUTO: 41.2 FL (ref 37–54)
EOSINOPHIL # BLD AUTO: 0.42 10*3/MM3 (ref 0–0.4)
EOSINOPHIL NFR BLD AUTO: 4.9 % (ref 0.3–6.2)
ERYTHROCYTE [DISTWIDTH] IN BLOOD BY AUTOMATED COUNT: 12.6 % (ref 12.3–15.4)
FLUAV AG NPH QL: NEGATIVE
FLUBV AG NPH QL IA: NEGATIVE
GFR SERPL CREATININE-BSD FRML MDRD: 102 ML/MIN/1.73
GLOBULIN UR ELPH-MCNC: 3.2 GM/DL
GLUCOSE BLD-MCNC: 104 MG/DL (ref 65–99)
HCT VFR BLD AUTO: 38.9 % (ref 34–46.6)
HGB BLD-MCNC: 13 G/DL (ref 12–15.9)
IMM GRANULOCYTES # BLD AUTO: 0.02 10*3/MM3 (ref 0–0.05)
IMM GRANULOCYTES NFR BLD AUTO: 0.2 % (ref 0–0.5)
LYMPHOCYTES # BLD AUTO: 2.59 10*3/MM3 (ref 0.7–3.1)
LYMPHOCYTES NFR BLD AUTO: 30.3 % (ref 19.6–45.3)
MCH RBC QN AUTO: 29.7 PG (ref 26.6–33)
MCHC RBC AUTO-ENTMCNC: 33.4 G/DL (ref 31.5–35.7)
MCV RBC AUTO: 88.8 FL (ref 79–97)
MONOCYTES # BLD AUTO: 0.41 10*3/MM3 (ref 0.1–0.9)
MONOCYTES NFR BLD AUTO: 4.8 % (ref 5–12)
NEUTROPHILS # BLD AUTO: 5.04 10*3/MM3 (ref 1.7–7)
NEUTROPHILS NFR BLD AUTO: 59.1 % (ref 42.7–76)
NRBC BLD AUTO-RTO: 0 /100 WBC (ref 0–0.2)
PLATELET # BLD AUTO: 314 10*3/MM3 (ref 140–450)
PMV BLD AUTO: 9.5 FL (ref 6–12)
POTASSIUM BLD-SCNC: 4.1 MMOL/L (ref 3.5–5.2)
PROT SERPL-MCNC: 7.3 G/DL (ref 6–8.5)
RBC # BLD AUTO: 4.38 10*6/MM3 (ref 3.77–5.28)
S PYO AG THROAT QL: NEGATIVE
SODIUM BLD-SCNC: 141 MMOL/L (ref 136–145)
TROPONIN T SERPL-MCNC: <0.01 NG/ML (ref 0–0.03)
WBC NRBC COR # BLD: 8.54 10*3/MM3 (ref 3.4–10.8)

## 2020-03-02 PROCEDURE — 87880 STREP A ASSAY W/OPTIC: CPT | Performed by: PHYSICIAN ASSISTANT

## 2020-03-02 PROCEDURE — 85379 FIBRIN DEGRADATION QUANT: CPT | Performed by: PHYSICIAN ASSISTANT

## 2020-03-02 PROCEDURE — 85025 COMPLETE CBC W/AUTO DIFF WBC: CPT | Performed by: PHYSICIAN ASSISTANT

## 2020-03-02 PROCEDURE — 99283 EMERGENCY DEPT VISIT LOW MDM: CPT

## 2020-03-02 PROCEDURE — 87081 CULTURE SCREEN ONLY: CPT | Performed by: PHYSICIAN ASSISTANT

## 2020-03-02 PROCEDURE — 93005 ELECTROCARDIOGRAM TRACING: CPT | Performed by: EMERGENCY MEDICINE

## 2020-03-02 PROCEDURE — 80053 COMPREHEN METABOLIC PANEL: CPT | Performed by: PHYSICIAN ASSISTANT

## 2020-03-02 PROCEDURE — 84484 ASSAY OF TROPONIN QUANT: CPT | Performed by: PHYSICIAN ASSISTANT

## 2020-03-02 PROCEDURE — 87804 INFLUENZA ASSAY W/OPTIC: CPT | Performed by: PHYSICIAN ASSISTANT

## 2020-03-02 PROCEDURE — 93005 ELECTROCARDIOGRAM TRACING: CPT

## 2020-03-02 RX ORDER — SODIUM CHLORIDE 0.9 % (FLUSH) 0.9 %
10 SYRINGE (ML) INJECTION AS NEEDED
Status: DISCONTINUED | OUTPATIENT
Start: 2020-03-02 | End: 2020-03-03 | Stop reason: HOSPADM

## 2020-03-03 ENCOUNTER — APPOINTMENT (OUTPATIENT)
Dept: CT IMAGING | Facility: HOSPITAL | Age: 30
End: 2020-03-03

## 2020-03-03 VITALS
SYSTOLIC BLOOD PRESSURE: 122 MMHG | HEIGHT: 63 IN | DIASTOLIC BLOOD PRESSURE: 87 MMHG | RESPIRATION RATE: 18 BRPM | HEART RATE: 78 BPM | BODY MASS INDEX: 35.05 KG/M2 | OXYGEN SATURATION: 98 % | TEMPERATURE: 98.8 F | WEIGHT: 197.8 LBS

## 2020-03-03 PROCEDURE — 71275 CT ANGIOGRAPHY CHEST: CPT

## 2020-03-03 PROCEDURE — 25010000002 IOPAMIDOL 61 % SOLUTION: Performed by: EMERGENCY MEDICINE

## 2020-03-03 RX ORDER — BENZONATATE 200 MG/1
200 CAPSULE ORAL 3 TIMES DAILY PRN
Qty: 30 CAPSULE | Refills: 0 | Status: SHIPPED | OUTPATIENT
Start: 2020-03-03 | End: 2020-04-13 | Stop reason: SDUPTHER

## 2020-03-03 RX ORDER — ETODOLAC 200 MG/1
200 CAPSULE ORAL EVERY 8 HOURS
Qty: 15 CAPSULE | Refills: 0 | Status: SHIPPED | OUTPATIENT
Start: 2020-03-03 | End: 2020-04-13 | Stop reason: SDUPTHER

## 2020-03-03 RX ADMIN — IOPAMIDOL 100 ML: 612 INJECTION, SOLUTION INTRAVENOUS at 00:26

## 2020-03-03 NOTE — ED PROVIDER NOTES
Subjective   This patient comes in for 2 days of pain with a deep breath on the right side of her chest and a cough with productive sputum.  No hemoptysis.  She does relate that she has a history of factor V Leiden and protein S deficiency and due to issues with her insurance she has not been on her Eliquis that she supposed to be taking since October 2019.  She was seen here a few weeks ago and diagnosed with bronchitis she states she got better and then the symptoms began 2 days ago.  It is worse with a cough and deep breath.  She also says she had a fever as high as 100 earlier today.          Review of Systems   Constitutional: Positive for fever.   HENT: Negative.    Eyes: Negative.    Respiratory: Positive for cough and shortness of breath.    Cardiovascular: Positive for chest pain. Negative for leg swelling.   Gastrointestinal: Negative.    Genitourinary: Negative.    Musculoskeletal: Negative.    Skin: Negative.    Neurological: Negative.    Psychiatric/Behavioral: Negative.        Past Medical History:   Diagnosis Date   • Abdominal pain    • Abnormal Pap smear of cervix    • Anxiety    • Asthma 2015   • Bleeding disorder (CMS/Formerly [facility]) 11-13-16   • Body piercing     TONGUE, NOSE, TWO IN EACH EAR   • Chest pain    • Clotting disorder (CMS/Formerly [facility])     factor 5   • Constipation    • Coronary artery disease involving native coronary artery of native heart without angina pectoris 6/16/2017   • Deep vein thrombosis (CMS/Formerly [facility]) 11-13-16   • Diarrhea    • Diarrhea    • Endometriosis    • Factor 5 Leiden mutation, heterozygous (CMS/Formerly [facility])    • Fibroid    • Gallstone    • GERD (gastroesophageal reflux disease)    • H/O blood clots    • Headache    • Hiatal hernia    • High cholesterol    • History of recurrent UTIs    • HPV (human papilloma virus) infection    • Hypertension    • Kidney infection    • Migraine    • Nausea    • Nausea & vomiting    • Obesity    • Orthostatic hypotension    • Osteoarthritis    • Ovarian cyst     • Pollen allergies    • Protein S deficiency (CMS/HCC) 2016    labs from hospitalization for PE   • Pulmonary embolism (CMS/HCC)    • Recurrent pregnancy loss, antepartum condition or complication    • Sleep apnea     CPAP ASKED TO BRING DOS   • Subclinical hyperthyroidism    • Tachycardia    • Tattoo     LOWER BACK   • Varicella        Allergies   Allergen Reactions   • Ceftin [Cefuroxime Axetil] Other (See Comments)     Numbness in mouth and throat   • Amoxicillin Rash       Past Surgical History:   Procedure Laterality Date   •  SECTION      X2  and    •  SECTION WITH TUBAL N/A 1/15/2019    Procedure:  SECTION REPEAT WITH TUBAL;  Surgeon: Alva Boyer MD;  Location: [facility] LABOR DELIVERY;  Service: Obstetrics/Gynecology   • CHOLECYSTECTOMY     • COLONOSCOPY N/A 2017    Procedure: COLONOSCOPY WITH BIOPSIES AND ARGON THERMAL ABLATION;  Surgeon: Jignesh Selby MD;  Location: [facility] ENDOSCOPY;  Service:    • DIAGNOSTIC LAPAROSCOPY N/A 2018    Procedure: DIAGNOSTIC LAPAROSCOPY AND ABLATION OF ENDOMETRIOSIS;  Surgeon: Evin Zamudio MD;  Location: [facility] OR;  Service: Obstetrics/Gynecology   • ENDOMETRIAL ABLATION     • ENDOSCOPY N/A 2017    Procedure: ESOPHAGOGASTRODUODENOSCOPY WITH BIOPSIES AND COLD BIOPSY POLYPECTOMIES;  Surgeon: Jignesh Selby MD;  Location: [facility] ENDOSCOPY;  Service:    • PELVIC LAPAROSCOPY         Family History   Problem Relation Age of Onset   • Arthritis Mother    • COPD Mother    • Asthma Mother    • Thyroid disease Mother    • Arthritis Father    • Diabetes Father    • Hypertension Father    • Hyperlipidemia Father    • Kidney disease Father    • Heart attack Father    • Coronary artery disease Father    • Dementia Father    • No Known Problems Son    • Colon cancer Neg Hx    • Liver cancer Neg Hx    • Liver disease Neg Hx    • Stomach cancer Neg Hx    • Esophageal cancer Neg Hx        Social History     Socioeconomic  History   • Marital status:      Spouse name: Not on file   • Number of children: Not on file   • Years of education: Not on file   • Highest education level: Not on file   Tobacco Use   • Smoking status: Never Smoker   • Smokeless tobacco: Never Used   Substance and Sexual Activity   • Alcohol use: No   • Drug use: No   • Sexual activity: Yes     Partners: Male     Birth control/protection: None     Comment: PE while on birth control patch           Objective   Physical Exam   Constitutional: She appears well-developed and well-nourished. No distress.   HENT:   Head: Normocephalic and atraumatic.   Neck: Normal range of motion.   Cardiovascular: Normal rate and regular rhythm.   Pulmonary/Chest: Effort normal and breath sounds normal.   Abdominal: Soft.   Musculoskeletal: Normal range of motion. She exhibits no edema or tenderness.   Neurological: She is alert.   Skin: Skin is warm and dry. She is not diaphoretic.   Psychiatric: She has a normal mood and affect. Her behavior is normal. Thought content normal.   Nursing note and vitals reviewed.      Procedures           ED Course  ED Course as of Mar 03 0133   Mon Mar 02, 2020   2058 EKG interpreted by me: Sinus rhythm, normal rate, no acute ST/T changes, this is a normal EKG    [MP]      ED Course User Index  [MP] Francois Kline MD                                           MDM  D-dimer obtained given past history of clotting disorders to rule out PE as patient wishes to avoid CT scan if all possible because she is had numerous ones in the past per history.  Given the fact that she has a cough, fever at home and URI symptoms feel her symptoms are more likely due to either a lower respiratory tract infection like bronchitis or pneumonia over a PE but will order d-dimer given history. 0129  Work appear unremarkable, CT negative for PE.  Vital signs within normal limits.  Given cough and pleuritic nature of pain suspect probable viral illness.  Treat  symptomatically  Final diagnoses:   Cough            Stew Sousa PA-C  03/03/20 0133

## 2020-03-04 LAB — BACTERIA SPEC AEROBE CULT: NORMAL

## 2020-03-14 ENCOUNTER — HOSPITAL ENCOUNTER (EMERGENCY)
Facility: HOSPITAL | Age: 30
Discharge: HOME | End: 2020-03-14
Attending: EMERGENCY MEDICINE | Admitting: EMERGENCY MEDICINE

## 2020-03-14 VITALS
SYSTOLIC BLOOD PRESSURE: 142 MMHG | OXYGEN SATURATION: 97 % | DIASTOLIC BLOOD PRESSURE: 91 MMHG | HEIGHT: 63 IN | TEMPERATURE: 98.1 F | HEART RATE: 102 BPM | WEIGHT: 196 LBS | RESPIRATION RATE: 18 BRPM | BODY MASS INDEX: 34.73 KG/M2

## 2020-03-14 DIAGNOSIS — J06.9 VIRAL UPPER RESPIRATORY INFECTION: Primary | ICD-10-CM

## 2020-03-14 LAB
FLUAV AG NPH QL: NEGATIVE
FLUBV AG NPH QL IA: NEGATIVE
S PYO AG THROAT QL: NEGATIVE

## 2020-03-14 PROCEDURE — 99283 EMERGENCY DEPT VISIT LOW MDM: CPT

## 2020-03-14 PROCEDURE — 87081 CULTURE SCREEN ONLY: CPT | Performed by: EMERGENCY MEDICINE

## 2020-03-14 PROCEDURE — 87804 INFLUENZA ASSAY W/OPTIC: CPT | Performed by: EMERGENCY MEDICINE

## 2020-03-14 PROCEDURE — 87880 STREP A ASSAY W/OPTIC: CPT | Performed by: EMERGENCY MEDICINE

## 2020-03-14 NOTE — DISCHARGE INSTRUCTIONS
Flu testing and strep testing are negative.  We will do a throat culture.  Treatment will be directed as supportive care,   Orders rec'd will continue to follow Stat ABG drawn pt ambued and suctioned  Fio2 increased to 70% with peep increased  to 8 repeat ABG to be drawn in 20 minutes Notify ALMA ROSA JJ in any changes MD Caruso ordered repeat test q1h. Patient to receive albumin 250cc Vancomycin initiated possible initiation of vancomycin.

## 2020-03-16 LAB — BACTERIA SPEC AEROBE CULT: NORMAL

## 2020-03-16 RX ORDER — CEFDINIR 300 MG/1
300 CAPSULE ORAL 2 TIMES DAILY
Qty: 20 CAPSULE | Refills: 0 | Status: SHIPPED | OUTPATIENT
Start: 2020-03-16 | End: 2020-03-16

## 2020-03-16 RX ORDER — CEFDINIR 300 MG/1
300 CAPSULE ORAL 2 TIMES DAILY
Qty: 20 CAPSULE | Refills: 0 | Status: SHIPPED | OUTPATIENT
Start: 2020-03-16 | End: 2020-03-26

## 2020-03-17 NOTE — ED PROVIDER NOTES
Subjective   History of Present Illness    Chief Complaint: Flulike symptoms  History of Present Illness: 29-year-old female with above complaint x1 day with body aches dry cough daughter with similar symptoms  Onset: 1 day  Duration: Persistent  Exacerbating / Alleviating factors: None  Associated symptoms: None    Nurses Notes reviewed and agree, including vitals, allergies, social history and prior medical history.     REVIEW OF SYSTEMS: All systems reviewed and not pertinent unless noted.    Positive for: Body aches dry cough    Negative for: Fever hemoptysis syncope  Review of Systems    Past Medical History:   Diagnosis Date   • Abdominal pain    • Abnormal Pap smear of cervix    • Anxiety    • Asthma 2015   • Bleeding disorder (CMS/Formerly [facility]) 11-13-16   • Body piercing     TONGUE, NOSE, TWO IN EACH EAR   • Chest pain    • Clotting disorder (CMS/Formerly [facility])     factor 5   • Constipation    • Coronary artery disease involving native coronary artery of native heart without angina pectoris 6/16/2017   • Deep vein thrombosis (CMS/Formerly [facility]) 11-13-16   • Diarrhea    • Diarrhea    • Endometriosis    • Factor 5 Leiden mutation, heterozygous (CMS/HCC)    • Fibroid    • Gallstone    • GERD (gastroesophageal reflux disease)    • H/O blood clots    • Headache    • Hiatal hernia    • High cholesterol    • History of recurrent UTIs    • HPV (human papilloma virus) infection    • Hypertension    • Kidney infection    • Migraine    • Nausea    • Nausea & vomiting    • Obesity    • Orthostatic hypotension    • Osteoarthritis    • Ovarian cyst    • Pollen allergies    • Protein S deficiency (CMS/Formerly [facility]) 11/14/2016    labs from hospitalization for PE   • Pulmonary embolism (CMS/Formerly [facility])    • Recurrent pregnancy loss, antepartum condition or complication    • Sleep apnea     CPAP ASKED TO BRING DOS   • Subclinical hyperthyroidism    • Tachycardia    • Tattoo     LOWER BACK   • Varicella        Allergies   Allergen Reactions   • Ceftin [Cefuroxime Axetil]  Other (See Comments)     Numbness in mouth and throat   • Amoxicillin Rash       Past Surgical History:   Procedure Laterality Date   •  SECTION      X2  and    •  SECTION WITH TUBAL N/A 1/15/2019    Procedure:  SECTION REPEAT WITH TUBAL;  Surgeon: Alva Boyer MD;  Location: [facility] LABOR DELIVERY;  Service: Obstetrics/Gynecology   • CHOLECYSTECTOMY     • COLONOSCOPY N/A 2017    Procedure: COLONOSCOPY WITH BIOPSIES AND ARGON THERMAL ABLATION;  Surgeon: Jignesh Selby MD;  Location: [facility] ENDOSCOPY;  Service:    • DIAGNOSTIC LAPAROSCOPY N/A 2018    Procedure: DIAGNOSTIC LAPAROSCOPY AND ABLATION OF ENDOMETRIOSIS;  Surgeon: Evin Zamudio MD;  Location: [facility] OR;  Service: Obstetrics/Gynecology   • ENDOMETRIAL ABLATION     • ENDOSCOPY N/A 2017    Procedure: ESOPHAGOGASTRODUODENOSCOPY WITH BIOPSIES AND COLD BIOPSY POLYPECTOMIES;  Surgeon: Jignesh Selby MD;  Location: [facility] ENDOSCOPY;  Service:    • PELVIC LAPAROSCOPY         Family History   Problem Relation Age of Onset   • Arthritis Mother    • COPD Mother    • Asthma Mother    • Thyroid disease Mother    • Arthritis Father    • Diabetes Father    • Hypertension Father    • Hyperlipidemia Father    • Kidney disease Father    • Heart attack Father    • Coronary artery disease Father    • Dementia Father    • No Known Problems Son    • Colon cancer Neg Hx    • Liver cancer Neg Hx    • Liver disease Neg Hx    • Stomach cancer Neg Hx    • Esophageal cancer Neg Hx        Social History     Socioeconomic History   • Marital status:      Spouse name: Not on file   • Number of children: Not on file   • Years of education: Not on file   • Highest education level: Not on file   Tobacco Use   • Smoking status: Never Smoker   • Smokeless tobacco: Never Used   Substance and Sexual Activity   • Alcohol use: No   • Drug use: No   • Sexual activity: Yes     Partners: Male     Birth control/protection: None      Comment: PE while on birth control patch           Objective   Physical Exam      GENERAL APPEARANCE: Well developed, obese, in no acute distress.  VITAL SIGNS: per nursing, reviewed and noted  SKIN: no rashes, ulcerations or petechiae.  Head: Normocephalic, atraumatic.   EYES: perrla. EOMI.  ENT:  TM clear, posterior pharynx patent.  LUNGS:  normal breath sounds. No retractions.   CARDIOVASCULAR:  regular rate and rhythm, no murmurs.  Good Peripheral pulses.  MUSCULOSKELETAL:  No tenderness. Full ROM. Strength and tone normal.  NEUROLOGIC: Alert, oriented x 3. No gross deficits.   NECK: Supple, symmetric. No tenderness, no masses. Full ROM  Back: full rom, no paraspinal spasm. No CVA tenderness.   PSYCH: appropriate affect  : no bladder tenderness or distention, no CVA tenderness      Procedures     No attending provider procedures were performed      ED Course  ED Course as of Mar 17 0332   Sat Mar 14, 2020   0158 Strep A Ag: Negative [PF]   0158 Influenza B Ag, EIA: Negative [PF]   0158 Influenza A Ag, EIA: Negative [PF]      ED Course User Index  [PF] Hakan Goldsmith DO                                           MDM  Flu and strep negative.  No indications for imaging or further testing.  Advised supportive care quarantine outpatient follow-up return precautions discussed.  Final diagnoses:   Viral upper respiratory infection            Hakan Goldsmith DO  03/17/20 0332

## 2020-04-01 ENCOUNTER — HOSPITAL ENCOUNTER (EMERGENCY)
Facility: HOSPITAL | Age: 30
Discharge: HOME | End: 2020-04-01
Attending: STUDENT IN AN ORGANIZED HEALTH CARE EDUCATION/TRAINING PROGRAM | Admitting: STUDENT IN AN ORGANIZED HEALTH CARE EDUCATION/TRAINING PROGRAM

## 2020-04-01 ENCOUNTER — APPOINTMENT (OUTPATIENT)
Dept: GENERAL RADIOLOGY | Facility: HOSPITAL | Age: 30
End: 2020-04-01

## 2020-04-01 VITALS
HEART RATE: 89 BPM | WEIGHT: 192 LBS | HEIGHT: 63 IN | RESPIRATION RATE: 17 BRPM | BODY MASS INDEX: 34.02 KG/M2 | SYSTOLIC BLOOD PRESSURE: 125 MMHG | TEMPERATURE: 98.5 F | OXYGEN SATURATION: 99 % | DIASTOLIC BLOOD PRESSURE: 78 MMHG

## 2020-04-01 DIAGNOSIS — R07.9 CHEST PAIN, UNSPECIFIED TYPE: Primary | ICD-10-CM

## 2020-04-01 LAB
ALBUMIN SERPL-MCNC: 4.5 G/DL (ref 3.5–5.2)
ALBUMIN/GLOB SERPL: 1.3 G/DL
ALP SERPL-CCNC: 96 U/L (ref 39–117)
ALT SERPL W P-5'-P-CCNC: 13 U/L (ref 1–33)
ANION GAP SERPL CALCULATED.3IONS-SCNC: 13 MMOL/L (ref 5–15)
AST SERPL-CCNC: 13 U/L (ref 1–32)
BASOPHILS # BLD AUTO: 0.03 10*3/MM3 (ref 0–0.2)
BASOPHILS NFR BLD AUTO: 0.3 % (ref 0–1.5)
BILIRUB SERPL-MCNC: 0.4 MG/DL (ref 0.2–1.2)
BUN BLD-MCNC: 9 MG/DL (ref 6–20)
BUN/CREAT SERPL: 14.1 (ref 7–25)
CALCIUM SPEC-SCNC: 10 MG/DL (ref 8.6–10.5)
CHLORIDE SERPL-SCNC: 102 MMOL/L (ref 98–107)
CO2 SERPL-SCNC: 26 MMOL/L (ref 22–29)
CREAT BLD-MCNC: 0.64 MG/DL (ref 0.57–1)
DEPRECATED RDW RBC AUTO: 41 FL (ref 37–54)
EOSINOPHIL # BLD AUTO: 0.13 10*3/MM3 (ref 0–0.4)
EOSINOPHIL NFR BLD AUTO: 1.5 % (ref 0.3–6.2)
ERYTHROCYTE [DISTWIDTH] IN BLOOD BY AUTOMATED COUNT: 12.5 % (ref 12.3–15.4)
GFR SERPL CREATININE-BSD FRML MDRD: 110 ML/MIN/1.73
GLOBULIN UR ELPH-MCNC: 3.6 GM/DL
GLUCOSE BLD-MCNC: 103 MG/DL (ref 65–99)
HCG SERPL QL: NEGATIVE
HCT VFR BLD AUTO: 40.3 % (ref 34–46.6)
HGB BLD-MCNC: 13.5 G/DL (ref 12–15.9)
HOLD SPECIMEN: NORMAL
HOLD SPECIMEN: NORMAL
IMM GRANULOCYTES # BLD AUTO: 0.01 10*3/MM3 (ref 0–0.05)
IMM GRANULOCYTES NFR BLD AUTO: 0.1 % (ref 0–0.5)
LYMPHOCYTES # BLD AUTO: 2.03 10*3/MM3 (ref 0.7–3.1)
LYMPHOCYTES NFR BLD AUTO: 23.1 % (ref 19.6–45.3)
MCH RBC QN AUTO: 29.8 PG (ref 26.6–33)
MCHC RBC AUTO-ENTMCNC: 33.5 G/DL (ref 31.5–35.7)
MCV RBC AUTO: 89 FL (ref 79–97)
MONOCYTES # BLD AUTO: 0.36 10*3/MM3 (ref 0.1–0.9)
MONOCYTES NFR BLD AUTO: 4.1 % (ref 5–12)
NEUTROPHILS # BLD AUTO: 6.24 10*3/MM3 (ref 1.7–7)
NEUTROPHILS NFR BLD AUTO: 70.9 % (ref 42.7–76)
NRBC BLD AUTO-RTO: 0 /100 WBC (ref 0–0.2)
PLATELET # BLD AUTO: 349 10*3/MM3 (ref 140–450)
PMV BLD AUTO: 9.8 FL (ref 6–12)
POTASSIUM BLD-SCNC: 3.7 MMOL/L (ref 3.5–5.2)
PROT SERPL-MCNC: 8.1 G/DL (ref 6–8.5)
RBC # BLD AUTO: 4.53 10*6/MM3 (ref 3.77–5.28)
SODIUM BLD-SCNC: 141 MMOL/L (ref 136–145)
TROPONIN I SERPL-MCNC: 0 NG/ML (ref 0–0.05)
TROPONIN T SERPL-MCNC: <0.01 NG/ML (ref 0–0.03)
WBC NRBC COR # BLD: 8.8 10*3/MM3 (ref 3.4–10.8)
WHOLE BLOOD HOLD SPECIMEN: NORMAL
WHOLE BLOOD HOLD SPECIMEN: NORMAL

## 2020-04-01 PROCEDURE — 84484 ASSAY OF TROPONIN QUANT: CPT | Performed by: STUDENT IN AN ORGANIZED HEALTH CARE EDUCATION/TRAINING PROGRAM

## 2020-04-01 PROCEDURE — 85025 COMPLETE CBC W/AUTO DIFF WBC: CPT | Performed by: STUDENT IN AN ORGANIZED HEALTH CARE EDUCATION/TRAINING PROGRAM

## 2020-04-01 PROCEDURE — 80053 COMPREHEN METABOLIC PANEL: CPT | Performed by: STUDENT IN AN ORGANIZED HEALTH CARE EDUCATION/TRAINING PROGRAM

## 2020-04-01 PROCEDURE — 99284 EMERGENCY DEPT VISIT MOD MDM: CPT

## 2020-04-01 PROCEDURE — 84703 CHORIONIC GONADOTROPIN ASSAY: CPT | Performed by: STUDENT IN AN ORGANIZED HEALTH CARE EDUCATION/TRAINING PROGRAM

## 2020-04-01 PROCEDURE — 71045 X-RAY EXAM CHEST 1 VIEW: CPT

## 2020-04-01 PROCEDURE — 93005 ELECTROCARDIOGRAM TRACING: CPT | Performed by: STUDENT IN AN ORGANIZED HEALTH CARE EDUCATION/TRAINING PROGRAM

## 2020-04-01 RX ORDER — SODIUM CHLORIDE 0.9 % (FLUSH) 0.9 %
10 SYRINGE (ML) INJECTION AS NEEDED
Status: DISCONTINUED | OUTPATIENT
Start: 2020-04-01 | End: 2020-04-01 | Stop reason: HOSPADM

## 2020-04-02 NOTE — ED PROVIDER NOTES
Subjective   29-year-old female presents with intermittent chest pain a cough and congestion.  She states she had some chest pain yesterday that was sharp in nature, the pain is intermittent today, she is also had a cough congestion and some wheezing.  She has had similar symptoms like this in the past, and was diagnosed with bronchitis.  No fever chills, the pain is intermittent, nothing makes better or worse.      History provided by:  Patient   used: No        Review of Systems   HENT: Positive for congestion.    Respiratory: Positive for cough.    All other systems reviewed and are negative.      Past Medical History:   Diagnosis Date   • Abdominal pain    • Abnormal Pap smear of cervix    • Anxiety    • Asthma 2015   • Bleeding disorder (CMS/[facility]) 11-13-16   • Body piercing     TONGUE, NOSE, TWO IN EACH EAR   • Chest pain    • Clotting disorder (CMS/[facility])     factor 5   • Constipation    • Coronary artery disease involving native coronary artery of native heart without angina pectoris 6/16/2017   • Deep vein thrombosis (CMS/[facility]) 11-13-16   • Diarrhea    • Diarrhea    • Endometriosis    • Factor 5 Leiden mutation, heterozygous (CMS/[facility])    • Fibroid    • Gallstone    • GERD (gastroesophageal reflux disease)    • H/O blood clots    • Headache    • Hiatal hernia    • High cholesterol    • History of recurrent UTIs    • HPV (human papilloma virus) infection    • Hypertension    • Kidney infection    • Migraine    • Nausea    • Nausea & vomiting    • Obesity    • Orthostatic hypotension    • Osteoarthritis    • Ovarian cyst    • Pollen allergies    • Protein S deficiency (CMS/[facility]) 11/14/2016    labs from hospitalization for PE   • Pulmonary embolism (CMS/[facility])    • Recurrent pregnancy loss, antepartum condition or complication    • Sleep apnea     CPAP ASKED TO BRING DOS   • Subclinical hyperthyroidism    • Tachycardia    • Tattoo     LOWER BACK   • Varicella        Allergies   Allergen Reactions    • Ceftin [Cefuroxime Axetil] Other (See Comments)     Numbness in mouth and throat   • Amoxicillin Rash       Past Surgical History:   Procedure Laterality Date   •  SECTION      X2  and    •  SECTION WITH TUBAL N/A 1/15/2019    Procedure:  SECTION REPEAT WITH TUBAL;  Surgeon: Alva Boyer MD;  Location: [facility] LABOR DELIVERY;  Service: Obstetrics/Gynecology   • CHOLECYSTECTOMY     • COLONOSCOPY N/A 2017    Procedure: COLONOSCOPY WITH BIOPSIES AND ARGON THERMAL ABLATION;  Surgeon: Jignesh Selby MD;  Location: [facility] ENDOSCOPY;  Service:    • DIAGNOSTIC LAPAROSCOPY N/A 2018    Procedure: DIAGNOSTIC LAPAROSCOPY AND ABLATION OF ENDOMETRIOSIS;  Surgeon: Evin Zamudio MD;  Location: [facility] OR;  Service: Obstetrics/Gynecology   • ENDOMETRIAL ABLATION     • ENDOSCOPY N/A 2017    Procedure: ESOPHAGOGASTRODUODENOSCOPY WITH BIOPSIES AND COLD BIOPSY POLYPECTOMIES;  Surgeon: Jignesh Selby MD;  Location: [facility] ENDOSCOPY;  Service:    • PELVIC LAPAROSCOPY         Family History   Problem Relation Age of Onset   • Arthritis Mother    • COPD Mother    • Asthma Mother    • Thyroid disease Mother    • Arthritis Father    • Diabetes Father    • Hypertension Father    • Hyperlipidemia Father    • Kidney disease Father    • Heart attack Father    • Coronary artery disease Father    • Dementia Father    • No Known Problems Son    • Colon cancer Neg Hx    • Liver cancer Neg Hx    • Liver disease Neg Hx    • Stomach cancer Neg Hx    • Esophageal cancer Neg Hx        Social History     Socioeconomic History   • Marital status:      Spouse name: Not on file   • Number of children: Not on file   • Years of education: Not on file   • Highest education level: Not on file   Tobacco Use   • Smoking status: Never Smoker   • Smokeless tobacco: Never Used   Substance and Sexual Activity   • Alcohol use: No   • Drug use: No   • Sexual activity: Yes     Partners: Male     Birth  control/protection: None     Comment: PE while on birth control patch           Objective   Physical Exam   Constitutional: She is oriented to person, place, and time. She appears well-developed and well-nourished.   Eyes: EOM are normal.   Neck: Normal range of motion. Neck supple.   Cardiovascular: Normal rate and regular rhythm.   Pulmonary/Chest: Effort normal and breath sounds normal.   Abdominal: Soft. Bowel sounds are normal.   Musculoskeletal: Normal range of motion.   Neurological: She is alert and oriented to person, place, and time. She has normal reflexes.   Skin: Skin is warm and dry.   Psychiatric: She has a normal mood and affect.   Nursing note and vitals reviewed.      Procedures           ED Course  ED Course as of Apr 01 2046 Wed Apr 01, 2020 1954 EKG shows sinus rhythm with a rate of 90.  No significant ST segments.  Normal EKG.  Interpreted by me.    [DT]      ED Course User Index  [DT] Carter Hdz MD                                           MDM  Number of Diagnoses or Management Options  Chest pain, unspecified type: new and requires workup     Amount and/or Complexity of Data Reviewed  Clinical lab tests: reviewed  Tests in the radiology section of CPT®: reviewed    Risk of Complications, Morbidity, and/or Mortality  Presenting problems: minimal  Diagnostic procedures: minimal  Management options: minimal    Patient Progress  Patient progress: stable      Final diagnoses:   Chest pain, unspecified type            Calvin Monzon Jr., PA-C  04/01/20 2046

## 2020-04-13 DIAGNOSIS — J20.9 ACUTE BRONCHITIS, UNSPECIFIED ORGANISM: ICD-10-CM

## 2020-04-13 RX ORDER — ALBUTEROL SULFATE 90 UG/1
2 AEROSOL, METERED RESPIRATORY (INHALATION) EVERY 4 HOURS PRN
Qty: 18 G | Refills: 11 | Status: SHIPPED | OUTPATIENT
Start: 2020-04-13 | End: 2021-04-15 | Stop reason: SDUPTHER

## 2020-05-05 ENCOUNTER — TELEPHONE (OUTPATIENT)
Dept: URGENT CARE | Facility: CLINIC | Age: 30
End: 2020-05-05

## 2020-05-05 DIAGNOSIS — M10.9 GOUTY ARTHRITIS OF GREAT TOE: Primary | ICD-10-CM

## 2020-05-05 RX ORDER — PREDNISONE 20 MG/1
TABLET ORAL
Qty: 18 TABLET | Refills: 0 | Status: SHIPPED | OUTPATIENT
Start: 2020-05-05 | End: 2020-08-14

## 2020-05-06 ENCOUNTER — HOSPITAL ENCOUNTER (EMERGENCY)
Facility: HOSPITAL | Age: 30
Discharge: HOME | End: 2020-05-06
Attending: EMERGENCY MEDICINE | Admitting: EMERGENCY MEDICINE

## 2020-05-06 ENCOUNTER — APPOINTMENT (OUTPATIENT)
Dept: CT IMAGING | Facility: HOSPITAL | Age: 30
End: 2020-05-06

## 2020-05-06 VITALS
OXYGEN SATURATION: 99 % | SYSTOLIC BLOOD PRESSURE: 119 MMHG | TEMPERATURE: 98.3 F | BODY MASS INDEX: 34.73 KG/M2 | WEIGHT: 196 LBS | RESPIRATION RATE: 20 BRPM | HEIGHT: 63 IN | DIASTOLIC BLOOD PRESSURE: 72 MMHG | HEART RATE: 86 BPM

## 2020-05-06 DIAGNOSIS — R07.9 CHEST PAIN, UNSPECIFIED TYPE: Primary | ICD-10-CM

## 2020-05-06 DIAGNOSIS — Z86.711 HISTORY OF PULMONARY EMBOLISM: ICD-10-CM

## 2020-05-06 LAB
ANION GAP SERPL CALCULATED.3IONS-SCNC: 12 MMOL/L (ref 5–15)
BASOPHILS # BLD AUTO: 0.06 10*3/MM3 (ref 0–0.2)
BASOPHILS NFR BLD AUTO: 0.4 % (ref 0–1.5)
BUN BLD-MCNC: 11 MG/DL (ref 6–20)
BUN/CREAT SERPL: 14.7 (ref 7–25)
CALCIUM SPEC-SCNC: 9.2 MG/DL (ref 8.6–10.5)
CHLORIDE SERPL-SCNC: 102 MMOL/L (ref 98–107)
CO2 SERPL-SCNC: 26 MMOL/L (ref 22–29)
CREAT BLD-MCNC: 0.75 MG/DL (ref 0.57–1)
D DIMER PPP FEU-MCNC: 0.6 MCGFEU/ML (ref 0–0.57)
DEPRECATED RDW RBC AUTO: 40.8 FL (ref 37–54)
EOSINOPHIL # BLD AUTO: 0.04 10*3/MM3 (ref 0–0.4)
EOSINOPHIL NFR BLD AUTO: 0.3 % (ref 0.3–6.2)
ERYTHROCYTE [DISTWIDTH] IN BLOOD BY AUTOMATED COUNT: 12.5 % (ref 12.3–15.4)
GFR SERPL CREATININE-BSD FRML MDRD: 91 ML/MIN/1.73
GLUCOSE BLD-MCNC: 105 MG/DL (ref 65–99)
HCT VFR BLD AUTO: 41.9 % (ref 34–46.6)
HGB BLD-MCNC: 14.3 G/DL (ref 12–15.9)
HOLD SPECIMEN: NORMAL
HOLD SPECIMEN: NORMAL
IMM GRANULOCYTES # BLD AUTO: 0.05 10*3/MM3 (ref 0–0.05)
IMM GRANULOCYTES NFR BLD AUTO: 0.4 % (ref 0–0.5)
LYMPHOCYTES # BLD AUTO: 3.15 10*3/MM3 (ref 0.7–3.1)
LYMPHOCYTES NFR BLD AUTO: 22.1 % (ref 19.6–45.3)
MCH RBC QN AUTO: 30.7 PG (ref 26.6–33)
MCHC RBC AUTO-ENTMCNC: 34.1 G/DL (ref 31.5–35.7)
MCV RBC AUTO: 89.9 FL (ref 79–97)
MONOCYTES # BLD AUTO: 0.84 10*3/MM3 (ref 0.1–0.9)
MONOCYTES NFR BLD AUTO: 5.9 % (ref 5–12)
NEUTROPHILS # BLD AUTO: 10.12 10*3/MM3 (ref 1.7–7)
NEUTROPHILS NFR BLD AUTO: 70.9 % (ref 42.7–76)
NRBC BLD AUTO-RTO: 0 /100 WBC (ref 0–0.2)
PLATELET # BLD AUTO: 368 10*3/MM3 (ref 140–450)
PMV BLD AUTO: 9.9 FL (ref 6–12)
POTASSIUM BLD-SCNC: 3.8 MMOL/L (ref 3.5–5.2)
RBC # BLD AUTO: 4.66 10*6/MM3 (ref 3.77–5.28)
SODIUM BLD-SCNC: 140 MMOL/L (ref 136–145)
TROPONIN T SERPL-MCNC: <0.01 NG/ML (ref 0–0.03)
WBC NRBC COR # BLD: 14.26 10*3/MM3 (ref 3.4–10.8)
WHOLE BLOOD HOLD SPECIMEN: NORMAL
WHOLE BLOOD HOLD SPECIMEN: NORMAL

## 2020-05-06 PROCEDURE — 25010000002 IOPAMIDOL 61 % SOLUTION: Performed by: EMERGENCY MEDICINE

## 2020-05-06 PROCEDURE — 71275 CT ANGIOGRAPHY CHEST: CPT

## 2020-05-06 PROCEDURE — 80048 BASIC METABOLIC PNL TOTAL CA: CPT | Performed by: EMERGENCY MEDICINE

## 2020-05-06 PROCEDURE — 99283 EMERGENCY DEPT VISIT LOW MDM: CPT

## 2020-05-06 PROCEDURE — 93005 ELECTROCARDIOGRAM TRACING: CPT

## 2020-05-06 PROCEDURE — 85025 COMPLETE CBC W/AUTO DIFF WBC: CPT | Performed by: EMERGENCY MEDICINE

## 2020-05-06 PROCEDURE — 85379 FIBRIN DEGRADATION QUANT: CPT | Performed by: EMERGENCY MEDICINE

## 2020-05-06 PROCEDURE — 84484 ASSAY OF TROPONIN QUANT: CPT | Performed by: EMERGENCY MEDICINE

## 2020-05-06 RX ORDER — SODIUM CHLORIDE 0.9 % (FLUSH) 0.9 %
10 SYRINGE (ML) INJECTION AS NEEDED
Status: DISCONTINUED | OUTPATIENT
Start: 2020-05-06 | End: 2020-05-07 | Stop reason: HOSPADM

## 2020-05-06 RX ADMIN — IOPAMIDOL 100 ML: 612 INJECTION, SOLUTION INTRAVENOUS at 22:13

## 2020-05-07 NOTE — DISCHARGE INSTRUCTIONS
Continue your current medications.  Keep your scheduled appointment with your family doctor return if worsening symptoms

## 2020-05-07 NOTE — ED PROVIDER NOTES
Subjective   History of Present Illness    Chief Complaint: Chest pain shortness of breath  History of Present Illness: 29-year-old female with a remote history of pulmonary embolism, presents with a left-sided chest pain that shoots through the left shoulder with a reported shortness of breath x1 day.  States this feels as though she did when she had her PE.  Had been off of her Eliquis since October but just started it this week 2 days ago again.  Is currently taking 5 mg twice daily  Onset: 1 day  Duration: Persistent  Exacerbating / Alleviating factors: None  Associated symptoms: None      Nurses Notes reviewed and agree, including vitals, allergies, social history and prior medical history.     REVIEW OF SYSTEMS: All systems reviewed and not pertinent unless noted.    Positive for: Chest pain and shortness of breath    Negative for: Hemoptysis syncope fever cough sick contacts travel  Review of Systems    Past Medical History:   Diagnosis Date   • Abdominal pain    • Abnormal Pap smear of cervix    • Anxiety    • Asthma 2015   • Bleeding disorder (CMS/[facility]) 11-13-16   • Body piercing     TONGUE, NOSE, TWO IN EACH EAR   • Chest pain    • Clotting disorder (CMS/[facility])     factor 5   • Constipation    • Coronary artery disease involving native coronary artery of native heart without angina pectoris 6/16/2017   • Deep vein thrombosis (CMS/[facility]) 11-13-16   • Diarrhea    • Diarrhea    • Endometriosis    • Factor 5 Leiden mutation, heterozygous (CMS/[facility])    • Fibroid    • Gallstone    • GERD (gastroesophageal reflux disease)    • H/O blood clots    • Headache    • Hiatal hernia    • High cholesterol    • History of recurrent UTIs    • HPV (human papilloma virus) infection    • Hypertension    • Kidney infection    • Migraine    • Nausea    • Nausea & vomiting    • Obesity    • Orthostatic hypotension    • Osteoarthritis    • Ovarian cyst    • Pollen allergies    • Protein S deficiency (CMS/[facility]) 11/14/2016    labs from  hospitalization for PE   • Pulmonary embolism (CMS/HCC)    • Recurrent pregnancy loss, antepartum condition or complication    • Sleep apnea     CPAP ASKED TO BRING DOS   • Subclinical hyperthyroidism    • Tachycardia    • Tattoo     LOWER BACK   • Varicella        Allergies   Allergen Reactions   • Ceftin [Cefuroxime Axetil] Other (See Comments)     Numbness in mouth and throat   • Amoxicillin Rash       Past Surgical History:   Procedure Laterality Date   •  SECTION      X2  and    •  SECTION WITH TUBAL N/A 1/15/2019    Procedure:  SECTION REPEAT WITH TUBAL;  Surgeon: Alva Boyer MD;  Location: [facility] LABOR DELIVERY;  Service: Obstetrics/Gynecology   • CHOLECYSTECTOMY     • COLONOSCOPY N/A 2017    Procedure: COLONOSCOPY WITH BIOPSIES AND ARGON THERMAL ABLATION;  Surgeon: Jignesh Selby MD;  Location: [facility] ENDOSCOPY;  Service:    • DIAGNOSTIC LAPAROSCOPY N/A 2018    Procedure: DIAGNOSTIC LAPAROSCOPY AND ABLATION OF ENDOMETRIOSIS;  Surgeon: Evin Zamudio MD;  Location: [facility] OR;  Service: Obstetrics/Gynecology   • ENDOMETRIAL ABLATION     • ENDOSCOPY N/A 2017    Procedure: ESOPHAGOGASTRODUODENOSCOPY WITH BIOPSIES AND COLD BIOPSY POLYPECTOMIES;  Surgeon: Jignesh Selby MD;  Location: [facility] ENDOSCOPY;  Service:    • PELVIC LAPAROSCOPY         Family History   Problem Relation Age of Onset   • Arthritis Mother    • COPD Mother    • Asthma Mother    • Thyroid disease Mother    • Arthritis Father    • Diabetes Father    • Hypertension Father    • Hyperlipidemia Father    • Kidney disease Father    • Heart attack Father    • Coronary artery disease Father    • Dementia Father    • No Known Problems Son    • Colon cancer Neg Hx    • Liver cancer Neg Hx    • Liver disease Neg Hx    • Stomach cancer Neg Hx    • Esophageal cancer Neg Hx        Social History     Socioeconomic History   • Marital status:      Spouse name: Not on file   • Number of  children: Not on file   • Years of education: Not on file   • Highest education level: Not on file   Tobacco Use   • Smoking status: Never Smoker   • Smokeless tobacco: Never Used   Substance and Sexual Activity   • Alcohol use: No   • Drug use: No   • Sexual activity: Yes     Partners: Male     Birth control/protection: None     Comment: PE while on birth control patch           Objective   Physical Exam      GENERAL APPEARANCE: Well developed, obese 29-year-old white female, in no acute distress.  VITAL SIGNS: per nursing, reviewed and noted  SKIN: no rashes, ulcerations or petechiae.  Head: Normocephalic, atraumatic.   EYES: perrla. EOMI.  ENT:  TM clear,  LUNGS:  normal breath sounds. No retractions.  No increased work of breathing  CARDIOVASCULAR:  regular rate and rhythm, no murmurs.  Good Peripheral pulses.  No calf soft tissue swelling  MUSCULOSKELETAL:  No tenderness. Full ROM. Strength and tone normal.  NEUROLOGIC: Alert, oriented x 3. No gross deficits.   NECK: Supple, symmetric. No tenderness, no masses. Full ROM  Back: full rom, no paraspinal spasm. No CVA tenderness.   PSYCH: appropriate affect,   : no bladder tenderness or distention, no CVA tenderness      Procedures     No attending provider procedures were performed      ED Course  ED Course as of May 07 0344   Wed May 06, 2020   2235 CLINICAL HISTORY:  hx. pe, soa, chest pain, elevated d-dimer.     FINDINGS:  The central airways are patent.  No suspicious pulmonary nodules  or masses are identified.  There is no mediastinal adenopathy.  The heart and pericardium are unremarkable.  The limited  evaluation of the upper abdomen demonstrates no acute  abnormality.  CT pulmonary angiogram: There is adequate  opacification of the pulmonary arteries to the subsegmental  level. No filling defects are identified.     IMPRESSION:  No acute intrathoracic abnormality, specifically there is no  evidence of pulmonary embolism.       [PF]   2247 EKG  interpreted by me reveals sinus rhythm rate 95.  No ectopy no ischemic changes.    [PF]   Thu May 07, 2020   0343 D-Dimer, Quant(!!): 0.60 [PF]   0343 Glucose(!): 105 [PF]   0343 BUN: 11 [PF]   0343 Creatinine: 0.75 [PF]   0343 Sodium: 140 [PF]   0343 Potassium: 3.8 [PF]   0343 Chloride: 102 [PF]   0343 CO2: 26.0 [PF]   0343 Calcium: 9.2 [PF]   0343 eGFR Non  Am: 91 [PF]   0343 BUN/Creatinine Ratio: 14.7 [PF]   0343 WBC(!): 14.26 [PF]   0343 Hemoglobin: 14.3 [PF]   0343 Hematocrit: 41.9 [PF]   0343 Platelets: 368 [PF]      ED Course User Index  [PF] Hakan Goldsmith,          29-year-old female presents with chest pain rating to her left shoulder.  Negative work-up for ACS.  No ischemic EKG changes.  Patient did have tachycardia at a rate of 102 on telemetry with positive d-dimer at 0.60.  As such patient had CT chest pulmonary embolism protocol to rule out PE.  This was negative for any acute pulmonary findings.  Patient was subsequently advised to be discharged home stable condition with outpatient follow-up with her family physician to keep her appointment this week.  Return precautions were discussed.                                  MDM    Final diagnoses:   Chest pain, unspecified type   History of pulmonary embolism            Hakan Goldsmith DO  05/07/20 0344

## 2020-05-08 ENCOUNTER — RESULTS ENCOUNTER (OUTPATIENT)
Dept: INTERNAL MEDICINE | Facility: CLINIC | Age: 30
End: 2020-05-08

## 2020-05-08 ENCOUNTER — OFFICE VISIT (OUTPATIENT)
Dept: INTERNAL MEDICINE | Facility: CLINIC | Age: 30
End: 2020-05-08

## 2020-05-08 ENCOUNTER — HOSPITAL ENCOUNTER (OUTPATIENT)
Dept: CT IMAGING | Facility: HOSPITAL | Age: 30
Discharge: HOME | End: 2020-05-08
Admitting: PHYSICIAN ASSISTANT

## 2020-05-08 VITALS
HEART RATE: 126 BPM | DIASTOLIC BLOOD PRESSURE: 72 MMHG | SYSTOLIC BLOOD PRESSURE: 128 MMHG | TEMPERATURE: 97.8 F | OXYGEN SATURATION: 99 %

## 2020-05-08 DIAGNOSIS — D68.59 PROTEIN S DEFICIENCY (HCC): ICD-10-CM

## 2020-05-08 DIAGNOSIS — D68.51 HETEROZYGOUS FACTOR V LEIDEN MUTATION (HCC): ICD-10-CM

## 2020-05-08 DIAGNOSIS — K92.1 HEMATOCHEZIA: Primary | ICD-10-CM

## 2020-05-08 DIAGNOSIS — R11.0 NAUSEA: ICD-10-CM

## 2020-05-08 DIAGNOSIS — D72.829 LEUKOCYTOSIS, UNSPECIFIED TYPE: ICD-10-CM

## 2020-05-08 DIAGNOSIS — R10.31 RLQ ABDOMINAL PAIN: ICD-10-CM

## 2020-05-08 LAB
B-HCG UR QL: NEGATIVE
BILIRUB BLD-MCNC: NEGATIVE MG/DL
CLARITY, POC: CLEAR
COLOR UR: YELLOW
GLUCOSE UR STRIP-MCNC: NEGATIVE MG/DL
INTERNAL NEGATIVE CONTROL: NEGATIVE
INTERNAL POSITIVE CONTROL: POSITIVE
KETONES UR QL: NEGATIVE
LEUKOCYTE EST, POC: NEGATIVE
Lab: NORMAL
NITRITE UR-MCNC: NEGATIVE MG/ML
PH UR: 9 [PH] (ref 5–8)
PROT UR STRIP-MCNC: NEGATIVE MG/DL
RBC # UR STRIP: NEGATIVE /UL
SP GR UR: 1.01 (ref 1–1.03)
UROBILINOGEN UR QL: NORMAL

## 2020-05-08 PROCEDURE — 81025 URINE PREGNANCY TEST: CPT | Performed by: PHYSICIAN ASSISTANT

## 2020-05-08 PROCEDURE — 99214 OFFICE O/P EST MOD 30 MIN: CPT | Performed by: PHYSICIAN ASSISTANT

## 2020-05-08 PROCEDURE — 74176 CT ABD & PELVIS W/O CONTRAST: CPT

## 2020-05-08 NOTE — PROGRESS NOTES
Marilu Godoy is a 29 y.o. female.     Subjective   History of Present Illness   Here today with concern of 2-3 weeks of loose stools containing some red blood which has been worsening since onset. She also complains of right lower abdominal pain x 1 week. She has had some nausea but no vomiting.  She has had a little blood in her stool intermittently for several years but she notes that it has never been this prevalent.  No low back pain, flank pain, lower abdominal pain, dysuria, hematuria, increased frequency or urgency. LMP around 4/20/20.  She has had previous episodes of bright red blood per rectum, however she reports that recent episodes had been much worse.  She went to [facility] [place] ED two days ago due to intermittent left sided chest pain which radiates into the left shoulder blade and feeling like she cant get a deep breath. In the ER she was found to have elevated WBC and slightly elevated d-dimer. CT chest with PE protocol was negative. She had been off Eliquis since October due to losing health insurance but she resumed it less than 1 week ago.     She will be needed approval from [facility] to hold Eliquis for 2 days prior to oral surgery, however, we have not received the form from [employer] yet.         The following portions of the patient's history were reviewed and updated as appropriate: allergies, current medications, past family history, past medical history, past social history, past surgical history and problem list.    Review of Systems   Constitutional: Negative for activity change, appetite change, chills, fatigue, fever and unexpected weight change.   HENT: Positive for dental problem. Negative for congestion, ear discharge, facial swelling, mouth sores, postnasal drip, sinus pain, sneezing, tinnitus and voice change.    Eyes: Negative for photophobia, pain and visual disturbance.   Respiratory: Positive for shortness of breath. Negative for cough, chest tightness and wheezing.     Cardiovascular: Positive for chest pain. Negative for palpitations and leg swelling.   Gastrointestinal: Positive for abdominal pain, blood in stool, diarrhea and nausea. Negative for abdominal distention, constipation, rectal pain and vomiting.   Endocrine: Negative for cold intolerance, heat intolerance, polydipsia, polyphagia and polyuria.   Genitourinary: Negative for decreased urine volume, difficulty urinating, dysuria, flank pain, frequency, hematuria, menstrual problem, urgency and vaginal pain.   Musculoskeletal: Negative for gait problem, joint swelling and neck stiffness.   Skin: Negative for color change, rash and wound.   Allergic/Immunologic: Negative for immunocompromised state.   Neurological: Negative for dizziness, seizures, syncope, weakness and light-headedness.   Hematological: Negative for adenopathy. Does not bruise/bleed easily.   Psychiatric/Behavioral: Negative for agitation, confusion, dysphoric mood, self-injury and suicidal ideas. The patient is not nervous/anxious.          Objective    Physical Exam   Constitutional: She is oriented to person, place, and time. She appears well-developed and well-nourished. No distress.   HENT:   Head: Normocephalic and atraumatic.   Right Ear: External ear normal.   Left Ear: External ear normal.   Very poor dentition.   Eyes: Pupils are equal, round, and reactive to light. Conjunctivae and EOM are normal.   Neck: Normal range of motion. Neck supple.   Cardiovascular: Normal rate, regular rhythm and normal heart sounds. Exam reveals no gallop and no friction rub.   No murmur heard.  Pulmonary/Chest: Effort normal and breath sounds normal. No stridor. No respiratory distress. She has no wheezes. She has no rales. She exhibits no tenderness.   Abdominal: Soft. Bowel sounds are normal. She exhibits no distension and no mass. There is tenderness (RLQ). There is no rebound and no guarding. No hernia.   Musculoskeletal: Normal range of motion. She exhibits  no edema, tenderness or deformity.   Lymphadenopathy:     She has no cervical adenopathy.   Neurological: She is alert and oriented to person, place, and time. She displays normal reflexes. No cranial nerve deficit or sensory deficit. She exhibits normal muscle tone. Coordination normal.   Skin: Skin is warm and dry. Capillary refill takes less than 2 seconds. No rash noted. She is not diaphoretic.   Psychiatric: She has a normal mood and affect. Her behavior is normal. Judgment and thought content normal.   Nursing note and vitals reviewed.        /72   Pulse (!) 126   Temp 97.8 °F (36.6 °C)   LMP 04/26/2020   SpO2 99%     Nursing note and vitals reviewed.          Assessment/Plan   Marilu was seen today for abstract.    Diagnoses and all orders for this visit:    Hematochezia  -     CT Abdomen Pelvis Without Contrast    Heterozygous factor V Leiden mutation (CMS/HCC)  Protein S deficiency (CMS/HCC)  -     Resume: apixaban (ELIQUIS) 5 MG tablet tablet; Take 1 tablet by mouth 2 (Two) Times a Day.    RLQ abdominal pain  -     CT Abdomen Pelvis Without Contrast  -     POCT pregnancy, urine - negative  -     Urine Culture - Urine, Urine, Clean Catch; Future  -     POC Urinalysis Dipstick, Automated - unremarkable    Nausea  -     CT Abdomen Pelvis Without Contrast  -     Urine Culture - Urine, Urine, Clean Catch; Future  -     POC Urinalysis Dipstick, Automated    Leukocytosis, unspecified type  -     CT Abdomen Pelvis Without Contrast  -     Urine Culture - Urine, Urine, Clean Catch; Future  -     POC Urinalysis Dipstick, Automated        Brief Urine Lab Results  (Last result in the past 365 days)      Color   Clarity   Blood   Leuk Est   Nitrite   Protein   CREAT   Urine HCG        05/08/20 1323               Negative       Urinalysis unremarkable.    Will refer to gastroenterology if there are no acute concerns from CT abdomen and pelvis.    ER record reviewed.         Mirlande Ordaz,  PA  05/08/2020  12:48 PM

## 2020-05-08 NOTE — PROGRESS NOTES
CT scan was negative. I am putting in a referral for her to go back to see GI for further evaluation.

## 2020-05-13 ENCOUNTER — TELEPHONE (OUTPATIENT)
Dept: ONCOLOGY | Facility: CLINIC | Age: 30
End: 2020-05-13

## 2020-05-13 NOTE — TELEPHONE ENCOUNTER
PT IS HAVING ORAL SURGERY HER DENTIST IS FAXING A FORM OVER THAT NEEDS TO BE SIGNED BY DR MIMS STATING IT IS OK FOR HER TO STOP HER ELIQUIS FOR THE SURGERY     PT CONTACT # [phone]

## 2020-05-15 ENCOUNTER — LAB (OUTPATIENT)
Dept: LAB | Facility: HOSPITAL | Age: 30
End: 2020-05-15

## 2020-05-15 ENCOUNTER — OFFICE VISIT (OUTPATIENT)
Dept: GASTROENTEROLOGY | Facility: CLINIC | Age: 30
End: 2020-05-15

## 2020-05-15 ENCOUNTER — TELEPHONE (OUTPATIENT)
Dept: ONCOLOGY | Facility: CLINIC | Age: 30
End: 2020-05-15

## 2020-05-15 VITALS
HEART RATE: 91 BPM | HEIGHT: 63 IN | TEMPERATURE: 98.6 F | DIASTOLIC BLOOD PRESSURE: 78 MMHG | RESPIRATION RATE: 18 BRPM | OXYGEN SATURATION: 99 % | WEIGHT: 197 LBS | SYSTOLIC BLOOD PRESSURE: 122 MMHG | BODY MASS INDEX: 34.91 KG/M2

## 2020-05-15 DIAGNOSIS — K62.5 RECTAL BLEEDING: ICD-10-CM

## 2020-05-15 DIAGNOSIS — K92.1 BLOOD IN STOOL: ICD-10-CM

## 2020-05-15 DIAGNOSIS — R19.7 DIARRHEA, UNSPECIFIED TYPE: ICD-10-CM

## 2020-05-15 DIAGNOSIS — R10.31 RIGHT LOWER QUADRANT ABDOMINAL PAIN: ICD-10-CM

## 2020-05-15 DIAGNOSIS — R10.31 RIGHT LOWER QUADRANT ABDOMINAL PAIN: Primary | ICD-10-CM

## 2020-05-15 LAB
BASOPHILS # BLD AUTO: 0.04 10*3/MM3 (ref 0–0.2)
BASOPHILS NFR BLD AUTO: 0.6 % (ref 0–1.5)
DEPRECATED RDW RBC AUTO: 42 FL (ref 37–54)
EOSINOPHIL # BLD AUTO: 0.11 10*3/MM3 (ref 0–0.4)
EOSINOPHIL NFR BLD AUTO: 1.6 % (ref 0.3–6.2)
ERYTHROCYTE [DISTWIDTH] IN BLOOD BY AUTOMATED COUNT: 13 % (ref 12.3–15.4)
HCT VFR BLD AUTO: 38 % (ref 34–46.6)
HGB BLD-MCNC: 13 G/DL (ref 12–15.9)
IMM GRANULOCYTES # BLD AUTO: 0.01 10*3/MM3 (ref 0–0.05)
IMM GRANULOCYTES NFR BLD AUTO: 0.1 % (ref 0–0.5)
LYMPHOCYTES # BLD AUTO: 1.72 10*3/MM3 (ref 0.7–3.1)
LYMPHOCYTES NFR BLD AUTO: 24.6 % (ref 19.6–45.3)
MCH RBC QN AUTO: 30 PG (ref 26.6–33)
MCHC RBC AUTO-ENTMCNC: 34.2 G/DL (ref 31.5–35.7)
MCV RBC AUTO: 87.8 FL (ref 79–97)
MONOCYTES # BLD AUTO: 0.41 10*3/MM3 (ref 0.1–0.9)
MONOCYTES NFR BLD AUTO: 5.9 % (ref 5–12)
NEUTROPHILS # BLD AUTO: 4.69 10*3/MM3 (ref 1.7–7)
NEUTROPHILS NFR BLD AUTO: 67.2 % (ref 42.7–76)
NRBC BLD AUTO-RTO: 0 /100 WBC (ref 0–0.2)
PLATELET # BLD AUTO: 330 10*3/MM3 (ref 140–450)
PMV BLD AUTO: 10.4 FL (ref 6–12)
RBC # BLD AUTO: 4.33 10*6/MM3 (ref 3.77–5.28)
WBC NRBC COR # BLD: 6.98 10*3/MM3 (ref 3.4–10.8)

## 2020-05-15 PROCEDURE — 36415 COLL VENOUS BLD VENIPUNCTURE: CPT

## 2020-05-15 PROCEDURE — 99214 OFFICE O/P EST MOD 30 MIN: CPT | Performed by: INTERNAL MEDICINE

## 2020-05-15 PROCEDURE — 85025 COMPLETE CBC W/AUTO DIFF WBC: CPT

## 2020-05-15 NOTE — TELEPHONE ENCOUNTER
Patient was wanting to know if a fax was sent to dr. amador for patient to stop taking blood thinners before her surgery             Call patient back at [phone]

## 2020-05-15 NOTE — PROGRESS NOTES
PCP: Deidre Barker MD    Chief Complaint   Patient presents with   • Abdominal Pain   • Diarrhea     passing blood during bowel movements   • Nausea       History of Present Illness:   HPI  Mrs. Godoy presents to the office.  She was last evaluated in 118.  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  Mrs. Godoy gives a history of some abdominal discomfort in the right lower side for just over a month.  The patient describes a crampy type of pain without radiation.  The pain can last for several hours or most of the day.  She does admit to having periods of loose stool with a Whatcom score of 6.  Mrs. Godoy has noted some blood in the commode water and also in the stool.  She will generally have no issues with nocturnal diarrhea.  Mrs. Godoy denies any unexplained weight loss.   Past Medical History:   Diagnosis Date   • Abdominal pain    • Abnormal Pap smear of cervix    • Anxiety    • Asthma 2015   • Bleeding disorder (CMS/[facility]) 11-13-16   • Body piercing     TONGUE, NOSE, TWO IN EACH EAR   • Chest pain    • Clotting disorder (CMS/[facility])     factor 5   • Constipation    • Coronary artery disease involving native coronary artery of native heart without angina pectoris 6/16/2017   • Deep vein thrombosis (CMS/[facility]) 11-13-16   • Diarrhea    • Diarrhea    • Endometriosis    • Factor 5 Leiden mutation, heterozygous (CMS/[facility])    • Fibroid    • Gallstone    • GERD (gastroesophageal reflux disease)    • H/O blood clots    • Headache    • Hiatal hernia    • High cholesterol    • History of recurrent UTIs    • HPV (human papilloma virus) infection    • Hypertension    • Kidney infection    • Migraine    • Nausea    • Nausea & vomiting    • Obesity    • Orthostatic hypotension    • Osteoarthritis    • Ovarian cyst    • Pollen allergies    • Protein S deficiency (CMS/[facility]) 11/14/2016    labs from hospitalization for PE   • Pulmonary embolism  (CMS/[facility])    • Recurrent pregnancy loss, antepartum condition or complication    • Sleep apnea     CPAP ASKED TO BRING DOS   • Subclinical hyperthyroidism    • Tachycardia    • Tattoo     LOWER BACK   • Varicella        Past Surgical History:   Procedure Laterality Date   •  SECTION      X2  and    •  SECTION WITH TUBAL N/A 1/15/2019    Procedure:  SECTION REPEAT WITH TUBAL;  Surgeon: Alva Boyer MD;  Location: [facility] LABOR DELIVERY;  Service: Obstetrics/Gynecology   • CHOLECYSTECTOMY     • COLONOSCOPY N/A 2017    Procedure: COLONOSCOPY WITH BIOPSIES AND ARGON THERMAL ABLATION;  Surgeon: Jignesh Selby MD;  Location: [facility] ENDOSCOPY;  Service:    • DIAGNOSTIC LAPAROSCOPY N/A 2018    Procedure: DIAGNOSTIC LAPAROSCOPY AND ABLATION OF ENDOMETRIOSIS;  Surgeon: Evin Zamudio MD;  Location: [facility] OR;  Service: Obstetrics/Gynecology   • ENDOMETRIAL ABLATION     • ENDOSCOPY N/A 2017    Procedure: ESOPHAGOGASTRODUODENOSCOPY WITH BIOPSIES AND COLD BIOPSY POLYPECTOMIES;  Surgeon: Jignesh Selby MD;  Location: [facility] ENDOSCOPY;  Service:    • PELVIC LAPAROSCOPY           Current Outpatient Medications:   •  albuterol sulfate  (90 Base) MCG/ACT inhaler, Inhale 2 puffs Every 4 (Four) Hours As Needed for Wheezing or Shortness of Air., Disp: 18 g, Rfl: 11  •  apixaban (ELIQUIS) 5 MG tablet tablet, Take 1 tablet by mouth 2 (Two) Times a Day., Disp: 60 tablet, Rfl: 5  •  clindamycin (CLEOCIN) 300 MG capsule, Take 1 capsule by mouth 3 (Three) Times a Day., Disp: 21 capsule, Rfl: 0  •  diclofenac (VOLTAREN) 1 % gel gel, Apply 4 g topically to the appropriate area as directed 4 (Four) Times a Day As Needed (pain)., Disp: 100 g, Rfl: 0  •  predniSONE (DELTASONE) 20 MG tablet, 3 po daily for 3 days, 2 po daily for 3 days, 1 po daily for 3 days, Disp: 18 tablet, Rfl: 0    Allergies   Allergen Reactions   • Ceftin [Cefuroxime Axetil] Other (See Comments)     Numbness in  mouth and throat   • Amoxicillin Rash       Family History   Problem Relation Age of Onset   • Arthritis Mother    • COPD Mother    • Asthma Mother    • Thyroid disease Mother    • Arthritis Father    • Diabetes Father    • Hypertension Father    • Hyperlipidemia Father    • Kidney disease Father    • Heart attack Father    • Coronary artery disease Father    • Dementia Father    • No Known Problems Son    • Colon cancer Neg Hx    • Liver cancer Neg Hx    • Liver disease Neg Hx    • Stomach cancer Neg Hx    • Esophageal cancer Neg Hx        Social History     Socioeconomic History   • Marital status:      Spouse name: Not on file   • Number of children: Not on file   • Years of education: Not on file   • Highest education level: Not on file   Tobacco Use   • Smoking status: Never Smoker   • Smokeless tobacco: Never Used   Substance and Sexual Activity   • Alcohol use: No   • Drug use: No   • Sexual activity: Yes     Partners: Male     Birth control/protection: None     Comment: PE while on birth control patch       Review of Systems   Constitutional: Positive for fatigue. Negative for appetite change, fever and unexpected weight change.   HENT: Negative.  Negative for dental problem, mouth sores, postnasal drip, sneezing, trouble swallowing and voice change.    Eyes: Negative.  Negative for pain, redness and itching.   Respiratory: Negative.  Negative for cough, shortness of breath and wheezing.    Cardiovascular: Negative.  Negative for chest pain, palpitations and leg swelling.   Gastrointestinal: Positive for abdominal pain, blood in stool, diarrhea and nausea. Negative for abdominal distention, anal bleeding, constipation, rectal pain and vomiting.   Endocrine: Negative.  Negative for cold intolerance, heat intolerance, polydipsia and polyuria.   Genitourinary: Negative.  Negative for dysuria, enuresis, flank pain, hematuria and urgency.   Musculoskeletal: Negative.  Negative for arthralgias, back pain,  joint swelling and myalgias.   Skin: Negative.  Negative for color change, pallor and rash.   Allergic/Immunologic: Negative.  Negative for environmental allergies, food allergies and immunocompromised state.   Neurological: Negative.  Negative for dizziness, tremors, seizures, facial asymmetry, numbness and headaches.   Hematological: Negative.    Psychiatric/Behavioral: Negative.  Negative for behavioral problems, dysphoric mood, hallucinations and self-injury.       Vitals:    05/15/20 1137   BP: 122/78   Pulse: 91   Resp: 18   Temp: 98.6 °F (37 °C)   SpO2: 99%       Physical Exam   Constitutional: She is oriented to person, place, and time. She appears well-nourished. No distress.   HENT:   Head: Atraumatic.   Poor dentition   Eyes: EOM are normal. No scleral icterus.   Neck: Neck supple. No thyromegaly present.   Cardiovascular: Normal rate, regular rhythm and normal heart sounds. Exam reveals no gallop.   No murmur heard.  Pulmonary/Chest: Effort normal and breath sounds normal. She has no wheezes. She has no rales.   Abdominal: Soft. Bowel sounds are normal. There is tenderness (right lower quadrant). There is no rebound and no guarding.   Musculoskeletal: Normal range of motion. She exhibits no edema.   Lymphadenopathy:     She has no cervical adenopathy.   Neurological: She is alert and oriented to person, place, and time. She exhibits normal muscle tone.   Skin: Skin is dry. No erythema.   Psychiatric: She has a normal mood and affect. Her behavior is normal. Thought content normal.   Vitals reviewed.      Marilu was seen today for abdominal pain, diarrhea and nausea.    Diagnoses and all orders for this visit:    Right lower quadrant abdominal pain  -     CBC & Differential; Future  -     Colonoscopy; Future    Diarrhea, unspecified type  -     CBC & Differential; Future  -     Colonoscopy; Future    Blood in stool  -     CBC & Differential; Future  -     Colonoscopy; Future    Rectal bleeding  -      CBC & Differential; Future  -     Colonoscopy; Future    The patient reports some blood in the stool and bright red blood per rectum.  The last hemoglobin from just over week ago was 14.  She has no clinical signs of orthostasis at this time.  I did review colonoscopy from April 2017 performed by Dr. Selby in [place], [place].  There were no polyps or gross evidence for colitis.  However, the patient did have some vascular ectasias that were treated with APC.  Certainly, the recent resumption of Eliquis will increase the risk for gastrointestinal bleeding.  She has also experienced some issue with right lower abdominal pain and diarrhea.  The differential diagnosis also include microscopic colitis and Crohn's disease.      Plan: Will check CBC.           Will schedule colonoscopy for further evaluation.  Patient was instructed she will need to be tested for COVID prior to the procedure.

## 2020-05-18 ENCOUNTER — PRIOR AUTHORIZATION (OUTPATIENT)
Dept: INTERNAL MEDICINE | Facility: CLINIC | Age: 30
End: 2020-05-18

## 2020-05-18 DIAGNOSIS — D68.51 HETEROZYGOUS FACTOR V LEIDEN MUTATION (HCC): ICD-10-CM

## 2020-05-18 DIAGNOSIS — D68.59 PROTEIN S DEFICIENCY (HCC): ICD-10-CM

## 2020-05-20 ENCOUNTER — TELEPHONE (OUTPATIENT)
Dept: GASTROENTEROLOGY | Facility: CLINIC | Age: 30
End: 2020-05-20

## 2020-05-20 NOTE — TELEPHONE ENCOUNTER
PATIENT CALLED BACK ABOUT RESULTS. GAVE PATIENT  MESSAGE.    Result Notes for CBC Auto Differential     Notes recorded by Oswaldo Bragg MD on 5/18/2020 at 4:26 PM EDT  Let Ms. Godoy know the blood count is normal.  Thank you,  JUDITH

## 2020-05-29 DIAGNOSIS — Z12.11 SCREENING FOR COLON CANCER: Primary | ICD-10-CM

## 2020-05-29 RX ORDER — SODIUM, POTASSIUM,MAG SULFATES 17.5-3.13G
1 SOLUTION, RECONSTITUTED, ORAL ORAL TAKE AS DIRECTED
Qty: 2 BOTTLE | Refills: 0 | Status: SHIPPED | OUTPATIENT
Start: 2020-05-29 | End: 2020-08-14

## 2020-06-02 ENCOUNTER — APPOINTMENT (OUTPATIENT)
Dept: PREADMISSION TESTING | Facility: HOSPITAL | Age: 30
End: 2020-06-02

## 2020-06-02 PROCEDURE — U0004 COV-19 TEST NON-CDC HGH THRU: HCPCS

## 2020-06-02 PROCEDURE — U0002 COVID-19 LAB TEST NON-CDC: HCPCS

## 2020-06-02 PROCEDURE — C9803 HOPD COVID-19 SPEC COLLECT: HCPCS

## 2020-06-03 LAB
REF LAB TEST METHOD: NORMAL
SARS-COV-2 RNA RESP QL NAA+PROBE: NOT DETECTED

## 2020-06-05 ENCOUNTER — OUTSIDE FACILITY SERVICE (OUTPATIENT)
Dept: GASTROENTEROLOGY | Facility: CLINIC | Age: 30
End: 2020-06-05

## 2020-06-05 ENCOUNTER — LAB REQUISITION (OUTPATIENT)
Dept: LAB | Facility: HOSPITAL | Age: 30
End: 2020-06-05

## 2020-06-05 DIAGNOSIS — R10.30 LOWER ABDOMINAL PAIN, UNSPECIFIED: ICD-10-CM

## 2020-06-05 DIAGNOSIS — R19.7 DIARRHEA, UNSPECIFIED: ICD-10-CM

## 2020-06-05 PROCEDURE — 45385 COLONOSCOPY W/LESION REMOVAL: CPT | Performed by: INTERNAL MEDICINE

## 2020-06-05 PROCEDURE — 45380 COLONOSCOPY AND BIOPSY: CPT | Performed by: INTERNAL MEDICINE

## 2020-06-05 PROCEDURE — 88305 TISSUE EXAM BY PATHOLOGIST: CPT | Performed by: INTERNAL MEDICINE

## 2020-06-08 LAB
CYTO UR: NORMAL
LAB AP CASE REPORT: NORMAL
LAB AP CLINICAL INFORMATION: NORMAL
PATH REPORT.FINAL DX SPEC: NORMAL
PATH REPORT.GROSS SPEC: NORMAL

## 2020-07-28 ENCOUNTER — PATIENT MESSAGE (OUTPATIENT)
Dept: INTERNAL MEDICINE | Facility: CLINIC | Age: 30
End: 2020-07-28

## 2020-07-28 DIAGNOSIS — G89.29 CHRONIC MUSCULOSKELETAL PAIN: Primary | ICD-10-CM

## 2020-07-28 DIAGNOSIS — M79.18 CHRONIC MUSCULOSKELETAL PAIN: Primary | ICD-10-CM

## 2020-07-29 ENCOUNTER — APPOINTMENT (OUTPATIENT)
Dept: CT IMAGING | Facility: HOSPITAL | Age: 30
End: 2020-07-29

## 2020-07-29 ENCOUNTER — HOSPITAL ENCOUNTER (EMERGENCY)
Facility: HOSPITAL | Age: 30
Discharge: HOME | End: 2020-07-29
Attending: EMERGENCY MEDICINE | Admitting: EMERGENCY MEDICINE

## 2020-07-29 VITALS
DIASTOLIC BLOOD PRESSURE: 82 MMHG | TEMPERATURE: 98 F | HEART RATE: 75 BPM | OXYGEN SATURATION: 99 % | RESPIRATION RATE: 16 BRPM | WEIGHT: 200 LBS | BODY MASS INDEX: 35.44 KG/M2 | SYSTOLIC BLOOD PRESSURE: 123 MMHG | HEIGHT: 63 IN

## 2020-07-29 DIAGNOSIS — R10.31 RIGHT LOWER QUADRANT ABDOMINAL PAIN: Primary | ICD-10-CM

## 2020-07-29 DIAGNOSIS — R11.0 NAUSEA: ICD-10-CM

## 2020-07-29 DIAGNOSIS — R19.7 DIARRHEA, UNSPECIFIED TYPE: ICD-10-CM

## 2020-07-29 LAB
ALBUMIN SERPL-MCNC: 4.5 G/DL (ref 3.5–5.2)
ALBUMIN/GLOB SERPL: 1.4 G/DL
ALP SERPL-CCNC: 90 U/L (ref 39–117)
ALT SERPL W P-5'-P-CCNC: 16 U/L (ref 1–33)
ANION GAP SERPL CALCULATED.3IONS-SCNC: 9.2 MMOL/L (ref 5–15)
AST SERPL-CCNC: 14 U/L (ref 1–32)
B-HCG UR QL: NEGATIVE
BASOPHILS # BLD AUTO: 0.05 10*3/MM3 (ref 0–0.2)
BASOPHILS NFR BLD AUTO: 0.6 % (ref 0–1.5)
BILIRUB SERPL-MCNC: 0.4 MG/DL (ref 0–1.2)
BILIRUB UR QL STRIP: NEGATIVE
BUN SERPL-MCNC: 9 MG/DL (ref 6–20)
BUN/CREAT SERPL: 13 (ref 7–25)
CALCIUM SPEC-SCNC: 9.8 MG/DL (ref 8.6–10.5)
CHLORIDE SERPL-SCNC: 104 MMOL/L (ref 98–107)
CLARITY UR: CLEAR
CO2 SERPL-SCNC: 26.8 MMOL/L (ref 22–29)
COLOR UR: YELLOW
CREAT SERPL-MCNC: 0.69 MG/DL (ref 0.57–1)
DEPRECATED RDW RBC AUTO: 39.9 FL (ref 37–54)
EOSINOPHIL # BLD AUTO: 0.1 10*3/MM3 (ref 0–0.4)
EOSINOPHIL NFR BLD AUTO: 1.2 % (ref 0.3–6.2)
ERYTHROCYTE [DISTWIDTH] IN BLOOD BY AUTOMATED COUNT: 12.2 % (ref 12.3–15.4)
GFR SERPL CREATININE-BSD FRML MDRD: 101 ML/MIN/1.73
GLOBULIN UR ELPH-MCNC: 3.2 GM/DL
GLUCOSE SERPL-MCNC: 102 MG/DL (ref 65–99)
GLUCOSE UR STRIP-MCNC: NEGATIVE MG/DL
HCT VFR BLD AUTO: 40.2 % (ref 34–46.6)
HGB BLD-MCNC: 14 G/DL (ref 12–15.9)
HGB UR QL STRIP.AUTO: NEGATIVE
IMM GRANULOCYTES # BLD AUTO: 0.03 10*3/MM3 (ref 0–0.05)
IMM GRANULOCYTES NFR BLD AUTO: 0.4 % (ref 0–0.5)
KETONES UR QL STRIP: NEGATIVE
LEUKOCYTE ESTERASE UR QL STRIP.AUTO: NEGATIVE
LIPASE SERPL-CCNC: 26 U/L (ref 13–60)
LIPASE SERPL-CCNC: 26 U/L (ref 13–60)
LYMPHOCYTES # BLD AUTO: 2.56 10*3/MM3 (ref 0.7–3.1)
LYMPHOCYTES NFR BLD AUTO: 31.7 % (ref 19.6–45.3)
MCH RBC QN AUTO: 31 PG (ref 26.6–33)
MCHC RBC AUTO-ENTMCNC: 34.8 G/DL (ref 31.5–35.7)
MCV RBC AUTO: 89.1 FL (ref 79–97)
MONOCYTES # BLD AUTO: 0.43 10*3/MM3 (ref 0.1–0.9)
MONOCYTES NFR BLD AUTO: 5.3 % (ref 5–12)
NEUTROPHILS NFR BLD AUTO: 4.91 10*3/MM3 (ref 1.7–7)
NEUTROPHILS NFR BLD AUTO: 60.8 % (ref 42.7–76)
NITRITE UR QL STRIP: NEGATIVE
NRBC BLD AUTO-RTO: 0 /100 WBC (ref 0–0.2)
PH UR STRIP.AUTO: 6 [PH] (ref 5–8)
PLATELET # BLD AUTO: 305 10*3/MM3 (ref 140–450)
PMV BLD AUTO: 10.2 FL (ref 6–12)
POTASSIUM SERPL-SCNC: 3.9 MMOL/L (ref 3.5–5.2)
PROT SERPL-MCNC: 7.7 G/DL (ref 6–8.5)
PROT UR QL STRIP: NEGATIVE
RBC # BLD AUTO: 4.51 10*6/MM3 (ref 3.77–5.28)
SODIUM SERPL-SCNC: 140 MMOL/L (ref 136–145)
SP GR UR STRIP: 1.02 (ref 1–1.03)
UROBILINOGEN UR QL STRIP: NORMAL
WBC # BLD AUTO: 8.08 10*3/MM3 (ref 3.4–10.8)

## 2020-07-29 PROCEDURE — 85025 COMPLETE CBC W/AUTO DIFF WBC: CPT | Performed by: PHYSICIAN ASSISTANT

## 2020-07-29 PROCEDURE — 81003 URINALYSIS AUTO W/O SCOPE: CPT | Performed by: PHYSICIAN ASSISTANT

## 2020-07-29 PROCEDURE — 83690 ASSAY OF LIPASE: CPT | Performed by: PHYSICIAN ASSISTANT

## 2020-07-29 PROCEDURE — 25010000002 KETOROLAC TROMETHAMINE PER 15 MG: Performed by: PHYSICIAN ASSISTANT

## 2020-07-29 PROCEDURE — 96374 THER/PROPH/DIAG INJ IV PUSH: CPT

## 2020-07-29 PROCEDURE — 74177 CT ABD & PELVIS W/CONTRAST: CPT

## 2020-07-29 PROCEDURE — 99283 EMERGENCY DEPT VISIT LOW MDM: CPT

## 2020-07-29 PROCEDURE — 25010000002 IOPAMIDOL 61 % SOLUTION: Performed by: EMERGENCY MEDICINE

## 2020-07-29 PROCEDURE — 80053 COMPREHEN METABOLIC PANEL: CPT | Performed by: PHYSICIAN ASSISTANT

## 2020-07-29 PROCEDURE — 81025 URINE PREGNANCY TEST: CPT | Performed by: PHYSICIAN ASSISTANT

## 2020-07-29 RX ORDER — KETOROLAC TROMETHAMINE 30 MG/ML
30 INJECTION, SOLUTION INTRAMUSCULAR; INTRAVENOUS ONCE
Status: COMPLETED | OUTPATIENT
Start: 2020-07-29 | End: 2020-07-29

## 2020-07-29 RX ORDER — ONDANSETRON 4 MG/1
4 TABLET, ORALLY DISINTEGRATING ORAL EVERY 6 HOURS PRN
Qty: 20 TABLET | Refills: 0 | Status: SHIPPED | OUTPATIENT
Start: 2020-07-29 | End: 2020-08-11 | Stop reason: SDUPTHER

## 2020-07-29 RX ORDER — SODIUM CHLORIDE 0.9 % (FLUSH) 0.9 %
10 SYRINGE (ML) INJECTION AS NEEDED
Status: DISCONTINUED | OUTPATIENT
Start: 2020-07-29 | End: 2020-07-30 | Stop reason: HOSPADM

## 2020-07-29 RX ORDER — DULOXETIN HYDROCHLORIDE 60 MG/1
60 CAPSULE, DELAYED RELEASE ORAL DAILY
Qty: 90 CAPSULE | Refills: 3 | Status: SHIPPED | OUTPATIENT
Start: 2020-07-29 | End: 2022-02-20 | Stop reason: SDUPTHER

## 2020-07-29 RX ORDER — DICYCLOMINE HCL 20 MG
20 TABLET ORAL EVERY 6 HOURS PRN
Qty: 20 TABLET | Refills: 0 | Status: SHIPPED | OUTPATIENT
Start: 2020-07-29 | End: 2020-08-14

## 2020-07-29 RX ORDER — LOPERAMIDE HYDROCHLORIDE 2 MG/1
2 CAPSULE ORAL 4 TIMES DAILY PRN
Qty: 12 CAPSULE | Refills: 0 | Status: SHIPPED | OUTPATIENT
Start: 2020-07-29 | End: 2020-08-14

## 2020-07-29 RX ADMIN — IOPAMIDOL 100 ML: 612 INJECTION, SOLUTION INTRAVENOUS at 22:41

## 2020-07-29 RX ADMIN — KETOROLAC TROMETHAMINE 30 MG: 30 INJECTION, SOLUTION INTRAMUSCULAR at 21:57

## 2020-07-29 NOTE — TELEPHONE ENCOUNTER
From: Marilu Godoy  To: Deidre Barker MD  Sent: 7/28/2020 5:24 PM EDT  Subject: Prescription Question    Can I get a new prescription for the 60mg Cymbalta that I was on for my Fibromyalgia before I lost my medical coverage?

## 2020-08-07 ENCOUNTER — HOSPITAL ENCOUNTER (EMERGENCY)
Facility: HOSPITAL | Age: 30
Discharge: HOME | End: 2020-08-07
Attending: FAMILY MEDICINE
Payer: MEDICAID

## 2020-08-07 VITALS
SYSTOLIC BLOOD PRESSURE: 137 MMHG | DIASTOLIC BLOOD PRESSURE: 89 MMHG | TEMPERATURE: 98.6 F | WEIGHT: 196 LBS | BODY MASS INDEX: 34.73 KG/M2 | HEIGHT: 63 IN | HEART RATE: 95 BPM | OXYGEN SATURATION: 100 % | RESPIRATION RATE: 16 BRPM

## 2020-08-07 PROCEDURE — 6370000000 HC RX 637 (ALT 250 FOR IP): Performed by: FAMILY MEDICINE

## 2020-08-07 PROCEDURE — 99281 EMR DPT VST MAYX REQ PHY/QHP: CPT

## 2020-08-07 PROCEDURE — 12001 RPR S/N/AX/GEN/TRNK 2.5CM/<: CPT

## 2020-08-07 PROCEDURE — 2500000003 HC RX 250 WO HCPCS: Performed by: FAMILY MEDICINE

## 2020-08-07 PROCEDURE — 99282 EMERGENCY DEPT VISIT SF MDM: CPT

## 2020-08-07 RX ORDER — LIDOCAINE HYDROCHLORIDE 10 MG/ML
20 INJECTION, SOLUTION INFILTRATION; PERINEURAL ONCE
Status: COMPLETED | OUTPATIENT
Start: 2020-08-07 | End: 2020-08-07

## 2020-08-07 RX ORDER — BACITRACIN, NEOMYCIN, POLYMYXIN B 400; 3.5; 5 [USP'U]/G; MG/G; [USP'U]/G
OINTMENT TOPICAL ONCE
Status: COMPLETED | OUTPATIENT
Start: 2020-08-07 | End: 2020-08-07

## 2020-08-07 RX ORDER — DULOXETIN HYDROCHLORIDE 60 MG/1
60 CAPSULE, DELAYED RELEASE ORAL DAILY
COMMUNITY

## 2020-08-07 RX ORDER — ONDANSETRON 4 MG/1
4 TABLET, ORALLY DISINTEGRATING ORAL ONCE
Status: COMPLETED | OUTPATIENT
Start: 2020-08-07 | End: 2020-08-07

## 2020-08-07 RX ADMIN — ONDANSETRON 4 MG: 4 TABLET, ORALLY DISINTEGRATING ORAL at 23:07

## 2020-08-07 RX ADMIN — LIDOCAINE HYDROCHLORIDE 20 ML: 10 INJECTION, SOLUTION INFILTRATION; PERINEURAL at 22:32

## 2020-08-07 RX ADMIN — BACITRACIN, NEOMYCIN, POLYMYXIN B: 400; 3.5; 5 OINTMENT TOPICAL at 23:06

## 2020-08-07 SDOH — HEALTH STABILITY: MENTAL HEALTH: HOW OFTEN DO YOU HAVE A DRINK CONTAINING ALCOHOL?: NEVER

## 2020-08-07 ASSESSMENT — PAIN SCALES - GENERAL
PAINLEVEL_OUTOF10: 5
PAINLEVEL_OUTOF10: 5

## 2020-08-08 NOTE — ED PROVIDER NOTES
[address] [place] [address] Court  eMERGENCY dEPARTMENT eNCOUnter      Pt Name: Andrews Leung  MRN: [id number]  [place] 1990  Date of evaluation: 2020  Provider: Padilla Alvarez, [address] [place] [address]       Chief Complaint   Patient presents with    Laceration     right thumb         HISTORY OF PRESENT ILLNESS   (Location/Symptom, Timing/Onset, Context/Setting, Quality, Duration, Modifying Factors, Severity)  Note limiting factors. Ny Franco is a 34 y.o. female who presents to the emergency department for having laceration to her right thumb. Pt states she was trying to open a can, can opener got stuck on both sides so she was trying to take a fork and open the can. The fork slipped and she cut the back of her thumb on the can. Controlled bleeding. Happened a couple hrs ago and was waiting on a ride here. Tetanus has been less than 5 years. No trouble moving thumb. Nursing Notes were reviewed. REVIEW OF SYSTEMS    (2-9 systems for level 4, 10 or more forlevel 5)     Review of Systems   Skin: Positive for wound. Laceration to right dorsum of thumb   All other systems reviewed and are negative. 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[facility]     Factor 5 Leiden mutation, heterozygous ([facility]     Fibromyalgia     Pulmonary emboli ([facility]          SURGICAL HISTORY       Past Surgical History:   Procedure Laterality Date     SECTION      CHOLECYSTECTOMY      TUBAL LIGATION           CURRENT MEDICATIONS       Current Discharge Medication List      CONTINUE these medications which have NOT CHANGED    Details   apixaban (ELIQUIS) 5 MG TABS tablet Take by mouth 2 times daily      DULoxetine (CYMBALTA) 60 MG extended release capsule Take 60 mg by mouth daily             ALLERGIES     Cefdinir and Amoxicillin    FAMILY HISTORY     History reviewed. No pertinent family history.        SOCIAL Heart sounds: Normal heart sounds. Pulmonary:      Effort: Pulmonary effort is normal.      Breath sounds: Normal breath sounds. Musculoskeletal: Normal range of motion. Hands:    Skin:     General: Skin is warm and dry. Comments: See MSK for close diagram hand laceration site, size, etc   Neurological:      Mental Status: She is alert and oriented to person, place, and time. Psychiatric:         Mood and Affect: Mood normal.         Behavior: Behavior normal.         DIAGNOSTIC RESULTS     EKG: All EKG's are interpreted by the Emergency Department Physician who either signs or Co-signs this chart in the absence of a cardiologist.    None    RADIOLOGY:   Non-plain film images such as CT, Ultrasound and MRI are read by the radiologist. Plain radiographic images are visualized andpreliminarily interpreted by the emergency physician with the below findings:    None    Interpretationper the Radiologist below, if available at the time of this note:    No orders to display         ED BEDSIDE ULTRASOUND:   Performed by ED Physician - none    LABS:  Labs Reviewed - No data to display    All other labs were within normal range or not returned as of this dictation. EMERGENCY DEPARTMENT COURSE and DIFFERENTIAL DIAGNOSIS/MDM:   Vitals:    Vitals:    08/07/20 2153   BP: 137/89   Pulse: 95   Resp: 16   Temp: 98.6 °F (37 °C)   TempSrc: Oral   SpO2: 100%   Weight: 196 lb (88.9 kg)   Height: 5' 3\" (1.6 m)           CRITICAL CARE TIME   Total Critical Care time was 0 minutes, excluding separatelyreportable procedures. There was a high probability ofclinically significant/life threatening deterioration in the patient's condition which required my urgent intervention.      CONSULTS:  None    PROCEDURES:  Lac Repair    Date/Time: 8/7/2020 10:48 PM  Performed by: Tita Barone DO  Authorized by: Tita Barone DO     Consent:     Consent obtained:  Verbal    Consent given by:  Patient    Risks discussed:  Pain and poor cosmetic result  Anesthesia (see MAR for exact dosages): Anesthesia method:  Local infiltration    Local anesthetic:  Lidocaine 1% w/o epi  Laceration details:     Location:  Finger    Finger location:  R thumb    Length (cm):  2    Depth (mm):  0.4  Repair type:     Repair type:  Simple  Pre-procedure details:     Preparation:  Patient was prepped and draped in usual sterile fashion  Exploration:     Hemostasis achieved with:  Direct pressure    Wound extent: no foreign bodies/material noted, no tendon damage noted and no vascular damage noted      Contaminated: no    Treatment:     Area cleansed with:  Hibiclens and saline    Amount of cleaning:  Standard    Irrigation solution:  Sterile saline    Irrigation method:  Tap  Skin repair:     Repair method:  Sutures    Suture size:  5-0    Suture material:  Nylon    Suture technique:  Simple interrupted    Number of sutures:  4  Approximation:     Approximation:  Close  Post-procedure details:     Dressing:  Antibiotic ointment and sterile dressing    Patient tolerance of procedure: Tolerated with difficulty due to pt looking at wound and getting sick, started vomiting, zofran ordered and vamsi to lay back in bed to relax as pt was about to have syncopal episode. PROGRESS NOTES:    Will anesthetize and stitch the area. Tetanus up to date. FINAL IMPRESSION      1.  Laceration of right thumb without foreign body without damage to nail, initial encounter New Problem         DISPOSITION/PLAN   DISPOSITION        PATIENT REFERRED TO:  Lisy Gupta MD  [address] [place] [address]    Schedule an appointment as soon as possible for a visit in 1 week  For wound re-check, For suture removal      DISCHARGE MEDICATIONS:  Current Discharge Medication List          (Please note that portions of this note were completed with a voice recognition program.  Efforts were made to edit the dictations but occasionallywords are mis-transcribed.)    Myranda Russell DO (electronically signed)  Attending Emergency Physician          Ryann Camp DO  08/07/20 1017

## 2020-08-08 NOTE — ED NOTES
Discharge instructions provided, no new prescriptions given. Patient verbalizes understanding of d/c instructions and follow up care. Patient off unit ambulatory to POV at this time with no further needs noted.       Cristiano Cormier RN  08/07/20 9584

## 2020-08-11 ENCOUNTER — HOSPITAL ENCOUNTER (EMERGENCY)
Facility: HOSPITAL | Age: 30
Discharge: HOME | End: 2020-08-11
Attending: EMERGENCY MEDICINE | Admitting: EMERGENCY MEDICINE

## 2020-08-11 ENCOUNTER — APPOINTMENT (OUTPATIENT)
Dept: GENERAL RADIOLOGY | Facility: HOSPITAL | Age: 30
End: 2020-08-11

## 2020-08-11 VITALS
HEIGHT: 63 IN | TEMPERATURE: 99.3 F | HEART RATE: 87 BPM | DIASTOLIC BLOOD PRESSURE: 98 MMHG | RESPIRATION RATE: 16 BRPM | WEIGHT: 196 LBS | OXYGEN SATURATION: 98 % | BODY MASS INDEX: 34.73 KG/M2 | SYSTOLIC BLOOD PRESSURE: 140 MMHG

## 2020-08-11 DIAGNOSIS — R68.89 FLU-LIKE SYMPTOMS: Primary | ICD-10-CM

## 2020-08-11 PROCEDURE — U0002 COVID-19 LAB TEST NON-CDC: HCPCS | Performed by: EMERGENCY MEDICINE

## 2020-08-11 PROCEDURE — 63710000001 ONDANSETRON ODT 4 MG TABLET DISPERSIBLE: Performed by: EMERGENCY MEDICINE

## 2020-08-11 PROCEDURE — U0004 COV-19 TEST NON-CDC HGH THRU: HCPCS | Performed by: EMERGENCY MEDICINE

## 2020-08-11 PROCEDURE — 99283 EMERGENCY DEPT VISIT LOW MDM: CPT

## 2020-08-11 PROCEDURE — 71045 X-RAY EXAM CHEST 1 VIEW: CPT

## 2020-08-11 RX ORDER — KETOROLAC TROMETHAMINE 30 MG/ML
60 INJECTION, SOLUTION INTRAMUSCULAR; INTRAVENOUS ONCE
Status: DISCONTINUED | OUTPATIENT
Start: 2020-08-11 | End: 2020-08-12 | Stop reason: HOSPADM

## 2020-08-11 RX ORDER — ACETAMINOPHEN 500 MG
1000 TABLET ORAL ONCE
Status: COMPLETED | OUTPATIENT
Start: 2020-08-11 | End: 2020-08-11

## 2020-08-11 RX ORDER — ONDANSETRON 4 MG/1
4 TABLET, ORALLY DISINTEGRATING ORAL ONCE
Status: COMPLETED | OUTPATIENT
Start: 2020-08-11 | End: 2020-08-11

## 2020-08-11 RX ORDER — ONDANSETRON 4 MG/1
4 TABLET, ORALLY DISINTEGRATING ORAL EVERY 6 HOURS PRN
Qty: 10 TABLET | Refills: 0 | Status: SHIPPED | OUTPATIENT
Start: 2020-08-11 | End: 2022-03-22

## 2020-08-11 RX ADMIN — ONDANSETRON 4 MG: 4 TABLET, ORALLY DISINTEGRATING ORAL at 21:45

## 2020-08-11 RX ADMIN — ACETAMINOPHEN 1000 MG: 500 TABLET, FILM COATED ORAL at 21:45

## 2020-08-12 ENCOUNTER — EPISODE CHANGES (OUTPATIENT)
Dept: CASE MANAGEMENT | Facility: OTHER | Age: 30
End: 2020-08-12

## 2020-08-12 LAB
REF LAB TEST METHOD: NORMAL
SARS-COV-2 RNA RESP QL NAA+PROBE: NOT DETECTED

## 2020-08-12 NOTE — ED PROVIDER NOTES
Subjective   29-year-old female presenting with several complaints.  She states over the last couple days she has had subjective fevers and chills, mild dry cough, body aches, some nausea, burning sensation in her throat, diarrhea.  Has been exposed to someone with COVID-19.  Denies chest pain or shortness of breath.  She does have a history of PE and is on Eliquis, has not missed any doses.  No other complaints or concerns.          Review of Systems   Constitutional: Positive for chills, fatigue and fever.   HENT: Negative.    Eyes: Negative.    Respiratory: Positive for cough. Negative for shortness of breath.    Cardiovascular: Negative.    Gastrointestinal: Positive for diarrhea and nausea. Negative for abdominal pain and vomiting.   Genitourinary: Negative.    Musculoskeletal: Positive for arthralgias and myalgias.   Skin: Negative.    Neurological: Negative.    Psychiatric/Behavioral: Negative.        Past Medical History:   Diagnosis Date   • Abdominal pain    • Abnormal Pap smear of cervix    • Anxiety    • Asthma 2015   • Bleeding disorder (CMS/[facility]) 11-13-16   • Body piercing     TONGUE, NOSE, TWO IN EACH EAR   • Chest pain    • Clotting disorder (CMS/[facility])     factor 5   • Constipation    • Coronary artery disease involving native coronary artery of native heart without angina pectoris 6/16/2017   • Deep vein thrombosis (CMS/[facility]) 11-13-16   • Diarrhea    • Diarrhea    • Endometriosis    • Factor 5 Leiden mutation, heterozygous (CMS/[facility])    • Fibroid    • Gallstone    • GERD (gastroesophageal reflux disease)    • H/O blood clots    • Headache    • Hiatal hernia    • High cholesterol    • History of recurrent UTIs    • HPV (human papilloma virus) infection    • Hypertension    • Kidney infection    • Migraine    • Nausea    • Nausea & vomiting    • Obesity    • Orthostatic hypotension    • Osteoarthritis    • Ovarian cyst    • Pollen allergies    • Protein S deficiency (CMS/[facility]) 11/14/2016    labs from  hospitalization for PE   • Pulmonary embolism (CMS/[facility])    • Recurrent pregnancy loss, antepartum condition or complication    • Sleep apnea     CPAP ASKED TO BRING DOS   • Subclinical hyperthyroidism    • Tachycardia    • Tattoo     LOWER BACK   • Varicella        Allergies   Allergen Reactions   • Ceftin [Cefuroxime Axetil] Other (See Comments)     Numbness in mouth and throat   • Amoxicillin Rash       Past Surgical History:   Procedure Laterality Date   •  SECTION      X2  and    •  SECTION WITH TUBAL N/A 1/15/2019    Procedure:  SECTION REPEAT WITH TUBAL;  Surgeon: Alva Boyer MD;  Location: [facility] LABOR DELIVERY;  Service: Obstetrics/Gynecology   • CHOLECYSTECTOMY     • COLONOSCOPY N/A 2017    Procedure: COLONOSCOPY WITH BIOPSIES AND ARGON THERMAL ABLATION;  Surgeon: Jignesh Selby MD;  Location: [facility] ENDOSCOPY;  Service:    • DIAGNOSTIC LAPAROSCOPY N/A 2018    Procedure: DIAGNOSTIC LAPAROSCOPY AND ABLATION OF ENDOMETRIOSIS;  Surgeon: Evin Zamudio MD;  Location: [facility] OR;  Service: Obstetrics/Gynecology   • ENDOMETRIAL ABLATION     • ENDOSCOPY N/A 2017    Procedure: ESOPHAGOGASTRODUODENOSCOPY WITH BIOPSIES AND COLD BIOPSY POLYPECTOMIES;  Surgeon: Jignesh Selby MD;  Location: [facility] ENDOSCOPY;  Service:    • PELVIC LAPAROSCOPY         Family History   Problem Relation Age of Onset   • Arthritis Mother    • COPD Mother    • Asthma Mother    • Thyroid disease Mother    • Arthritis Father    • Diabetes Father    • Hypertension Father    • Hyperlipidemia Father    • Kidney disease Father    • Heart attack Father    • Coronary artery disease Father    • Dementia Father    • No Known Problems Son    • Colon cancer Neg Hx    • Liver cancer Neg Hx    • Liver disease Neg Hx    • Stomach cancer Neg Hx    • Esophageal cancer Neg Hx        Social History     Socioeconomic History   • Marital status:      Spouse name: Not on file   • Number of  children: Not on file   • Years of education: Not on file   • Highest education level: Not on file   Tobacco Use   • Smoking status: Never Smoker   • Smokeless tobacco: Never Used   Substance and Sexual Activity   • Alcohol use: No   • Drug use: No   • Sexual activity: Yes     Partners: Male     Birth control/protection: None     Comment: PE while on birth control patch           Objective   Physical Exam   Constitutional: She is oriented to person, place, and time. She appears well-developed and well-nourished. No distress.   HENT:   Head: Normocephalic and atraumatic.   Right Ear: External ear normal.   Left Ear: External ear normal.   Nose: Nose normal.   Mouth/Throat: Oropharynx is clear and moist.   Eyes: Pupils are equal, round, and reactive to light. Conjunctivae and EOM are normal.   Neck: Normal range of motion. Neck supple.   Cardiovascular: Normal rate, regular rhythm, normal heart sounds and intact distal pulses.   Pulmonary/Chest: Effort normal and breath sounds normal. No stridor. No respiratory distress. She has no wheezes. She has no rales.   Abdominal: Soft. Bowel sounds are normal. She exhibits no distension. There is no tenderness. There is no rebound and no guarding.   Musculoskeletal: Normal range of motion. She exhibits no edema, tenderness or deformity.   Neurological: She is alert and oriented to person, place, and time.   Skin: Skin is warm and dry. No rash noted.   Psychiatric: She has a normal mood and affect. Her behavior is normal.   Nursing note and vitals reviewed.      Procedures           ED Course                                           MDM  Number of Diagnoses or Management Options  Flu-like symptoms:   Diagnosis management comments: 29-year-old female with multiple complaints.  Well-developed, well-nourished lady no distress with exam as above.  She has normal vital signs.  Her lungs are clear without signs or symptoms of respiratory distress.  I suspect she has viral illness,  possibly COVID-19.  Will obtain chest x-ray, COVID-19 swab and treat symptomatically.  Disposition is likely to home.    DDX: viral illness including COVID 19, pneumonia    She feels well.  Chest x-ray to monitor patient reveals no acute abnormality.  Vital signs are remained normal.  Supportive measures and outpatient follow-up discussed.  She is comfortable with and understand the plan.      Final diagnoses:   Flu-like symptoms            Francois Kline MD  08/11/20 1215

## 2020-08-13 ENCOUNTER — TELEPHONE (OUTPATIENT)
Dept: INFECTIOUS DISEASES | Facility: HOSPITAL | Age: 30
End: 2020-08-13

## 2020-08-13 ENCOUNTER — EPISODE CHANGES (OUTPATIENT)
Dept: CASE MANAGEMENT | Facility: OTHER | Age: 30
End: 2020-08-13

## 2020-08-13 ENCOUNTER — TELEPHONE (OUTPATIENT)
Dept: INTERNAL MEDICINE | Facility: CLINIC | Age: 30
End: 2020-08-13

## 2020-08-14 ENCOUNTER — HOSPITAL ENCOUNTER (OUTPATIENT)
Dept: GENERAL RADIOLOGY | Facility: HOSPITAL | Age: 30
Discharge: HOME | End: 2020-08-14
Admitting: PHYSICIAN ASSISTANT

## 2020-08-14 ENCOUNTER — OFFICE VISIT (OUTPATIENT)
Dept: INTERNAL MEDICINE | Facility: CLINIC | Age: 30
End: 2020-08-14

## 2020-08-14 VITALS
SYSTOLIC BLOOD PRESSURE: 132 MMHG | WEIGHT: 196 LBS | HEART RATE: 123 BPM | OXYGEN SATURATION: 98 % | BODY MASS INDEX: 34.73 KG/M2 | DIASTOLIC BLOOD PRESSURE: 88 MMHG | TEMPERATURE: 98.2 F | HEIGHT: 63 IN

## 2020-08-14 DIAGNOSIS — M25.561 RIGHT ANTERIOR KNEE PAIN: Primary | ICD-10-CM

## 2020-08-14 DIAGNOSIS — R00.2 PALPITATIONS: ICD-10-CM

## 2020-08-14 DIAGNOSIS — S61.011D THUMB LACERATION, RIGHT, SUBSEQUENT ENCOUNTER: ICD-10-CM

## 2020-08-14 DIAGNOSIS — M25.561 RIGHT ANTERIOR KNEE PAIN: ICD-10-CM

## 2020-08-14 DIAGNOSIS — Z48.02 VISIT FOR SUTURE REMOVAL: Primary | ICD-10-CM

## 2020-08-14 DIAGNOSIS — R00.0 INAPPROPRIATE SINUS TACHYCARDIA: ICD-10-CM

## 2020-08-14 PROCEDURE — 99214 OFFICE O/P EST MOD 30 MIN: CPT | Performed by: PHYSICIAN ASSISTANT

## 2020-08-14 PROCEDURE — 73562 X-RAY EXAM OF KNEE 3: CPT

## 2020-08-14 RX ORDER — CEPHALEXIN 500 MG/1
500 CAPSULE ORAL 3 TIMES DAILY
Qty: 21 CAPSULE | Refills: 0 | Status: SHIPPED | OUTPATIENT
Start: 2020-08-14 | End: 2020-08-21

## 2020-08-14 NOTE — PROGRESS NOTES
Marilu Godoy is a 29 y.o. female.     Subjective   History of Present Illness   Here today for removal of sutures from the right thumb which she cut on a can 1 week ago today.  Four sutures were placed at [facility] on the evening of 8/7/2020.  One of the sutures untied and came out on its own prior to the encounter today.  She reports that there has been some clear discharge from the wound and it remains a little bit tender but otherwise has been uncomplicated.  She was not prescribed antibiotics.  She has been keeping the wound clean and applying Neosporin.    Today she is also concerned with around 1 year of worsening right anterior knee pain which is predominantly bothersome inferior to the patella medially and laterally.  Recently she has had increased pain with ambulation and reports that the knee sometimes feels like it is giving out and has caused her to nearly fall on a couple of occasions.  No known injury.  No edema, erythema, abnormal warmth or bruising.      For the last couple of years she has had trouble with palpitations and tachycardia.  She is overdue for follow-up with cardiology.  She denies any chest pain with palpitations she experiences some dyspnea.        The following portions of the patient's history were reviewed and updated as appropriate: allergies, current medications, past family history, past medical history, past social history, past surgical history and problem list.    Review of Systems   Constitutional: Negative for activity change, chills, fatigue and fever.   HENT: Negative.    Eyes: Negative.  Negative for visual disturbance.   Respiratory: Positive for shortness of breath. Negative for cough, chest tightness and wheezing.    Cardiovascular: Positive for palpitations (tachycardia). Negative for chest pain and leg swelling.   Gastrointestinal: Negative for abdominal pain, constipation, diarrhea, nausea and vomiting.   Endocrine: Negative for polydipsia,  polyphagia and polyuria.   Genitourinary: Negative.    Musculoskeletal: Positive for arthralgias and gait problem. Negative for myalgias, neck pain, neck stiffness and bursitis.   Skin: Positive for skin lesions (wound). Negative for color change, dry skin and rash.   Allergic/Immunologic: Negative for immunocompromised state.   Neurological: Negative for dizziness, speech difficulty, weakness, headache and confusion.   Hematological: Negative for adenopathy. Does not bruise/bleed easily.   Psychiatric/Behavioral: Negative for agitation, sleep disturbance, suicidal ideas and depressed mood. The patient is not nervous/anxious.          Objective    Physical Exam   Constitutional: She is oriented to person, place, and time. She appears well-developed and well-nourished. No distress.   Obese.    HENT:   Head: Normocephalic and atraumatic.   Eyes: Pupils are equal, round, and reactive to light. Conjunctivae and EOM are normal.   Neck: Normal range of motion. Neck supple.   Cardiovascular: Normal rate, regular rhythm and normal heart sounds. Exam reveals no gallop and no friction rub.   No murmur heard.  Pulmonary/Chest: Effort normal and breath sounds normal. No respiratory distress. She has no wheezes. She has no rales.   Abdominal: Soft. Bowel sounds are normal. She exhibits no mass. There is no tenderness.   Musculoskeletal: Normal range of motion. She exhibits no edema, tenderness or deformity.        Right knee: She exhibits abnormal patellar mobility. She exhibits normal range of motion, no swelling, no effusion, no ecchymosis, no deformity, no laceration, no erythema, no LCL laxity, no bony tenderness, normal meniscus and no MCL laxity. No tenderness found. No medial joint line, no lateral joint line, no MCL, no LCL and no patellar tendon tenderness noted.   Lymphadenopathy:     She has no cervical adenopathy.   Neurological: She is alert and oriented to person, place, and time. She displays normal reflexes. No  "cranial nerve deficit or sensory deficit. She exhibits normal muscle tone. Coordination normal.   Skin: Skin is warm. Capillary refill takes less than 2 seconds. Laceration (pink, tender, trace discharge) noted. No rash noted. She is not diaphoretic.        Psychiatric: She has a normal mood and affect. Her behavior is normal. Judgment and thought content normal.   Nursing note and vitals reviewed.      Suture Removal  Date/Time: 8/14/2020 3:24 PM  Performed by: Mirlande rOdaz PA  Authorized by: Mirlande Ordaz PA   Consent: Verbal consent obtained. Written consent not obtained.  Risks and benefits: risks, benefits and alternatives were discussed  Consent given by: patient  Patient understanding: patient states understanding of the procedure being performed  Patient consent: the patient's understanding of the procedure matches consent given  Procedure consent: procedure consent matches procedure scheduled  Relevant documents: relevant documents present and verified  Required items: required blood products, implants, devices, and special equipment available  Patient identity confirmed: verbally with patient  Time out: Immediately prior to procedure a \"time out\" was called to verify the correct patient, procedure, equipment, support staff and site/side marked as required.  Body area: upper extremity  Location details: right thumb  Wound Appearance: pink and tender  Sutures Removed: 3  Staples Removed: 0  Post-removal: Steri-Strips applied  Patient tolerance: Patient tolerated the procedure well with no immediate complications  Comments: The wound was first cleansed with antibacterial soap and water and patted dry with clean gauze.  It was then cleaned with an alcohol prep pad allowed to dry for 3 minutes.  Then, 3 simple sutures in the dorsal surface of the right thumb were removed without difficulty.  The wound was well approximated with surrounding skin pink mild tenderness.  There was evidence of " "trace serous discharge which prompted initiation of Keflex 500 mg 3 times daily x7 days.  Steri-Strips were applied to help prevent the wound from reopening as it is very close to the knuckle.  She will wear a thumb splint for the next couple of days to prevent bending the knuckle.            /88   Pulse (!) 123   Temp 98.2 °F (36.8 °C)   Ht 160 cm (62.99\")   Wt 88.9 kg (196 lb)   SpO2 98%   BMI 34.73 kg/m²     Nursing note and vitals reviewed.          Assessment/Plan   Marilu was seen today for suture / staple removal and knee pain.    Diagnoses and all orders for this visit:    Visit for suture removal  Thumb laceration, right, subsequent encounter  -     cephalexin (Keflex) 500 MG capsule; Take 1 capsule by mouth 3 (Three) Times a Day for 7 days.        -     Suture Removal    Right anterior knee pain  -     XR Knee 3 View Right  She declined physical therapy referral at this time due to transportation limitations.    Palpitations  -     Ambulatory Referral to Cardiology    Inappropriate sinus tachycardia  -     Ambulatory Referral to Cardiology          ER record reviewed.      Call or return to clinic if symptoms worsen or persist.           STEVE Willingham  08/14/2020  3:13 PM  "

## 2020-08-20 RX ORDER — SUMATRIPTAN 50 MG/1
TABLET, FILM COATED ORAL
Qty: 9 TABLET | Refills: 2 | Status: SHIPPED | OUTPATIENT
Start: 2020-08-20 | End: 2020-11-16 | Stop reason: SDUPTHER

## 2020-08-26 ENCOUNTER — OFFICE VISIT (OUTPATIENT)
Dept: CARDIOLOGY | Facility: CLINIC | Age: 30
End: 2020-08-26

## 2020-08-26 VITALS
HEART RATE: 99 BPM | OXYGEN SATURATION: 99 % | RESPIRATION RATE: 18 BRPM | HEIGHT: 63 IN | DIASTOLIC BLOOD PRESSURE: 80 MMHG | WEIGHT: 190.2 LBS | BODY MASS INDEX: 33.7 KG/M2 | SYSTOLIC BLOOD PRESSURE: 126 MMHG

## 2020-08-26 DIAGNOSIS — I25.10 NONOCCLUSIVE CORONARY ATHEROSCLEROSIS OF NATIVE CORONARY ARTERY: ICD-10-CM

## 2020-08-26 DIAGNOSIS — Z86.711 HISTORY OF PULMONARY EMBOLUS (PE): ICD-10-CM

## 2020-08-26 DIAGNOSIS — R09.89 LABILE HYPERTENSION: ICD-10-CM

## 2020-08-26 DIAGNOSIS — R00.0 INAPPROPRIATE SINUS TACHYCARDIA: Primary | ICD-10-CM

## 2020-08-26 DIAGNOSIS — D68.59 PROTEIN S DEFICIENCY (HCC): ICD-10-CM

## 2020-08-26 DIAGNOSIS — R00.2 PALPITATIONS: ICD-10-CM

## 2020-08-26 DIAGNOSIS — I47.1 PSVT (PAROXYSMAL SUPRAVENTRICULAR TACHYCARDIA) (HCC): ICD-10-CM

## 2020-08-26 PROBLEM — G90.3 NEUROGENIC ORTHOSTATIC HYPOTENSION: Status: RESOLVED | Noted: 2017-05-16 | Resolved: 2020-08-26

## 2020-08-26 PROBLEM — I73.9 CLAUDICATION (HCC): Status: RESOLVED | Noted: 2017-06-28 | Resolved: 2020-08-26

## 2020-08-26 PROBLEM — M46.1 INFLAMMATION OF BOTH SACROILIAC JOINTS: Status: RESOLVED | Noted: 2019-04-08 | Resolved: 2020-08-26

## 2020-08-26 PROBLEM — I47.10 PSVT (PAROXYSMAL SUPRAVENTRICULAR TACHYCARDIA): Status: ACTIVE | Noted: 2020-08-26

## 2020-08-26 PROCEDURE — 99204 OFFICE O/P NEW MOD 45 MIN: CPT | Performed by: INTERNAL MEDICINE

## 2020-08-26 RX ORDER — ATORVASTATIN CALCIUM 40 MG/1
40 TABLET, FILM COATED ORAL DAILY
Qty: 30 TABLET | Refills: 11 | Status: SHIPPED | OUTPATIENT
Start: 2020-08-26 | End: 2021-03-17

## 2020-08-26 RX ORDER — ASPIRIN 81 MG/1
81 TABLET, CHEWABLE ORAL ONCE
Status: DISCONTINUED | OUTPATIENT
Start: 2020-08-26 | End: 2020-10-16

## 2020-08-26 NOTE — PROGRESS NOTES
Subjective:     Encounter Date:08/26/2020      Patient ID: Marilu Godoy is a 29 y.o. female.    Chief Complaint: Palpitations  HPI  This is a 29-year-old female patient who was evaluated previously for inappropriate sinus tachycardia as well as PSVT.  She was evaluated by a electrophysiologist in [facility] 2 years ago and was considered for radiofrequency ablation of the beta pathway but did not pursue this line of testing due to recently being diagnosed as pregnant with her last child.  She had previously been treated with antiarrhythmic drug therapy which did not seem to control her symptoms and this medication was discontinued once she was discovered to be pregnant.  She has been intolerant to beta-blocker therapy in the past.  She has a history of protein S deficiency with a hypercoagulable state and prior pulmonary embolus.  She is anticoagulated with Eliquis.  The patient indicates that her palpitations actually improved spontaneously towards the end of her last pregnancy and thereafter.  However, over the last few months the patient has experienced increasing palpitations with a sense that her heart is racing.  She has a home pulse oximeter which has recorded heart rates up to 150 bpm.  She has not worn an outpatient cardiac monitor in the last 2 years.  Her last outpatient cardiac monitor showed only sinus tachycardia up to 150 bpm but no episodes of sustained supraventricular tachycardia were noted.  The patient has had previous echocardiograms which showed a normal structural heart.  The patient has a longstanding history of atypical chest pain and has had multiple repeat spiral CT scans of the chest which has shown no evidence of recurrent pulmonary embolus.  In 2017 the patient underwent a coronary CT angiogram which demonstrated the presence of coronary calcification but no evidence of coronary stenosis.  She is a non-smoker.  She is not on aspirin or cholesterol-lowering therapy.   She has no history of diabetes.  She continues to be morbidly obese.  The following portions of the patient's history were reviewed and updated as appropriate: allergies, current medications, past family history, past medical history, past social history, past surgical history and problem  Review of Systems   Constitution: Negative for chills, diaphoresis, fever, malaise/fatigue, weight gain and weight loss.   HENT: Negative for ear discharge, hearing loss, hoarse voice and nosebleeds.    Eyes: Negative for discharge, double vision, pain and photophobia.   Cardiovascular: Positive for palpitations. Negative for chest pain, claudication, cyanosis, dyspnea on exertion, irregular heartbeat, leg swelling, near-syncope, orthopnea, paroxysmal nocturnal dyspnea and syncope.   Respiratory: Negative for cough, hemoptysis, shortness of breath, sputum production and wheezing.    Endocrine: Negative for cold intolerance, heat intolerance, polydipsia, polyphagia and polyuria.   Hematologic/Lymphatic: Negative for adenopathy and bleeding problem. Does not bruise/bleed easily.   Skin: Negative for color change, flushing, itching and rash.   Musculoskeletal: Negative for muscle cramps, muscle weakness, myalgias and stiffness.   Gastrointestinal: Negative for abdominal pain, diarrhea, hematemesis, hematochezia, nausea and vomiting.   Genitourinary: Negative for dysuria, frequency and nocturia.   Neurological: Negative for focal weakness, loss of balance, numbness, paresthesias and seizures.   Psychiatric/Behavioral: Negative for altered mental status, hallucinations and suicidal ideas.   Allergic/Immunologic: Negative for HIV exposure, hives and persistent infections.           Current Outpatient Medications:   •  albuterol sulfate  (90 Base) MCG/ACT inhaler, Inhale 2 puffs Every 4 (Four) Hours As Needed for Wheezing or Shortness of Air., Disp: 18 g, Rfl: 11  •  apixaban (ELIQUIS) 5 MG tablet tablet, Take 1 tablet by mouth 2  (Two) Times a Day., Disp: 60 tablet, Rfl: 5  •  atorvastatin (LIPITOR) 40 MG tablet, Take 1 tablet by mouth Daily., Disp: 30 tablet, Rfl: 11  •  DULoxetine (CYMBALTA) 60 MG capsule, Take 1 capsule by mouth Daily., Disp: 90 capsule, Rfl: 3  •  ondansetron ODT (ZOFRAN-ODT) 4 MG disintegrating tablet, Place 1 tablet on the tongue Every 6 (Six) Hours As Needed for Nausea or Vomiting., Disp: 10 tablet, Rfl: 0  •  SUMAtriptan (Imitrex) 50 MG tablet, Take one tablet at onset of headache. May repeat dose one time in 2 hours if headache not relieved., Disp: 9 tablet, Rfl: 2    Current Facility-Administered Medications:   •  aspirin chewable tablet 81 mg, 81 mg, Oral, Once, Breeding, Marc CASTREJON MD    Objective:   Physical Exam   Constitutional: She is oriented to person, place, and time. She appears well-developed and well-nourished.   HENT:   Head: Normocephalic and atraumatic.   Mouth/Throat: Oropharynx is clear and moist.   Eyes: Pupils are equal, round, and reactive to light. Conjunctivae and EOM are normal. No scleral icterus.   Neck: Normal range of motion. Neck supple. No JVD present. No tracheal deviation present. No thyromegaly present.   Cardiovascular: Normal rate, regular rhythm, S1 normal, S2 normal, normal heart sounds, intact distal pulses and normal pulses. PMI is not displaced. Exam reveals no gallop and no friction rub.   No murmur heard.  Pulses:       Carotid pulses are 2+ on the right side, and 2+ on the left side.       Radial pulses are 2+ on the right side, and 2+ on the left side.        Dorsalis pedis pulses are 2+ on the right side, and 2+ on the left side.        Posterior tibial pulses are 2+ on the right side, and 2+ on the left side.   Pulmonary/Chest: Effort normal and breath sounds normal. No respiratory distress. She has no wheezes. She has no rales.   Abdominal: Soft. Bowel sounds are normal. She exhibits no distension and no mass. There is no tenderness. There is no rebound and no guarding.  "  Musculoskeletal: Normal range of motion. She exhibits no edema or deformity.   Neurological: She is alert and oriented to person, place, and time. She displays normal reflexes. No cranial nerve deficit. Coordination normal.   Skin: Skin is warm and dry. No rash noted. No erythema.   Psychiatric: She has a normal mood and affect. Her behavior is normal. Thought content normal.     Blood pressure 126/80, pulse 99, resp. rate 18, height 160 cm (62.99\"), weight 86.3 kg (190 lb 3.2 oz), SpO2 99 %, not currently breastfeeding.   Lab Review:     Assessment:       1. Inappropriate sinus tachycardia  The patient indicates that her tachycardia improved spontaneously towards the end of her last pregnancy 2 years ago and shortly after delivery.  Over the last several months she has had worsening tachycardia with heart rates up to 150 bpm as recorded by a digital pulse oximeter.  - Mobile Cardiac Outpatient Telemetry    2. Nonocclusive coronary atherosclerosis of native coronary artery  The patient underwent coronary CT angiography in 2017 which showed evidence of coronary calcification with no evidence of coronary stenosis.  - aspirin chewable tablet 81 mg    3. Palpitations  The patient has had a recurrence of her palpitations which is worsened over the last several months.  She reports heart rates up to 150 bpm.  - Mobile Cardiac Outpatient Telemetry    4. Labile hypertension  Blood pressure control by today's measurement is acceptable.    5. PSVT (paroxysmal supraventricular tachycardia) (CMS/[facility])  The patient has previously been evaluated by electrophysiologist in [facility] but chose not to have beta pathway ablation.  All previous cardiac monitoring has shown only sinus tachycardia with no evidence of sustained supraventricular tachycardia.  She has not worn a cardiac monitor in the last 2 years.    6. History of pulmonary embolus (PE)  She is on lifelong anticoagulation therapy.  The patient has had " approximately 8 follow-up spiral CT angiograms of the chest which have shown no evidence of recurrent pulmonary embolus.    7. Protein S deficiency (CMS/HCC)  She is known to have a hypercoagulable state.    Procedures    Plan:     I have recommended another 30-day cardiac monitor.  No changes in her medication therapy have been made at today's visit.  Given the presence of coronary calcification in such a young patient I have recommended that in addition to Eliquis therapy that the patient take enteric-coated aspirin 81 mg once per day.  I have also recommended statin based cholesterol-lowering therapy to maintain her LDL cholesterol less than 70 mg/dL.  The patient has been counseled regarding the essential need for diet exercise and weight management.  While she is not yet known to be diabetic, given her obesity the eventual development of obesity related insulin resistance is an inevitability.  The patient has been advised to exercise daily with the goal of achieving a minimum of 150 minutes/week of moderate intensity activity which includes walking at a brisk but comfortable pace.  Further recommendations will be predicated on the results of her outpatient testing.

## 2020-09-18 ENCOUNTER — PATIENT MESSAGE (OUTPATIENT)
Dept: GASTROENTEROLOGY | Facility: CLINIC | Age: 30
End: 2020-09-18

## 2020-09-21 NOTE — TELEPHONE ENCOUNTER
From: Marilu Godoy  To: Oswaldo Bragg MD  Sent: 9/18/2020 3:36 PM EDT  Subject: Non-Urgent Medical Question    I have been having abdominal pain across my lower stomach for probably the past couple weeks. I've also been having tar like stool.

## 2020-09-22 ENCOUNTER — RESULTS ENCOUNTER (OUTPATIENT)
Dept: INTERNAL MEDICINE | Facility: CLINIC | Age: 30
End: 2020-09-22

## 2020-09-22 DIAGNOSIS — I47.1 PSVT (PAROXYSMAL SUPRAVENTRICULAR TACHYCARDIA) (HCC): ICD-10-CM

## 2020-09-22 DIAGNOSIS — Z13.220 SCREENING, LIPID: ICD-10-CM

## 2020-09-22 DIAGNOSIS — K92.1 HEMATOCHEZIA: ICD-10-CM

## 2020-09-22 DIAGNOSIS — I25.10 NONOCCLUSIVE CORONARY ATHEROSCLEROSIS OF NATIVE CORONARY ARTERY: ICD-10-CM

## 2020-09-22 DIAGNOSIS — R00.2 PALPITATIONS: ICD-10-CM

## 2020-09-22 DIAGNOSIS — R53.82 CHRONIC FATIGUE: ICD-10-CM

## 2020-09-25 LAB
25(OH)D3+25(OH)D2 SERPL-MCNC: 13.7 NG/ML (ref 30–100)
ALBUMIN SERPL-MCNC: 4.6 G/DL (ref 3.5–5.2)
ALBUMIN/GLOB SERPL: 2.1 G/DL
ALP SERPL-CCNC: 93 U/L (ref 39–117)
ALT SERPL-CCNC: 17 U/L (ref 1–33)
AST SERPL-CCNC: 11 U/L (ref 1–32)
BASOPHILS # BLD AUTO: 0.05 10*3/MM3 (ref 0–0.2)
BASOPHILS NFR BLD AUTO: 0.6 % (ref 0–1.5)
BILIRUB SERPL-MCNC: 0.7 MG/DL (ref 0–1.2)
BUN SERPL-MCNC: 9 MG/DL (ref 6–20)
BUN/CREAT SERPL: 11.5 (ref 7–25)
CALCIUM SERPL-MCNC: 9 MG/DL (ref 8.6–10.5)
CHLORIDE SERPL-SCNC: 103 MMOL/L (ref 98–107)
CHOLEST SERPL-MCNC: 222 MG/DL (ref 0–200)
CO2 SERPL-SCNC: 25 MMOL/L (ref 22–29)
CREAT SERPL-MCNC: 0.78 MG/DL (ref 0.57–1)
EOSINOPHIL # BLD AUTO: 0.09 10*3/MM3 (ref 0–0.4)
EOSINOPHIL NFR BLD AUTO: 1 % (ref 0.3–6.2)
ERYTHROCYTE [DISTWIDTH] IN BLOOD BY AUTOMATED COUNT: 13.2 % (ref 12.3–15.4)
FERRITIN SERPL-MCNC: 56.7 NG/ML (ref 13–150)
FOLATE SERPL-MCNC: 10.6 NG/ML (ref 4.78–24.2)
GLOBULIN SER CALC-MCNC: 2.2 GM/DL
GLUCOSE SERPL-MCNC: 95 MG/DL (ref 65–99)
HCT VFR BLD AUTO: 40.6 % (ref 34–46.6)
HDLC SERPL-MCNC: 42 MG/DL (ref 40–60)
HGB BLD-MCNC: 13.5 G/DL (ref 12–15.9)
IMM GRANULOCYTES # BLD AUTO: 0.02 10*3/MM3 (ref 0–0.05)
IMM GRANULOCYTES NFR BLD AUTO: 0.2 % (ref 0–0.5)
IRON SATN MFR SERPL: 31 % (ref 20–50)
IRON SERPL-MCNC: 136 MCG/DL (ref 37–145)
LDLC SERPL CALC-MCNC: 150 MG/DL (ref 0–100)
LYMPHOCYTES # BLD AUTO: 2.06 10*3/MM3 (ref 0.7–3.1)
LYMPHOCYTES NFR BLD AUTO: 23.8 % (ref 19.6–45.3)
MAGNESIUM SERPL-MCNC: 2.1 MG/DL (ref 1.6–2.6)
MCH RBC QN AUTO: 30.4 PG (ref 26.6–33)
MCHC RBC AUTO-ENTMCNC: 33.3 G/DL (ref 31.5–35.7)
MCV RBC AUTO: 91.4 FL (ref 79–97)
MONOCYTES # BLD AUTO: 0.59 10*3/MM3 (ref 0.1–0.9)
MONOCYTES NFR BLD AUTO: 6.8 % (ref 5–12)
NEUTROPHILS # BLD AUTO: 5.83 10*3/MM3 (ref 1.7–7)
NEUTROPHILS NFR BLD AUTO: 67.6 % (ref 42.7–76)
NRBC BLD AUTO-RTO: 0 /100 WBC (ref 0–0.2)
PLATELET # BLD AUTO: 313 10*3/MM3 (ref 140–450)
POTASSIUM SERPL-SCNC: 4 MMOL/L (ref 3.5–5.2)
PROT SERPL-MCNC: 6.8 G/DL (ref 6–8.5)
RBC # BLD AUTO: 4.44 10*6/MM3 (ref 3.77–5.28)
SODIUM SERPL-SCNC: 139 MMOL/L (ref 136–145)
T4 FREE SERPL-MCNC: 1.01 NG/DL (ref 0.93–1.7)
TIBC SERPL-MCNC: 444 MCG/DL
TRIGL SERPL-MCNC: 152 MG/DL (ref 0–150)
TSH SERPL DL<=0.005 MIU/L-ACNC: 1.21 UIU/ML (ref 0.27–4.2)
UIBC SERPL-MCNC: 308 MCG/DL (ref 112–346)
VIT B12 SERPL-MCNC: 360 PG/ML (ref 211–946)
VLDLC SERPL CALC-MCNC: 30.4 MG/DL
WBC # BLD AUTO: 8.64 10*3/MM3 (ref 3.4–10.8)

## 2020-09-29 DIAGNOSIS — E55.9 VITAMIN D DEFICIENCY: Primary | ICD-10-CM

## 2020-09-29 RX ORDER — CHOLECALCIFEROL (VITAMIN D3) 1250 MCG
50000 CAPSULE ORAL
Qty: 12 CAPSULE | Refills: 1 | Status: SHIPPED | OUTPATIENT
Start: 2020-09-29 | End: 2021-03-22

## 2020-10-07 ENCOUNTER — OFFICE VISIT (OUTPATIENT)
Dept: CARDIOLOGY | Facility: CLINIC | Age: 30
End: 2020-10-07

## 2020-10-07 VITALS
HEART RATE: 93 BPM | OXYGEN SATURATION: 96 % | WEIGHT: 196 LBS | SYSTOLIC BLOOD PRESSURE: 122 MMHG | HEIGHT: 62 IN | DIASTOLIC BLOOD PRESSURE: 82 MMHG | BODY MASS INDEX: 36.07 KG/M2

## 2020-10-07 DIAGNOSIS — D68.59 PROTEIN S DEFICIENCY (HCC): ICD-10-CM

## 2020-10-07 DIAGNOSIS — I25.10 NONOCCLUSIVE CORONARY ATHEROSCLEROSIS OF NATIVE CORONARY ARTERY: ICD-10-CM

## 2020-10-07 DIAGNOSIS — E78.5 DYSLIPIDEMIA: ICD-10-CM

## 2020-10-07 DIAGNOSIS — E66.01 MORBIDLY OBESE (HCC): ICD-10-CM

## 2020-10-07 DIAGNOSIS — R00.0 INAPPROPRIATE SINUS TACHYCARDIA: Primary | ICD-10-CM

## 2020-10-07 DIAGNOSIS — R00.2 PALPITATIONS: ICD-10-CM

## 2020-10-07 PROCEDURE — 99213 OFFICE O/P EST LOW 20 MIN: CPT | Performed by: INTERNAL MEDICINE

## 2020-10-07 NOTE — PROGRESS NOTES
"    Subjective:     Encounter Date:10/07/2020      Patient ID: Marilu Godoy is a 29 y.o. female.    Chief Complaint: Tachycardia  HPI  This is a 29-year-old female patient who has a history of longstanding sinus tachycardia with a diagnosis of \"inappropriate sinus tachycardia\".  The patient has had numerous outpatient cardiac monitors none of which have shown anything other than sinus tachycardia.  The patient indicates that she will commonly have resting heart rates in excess of 150 bpm as gauged by finger pulse oximeter.  This is usually associated with palpitations, occasionally dizziness, occasionally dyspnea and occasionally chest discomfort.  The patient has also had prior echocardiogram which shows a structurally normal heart.  The patient was previously evaluated by Dr. Vazquez and medical therapy was tried without success.  The following portions of the patient's history were reviewed and updated as appropriate: allergies, current medications, past family history, past medical history, past social history, past surgical history and problem  Review of Systems   Constitution: Negative for chills, diaphoresis, fever, malaise/fatigue, weight gain and weight loss.   HENT: Negative for ear discharge, hearing loss, hoarse voice and nosebleeds.    Eyes: Negative for discharge, double vision, pain and photophobia.   Cardiovascular: Positive for chest pain and palpitations. Negative for claudication, cyanosis, leg swelling, near-syncope, orthopnea and paroxysmal nocturnal dyspnea.   Respiratory: Positive for shortness of breath. Negative for cough, hemoptysis, sputum production and wheezing.    Endocrine: Negative for cold intolerance, heat intolerance, polydipsia, polyphagia and polyuria.   Hematologic/Lymphatic: Negative for adenopathy and bleeding problem. Does not bruise/bleed easily.   Skin: Negative for color change, flushing, itching and rash.   Musculoskeletal: Negative for muscle cramps, muscle " weakness, myalgias and stiffness.   Gastrointestinal: Negative for abdominal pain, diarrhea, hematemesis, hematochezia, nausea and vomiting.   Genitourinary: Negative for dysuria, frequency and nocturia.   Neurological: Positive for dizziness. Negative for focal weakness, loss of balance, numbness, paresthesias and seizures.   Psychiatric/Behavioral: Negative for altered mental status, hallucinations and suicidal ideas.   Allergic/Immunologic: Negative for HIV exposure, hives and persistent infections.           Current Outpatient Medications:   •  albuterol sulfate  (90 Base) MCG/ACT inhaler, Inhale 2 puffs Every 4 (Four) Hours As Needed for Wheezing or Shortness of Air., Disp: 18 g, Rfl: 11  •  apixaban (ELIQUIS) 5 MG tablet tablet, Take 1 tablet by mouth 2 (Two) Times a Day., Disp: 60 tablet, Rfl: 5  •  atorvastatin (LIPITOR) 40 MG tablet, Take 1 tablet by mouth Daily., Disp: 30 tablet, Rfl: 11  •  Cholecalciferol (Vitamin D3) 1.25 MG (81925 UT) capsule, Take 1 capsule by mouth Every 7 (Seven) Days., Disp: 12 capsule, Rfl: 1  •  DULoxetine (CYMBALTA) 60 MG capsule, Take 1 capsule by mouth Daily., Disp: 90 capsule, Rfl: 3  •  ondansetron ODT (ZOFRAN-ODT) 4 MG disintegrating tablet, Place 1 tablet on the tongue Every 6 (Six) Hours As Needed for Nausea or Vomiting., Disp: 10 tablet, Rfl: 0  •  SUMAtriptan (Imitrex) 50 MG tablet, Take one tablet at onset of headache. May repeat dose one time in 2 hours if headache not relieved., Disp: 9 tablet, Rfl: 2    Current Facility-Administered Medications:   •  aspirin chewable tablet 81 mg, 81 mg, Oral, Once, Breeding, Marc CASTREJON MD    Objective:   Vitals signs and nursing note reviewed.   Constitutional:       Appearance: Healthy appearance. Not in distress.   Neck:      Vascular: No JVR. JVD normal.   Pulmonary:      Effort: Pulmonary effort is normal.      Breath sounds: Normal breath sounds. No wheezing. No rhonchi. No rales.   Chest:      Chest wall: Not tender to  "palpatation.   Cardiovascular:      PMI at left midclavicular line. Normal rate. Regular rhythm. Normal S1. Normal S2.      Murmurs: There is no murmur.      No gallop. No click. No rub.   Pulses:     Intact distal pulses.   Edema:     Peripheral edema absent.   Abdominal:      General: Bowel sounds are normal.      Palpations: Abdomen is soft.      Tenderness: There is no abdominal tenderness.   Musculoskeletal: Normal range of motion.         General: No tenderness.   Skin:     General: Skin is warm and dry.   Neurological:      General: No focal deficit present.      Mental Status: Alert and oriented to person, place and time.       Blood pressure 122/82, pulse 93, height 157.5 cm (62\"), weight 88.9 kg (196 lb), SpO2 96 %, not currently breastfeeding.   Lab Review:     Assessment:       1. Inappropriate sinus tachycardia  The patient continues to have inappropriate sinus tachycardia.  She was unable to tolerate beta-blocker therapy without limiting side effects.  She has previously seen electrophysiology in [facility].    2. Nonocclusive coronary atherosclerosis of native coronary artery  No evidence of ischemic heart disease.    3. Palpitations  The patient continues to have a variety of symptoms including palpitations shortness of breath and dizziness related to her elevated heart rates.  The patient wore another outpatient cardiac monitor showing only sinus tachycardia.  There was no supraventricular tachycardia or ventricular tachycardia.  Atrial and ventricular ectopy burden's were less than 1%.    4. Protein S deficiency (CMS/HCC)  The patient is on lifelong anticoagulation with a direct oral anticoagulant for hypercoagulable state.    5. Dyslipidemia  The patient indicates that her last lipid profile indicated that her cholesterol numbers were \"slightly\".    Procedures    Plan:     I have deferred management of her cholesterol abnormalities to her primary care provider.  I would recommend up " titrating her statin based cholesterol-lowering therapy in order to achieve an LDL cholesterol of less than 70 mg/dL.  If necessary, Zetia 10mg PO daily can be added.  At this point however, emphasis on the importance of lifestyle modification would be appropriate as the patient remains substantially overweight.  Diet exercise and weight management is essential to effective function of cholesterol-lowering therapy.  No additional cardiovascular testing is indicated.    I have recommended that the patient be reevaluated by the electrophysiologist in [facility].  The patient has been encouraged to get a second opinion.

## 2020-10-16 ENCOUNTER — OFFICE VISIT (OUTPATIENT)
Dept: CARDIOLOGY | Facility: CLINIC | Age: 30
End: 2020-10-16

## 2020-10-16 VITALS
TEMPERATURE: 97.5 F | SYSTOLIC BLOOD PRESSURE: 118 MMHG | OXYGEN SATURATION: 98 % | DIASTOLIC BLOOD PRESSURE: 80 MMHG | HEIGHT: 62 IN | HEART RATE: 100 BPM | WEIGHT: 194 LBS | BODY MASS INDEX: 35.7 KG/M2

## 2020-10-16 DIAGNOSIS — E89.0 POST-SURGICAL HYPOTHYROIDISM: Primary | ICD-10-CM

## 2020-10-16 PROCEDURE — 93000 ELECTROCARDIOGRAM COMPLETE: CPT | Performed by: PHYSICIAN ASSISTANT

## 2020-10-16 PROCEDURE — 99213 OFFICE O/P EST LOW 20 MIN: CPT | Performed by: PHYSICIAN ASSISTANT

## 2020-10-16 RX ORDER — ASPIRIN 81 MG/1
81 TABLET ORAL DAILY
COMMUNITY

## 2020-10-16 NOTE — PROGRESS NOTES
[facility] at [facility]   OFFICE NOTE      Marilu Godoy  1990  PCP: Deidre Barker MD    SUBJECTIVE:   Marilu Godoy is a 29 y.o. female seen for a follow up visit regarding the following:     CC:IST    HPI:   Pleasant 29-year-old female returns today for evaluation regarding inappropriate sinus tachycardia, palpitations.  On last visit with electrophysiology she was initiated on ivabradine therapy.  However she states she is not able to take this very long period time because she ended up becoming pregnant had to stop the medication.  She has since had her child who is now almost 2 years old.  She continues to have difficulty with elevated heart rate she feels her heart rate increasing when she tries to do activities.  This is bothersome to her.  She had a recent visit Dr. Byrnes and another Event monitor 8/2020. The monitor revealed average HR 87 and Max  bpm. Dr. Byrnes has  recommended consideration for another evaluation with a EP regarding possibility of other types of medication for her inappropriate sinus tachycardia versus considering EP study and sinus node modification.    Cardiac PMH: (Old records have been reviewed and summarized below)  1. Inappropriate sinus tachycardia/palpitations  a. Unable to tolerate BBL or CCB secondary to hypotension, intolerant to Northera secondary to hypertension, intolerant to Florinef  b. Echocardiogram 12/15/2016, Dr. byrnes: EF 61%  c. Holter monitor 12/13/2016: Rare PAC/PVC, no evidence of atrial arrhythmias, no episodes of SVT/VT, sinoatrial node conduction was normal, no AV block noted  d. Echocardiogram 5/7/2018: EF 65%, no significant valvular disease  e. Treadmill stress test 5/7/2018: Normal treadmill exercise stress test, baseline heart rate 102 bpm, peak heart rate 180 bpm  f. 48-hour Holter monitor 4/19/2018: Predominantly normal sinus rhythm, average heart rate 88 bpm, ( bpm) rare  PAC/PVC  g. Echocardiogram 2019: EF 68%, no significant valvular disease  h. 30-day event monitor 2019: Predominantly normal sinus rhythm, episodes of sinus tachycardia  2. Orthostatic hypotension: Intolerance to medications including midodrine, Florinef, and Northera  3. Heterozygous factor V Leiden mutation  a. Also protein S deficiency  b. Right sided PE, 2016, no evidence of DVT  c. On chronic Eliquis therapy  d. Chest pain, dyspnea, 2018: Presented to local ED, CT chest negative for recurrent PE  4. Obesity  5. Surgical history       a.  section  b. Tubal ligation  c. Cholecystectomy  d. Diagnostic laparoscopy and ablation of endometriosis    Past Medical History, Past Surgical History, Family history, Social History, and Medications were all reviewed with the patient today and updated as necessary.       Current Outpatient Medications:   •  albuterol sulfate  (90 Base) MCG/ACT inhaler, Inhale 2 puffs Every 4 (Four) Hours As Needed for Wheezing or Shortness of Air., Disp: 18 g, Rfl: 11  •  apixaban (ELIQUIS) 5 MG tablet tablet, Take 1 tablet by mouth 2 (Two) Times a Day., Disp: 60 tablet, Rfl: 5  •  aspirin 81 MG EC tablet, Take 81 mg by mouth Daily., Disp: , Rfl:   •  atorvastatin (LIPITOR) 40 MG tablet, Take 1 tablet by mouth Daily., Disp: 30 tablet, Rfl: 11  •  Cholecalciferol (Vitamin D3) 1.25 MG (84211 UT) capsule, Take 1 capsule by mouth Every 7 (Seven) Days., Disp: 12 capsule, Rfl: 1  •  DULoxetine (CYMBALTA) 60 MG capsule, Take 1 capsule by mouth Daily., Disp: 90 capsule, Rfl: 3  •  ondansetron ODT (ZOFRAN-ODT) 4 MG disintegrating tablet, Place 1 tablet on the tongue Every 6 (Six) Hours As Needed for Nausea or Vomiting., Disp: 10 tablet, Rfl: 0  •  SUMAtriptan (Imitrex) 50 MG tablet, Take one tablet at onset of headache. May repeat dose one time in 2 hours if headache not relieved., Disp: 9 tablet, Rfl: 2  •  ivabradine HCl (Corlanor) 5 MG tablet tablet, Take 1 tablet  by mouth 2 (Two) Times a Day With Meals. Start one half tab BID for two weeks then increase to one tab BID., Disp: 60 tablet, Rfl: 6  No current facility-administered medications for this visit.       Allergies   Allergen Reactions   • Ceftin [Cefuroxime Axetil] Other (See Comments)     Numbness in mouth and throat   • Amoxicillin Rash     Patient Active Problem List   Diagnosis   • Abnormal CT scan, colon   • Elevated liver function tests   • Right upper quadrant pain   • History of pulmonary embolus (PE)   • Obesity (BMI 30-39.9)   • Heterozygous factor V Leiden mutation (CMS/HCC)   • Protein S deficiency (CMS/HCC)   • Labile hypertension   • Excessive daytime sleepiness   • Obstructive sleep apnea   • Orthopnea   • Pleuritic pain   • Mild intermittent asthma   • Hypotension   • Bilateral edema of lower extremity   • Chronic pain of both knees   • Abnormal SPEP   • Subclinical hyperthyroidism   • Cyst of right ovary   • Chronic midline thoracic back pain   • Precordial pain   • Palpitations   • Anti-RNP antibodies present   • Decreased pedal pulses   • Elevated IgE level   • Nonocclusive coronary atherosclerosis of native coronary artery   • Pelvic pain   • Inappropriate sinus tachycardia   • Chronic anticoagulation   • History of  section complicating pregnancy   • Chronic pain disorder   • Chronic low back pain   • Lumbar spondylosis   • PSVT (paroxysmal supraventricular tachycardia) (CMS/HCC)   • Dyslipidemia   • Morbidly obese (CMS/HCC)     Past Medical History:   Diagnosis Date   • Abdominal pain    • Abnormal Pap smear of cervix    • Anxiety    • Asthma    • Bleeding disorder (CMS/HCC) 16   • Body piercing     TONGUE, NOSE, TWO IN EACH EAR   • Chest pain    • Clotting disorder (CMS/[facility])     factor 5   • Constipation    • Coronary artery disease involving native coronary artery of native heart without angina pectoris 2017   • Deep vein thrombosis (CMS/[facility]) 16   • Diarrhea    •  Diarrhea    • Endometriosis    • Factor 5 Leiden mutation, heterozygous (CMS/HCC)    • Fibroid    • Gallstone    • GERD (gastroesophageal reflux disease)    • H/O blood clots    • Headache    • Hiatal hernia    • High cholesterol    • History of recurrent UTIs    • HPV (human papilloma virus) infection    • Hypertension    • Kidney infection    • Migraine    • Nausea    • Nausea & vomiting    • Obesity    • Orthostatic hypotension    • Osteoarthritis    • Ovarian cyst    • Pollen allergies    • Protein S deficiency (CMS/HCC) 2016    labs from hospitalization for PE   • Pulmonary embolism (CMS/HCC)    • Recurrent pregnancy loss, antepartum condition or complication    • Sleep apnea     CPAP ASKED TO BRING DOS   • Subclinical hyperthyroidism    • Tachycardia    • Tattoo     LOWER BACK   • Varicella      Past Surgical History:   Procedure Laterality Date   •  SECTION      X2  and    •  SECTION WITH TUBAL N/A 1/15/2019    Procedure:  SECTION REPEAT WITH TUBAL;  Surgeon: Alva Boyer MD;  Location: [facility] LABOR DELIVERY;  Service: Obstetrics/Gynecology   • CHOLECYSTECTOMY     • COLONOSCOPY N/A 2017    Procedure: COLONOSCOPY WITH BIOPSIES AND ARGON THERMAL ABLATION;  Surgeon: Jignesh Selby MD;  Location: [facility] ENDOSCOPY;  Service:    • DIAGNOSTIC LAPAROSCOPY N/A 2018    Procedure: DIAGNOSTIC LAPAROSCOPY AND ABLATION OF ENDOMETRIOSIS;  Surgeon: Evin Zamudio MD;  Location: [facility] OR;  Service: Obstetrics/Gynecology   • ENDOMETRIAL ABLATION     • ENDOSCOPY N/A 2017    Procedure: ESOPHAGOGASTRODUODENOSCOPY WITH BIOPSIES AND COLD BIOPSY POLYPECTOMIES;  Surgeon: Jignesh Selby MD;  Location: [facility] ENDOSCOPY;  Service:    • PELVIC LAPAROSCOPY       Family History   Problem Relation Age of Onset   • Arthritis Mother    • COPD Mother    • Asthma Mother    • Thyroid disease Mother    • Arthritis Father    • Diabetes Father    • Hypertension Father    •  "Hyperlipidemia Father    • Kidney disease Father    • Heart attack Father    • Coronary artery disease Father    • Dementia Father    • No Known Problems Son    • Colon cancer Neg Hx    • Liver cancer Neg Hx    • Liver disease Neg Hx    • Stomach cancer Neg Hx    • Esophageal cancer Neg Hx      Social History     Tobacco Use   • Smoking status: Never Smoker   • Smokeless tobacco: Never Used   Substance Use Topics   • Alcohol use: No       ROS:  Review of Symptoms:  General: no recent weight loss/gain, weakness or fatigue  Skin: no rashes, lumps, or other skin changes  HEENT: no dizziness, lightheadedness, or vision changes  Respiratory: no cough or hemoptysis  Cardiovascular: + palpitations, and tachycardia  Gastrointestinal: no black/tarry stools or diarrhea  Urinary: no change in frequency or urgency  Peripheral Vascular: no claudication or leg cramps  Musculoskeletal: no muscle or joint pain/stiffness  Psychiatric: no depression or excessive stress  Neurological: no sensory or motor loss, no syncope  Hematologic: no anemia, easy bruising or bleeding  Endocrine: no thyroid problems, nor heat or cold intolerance    PHYSICAL EXAM:    /80 (BP Location: Left arm, Patient Position: Sitting, Cuff Size: Adult)   Pulse 100   Temp 97.5 °F (36.4 °C)   Ht 157.5 cm (62\")   Wt 88 kg (194 lb)   SpO2 98%   BMI 35.48 kg/m²        Wt Readings from Last 5 Encounters:   10/16/20 88 kg (194 lb)   10/07/20 88.9 kg (196 lb)   08/26/20 86.3 kg (190 lb 3.2 oz)   08/14/20 88.9 kg (196 lb)   08/11/20 88.9 kg (196 lb)       BP Readings from Last 5 Encounters:   10/16/20 118/80   10/07/20 122/82   08/26/20 126/80   08/14/20 132/88   08/11/20 140/98       General appearance - Alert, well appearing, and in no distress   Mental status - Affect appropriate to mood.  Eyes - Sclerae anicteric,  ENMT - Hearing grossly normal bilaterally, Dental hygiene good.  Neck - Carotids upstroke normal bilaterally, no bruits, no JVD.  Resp - Clear " to auscultation, no wheezes, rales or rhonchi, symmetric air entry.  Heart - Normal rate, regular rhythm, normal S1, S2, no murmurs, rubs, clicks or gallops.  GI - Soft, nontender, nondistended, no masses or organomegaly.  Neurological - Grossly intact - normal speech, no focal findings  Musculoskeletal - No joint tenderness, deformity or swelling, no muscular tenderness noted.  Extremities - Peripheral pulses normal, no pedal edema, no clubbing or cyanosis.  Skin - Normal coloration and turgor.  Psych -  oriented to person, place, and time.    Medical problems and test results were reviewed with the patient today.     Recent Results (from the past 672 hour(s))   Lipid Panel    Collection Time: 09/25/20  8:33 AM    Specimen: Blood   Result Value Ref Range    Total Cholesterol 222 (H) 0 - 200 mg/dL    Triglycerides 152 (H) 0 - 150 mg/dL    HDL Cholesterol 42 40 - 60 mg/dL    VLDL Cholesterol Abhinav 30.4 mg/dL    LDL Chol Calc (NIH) 150 (H) 0 - 100 mg/dL   TSH+Free T4    Collection Time: 09/25/20  8:33 AM    Specimen: Blood   Result Value Ref Range    TSH 1.210 0.270 - 4.200 uIU/mL    Free T4 1.01 0.93 - 1.70 ng/dL   Vitamin D 25 Hydroxy    Collection Time: 09/25/20  8:33 AM    Specimen: Blood   Result Value Ref Range    25 Hydroxy, Vitamin D 13.7 (L) 30.0 - 100.0 ng/ml   Vitamin B12    Collection Time: 09/25/20  8:33 AM    Specimen: Blood   Result Value Ref Range    Vitamin B-12 360 211 - 946 pg/mL   Folate    Collection Time: 09/25/20  8:33 AM    Specimen: Blood   Result Value Ref Range    Folate 10.60 4.78 - 24.20 ng/mL   CBC & Differential    Collection Time: 09/25/20  8:33 AM    Specimen: Blood   Result Value Ref Range    WBC 8.64 3.40 - 10.80 10*3/mm3    RBC 4.44 3.77 - 5.28 10*6/mm3    Hemoglobin 13.5 12.0 - 15.9 g/dL    Hematocrit 40.6 34.0 - 46.6 %    MCV 91.4 79.0 - 97.0 fL    MCH 30.4 26.6 - 33.0 pg    MCHC 33.3 31.5 - 35.7 g/dL    RDW 13.2 12.3 - 15.4 %    Platelets 313 140 - 450 10*3/mm3    Neutrophil Rel %  67.6 42.7 - 76.0 %    Lymphocyte Rel % 23.8 19.6 - 45.3 %    Monocyte Rel % 6.8 5.0 - 12.0 %    Eosinophil Rel % 1.0 0.3 - 6.2 %    Basophil Rel % 0.6 0.0 - 1.5 %    Neutrophils Absolute 5.83 1.70 - 7.00 10*3/mm3    Lymphocytes Absolute 2.06 0.70 - 3.10 10*3/mm3    Monocytes Absolute 0.59 0.10 - 0.90 10*3/mm3    Eosinophils Absolute 0.09 0.00 - 0.40 10*3/mm3    Basophils Absolute 0.05 0.00 - 0.20 10*3/mm3    Immature Granulocyte Rel % 0.2 0.0 - 0.5 %    Immature Grans Absolute 0.02 0.00 - 0.05 10*3/mm3    nRBC 0.0 0.0 - 0.2 /100 WBC   Iron Profile    Collection Time: 09/25/20  8:33 AM    Specimen: Blood   Result Value Ref Range    TIBC 444 mcg/dL    UIBC 308 112 - 346 mcg/dL    Iron 136 37 - 145 mcg/dL    Iron Saturation 31 20 - 50 %   Ferritin    Collection Time: 09/25/20  8:33 AM    Specimen: Blood   Result Value Ref Range    Ferritin 56.70 13.00 - 150.00 ng/mL   Comprehensive Metabolic Panel    Collection Time: 09/25/20  8:33 AM    Specimen: Blood   Result Value Ref Range    Glucose 95 65 - 99 mg/dL    BUN 9 6 - 20 mg/dL    Creatinine 0.78 0.57 - 1.00 mg/dL    eGFR Non African Am 87 >60 mL/min/1.73    eGFR African Am 106 >60 mL/min/1.73    BUN/Creatinine Ratio 11.5 7.0 - 25.0    Sodium 139 136 - 145 mmol/L    Potassium 4.0 3.5 - 5.2 mmol/L    Chloride 103 98 - 107 mmol/L    Total CO2 25.0 22.0 - 29.0 mmol/L    Calcium 9.0 8.6 - 10.5 mg/dL    Total Protein 6.8 6.0 - 8.5 g/dL    Albumin 4.60 3.50 - 5.20 g/dL    Globulin 2.2 gm/dL    A/G Ratio 2.1 g/dL    Total Bilirubin 0.7 0.0 - 1.2 mg/dL    Alkaline Phosphatase 93 39 - 117 U/L    AST (SGOT) 11 1 - 32 U/L    ALT (SGPT) 17 1 - 33 U/L   Magnesium    Collection Time: 09/25/20  8:33 AM    Specimen: Blood   Result Value Ref Range    Magnesium 2.1 1.6 - 2.6 mg/dL         EKG: (EKG has been independently visualized by me and summarized below)    ECG 12 Lead    Date/Time: 10/16/2020 1:09 PM  Performed by: Evin Sheehan PA  Authorized by: Evin Sheehan PA    Comparison: compared with previous ECG from 5/6/2020  Similar to previous ECG  Rhythm: sinus tachycardia  Rate: tachycardic  Conduction: conduction normal  ST Segments: ST segments normal  T Waves: T waves normal  QRS axis: normal  Other: no other findings    Clinical impression: normal ECG                ASSESSMENT   1. IAST: Corlanor 2.5mg BID for two weeks then increase to 5mg BID  2. Orthostatic hypotension,Syncope: Intolerance to Midodrine, florinef and Northera.   3. Protein S deficiency: Anticoagulation, Eliquis 5mg BID    PLAN  · Long discussion with patient regarding treatment options for inappropriate sinus tachycardia marginal blood pressure.  We have recommended conservative measures such as increasing hydration fluids, Gatorade and increasing physical exercise as well as focusing on core strengthening and lower extremity exercising  · She would like to get and try Corlanor therapy disease improves upon her elevated heart rate which she will initiate 2.5 mg twice daily for 2 weeks and increase to 5 days twice daily as she tolerates with her blood pressure.  · Return in 2 months to see Dr. Washington further EP evaluation regarding IAST, tachycardia in 2 months or sooner as needed.        10/16/2020  13:05 EDT    Will Aguila CORTES

## 2020-10-19 ENCOUNTER — TELEPHONE (OUTPATIENT)
Dept: CARDIOLOGY | Facility: CLINIC | Age: 30
End: 2020-10-19

## 2020-10-19 NOTE — TELEPHONE ENCOUNTER
Called pt to let her know that her PA was approved for her Eliquis 5mg 1 BID. Good through 10/19/202-4/19/2020. No answer I left a voicemail.

## 2020-10-23 ENCOUNTER — TELEPHONE (OUTPATIENT)
Dept: CARDIOLOGY | Facility: CLINIC | Age: 30
End: 2020-10-23

## 2020-10-25 ENCOUNTER — APPOINTMENT (OUTPATIENT)
Dept: CT IMAGING | Facility: HOSPITAL | Age: 30
End: 2020-10-25

## 2020-10-25 ENCOUNTER — HOSPITAL ENCOUNTER (EMERGENCY)
Facility: HOSPITAL | Age: 30
Discharge: HOME | End: 2020-10-25
Attending: EMERGENCY MEDICINE | Admitting: EMERGENCY MEDICINE

## 2020-10-25 VITALS
RESPIRATION RATE: 16 BRPM | WEIGHT: 194 LBS | SYSTOLIC BLOOD PRESSURE: 121 MMHG | HEIGHT: 63 IN | TEMPERATURE: 98.9 F | DIASTOLIC BLOOD PRESSURE: 80 MMHG | HEART RATE: 85 BPM | OXYGEN SATURATION: 98 % | BODY MASS INDEX: 34.38 KG/M2

## 2020-10-25 DIAGNOSIS — R07.9 CHEST PAIN, UNSPECIFIED TYPE: Primary | ICD-10-CM

## 2020-10-25 DIAGNOSIS — R06.02 SHORTNESS OF BREATH: ICD-10-CM

## 2020-10-25 LAB
ALBUMIN SERPL-MCNC: 4.7 G/DL (ref 3.5–5.2)
ALBUMIN/GLOB SERPL: 1.7 G/DL
ALP SERPL-CCNC: 91 U/L (ref 39–117)
ALT SERPL W P-5'-P-CCNC: 13 U/L (ref 1–33)
ANION GAP SERPL CALCULATED.3IONS-SCNC: 8.1 MMOL/L (ref 5–15)
AST SERPL-CCNC: 11 U/L (ref 1–32)
BASOPHILS # BLD AUTO: 0.05 10*3/MM3 (ref 0–0.2)
BASOPHILS NFR BLD AUTO: 0.6 % (ref 0–1.5)
BILIRUB SERPL-MCNC: 0.5 MG/DL (ref 0–1.2)
BUN SERPL-MCNC: 10 MG/DL (ref 6–20)
BUN/CREAT SERPL: 14.9 (ref 7–25)
CALCIUM SPEC-SCNC: 9.7 MG/DL (ref 8.6–10.5)
CHLORIDE SERPL-SCNC: 104 MMOL/L (ref 98–107)
CO2 SERPL-SCNC: 25.9 MMOL/L (ref 22–29)
CREAT SERPL-MCNC: 0.67 MG/DL (ref 0.57–1)
DEPRECATED RDW RBC AUTO: 40.1 FL (ref 37–54)
EOSINOPHIL # BLD AUTO: 0.11 10*3/MM3 (ref 0–0.4)
EOSINOPHIL NFR BLD AUTO: 1.4 % (ref 0.3–6.2)
ERYTHROCYTE [DISTWIDTH] IN BLOOD BY AUTOMATED COUNT: 12.1 % (ref 12.3–15.4)
GFR SERPL CREATININE-BSD FRML MDRD: 104 ML/MIN/1.73
GLOBULIN UR ELPH-MCNC: 2.8 GM/DL
GLUCOSE SERPL-MCNC: 100 MG/DL (ref 65–99)
HCT VFR BLD AUTO: 42.4 % (ref 34–46.6)
HGB BLD-MCNC: 14.4 G/DL (ref 12–15.9)
HOLD SPECIMEN: NORMAL
HOLD SPECIMEN: NORMAL
IMM GRANULOCYTES # BLD AUTO: 0.02 10*3/MM3 (ref 0–0.05)
IMM GRANULOCYTES NFR BLD AUTO: 0.3 % (ref 0–0.5)
LIPASE SERPL-CCNC: 31 U/L (ref 13–60)
LYMPHOCYTES # BLD AUTO: 1.8 10*3/MM3 (ref 0.7–3.1)
LYMPHOCYTES NFR BLD AUTO: 22.6 % (ref 19.6–45.3)
MCH RBC QN AUTO: 30.8 PG (ref 26.6–33)
MCHC RBC AUTO-ENTMCNC: 34 G/DL (ref 31.5–35.7)
MCV RBC AUTO: 90.6 FL (ref 79–97)
MONOCYTES # BLD AUTO: 0.37 10*3/MM3 (ref 0.1–0.9)
MONOCYTES NFR BLD AUTO: 4.6 % (ref 5–12)
NEUTROPHILS NFR BLD AUTO: 5.62 10*3/MM3 (ref 1.7–7)
NEUTROPHILS NFR BLD AUTO: 70.5 % (ref 42.7–76)
NRBC BLD AUTO-RTO: 0 /100 WBC (ref 0–0.2)
NT-PROBNP SERPL-MCNC: 31.9 PG/ML (ref 0–450)
PLATELET # BLD AUTO: 329 10*3/MM3 (ref 140–450)
PMV BLD AUTO: 10.1 FL (ref 6–12)
POTASSIUM SERPL-SCNC: 4 MMOL/L (ref 3.5–5.2)
PROT SERPL-MCNC: 7.5 G/DL (ref 6–8.5)
RBC # BLD AUTO: 4.68 10*6/MM3 (ref 3.77–5.28)
SARS-COV-2 RNA PNL SPEC NAA+PROBE: NOT DETECTED
SODIUM SERPL-SCNC: 138 MMOL/L (ref 136–145)
TROPONIN T SERPL-MCNC: <0.01 NG/ML (ref 0–0.03)
WBC # BLD AUTO: 7.97 10*3/MM3 (ref 3.4–10.8)
WHOLE BLOOD HOLD SPECIMEN: NORMAL
WHOLE BLOOD HOLD SPECIMEN: NORMAL

## 2020-10-25 PROCEDURE — 84484 ASSAY OF TROPONIN QUANT: CPT | Performed by: EMERGENCY MEDICINE

## 2020-10-25 PROCEDURE — 96374 THER/PROPH/DIAG INJ IV PUSH: CPT

## 2020-10-25 PROCEDURE — 25010000002 IOPAMIDOL 61 % SOLUTION: Performed by: EMERGENCY MEDICINE

## 2020-10-25 PROCEDURE — 96375 TX/PRO/DX INJ NEW DRUG ADDON: CPT

## 2020-10-25 PROCEDURE — 99283 EMERGENCY DEPT VISIT LOW MDM: CPT

## 2020-10-25 PROCEDURE — 93005 ELECTROCARDIOGRAM TRACING: CPT

## 2020-10-25 PROCEDURE — 85025 COMPLETE CBC W/AUTO DIFF WBC: CPT | Performed by: EMERGENCY MEDICINE

## 2020-10-25 PROCEDURE — 87635 SARS-COV-2 COVID-19 AMP PRB: CPT | Performed by: EMERGENCY MEDICINE

## 2020-10-25 PROCEDURE — 83690 ASSAY OF LIPASE: CPT | Performed by: EMERGENCY MEDICINE

## 2020-10-25 PROCEDURE — 71275 CT ANGIOGRAPHY CHEST: CPT

## 2020-10-25 PROCEDURE — 80053 COMPREHEN METABOLIC PANEL: CPT | Performed by: EMERGENCY MEDICINE

## 2020-10-25 PROCEDURE — 25010000002 ONDANSETRON PER 1 MG: Performed by: EMERGENCY MEDICINE

## 2020-10-25 PROCEDURE — 83880 ASSAY OF NATRIURETIC PEPTIDE: CPT | Performed by: EMERGENCY MEDICINE

## 2020-10-25 PROCEDURE — 25010000002 MORPHINE PER 10 MG: Performed by: EMERGENCY MEDICINE

## 2020-10-25 RX ORDER — ONDANSETRON 2 MG/ML
4 INJECTION INTRAMUSCULAR; INTRAVENOUS ONCE
Status: COMPLETED | OUTPATIENT
Start: 2020-10-25 | End: 2020-10-25

## 2020-10-25 RX ORDER — MORPHINE SULFATE 4 MG/ML
4 INJECTION, SOLUTION INTRAMUSCULAR; INTRAVENOUS ONCE
Status: COMPLETED | OUTPATIENT
Start: 2020-10-25 | End: 2020-10-25

## 2020-10-25 RX ORDER — SODIUM CHLORIDE 0.9 % (FLUSH) 0.9 %
10 SYRINGE (ML) INJECTION AS NEEDED
Status: DISCONTINUED | OUTPATIENT
Start: 2020-10-25 | End: 2020-10-25 | Stop reason: HOSPADM

## 2020-10-25 RX ADMIN — MORPHINE SULFATE 4 MG: 4 INJECTION, SOLUTION INTRAMUSCULAR; INTRAVENOUS at 14:20

## 2020-10-25 RX ADMIN — IOPAMIDOL 100 ML: 612 INJECTION, SOLUTION INTRAVENOUS at 15:25

## 2020-10-25 RX ADMIN — SODIUM CHLORIDE 1000 ML: 9 INJECTION, SOLUTION INTRAVENOUS at 14:24

## 2020-10-25 RX ADMIN — ONDANSETRON 4 MG: 2 INJECTION INTRAMUSCULAR; INTRAVENOUS at 14:23

## 2020-10-25 NOTE — ED PROVIDER NOTES
Subjective   29-year-old female presenting with chest pain.  She states that for the last few days she has had mild to moderate left-sided chest pain, mostly pressure but can be sharp intermittently.  Does not radiate, there are no alleviating factors, it is made worse by deep breath.  She has had a little bit of a cough as well.  Has had some shortness of breath over the last few days.  She notes stopping her Eliquis just prior to symptoms starting due to an insurance issue.  She denies fevers, chills, nausea, vomiting, abdominal pain.          Review of Systems   Constitutional: Negative.    HENT: Negative.    Eyes: Negative.    Respiratory: Positive for cough and shortness of breath.    Cardiovascular: Positive for chest pain.   Gastrointestinal: Negative.    Genitourinary: Negative.    Musculoskeletal: Negative.    Skin: Negative.    Neurological: Negative.    Psychiatric/Behavioral: Negative.        Past Medical History:   Diagnosis Date   • Abdominal pain    • Abnormal Pap smear of cervix    • Anxiety    • Asthma 2015   • Bleeding disorder (CMS/[facility]) 11-13-16   • Body piercing     TONGUE, NOSE, TWO IN EACH EAR   • Chest pain    • Clotting disorder (CMS/[facility])     factor 5   • Constipation    • Coronary artery disease involving native coronary artery of native heart without angina pectoris 6/16/2017   • Deep vein thrombosis (CMS/[facility]) 11-13-16   • Diarrhea    • Diarrhea    • Endometriosis    • Factor 5 Leiden mutation, heterozygous (CMS/[facility])    • Fibroid    • Gallstone    • GERD (gastroesophageal reflux disease)    • H/O blood clots    • Headache    • Hiatal hernia    • High cholesterol    • History of recurrent UTIs    • HPV (human papilloma virus) infection    • Hypertension    • Kidney infection    • Migraine    • Nausea    • Nausea & vomiting    • Obesity    • Orthostatic hypotension    • Osteoarthritis    • Ovarian cyst    • Pollen allergies    • Protein S deficiency (CMS/[facility]) 11/14/2016    labs from  hospitalization for PE   • Pulmonary embolism (CMS/HCC)    • Recurrent pregnancy loss, antepartum condition or complication    • Sleep apnea     CPAP ASKED TO BRING DOS   • Subclinical hyperthyroidism    • Tachycardia    • Tattoo     LOWER BACK   • Varicella        Allergies   Allergen Reactions   • Ceftin [Cefuroxime Axetil] Other (See Comments)     Numbness in mouth and throat   • Amoxicillin Rash       Past Surgical History:   Procedure Laterality Date   •  SECTION      X2  and    •  SECTION WITH TUBAL N/A 1/15/2019    Procedure:  SECTION REPEAT WITH TUBAL;  Surgeon: Alva Boyer MD;  Location: [facility] LABOR DELIVERY;  Service: Obstetrics/Gynecology   • CHOLECYSTECTOMY     • COLONOSCOPY N/A 2017    Procedure: COLONOSCOPY WITH BIOPSIES AND ARGON THERMAL ABLATION;  Surgeon: Jignesh Selby MD;  Location: [facility] ENDOSCOPY;  Service:    • DIAGNOSTIC LAPAROSCOPY N/A 2018    Procedure: DIAGNOSTIC LAPAROSCOPY AND ABLATION OF ENDOMETRIOSIS;  Surgeon: Evin Zamudio MD;  Location: [facility] OR;  Service: Obstetrics/Gynecology   • ENDOMETRIAL ABLATION     • ENDOSCOPY N/A 2017    Procedure: ESOPHAGOGASTRODUODENOSCOPY WITH BIOPSIES AND COLD BIOPSY POLYPECTOMIES;  Surgeon: Jignesh Selby MD;  Location: [facility] ENDOSCOPY;  Service:    • PELVIC LAPAROSCOPY         Family History   Problem Relation Age of Onset   • Arthritis Mother    • COPD Mother    • Asthma Mother    • Thyroid disease Mother    • Arthritis Father    • Diabetes Father    • Hypertension Father    • Hyperlipidemia Father    • Kidney disease Father    • Heart attack Father    • Coronary artery disease Father    • Dementia Father    • No Known Problems Son    • Colon cancer Neg Hx    • Liver cancer Neg Hx    • Liver disease Neg Hx    • Stomach cancer Neg Hx    • Esophageal cancer Neg Hx        Social History     Socioeconomic History   • Marital status:      Spouse name: Not on file   • Number of  children: Not on file   • Years of education: Not on file   • Highest education level: Not on file   Tobacco Use   • Smoking status: Never Smoker   • Smokeless tobacco: Never Used   Substance and Sexual Activity   • Alcohol use: No   • Drug use: No   • Sexual activity: Yes     Partners: Male     Birth control/protection: None     Comment: PE while on birth control patch           Objective   Physical Exam  Constitutional:       General: She is not in acute distress.     Appearance: Normal appearance. She is not ill-appearing, toxic-appearing or diaphoretic.   HENT:      Head: Normocephalic and atraumatic.      Right Ear: External ear normal.      Left Ear: External ear normal.      Nose: Nose normal.      Mouth/Throat:      Mouth: Mucous membranes are moist.      Pharynx: Oropharynx is clear.   Eyes:      Extraocular Movements: Extraocular movements intact.      Conjunctiva/sclera: Conjunctivae normal.      Pupils: Pupils are equal, round, and reactive to light.   Neck:      Musculoskeletal: Normal range of motion.   Cardiovascular:      Rate and Rhythm: Regular rhythm.      Pulses: Normal pulses.      Heart sounds: Normal heart sounds.      Comments: Mild tachycardia   Pulmonary:      Effort: Pulmonary effort is normal. No respiratory distress.      Breath sounds: Normal breath sounds.   Abdominal:      General: Bowel sounds are normal. There is no distension.      Tenderness: There is no abdominal tenderness.   Musculoskeletal: Normal range of motion.         General: No swelling, tenderness or deformity.   Skin:     General: Skin is warm and dry.      Capillary Refill: Capillary refill takes less than 2 seconds.      Findings: No rash.   Neurological:      General: No focal deficit present.      Mental Status: She is alert and oriented to person, place, and time.   Psychiatric:         Mood and Affect: Mood normal.         Behavior: Behavior normal.         Procedures           ED Course                                            MDM  Number of Diagnoses or Management Options  Chest pain, unspecified type:   Shortness of breath:   Diagnosis management comments: 29 year old female with chest pain and shortness of breath. Well developed, well nourished obese young female no distress with exam as above.  She is mildly tachycardic.  Her exam is otherwise fairly nonfocal.  Given her history of clots and being off her Eliquis will obtain CT scan.  Will obtain labs, EKG.  Will give symptomatic treatment.  Disposition pending.    DDx: PE, pneumonia, bronchitis, COVID-19, anxiety, pleurisy    EKG interpreted by me: sinus rhythm, normal rate, no acute ST/T changes, low voltage QRS complexes, this is an atypical EKG    Work-up here is unremarkable.  She is sitting up on her phone resting comfortably.  Will discharge home with continued outpatient follow-up.       Amount and/or Complexity of Data Reviewed  Clinical lab tests: reviewed  Tests in the radiology section of CPT®: reviewed        Final diagnoses:   Chest pain, unspecified type   Shortness of breath            Francois Kline MD  10/25/20 1600

## 2020-11-11 ENCOUNTER — TELEPHONE (OUTPATIENT)
Dept: CARDIOLOGY | Facility: CLINIC | Age: 30
End: 2020-11-11

## 2020-11-11 NOTE — TELEPHONE ENCOUNTER
Tried to call pt to let her know that I have placed the PA for corlanor. They will make their decision in 24hrs and fax it to us. Pt was not available and no voicemail.

## 2020-11-12 ENCOUNTER — HOSPITAL ENCOUNTER (EMERGENCY)
Facility: HOSPITAL | Age: 30
Discharge: HOME | End: 2020-11-12
Attending: EMERGENCY MEDICINE | Admitting: EMERGENCY MEDICINE

## 2020-11-12 ENCOUNTER — TELEPHONE (OUTPATIENT)
Dept: CARDIOLOGY | Facility: CLINIC | Age: 30
End: 2020-11-12

## 2020-11-12 ENCOUNTER — APPOINTMENT (OUTPATIENT)
Dept: GENERAL RADIOLOGY | Facility: HOSPITAL | Age: 30
End: 2020-11-12

## 2020-11-12 VITALS
DIASTOLIC BLOOD PRESSURE: 86 MMHG | OXYGEN SATURATION: 99 % | BODY MASS INDEX: 34.01 KG/M2 | WEIGHT: 199.2 LBS | HEART RATE: 84 BPM | RESPIRATION RATE: 20 BRPM | HEIGHT: 64 IN | SYSTOLIC BLOOD PRESSURE: 119 MMHG | TEMPERATURE: 98.3 F

## 2020-11-12 DIAGNOSIS — R07.9 CHEST PAIN IN ADULT: Primary | ICD-10-CM

## 2020-11-12 LAB
ALBUMIN SERPL-MCNC: 4.3 G/DL (ref 3.5–5.2)
ALBUMIN/GLOB SERPL: 1.3 G/DL
ALP SERPL-CCNC: 102 U/L (ref 39–117)
ALT SERPL W P-5'-P-CCNC: 19 U/L (ref 1–33)
ANION GAP SERPL CALCULATED.3IONS-SCNC: 9.5 MMOL/L (ref 5–15)
AST SERPL-CCNC: 15 U/L (ref 1–32)
BASOPHILS # BLD AUTO: 0.04 10*3/MM3 (ref 0–0.2)
BASOPHILS NFR BLD AUTO: 0.5 % (ref 0–1.5)
BILIRUB SERPL-MCNC: 0.4 MG/DL (ref 0–1.2)
BUN SERPL-MCNC: 10 MG/DL (ref 6–20)
BUN/CREAT SERPL: 13.7 (ref 7–25)
CALCIUM SPEC-SCNC: 9.7 MG/DL (ref 8.6–10.5)
CHLORIDE SERPL-SCNC: 103 MMOL/L (ref 98–107)
CO2 SERPL-SCNC: 26.5 MMOL/L (ref 22–29)
CREAT SERPL-MCNC: 0.73 MG/DL (ref 0.57–1)
D DIMER PPP FEU-MCNC: 0.54 MCGFEU/ML (ref 0–0.57)
DEPRECATED RDW RBC AUTO: 40 FL (ref 37–54)
EOSINOPHIL # BLD AUTO: 0.09 10*3/MM3 (ref 0–0.4)
EOSINOPHIL NFR BLD AUTO: 1 % (ref 0.3–6.2)
ERYTHROCYTE [DISTWIDTH] IN BLOOD BY AUTOMATED COUNT: 12 % (ref 12.3–15.4)
GFR SERPL CREATININE-BSD FRML MDRD: 94 ML/MIN/1.73
GLOBULIN UR ELPH-MCNC: 3.2 GM/DL
GLUCOSE SERPL-MCNC: 100 MG/DL (ref 65–99)
HCT VFR BLD AUTO: 41.4 % (ref 34–46.6)
HGB BLD-MCNC: 13.7 G/DL (ref 12–15.9)
HOLD SPECIMEN: NORMAL
HOLD SPECIMEN: NORMAL
IMM GRANULOCYTES # BLD AUTO: 0.03 10*3/MM3 (ref 0–0.05)
IMM GRANULOCYTES NFR BLD AUTO: 0.3 % (ref 0–0.5)
LYMPHOCYTES # BLD AUTO: 2.09 10*3/MM3 (ref 0.7–3.1)
LYMPHOCYTES NFR BLD AUTO: 23.7 % (ref 19.6–45.3)
MCH RBC QN AUTO: 30.2 PG (ref 26.6–33)
MCHC RBC AUTO-ENTMCNC: 33.1 G/DL (ref 31.5–35.7)
MCV RBC AUTO: 91.2 FL (ref 79–97)
MONOCYTES # BLD AUTO: 0.45 10*3/MM3 (ref 0.1–0.9)
MONOCYTES NFR BLD AUTO: 5.1 % (ref 5–12)
NEUTROPHILS NFR BLD AUTO: 6.12 10*3/MM3 (ref 1.7–7)
NEUTROPHILS NFR BLD AUTO: 69.4 % (ref 42.7–76)
NRBC BLD AUTO-RTO: 0 /100 WBC (ref 0–0.2)
PLATELET # BLD AUTO: 318 10*3/MM3 (ref 140–450)
PMV BLD AUTO: 9.8 FL (ref 6–12)
POTASSIUM SERPL-SCNC: 3.8 MMOL/L (ref 3.5–5.2)
PROT SERPL-MCNC: 7.5 G/DL (ref 6–8.5)
RBC # BLD AUTO: 4.54 10*6/MM3 (ref 3.77–5.28)
SODIUM SERPL-SCNC: 139 MMOL/L (ref 136–145)
TROPONIN T SERPL-MCNC: <0.01 NG/ML (ref 0–0.03)
WBC # BLD AUTO: 8.82 10*3/MM3 (ref 3.4–10.8)
WHOLE BLOOD HOLD SPECIMEN: NORMAL
WHOLE BLOOD HOLD SPECIMEN: NORMAL

## 2020-11-12 PROCEDURE — 85025 COMPLETE CBC W/AUTO DIFF WBC: CPT | Performed by: EMERGENCY MEDICINE

## 2020-11-12 PROCEDURE — 85379 FIBRIN DEGRADATION QUANT: CPT | Performed by: PHYSICIAN ASSISTANT

## 2020-11-12 PROCEDURE — 93005 ELECTROCARDIOGRAM TRACING: CPT | Performed by: EMERGENCY MEDICINE

## 2020-11-12 PROCEDURE — 80053 COMPREHEN METABOLIC PANEL: CPT | Performed by: EMERGENCY MEDICINE

## 2020-11-12 PROCEDURE — 84484 ASSAY OF TROPONIN QUANT: CPT | Performed by: EMERGENCY MEDICINE

## 2020-11-12 PROCEDURE — 99284 EMERGENCY DEPT VISIT MOD MDM: CPT

## 2020-11-12 PROCEDURE — 71045 X-RAY EXAM CHEST 1 VIEW: CPT

## 2020-11-12 RX ORDER — SODIUM CHLORIDE 0.9 % (FLUSH) 0.9 %
10 SYRINGE (ML) INJECTION AS NEEDED
Status: DISCONTINUED | OUTPATIENT
Start: 2020-11-12 | End: 2020-11-12 | Stop reason: HOSPADM

## 2020-11-12 RX ORDER — ACETAMINOPHEN 325 MG/1
975 TABLET ORAL ONCE
Status: COMPLETED | OUTPATIENT
Start: 2020-11-12 | End: 2020-11-12

## 2020-11-12 RX ADMIN — ACETAMINOPHEN 975 MG: 325 TABLET, FILM COATED ORAL at 21:25

## 2020-11-13 NOTE — ED PROVIDER NOTES
Subjective   This is a 29-year-old female who presents to the emergency department chief complaint left-sided chest pain that radiates down her left shoulder and arm.  Patient does states she has had associated shortness of breath.  Patient states this pain started earlier this day.  Patient does have history of pulmonary embolus secondary to factor V.  Patient states she is prescribed Eliquis.  Patient also complains of headache, dizziness.  Denies any localized weakness into her arms or legs.      History provided by:  Patient   used: No    Chest Pain  Pain location:  L chest  Pain quality: pressure    Pain radiates to:  Does not radiate  Pain severity:  Moderate  Onset quality:  Gradual  Duration:  1 day  Timing:  Intermittent  Progression:  Worsening  Chronicity:  New  Context: not breathing, not drug use, not eating, not intercourse, not lifting, not at rest and not stress    Relieved by:  Nothing  Worsened by:  Nothing  Ineffective treatments:  None tried  Associated symptoms: shortness of breath and weakness    Associated symptoms: no abdominal pain, no altered mental status, no anorexia, no back pain, no claudication, no dizziness, no dysphagia, no fatigue, no headache, no heartburn, no numbness and no palpitations    Risk factors: coronary artery disease, hypertension and prior DVT/PE    Risk factors: no aortic disease, no birth control, not male, no Marfan's syndrome and not obese        Review of Systems   Constitutional: Negative for fatigue.   HENT: Negative.  Negative for congestion, dental problem, drooling, ear pain, facial swelling, hearing loss and trouble swallowing.    Eyes: Negative.  Negative for pain, discharge, redness and itching.   Respiratory: Positive for chest tightness and shortness of breath. Negative for choking.    Cardiovascular: Positive for chest pain. Negative for palpitations and claudication.   Gastrointestinal: Negative for abdominal pain, anorexia and  heartburn.   Genitourinary: Negative for difficulty urinating, dyspareunia, dysuria, enuresis, genital sores and hematuria.   Musculoskeletal: Negative.  Negative for arthralgias, back pain, gait problem and joint swelling.   Skin: Negative.  Negative for color change and pallor.   Neurological: Positive for weakness. Negative for dizziness, numbness and headaches.   Hematological: Negative.  Negative for adenopathy. Does not bruise/bleed easily.   Psychiatric/Behavioral: Negative.  Negative for agitation, behavioral problems, confusion, decreased concentration and dysphoric mood. The patient is not hyperactive.    All other systems reviewed and are negative.      Past Medical History:   Diagnosis Date   • Abdominal pain    • Abnormal Pap smear of cervix    • Anxiety    • Asthma 2015   • Bleeding disorder (CMS/[facility]) 11-13-16   • Body piercing     TONGUE, NOSE, TWO IN EACH EAR   • Chest pain    • Clotting disorder (CMS/[facility])     factor 5   • Constipation    • Coronary artery disease involving native coronary artery of native heart without angina pectoris 6/16/2017   • Deep vein thrombosis (CMS/[facility]) 11-13-16   • Diarrhea    • Diarrhea    • Endometriosis    • Factor 5 Leiden mutation, heterozygous (CMS/[facility])    • Fibroid    • Gallstone    • GERD (gastroesophageal reflux disease)    • H/O blood clots    • Headache    • Hiatal hernia    • High cholesterol    • History of recurrent UTIs    • HPV (human papilloma virus) infection    • Hypertension    • Kidney infection    • Migraine    • Nausea    • Nausea & vomiting    • Obesity    • Orthostatic hypotension    • Osteoarthritis    • Ovarian cyst    • Pollen allergies    • Protein S deficiency (CMS/[facility]) 11/14/2016    labs from hospitalization for PE   • Pulmonary embolism (CMS/[facility])    • Recurrent pregnancy loss, antepartum condition or complication    • Sleep apnea     CPAP ASKED TO BRING DOS   • Subclinical hyperthyroidism    • Tachycardia    • Tattoo     LOWER BACK   •  Varicella        Allergies   Allergen Reactions   • Ceftin [Cefuroxime Axetil] Other (See Comments)     Numbness in mouth and throat   • Amoxicillin Rash       Past Surgical History:   Procedure Laterality Date   •  SECTION      X2  and    •  SECTION WITH TUBAL N/A 1/15/2019    Procedure:  SECTION REPEAT WITH TUBAL;  Surgeon: Alva Boyer MD;  Location: [facility] LABOR DELIVERY;  Service: Obstetrics/Gynecology   • CHOLECYSTECTOMY     • COLONOSCOPY N/A 2017    Procedure: COLONOSCOPY WITH BIOPSIES AND ARGON THERMAL ABLATION;  Surgeon: Jignesh Selby MD;  Location: [facility] ENDOSCOPY;  Service:    • DIAGNOSTIC LAPAROSCOPY N/A 2018    Procedure: DIAGNOSTIC LAPAROSCOPY AND ABLATION OF ENDOMETRIOSIS;  Surgeon: Evin Zamudio MD;  Location: [facility] OR;  Service: Obstetrics/Gynecology   • ENDOMETRIAL ABLATION     • ENDOSCOPY N/A 2017    Procedure: ESOPHAGOGASTRODUODENOSCOPY WITH BIOPSIES AND COLD BIOPSY POLYPECTOMIES;  Surgeon: Jignesh Selby MD;  Location: [facility] ENDOSCOPY;  Service:    • PELVIC LAPAROSCOPY         Family History   Problem Relation Age of Onset   • Arthritis Mother    • COPD Mother    • Asthma Mother    • Thyroid disease Mother    • Arthritis Father    • Diabetes Father    • Hypertension Father    • Hyperlipidemia Father    • Kidney disease Father    • Heart attack Father    • Coronary artery disease Father    • Dementia Father    • No Known Problems Son    • Colon cancer Neg Hx    • Liver cancer Neg Hx    • Liver disease Neg Hx    • Stomach cancer Neg Hx    • Esophageal cancer Neg Hx        Social History     Socioeconomic History   • Marital status:      Spouse name: Not on file   • Number of children: Not on file   • Years of education: Not on file   • Highest education level: Not on file   Tobacco Use   • Smoking status: Never Smoker   • Smokeless tobacco: Never Used   Substance and Sexual Activity   • Alcohol use: No   • Drug use: No   •  Sexual activity: Yes     Partners: Male     Birth control/protection: None     Comment: PE while on birth control patch           Objective   Physical Exam  Vitals signs and nursing note reviewed.   Constitutional:       General: She is not in acute distress.     Appearance: She is well-developed and normal weight. She is not ill-appearing, toxic-appearing or diaphoretic.   HENT:      Head: Normocephalic and atraumatic.   Eyes:      Extraocular Movements: Extraocular movements intact.      Pupils: Pupils are equal, round, and reactive to light.   Neck:      Musculoskeletal: Normal range of motion and neck supple.      Thyroid: No thyromegaly.      Vascular: No hepatojugular reflux or JVD.      Trachea: No tracheal deviation.   Cardiovascular:      Rate and Rhythm: Normal rate and regular rhythm.      Pulses:           Carotid pulses are 2+ on the right side and 2+ on the left side.       Radial pulses are 2+ on the right side and 2+ on the left side.        Dorsalis pedis pulses are 2+ on the right side and 2+ on the left side.        Posterior tibial pulses are 2+ on the right side and 2+ on the left side.      Heart sounds: Normal heart sounds. Heart sounds not distant. No murmur.   Pulmonary:      Effort: Pulmonary effort is normal. No tachypnea, accessory muscle usage or respiratory distress.      Breath sounds: Normal breath sounds. No stridor. No decreased breath sounds, wheezing, rhonchi or rales.   Chest:      Chest wall: No mass, deformity, tenderness, crepitus or edema. There is no dullness to percussion.   Abdominal:      General: Bowel sounds are normal. There is no abdominal bruit.      Palpations: Abdomen is soft. There is no fluid wave, hepatomegaly, splenomegaly or mass.      Tenderness: There is no abdominal tenderness. There is no guarding or rebound.   Musculoskeletal: Normal range of motion.      Right lower leg: She exhibits no tenderness. No edema.      Left lower leg: She exhibits no  tenderness. No edema.   Lymphadenopathy:      Cervical: No cervical adenopathy.   Skin:     General: Skin is warm and dry.      Capillary Refill: Capillary refill takes less than 2 seconds.      Coloration: Skin is not cyanotic or pale.      Findings: No ecchymosis, erythema or rash.      Nails: There is no clubbing.     Neurological:      General: No focal deficit present.      Mental Status: She is alert. She is disoriented.      Cranial Nerves: No cranial nerve deficit.      Motor: No weakness.   Psychiatric:         Mood and Affect: Mood normal. Mood is not anxious.         Behavior: Behavior normal. Behavior is not agitated.         Procedures           ED Course  ED Course as of Nov 13 0005   Thu Nov 12, 2020 1911 EKG interpreted by me reveals sinus rhythm rate of 96.  No ectopy and nonspecific diffuse T wave changes.  No ischemic changes.    [PF]   2059 No acute cardiopulmonary process.  Patient came in with chief complaint left-sided chest pain that radiates to left arm.  Patient did have negative cardiopulmonary process.  Patient will be discharged home.  Patient advised to follow-up with cardiology.  Patient stable at this time.    [BH]   2117 ECG 12 Lead [BH]      ED Course User Index  [BH] Kwasi Waller PA-C  [PF] Hakan Goldsmith, DO                                         HEART Score (for prediction of 6-week risk of major adverse cardiac event) reviewed and/or performed as part of the patient evaluation and treatment planning process.  The result associated with this review/performance is: 0       MDM    Final diagnoses:   Chest pain in adult            Kwasi Waller PA-C  11/12/20 2103       Kwasi Waller PA-C  11/13/20 0005

## 2020-11-14 ENCOUNTER — HOSPITAL ENCOUNTER (EMERGENCY)
Facility: HOSPITAL | Age: 30
Discharge: HOME | End: 2020-11-14
Attending: EMERGENCY MEDICINE | Admitting: EMERGENCY MEDICINE

## 2020-11-14 VITALS
HEART RATE: 98 BPM | OXYGEN SATURATION: 100 % | WEIGHT: 198 LBS | TEMPERATURE: 98.8 F | DIASTOLIC BLOOD PRESSURE: 81 MMHG | RESPIRATION RATE: 18 BRPM | SYSTOLIC BLOOD PRESSURE: 131 MMHG | BODY MASS INDEX: 35.08 KG/M2 | HEIGHT: 63 IN

## 2020-11-14 DIAGNOSIS — J06.9 UPPER RESPIRATORY TRACT INFECTION, UNSPECIFIED TYPE: Primary | ICD-10-CM

## 2020-11-14 LAB
FLUAV AG NPH QL: NEGATIVE
FLUBV AG NPH QL IA: NEGATIVE
S PYO AG THROAT QL: NEGATIVE
SARS-COV-2 RNA PNL SPEC NAA+PROBE: NOT DETECTED

## 2020-11-14 PROCEDURE — C9803 HOPD COVID-19 SPEC COLLECT: HCPCS

## 2020-11-14 PROCEDURE — 87804 INFLUENZA ASSAY W/OPTIC: CPT | Performed by: PHYSICIAN ASSISTANT

## 2020-11-14 PROCEDURE — 87880 STREP A ASSAY W/OPTIC: CPT | Performed by: PHYSICIAN ASSISTANT

## 2020-11-14 PROCEDURE — 87081 CULTURE SCREEN ONLY: CPT | Performed by: PHYSICIAN ASSISTANT

## 2020-11-14 PROCEDURE — 99283 EMERGENCY DEPT VISIT LOW MDM: CPT

## 2020-11-14 PROCEDURE — 87635 SARS-COV-2 COVID-19 AMP PRB: CPT | Performed by: PHYSICIAN ASSISTANT

## 2020-11-14 RX ORDER — BENZONATATE 200 MG/1
200 CAPSULE ORAL 3 TIMES DAILY PRN
Qty: 30 CAPSULE | Refills: 0 | Status: SHIPPED | OUTPATIENT
Start: 2020-11-14 | End: 2021-04-02

## 2020-11-14 NOTE — ED PROVIDER NOTES
Subjective   This patient comes in for evaluation of a few days of URI symptoms to include nasal congestion, sore throat, cough, body aches and a fever as high as 100 degrees orally.  She states she took antipyretics around 8:00 this morning.  She states she feels like she has the flu.  No known contact with anyone who is been diagnosed with COVID-19.          Review of Systems   Constitutional: Positive for fatigue and fever.   HENT: Positive for congestion and sore throat.    Eyes: Negative.    Respiratory: Positive for cough.    Cardiovascular: Negative.    Gastrointestinal: Negative.    Genitourinary: Negative.    Musculoskeletal: Negative.    Skin: Negative.    Neurological: Negative.    Psychiatric/Behavioral: Negative.        Past Medical History:   Diagnosis Date   • Abdominal pain    • Abnormal Pap smear of cervix    • Anxiety    • Asthma 2015   • Bleeding disorder (CMS/[facility]) 11-13-16   • Body piercing     TONGUE, NOSE, TWO IN EACH EAR   • Chest pain    • Clotting disorder (CMS/[facility])     factor 5   • Constipation    • Coronary artery disease involving native coronary artery of native heart without angina pectoris 6/16/2017   • Deep vein thrombosis (CMS/[facility]) 11-13-16   • Diarrhea    • Diarrhea    • Endometriosis    • Factor 5 Leiden mutation, heterozygous (CMS/[facility])    • Fibroid    • Gallstone    • GERD (gastroesophageal reflux disease)    • H/O blood clots    • Headache    • Hiatal hernia    • High cholesterol    • History of recurrent UTIs    • HPV (human papilloma virus) infection    • Hypertension    • Kidney infection    • Migraine    • Nausea    • Nausea & vomiting    • Obesity    • Orthostatic hypotension    • Osteoarthritis    • Ovarian cyst    • Pollen allergies    • Protein S deficiency (CMS/[facility]) 11/14/2016    labs from hospitalization for PE   • Pulmonary embolism (CMS/[facility])    • Recurrent pregnancy loss, antepartum condition or complication    • Sleep apnea     CPAP ASKED TO BRING DOS   • Subclinical  hyperthyroidism    • Tachycardia    • Tattoo     LOWER BACK   • Varicella        Allergies   Allergen Reactions   • Ceftin [Cefuroxime Axetil] Other (See Comments)     Numbness in mouth and throat   • Amoxicillin Rash       Past Surgical History:   Procedure Laterality Date   •  SECTION      X2  and    •  SECTION WITH TUBAL N/A 1/15/2019    Procedure:  SECTION REPEAT WITH TUBAL;  Surgeon: Alva Boyer MD;  Location: [facility] LABOR DELIVERY;  Service: Obstetrics/Gynecology   • CHOLECYSTECTOMY     • COLONOSCOPY N/A 2017    Procedure: COLONOSCOPY WITH BIOPSIES AND ARGON THERMAL ABLATION;  Surgeon: Jignesh Selby MD;  Location: [facility] ENDOSCOPY;  Service:    • DIAGNOSTIC LAPAROSCOPY N/A 2018    Procedure: DIAGNOSTIC LAPAROSCOPY AND ABLATION OF ENDOMETRIOSIS;  Surgeon: Evin Zamudio MD;  Location: [facility] OR;  Service: Obstetrics/Gynecology   • ENDOMETRIAL ABLATION     • ENDOSCOPY N/A 2017    Procedure: ESOPHAGOGASTRODUODENOSCOPY WITH BIOPSIES AND COLD BIOPSY POLYPECTOMIES;  Surgeon: Jignesh Selby MD;  Location: [facility] ENDOSCOPY;  Service:    • PELVIC LAPAROSCOPY         Family History   Problem Relation Age of Onset   • Arthritis Mother    • COPD Mother    • Asthma Mother    • Thyroid disease Mother    • Arthritis Father    • Diabetes Father    • Hypertension Father    • Hyperlipidemia Father    • Kidney disease Father    • Heart attack Father    • Coronary artery disease Father    • Dementia Father    • No Known Problems Son    • Colon cancer Neg Hx    • Liver cancer Neg Hx    • Liver disease Neg Hx    • Stomach cancer Neg Hx    • Esophageal cancer Neg Hx        Social History     Socioeconomic History   • Marital status:      Spouse name: Not on file   • Number of children: Not on file   • Years of education: Not on file   • Highest education level: Not on file   Tobacco Use   • Smoking status: Never Smoker   • Smokeless tobacco: Never Used   Substance  and Sexual Activity   • Alcohol use: No   • Drug use: No   • Sexual activity: Yes     Partners: Male     Birth control/protection: None     Comment: PE while on birth control patch           Objective   Physical Exam  Vitals signs and nursing note reviewed.   Constitutional:       General: She is not in acute distress.     Appearance: Normal appearance. She is obese. She is not ill-appearing, toxic-appearing or diaphoretic.   HENT:      Head: Normocephalic and atraumatic.      Right Ear: Tympanic membrane normal.      Left Ear: Tympanic membrane normal.      Nose: Nose normal.      Mouth/Throat:      Mouth: Mucous membranes are moist.      Pharynx: Oropharynx is clear. No oropharyngeal exudate or posterior oropharyngeal erythema.   Eyes:      Extraocular Movements: Extraocular movements intact.   Neck:      Musculoskeletal: Normal range of motion and neck supple.   Cardiovascular:      Rate and Rhythm: Normal rate and regular rhythm.   Pulmonary:      Effort: Pulmonary effort is normal. No respiratory distress.      Breath sounds: Normal breath sounds. No stridor. No wheezing, rhonchi or rales.   Musculoskeletal: Normal range of motion.   Skin:     General: Skin is warm and dry.   Neurological:      General: No focal deficit present.      Mental Status: She is alert.   Psychiatric:         Mood and Affect: Mood normal.         Behavior: Behavior normal.         Procedures           ED Course  ED Course as of Nov 14 1428   Sat Nov 14, 2020   1355 Strep A Ag: Negative [TM]   1405 Influenza B Ag, EIA: Negative [TM]   1405 Influenza A Ag, EIA: Negative [TM]   1424 COVID19: Not Detected [TM]      ED Course User Index  [TM] Stew Sousa PA-C                                           [facility]    Final diagnoses:   Upper respiratory tract infection, unspecified type            Stew Sousa PA-C  11/14/20 1428

## 2020-11-16 ENCOUNTER — OFFICE VISIT (OUTPATIENT)
Dept: INTERNAL MEDICINE | Facility: CLINIC | Age: 30
End: 2020-11-16

## 2020-11-16 ENCOUNTER — PATIENT MESSAGE (OUTPATIENT)
Dept: INTERNAL MEDICINE | Facility: CLINIC | Age: 30
End: 2020-11-16

## 2020-11-16 ENCOUNTER — HOSPITAL ENCOUNTER (OUTPATIENT)
Dept: CT IMAGING | Facility: HOSPITAL | Age: 30
Discharge: HOME | End: 2020-11-16
Admitting: PHYSICIAN ASSISTANT

## 2020-11-16 VITALS
HEART RATE: 100 BPM | BODY MASS INDEX: 34.91 KG/M2 | OXYGEN SATURATION: 97 % | DIASTOLIC BLOOD PRESSURE: 82 MMHG | SYSTOLIC BLOOD PRESSURE: 114 MMHG | HEIGHT: 63 IN | WEIGHT: 197 LBS | TEMPERATURE: 98.4 F

## 2020-11-16 DIAGNOSIS — H53.149 PHOTOPHOBIA: ICD-10-CM

## 2020-11-16 DIAGNOSIS — R20.2 NUMBNESS AND TINGLING OF RIGHT SIDE OF FACE: ICD-10-CM

## 2020-11-16 DIAGNOSIS — G43.911 INTRACTABLE MIGRAINE WITH STATUS MIGRAINOSUS, UNSPECIFIED MIGRAINE TYPE: Primary | ICD-10-CM

## 2020-11-16 DIAGNOSIS — R20.0 NUMBNESS AND TINGLING OF RIGHT SIDE OF FACE: ICD-10-CM

## 2020-11-16 DIAGNOSIS — L98.9 SKIN LESION OF BACK: ICD-10-CM

## 2020-11-16 DIAGNOSIS — J01.41 ACUTE RECURRENT PANSINUSITIS: Primary | ICD-10-CM

## 2020-11-16 DIAGNOSIS — R42 DIZZINESS: ICD-10-CM

## 2020-11-16 LAB — BACTERIA SPEC AEROBE CULT: NORMAL

## 2020-11-16 PROCEDURE — 70450 CT HEAD/BRAIN W/O DYE: CPT

## 2020-11-16 PROCEDURE — 99214 OFFICE O/P EST MOD 30 MIN: CPT | Performed by: PHYSICIAN ASSISTANT

## 2020-11-16 RX ORDER — SUMATRIPTAN 50 MG/1
TABLET, FILM COATED ORAL
Qty: 9 TABLET | Refills: 2 | Status: SHIPPED | OUTPATIENT
Start: 2020-11-16 | End: 2020-12-14

## 2020-11-16 RX ORDER — CEFDINIR 300 MG/1
300 CAPSULE ORAL 2 TIMES DAILY
Qty: 20 CAPSULE | Refills: 0 | Status: SHIPPED | OUTPATIENT
Start: 2020-11-16 | End: 2020-11-26

## 2020-11-16 NOTE — PROGRESS NOTES
Marilu Godoy is a 29 y.o. female.     Subjective   History of Present Illness   Here today with concern of around 3-4 days of migraine headaches along with numbness and tingling in her head and face, dizziness, and weakness. Migraine [ain is behind both eyes and has accompanied photophobia. She has been taking tylenol which does not help much. She has had some pain in the back of her neck for the same amount of time. Numbness and tingling are isolated to the right side of the head, right cheek area and right jaw area.  She has seen some spots in her vision a couple of times when she has photophobia. She was seen at [facility] ED on 11/12 and 11/14 for the same symptoms, however the first time focus was directed at intermittent chest pain which has been a long-term issue for which she had negative workup. The second visit she was diagnosed with acute URI after testing negative for strep, influenza and Covid-19.  She is not taking anything for symptoms. She still has postnasal drip and some sinus pressure. No sore throat or ear pain in the last 2 days. Temp up to 100.0 prior to the second ER visit but none since then. She does have Factor V Leiden with chronic anticoagulation, however, around 1 month ago she had difficulty getting insurance to approve medication refills and went without he medication for several days.     She has had a sore place on her right middle to lower back for at least 6 months which starts as a bump which bleeds, then heals, and then returns again.         The following portions of the patient's history were reviewed and updated as appropriate: allergies, current medications, past family history, past medical history, past social history, past surgical history and problem list.    Review of Systems   Constitutional: Positive for fatigue. Negative for activity change, chills and fever.   HENT: Positive for congestion, postnasal drip, rhinorrhea, sinus pressure and sore throat (resolved).  Negative for drooling, ear pain, hearing loss, sneezing, tinnitus, trouble swallowing and voice change.    Eyes: Positive for photophobia and visual disturbance. Negative for blurred vision, pain, discharge and itching.   Respiratory: Negative for cough, chest tightness, shortness of breath and wheezing.    Cardiovascular: Positive for chest pain (chronic, unchanges) and palpitations (chronic, unchanged). Negative for leg swelling.   Gastrointestinal: Negative for abdominal pain, constipation, diarrhea, nausea and vomiting.   Endocrine: Negative for polydipsia, polyphagia and polyuria.   Genitourinary: Negative.    Musculoskeletal: Positive for neck pain. Negative for back pain, joint swelling and bursitis.   Skin: Positive for skin lesions. Negative for color change and bruise.   Allergic/Immunologic: Negative for immunocompromised state.   Neurological: Positive for dizziness, weakness, light-headedness and headache. Negative for tremors, seizures, speech difficulty, memory problem and confusion.   Hematological: Negative for adenopathy. Does not bruise/bleed easily.   Psychiatric/Behavioral: Negative for agitation, hallucinations, sleep disturbance and depressed mood. The patient is not nervous/anxious.          Objective    Physical Exam  Vitals signs and nursing note reviewed.   Constitutional:       General: She is not in acute distress.     Appearance: She is well-developed. She is obese. She is not ill-appearing, toxic-appearing or diaphoretic.   HENT:      Head: Normocephalic and atraumatic.      Comments: No sinus tenderness. No TMJ tenderness.      Right Ear: Tympanic membrane, ear canal and external ear normal. There is impacted cerumen.      Left Ear: Tympanic membrane, ear canal and external ear normal. There is impacted cerumen.      Nose: No congestion or rhinorrhea.      Mouth/Throat:      Lips: Pink. No lesions.      Mouth: Mucous membranes are moist.      Tongue: No lesions. Tongue does not  deviate from midline.      Palate: No mass and lesions.      Pharynx: Oropharynx is clear. Uvula midline. No pharyngeal swelling, oropharyngeal exudate, posterior oropharyngeal erythema or uvula swelling.      Comments: Edentulous.   Eyes:      General: No visual field deficit or scleral icterus.        Right eye: No discharge.         Left eye: No discharge.      Extraocular Movements: Extraocular movements intact.      Conjunctiva/sclera: Conjunctivae normal.      Pupils: Pupils are equal, round, and reactive to light.   Neck:      Musculoskeletal: Full passive range of motion without pain, normal range of motion and neck supple. No neck rigidity or muscular tenderness.      Thyroid: No thyroid mass or thyromegaly.      Trachea: Trachea normal.      Meningeal: Brudzinski's sign and Kernig's sign absent.   Cardiovascular:      Rate and Rhythm: Normal rate and regular rhythm.      Pulses: Normal pulses.      Heart sounds: Normal heart sounds. No murmur. No friction rub. No gallop.    Pulmonary:      Effort: Pulmonary effort is normal. No respiratory distress.      Breath sounds: Normal breath sounds. No wheezing, rhonchi or rales.   Chest:      Chest wall: No tenderness.   Abdominal:      General: Bowel sounds are normal.      Palpations: Abdomen is soft.      Tenderness: There is no abdominal tenderness. There is no right CVA tenderness, left CVA tenderness, guarding or rebound.      Hernia: No hernia is present.   Musculoskeletal: Normal range of motion.         General: No tenderness or deformity.      Right lower leg: No edema.      Left lower leg: No edema.   Lymphadenopathy:      Cervical: No cervical adenopathy.      Right cervical: No superficial, deep or posterior cervical adenopathy.     Left cervical: No superficial, deep or posterior cervical adenopathy.   Skin:     General: Skin is warm and dry.      Capillary Refill: Capillary refill takes less than 2 seconds.      Coloration: Skin is not jaundiced or  "pale.      Findings: Erythema and lesion present. No rash. Rash is not crusting, macular, nodular, papular or purpuric.          Neurological:      General: No focal deficit present.      Mental Status: She is alert and oriented to person, place, and time. Mental status is at baseline.      Cranial Nerves: Cranial nerves are intact. No cranial nerve deficit, dysarthria or facial asymmetry.      Sensory: Sensation is intact. No sensory deficit.      Motor: Motor function is intact. No weakness, tremor, atrophy, abnormal muscle tone, seizure activity or pronator drift.      Coordination: Coordination is intact. Romberg sign negative. Coordination normal. Finger-Nose-Finger Test and Heel to Shin Test normal. Rapid alternating movements normal.      Gait: Gait and tandem walk normal.      Deep Tendon Reflexes: Reflexes are normal and symmetric. Reflexes normal.      Reflex Scores:       Tricep reflexes are 2+ on the right side and 2+ on the left side.       Bicep reflexes are 2+ on the right side and 2+ on the left side.       Brachioradialis reflexes are 2+ on the right side and 2+ on the left side.       Patellar reflexes are 2+ on the right side and 2+ on the left side.       Achilles reflexes are 2+ on the right side and 2+ on the left side.  Psychiatric:         Mood and Affect: Mood normal.         Behavior: Behavior normal.         Thought Content: Thought content normal.         Judgment: Judgment normal.           /82   Pulse 100   Temp 98.4 °F (36.9 °C)   Ht 160 cm (62.99\")   Wt 89.4 kg (197 lb)   SpO2 97%   BMI 34.91 kg/m²     Nursing note and vitals reviewed.        Assessment/Plan   Diagnoses and all orders for this visit:    1. Intractable migraine with status migrainosus, unspecified migraine type (Primary)  -     CT head wo contrast    2. Dizziness  -     CT head wo contrast    3. Photophobia  -     CT head wo contrast    4. Numbness and tingling of right side of face  -     CT head wo " contrast    5. Skin lesion of back  -     Ambulatory Referral to Dermatology      ER records reviewed.   Return to ER with any acutely worsened symptoms.          STEVE Willingham  11/16/2020  13:03 EST

## 2020-11-16 NOTE — TELEPHONE ENCOUNTER
From: Marilu Godoy  To: STEVE Willingham  Sent: 11/16/2020 4:32 PM EST  Subject: Prescription Question    Can you send in a prescription for imitrex?

## 2020-11-20 ENCOUNTER — OFFICE VISIT (OUTPATIENT)
Dept: INTERNAL MEDICINE | Facility: CLINIC | Age: 30
End: 2020-11-20

## 2020-11-20 ENCOUNTER — TELEPHONE (OUTPATIENT)
Dept: CARDIOLOGY | Facility: CLINIC | Age: 30
End: 2020-11-20

## 2020-11-20 VITALS
SYSTOLIC BLOOD PRESSURE: 124 MMHG | DIASTOLIC BLOOD PRESSURE: 84 MMHG | BODY MASS INDEX: 35.08 KG/M2 | HEIGHT: 63 IN | TEMPERATURE: 98 F | HEART RATE: 115 BPM | OXYGEN SATURATION: 98 % | WEIGHT: 198 LBS

## 2020-11-20 DIAGNOSIS — R20.0 NUMBNESS AND TINGLING OF RIGHT SIDE OF FACE: ICD-10-CM

## 2020-11-20 DIAGNOSIS — G43.911 INTRACTABLE MIGRAINE WITH STATUS MIGRAINOSUS, UNSPECIFIED MIGRAINE TYPE: Primary | ICD-10-CM

## 2020-11-20 DIAGNOSIS — R20.2 NUMBNESS AND TINGLING OF RIGHT SIDE OF FACE: ICD-10-CM

## 2020-11-20 DIAGNOSIS — H53.149 PHOTOPHOBIA: ICD-10-CM

## 2020-11-20 DIAGNOSIS — G47.33 OBSTRUCTIVE SLEEP APNEA SYNDROME: ICD-10-CM

## 2020-11-20 DIAGNOSIS — R53.82 CHRONIC FATIGUE: ICD-10-CM

## 2020-11-20 DIAGNOSIS — R42 DIZZINESS: ICD-10-CM

## 2020-11-20 PROCEDURE — 99213 OFFICE O/P EST LOW 20 MIN: CPT | Performed by: PHYSICIAN ASSISTANT

## 2020-11-20 NOTE — PROGRESS NOTES
Marilu Godoy is a 29 y.o. female.     Subjective   History of Present Illness   Here today with continued concern of numbness and tingling in the right side of her face and head along with migraine headaches.  She was last seen here 4 days ago with the same complaints and notes that she continues to have migraine headaches off and on all day and blurred vision around once a day.  She took Imitrex once which caused dizziness, nausea and burning pain in the back of her head for around 1 hour. She is taking omnicef as previously prescribed for treatment of presumed sinusitis. No fever or chills but she has been staying tired. Her throat has been a little irritated but not sore. She is coughing on and off which she feels is from postnasal drip. She has had ear pain bilaterally off and on but was much worse around 30 minutes after taking Imitrex. No sinus pressure, facial drooping, confusion, weakness or speech disturbance. She does snore and does not feel rested when she awakes. She has previously been diagnosed with sleep apnea and used a CPAP for 2-3 months before it was taken back due to the insurance company stating she was not compliant with use, however she reports that she wore it for more than 5 hours each night.         The following portions of the patient's history were reviewed and updated as appropriate: allergies, current medications, past family history, past medical history, past social history, past surgical history and problem list.    Review of Systems   Constitutional: Positive for fatigue. Negative for activity change, appetite change, chills, diaphoresis, fever and unexpected weight gain.   HENT: Positive for congestion, ear pain, postnasal drip, rhinorrhea, sinus pressure and sore throat. Negative for drooling, mouth sores, nosebleeds, sneezing, swollen glands and trouble swallowing.    Eyes: Positive for blurred vision. Negative for visual disturbance.   Respiratory: Positive for apnea  and cough. Negative for chest tightness, shortness of breath and wheezing.    Cardiovascular: Negative for chest pain, palpitations and leg swelling.   Gastrointestinal: Positive for nausea. Negative for abdominal pain, constipation, diarrhea and vomiting.   Endocrine: Negative for polydipsia, polyphagia and polyuria.   Genitourinary: Negative.    Musculoskeletal: Negative.    Skin: Negative.    Allergic/Immunologic: Negative for immunocompromised state.   Neurological: Positive for dizziness, numbness and headache. Negative for tremors, speech difficulty, weakness, light-headedness and confusion.   Hematological: Negative for adenopathy. Does not bruise/bleed easily.   Psychiatric/Behavioral: Negative for agitation, sleep disturbance and depressed mood. The patient is not nervous/anxious.          Objective    Physical Exam  Vitals signs and nursing note reviewed.   Constitutional:       General: She is not in acute distress.     Appearance: She is well-developed. She is obese. She is not ill-appearing, toxic-appearing or diaphoretic.   HENT:      Head: Normocephalic and atraumatic.      Comments: Edentulous.  Minimal diffuse sinus tenderness.  No pain with palpation of face and trigeminal nerve pattern bilaterally.     Right Ear: Ear canal and external ear normal. There is no impacted cerumen.      Left Ear: Ear canal and external ear normal. There is no impacted cerumen.      Ears:      Comments: Trace bilateral TM effusions.     Mouth/Throat:      Mouth: Mucous membranes are moist.      Pharynx: No posterior oropharyngeal erythema.   Eyes:      General: No visual field deficit or scleral icterus.        Right eye: No discharge.         Left eye: No discharge.      Extraocular Movements: Extraocular movements intact.      Conjunctiva/sclera: Conjunctivae normal.      Pupils: Pupils are equal, round, and reactive to light.   Neck:      Musculoskeletal: Normal range of motion and neck supple. No neck rigidity or  muscular tenderness.      Vascular: No carotid bruit.   Cardiovascular:      Rate and Rhythm: Normal rate and regular rhythm.      Heart sounds: Normal heart sounds. No murmur. No friction rub. No gallop.    Pulmonary:      Effort: Pulmonary effort is normal. No respiratory distress.      Breath sounds: Normal breath sounds. No wheezing, rhonchi or rales.   Chest:      Chest wall: No tenderness.   Abdominal:      General: Bowel sounds are normal.      Palpations: Abdomen is soft.      Tenderness: There is no abdominal tenderness. There is no right CVA tenderness or left CVA tenderness.   Musculoskeletal: Normal range of motion.         General: No tenderness or deformity.      Right lower leg: No edema.      Left lower leg: No edema.   Lymphadenopathy:      Cervical: No cervical adenopathy.   Skin:     General: Skin is warm and dry.      Capillary Refill: Capillary refill takes less than 2 seconds.      Findings: No lesion or rash.   Neurological:      General: No focal deficit present.      Mental Status: She is alert and oriented to person, place, and time.      Cranial Nerves: Cranial nerves are intact. No cranial nerve deficit, dysarthria or facial asymmetry.      Sensory: Sensation is intact. No sensory deficit.      Motor: No weakness, tremor, abnormal muscle tone, seizure activity or pronator drift.      Coordination: Coordination is intact. Coordination normal.      Gait: Gait normal.      Deep Tendon Reflexes: Reflexes are normal and symmetric. Reflexes normal. Babinski sign absent on the right side. Babinski sign absent on the left side.      Reflex Scores:       Tricep reflexes are 2+ on the right side and 2+ on the left side.       Bicep reflexes are 2+ on the right side and 2+ on the left side.       Brachioradialis reflexes are 2+ on the right side and 2+ on the left side.       Patellar reflexes are 2+ on the right side and 2+ on the left side.       Achilles reflexes are 2+ on the right side and 2+  "on the left side.  Psychiatric:         Mood and Affect: Mood normal.         Behavior: Behavior normal.         Thought Content: Thought content normal.         Judgment: Judgment normal.           /84   Pulse 115   Temp 98 °F (36.7 °C)   Ht 160 cm (62.99\")   Wt 89.8 kg (198 lb)   SpO2 98%   BMI 35.08 kg/m²     Nursing note and vitals reviewed.          Assessment/Plan   Diagnoses and all orders for this visit:    1. Intractable migraine with status migrainosus, unspecified migraine type (Primary)  -     Ambulatory Referral to Neurology  -     US Carotid Bilateral    2. Dizziness  -     Ambulatory Referral to Neurology  -     US Carotid Bilateral    3. Photophobia  -     Ambulatory Referral to Neurology  -     US Carotid Bilateral    4. Numbness and tingling of right side of face  -     Ambulatory Referral to Neurology  -     US Carotid Bilateral    5. Obstructive sleep apnea syndrome  -     Ambulatory Referral to Pulmonology    6. Chronic fatigue  -     Ambulatory Referral to Pulmonology        There continues to be no clear etiology for her symptoms.  Upon review of the chart it seems as though she had similar symptoms on the left side 2 to 3 years ago which resulted in neurology referral.  At that time she had brain MRI with and without contrast which was unremarkable.  Symptoms eventually resolved spontaneously without intervention.    Neurology referral ordered.  We will also evaluate with carotid artery ultrasound bilaterally and attempt to rule out atherosclerosis as a cause of her symptoms.  She was also referred back to pulmonology for follow-up of sleep apnea.      ER with any acutely worsening symptoms.       STEVE Willingham  11/20/2020  13:44 EST  "

## 2020-11-27 ENCOUNTER — HOSPITAL ENCOUNTER (OUTPATIENT)
Dept: ULTRASOUND IMAGING | Facility: HOSPITAL | Age: 30
Discharge: HOME | End: 2020-11-27
Admitting: PHYSICIAN ASSISTANT

## 2020-11-27 PROCEDURE — 93880 EXTRACRANIAL BILAT STUDY: CPT

## 2020-12-14 ENCOUNTER — OFFICE VISIT (OUTPATIENT)
Dept: CARDIOLOGY | Facility: CLINIC | Age: 30
End: 2020-12-14

## 2020-12-14 VITALS
HEART RATE: 92 BPM | DIASTOLIC BLOOD PRESSURE: 64 MMHG | WEIGHT: 205 LBS | BODY MASS INDEX: 36.32 KG/M2 | OXYGEN SATURATION: 99 % | HEIGHT: 63 IN | SYSTOLIC BLOOD PRESSURE: 110 MMHG | TEMPERATURE: 97.1 F

## 2020-12-14 DIAGNOSIS — D68.51 HETEROZYGOUS FACTOR V LEIDEN MUTATION (HCC): ICD-10-CM

## 2020-12-14 DIAGNOSIS — R00.0 INAPPROPRIATE SINUS TACHYCARDIA: Primary | ICD-10-CM

## 2020-12-14 DIAGNOSIS — G90.3 NEUROGENIC ORTHOSTATIC HYPOTENSION (HCC): ICD-10-CM

## 2020-12-14 DIAGNOSIS — R00.2 PALPITATIONS: ICD-10-CM

## 2020-12-14 PROCEDURE — 99213 OFFICE O/P EST LOW 20 MIN: CPT | Performed by: INTERNAL MEDICINE

## 2020-12-14 NOTE — PROGRESS NOTES
Cardiac Electrophysiology Outpatient Follow Up Note            [facility]    Follow Up Office Visit      Marilu Godoy  [id number]  2020    Primary Care Physician: Deidre Barker MD    Referred By: No ref. provider found    Subjective     Chief Complaint:   Chief Complaint   Patient presents with   • Inappropriate Sinus Tachycardia     Cardiac PMH: (Old records have been reviewed and summarized below)  1. Inappropriate sinus tachycardia/palpitations  a. Unable to tolerate BBL or CCB secondary to hypotension, intolerant to Northera secondary to hypertension, intolerant to Florinef  b. Echocardiogram 12/15/2016, Dr. delgado: EF 61%  c. Holter monitor 2016: Rare PAC/PVC, no evidence of atrial arrhythmias, no episodes of SVT/VT, sinoatrial node conduction was normal, no AV block noted  d. Echocardiogram 2018: EF 65%, no significant valvular disease  e. Treadmill stress test 2018: Normal treadmill exercise stress test, baseline heart rate 102 bpm, peak heart rate 180 bpm  f. 48-hour Holter monitor 2018: Predominantly normal sinus rhythm, average heart rate 88 bpm, ( bpm) rare PAC/PVC  g. Echocardiogram 2019: EF 68%, no significant valvular disease  h. 30-day event monitor 2019: Predominantly normal sinus rhythm, episodes of sinus tachycardia  2. Orthostatic hypotension: Intolerance to medications including midodrine, Florinef, and Northera  3. Heterozygous factor V Leiden mutation  a. Also protein S deficiency  b. Right sided PE, 2016, no evidence of DVT  c. On chronic Eliquis therapy  d. Chest pain, dyspnea, 2018: Presented to local ED, CT chest negative for recurrent PE  4. Obesity  5. Surgical history       a.  section  b. Tubal ligation  c. Cholecystectomy  d. Diagnostic laparoscopy and ablation of endometriosis      History of Present Illness:   Marilu Godoy is a 30 y.o. female  who presents to my electrophysiology clinic for follow up of her history of inappropriate sinus tachycardia.  Patient recently seen in EP follow-up with Benny Sheehan PA-C on 10/16/2020 with complaints of continued daily symptoms of sudden onset palpitations lasting up to an hour in duration.  Patient was subsequently ordered to be placed back on Corlanor therapy however upon presentation today patient reports that her insurance has not approved her medication.  On evaluation patient reports continued daily episodes of tachypalpitations that are up to 130 bpm according to her pulse monitor at home.  Patient reports associated anxiety, shortness of breath, and occasional dizziness.  Patient denies chest pain, presyncope, or syncope.  Patient's blood pressure is acceptable in office today with reported history of hypotension in the past.  Patient denies fatigue, orthopnea, PND, or bilateral lower extremity edema.  Patient is maintained on Eliquis therapy for history of PE related to her factor V Leiden disease without noted side effects or signs of bleeding.    Review of Systems:   Constitutional: No fevers or chills, no recent weight gain or weight loss or fatigue  Eyes: No visual loss, blurred vision, double vision, yellow sclerae.  ENT: No headaches, hearing loss, vertigo, congestion or sore throat.   Cardiovascular: Per HPI  Respiratory: No cough or wheezing, no sputum production, no hematemesis   Gastrointestinal: No abdominal pain, no nausea, vomiting, constipation, diarrhea, melena.   Genitourinary: No dysuria, hematuria or increased frequency.  Musculoskeletal:  No gait disturbance, weakness or joint pain or stiffness  Integumentary: No rashes, urticaria, ulcers or sores.   Neurological: No headache, syncope, paralysis, ataxia  Psychiatric: No anxiety, or depression  Endocrine: No diaphoresis, cold or heat intolerance. No polyuria or polydipsia.   Hematologic/Lymphatic: No anemia, abnormal bruising or  bleeding.     Past Medical History:   Past Medical History:   Diagnosis Date   • Abdominal pain    • Abnormal Pap smear of cervix    • Anxiety    • Asthma    • Bleeding disorder (CMS/HCC) 16   • Body piercing     TONGUE, NOSE, TWO IN EACH EAR   • Chest pain    • Clotting disorder (CMS/[facility])     factor 5   • Constipation    • Coronary artery disease involving native coronary artery of native heart without angina pectoris 2017   • Deep vein thrombosis (CMS/[facility]) 16   • Diarrhea    • Diarrhea    • Endometriosis    • Factor 5 Leiden mutation, heterozygous (CMS/[facility])    • Fibroid    • Gallstone    • GERD (gastroesophageal reflux disease)    • H/O blood clots    • Headache    • Hiatal hernia    • High cholesterol    • History of recurrent UTIs    • HPV (human papilloma virus) infection    • Hypertension    • Kidney infection    • Migraine    • Nausea    • Nausea & vomiting    • Obesity    • Orthostatic hypotension    • Osteoarthritis    • Ovarian cyst    • Pollen allergies    • Protein S deficiency (CMS/HCC) 2016    labs from hospitalization for PE   • Pulmonary embolism (CMS/[facility])    • Recurrent pregnancy loss, antepartum condition or complication    • Sleep apnea     CPAP ASKED TO BRING DOS   • Subclinical hyperthyroidism    • Tachycardia    • Tattoo     LOWER BACK   • Varicella        Past Surgical History:   Past Surgical History:   Procedure Laterality Date   •  SECTION      X2  and    •  SECTION WITH TUBAL N/A 1/15/2019    Procedure:  SECTION REPEAT WITH TUBAL;  Surgeon: Alva Boyer MD;  Location: [facility] LABOR DELIVERY;  Service: Obstetrics/Gynecology   • CHOLECYSTECTOMY     • COLONOSCOPY N/A 2017    Procedure: COLONOSCOPY WITH BIOPSIES AND ARGON THERMAL ABLATION;  Surgeon: Jignesh Selby MD;  Location: [facility] ENDOSCOPY;  Service:    • DIAGNOSTIC LAPAROSCOPY N/A 2018    Procedure: DIAGNOSTIC LAPAROSCOPY AND ABLATION OF ENDOMETRIOSIS;  Surgeon:  Evin Zamudio MD;  Location: [facility] OR;  Service: Obstetrics/Gynecology   • ENDOMETRIAL ABLATION     • ENDOSCOPY N/A 4/20/2017    Procedure: ESOPHAGOGASTRODUODENOSCOPY WITH BIOPSIES AND COLD BIOPSY POLYPECTOMIES;  Surgeon: Jignesh Selby MD;  Location: [facility] ENDOSCOPY;  Service:    • PELVIC LAPAROSCOPY         Family History:   Family History   Problem Relation Age of Onset   • Arthritis Mother    • COPD Mother    • Asthma Mother    • Thyroid disease Mother    • Arthritis Father    • Diabetes Father    • Hypertension Father    • Hyperlipidemia Father    • Kidney disease Father    • Heart attack Father    • Coronary artery disease Father    • Dementia Father    • No Known Problems Son    • Colon cancer Neg Hx    • Liver cancer Neg Hx    • Liver disease Neg Hx    • Stomach cancer Neg Hx    • Esophageal cancer Neg Hx        Social History:   Social History     Socioeconomic History   • Marital status:      Spouse name: Not on file   • Number of children: Not on file   • Years of education: Not on file   • Highest education level: Not on file   Tobacco Use   • Smoking status: Never Smoker   • Smokeless tobacco: Never Used   Substance and Sexual Activity   • Alcohol use: No   • Drug use: No   • Sexual activity: Yes     Partners: Male     Birth control/protection: None     Comment: PE while on birth control patch       Medications:     Current Outpatient Medications:   •  albuterol sulfate  (90 Base) MCG/ACT inhaler, Inhale 2 puffs Every 4 (Four) Hours As Needed for Wheezing or Shortness of Air., Disp: 18 g, Rfl: 11  •  apixaban (ELIQUIS) 5 MG tablet tablet, Take 1 tablet by mouth 2 (Two) Times a Day., Disp: 60 tablet, Rfl: 5  •  aspirin 81 MG EC tablet, Take 81 mg by mouth Daily., Disp: , Rfl:   •  atorvastatin (LIPITOR) 40 MG tablet, Take 1 tablet by mouth Daily., Disp: 30 tablet, Rfl: 11  •  benzonatate (TESSALON) 200 MG capsule, Take 1 capsule by mouth 3 (Three) Times a Day As Needed for  "Cough., Disp: 30 capsule, Rfl: 0  •  Cholecalciferol (Vitamin D3) 1.25 MG (40341 UT) capsule, Take 1 capsule by mouth Every 7 (Seven) Days., Disp: 12 capsule, Rfl: 1  •  Diclofenac Sodium (VOLTAREN) 1 % gel gel, diclofenac 1 % topical gel, Disp: , Rfl:   •  DULoxetine (CYMBALTA) 60 MG capsule, Take 1 capsule by mouth Daily., Disp: 90 capsule, Rfl: 3  •  ondansetron ODT (ZOFRAN-ODT) 4 MG disintegrating tablet, Place 1 tablet on the tongue Every 6 (Six) Hours As Needed for Nausea or Vomiting., Disp: 10 tablet, Rfl: 0    Allergies:   Allergies   Allergen Reactions   • Ceftin [Cefuroxime Axetil] Other (See Comments)     Numbness in mouth and throat   • Amoxicillin Rash       Objective     Physical Exam:  Vital Signs:   Vitals:    12/14/20 1043   BP: 110/64   BP Location: Left arm   Patient Position: Sitting   Pulse: 92   Temp: 97.1 °F (36.2 °C)   SpO2: 99%   Weight: 93 kg (205 lb)   Height: 160 cm (63\")       GEN: Well nourished, well-developed, no acute distress  HEENT: Normocephalic, atraumatic, moist mucous membranes  NECK: Supple, no JVD, no thyromegaly, no lymphadenopathy  CARD: Regular rate and rhythm, normal S1 & S2 are present.  No murmur, gallop or rubs are appreciated.  LUNGS: Clear to auscultation bilateraly, normal respiratory effort  ABDOMEN: Soft, nontender, normal bowel sounds  EXTREMITIES: No gross deformities, no clubbing, cyanosis.  No edema   SKIN: Warm, dry  NEURO: No focal deficits, alert and oriented x 3  PSYCHIATRIC: Normal affect and mood, appropriate use of semantics and logic.        Lab Results   Component Value Date    GLUCOSE 100 (H) 11/12/2020    CALCIUM 9.7 11/12/2020     11/12/2020    K 3.8 11/12/2020    CO2 26.5 11/12/2020     11/12/2020    BUN 10 11/12/2020    CREATININE 0.73 11/12/2020    EGFRIFAFRI 106 09/25/2020    EGFRIFNONA 94 11/12/2020    BCR 13.7 11/12/2020    ANIONGAP 9.5 11/12/2020     Lab Results   Component Value Date    WBC 8.82 11/12/2020    HGB 13.7 11/12/2020 "    HCT 41.4 11/12/2020    MCV 91.2 11/12/2020     11/12/2020     Lab Results   Component Value Date    INR 1.31 (H) 04/25/2018    INR 1.0 06/16/2017    INR 1.1 06/02/2017    PROTIME 14.6 (H) 04/25/2018    PROTIME 11.4 11/13/2016     Lab Results   Component Value Date    TSH 1.210 09/25/2020    V4TSFTM 149 04/28/2017    R8JSXJZ 7.7 06/16/2017    THYROIDAB 10 06/16/2017         Assessment & Plan        Diagnoses and all orders for this visit:    1. Inappropriate sinus tachycardia (Primary)    2. Neurogenic orthostatic hypotension (CMS/HCC)    3. Palpitations    4.  Chronic anticoagulation         Diagnosis Plan   1. Inappropriate sinus tachycardia   initiate Corlanor as prescribed   2. Neurogenic orthostatic hypotension (CMS/HCC)   stable on current medical therapy   3. Palpitations  initiate Corlanor as prescribed   4.   Chronic anticoagulation for history of PE  continue Eliquis       Plan:      Ms. Marilu Godoy is a 30 y.o. female who presents to my electrophysiology clinic for follow up of her history of inappropriate sinus tachycardia.  Patient recently seen in EP follow-up with Benny Sheehan PA-C on 10/16/2020 with complaints of continued daily symptoms of sudden onset palpitations lasting up to an hour in duration.  Patient was subsequently ordered to be placed back on Corlanor therapy however upon presentation today patient reports that her insurance has not approved her medication.  On evaluation patient reports continued daily episodes of tachypalpitations that are up to 130 bpm according to her pulse monitor at home.  Patient's prior authorization checked in office today with noted approval by her insurance company a couple weeks ago.  Case reviewed with Dr. Washington in office today with instruction to begin Corlanor therapy as previously prescribed by STEVE Apodaca and follow-up with him in 6-6 weeks for reevaluation.      Follow Up: 6-8-week follow-up with Benny Sheehan PA-C      Thank you  for allowing me to participate in the care of your patient. Please to not hesitate to contact me with additional questions or concerns.        Electronically signed by ARNIE Vázquez, 12/14/20, 11:41 AM EST.

## 2020-12-22 DIAGNOSIS — J45.21 MILD INTERMITTENT ASTHMA WITH ACUTE EXACERBATION: Primary | ICD-10-CM

## 2021-01-13 ENCOUNTER — OFFICE VISIT (OUTPATIENT)
Dept: INTERNAL MEDICINE | Facility: CLINIC | Age: 31
End: 2021-01-13

## 2021-01-13 VITALS
WEIGHT: 207 LBS | SYSTOLIC BLOOD PRESSURE: 120 MMHG | BODY MASS INDEX: 36.68 KG/M2 | DIASTOLIC BLOOD PRESSURE: 70 MMHG | OXYGEN SATURATION: 95 % | HEIGHT: 63 IN | TEMPERATURE: 98.6 F | RESPIRATION RATE: 18 BRPM | HEART RATE: 92 BPM

## 2021-01-13 DIAGNOSIS — Z23 NEED FOR INFLUENZA VACCINATION: ICD-10-CM

## 2021-01-13 DIAGNOSIS — R79.9 ABNORMAL BLOOD CHEMISTRY: ICD-10-CM

## 2021-01-13 DIAGNOSIS — D68.51 HETEROZYGOUS FACTOR V LEIDEN MUTATION (HCC): ICD-10-CM

## 2021-01-13 DIAGNOSIS — R51.9 HEADACHE, TEMPORAL: ICD-10-CM

## 2021-01-13 DIAGNOSIS — R53.82 CHRONIC FATIGUE: ICD-10-CM

## 2021-01-13 DIAGNOSIS — E55.9 VITAMIN D DEFICIENCY: ICD-10-CM

## 2021-01-13 DIAGNOSIS — I47.1 PSVT (PAROXYSMAL SUPRAVENTRICULAR TACHYCARDIA) (HCC): ICD-10-CM

## 2021-01-13 DIAGNOSIS — M47.816 FACET ARTHRITIS OF LUMBAR REGION: ICD-10-CM

## 2021-01-13 DIAGNOSIS — M54.50 LUMBAR BACK PAIN: Primary | ICD-10-CM

## 2021-01-13 PROBLEM — E66.01 MORBIDLY OBESE (HCC): Status: RESOLVED | Noted: 2020-10-07 | Resolved: 2021-01-13

## 2021-01-13 PROCEDURE — 90471 IMMUNIZATION ADMIN: CPT | Performed by: FAMILY MEDICINE

## 2021-01-13 PROCEDURE — 90686 IIV4 VACC NO PRSV 0.5 ML IM: CPT | Performed by: FAMILY MEDICINE

## 2021-01-13 PROCEDURE — 99214 OFFICE O/P EST MOD 30 MIN: CPT | Performed by: FAMILY MEDICINE

## 2021-01-13 RX ORDER — LIDOCAINE 50 MG/G
2 PATCH TOPICAL EVERY 24 HOURS
Qty: 60 PATCH | Refills: 3 | Status: SHIPPED | OUTPATIENT
Start: 2021-01-13

## 2021-01-13 RX ORDER — CYCLOBENZAPRINE HCL 10 MG
10 TABLET ORAL 3 TIMES DAILY PRN
Qty: 90 TABLET | Refills: 5 | Status: SHIPPED | OUTPATIENT
Start: 2021-01-13 | End: 2021-06-22 | Stop reason: SDUPTHER

## 2021-01-13 RX ORDER — MINOCYCLINE HYDROCHLORIDE 50 MG/1
CAPSULE ORAL
COMMUNITY
Start: 2020-12-29 | End: 2021-03-01 | Stop reason: ALTCHOICE

## 2021-01-13 RX ORDER — METHOCARBAMOL 750 MG/1
TABLET, FILM COATED ORAL
Qty: 60 TABLET | Refills: 0 | Status: SHIPPED | OUTPATIENT
Start: 2021-01-13 | End: 2021-06-22

## 2021-01-13 RX ORDER — PREDNISONE 20 MG/1
TABLET ORAL
Qty: 18 TABLET | Refills: 0 | Status: SHIPPED | OUTPATIENT
Start: 2021-01-13 | End: 2021-02-08

## 2021-01-13 RX ORDER — IVABRADINE 5 MG/1
5 TABLET, FILM COATED ORAL 2 TIMES DAILY
COMMUNITY
Start: 2020-12-14 | End: 2021-07-19 | Stop reason: SDUPTHER

## 2021-01-13 NOTE — PROGRESS NOTES
"Subjective    Marilu Fiorella Godoy is a 30 y.o. female here for:  Chief Complaint   Patient presents with   • Back Pain     Pt states she has had back pain for awhile but it is getting better. Pain is achy and stays in the middle. Sometimes radiates to the sides. Worse when she sits.        History per MA reviewed.    Has had PT didn't help  Injections didn't help  TENS unit didn't help, even PT said it wasn't helping    Going to neurology tomorrow  Previous mri brain for ms work up negative  When she takes hot shower/bath she gets numbness/tingling  Having headache right side of head, temple area  Has not noticed jaw pain/fatigue with talking/chewing         The following portions of the patient's history were reviewed and updated as appropriate: allergies, current medications, past family history, past medical history, past social history, past surgical history and problem list.    Review of Systems   Constitutional: Positive for fatigue. Negative for fever.   Eyes: Positive for visual disturbance (blurry when gets hot).   Musculoskeletal: Positive for arthralgias and back pain.   Neurological: Positive for numbness (face) and headache (right side of head, temple).       Visit Vitals  /70   Pulse 92   Temp 98.6 °F (37 °C)   Resp 18   Ht 160 cm (62.99\")   Wt 93.9 kg (207 lb)   SpO2 95%   BMI 36.68 kg/m²         Objective   Physical Exam  Vitals signs and nursing note reviewed.   Constitutional:       General: She is not in acute distress.     Appearance: Normal appearance. She is well-developed and well-groomed. She is obese. She is not ill-appearing, toxic-appearing or diaphoretic.      Interventions: Face mask in place.   HENT:      Head: Normocephalic and atraumatic.      Comments: No tenderness to right temple     Right Ear: Hearing normal.      Left Ear: Hearing normal.   Eyes:      General: Lids are normal. No scleral icterus.     Extraocular Movements: Extraocular movements intact.   Neck:      Trachea: " Phonation normal.   Pulmonary:      Effort: Pulmonary effort is normal.   Musculoskeletal:      Lumbar back: She exhibits pain and spasm.   Skin:     Coloration: Skin is not jaundiced.   Neurological:      General: No focal deficit present.      Mental Status: She is alert and oriented to person, place, and time.      Motor: Motor function is intact.   Psychiatric:         Attention and Perception: Attention and perception normal.         Mood and Affect: Mood and affect normal.         Speech: Speech normal.         Behavior: Behavior normal. Behavior is cooperative.         Thought Content: Thought content normal.         Cognition and Memory: Cognition and memory normal.         Judgment: Judgment normal.         For medical decision making review of the following was required:  MRI Lumbar Spine Without Contrast (07/17/2019 10:23)   IMPRESSION:   1. Mild facet arthritis.   2. No significant canal or foraminal narrowing.  Component      Latest Ref Rng & Units 5/14/2019   HLA B27       Negative     Component      Latest Ref Rng & Units 6/2/2017 6/16/2017   RNP      EU/ml 15.7 107.5 (A)       Assessment/Plan     Problem List Items Addressed This Visit        Cardiac and Vasculature    PSVT (paroxysmal supraventricular tachycardia) (CMS/HCC)    Current Assessment & Plan     Heart rate staying in 80s on corgard per pt  Follow up with cardiology         Relevant Medications    Corlanor 5 MG tablet tablet       Coag and Thromboembolic    Heterozygous factor V Leiden mutation (CMS/HCC)    Current Assessment & Plan     Lifelong anticoagulation required due to clot history.  NSAIDs contraindicated.           Other Visit Diagnoses     Lumbar back pain    -  Primary    Relevant Medications    lidocaine (LIDODERM) 5 %    methocarbamol (ROBAXIN) 750 MG tablet    cyclobenzaprine (FLEXERIL) 10 MG tablet    predniSONE (DELTASONE) 20 MG tablet    Facet arthritis of lumbar region        Headache, temporal        Relevant Orders     Sedimentation Rate    Chronic fatigue        Relevant Orders    Vitamin D 25 Hydroxy    Abnormal blood chemistry        Relevant Orders    Myositis Panel III Plus    Vitamin D deficiency        Relevant Orders    Vitamin D 25 Hydroxy    Need for influenza vaccination        Relevant Orders    Fluarix Quad >6 Months (3314-4412) (Completed)            · See neurology as scheduled. Labs in, wait until neuro appointment, as may need labs for them as well, trying to avoid two lab draws  · Follow up with pulm/sleep regarding AMBAR, may need updated sleep study  · Discussed muscle relaxers, not to mix.   · Cannot take NSAIDs safely due to chronic anticoagulation  · May need referral back to pain management for facet arthritis. No xray today as it would not change treatment plans.    Deidre Barker MD

## 2021-01-13 NOTE — PATIENT INSTRUCTIONS
Chronic Back Pain  When back pain lasts longer than 3 months, it is called chronic back pain. The cause of your back pain may not be known. Some common causes include:  · Wear and tear (degenerative disease) of the bones, ligaments, or disks in your back.  · Inflammation and stiffness in your back (arthritis).  People who have chronic back pain often go through certain periods in which the pain is more intense (flare-ups). Many people can learn to manage the pain with home care.  Follow these instructions at home:  Pay attention to any changes in your symptoms. Take these actions to help with your pain:  Activity    · Avoid bending and other activities that make the problem worse.  · Maintain a proper position when standing or sitting:  ? When standing, keep your upper back and neck straight, with your shoulders pulled back. Avoid slouching.  ? When sitting, keep your back straight and relax your shoulders. Do not round your shoulders or pull them backward.  · Do not sit or  one place for long periods of time.  · Take brief periods of rest throughout the day. This will reduce your pain. Resting in a lying or standing position is usually better than sitting to rest.  · When you are resting for longer periods, mix in some mild activity or stretching between periods of rest. This will help to prevent stiffness and pain.  · Get regular exercise. Ask your health care provider what activities are safe for you.  · Do not lift anything that is heavier than 10 lb (4.5 kg). Always use proper lifting technique, which includes:  ? Bending your knees.  ? Keeping the load close to your body.  ? Avoiding twisting.  · Sleep on a firm mattress in a comfortable position. Try lying on your side with your knees slightly bent. If you lie on your back, put a pillow under your knees.  Managing pain  · If directed, apply ice to the painful area. Your health care provider may recommend applying ice during the first 24-48 hours after  a flare-up begins.  ? Put ice in a plastic bag.  ? Place a towel between your skin and the bag.  ? Leave the ice on for 20 minutes, 2-3 times per day.  · If directed, apply heat to the affected area as often as told by your health care provider. Use the heat source that your health care provider recommends, such as a moist heat pack or a heating pad.  ? Place a towel between your skin and the heat source.  ? Leave the heat on for 20-30 minutes.  ? Remove the heat if your skin turns bright red. This is especially important if you are unable to feel pain, heat, or cold. You may have a greater risk of getting burned.  · Try soaking in a warm tub.  · Take over-the-counter and prescription medicines only as told by your health care provider.  · Keep all follow-up visits as told by your health care provider. This is important.  Contact a health care provider if:  · You have pain that is not relieved with rest or medicine.  Get help right away if:  · You have weakness or numbness in one or both of your legs or feet.  · You have trouble controlling your bladder or your bowels.  · You have nausea or vomiting.  · You have pain in your abdomen.  · You have shortness of breath or you faint.  This information is not intended to replace advice given to you by your health care provider. Make sure you discuss any questions you have with your health care provider.  Document Revised: 04/09/2020 Document Reviewed: 06/27/2018  Elsevier Patient Education © 2020 [employer]

## 2021-01-14 ENCOUNTER — OFFICE VISIT (OUTPATIENT)
Dept: NEUROLOGY | Facility: CLINIC | Age: 31
End: 2021-01-14

## 2021-01-14 ENCOUNTER — LAB (OUTPATIENT)
Dept: LAB | Facility: HOSPITAL | Age: 31
End: 2021-01-14

## 2021-01-14 VITALS
OXYGEN SATURATION: 98 % | TEMPERATURE: 97.1 F | BODY MASS INDEX: 36.61 KG/M2 | DIASTOLIC BLOOD PRESSURE: 68 MMHG | WEIGHT: 206.6 LBS | HEART RATE: 68 BPM | SYSTOLIC BLOOD PRESSURE: 102 MMHG

## 2021-01-14 DIAGNOSIS — G43.009 MIGRAINE WITHOUT AURA AND WITHOUT STATUS MIGRAINOSUS, NOT INTRACTABLE: Primary | ICD-10-CM

## 2021-01-14 DIAGNOSIS — G43.009 MIGRAINE WITHOUT AURA AND WITHOUT STATUS MIGRAINOSUS, NOT INTRACTABLE: ICD-10-CM

## 2021-01-14 DIAGNOSIS — G47.19 EXCESSIVE DAYTIME SLEEPINESS: ICD-10-CM

## 2021-01-14 LAB
25(OH)D3 SERPL-MCNC: 31.8 NG/ML (ref 30–100)
CRP SERPL-MCNC: 0.34 MG/DL (ref 0–0.5)
ERYTHROCYTE [SEDIMENTATION RATE] IN BLOOD: 19 MM/HR (ref 0–20)
FOLATE SERPL-MCNC: 6.57 NG/ML (ref 4.78–24.2)

## 2021-01-14 PROCEDURE — 82306 VITAMIN D 25 HYDROXY: CPT | Performed by: FAMILY MEDICINE

## 2021-01-14 PROCEDURE — 36415 COLL VENOUS BLD VENIPUNCTURE: CPT | Performed by: FAMILY MEDICINE

## 2021-01-14 PROCEDURE — 99214 OFFICE O/P EST MOD 30 MIN: CPT | Performed by: NURSE PRACTITIONER

## 2021-01-14 PROCEDURE — 83516 IMMUNOASSAY NONANTIBODY: CPT | Performed by: FAMILY MEDICINE

## 2021-01-14 PROCEDURE — 86140 C-REACTIVE PROTEIN: CPT

## 2021-01-14 PROCEDURE — 83921 ORGANIC ACID SINGLE QUANT: CPT

## 2021-01-14 PROCEDURE — 82746 ASSAY OF FOLIC ACID SERUM: CPT

## 2021-01-14 PROCEDURE — 86235 NUCLEAR ANTIGEN ANTIBODY: CPT | Performed by: FAMILY MEDICINE

## 2021-01-14 PROCEDURE — 85652 RBC SED RATE AUTOMATED: CPT | Performed by: FAMILY MEDICINE

## 2021-01-14 PROCEDURE — 83520 IMMUNOASSAY QUANT NOS NONAB: CPT | Performed by: FAMILY MEDICINE

## 2021-01-14 RX ORDER — MAGNESIUM OXIDE 400 MG/1
400 TABLET ORAL EVERY EVENING
Qty: 90 TABLET | Refills: 1 | Status: SHIPPED | OUTPATIENT
Start: 2021-01-14 | End: 2021-02-08

## 2021-01-14 RX ORDER — VITAMIN B COMPLEX
1 CAPSULE ORAL DAILY
Qty: 90 CAPSULE | Refills: 3 | Status: SHIPPED | OUTPATIENT
Start: 2021-01-14 | End: 2022-01-14

## 2021-01-14 RX ORDER — TOPIRAMATE 25 MG/1
TABLET ORAL
Qty: 70 TABLET | Refills: 0 | Status: SHIPPED | OUTPATIENT
Start: 2021-01-14 | End: 2021-03-01 | Stop reason: SDUPTHER

## 2021-01-14 NOTE — PATIENT INSTRUCTIONS
You will begin topiramate on a gradually adjusting dose schedule. The first week, you will take one pill at bedtime. The second week, you will take one pill in the morning and one pill at bedtime. The third week, you will take one pill in the morning and two pills at bedtime. The fourth week, you will take two pills in the morning and two at bedtime. Please call the office when you are ready for a refill.     You have been given samples of an as-needed migraine medication called Ubrelvy. You may take one pill at the start of the migraine. If this doesn't help enough, you may take 1 more tablet after 2 hours. Do not take more than 2 tablets in 24 hours.    Please begin taking a B complex vitamin every morning, and magnesium every evening.  Recurrent Migraine Headache  A migraine headache is very bad, throbbing pain that is usually on one side of your head. Recurrent migraines keep coming back (recurring). Talk with your doctor about what things may bring on (trigger) your migraine headaches.  Follow these instructions at home:  Medicines  · Take over-the-counter and prescription medicines only as told by your doctor.  · Do not drive or use heavy machinery while taking prescription pain medicine.  Lifestyle  · Do not use any products that contain nicotine or tobacco, such as cigarettes and e-cigarettes. If you need help quitting, ask your doctor.  · Limit alcohol intake to no more than 1 drink a day for nonpregnant women and 2 drinks a day for men. One drink equals 12 oz of beer, 5 oz of wine, or 1½ oz of hard liquor.  · Get 7-9 hours of sleep each night.  · Lessen any stress in your life. Ask your doctor about ways to lower your stress.  · Stay at a healthy weight. Talk with your doctor if you need help losing weight.  · Get regular exercise.  General instructions    · Keep a journal to find out if certain things bring on migraine headaches. For example, write down:  ? What you eat and drink.  ? How much sleep you  get.  ? Any change to your diet or medicines.  · Lie down in a dark, quiet room when you have a migraine.  · Try placing a cool towel over your head when you have a migraine.  · Keep lights dim if bright lights bother you or make your migraines worse.  · Keep all follow-up visits as told by your doctor. This is important.  Contact a doctor if:  · Medicine does not help your migraines.  · Your pain keeps coming back.  · You have a fever.  · You have weight loss without trying.  Get help right away if:  · Your migraine becomes really bad and medicine does not help.  · You have a stiff neck.  · You have trouble seeing.  · Your muscles are weak or you lose control of your muscles.  · You lose your balance or have trouble walking.  · You feel like you will pass out (faint) or you pass out.  · You have really bad symptoms that are different than your first symptoms.  · You start having sudden, very bad headaches that last for one second or less, like a thunderclap.  Summary  · A migraine headache is very bad, throbbing pain that is usually on one side of your head.  · Talk with your doctor about what things may bring on (trigger) your migraine headaches.  · Take over-the-counter and prescription medicines only as told by your doctor.  · Lie down in a dark, quiet room when you have a migraine.  · Keep a journal about what you eat and drink, how much sleep you get, and any changes to your medicines. This can help you find out if certain things make you have migraine headaches.  This information is not intended to replace advice given to you by your health care provider. Make sure you discuss any questions you have with your health care provider.  Document Revised: 12/21/2018 Document Reviewed: 11/10/2017  Elsevier Patient Education © 2020 [employer]

## 2021-01-14 NOTE — PROGRESS NOTES
New Neurology Patient Office Visit      Patient Name: Marilu Godoy    Referring Physician: Mirlande Ordaz, *    Chief Complaint:    Chief Complaint   Patient presents with   • Consult     NP, in office today for migraine treatment.  Patient states she has had migrines for 3 to 4 months and getting worse.       History of Present Illness: Marilu Godoy is a 30 y.o. female who is here today to establish care with Neurology.  She has been experiencing increasing headaches.  Age onset: early 20's, worse over the past 3-4 months  MHD/week: daily, sometimes multiple times per day  HAD/week: every day  MH: RS, pounding, photophobia, has to lay down in dark room  Worsening factors: none identified  No correlation with menses  She has undergone tubal ligation  She reports that she was told that because she experiences pain behind her eye, she may have a condition that could lead to blindness.  No recent steroid use or visual deficits.  She expresses concern that she may have multiple sclerosis because she experiences flushing and tingling during hot showers.   She also states that she has 'a little bit' of sleep apnea.    The following portions of the patient's history were reviewed and updated as appropriate: allergies, current medications, past family history, past medical history, past social history, past surgical history and problem list.    Subjective      Review of Systems:   Review of Systems   Constitutional: Negative for activity change and fatigue.   HENT: Negative for drooling and voice change.    Eyes: Positive for blurred vision and visual disturbance. Negative for double vision and photophobia.   Respiratory: Negative for shortness of breath.    Cardiovascular: Negative for chest pain and palpitations.   Gastrointestinal: Positive for nausea. Negative for vomiting.   Genitourinary: Negative for urinary incontinence.   Musculoskeletal: Negative for arthralgias, back pain, gait problem,  myalgias and neck pain.   Allergic/Immunologic: Negative for immunocompromised state.   Neurological: Positive for weakness, numbness and headache. Negative for dizziness, tremors, seizures, syncope, facial asymmetry, speech difficulty, light-headedness, memory problem and confusion.   Hematological: Does not bruise/bleed easily.   Psychiatric/Behavioral: Positive for sleep disturbance. Negative for decreased concentration, dysphoric mood, hallucinations, depressed mood and stress. The patient is not nervous/anxious.      Past Medical History:   Past Medical History:   Diagnosis Date   • Abdominal pain    • Abnormal Pap smear of cervix    • Anxiety    • Asthma    • Bleeding disorder (CMS/[facility]) 16   • Body piercing     TONGUE, NOSE, TWO IN EACH EAR   • Chest pain    • Clotting disorder (CMS/[facility])     factor 5   • Constipation    • Coronary artery disease involving native coronary artery of native heart without angina pectoris 2017   • Deep vein thrombosis (CMS/[facility]) 16   • Diarrhea    • Diarrhea    • Endometriosis    • Factor 5 Leiden mutation, heterozygous (CMS/[facility])    • Fibroid    • Gallstone    • GERD (gastroesophageal reflux disease)    • H/O blood clots    • Headache    • Hiatal hernia    • High cholesterol    • History of recurrent UTIs    • HPV (human papilloma virus) infection    • Hypertension    • Kidney infection    • Migraine    • Nausea    • Nausea & vomiting    • Obesity    • Orthostatic hypotension    • Osteoarthritis    • Ovarian cyst    • Pollen allergies    • Protein S deficiency (CMS/[facility]) 2016    labs from hospitalization for PE   • Pulmonary embolism (CMS/[facility])    • Recurrent pregnancy loss, antepartum condition or complication    • Sleep apnea     CPAP ASKED TO BRING DOS   • Subclinical hyperthyroidism    • Tachycardia    • Tattoo     LOWER BACK   • Varicella        Past Surgical History:   Past Surgical History:   Procedure Laterality Date   •  SECTION      X2   and    •  SECTION WITH TUBAL N/A 1/15/2019    Procedure:  SECTION REPEAT WITH TUBAL;  Surgeon: Alva Boyer MD;  Location: [facility] LABOR DELIVERY;  Service: Obstetrics/Gynecology   • CHOLECYSTECTOMY     • COLONOSCOPY N/A 2017    Procedure: COLONOSCOPY WITH BIOPSIES AND ARGON THERMAL ABLATION;  Surgeon: Jignesh Selby MD;  Location: [facility] ENDOSCOPY;  Service:    • DIAGNOSTIC LAPAROSCOPY N/A 2018    Procedure: DIAGNOSTIC LAPAROSCOPY AND ABLATION OF ENDOMETRIOSIS;  Surgeon: Evin Zamudio MD;  Location: [facility] OR;  Service: Obstetrics/Gynecology   • ENDOMETRIAL ABLATION     • ENDOSCOPY N/A 2017    Procedure: ESOPHAGOGASTRODUODENOSCOPY WITH BIOPSIES AND COLD BIOPSY POLYPECTOMIES;  Surgeon: Jignesh Selby MD;  Location: [facility] ENDOSCOPY;  Service:    • PELVIC LAPAROSCOPY         Family History:   Family History   Problem Relation Age of Onset   • Arthritis Mother    • COPD Mother    • Asthma Mother    • Thyroid disease Mother    • Arthritis Father    • Diabetes Father    • Hypertension Father    • Hyperlipidemia Father    • Kidney disease Father    • Heart attack Father    • Coronary artery disease Father    • Dementia Father    • No Known Problems Son    • Colon cancer Neg Hx    • Liver cancer Neg Hx    • Liver disease Neg Hx    • Stomach cancer Neg Hx    • Esophageal cancer Neg Hx      Social History:   Social History     Socioeconomic History   • Marital status:      Spouse name: Not on file   • Number of children: Not on file   • Years of education: Not on file   • Highest education level: Not on file   Tobacco Use   • Smoking status: Never Smoker   • Smokeless tobacco: Never Used   Substance and Sexual Activity   • Alcohol use: No   • Drug use: No   • Sexual activity: Yes     Partners: Male     Birth control/protection: None     Comment: PE while on birth control patch       Medications:     Current Outpatient Medications:   •  albuterol sulfate  (90  Base) MCG/ACT inhaler, Inhale 2 puffs Every 4 (Four) Hours As Needed for Wheezing or Shortness of Air., Disp: 18 g, Rfl: 11  •  apixaban (ELIQUIS) 5 MG tablet tablet, Take 1 tablet by mouth 2 (Two) Times a Day., Disp: 60 tablet, Rfl: 5  •  aspirin 81 MG EC tablet, Take 81 mg by mouth Daily., Disp: , Rfl:   •  atorvastatin (LIPITOR) 40 MG tablet, Take 1 tablet by mouth Daily., Disp: 30 tablet, Rfl: 11  •  benzonatate (TESSALON) 200 MG capsule, Take 1 capsule by mouth 3 (Three) Times a Day As Needed for Cough., Disp: 30 capsule, Rfl: 0  •  Cholecalciferol (Vitamin D3) 1.25 MG (33246 UT) capsule, Take 1 capsule by mouth Every 7 (Seven) Days., Disp: 12 capsule, Rfl: 1  •  Corlanor 5 MG tablet tablet, TAKE 1/2 TABLET BY MOUTH TWO TIMES A DAY WITH MEALS FOR 2 WEEKS, THEN INCREASE TO 1 TABLET 2 TIMES A DAY AS DIRECTED, Disp: , Rfl:   •  cyclobenzaprine (FLEXERIL) 10 MG tablet, Take 1 tablet by mouth 3 (Three) Times a Day As Needed for Muscle Spasms., Disp: 90 tablet, Rfl: 5  •  Diclofenac Sodium (VOLTAREN) 1 % gel gel, diclofenac 1 % topical gel, Disp: , Rfl:   •  DULoxetine (CYMBALTA) 60 MG capsule, Take 1 capsule by mouth Daily., Disp: 90 capsule, Rfl: 3  •  Fluticasone Furoate-Vilanterol (Breo Ellipta) 200-25 MCG/INH inhaler, Inhale 1 puff Daily., Disp: 60 each, Rfl: 11  •  lidocaine (LIDODERM) 5 %, Place 2 patches on the skin as directed by provider Daily. Remove & Discard patch within 12 hours or as directed by MD, Disp: 60 patch, Rfl: 3  •  methocarbamol (ROBAXIN) 750 MG tablet, TAKE 2 TAB PO UP TO 4 TIMES A DAY FOR NO MORE THAN 3 DAYS, THEN 1 TAB PO UP TO 4 TIMES A DAY AS NEEDED FOR SPASM., Disp: 60 tablet, Rfl: 0  •  minocycline (MINOCIN,DYNACIN) 50 MG capsule, , Disp: , Rfl:   •  ondansetron ODT (ZOFRAN-ODT) 4 MG disintegrating tablet, Place 1 tablet on the tongue Every 6 (Six) Hours As Needed for Nausea or Vomiting., Disp: 10 tablet, Rfl: 0  •  predniSONE (DELTASONE) 20 MG tablet, 3 TAB PO QDAY X 3 DAYS, 2 TAB PO  QDAY X 3 DAYS, 1 TAB PO QDAY X 2 DAYS, 1/2 TAB PO QDAY X 2 DAYS., Disp: 18 tablet, Rfl: 0  •  b complex vitamins capsule, Take 1 capsule by mouth Daily., Disp: 90 capsule, Rfl: 3  •  magnesium oxide (MAG-OX) 400 MG tablet, Take 1 tablet by mouth Every Evening., Disp: 90 tablet, Rfl: 1  •  topiramate (Topamax) 25 MG tablet, Week 1: 1 tab night Week 2: 1 tab AM, 1 tab PM Week 3: 1 tab AM, 2 tabs PM Week 4: 2 tabs twice daily, Disp: 70 tablet, Rfl: 0    Allergies:   Allergies   Allergen Reactions   • Imitrex [Sumatriptan] Shortness Of Breath and Nausea Only   • Ceftin [Cefuroxime Axetil] Other (See Comments)     Numbness in mouth and throat   • Amoxicillin Rash       Objective     Physical Exam:  Vital Signs:   Vitals:    01/14/21 1051   BP: 102/68   BP Location: Left arm   Patient Position: Sitting   Cuff Size: Adult   Pulse: 68   Temp: 97.1 °F (36.2 °C)   TempSrc: Temporal   SpO2: 98%   Weight: 93.7 kg (206 lb 9.6 oz)   PainSc: 0-No pain     Physical Exam  Vitals signs and nursing note reviewed.   Constitutional:       Appearance: She is morbidly obese.   HENT:      Head:      Jaw: No tenderness or pain on movement.      Comments:   Right temporal area not TTP  Eyes:      Extraocular Movements: EOM normal.      Pupils: Pupils are equal, round, and reactive to light.   Neurological:      Mental Status: She is oriented to person, place, and time.      Coordination: Romberg Test normal.      Gait: Gait is intact. Tandem walk normal.      Deep Tendon Reflexes: Strength normal.      Reflex Scores:       Bicep reflexes are 1+ on the right side and 1+ on the left side.       Patellar reflexes are 1+ on the right side and 1+ on the left side.  Psychiatric:         Speech: Speech normal.       Neurologic Exam     Mental Status   Oriented to person, place, and time.   Attention: normal. Concentration: normal.   Speech: speech is normal   Level of consciousness: alert  Knowledge: consistent with education.     Cranial Nerves      CN II   Visual fields full to confrontation.   Visual acuity: normal    CN III, IV, VI   Pupils are equal, round, and reactive to light.  Extraocular motions are normal.   Right pupil: Shape: regular. Reactivity: brisk.   Left pupil: Shape: regular. Reactivity: brisk.   Diplopia: none  Ophthalmoparesis: none  Upgaze: normal  Downgaze: normal    CN V   Facial sensation intact.     CN VII   Facial expression full, symmetric.     CN VIII   CN VIII normal.     CN IX, X   CN IX normal.   CN X normal.     CN XI   CN XI normal.     CN XII   CN XII normal.     Motor Exam   Muscle bulk: normal  Overall muscle tone: normal    Strength   Strength 5/5 throughout.     Sensory Exam   Light touch normal.     Gait, Coordination, and Reflexes     Gait  Gait: normal    Coordination   Romberg: negative  Tandem walking coordination: normal    Tremor   Resting tremor: absent    Reflexes   Right biceps: 1+  Left biceps: 1+  Right patellar: 1+  Left patellar: 1+  Right plantar: normal  Left plantar: normal    PROCEDURE: US CAROTID BILATERAL 11/27/2020   HISTORY: frequent headache, dizziness and numbness/tingling right side  of face; Factor V Leiden; G43.911-Migraine, unspecified, intractable,  with status migrainosus; R42-Dizziness and giddiness; H53.149-Visual  discomfort, unspecified; R20.0-Anesthesia of skin; R20.2-Paresthesia of  skin     Technique: Real-time imaging was performed of the extracranial carotid  arteries in transverse and longitudinal planes, with color duplex  evaluation of blood flow velocity. Spectral analysis was performed. The  cervicovertebral arteries were also examined. Stenosis evaluation is  based on validated velocity criteria.     Right carotid system:  CCA: 112 cm/s  ICA: 103 cm/s  ECA: 135 cm/s  Vertebral artery: Antegrade  ICA/CCA ratio: 0.92  No visible plaque is identified at the bifurcation.     Left carotid system:  CCA: 105 cm/s  ICA: 132 cm/s  ECA: 123 cm/s  Vertebral artery: Antegrade  ICA/CCA  ratio: 1.26  No visible plaque is identified at the bifurcation.        IMPRESSION:  Normal carotid artery duplex.    PROCEDURE: CT HEAD WO CONTRAST 11/16/2020   HISTORY: Headache, intracranial hemorrhage suspected     COMPARISON:  None .     TECHNIQUE: Multiple axial CT images were performed from the foramen  magnum to the vertex without enhancement.      FINDINGS: There is no CT evidence of hemorrhage. There is no mass, mass  effect or midline shift.  There is no hydrocephalus. The paranasal  sinuses are clear. Bone windows reveal no acute osseous abnormalities.     IMPRESSION:  No acute intracranial process.    PROCEDURE: MRI BRAIN W WO CONTRAST 4/22/2019   HISTORY: Multiple sclerosis, new neurological event; G35-Multiple  sclerosis     PROCEDURE: Multiplanar multisequence imaging of the brain was performed  both before and following the administration of 15 mL MultiHance  intravenous contrast.     COMPARISON: None.     FINDINGS: There are no significant white matter abnormalities. There is  no mass, mass effect or midline shift. There is no hydrocephalus. There  are no areas of restricted diffusion. There is no pathologic contrast  enhancement.     The midbrain, jenelle, cerebellum and craniocervical junction are  unremarkable. The sella and pituitary gland are within normal limits.  The major intracranial vasculature demonstrates the expected flow  related signal. The paranasal sinuses are clear.     IMPRESSION:  Unremarkable MRI of the brain.      Results Review:   I reviewed the patient's new clinical results.  I have reviewed the patient's other medical records to include, labs, radiology and referrals.   I reviewed the patient's other test results and agree with the interpretation  Extensive review of previous imaging, neurology records    Assessment / Plan      Assessment/Plan:   Diagnoses and all orders for this visit:    1. Migraine without aura and without status migrainosus, not intractable (Primary)  -    "  Methylmalonic Acid, Serum; Future  -     Folate; Future  -     C-reactive Protein; Future  -     topiramate (Topamax) 25 MG tablet; Week 1: 1 tab night Week 2: 1 tab AM, 1 tab PM Week 3: 1 tab AM, 2 tabs PM Week 4: 2 tabs twice daily  Dispense: 70 tablet; Refill: 0  -     b complex vitamins capsule; Take 1 capsule by mouth Daily.  Dispense: 90 capsule; Refill: 3  -     magnesium oxide (MAG-OX) 400 MG tablet; Take 1 tablet by mouth Every Evening.  Dispense: 90 tablet; Refill: 1  -     Home Sleep Study; Future   Patient has many neurologic complaints today.  Most pronounced to her headaches, which she describes as right-sided, retro-orbital, with occasional associated numbness and tingling.  She is very concerned because apparently she was told that she may have MS because she experiences flushing and tingling with hot showers.  I have explained to her that her previous brain MRI was negative for any white matter lesions, and not reflective of MS.  She has no focal deficits on exam today.  No abnormality of external carotid which would be contributing to facial numbness. Her symptoms seem consistent with migraine with aura, and I have discussed this with patient today.  She is again asking if she could have \"a condition leading to blindness\" (?TA) because she experiences retro-orbital pain.  She does not have any jaw claudication or tenderness on palpation today, awaiting inflammatory markers but I have reassured her that this type of pain is quite common in migraine.  Temporal arteritis is extremely unlikely given patient's age (under 50), lack of visual deficit, absence of jaw claudication, or tenderness on palpation.  Denies history of kidney stone, and has undergone tubal ligation. She will begin topiramate on adjusted dosing schedule which was reviewed with patient and provided in writing.  I have ordered additional labs to rule out B vitamin deficiency which may be can contributing to headaches as well as CRP " "to assess for inflammatory process.  She will also begin daily B complex and magnesium supplementation.  During interview, patient mentions that she had previous home sleep study which showed \"a little bit\" of sleep apnea.  Given body habitus, fatigue, and chronic headaches I have ordered home sleep test to assess for sleep apnea.  I counseled patient that PSG is typically more accurate, especially given her multiple health problems but she states this would be difficult as she has 3 young children.     2. Excessive daytime sleepiness  -     Home Sleep Study; Future    Patient endorses significant fatigue and persistent headaches on exam today, her BMI is 36 and she does endorse snoring.  I have ordered home sleep test to assess for sleep apnea.    Follow Up:   Return in about 6 weeks (around 2/25/2021) for Next scheduled follow up.    ARNIE Bower  [facility]   AS THE PROVIDER, I PERSONALLY WORE PPE DURING ENTIRE FACE TO FACE ENCOUNTER IN CLINIC WITH THE PATIENT. PATIENT ALSO WORE PPE DURING ENTIRE FACE TO FACE ENCOUNTER EXCEPT FOR A MAX OF 30 SECONDS DURING NEUROLOGICAL EVALUATION OF CRANIAL NERVES AND THEN MASK WAS PLACED BACK OVER PATIENT FACE FOR REMAINDER OF VISIT. I WASHED MY HANDS BEFORE AND AFTER VISIT.    Please note that portions of this note may have been completed with a voice recognition program. Efforts were made to edit the dictations, but occasionally words are mistranscribed.  "

## 2021-01-15 RX ORDER — ACETAMINOPHEN 160 MG
2000 TABLET,DISINTEGRATING ORAL DAILY
Qty: 90 CAPSULE | Refills: 3 | Status: SHIPPED | OUTPATIENT
Start: 2021-01-15 | End: 2022-05-12

## 2021-01-23 LAB
Lab: NORMAL
METHYLMALONATE SERPL-SCNC: 190 NMOL/L (ref 0–378)

## 2021-01-25 ENCOUNTER — HOSPITAL ENCOUNTER (OUTPATIENT)
Dept: SLEEP MEDICINE | Facility: HOSPITAL | Age: 31
Discharge: HOME | End: 2021-01-25
Admitting: NURSE PRACTITIONER

## 2021-01-25 DIAGNOSIS — G47.19 EXCESSIVE DAYTIME SLEEPINESS: ICD-10-CM

## 2021-01-25 DIAGNOSIS — G43.009 MIGRAINE WITHOUT AURA AND WITHOUT STATUS MIGRAINOSUS, NOT INTRACTABLE: ICD-10-CM

## 2021-01-25 LAB
EJ AB SER QL: NEGATIVE
ENA JO1 AB SER IA-ACNC: <20 UNITS
ENA PM/SCL AB SER-ACNC: <20 UNITS
ENA SS-A 52KD IGG SER IA-ACNC: <20 UNITS
FIBRILLARIN AB SER QL: NEGATIVE
KU AB SER QL: NEGATIVE
MDA5 AB SER LINE BLOT-ACNC: <20 UNITS
MI2 AB SER QL: NEGATIVE
MJ AB SER LINE BLOT-ACNC: <20 UNITS
OJ AB SER QL: NEGATIVE
PL12 AB SER QL: NEGATIVE
PL7 AB SER QL: NEGATIVE
SAE1 IGG SER QL LINE BLOT: <20 UNITS
SRP AB SERPL QL: NEGATIVE
TIF1-GAMMA AB SER LINE BLOT-ACNC: <20 UNITS
U1 SNRNP AB SER IA-ACNC: <20 UNITS
U2 SNRNP AB SER QL: NEGATIVE

## 2021-01-25 PROCEDURE — 95806 SLEEP STUDY UNATT&RESP EFFT: CPT

## 2021-01-25 PROCEDURE — 95806 SLEEP STUDY UNATT&RESP EFFT: CPT | Performed by: INTERNAL MEDICINE

## 2021-02-08 ENCOUNTER — OFFICE VISIT (OUTPATIENT)
Dept: CARDIOLOGY | Facility: CLINIC | Age: 31
End: 2021-02-08

## 2021-02-08 VITALS
TEMPERATURE: 97.8 F | DIASTOLIC BLOOD PRESSURE: 84 MMHG | OXYGEN SATURATION: 97 % | HEIGHT: 63 IN | WEIGHT: 206 LBS | BODY MASS INDEX: 36.5 KG/M2 | HEART RATE: 98 BPM | SYSTOLIC BLOOD PRESSURE: 130 MMHG

## 2021-02-08 DIAGNOSIS — E66.01 SEVERE OBESITY (BMI 35.0-39.9) WITH COMORBIDITY (HCC): ICD-10-CM

## 2021-02-08 DIAGNOSIS — G47.33 OBSTRUCTIVE SLEEP APNEA: Primary | ICD-10-CM

## 2021-02-08 DIAGNOSIS — R00.0 INAPPROPRIATE SINUS TACHYCARDIA: ICD-10-CM

## 2021-02-08 DIAGNOSIS — Z79.01 CHRONIC ANTICOAGULATION: ICD-10-CM

## 2021-02-08 PROBLEM — I47.1 PSVT (PAROXYSMAL SUPRAVENTRICULAR TACHYCARDIA): Status: RESOLVED | Noted: 2020-08-26 | Resolved: 2021-02-08

## 2021-02-08 PROBLEM — I47.10 PSVT (PAROXYSMAL SUPRAVENTRICULAR TACHYCARDIA): Status: RESOLVED | Noted: 2020-08-26 | Resolved: 2021-02-08

## 2021-02-08 PROBLEM — I47.11 INAPPROPRIATE SINUS TACHYCARDIA: Status: ACTIVE | Noted: 2021-02-08

## 2021-02-08 PROCEDURE — 99213 OFFICE O/P EST LOW 20 MIN: CPT | Performed by: INTERNAL MEDICINE

## 2021-02-08 NOTE — PROGRESS NOTES
Cardiac Electrophysiology Outpatient Follow Up Note            [facility]    Follow Up Office Visit      Marilu Godoy  [id number]  02/08/2021  [unfilled]  [unfilled]    Primary Care Physician: Deidre Barker MD    Referred By: No ref. provider found    Subjective     Chief Complaint:   Diagnoses and all orders for this visit:    1. Obstructive sleep apnea (Primary)    2. Inappropriate sinus tachycardia    3. Chronic anticoagulation    4. Severe obesity (BMI 35.0-39.9) with comorbidity (CMS/HCC)      Chief Complaint   Patient presents with   • Rapid Heart Rate       History of Present Illness:   Mariul Godoy is a 30 y.o. female who presents to my electrophysiology clinic for follow up of the above complaints.  She is very pleased with ivabradine.  This is tremendously helped her symptoms of palpitations.  The medication is cost effective for her and really she has no side effects that she can identify.    She has been diagnosed with sleep apnea.  She is about to get her CPAP.  She is happy about this.    No chest pain nausea vomit fevers or chills..      Review of Systems:   Constitutional: No fevers or chills, no recent weight gain or weight loss or fatigue  Eyes: No visual loss, blurred vision, double vision, yellow sclerae.  ENT: No headaches, hearing loss, vertigo, congestion or sore throat.   Cardiovascular: Per HPI  Respiratory: No cough or wheezing, no sputum production, no hematemesis   Gastrointestinal: No abdominal pain, no nausea, vomiting, constipation, diarrhea, melena.   Genitourinary: No dysuria, hematuria or increased frequency.  Musculoskeletal:  No gait disturbance, weakness or joint pain or stiffness  Integumentary: No rashes, urticaria, ulcers or sores.   Neurological: No headache, dizziness, syncope, paralysis, ataxia, no prior CVA/TIA  Psychiatric: No anxiety, or depression  Endocrine: No diaphoresis, cold or heat  intolerance. No polyuria or polydipsia.   Hematologic/Lymphatic: No anemia, abnormal bruising or bleeding. No history of DVT/PE.      Past Medical History:   Past Medical History:   Diagnosis Date   • Abdominal pain    • Abnormal Pap smear of cervix    • Anxiety    • Asthma    • Bleeding disorder (CMS/[facility]) 16   • Body piercing     TONGUE, NOSE, TWO IN EACH EAR   • Chest pain    • Clotting disorder (CMS/[facility])     factor 5   • Constipation    • Coronary artery disease involving native coronary artery of native heart without angina pectoris 2017   • Deep vein thrombosis (CMS/[facility]) 16   • Diarrhea    • Diarrhea    • Endometriosis    • Factor 5 Leiden mutation, heterozygous (CMS/[facility])    • Fibroid    • Gallstone    • GERD (gastroesophageal reflux disease)    • H/O blood clots    • Headache    • Hiatal hernia    • High cholesterol    • History of recurrent UTIs    • HPV (human papilloma virus) infection    • Hypertension    • Kidney infection    • Migraine    • Nausea    • Nausea & vomiting    • Obesity    • Orthostatic hypotension    • Osteoarthritis    • Ovarian cyst    • Pollen allergies    • Protein S deficiency (CMS/HCC) 2016    labs from hospitalization for PE   • Pulmonary embolism (CMS/HCC)    • Recurrent pregnancy loss, antepartum condition or complication    • Sleep apnea     CPAP ASKED TO BRING DOS   • Subclinical hyperthyroidism    • Tachycardia    • Tattoo     LOWER BACK   • Varicella        Past Surgical History:   Past Surgical History:   Procedure Laterality Date   •  SECTION      X2  and    •  SECTION WITH TUBAL N/A 1/15/2019    Procedure:  SECTION REPEAT WITH TUBAL;  Surgeon: Alva Boyer MD;  Location: [facility] LABOR DELIVERY;  Service: Obstetrics/Gynecology   • CHOLECYSTECTOMY     • COLONOSCOPY N/A 2017    Procedure: COLONOSCOPY WITH BIOPSIES AND ARGON THERMAL ABLATION;  Surgeon: Jignesh Selby MD;  Location: [facility] ENDOSCOPY;  Service:     • DIAGNOSTIC LAPAROSCOPY N/A 4/2/2018    Procedure: DIAGNOSTIC LAPAROSCOPY AND ABLATION OF ENDOMETRIOSIS;  Surgeon: Evin Zamudio MD;  Location: [facility] OR;  Service: Obstetrics/Gynecology   • ENDOMETRIAL ABLATION     • ENDOSCOPY N/A 4/20/2017    Procedure: ESOPHAGOGASTRODUODENOSCOPY WITH BIOPSIES AND COLD BIOPSY POLYPECTOMIES;  Surgeon: Jignesh Selby MD;  Location: [facility] ENDOSCOPY;  Service:    • PELVIC LAPAROSCOPY         Family History:   Family History   Problem Relation Age of Onset   • Arthritis Mother    • COPD Mother    • Asthma Mother    • Thyroid disease Mother    • Arthritis Father    • Diabetes Father    • Hypertension Father    • Hyperlipidemia Father    • Kidney disease Father    • Heart attack Father    • Coronary artery disease Father    • Dementia Father    • No Known Problems Son    • Colon cancer Neg Hx    • Liver cancer Neg Hx    • Liver disease Neg Hx    • Stomach cancer Neg Hx    • Esophageal cancer Neg Hx        Social History:   Social History     Socioeconomic History   • Marital status:      Spouse name: Not on file   • Number of children: Not on file   • Years of education: Not on file   • Highest education level: Not on file   Tobacco Use   • Smoking status: Never Smoker   • Smokeless tobacco: Never Used   Substance and Sexual Activity   • Alcohol use: No   • Drug use: No   • Sexual activity: Yes     Partners: Male     Birth control/protection: None     Comment: PE while on birth control patch       Medications:     Current Outpatient Medications:   •  albuterol sulfate  (90 Base) MCG/ACT inhaler, Inhale 2 puffs Every 4 (Four) Hours As Needed for Wheezing or Shortness of Air., Disp: 18 g, Rfl: 11  •  apixaban (ELIQUIS) 5 MG tablet tablet, Take 1 tablet by mouth 2 (Two) Times a Day., Disp: 60 tablet, Rfl: 5  •  aspirin 81 MG EC tablet, Take 81 mg by mouth Daily., Disp: , Rfl:   •  atorvastatin (LIPITOR) 40 MG tablet, Take 1 tablet by mouth Daily., Disp: 30  tablet, Rfl: 11  •  b complex vitamins capsule, Take 1 capsule by mouth Daily., Disp: 90 capsule, Rfl: 3  •  benzonatate (TESSALON) 200 MG capsule, Take 1 capsule by mouth 3 (Three) Times a Day As Needed for Cough., Disp: 30 capsule, Rfl: 0  •  Cholecalciferol (Vitamin D3) 1.25 MG (78768 UT) capsule, Take 1 capsule by mouth Every 7 (Seven) Days., Disp: 12 capsule, Rfl: 1  •  Cholecalciferol (Vitamin D3) 50 MCG (2000 UT) capsule, Take 1 capsule by mouth Daily. Start after completing 50,000 IU weekly dose.  Indications: Vitamin D Deficiency, Disp: 90 capsule, Rfl: 3  •  Corlanor 5 MG tablet tablet, TAKE 1/2 TABLET BY MOUTH TWO TIMES A DAY WITH MEALS FOR 2 WEEKS, THEN INCREASE TO 1 TABLET 2 TIMES A DAY AS DIRECTED, Disp: , Rfl:   •  cyclobenzaprine (FLEXERIL) 10 MG tablet, Take 1 tablet by mouth 3 (Three) Times a Day As Needed for Muscle Spasms., Disp: 90 tablet, Rfl: 5  •  Diclofenac Sodium (VOLTAREN) 1 % gel gel, diclofenac 1 % topical gel, Disp: , Rfl:   •  DULoxetine (CYMBALTA) 60 MG capsule, Take 1 capsule by mouth Daily., Disp: 90 capsule, Rfl: 3  •  Fluticasone Furoate-Vilanterol (Breo Ellipta) 200-25 MCG/INH inhaler, Inhale 1 puff Daily., Disp: 60 each, Rfl: 11  •  lidocaine (LIDODERM) 5 %, Place 2 patches on the skin as directed by provider Daily. Remove & Discard patch within 12 hours or as directed by MD, Disp: 60 patch, Rfl: 3  •  methocarbamol (ROBAXIN) 750 MG tablet, TAKE 2 TAB PO UP TO 4 TIMES A DAY FOR NO MORE THAN 3 DAYS, THEN 1 TAB PO UP TO 4 TIMES A DAY AS NEEDED FOR SPASM., Disp: 60 tablet, Rfl: 0  •  minocycline (MINOCIN,DYNACIN) 50 MG capsule, , Disp: , Rfl:   •  ondansetron ODT (ZOFRAN-ODT) 4 MG disintegrating tablet, Place 1 tablet on the tongue Every 6 (Six) Hours As Needed for Nausea or Vomiting., Disp: 10 tablet, Rfl: 0  •  topiramate (Topamax) 25 MG tablet, Week 1: 1 tab night Week 2: 1 tab AM, 1 tab PM Week 3: 1 tab AM, 2 tabs PM Week 4: 2 tabs twice daily, Disp: 70 tablet, Rfl:  "0    Allergies:   Allergies   Allergen Reactions   • Imitrex [Sumatriptan] Shortness Of Breath and Nausea Only   • Ceftin [Cefuroxime Axetil] Other (See Comments)     Numbness in mouth and throat   • Amoxicillin Rash       Objective   Vital Signs:   Vitals:    02/08/21 1038   BP: 130/84   Pulse: 98   Temp: 97.8 °F (36.6 °C)   SpO2: 97%   Weight: 93.4 kg (206 lb)   Height: 160 cm (62.99\")       PHYSICAL EXAM  General appearance: Awake, alert, cooperative  Head: Normocephalic, without obvious abnormality, atraumatic  Eyes: Conjunctivae/corneas clear, EOMs intact  Neck: no adenopathy, no carotid bruit, no JVD and thyroid: not enlarged  Lungs: clear to auscultation bilaterally and no rhonchi or crackles\", ' symmetric  Heart: regular rate and rhythm, S1, S2 normal, no murmur, click, rub or gallop  Abdomen: Soft, non-tender, bowel sounds normal,  no organomegaly  Extremities: extremities normal, atraumatic, no cyanosis or edema  Skin: Skin color, turgor normal, no rashes or lesions  Neurologic: Grossly normal     Lab Results   Component Value Date    GLUCOSE 100 (H) 11/12/2020    CALCIUM 9.7 11/12/2020     11/12/2020    K 3.8 11/12/2020    CO2 26.5 11/12/2020     11/12/2020    BUN 10 11/12/2020    CREATININE 0.73 11/12/2020    EGFRIFAFRI 106 09/25/2020    EGFRIFNONA 94 11/12/2020    BCR 13.7 11/12/2020    ANIONGAP 9.5 11/12/2020     Lab Results   Component Value Date    WBC 8.82 11/12/2020    HGB 13.7 11/12/2020    HCT 41.4 11/12/2020    MCV 91.2 11/12/2020     11/12/2020     Lab Results   Component Value Date    INR 1.31 (H) 04/25/2018    INR 1.0 06/16/2017    INR 1.1 06/02/2017    PROTIME 14.6 (H) 04/25/2018    PROTIME 11.4 11/13/2016     Lab Results   Component Value Date    TSH 1.210 09/25/2020    O8FPKOG 149 04/28/2017    B8HNYAJ 7.7 06/16/2017    THYROIDAB 10 06/16/2017       Cardiac Testing:     I personally viewed and interpreted the patient's EKG/Telemetry/lab data    Procedures    Tobacco " Cessation: N/A  Obstructive Sleep Apnea Screening: N/A    Assessment & Plan    Diagnoses and all orders for this visit:    1. Obstructive sleep apnea (Primary)    2. Inappropriate sinus tachycardia    3. Chronic anticoagulation    4. Severe obesity (BMI 35.0-39.9) with comorbidity (CMS/HCC)         Diagnosis Plan   1. Obstructive sleep apnea   about to start CPAP therapy.  This is tied into inappropriate sinus tachycardia chronic fatigue obesity etc.   2. Inappropriate sinus tachycardia   responded to ivabradine beautifully.  Continue ivabradine therapy.  He actually gets it at a cost effective manner and is working almost entirely perfectly.   3. Chronic anticoagulation   multiple reasons including DVT PE factor V Leiden and protein C deficiency.  Continue anticoagulation per primary care physician and hematology.   4.  Obesity.  Body mass index is 36.5 kg/m².  Obesity Counseling:    I discussed with the patient that they are obese.  We discussed that obesity is a significant risk factor for heart disease, hypertension, diabetes, other forms of health problems and indeed premature death.  We discussed that it is critical that the patient loose weight to minimize their risk of excess morbidity and mortality.  We further discussed various options regarding weight loss strategies including dietary caloric restriction, exercise, referral to a dietitian and possibly also bariatric surgical options   (which are appropriate for some but not all patients ).She has a clear understanding of my medical advice that they endeavor to loose weight as their obesity is adversely affecting their health.      I spent 17 minutes in consultation with this patient which included more than 65% of this time in direct face-to-face counseling, physical examination and discussion of my assessment and findings and shared decision making with the patient.    Follow Up:       Thank you for allowing me to participate in the care of your patient.  Please to not hesitate to contact me with additional questions or concerns.      Pierce Washington DO, FACC, RS  Cardiac Electrophysiologist  [facility] / [facility]

## 2021-02-22 ENCOUNTER — TELEPHONE (OUTPATIENT)
Dept: NEUROLOGY | Facility: CLINIC | Age: 31
End: 2021-02-22

## 2021-02-22 NOTE — TELEPHONE ENCOUNTER
L/M FOR PT TO CALL OFFICE - PT NEEDS TO BE SCHEDULED FOR AN INITIAL PAP COMPLIANCE APPOINTMENT WITH [facility] GAITAN.    PATIENT NEEDS TO BE SCHEDULED AFTER 04/19/2021 BUT BEFORE 05/19/2021 FOR INSURANCE COMPLIANCE PURPOSES.    PATIENT WOULD BE SCHEDULED FOR A FOLLOW UP APPOINTMENT - [facility]HNI ZHANG - [facility] SLEEP COMPLIANCE.

## 2021-03-01 ENCOUNTER — OFFICE VISIT (OUTPATIENT)
Dept: NEUROLOGY | Facility: CLINIC | Age: 31
End: 2021-03-01

## 2021-03-01 VITALS
HEART RATE: 104 BPM | BODY MASS INDEX: 37.14 KG/M2 | HEIGHT: 63 IN | OXYGEN SATURATION: 98 % | TEMPERATURE: 98.2 F | WEIGHT: 209.6 LBS | DIASTOLIC BLOOD PRESSURE: 78 MMHG | SYSTOLIC BLOOD PRESSURE: 118 MMHG

## 2021-03-01 DIAGNOSIS — H53.9 VISUAL DISTURBANCES: ICD-10-CM

## 2021-03-01 DIAGNOSIS — G43.009 MIGRAINE WITHOUT AURA AND WITHOUT STATUS MIGRAINOSUS, NOT INTRACTABLE: Primary | ICD-10-CM

## 2021-03-01 PROCEDURE — 99214 OFFICE O/P EST MOD 30 MIN: CPT | Performed by: NURSE PRACTITIONER

## 2021-03-01 RX ORDER — TOPIRAMATE 100 MG/1
TABLET, FILM COATED ORAL
Qty: 60 TABLET | Refills: 1 | Status: SHIPPED | OUTPATIENT
Start: 2021-03-01 | End: 2021-04-12 | Stop reason: SDUPTHER

## 2021-03-01 NOTE — PATIENT INSTRUCTIONS
Migraine Headache  A migraine headache is a very strong throbbing pain on one side or both sides of your head. This type of headache can also cause other symptoms. It can last from 4 hours to 3 days. Talk with your doctor about what things may bring on (trigger) this condition.  What are the causes?  The exact cause of this condition is not known. This condition may be triggered or caused by:  · Drinking alcohol.  · Smoking.  · Taking medicines, such as:  ? Medicine used to treat chest pain (nitroglycerin).  ? Birth control pills.  ? Estrogen.  ? Some blood pressure medicines.  · Eating or drinking certain products.  · Doing physical activity.  Other things that may trigger a migraine headache include:  · Having a menstrual period.  · Pregnancy.  · Hunger.  · Stress.  · Not getting enough sleep or getting too much sleep.  · Weather changes.  · Tiredness (fatigue).  What increases the risk?  · Being 25-55 years old.  · Being female.  · Having a family history of migraine headaches.  · Being .  · Having depression or anxiety.  · Being very overweight.  What are the signs or symptoms?  · A throbbing pain. This pain may:  ? Happen in any area of the head, such as on one side or both sides.  ? Make it hard to do daily activities.  ? Get worse with physical activity.  ? Get worse around bright lights or loud noises.  · Other symptoms may include:  ? Feeling sick to your stomach (nauseous).  ? Vomiting.  ? Dizziness.  ? Being sensitive to bright lights, loud noises, or smells.  · Before you get a migraine headache, you may get warning signs (an aura). An aura may include:  ? Seeing flashing lights or having blind spots.  ? Seeing bright spots, halos, or zigzag lines.  ? Having tunnel vision or blurred vision.  ? Having numbness or a tingling feeling.  ? Having trouble talking.  ? Having weak muscles.  · Some people have symptoms after a migraine headache (postdromal phase), such as:  ? Tiredness.  ? Trouble  thinking (concentrating).  How is this treated?  · Taking medicines that:  ? Relieve pain.  ? Relieve the feeling of being sick to your stomach.  ? Prevent migraine headaches.  · Treatment may also include:  ? Having acupuncture.  ? Avoiding foods that bring on migraine headaches.  ? Learning ways to control your body functions (biofeedback).  ? Therapy to help you know and deal with negative thoughts (cognitive behavioral therapy).  Follow these instructions at home:  Medicines  · Take over-the-counter and prescription medicines only as told by your doctor.  · Ask your doctor if the medicine prescribed to you:  ? Requires you to avoid driving or using heavy machinery.  ? Can cause trouble pooping (constipation). You may need to take these steps to prevent or treat trouble pooping:  § Drink enough fluid to keep your pee (urine) pale yellow.  § Take over-the-counter or prescription medicines.  § Eat foods that are high in fiber. These include beans, whole grains, and fresh fruits and vegetables.  § Limit foods that are high in fat and sugar. These include fried or sweet foods.  Lifestyle  · Do not drink alcohol.  · Do not use any products that contain nicotine or tobacco, such as cigarettes, e-cigarettes, and chewing tobacco. If you need help quitting, ask your doctor.  · Get at least 8 hours of sleep every night.  · Limit and deal with stress.  General instructions         · Keep a journal to find out what may bring on your migraine headaches. For example, write down:  ? What you eat and drink.  ? How much sleep you get.  ? Any change in what you eat or drink.  ? Any change in your medicines.  · If you have a migraine headache:  ? Avoid things that make your symptoms worse, such as bright lights.  ? It may help to lie down in a dark, quiet room.  ? Do not drive or use heavy machinery.  ? Ask your doctor what activities are safe for you.  · Keep all follow-up visits as told by your doctor. This is important.  Contact  a doctor if:  · You get a migraine headache that is different or worse than others you have had.  · You have more than 15 headache days in one month.  Get help right away if:  · Your migraine headache gets very bad.  · Your migraine headache lasts longer than 72 hours.  · You have a fever.  · You have a stiff neck.  · You have trouble seeing.  · Your muscles feel weak or like you cannot control them.  · You start to lose your balance a lot.  · You start to have trouble walking.  · You pass out (faint).  · You have a seizure.  Summary  · A migraine headache is a very strong throbbing pain on one side or both sides of your head. These headaches can also cause other symptoms.  · This condition may be treated with medicines and changes to your lifestyle.  · Keep a journal to find out what may bring on your migraine headaches.  · Contact a doctor if you get a migraine headache that is different or worse than others you have had.  · Contact your doctor if you have more than 15 headache days in a month.  This information is not intended to replace advice given to you by your health care provider. Make sure you discuss any questions you have with your health care provider.  Document Revised: 04/10/2020 Document Reviewed: 01/30/2020  Elsevier Patient Education © 2020 [employer]    Idiopathic Intracranial Hypertension    Idiopathic intracranial hypertension (IIH) is a condition that increases pressure around the brain. The fluid that surrounds the brain and spinal cord (cerebrospinal fluid, CSF) increases and causes the pressure. Idiopathic means that the cause of this condition is not known.  IIH affects the brain and spinal cord (is a neurological disorder). If this condition is not treated, it can cause vision loss or blindness.  What increases the risk?  You are more likely to develop this condition if:  · You are severely overweight (obese).  · You are a woman who has not gone through menopause.  · You take certain  "medicines, such as birth control or steroids.  What are the signs or symptoms?  Symptoms of IIH include:  · Headaches. This is the most common symptom.  · Pain in the shoulders or neck.  · Nausea and vomiting.  · A \"rushing water\" or pulsing sound within the ears (pulsatile tinnitus).  · Double vision.  · Blurred vision.  · Brief episodes of complete vision loss.  How is this diagnosed?  This condition may be diagnosed based on:  · Your symptoms.  · Your medical history.  · CT scan of the brain.  · MRI of the brain.  · Magnetic resonance venogram (MRV) to check veins in the brain.  · Diagnostic lumbar puncture. This is a procedure to remove and examine a sample of cerebrospinal fluid. This procedure can determine whether too much fluid may be causing IIH.  · A thorough eye exam to check for swelling or nerve damage in the eyes.  How is this treated?  Treatment for this condition depends on your symptoms. The goal of treatment is to decrease the pressure around your brain. Common treatments include:  · Medicines to decrease the production of spinal fluid and lower the pressure within your skull.  · Medicines to prevent or treat headaches.  · Surgery to place drains (shunts) in your brain to remove excess fluid.  · Lumbar puncture to remove excess cerebrospinal fluid.  Follow these instructions at home:  · If you are overweight or obese, work with your health care provider to lose weight.  · Take over-the-counter and prescription medicines only as told by your health care provider.  · Do not drive or use heavy machinery while taking medicines that can make you sleepy.  · Keep all follow-up visits as told by your health care provider. This is important.  Contact a health care provider if:  · You have changes in your vision, such as:  ? Double vision.  ? Not being able to see colors (color vision).  Get help right away if:  · You have any of the following symptoms and they get worse or do not get " better.  ? Headaches.  ? Nausea.  ? Vomiting.  ? Vision changes or difficulty seeing.  Summary  · Idiopathic intracranial hypertension (IIH) is a condition that increases pressure around the brain. The cause is not known (is idiopathic).  · The most common symptom of IIH is headaches.  · Treatment may include medicines or surgery to relieve the pressure on your brain.  This information is not intended to replace advice given to you by your health care provider. Make sure you discuss any questions you have with your health care provider.  Document Revised: 11/30/2018 Document Reviewed: 11/08/2017  Elsevier Patient Education © 2020 [employer]

## 2021-03-01 NOTE — PROGRESS NOTES
Follow Up Neurology Office Visit      Patient Name: Marilu Godoy    Referring Physician: Deidre Barker MD    Chief Complaint:    Chief Complaint   Patient presents with   • Migraine     Pt in office today for f/u on migraines. Pt states they are worse and is seeing 'spots'.       History of Present Illness: Marilu Godoy is a 30 y.o. female who is here to follow up with Neurology for  Headaches.  She has been taking topiramate with minimum improvement in symptoms.  She states that that her headaches are worsening and she sees ' spots' recurrently.    The following portions of the patient's history were reviewed and updated as appropriate: allergies, current medications, past family history, past medical history, past social history, past surgical history and problem list.    Subjective     Review of Systems:   Review of Systems   Eyes: Positive for visual disturbance.   Neurological: Positive for headache.   All other systems reviewed and are negative.    Medications:     Current Outpatient Medications:   •  albuterol sulfate  (90 Base) MCG/ACT inhaler, Inhale 2 puffs Every 4 (Four) Hours As Needed for Wheezing or Shortness of Air., Disp: 18 g, Rfl: 11  •  apixaban (ELIQUIS) 5 MG tablet tablet, Take 1 tablet by mouth 2 (Two) Times a Day., Disp: 60 tablet, Rfl: 5  •  aspirin 81 MG EC tablet, Take 81 mg by mouth Daily., Disp: , Rfl:   •  atorvastatin (LIPITOR) 40 MG tablet, Take 1 tablet by mouth Daily., Disp: 30 tablet, Rfl: 11  •  b complex vitamins capsule, Take 1 capsule by mouth Daily., Disp: 90 capsule, Rfl: 3  •  benzonatate (TESSALON) 200 MG capsule, Take 1 capsule by mouth 3 (Three) Times a Day As Needed for Cough., Disp: 30 capsule, Rfl: 0  •  Cholecalciferol (Vitamin D3) 1.25 MG (13654 UT) capsule, Take 1 capsule by mouth Every 7 (Seven) Days., Disp: 12 capsule, Rfl: 1  •  Cholecalciferol (Vitamin D3) 50 MCG (2000 UT) capsule, Take 1 capsule by mouth Daily. Start after  completing 50,000 IU weekly dose.  Indications: Vitamin D Deficiency, Disp: 90 capsule, Rfl: 3  •  Corlanor 5 MG tablet tablet, TAKE 1/2 TABLET BY MOUTH TWO TIMES A DAY WITH MEALS FOR 2 WEEKS, THEN INCREASE TO 1 TABLET 2 TIMES A DAY AS DIRECTED, Disp: , Rfl:   •  cyclobenzaprine (FLEXERIL) 10 MG tablet, Take 1 tablet by mouth 3 (Three) Times a Day As Needed for Muscle Spasms., Disp: 90 tablet, Rfl: 5  •  Diclofenac Sodium (VOLTAREN) 1 % gel gel, diclofenac 1 % topical gel, Disp: , Rfl:   •  DULoxetine (CYMBALTA) 60 MG capsule, Take 1 capsule by mouth Daily., Disp: 90 capsule, Rfl: 3  •  Fluticasone Furoate-Vilanterol (Breo Ellipta) 200-25 MCG/INH inhaler, Inhale 1 puff Daily., Disp: 60 each, Rfl: 11  •  lidocaine (LIDODERM) 5 %, Place 2 patches on the skin as directed by provider Daily. Remove & Discard patch within 12 hours or as directed by MD, Disp: 60 patch, Rfl: 3  •  methocarbamol (ROBAXIN) 750 MG tablet, TAKE 2 TAB PO UP TO 4 TIMES A DAY FOR NO MORE THAN 3 DAYS, THEN 1 TAB PO UP TO 4 TIMES A DAY AS NEEDED FOR SPASM., Disp: 60 tablet, Rfl: 0  •  ondansetron ODT (ZOFRAN-ODT) 4 MG disintegrating tablet, Place 1 tablet on the tongue Every 6 (Six) Hours As Needed for Nausea or Vomiting., Disp: 10 tablet, Rfl: 0  •  topiramate (TOPAMAX) 100 MG tablet, Take 0.5 tablets by mouth Every Morning AND 1 tablet Every Evening., Disp: 60 tablet, Rfl: 1  •  ubrogepant (ubrogepant) 100 MG tablet, Take 1 tablet by mouth 1 (One) Time As Needed (migraine) for up to 2 doses., Disp: 10 tablet, Rfl: 1  •  ubrogepant (ubrogepant) 100 MG tablet, Take 1 tablet by mouth 1 (One) Time for 1 dose., Disp: 3 tablet, Rfl: 0  •  ubrogepant (ubrogepant) 50 MG tablet, Take 1 tablet by mouth 1 (One) Time for 1 dose., Disp: 2 tablet, Rfl: 0    Allergies:   Allergies   Allergen Reactions   • Imitrex [Sumatriptan] Shortness Of Breath and Nausea Only   • Ceftin [Cefuroxime Axetil] Other (See Comments)     Numbness in mouth and throat   • Amoxicillin  "Rash     Objective     Physical Exam:  Vital Signs:   Vitals:    03/01/21 1130   BP: 118/78   BP Location: Left arm   Patient Position: Sitting   Cuff Size: Adult   Pulse: 104   Temp: 98.2 °F (36.8 °C)   TempSrc: Infrared   SpO2: 98%   Weight: 95.1 kg (209 lb 9.6 oz)   Height: 160 cm (62.99\")   PainSc:   6   PainLoc: Head     Physical Exam  Eyes:      General: No visual field deficit.     Funduscopic exam:     Right eye: No papilledema.         Left eye: No papilledema.   Neurological:      Mental Status: She is oriented to person, place, and time.      Gait: Gait is intact.   Psychiatric:         Speech: Speech normal.       Neurologic Exam     Mental Status   Oriented to person, place, and time.   Attention: normal. Concentration: normal.   Speech: speech is normal   Level of consciousness: alert  Knowledge: consistent with education.     Cranial Nerves   Cranial nerves II through XII intact.     CN II   Visual fields full to confrontation.   Right visual field deficit: none  Left visual field deficit: none     Motor Exam   Muscle bulk: normal    Gait, Coordination, and Reflexes     Gait  Gait: normal    Tremor   Resting tremor: absent    MRI BRAIN W WO CONTRAST April 2019   HISTORY: ?Multiple sclerosis, new neurological event; G35-Multiple  sclerosis     PROCEDURE: Multiplanar multisequence imaging of the brain was performed  both before and following the administration of 15 mL MultiHance  intravenous contrast.     COMPARISON: None.     FINDINGS: There are no significant white matter abnormalities. There is  no mass, mass effect or midline shift. There is no hydrocephalus. There  are no areas of restricted diffusion. There is no pathologic contrast  enhancement.     The midbrain, jenelle, cerebellum and craniocervical junction are  unremarkable. The sella and pituitary gland are within normal limits.  The major intracranial vasculature demonstrates the expected flow  related signal. The paranasal sinuses are " clear.     IMPRESSION:  Unremarkable MRI of the brain.     Results Review:   I reviewed the patient's new clinical results.  I have reviewed the patient's other medical records to include, labs, radiology and referrals.   I reviewed the patient's other test results and agree with the interpretation  Previous imaging reviewed    Assessment / Plan      Assessment/Plan:   Diagnoses and all orders for this visit:    1. Migraine without aura and without status migrainosus, not intractable (Primary)  -     Ambulatory Referral to Ophthalmology  -     topiramate (TOPAMAX) 100 MG tablet; Take 0.5 tablets by mouth Every Morning AND 1 tablet Every Evening.  Dispense: 60 tablet; Refill: 1  -     ubrogepant (ubrogepant) 100 MG tablet; Take 1 tablet by mouth 1 (One) Time As Needed (migraine) for up to 2 doses.  Dispense: 10 tablet; Refill: 1  -     MRI Brain With & Without Contrast; Future  -     ubrogepant (ubrogepant) 50 MG tablet; Take 1 tablet by mouth 1 (One) Time for 1 dose.  Dispense: 2 tablet; Refill: 0  -     ubrogepant (ubrogepant) 100 MG tablet; Take 1 tablet by mouth 1 (One) Time for 1 dose.  Dispense: 3 tablet; Refill: 0  Patient has been experiencing frequent migraine episodes, now complaining of visual disturbances with this.  Patient is already taking aspirin 81 mg daily.  Minocycline noted on medication list.  I have discussed my concerns for PTCS with patient, given her obesity, use of minocycline, and persistent headache with visual aura.  She has not had ophthalmology evaluation for visual symptoms, I have referred her today.  No appreciable papilledema on undilated eye exam today in office.  I have explained that she is already taking topiramate, which is treatment for PTCS.  Patient is extremely concerned about her symptoms, and would like to have repeat MRI.  I have reassured her that her previous MRI was normal, although given her new symptoms and concern for PTCS I have ordered MRI of the brain to assess  for possible intracranial hypertension.  She had modest relief of migraine episodes with Ubrelvy 50 mg, I have provided her with samples of 100 mg dose today.  If no improvement in symptoms, consider LP.    2. Visual disturbances  -     Ambulatory Referral to Ophthalmology  -     MRI Brain With & Without Contrast; Future  Patient continues to experience headaches with visual disturbance.  I referred her to ophthalmology for evaluation.  I have also ordered contrasted brain MRI upon consideration of IIH as well as vascular or structural abnormality which could be contributing to symptoms.  If no improvement, consider LP.     Follow Up:   Return in about 6 weeks (around 4/12/2021).     ARNIE Bower  [facility]   AS THE PROVIDER, I PERSONALLY WORE PPE DURING ENTIRE FACE TO FACE ENCOUNTER IN CLINIC WITH THE PATIENT. PATIENT ALSO WORE PPE DURING ENTIRE FACE TO FACE ENCOUNTER EXCEPT FOR A MAX OF 30 SECONDS DURING NEUROLOGICAL EVALUATION OF CRANIAL NERVES AND THEN MASK WAS PLACED BACK OVER PATIENT FACE FOR REMAINDER OF VISIT. I WASHED MY HANDS BEFORE AND AFTER VISIT.    Please note that portions of this note may have been completed with a voice recognition program. Efforts were made to edit the dictations, but occasionally words are mistranscribed.

## 2021-03-10 ENCOUNTER — TELEPHONE (OUTPATIENT)
Dept: CARDIOLOGY | Facility: CLINIC | Age: 31
End: 2021-03-10

## 2021-03-10 NOTE — TELEPHONE ENCOUNTER
I attempted to call the patient to make her aware her corlanor is no longer on her insurance formulary. Will await information from her pharmacy to see if we are able to complete a PA to authorize.

## 2021-03-14 ENCOUNTER — HOSPITAL ENCOUNTER (EMERGENCY)
Facility: HOSPITAL | Age: 31
Discharge: HOME | End: 2021-03-14
Attending: EMERGENCY MEDICINE | Admitting: EMERGENCY MEDICINE

## 2021-03-14 ENCOUNTER — APPOINTMENT (OUTPATIENT)
Dept: CT IMAGING | Facility: HOSPITAL | Age: 31
End: 2021-03-14

## 2021-03-14 VITALS
RESPIRATION RATE: 16 BRPM | SYSTOLIC BLOOD PRESSURE: 118 MMHG | HEART RATE: 87 BPM | TEMPERATURE: 98.6 F | DIASTOLIC BLOOD PRESSURE: 70 MMHG | HEIGHT: 64 IN | BODY MASS INDEX: 35 KG/M2 | WEIGHT: 205 LBS | OXYGEN SATURATION: 98 %

## 2021-03-14 DIAGNOSIS — R10.9 ABDOMINAL PAIN, UNSPECIFIED ABDOMINAL LOCATION: Primary | ICD-10-CM

## 2021-03-14 LAB
ALBUMIN SERPL-MCNC: 4.1 G/DL (ref 3.5–5.2)
ALBUMIN/GLOB SERPL: 1.6 G/DL
ALP SERPL-CCNC: 94 U/L (ref 39–117)
ALT SERPL W P-5'-P-CCNC: 18 U/L (ref 1–33)
ANION GAP SERPL CALCULATED.3IONS-SCNC: 8.6 MMOL/L (ref 5–15)
AST SERPL-CCNC: 14 U/L (ref 1–32)
B-HCG UR QL: NEGATIVE
BASOPHILS # BLD AUTO: 0.07 10*3/MM3 (ref 0–0.2)
BASOPHILS NFR BLD AUTO: 0.8 % (ref 0–1.5)
BILIRUB SERPL-MCNC: 0.4 MG/DL (ref 0–1.2)
BILIRUB UR QL STRIP: NEGATIVE
BUN SERPL-MCNC: 10 MG/DL (ref 6–20)
BUN/CREAT SERPL: 11.4 (ref 7–25)
CALCIUM SPEC-SCNC: 8.8 MG/DL (ref 8.6–10.5)
CHLORIDE SERPL-SCNC: 103 MMOL/L (ref 98–107)
CLARITY UR: CLEAR
CO2 SERPL-SCNC: 24.4 MMOL/L (ref 22–29)
COLOR UR: YELLOW
CREAT SERPL-MCNC: 0.88 MG/DL (ref 0.57–1)
DEPRECATED RDW RBC AUTO: 39.8 FL (ref 37–54)
EOSINOPHIL # BLD AUTO: 0.14 10*3/MM3 (ref 0–0.4)
EOSINOPHIL NFR BLD AUTO: 1.6 % (ref 0.3–6.2)
ERYTHROCYTE [DISTWIDTH] IN BLOOD BY AUTOMATED COUNT: 12.1 % (ref 12.3–15.4)
GFR SERPL CREATININE-BSD FRML MDRD: 75 ML/MIN/1.73
GLOBULIN UR ELPH-MCNC: 2.6 GM/DL
GLUCOSE SERPL-MCNC: 89 MG/DL (ref 65–99)
GLUCOSE UR STRIP-MCNC: NEGATIVE MG/DL
HCT VFR BLD AUTO: 41.5 % (ref 34–46.6)
HGB BLD-MCNC: 14 G/DL (ref 12–15.9)
HGB UR QL STRIP.AUTO: NEGATIVE
HOLD SPECIMEN: NORMAL
HOLD SPECIMEN: NORMAL
IMM GRANULOCYTES # BLD AUTO: 0.02 10*3/MM3 (ref 0–0.05)
IMM GRANULOCYTES NFR BLD AUTO: 0.2 % (ref 0–0.5)
KETONES UR QL STRIP: NEGATIVE
LEUKOCYTE ESTERASE UR QL STRIP.AUTO: NEGATIVE
LIPASE SERPL-CCNC: 27 U/L (ref 13–60)
LYMPHOCYTES # BLD AUTO: 2.73 10*3/MM3 (ref 0.7–3.1)
LYMPHOCYTES NFR BLD AUTO: 32.1 % (ref 19.6–45.3)
MCH RBC QN AUTO: 30.6 PG (ref 26.6–33)
MCHC RBC AUTO-ENTMCNC: 33.7 G/DL (ref 31.5–35.7)
MCV RBC AUTO: 90.6 FL (ref 79–97)
MONOCYTES # BLD AUTO: 0.52 10*3/MM3 (ref 0.1–0.9)
MONOCYTES NFR BLD AUTO: 6.1 % (ref 5–12)
NEUTROPHILS NFR BLD AUTO: 5.02 10*3/MM3 (ref 1.7–7)
NEUTROPHILS NFR BLD AUTO: 59.2 % (ref 42.7–76)
NITRITE UR QL STRIP: NEGATIVE
NRBC BLD AUTO-RTO: 0 /100 WBC (ref 0–0.2)
PH UR STRIP.AUTO: 7.5 [PH] (ref 5–8)
PLATELET # BLD AUTO: 294 10*3/MM3 (ref 140–450)
PMV BLD AUTO: 10.2 FL (ref 6–12)
POTASSIUM SERPL-SCNC: 4 MMOL/L (ref 3.5–5.2)
PROT SERPL-MCNC: 6.7 G/DL (ref 6–8.5)
PROT UR QL STRIP: NEGATIVE
RBC # BLD AUTO: 4.58 10*6/MM3 (ref 3.77–5.28)
SODIUM SERPL-SCNC: 136 MMOL/L (ref 136–145)
SP GR UR STRIP: 1.02 (ref 1–1.03)
UROBILINOGEN UR QL STRIP: NORMAL
WBC # BLD AUTO: 8.5 10*3/MM3 (ref 3.4–10.8)
WHOLE BLOOD HOLD SPECIMEN: NORMAL
WHOLE BLOOD HOLD SPECIMEN: NORMAL

## 2021-03-14 PROCEDURE — 83690 ASSAY OF LIPASE: CPT | Performed by: EMERGENCY MEDICINE

## 2021-03-14 PROCEDURE — 85025 COMPLETE CBC W/AUTO DIFF WBC: CPT | Performed by: EMERGENCY MEDICINE

## 2021-03-14 PROCEDURE — 80053 COMPREHEN METABOLIC PANEL: CPT | Performed by: EMERGENCY MEDICINE

## 2021-03-14 PROCEDURE — 25010000002 KETOROLAC TROMETHAMINE PER 15 MG: Performed by: NURSE PRACTITIONER

## 2021-03-14 PROCEDURE — 74177 CT ABD & PELVIS W/CONTRAST: CPT

## 2021-03-14 PROCEDURE — 25010000002 ONDANSETRON PER 1 MG: Performed by: NURSE PRACTITIONER

## 2021-03-14 PROCEDURE — 25010000002 IOPAMIDOL 61 % SOLUTION: Performed by: EMERGENCY MEDICINE

## 2021-03-14 PROCEDURE — 81025 URINE PREGNANCY TEST: CPT | Performed by: EMERGENCY MEDICINE

## 2021-03-14 PROCEDURE — 99284 EMERGENCY DEPT VISIT MOD MDM: CPT

## 2021-03-14 PROCEDURE — 81003 URINALYSIS AUTO W/O SCOPE: CPT | Performed by: EMERGENCY MEDICINE

## 2021-03-14 PROCEDURE — 96374 THER/PROPH/DIAG INJ IV PUSH: CPT

## 2021-03-14 PROCEDURE — 96375 TX/PRO/DX INJ NEW DRUG ADDON: CPT

## 2021-03-14 RX ORDER — SODIUM CHLORIDE 0.9 % (FLUSH) 0.9 %
10 SYRINGE (ML) INJECTION AS NEEDED
Status: DISCONTINUED | OUTPATIENT
Start: 2021-03-14 | End: 2021-03-14 | Stop reason: HOSPADM

## 2021-03-14 RX ORDER — PANTOPRAZOLE SODIUM 40 MG/1
40 TABLET, DELAYED RELEASE ORAL DAILY
Qty: 30 TABLET | Refills: 0 | Status: SHIPPED | OUTPATIENT
Start: 2021-03-14 | End: 2023-03-10 | Stop reason: SDUPTHER

## 2021-03-14 RX ORDER — ONDANSETRON 2 MG/ML
4 INJECTION INTRAMUSCULAR; INTRAVENOUS ONCE
Status: COMPLETED | OUTPATIENT
Start: 2021-03-14 | End: 2021-03-14

## 2021-03-14 RX ORDER — KETOROLAC TROMETHAMINE 30 MG/ML
30 INJECTION, SOLUTION INTRAMUSCULAR; INTRAVENOUS EVERY 6 HOURS PRN
Status: DISCONTINUED | OUTPATIENT
Start: 2021-03-14 | End: 2021-03-14 | Stop reason: HOSPADM

## 2021-03-14 RX ADMIN — IOPAMIDOL 100 ML: 612 INJECTION, SOLUTION INTRAVENOUS at 20:50

## 2021-03-14 RX ADMIN — ONDANSETRON 4 MG: 2 INJECTION INTRAMUSCULAR; INTRAVENOUS at 19:25

## 2021-03-14 RX ADMIN — KETOROLAC TROMETHAMINE 30 MG: 30 INJECTION, SOLUTION INTRAMUSCULAR; INTRAVENOUS at 19:25

## 2021-03-14 NOTE — ED PROVIDER NOTES
Subjective   History of Present Illness  This is a 30-year-old female who comes in today complaining of epigastric and right upper quadrant pain has been going on for about 2 weeks.  She reports symptoms got progressively worse yesterday with nausea.  She complains of diarrhea as well.  She denies any vomiting.  Review of Systems   Constitutional: Negative.    HENT: Negative.    Eyes: Negative.    Respiratory: Negative.    Cardiovascular: Negative.    Gastrointestinal: Positive for abdominal pain, diarrhea and nausea.   Genitourinary: Negative.    Skin: Negative.    Neurological: Negative.    Psychiatric/Behavioral: Negative.        Past Medical History:   Diagnosis Date   • Abdominal pain    • Abnormal Pap smear of cervix    • Anxiety    • Asthma 2015   • Bleeding disorder (CMS/[facility]) 11-13-16   • Body piercing     TONGUE, NOSE, TWO IN EACH EAR   • Chest pain    • Clotting disorder (CMS/[facility])     factor 5   • Constipation    • Coronary artery disease involving native coronary artery of native heart without angina pectoris 6/16/2017   • Deep vein thrombosis (CMS/[facility]) 11-13-16   • Diarrhea    • Diarrhea    • Endometriosis    • Factor 5 Leiden mutation, heterozygous (CMS/[facility])    • Fibroid    • Gallstone    • GERD (gastroesophageal reflux disease)    • H/O blood clots    • Headache    • Hiatal hernia    • High cholesterol    • History of recurrent UTIs    • HPV (human papilloma virus) infection    • Hypertension    • Kidney infection    • Migraine    • Nausea    • Nausea & vomiting    • Obesity    • Orthostatic hypotension    • Osteoarthritis    • Ovarian cyst    • Pollen allergies    • Protein S deficiency (CMS/[facility]) 11/14/2016    labs from hospitalization for PE   • Pulmonary embolism (CMS/[facility])    • Recurrent pregnancy loss, antepartum condition or complication    • Sleep apnea     CPAP ASKED TO BRING DOS   • Subclinical hyperthyroidism    • Tachycardia    • Tattoo     LOWER BACK   • Varicella        Allergies   Allergen  Reactions   • Imitrex [Sumatriptan] Shortness Of Breath and Nausea Only   • Ceftin [Cefuroxime Axetil] Other (See Comments)     Numbness in mouth and throat   • Amoxicillin Rash       Past Surgical History:   Procedure Laterality Date   •  SECTION      X2  and    •  SECTION WITH TUBAL N/A 1/15/2019    Procedure:  SECTION REPEAT WITH TUBAL;  Surgeon: Alva Boyer MD;  Location: [facility] LABOR DELIVERY;  Service: Obstetrics/Gynecology   • CHOLECYSTECTOMY     • COLONOSCOPY N/A 2017    Procedure: COLONOSCOPY WITH BIOPSIES AND ARGON THERMAL ABLATION;  Surgeon: Jignesh Selby MD;  Location: [facility] ENDOSCOPY;  Service:    • DIAGNOSTIC LAPAROSCOPY N/A 2018    Procedure: DIAGNOSTIC LAPAROSCOPY AND ABLATION OF ENDOMETRIOSIS;  Surgeon: Evin Zamudio MD;  Location: [facility] OR;  Service: Obstetrics/Gynecology   • ENDOMETRIAL ABLATION     • ENDOSCOPY N/A 2017    Procedure: ESOPHAGOGASTRODUODENOSCOPY WITH BIOPSIES AND COLD BIOPSY POLYPECTOMIES;  Surgeon: Jignesh Selby MD;  Location: [facility] ENDOSCOPY;  Service:    • PELVIC LAPAROSCOPY         Family History   Problem Relation Age of Onset   • Arthritis Mother    • COPD Mother    • Asthma Mother    • Thyroid disease Mother    • Arthritis Father    • Diabetes Father    • Hypertension Father    • Hyperlipidemia Father    • Kidney disease Father    • Heart attack Father    • Coronary artery disease Father    • Dementia Father    • No Known Problems Son    • Colon cancer Neg Hx    • Liver cancer Neg Hx    • Liver disease Neg Hx    • Stomach cancer Neg Hx    • Esophageal cancer Neg Hx        Social History     Socioeconomic History   • Marital status:      Spouse name: Not on file   • Number of children: Not on file   • Years of education: Not on file   • Highest education level: Not on file   Tobacco Use   • Smoking status: Never Smoker   • Smokeless tobacco: Never Used   Vaping Use   • Vaping Use: Never used   Substance  and Sexual Activity   • Alcohol use: No   • Drug use: No   • Sexual activity: Yes     Partners: Male     Birth control/protection: None     Comment: PE while on birth control patch           Objective   Physical Exam  Vitals and nursing note reviewed.   Constitutional:       Appearance: She is well-developed. She is obese.   Neurological:      Mental Status: She is alert.     GEN: No acute distress  Head: Normocephalic, atraumatic  Eyes: Pupils equal round reactive to light  ENT: Posterior pharynx normal in appearance, oral mucosa is moist  Chest: Nontender to palpation  Cardiovascular: Regular rate  Lungs: Clear to auscultation bilaterally  Abdomen: Soft, tender, nondistended, no peritoneal signs  Extremities: No edema, normal appearance  Neuro: GCS 15  Psych: Mood and affect are appropriate      Procedures           ED Course  ED Course as of Mar 14 2129   Sun Mar 14, 2021   2129 I discussed the findings with the patient.  Of advised her to follow-up with her primary care in the next 24 to 48 hours.  I given her strict return to care instructions and she is agreeable to this plan of care.    [TW]      ED Course User Index  [TW] Meg Casanova, APRN                                           MDM  Number of Diagnoses or Management Options  Diagnosis management comments: Differential diagnosis would include peptic ulcer disease, stone in her common bile duct, pancreatitis, hepatitis.       Amount and/or Complexity of Data Reviewed  Clinical lab tests: ordered and reviewed  Tests in the radiology section of CPT®: ordered and reviewed  Review and summarize past medical records: yes  Discuss the patient with other providers: yes  Independent visualization of images, tracings, or specimens: yes    Risk of Complications, Morbidity, and/or Mortality  Presenting problems: moderate  Diagnostic procedures: moderate  Management options: moderate        Final diagnoses:   Abdominal pain, unspecified abdominal location             Meg Casanova, APRN  03/14/21 212

## 2021-03-15 ENCOUNTER — OFFICE VISIT (OUTPATIENT)
Dept: INTERNAL MEDICINE | Facility: CLINIC | Age: 31
End: 2021-03-15

## 2021-03-15 ENCOUNTER — TELEPHONE (OUTPATIENT)
Dept: GASTROENTEROLOGY | Facility: CLINIC | Age: 31
End: 2021-03-15

## 2021-03-15 ENCOUNTER — HOSPITAL ENCOUNTER (OUTPATIENT)
Dept: MRI IMAGING | Facility: HOSPITAL | Age: 31
Discharge: HOME | End: 2021-03-15
Admitting: NURSE PRACTITIONER

## 2021-03-15 VITALS
SYSTOLIC BLOOD PRESSURE: 130 MMHG | HEART RATE: 120 BPM | OXYGEN SATURATION: 100 % | BODY MASS INDEX: 36.54 KG/M2 | WEIGHT: 214 LBS | DIASTOLIC BLOOD PRESSURE: 79 MMHG | HEIGHT: 64 IN | TEMPERATURE: 97.8 F | RESPIRATION RATE: 16 BRPM

## 2021-03-15 DIAGNOSIS — G43.009 MIGRAINE WITHOUT AURA AND WITHOUT STATUS MIGRAINOSUS, NOT INTRACTABLE: ICD-10-CM

## 2021-03-15 DIAGNOSIS — H53.9 VISUAL DISTURBANCES: ICD-10-CM

## 2021-03-15 DIAGNOSIS — R10.9 RIGHT SIDED ABDOMINAL PAIN: ICD-10-CM

## 2021-03-15 DIAGNOSIS — K21.9 GASTROESOPHAGEAL REFLUX DISEASE, UNSPECIFIED WHETHER ESOPHAGITIS PRESENT: Primary | ICD-10-CM

## 2021-03-15 PROCEDURE — A9577 INJ MULTIHANCE: HCPCS | Performed by: NURSE PRACTITIONER

## 2021-03-15 PROCEDURE — 99214 OFFICE O/P EST MOD 30 MIN: CPT | Performed by: NURSE PRACTITIONER

## 2021-03-15 PROCEDURE — 70553 MRI BRAIN STEM W/O & W/DYE: CPT

## 2021-03-15 PROCEDURE — 0 GADOBENATE DIMEGLUMINE 529 MG/ML SOLUTION: Performed by: NURSE PRACTITIONER

## 2021-03-15 RX ORDER — CALCIUM CARBONATE 750 MG/1
750 TABLET, CHEWABLE ORAL DAILY
Qty: 30 TABLET | Refills: 1 | Status: SHIPPED | OUTPATIENT
Start: 2021-03-15

## 2021-03-15 RX ADMIN — GADOBENATE DIMEGLUMINE 15 ML: 529 INJECTION, SOLUTION INTRAVENOUS at 13:02

## 2021-03-15 NOTE — TELEPHONE ENCOUNTER
PATIENT CALLED THIS AFTERNOON, SHE WAS IN THE [place] LAST NIGHT FOR RIGHT SIDE AB PAIN, AND THEN HER PCP THIS MORNING FOR THE SAME THING AND THROWING UP BLOOD, THEY BOTH TOLD HER TO CALL OUR OFFICE AND SEE IF YOU WANT TO SCHLD FOR AN EGD. TOLD HER I WOULD GET BACK WITH HER TOMORROW ON YOUR RECOMMENDATION.   THANKS   ENMANUEL

## 2021-03-16 ENCOUNTER — TELEPHONE (OUTPATIENT)
Dept: GASTROENTEROLOGY | Facility: CLINIC | Age: 31
End: 2021-03-16

## 2021-03-16 ENCOUNTER — PATIENT MESSAGE (OUTPATIENT)
Dept: NEUROLOGY | Facility: CLINIC | Age: 31
End: 2021-03-16

## 2021-03-16 DIAGNOSIS — M27.40 MAXILLARY CYST: Primary | ICD-10-CM

## 2021-03-16 NOTE — TELEPHONE ENCOUNTER
Recurrent clots, bleeding disorder, there's risk taking off med but can do for few days prior with minimal risk.

## 2021-03-17 ENCOUNTER — OFFICE VISIT (OUTPATIENT)
Dept: INTERNAL MEDICINE | Facility: CLINIC | Age: 31
End: 2021-03-17

## 2021-03-17 VITALS
RESPIRATION RATE: 18 BRPM | HEART RATE: 93 BPM | OXYGEN SATURATION: 98 % | BODY MASS INDEX: 34.72 KG/M2 | SYSTOLIC BLOOD PRESSURE: 120 MMHG | WEIGHT: 203.38 LBS | TEMPERATURE: 98.7 F | HEIGHT: 64 IN | DIASTOLIC BLOOD PRESSURE: 70 MMHG

## 2021-03-17 DIAGNOSIS — M47.816 FACET ARTHRITIS OF LUMBAR REGION: ICD-10-CM

## 2021-03-17 DIAGNOSIS — M54.50 LUMBAR BACK PAIN: ICD-10-CM

## 2021-03-17 DIAGNOSIS — Z00.00 ANNUAL PHYSICAL EXAM: Primary | ICD-10-CM

## 2021-03-17 DIAGNOSIS — E66.09 CLASS 1 OBESITY DUE TO EXCESS CALORIES WITH SERIOUS COMORBIDITY AND BODY MASS INDEX (BMI) OF 34.0 TO 34.9 IN ADULT: ICD-10-CM

## 2021-03-17 DIAGNOSIS — G43.911 INTRACTABLE MIGRAINE WITH STATUS MIGRAINOSUS, UNSPECIFIED MIGRAINE TYPE: ICD-10-CM

## 2021-03-17 PROCEDURE — 99395 PREV VISIT EST AGE 18-39: CPT | Performed by: FAMILY MEDICINE

## 2021-03-17 RX ORDER — RIMEGEPANT SULFATE 75 MG/75MG
75 TABLET, ORALLY DISINTEGRATING ORAL DAILY PRN
Qty: 8 TABLET | Refills: 5 | Status: SHIPPED | OUTPATIENT
Start: 2021-03-17 | End: 2021-04-12

## 2021-03-17 RX ORDER — ATORVASTATIN CALCIUM 40 MG/1
40 TABLET, FILM COATED ORAL NIGHTLY
Qty: 30 TABLET | Refills: 11 | Status: SHIPPED | OUTPATIENT
Start: 2021-03-17 | End: 2022-08-30 | Stop reason: SDUPTHER

## 2021-03-17 NOTE — PROGRESS NOTES
2021  Chief Complaint   Patient presents with   • Annual Exam     Physical       Marilu Godoy is here for her annual preventive exam. History per MA reviewed.       Marilu Godoy has the following medical issues:  Patient Active Problem List    Diagnosis    • Inappropriate sinus tachycardia [R00.0]    • Severe obesity (BMI 35.0-39.9) with comorbidity (CMS/HCC) [E66.01]    • Dyslipidemia [E78.5]    • Chronic pain disorder [G89.4]    • Lumbar spondylosis [M47.816]    • Chronic low back pain [M54.5, G89.29]    • History of  section complicating pregnancy [O34.219]    • Chronic anticoagulation [Z79.01]    • Inappropriate sinus tachycardia [R00.0]    • Pelvic pain [R10.2]    • Nonocclusive coronary atherosclerosis of native coronary artery [I25.10]    • Elevated IgE level [R76.8]    • Decreased pedal pulses [R09.89]    • Anti-RNP antibodies present [R76.8]    • Palpitations [R00.2]    • Precordial pain [R07.2]    • Subclinical hyperthyroidism [E05.90]    • Cyst of right ovary [N83.201]    • Chronic midline thoracic back pain [M54.6, G89.29]    • Abnormal SPEP [R77.8]    • Bilateral edema of lower extremity [R60.0]    • Chronic pain of both knees [M25.561, M25.562, G89.29]    • Mild intermittent asthma [J45.20]    • Hypotension [I95.9]    • Pleuritic pain [R07.81]    • Orthopnea [R06.01]    • Excessive daytime sleepiness [G47.19]    • Obstructive sleep apnea [G47.33]    • Labile hypertension [R09.89]    • Protein S deficiency (CMS/HCC) [D68.59]    • Obesity (BMI 30-39.9) [E66.9]    • Heterozygous factor V Leiden mutation (CMS/HCC) [D68.51]    • History of pulmonary embolus (PE) [Z86.711]    • Abnormal CT scan, colon [R93.3]    • Elevated liver function tests [R79.89]    • Right upper quadrant pain [R10.11]        Health Maintenance   Topic Date Due   • PAP SMEAR  2021 (Originally 2017)   • LIPID PANEL  2021   • ANNUAL PHYSICAL  2022   • TDAP/TD VACCINES (3 - Td)  "01/16/2029   • HEPATITIS C SCREENING  Completed   • Pneumococcal Vaccine 0-64  Completed   • INFLUENZA VACCINE  Completed   • MENINGOCOCCAL VACCINE  Aged Out       Immunization History   Administered Date(s) Administered   • Flulaval/Fluarix/Fluzone Quad 01/13/2021   • Influenza, Unspecified 11/15/2016   • Pneumococcal Polysaccharide (PPSV23) 11/15/2016   • Tdap 08/03/2014, 01/16/2019   • flucelvax quad pfs =>4 YRS 01/16/2019       Review of Systems   Constitutional: Positive for fatigue. Negative for fever.   Gastrointestinal: Positive for abdominal pain and nausea.   Musculoskeletal: Positive for arthralgias.   Psychiatric/Behavioral: Positive for stress.   All other systems reviewed and are negative.      The following portions of the patient's history were reviewed and updated as appropriate: allergies, current medications, past family history, past medical history, past social history, past surgical history and problem list.    Objective   Visit Vitals  /70   Pulse 93   Temp 98.7 °F (37.1 °C) (Temporal)   Resp 18   Ht 162.6 cm (64.02\")   Wt 92.3 kg (203 lb 6 oz)   LMP 02/28/2021   SpO2 98%   BMI 34.89 kg/m²        Physical Exam  Vitals and nursing note reviewed.   Constitutional:       General: She is not in acute distress.     Appearance: Normal appearance. She is well-developed and well-groomed. She is obese. She is not ill-appearing, toxic-appearing or diaphoretic.      Interventions: Face mask in place.   HENT:      Head: Normocephalic and atraumatic. Hair is normal.      Right Ear: Hearing, tympanic membrane, ear canal and external ear normal.      Left Ear: Hearing, tympanic membrane, ear canal and external ear normal.   Eyes:      General: Lids are normal. Gaze aligned appropriately. No scleral icterus.        Right eye: No discharge.         Left eye: No discharge.      Extraocular Movements: Extraocular movements intact.      Conjunctiva/sclera: Conjunctivae normal.      Pupils: Pupils are equal, " round, and reactive to light.   Neck:      Thyroid: No thyromegaly.      Trachea: Trachea and phonation normal. No tracheal deviation.   Cardiovascular:      Rate and Rhythm: Normal rate and regular rhythm.      Heart sounds: Normal heart sounds. No murmur heard.   No friction rub. No gallop.    Pulmonary:      Effort: Pulmonary effort is normal.      Breath sounds: Normal breath sounds and air entry.   Chest:      Chest wall: No tenderness.   Abdominal:      General: Bowel sounds are normal. There is no distension.      Palpations: Abdomen is soft. Abdomen is not rigid. There is no mass.      Tenderness: There is abdominal tenderness in the right upper quadrant and right lower quadrant. There is no guarding or rebound.   Musculoskeletal:         General: No tenderness or deformity.      Cervical back: Neck supple.      Right lower leg: No edema.      Left lower leg: No edema.   Lymphadenopathy:      Head:      Right side of head: No submandibular adenopathy.      Left side of head: No submandibular adenopathy.   Skin:     General: Skin is warm.      Capillary Refill: Capillary refill takes less than 2 seconds.      Coloration: Skin is not cyanotic, jaundiced or pale.      Findings: No rash.      Nails: There is no clubbing.   Neurological:      General: No focal deficit present.      Mental Status: She is alert and oriented to person, place, and time.      Cranial Nerves: No cranial nerve deficit.      Motor: No tremor, atrophy, abnormal muscle tone or seizure activity.      Gait: Gait is intact.   Psychiatric:         Attention and Perception: Attention and perception normal.         Mood and Affect: Mood and affect normal.         Speech: Speech normal.         Behavior: Behavior normal. Behavior is cooperative.         Thought Content: Thought content normal.         Cognition and Memory: Cognition and memory normal.         Judgment: Judgment normal.         Lab Results   Component Value Date    CHLPL 222 (H)  09/25/2020    TRIG 152 (H) 09/25/2020    HDL 42 09/25/2020     (H) 09/25/2020     Lab Results   Component Value Date    TSH 1.210 09/25/2020     Lab Results   Component Value Date    FREET4 1.01 09/25/2020     Lab Results   Component Value Date    HGBA1C 4.90 04/24/2017       Assessment     Problem List Items Addressed This Visit     None      Visit Diagnoses     Annual physical exam    -  Primary    Intractable migraine with status migrainosus, unspecified migraine type        Relevant Medications    Rimegepant Sulfate (Nurtec) 75 MG tablet dispersible tablet    Facet arthritis of lumbar region        Relevant Orders    Ambulatory Referral to Pain Management (Completed)    Lumbar back pain        Relevant Orders    Ambulatory Referral to Pain Management (Completed)    Class 1 obesity due to excess calories with serious comorbidity and body mass index (BMI) of 34.0 to 34.9 in adult              · Health maintenance information provided with patient plan.   · Counseled on age appropriate health screenings.  · Immunizations for age discussed, encouraged.   · Encourage healthy habits such as exercise, healthy diet.  Patient's Body mass index is 34.89 kg/m². BMI is above normal parameters. Recommendations include: educational material and nutrition counseling.      Deidre Barker MD

## 2021-03-17 NOTE — PATIENT INSTRUCTIONS
Health Maintenance, Female  Adopting a healthy lifestyle and getting preventive care can go a long way to promote health and wellness. Talk with your health care provider about what schedule of regular examinations is right for you. This is a good chance for you to check in with your provider about disease prevention and staying healthy.  In between checkups, there are plenty of things you can do on your own. Experts have done a lot of research about which lifestyle changes and preventive measures are most likely to keep you healthy. Ask your health care provider for more information.  Weight and diet  Eat a healthy diet  · Be sure to include plenty of vegetables, fruits, low-fat dairy products, and lean protein.  · Do not eat a lot of foods high in solid fats, added sugars, or salt.  · Get regular exercise. This is one of the most important things you can do for your health.  ? Most adults should exercise for at least 150 minutes each week. The exercise should increase your heart rate and make you sweat (moderate-intensity exercise).  ? Most adults should also do strengthening exercises at least twice a week. This is in addition to the moderate-intensity exercise.     Maintain a healthy weight  · Body mass index (BMI) is a measurement that can be used to identify possible weight problems. It estimates body fat based on height and weight. Your health care provider can help determine your BMI and help you achieve or maintain a healthy weight.  · For females 20 years of age and older:  ? A BMI below 18.5 is considered underweight.  ? A BMI of 18.5 to 24.9 is normal.  ? A BMI of 25 to 29.9 is considered overweight.  ? A BMI of 30 and above is considered obese.     Watch levels of cholesterol and blood lipids  · You should start having your blood tested for lipids and cholesterol at 20 years of age, then have this test every 5 years.  · You may need to have your cholesterol levels checked more often if:  ? Your lipid or  cholesterol levels are high.  ? You are older than 50 years of age.  ? You are at high risk for heart disease.     Cancer screening  Lung Cancer  · Lung cancer screening is recommended for adults 55-80 years old who are at high risk for lung cancer because of a history of smoking.  · A yearly low-dose CT scan of the lungs is recommended for people who:  ? Currently smoke.  ? Have quit within the past 15 years.  ? Have at least a 30-pack-year history of smoking. A pack year is smoking an average of one pack of cigarettes a day for 1 year.  · Yearly screening should continue until it has been 15 years since you quit.  · Yearly screening should stop if you develop a health problem that would prevent you from having lung cancer treatment.     Breast Cancer  · Practice breast self-awareness. This means understanding how your breasts normally appear and feel.  · It also means doing regular breast self-exams. Let your health care provider know about any changes, no matter how small.  · If you are in your 20s or 30s, you should have a clinical breast exam (CBE) by a health care provider every 1-3 years as part of a regular health exam.  · If you are 40 or older, have a CBE every year. Also consider having a breast X-ray (mammogram) every year.  · If you have a family history of breast cancer, talk to your health care provider about genetic screening.  · If you are at high risk for breast cancer, talk to your health care provider about having an MRI and a mammogram every year.  · Breast cancer gene (BRCA) assessment is recommended for women who have family members with BRCA-related cancers. BRCA-related cancers include:  ? Breast.  ? Ovarian.  ? Tubal.  ? Peritoneal cancers.  · Results of the assessment will determine the need for genetic counseling and BRCA1 and BRCA2 testing.     Cervical Cancer  Your health care provider may recommend that you be screened regularly for cancer of the pelvic organs (ovaries, uterus, and  vagina). This screening involves a pelvic examination, including checking for microscopic changes to the surface of your cervix (Pap test). You may be encouraged to have this screening done every 3 years, beginning at age 21.  · For women ages 30-65, health care providers may recommend pelvic exams and Pap testing every 3 years, or they may recommend the Pap and pelvic exam, combined with testing for human papilloma virus (HPV), every 5 years. Some types of HPV increase your risk of cervical cancer. Testing for HPV may also be done on women of any age with unclear Pap test results.  · Other health care providers may not recommend any screening for nonpregnant women who are considered low risk for pelvic cancer and who do not have symptoms. Ask your health care provider if a screening pelvic exam is right for you.  · If you have had past treatment for cervical cancer or a condition that could lead to cancer, you need Pap tests and screening for cancer for at least 20 years after your treatment. If Pap tests have been discontinued, your risk factors (such as having a new sexual partner) need to be reassessed to determine if screening should resume. Some women have medical problems that increase the chance of getting cervical cancer. In these cases, your health care provider may recommend more frequent screening and Pap tests.     Colorectal Cancer  · This type of cancer can be detected and often prevented.  · Routine colorectal cancer screening usually begins at 50 years of age and continues through 75 years of age.  · Your health care provider may recommend screening at an earlier age if you have risk factors for colon cancer.  · Your health care provider may also recommend using home test kits to check for hidden blood in the stool.  · A small camera at the end of a tube can be used to examine your colon directly (sigmoidoscopy or colonoscopy). This is done to check for the earliest forms of colorectal  cancer.  · Routine screening usually begins at age 50.  · Direct examination of the colon should be repeated every 5-10 years through 75 years of age. However, you may need to be screened more often if early forms of precancerous polyps or small growths are found.     Skin Cancer  · Check your skin from head to toe regularly.  · Tell your health care provider about any new moles or changes in moles, especially if there is a change in a mole's shape or color.  · Also tell your health care provider if you have a mole that is larger than the size of a pencil eraser.  · Always use sunscreen. Apply sunscreen liberally and repeatedly throughout the day.  · Protect yourself by wearing long sleeves, pants, a wide-brimmed hat, and sunglasses whenever you are outside.     Heart disease, diabetes, and high blood pressure  · High blood pressure causes heart disease and increases the risk of stroke. High blood pressure is more likely to develop in:  ? People who have blood pressure in the high end of the normal range (130-139/85-89 mm Hg).  ? People who are overweight or obese.  ? People who are .  · If you are 18-39 years of age, have your blood pressure checked every 3-5 years. If you are 40 years of age or older, have your blood pressure checked every year. You should have your blood pressure measured twice--once when you are at a hospital or clinic, and once when you are not at a hospital or clinic. Record the average of the two measurements. To check your blood pressure when you are not at a hospital or clinic, you can use:  ? An automated blood pressure machine at a pharmacy.  ? A home blood pressure monitor.  · If you are between 55 years and 79 years old, ask your health care provider if you should take aspirin to prevent strokes.  · Have regular diabetes screenings. This involves taking a blood sample to check your fasting blood sugar level.  ? If you are at a normal weight and have a low risk for  diabetes, have this test once every three years after 45 years of age.  ? If you are overweight and have a high risk for diabetes, consider being tested at a younger age or more often.  Preventing infection  Hepatitis B  · If you have a higher risk for hepatitis B, you should be screened for this virus. You are considered at high risk for hepatitis B if:  ? You were born in a country where hepatitis B is common. Ask your health care provider which countries are considered high risk.  ? Your parents were born in a high-risk country, and you have not been immunized against hepatitis B (hepatitis B vaccine).  ? You have HIV or AIDS.  ? You use needles to inject street drugs.  ? You live with someone who has hepatitis B.  ? You have had sex with someone who has hepatitis B.  ? You get hemodialysis treatment.  ? You take certain medicines for conditions, including cancer, organ transplantation, and autoimmune conditions.     Hepatitis C  · Blood testing is recommended for:  ? Everyone born from 1945 through 1965.  ? Anyone with known risk factors for hepatitis C.     Sexually transmitted infections (STIs)  · You should be screened for sexually transmitted infections (STIs) including gonorrhea and chlamydia if:  ? You are sexually active and are younger than 24 years of age.  ? You are older than 24 years of age and your health care provider tells you that you are at risk for this type of infection.  ? Your sexual activity has changed since you were last screened and you are at an increased risk for chlamydia or gonorrhea. Ask your health care provider if you are at risk.  · If you do not have HIV, but are at risk, it may be recommended that you take a prescription medicine daily to prevent HIV infection. This is called pre-exposure prophylaxis (PrEP). You are considered at risk if:  ? You are sexually active and do not regularly use condoms or know the HIV status of your partner(s).  ? You take drugs by injection.  ? You  are sexually active with a partner who has HIV.     Talk with your health care provider about whether you are at high risk of being infected with HIV. If you choose to begin PrEP, you should first be tested for HIV. You should then be tested every 3 months for as long as you are taking PrEP.  Pregnancy  · If you are premenopausal and you may become pregnant, ask your health care provider about preconception counseling.  · If you may become pregnant, take 400 to 800 micrograms (mcg) of folic acid every day.  · If you want to prevent pregnancy, talk to your health care provider about birth control (contraception).  Osteoporosis and menopause  · Osteoporosis is a disease in which the bones lose minerals and strength with aging. This can result in serious bone fractures. Your risk for osteoporosis can be identified using a bone density scan.  · If you are 65 years of age or older, or if you are at risk for osteoporosis and fractures, ask your health care provider if you should be screened.  · Ask your health care provider whether you should take a calcium or vitamin D supplement to lower your risk for osteoporosis.  · Menopause may have certain physical symptoms and risks.  · Hormone replacement therapy may reduce some of these symptoms and risks.  Talk to your health care provider about whether hormone replacement therapy is right for you.  Follow these instructions at home:  · Schedule regular health, dental, and eye exams.  · Stay current with your immunizations.  · Do not use any tobacco products including cigarettes, chewing tobacco, or electronic cigarettes.  · If you are pregnant, do not drink alcohol.  · If you are breastfeeding, limit how much and how often you drink alcohol.  · Limit alcohol intake to no more than 1 drink per day for nonpregnant women. One drink equals 12 ounces of beer, 5 ounces of wine, or 1½ ounces of hard liquor.  · Do not use street drugs.  · Do not share needles.  · Ask your health care  provider for help if you need support or information about quitting drugs.  · Tell your health care provider if you often feel depressed.  · Tell your health care provider if you have ever been abused or do not feel safe at home.  This information is not intended to replace advice given to you by your health care provider. Make sure you discuss any questions you have with your health care provider.  Document Released: 07/02/2012 Document Revised: 05/25/2017 Document Reviewed: 09/20/2016  [employer] Patient Education © 2018 [employer]       Cervical Cancer Screening--the PAP test     What is cervical cancer screening?  Cervical cancer screening is used to find changes in the cells of the cervix that could lead to cancer. The cervix is the opening to the uterus and is located at the top of the vagina. Screening includes cervical cytology (also called the Pap test or Pap smear) and, for some women, testing for human papillomavirus (HPV).    How does cervical cancer occur?  Cancer occurs when cervical cells become abnormal and, over time, grow out of control. The cancer cells invade deeper into the cervical tissue. In advanced cases, cancer cells can spread to other organs of the body.    What causes cervical cancer?  Most cases of cervical cancer are caused by infection with HPV. HPV is a virus that enters cells and can cause them to change. Some types of HPV have been linked to cervical cancer as well as cancer of the vulva, vagina, penis, anus, mouth, and throat. Types of HPV that may cause cancer are known as “high-risk types.”  HPV is passed from person to person during sexual activity. It is very common, and most people who are sexually active will get an HPV infection in their lifetime. HPV infection often causes no symptoms. Most HPV infections go away on their own. These short-term infections typically cause only mild (“low-grade”) changes in cervical cells. The cells go back to normal as the HPV  infection clears. But in some women, HPV does not go away. If a high-risk type of HPV infection lasts for a long time, it can cause more severe (“high-grade”) changes in cervical cells. High-grade changes are more likely to lead to cancer.    Why is cervical cancer screening important?  It usually takes 3-7 years for high-grade changes in cervical cells to become cancer. Cervical cancer screening may detect these changes before they become cancer. Women with low-grade changes can be tested more frequently to see if their cells go back to normal. Women with high-grade changes can get treatment to have the cells removed.    How is cervical cancer screening done?  Cervical cancer screening includes the Pap test and, for some women, an HPV test. Both tests use cells taken from the cervix. The screening process is simple and fast. You lie on an exam table and a speculum is used to open the vagina. The speculum gives a clear view of the cervix and upper vagina. Cells are removed from the cervix with a brush or other sampling instrument. The cells usually are put into a special liquid and sent to a laboratory for testing:  ·  For a Pap test, the sample is examined to see if abnormal cells are present.  ·  For an HPV test, the sample is tested for the presence of 13-14 of the most common high-risk HPV types.    How often should I have cervical cancer screening and which tests should I have?  How often you should have cervical cancer screening and which tests you should have depend on your age and health history:  ·  Women aged 21-29 years should have a Pap test alone every 3 years. HPV testing is not recommended.  · Women aged 30-65 years should have a Pap test and an HPV test (co-testing) every 5 years (preferred). It also is acceptable to have a Pap test alone every 3 years.    When should I stop having cervical cancer screening? You should stop having cervical cancer screening after age 65 years if:  · you do not have a  history of moderate or severe abnormal cervical cells or cervical cancer, and  · you have had either three negative Pap test results in a row or two negative co-test results in a row within the past 10 years, with the most recent test performed within the past 5 years.    If I have had a hysterectomy, do I still need cervical cancer screening?  If you have had a hysterectomy, you still may need screening. The decision is based on whether your cervix was removed, why the hysterectomy was needed, and whether you have a history of moderate or severe cervical cell changes or cervical cancer. Even if your cervix is removed at the time of hysterectomy, cervical cells can still be present at the top of the vagina. If you have a history of cervical cancer or cervical cell changes, you should continue to have screening for 20 years after the time of your surgery.    Are there any women who should not follow routine cervical cancer screening guidelines?  Yes. Women who have a history of cervical cancer, are infected with human immunodeficiency virus (HIV), have a weakened immune system, or who were exposed to diethylstilbestrol (VARSHA) before birth may require more frequent screening and should not follow these routine guidelines.    Having an HPV vaccination (such as Gardasil) does not change screening recommendations. Women who have been vaccinated against HPV still need to follow the screening recommendations for their age group.    What does it mean if I have an abnormal cervical cancer screening test result?  Many women have abnormal cervical cancer screening results. An abnormal result does not mean that you have cancer. Remember that cervical cell changes often go back to normal on their own. And if they do not, it often takes several years for even high-grade changes to become cancer.     If you have an abnormal screening test result, additional testing is needed to find out whether high-grade changes or cancer actually  are present. Sometimes, only repeat testing is needed. In other cases, colposcopy and cervical biopsy may be recommended to find out how severe the changes really are. If results of follow-up tests indicate high-grade changes, you may need treatment to remove the abnormal cells. You will need follow-up testing after treatment and will need to get regular cervical cancer screening after the follow-up is complete.    How accurate are cervical cancer screening test results?  As with any lab test, cervical cancer screening results are not always accurate. Sometimes, the results show abnormal cells when the cells are normal. This is called a “false-positive” result. Cervical cancer screening also may not detect abnormal cells when they are present. This is called a “false-negative” result. To help prevent false-negative or false-positive results, you should avoid douching, sexual intercourse, and using vaginal medications or hygiene products for 2 days before your test. You also should avoid cervical cancer screening when you have your menstrual period.    Designed as an aid to patients, this document sets forth current information and opinions related to women’s health. The information does not dictate an exclusive course of treatment or procedure to be followed and should not be construed as excluding other acceptable methods of practice. Variations, taking into account the needs of the individual patient, resources, and limitations unique to the institution or type of practice, may be appropriate.    Copyright September 2017 by the American College of Obstetricians and Gynecologists    [place]'s COVID-19 vaccine phases:  • 1A - Long term care facilities, assisted living facilities, health care personnel  • 1B - First responders, anyone age 70 or older, K-12 school personnel  • 1C - Anyone age 60 or older, anyone age 16 or older with CDC highest risk C19 risk conditions, all essential workers  • 2 - Anyone age 40 or  older  • 3 - Anyone age 16 or older  • 4 - Children under the age of 16 (if the vaccine is approved for this age group)    Centers offering COVID-19 vaccinations are doing so in accordance with the state's phased plan.  Each location's website/Facebook page offers more specific information pertaining to their phase status.      Options for COVID-19 vaccinations/wait lists:  • [facility] - vaccination sign-up for those in current eligible phase(s)  o [email or web address]  • [facility] - notification for future phases  o [email or web address]-[email or web address]-will-the-covid-19-vaccine-yn-zrhbastke-sa-me  • [facility]  o ukvaccine.org  • [facility] (wait list)  o [email or web address]  • [facility]  o [phone]   o Email Devaughn@[place].gov  • [facility] ([facility], [facility])  o Out of vaccines  o No wait list  o Monitor [facility]'s Facebook page for updates  • [facility]  o Sign-up through [facility]'s Facebook page  o At top of Facebook page, there is a blue box that says Sign Up  • [facility]  o Not vaccinating  o No wait list available    Additional information regarding the COVID-19 vaccination can be found at [email or web address]-[email or web address].

## 2021-03-17 NOTE — TELEPHONE ENCOUNTER
I tried to call Ms Godoy to inform her that Dr Barker approved for her to stop  Eliquis for a few( 3 ) days prior to procedure with Dr Bragg. No answer; left voice message.

## 2021-03-20 DIAGNOSIS — E55.9 VITAMIN D DEFICIENCY: ICD-10-CM

## 2021-03-21 ENCOUNTER — APPOINTMENT (OUTPATIENT)
Dept: PREADMISSION TESTING | Facility: HOSPITAL | Age: 31
End: 2021-03-21

## 2021-03-21 PROCEDURE — U0004 COV-19 TEST NON-CDC HGH THRU: HCPCS

## 2021-03-21 PROCEDURE — C9803 HOPD COVID-19 SPEC COLLECT: HCPCS

## 2021-03-22 ENCOUNTER — PATIENT MESSAGE (OUTPATIENT)
Dept: INTERNAL MEDICINE | Facility: CLINIC | Age: 31
End: 2021-03-22

## 2021-03-22 LAB — SARS-COV-2 RNA NOSE QL NAA+PROBE: NOT DETECTED

## 2021-03-22 RX ORDER — METHOCARBAMOL 750 MG/1
TABLET ORAL
Qty: 12 CAPSULE | Refills: 0 | Status: SHIPPED | OUTPATIENT
Start: 2021-03-22 | End: 2021-06-22

## 2021-03-23 ENCOUNTER — PRIOR AUTHORIZATION (OUTPATIENT)
Dept: INTERNAL MEDICINE | Facility: CLINIC | Age: 31
End: 2021-03-23

## 2021-03-23 ENCOUNTER — OUTSIDE FACILITY SERVICE (OUTPATIENT)
Dept: GASTROENTEROLOGY | Facility: CLINIC | Age: 31
End: 2021-03-23

## 2021-03-23 PROCEDURE — 88305 TISSUE EXAM BY PATHOLOGIST: CPT | Performed by: INTERNAL MEDICINE

## 2021-03-23 PROCEDURE — 43239 EGD BIOPSY SINGLE/MULTIPLE: CPT | Performed by: INTERNAL MEDICINE

## 2021-03-24 ENCOUNTER — OFFICE VISIT (OUTPATIENT)
Dept: OTOLARYNGOLOGY | Facility: CLINIC | Age: 31
End: 2021-03-24

## 2021-03-24 ENCOUNTER — LAB REQUISITION (OUTPATIENT)
Dept: LAB | Facility: HOSPITAL | Age: 31
End: 2021-03-24

## 2021-03-24 VITALS
BODY MASS INDEX: 34.66 KG/M2 | HEIGHT: 64 IN | WEIGHT: 203 LBS | RESPIRATION RATE: 18 BRPM | TEMPERATURE: 97.5 F | DIASTOLIC BLOOD PRESSURE: 84 MMHG | SYSTOLIC BLOOD PRESSURE: 120 MMHG

## 2021-03-24 DIAGNOSIS — R51.9 HEADACHE AROUND THE EYES: ICD-10-CM

## 2021-03-24 DIAGNOSIS — J32.0 MAXILLARY SINUSITIS, UNSPECIFIED CHRONICITY: Primary | ICD-10-CM

## 2021-03-24 DIAGNOSIS — R10.11 RIGHT UPPER QUADRANT PAIN: ICD-10-CM

## 2021-03-24 DIAGNOSIS — R10.84 GENERALIZED ABDOMINAL PAIN: ICD-10-CM

## 2021-03-24 DIAGNOSIS — K92.0 HEMATEMESIS: ICD-10-CM

## 2021-03-24 DIAGNOSIS — K21.9 GASTRO-ESOPHAGEAL REFLUX DISEASE WITHOUT ESOPHAGITIS: ICD-10-CM

## 2021-03-24 DIAGNOSIS — K30 FUNCTIONAL DYSPEPSIA: ICD-10-CM

## 2021-03-24 DIAGNOSIS — R11.0 NAUSEA: ICD-10-CM

## 2021-03-24 PROCEDURE — 99203 OFFICE O/P NEW LOW 30 MIN: CPT | Performed by: OTOLARYNGOLOGY

## 2021-03-24 RX ORDER — FLUCONAZOLE 100 MG/1
100 TABLET ORAL DAILY
Qty: 1 TABLET | Refills: 1 | Status: SHIPPED | OUTPATIENT
Start: 2021-03-24 | End: 2021-04-02

## 2021-03-24 RX ORDER — CLARITHROMYCIN 500 MG/1
500 TABLET, COATED ORAL 2 TIMES DAILY
Qty: 28 TABLET | Refills: 0 | Status: SHIPPED | OUTPATIENT
Start: 2021-03-24 | End: 2021-04-07

## 2021-03-24 RX ORDER — FLUCONAZOLE 150 MG/1
TABLET ORAL
Qty: 2 TABLET | Refills: 0 | Status: SHIPPED | OUTPATIENT
Start: 2021-03-24 | End: 2021-04-02

## 2021-03-24 RX ORDER — GUAIFENESIN, PSEUDOEPHEDRINE HYDROCHLORIDE 600; 60 MG/1; MG/1
1 TABLET, EXTENDED RELEASE ORAL EVERY 12 HOURS
Qty: 20 TABLET | Refills: 1 | Status: SHIPPED | OUTPATIENT
Start: 2021-03-24 | End: 2021-04-15

## 2021-03-24 NOTE — TELEPHONE ENCOUNTER
From: Marilu Godoy  To: STEVE Willingham  Sent: 3/22/2021 8:10 PM EDT  Subject: Prescription Question    Can you send me in a prescription for one of the yeast infection pills?

## 2021-03-24 NOTE — PROGRESS NOTES
ENT Office Consult Note     Date of Consult: 2021     Patient Name: Marilu Godoy  MRN: [id number]   : 1990     Referring Provider: Sandy Catalan A*    Care Team: Patient Care Team:  Deidre Barker MD as PCP - General (Family Medicine)  Pierce Washington DO as Consulting Physician (Cardiology)  Oswaldo Bragg MD as Consulting Physician (Gastroenterology)     Chief Complaint:    Chief Complaint   Patient presents with   • Consult   • maxillary cyst       History of Present Illness: Marilu Godoy is a 30 y.o. female who presents today for evaluation of a recently discovered right antral cyst.  Marilu had a MRI of the brain ordered for headaches and possible papilledema.  The MRI of the brain on 3/15/2021 was read as normal with the exception of a right antral cyst.  The radiologist did not comment on the size of the cyst.  Marilu has not required any antibiotics in the last year for sinusitis.  She does have some drainage and occasionally has some right jaw pain and numbness.  She is being treated by her neurologist Dr. JS Carlton with Topamax for migraine headaches.    Subjective      Review of Systems:   Review of Systems   Constitutional: Negative for fever and unexpected weight gain.   HENT: Positive for rhinorrhea and trouble swallowing. Negative for congestion, dental problem, ear discharge, ear pain, facial swelling, hearing loss, mouth sores, nosebleeds, postnasal drip, sinus pressure, sneezing, sore throat, swollen glands, tinnitus and voice change.    Eyes: Positive for blurred vision. Negative for double vision, pain and itching.   Respiratory: Negative for cough, shortness of breath and wheezing.    Gastrointestinal: Negative for GERD and indigestion.   Endocrine: Negative for cold intolerance.   Musculoskeletal: Positive for arthralgias. Negative for neck pain.   Skin: Negative for rash and bruise.   Neurological: Positive for dizziness and headache.  Negative for seizures.   Hematological: Negative for adenopathy. Does not bruise/bleed easily.      I have reviewed and confirmed the accuracy of the ROS as documented by the MA/LPN/RN Saulo Brito MD     Pertinent items are noted in HPI.     Past Medical History:   Past Medical History:   Diagnosis Date   • Abdominal pain    • Abnormal Pap smear of cervix    • Anxiety    • Asthma    • Bleeding disorder (CMS/HCC) 16   • Body piercing     TONGUE, NOSE, TWO IN EACH EAR   • Chest pain    • Clotting disorder (CMS/Formerly [facility])     factor 5   • Constipation    • Coronary artery disease involving native coronary artery of native heart without angina pectoris 2017   • Deep vein thrombosis (CMS/HCC) 16   • Diarrhea    • Diarrhea    • Endometriosis    • Factor 5 Leiden mutation, heterozygous (CMS/Formerly [facility])    • Fibroid    • Gallstone    • GERD (gastroesophageal reflux disease)    • H/O blood clots    • Headache    • Hiatal hernia    • High cholesterol    • History of recurrent UTIs    • HPV (human papilloma virus) infection    • Hypertension    • Kidney infection    • Migraine    • Nausea    • Nausea & vomiting    • Obesity    • Orthostatic hypotension    • Osteoarthritis    • Ovarian cyst    • Pollen allergies    • Protein S deficiency (CMS/HCC) 2016    labs from hospitalization for PE   • Pulmonary embolism (CMS/HCC)    • Recurrent pregnancy loss, antepartum condition or complication    • Sleep apnea     CPAP ASKED TO BRING DOS   • Subclinical hyperthyroidism    • Tachycardia    • Tattoo     LOWER BACK   • Varicella        Past Surgical History:   Past Surgical History:   Procedure Laterality Date   •  SECTION      X2  and    •  SECTION WITH TUBAL N/A 1/15/2019    Procedure:  SECTION REPEAT WITH TUBAL;  Surgeon: Alva Boyer MD;  Location: [facility] LABOR DELIVERY;  Service: Obstetrics/Gynecology   • CHOLECYSTECTOMY     • COLONOSCOPY N/A 2017    Procedure:  COLONOSCOPY WITH BIOPSIES AND ARGON THERMAL ABLATION;  Surgeon: Jignesh Selby MD;  Location: [facility] ENDOSCOPY;  Service:    • DIAGNOSTIC LAPAROSCOPY N/A 4/2/2018    Procedure: DIAGNOSTIC LAPAROSCOPY AND ABLATION OF ENDOMETRIOSIS;  Surgeon: Evin Zamudio MD;  Location: [facility] OR;  Service: Obstetrics/Gynecology   • ENDOMETRIAL ABLATION     • ENDOSCOPY N/A 4/20/2017    Procedure: ESOPHAGOGASTRODUODENOSCOPY WITH BIOPSIES AND COLD BIOPSY POLYPECTOMIES;  Surgeon: Jignesh Selby MD;  Location: [facility] ENDOSCOPY;  Service:    • PELVIC LAPAROSCOPY         Family History:   Family History   Problem Relation Age of Onset   • Arthritis Mother    • COPD Mother    • Asthma Mother    • Thyroid disease Mother    • Arthritis Father    • Diabetes Father    • Hypertension Father    • Hyperlipidemia Father    • Kidney disease Father    • Heart attack Father    • Coronary artery disease Father    • Dementia Father    • No Known Problems Son    • Colon cancer Neg Hx    • Liver cancer Neg Hx    • Liver disease Neg Hx    • Stomach cancer Neg Hx    • Esophageal cancer Neg Hx        Social History:   Social History     Socioeconomic History   • Marital status:      Spouse name: Not on file   • Number of children: Not on file   • Years of education: Not on file   • Highest education level: Not on file   Tobacco Use   • Smoking status: Never Smoker   • Smokeless tobacco: Never Used   Vaping Use   • Vaping Use: Never used   Substance and Sexual Activity   • Alcohol use: No   • Drug use: No   • Sexual activity: Yes     Partners: Male     Birth control/protection: None     Comment: PE while on birth control patch       Medications:     Current Outpatient Medications:   •  albuterol sulfate  (90 Base) MCG/ACT inhaler, Inhale 2 puffs Every 4 (Four) Hours As Needed for Wheezing or Shortness of Air., Disp: 18 g, Rfl: 11  •  apixaban (ELIQUIS) 5 MG tablet tablet, Take 1 tablet by mouth 2 (Two) Times a Day., Disp: 60 tablet,  Rfl: 5  •  aspirin 81 MG EC tablet, Take 81 mg by mouth Daily., Disp: , Rfl:   •  atorvastatin (LIPITOR) 40 MG tablet, Take 1 tablet by mouth Every Night., Disp: 30 tablet, Rfl: 11  •  b complex vitamins capsule, Take 1 capsule by mouth Daily., Disp: 90 capsule, Rfl: 3  •  benzonatate (TESSALON) 200 MG capsule, Take 1 capsule by mouth 3 (Three) Times a Day As Needed for Cough., Disp: 30 capsule, Rfl: 0  •  calcium carbonate EX (TUMS EX) 750 MG chewable tablet, Chew 1 tablet Daily., Disp: 30 tablet, Rfl: 1  •  Cholecalciferol (Vitamin D3) 50 MCG (2000 UT) capsule, Take 1 capsule by mouth Daily. Start after completing 50,000 IU weekly dose.  Indications: Vitamin D Deficiency, Disp: 90 capsule, Rfl: 3  •  Corlanor 5 MG tablet tablet, TAKE 1/2 TABLET BY MOUTH TWO TIMES A DAY WITH MEALS FOR 2 WEEKS, THEN INCREASE TO 1 TABLET 2 TIMES A DAY AS DIRECTED, Disp: , Rfl:   •  cyclobenzaprine (FLEXERIL) 10 MG tablet, Take 1 tablet by mouth 3 (Three) Times a Day As Needed for Muscle Spasms., Disp: 90 tablet, Rfl: 5  •  D3-50 1.25 MG (07993 UT) capsule, TAKE 1 CAPSULE BY MOUTH ONE TIME A WEEK , Disp: 12 capsule, Rfl: 0  •  Diclofenac Sodium (VOLTAREN) 1 % gel gel, diclofenac 1 % topical gel, Disp: , Rfl:   •  DULoxetine (CYMBALTA) 60 MG capsule, Take 1 capsule by mouth Daily., Disp: 90 capsule, Rfl: 3  •  Fluticasone Furoate-Vilanterol (Breo Ellipta) 200-25 MCG/INH inhaler, Inhale 1 puff Daily., Disp: 60 each, Rfl: 11  •  lidocaine (LIDODERM) 5 %, Place 2 patches on the skin as directed by provider Daily. Remove & Discard patch within 12 hours or as directed by MD, Disp: 60 patch, Rfl: 3  •  magnesium hydroxide (Milk of Magnesia) 400 MG/5ML suspension, Take 5 mL by mouth Daily As Needed for Constipation or Heartburn., Disp: 354 mL, Rfl: 0  •  methocarbamol (ROBAXIN) 750 MG tablet, TAKE 2 TAB PO UP TO 4 TIMES A DAY FOR NO MORE THAN 3 DAYS, THEN 1 TAB PO UP TO 4 TIMES A DAY AS NEEDED FOR SPASM., Disp: 60 tablet, Rfl: 0  •   "ondansetron ODT (ZOFRAN-ODT) 4 MG disintegrating tablet, Place 1 tablet on the tongue Every 6 (Six) Hours As Needed for Nausea or Vomiting., Disp: 10 tablet, Rfl: 0  •  pantoprazole (PROTONIX) 40 MG EC tablet, Take 1 tablet by mouth Daily., Disp: 30 tablet, Rfl: 0  •  Rimegepant Sulfate (Nurtec) 75 MG tablet dispersible tablet, Take 1 tablet by mouth Daily As Needed (migraine)., Disp: 8 tablet, Rfl: 5  •  topiramate (TOPAMAX) 100 MG tablet, Take 0.5 tablets by mouth Every Morning AND 1 tablet Every Evening., Disp: 60 tablet, Rfl: 1  •  clarithromycin (Biaxin) 500 MG tablet, Take 1 tablet by mouth 2 (Two) Times a Day for 14 days., Disp: 28 tablet, Rfl: 0  •  fluconazole (Diflucan) 100 MG tablet, Take 1 tablet by mouth Daily., Disp: 1 tablet, Rfl: 1  •  pseudoephedrine-guaifenesin (MUCINEX D)  MG per 12 hr tablet, Take 1 tablet by mouth Every 12 (Twelve) Hours., Disp: 20 tablet, Rfl: 1    Allergies:   Allergies   Allergen Reactions   • Imitrex [Sumatriptan] Shortness Of Breath and Nausea Only   • Ceftin [Cefuroxime Axetil] Other (See Comments)     Numbness in mouth and throat   • Amoxicillin Rash       Objective     Physical Exam:  Vital Signs:   Vitals:    03/24/21 1148   BP: 120/84   Resp: 18   Temp: 97.5 °F (36.4 °C)   Weight: 92.1 kg (203 lb)   Height: 162.6 cm (64\")     Body mass index is 34.84 kg/m².     General Appearance:  Alert, cooperative, in no acute distress   Head:  Normocephalic, without obvious abnormality, atraumatic   Eyes:          Conjunctivae and sclerae normal, PERRLA   Ears:   Tympanic membranes normal   Nose:  Relatively straight nasal septum no obvious polyps   Throat:  Size 1 tonsils with 1+ postnasal drainage   Fiberoptic Exam:  Deferred   Neck:  No palpable cervical adenopathy salivary masses or thyroid masses   Lungs:   Clear to auscultation,respirations regular, jeremy and unlabored      Heart:  Regular rhythm and normal rate, normal S1 and S2, no       murmur, no gallop, no rub, no " click   Pulses: Pulses palpable and equal bilaterally   Skin: No bleeding, bruising or rash   Lymph nodes: No palpable adenopathy   Neurologic: Cranial nerves 2 - 12 grossly intact        Results Review:   Labs:     Imaging: MRI Brain With & Without Contrast    Result Date: 3/15/2021  Unremarkable MRI of the brain.  Retention cyst in the right maxillary sinus.      Images were reviewed, interpreted, and dictated by Dr. Jorge Alberto Sargent M.D. Transcribed by Addie Burgess PA-C.  This report was finalized on 3/15/2021 3:26 PM by Jorge Alberto Sargent M.D..    CT Abdomen Pelvis With Contrast    Result Date: 3/14/2021  No acute disease. Authenticated by Osmany Ventura M.D. on 03/14/2021 09:33:53 PM    US Carotid Bilateral    Result Date: 11/27/2020  Normal carotid artery duplex.  This report was finalized on 11/27/2020 3:47 PM by Jorge Alberto Sargent M.D..        Assessment / Plan      Assessment/Plan:   Diagnoses and all orders for this visit:    1. Maxillary sinusitis, unspecified chronicity (Primary)  -     CT Sinus Without Contrast; Future  -     clarithromycin (Biaxin) 500 MG tablet; Take 1 tablet by mouth 2 (Two) Times a Day for 14 days.  Dispense: 28 tablet; Refill: 0  -     fluconazole (Diflucan) 100 MG tablet; Take 1 tablet by mouth Daily.  Dispense: 1 tablet; Refill: 1  -     pseudoephedrine-guaifenesin (MUCINEX D)  MG per 12 hr tablet; Take 1 tablet by mouth Every 12 (Twelve) Hours.  Dispense: 20 tablet; Refill: 1    2. Headache around the eyes         1.  Large right antral cyst with facial pain and numbness -no recent antibiotics  2.  History of migraine headaches treated with Topamax by Sandy Chow [facility] neurology  3.  History of factor V deficiency with history of pulmonary emboli -treated with Eliquis by Dr. Valadez    Plan: Will place Marilu on Biaxin 500 mg twice daily for 14days along with Mucinex D to decongest the sinuses.  I would then like to obtain a coronal scan of her sinuses at the completion  of the antibiotics.  We may ultimately need to surgically remove the large antral retention cyst surgically if conservative management alone is not successful  Follow Up:   Return in about 4 weeks (around 4/21/2021).    Time:    Discussed plan of care in detail with patient today. Patient verbally understands and agrees. I have spent and counseled for [id number] minutes face to face, with greater than 50 % of the time counseling.     Saulo Brito MD

## 2021-03-26 ENCOUNTER — TELEPHONE (OUTPATIENT)
Dept: GASTROENTEROLOGY | Facility: CLINIC | Age: 31
End: 2021-03-26

## 2021-03-26 DIAGNOSIS — R11.0 NAUSEA: Primary | ICD-10-CM

## 2021-03-26 DIAGNOSIS — R10.13 DYSPEPSIA: ICD-10-CM

## 2021-03-26 DIAGNOSIS — R10.13 EPIGASTRIC PAIN: ICD-10-CM

## 2021-03-26 NOTE — TELEPHONE ENCOUNTER
I called and spoke with Ms. Godoy regarding the pathology.  There was evidence of reflux but no H. pylori.  I will go ahead and proceed with a 4-hour gastric emptying study.

## 2021-03-30 ENCOUNTER — IMMUNIZATION (OUTPATIENT)
Dept: VACCINE CLINIC | Facility: HOSPITAL | Age: 31
End: 2021-03-30

## 2021-03-30 PROCEDURE — 0001A: CPT | Performed by: INTERNAL MEDICINE

## 2021-03-30 PROCEDURE — 91300 HC SARSCOV02 VAC 30MCG/0.3ML IM: CPT | Performed by: INTERNAL MEDICINE

## 2021-04-02 ENCOUNTER — OFFICE VISIT (OUTPATIENT)
Dept: INTERNAL MEDICINE | Facility: CLINIC | Age: 31
End: 2021-04-02

## 2021-04-02 ENCOUNTER — PATIENT MESSAGE (OUTPATIENT)
Dept: GASTROENTEROLOGY | Facility: CLINIC | Age: 31
End: 2021-04-02

## 2021-04-02 ENCOUNTER — HOSPITAL ENCOUNTER (OUTPATIENT)
Dept: GENERAL RADIOLOGY | Facility: HOSPITAL | Age: 31
Discharge: HOME | End: 2021-04-02
Admitting: NURSE PRACTITIONER

## 2021-04-02 ENCOUNTER — TELEPHONE (OUTPATIENT)
Dept: GASTROENTEROLOGY | Facility: CLINIC | Age: 31
End: 2021-04-02

## 2021-04-02 VITALS
TEMPERATURE: 97.8 F | HEART RATE: 101 BPM | DIASTOLIC BLOOD PRESSURE: 78 MMHG | OXYGEN SATURATION: 98 % | HEIGHT: 64 IN | BODY MASS INDEX: 34.51 KG/M2 | SYSTOLIC BLOOD PRESSURE: 120 MMHG | WEIGHT: 202.12 LBS

## 2021-04-02 DIAGNOSIS — R10.11 RUQ PAIN: ICD-10-CM

## 2021-04-02 DIAGNOSIS — R52 PAIN AGGRAVATED BY COUGHING AND DEEP BREATHING: Primary | ICD-10-CM

## 2021-04-02 DIAGNOSIS — R05.9 PAIN AGGRAVATED BY COUGHING AND DEEP BREATHING: Primary | ICD-10-CM

## 2021-04-02 PROCEDURE — 71046 X-RAY EXAM CHEST 2 VIEWS: CPT

## 2021-04-02 PROCEDURE — 99214 OFFICE O/P EST MOD 30 MIN: CPT | Performed by: NURSE PRACTITIONER

## 2021-04-02 NOTE — TELEPHONE ENCOUNTER
From: Marilu Godoy  To: Oswaldo Bragg MD  Sent: 4/2/2021 9:49 AM EDT  Subject: Non-Urgent Medical Question    I'm having a sharp stabbing pain in my right upper side. It's not easing up with anything.
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
## 2021-04-03 LAB
ALBUMIN SERPL-MCNC: 4.7 G/DL (ref 3.5–5.2)
ALBUMIN/GLOB SERPL: 1.8 G/DL
ALP SERPL-CCNC: 99 U/L (ref 39–117)
ALT SERPL-CCNC: 23 U/L (ref 1–33)
AMYLASE SERPL-CCNC: 59 U/L (ref 28–100)
AST SERPL-CCNC: 17 U/L (ref 1–32)
BILIRUB SERPL-MCNC: 0.7 MG/DL (ref 0–1.2)
BUN SERPL-MCNC: 8 MG/DL (ref 6–20)
BUN/CREAT SERPL: 10.8 (ref 7–25)
CALCIUM SERPL-MCNC: 9.6 MG/DL (ref 8.6–10.5)
CHLORIDE SERPL-SCNC: 102 MMOL/L (ref 98–107)
CO2 SERPL-SCNC: 27.8 MMOL/L (ref 22–29)
CREAT SERPL-MCNC: 0.74 MG/DL (ref 0.57–1)
GLOBULIN SER CALC-MCNC: 2.6 GM/DL
GLUCOSE SERPL-MCNC: 85 MG/DL (ref 65–99)
LIPASE SERPL-CCNC: 21 U/L (ref 13–60)
POTASSIUM SERPL-SCNC: 4.4 MMOL/L (ref 3.5–5.2)
PROT SERPL-MCNC: 7.3 G/DL (ref 6–8.5)
SODIUM SERPL-SCNC: 141 MMOL/L (ref 136–145)

## 2021-04-05 ENCOUNTER — PATIENT MESSAGE (OUTPATIENT)
Dept: NEUROLOGY | Facility: CLINIC | Age: 31
End: 2021-04-05

## 2021-04-12 ENCOUNTER — OFFICE VISIT (OUTPATIENT)
Dept: NEUROLOGY | Facility: CLINIC | Age: 31
End: 2021-04-12

## 2021-04-12 ENCOUNTER — PATIENT MESSAGE (OUTPATIENT)
Dept: NEUROLOGY | Facility: CLINIC | Age: 31
End: 2021-04-12

## 2021-04-12 VITALS
SYSTOLIC BLOOD PRESSURE: 136 MMHG | HEART RATE: 98 BPM | HEIGHT: 64 IN | WEIGHT: 202.6 LBS | TEMPERATURE: 98 F | OXYGEN SATURATION: 98 % | DIASTOLIC BLOOD PRESSURE: 84 MMHG | BODY MASS INDEX: 34.59 KG/M2

## 2021-04-12 DIAGNOSIS — M79.641 RIGHT HAND PAIN: ICD-10-CM

## 2021-04-12 DIAGNOSIS — G43.009 MIGRAINE WITHOUT AURA AND WITHOUT STATUS MIGRAINOSUS, NOT INTRACTABLE: Primary | ICD-10-CM

## 2021-04-12 PROCEDURE — 99214 OFFICE O/P EST MOD 30 MIN: CPT | Performed by: NURSE PRACTITIONER

## 2021-04-12 RX ORDER — ERENUMAB-AOOE 70 MG/ML
70 INJECTION SUBCUTANEOUS
Qty: 1 ML | Refills: 5 | Status: SHIPPED | OUTPATIENT
Start: 2021-04-12 | End: 2021-04-15

## 2021-04-12 RX ORDER — TOPIRAMATE 100 MG/1
TABLET, FILM COATED ORAL
Qty: 60 TABLET | Refills: 1 | Status: SHIPPED | OUTPATIENT
Start: 2021-04-12 | End: 2021-08-25 | Stop reason: ALTCHOICE

## 2021-04-12 NOTE — PROGRESS NOTES
Follow Up Neurology Office Visit      Patient Name: Marilu Godoy    Referring Physician: No ref. provider found    Chief Complaint:    Chief Complaint   Patient presents with   • Migraine     Patient is in office today for migraine treatment follow up.  Patient states migrines are the same since being on current medication treatment.       History of Present Illness: Marilu Godoy is a 30 y.o. female who is here to follow up with Neurology for  Headaches. She continues to experiences daily headaches, with migraine headaches at least 4 times per week. She has not improved with topiramate. She has also tried NSAIDs, muscle relaxant, and SNRIs. She has had some relief with Nurtec, but was unable to obtain prescription due to cost.     Has been evaluated by ENT for treatment of large right maxillary cyst. Completed antibiotics.    New complaint of right hand pain with numbness and tingling in fingers, most pronounced in middle finger through thumb. She reports that she did experience carpal tunnel while pregnant, this feels consistent with the CTS. No triggering factors, present consistently. Does not have splints, did not previously undergo treatment.    The following portions of the patient's history were reviewed and updated as appropriate: allergies, current medications, past family history, past medical history, past social history, past surgical history and problem list.    Subjective     Review of Systems:   Review of Systems   Constitutional: Negative for activity change and fatigue.   HENT: Negative for drooling and voice change.    Eyes: Positive for visual disturbance. Negative for blurred vision, double vision and photophobia.   Respiratory: Negative for shortness of breath.    Cardiovascular: Negative for chest pain and palpitations.   Gastrointestinal: Positive for nausea. Negative for vomiting.   Genitourinary: Negative for urinary incontinence.   Musculoskeletal: Negative for  arthralgias, back pain, gait problem, myalgias and neck pain.   Allergic/Immunologic: Negative for immunocompromised state.   Neurological: Positive for headache. Negative for dizziness, tremors, seizures, syncope, facial asymmetry, speech difficulty, weakness, light-headedness, numbness, memory problem and confusion.   Hematological: Does not bruise/bleed easily.   Psychiatric/Behavioral: Negative for decreased concentration, dysphoric mood, hallucinations, sleep disturbance, depressed mood and stress. The patient is not nervous/anxious.        Medications:     Current Outpatient Medications:   •  albuterol sulfate  (90 Base) MCG/ACT inhaler, Inhale 2 puffs Every 4 (Four) Hours As Needed for Wheezing or Shortness of Air., Disp: 18 g, Rfl: 11  •  apixaban (ELIQUIS) 5 MG tablet tablet, Take 1 tablet by mouth 2 (Two) Times a Day., Disp: 60 tablet, Rfl: 5  •  aspirin 81 MG EC tablet, Take 81 mg by mouth Daily., Disp: , Rfl:   •  atorvastatin (LIPITOR) 40 MG tablet, Take 1 tablet by mouth Every Night., Disp: 30 tablet, Rfl: 11  •  b complex vitamins capsule, Take 1 capsule by mouth Daily., Disp: 90 capsule, Rfl: 3  •  calcium carbonate EX (TUMS EX) 750 MG chewable tablet, Chew 1 tablet Daily., Disp: 30 tablet, Rfl: 1  •  Cholecalciferol (Vitamin D3) 50 MCG (2000 UT) capsule, Take 1 capsule by mouth Daily. Start after completing 50,000 IU weekly dose.  Indications: Vitamin D Deficiency, Disp: 90 capsule, Rfl: 3  •  Corlanor 5 MG tablet tablet, TAKE 1/2 TABLET BY MOUTH TWO TIMES A DAY WITH MEALS FOR 2 WEEKS, THEN INCREASE TO 1 TABLET 2 TIMES A DAY AS DIRECTED, Disp: , Rfl:   •  cyclobenzaprine (FLEXERIL) 10 MG tablet, Take 1 tablet by mouth 3 (Three) Times a Day As Needed for Muscle Spasms., Disp: 90 tablet, Rfl: 5  •  D3-50 1.25 MG (03111 UT) capsule, TAKE 1 CAPSULE BY MOUTH ONE TIME A WEEK , Disp: 12 capsule, Rfl: 0  •  Diclofenac Sodium (VOLTAREN) 1 % gel gel, diclofenac 1 % topical gel, Disp: , Rfl:   •   "DULoxetine (CYMBALTA) 60 MG capsule, Take 1 capsule by mouth Daily., Disp: 90 capsule, Rfl: 3  •  Fluticasone Furoate-Vilanterol (Breo Ellipta) 200-25 MCG/INH inhaler, Inhale 1 puff Daily., Disp: 60 each, Rfl: 11  •  lidocaine (LIDODERM) 5 %, Place 2 patches on the skin as directed by provider Daily. Remove & Discard patch within 12 hours or as directed by MD, Disp: 60 patch, Rfl: 3  •  magnesium hydroxide (Milk of Magnesia) 400 MG/5ML suspension, Take 5 mL by mouth Daily As Needed for Constipation or Heartburn., Disp: 354 mL, Rfl: 0  •  methocarbamol (ROBAXIN) 750 MG tablet, TAKE 2 TAB PO UP TO 4 TIMES A DAY FOR NO MORE THAN 3 DAYS, THEN 1 TAB PO UP TO 4 TIMES A DAY AS NEEDED FOR SPASM., Disp: 60 tablet, Rfl: 0  •  ondansetron ODT (ZOFRAN-ODT) 4 MG disintegrating tablet, Place 1 tablet on the tongue Every 6 (Six) Hours As Needed for Nausea or Vomiting., Disp: 10 tablet, Rfl: 0  •  pantoprazole (PROTONIX) 40 MG EC tablet, Take 1 tablet by mouth Daily., Disp: 30 tablet, Rfl: 0  •  pseudoephedrine-guaifenesin (MUCINEX D)  MG per 12 hr tablet, Take 1 tablet by mouth Every 12 (Twelve) Hours., Disp: 20 tablet, Rfl: 1  •  topiramate (TOPAMAX) 100 MG tablet, Take 1 tablet by mouth Every Morning AND 1 tablet Every Evening., Disp: 60 tablet, Rfl: 1  •  Erenumab-aooe (Aimovig) 70 MG/ML prefilled syringe, Inject 1 mL under the skin into the appropriate area as directed Every 30 (Thirty) Days., Disp: 1 mL, Rfl: 5    Allergies:   Allergies   Allergen Reactions   • Imitrex [Sumatriptan] Shortness Of Breath and Nausea Only   • Ceftin [Cefuroxime Axetil] Other (See Comments)     Numbness in mouth and throat   • Amoxicillin Rash       Objective     Physical Exam:  Vital Signs:   Vitals:    04/12/21 1537   BP: 136/84   Pulse: 98   Temp: 98 °F (36.7 °C)   SpO2: 98%   Weight: 91.9 kg (202 lb 9.6 oz)   Height: 162.6 cm (64\")   PainSc: 0-No pain     Physical Exam  Pulmonary:      Effort: Pulmonary effort is normal.   Skin:     " General: Skin is warm.   Neurological:      General: No focal deficit present.      Mental Status: She is oriented to person, place, and time. Mental status is at baseline.      Gait: Gait is intact.   Psychiatric:         Mood and Affect: Mood normal.         Speech: Speech normal.         Behavior: Behavior normal.       Neurologic Exam     Mental Status   Oriented to person, place, and time.   Attention: normal. Concentration: normal.   Speech: speech is normal   Level of consciousness: alert    Cranial Nerves   Cranial nerves II through XII intact.     Motor Exam   Muscle bulk: normal  Overall muscle tone: normal    Gait, Coordination, and Reflexes     Gait  Gait: normal    Tremor   Resting tremor: absent    Results Review:   I reviewed the patient's new clinical results.  I have reviewed the patient's other medical records to include, labs, radiology and referrals.     Assessment / Plan      Assessment/Plan:   Diagnoses and all orders for this visit:    1. Migraine without aura and without status migrainosus, not intractable (Primary)  -     topiramate (TOPAMAX) 100 MG tablet; Take 1 tablet by mouth Every Morning AND 1 tablet Every Evening.  Dispense: 60 tablet; Refill: 1  -     Erenumab-aooe (Aimovig) 70 MG/ML prefilled syringe; Inject 1 mL under the skin into the appropriate area as directed Every 30 (Thirty) Days.  Dispense: 1 mL; Refill: 5    Patient has been experiencing intractable migraine headaches which have not improved with topiramate therapy.  She has also tried SNRI, NSAIDs, triptans, and muscle relaxants without benefit.  She has undergone tubal ligation, denies possibility of pregnancy today.  I discussed that given her multiple medication trials and comorbidities, including hypercoagulability, treatment with anti-CGRP medication would be best option.  She agreed to try Aimovig injections monthly for reduction of migraine headaches.  I reviewed dosing instructions with her today.  I have also  increased her topiramate for improved headache relief.  I have also given her additional samples of Nurtec.    2. Right hand pain  -     EMG & Nerve Conduction Test; Future    Patient mentions today that she has been having right hand pain, most pronounced from thumb to middle finger.  She reports that she had carpal tunnel while pregnant, this feels consistent with previous carpal tunnel pain.  I have ordered EMG with nerve conduction testing for further assessment.    Follow Up:   Return in about 4 weeks (around 5/10/2021) for Next scheduled follow up.     ARNIE Bower  [facility]   AS THE PROVIDER, I PERSONALLY WORE PPE DURING ENTIRE FACE TO FACE ENCOUNTER IN CLINIC WITH THE PATIENT. PATIENT ALSO WORE PPE DURING ENTIRE FACE TO FACE ENCOUNTER EXCEPT FOR A MAX OF 30 SECONDS DURING NEUROLOGICAL EVALUATION OF CRANIAL NERVES AND THEN MASK WAS PLACED BACK OVER PATIENT FACE FOR REMAINDER OF VISIT. I WASHED MY HANDS BEFORE AND AFTER VISIT.    Please note that portions of this note may have been completed with a voice recognition program. Efforts were made to edit the dictations, but occasionally words are mistranscribed.

## 2021-04-12 NOTE — PATIENT INSTRUCTIONS
Carpal Tunnel Syndrome    Carpal tunnel syndrome is a condition that causes pain in your hand and arm. The carpal tunnel is a narrow area that is on the palm side of your wrist. Repeated wrist motion or certain diseases may cause swelling in the tunnel. This swelling can pinch the main nerve in the wrist (median nerve).  What are the causes?  This condition may be caused by:  · Repeated wrist motions.  · Wrist injuries.  · Arthritis.  · A sac of fluid (cyst) or abnormal growth (tumor) in the carpal tunnel.  · Fluid buildup during pregnancy.  Sometimes the cause is not known.  What increases the risk?  The following factors may make you more likely to develop this condition:  · Having a job in which you move your wrist in the same way many times. This includes jobs like being a  or a .  · Being a woman.  · Having other health conditions, such as:  ? Diabetes.  ? Obesity.  ? A thyroid gland that is not active enough (hypothyroidism).  ? Kidney failure.  What are the signs or symptoms?  Symptoms of this condition include:  · A tingling feeling in your fingers.  · Tingling or a loss of feeling (numbness) in your hand.  · Pain in your entire arm. This pain may get worse when you bend your wrist and elbow for a long time.  · Pain in your wrist that goes up your arm to your shoulder.  · Pain that goes down into your palm or fingers.  · A weak feeling in your hands. You may find it hard to grab and hold items.  You may feel worse at night.  How is this diagnosed?  This condition is diagnosed with a medical history and physical exam. You may also have tests, such as:  · Electromyogram (EMG). This test checks the signals that the nerves send to the muscles.  · Nerve conduction study. This test checks how well signals pass through your nerves.  · Imaging tests, such as X-rays, ultrasound, and MRI. These tests check for what might be the cause of your condition.  How is this treated?  This condition may be treated  with:  · Lifestyle changes. You will be asked to stop or change the activity that caused your problem.  · Doing exercise and activities that make bones and muscles stronger (physical therapy).  · Learning how to use your hand again (occupational therapy).  · Medicines for pain and swelling (inflammation). You may have injections in your wrist.  · A wrist splint.  · Surgery.  Follow these instructions at home:  If you have a splint:  · Wear the splint as told by your doctor. Remove it only as told by your doctor.  · Loosen the splint if your fingers:  ? Tingle.  ? Lose feeling (become numb).  ? Turn cold and blue.  · Keep the splint clean.  · If the splint is not waterproof:  ? Do not let it get wet.  ? Cover it with a watertight covering when you take a bath or a shower.  Managing pain, stiffness, and swelling    · If told, put ice on the painful area:  ? If you have a removable splint, remove it as told by your doctor.  ? Put ice in a plastic bag.  ? Place a towel between your skin and the bag.  ? Leave the ice on for 20 minutes, 2-3 times per day.  General instructions  · Take over-the-counter and prescription medicines only as told by your doctor.  · Rest your wrist from any activity that may cause pain. If needed, talk with your boss at work about changes that can help your wrist heal.  · Do any exercises as told by your doctor, physical therapist, or occupational therapist.  · Keep all follow-up visits as told by your doctor. This is important.  Contact a doctor if:  · You have new symptoms.  · Medicine does not help your pain.  · Your symptoms get worse.  Get help right away if:  · You have very bad numbness or tingling in your wrist or hand.  Summary  · Carpal tunnel syndrome is a condition that causes pain in your hand and arm.  · It is often caused by repeated wrist motions.  · Lifestyle changes and medicines are used to treat this problem. Surgery may help in very bad cases.  · Follow your doctor's  instructions about wearing a splint, resting your wrist, keeping follow-up visits, and calling for help.  This information is not intended to replace advice given to you by your health care provider. Make sure you discuss any questions you have with your health care provider.  Document Revised: 04/26/2019 Document Reviewed: 04/26/2019  Elsevier Patient Education © 2021 [employer]

## 2021-04-15 ENCOUNTER — OFFICE VISIT (OUTPATIENT)
Dept: INTERNAL MEDICINE | Facility: CLINIC | Age: 31
End: 2021-04-15

## 2021-04-15 VITALS
HEART RATE: 98 BPM | HEIGHT: 64 IN | SYSTOLIC BLOOD PRESSURE: 120 MMHG | BODY MASS INDEX: 34.97 KG/M2 | WEIGHT: 204.8 LBS | TEMPERATURE: 98.2 F | DIASTOLIC BLOOD PRESSURE: 60 MMHG | OXYGEN SATURATION: 99 %

## 2021-04-15 DIAGNOSIS — R05.9 COUGH: Primary | ICD-10-CM

## 2021-04-15 PROCEDURE — 99213 OFFICE O/P EST LOW 20 MIN: CPT | Performed by: INTERNAL MEDICINE

## 2021-04-15 RX ORDER — AZITHROMYCIN 250 MG/1
TABLET, FILM COATED ORAL
Qty: 6 TABLET | Refills: 0 | Status: SHIPPED | OUTPATIENT
Start: 2021-04-15 | End: 2021-06-22

## 2021-04-15 RX ORDER — ALBUTEROL SULFATE 90 UG/1
2 AEROSOL, METERED RESPIRATORY (INHALATION) EVERY 4 HOURS PRN
Qty: 18 G | Refills: 11 | Status: SHIPPED | OUTPATIENT
Start: 2021-04-15 | End: 2022-08-30 | Stop reason: SDUPTHER

## 2021-04-15 RX ORDER — FLUTICASONE PROPIONATE 50 MCG
1 SPRAY, SUSPENSION (ML) NASAL DAILY
Qty: 16 G | Refills: 2 | Status: SHIPPED | OUTPATIENT
Start: 2021-04-15 | End: 2021-07-19

## 2021-04-16 NOTE — PROGRESS NOTES
"Subjective  Marilu Fiorella Godoy is a 30 y.o. female    HPI coming in with complains of cough congestion for about a week usually gets allergies around this time now has had a low-grade fever.  She received her Covid first dose about 2 weeks ago.  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  Denies leg swelling or chest pain.  Non-smoker    The following portions of the patient's history were reviewed and updated as appropriate: allergies, current medications, past family history, past medical history, past social history, past surgical history, and problem list.     Review of Systems   Constitutional: Positive for fatigue. Negative for activity change, appetite change and fever.   HENT: Positive for congestion. Negative for ear discharge, ear pain and trouble swallowing.    Eyes: Negative for photophobia and visual disturbance.   Respiratory: Positive for cough. Negative for shortness of breath.    Cardiovascular: Negative for chest pain and palpitations.   Gastrointestinal: Negative for abdominal distention, abdominal pain, constipation, diarrhea, nausea and vomiting.   Endocrine: Negative.    Genitourinary: Negative for dysuria, hematuria and urgency.   Musculoskeletal: Positive for arthralgias. Negative for back pain, joint swelling and myalgias.   Skin: Negative for color change and rash.   Allergic/Immunologic: Negative.    Neurological: Negative for dizziness, weakness, light-headedness and headaches.   Hematological: Negative for adenopathy. Does not bruise/bleed easily.   Psychiatric/Behavioral: Positive for sleep disturbance. Negative for agitation, confusion and dysphoric mood. The patient is not nervous/anxious.        Visit Vitals  /60   Pulse 98   Temp 98.2 °F (36.8 °C) (Infrared)   Ht 162.6 cm (64\")   Wt 92.9 kg (204 lb 12.8 oz)   SpO2 99%   BMI 35.15 kg/m²       Objective  Physical Exam  Constitutional:       General: She is not in acute distress.     Appearance: She is " well-developed.   HENT:      Nose: Nose normal.   Eyes:      General: No scleral icterus.     Conjunctiva/sclera: Conjunctivae normal.   Neck:      Thyroid: No thyromegaly.      Trachea: No tracheal deviation.   Cardiovascular:      Rate and Rhythm: Normal rate and regular rhythm.      Heart sounds: No murmur heard.   No friction rub.   Pulmonary:      Effort: No respiratory distress.      Breath sounds: No wheezing or rales.   Abdominal:      General: There is no distension.      Palpations: Abdomen is soft. There is no mass.      Tenderness: There is no abdominal tenderness. There is no guarding.   Musculoskeletal:         General: No deformity. Normal range of motion.   Lymphadenopathy:      Cervical: No cervical adenopathy.   Skin:     General: Skin is warm and dry.      Findings: No erythema or rash.   Neurological:      Mental Status: She is alert and oriented to person, place, and time.      Cranial Nerves: No cranial nerve deficit.      Coordination: Coordination normal.      Deep Tendon Reflexes: Reflexes are normal and symmetric.   Psychiatric:         Behavior: Behavior normal.         Thought Content: Thought content normal.         Judgment: Judgment normal.         Diagnoses and all orders for this visit:    Cough exam unremarkable continue with albuterol inhaler added on a azithromycin for suspected bronchitis.  Has nasal congestion and postnasal drainage added on Flonase    Other orders  -     albuterol sulfate  (90 Base) MCG/ACT inhaler; Inhale 2 puffs Every 4 (Four) Hours As Needed for Wheezing or Shortness of Air.  -     azithromycin (Zithromax Z-Ravin) 250 MG tablet; Take 2 tablets the first day, then 1 tablet daily for 4 days.  -     fluticasone (Flonase) 50 MCG/ACT nasal spray; 1 spray into the nostril(s) as directed by provider Daily for 30 days. Administer 2 sprays in each nostril for each dose.

## 2021-04-20 ENCOUNTER — IMMUNIZATION (OUTPATIENT)
Dept: VACCINE CLINIC | Facility: HOSPITAL | Age: 31
End: 2021-04-20

## 2021-04-20 PROCEDURE — 0002A: CPT | Performed by: INTERNAL MEDICINE

## 2021-04-20 PROCEDURE — 91300 HC SARSCOV02 VAC 30MCG/0.3ML IM: CPT | Performed by: INTERNAL MEDICINE

## 2021-04-22 ENCOUNTER — PRIOR AUTHORIZATION (OUTPATIENT)
Dept: INTERNAL MEDICINE | Facility: CLINIC | Age: 31
End: 2021-04-22

## 2021-04-22 DIAGNOSIS — J45.21 MILD INTERMITTENT ASTHMA WITH ACUTE EXACERBATION: Primary | ICD-10-CM

## 2021-04-22 NOTE — TELEPHONE ENCOUNTER
Key: [id number]   Sent to [employer] today  Drug  Breo Ellipta 200-25MCG/INH aerosol powder  Form  [facility] Electronic PA Form (2017 [employer])

## 2021-04-23 RX ORDER — MOMETASONE FUROATE AND FORMOTEROL FUMARATE DIHYDRATE 200; 5 UG/1; UG/1
2 AEROSOL RESPIRATORY (INHALATION)
Qty: 13 G | Refills: 11 | Status: SHIPPED | OUTPATIENT
Start: 2021-04-23

## 2021-05-05 ENCOUNTER — PATIENT MESSAGE (OUTPATIENT)
Dept: NEUROLOGY | Facility: CLINIC | Age: 31
End: 2021-05-05

## 2021-05-06 ENCOUNTER — APPOINTMENT (OUTPATIENT)
Dept: NUCLEAR MEDICINE | Facility: HOSPITAL | Age: 31
End: 2021-05-06

## 2021-05-12 ENCOUNTER — OFFICE VISIT (OUTPATIENT)
Dept: INTERNAL MEDICINE | Facility: CLINIC | Age: 31
End: 2021-05-12

## 2021-05-12 VITALS
TEMPERATURE: 98.4 F | DIASTOLIC BLOOD PRESSURE: 72 MMHG | HEIGHT: 64 IN | WEIGHT: 203.5 LBS | SYSTOLIC BLOOD PRESSURE: 118 MMHG | OXYGEN SATURATION: 99 % | BODY MASS INDEX: 34.74 KG/M2 | HEART RATE: 94 BPM

## 2021-05-12 DIAGNOSIS — R10.12 LEFT UPPER QUADRANT ABDOMINAL PAIN: Primary | ICD-10-CM

## 2021-05-12 DIAGNOSIS — R10.33 PERIUMBILICAL ABDOMINAL PAIN: ICD-10-CM

## 2021-05-12 PROCEDURE — 99213 OFFICE O/P EST LOW 20 MIN: CPT | Performed by: NURSE PRACTITIONER

## 2021-05-17 ENCOUNTER — HOSPITAL ENCOUNTER (OUTPATIENT)
Dept: CT IMAGING | Facility: HOSPITAL | Age: 31
Discharge: HOME | End: 2021-05-17
Admitting: OTOLARYNGOLOGY

## 2021-05-17 DIAGNOSIS — J32.0 MAXILLARY SINUSITIS, UNSPECIFIED CHRONICITY: ICD-10-CM

## 2021-05-17 PROCEDURE — 70486 CT MAXILLOFACIAL W/O DYE: CPT

## 2021-05-19 ENCOUNTER — PREP FOR SURGERY (OUTPATIENT)
Dept: OTHER | Facility: HOSPITAL | Age: 31
End: 2021-05-19

## 2021-05-19 ENCOUNTER — HOSPITAL ENCOUNTER (EMERGENCY)
Facility: HOSPITAL | Age: 31
Discharge: HOME | End: 2021-05-19
Attending: EMERGENCY MEDICINE | Admitting: EMERGENCY MEDICINE

## 2021-05-19 ENCOUNTER — OFFICE VISIT (OUTPATIENT)
Dept: OTOLARYNGOLOGY | Facility: CLINIC | Age: 31
End: 2021-05-19

## 2021-05-19 VITALS
DIASTOLIC BLOOD PRESSURE: 79 MMHG | HEIGHT: 63 IN | HEART RATE: 92 BPM | WEIGHT: 206 LBS | RESPIRATION RATE: 18 BRPM | BODY MASS INDEX: 36.5 KG/M2 | TEMPERATURE: 98.9 F | SYSTOLIC BLOOD PRESSURE: 115 MMHG | OXYGEN SATURATION: 97 %

## 2021-05-19 VITALS
WEIGHT: 204 LBS | HEIGHT: 64 IN | RESPIRATION RATE: 20 BRPM | DIASTOLIC BLOOD PRESSURE: 70 MMHG | SYSTOLIC BLOOD PRESSURE: 122 MMHG | BODY MASS INDEX: 34.83 KG/M2

## 2021-05-19 DIAGNOSIS — J34.1: ICD-10-CM

## 2021-05-19 DIAGNOSIS — D68.51 HETEROZYGOUS FACTOR V LEIDEN MUTATION (HCC): Primary | ICD-10-CM

## 2021-05-19 DIAGNOSIS — D68.2 FACTOR V DEFICIENCY (HCC): ICD-10-CM

## 2021-05-19 DIAGNOSIS — J32.0 CHRONIC RIGHT MAXILLARY SINUSITIS: Primary | ICD-10-CM

## 2021-05-19 DIAGNOSIS — B34.9 ACUTE VIRAL SYNDROME: Primary | ICD-10-CM

## 2021-05-19 LAB
S PYO AG THROAT QL: NEGATIVE
SARS-COV-2 RNA PNL SPEC NAA+PROBE: NOT DETECTED

## 2021-05-19 PROCEDURE — 99214 OFFICE O/P EST MOD 30 MIN: CPT | Performed by: OTOLARYNGOLOGY

## 2021-05-19 PROCEDURE — 87081 CULTURE SCREEN ONLY: CPT | Performed by: EMERGENCY MEDICINE

## 2021-05-19 PROCEDURE — 99283 EMERGENCY DEPT VISIT LOW MDM: CPT

## 2021-05-19 PROCEDURE — C9803 HOPD COVID-19 SPEC COLLECT: HCPCS

## 2021-05-19 PROCEDURE — 31231 NASAL ENDOSCOPY DX: CPT | Performed by: OTOLARYNGOLOGY

## 2021-05-19 PROCEDURE — 87635 SARS-COV-2 COVID-19 AMP PRB: CPT | Performed by: EMERGENCY MEDICINE

## 2021-05-19 PROCEDURE — 87880 STREP A ASSAY W/OPTIC: CPT | Performed by: EMERGENCY MEDICINE

## 2021-05-19 NOTE — PROGRESS NOTES
ENT Office Consult Note     Date of Consult: 2021     Patient Name: Marilu Godoy  MRN: [id number]   : 1990     Referring Provider: No ref. provider found    Care Team: Patient Care Team:  Deidre Barker MD as PCP - General (Family Medicine)  Pierce Washington DO as Consulting Physician (Cardiology)  Oswaldo Bragg MD as Consulting Physician (Gastroenterology)     Chief Complaint:    Chief Complaint   Patient presents with   • Sinusitis     pt is here for a f/u on CT scan        History of Present Illness: Marilu Godoy is a 30 y.o. female who presents today for reevaluation of a right antral cyst causing facial pain and pressure.      Subjective      Review of Systems:   Review of Systems   HENT: Positive for congestion, postnasal drip and sinus pressure.       I have reviewed and confirmed the accuracy of the ROS as documented by the MA/LPN/RN Saulo Brito MD     Pertinent items are noted in HPI.     Past Medical History:   Past Medical History:   Diagnosis Date   • Abdominal pain    • Abnormal Pap smear of cervix    • Anxiety    • Asthma    • Bleeding disorder (CMS/HCC) 16   • Body piercing     TONGUE, NOSE, TWO IN EACH EAR   • Chest pain    • Clotting disorder (CMS/HCC)     factor 5   • Constipation    • Coronary artery disease involving native coronary artery of native heart without angina pectoris 2017   • Deep vein thrombosis (CMS/HCC) 16   • Diarrhea    • Diarrhea    • Endometriosis    • Factor 5 Leiden mutation, heterozygous (CMS/HCC)    • Fibroid    • Gallstone    • GERD (gastroesophageal reflux disease)    • H/O blood clots    • Headache    • Hiatal hernia    • High cholesterol    • History of recurrent UTIs    • HPV (human papilloma virus) infection    • Hypertension    • Kidney infection    • Migraine    • Nausea    • Nausea & vomiting    • Obesity    • Orthostatic hypotension    • Osteoarthritis    • Ovarian cyst    • Pollen  allergies    • Protein S deficiency (CMS/HCC) 2016    labs from hospitalization for PE   • Pulmonary embolism (CMS/HCC)    • Recurrent pregnancy loss, antepartum condition or complication    • Sleep apnea     CPAP ASKED TO BRING DOS   • Subclinical hyperthyroidism    • Tachycardia    • Tattoo     LOWER BACK   • Varicella        Past Surgical History:   Past Surgical History:   Procedure Laterality Date   •  SECTION      X2  and    •  SECTION WITH TUBAL N/A 1/15/2019    Procedure:  SECTION REPEAT WITH TUBAL;  Surgeon: Alva Boyer MD;  Location: [facility] LABOR DELIVERY;  Service: Obstetrics/Gynecology   • CHOLECYSTECTOMY     • COLONOSCOPY N/A 2017    Procedure: COLONOSCOPY WITH BIOPSIES AND ARGON THERMAL ABLATION;  Surgeon: Jignesh Selby MD;  Location: [facility] ENDOSCOPY;  Service:    • DIAGNOSTIC LAPAROSCOPY N/A 2018    Procedure: DIAGNOSTIC LAPAROSCOPY AND ABLATION OF ENDOMETRIOSIS;  Surgeon: Evin Zamudio MD;  Location: [facility] OR;  Service: Obstetrics/Gynecology   • ENDOMETRIAL ABLATION     • ENDOSCOPY N/A 2017    Procedure: ESOPHAGOGASTRODUODENOSCOPY WITH BIOPSIES AND COLD BIOPSY POLYPECTOMIES;  Surgeon: Jignesh Selby MD;  Location: [facility] ENDOSCOPY;  Service:    • PELVIC LAPAROSCOPY         Family History:   Family History   Problem Relation Age of Onset   • Arthritis Mother    • COPD Mother    • Asthma Mother    • Thyroid disease Mother    • Arthritis Father    • Diabetes Father    • Hypertension Father    • Hyperlipidemia Father    • Kidney disease Father    • Heart attack Father    • Coronary artery disease Father    • Dementia Father    • No Known Problems Son    • Colon cancer Neg Hx    • Liver cancer Neg Hx    • Liver disease Neg Hx    • Stomach cancer Neg Hx    • Esophageal cancer Neg Hx        Social History:   Social History     Socioeconomic History   • Marital status:      Spouse name: Not on file   • Number of children: Not on  file   • Years of education: Not on file   • Highest education level: Not on file   Tobacco Use   • Smoking status: Never Smoker   • Smokeless tobacco: Never Used   Vaping Use   • Vaping Use: Never used   Substance and Sexual Activity   • Alcohol use: No   • Drug use: No   • Sexual activity: Yes     Partners: Male     Birth control/protection: None     Comment: PE while on birth control patch       Medications:     Current Outpatient Medications:   •  albuterol sulfate  (90 Base) MCG/ACT inhaler, Inhale 2 puffs Every 4 (Four) Hours As Needed for Wheezing or Shortness of Air., Disp: 18 g, Rfl: 11  •  apixaban (ELIQUIS) 5 MG tablet tablet, Take 1 tablet by mouth 2 (Two) Times a Day., Disp: 60 tablet, Rfl: 5  •  aspirin 81 MG EC tablet, Take 81 mg by mouth Daily., Disp: , Rfl:   •  atorvastatin (LIPITOR) 40 MG tablet, Take 1 tablet by mouth Every Night., Disp: 30 tablet, Rfl: 11  •  azithromycin (Zithromax Z-Ravin) 250 MG tablet, Take 2 tablets the first day, then 1 tablet daily for 4 days., Disp: 6 tablet, Rfl: 0  •  b complex vitamins capsule, Take 1 capsule by mouth Daily., Disp: 90 capsule, Rfl: 3  •  calcium carbonate EX (TUMS EX) 750 MG chewable tablet, Chew 1 tablet Daily., Disp: 30 tablet, Rfl: 1  •  Cholecalciferol (Vitamin D3) 50 MCG (2000 UT) capsule, Take 1 capsule by mouth Daily. Start after completing 50,000 IU weekly dose.  Indications: Vitamin D Deficiency, Disp: 90 capsule, Rfl: 3  •  Corlanor 5 MG tablet tablet, TAKE 1/2 TABLET BY MOUTH TWO TIMES A DAY WITH MEALS FOR 2 WEEKS, THEN INCREASE TO 1 TABLET 2 TIMES A DAY AS DIRECTED, Disp: , Rfl:   •  cyclobenzaprine (FLEXERIL) 10 MG tablet, Take 1 tablet by mouth 3 (Three) Times a Day As Needed for Muscle Spasms., Disp: 90 tablet, Rfl: 5  •  D3-50 1.25 MG (68095 UT) capsule, TAKE 1 CAPSULE BY MOUTH ONE TIME A WEEK , Disp: 12 capsule, Rfl: 0  •  Diclofenac Sodium (VOLTAREN) 1 % gel gel, diclofenac 1 % topical gel, Disp: , Rfl:   •  DULoxetine  "(CYMBALTA) 60 MG capsule, Take 1 capsule by mouth Daily., Disp: 90 capsule, Rfl: 3  •  lidocaine (LIDODERM) 5 %, Place 2 patches on the skin as directed by provider Daily. Remove & Discard patch within 12 hours or as directed by MD, Disp: 60 patch, Rfl: 3  •  magnesium hydroxide (Milk of Magnesia) 400 MG/5ML suspension, Take 5 mL by mouth Daily As Needed for Constipation or Heartburn., Disp: 354 mL, Rfl: 0  •  methocarbamol (ROBAXIN) 750 MG tablet, TAKE 2 TAB PO UP TO 4 TIMES A DAY FOR NO MORE THAN 3 DAYS, THEN 1 TAB PO UP TO 4 TIMES A DAY AS NEEDED FOR SPASM., Disp: 60 tablet, Rfl: 0  •  mometasone-formoterol (Dulera) 200-5 MCG/ACT inhaler, Inhale 2 puffs 2 (Two) Times a Day., Disp: 13 g, Rfl: 11  •  ondansetron ODT (ZOFRAN-ODT) 4 MG disintegrating tablet, Place 1 tablet on the tongue Every 6 (Six) Hours As Needed for Nausea or Vomiting., Disp: 10 tablet, Rfl: 0  •  pantoprazole (PROTONIX) 40 MG EC tablet, Take 1 tablet by mouth Daily., Disp: 30 tablet, Rfl: 0  •  topiramate (TOPAMAX) 100 MG tablet, Take 1 tablet by mouth Every Morning AND 1 tablet Every Evening., Disp: 60 tablet, Rfl: 1  •  fluticasone (Flonase) 50 MCG/ACT nasal spray, 1 spray into the nostril(s) as directed by provider Daily for 30 days. Administer 2 sprays in each nostril for each dose., Disp: 16 g, Rfl: 2    Allergies:   Allergies   Allergen Reactions   • Imitrex [Sumatriptan] Shortness Of Breath and Nausea Only   • Ceftin [Cefuroxime Axetil] Other (See Comments)     Numbness in mouth and throat   • Amoxicillin Rash       Objective     Physical Exam:  Vital Signs:   Vitals:    05/19/21 0815   BP: 122/70   Resp: 20   Weight: 92.5 kg (204 lb)   Height: 162.6 cm (64.02\")     Body mass index is 35 kg/m².     General Appearance:  Alert, cooperative, in no acute distress   Head:  Normocephalic, without obvious abnormality, atraumatic   Eyes:          Conjunctivae and sclerae normal, PERRLA   Ears:   Normal TMs   Nose:  Midline septum   Throat:  No " oral leukoplakia   Fiberoptic Exam:  Very slight septal deviation towards the right middle meatus but with no obvious polyps or purulent drainage   Neck: No cervical adenopathy   Lungs:   Clear to auscultation,respirations regular, jeremy and unlabored      Heart:  Regular rhythm and normal rate, normal S1 and S2, no       murmur, no gallop, no rub, no click   Pulses: Pulses palpable and equal bilaterally   Skin: No bleeding, bruising or rash   Lymph nodes: No palpable adenopathy   Neurologic: Cranial nerves 2 - 12 grossly intact        Results Review:   Labs:     Imaging: MRI Brain With & Without Contrast    Result Date: 3/15/2021  Unremarkable MRI of the brain.  Retention cyst in the right maxillary sinus.      Images were reviewed, interpreted, and dictated by Dr. Jorge Alberto Sargent M.D. Transcribed by Addie Burgess PA-C.  This report was finalized on 3/15/2021 3:26 PM by Jorge Alberto Sargent M.D..    CT Abdomen Pelvis With Contrast    Result Date: 3/14/2021  No acute disease. Authenticated by Osmany Ventura M.D. on 03/14/2021 09:33:53 PM    CT Sinus Without Contrast    Result Date: 5/17/2021  No evidence of acute sinusitis.  Retention cyst versus polyp in the right maxillary sinus.     This study was performed with techniques to keep radiation doses as low as reasonably achievable (ALARA). Individualized dose reduction techniques using automated exposure control or adjustment of mA and/or kV according to the patient size were employed.       Images were reviewed, interpreted, and dictated by Dr. Jorge Alberto Sargent M.D. Transcribed by Addie Burgess PA-C.  This report was finalized on 5/17/2021 1:48 PM by Jorge Alberto Sargent M.D..    XR Chest PA & Lateral    Result Date: 4/2/2021  No acute cardiopulmonary process.  D:  04/02/2021 E:  04/02/2021  This report was finalized on 4/2/2021 5:23 PM by Dr. Umesh Montgomery.          Assessment / Plan      Assessment/Plan:   Diagnoses and all orders for this visit:    1. Chronic  right maxillary sinusitis (Primary)    2. Antral cyst of maxilla    3. Factor V deficiency (CMS/HCC)         Marilu's follow-up CT sinus after 2-week course of antibiotic shows a persistent large right antral cyst almost completely filling the right antrum.  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  This will need to be taken down with traditional endoscopic sinus surgery on the right and removal of the right antral cyst.  Marilu does have factor V deficiency and did have a pulmonary embolus in 2016 and has since been on Eliquis.  She has had the Eliquis held previously for oral surgery.  Hopefully this can be done again 4 to 5 days prior to surgery and held for 4 to 5 days after surgery but I will need written confirmation from her PCP Dr. Hairston who manages her factor V deficiency.  Once we have this we will proceed as an outpatient.      Follow Up:   No follow-ups on file.    Time:    Discussed plan of care in detail with patient today. Patient verbally understands and agrees. I have spent and counseled for approximately 30 minutes face to face, with greater than 50 % of the time counseling.     Saulo Brito MD

## 2021-05-20 ENCOUNTER — OFFICE VISIT (OUTPATIENT)
Dept: NEUROLOGY | Facility: CLINIC | Age: 31
End: 2021-05-20

## 2021-05-20 ENCOUNTER — PATIENT MESSAGE (OUTPATIENT)
Dept: NEUROLOGY | Facility: CLINIC | Age: 31
End: 2021-05-20

## 2021-05-20 ENCOUNTER — HOSPITAL ENCOUNTER (OUTPATIENT)
Dept: CT IMAGING | Facility: HOSPITAL | Age: 31
Discharge: HOME | End: 2021-05-20
Admitting: NURSE PRACTITIONER

## 2021-05-20 VITALS
DIASTOLIC BLOOD PRESSURE: 82 MMHG | HEIGHT: 64 IN | BODY MASS INDEX: 34.56 KG/M2 | SYSTOLIC BLOOD PRESSURE: 120 MMHG | TEMPERATURE: 97.1 F | HEART RATE: 87 BPM | OXYGEN SATURATION: 99 % | WEIGHT: 202.4 LBS

## 2021-05-20 DIAGNOSIS — R10.33 PERIUMBILICAL ABDOMINAL PAIN: ICD-10-CM

## 2021-05-20 DIAGNOSIS — R10.12 LEFT UPPER QUADRANT ABDOMINAL PAIN: ICD-10-CM

## 2021-05-20 DIAGNOSIS — G43.711 INTRACTABLE CHRONIC MIGRAINE WITHOUT AURA AND WITH STATUS MIGRAINOSUS: ICD-10-CM

## 2021-05-20 DIAGNOSIS — G43.009 MIGRAINE WITHOUT AURA AND WITHOUT STATUS MIGRAINOSUS, NOT INTRACTABLE: Primary | ICD-10-CM

## 2021-05-20 PROCEDURE — 99214 OFFICE O/P EST MOD 30 MIN: CPT | Performed by: NURSE PRACTITIONER

## 2021-05-20 PROCEDURE — 96372 THER/PROPH/DIAG INJ SC/IM: CPT | Performed by: NURSE PRACTITIONER

## 2021-05-20 PROCEDURE — 74176 CT ABD & PELVIS W/O CONTRAST: CPT

## 2021-05-20 NOTE — PATIENT INSTRUCTIONS
You have been given samples of an as-needed migraine medication called Ubrelvy. You may take one pill at the start of the migraine. If this doesn't help enough, you may take 1 more tablet after 2 hours. Do not take more than 2 tablets in 24 hours.  Recurrent Migraine Headache    Migraines are a type of headache, and they are usually stronger and more sudden than normal headaches (tension headaches). Migraines are characterized by an intense pulsing, throbbing pain that is usually only present on one side of the head. Sometimes, migraine headaches can cause nausea, vomiting, sensitivity to light and sound, and vision changes. Recurrent migraines keep coming back (recurring). A migraine can last from 4 hours up to 3 days.  What are the causes?  The exact cause of this condition is not known. However, a migraine may be caused when nerves in the brain become irritated and release chemicals that cause inflammation of blood vessels. This inflammation causes pain.  Certain things may also trigger migraines, such as:  · A disruption in your regular eating and sleeping schedule.  · Smoking.  · Stress.  · Menstruation.  · Certain foods and drinks, such as:  ? Aged cheese.  ? Chocolate.  ? Alcohol.  ? Caffeine.  ? Foods or drinks that contain nitrates, glutamate, aspartame, MSG, or tyramine.  · Lack of sleep.  · Hunger.  · Physical exertion.  · Fatigue.  · High altitude.  · Weather changes.  · Medicines, such as:  ? Nitroglycerin, which is used to treat chest pain.  ? Birth control pills.  ? Estrogen.  ? Some blood pressure medicines.  What are the signs or symptoms?  Symptoms of this condition vary for each person and may include:  · Pain that is usually only present on one side of the head. In some cases, the pain may be on both sides of the head or around the head or neck.  · Pulsating or throbbing pain.  · Severe pain that prevents daily activities.  · Pain that is aggravated by any physical activity.  · Nausea, vomiting,  or both.  · Dizziness.  · Pain with exposure to bright lights, loud noises, or activity.  · General sensitivity to bright lights, loud noises, or smells.  Before you get a migraine, you may get warning signs that a migraine is coming (aura). An aura may include:  · Seeing flashing lights.  · Seeing bright spots, halos, or zigzag lines.  · Having tunnel vision or blurred vision.  · Having numbness or a tingling feeling.  · Having trouble talking.  · Having muscle weakness.  · Smelling a certain odor.  How is this diagnosed?  This condition is often diagnosed based on:  · Your symptoms and medical history.  · A physical exam.  You may also have tests, including:  · A CT scan or MRI of your brain. These imaging tests cannot diagnose migraines, but they can help to rule out other causes of headaches.  · Blood tests.  How is this treated?  This condition is treated with:  · Medicines. These are used for:  ? Lessening pain and nausea.  ? Preventing recurrent migraines.  · Lifestyle changes, such as changes to your diet or sleeping patterns.  · Behavior therapy, such as relaxation training or biofeedback. Biofeedback is a treatment that involves teaching you to relax and use your brain to lower your heart rate and control your breathing.  Follow these instructions at home:  Medicines  · Take over-the-counter and prescription medicines only as told by your health care provider.  · Do not drive or use heavy machinery while taking prescription pain medicine.  Lifestyle  · Do not use any products that contain nicotine or tobacco, such as cigarettes and e-cigarettes. If you need help quitting, ask your health care provider.  · Limit alcohol intake to no more than 1 drink a day for nonpregnant women and 2 drinks a day for men. One drink equals 12 oz of beer, 5 oz of wine, or 1½ oz of hard liquor.  · Get 7-9 hours of sleep each night, or the amount of sleep recommended by your health care provider.  · Limit your stress. Talk with  your health care provider if you need help with stress management.  · Maintain a healthy weight. If you need help losing weight, ask your health care provider.  · Exercise regularly. Aim for 150 minutes of moderate-intensity exercise (walking, biking, yoga) or 75 minutes of vigorous exercise (running, circuit training, swimming) each week.  General instructions    · Keep a journal to find out what triggers your migraine headaches so you can avoid these triggers. For example, write down:  ? What you eat and drink.  ? How much sleep you get.  ? Any change to your diet or medicines.  · Lie down in a dark, quiet room when you have a migraine.  · Try placing a cool towel over your head when you have a migraine.  · Keep lights dim, if bright lights bother you and make your migraines worse.  · Keep all follow-up visits as told by your health care provider. This is important.  Contact a health care provider if:  · Your pain does not improve, even with medicine.  · Your migraines continue to return, even with medicine.  · You have a fever.  · You have weight loss.  Get help right away if:  · Your migraine becomes severe and medicine does not help.  · You have a stiff neck.  · You have a loss of vision.  · You have muscle weakness or loss of muscle control.  · You start losing your balance or have trouble walking.  · You feel faint or you pass out.  · You develop new, severe symptoms.  · You start having abrupt severe headaches that last for a second or less, like a thunderclap.  Summary  · Migraine headaches are usually stronger and more sudden than normal headaches (tension headaches). Migraines are characterized by an intense pulsing, throbbing pain that is usually only present on one side of the head.  · The exact cause of this condition is not known. However, a migraine may be caused when nerves in the brain become irritated and release chemicals that cause inflammation of blood vessels.  · Certain things may trigger  migraines, such as changes to diet or sleeping patterns, smoking, certain foods, alcohol, stress, and certain medicines.  · Sometimes, migraine headaches can cause nausea, vomiting, sensitivity to light and sound, and vision changes.  · Migraines are often diagnosed based on your symptoms, medical history, and a physical exam.  This information is not intended to replace advice given to you by your health care provider. Make sure you discuss any questions you have with your health care provider.  Document Revised: 12/21/2018 Document Reviewed: 09/29/2017  [employer] Patient Education © 2021 [employer]

## 2021-05-20 NOTE — PROGRESS NOTES
Follow Up Neurology Office Visit      Patient Name: Marilu Godoy    Referring Physician: No ref. provider found    Chief Complaint:    Chief Complaint   Patient presents with   • Migraine     Patient in office to follow up on migraines.  Patient states she is still having daily migraines.        History of Present Illness: Marilu Godoy is a 30 y.o. female who is here to follow up with Neurology for  Intractable headaches. She continues to experience daily headaches, with at least 4 migraine headache episodes per week. She has not improved with topiramate. She has also tried NSAIDs, beta blockers,muscle relaxant, and SNRIs. She has complex medical history, significant for Factor V Leiden (Dx'ed after suffering PE in 2016) for which she is anticoagulated with Eliquis.     She has undergone tubal ligation, and denies possibility of pregnancy today.     The following portions of the patient's history were reviewed and updated as appropriate: allergies, current medications, past family history, past medical history, past social history, past surgical history and problem list.    Subjective     Review of Systems:   Review of Systems   Constitutional: Negative for activity change and fatigue.   HENT: Negative for drooling and voice change.    Eyes: Positive for visual disturbance. Negative for blurred vision, double vision and photophobia.   Respiratory: Negative for shortness of breath.    Cardiovascular: Negative for chest pain and palpitations.   Gastrointestinal: Positive for nausea. Negative for vomiting.   Genitourinary: Negative for urinary incontinence.   Musculoskeletal: Negative for arthralgias, back pain, gait problem, myalgias and neck pain.   Allergic/Immunologic: Negative for immunocompromised state.   Neurological: Positive for headache. Negative for dizziness, tremors, seizures, syncope, facial asymmetry, speech difficulty, weakness, light-headedness, numbness, memory problem and  confusion.   Hematological: Does not bruise/bleed easily.   Psychiatric/Behavioral: Negative for decreased concentration, dysphoric mood, hallucinations, sleep disturbance, depressed mood and stress. The patient is not nervous/anxious.      Medications:     Current Outpatient Medications:   •  albuterol sulfate  (90 Base) MCG/ACT inhaler, Inhale 2 puffs Every 4 (Four) Hours As Needed for Wheezing or Shortness of Air., Disp: 18 g, Rfl: 11  •  apixaban (ELIQUIS) 5 MG tablet tablet, Take 1 tablet by mouth 2 (Two) Times a Day., Disp: 60 tablet, Rfl: 5  •  aspirin 81 MG EC tablet, Take 81 mg by mouth Daily., Disp: , Rfl:   •  atorvastatin (LIPITOR) 40 MG tablet, Take 1 tablet by mouth Every Night., Disp: 30 tablet, Rfl: 11  •  b complex vitamins capsule, Take 1 capsule by mouth Daily., Disp: 90 capsule, Rfl: 3  •  calcium carbonate EX (TUMS EX) 750 MG chewable tablet, Chew 1 tablet Daily., Disp: 30 tablet, Rfl: 1  •  Cholecalciferol (Vitamin D3) 50 MCG (2000 UT) capsule, Take 1 capsule by mouth Daily. Start after completing 50,000 IU weekly dose.  Indications: Vitamin D Deficiency, Disp: 90 capsule, Rfl: 3  •  Corlanor 5 MG tablet tablet, TAKE 1/2 TABLET BY MOUTH TWO TIMES A DAY WITH MEALS FOR 2 WEEKS, THEN INCREASE TO 1 TABLET 2 TIMES A DAY AS DIRECTED, Disp: , Rfl:   •  cyclobenzaprine (FLEXERIL) 10 MG tablet, Take 1 tablet by mouth 3 (Three) Times a Day As Needed for Muscle Spasms., Disp: 90 tablet, Rfl: 5  •  D3-50 1.25 MG (88845 UT) capsule, TAKE 1 CAPSULE BY MOUTH ONE TIME A WEEK , Disp: 12 capsule, Rfl: 0  •  Diclofenac Sodium (VOLTAREN) 1 % gel gel, diclofenac 1 % topical gel, Disp: , Rfl:   •  DULoxetine (CYMBALTA) 60 MG capsule, Take 1 capsule by mouth Daily., Disp: 90 capsule, Rfl: 3  •  magnesium hydroxide (Milk of Magnesia) 400 MG/5ML suspension, Take 5 mL by mouth Daily As Needed for Constipation or Heartburn., Disp: 354 mL, Rfl: 0  •  methocarbamol (ROBAXIN) 750 MG tablet, TAKE 2 TAB PO UP TO 4  "TIMES A DAY FOR NO MORE THAN 3 DAYS, THEN 1 TAB PO UP TO 4 TIMES A DAY AS NEEDED FOR SPASM., Disp: 60 tablet, Rfl: 0  •  mometasone-formoterol (Dulera) 200-5 MCG/ACT inhaler, Inhale 2 puffs 2 (Two) Times a Day., Disp: 13 g, Rfl: 11  •  ondansetron ODT (ZOFRAN-ODT) 4 MG disintegrating tablet, Place 1 tablet on the tongue Every 6 (Six) Hours As Needed for Nausea or Vomiting., Disp: 10 tablet, Rfl: 0  •  pantoprazole (PROTONIX) 40 MG EC tablet, Take 1 tablet by mouth Daily., Disp: 30 tablet, Rfl: 0  •  topiramate (TOPAMAX) 100 MG tablet, Take 1 tablet by mouth Every Morning AND 1 tablet Every Evening., Disp: 60 tablet, Rfl: 1  •  azithromycin (Zithromax Z-Ravin) 250 MG tablet, Take 2 tablets the first day, then 1 tablet daily for 4 days., Disp: 6 tablet, Rfl: 0  •  Erenumab-aooe (AIMOVIG) 70 MG/ML prefilled syringe, Inject 1 mL under the skin into the appropriate area as directed 1 (One) Time for 1 dose., Disp: 1 mL, Rfl: 0  •  fluticasone (Flonase) 50 MCG/ACT nasal spray, 1 spray into the nostril(s) as directed by provider Daily for 30 days. Administer 2 sprays in each nostril for each dose., Disp: 16 g, Rfl: 2  •  lidocaine (LIDODERM) 5 %, Place 2 patches on the skin as directed by provider Daily. Remove & Discard patch within 12 hours or as directed by MD, Disp: 60 patch, Rfl: 3  •  ubrogepant (ubrogepant) 100 MG tablet, Take 1 tablet by mouth 1 (One) Time for 1 dose., Disp: 6 tablet, Rfl: 0  No current facility-administered medications for this visit.    Allergies:   Allergies   Allergen Reactions   • Imitrex [Sumatriptan] Shortness Of Breath and Nausea Only   • Ceftin [Cefuroxime Axetil] Other (See Comments)     Numbness in mouth and throat   • Amoxicillin Rash       Objective     Physical Exam:  Vital Signs:   Vitals:    05/20/21 1322   BP: 120/82   Pulse: 87   Temp: 97.1 °F (36.2 °C)   SpO2: 99%   Weight: 91.8 kg (202 lb 6.4 oz)   Height: 162.6 cm (64\")   PainSc: 0-No pain       Physical Exam  Vitals and nursing " note reviewed.   Pulmonary:      Effort: Pulmonary effort is normal.   Neurological:      General: No focal deficit present.      Mental Status: She is oriented to person, place, and time. Mental status is at baseline.      Gait: Gait is intact.   Psychiatric:         Mood and Affect: Mood normal.         Speech: Speech normal.         Behavior: Behavior normal.         Thought Content: Thought content normal.         Judgment: Judgment normal.       Neurologic Exam     Mental Status   Oriented to person, place, and time.   Attention: normal. Concentration: normal.   Speech: speech is normal   Level of consciousness: alert  Normal comprehension.     Cranial Nerves   Cranial nerves II through XII intact.     Motor Exam   Muscle bulk: normal  Overall muscle tone: normal    Gait, Coordination, and Reflexes     Gait  Gait: normal    Tremor   Resting tremor: absent    CT SINUS WO CONTRAST 5/17/2021   HISTORY: Sinusitis, acute, uncomplicated; J32.0-Chronic maxillary  sinusitis     PROCEDURE: Axial and coronal images through the paranasal sinuses were  obtained by computed tomography.      FINDINGS: There is a retention cyst versus polyp in the right maxillary  sinus. There are no air-fluid levels. The ostiomeatal units are patent  bilaterally. The nasal septum is midline. There are no acute fractures  or dislocations. The temporomandibular joints are intact. The soft  tissues are unremarkable.     IMPRESSION:  No evidence of acute sinusitis    Results Review:   I reviewed the patient's new clinical results.  I have reviewed the patient's other medical records to include, labs, radiology and referrals.   I reviewed the patient's new imaging results and agree with the interpretation.    Assessment / Plan      Assessment/Plan:   Diagnoses and all orders for this visit:    1. Migraine without aura and without status migrainosus, not intractable (Primary)  -     Erenumab-aooe (AIMOVIG) prefilled syringe 70 mg  -      Erenumab-aooe (AIMOVIG) 70 MG/ML prefilled syringe; Inject 1 mL under the skin into the appropriate area as directed 1 (One) Time for 1 dose.  Dispense: 1 mL; Refill: 0  -     ubrogepant (ubrogepant) 100 MG tablet; Take 1 tablet by mouth 1 (One) Time for 1 dose.  Dispense: 6 tablet; Refill: 0     Patient has tried and failed several daily preventative medications for migraine, and has documented allergy to triptan. She has been experiencing daily headaches, at least 4 migraine headache days per week, and is not a candidate for additional oral daily preventatives due to her multiple medical co morbidities. We discussed treatment options, patient agrees to try Aimovig for relief of chronic intractable migraine headaches. First dose given by sample today, patient tolerated well. She will continue using Ubrelvy for relief of episodic migraine headaches, samples provided today.     Follow Up:   Return in about 3 months (around 8/20/2021) for Next scheduled follow up.     Sandy Catalan APRKAISER  [facility]   AS THE PROVIDER, I PERSONALLY WORE PPE DURING ENTIRE FACE TO FACE ENCOUNTER IN CLINIC WITH THE PATIENT. PATIENT ALSO WORE PPE DURING ENTIRE FACE TO FACE ENCOUNTER EXCEPT FOR A MAX OF 30 SECONDS DURING NEUROLOGICAL EVALUATION OF CRANIAL NERVES AND THEN MASK WAS PLACED BACK OVER PATIENT FACE FOR REMAINDER OF VISIT. I WASHED MY HANDS BEFORE AND AFTER VISIT.      Please note that portions of this note may have been completed with a voice recognition program. Efforts were made to edit the dictations, but occasionally words are mistranscribed.

## 2021-05-21 ENCOUNTER — APPOINTMENT (OUTPATIENT)
Dept: GENERAL RADIOLOGY | Facility: HOSPITAL | Age: 31
End: 2021-05-21

## 2021-05-21 ENCOUNTER — HOSPITAL ENCOUNTER (EMERGENCY)
Facility: HOSPITAL | Age: 31
Discharge: HOME | End: 2021-05-21
Attending: EMERGENCY MEDICINE | Admitting: EMERGENCY MEDICINE

## 2021-05-21 ENCOUNTER — PATIENT MESSAGE (OUTPATIENT)
Dept: INTERNAL MEDICINE | Facility: CLINIC | Age: 31
End: 2021-05-21

## 2021-05-21 VITALS
HEART RATE: 98 BPM | TEMPERATURE: 97.9 F | DIASTOLIC BLOOD PRESSURE: 82 MMHG | HEIGHT: 63 IN | BODY MASS INDEX: 35.75 KG/M2 | RESPIRATION RATE: 18 BRPM | OXYGEN SATURATION: 99 % | WEIGHT: 201.8 LBS | SYSTOLIC BLOOD PRESSURE: 133 MMHG

## 2021-05-21 DIAGNOSIS — K42.9 UMBILICAL HERNIA WITHOUT OBSTRUCTION OR GANGRENE: Primary | ICD-10-CM

## 2021-05-21 DIAGNOSIS — S93.509A SPRAIN OF TOE, INITIAL ENCOUNTER: Primary | ICD-10-CM

## 2021-05-21 LAB — BACTERIA SPEC AEROBE CULT: NORMAL

## 2021-05-21 PROCEDURE — 73630 X-RAY EXAM OF FOOT: CPT

## 2021-05-21 PROCEDURE — 99283 EMERGENCY DEPT VISIT LOW MDM: CPT

## 2021-05-21 RX ORDER — CYCLOBENZAPRINE HCL 10 MG
10 TABLET ORAL 2 TIMES DAILY PRN
Qty: 20 TABLET | Refills: 0 | Status: SHIPPED | OUTPATIENT
Start: 2021-05-21 | End: 2021-06-22

## 2021-05-21 RX ORDER — IBUPROFEN 800 MG/1
800 TABLET ORAL ONCE
Status: COMPLETED | OUTPATIENT
Start: 2021-05-21 | End: 2021-05-21

## 2021-05-21 RX ADMIN — IBUPROFEN 800 MG: 800 TABLET, FILM COATED ORAL at 20:59

## 2021-05-21 NOTE — TELEPHONE ENCOUNTER
From: JUAN ANTONIO CURTIS  To: Marilu Godoy  Sent: 5/21/2021 8:12 AM EDT  Subject: Ultrasound    Per Mckenzie,  Your CT of abd/pelvis is normal, except small fat-containing hernia at umbilicus. This means the hernia is made of fat. No intervention needed. Should it become painful, may RTC for further evaluation

## 2021-05-22 NOTE — ED PROVIDER NOTES
Subjective     History provided by:  Patient  Foot Injury  Location:  Toe  Time since incident:  1 hour  Injury: yes    Mechanism of injury comment:  Hit toe on rock  Toe location:  R second toe  Pain details:     Quality:  Aching    Radiates to:  Does not radiate    Severity:  Mild    Onset quality:  Sudden    Timing:  Constant    Progression:  Unchanged  Chronicity:  New  Relieved by:  Nothing  Worsened by:  Bearing weight  Ineffective treatments:  None tried  Associated symptoms: stiffness and swelling    Associated symptoms: no fever    Risk factors: obesity        Review of Systems   Constitutional: Negative.  Negative for fever.   HENT: Negative.    Respiratory: Negative.    Cardiovascular: Negative.  Negative for chest pain.   Gastrointestinal: Negative.  Negative for abdominal pain.   Endocrine: Negative.    Genitourinary: Negative.  Negative for dysuria.   Musculoskeletal: Positive for arthralgias and stiffness.   Skin: Negative.    Neurological: Negative.    Psychiatric/Behavioral: Negative.    All other systems reviewed and are negative.      Past Medical History:   Diagnosis Date   • Abdominal pain    • Abnormal Pap smear of cervix    • Anxiety    • Asthma 2015   • Bleeding disorder (CMS/[facility]) 11-13-16   • Body piercing     TONGUE, NOSE, TWO IN EACH EAR   • Chest pain    • Clotting disorder (CMS/[facility])     factor 5   • Constipation    • Coronary artery disease involving native coronary artery of native heart without angina pectoris 6/16/2017   • Deep vein thrombosis (CMS/[facility]) 11-13-16   • Diarrhea    • Diarrhea    • Endometriosis    • Factor 5 Leiden mutation, heterozygous (CMS/[facility])    • Fibroid    • Gallstone    • GERD (gastroesophageal reflux disease)    • H/O blood clots    • Headache    • Hiatal hernia    • High cholesterol    • History of recurrent UTIs    • HPV (human papilloma virus) infection    • Hypertension    • Kidney infection    • Migraine    • Nausea    • Nausea & vomiting    • Obesity    •  Orthostatic hypotension    • Osteoarthritis    • Ovarian cyst    • Pollen allergies    • Protein S deficiency (CMS/HCC) 2016    labs from hospitalization for PE   • Pulmonary embolism (CMS/HCC)    • Recurrent pregnancy loss, antepartum condition or complication    • Sleep apnea     CPAP ASKED TO BRING DOS   • Subclinical hyperthyroidism    • Tachycardia    • Tattoo     LOWER BACK   • Varicella        Allergies   Allergen Reactions   • Imitrex [Sumatriptan] Shortness Of Breath and Nausea Only   • Ceftin [Cefuroxime Axetil] Other (See Comments)     Numbness in mouth and throat   • Amoxicillin Rash       Past Surgical History:   Procedure Laterality Date   •  SECTION      X2  and    •  SECTION WITH TUBAL N/A 1/15/2019    Procedure:  SECTION REPEAT WITH TUBAL;  Surgeon: Alva Boyer MD;  Location: [facility] LABOR DELIVERY;  Service: Obstetrics/Gynecology   • CHOLECYSTECTOMY     • COLONOSCOPY N/A 2017    Procedure: COLONOSCOPY WITH BIOPSIES AND ARGON THERMAL ABLATION;  Surgeon: Jignesh Selby MD;  Location: [facility] ENDOSCOPY;  Service:    • DIAGNOSTIC LAPAROSCOPY N/A 2018    Procedure: DIAGNOSTIC LAPAROSCOPY AND ABLATION OF ENDOMETRIOSIS;  Surgeon: Evin Zamudio MD;  Location: [facility] OR;  Service: Obstetrics/Gynecology   • ENDOMETRIAL ABLATION     • ENDOSCOPY N/A 2017    Procedure: ESOPHAGOGASTRODUODENOSCOPY WITH BIOPSIES AND COLD BIOPSY POLYPECTOMIES;  Surgeon: Jignesh Selby MD;  Location: [facility] ENDOSCOPY;  Service:    • PELVIC LAPAROSCOPY         Family History   Problem Relation Age of Onset   • Arthritis Mother    • COPD Mother    • Asthma Mother    • Thyroid disease Mother    • Arthritis Father    • Diabetes Father    • Hypertension Father    • Hyperlipidemia Father    • Kidney disease Father    • Heart attack Father    • Coronary artery disease Father    • Dementia Father    • No Known Problems Son    • Colon cancer Neg Hx    • Liver cancer Neg Hx     • Liver disease Neg Hx    • Stomach cancer Neg Hx    • Esophageal cancer Neg Hx        Social History     Socioeconomic History   • Marital status:      Spouse name: Not on file   • Number of children: Not on file   • Years of education: Not on file   • Highest education level: Not on file   Tobacco Use   • Smoking status: Never Smoker   • Smokeless tobacco: Never Used   Vaping Use   • Vaping Use: Never used   Substance and Sexual Activity   • Alcohol use: No   • Drug use: No   • Sexual activity: Yes     Partners: Male     Birth control/protection: None     Comment: PE while on birth control patch           Objective   Physical Exam  Vitals and nursing note reviewed.   Constitutional:       General: She is not in acute distress.     Appearance: She is well-developed. She is not diaphoretic.   HENT:      Head: Normocephalic and atraumatic.      Right Ear: External ear normal.      Left Ear: External ear normal.      Nose: Nose normal.   Eyes:      Conjunctiva/sclera: Conjunctivae normal.   Neck:      Vascular: No JVD.      Trachea: No tracheal deviation.   Cardiovascular:      Rate and Rhythm: Normal rate and regular rhythm.      Heart sounds: No murmur heard.     Pulmonary:      Effort: Pulmonary effort is normal. No respiratory distress.      Breath sounds: No wheezing.   Abdominal:      Palpations: Abdomen is soft.      Tenderness: There is no abdominal tenderness.   Musculoskeletal:         General: Tenderness and signs of injury present. No deformity.      Cervical back: Normal range of motion and neck supple.      Comments: Localized tenderness of the right second toe.  There is pain extending within the dorsum of the right foot   Skin:     General: Skin is warm and dry.      Coloration: Skin is not pale.      Findings: No erythema or rash.   Neurological:      Mental Status: She is alert and oriented to person, place, and time.      Cranial Nerves: No cranial nerve deficit.   Psychiatric:          Behavior: Behavior normal.         Thought Content: Thought content normal.         Procedures           ED Course                                           MDM  Number of Diagnoses or Management Options  Sprain of toe, initial encounter: new and requires workup     Amount and/or Complexity of Data Reviewed  Tests in the radiology section of CPT®: ordered and reviewed  Independent visualization of images, tracings, or specimens: yes    Risk of Complications, Morbidity, and/or Mortality  Presenting problems: low  Diagnostic procedures: low  Management options: low    Patient Progress  Patient progress: stable      Final diagnoses:   Sprain of toe, initial encounter       ED Disposition  ED Disposition     ED Disposition Condition Comment    Discharge Stable           Antione Heaton MD  [address]  [address]  [facility] [address]  [phone]    Schedule an appointment as soon as possible for a visit            Medication List      Changed    * cyclobenzaprine 10 MG tablet  Commonly known as: FLEXERIL  Take 1 tablet by mouth 3 (Three) Times a Day As Needed for Muscle Spasms.  What changed: Another medication with the same name was added. Make sure you understand how and when to take each.     * cyclobenzaprine 10 MG tablet  Commonly known as: FLEXERIL  Take 1 tablet by mouth 2 (Two) Times a Day As Needed for Muscle Spasms.  What changed: You were already taking a medication with the same name, and this prescription was added. Make sure you understand how and when to take each.     Erenumab-aooe 70 MG/ML prefilled syringe  Commonly known as: AIMOVIG  Inject 1 mL under the skin into the appropriate area as directed Every 30 (Thirty) Days.  What changed: Another medication with the same name was removed. Continue taking this medication, and follow the directions you see here.         * This list has 2 medication(s) that are the same as other medications prescribed for you. Read the directions carefully,  and ask your doctor or other care provider to review them with you.            Stop    ubrogepant 100 MG tablet  Commonly known as: ubrogepant           Where to Get Your Medications      These medications were sent to [id number] - [place], [place] - [address] - [phone]  - [phone] FX  [address] [place] [address] [place] [address]    Phone: [phone]   · cyclobenzaprine 10 MG tablet          Rich Cid II, PA  05/21/21 2293

## 2021-05-23 NOTE — ED PROVIDER NOTES
Subjective   History of Present Illness    Chief Complaint: Cough congestion sore throat fever  History of Present Illness: 30-year-old female with above complaint x4 days.  Child with similar complaints.  Significant other had Covid 4 weeks ago.  Was not tested but quarantined.  Onset: 4 days  Duration: Persistent mild symptoms  Exacerbating / Alleviating factors: Improvement with over-the-counter medication  Associated symptoms: None      Nurses Notes reviewed and agree, including vitals, allergies, social history and prior medical history.     REVIEW OF SYSTEMS: All systems reviewed and not pertinent unless noted.    Positive for: Cough congestion sore throat fever, sick contact with the  Child  Negative for: Hemoptysis chest pain palpitations urinary symptoms abdominal pain back pain  Review of Systems    Past Medical History:   Diagnosis Date   • Abdominal pain    • Abnormal Pap smear of cervix    • Anxiety    • Asthma 2015   • Bleeding disorder (CMS/[facility]) 11-13-16   • Body piercing     TONGUE, NOSE, TWO IN EACH EAR   • Chest pain    • Clotting disorder (CMS/[facility])     factor 5   • Constipation    • Coronary artery disease involving native coronary artery of native heart without angina pectoris 6/16/2017   • Deep vein thrombosis (CMS/[facility]) 11-13-16   • Diarrhea    • Diarrhea    • Endometriosis    • Factor 5 Leiden mutation, heterozygous (CMS/[facility])    • Fibroid    • Gallstone    • GERD (gastroesophageal reflux disease)    • H/O blood clots    • Headache    • Hiatal hernia    • High cholesterol    • History of recurrent UTIs    • HPV (human papilloma virus) infection    • Hypertension    • Kidney infection    • Migraine    • Nausea    • Nausea & vomiting    • Obesity    • Orthostatic hypotension    • Osteoarthritis    • Ovarian cyst    • Pollen allergies    • Protein S deficiency (CMS/[facility]) 11/14/2016    labs from hospitalization for PE   • Pulmonary embolism (CMS/[facility])    • Recurrent pregnancy loss, antepartum  condition or complication    • Sleep apnea     CPAP ASKED TO BRING DOS   • Subclinical hyperthyroidism    • Tachycardia    • Tattoo     LOWER BACK   • Varicella        Allergies   Allergen Reactions   • Imitrex [Sumatriptan] Shortness Of Breath and Nausea Only   • Ceftin [Cefuroxime Axetil] Other (See Comments)     Numbness in mouth and throat   • Amoxicillin Rash       Past Surgical History:   Procedure Laterality Date   •  SECTION      X2  and    •  SECTION WITH TUBAL N/A 1/15/2019    Procedure:  SECTION REPEAT WITH TUBAL;  Surgeon: Alva Boyer MD;  Location: [facility] LABOR DELIVERY;  Service: Obstetrics/Gynecology   • CHOLECYSTECTOMY     • COLONOSCOPY N/A 2017    Procedure: COLONOSCOPY WITH BIOPSIES AND ARGON THERMAL ABLATION;  Surgeon: Jignesh Selby MD;  Location: [facility] ENDOSCOPY;  Service:    • DIAGNOSTIC LAPAROSCOPY N/A 2018    Procedure: DIAGNOSTIC LAPAROSCOPY AND ABLATION OF ENDOMETRIOSIS;  Surgeon: Evin Zamudio MD;  Location: [facility] OR;  Service: Obstetrics/Gynecology   • ENDOMETRIAL ABLATION     • ENDOSCOPY N/A 2017    Procedure: ESOPHAGOGASTRODUODENOSCOPY WITH BIOPSIES AND COLD BIOPSY POLYPECTOMIES;  Surgeon: Jignesh Selby MD;  Location: [facility] ENDOSCOPY;  Service:    • PELVIC LAPAROSCOPY         Family History   Problem Relation Age of Onset   • Arthritis Mother    • COPD Mother    • Asthma Mother    • Thyroid disease Mother    • Arthritis Father    • Diabetes Father    • Hypertension Father    • Hyperlipidemia Father    • Kidney disease Father    • Heart attack Father    • Coronary artery disease Father    • Dementia Father    • No Known Problems Son    • Colon cancer Neg Hx    • Liver cancer Neg Hx    • Liver disease Neg Hx    • Stomach cancer Neg Hx    • Esophageal cancer Neg Hx        Social History     Socioeconomic History   • Marital status:      Spouse name: Not on file   • Number of children: Not on file   • Years of  education: Not on file   • Highest education level: Not on file   Tobacco Use   • Smoking status: Never Smoker   • Smokeless tobacco: Never Used   Vaping Use   • Vaping Use: Never used   Substance and Sexual Activity   • Alcohol use: No   • Drug use: No   • Sexual activity: Yes     Partners: Male     Birth control/protection: None     Comment: PE while on birth control patch           Objective   Physical Exam    CONSTITUTIONAL: Well developed, nontoxic obese 30-year-old white female,  in no acute distress.  VITAL SIGNS: per nursing, reviewed and noted  SKIN: exposed skin with no rashes, ulcerations or petechiae.  EYES: perrla. EOMI.  ENT: Normal voice.  No posterior pharyngeal erythema or exudate patent nares bilaterally mild boggy turbinates RESPIRATORY:  No increased work of breathing. No retractions.   CARDIOVASCULAR:  regular rate and rhythm, no murmurs.  Good Peripheral pulses. Good cap refill to extremities.   GI: Abdomen soft, nontender, normal bowel sounds. No hernia. No ascites.  MUSCULOSKELETAL:  No tenderness. Full ROM. Strength and tone grossly normal.  no spasms. no neck or back tenderness or spasm.   NEUROLOGIC: Alert, oriented x 3. No gross deficits. GCS 15.   PSYCH: appropriate affect.  : no bladder tenderness or distention, no CVA tenderness      Procedures     No attending physician procedures were performed on this patient.      ED Course  ED Course as of May 23 1944   Wed May 19, 2021   1112 COVID19: Not Detected [PF]   Sun May 23, 2021   1943 Strep A Ag: Negative [PF]   1944 COVID19: Not Detected [PF]      ED Course User Index  [PF] Hakan Goldsmith, DO                                           MDM  3-year-old female presenting with fever congestion cough sore throat.  Negative strep, negative Covid, normal air sats 97% chest clear to auscultation.  No indication for antibiotics.  Discharged home stable condition with outpatient follow-up return precautions discussed  Final diagnoses:   Acute  viral syndrome       ED Disposition  ED Disposition     ED Disposition Condition Comment    Discharge Stable           Deidre Barker MD  [address]  [facility] [address] [phone]          [facility]  [address] [address] [phone]    As needed, If symptoms worsen         Medication List      No changes were made to your prescriptions during this visit.          Hakan Goldsmith,   05/23/21 1944

## 2021-05-25 ENCOUNTER — HOSPITAL ENCOUNTER (OUTPATIENT)
Dept: NEUROLOGY | Facility: HOSPITAL | Age: 31
Discharge: HOME | End: 2021-05-25
Admitting: NURSE PRACTITIONER

## 2021-05-25 DIAGNOSIS — M79.641 RIGHT HAND PAIN: ICD-10-CM

## 2021-05-25 PROCEDURE — 95886 MUSC TEST DONE W/N TEST COMP: CPT

## 2021-05-25 PROCEDURE — 95910 NRV CNDJ TEST 7-8 STUDIES: CPT

## 2021-06-02 ENCOUNTER — PATIENT MESSAGE (OUTPATIENT)
Dept: NEUROLOGY | Facility: CLINIC | Age: 31
End: 2021-06-02

## 2021-06-10 ENCOUNTER — PATIENT MESSAGE (OUTPATIENT)
Dept: GASTROENTEROLOGY | Facility: CLINIC | Age: 31
End: 2021-06-10

## 2021-06-10 DIAGNOSIS — D68.51 HETEROZYGOUS FACTOR V LEIDEN MUTATION (HCC): ICD-10-CM

## 2021-06-10 DIAGNOSIS — D68.59 PROTEIN S DEFICIENCY (HCC): ICD-10-CM

## 2021-06-11 NOTE — TELEPHONE ENCOUNTER
From: Marilu Godoy  To: Oswaldo Bragg MD  Sent: 6/10/2021 6:30 PM EDT  Subject: Non-Urgent Medical Question    I have been having abdominal pain thru my mid stomach for a month or so now. Its continually getting worse and is accompanied by nausea and dizziness and cold sweats and felling flushed. I've also been having tar like stools. My family doctor had a CT scan done on my abdomen and it showed a small fat-containing umbilical hernia. They sent me to Dr. Xiao (General surgeon) and he said that it wasn't big enough to need surgery and wasn't causing my pain and other issues.

## 2021-06-14 ENCOUNTER — PATIENT MESSAGE (OUTPATIENT)
Dept: NEUROLOGY | Facility: CLINIC | Age: 31
End: 2021-06-14

## 2021-06-14 ENCOUNTER — HOSPITAL ENCOUNTER (OUTPATIENT)
Dept: NUCLEAR MEDICINE | Facility: HOSPITAL | Age: 31
End: 2021-06-14

## 2021-06-14 ENCOUNTER — HOSPITAL ENCOUNTER (OUTPATIENT)
Dept: NUCLEAR MEDICINE | Facility: HOSPITAL | Age: 31
Discharge: HOME | End: 2021-06-14

## 2021-06-14 DIAGNOSIS — R10.13 EPIGASTRIC PAIN: ICD-10-CM

## 2021-06-14 DIAGNOSIS — R11.0 NAUSEA: ICD-10-CM

## 2021-06-14 DIAGNOSIS — R10.13 DYSPEPSIA: ICD-10-CM

## 2021-06-14 PROCEDURE — 78264 GASTRIC EMPTYING IMG STUDY: CPT

## 2021-06-14 PROCEDURE — A9541 TC99M SULFUR COLLOID: HCPCS | Performed by: INTERNAL MEDICINE

## 2021-06-14 PROCEDURE — 0 TECHNETIUM SULFUR COLLOID: Performed by: INTERNAL MEDICINE

## 2021-06-14 RX ADMIN — TECHNETIUM TC 99M SULFUR COLLOID 1 DOSE: KIT at 11:00

## 2021-06-16 ENCOUNTER — PATIENT MESSAGE (OUTPATIENT)
Dept: GASTROENTEROLOGY | Facility: CLINIC | Age: 31
End: 2021-06-16

## 2021-06-17 ENCOUNTER — TELEPHONE (OUTPATIENT)
Dept: OTOLARYNGOLOGY | Facility: CLINIC | Age: 31
End: 2021-06-17

## 2021-06-17 NOTE — TELEPHONE ENCOUNTER
PT CALLED TO SEE IF SHE IF SUPPOSED TO STOP TAKING HER ELOQUIS 5 DAYS PRIOR TO SURGERY.    I NEVER RECEIVED VM ABOUT THIS.    PLEASE ADVISE.    OKAY FOR PT TO HOLD ELOQUIS 5 DAYS PRIOR TO SURGERY?    THANKS!

## 2021-06-17 NOTE — TELEPHONE ENCOUNTER
From: Marilu Godoy  To: Oswaldo Bragg MD  Sent: 6/16/2021 4:08 PM EDT  Subject: Test Results Question    Did you get my gastric emptying study results?

## 2021-06-18 DIAGNOSIS — K31.84 GASTROPARESIS: Primary | ICD-10-CM

## 2021-06-18 NOTE — TELEPHONE ENCOUNTER
Tonya sent me a chat about this patient and I responded back in the chat. Ok to stop for 5 days but must go back on the day after surgery.

## 2021-06-21 NOTE — PROGRESS NOTES
Chief Complaint / Reason:      Chief Complaint   Patient presents with   • Abdominal Pain     into back. Seen E yesterday and they think ulcer. has burping. I threw up blood at home.        Subjective     HPI  Patient presents today for complaints of abdominal pain and states that it radiates into her back.  She was seen in [facility] ER yesterday and discussed possible ulcer and she states she has been having a lot of burping and bloating feeling.  She does have a history of small sliding hiatal hernia as she has seen GI in the past in addition to erythematous erosive gastritis and did have evidence of old healed ulceration.  She has seen Dr. Trotter and Dr. Bragg.  She states that she has been slightly nauseous but denies any chance of pregnancy as she has had a tubal.  She is on blood thinners and states that she did throw up a little bit of blood-tinged secretions.  She is hemodynamically stable and states that she has had blood in her stool in the past but denies any recent.  She did have CT of abdomen and pelvis which showed no acute disease.  Patient does not have gallbladder but states that her right side hurts worse than her left.  She denies fever chills.  She states that the feeling is like a burning sensation and symptoms foods make it worse especially sodas or greasy foods.  His heart rate was elevated but was rechecked and was 104.  History taken from: patient    PMH/FH/Social History were reviewed and updated appropriately in the electronic medical record.   Past Medical History:   Diagnosis Date   • Abdominal pain    • Abnormal Pap smear of cervix    • Anxiety    • Asthma 2015   • Bleeding disorder (CMS/[facility]) 11-13-16   • Body piercing     TONGUE, NOSE, TWO IN EACH EAR   • Chest pain    • Clotting disorder (CMS/[facility])     factor 5   • Constipation    • Coronary artery disease involving native coronary artery of native heart without angina pectoris 6/16/2017   • Deep vein thrombosis  (CMS/[facility]) 16   • Diarrhea    • Diarrhea    • Endometriosis    • Factor 5 Leiden mutation, heterozygous (CMS/[facility])    • Fibroid    • Gallstone    • GERD (gastroesophageal reflux disease)    • H/O blood clots    • Headache    • Hiatal hernia    • High cholesterol    • History of recurrent UTIs    • HPV (human papilloma virus) infection    • Hypertension    • Kidney infection    • Migraine    • Nausea    • Nausea & vomiting    • Obesity    • Orthostatic hypotension    • Osteoarthritis    • Ovarian cyst    • Pollen allergies    • Protein S deficiency (CMS/[facility]) 2016    labs from hospitalization for PE   • Pulmonary embolism (CMS/[facility])    • Recurrent pregnancy loss, antepartum condition or complication    • Sleep apnea     CPAP ASKED TO BRING DOS   • Subclinical hyperthyroidism    • Tachycardia    • Tattoo     LOWER BACK   • Varicella      Past Surgical History:   Procedure Laterality Date   •  SECTION      X2  and    •  SECTION WITH TUBAL N/A 1/15/2019    Procedure:  SECTION REPEAT WITH TUBAL;  Surgeon: Alva Boyer MD;  Location: [facility] LABOR DELIVERY;  Service: Obstetrics/Gynecology   • CHOLECYSTECTOMY     • COLONOSCOPY N/A 2017    Procedure: COLONOSCOPY WITH BIOPSIES AND ARGON THERMAL ABLATION;  Surgeon: Jignesh Selby MD;  Location: [facility] ENDOSCOPY;  Service:    • DIAGNOSTIC LAPAROSCOPY N/A 2018    Procedure: DIAGNOSTIC LAPAROSCOPY AND ABLATION OF ENDOMETRIOSIS;  Surgeon: Evin Zamudio MD;  Location: [facility] OR;  Service: Obstetrics/Gynecology   • ENDOMETRIAL ABLATION     • ENDOSCOPY N/A 2017    Procedure: ESOPHAGOGASTRODUODENOSCOPY WITH BIOPSIES AND COLD BIOPSY POLYPECTOMIES;  Surgeon: Jignesh Selby MD;  Location: [facility] ENDOSCOPY;  Service:    • PELVIC LAPAROSCOPY       Social History     Socioeconomic History   • Marital status:      Spouse name: Not on file   • Number of children: Not on file   • Years of education: Not on file   •  Highest education level: Not on file   Tobacco Use   • Smoking status: Never Smoker   • Smokeless tobacco: Never Used   Vaping Use   • Vaping Use: Never used   Substance and Sexual Activity   • Alcohol use: No   • Drug use: No   • Sexual activity: Yes     Partners: Male     Birth control/protection: None     Comment: PE while on birth control patch     Family History   Problem Relation Age of Onset   • Arthritis Mother    • COPD Mother    • Asthma Mother    • Thyroid disease Mother    • Arthritis Father    • Diabetes Father    • Hypertension Father    • Hyperlipidemia Father    • Kidney disease Father    • Heart attack Father    • Coronary artery disease Father    • Dementia Father    • No Known Problems Son    • Colon cancer Neg Hx    • Liver cancer Neg Hx    • Liver disease Neg Hx    • Stomach cancer Neg Hx    • Esophageal cancer Neg Hx        Review of Systems:   Review of Systems   Constitutional: Positive for appetite change and fatigue. Negative for chills, fever, unexpected weight gain and unexpected weight loss.   Respiratory: Negative.  Negative for cough, chest tightness and shortness of breath.    Cardiovascular: Negative.  Negative for chest pain.   Gastrointestinal: Positive for abdominal distention, abdominal pain, GERD and indigestion. Negative for blood in stool, constipation, diarrhea, nausea and vomiting.   Genitourinary: Negative for difficulty urinating and dysuria.   Musculoskeletal: Negative for back pain.   Skin: Negative.  Negative for color change and rash.   Allergic/Immunologic: Positive for environmental allergies. Negative for food allergies.   Psychiatric/Behavioral: Positive for sleep disturbance and stress.         All other systems were reviewed and are negative.  Exceptions are noted in the subjective or above.      Objective     Vital Signs  Vitals:    03/15/21 1545   BP: 130/79   Pulse: 120   Resp: 16   Temp: 97.8 °F (36.6 °C)   SpO2: 100%       Body mass index is 36.73  kg/m².    Physical Exam  Vitals and nursing note reviewed.   Constitutional:       General: She is not in acute distress.     Appearance: She is well-developed.   HENT:      Head: Normocephalic and atraumatic.      Right Ear: Ear canal and external ear normal. Tympanic membrane is erythematous and bulging.      Left Ear: Ear canal and external ear normal. Tympanic membrane is erythematous and bulging.      Nose: Mucosal edema present. No rhinorrhea.      Right Sinus: No maxillary sinus tenderness or frontal sinus tenderness.      Left Sinus: No maxillary sinus tenderness or frontal sinus tenderness.      Mouth/Throat:      Mouth: Mucous membranes are dry.      Pharynx: Posterior oropharyngeal erythema present.      Comments: PND    Eyes:      Conjunctiva/sclera: Conjunctivae normal.   Cardiovascular:      Rate and Rhythm: Normal rate and regular rhythm.      Heart sounds: Normal heart sounds.   Pulmonary:      Effort: Pulmonary effort is normal. No respiratory distress.      Breath sounds: Normal breath sounds.   Abdominal:      Tenderness: There is abdominal tenderness in the right upper quadrant and epigastric area. There is no right CVA tenderness, left CVA tenderness, guarding or rebound. Negative signs include Coon's sign, Rovsing's sign, McBurney's sign, psoas sign and obturator sign.   Lymphadenopathy:      Head:      Right side of head: No submental, submandibular or tonsillar adenopathy.      Left side of head: No submental, submandibular or tonsillar adenopathy.      Cervical: No cervical adenopathy.   Skin:     General: Skin is warm and dry.      Capillary Refill: Capillary refill takes less than 2 seconds.      Findings: No rash.   Neurological:      Mental Status: She is alert and oriented to person, place, and time.   Psychiatric:         Behavior: Behavior normal.         Thought Content: Thought content normal.         Judgment: Judgment normal.              Results Review:    I reviewed the  patient's previous clinical results.   Results for orders placed during the hospital encounter of 03/14/21    CT Abdomen Pelvis With Contrast    Narrative  FINAL REPORT    TECHNIQUE:  Routine axial images through the abdomen and pelvis were  obtained following IV contrast administration.    CLINICAL HISTORY:  right upper quad with epigastric tenderness.    FINDINGS:  Abdomen: The gallbladder has been removed.  The solid abdominal  organs and ureters are unremarkable.  The GI tract is  unremarkable, including the appendix.  Pelvis: The uterus,  ovaries and urinary bladder are normal.   There is no pelvic or  abdominal ascites, adenopathy or acute osseous abnormality.    Impression  No acute disease.    Authenticated by Osmany Ventura M.D. on 03/14/2021 09:33:53 PM        Medication Review:     Current Outpatient Medications:   •  albuterol sulfate  (90 Base) MCG/ACT inhaler, Inhale 2 puffs Every 4 (Four) Hours As Needed for Wheezing or Shortness of Air., Disp: 18 g, Rfl: 11  •  apixaban (ELIQUIS) 5 MG tablet tablet, Take 1 tablet by mouth 2 (Two) Times a Day., Disp: 60 tablet, Rfl: 5  •  aspirin 81 MG EC tablet, Take 81 mg by mouth Daily., Disp: , Rfl:   •  atorvastatin (LIPITOR) 40 MG tablet, Take 1 tablet by mouth Daily., Disp: 30 tablet, Rfl: 11  •  b complex vitamins capsule, Take 1 capsule by mouth Daily., Disp: 90 capsule, Rfl: 3  •  benzonatate (TESSALON) 200 MG capsule, Take 1 capsule by mouth 3 (Three) Times a Day As Needed for Cough., Disp: 30 capsule, Rfl: 0  •  Cholecalciferol (Vitamin D3) 1.25 MG (40494 UT) capsule, Take 1 capsule by mouth Every 7 (Seven) Days., Disp: 12 capsule, Rfl: 1  •  Cholecalciferol (Vitamin D3) 50 MCG (2000 UT) capsule, Take 1 capsule by mouth Daily. Start after completing 50,000 IU weekly dose.  Indications: Vitamin D Deficiency, Disp: 90 capsule, Rfl: 3  •  Corlanor 5 MG tablet tablet, TAKE 1/2 TABLET BY MOUTH TWO TIMES A DAY WITH MEALS FOR 2 WEEKS, THEN INCREASE TO 1 TABLET  2 TIMES A DAY AS DIRECTED, Disp: , Rfl:   •  cyclobenzaprine (FLEXERIL) 10 MG tablet, Take 1 tablet by mouth 3 (Three) Times a Day As Needed for Muscle Spasms., Disp: 90 tablet, Rfl: 5  •  Diclofenac Sodium (VOLTAREN) 1 % gel gel, diclofenac 1 % topical gel, Disp: , Rfl:   •  DULoxetine (CYMBALTA) 60 MG capsule, Take 1 capsule by mouth Daily., Disp: 90 capsule, Rfl: 3  •  Fluticasone Furoate-Vilanterol (Breo Ellipta) 200-25 MCG/INH inhaler, Inhale 1 puff Daily., Disp: 60 each, Rfl: 11  •  lidocaine (LIDODERM) 5 %, Place 2 patches on the skin as directed by provider Daily. Remove & Discard patch within 12 hours or as directed by MD, Disp: 60 patch, Rfl: 3  •  methocarbamol (ROBAXIN) 750 MG tablet, TAKE 2 TAB PO UP TO 4 TIMES A DAY FOR NO MORE THAN 3 DAYS, THEN 1 TAB PO UP TO 4 TIMES A DAY AS NEEDED FOR SPASM., Disp: 60 tablet, Rfl: 0  •  ondansetron ODT (ZOFRAN-ODT) 4 MG disintegrating tablet, Place 1 tablet on the tongue Every 6 (Six) Hours As Needed for Nausea or Vomiting., Disp: 10 tablet, Rfl: 0  •  pantoprazole (PROTONIX) 40 MG EC tablet, Take 1 tablet by mouth Daily., Disp: 30 tablet, Rfl: 0  •  topiramate (TOPAMAX) 100 MG tablet, Take 0.5 tablets by mouth Every Morning AND 1 tablet Every Evening., Disp: 60 tablet, Rfl: 1  •  ubrogepant (ubrogepant) 100 MG tablet, Take 1 tablet by mouth 1 (One) Time As Needed (migraine) for up to 2 doses., Disp: 10 tablet, Rfl: 1  •  calcium carbonate EX (TUMS EX) 750 MG chewable tablet, Chew 1 tablet Daily., Disp: 30 tablet, Rfl: 1  •  magnesium hydroxide (Milk of Magnesia) 400 MG/5ML suspension, Take 5 mL by mouth Daily As Needed for Constipation or Heartburn., Disp: 354 mL, Rfl: 0    Diagnoses and all orders for this visit:    Gastroesophageal reflux disease, unspecified whether esophagitis present  -     magnesium hydroxide (Milk of Magnesia) 400 MG/5ML suspension; Take 5 mL by mouth Daily As Needed for Constipation or Heartburn.  -     calcium carbonate EX (TUMS EX) 750 MG  chewable tablet; Chew 1 tablet Daily.  Advised patient to  Protonix that was previously prescribed.  Right sided abdominal pain    Discussed worsening signs and symptoms and recommend dietary modifications.  Advised patient to avoid spicy greasy 8 caffeinated beverages.  Recommend following up with GI if symptoms persist.  Advised patient to avoid changes in bowel habits and contact office for any blood presence.    Advised patient to send photos of emesis if it has blood in it.  Return if symptoms worsen or fail to improve.    Barby Garcia, APRN  03/15/2021             Self

## 2021-06-22 ENCOUNTER — PRE-ADMISSION TESTING (OUTPATIENT)
Dept: PREADMISSION TESTING | Facility: HOSPITAL | Age: 31
End: 2021-06-22

## 2021-06-22 ENCOUNTER — PATIENT MESSAGE (OUTPATIENT)
Dept: NEUROLOGY | Facility: CLINIC | Age: 31
End: 2021-06-22

## 2021-06-22 ENCOUNTER — HOSPITAL ENCOUNTER (OUTPATIENT)
Dept: GENERAL RADIOLOGY | Facility: HOSPITAL | Age: 31
Discharge: HOME | End: 2021-06-22

## 2021-06-22 ENCOUNTER — OFFICE VISIT (OUTPATIENT)
Dept: INTERNAL MEDICINE | Facility: CLINIC | Age: 31
End: 2021-06-22

## 2021-06-22 VITALS
SYSTOLIC BLOOD PRESSURE: 124 MMHG | WEIGHT: 205.12 LBS | RESPIRATION RATE: 18 BRPM | TEMPERATURE: 97.3 F | OXYGEN SATURATION: 99 % | BODY MASS INDEX: 36.34 KG/M2 | DIASTOLIC BLOOD PRESSURE: 80 MMHG | HEART RATE: 96 BPM | HEIGHT: 63 IN

## 2021-06-22 VITALS — BODY MASS INDEX: 36.14 KG/M2 | HEIGHT: 63 IN | WEIGHT: 204 LBS

## 2021-06-22 DIAGNOSIS — S03.00XA DISLOCATION OF TEMPOROMANDIBULAR JOINT, INITIAL ENCOUNTER: ICD-10-CM

## 2021-06-22 DIAGNOSIS — D68.51 HETEROZYGOUS FACTOR V LEIDEN MUTATION (HCC): ICD-10-CM

## 2021-06-22 DIAGNOSIS — M26.609 TEMPOROMANDIBULAR JOINT DYSFUNCTION: Primary | ICD-10-CM

## 2021-06-22 LAB
ANION GAP SERPL CALCULATED.3IONS-SCNC: 10.1 MMOL/L (ref 5–15)
B-HCG UR QL: NEGATIVE
BUN SERPL-MCNC: 8 MG/DL (ref 6–20)
BUN/CREAT SERPL: 12.5 (ref 7–25)
CALCIUM SPEC-SCNC: 8.9 MG/DL (ref 8.6–10.5)
CHLORIDE SERPL-SCNC: 106 MMOL/L (ref 98–107)
CO2 SERPL-SCNC: 23.9 MMOL/L (ref 22–29)
CREAT SERPL-MCNC: 0.64 MG/DL (ref 0.57–1)
DEPRECATED RDW RBC AUTO: 40.3 FL (ref 37–54)
ERYTHROCYTE [DISTWIDTH] IN BLOOD BY AUTOMATED COUNT: 12.1 % (ref 12.3–15.4)
GFR SERPL CREATININE-BSD FRML MDRD: 109 ML/MIN/1.73
GLUCOSE SERPL-MCNC: 101 MG/DL (ref 65–99)
HCT VFR BLD AUTO: 42.3 % (ref 34–46.6)
HGB BLD-MCNC: 14.3 G/DL (ref 12–15.9)
MCH RBC QN AUTO: 31 PG (ref 26.6–33)
MCHC RBC AUTO-ENTMCNC: 33.8 G/DL (ref 31.5–35.7)
MCV RBC AUTO: 91.8 FL (ref 79–97)
PLATELET # BLD AUTO: 313 10*3/MM3 (ref 140–450)
PMV BLD AUTO: 10.1 FL (ref 6–12)
POTASSIUM SERPL-SCNC: 4.6 MMOL/L (ref 3.5–5.2)
QT INTERVAL: 356 MS
QTC INTERVAL: 410 MS
RBC # BLD AUTO: 4.61 10*6/MM3 (ref 3.77–5.28)
SODIUM SERPL-SCNC: 140 MMOL/L (ref 136–145)
WBC # BLD AUTO: 7.38 10*3/MM3 (ref 3.4–10.8)

## 2021-06-22 PROCEDURE — 99213 OFFICE O/P EST LOW 20 MIN: CPT | Performed by: NURSE PRACTITIONER

## 2021-06-22 PROCEDURE — 85027 COMPLETE CBC AUTOMATED: CPT

## 2021-06-22 PROCEDURE — 81025 URINE PREGNANCY TEST: CPT

## 2021-06-22 PROCEDURE — U0004 COV-19 TEST NON-CDC HGH THRU: HCPCS

## 2021-06-22 PROCEDURE — 71045 X-RAY EXAM CHEST 1 VIEW: CPT

## 2021-06-22 PROCEDURE — 80048 BASIC METABOLIC PNL TOTAL CA: CPT

## 2021-06-22 PROCEDURE — 36415 COLL VENOUS BLD VENIPUNCTURE: CPT

## 2021-06-22 PROCEDURE — 93010 ELECTROCARDIOGRAM REPORT: CPT | Performed by: INTERNAL MEDICINE

## 2021-06-22 PROCEDURE — 93005 ELECTROCARDIOGRAM TRACING: CPT

## 2021-06-22 PROCEDURE — C9803 HOPD COVID-19 SPEC COLLECT: HCPCS

## 2021-06-22 RX ORDER — METHYLPREDNISOLONE 4 MG/1
TABLET ORAL
Qty: 21 EACH | Refills: 0 | Status: SHIPPED | OUTPATIENT
Start: 2021-06-22 | End: 2021-07-07

## 2021-06-22 RX ORDER — CYCLOBENZAPRINE HCL 10 MG
10 TABLET ORAL 2 TIMES DAILY PRN
Qty: 14 TABLET | Refills: 0 | Status: SHIPPED | OUTPATIENT
Start: 2021-06-22 | End: 2021-10-18 | Stop reason: SDUPTHER

## 2021-06-22 NOTE — PROGRESS NOTES
"  Office Visit      Patient Name: Marilu Godoy  : 1990   MRN: [id number]   Care Team: Patient Care Team:  Deidre Barker MD as PCP - General (Family Medicine)  Pierce Washington DO as Consulting Physician (Cardiology)  Oswaldo Bragg MD as Consulting Physician (Gastroenterology)    Chief Complaint  Jaw Pain (majority on the left side, intermittent on the right side )    Subjective     Subjective      Marilu Godoy presents to [facility] for jaw pain. Symptoms started about 1 month ago but worsened in the last week. Endorses jaw pain that is worse on the left side and in the AM that radiates to her ear. Denies vision changes, trauma, recent stressors, and worsening headaches (hx of migraines that are the same). Right side hurts as well but not as severe. Has had all teeth removed and wears dentures. Pain is worse in the mornings, with talking, and eating. Has tried tylenol but hasn't helped. Does not tolerate ibuprofen and hesitant to use due to eliquis.     Review of Systems   Constitutional: Negative for fatigue and fever.   HENT: Positive for ear pain (left).    Eyes: Negative for visual disturbance.   Respiratory: Negative for shortness of breath.    Cardiovascular: Negative for chest pain.   Gastrointestinal: Negative for abdominal pain.   Musculoskeletal: Positive for arthralgias.   Skin: Negative for rash.   Psychiatric/Behavioral: Negative for sleep disturbance.       Objective     Objective   Vital Signs:   /80   Pulse 96   Temp 97.3 °F (36.3 °C) (Temporal)   Resp 18   Ht 160 cm (63\")   Wt 93 kg (205 lb 1.9 oz)   SpO2 99%   BMI 36.34 kg/m²     Physical Exam  Vitals and nursing note reviewed.   Constitutional:       General: She is not in acute distress.     Appearance: Normal appearance. She is ill-appearing (appears in acute pain). She is not toxic-appearing.   HENT:      Head:      Jaw: Trismus (extreme crepitus (right worse than " left) and dislocation upon movmement, unable to open mouth fully), tenderness (left sided), swelling, pain on movement and malocclusion present.      Salivary Glands: Right salivary gland is not diffusely enlarged or tender. Left salivary gland is not diffusely enlarged or tender.      Right Ear: Tympanic membrane and ear canal normal.      Left Ear: Tympanic membrane and ear canal normal.      Nose: Nose normal.      Mouth/Throat:      Mouth: Mucous membranes are moist.      Pharynx: No posterior oropharyngeal erythema.   Eyes:      Pupils: Pupils are equal, round, and reactive to light.   Neck:      Vascular: No carotid bruit.   Cardiovascular:      Rate and Rhythm: Normal rate and regular rhythm.      Heart sounds: Normal heart sounds. No murmur heard.     Pulmonary:      Effort: Pulmonary effort is normal. No respiratory distress.      Breath sounds: Normal breath sounds. No wheezing.   Abdominal:      General: Bowel sounds are normal. There is no distension.      Palpations: Abdomen is soft.      Tenderness: There is no abdominal tenderness.   Musculoskeletal:         General: Normal range of motion.      Cervical back: Neck supple. No tenderness.   Skin:     General: Skin is warm and dry.      Findings: No rash.   Neurological:      General: No focal deficit present.      Mental Status: She is alert.   Psychiatric:         Mood and Affect: Mood normal.         Behavior: Behavior normal.          Assessment / Plan      Assessment/Plan   Problem List Items Addressed This Visit     None      Visit Diagnoses     Temporomandibular joint dysfunction    -  Primary    Relevant Orders    XR temporomandibular joints bilateral    Dislocation of temporomandibular joint, initial encounter        Medrol burst to decrease acute inflammation as she is unable to take NSAIDs and is in acute pain that is severe at times. Flexeril as needed at night, discussed side effects and informed to not operate a vehicle or drink ETOH with  this medication. Discussed stress management, moist heat, and rest for symptom relief. Can use tylenol as needed for pain. If symptoms are not improved she will have x-rays performed of bilateral tempomandibular joints and may require referral. Discussed this with her today and order placed. RTC if symptoms worsen or fail to improve.            Follow Up   Return if symptoms worsen or fail to improve.  Patient was given instructions and counseling regarding her condition or for health maintenance advice. Please see specific information pulled into the AVS if appropriate.     ARNIE Lucas  [facility]    Answers for HPI/ROS submitted by the patient on 6/22/2021  Please describe your symptoms.: Left jaw pain  Have you had these symptoms before?: No  How long have you been having these symptoms?: Greater than 2 weeks  What is the primary reason for your visit?: Other

## 2021-06-22 NOTE — DISCHARGE INSTRUCTIONS
COVID self-quarantine instructions reviewed with the pt.  Verbalized understanding.PAT PASS GIVEN/REVIEWED WITH PT.  VERBALIZED UNDERSTANDING OF THE FOLLOWING:  DO NOT EAT, DRINK, SMOKE, USE SMOKELESS TOBACCO OR CHEW GUM AFTER MIDNIGHT THE NIGHT BEFORE SURGERY.  THIS ALSO INCLUDES HARD CANDIES AND MINTS.    DO NOT SHAVE THE AREA TO BE OPERATED ON AT LEAST 48 HOURS PRIOR TO THE PROCEDURE.  DO NOT WEAR MAKE UP OR NAIL POLISH.  DO NOT LEAVE IN ANY PIERCING OR WEAR JEWELRY THE DAY OF SURGERY.      DO NOT USE ADHESIVES IF YOU WEAR DENTURES.    DO NOT WEAR EYE CONTACTS; BRING IN YOUR GLASSES.    ONLY TAKE MEDICATION THE MORNING OF YOUR PROCEDURE IF INSTRUCTED BY YOUR SURGEON WITH ENOUGH WATER TO SWALLOW THE MEDICATION.  IF YOUR SURGEON DID NOT SPECIFY WHICH MEDICATIONS TO TAKE, YOU WILL NEED TO CALL THEIR OFFICE FOR FURTHER INSTRUCTIONS AND DO AS THEY INSTRUCT.    LEAVE ANYTHING YOU CONSIDER VALUABLE AT HOME.    YOU WILL NEED TO ARRANGE FOR SOMEONE TO DRIVE YOU HOME AFTER SURGERY.  IT IS RECOMMENDED THAT YOU DO NOT DRIVE, WORK, DRINK ALCOHOL OR MAKE MAJOR DECISIONS FOR AT LEAST 24 HOURS AFTER YOUR PROCEDURE IS COMPLETE.      THE DAY OF YOUR PROCEDURE, BRING IN THE FOLLOWING IF APPLICABLE:   PICTURE ID AND INSURANCE/MEDICARE OR MEDICAID CARDS/ANY CO-PAY THAT MAY BE DUE   COPY OF ADVANCED DIRECTIVE/LIVING WILL/POWER OR    CPAP/BIPAP/INHALERS   SKIN PREP SHEET   YOUR PREADMISSION TESTING PASS (IF NOT A PHONE HISTORY)

## 2021-06-23 LAB — SARS-COV-2 RNA NOSE QL NAA+PROBE: NOT DETECTED

## 2021-06-24 ENCOUNTER — ANESTHESIA EVENT (OUTPATIENT)
Dept: PERIOP | Facility: HOSPITAL | Age: 31
End: 2021-06-24

## 2021-06-25 ENCOUNTER — HOSPITAL ENCOUNTER (OUTPATIENT)
Facility: HOSPITAL | Age: 31
Setting detail: HOSPITAL OUTPATIENT SURGERY
Discharge: HOME | End: 2021-06-25
Attending: OTOLARYNGOLOGY | Admitting: OTOLARYNGOLOGY

## 2021-06-25 ENCOUNTER — ANESTHESIA (OUTPATIENT)
Dept: PERIOP | Facility: HOSPITAL | Age: 31
End: 2021-06-25

## 2021-06-25 VITALS
HEART RATE: 77 BPM | DIASTOLIC BLOOD PRESSURE: 80 MMHG | OXYGEN SATURATION: 96 % | TEMPERATURE: 97.6 F | SYSTOLIC BLOOD PRESSURE: 115 MMHG | RESPIRATION RATE: 17 BRPM

## 2021-06-25 DIAGNOSIS — D68.51 HETEROZYGOUS FACTOR V LEIDEN MUTATION (HCC): ICD-10-CM

## 2021-06-25 DIAGNOSIS — R76.8 ELEVATED IGE LEVEL: Primary | ICD-10-CM

## 2021-06-25 DIAGNOSIS — J32.0 CHRONIC MAXILLARY SINUSITIS: ICD-10-CM

## 2021-06-25 PROCEDURE — 25010000002 MIDAZOLAM PER 1MG: Performed by: NURSE ANESTHETIST, CERTIFIED REGISTERED

## 2021-06-25 PROCEDURE — 31267 ENDOSCOPY MAXILLARY SINUS: CPT | Performed by: OTOLARYNGOLOGY

## 2021-06-25 PROCEDURE — 25010000002 FENTANYL CITRATE (PF) 100 MCG/2ML SOLUTION: Performed by: NURSE ANESTHETIST, CERTIFIED REGISTERED

## 2021-06-25 PROCEDURE — 25010000002 HYDROMORPHONE 1 MG/ML SOLUTION: Performed by: NURSE ANESTHETIST, CERTIFIED REGISTERED

## 2021-06-25 PROCEDURE — 25010000002 PROPOFOL 200 MG/20ML EMULSION: Performed by: NURSE ANESTHETIST, CERTIFIED REGISTERED

## 2021-06-25 PROCEDURE — 25010000002 ONDANSETRON PER 1 MG: Performed by: NURSE ANESTHETIST, CERTIFIED REGISTERED

## 2021-06-25 PROCEDURE — 31253 NSL/SINS NDSC TOTAL: CPT | Performed by: OTOLARYNGOLOGY

## 2021-06-25 PROCEDURE — 25010000002 HALOPERIDOL LACTATE PER 5 MG: Performed by: NURSE ANESTHETIST, CERTIFIED REGISTERED

## 2021-06-25 PROCEDURE — 25010000002 DEXAMETHASONE PER 1 MG: Performed by: NURSE ANESTHETIST, CERTIFIED REGISTERED

## 2021-06-25 RX ORDER — PYRAZINAMIDE 500 MG/1
1 TABLET ORAL EVERY 4 HOURS PRN
Qty: 10 TABLET | Refills: 1 | Status: SHIPPED | OUTPATIENT
Start: 2021-06-25 | End: 2021-07-07

## 2021-06-25 RX ORDER — LIDOCAINE HYDROCHLORIDE 20 MG/ML
INJECTION, SOLUTION INTRAVENOUS AS NEEDED
Status: DISCONTINUED | OUTPATIENT
Start: 2021-06-25 | End: 2021-06-25 | Stop reason: SURG

## 2021-06-25 RX ORDER — DEXAMETHASONE SODIUM PHOSPHATE 4 MG/ML
INJECTION, SOLUTION INTRA-ARTICULAR; INTRALESIONAL; INTRAMUSCULAR; INTRAVENOUS; SOFT TISSUE AS NEEDED
Status: DISCONTINUED | OUTPATIENT
Start: 2021-06-25 | End: 2021-06-25 | Stop reason: SURG

## 2021-06-25 RX ORDER — SODIUM CHLORIDE 0.9 % (FLUSH) 0.9 %
10 SYRINGE (ML) INJECTION AS NEEDED
Status: DISCONTINUED | OUTPATIENT
Start: 2021-06-25 | End: 2021-06-25 | Stop reason: HOSPADM

## 2021-06-25 RX ORDER — ONDANSETRON 2 MG/ML
4 INJECTION INTRAMUSCULAR; INTRAVENOUS ONCE AS NEEDED
Status: COMPLETED | OUTPATIENT
Start: 2021-06-25 | End: 2021-06-25

## 2021-06-25 RX ORDER — SODIUM CHLORIDE 9 MG/ML
INJECTION, SOLUTION INTRAVENOUS AS NEEDED
Status: DISCONTINUED | OUTPATIENT
Start: 2021-06-25 | End: 2021-06-25 | Stop reason: HOSPADM

## 2021-06-25 RX ORDER — PROPOFOL 10 MG/ML
INJECTION, EMULSION INTRAVENOUS AS NEEDED
Status: DISCONTINUED | OUTPATIENT
Start: 2021-06-25 | End: 2021-06-25 | Stop reason: SURG

## 2021-06-25 RX ORDER — ONDANSETRON 2 MG/ML
INJECTION INTRAMUSCULAR; INTRAVENOUS AS NEEDED
Status: DISCONTINUED | OUTPATIENT
Start: 2021-06-25 | End: 2021-06-25 | Stop reason: SURG

## 2021-06-25 RX ORDER — NEOSTIGMINE METHYLSULFATE 5 MG/5 ML
SYRINGE (ML) INTRAVENOUS AS NEEDED
Status: DISCONTINUED | OUTPATIENT
Start: 2021-06-25 | End: 2021-06-25 | Stop reason: SURG

## 2021-06-25 RX ORDER — OXYMETAZOLINE HYDROCHLORIDE 0.05 G/100ML
SPRAY NASAL AS NEEDED
Status: DISCONTINUED | OUTPATIENT
Start: 2021-06-25 | End: 2021-06-25 | Stop reason: HOSPADM

## 2021-06-25 RX ORDER — SODIUM CHLORIDE, SODIUM LACTATE, POTASSIUM CHLORIDE, CALCIUM CHLORIDE 600; 310; 30; 20 MG/100ML; MG/100ML; MG/100ML; MG/100ML
1000 INJECTION, SOLUTION INTRAVENOUS CONTINUOUS
Status: DISCONTINUED | OUTPATIENT
Start: 2021-06-25 | End: 2021-06-25 | Stop reason: HOSPADM

## 2021-06-25 RX ORDER — FENTANYL CITRATE 50 UG/ML
INJECTION, SOLUTION INTRAMUSCULAR; INTRAVENOUS AS NEEDED
Status: DISCONTINUED | OUTPATIENT
Start: 2021-06-25 | End: 2021-06-25 | Stop reason: SURG

## 2021-06-25 RX ORDER — ROCURONIUM BROMIDE 10 MG/ML
INJECTION, SOLUTION INTRAVENOUS AS NEEDED
Status: DISCONTINUED | OUTPATIENT
Start: 2021-06-25 | End: 2021-06-25 | Stop reason: SURG

## 2021-06-25 RX ORDER — MIDAZOLAM HYDROCHLORIDE 2 MG/2ML
INJECTION, SOLUTION INTRAMUSCULAR; INTRAVENOUS AS NEEDED
Status: DISCONTINUED | OUTPATIENT
Start: 2021-06-25 | End: 2021-06-25 | Stop reason: SURG

## 2021-06-25 RX ORDER — PROMETHAZINE HYDROCHLORIDE 25 MG/1
25 TABLET ORAL ONCE AS NEEDED
Status: DISCONTINUED | OUTPATIENT
Start: 2021-06-25 | End: 2021-06-25 | Stop reason: HOSPADM

## 2021-06-25 RX ORDER — LIDOCAINE HYDROCHLORIDE AND EPINEPHRINE 10; 10 MG/ML; UG/ML
INJECTION, SOLUTION INFILTRATION; PERINEURAL AS NEEDED
Status: DISCONTINUED | OUTPATIENT
Start: 2021-06-25 | End: 2021-06-25 | Stop reason: HOSPADM

## 2021-06-25 RX ORDER — PROMETHAZINE HYDROCHLORIDE 25 MG/1
25 SUPPOSITORY RECTAL ONCE AS NEEDED
Status: DISCONTINUED | OUTPATIENT
Start: 2021-06-25 | End: 2021-06-25 | Stop reason: HOSPADM

## 2021-06-25 RX ORDER — HALOPERIDOL 5 MG/ML
INJECTION INTRAMUSCULAR AS NEEDED
Status: DISCONTINUED | OUTPATIENT
Start: 2021-06-25 | End: 2021-06-25 | Stop reason: SURG

## 2021-06-25 RX ORDER — IPRATROPIUM BROMIDE AND ALBUTEROL SULFATE 2.5; .5 MG/3ML; MG/3ML
3 SOLUTION RESPIRATORY (INHALATION) ONCE AS NEEDED
Status: DISCONTINUED | OUTPATIENT
Start: 2021-06-25 | End: 2021-06-25 | Stop reason: HOSPADM

## 2021-06-25 RX ORDER — MEPERIDINE HYDROCHLORIDE 25 MG/ML
12.5 INJECTION INTRAMUSCULAR; INTRAVENOUS; SUBCUTANEOUS
Status: DISCONTINUED | OUTPATIENT
Start: 2021-06-25 | End: 2021-06-25 | Stop reason: HOSPADM

## 2021-06-25 RX ORDER — SULFAMETHOXAZOLE AND TRIMETHOPRIM 800; 160 MG/1; MG/1
1 TABLET ORAL 2 TIMES DAILY
Qty: 10 TABLET | Refills: 0 | Status: SHIPPED | OUTPATIENT
Start: 2021-06-25 | End: 2021-06-30

## 2021-06-25 RX ADMIN — FENTANYL CITRATE 50 MCG: 50 INJECTION INTRAMUSCULAR; INTRAVENOUS at 10:20

## 2021-06-25 RX ADMIN — Medication 3 MG: at 10:47

## 2021-06-25 RX ADMIN — FENTANYL CITRATE 50 MCG: 50 INJECTION INTRAMUSCULAR; INTRAVENOUS at 09:45

## 2021-06-25 RX ADMIN — ONDANSETRON 4 MG: 2 INJECTION INTRAMUSCULAR; INTRAVENOUS at 11:45

## 2021-06-25 RX ADMIN — GLYCOPYRROLATE 0.4 MG: 0.2 INJECTION, SOLUTION INTRAMUSCULAR; INTRAVENOUS at 10:47

## 2021-06-25 RX ADMIN — DEXAMETHASONE SODIUM PHOSPHATE 4 MG: 4 INJECTION, SOLUTION INTRAMUSCULAR; INTRAVENOUS at 10:00

## 2021-06-25 RX ADMIN — LIDOCAINE HYDROCHLORIDE 100 MG: 20 INJECTION, SOLUTION INTRAVENOUS at 09:47

## 2021-06-25 RX ADMIN — HYDROMORPHONE HYDROCHLORIDE 0.5 MG: 1 INJECTION, SOLUTION INTRAMUSCULAR; INTRAVENOUS; SUBCUTANEOUS at 11:39

## 2021-06-25 RX ADMIN — ONDANSETRON 4 MG: 2 INJECTION INTRAMUSCULAR; INTRAVENOUS at 10:00

## 2021-06-25 RX ADMIN — MIDAZOLAM HYDROCHLORIDE 2 MG: 1 INJECTION, SOLUTION INTRAMUSCULAR; INTRAVENOUS at 09:45

## 2021-06-25 RX ADMIN — ROCURONIUM BROMIDE 10 MG: 10 INJECTION INTRAVENOUS at 09:47

## 2021-06-25 RX ADMIN — HALOPERIDOL LACTATE 0.5 MG: 5 INJECTION, SOLUTION INTRAMUSCULAR at 10:00

## 2021-06-25 RX ADMIN — ROCURONIUM BROMIDE 10 MG: 10 INJECTION INTRAVENOUS at 10:20

## 2021-06-25 RX ADMIN — ROCURONIUM BROMIDE 20 MG: 10 INJECTION INTRAVENOUS at 09:52

## 2021-06-25 RX ADMIN — PROPOFOL 200 MG: 10 INJECTION, EMULSION INTRAVENOUS at 09:47

## 2021-06-25 RX ADMIN — SODIUM CHLORIDE, POTASSIUM CHLORIDE, SODIUM LACTATE AND CALCIUM CHLORIDE: 600; 310; 30; 20 INJECTION, SOLUTION INTRAVENOUS at 08:50

## 2021-06-25 RX ADMIN — SODIUM CHLORIDE, POTASSIUM CHLORIDE, SODIUM LACTATE AND CALCIUM CHLORIDE: 600; 310; 30; 20 INJECTION, SOLUTION INTRAVENOUS at 10:51

## 2021-06-25 NOTE — ANESTHESIA POSTPROCEDURE EVALUATION
Patient: Marilu Godoy    Procedure Summary     Date: 06/25/21 Room / Location: [facility] OR  /  [facility] OR    Anesthesia Start: 0939 Anesthesia Stop: 1102    Procedure: Right endoscopic total ethmoidectomy, right endoscopic middle meatal antrotomy with cyst removal, right endoscopic frontal recess exploration with cyst removal (Right Nose) Diagnosis:       Heterozygous factor V Leiden mutation (CMS/HCC)      (Heterozygous factor V Leiden mutation (CMS/HCC) [D68.51])    Surgeons: Saulo Brito MD Provider: Blanca Myers CRNA    Anesthesia Type: general ASA Status: 3          Anesthesia Type: general    Vitals  Vitals Value Taken Time   /80 06/25/21 1213   Temp 97.6 °F (36.4 °C) 06/25/21 1213   Pulse 67 06/25/21 1213   Resp 18 06/25/21 1213   SpO2 97 % 06/25/21 1213           Post Anesthesia Care and Evaluation    Patient location during evaluation: PHASE II  Patient participation: complete - patient participated  Level of consciousness: awake and alert  Pain score: 2  Pain management: satisfactory to patient  Airway patency: patent  Anesthetic complications: No anesthetic complications  PONV Status: controlled  Cardiovascular status: acceptable and stable  Respiratory status: acceptable  Hydration status: acceptable

## 2021-06-25 NOTE — ANESTHESIA PREPROCEDURE EVALUATION
Anesthesia Evaluation     Patient summary reviewed and Nursing notes reviewed   NPO Solid Status: > 8 hours  NPO Liquid Status: > 8 hours           Airway   Mallampati: I  TM distance: >3 FB  Neck ROM: full  Small opening and Possible difficult intubation  Dental - normal exam   (+) edentulous    Pulmonary - normal exam   (+) pulmonary embolism, asthma,shortness of breath, sleep apnea (STOPPED USING CPAP X2 MONTHS) on CPAP,   Cardiovascular - normal exam    ECG reviewed  PT is on anticoagulation therapy    (+) dysrhythmias Tachycardia, orthopnea, hyperlipidemia,   (-) hypertension, CAD      Neuro/Psych  (+) headaches, psychiatric history Anxiety,     GI/Hepatic/Renal/Endo    (+) obesity,  hiatal hernia, GERD,  renal disease,     Musculoskeletal     Abdominal   (+) obese,     Bowel sounds: normal.   Substance History - negative use     OB/GYN negative ob/gyn ROS   (-)  Pregnant        Other   arthritis,      ROS/Med Hx Other: eliquis-last dose Saturday    Steroid dose pack started Tuesday for her tmj                Anesthesia Plan    ASA 3     general

## 2021-06-25 NOTE — ANESTHESIA PROCEDURE NOTES
Airway  Urgency: elective    Date/Time: 6/25/2021 9:48 AM  Airway not difficult    General Information and Staff    Patient location during procedure: OR  CRNA: Blanca Myers CRNA    Indications and Patient Condition  Indications for airway management: airway protection    Preoxygenated: yes  MILS not maintained throughout  Mask difficulty assessment: 1 - vent by mask    Final Airway Details  Final airway type: endotracheal airway (oral sina)      Successful airway: ETT and SINA tube  Cuffed: yes   Successful intubation technique: direct laryngoscopy  Endotracheal tube insertion site: oral  Blade: Konstantin  Blade size: 3  ETT size (mm): 7.0  Cormack-Lehane Classification: grade I - full view of glottis  Placement verified by: chest auscultation and capnometry   Cuff volume (mL): 5  Measured from: lips  ETT/EBT  to lips (cm): 20  Number of attempts at approach: 1  Assessment: lips, teeth, and gum same as pre-op and atraumatic intubation    Additional Comments  Negative epigastric sounds, Breath sound equal bilaterally with symmetric chest rise and fall

## 2021-06-25 NOTE — OP NOTE
ENDOSCOPIC FUNCTIONAL SINUS SURGERY WITH TRACKING AND SEPTOPLASTY  Procedure Report    Patient Name:  Marilu Godoy  YOB: 1990    Date of Surgery:  6/25/2021     Indications: Chronic right pansinusitis with antral cyst    Pre-op Diagnosis:   Heterozygous factor V Leiden mutation (CMS/HCC) [D68.51]       Post-Op Diagnosis Codes:     * Heterozygous factor V Leiden mutation (CMS/HCC) [D68.51]    Procedure/CPT® Codes:      Procedure(s):  Right endoscopic total ethmoidectomy, right endoscopic middle meatal antrotomy with cyst removal, right endoscopic frontal recess exploration with cyst removal    Staff:  Surgeon(s):  Saulo Brito MD      Suction     Anesthesia: General    Estimated Blood Loss: 100ml    Implants:    Nothing was implanted during the procedure    Specimen:          Specimens     ID Source Type Tests Collected By Collected At Frozen?    A Sinus, ethmoid right Tissue · TISSUE PATHOLOGY EXAM   Valorie Wei RN 6/25/21 5786     Description: right sinus contents    This specimen was not marked as sent.            Findings: Large right cystic antral mass    Complications: None    Description of Procedure: Marilu was brought the operating room where general anesthesia was induced followed by endotracheal intubation the face was prepped and draped in the usual manner.  The right lateral nasal wall was infiltrated with 1% lidocaine with 1 200,000 epinephrine and oxymetazoline on pledgets were also placed    I began the procedure by making a right endoscopic infundibulotomy.  The uncinate process was taken down.  The ethmoid bulla was encountered and I removed the inferior portion with Joselito-Cut forceps.  I followed this down to the middle meatal ostia.  The remainder of the uncinate was removed with backbiting Darien forceps.  I then dissected through the basal lamella with the through cut forceps and identified the sphenoethmoid recess and skull base posteriorly.  I then completed the  ethmoidectomy working in a retrograde fashion using the lamina papyracea the skull base and the middle turbinate as the margins of the dissection.  I approach the frontal recess and remove the superior portion of the ethmoid bulla and was able to find the frontal recess lateral to this and remove some polypoid tissue opening the frontal recess quite nicely.  There was a small dehiscence of bone in the lamina and a small 4 mm exposure of periorbital fat.  Next I took angled curved forceps and visualize the large right antral cyst and this was removed working through the enlarged maxillary ostia which was enlarged in an anterior inferior direction with backbiting forceps.  Once the ostia was removed the large cyst was removed working through the ostia and all of the sinus contents including the right maxillary sinus cyst were sent for permanent pathology.  Next I took the microdebrider straight blade and trimmed the mucosa and determined hemostasis was complete.  I completed the procedure by placing a nova pack absorbable sponge in the right middle meatus.  The hypertrophic atrophic inferior turbinate was infractured.    Please Marilu tolerated the procedure well she was awakened and returned to the recovery area in stable condition having tolerated the procedure well      Saulo Brito MD     Date: 6/25/2021  Time: 10:54 EDT

## 2021-06-25 NOTE — H&P
[facility]   HISTORY AND PHYSICAL    Patient Name: Marilu Godoy  : 1990  MRN: [id number]  Primary Care Physician:  Deidre Barker MD  Date of admission: 2021    Subjective   Subjective     Chief Complaint: Right maxillary sinus cyst and chronic sinusitis    History of Present Illness 30-year-old with expanding right maxillary sinus cyst causing facial pain and pressure unrelieved with medical management    Review of Systems denies loss of smell or taste    Personal History     Past Medical History:   Diagnosis Date   • Abdominal pain    • Abnormal Pap smear of cervix    • Anxiety    • Asthma    • Bleeding disorder (CMS/[facility]) 16   • Body piercing     TONGUE, NOSE, TWO IN EACH EAR   • Chest pain 2021    6-7 months ago -seen at [facility] [place] er and released same day of complaint with resitory infection   • Clotting disorder (CMS/[facility])     factor 5   • Constipation    • Coronary artery disease involving native coronary artery of native heart without angina pectoris 2017   • Diarrhea    • Elevated cholesterol    • Endometriosis    • Factor 5 Leiden mutation, heterozygous (CMS/[facility])    • Fibroid    • Full dentures 2021    advised no adhesives DOS   • GERD (gastroesophageal reflux disease)    • H/O blood clots    • H/O cardiovascular stress test 2021    reported several years ago-exercise wnl   • Headache    • Hiatal hernia    • High cholesterol    • History of recurrent UTIs    • HPV (human papilloma virus) infection    • Hx of gout 2021   • Hypertension    • Kidney infection 2021    history only   • Migraine    • Nausea    • Nausea & vomiting    • Obesity    • Orthostatic hypotension    • Osteoarthritis    • Pollen allergies    • Protein S deficiency (CMS/[facility]) 2016    labs from hospitalization for PE   • Pulmonary embolism (CMS/[facility]) 2016    on eliquis since that time   • Recurrent pregnancy loss, antepartum condition or complication    • Sleep  apnea     CPAP ASKED TO BRING DOS, has been unable to use due to sinus cyst for 2 months   • Subclinical hyperthyroidism    • Tachycardia    • Tattoo     LOWER BACK   • Varicella        Past Surgical History:   Procedure Laterality Date   •  SECTION        and  and    •  SECTION WITH TUBAL N/A 1/15/2019    Procedure:  SECTION REPEAT WITH TUBAL;  Surgeon: Alva Boyer MD;  Location: [facility] LABOR DELIVERY;  Service: Obstetrics/Gynecology   • CHOLECYSTECTOMY     • COLONOSCOPY N/A 2017    Procedure: COLONOSCOPY WITH BIOPSIES AND ARGON THERMAL ABLATION;  Surgeon: Jignesh Selby MD;  Location: [facility] ENDOSCOPY;  Service:    • DIAGNOSTIC LAPAROSCOPY N/A 2018    Procedure: DIAGNOSTIC LAPAROSCOPY AND ABLATION OF ENDOMETRIOSIS;  Surgeon: Evin Zamudio MD;  Location: [facility] OR;  Service: Obstetrics/Gynecology   • ENDOMETRIAL ABLATION     • ENDOSCOPY N/A 2017    Procedure: ESOPHAGOGASTRODUODENOSCOPY WITH BIOPSIES AND COLD BIOPSY POLYPECTOMIES;  Surgeon: Jignesh Selby MD;  Location: [facility] ENDOSCOPY;  Service:    • MOUTH SURGERY      full mouth tooth extration       Family History: family history includes Arthritis in her father and mother; Asthma in her mother; COPD in her mother; Coronary artery disease in her father; Dementia in her father; Diabetes in her father; Heart attack in her father; Hyperlipidemia in her father; Hypertension in her father; Kidney disease in her father; No Known Problems in her son; Thyroid disease in her mother. Otherwise pertinent FHx was reviewed and not pertinent to current issue.    Social History:  reports that she has never smoked. She has never used smokeless tobacco. She reports that she does not drink alcohol and does not use drugs.    Home Medications:  DULoxetine, Diclofenac Sodium, Erenumab-aooe, Vitamin D3, albuterol sulfate HFA, apixaban, aspirin, atorvastatin, b complex vitamins, calcium carbonate EX, cyclobenzaprine,  fluticasone, ivabradine HCl, lidocaine, magnesium hydroxide, methylPREDNISolone, mometasone-formoterol, ondansetron ODT, pantoprazole, and topiramate    Allergies:  Allergies   Allergen Reactions   • Ceftin [Cefuroxime Axetil] Shortness Of Breath and Rash     Numbness in mouth and throat   • Imitrex [Sumatriptan] Shortness Of Breath and Nausea Only   • Amoxicillin Rash       Objective    Objective     Vitals:        Physical Exam    Result Review    Result Review:  I have personally reviewed the results from the time of this admission to 6/25/2021 08:01 EDT and agree with these findings:  []  Laboratory  []  MicrobiologyENT Consult Note    Referring Provider: Saulo Brito        Patient Care Team:  Deidre Barker MD as PCP - General (Family Medicine)  Pierce Washington DO as Consulting Physician (Cardiology)  Oswaldo Bragg MD as Consulting Physician (Gastroenterology)          Subjective .     History of present illness:     Past Medical History:   Diagnosis Date   • Abdominal pain    • Abnormal Pap smear of cervix    • Anxiety    • Asthma 2015   • Bleeding disorder (CMS/HCC) 11-13-16   • Body piercing     TONGUE, NOSE, TWO IN EACH EAR   • Chest pain 06/22/2021    6-7 months ago -seen at [facility] er and released same day of complaint with resitory infection   • Clotting disorder (CMS/HCC)     factor 5   • Constipation    • Coronary artery disease involving native coronary artery of native heart without angina pectoris 6/16/2017   • Diarrhea    • Elevated cholesterol    • Endometriosis    • Factor 5 Leiden mutation, heterozygous (CMS/HCC)    • Fibroid    • Full dentures 06/22/2021    advised no adhesives DOS   • GERD (gastroesophageal reflux disease)    • H/O blood clots    • H/O cardiovascular stress test 06/22/2021    reported several years ago-exercise wnl   • Headache    • Hiatal hernia    • High cholesterol    • History of recurrent UTIs    • HPV (human papilloma virus) infection    • Hx of gout  2021   • Hypertension    • Kidney infection 2021    history only   • Migraine    • Nausea    • Nausea & vomiting    • Obesity    • Orthostatic hypotension    • Osteoarthritis    • Pollen allergies    • Protein S deficiency (CMS/[facility]) 2016    labs from hospitalization for PE   • Pulmonary embolism (CMS/[facility]) 2016    on eliquis since that time   • Recurrent pregnancy loss, antepartum condition or complication    • Sleep apnea     CPAP ASKED TO BRING DOS, has been unable to use due to sinus cyst for 2 months   • Subclinical hyperthyroidism    • Tachycardia    • Tattoo     LOWER BACK   • Varicella      Past Surgical History:   Procedure Laterality Date   •  SECTION        and  and    •  SECTION WITH TUBAL N/A 1/15/2019    Procedure:  SECTION REPEAT WITH TUBAL;  Surgeon: Alva Boyer MD;  Location: [facility] LABOR DELIVERY;  Service: Obstetrics/Gynecology   • CHOLECYSTECTOMY     • COLONOSCOPY N/A 2017    Procedure: COLONOSCOPY WITH BIOPSIES AND ARGON THERMAL ABLATION;  Surgeon: Jignesh Selby MD;  Location: [facility] ENDOSCOPY;  Service:    • DIAGNOSTIC LAPAROSCOPY N/A 2018    Procedure: DIAGNOSTIC LAPAROSCOPY AND ABLATION OF ENDOMETRIOSIS;  Surgeon: Evin Zamudio MD;  Location: [facility] OR;  Service: Obstetrics/Gynecology   • ENDOMETRIAL ABLATION     • ENDOSCOPY N/A 2017    Procedure: ESOPHAGOGASTRODUODENOSCOPY WITH BIOPSIES AND COLD BIOPSY POLYPECTOMIES;  Surgeon: Jignesh Selby MD;  Location: [facility] ENDOSCOPY;  Service:    • MOUTH SURGERY      full mouth tooth extration     Allergies   Allergen Reactions   • Ceftin [Cefuroxime Axetil] Shortness Of Breath and Rash     Numbness in mouth and throat   • Imitrex [Sumatriptan] Shortness Of Breath and Nausea Only   • Amoxicillin Rash       Social History     Socioeconomic History   • Marital status:      Spouse name: Not on file   • Number of children: Not on file   • Years of education:  Not on file   • Highest education level: Not on file   Tobacco Use   • Smoking status: Never Smoker   • Smokeless tobacco: Never Used   Vaping Use   • Vaping Use: Never used   Substance and Sexual Activity   • Alcohol use: No   • Drug use: No   • Sexual activity: Yes     Partners: Male     Birth control/protection: None     Comment: PE while on birth control patch     Family History   Problem Relation Age of Onset   • Arthritis Mother    • COPD Mother    • Asthma Mother    • Thyroid disease Mother    • Arthritis Father    • Diabetes Father    • Hypertension Father    • Hyperlipidemia Father    • Kidney disease Father    • Heart attack Father    • Coronary artery disease Father    • Dementia Father    • No Known Problems Son    • Colon cancer Neg Hx    • Liver cancer Neg Hx    • Liver disease Neg Hx    • Stomach cancer Neg Hx    • Esophageal cancer Neg Hx        REVIEW OF SYMPTOMS    Systemic Symptoms: No fever, no recent weight loss  Head Symptoms: No headache, no facial pain, no facial weakness  Eye Symptoms: No blurry vision, full range of motion  ENT Symptoms: No loss of hearing, no hoarseness  Cardiovascular Symptoms: No cold hands or feet  Pulmonary Symptoms: No dyspnea or stridor  GI Symptoms: No dysphagia, no choking  Endocrine Symptoms: No heat or cold intolerance  Neurological Symptoms: No vertigo, no taste or smell disturbances  Skin Symptoms: No abnormal lumps      Vital Signs         There is no height or weight on file to calculate BMI.      Physical Exam:     General Appearance:    Alert, cooperative, in no acute distress, no audible stridor   Head:    Normocephalic, without obvious abnormality, atraumatic   Eyes:          conjunctivae and sclerae normal, corneas clear, PERRLA   Ears:   Ear canals clear, right TM normal, left TM normal   Oral Exam:   Normal tongue, tonsils   Nose Exam: Midline septum with hypertrophic right maxillary and ethmoid sinus mucosa      Laryngeal:  Refer to fiber optic  laryngoscopy procedure note   Neck:   No adenopathy, supple, trachea midline, no thyromegaly, no   carotid bruit   Salivary Glands:     No palpable masses   Lungs:     Clear to auscultation     Heart:    Regular rhythm and normal rate   Abdomen:     Soft nontender, nondistended, no guarding   Pulses:   Pulses palpable and equal bilaterally   Skin:   No bleeding, bruising or rash   Neurologic:   Cranial nerves II-XII grossly intact, no spontaneous   nystagmus       Results Review:   I reviewed the patient's new clinical results.    Results from last 7 days   Lab Units 06/22/21  0942   WBC 10*3/mm3 7.38   HEMOGLOBIN g/dL 14.3   HEMATOCRIT % 42.3   PLATELETS 10*3/mm3 313     Results from last 7 days   Lab Units 06/22/21  0942   SODIUM mmol/L 140   POTASSIUM mmol/L 4.6   CHLORIDE mmol/L 106   CO2 mmol/L 23.9   BUN mg/dL 8   CREATININE mg/dL 0.64   GLUCOSE mg/dL 101*   CALCIUM mg/dL 8.9     No results found for: ACANTHNAEG, AFBCX, BPERTUSSISCX, BLOODCX  No results found for: BCIDPCR, CXREFLEX, CSFCX, CULTURETIS  No results found for: CULTURES, HSVCX, URCX  No results found for: EYECULTURE, GCCX, HSVCULTURE, LABHSV  No results found for: LEGIONELLA, MRSACX, MUMPSCX, MYCOPLASCX  No results found for: NOCARDIACX, STOOLCX  No results found for: THROATCX, UNSTIMCULT, URINECX, CULTURE, VZVCULTUR  No results found for: VIRALCULTU, WOUNDCX    Imaging Results (Last 72 Hours)     ** No results found for the last 72 hours. **            Assessment/Plan         I discussed the patients findings and my recommendations with patient.     Saulo Brito MD  06/25/21  08:02 EDT    Time: Total face-to-face/floor time     []  Radiology  []  EKG/Telemetry   []  Cardiology/Vascular   []  Pathology  []  Old records  []  Other:  Most notable findings include: See above H&P    Assessment/Plan   Assessment / Plan     Brief Patient Summary:  Marilu Godoy is a 30 y.o. female who presents with longstanding right maxillary sinusitis and  expanding retention cyst leading to facial pain and pressure which has not responded to medical management.  I am recommending that she undergo a right endoscopic maxillary antrostomy and right maxillary ethmoidectomy with possible frontal recess resection.  Risk were explained to her and Marilu is asked that I proceed    Active Hospital Problems:  Active Hospital Problems    Diagnosis    • **Heterozygous factor V Leiden mutation (CMS/HCC)      Plan:   Right endoscopic  middle meatal antrotomy and endoscopic right ethmoidectomy    DVT prophylaxis:  No DVT prophylaxis order currently exists.    CODE STATUS:       Admission Status:  I believe this patient meets outpatient surgical status.    Electronically signed by Saulo Brito MD, 06/25/21, 8:01 AM EDT.

## 2021-06-27 ENCOUNTER — HOSPITAL ENCOUNTER (EMERGENCY)
Facility: HOSPITAL | Age: 31
Discharge: HOME | End: 2021-06-27
Attending: STUDENT IN AN ORGANIZED HEALTH CARE EDUCATION/TRAINING PROGRAM | Admitting: STUDENT IN AN ORGANIZED HEALTH CARE EDUCATION/TRAINING PROGRAM

## 2021-06-27 ENCOUNTER — APPOINTMENT (OUTPATIENT)
Dept: CT IMAGING | Facility: HOSPITAL | Age: 31
End: 2021-06-27

## 2021-06-27 VITALS
WEIGHT: 201.2 LBS | SYSTOLIC BLOOD PRESSURE: 135 MMHG | BODY MASS INDEX: 35.65 KG/M2 | TEMPERATURE: 98.7 F | RESPIRATION RATE: 18 BRPM | HEIGHT: 63 IN | DIASTOLIC BLOOD PRESSURE: 85 MMHG | OXYGEN SATURATION: 98 % | HEART RATE: 120 BPM

## 2021-06-27 DIAGNOSIS — R10.9 ABDOMINAL PAIN, UNSPECIFIED ABDOMINAL LOCATION: Primary | ICD-10-CM

## 2021-06-27 LAB
ALBUMIN SERPL-MCNC: 4.4 G/DL (ref 3.5–5.2)
ALBUMIN/GLOB SERPL: 1.4 G/DL
ALP SERPL-CCNC: 100 U/L (ref 39–117)
ALT SERPL W P-5'-P-CCNC: 16 U/L (ref 1–33)
ANION GAP SERPL CALCULATED.3IONS-SCNC: 9 MMOL/L (ref 5–15)
AST SERPL-CCNC: 16 U/L (ref 1–32)
BASOPHILS # BLD AUTO: 0.04 10*3/MM3 (ref 0–0.2)
BASOPHILS NFR BLD AUTO: 0.4 % (ref 0–1.5)
BILIRUB SERPL-MCNC: 0.6 MG/DL (ref 0–1.2)
BILIRUB UR QL STRIP: NEGATIVE
BUN SERPL-MCNC: 10 MG/DL (ref 6–20)
BUN/CREAT SERPL: 12.5 (ref 7–25)
CALCIUM SPEC-SCNC: 9.2 MG/DL (ref 8.6–10.5)
CHLORIDE SERPL-SCNC: 100 MMOL/L (ref 98–107)
CLARITY UR: ABNORMAL
CO2 SERPL-SCNC: 26 MMOL/L (ref 22–29)
COLOR UR: ABNORMAL
CREAT SERPL-MCNC: 0.8 MG/DL (ref 0.57–1)
DEPRECATED RDW RBC AUTO: 39.6 FL (ref 37–54)
EOSINOPHIL # BLD AUTO: 0.02 10*3/MM3 (ref 0–0.4)
EOSINOPHIL NFR BLD AUTO: 0.2 % (ref 0.3–6.2)
ERYTHROCYTE [DISTWIDTH] IN BLOOD BY AUTOMATED COUNT: 11.9 % (ref 12.3–15.4)
GFR SERPL CREATININE-BSD FRML MDRD: 84 ML/MIN/1.73
GLOBULIN UR ELPH-MCNC: 3.1 GM/DL
GLUCOSE SERPL-MCNC: 118 MG/DL (ref 65–99)
GLUCOSE UR STRIP-MCNC: NEGATIVE MG/DL
HCT VFR BLD AUTO: 42.8 % (ref 34–46.6)
HGB BLD-MCNC: 14.9 G/DL (ref 12–15.9)
HGB UR QL STRIP.AUTO: NEGATIVE
IMM GRANULOCYTES # BLD AUTO: 0.03 10*3/MM3 (ref 0–0.05)
IMM GRANULOCYTES NFR BLD AUTO: 0.3 % (ref 0–0.5)
KETONES UR QL STRIP: ABNORMAL
LEUKOCYTE ESTERASE UR QL STRIP.AUTO: NEGATIVE
LIPASE SERPL-CCNC: 43 U/L (ref 13–60)
LYMPHOCYTES # BLD AUTO: 1.22 10*3/MM3 (ref 0.7–3.1)
LYMPHOCYTES NFR BLD AUTO: 11.4 % (ref 19.6–45.3)
MCH RBC QN AUTO: 31.3 PG (ref 26.6–33)
MCHC RBC AUTO-ENTMCNC: 34.8 G/DL (ref 31.5–35.7)
MCV RBC AUTO: 89.9 FL (ref 79–97)
MONOCYTES # BLD AUTO: 0.49 10*3/MM3 (ref 0.1–0.9)
MONOCYTES NFR BLD AUTO: 4.6 % (ref 5–12)
NEUTROPHILS NFR BLD AUTO: 8.86 10*3/MM3 (ref 1.7–7)
NEUTROPHILS NFR BLD AUTO: 83.1 % (ref 42.7–76)
NITRITE UR QL STRIP: NEGATIVE
NRBC BLD AUTO-RTO: 0 /100 WBC (ref 0–0.2)
PH UR STRIP.AUTO: 6 [PH] (ref 5–8)
PLATELET # BLD AUTO: 307 10*3/MM3 (ref 140–450)
PMV BLD AUTO: 9.9 FL (ref 6–12)
POTASSIUM SERPL-SCNC: 4 MMOL/L (ref 3.5–5.2)
PROT SERPL-MCNC: 7.5 G/DL (ref 6–8.5)
PROT UR QL STRIP: NEGATIVE
RBC # BLD AUTO: 4.76 10*6/MM3 (ref 3.77–5.28)
SODIUM SERPL-SCNC: 135 MMOL/L (ref 136–145)
SP GR UR STRIP: 1.03 (ref 1–1.03)
UROBILINOGEN UR QL STRIP: ABNORMAL
WBC # BLD AUTO: 10.66 10*3/MM3 (ref 3.4–10.8)

## 2021-06-27 PROCEDURE — 85025 COMPLETE CBC W/AUTO DIFF WBC: CPT | Performed by: NURSE PRACTITIONER

## 2021-06-27 PROCEDURE — 96375 TX/PRO/DX INJ NEW DRUG ADDON: CPT

## 2021-06-27 PROCEDURE — 25010000002 ONDANSETRON PER 1 MG: Performed by: NURSE PRACTITIONER

## 2021-06-27 PROCEDURE — 83690 ASSAY OF LIPASE: CPT | Performed by: NURSE PRACTITIONER

## 2021-06-27 PROCEDURE — 25010000002 IOPAMIDOL 61 % SOLUTION: Performed by: STUDENT IN AN ORGANIZED HEALTH CARE EDUCATION/TRAINING PROGRAM

## 2021-06-27 PROCEDURE — 25010000002 KETOROLAC TROMETHAMINE PER 15 MG: Performed by: NURSE PRACTITIONER

## 2021-06-27 PROCEDURE — 80053 COMPREHEN METABOLIC PANEL: CPT | Performed by: NURSE PRACTITIONER

## 2021-06-27 PROCEDURE — 99283 EMERGENCY DEPT VISIT LOW MDM: CPT

## 2021-06-27 PROCEDURE — 96374 THER/PROPH/DIAG INJ IV PUSH: CPT

## 2021-06-27 PROCEDURE — 81003 URINALYSIS AUTO W/O SCOPE: CPT | Performed by: NURSE PRACTITIONER

## 2021-06-27 PROCEDURE — 74177 CT ABD & PELVIS W/CONTRAST: CPT

## 2021-06-27 RX ORDER — ONDANSETRON 4 MG/1
4 TABLET, ORALLY DISINTEGRATING ORAL EVERY 6 HOURS PRN
Qty: 20 TABLET | Refills: 0 | Status: SHIPPED | OUTPATIENT
Start: 2021-06-27 | End: 2021-07-07

## 2021-06-27 RX ORDER — SODIUM CHLORIDE 0.9 % (FLUSH) 0.9 %
10 SYRINGE (ML) INJECTION AS NEEDED
Status: DISCONTINUED | OUTPATIENT
Start: 2021-06-27 | End: 2021-06-27 | Stop reason: HOSPADM

## 2021-06-27 RX ORDER — KETOROLAC TROMETHAMINE 30 MG/ML
30 INJECTION, SOLUTION INTRAMUSCULAR; INTRAVENOUS ONCE
Status: COMPLETED | OUTPATIENT
Start: 2021-06-27 | End: 2021-06-27

## 2021-06-27 RX ORDER — ONDANSETRON 2 MG/ML
4 INJECTION INTRAMUSCULAR; INTRAVENOUS ONCE
Status: COMPLETED | OUTPATIENT
Start: 2021-06-27 | End: 2021-06-27

## 2021-06-27 RX ADMIN — IOPAMIDOL 100 ML: 612 INJECTION, SOLUTION INTRAVENOUS at 16:12

## 2021-06-27 RX ADMIN — ONDANSETRON 4 MG: 2 INJECTION INTRAMUSCULAR; INTRAVENOUS at 15:58

## 2021-06-27 RX ADMIN — SODIUM CHLORIDE 1000 ML: 9 INJECTION, SOLUTION INTRAVENOUS at 15:57

## 2021-06-27 RX ADMIN — KETOROLAC TROMETHAMINE 30 MG: 30 INJECTION, SOLUTION INTRAMUSCULAR; INTRAVENOUS at 15:59

## 2021-06-28 ENCOUNTER — HOSPITAL ENCOUNTER (OUTPATIENT)
Dept: GENERAL RADIOLOGY | Facility: HOSPITAL | Age: 31
Discharge: HOME | End: 2021-06-28
Admitting: NURSE PRACTITIONER

## 2021-06-28 DIAGNOSIS — M26.609 TEMPOROMANDIBULAR JOINT DYSFUNCTION: ICD-10-CM

## 2021-06-28 PROCEDURE — 70330 X-RAY EXAM OF JAW JOINTS: CPT

## 2021-06-29 ENCOUNTER — TELEPHONE (OUTPATIENT)
Dept: GASTROENTEROLOGY | Facility: CLINIC | Age: 31
End: 2021-06-29

## 2021-06-29 ENCOUNTER — PATIENT MESSAGE (OUTPATIENT)
Dept: GASTROENTEROLOGY | Facility: CLINIC | Age: 31
End: 2021-06-29

## 2021-06-29 DIAGNOSIS — R10.84 GENERALIZED ABDOMINAL PAIN: Primary | ICD-10-CM

## 2021-06-29 DIAGNOSIS — R11.0 NAUSEA: ICD-10-CM

## 2021-06-29 DIAGNOSIS — K31.84 GASTROPARESIS: ICD-10-CM

## 2021-06-29 DIAGNOSIS — M26.622 ARTHRALGIA OF LEFT TEMPOROMANDIBULAR JOINT: Primary | ICD-10-CM

## 2021-06-29 NOTE — TELEPHONE ENCOUNTER
From: Marilu Godoy  To: Oswaldo Bragg MD  Sent: 6/29/2021 8:42 AM EDT  Subject: Non-Urgent Medical Question    [facility] is giving me issues with my medical coverage. I haven't had a bowel movement in 6 days. I'm hurting and throwing up. Something has to be done. Is there anyway you can try to call them and get it sped up?

## 2021-06-29 NOTE — TELEPHONE ENCOUNTER
I spoke with Ms Godoy. I explained to her that I forward her message to Dr Bragg. Once I get a recommendation on next plan of care from Dr Bragg. I will give her a call back. Also [facility] is going to call [facility] to follow up on patient's referral. Patient voiced understanding.

## 2021-06-29 NOTE — TELEPHONE ENCOUNTER
----- Message from Ashetin Hakola sent at 6/29/2021 11:56 AM EDT -----  Regarding: PLEASE ADVISE  Patient called and lvm on referral line. Stated she has not had a bowel movement in a week. Dr Rivera's office cannot get her in for another month and she needs advice on what to do.  Thanks,   Kristian

## 2021-06-30 ENCOUNTER — OFFICE VISIT (OUTPATIENT)
Dept: OTOLARYNGOLOGY | Facility: CLINIC | Age: 31
End: 2021-06-30

## 2021-06-30 VITALS
HEIGHT: 63 IN | SYSTOLIC BLOOD PRESSURE: 118 MMHG | BODY MASS INDEX: 35.31 KG/M2 | WEIGHT: 199.3 LBS | DIASTOLIC BLOOD PRESSURE: 78 MMHG

## 2021-06-30 DIAGNOSIS — J32.0 CHRONIC MAXILLARY SINUSITIS: Primary | ICD-10-CM

## 2021-06-30 LAB
LAB AP CASE REPORT: NORMAL
PATH REPORT.FINAL DX SPEC: NORMAL

## 2021-06-30 PROCEDURE — 99024 POSTOP FOLLOW-UP VISIT: CPT | Performed by: OTOLARYNGOLOGY

## 2021-06-30 PROCEDURE — 31237 NSL/SINS NDSC SURG BX POLYPC: CPT | Performed by: OTOLARYNGOLOGY

## 2021-06-30 NOTE — PROGRESS NOTES
ENT Office Consult Note     Date of Consult: 2021     Patient Name: Marilu Godoy  MRN: [id number]   : 1990     Referring Provider: No ref. provider found    Care Team: Patient Care Team:  Deidre Barker MD as PCP - General (Family Medicine)  Pierce Washington DO as Consulting Physician (Cardiology)  Oswaldo Bragg MD as Consulting Physician (Gastroenterology)     Chief Complaint:    Chief Complaint   Patient presents with   • Post-op     Patient in office for post up, follow up. Patient complains of pain in right eye.       History of Present Illness: Marilu Godoy is a 30 y.o. female who presents today 5 days following her right endoscopic ethmoidectomy and maxillary antrostomy.  She has done well postoperatively with only some complaints of achiness around the right eye but no double vision or visual changes.      Subjective      Review of Systems:   Review of Systems   I have reviewed and confirmed the accuracy of the ROS as documented by the MA/LPN/RN Saulo Brito MD     Pertinent items are noted in HPI.     Past Medical History:   Past Medical History:   Diagnosis Date   • Abdominal pain    • Abnormal Pap smear of cervix    • Anxiety    • Asthma    • Bleeding disorder (CMS/HCC) 16   • Body piercing     TONGUE, NOSE, TWO IN EACH EAR   • Chest pain 2021    6-7 months ago -seen at [facility] er and released same day of complaint with resitory infection   • Clotting disorder (CMS/HCC)     factor 5   • Constipation    • Coronary artery disease involving native coronary artery of native heart without angina pectoris 2017   • Diarrhea    • Elevated cholesterol    • Endometriosis    • Factor 5 Leiden mutation, heterozygous (CMS/HCC)    • Fibroid    • Full dentures 2021    advised no adhesives DOS   • GERD (gastroesophageal reflux disease)    • H/O blood clots    • H/O cardiovascular stress test 2021    reported several years ago-exercise wnl    • Headache    • Hiatal hernia    • High cholesterol    • History of recurrent UTIs    • HPV (human papilloma virus) infection    • Hx of gout 2021   • Hypertension    • Kidney infection 2021    history only   • Migraine    • Nausea    • Nausea & vomiting    • Obesity    • Orthostatic hypotension    • Osteoarthritis    • Pollen allergies    • Protein S deficiency (CMS/HCC) 2016    labs from hospitalization for PE   • Pulmonary embolism (CMS/HCC) 2016    on eliquis since that time   • Recurrent pregnancy loss, antepartum condition or complication    • Sleep apnea     CPAP ASKED TO BRING DOS, has been unable to use due to sinus cyst for 2 months   • Subclinical hyperthyroidism    • Tachycardia    • Tattoo     LOWER BACK   • Varicella        Past Surgical History:   Past Surgical History:   Procedure Laterality Date   •  SECTION        and  and    •  SECTION WITH TUBAL N/A 1/15/2019    Procedure:  SECTION REPEAT WITH TUBAL;  Surgeon: Alva Boyer MD;  Location: [facility] LABOR DELIVERY;  Service: Obstetrics/Gynecology   • CHOLECYSTECTOMY     • COLONOSCOPY N/A 2017    Procedure: COLONOSCOPY WITH BIOPSIES AND ARGON THERMAL ABLATION;  Surgeon: Jignesh Selby MD;  Location: [facility] ENDOSCOPY;  Service:    • DIAGNOSTIC LAPAROSCOPY N/A 2018    Procedure: DIAGNOSTIC LAPAROSCOPY AND ABLATION OF ENDOMETRIOSIS;  Surgeon: Evin Zamudio MD;  Location: [facility] OR;  Service: Obstetrics/Gynecology   • ENDOMETRIAL ABLATION     • ENDOSCOPIC FUNCTIONAL SINUS SURGERY (FESS) Right 2021    Procedure: Right endoscopic total ethmoidectomy, right endoscopic middle meatal antrotomy with cyst removal, right endoscopic frontal recess exploration with cyst removal;  Surgeon: Saulo Brito MD;  Location: [facility] OR;  Service: ENT;  Laterality: Right;   • ENDOSCOPY N/A 2017    Procedure: ESOPHAGOGASTRODUODENOSCOPY WITH BIOPSIES AND COLD BIOPSY  POLYPECTOMIES;  Surgeon: Jignesh Selby MD;  Location: [facility] ENDOSCOPY;  Service:    • MOUTH SURGERY      full mouth tooth extration       Family History:   Family History   Problem Relation Age of Onset   • Arthritis Mother    • COPD Mother    • Asthma Mother    • Thyroid disease Mother    • Arthritis Father    • Diabetes Father    • Hypertension Father    • Hyperlipidemia Father    • Kidney disease Father    • Heart attack Father    • Coronary artery disease Father    • Dementia Father    • No Known Problems Son    • Colon cancer Neg Hx    • Liver cancer Neg Hx    • Liver disease Neg Hx    • Stomach cancer Neg Hx    • Esophageal cancer Neg Hx        Social History:   Social History     Socioeconomic History   • Marital status:      Spouse name: Not on file   • Number of children: Not on file   • Years of education: Not on file   • Highest education level: Not on file   Tobacco Use   • Smoking status: Never Smoker   • Smokeless tobacco: Never Used   Vaping Use   • Vaping Use: Never used   Substance and Sexual Activity   • Alcohol use: No   • Drug use: No   • Sexual activity: Yes     Partners: Male     Birth control/protection: None     Comment: PE while on birth control patch       Medications:     Current Outpatient Medications:   •  albuterol sulfate  (90 Base) MCG/ACT inhaler, Inhale 2 puffs Every 4 (Four) Hours As Needed for Wheezing or Shortness of Air., Disp: 18 g, Rfl: 11  •  apixaban (ELIQUIS) 5 MG tablet tablet, Take 1 tablet by mouth 2 (Two) Times a Day., Disp: 180 tablet, Rfl: 3  •  aspirin 81 MG EC tablet, Take 81 mg by mouth Daily., Disp: , Rfl:   •  atorvastatin (LIPITOR) 40 MG tablet, Take 1 tablet by mouth Every Night., Disp: 30 tablet, Rfl: 11  •  b complex vitamins capsule, Take 1 capsule by mouth Daily., Disp: 90 capsule, Rfl: 3  •  calcium carbonate EX (TUMS EX) 750 MG chewable tablet, Chew 1 tablet Daily. (Patient taking differently: Chew 750 mg Daily As Needed.), Disp: 30  tablet, Rfl: 1  •  Cholecalciferol (Vitamin D3) 50 MCG (2000 UT) capsule, Take 1 capsule by mouth Daily. Start after completing 50,000 IU weekly dose.  Indications: Vitamin D Deficiency (Patient taking differently: Take 2,000 Units by mouth Daily. .  Indications: Vitamin D Deficiency), Disp: 90 capsule, Rfl: 3  •  Corlanor 5 MG tablet tablet, Take 5 mg by mouth 2 (two) times a day., Disp: , Rfl:   •  cyclobenzaprine (FLEXERIL) 10 MG tablet, Take 1 tablet by mouth 2 (Two) Times a Day As Needed for Muscle Spasms., Disp: 14 tablet, Rfl: 0  •  Diclofenac Sodium (VOLTAREN) 1 % gel gel, Apply  topically to the appropriate area as directed Take As Directed., Disp: , Rfl:   •  DULoxetine (CYMBALTA) 60 MG capsule, Take 1 capsule by mouth Daily., Disp: 90 capsule, Rfl: 3  •  Erenumab-aooe (AIMOVIG) 70 MG/ML prefilled syringe, Inject 1 mL under the skin into the appropriate area as directed Every 30 (Thirty) Days., Disp: 1 mL, Rfl: 5  •  lidocaine (LIDODERM) 5 %, Place 2 patches on the skin as directed by provider Daily. Remove & Discard patch within 12 hours or as directed by MD, Disp: 60 patch, Rfl: 3  •  magnesium hydroxide (MILK OF MAGNESIA) 2400 MG/10ML suspension suspension, Take 5 mL by mouth Daily As Needed., Disp: , Rfl:   •  methylPREDNISolone (MEDROL) 4 MG dose pack, Take as directed on package instructions., Disp: 21 each, Rfl: 0  •  mometasone-formoterol (Dulera) 200-5 MCG/ACT inhaler, Inhale 2 puffs 2 (Two) Times a Day., Disp: 13 g, Rfl: 11  •  ondansetron ODT (ZOFRAN-ODT) 4 MG disintegrating tablet, Place 1 tablet on the tongue Every 6 (Six) Hours As Needed for Nausea or Vomiting., Disp: 10 tablet, Rfl: 0  •  pantoprazole (PROTONIX) 40 MG EC tablet, Take 1 tablet by mouth Daily., Disp: 30 tablet, Rfl: 0  •  topiramate (TOPAMAX) 100 MG tablet, Take 1 tablet by mouth Every Morning AND 1 tablet Every Evening., Disp: 60 tablet, Rfl: 1  •  acetaminophen-codeine (TYLENOL with CODEINE #3) 300-30 MG per tablet, Take 1-2  "tablets by mouth every 4-6 hours as needed for pain, Disp: 10 tablet, Rfl: 1  •  fluticasone (Flonase) 50 MCG/ACT nasal spray, 1 spray into the nostril(s) as directed by provider Daily for 30 days. Administer 2 sprays in each nostril for each dose. (Patient taking differently: 2 sprays into the nostril(s) as directed by provider Daily. Administer 2 sprays in each nostril for each dose.), Disp: 16 g, Rfl: 2  •  ondansetron ODT (ZOFRAN-ODT) 4 MG disintegrating tablet, Place 1 tablet on the tongue Every 6 (Six) Hours As Needed for Nausea., Disp: 20 tablet, Rfl: 0  •  sulfamethoxazole-trimethoprim (BACTRIM DS,SEPTRA DS) 800-160 MG per tablet, Take 1 tablet by mouth 2 (Two) Times a Day for 5 days., Disp: 10 tablet, Rfl: 0    Allergies:   Allergies   Allergen Reactions   • Ceftin [Cefuroxime Axetil] Shortness Of Breath and Rash     Numbness in mouth and throat   • Imitrex [Sumatriptan] Shortness Of Breath and Nausea Only   • Amoxicillin Rash       Objective     Physical Exam:  Vital Signs:   Vitals:    06/30/21 0812   BP: 118/78   Weight: 90.4 kg (199 lb 4.8 oz)   Height: 160 cm (63\")     Body mass index is 35.3 kg/m².     General Appearance:  Alert, cooperative, in no acute distress   Head:  Normocephalic, without obvious abnormality, atraumatic   Eyes:          Conjunctivae and sclerae normal, PERRLA   Ears:   Normal   Nose:  The right nares was anesthetized with phenylephrine and lidocaine and also topical adrenaline on cotton and then I endoscopically debrided a fair amount of the nova pack absorbable packing in the right ethmoid frontal recess and maxillary sinus on the right.  Healing is as expected   Throat:  Normal mucosa   Fiberoptic Exam:  The right nares was topically anesthetized with phenylephrine and lidocaine and then a 0 degree telescope was used to endoscopically debrided the right frontal recess ethmoid and maxillary sinus of a fair amount of old blood and mucus and absorbable nova pack   Neck:  Without " adenopathy   Lungs:   Clear to auscultation,respirations regular, jeremy and unlabored      Heart:  Regular rhythm and normal rate, normal S1 and S2, no       murmur, no gallop, no rub, no click   Pulses: Pulses palpable and equal bilaterally   Skin: No bleeding, bruising or rash   Lymph nodes: No palpable adenopathy   Neurologic: Cranial nerves 2 - 12 grossly intact        Results Review:   Labs:     Imaging: CT Abdomen Pelvis Without Contrast    Result Date: 5/20/2021  Small fat-containing periumbilical hernia.     947.03 mGy.cm. 19.36 mGy  This study was performed with techniques to keep radiation doses as low as reasonably achievable (ALARA). Individualized dose reduction techniques using automated exposure control or adjustment of mA and/or kV according to the patient size were employed.  This report was finalized on 5/20/2021 4:24 PM by Jorge Alberto Sargent M.D..    XR Temporomandibular Joints Bilateral    Result Date: 6/28/2021  Left greater than right mild TMJ arthrosis changes noted with some narrowing and sclerotic change. No acute fracture or dislocation.  This report was finalized on 6/28/2021 12:25 PM by Evin Galaviz.      XR Foot 3+ View Right    Result Date: 5/22/2021  No acute bony abnormality.    This report was finalized on 5/22/2021 8:27 AM by Antonietta Ochoa MD.    MRI Brain With & Without Contrast    Result Date: 3/15/2021  Unremarkable MRI of the brain.  Retention cyst in the right maxillary sinus.      Images were reviewed, interpreted, and dictated by Dr. Jorge Alberto Sargent M.D. Transcribed by Addie Burgess PA-C.  This report was finalized on 3/15/2021 3:26 PM by Jorge Alberto Sargent M.D..    NM Gastric Emptying    Result Date: 6/15/2021  Delayed gastric emptying consistent with gastroparesis.  D:  06/15/2021 E:  06/15/2021     This report was finalized on 6/15/2021 5:13 PM by Dr. Umesh Montgomery.      CT Abdomen Pelvis With Contrast    Result Date: 6/27/2021  No acute findings in the abdomen or  pelvis to account for patient's symptoms. Authenticated by Justice Vicente MD on 06/27/2021 05:36:18 PM    CT Abdomen Pelvis With Contrast    Result Date: 3/14/2021  No acute disease. Authenticated by Osmany Ventura M.D. on 03/14/2021 09:33:53 PM    XR Chest 1 View    Result Date: 6/22/2021  No acute cardiopulmonary process.    Images were reviewed, interpreted, and dictated by Dr. Jorge Alberto Sargent M.D. Transcribed by Addie Burgess PA-C.  This report was finalized on 6/22/2021 12:03 PM by Jorge Alberto Sargent M.D..    CT Sinus Without Contrast    Result Date: 5/17/2021  No evidence of acute sinusitis.  Retention cyst versus polyp in the right maxillary sinus.     This study was performed with techniques to keep radiation doses as low as reasonably achievable (ALARA). Individualized dose reduction techniques using automated exposure control or adjustment of mA and/or kV according to the patient size were employed.       Images were reviewed, interpreted, and dictated by Dr. Jorge Alberto Sargent M.D. Transcribed by Addie Burgess PA-C.  This report was finalized on 5/17/2021 1:48 PM by Jorge Alberto Sargent M.D..    XR Chest PA & Lateral    Result Date: 4/2/2021  No acute cardiopulmonary process.  D:  04/02/2021 E:  04/02/2021  This report was finalized on 4/2/2021 5:23 PM by Dr. Umesh Montgomery.      EMG & Nerve Conduction Test    Result Date: 5/25/2021  Normal NCS/EMG of both arms and neck This report is transcribed using the Dragon dictation system.         Assessment / Plan      Assessment/Plan:   Diagnoses and all orders for this visit:    1. Chronic maxillary sinusitis (Primary)         Marilu has done well now 5 days following her right endoscopic ethmoidectomy frontal recess resection and maxillary antrostomy.  Her final path report showed no evidence of malignancy and only chronic inflammation.  I endoscopically debrided the right frontal recess ethmoid and maxillary sinus today of a fair amount of old blood and  mucus and some of the absorbable nova pack.  I will encourage Marilu to use saline spray twice daily and will also start her on either fluticasone or Nasacort spray twice daily to promote healing.  I will see her again in approximately 3 weeks.      Follow Up:   Return in about 3 weeks (around 7/21/2021).    Time:    Discussed plan of care in detail with patient today. Patient verbally understands and agrees. I have spent and counseled for approximately 30 minutes face to face, with greater than 50 % of the time counseling.     Saulo Brito MD

## 2021-07-04 PROCEDURE — 84110 ASSAY OF PORPHOBILINOGEN: CPT | Performed by: INTERNAL MEDICINE

## 2021-07-04 PROCEDURE — 82570 ASSAY OF URINE CREATININE: CPT | Performed by: INTERNAL MEDICINE

## 2021-07-04 PROCEDURE — 84120 ASSAY OF URINE PORPHYRINS: CPT | Performed by: INTERNAL MEDICINE

## 2021-07-05 ENCOUNTER — PATIENT MESSAGE (OUTPATIENT)
Dept: NEUROLOGY | Facility: CLINIC | Age: 31
End: 2021-07-05

## 2021-07-07 ENCOUNTER — OFFICE VISIT (OUTPATIENT)
Dept: INTERNAL MEDICINE | Facility: CLINIC | Age: 31
End: 2021-07-07

## 2021-07-07 VITALS
HEIGHT: 63 IN | HEART RATE: 126 BPM | WEIGHT: 200 LBS | BODY MASS INDEX: 35.44 KG/M2 | DIASTOLIC BLOOD PRESSURE: 89 MMHG | RESPIRATION RATE: 15 BRPM | OXYGEN SATURATION: 100 % | TEMPERATURE: 98.2 F | SYSTOLIC BLOOD PRESSURE: 144 MMHG

## 2021-07-07 DIAGNOSIS — R00.0 INAPPROPRIATE SINUS TACHYCARDIA: Primary | ICD-10-CM

## 2021-07-07 DIAGNOSIS — R53.83 FATIGUE, UNSPECIFIED TYPE: ICD-10-CM

## 2021-07-07 DIAGNOSIS — G47.33 OBSTRUCTIVE SLEEP APNEA: ICD-10-CM

## 2021-07-07 PROCEDURE — 99214 OFFICE O/P EST MOD 30 MIN: CPT | Performed by: NURSE PRACTITIONER

## 2021-07-07 PROCEDURE — 93000 ELECTROCARDIOGRAM COMPLETE: CPT | Performed by: NURSE PRACTITIONER

## 2021-07-07 NOTE — PROGRESS NOTES
Office Visit      Patient Name: Marilu Godoy  : 1990   MRN: [id number]   Care Team: Patient Care Team:  Deidre Barker MD as PCP - General (Family Medicine)  Pierce Washington DO as Consulting Physician (Cardiology)  Oswaldo Bragg MD as Consulting Physician (Gastroenterology)    Chief Complaint  Fatigue (I stay at home with kids. I dont work or exercise-I cant with kids)    Subjective     Subjective      Marilu Godoy presents to [facility] for fatigue. Has had this problem off and on but has worsened in the last 2 months. Several chronic health problems including inappropriate sinus tachycardia, factor 5 leiden, protein s deficiency, obesity, and gastroparesis. Is followed by Dr. Vazquez with EP, has been on corlanor BID for tachycardia and had been controlled. Last two office visits heart rate has been elevated and she notes an increased heart rate at home as well (highest being 140). Denies chest pain, shortness of breath, syncope, and dizziness. She has had no recent weight gain. Last lab work performed shows no anemia, kidney dysfunction, or liver dysfunction. Has been taking vitamin D and vitamin B complex. Has been vitamin D deficient in the past. She does have a history of sleep apnea and uses a CPAP, she has recently had sinus surgery and has not been able to wear her CPAP for these reasons. No recent URI symptoms or sore throat. No new changes in her medications.       ECG 12 Lead    Date/Time: 2021 2:33 PM  Performed by: Jacquie Coffman APRN  Authorized by: Jacquie Coffman APRN   Comparison: compared with previous ECG   Similar to previous ECG  Rhythm: sinus tachycardia  Rate: tachycardic  Conduction: conduction normal  ST Segments: ST segments normal  T Waves: T waves normal  QRS axis: normal  Other: no other findings  Other findings: low voltage    Clinical impression: normal ECG          Review of Systems   Constitutional:  "Positive for fatigue. Negative for appetite change and fever.   HENT: Negative for congestion, sore throat and trouble swallowing.    Eyes: Negative for blurred vision and visual disturbance.   Respiratory: Negative for cough, shortness of breath and wheezing.    Cardiovascular: Positive for palpitations. Negative for chest pain and leg swelling.   Gastrointestinal: Negative for abdominal pain, blood in stool, constipation, diarrhea and nausea.   Endocrine: Negative for polydipsia, polyphagia and polyuria.   Genitourinary: Negative for dysuria.   Musculoskeletal: Negative for arthralgias, back pain and myalgias.   Skin: Negative for rash.   Neurological: Negative for dizziness, weakness, light-headedness and headache.   Psychiatric/Behavioral: Negative for sleep disturbance and depressed mood. The patient is not nervous/anxious.        Objective     Objective   Vital Signs:   /89   Pulse (!) 126   Temp 98.2 °F (36.8 °C)   Resp 15   Ht 160 cm (63\")   Wt 90.7 kg (200 lb)   SpO2 100%   BMI 35.43 kg/m²     Physical Exam  Vitals and nursing note reviewed.   Constitutional:       General: She is not in acute distress.     Appearance: Normal appearance. She is obese. She is not toxic-appearing.   HENT:      Mouth/Throat:      Comments: malampati 3   Eyes:      Pupils: Pupils are equal, round, and reactive to light.   Neck:      Vascular: No carotid bruit.   Cardiovascular:      Rate and Rhythm: Regular rhythm. Tachycardia present.      Heart sounds: Normal heart sounds. No murmur heard.     Pulmonary:      Effort: Pulmonary effort is normal. No respiratory distress.      Breath sounds: Normal breath sounds. No wheezing.   Abdominal:      General: Bowel sounds are normal. There is no distension.      Palpations: Abdomen is soft.      Tenderness: There is no abdominal tenderness.   Musculoskeletal:      Cervical back: Neck supple. No tenderness.   Skin:     General: Skin is warm and dry.      Findings: No rash. " "  Neurological:      General: No focal deficit present.      Mental Status: She is alert.   Psychiatric:         Mood and Affect: Mood normal. Affect is tearful.         Behavior: Behavior normal.       Assessment / Plan      Assessment/Plan   Problem List Items Addressed This Visit        Cardiac and Vasculature    Inappropriate sinus tachycardia - Primary    Relevant Orders    ECG 12 Lead    TSH Rfx On Abnormal To Free T4    Vitamin D 25 hydroxy    Vitamin B12    Folate    Iron and TIBC    Ferritin    EBV Antibody Profile    Suspect increased heart rate is major contributor to her fatigue. EKG shows sinus tachycardia. She reports heart rate as high as 140 at home. She does continue to follow with EP and I have advised her to reach out to them for an earlier appointment. They have discussed need for ablation in the past. Continue corlanor at current dose for now, she has not tolerated beta blockade therapy in the past. If no answers from EP will perform holter monitor study to further evaluate.        Sleep    Obstructive sleep apnea    She has been previously diagnosed with \"mild\" sleep apnea with CPAP use. Has been unable to use her CPAP due to recent sinus surgery. Discussed with her that this could also be contributing to her fatigue. Once cleared from ENT will reinitiate CPAP therapy. Sleep hygiene discussed and encouraged.       Other Visit Diagnoses     Fatigue, unspecified type        Relevant Orders    TSH Rfx On Abnormal To Free T4    Vitamin D 25 hydroxy    Vitamin B12    Folate    Iron and TIBC    Ferritin    EBV Antibody Profile    Previous lab work has been unremarkable. Will perform labs as above to rule out underlying causes.            Follow Up   Return if symptoms worsen or fail to improve.  Patient was given instructions and counseling regarding her condition or for health maintenance advice. Please see specific information pulled into the AVS if appropriate.     ARNIE Lucas " [facility]

## 2021-07-08 LAB
25(OH)D3+25(OH)D2 SERPL-MCNC: 20.1 NG/ML (ref 30–100)
EBV NA IGG SER IA-ACNC: 160 U/ML (ref 0–17.9)
EBV VCA IGG SER IA-ACNC: 304 U/ML (ref 0–17.9)
EBV VCA IGM SER IA-ACNC: <36 U/ML (ref 0–35.9)
FERRITIN SERPL-MCNC: 63.5 NG/ML (ref 13–150)
FOLATE SERPL-MCNC: 4.51 NG/ML (ref 4.78–24.2)
IRON SATN MFR SERPL: 15 % (ref 20–50)
IRON SERPL-MCNC: 66 MCG/DL (ref 37–145)
SERVICE CMNT-IMP: ABNORMAL
TIBC SERPL-MCNC: 439 MCG/DL
TSH SERPL DL<=0.005 MIU/L-ACNC: 0.8 UIU/ML (ref 0.27–4.2)
UIBC SERPL-MCNC: 373 MCG/DL (ref 112–346)
VIT B12 SERPL-MCNC: 457 PG/ML (ref 211–946)

## 2021-07-09 DIAGNOSIS — R53.83 FATIGUE, UNSPECIFIED TYPE: Primary | ICD-10-CM

## 2021-07-09 DIAGNOSIS — R00.0 INAPPROPRIATE SINUS TACHYCARDIA: ICD-10-CM

## 2021-07-12 ENCOUNTER — APPOINTMENT (OUTPATIENT)
Dept: CT IMAGING | Facility: HOSPITAL | Age: 31
End: 2021-07-12

## 2021-07-12 ENCOUNTER — APPOINTMENT (OUTPATIENT)
Dept: GENERAL RADIOLOGY | Facility: HOSPITAL | Age: 31
End: 2021-07-12

## 2021-07-12 ENCOUNTER — HOSPITAL ENCOUNTER (EMERGENCY)
Facility: HOSPITAL | Age: 31
Discharge: HOME | End: 2021-07-12
Attending: EMERGENCY MEDICINE | Admitting: EMERGENCY MEDICINE

## 2021-07-12 VITALS
TEMPERATURE: 98.8 F | HEART RATE: 90 BPM | BODY MASS INDEX: 36.79 KG/M2 | WEIGHT: 207.6 LBS | HEIGHT: 63 IN | RESPIRATION RATE: 16 BRPM | DIASTOLIC BLOOD PRESSURE: 78 MMHG | SYSTOLIC BLOOD PRESSURE: 131 MMHG | OXYGEN SATURATION: 100 %

## 2021-07-12 DIAGNOSIS — Z79.01 CURRENT USE OF LONG TERM ANTICOAGULATION: ICD-10-CM

## 2021-07-12 DIAGNOSIS — Z87.898 HISTORY OF TACHYCARDIA: ICD-10-CM

## 2021-07-12 DIAGNOSIS — R53.83 OTHER FATIGUE: ICD-10-CM

## 2021-07-12 DIAGNOSIS — Z86.711 HISTORY OF PULMONARY EMBOLISM: ICD-10-CM

## 2021-07-12 DIAGNOSIS — R07.89 ATYPICAL CHEST PAIN: Primary | ICD-10-CM

## 2021-07-12 LAB
ALBUMIN SERPL-MCNC: 4.3 G/DL (ref 3.5–5.2)
ALBUMIN/GLOB SERPL: 1.4 G/DL
ALP SERPL-CCNC: 93 U/L (ref 39–117)
ALT SERPL W P-5'-P-CCNC: 15 U/L (ref 1–33)
ANION GAP SERPL CALCULATED.3IONS-SCNC: 11.9 MMOL/L (ref 5–15)
AST SERPL-CCNC: 14 U/L (ref 1–32)
BASOPHILS # BLD AUTO: 0.03 10*3/MM3 (ref 0–0.2)
BASOPHILS NFR BLD AUTO: 0.5 % (ref 0–1.5)
BILIRUB SERPL-MCNC: 0.5 MG/DL (ref 0–1.2)
BUN SERPL-MCNC: 7 MG/DL (ref 6–20)
BUN/CREAT SERPL: 10.4 (ref 7–25)
CALCIUM SPEC-SCNC: 9.1 MG/DL (ref 8.6–10.5)
CHLORIDE SERPL-SCNC: 104 MMOL/L (ref 98–107)
CO2 SERPL-SCNC: 22.1 MMOL/L (ref 22–29)
CREAT SERPL-MCNC: 0.67 MG/DL (ref 0.57–1)
D DIMER PPP FEU-MCNC: 0.75 MCGFEU/ML (ref 0–0.57)
DEPRECATED RDW RBC AUTO: 39.8 FL (ref 37–54)
EOSINOPHIL # BLD AUTO: 0.08 10*3/MM3 (ref 0–0.4)
EOSINOPHIL NFR BLD AUTO: 1.3 % (ref 0.3–6.2)
ERYTHROCYTE [DISTWIDTH] IN BLOOD BY AUTOMATED COUNT: 12.2 % (ref 12.3–15.4)
GFR SERPL CREATININE-BSD FRML MDRD: 103 ML/MIN/1.73
GLOBULIN UR ELPH-MCNC: 3.1 GM/DL
GLUCOSE SERPL-MCNC: 104 MG/DL (ref 65–99)
HCT VFR BLD AUTO: 39.5 % (ref 34–46.6)
HGB BLD-MCNC: 13.8 G/DL (ref 12–15.9)
HOLD SPECIMEN: NORMAL
HOLD SPECIMEN: NORMAL
IMM GRANULOCYTES # BLD AUTO: 0.01 10*3/MM3 (ref 0–0.05)
IMM GRANULOCYTES NFR BLD AUTO: 0.2 % (ref 0–0.5)
LYMPHOCYTES # BLD AUTO: 1.71 10*3/MM3 (ref 0.7–3.1)
LYMPHOCYTES NFR BLD AUTO: 27.5 % (ref 19.6–45.3)
MCH RBC QN AUTO: 31.2 PG (ref 26.6–33)
MCHC RBC AUTO-ENTMCNC: 34.9 G/DL (ref 31.5–35.7)
MCV RBC AUTO: 89.2 FL (ref 79–97)
MONOCYTES # BLD AUTO: 0.27 10*3/MM3 (ref 0.1–0.9)
MONOCYTES NFR BLD AUTO: 4.3 % (ref 5–12)
NEUTROPHILS NFR BLD AUTO: 4.12 10*3/MM3 (ref 1.7–7)
NEUTROPHILS NFR BLD AUTO: 66.2 % (ref 42.7–76)
NRBC BLD AUTO-RTO: 0 /100 WBC (ref 0–0.2)
PLATELET # BLD AUTO: 274 10*3/MM3 (ref 140–450)
PMV BLD AUTO: 9.9 FL (ref 6–12)
POTASSIUM SERPL-SCNC: 4 MMOL/L (ref 3.5–5.2)
PROT SERPL-MCNC: 7.4 G/DL (ref 6–8.5)
RBC # BLD AUTO: 4.43 10*6/MM3 (ref 3.77–5.28)
SODIUM SERPL-SCNC: 138 MMOL/L (ref 136–145)
TROPONIN T SERPL-MCNC: <0.01 NG/ML (ref 0–0.03)
WBC # BLD AUTO: 6.22 10*3/MM3 (ref 3.4–10.8)
WHOLE BLOOD HOLD SPECIMEN: NORMAL

## 2021-07-12 PROCEDURE — 93005 ELECTROCARDIOGRAM TRACING: CPT

## 2021-07-12 PROCEDURE — 85379 FIBRIN DEGRADATION QUANT: CPT | Performed by: NURSE PRACTITIONER

## 2021-07-12 PROCEDURE — 93005 ELECTROCARDIOGRAM TRACING: CPT | Performed by: EMERGENCY MEDICINE

## 2021-07-12 PROCEDURE — 71045 X-RAY EXAM CHEST 1 VIEW: CPT

## 2021-07-12 PROCEDURE — 85025 COMPLETE CBC W/AUTO DIFF WBC: CPT

## 2021-07-12 PROCEDURE — 84484 ASSAY OF TROPONIN QUANT: CPT | Performed by: EMERGENCY MEDICINE

## 2021-07-12 PROCEDURE — 99283 EMERGENCY DEPT VISIT LOW MDM: CPT

## 2021-07-12 PROCEDURE — 25010000002 IOPAMIDOL 61 % SOLUTION: Performed by: EMERGENCY MEDICINE

## 2021-07-12 PROCEDURE — 71275 CT ANGIOGRAPHY CHEST: CPT

## 2021-07-12 PROCEDURE — 80053 COMPREHEN METABOLIC PANEL: CPT | Performed by: EMERGENCY MEDICINE

## 2021-07-12 RX ORDER — SODIUM CHLORIDE 0.9 % (FLUSH) 0.9 %
10 SYRINGE (ML) INJECTION AS NEEDED
Status: DISCONTINUED | OUTPATIENT
Start: 2021-07-12 | End: 2021-07-12 | Stop reason: HOSPADM

## 2021-07-12 RX ADMIN — IOPAMIDOL 100 ML: 612 INJECTION, SOLUTION INTRAVENOUS at 18:34

## 2021-07-12 NOTE — DISCHARGE INSTRUCTIONS
Home to rest.  Maintain your very best hydration and nutrition.  Continue your current medication regimen.  Follow-up with Deidre Barker to monitor your recovery.  Return to the emergency department as needed for worsening symptoms or concerns.  Thank you

## 2021-07-12 NOTE — ED PROVIDER NOTES
Subjective   Patient presents to the emergency department with complaint of left-sided chest pain radiating to her left arm intermittently for 3 days.  She has experienced some intermittent shortness of breath.  She denies fever cough or URI symptoms.  Patient goes on to state that she has been reaching out to her primary and secondary providers regarding some elevated heart rate without explanation.  She has been placed on a cardiac monitor recently.  She has been taking her medications as usual.  She denies any syncope.    Patient has a past medical history of pulmonary embolus many years ago with factor V Leiden and she currently takes her Eliquis as prescribed.      History provided by:  Patient   used: No    Chest Pain  Pain location:  L chest  Pain quality: aching    Radiates to: left arm.  Pain severity:  Mild  Onset quality:  Gradual  Duration:  3 days  Timing:  Intermittent  Progression:  Waxing and waning  Chronicity:  New  Context: breathing    Relieved by:  Nothing  Worsened by:  Nothing  Ineffective treatments:  None tried  Associated symptoms: anxiety, back pain, dizziness, palpitations and shortness of breath    Associated symptoms: no abdominal pain, no cough, no diaphoresis, no fever, no headache, no lower extremity edema, no nausea, no vomiting and no weakness    Risk factors: coronary artery disease, high cholesterol, hypertension, obesity and prior DVT/PE    Risk factors: no diabetes mellitus        Review of Systems   Constitutional: Negative.  Negative for diaphoresis and fever.   HENT: Negative for sore throat.    Eyes: Negative.  Negative for pain and visual disturbance.   Respiratory: Positive for shortness of breath. Negative for cough, wheezing and stridor.    Cardiovascular: Positive for chest pain and palpitations. Negative for leg swelling.   Gastrointestinal: Negative.  Negative for abdominal pain, diarrhea, nausea and vomiting.   Endocrine: Negative.     Genitourinary: Negative.  Negative for dysuria.   Musculoskeletal: Positive for back pain. Negative for neck pain.   Skin: Negative.  Negative for pallor and rash.   Allergic/Immunologic: Negative.    Neurological: Positive for dizziness. Negative for syncope, weakness and headaches.   Hematological: Negative.    Psychiatric/Behavioral: Negative.  Negative for agitation.   All other systems reviewed and are negative.      Past Medical History:   Diagnosis Date   • Abdominal pain    • Abnormal Pap smear of cervix    • Anxiety    • Asthma 2015   • Bleeding disorder (CMS/[facility]) 11-13-16   • Body piercing     TONGUE, NOSE, TWO IN EACH EAR   • Chest pain 06/22/2021    6-7 months ago -seen at [facility] er and released same day of complaint with resitory infection   • Clotting disorder (CMS/[facility])     factor 5   • Constipation    • Coronary artery disease involving native coronary artery of native heart without angina pectoris 6/16/2017   • Diarrhea    • Elevated cholesterol    • Endometriosis    • Factor 5 Leiden mutation, heterozygous (CMS/[facility])    • Fibroid    • Full dentures 06/22/2021    advised no adhesives DOS   • GERD (gastroesophageal reflux disease)    • H/O blood clots    • H/O cardiovascular stress test 06/22/2021    reported several years ago-exercise wnl   • Headache    • Hiatal hernia    • High cholesterol    • History of recurrent UTIs    • HPV (human papilloma virus) infection    • Hx of gout 06/22/2021   • Hypertension    • Kidney infection 06/22/2021    history only   • Migraine    • Nausea    • Nausea & vomiting    • Obesity    • Orthostatic hypotension    • Osteoarthritis    • Pollen allergies    • Protein S deficiency (CMS/[facility]) 11/14/2016    labs from hospitalization for PE   • Pulmonary embolism (CMS/[facility]) 11/20/2016    on eliquis since that time   • Recurrent pregnancy loss, antepartum condition or complication    • Sleep apnea     CPAP ASKED TO BRING DOS, has been unable to use due to sinus cyst for 2 months    • Subclinical hyperthyroidism    • Tachycardia    • Tattoo     LOWER BACK   • Varicella        Allergies   Allergen Reactions   • Ceftin [Cefuroxime Axetil] Shortness Of Breath and Rash     Numbness in mouth and throat   • Imitrex [Sumatriptan] Shortness Of Breath and Nausea Only   • Amoxicillin Rash       Past Surgical History:   Procedure Laterality Date   •  SECTION        and  and    •  SECTION WITH TUBAL N/A 1/15/2019    Procedure:  SECTION REPEAT WITH TUBAL;  Surgeon: Alva Boyer MD;  Location: [facility] LABOR DELIVERY;  Service: Obstetrics/Gynecology   • CHOLECYSTECTOMY     • COLONOSCOPY N/A 2017    Procedure: COLONOSCOPY WITH BIOPSIES AND ARGON THERMAL ABLATION;  Surgeon: Jignesh Selby MD;  Location: [facility] ENDOSCOPY;  Service:    • DIAGNOSTIC LAPAROSCOPY N/A 2018    Procedure: DIAGNOSTIC LAPAROSCOPY AND ABLATION OF ENDOMETRIOSIS;  Surgeon: Evin Zamudio MD;  Location: [facility] OR;  Service: Obstetrics/Gynecology   • ENDOMETRIAL ABLATION     • ENDOSCOPIC FUNCTIONAL SINUS SURGERY (FESS) Right 2021    Procedure: Right endoscopic total ethmoidectomy, right endoscopic middle meatal antrotomy with cyst removal, right endoscopic frontal recess exploration with cyst removal;  Surgeon: Saulo Brito MD;  Location: [facility] OR;  Service: ENT;  Laterality: Right;   • ENDOSCOPY N/A 2017    Procedure: ESOPHAGOGASTRODUODENOSCOPY WITH BIOPSIES AND COLD BIOPSY POLYPECTOMIES;  Surgeon: Jignesh Selby MD;  Location: [facility] ENDOSCOPY;  Service:    • MOUTH SURGERY      full mouth tooth extration       Family History   Problem Relation Age of Onset   • Arthritis Mother    • COPD Mother    • Asthma Mother    • Thyroid disease Mother    • Arthritis Father    • Diabetes Father    • Hypertension Father    • Hyperlipidemia Father    • Kidney disease Father    • Heart attack Father    • Coronary artery disease Father    • Dementia Father    • No Known Problems  Son    • Colon cancer Neg Hx    • Liver cancer Neg Hx    • Liver disease Neg Hx    • Stomach cancer Neg Hx    • Esophageal cancer Neg Hx        Social History     Socioeconomic History   • Marital status:      Spouse name: Not on file   • Number of children: Not on file   • Years of education: Not on file   • Highest education level: Not on file   Tobacco Use   • Smoking status: Never Smoker   • Smokeless tobacco: Never Used   Vaping Use   • Vaping Use: Never used   Substance and Sexual Activity   • Alcohol use: No   • Drug use: No   • Sexual activity: Yes     Partners: Male     Birth control/protection: None     Comment: PE while on birth control patch           Objective   Physical Exam  Vitals and nursing note reviewed.   Constitutional:       General: She is not in acute distress.     Appearance: Normal appearance. She is well-developed. She is obese. She is not ill-appearing or toxic-appearing.      Comments: Patient is an excellent historian.  Her vital signs are normal.   HENT:      Head: Normocephalic.      Right Ear: External ear normal.      Left Ear: External ear normal.      Nose: Nose normal.      Mouth/Throat:      Mouth: Mucous membranes are moist.      Pharynx: No oropharyngeal exudate.   Eyes:      Conjunctiva/sclera: Conjunctivae normal.   Cardiovascular:      Rate and Rhythm: Normal rate and regular rhythm.      Heart sounds: No murmur heard.     Pulmonary:      Effort: Pulmonary effort is normal. No respiratory distress.      Breath sounds: Normal breath sounds.   Abdominal:      General: Bowel sounds are normal. There is no distension.      Palpations: Abdomen is soft.      Tenderness: There is no abdominal tenderness.   Musculoskeletal:         General: No swelling, tenderness or deformity. Normal range of motion.      Cervical back: Normal range of motion and neck supple. No muscular tenderness.   Skin:     General: Skin is warm and dry.      Capillary Refill: Capillary refill takes  less than 2 seconds.      Coloration: Skin is not pale.      Findings: No lesion.   Neurological:      General: No focal deficit present.      Mental Status: She is alert and oriented to person, place, and time.      Comments: No Neurosensory deficit or focal weakness     Psychiatric:         Behavior: Behavior normal.         Thought Content: Thought content normal.         Procedures           ED Course  ED Course as of Jul 12 1945   Mon Jul 12, 2021   1458 EKG interpreted by me: Sinus rhythm, normal rate, no acute ST/T changes, low-voltage QRS complexes, this is an atypical EKG    [MP]   1626 Ddimer is elevated and patient consents to CTA chest.      [MS]   1943 Patient's imaging is negative for acute findings.  We discussed parameters for concern that would warrant return to the emergency department.  In the meantime, patient has had normal vital signs throughout her ED course.  We conjectured together regarding differential diagnosis which might be responsible for her constellation of symptoms in the last few weeks.  She agrees to follow-up with her primary and secondary providers to pursue explanations for her fatigue.     [MS]      ED Course User Index  [MP] Francois Kline MD  [MS] Jennifer Mcgill, ARNIE            Recent Results (from the past 24 hour(s))   Comprehensive Metabolic Panel    Collection Time: 07/12/21  2:54 PM    Specimen: Blood   Result Value Ref Range    Glucose 104 (H) 65 - 99 mg/dL    BUN 7 6 - 20 mg/dL    Creatinine 0.67 0.57 - 1.00 mg/dL    Sodium 138 136 - 145 mmol/L    Potassium 4.0 3.5 - 5.2 mmol/L    Chloride 104 98 - 107 mmol/L    CO2 22.1 22.0 - 29.0 mmol/L    Calcium 9.1 8.6 - 10.5 mg/dL    Total Protein 7.4 6.0 - 8.5 g/dL    Albumin 4.30 3.50 - 5.20 g/dL    ALT (SGPT) 15 1 - 33 U/L    AST (SGOT) 14 1 - 32 U/L    Alkaline Phosphatase 93 39 - 117 U/L    Total Bilirubin 0.5 0.0 - 1.2 mg/dL    eGFR Non African Amer 103 >60 mL/min/1.73    Globulin 3.1 gm/dL    A/G Ratio 1.4  g/dL    BUN/Creatinine Ratio 10.4 7.0 - 25.0    Anion Gap 11.9 5.0 - 15.0 mmol/L   Troponin    Collection Time: 07/12/21  2:54 PM    Specimen: Blood   Result Value Ref Range    Troponin T <0.010 0.000 - 0.030 ng/mL   Green Top (Gel)    Collection Time: 07/12/21  2:54 PM   Result Value Ref Range    Extra Tube Hold for add-ons.    Lavender Top    Collection Time: 07/12/21  2:54 PM   Result Value Ref Range    Extra Tube hold for add-on    Gold Top - SST    Collection Time: 07/12/21  2:54 PM   Result Value Ref Range    Extra Tube Hold for add-ons.    CBC Auto Differential    Collection Time: 07/12/21  2:54 PM    Specimen: Blood   Result Value Ref Range    WBC 6.22 3.40 - 10.80 10*3/mm3    RBC 4.43 3.77 - 5.28 10*6/mm3    Hemoglobin 13.8 12.0 - 15.9 g/dL    Hematocrit 39.5 34.0 - 46.6 %    MCV 89.2 79.0 - 97.0 fL    MCH 31.2 26.6 - 33.0 pg    MCHC 34.9 31.5 - 35.7 g/dL    RDW 12.2 (L) 12.3 - 15.4 %    RDW-SD 39.8 37.0 - 54.0 fl    MPV 9.9 6.0 - 12.0 fL    Platelets 274 140 - 450 10*3/mm3    Neutrophil % 66.2 42.7 - 76.0 %    Lymphocyte % 27.5 19.6 - 45.3 %    Monocyte % 4.3 (L) 5.0 - 12.0 %    Eosinophil % 1.3 0.3 - 6.2 %    Basophil % 0.5 0.0 - 1.5 %    Immature Grans % 0.2 0.0 - 0.5 %    Neutrophils, Absolute 4.12 1.70 - 7.00 10*3/mm3    Lymphocytes, Absolute 1.71 0.70 - 3.10 10*3/mm3    Monocytes, Absolute 0.27 0.10 - 0.90 10*3/mm3    Eosinophils, Absolute 0.08 0.00 - 0.40 10*3/mm3    Basophils, Absolute 0.03 0.00 - 0.20 10*3/mm3    Immature Grans, Absolute 0.01 0.00 - 0.05 10*3/mm3    nRBC 0.0 0.0 - 0.2 /100 WBC   D-dimer, Quantitative    Collection Time: 07/12/21  3:47 PM    Specimen: Blood   Result Value Ref Range    D-Dimer, Quantitative 0.75 (C) 0.00 - 0.57 MCGFEU/mL     Note: In addition to lab results from this visit, the labs listed above may include labs taken at another facility or during a different encounter within the last 24 hours. Please correlate lab times with ED admission and discharge times for  "further clarification of the services performed during this visit.    CT Chest Pulmonary Embolism   Final Result   No pulmonary embolus.  No acute findings in the chest.      Authenticated by Justice Vicente MD on 07/12/2021 07:01:05 PM      XR Chest 1 View   Final Result   No acute cardiopulmonary process.       Continued followup is recommended.       This report was finalized on 7/12/2021 3:08 PM by Jorge Alberto Sargent M.D..        Vitals:    07/12/21 1444   BP: 113/75   BP Location: Right arm   Patient Position: Sitting   Pulse: 95   Resp: 16   Temp: 98.8 °F (37.1 °C)   TempSrc: Oral   SpO2: 99%   Weight: 94.2 kg (207 lb 9.6 oz)   Height: 160 cm (63\")     Medications   sodium chloride 0.9 % flush 10 mL (has no administration in time range)   iopamidol (ISOVUE-300) 61 % injection 100 mL (100 mL Intravenous Given 7/12/21 1834)     ECG/EMG Results (last 24 hours)     ** No results found for the last 24 hours. **        ECG 12 Lead    (Results Pending)                                         MDM    Final diagnoses:   Atypical chest pain   History of pulmonary embolism   Current use of long term anticoagulation   Other fatigue   History of tachycardia       ED Disposition  ED Disposition     ED Disposition Condition Comment    Discharge Stable           Deidre Barker MD  [address] [place] [id number] [phone]    Schedule an appointment as soon as possible for a visit in 2 days  If symptoms worsen         Medication List      Changed    calcium carbonate  MG chewable tablet  Commonly known as: TUMS EX  Chew 1 tablet Daily.  What changed:   · when to take this  · reasons to take this     fluticasone 50 MCG/ACT nasal spray  Commonly known as: Flonase  1 spray into the nostril(s) as directed by provider Daily for 30 days. Administer 2 sprays in each nostril for each dose.  What changed: how much to take     Vitamin D3 50 MCG (2000 UT) capsule  Take 1 capsule by mouth Daily. Start after " completing 50,000 IU weekly dose.  Indications: Vitamin D Deficiency  What changed: additional instructions             Jennifer Mcgill, ARNIE  07/12/21 1945       Jennifer Mcgill, ARNIE  07/12/21 2034

## 2021-07-13 ENCOUNTER — TELEPHONE (OUTPATIENT)
Dept: NEUROLOGY | Facility: CLINIC | Age: 31
End: 2021-07-13

## 2021-07-13 LAB
COPRO1 24H UR-MCNC: NORMAL NG/ML
COPRO1 24H UR-MRATE: NORMAL
COPRO3 24H UR-MCNC: NORMAL NG/ML
COPRO3 24H UR-MRATE: NORMAL
CREAT 24H UR-MRATE: 1145 MG/24 HR (ref 800–1800)
CREAT UR-MCNC: 163.6 MG/DL
HEPTA-CP 24H UR-MRATE: NORMAL
HEPTA-CP UR-MCNC: NORMAL UG/DL
HEXA-CP 24H UR-MRATE: NORMAL
HEXA-CP UR-MCNC: NORMAL UG/L
PBG 24H UR-MCNC: 1.2 MG/L (ref 0–2)
PBG 24H UR-MRATE: 0.8 MG/24 HR (ref 0–1.5)
PENTA-CP 24H UR-MRATE: NORMAL
PENTA-CP UR-MCNC: NORMAL UG/L
UROPOR 24H UR-MRATE: NORMAL
UROPOR UR-MCNC: NORMAL UG/L

## 2021-07-13 NOTE — TELEPHONE ENCOUNTER
ATTEMPTED TO CONTACT PT TO SCHEDULE A VISIT TO DISCUSS MIGRAINE TREATMENT.  PT CAN BE SCHEDULED AS A MYCHART,TELEPHONE OR IN OFFICE.  NEEDS TO BE SCHEDULED THIS WEEK.

## 2021-07-13 NOTE — PROGRESS NOTES
"             [facility] Cardiology Office Follow Up Note    Marilu Godoy  [id number]  07/14/2021    Primary Care Provider: Deidre Barker MD   Referring Provider: No ref. provider found    Chief Complaint: Palpitations    History of Present Illness:   Mrs. Marilu Godoy is a 30 y.o. female who presents to the Cardiology Clinic for ED follow-up.  Although she was seen for chest pain and palpitations, she reports that palpitations are her chief complaint.  The patient has a longstanding history of sinus tachycardia with a diagnosis of \"inappropriate sinus tachycardia\".Additional history includes hyperlipidemia, protein S deficiency, factor V Leiden mutation, pulmonary embolism on long-term anticoagulation, AMBAR, and severe obesity.  She was recently referred back to electrophysiology who placed patient on Corlanor therapy.  She was last seen by their office in 2/21 at which time she reported significant improvement of palpitations.  Unfortunately, she recently presented to the emergency department with complaints palpitations, left-sided chest pain, and intermittent shortness of breath.  At that time, her EKG demonstrated a normal rate and rhythm.  Her D-dimer was mildly elevated, but subsequent CT of chest showed no pulmonary embolus or acute findings in the chest.  Troponin was negative.  Recent TSH is normal.  She presents to our office today for ED follow-up.  The patient reports a 1 to 2-week history of palpitations that occur 2-3 times every day.  These episodes tend to occur at rest and can last anywhere from 5 minutes to 45 minutes.  She reports that when this occurs, she breaks out in a cold sweat and becomes dizzy.  She reports tachycardia as high as 145 bpm according to her home pulse oximeter.  She also reports that during these episodes of palpitations, she will have left-sided/upper chest pain that radiates up the left side of her neck.  She continues to report " intermittent shortness of breath with palpitations.  She is unable to identify any precipitating, exacerbating, or relieving factors.  These episodes just spontaneously resolve.  She notes that she does not experience chest discomfort independently of the palpitations.  The patient tells me that she was wearing a 72-hour cardiac monitor at the time she went to the ED (as ordered by her PCP office).  She reports that it is on her 's desk to be mailed back via [employer] today.  She reports that Corlanor used manage her palpitations, but it has stopped working.  Patient asks if she is a candidate for ablation.  The patient describes not being able to wear her CPAP for the past 2 to 3 weeks due to a recent sinus surgery, but adds that she is scheduled to follow-up with her ENT provider soon.  She denies any history of diabetes, hypertension.  She has never been a smoker.  She specifically denies exertional chest pain and dyspnea on exertion.  She denies syncope.  She denies orthopnea and PND, but notes that if she wears the socks, she might notice indentions on her skin when she removes them.  She drinks 2 caffeinated Mountain Dew sodas daily.  She does not drink alcohol.  She denies taking any over-the-counter medications.  No other complaints or concerns at this time.    Past Cardiac Testin. Last Coronary Angio: None  2. Prior Stress Testin2018   1. Normal treadmill exercise stress test  3. Last Echo: 2019   1. Estimated EF = 68%.   2. Left ventricular systolic function is normal.    2018.   1. Left ventricular systolic function is normal. Estimated EF = 65%.  12/15/2016   1. All left ventricular wall segments contract normally.   2. Left ventricular function is normal.  Estimated EF = 61%.   4. Prior Holter Monitor: 2020   1. A normal monitor study.    2. Normal cardiac monitor.   3. No correlation between patient's symptoms and underlying arrhythmia or ectopy.   4. Mild sinus tachycardia  with all reported symptoms.       4/1/2019   1. A normal monitor study.   2. Palpitations, dizziness and chest pain were reported during the monitoring period. Palpitations had no correlations. Patient reported frequent episodes of palpitations. Dizziness had no correlations. Patient reported frequent episodes of dizziness. Chest pain had no correlations.  3. Patient reported frequent episodes of chest pain. No complications noted. The predominant rhythm noted during the testing period was sinus tachycardia. Premature atrial contractions did not appear during monitoring. There was no evidence of atrial arrhythmias.    4. There were no episodes of supraventricular tachycardia. Premature ventricular contractions did not appear during monitoring. There were no episodes of ventricular tachycardia. Sinoatrial node conduction was normal. No atrioventricular block noted.       4/26/2018   1. Rhythm is sinus with sinus arrhythmia at times   2. IN and QRS are within normal limits   3. Patient diary was submitted, patient triggered events noted.   4. Sinus rhythm with average of 88 bpm.   5. Supraventricular ectopy average less than 0.1%   6. Ventricular ectopy average less than 0.1%  5. CT Angiogram: 2017   1. Coronary CT angiogram with 3-D reconstruction which showed a coronary calcium score of 10.   2. Early minor soft plaque accumulation in the mid left anterior descending.  This however did not constitute a luminal narrowing.     Review of Systems:   Review of Systems   Constitutional: Positive for diaphoresis. Negative for activity change, chills, fatigue, fever and unexpected weight gain.   Eyes: Negative for blurred vision and visual disturbance.   Respiratory: Positive for shortness of breath. Negative for apnea, cough, chest tightness and wheezing.    Cardiovascular: Positive for chest pain and palpitations.   Gastrointestinal: Negative for abdominal distention, blood in stool, GERD and indigestion.   Endocrine:  Negative for cold intolerance and heat intolerance.   Genitourinary: Negative for hematuria.   Musculoskeletal: Negative for gait problem, joint swelling and myalgias.   Skin: Negative for color change, pallor and bruise.   Neurological: Positive for dizziness. Negative for seizures, syncope, weakness, light-headedness, numbness, headache and confusion.   Hematological: Does not bruise/bleed easily.   Psychiatric/Behavioral: Negative for behavioral problems, sleep disturbance, suicidal ideas and depressed mood.     I have reviewed and confirmed the accuracy of the ROS as documented by the MA/LPN/RN ARNIE Ritter    I have reviewed and/or updated the patient's past medical, past surgical, family, social history, problem list and allergies as appropriate.     Medications:     Current Outpatient Medications:   •  albuterol sulfate  (90 Base) MCG/ACT inhaler, Inhale 2 puffs Every 4 (Four) Hours As Needed for Wheezing or Shortness of Air., Disp: 18 g, Rfl: 11  •  apixaban (ELIQUIS) 5 MG tablet tablet, Take 1 tablet by mouth 2 (Two) Times a Day., Disp: 180 tablet, Rfl: 3  •  aspirin 81 MG EC tablet, Take 81 mg by mouth Daily., Disp: , Rfl:   •  atorvastatin (LIPITOR) 40 MG tablet, Take 1 tablet by mouth Every Night., Disp: 30 tablet, Rfl: 11  •  b complex vitamins capsule, Take 1 capsule by mouth Daily., Disp: 90 capsule, Rfl: 3  •  calcium carbonate EX (TUMS EX) 750 MG chewable tablet, Chew 1 tablet Daily. (Patient taking differently: Chew 750 mg Daily As Needed.), Disp: 30 tablet, Rfl: 1  •  Cholecalciferol (Vitamin D3) 50 MCG (2000 UT) capsule, Take 1 capsule by mouth Daily. Start after completing 50,000 IU weekly dose.  Indications: Vitamin D Deficiency (Patient taking differently: Take 2,000 Units by mouth Daily. .  Indications: Vitamin D Deficiency), Disp: 90 capsule, Rfl: 3  •  Corlanor 5 MG tablet tablet, Take 5 mg by mouth 2 (two) times a day., Disp: , Rfl:   •  cyclobenzaprine (FLEXERIL) 10 MG  tablet, Take 1 tablet by mouth 2 (Two) Times a Day As Needed for Muscle Spasms., Disp: 14 tablet, Rfl: 0  •  Diclofenac Sodium (VOLTAREN) 1 % gel gel, Apply  topically to the appropriate area as directed Take As Directed., Disp: , Rfl:   •  DULoxetine (CYMBALTA) 60 MG capsule, Take 1 capsule by mouth Daily., Disp: 90 capsule, Rfl: 3  •  fluticasone (Flonase) 50 MCG/ACT nasal spray, 1 spray into the nostril(s) as directed by provider Daily for 30 days. Administer 2 sprays in each nostril for each dose. (Patient taking differently: 2 sprays into the nostril(s) as directed by provider Daily. Administer 2 sprays in each nostril for each dose.), Disp: 16 g, Rfl: 2  •  lidocaine (LIDODERM) 5 %, Place 2 patches on the skin as directed by provider Daily. Remove & Discard patch within 12 hours or as directed by MD, Disp: 60 patch, Rfl: 3  •  magnesium hydroxide (MILK OF MAGNESIA) 2400 MG/10ML suspension suspension, Take 5 mL by mouth Daily As Needed., Disp: , Rfl:   •  mometasone-formoterol (Dulera) 200-5 MCG/ACT inhaler, Inhale 2 puffs 2 (Two) Times a Day., Disp: 13 g, Rfl: 11  •  ondansetron ODT (ZOFRAN-ODT) 4 MG disintegrating tablet, Place 1 tablet on the tongue Every 6 (Six) Hours As Needed for Nausea or Vomiting., Disp: 10 tablet, Rfl: 0  •  pantoprazole (PROTONIX) 40 MG EC tablet, Take 1 tablet by mouth Daily., Disp: 30 tablet, Rfl: 0  •  topiramate (TOPAMAX) 100 MG tablet, Take 1 tablet by mouth Every Morning AND 1 tablet Every Evening., Disp: 60 tablet, Rfl: 1  •  Fremanezumab-vfrm (Ajovy) 225 MG/1.5ML solution auto-injector, Inject 225 mg under the skin into the appropriate area as directed Every 30 (Thirty) Days., Disp: 1 pen, Rfl: 5    Physical Exam:  Vital Signs:   Vitals:    07/14/21 1353 07/14/21 1357   BP: 112/70 102/70   BP Location: Right arm Right arm   Patient Position: Sitting Standing   Cuff Size: Large Adult Large Adult   Pulse: 96    Resp: 18    SpO2: 99%    Weight: 89.7 kg (197 lb 12.8 oz)    Height:  "160 cm (62.99\")      Body mass index is 35.05 kg/m².    Physical Exam  Vitals and nursing note reviewed.   Constitutional:       General: She is not in acute distress.     Appearance: Normal appearance. She is well-developed.   HENT:      Head: Normocephalic and atraumatic.      Mouth/Throat:      Mouth: Mucous membranes are moist.   Eyes:      General: No scleral icterus.     Extraocular Movements: Extraocular movements intact.   Neck:      Trachea: Trachea normal.   Cardiovascular:      Rate and Rhythm: Normal rate and regular rhythm.      Pulses: Normal pulses.      Heart sounds: Normal heart sounds. No murmur heard.   No friction rub. No gallop.    Pulmonary:      Effort: Pulmonary effort is normal.      Breath sounds: Normal breath sounds.   Abdominal:      Palpations: Abdomen is soft.      Tenderness: There is no abdominal tenderness.   Musculoskeletal:         General: Normal range of motion.      Cervical back: Neck supple.      Right lower leg: No edema.      Left lower leg: No edema.   Skin:     General: Skin is warm and dry.      Findings: No bruising, lesion or rash.   Neurological:      Mental Status: She is alert and oriented to person, place, and time.      Motor: No weakness.      Gait: Gait normal.   Psychiatric:         Mood and Affect: Mood normal.         Behavior: Behavior normal. Behavior is cooperative.         Thought Content: Thought content does not include suicidal ideation.         Results Review:   I reviewed the patient's new clinical results.      ECG 12 Lead    Date/Time: 7/14/2021 2:58 PM  Performed by: Leisa Brito APRN  Authorized by: Leisa Brito APRN   Rhythm: sinus rhythm  Rate: normal  BPM: 83  QRS axis: normal    Clinical impression: normal ECG        Assessment / Plan:     1. Palpitations  --Recent normal TSH  --EKG shows NSR today  --72-hour Holter monitor ordered by PCP, results pending    2. Chest pain  --Recent nonischemic EKG  --EKG today shows NSR, HR " 83 bpm  --Recent negative troponin  --History of pulmonary embolism  --Elevated D-dimer in the ED, but CT scan per PE protocol was negative  --Does not occur independently of palpitations  --There is no identifiable exertional component/ischemic equivalent  --Follow-up with our office as needed    I did explain to the patient that no one in our office performs ablations.  I advised her to follow-up with primary care regarding the results of her Holter monitor and follow-up with electrophysiology as needed.      Preventative Cardiology:   Tobacco Cessation: N/A   Advance Care Planning: ACP discussion was declined by the patient. Patient does not have an advance directive, declines further assistance.     Follow Up:   As needed    Thank you for allowing me to participate in the care of your patient. Please to not hesitate to contact me with additional questions or concerns.     Leisa Brito, APRN

## 2021-07-14 ENCOUNTER — OFFICE VISIT (OUTPATIENT)
Dept: NEUROLOGY | Facility: CLINIC | Age: 31
End: 2021-07-14

## 2021-07-14 ENCOUNTER — OFFICE VISIT (OUTPATIENT)
Dept: CARDIOLOGY | Facility: CLINIC | Age: 31
End: 2021-07-14

## 2021-07-14 ENCOUNTER — TELEPHONE (OUTPATIENT)
Dept: GASTROENTEROLOGY | Facility: CLINIC | Age: 31
End: 2021-07-14

## 2021-07-14 ENCOUNTER — PATIENT MESSAGE (OUTPATIENT)
Dept: GASTROENTEROLOGY | Facility: CLINIC | Age: 31
End: 2021-07-14

## 2021-07-14 VITALS
HEIGHT: 63 IN | WEIGHT: 197.8 LBS | OXYGEN SATURATION: 99 % | HEART RATE: 96 BPM | DIASTOLIC BLOOD PRESSURE: 70 MMHG | BODY MASS INDEX: 35.05 KG/M2 | RESPIRATION RATE: 18 BRPM | SYSTOLIC BLOOD PRESSURE: 102 MMHG

## 2021-07-14 DIAGNOSIS — E78.5 DYSLIPIDEMIA: ICD-10-CM

## 2021-07-14 DIAGNOSIS — D68.59 PROTEIN S DEFICIENCY (HCC): ICD-10-CM

## 2021-07-14 DIAGNOSIS — G43.711 INTRACTABLE CHRONIC MIGRAINE WITHOUT AURA AND WITH STATUS MIGRAINOSUS: Primary | ICD-10-CM

## 2021-07-14 DIAGNOSIS — E66.01 SEVERE OBESITY (BMI 35.0-39.9) WITH COMORBIDITY (HCC): ICD-10-CM

## 2021-07-14 DIAGNOSIS — I25.10 NONOCCLUSIVE CORONARY ATHEROSCLEROSIS OF NATIVE CORONARY ARTERY: ICD-10-CM

## 2021-07-14 DIAGNOSIS — R00.0 INAPPROPRIATE SINUS TACHYCARDIA: Primary | ICD-10-CM

## 2021-07-14 PROCEDURE — 93000 ELECTROCARDIOGRAM COMPLETE: CPT | Performed by: NURSE PRACTITIONER

## 2021-07-14 PROCEDURE — 99213 OFFICE O/P EST LOW 20 MIN: CPT | Performed by: NURSE PRACTITIONER

## 2021-07-14 PROCEDURE — 99441 PR PHYS/QHP TELEPHONE EVALUATION 5-10 MIN: CPT | Performed by: NURSE PRACTITIONER

## 2021-07-14 RX ORDER — FREMANEZUMAB-VFRM 225 MG/1.5ML
225 INJECTION SUBCUTANEOUS
Qty: 1 PEN | Refills: 5 | Status: SHIPPED | OUTPATIENT
Start: 2021-07-14 | End: 2021-11-18 | Stop reason: SDUPTHER

## 2021-07-14 NOTE — PROGRESS NOTES
Follow Up Neurology Office Visit      Patient Name: Marilu Godoy    Referring Physician: No ref. provider found    Chief Complaint:    Chief Complaint   Patient presents with   • Migraine     pt requesting a telephone visit for [facility] care.         History of Present Illness: Marilu Godoy is a 30 y.o. female who is here to follow up with Neurology for  Migraine headaches. She has had 2 Aimovig injections, and has had significant reduction of migraine headache days. She does note that she experiences some wear off effect about three weeks after second shot. She has also recently been diagnosed with gastropares    The following portions of the patient's history were reviewed and updated as appropriate: allergies, current medications, past family history, past medical history, past social history, past surgical history and problem list.    Subjective     Review of Systems:   Review of Systems   Gastrointestinal: Positive for constipation.   Skin: Positive for bruise.   Neurological: Positive for headache.   Hematological: Bruises/bleeds easily.   All other systems reviewed and are negative.    Medications:     Current Outpatient Medications:   •  albuterol sulfate  (90 Base) MCG/ACT inhaler, Inhale 2 puffs Every 4 (Four) Hours As Needed for Wheezing or Shortness of Air., Disp: 18 g, Rfl: 11  •  apixaban (ELIQUIS) 5 MG tablet tablet, Take 1 tablet by mouth 2 (Two) Times a Day., Disp: 180 tablet, Rfl: 3  •  aspirin 81 MG EC tablet, Take 81 mg by mouth Daily., Disp: , Rfl:   •  atorvastatin (LIPITOR) 40 MG tablet, Take 1 tablet by mouth Every Night., Disp: 30 tablet, Rfl: 11  •  b complex vitamins capsule, Take 1 capsule by mouth Daily., Disp: 90 capsule, Rfl: 3  •  calcium carbonate EX (TUMS EX) 750 MG chewable tablet, Chew 1 tablet Daily. (Patient taking differently: Chew 750 mg Daily As Needed.), Disp: 30 tablet, Rfl: 1  •  Cholecalciferol (Vitamin D3) 50 MCG (2000 UT) capsule, Take 1 capsule  by mouth Daily. Start after completing 50,000 IU weekly dose.  Indications: Vitamin D Deficiency (Patient taking differently: Take 2,000 Units by mouth Daily. .  Indications: Vitamin D Deficiency), Disp: 90 capsule, Rfl: 3  •  Corlanor 5 MG tablet tablet, Take 5 mg by mouth 2 (two) times a day., Disp: , Rfl:   •  cyclobenzaprine (FLEXERIL) 10 MG tablet, Take 1 tablet by mouth 2 (Two) Times a Day As Needed for Muscle Spasms., Disp: 14 tablet, Rfl: 0  •  Diclofenac Sodium (VOLTAREN) 1 % gel gel, Apply  topically to the appropriate area as directed Take As Directed., Disp: , Rfl:   •  DULoxetine (CYMBALTA) 60 MG capsule, Take 1 capsule by mouth Daily., Disp: 90 capsule, Rfl: 3  •  lidocaine (LIDODERM) 5 %, Place 2 patches on the skin as directed by provider Daily. Remove & Discard patch within 12 hours or as directed by MD, Disp: 60 patch, Rfl: 3  •  magnesium hydroxide (MILK OF MAGNESIA) 2400 MG/10ML suspension suspension, Take 5 mL by mouth Daily As Needed., Disp: , Rfl:   •  mometasone-formoterol (Dulera) 200-5 MCG/ACT inhaler, Inhale 2 puffs 2 (Two) Times a Day., Disp: 13 g, Rfl: 11  •  ondansetron ODT (ZOFRAN-ODT) 4 MG disintegrating tablet, Place 1 tablet on the tongue Every 6 (Six) Hours As Needed for Nausea or Vomiting., Disp: 10 tablet, Rfl: 0  •  pantoprazole (PROTONIX) 40 MG EC tablet, Take 1 tablet by mouth Daily., Disp: 30 tablet, Rfl: 0  •  topiramate (TOPAMAX) 100 MG tablet, Take 1 tablet by mouth Every Morning AND 1 tablet Every Evening., Disp: 60 tablet, Rfl: 1  •  fluticasone (Flonase) 50 MCG/ACT nasal spray, 1 spray into the nostril(s) as directed by provider Daily for 30 days. Administer 2 sprays in each nostril for each dose. (Patient taking differently: 2 sprays into the nostril(s) as directed by provider Daily. Administer 2 sprays in each nostril for each dose.), Disp: 16 g, Rfl: 2  •  Fremanezumab-vfrm (Ajovy) 225 MG/1.5ML solution auto-injector, Inject 225 mg under the skin into the appropriate  area as directed Every 30 (Thirty) Days., Disp: 1 pen, Rfl: 5    Allergies:   Allergies   Allergen Reactions   • Ceftin [Cefuroxime Axetil] Shortness Of Breath and Rash     Numbness in mouth and throat   • Imitrex [Sumatriptan] Shortness Of Breath and Nausea Only   • Amoxicillin Rash     Objective     Physical Exam:  Vital Signs: There were no vitals filed for this visit.    Physical Exam  Neurological:      Mental Status: She is oriented to person, place, and time.   Psychiatric:         Speech: Speech normal.       Neurologic Exam     Mental Status   Oriented to person, place, and time.   Attention: normal. Concentration: normal.   Speech: speech is normal   Level of consciousness: alert  Normal comprehension.     Results Review:   I have reviewed the patient's other medical records to include, labs, radiology and referrals.     Assessment / Plan      Assessment/Plan:   Diagnoses and all orders for this visit:    1. Intractable chronic migraine without aura and with status migrainosus (Primary)  -     Fremanezumab-vfrm (Ajovy) 225 MG/1.5ML solution auto-injector; Inject 225 mg under the skin into the appropriate area as directed Every 30 (Thirty) Days.  Dispense: 1 pen; Refill: 5     Patient has been experiencing constipation, recently diagnosed with gastroparesis, and notes that Aimovig shot wears off after about three weeks. We discussed treatment with Ajovy or Emgality, patient agreed to begin Ajovy injections monthly.     Follow Up:   Return in about 6 weeks (around 8/25/2021) for Next scheduled follow up.     ARNIE Bower  [facility]     Please note that portions of this note may have been completed with a voice recognition program. Efforts were made to edit the dictations, but occasionally words are mistranscribed.  You have chosen to receive care through a telephone visit. Do you consent to use a telephone visit for your medical care today? Yes  This visit has been  rescheduled as a phone visit to comply with patient safety concerns in accordance with CDC recommendations. Total time of discussion was 6 minutes.

## 2021-07-14 NOTE — TELEPHONE ENCOUNTER
I called and left a message regarding the 24-hour urine porphobilinogen porphyrin study.  The porphobilinogen component was normal.  However, the patient apparently did not receive the other container to get the porphyrin aspect of the study.  I called the lab to get the specifics of what may have occurred.  The patient received her container in [place], [place].  I stated that she could come to the [facility] to receive the correct container to have the study performed for porphyrin.

## 2021-07-15 NOTE — TELEPHONE ENCOUNTER
From: Marilu Godoy  To: Oswaldo Bragg MD  Sent: 7/14/2021 9:10 PM EDT  Subject: Non-Urgent Medical Question    If I'm able to come  the container for the 24 hour urine Friday would the lab be open Saturday for me to drop it back off?

## 2021-07-16 DIAGNOSIS — E80.20: Primary | ICD-10-CM

## 2021-07-17 ENCOUNTER — LAB (OUTPATIENT)
Dept: LAB | Facility: HOSPITAL | Age: 31
End: 2021-07-17

## 2021-07-17 PROCEDURE — 81050 URINALYSIS VOLUME MEASURE: CPT | Performed by: NURSE PRACTITIONER

## 2021-07-17 PROCEDURE — 83497 ASSAY OF 5-HIAA: CPT | Performed by: NURSE PRACTITIONER

## 2021-07-19 RX ORDER — IVABRADINE 5 MG/1
5 TABLET, FILM COATED ORAL 2 TIMES DAILY
Qty: 60 TABLET | Refills: 6 | Status: SHIPPED | OUTPATIENT
Start: 2021-07-19 | End: 2021-08-09 | Stop reason: SDUPTHER

## 2021-07-20 RX ORDER — FLUTICASONE PROPIONATE 50 MCG
SPRAY, SUSPENSION (ML) NASAL
Qty: 16 G | Refills: 11 | Status: SHIPPED | OUTPATIENT
Start: 2021-07-20 | End: 2022-08-30 | Stop reason: SDUPTHER

## 2021-07-23 LAB
5OH-INDOLEACETATE 24H UR-MCNC: 7.2 MG/L
5OH-INDOLEACETATE 24H UR-MRATE: 3.8 MG/24 HR (ref 0–14.9)

## 2021-07-30 ENCOUNTER — TRANSCRIBE ORDERS (OUTPATIENT)
Dept: GENERAL RADIOLOGY | Facility: HOSPITAL | Age: 31
End: 2021-07-30

## 2021-07-30 ENCOUNTER — TRANSCRIBE ORDERS (OUTPATIENT)
Dept: LAB | Facility: HOSPITAL | Age: 31
End: 2021-07-30

## 2021-07-30 ENCOUNTER — OFFICE VISIT (OUTPATIENT)
Dept: OTOLARYNGOLOGY | Facility: CLINIC | Age: 31
End: 2021-07-30

## 2021-07-30 ENCOUNTER — HOSPITAL ENCOUNTER (OUTPATIENT)
Dept: GENERAL RADIOLOGY | Facility: HOSPITAL | Age: 31
Discharge: HOME | End: 2021-07-30
Admitting: INTERNAL MEDICINE

## 2021-07-30 ENCOUNTER — TELEPHONE (OUTPATIENT)
Dept: INTERNAL MEDICINE | Facility: CLINIC | Age: 31
End: 2021-07-30

## 2021-07-30 VITALS
BODY MASS INDEX: 36.96 KG/M2 | WEIGHT: 208.6 LBS | SYSTOLIC BLOOD PRESSURE: 132 MMHG | HEIGHT: 63 IN | DIASTOLIC BLOOD PRESSURE: 81 MMHG

## 2021-07-30 DIAGNOSIS — K59.04 CHRONIC IDIOPATHIC CONSTIPATION: Primary | ICD-10-CM

## 2021-07-30 DIAGNOSIS — K31.84 GASTROPARESIS: Primary | ICD-10-CM

## 2021-07-30 DIAGNOSIS — K59.04 CHRONIC IDIOPATHIC CONSTIPATION: ICD-10-CM

## 2021-07-30 DIAGNOSIS — R00.0 INAPPROPRIATE SINUS TACHYCARDIA: ICD-10-CM

## 2021-07-30 DIAGNOSIS — J32.0 CHRONIC MAXILLARY SINUSITIS: Primary | ICD-10-CM

## 2021-07-30 DIAGNOSIS — R53.83 FATIGUE, UNSPECIFIED TYPE: ICD-10-CM

## 2021-07-30 PROCEDURE — 74018 RADEX ABDOMEN 1 VIEW: CPT

## 2021-07-30 PROCEDURE — 99024 POSTOP FOLLOW-UP VISIT: CPT | Performed by: OTOLARYNGOLOGY

## 2021-07-30 PROCEDURE — 31237 NSL/SINS NDSC SURG BX POLYPC: CPT | Performed by: OTOLARYNGOLOGY

## 2021-07-30 RX ORDER — BUSPIRONE HYDROCHLORIDE 5 MG/1
5 TABLET ORAL 3 TIMES DAILY
Qty: 90 TABLET | Refills: 4 | Status: SHIPPED | OUTPATIENT
Start: 2021-07-30 | End: 2022-01-21

## 2021-07-30 RX ORDER — GRANISETRON HYDROCHLORIDE 1 MG/1
1 TABLET, FILM COATED ORAL EVERY 12 HOURS PRN
Qty: 60 TABLET | Refills: 5 | Status: SHIPPED | OUTPATIENT
Start: 2021-07-30 | End: 2021-08-09

## 2021-07-30 NOTE — PROGRESS NOTES
ENT Office Consult Note     Date of Consult: 2021     Patient Name: Marilu Godoy  MRN: [id number]   : 1990     Referring Provider: No ref. provider found    Care Team: Patient Care Team:  Deidre Barker MD as PCP - General (Family Medicine)  Pierce Washington DO as Consulting Physician (Cardiology)  Oswaldo Bragg MD as Consulting Physician (Gastroenterology)     Chief Complaint:    Chief Complaint   Patient presents with   • Post-op     Patient is here for a post-op evaluation       History of Present Illness: Marilu Godoy is a 30 y.o. female who presents today for right endoscopic debridement.  Marilu underwent a right endoscopic ethmoidectomy frontal recess exploration and maxillary antrostomy on 2021.  Her final path report shows chronic inflammation but no evidence of malignancy.    Subjective      Review of Systems:   Review of Systems   I have reviewed and confirmed the accuracy of the ROS as documented by the MA/LPN/RN Saulo Brito MD     Pertinent items are noted in HPI.     Past Medical History:   Past Medical History:   Diagnosis Date   • Abdominal pain    • Abnormal Pap smear of cervix    • Anxiety    • Asthma    • Bleeding disorder (CMS/HCC) 16   • Body piercing     TONGUE, NOSE, TWO IN EACH EAR   • Chest pain 2021    6-7 months ago -seen at [facility] er and released same day of complaint with resitory infection   • Clotting disorder (CMS/HCC)     factor 5   • Constipation    • Coronary artery disease involving native coronary artery of native heart without angina pectoris 2017   • Diarrhea    • Elevated cholesterol    • Endometriosis    • Factor 5 Leiden mutation, heterozygous (CMS/HCC)    • Fibroid    • Full dentures 2021    advised no adhesives DOS   • GERD (gastroesophageal reflux disease)    • H/O blood clots    • H/O cardiovascular stress test 2021    reported several years ago-exercise wnl   • Headache    •  Hiatal hernia    • High cholesterol    • History of recurrent UTIs    • HPV (human papilloma virus) infection    • Hx of gout 2021   • Hypertension    • Kidney infection 2021    history only   • Migraine    • Nausea    • Nausea & vomiting    • Obesity    • Orthostatic hypotension    • Osteoarthritis    • Pollen allergies    • Protein S deficiency (CMS/HCC) 2016    labs from hospitalization for PE   • Pulmonary embolism (CMS/HCC) 2016    on eliquis since that time   • Recurrent pregnancy loss, antepartum condition or complication    • Sleep apnea     CPAP ASKED TO BRING DOS, has been unable to use due to sinus cyst for 2 months   • Subclinical hyperthyroidism    • Tachycardia    • Tattoo     LOWER BACK   • Varicella        Past Surgical History:   Past Surgical History:   Procedure Laterality Date   •  SECTION        and  and    •  SECTION WITH TUBAL N/A 1/15/2019    Procedure:  SECTION REPEAT WITH TUBAL;  Surgeon: Alva Boyer MD;  Location: [facility] LABOR DELIVERY;  Service: Obstetrics/Gynecology   • CHOLECYSTECTOMY     • COLONOSCOPY N/A 2017    Procedure: COLONOSCOPY WITH BIOPSIES AND ARGON THERMAL ABLATION;  Surgeon: Jignesh Selby MD;  Location: [facility] ENDOSCOPY;  Service:    • DIAGNOSTIC LAPAROSCOPY N/A 2018    Procedure: DIAGNOSTIC LAPAROSCOPY AND ABLATION OF ENDOMETRIOSIS;  Surgeon: Evin Zamudio MD;  Location: [facility] OR;  Service: Obstetrics/Gynecology   • ENDOMETRIAL ABLATION     • ENDOSCOPIC FUNCTIONAL SINUS SURGERY (FESS) Right 2021    Procedure: Right endoscopic total ethmoidectomy, right endoscopic middle meatal antrotomy with cyst removal, right endoscopic frontal recess exploration with cyst removal;  Surgeon: Saulo Brito MD;  Location: [facility] OR;  Service: ENT;  Laterality: Right;   • ENDOSCOPY N/A 2017    Procedure: ESOPHAGOGASTRODUODENOSCOPY WITH BIOPSIES AND COLD BIOPSY POLYPECTOMIES;  Surgeon:  Jignesh Selby MD;  Location: [facility] ENDOSCOPY;  Service:    • MOUTH SURGERY      full mouth tooth extration       Family History:   Family History   Problem Relation Age of Onset   • Arthritis Mother    • COPD Mother    • Asthma Mother    • Thyroid disease Mother    • Arthritis Father    • Diabetes Father    • Hypertension Father    • Hyperlipidemia Father    • Kidney disease Father    • Heart attack Father    • Coronary artery disease Father    • Dementia Father    • No Known Problems Son    • Colon cancer Neg Hx    • Liver cancer Neg Hx    • Liver disease Neg Hx    • Stomach cancer Neg Hx    • Esophageal cancer Neg Hx        Social History:   Social History     Socioeconomic History   • Marital status:      Spouse name: Not on file   • Number of children: Not on file   • Years of education: Not on file   • Highest education level: Not on file   Tobacco Use   • Smoking status: Never Smoker   • Smokeless tobacco: Never Used   Vaping Use   • Vaping Use: Never used   Substance and Sexual Activity   • Alcohol use: No   • Drug use: No   • Sexual activity: Yes     Partners: Male     Birth control/protection: None     Comment: PE while on birth control patch       Medications:     Current Outpatient Medications:   •  albuterol sulfate  (90 Base) MCG/ACT inhaler, Inhale 2 puffs Every 4 (Four) Hours As Needed for Wheezing or Shortness of Air., Disp: 18 g, Rfl: 11  •  apixaban (ELIQUIS) 5 MG tablet tablet, Take 1 tablet by mouth 2 (Two) Times a Day., Disp: 180 tablet, Rfl: 3  •  aspirin 81 MG EC tablet, Take 81 mg by mouth Daily., Disp: , Rfl:   •  atorvastatin (LIPITOR) 40 MG tablet, Take 1 tablet by mouth Every Night., Disp: 30 tablet, Rfl: 11  •  b complex vitamins capsule, Take 1 capsule by mouth Daily., Disp: 90 capsule, Rfl: 3  •  calcium carbonate EX (TUMS EX) 750 MG chewable tablet, Chew 1 tablet Daily. (Patient taking differently: Chew 750 mg Daily As Needed.), Disp: 30 tablet, Rfl: 1  •   Cholecalciferol (Vitamin D3) 50 MCG (2000 UT) capsule, Take 1 capsule by mouth Daily. Start after completing 50,000 IU weekly dose.  Indications: Vitamin D Deficiency (Patient taking differently: Take 2,000 Units by mouth Daily. .  Indications: Vitamin D Deficiency), Disp: 90 capsule, Rfl: 3  •  Corlanor 5 MG tablet tablet, Take 1 tablet by mouth 2 (two) times a day., Disp: 60 tablet, Rfl: 6  •  cyclobenzaprine (FLEXERIL) 10 MG tablet, Take 1 tablet by mouth 2 (Two) Times a Day As Needed for Muscle Spasms., Disp: 14 tablet, Rfl: 0  •  Diclofenac Sodium (VOLTAREN) 1 % gel gel, Apply  topically to the appropriate area as directed Take As Directed., Disp: , Rfl:   •  DULoxetine (CYMBALTA) 60 MG capsule, Take 1 capsule by mouth Daily., Disp: 90 capsule, Rfl: 3  •  fluticasone (FLONASE) 50 MCG/ACT nasal spray, INSTILL 2 SPRAYS INTO EACH NOSTRIL EVERY DAY AS DIRECTED, Disp: 16 g, Rfl: 11  •  Fremanezumab-vfrm (Ajovy) 225 MG/1.5ML solution auto-injector, Inject 225 mg under the skin into the appropriate area as directed Every 30 (Thirty) Days., Disp: 1 pen, Rfl: 5  •  lidocaine (LIDODERM) 5 %, Place 2 patches on the skin as directed by provider Daily. Remove & Discard patch within 12 hours or as directed by MD, Disp: 60 patch, Rfl: 3  •  magnesium hydroxide (MILK OF MAGNESIA) 2400 MG/10ML suspension suspension, Take 5 mL by mouth Daily As Needed., Disp: , Rfl:   •  mometasone-formoterol (Dulera) 200-5 MCG/ACT inhaler, Inhale 2 puffs 2 (Two) Times a Day., Disp: 13 g, Rfl: 11  •  ondansetron ODT (ZOFRAN-ODT) 4 MG disintegrating tablet, Place 1 tablet on the tongue Every 6 (Six) Hours As Needed for Nausea or Vomiting., Disp: 10 tablet, Rfl: 0  •  pantoprazole (PROTONIX) 40 MG EC tablet, Take 1 tablet by mouth Daily., Disp: 30 tablet, Rfl: 0  •  topiramate (TOPAMAX) 100 MG tablet, Take 1 tablet by mouth Every Morning AND 1 tablet Every Evening., Disp: 60 tablet, Rfl: 1    Allergies:   Allergies   Allergen Reactions   • Ceftin  "[Cefuroxime Axetil] Shortness Of Breath and Rash     Numbness in mouth and throat   • Imitrex [Sumatriptan] Shortness Of Breath and Nausea Only   • Amoxicillin Rash       Objective     Physical Exam:  Vital Signs:   Vitals:    07/30/21 0955   BP: 132/81   Weight: 94.6 kg (208 lb 9.6 oz)   Height: 160 cm (63\")     Body mass index is 36.95 kg/m².     General Appearance:  Alert, cooperative, in no acute distress   Head:  Normocephalic, without obvious abnormality, atraumatic   Eyes:          Conjunctivae and sclerae normal, PERRLA   Ears:   Tympanic membranes normal midline    Nose: Midline septum   Throat:  No leukoplakia   Fiberoptic Exam:  The right nose was topically anesthetized and then I used a 30 degree telescope and suction to debride some old crusting from the ethmoid sinus.  The maxillary sinus is patent she does have some hyperemic and edematous mucosa in the ethmoids and around the maxillary sinus ostia which was debrided   Neck:  No palpable adenopathy   Lungs:   Clear to auscultation,respirations regular, jeremy and unlabored      Heart:  Regular rhythm and normal rate, normal S1 and S2, no       murmur, no gallop, no rub, no click   Pulses: Pulses palpable and equal bilaterally   Skin: No bleeding, bruising or rash   Lymph nodes: No palpable adenopathy   Neurologic: Cranial nerves 2 - 12 grossly intact        Results Review:   Labs:     Imaging: CT Abdomen Pelvis Without Contrast    Result Date: 5/20/2021  Small fat-containing periumbilical hernia.     947.03 mGy.cm. 19.36 mGy  This study was performed with techniques to keep radiation doses as low as reasonably achievable (ALARA). Individualized dose reduction techniques using automated exposure control or adjustment of mA and/or kV according to the patient size were employed.  This report was finalized on 5/20/2021 4:24 PM by Jorge Alberto Sargent M.D..    XR Temporomandibular Joints Bilateral    Result Date: 6/28/2021  Left greater than right mild TMJ " arthrosis changes noted with some narrowing and sclerotic change. No acute fracture or dislocation.  This report was finalized on 6/28/2021 12:25 PM by Evin Galaviz.      XR Foot 3+ View Right    Result Date: 5/22/2021  No acute bony abnormality.    This report was finalized on 5/22/2021 8:27 AM by Antonietta Ochoa MD.    NM Gastric Emptying    Result Date: 6/15/2021  Delayed gastric emptying consistent with gastroparesis.  D:  06/15/2021 E:  06/15/2021     This report was finalized on 6/15/2021 5:13 PM by Dr. Umesh Montgomery.      CT Abdomen Pelvis With Contrast    Result Date: 6/27/2021  No acute findings in the abdomen or pelvis to account for patient's symptoms. Authenticated by Justice Vicente MD on 06/27/2021 05:36:18 PM    XR Chest 1 View    Result Date: 7/12/2021  No acute cardiopulmonary process.  Continued followup is recommended.  This report was finalized on 7/12/2021 3:08 PM by Jorge Alberto Sargent M.D..    XR Chest 1 View    Result Date: 6/22/2021  No acute cardiopulmonary process.    Images were reviewed, interpreted, and dictated by Dr. Jorge Alberto Sargent M.D. Transcribed by Addie Burgess PA-C.  This report was finalized on 6/22/2021 12:03 PM by Jorge Alberto Sargent M.D..    CT Chest Pulmonary Embolism    Result Date: 7/12/2021  No pulmonary embolus.  No acute findings in the chest. Authenticated by Justice Vicente MD on 07/12/2021 07:01:05 PM    CT Sinus Without Contrast    Result Date: 5/17/2021  No evidence of acute sinusitis.  Retention cyst versus polyp in the right maxillary sinus.     This study was performed with techniques to keep radiation doses as low as reasonably achievable (ALARA). Individualized dose reduction techniques using automated exposure control or adjustment of mA and/or kV according to the patient size were employed.       Images were reviewed, interpreted, and dictated by Dr. Jorge Alberto Sargent M.D. Transcribed by Addie Burgess PA-C.  This report was finalized on 5/17/2021 1:48  PM by Jorge Alberto Sargent M.D..    XR Chest PA & Lateral    Result Date: 4/2/2021  No acute cardiopulmonary process.  D:  04/02/2021 E:  04/02/2021  This report was finalized on 4/2/2021 5:23 PM by Dr. Umesh Montgomery.      EMG & Nerve Conduction Test    Result Date: 5/25/2021  Normal NCS/EMG of both arms and neck This report is transcribed using the Dragon dictation system.         Assessment / Plan      Assessment/Plan:   Diagnoses and all orders for this visit:    1. Chronic maxillary sinusitis (Primary)       Marilu is now 5 weeks out from a right endoscopic ethmoidectomy and maxillary antrostomy endoscopically.  She is healing quite well although she does have some edematous and hyperemic mucosa in the ethmoids and around the maxillary sinus.  I therefore am going to place her on a 2-week course of prednisone to promote healing.  Rx prednisone 20 mg  -60/day for 3 days then 40 a day for 7 days then 20 a day for 7 days she will continue to use sinus rinses daily.  I will see her back again in 1 to 2 months      Follow Up:   No follow-ups on file.    Time:    Discussed plan of care in detail with patient today. Patient verbally understands and agrees. I have spent and counseled for  minutes face to face, with greater than 50 % of the time counseling.     Saulo Brito MD

## 2021-07-30 NOTE — TELEPHONE ENCOUNTER
Dr Bragg,  Dr Rivera's nurse Julissa called. Ms Godoy's Medicaid doesn't pay for medication prescribe outside of [place]. Dr Rivera request a favor for you to send in Buspirone 5 mg Take 1 tablet po 3 times a day, Qty 90 w/ 4 refills. Granisetron 1mg Take 1 tablet by PO q 12 hrs PRN. Qty 60 w/ 5 refills.

## 2021-08-04 ENCOUNTER — LAB (OUTPATIENT)
Dept: LAB | Facility: HOSPITAL | Age: 31
End: 2021-08-04

## 2021-08-04 DIAGNOSIS — K31.84 GASTROPARESIS: ICD-10-CM

## 2021-08-04 LAB
CK SERPL-CCNC: 43 U/L (ref 20–180)
CRP SERPL-MCNC: 0.48 MG/DL (ref 0–0.5)
ERYTHROCYTE [SEDIMENTATION RATE] IN BLOOD: 8 MM/HR (ref 0–20)
HBA1C MFR BLD: 4.89 % (ref 4.8–5.6)
IGA1 MFR SER: 241 MG/DL (ref 70–400)
IGG1 SER-MCNC: 977 MG/DL (ref 700–1600)
IGM SERPL-MCNC: 45 MG/DL (ref 40–230)
LDH SERPL-CCNC: 181 U/L (ref 135–214)
T4 FREE SERPL-MCNC: 1.12 NG/DL (ref 0.93–1.7)
TSH SERPL DL<=0.05 MIU/L-ACNC: 0.98 UIU/ML (ref 0.27–4.2)

## 2021-08-04 PROCEDURE — 83519 RIA NONANTIBODY: CPT

## 2021-08-04 PROCEDURE — 83615 LACTATE (LD) (LDH) ENZYME: CPT

## 2021-08-04 PROCEDURE — 83918 ORGANIC ACIDS TOTAL QUANT: CPT

## 2021-08-04 PROCEDURE — 83516 IMMUNOASSAY NONANTIBODY: CPT

## 2021-08-04 PROCEDURE — 85240 CLOT FACTOR VIII AHG 1 STAGE: CPT

## 2021-08-04 PROCEDURE — 84443 ASSAY THYROID STIM HORMONE: CPT

## 2021-08-04 PROCEDURE — 86341 ISLET CELL ANTIBODY: CPT

## 2021-08-04 PROCEDURE — 85652 RBC SED RATE AUTOMATED: CPT

## 2021-08-04 PROCEDURE — 84439 ASSAY OF FREE THYROXINE: CPT

## 2021-08-04 PROCEDURE — 83036 HEMOGLOBIN GLYCOSYLATED A1C: CPT

## 2021-08-04 PROCEDURE — 82139 AMINO ACIDS QUAN 6 OR MORE: CPT

## 2021-08-04 PROCEDURE — 82784 ASSAY IGA/IGD/IGG/IGM EACH: CPT

## 2021-08-04 PROCEDURE — 82550 ASSAY OF CK (CPK): CPT

## 2021-08-04 PROCEDURE — 84220 ASSAY OF PYRUVATE KINASE: CPT

## 2021-08-04 PROCEDURE — 82542 COL CHROMOTOGRAPHY QUAL/QUAN: CPT

## 2021-08-04 PROCEDURE — 36415 COLL VENOUS BLD VENIPUNCTURE: CPT

## 2021-08-04 PROCEDURE — 86140 C-REACTIVE PROTEIN: CPT

## 2021-08-06 LAB
ACHR BIND AB SER-SCNC: <0.03 NMOL/L (ref 0–0.24)
FACT VIII ACT/NOR PPP: 154 % (ref 56–140)

## 2021-08-07 LAB — PK RBC-CCNC: 8.4 U/G HB (ref 4.6–11.2)

## 2021-08-09 ENCOUNTER — TELEPHONE (OUTPATIENT)
Dept: CARDIOLOGY | Facility: CLINIC | Age: 31
End: 2021-08-09

## 2021-08-09 ENCOUNTER — OFFICE VISIT (OUTPATIENT)
Dept: CARDIOLOGY | Facility: CLINIC | Age: 31
End: 2021-08-09

## 2021-08-09 VITALS
SYSTOLIC BLOOD PRESSURE: 124 MMHG | HEIGHT: 63 IN | HEART RATE: 99 BPM | OXYGEN SATURATION: 98 % | BODY MASS INDEX: 35.19 KG/M2 | WEIGHT: 198.6 LBS | DIASTOLIC BLOOD PRESSURE: 84 MMHG

## 2021-08-09 DIAGNOSIS — R00.0 INAPPROPRIATE SINUS TACHYCARDIA: Primary | ICD-10-CM

## 2021-08-09 DIAGNOSIS — G90.3 NEUROGENIC ORTHOSTATIC HYPOTENSION (HCC): ICD-10-CM

## 2021-08-09 LAB
(HCYS)2 SERPL-SCNC: <0.3 UMOL/L (ref 0–0.2)
A-AMINOBUTYR SERPL-SCNC: 13.2 UMOL/L (ref 5.4–34.5)
AAA SERPL-SCNC: <0.5 UMOL/L (ref 0–1.9)
ALANINE SERPL-SCNC: 323 UMOL/L (ref 209.2–515.5)
ALLOISOLEUCINE SERPL-SCNC: 1.2 UMOL/L (ref 0–3.2)
AMINO ACID PAT SERPL-IMP: NORMAL
ARGININE SERPL-SCNC: 44.2 UMOL/L (ref 36.3–119.2)
ARGININOSUCCINATE SERPL-SCNC: <0.1 UMOL/L (ref 0–3)
ASPARAGINE SERPL-SCNC: 46.2 UMOL/L (ref 29.5–84.5)
ASPARTATE SERPL-SCNC: 2.6 UMOL/L (ref 0–7.4)
B-AIB SERPL-SCNC: 1.5 UMOL/L (ref 0–4.3)
B-ALANINE SERPL-SCNC: 3.7 UMOL/L (ref 1.1–9)
CARN ESTERS/C0 SERPL-SRTO: 0.8 RATIO (ref 0–0.9)
CARNITINE FREE SERPL-SCNC: 28 UMOL/L (ref 20–55)
CARNITINE SERPL-SCNC: 50 UMOL/L (ref 27–73)
CITRULLINE SERPL-SCNC: 22.3 UMOL/L (ref 15.6–46.9)
CYSTATHIONIN SERPL-SCNC: <0.5 UMOL/L (ref 0–0.7)
CYSTINE SERPL-SCNC: 16.8 UMOL/L (ref 15.8–47.3)
GABA SERPL-SCNC: <0.5 UMOL/L (ref 0–0.6)
GAD65 AB SER IA-ACNC: <5 U/ML (ref 0–5)
GLUTAMATE SERPL-SCNC: 55.9 UMOL/L (ref 18.1–155.9)
GLUTAMINE SERPL-SCNC: 442.6 UMOL/L (ref 372.8–701.4)
GLYCINE SERPL-SCNC: 178.2 UMOL/L (ref 144–411)
HISTIDINE SERPL-SCNC: 73.6 UMOL/L (ref 47.2–98.5)
HOMOCITRULLINE SERPL-SCNC: <0.5 UMOL/L (ref 0–1.7)
ISOLEUCINE SERPL-SCNC: 42.3 UMOL/L (ref 32.8–88.3)
LAB DIRECTOR NAME PROVIDER: NORMAL
LEUCINE SERPL-SCNC: 93.7 UMOL/L (ref 66.7–165.7)
LYSINE SERPL-SCNC: 157.6 UMOL/L (ref 94–278)
METHIONINE SERPL-SCNC: 19 UMOL/L (ref 14.7–35.2)
OH-LYSINE SERPL-SCNC: 0.2 UMOL/L (ref 0.1–0.8)
OH-PROLINE SERPL-SCNC: 13 UMOL/L (ref 4.7–35.2)
ORNITHINE SERPL-SCNC: 41.4 UMOL/L (ref 30.1–101.3)
PHE SERPL-SCNC: 51.4 UMOL/L (ref 35.8–76.9)
PROLINE SERPL-SCNC: 200.1 UMOL/L (ref 84.8–352.5)
REF LAB TEST METHOD: NORMAL
SARCOSINE SERPL-SCNC: 1.3 UMOL/L (ref 0–4)
SERINE SERPL-SCNC: 73.6 UMOL/L (ref 48.7–145.2)
TAURINE SERPL-SCNC: 76.9 UMOL/L (ref 29.2–132.3)
THREONINE SERPL-SCNC: 81.1 UMOL/L (ref 67.8–211.6)
TRYPTOPHAN SERPL-SCNC: 47.9 UMOL/L (ref 23.5–93)
TYROSINE SERPL-SCNC: 50.1 UMOL/L (ref 27.8–83.3)
VALINE SERPL-SCNC: 197 UMOL/L (ref 133–317.1)
VGKC AB SER-SCNC: 14 PMOL/L (ref 0–31)

## 2021-08-09 PROCEDURE — 99214 OFFICE O/P EST MOD 30 MIN: CPT | Performed by: PHYSICIAN ASSISTANT

## 2021-08-09 RX ORDER — BENZONATATE 200 MG/1
1 CAPSULE ORAL AS NEEDED
COMMUNITY
End: 2021-08-24

## 2021-08-09 RX ORDER — METHYLPREDNISOLONE 4 MG/1
1 TABLET ORAL TAKE AS DIRECTED
COMMUNITY
End: 2021-08-24

## 2021-08-09 RX ORDER — IVABRADINE 5 MG/1
7.5 TABLET, FILM COATED ORAL 2 TIMES DAILY
Qty: 60 TABLET | Refills: 6 | Status: SHIPPED | OUTPATIENT
Start: 2021-08-09 | End: 2021-08-10 | Stop reason: SDUPTHER

## 2021-08-09 NOTE — PROGRESS NOTES
Cardiac Electrophysiology Outpatient Follow Up Note            [facility] at [facility]    Follow Up Office Visit      Marilu Godoy  [id number]  02/08/2021  Primary Care Physician: Deidre Barker MD    Referred By: No ref. provider found    Subjective     Chief Complaint:   Diagnoses and all orders for this visit:    1. Inappropriate sinus tachycardia (Primary)    2. Neurogenic orthostatic hypotension (CMS/HCC)    Other orders  -     Corlanor 5 MG tablet tablet; Take 1.5 tablets by mouth 2 (two) times a day.  Dispense: 60 tablet; Refill: 6      Chief Complaint   Patient presents with   • Inappropriate Sinus Tachycardia       History of Present Illness:   30-year-old female referred to electrophysiology clinic regarding palpitations recent Holter monitor.  Patient has known inappropriate sinus tachycardia and no cardiogenic syncope.  She states she did well initially corner but recently had some breakthrough episodes where she feels tachycardic weak nauseous with clinical symptoms suggesting vasovagal events.  She presented to Dr. Byrnes her local cardiologist office and the monitor was placed.  This revealed average heart rate of 91 with episodes of sinus tachycardia at 148 bpm.  She is not any chest pain or chest tightness testing is pectoris.  She had no junaid syncope events.  She has no heart failure symptoms.  She states she is undergoing extensive work-up regarding gastroparesis at [facility].    Review of Systems:   Constitutional: No fevers or chills, no recent weight gain or weight loss or fatigue  Eyes: No visual loss, blurred vision, double vision, yellow sclerae.  ENT: No headaches, hearing loss, vertigo, congestion or sore throat.   Cardiovascular: Per HPI  Respiratory: No cough or wheezing, no sputum production, no hematemesis   Gastrointestinal: Gastroparesis   genitourinary: No dysuria, hematuria or increased frequency.  Musculoskeletal:   No gait disturbance, weakness or joint pain or stiffness  Integumentary: No rashes, urticaria, ulcers or sores.   Neurological: No headache, dizziness, syncope, paralysis, ataxia, no prior CVA/TIA  Psychiatric: No anxiety, or depression  Endocrine: No diaphoresis, cold or heat intolerance. No polyuria or polydipsia.   Hematologic/Lymphatic: No anemia, abnormal bruising or bleeding. No history of DVT/PE.      Past Medical History:   Past Medical History:   Diagnosis Date   • Abdominal pain    • Abnormal Pap smear of cervix    • Anxiety    • Asthma 2015   • Bleeding disorder (CMS/[facility]) 11-13-16   • Body piercing     TONGUE, NOSE, TWO IN EACH EAR   • Chest pain 06/22/2021    6-7 months ago -seen at [facility] er and released same day of complaint with resitory infection   • Clotting disorder (CMS/[facility])     factor 5   • Constipation    • Coronary artery disease involving native coronary artery of native heart without angina pectoris 6/16/2017   • Diarrhea    • Elevated cholesterol    • Endometriosis    • Factor 5 Leiden mutation, heterozygous (CMS/[facility])    • Fibroid    • Full dentures 06/22/2021    advised no adhesives DOS   • GERD (gastroesophageal reflux disease)    • H/O blood clots    • H/O cardiovascular stress test 06/22/2021    reported several years ago-exercise wnl   • Headache    • Hiatal hernia    • High cholesterol    • History of recurrent UTIs    • HPV (human papilloma virus) infection    • Hx of gout 06/22/2021   • Hypertension    • Kidney infection 06/22/2021    history only   • Migraine    • Nausea    • Nausea & vomiting    • Obesity    • Orthostatic hypotension    • Osteoarthritis    • Pollen allergies    • Protein S deficiency (CMS/[facility]) 11/14/2016    labs from hospitalization for PE   • Pulmonary embolism (CMS/[facility]) 11/20/2016    on eliquis since that time   • Recurrent pregnancy loss, antepartum condition or complication    • Sleep apnea     CPAP ASKED TO BRING DOS, has been unable to use due to sinus cyst for  2 months   • Subclinical hyperthyroidism    • Tachycardia    • Tattoo     LOWER BACK   • Varicella        Past Surgical History:   Past Surgical History:   Procedure Laterality Date   •  SECTION        and  and    •  SECTION WITH TUBAL N/A 1/15/2019    Procedure:  SECTION REPEAT WITH TUBAL;  Surgeon: Alva Boyer MD;  Location: [facility] LABOR DELIVERY;  Service: Obstetrics/Gynecology   • CHOLECYSTECTOMY     • COLONOSCOPY N/A 2017    Procedure: COLONOSCOPY WITH BIOPSIES AND ARGON THERMAL ABLATION;  Surgeon: Jignesh Selby MD;  Location: [facility] ENDOSCOPY;  Service:    • DIAGNOSTIC LAPAROSCOPY N/A 2018    Procedure: DIAGNOSTIC LAPAROSCOPY AND ABLATION OF ENDOMETRIOSIS;  Surgeon: Evin Zamudio MD;  Location: [facility] OR;  Service: Obstetrics/Gynecology   • ENDOMETRIAL ABLATION     • ENDOSCOPIC FUNCTIONAL SINUS SURGERY (FESS) Right 2021    Procedure: Right endoscopic total ethmoidectomy, right endoscopic middle meatal antrotomy with cyst removal, right endoscopic frontal recess exploration with cyst removal;  Surgeon: Saulo Brito MD;  Location: [facility] OR;  Service: ENT;  Laterality: Right;   • ENDOSCOPY N/A 2017    Procedure: ESOPHAGOGASTRODUODENOSCOPY WITH BIOPSIES AND COLD BIOPSY POLYPECTOMIES;  Surgeon: Jignesh Selby MD;  Location: [facility] ENDOSCOPY;  Service:    • MOUTH SURGERY      full mouth tooth extration       Family History:   Family History   Problem Relation Age of Onset   • Arthritis Mother    • COPD Mother    • Asthma Mother    • Thyroid disease Mother    • Arthritis Father    • Diabetes Father    • Hypertension Father    • Hyperlipidemia Father    • Kidney disease Father    • Heart attack Father    • Coronary artery disease Father    • Dementia Father    • No Known Problems Son    • Colon cancer Neg Hx    • Liver cancer Neg Hx    • Liver disease Neg Hx    • Stomach cancer Neg Hx    • Esophageal cancer Neg Hx        Social History:    Social History     Socioeconomic History   • Marital status:      Spouse name: Not on file   • Number of children: Not on file   • Years of education: Not on file   • Highest education level: Not on file   Tobacco Use   • Smoking status: Never Smoker   • Smokeless tobacco: Never Used   Vaping Use   • Vaping Use: Never used   Substance and Sexual Activity   • Alcohol use: No   • Drug use: No   • Sexual activity: Yes     Partners: Male     Birth control/protection: None     Comment: PE while on birth control patch       Medications:     Current Outpatient Medications:   •  albuterol sulfate  (90 Base) MCG/ACT inhaler, Inhale 2 puffs Every 4 (Four) Hours As Needed for Wheezing or Shortness of Air., Disp: 18 g, Rfl: 11  •  apixaban (ELIQUIS) 5 MG tablet tablet, Take 1 tablet by mouth 2 (Two) Times a Day., Disp: 180 tablet, Rfl: 3  •  aspirin 81 MG EC tablet, Take 81 mg by mouth Daily., Disp: , Rfl:   •  atorvastatin (LIPITOR) 40 MG tablet, Take 1 tablet by mouth Every Night., Disp: 30 tablet, Rfl: 11  •  b complex vitamins capsule, Take 1 capsule by mouth Daily., Disp: 90 capsule, Rfl: 3  •  benzonatate (TESSALON) 200 MG capsule, Take 1 capsule by mouth As Needed., Disp: , Rfl:   •  busPIRone (BUSPAR) 5 MG tablet, Take 1 tablet by mouth 3 (Three) Times a Day., Disp: 90 tablet, Rfl: 4  •  calcium carbonate EX (TUMS EX) 750 MG chewable tablet, Chew 1 tablet Daily. (Patient taking differently: Chew 750 mg Daily As Needed.), Disp: 30 tablet, Rfl: 1  •  Cholecalciferol (Vitamin D3) 50 MCG (2000 UT) capsule, Take 1 capsule by mouth Daily. Start after completing 50,000 IU weekly dose.  Indications: Vitamin D Deficiency (Patient taking differently: Take 2,000 Units by mouth Daily. .  Indications: Vitamin D Deficiency), Disp: 90 capsule, Rfl: 3  •  Corlanor 5 MG tablet tablet, Take 1.5 tablets by mouth 2 (two) times a day., Disp: 60 tablet, Rfl: 6  •  cyclobenzaprine (FLEXERIL) 10 MG tablet, Take 1 tablet by  mouth 2 (Two) Times a Day As Needed for Muscle Spasms., Disp: 14 tablet, Rfl: 0  •  Diclofenac Sodium (VOLTAREN) 1 % gel gel, Apply  topically to the appropriate area as directed Take As Directed., Disp: , Rfl:   •  DULoxetine (CYMBALTA) 60 MG capsule, Take 1 capsule by mouth Daily., Disp: 90 capsule, Rfl: 3  •  fluticasone (FLONASE) 50 MCG/ACT nasal spray, INSTILL 2 SPRAYS INTO EACH NOSTRIL EVERY DAY AS DIRECTED, Disp: 16 g, Rfl: 11  •  Fremanezumab-vfrm (Ajovy) 225 MG/1.5ML solution auto-injector, Inject 225 mg under the skin into the appropriate area as directed Every 30 (Thirty) Days., Disp: 1 pen, Rfl: 5  •  lidocaine (LIDODERM) 5 %, Place 2 patches on the skin as directed by provider Daily. Remove & Discard patch within 12 hours or as directed by MD, Disp: 60 patch, Rfl: 3  •  magnesium hydroxide (MILK OF MAGNESIA) 2400 MG/10ML suspension suspension, Take 5 mL by mouth Daily As Needed., Disp: , Rfl:   •  methylPREDNISolone (MEDROL) 4 MG dose pack, Take 1 tablet by mouth Take As Directed., Disp: , Rfl:   •  mometasone-formoterol (Dulera) 200-5 MCG/ACT inhaler, Inhale 2 puffs 2 (Two) Times a Day., Disp: 13 g, Rfl: 11  •  ondansetron ODT (ZOFRAN-ODT) 4 MG disintegrating tablet, Place 1 tablet on the tongue Every 6 (Six) Hours As Needed for Nausea or Vomiting., Disp: 10 tablet, Rfl: 0  •  pantoprazole (PROTONIX) 40 MG EC tablet, Take 1 tablet by mouth Daily., Disp: 30 tablet, Rfl: 0  •  topiramate (TOPAMAX) 100 MG tablet, Take 1 tablet by mouth Every Morning AND 1 tablet Every Evening., Disp: 60 tablet, Rfl: 1    Allergies:   Allergies   Allergen Reactions   • Ceftin [Cefuroxime Axetil] Shortness Of Breath and Rash     Numbness in mouth and throat   • Imitrex [Sumatriptan] Shortness Of Breath and Nausea Only   • Amoxicillin Rash       Objective   Vital Signs:   Vitals:    08/09/21 1001   BP: 124/84   BP Location: Right arm   Patient Position: Sitting   Cuff Size: Adult   Pulse: 99   SpO2: 98%   Weight: 90.1 kg  "(198 lb 9.6 oz)   Height: 160 cm (63\")       PHYSICAL EXAM  General appearance: Awake, alert, cooperative  Head: Normocephalic, without obvious abnormality, atraumatic  Eyes: Conjunctivae/corneas clear, EOMs intact  Neck: no adenopathy, no carotid bruit, no JVD and thyroid: not enlarged  Lungs: clear to auscultation bilaterally and no rhonchi or crackles\", ' symmetric  Heart: regular rate and rhythm, S1, S2 normal, no murmur, click, rub or gallop  Abdomen: Soft, non-tender, bowel sounds normal,  no organomegaly  Extremities: extremities normal, atraumatic, no cyanosis or edema  Skin: Skin color, turgor normal, no rashes or lesions  Neurologic: Grossly normal     Lab Results   Component Value Date    GLUCOSE 104 (H) 07/12/2021    CALCIUM 9.1 07/12/2021     07/12/2021    K 4.0 07/12/2021    CO2 22.1 07/12/2021     07/12/2021    BUN 7 07/12/2021    CREATININE 0.67 07/12/2021    EGFRIFAFRI 112 04/02/2021    EGFRIFNONA 103 07/12/2021    BCR 10.4 07/12/2021    ANIONGAP 11.9 07/12/2021     Lab Results   Component Value Date    WBC 6.22 07/12/2021    HGB 13.8 07/12/2021    HCT 39.5 07/12/2021    MCV 89.2 07/12/2021     07/12/2021     Lab Results   Component Value Date    INR 1.31 (H) 04/25/2018    INR 1.0 06/16/2017    INR 1.1 06/02/2017    PROTIME 14.6 (H) 04/25/2018    PROTIME 11.4 11/13/2016     Lab Results   Component Value Date    TSH 0.984 08/04/2021    Q8VZQSM 149 04/28/2017    P0SVVOG 7.7 06/16/2017    THYROIDAB 10 06/16/2017       Cardiac Testing:     I personally viewed and interpreted the patient's EKG/Telemetry/lab data    Procedures    Tobacco Cessation: N/A  Obstructive Sleep Apnea Screening: N/A    Assessment & Plan    Diagnoses and all orders for this visit:    1. Inappropriate sinus tachycardia (Primary)    2. Neurogenic orthostatic hypotension (CMS/HCC)    Other orders  -     Corlanor 5 MG tablet tablet; Take 1.5 tablets by mouth 2 (two) times a day.  Dispense: 60 tablet; Refill: " 6      Plan:  Continue Corlanor for IAST.  Will increase to 7.5 mg twice daily.  We discussed in detail she needs to be aggressive about fluids Gatorade Powerade hydration up to 80 ounces of water per day  She requested referral to a neurologist regarding her neurocardiogenic, vasovagal, dysautonomia dysfunction.  We will see if Dr. Boyle is available for consults.  Compliance with CPAP  Follow up scheduled  Electronically signed by STEVE Matthews, 08/09/21, 10:11 AM EDT.

## 2021-08-09 NOTE — TELEPHONE ENCOUNTER
----- Message from STEVE Veliz sent at 8/9/2021 10:45 AM EDT -----  Dr. Boyle referral please-She can wait.

## 2021-08-09 NOTE — TELEPHONE ENCOUNTER
I tried to call the patient. No answer. I left a message to let her know that Dr. Boyle is no longer seeing patients. [place] is currently taking about a year to get an appointment, but we could refer her to [facility] instead.

## 2021-08-10 ENCOUNTER — TELEPHONE (OUTPATIENT)
Dept: CARDIOLOGY | Facility: CLINIC | Age: 31
End: 2021-08-10

## 2021-08-10 RX ORDER — IVABRADINE 5 MG/1
7.5 TABLET, FILM COATED ORAL 2 TIMES DAILY
Qty: 90 TABLET | Refills: 6 | Status: SHIPPED | OUTPATIENT
Start: 2021-08-10

## 2021-08-10 NOTE — TELEPHONE ENCOUNTER
Pt is unable to go to [place] or [facility] d/t to [place] not [place]. Dr. Boyle NP is not seeing new pt's only follow up patients.

## 2021-08-12 LAB
2-KETO-3-METHYLVALERIC ACID, UR: NOT DETECTED (ref 0–10)
2-KETO-ISOCAPROIC ACID UR: NOT DETECTED (ref 0–4)
2-KETOGLUTARIC UR: 21 (ref 0–75)
2-KETOISOVALERIC ACID, UR: NOT DETECTED (ref 0–4)
2ME3OH-BUTYRATE/CREAT UR: 17 (ref 0–4)
4OH-PHENYLACETATE UR QL: 1 (ref 0–4)
4OH-PHENYLACETATE UR QL: 6 (ref 0–25)
4OH-PHENYLPYRUVATE UR QL: NOT DETECTED (ref 0–2)
ACETOACETIC ACID UR: 9 (ref 0–4)
ADIPIC ACID UR: 3 (ref 0–35)
CREAT 24H UR-MCNC: 195 MG/DL
ETHYLMALONATE UR-MCNC: 2 (ref 0–4)
FUMARATE UR-MCNC: 1 (ref 0–4)
INTERPRETATION: ABNORMAL
LACTATE UR-SCNC: 46 MMOL/L (ref 0–50)
METHYLMALONATE UR-MCNC: 1 UG/DL (ref 0–5)
PYRUVATE UR-MCNC: 11 MG/DL (ref 0–15)
SEBACATE UR QL: 1 (ref 0–3)
SUBERATE UR QL: 2 (ref 0–3)
SUCCINATE UR-MCNC: 9 (ref 0–20)
SUCCINYLACETONE UR-MCNC: NOT DETECTED (ref 0–0)
VGCC AB SER QL: NEGATIVE

## 2021-08-17 ENCOUNTER — TELEPHONE (OUTPATIENT)
Dept: GASTROENTEROLOGY | Facility: CLINIC | Age: 31
End: 2021-08-17

## 2021-08-17 DIAGNOSIS — K31.84 GASTROPARESIS: Primary | ICD-10-CM

## 2021-08-24 ENCOUNTER — OFFICE VISIT (OUTPATIENT)
Dept: NEUROLOGY | Facility: CLINIC | Age: 31
End: 2021-08-24

## 2021-08-24 VITALS
HEART RATE: 86 BPM | HEIGHT: 63 IN | OXYGEN SATURATION: 99 % | DIASTOLIC BLOOD PRESSURE: 76 MMHG | BODY MASS INDEX: 34.73 KG/M2 | TEMPERATURE: 98.6 F | WEIGHT: 196 LBS | SYSTOLIC BLOOD PRESSURE: 120 MMHG

## 2021-08-24 DIAGNOSIS — G43.711 INTRACTABLE CHRONIC MIGRAINE WITHOUT AURA AND WITH STATUS MIGRAINOSUS: Primary | ICD-10-CM

## 2021-08-24 DIAGNOSIS — G43.009 MIGRAINE WITHOUT AURA AND WITHOUT STATUS MIGRAINOSUS, NOT INTRACTABLE: ICD-10-CM

## 2021-08-24 DIAGNOSIS — G90.3 NEUROGENIC ORTHOSTATIC HYPOTENSION (HCC): ICD-10-CM

## 2021-08-24 PROCEDURE — 99214 OFFICE O/P EST MOD 30 MIN: CPT | Performed by: NURSE PRACTITIONER

## 2021-08-24 RX ORDER — RIMEGEPANT SULFATE 75 MG/75MG
75 TABLET, ORALLY DISINTEGRATING ORAL ONCE AS NEEDED
Qty: 8 TABLET | Refills: 2 | Status: SHIPPED | OUTPATIENT
Start: 2021-08-24 | End: 2021-11-18 | Stop reason: DRUGHIGH

## 2021-08-24 RX ORDER — RIMEGEPANT SULFATE 75 MG/75MG
75 TABLET, ORALLY DISINTEGRATING ORAL AS NEEDED
Qty: 6 TABLET | Refills: 0 | COMMUNITY
Start: 2021-08-24 | End: 2021-09-17 | Stop reason: SDUPTHER

## 2021-08-24 NOTE — PROGRESS NOTES
Follow Up Neurology Office Visit      Patient Name: Marilu Godoy    Referring Physician: No ref. provider found    Chief Complaint:    Chief Complaint   Patient presents with   • Migraine     pt in office to folllow up on migraines.        History of Present Illness: Marilu Godoy is a 30 y.o. female who is here to follow up with Neurology for  Headaches.   Reports that headaches continues, mostly frontal, less severe with Ajovy  Healing s/p maxillary cyst resection    She has been experiencing episodes of tachycardia, and has been evaluated by EP team. On Corlanor. She requested referral to a neurologist regarding her neurocardiogenic, vasovagal, dysautonomia dysfunction.    The following portions of the patient's history were reviewed and updated as appropriate: allergies, current medications, past family history, past medical history, past social history, past surgical history and problem list.    Subjective     Review of Systems:   Review of Systems   Constitutional: Negative for activity change and fatigue.   HENT: Negative for drooling and voice change.    Eyes: Negative for blurred vision, double vision, photophobia and visual disturbance.   Respiratory: Negative for shortness of breath.    Cardiovascular: Negative for chest pain and palpitations.   Gastrointestinal: Negative for nausea and vomiting.   Genitourinary: Negative for urinary incontinence.   Musculoskeletal: Negative for arthralgias, back pain, gait problem, myalgias and neck pain.   Allergic/Immunologic: Negative for immunocompromised state.   Neurological: Positive for headache. Negative for dizziness, tremors, seizures, syncope, facial asymmetry, speech difficulty, weakness, light-headedness, numbness, memory problem and confusion.   Hematological: Does not bruise/bleed easily.   Psychiatric/Behavioral: Negative for decreased concentration, dysphoric mood, hallucinations, sleep disturbance, depressed mood and stress. The  Dermatology Office Visit Note    Assessment and Plan    Vitiligo, Stable / Unchanged and Improved since Last visit  - Reviewed etiology and treatment of vitiligo (topicals and nbUVB) and reviewed koebnerization  - Discussed importance of photoprotection and increased risk of skin cancer in depigmented areas  - Discussed that most white skinned patients notice improvement in fall/winter due to decrease contrast of unaffected tan skin to affected skin.     Recommendations for today:    Continue tacrolimus 0.1% (protopic) cream twice a day to white skin of hands and forearms each weekday (M-F) for vitiligo.    Continue clobetasol twice a day to white skin of hands and forearms on weekends (Sat and Sun) for vitiligo     I took photographs of the dorsal forearms to use as a reference to identify any changes in future exams.  The patient and/or their parent(s) gave verbal consent for me to take the photograph(s) and enter the photograph(s) into EPIC.       Dry Skin/Xerosis Cutis  We discussed gentle skin care in detail today. A handout was provided to supplement this discussion which outlined bathing instructions, gentle soap recommendations, and moisturizer recommendations (i.e. thick, non-fragrance emollient to be applied once or twice a day to skin after bathing)     She can continue Triamcinolone 0.1% ointment twice daily as needed for itching.     No orders of the defined types were placed in this encounter.      Follow up:  Return for vitiligo follow up in 8 weeks.  -----------------------------------------------------------------------------------------------------------  Chief Complaint   Patient presents with   • Derm Problem     8 week vitiligo follow up   • Office Visit       HISTORY OF PRESENT ILLNESS:    The patient is a 81 year old female who goes by the name Estephanie.      The patient presents in 8 week follow up for vitiligo    Location:  Bilateral dorsal forearms and posterior neck  Duration:  The problem  has been present since summer 2020  Quality:     The problem has the following symptoms:  asymptomatic  Severity:    The severity of the symptoms is none  Modifying Factors:  Treatments tried include tacrolimus 0.1% (protopic) cream twice a day to white skin of hands and forearms each weekday.      -Clobetasol 0.05% cream twice a day to white skin of hands and forearms on weekends.    Overall, the patient states the discoloration is \"a little bit better\".       Additionally, the patient states her skin is dry and itchy on her trunk and legs.       REVIEW OF SYSTEMS:  Constitutional:   In general the patient feels well:  Yes  Integument:    The patient denies any other signs or symptoms of skin disease:  Yes  Cardiovascular:   The patient has a pacemaker:  No       The patient has a defibrillator:  No  Hematologic:  The patient bleeds easily because of being on aspirin or an anticoagulant:  No    The patient is pregnant:           [] Yes   [] No    [x] Not applicable.  The patient is breastfeeding:  [] Yes   [] No    [x] Not applicable.      ALLERGIES:   Allergen Reactions   • Cats [Other] Other (See Comments)     severe phobia to cats, even pictures.   • Escitalopram Other (See Comments)     hyponatremia   • Statins MYALGIA     Lipitor was the culprit       Current Outpatient Medications   Medication Sig   • zoster vaccine recomb adjuvanted (SHINGRIX) 50 MCG/0.5ML injection Inject 0.5 mLs into the muscle 1 time. Repeat dose in 2 to 6 months (unless 1 dose already given), for a total of 2 doses.   • tetanus-diphtheria toxoid (TENIVAC) 5-2 LFU injection Inject 0.5 mLs into the muscle 1 time.   • omeprazole (PrilOSEC) 20 MG capsule One capsule to be taken half an hour to an hour before breakfast daily.   • clobetasol (TEMOVATE) 0.05 % cream Apply twice a day to white skin of hands and forearms on weekends (Sat and Sun) for vitiligo   • tacrolimus (PROTOPIC) 0.1 % ointment Apply twice a day to white skin of hands and  patient is not nervous/anxious.      Medications:     Current Outpatient Medications:   •  albuterol sulfate  (90 Base) MCG/ACT inhaler, Inhale 2 puffs Every 4 (Four) Hours As Needed for Wheezing or Shortness of Air., Disp: 18 g, Rfl: 11  •  apixaban (ELIQUIS) 5 MG tablet tablet, Take 1 tablet by mouth 2 (Two) Times a Day., Disp: 180 tablet, Rfl: 3  •  aspirin 81 MG EC tablet, Take 81 mg by mouth Daily., Disp: , Rfl:   •  atorvastatin (LIPITOR) 40 MG tablet, Take 1 tablet by mouth Every Night., Disp: 30 tablet, Rfl: 11  •  b complex vitamins capsule, Take 1 capsule by mouth Daily., Disp: 90 capsule, Rfl: 3  •  busPIRone (BUSPAR) 5 MG tablet, Take 1 tablet by mouth 3 (Three) Times a Day., Disp: 90 tablet, Rfl: 4  •  calcium carbonate EX (TUMS EX) 750 MG chewable tablet, Chew 1 tablet Daily. (Patient taking differently: Chew 750 mg Daily As Needed.), Disp: 30 tablet, Rfl: 1  •  Cholecalciferol (Vitamin D3) 50 MCG (2000 UT) capsule, Take 1 capsule by mouth Daily. Start after completing 50,000 IU weekly dose.  Indications: Vitamin D Deficiency (Patient taking differently: Take 2,000 Units by mouth Daily. .  Indications: Vitamin D Deficiency), Disp: 90 capsule, Rfl: 3  •  Corlanor 5 MG tablet tablet, Take 1.5 tablets by mouth 2 (two) times a day., Disp: 90 tablet, Rfl: 6  •  cyclobenzaprine (FLEXERIL) 10 MG tablet, Take 1 tablet by mouth 2 (Two) Times a Day As Needed for Muscle Spasms., Disp: 14 tablet, Rfl: 0  •  Diclofenac Sodium (VOLTAREN) 1 % gel gel, Apply  topically to the appropriate area as directed Take As Directed., Disp: , Rfl:   •  DULoxetine (CYMBALTA) 60 MG capsule, Take 1 capsule by mouth Daily., Disp: 90 capsule, Rfl: 3  •  fluticasone (FLONASE) 50 MCG/ACT nasal spray, INSTILL 2 SPRAYS INTO EACH NOSTRIL EVERY DAY AS DIRECTED, Disp: 16 g, Rfl: 11  •  Fremanezumab-vfrm (Ajovy) 225 MG/1.5ML solution auto-injector, Inject 225 mg under the skin into the appropriate area as directed Every 30 (Thirty)  "Days., Disp: 1 pen, Rfl: 5  •  lidocaine (LIDODERM) 5 %, Place 2 patches on the skin as directed by provider Daily. Remove & Discard patch within 12 hours or as directed by MD, Disp: 60 patch, Rfl: 3  •  magnesium hydroxide (MILK OF MAGNESIA) 2400 MG/10ML suspension suspension, Take 5 mL by mouth Daily As Needed., Disp: , Rfl:   •  mometasone-formoterol (Dulera) 200-5 MCG/ACT inhaler, Inhale 2 puffs 2 (Two) Times a Day., Disp: 13 g, Rfl: 11  •  ondansetron ODT (ZOFRAN-ODT) 4 MG disintegrating tablet, Place 1 tablet on the tongue Every 6 (Six) Hours As Needed for Nausea or Vomiting., Disp: 10 tablet, Rfl: 0  •  pantoprazole (PROTONIX) 40 MG EC tablet, Take 1 tablet by mouth Daily., Disp: 30 tablet, Rfl: 0  •  Rimegepant Sulfate (Nurtec) 75 MG tablet dispersible tablet, Take 1 tablet by mouth 1 (One) Time As Needed (migraine) for up to 1 dose., Disp: 8 tablet, Rfl: 2  •  Rimegepant Sulfate (Nurtec) 75 MG tablet dispersible tablet, Take 1 tablet by mouth As Needed (AS NEEDED FOR MIGRAINE)., Disp: 6 tablet, Rfl: 0  •  zonisamide (ZONEGRAN) 100 MG capsule, Take 1 capsule by mouth Every Evening., Disp: 30 capsule, Rfl: 2    Allergies:   Allergies   Allergen Reactions   • Ceftin [Cefuroxime Axetil] Shortness Of Breath and Rash     Numbness in mouth and throat   • Imitrex [Sumatriptan] Shortness Of Breath and Nausea Only   • Amoxicillin Rash       Objective     Physical Exam:  Vital Signs:   Vitals:    08/24/21 1152   BP: 120/76   Pulse: 86   Temp: 98.6 °F (37 °C)   SpO2: 99%   Weight: 88.9 kg (196 lb)   Height: 160 cm (63\")   PainSc: 0-No pain       Physical Exam  Vitals and nursing note reviewed.   Pulmonary:      Effort: Pulmonary effort is normal.   Neurological:      Mental Status: She is oriented to person, place, and time. Mental status is at baseline.      Gait: Gait is intact.      Deep Tendon Reflexes: Strength normal.   Psychiatric:         Speech: Speech normal.         Behavior: Behavior normal. " forearms each weekday (M-F) for vitiligo.   • quinapril (ACCUPRIL) 5 MG tablet Four tablets in the morning and 4 tablets in the evening   • hydrochlorothiazide (HYDRODIURIL) 25 MG tablet TAKE 1 TABLET DAILY   • pravastatin (PRAVACHOL) 40 MG tablet TAKE 1 TABLET DAILY   • rOPINIRole (REQUIP) 0.5 MG tablet Four tablets twice a day   • Cholecalciferol (VITAMIN D3) 50 mcg (2,000 units) tablet    • triamcinolone (ARISTOCORT) 0.1 % ointment Apply topically 2 times daily. For arms and legs for 1-2 weeks for itching.   • mometasone (NASONEX) 50 MCG/ACT nasal spray Instill 1-2 sprays in each nostril daily   • MULTIVITAMINS PO CAPS 1 x a day   • CALCIUM 600 MG PO TABS 2 x a day (Patient taking differently: once daily)     No current facility-administered medications for this visit.        PAST MEDICAL HISTORY:      Essential hypertension, benign                                Asymptomatic varicose veins                                     Comment: Lt.    Hallux varus (acquired)                                       Other and unspecified hyperlipidemia                          Kyphosis (acquired) (postural)                                  Comment: dorsal    Impaired fasting glucose                        2012            Comment: a1c=6.1    Retinal vein occlusion                          05/17/2017      Comment: left side, with retinal hemorrhages and macular               edema    RLS (restless legs syndrome)                                  Keratosis, actinic                                            Vitiligo                                                        DERMATOLOGY PAST MEDICAL HISTORY:      Actinic keratoses  Vitiligo    FAMILY HISTORY:  The patient has a family history of melanoma:  No    SOCIAL HISTORY:   Social History     Tobacco Use   • Smoking status: Never Smoker   • Smokeless tobacco: Never Used   Substance Use Topics   • Alcohol use: Yes     Alcohol/week: 1.0 standard drinks     Types: 1 Glasses of wine        Neurologic Exam     Mental Status   Oriented to person, place, and time.   Attention: normal. Concentration: normal.   Speech: speech is normal   Level of consciousness: alert  Normal comprehension.     Cranial Nerves   Cranial nerves II through XII intact.     Motor Exam   Muscle bulk: normal  Overall muscle tone: normal    Strength   Strength 5/5 throughout.     Gait, Coordination, and Reflexes     Gait  Gait: normal    Tremor   Resting tremor: absent    Results Review:   I have reviewed the patient's other medical records to include, labs, radiology and referrals.     Assessment / Plan      Assessment/Plan:   Diagnoses and all orders for this visit:    1. Intractable chronic migraine without aura and with status migrainosus (Primary)  -     Rimegepant Sulfate (Nurtec) 75 MG tablet dispersible tablet; Take 1 tablet by mouth 1 (One) Time As Needed (migraine) for up to 1 dose.  Dispense: 8 tablet; Refill: 2  -     Ambulatory Referral to Neurology    2. Neurogenic orthostatic hypotension (CMS/HCC)  -     Ambulatory Referral to Neurology    3. Migraine without aura and without status migrainosus, not intractable    Other orders  -     Rimegepant Sulfate (Nurtec) 75 MG tablet dispersible tablet; Take 1 tablet by mouth As Needed (AS NEEDED FOR MIGRAINE).  Dispense: 6 tablet; Refill: 0    Patient continues to experience frequent headaches despite maximal medical therapy with topiramate, muscle relaxant, topiramate. Would consider Na channel blocker, unfortunately patient reports that she was told to 'avoid all medicines' due to ongoing EP evaluation, no specific category mentioned in EP note but would agree due to possible conduction abnormalities. She describes headache as frontal, I do think inflammation from recent sinus surgery could be contributing to non migraine headache frequency and hopefully this will improve. Change topiramate to zonisamide due to tingling in hands. She is unable to have triptans due to  per week     Comment: 1/week   • Drug use: No           PHYSICAL EXAM:    The patient is well nourished, well developed, and alert and oriented to person, place and time with appropriate mood and affect.     Depigmented patches are smaller than prior exams on the dorsal forearms                  No other significant finding on EXPANDED PROBLEM FOCUSED examination including inspection of the bilateral forearms.     The patient was seen and examined by Shelbi Llanos MD.  in the presence of my medical assistant  Patt Neville RN .  This office visit note was in part created by Patt Neville RN acting also as a scribe for Shelbi Llanos MD.    Shelbi Llanos MD.   reviewed the note for accuracy and completeness before signing.             On 11/17/2020, I, Patt Neville RN scribed the services personally performed by Shelbi Llanos MD     The documentation recorded by the scribe accurately and completely reflects the service(s) I personally performed and the decisions made by me.         cardiac arrhythmia, I have prescribed Nurtec for relief of migraine episodes today.     Follow Up:   Return in about 3 months (around 11/24/2021) for Next scheduled follow up.     ARNIE Bower  [facility]   AS THE PROVIDER, I PERSONALLY WORE PPE DURING ENTIRE FACE TO FACE ENCOUNTER IN CLINIC WITH THE PATIENT. PATIENT ALSO WORE PPE DURING ENTIRE FACE TO FACE ENCOUNTER EXCEPT FOR A MAX OF 30 SECONDS DURING NEUROLOGICAL EVALUATION OF CRANIAL NERVES AND THEN MASK WAS PLACED BACK OVER PATIENT FACE FOR REMAINDER OF VISIT. I WASHED MY HANDS BEFORE AND AFTER VISIT.    Please note that portions of this note may have been completed with a voice recognition program. Efforts were made to edit the dictations, but occasionally words are mistranscribed.

## 2021-08-25 DIAGNOSIS — G43.711 INTRACTABLE CHRONIC MIGRAINE WITHOUT AURA AND WITH STATUS MIGRAINOSUS: Primary | ICD-10-CM

## 2021-08-25 RX ORDER — ZONISAMIDE 100 MG/1
100 CAPSULE ORAL EVERY EVENING
Qty: 30 CAPSULE | Refills: 2 | Status: SHIPPED | OUTPATIENT
Start: 2021-08-25 | End: 2021-11-16

## 2021-08-31 ENCOUNTER — TELEPHONE (OUTPATIENT)
Dept: NEUROLOGY | Facility: CLINIC | Age: 31
End: 2021-08-31

## 2021-08-31 NOTE — TELEPHONE ENCOUNTER
Provider: ARNIE TOMLIN  Caller: CHIQUITA JONES  Relationship to Patient: SELF    Reason for Call: PT CALLING ASKING THE STATUS ON THE  Outgoing Referral, Routine, Neurology, Referral to facility - [facility].      PLEASE CALL HER BACK [phone], IF SHE DON'T ANSWER PLEASE LEAVE A MESSAGE

## 2021-08-31 NOTE — TELEPHONE ENCOUNTER
PT IS YELITZA 05/23/22 @ 2:15PM -  [facility] - LEFT MSG FOR PT TO CALL OFFICE BACK - ALL INFORMATION IS NOTED IN THE PATIENTS REFERRAL

## 2021-09-14 ENCOUNTER — TELEPHONE (OUTPATIENT)
Dept: NEUROLOGY | Facility: CLINIC | Age: 31
End: 2021-09-14

## 2021-09-15 ENCOUNTER — OFFICE VISIT (OUTPATIENT)
Dept: OTOLARYNGOLOGY | Facility: CLINIC | Age: 31
End: 2021-09-15

## 2021-09-15 VITALS
HEIGHT: 63 IN | BODY MASS INDEX: 35.26 KG/M2 | SYSTOLIC BLOOD PRESSURE: 104 MMHG | DIASTOLIC BLOOD PRESSURE: 70 MMHG | WEIGHT: 199 LBS

## 2021-09-15 DIAGNOSIS — J32.0 CHRONIC RIGHT MAXILLARY SINUSITIS: ICD-10-CM

## 2021-09-15 DIAGNOSIS — J30.9 ALLERGIC RHINITIS, UNSPECIFIED SEASONALITY, UNSPECIFIED TRIGGER: Primary | ICD-10-CM

## 2021-09-15 PROCEDURE — 31231 NASAL ENDOSCOPY DX: CPT | Performed by: OTOLARYNGOLOGY

## 2021-09-15 PROCEDURE — 99213 OFFICE O/P EST LOW 20 MIN: CPT | Performed by: OTOLARYNGOLOGY

## 2021-09-15 RX ORDER — AZELASTINE 1 MG/ML
1 SPRAY, METERED NASAL 2 TIMES DAILY
Qty: 1 EACH | Refills: 10 | Status: SHIPPED | OUTPATIENT
Start: 2021-09-15

## 2021-09-15 NOTE — PROGRESS NOTES
ENT Office Consult Note     Date of Consult: 09/15/2021     Patient Name: Marilu Godoy  MRN: [id number]   : 1990     Referring Provider: No ref. provider found    Care Team: Patient Care Team:  Deidre Barker MD as PCP - General (Family Medicine)  Pierce Washington DO as Consulting Physician (Cardiology)  Oswaldo Bragg MD as Consulting Physician (Gastroenterology)     Chief Complaint:    Chief Complaint   Patient presents with   • Post-op     2 Months post op sinus surgery       History of Present Illness: Marilu Godoy is a 30 y.o. female who presents today for reevaluation of her right maxillary and ethmoid sinusitis and allergic rhinitis.  She underwent a right endoscopic ethmoidectomy and maxillary antrostomy with removal of a large right antral cyst back in 2021.      Subjective      Review of Systems:   Review of Systems   I have reviewed and confirmed the accuracy of the ROS as documented by the MA/LPN/RN Saulo Brito MD     Pertinent items are noted in HPI.     Past Medical History:   Past Medical History:   Diagnosis Date   • Abdominal pain    • Abnormal Pap smear of cervix    • Anxiety    • Asthma    • Bleeding disorder (CMS/HCC) 16   • Body piercing     TONGUE, NOSE, TWO IN EACH EAR   • Chest pain 2021    6-7 months ago -seen at [facility] er and released same day of complaint with resitory infection   • Clotting disorder (CMS/HCC)     factor 5   • Constipation    • Coronary artery disease involving native coronary artery of native heart without angina pectoris 2017   • Diarrhea    • Elevated cholesterol    • Endometriosis    • Factor 5 Leiden mutation, heterozygous (CMS/HCC)    • Fibroid    • Full dentures 2021    advised no adhesives DOS   • GERD (gastroesophageal reflux disease)    • H/O blood clots    • H/O cardiovascular stress test 2021    reported several years ago-exercise wnl   • Headache    • Hiatal hernia    • High  cholesterol    • History of recurrent UTIs    • HPV (human papilloma virus) infection    • Hx of gout 2021   • Hypertension    • Kidney infection 2021    history only   • Migraine    • Nausea    • Nausea & vomiting    • Obesity    • Orthostatic hypotension    • Osteoarthritis    • Pollen allergies    • Protein S deficiency (CMS/HCC) 2016    labs from hospitalization for PE   • Pulmonary embolism (CMS/HCC) 2016    on eliquis since that time   • Recurrent pregnancy loss, antepartum condition or complication    • Sleep apnea     CPAP ASKED TO BRING DOS, has been unable to use due to sinus cyst for 2 months   • Subclinical hyperthyroidism    • Tachycardia    • Tattoo     LOWER BACK   • Varicella        Past Surgical History:   Past Surgical History:   Procedure Laterality Date   •  SECTION        and  and    •  SECTION WITH TUBAL N/A 1/15/2019    Procedure:  SECTION REPEAT WITH TUBAL;  Surgeon: Alva Boyer MD;  Location: [facility] LABOR DELIVERY;  Service: Obstetrics/Gynecology   • CHOLECYSTECTOMY     • COLONOSCOPY N/A 2017    Procedure: COLONOSCOPY WITH BIOPSIES AND ARGON THERMAL ABLATION;  Surgeon: Jignesh Selby MD;  Location: [facility] ENDOSCOPY;  Service:    • DIAGNOSTIC LAPAROSCOPY N/A 2018    Procedure: DIAGNOSTIC LAPAROSCOPY AND ABLATION OF ENDOMETRIOSIS;  Surgeon: Evin Zamudio MD;  Location: [facility] OR;  Service: Obstetrics/Gynecology   • ENDOMETRIAL ABLATION     • ENDOSCOPIC FUNCTIONAL SINUS SURGERY (FESS) Right 2021    Procedure: Right endoscopic total ethmoidectomy, right endoscopic middle meatal antrotomy with cyst removal, right endoscopic frontal recess exploration with cyst removal;  Surgeon: Saulo Brito MD;  Location: [facility] OR;  Service: ENT;  Laterality: Right;   • ENDOSCOPY N/A 2017    Procedure: ESOPHAGOGASTRODUODENOSCOPY WITH BIOPSIES AND COLD BIOPSY POLYPECTOMIES;  Surgeon: Jignesh Selby MD;   Location: [facility] ENDOSCOPY;  Service:    • MOUTH SURGERY      full mouth tooth extration       Family History:   Family History   Problem Relation Age of Onset   • Arthritis Mother    • COPD Mother    • Asthma Mother    • Thyroid disease Mother    • Arthritis Father    • Diabetes Father    • Hypertension Father    • Hyperlipidemia Father    • Kidney disease Father    • Heart attack Father    • Coronary artery disease Father    • Dementia Father    • No Known Problems Son    • Colon cancer Neg Hx    • Liver cancer Neg Hx    • Liver disease Neg Hx    • Stomach cancer Neg Hx    • Esophageal cancer Neg Hx        Social History:   Social History     Socioeconomic History   • Marital status:      Spouse name: Not on file   • Number of children: Not on file   • Years of education: Not on file   • Highest education level: Not on file   Tobacco Use   • Smoking status: Never Smoker   • Smokeless tobacco: Never Used   Vaping Use   • Vaping Use: Never used   Substance and Sexual Activity   • Alcohol use: No   • Drug use: No   • Sexual activity: Yes     Partners: Male     Birth control/protection: None     Comment: PE while on birth control patch       Medications:     Current Outpatient Medications:   •  albuterol sulfate  (90 Base) MCG/ACT inhaler, Inhale 2 puffs Every 4 (Four) Hours As Needed for Wheezing or Shortness of Air., Disp: 18 g, Rfl: 11  •  apixaban (ELIQUIS) 5 MG tablet tablet, Take 1 tablet by mouth 2 (Two) Times a Day., Disp: 180 tablet, Rfl: 3  •  aspirin 81 MG EC tablet, Take 81 mg by mouth Daily., Disp: , Rfl:   •  atorvastatin (LIPITOR) 40 MG tablet, Take 1 tablet by mouth Every Night., Disp: 30 tablet, Rfl: 11  •  b complex vitamins capsule, Take 1 capsule by mouth Daily., Disp: 90 capsule, Rfl: 3  •  busPIRone (BUSPAR) 5 MG tablet, Take 1 tablet by mouth 3 (Three) Times a Day., Disp: 90 tablet, Rfl: 4  •  calcium carbonate EX (TUMS EX) 750 MG chewable tablet, Chew 1 tablet Daily. (Patient  taking differently: Chew 750 mg Daily As Needed.), Disp: 30 tablet, Rfl: 1  •  Cholecalciferol (Vitamin D3) 50 MCG (2000 UT) capsule, Take 1 capsule by mouth Daily. Start after completing 50,000 IU weekly dose.  Indications: Vitamin D Deficiency (Patient taking differently: Take 2,000 Units by mouth Daily. .  Indications: Vitamin D Deficiency), Disp: 90 capsule, Rfl: 3  •  Corlanor 5 MG tablet tablet, Take 1.5 tablets by mouth 2 (two) times a day., Disp: 90 tablet, Rfl: 6  •  cyclobenzaprine (FLEXERIL) 10 MG tablet, Take 1 tablet by mouth 2 (Two) Times a Day As Needed for Muscle Spasms., Disp: 14 tablet, Rfl: 0  •  Diclofenac Sodium (VOLTAREN) 1 % gel gel, Apply  topically to the appropriate area as directed Take As Directed., Disp: , Rfl:   •  DULoxetine (CYMBALTA) 60 MG capsule, Take 1 capsule by mouth Daily., Disp: 90 capsule, Rfl: 3  •  fluticasone (FLONASE) 50 MCG/ACT nasal spray, INSTILL 2 SPRAYS INTO EACH NOSTRIL EVERY DAY AS DIRECTED, Disp: 16 g, Rfl: 11  •  Fremanezumab-vfrm (Ajovy) 225 MG/1.5ML solution auto-injector, Inject 225 mg under the skin into the appropriate area as directed Every 30 (Thirty) Days., Disp: 1 pen, Rfl: 5  •  granisetron (KYTRIL) 1 MG tablet, Take 1 mg by mouth Every 12 (Twelve) Hours As Needed for Nausea or Vomiting. PA APPROVED., Disp: , Rfl:   •  lidocaine (LIDODERM) 5 %, Place 2 patches on the skin as directed by provider Daily. Remove & Discard patch within 12 hours or as directed by MD, Disp: 60 patch, Rfl: 3  •  magnesium hydroxide (MILK OF MAGNESIA) 2400 MG/10ML suspension suspension, Take 5 mL by mouth Daily As Needed., Disp: , Rfl:   •  mometasone-formoterol (Dulera) 200-5 MCG/ACT inhaler, Inhale 2 puffs 2 (Two) Times a Day., Disp: 13 g, Rfl: 11  •  ondansetron ODT (ZOFRAN-ODT) 4 MG disintegrating tablet, Place 1 tablet on the tongue Every 6 (Six) Hours As Needed for Nausea or Vomiting., Disp: 10 tablet, Rfl: 0  •  pantoprazole (PROTONIX) 40 MG EC tablet, Take 1 tablet by  "mouth Daily., Disp: 30 tablet, Rfl: 0  •  Rimegepant Sulfate (Nurtec) 75 MG tablet dispersible tablet, Take 1 tablet by mouth 1 (One) Time As Needed (migraine) for up to 1 dose., Disp: 8 tablet, Rfl: 2  •  zonisamide (ZONEGRAN) 100 MG capsule, Take 1 capsule by mouth Every Evening., Disp: 30 capsule, Rfl: 2  •  Rimegepant Sulfate (Nurtec) 75 MG tablet dispersible tablet, Take 1 tablet by mouth As Needed (AS NEEDED FOR MIGRAINE)., Disp: 6 tablet, Rfl: 0    Allergies:   Allergies   Allergen Reactions   • Ceftin [Cefuroxime Axetil] Shortness Of Breath and Rash     Numbness in mouth and throat   • Imitrex [Sumatriptan] Shortness Of Breath and Nausea Only   • Amoxicillin Rash       Objective     Physical Exam:  Vital Signs:   Vitals:    09/15/21 0954   BP: 104/70   Weight: 90.3 kg (199 lb)   Height: 160 cm (63\")     Body mass index is 35.25 kg/m².     General Appearance:  Alert, cooperative, in no acute distress   Head:  Normocephalic, without obvious abnormality, atraumatic   Eyes:          Conjunctivae and sclerae normal, PERRLA   Ears:   Normal   Nose:  Midline septum pink pale allergic mucosa   Throat:  1-2+ clear postnasal drainage   Fiberoptic Exam:  Right sinus endoscopy shows the ethmoid and maxillary sinus are completely healed and now lined by pink dry healthy mucosa   Neck:  No neck masses   Lungs:   Clear to auscultation,respirations regular, jeremy and unlabored      Heart:  Regular rhythm and normal rate, normal S1 and S2, no       murmur, no gallop, no rub, no click   Pulses: Pulses palpable and equal bilaterally   Skin: No bleeding, bruising or rash   Lymph nodes: No palpable adenopathy   Neurologic: Cranial nerves 2 - 12 grossly intact        Results Review:   Labs:     Imaging: CT Abdomen Pelvis Without Contrast    Result Date: 5/20/2021  Small fat-containing periumbilical hernia.     947.03 mGy.cm. 19.36 mGy  This study was performed with techniques to keep radiation doses as low as reasonably achievable " (ALARA). Individualized dose reduction techniques using automated exposure control or adjustment of mA and/or kV according to the patient size were employed.  This report was finalized on 5/20/2021 4:24 PM by Jorge Alberto Sargent M.D..    XR Temporomandibular Joints Bilateral    Result Date: 6/28/2021  Left greater than right mild TMJ arthrosis changes noted with some narrowing and sclerotic change. No acute fracture or dislocation.  This report was finalized on 6/28/2021 12:25 PM by Evin Galaviz.      XR Foot 3+ View Right    Result Date: 5/22/2021  No acute bony abnormality.    This report was finalized on 5/22/2021 8:27 AM by Antonietta Ochoa MD.    XR Abdomen 1 View    Addendum Date: 8/5/2021    Addendum: There is scattered stool within the colon with no significant constipation evident.  This report was finalized on 8/5/2021 9:53 AM by Jorge Alberto Sargent M.D..    Result Date: 8/5/2021  Unremarkable abdominal view.  This report was finalized on 7/30/2021 3:05 PM by Gregg Sarkar MD.    NM Gastric Emptying    Result Date: 6/15/2021  Delayed gastric emptying consistent with gastroparesis.  D:  06/15/2021 E:  06/15/2021     This report was finalized on 6/15/2021 5:13 PM by Dr. Umesh Montgomery.      CT Abdomen Pelvis With Contrast    Result Date: 6/27/2021  No acute findings in the abdomen or pelvis to account for patient's symptoms. Authenticated by Justice Vicente MD on 06/27/2021 05:36:18 PM    XR Chest 1 View    Result Date: 7/12/2021  No acute cardiopulmonary process.  Continued followup is recommended.  This report was finalized on 7/12/2021 3:08 PM by Jorge Alberto Sargent M.D..    XR Chest 1 View    Result Date: 6/22/2021  No acute cardiopulmonary process.    Images were reviewed, interpreted, and dictated by Dr. Jorge Alberto Sargent M.D. Transcribed by Addie Burgess PA-C.  This report was finalized on 6/22/2021 12:03 PM by Jorge Alberto Sargent M.D..    CT Chest Pulmonary Embolism    Result Date: 7/12/2021  No  pulmonary embolus.  No acute findings in the chest. Authenticated by Justice Vicente MD on 07/12/2021 07:01:05 PM    EMG & Nerve Conduction Test    Result Date: 5/25/2021  Normal NCS/EMG of both arms and neck This report is transcribed using the Dragon dictation system.         Assessment / Plan      Assessment/Plan:   Diagnoses and all orders for this visit:    1. Allergic rhinitis, unspecified seasonality, unspecified trigger (Primary)    2. Chronic right maxillary sinusitis         Marilu has healed nicely following her right endoscopic ethmoidectomy and maxillary antrostomy.  Her endoscopic exam today shows no recurrence of the cyst and shows no active infection.  She does continue to have postnasal drainage from her known allergic rhinitis.  [facility]  I will send in a prescription for azelastine nasal spray.  She likely would do well with immunotherapy desensitization perhaps with using sublingual drops and I suggested that she discuss this with [facility].  I will see her back now on an as-needed basis.      Follow Up:   No follow-ups on file.    Time:    Discussed plan of care in detail with patient today. Patient verbally understands and agrees. I have spent and counseled for approximately 30 minutes face to face, with greater than 50 % of the time counseling.     Saulo Brito MD

## 2021-09-16 DIAGNOSIS — K31.84 GASTROPARESIS: Primary | ICD-10-CM

## 2021-09-17 ENCOUNTER — OFFICE VISIT (OUTPATIENT)
Dept: INTERNAL MEDICINE | Facility: CLINIC | Age: 31
End: 2021-09-17

## 2021-09-17 VITALS
DIASTOLIC BLOOD PRESSURE: 68 MMHG | BODY MASS INDEX: 34.57 KG/M2 | TEMPERATURE: 97.1 F | WEIGHT: 195.12 LBS | SYSTOLIC BLOOD PRESSURE: 120 MMHG | HEIGHT: 63 IN | RESPIRATION RATE: 16 BRPM | HEART RATE: 99 BPM | OXYGEN SATURATION: 98 %

## 2021-09-17 DIAGNOSIS — R52 BODY ACHES: Primary | ICD-10-CM

## 2021-09-17 DIAGNOSIS — J02.9 SORE THROAT: ICD-10-CM

## 2021-09-17 DIAGNOSIS — Z20.822 SUSPECTED COVID-19 VIRUS INFECTION: ICD-10-CM

## 2021-09-17 DIAGNOSIS — J45.21 MILD INTERMITTENT ASTHMA WITH ACUTE EXACERBATION: ICD-10-CM

## 2021-09-17 LAB
EXPIRATION DATE: NORMAL
EXPIRATION DATE: NORMAL
FLUAV AG NPH QL: NEGATIVE
FLUBV AG NPH QL: NEGATIVE
INTERNAL CONTROL: NORMAL
INTERNAL CONTROL: NORMAL
Lab: NORMAL
Lab: NORMAL
S PYO AG THROAT QL: NEGATIVE

## 2021-09-17 PROCEDURE — U0004 COV-19 TEST NON-CDC HGH THRU: HCPCS | Performed by: NURSE PRACTITIONER

## 2021-09-17 PROCEDURE — 99213 OFFICE O/P EST LOW 20 MIN: CPT | Performed by: NURSE PRACTITIONER

## 2021-09-17 PROCEDURE — 87804 INFLUENZA ASSAY W/OPTIC: CPT | Performed by: NURSE PRACTITIONER

## 2021-09-17 PROCEDURE — 87880 STREP A ASSAY W/OPTIC: CPT | Performed by: NURSE PRACTITIONER

## 2021-09-17 RX ORDER — PREDNISONE 20 MG/1
40 TABLET ORAL DAILY
Qty: 6 TABLET | Refills: 0 | Status: SHIPPED | OUTPATIENT
Start: 2021-09-17 | End: 2021-09-20

## 2021-09-17 NOTE — PROGRESS NOTES
"  Office Visit      Patient Name: Marilu Godoy  : 1990   MRN: [id number]   Care Team: Patient Care Team:  Deidre Barker MD as PCP - General (Family Medicine)  Pierce Washington DO as Consulting Physician (Cardiology)  Oswaldo Bragg MD as Consulting Physician (Gastroenterology)    Chief Complaint  Cough (not so much, but has been coughing up phlegm ), Wheezing, and Fatigue (muscle aches )    Subjective     Subjective      Marilu Godoy presents to [facility] for cough and fatigue. Symptoms started 4 or 5 days ago. Endorses headache, diarrhea, productive cough green sputum, body aches, chills weakness, sore throat, and adenopathy.  Denies fever, chest pain, shortness of breath, loss of taste/smell, abdominal pain, and vomiting.   Has been fully vaccinated for COVID . Still wears a mask out when she remembers.   No known exposure to COVID and no ill household contacts.   Has not tried anything for the symptoms.  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    Review of Systems   Constitutional: Positive for chills and fatigue. Negative for fever.   HENT: Positive for congestion and sore throat. Negative for postnasal drip, sinus pressure, sneezing, swollen glands and trouble swallowing.    Respiratory: Positive for cough and wheezing. Negative for shortness of breath.    Cardiovascular: Negative for chest pain.   Gastrointestinal: Negative for abdominal pain, diarrhea, nausea and vomiting.   Musculoskeletal: Positive for arthralgias.   Neurological: Positive for headache.   Psychiatric/Behavioral: Negative for sleep disturbance.       Objective     Objective   Vital Signs:   /68   Pulse 99   Temp 97.1 °F (36.2 °C) (Temporal)   Resp 16   Ht 160 cm (63\")   Wt 88.5 kg (195 lb 1.9 oz)   SpO2 98%   BMI 34.56 kg/m²     Physical Exam  Vitals and nursing note reviewed.   Constitutional:       General: She is not in acute " distress.     Appearance: Normal appearance. She is ill-appearing. She is not toxic-appearing.   HENT:      Right Ear: Ear canal normal. A middle ear effusion (Dull landmarks) is present. Tympanic membrane is bulging.      Left Ear: Ear canal normal. A middle ear effusion (Dull landmarks) is present. Tympanic membrane is bulging.      Nose: Nose normal. No congestion or rhinorrhea.      Right Sinus: No maxillary sinus tenderness or frontal sinus tenderness.      Left Sinus: No maxillary sinus tenderness or frontal sinus tenderness.      Mouth/Throat:      Mouth: Mucous membranes are moist.      Palate: No lesions.      Pharynx: Oropharyngeal exudate and posterior oropharyngeal erythema (Mild PND) present.   Eyes:      Pupils: Pupils are equal, round, and reactive to light.   Cardiovascular:      Rate and Rhythm: Normal rate and regular rhythm.      Heart sounds: Normal heart sounds. No murmur heard.     Pulmonary:      Effort: Pulmonary effort is normal. No respiratory distress.      Breath sounds: Wheezing (Diffuse throughout lung fields) present.   Abdominal:      General: Bowel sounds are normal. There is no distension.      Palpations: Abdomen is soft.      Tenderness: There is no abdominal tenderness.   Musculoskeletal:      Cervical back: Neck supple. No tenderness.   Lymphadenopathy:      Head:      Right side of head: Tonsillar adenopathy present. No submental or submandibular adenopathy.      Left side of head: Tonsillar adenopathy present. No submental or submandibular adenopathy.      Cervical: No cervical adenopathy.   Skin:     General: Skin is warm and dry.      Findings: No rash.   Neurological:      Mental Status: She is alert.   Psychiatric:         Mood and Affect: Mood normal.         Behavior: Behavior normal.          Assessment / Plan      Assessment/Plan   Problem List Items Addressed This Visit        Pulmonary and Pneumonias    Mild intermittent asthma    Continue daily Dulera and albuterol  as needed.  We will treat for acute exacerbation with short course of prednisone burst, 3 days.  Will hold on any further steroids due to history of tachycardia.      Other Visit Diagnoses     Body aches    -  Primary    Relevant Orders    COVID-19 PCR, [facility], NP SWAB IN [facility] V[facility]AL TRANSPORT MEDIA/ORAL SWISH 24-30 HR TAT - Swab, Nasopharynx    POCT Influenza A/B (Completed)    POCT rapid strep A    Suspected COVID-19 virus infection        Relevant Orders    COVID-19 PCR, [facility], NP SWAB IN [facility]AL TRANSPORT MEDIA/ORAL SWISH 24-30 HR TAT - Swab, Nasopharynx    POCT Influenza A/B (Completed)    POCT rapid strep A    Sore throat        Relevant Orders    COVID-19 PCR, [facility], NP SWAB IN [facility] V[facility]AL TRANSPORT MEDIA/ORAL SWISH 24-30 HR TAT - Swab, Nasopharynx    POCT Influenza A/B (Completed)    POCT rapid strep A    Strep and flu both negative.  COVID-19 test pending.  Advised to quarantine until results are back and negative, if positive will need to quarantine 10 days from symptom onset.  Recommend Mucinex twice daily, finish full course of steroids as prescribed, increase water intake, and Tylenol as needed for body aches or fever.  Any chest pain or worsening shortness of breath go to the ER for evaluation.  Return to the clinic with worsening or persisting symptoms despite conservative measures.             Follow Up   Return if symptoms worsen or fail to improve.  Patient was given instructions and counseling regarding her condition or for health maintenance advice. Please see specific information pulled into the AVS if appropriate.     ARNIE Lucas  [facility]

## 2021-09-18 LAB — SARS-COV-2 RNA NOSE QL NAA+PROBE: NOT DETECTED

## 2021-09-20 ENCOUNTER — TELEPHONE (OUTPATIENT)
Dept: GASTROENTEROLOGY | Facility: CLINIC | Age: 31
End: 2021-09-20

## 2021-09-20 NOTE — TELEPHONE ENCOUNTER
PATIENT IS SCHEDULED TO SEE DR DAVILA FOR APPT ON NOV 2, 2021.  THEY MUST DO CONSULT FIRST PRIOR TO SCHEDULING SMART PILL STUDY.     CALLED PATIENT TO LET HER KNOW AND GAVE HER THE APPT INFORMATION ON VM AND ASKED HER TO CALL BACK SO I KNOW SHE IS AWARE OF THE APPT.

## 2021-09-27 LAB — REF LAB TEST RESULTS: NORMAL

## 2021-09-29 ENCOUNTER — APPOINTMENT (OUTPATIENT)
Dept: CT IMAGING | Facility: HOSPITAL | Age: 31
End: 2021-09-29

## 2021-09-29 ENCOUNTER — APPOINTMENT (OUTPATIENT)
Dept: GENERAL RADIOLOGY | Facility: HOSPITAL | Age: 31
End: 2021-09-29

## 2021-09-29 ENCOUNTER — HOSPITAL ENCOUNTER (EMERGENCY)
Facility: HOSPITAL | Age: 31
Discharge: HOME | End: 2021-09-29
Attending: EMERGENCY MEDICINE | Admitting: EMERGENCY MEDICINE

## 2021-09-29 VITALS
WEIGHT: 193 LBS | BODY MASS INDEX: 34.2 KG/M2 | TEMPERATURE: 99 F | HEIGHT: 63 IN | OXYGEN SATURATION: 100 % | RESPIRATION RATE: 18 BRPM | HEART RATE: 82 BPM | SYSTOLIC BLOOD PRESSURE: 118 MMHG | DIASTOLIC BLOOD PRESSURE: 76 MMHG

## 2021-09-29 DIAGNOSIS — R07.1 CHEST PAIN VARYING WITH BREATHING: Primary | ICD-10-CM

## 2021-09-29 LAB
ALBUMIN SERPL-MCNC: 4.9 G/DL (ref 3.5–5.2)
ALBUMIN/GLOB SERPL: 1.5 G/DL
ALP SERPL-CCNC: 103 U/L (ref 39–117)
ALT SERPL W P-5'-P-CCNC: 15 U/L (ref 1–33)
ANION GAP SERPL CALCULATED.3IONS-SCNC: 12.5 MMOL/L (ref 5–15)
AST SERPL-CCNC: 12 U/L (ref 1–32)
BASOPHILS # BLD AUTO: 0.07 10*3/MM3 (ref 0–0.2)
BASOPHILS NFR BLD AUTO: 0.5 % (ref 0–1.5)
BILIRUB SERPL-MCNC: 0.8 MG/DL (ref 0–1.2)
BUN SERPL-MCNC: 11 MG/DL (ref 6–20)
BUN/CREAT SERPL: 17.2 (ref 7–25)
CALCIUM SPEC-SCNC: 9.6 MG/DL (ref 8.6–10.5)
CHLORIDE SERPL-SCNC: 101 MMOL/L (ref 98–107)
CO2 SERPL-SCNC: 23.5 MMOL/L (ref 22–29)
CREAT SERPL-MCNC: 0.64 MG/DL (ref 0.57–1)
D DIMER PPP FEU-MCNC: 0.65 MCGFEU/ML (ref 0–0.57)
DEPRECATED RDW RBC AUTO: 39.2 FL (ref 37–54)
EOSINOPHIL # BLD AUTO: 0.03 10*3/MM3 (ref 0–0.4)
EOSINOPHIL NFR BLD AUTO: 0.2 % (ref 0.3–6.2)
ERYTHROCYTE [DISTWIDTH] IN BLOOD BY AUTOMATED COUNT: 12.2 % (ref 12.3–15.4)
GFR SERPL CREATININE-BSD FRML MDRD: 109 ML/MIN/1.73
GLOBULIN UR ELPH-MCNC: 3.2 GM/DL
GLUCOSE SERPL-MCNC: 88 MG/DL (ref 65–99)
HCG SERPL QL: NEGATIVE
HCT VFR BLD AUTO: 43.4 % (ref 34–46.6)
HGB BLD-MCNC: 15 G/DL (ref 12–15.9)
HOLD SPECIMEN: NORMAL
IMM GRANULOCYTES # BLD AUTO: 0.04 10*3/MM3 (ref 0–0.05)
IMM GRANULOCYTES NFR BLD AUTO: 0.3 % (ref 0–0.5)
LYMPHOCYTES # BLD AUTO: 2.42 10*3/MM3 (ref 0.7–3.1)
LYMPHOCYTES NFR BLD AUTO: 17 % (ref 19.6–45.3)
MCH RBC QN AUTO: 30.8 PG (ref 26.6–33)
MCHC RBC AUTO-ENTMCNC: 34.6 G/DL (ref 31.5–35.7)
MCV RBC AUTO: 89.1 FL (ref 79–97)
MONOCYTES # BLD AUTO: 0.68 10*3/MM3 (ref 0.1–0.9)
MONOCYTES NFR BLD AUTO: 4.8 % (ref 5–12)
NEUTROPHILS NFR BLD AUTO: 10.98 10*3/MM3 (ref 1.7–7)
NEUTROPHILS NFR BLD AUTO: 77.2 % (ref 42.7–76)
NRBC BLD AUTO-RTO: 0 /100 WBC (ref 0–0.2)
NT-PROBNP SERPL-MCNC: 37.6 PG/ML (ref 0–450)
PLATELET # BLD AUTO: 354 10*3/MM3 (ref 140–450)
PMV BLD AUTO: 9.7 FL (ref 6–12)
POTASSIUM SERPL-SCNC: 4.2 MMOL/L (ref 3.5–5.2)
PROT SERPL-MCNC: 8.1 G/DL (ref 6–8.5)
RBC # BLD AUTO: 4.87 10*6/MM3 (ref 3.77–5.28)
SODIUM SERPL-SCNC: 137 MMOL/L (ref 136–145)
TROPONIN T SERPL-MCNC: <0.01 NG/ML (ref 0–0.03)
WBC # BLD AUTO: 14.22 10*3/MM3 (ref 3.4–10.8)
WHOLE BLOOD HOLD SPECIMEN: NORMAL
WHOLE BLOOD HOLD SPECIMEN: NORMAL

## 2021-09-29 PROCEDURE — 71275 CT ANGIOGRAPHY CHEST: CPT

## 2021-09-29 PROCEDURE — 85379 FIBRIN DEGRADATION QUANT: CPT | Performed by: PHYSICIAN ASSISTANT

## 2021-09-29 PROCEDURE — 96374 THER/PROPH/DIAG INJ IV PUSH: CPT

## 2021-09-29 PROCEDURE — 93005 ELECTROCARDIOGRAM TRACING: CPT | Performed by: EMERGENCY MEDICINE

## 2021-09-29 PROCEDURE — 71045 X-RAY EXAM CHEST 1 VIEW: CPT

## 2021-09-29 PROCEDURE — 25010000002 IOPAMIDOL 61 % SOLUTION: Performed by: EMERGENCY MEDICINE

## 2021-09-29 PROCEDURE — 99283 EMERGENCY DEPT VISIT LOW MDM: CPT

## 2021-09-29 PROCEDURE — 84484 ASSAY OF TROPONIN QUANT: CPT | Performed by: EMERGENCY MEDICINE

## 2021-09-29 PROCEDURE — 83880 ASSAY OF NATRIURETIC PEPTIDE: CPT | Performed by: EMERGENCY MEDICINE

## 2021-09-29 PROCEDURE — 25010000002 KETOROLAC TROMETHAMINE PER 15 MG: Performed by: PHYSICIAN ASSISTANT

## 2021-09-29 PROCEDURE — 80053 COMPREHEN METABOLIC PANEL: CPT | Performed by: EMERGENCY MEDICINE

## 2021-09-29 PROCEDURE — 85025 COMPLETE CBC W/AUTO DIFF WBC: CPT | Performed by: EMERGENCY MEDICINE

## 2021-09-29 PROCEDURE — 84703 CHORIONIC GONADOTROPIN ASSAY: CPT | Performed by: EMERGENCY MEDICINE

## 2021-09-29 RX ORDER — SODIUM CHLORIDE 0.9 % (FLUSH) 0.9 %
10 SYRINGE (ML) INJECTION AS NEEDED
Status: DISCONTINUED | OUTPATIENT
Start: 2021-09-29 | End: 2021-09-29 | Stop reason: HOSPADM

## 2021-09-29 RX ORDER — KETOROLAC TROMETHAMINE 30 MG/ML
30 INJECTION, SOLUTION INTRAMUSCULAR; INTRAVENOUS ONCE
Status: COMPLETED | OUTPATIENT
Start: 2021-09-29 | End: 2021-09-29

## 2021-09-29 RX ADMIN — IOPAMIDOL 100 ML: 612 INJECTION, SOLUTION INTRAVENOUS at 18:29

## 2021-09-29 RX ADMIN — KETOROLAC TROMETHAMINE 30 MG: 30 INJECTION, SOLUTION INTRAMUSCULAR; INTRAVENOUS at 16:34

## 2021-10-02 ENCOUNTER — APPOINTMENT (OUTPATIENT)
Dept: GENERAL RADIOLOGY | Facility: HOSPITAL | Age: 31
End: 2021-10-02
Payer: MEDICAID

## 2021-10-02 ENCOUNTER — HOSPITAL ENCOUNTER (EMERGENCY)
Facility: HOSPITAL | Age: 31
Discharge: HOME | End: 2021-10-02
Attending: EMERGENCY MEDICINE
Payer: MEDICAID

## 2021-10-02 VITALS
HEIGHT: 64 IN | OXYGEN SATURATION: 98 % | WEIGHT: 193 LBS | SYSTOLIC BLOOD PRESSURE: 122 MMHG | DIASTOLIC BLOOD PRESSURE: 75 MMHG | BODY MASS INDEX: 32.95 KG/M2 | TEMPERATURE: 94 F | HEART RATE: 87 BPM | RESPIRATION RATE: 20 BRPM

## 2021-10-02 DIAGNOSIS — R06.02 SHORTNESS OF BREATH: ICD-10-CM

## 2021-10-02 DIAGNOSIS — R07.89 CHEST WALL PAIN: Primary | ICD-10-CM

## 2021-10-02 DIAGNOSIS — M79.18 MUSCULOSKELETAL PAIN: ICD-10-CM

## 2021-10-02 LAB
A/G RATIO: 1.3 (ref 0.8–2)
ALBUMIN SERPL-MCNC: 4.9 G/DL (ref 3.4–4.8)
ALP BLD-CCNC: 113 U/L (ref 25–100)
ALT SERPL-CCNC: 15 U/L (ref 4–36)
ANION GAP SERPL CALCULATED.3IONS-SCNC: 11 MMOL/L (ref 3–16)
AST SERPL-CCNC: 13 U/L (ref 8–33)
BASOPHILS ABSOLUTE: 0.1 K/UL (ref 0–0.1)
BASOPHILS RELATIVE PERCENT: 0.5 %
BILIRUB SERPL-MCNC: 0.6 MG/DL (ref 0.3–1.2)
BUN BLDV-MCNC: 13 MG/DL (ref 6–20)
CALCIUM SERPL-MCNC: 9.8 MG/DL (ref 8.5–10.5)
CHLORIDE BLD-SCNC: 100 MMOL/L (ref 98–107)
CO2: 25 MMOL/L (ref 20–30)
CREAT SERPL-MCNC: 0.8 MG/DL (ref 0.4–1.2)
D DIMER: 0.6 UG/ML FEU (ref 0–0.6)
EOSINOPHILS ABSOLUTE: 0.1 K/UL (ref 0–0.4)
EOSINOPHILS RELATIVE PERCENT: 0.4 %
GFR AFRICAN AMERICAN: >59
GFR NON-AFRICAN AMERICAN: >60
GLOBULIN: 3.7 G/DL
GLUCOSE BLD-MCNC: 71 MG/DL (ref 74–106)
HCT VFR BLD CALC: 45.3 % (ref 37–47)
HEMOGLOBIN: 15 G/DL (ref 11.5–16.5)
IMMATURE GRANULOCYTES #: 0 K/UL
IMMATURE GRANULOCYTES %: 0.2 % (ref 0–5)
LYMPHOCYTES ABSOLUTE: 2.2 K/UL (ref 1.5–4)
LYMPHOCYTES RELATIVE PERCENT: 16.4 %
MCH RBC QN AUTO: 30.4 PG (ref 27–32)
MCHC RBC AUTO-ENTMCNC: 33.1 G/DL (ref 31–35)
MCV RBC AUTO: 91.9 FL (ref 80–100)
MONOCYTES ABSOLUTE: 0.6 K/UL (ref 0.2–0.8)
MONOCYTES RELATIVE PERCENT: 4.8 %
NEUTROPHILS ABSOLUTE: 10.2 K/UL (ref 2–7.5)
NEUTROPHILS RELATIVE PERCENT: 77.7 %
PDW BLD-RTO: 12.5 % (ref 11–16)
PLATELET # BLD: 375 K/UL (ref 150–400)
PMV BLD AUTO: 9.7 FL (ref 6–10)
POTASSIUM REFLEX MAGNESIUM: 3.7 MMOL/L (ref 3.4–5.1)
PRO-BNP: 98 PG/ML (ref 0–900)
RBC # BLD: 4.93 M/UL (ref 3.8–5.8)
SARS-COV-2, NAAT: NOT DETECTED
SODIUM BLD-SCNC: 136 MMOL/L (ref 136–145)
TOTAL PROTEIN: 8.6 G/DL (ref 6.4–8.3)
TROPONIN: <0.3 NG/ML
WBC # BLD: 13.1 K/UL (ref 4–11)

## 2021-10-02 PROCEDURE — 99282 EMERGENCY DEPT VISIT SF MDM: CPT

## 2021-10-02 PROCEDURE — 93005 ELECTROCARDIOGRAM TRACING: CPT

## 2021-10-02 PROCEDURE — 80053 COMPREHEN METABOLIC PANEL: CPT

## 2021-10-02 PROCEDURE — 84484 ASSAY OF TROPONIN QUANT: CPT

## 2021-10-02 PROCEDURE — 36415 COLL VENOUS BLD VENIPUNCTURE: CPT

## 2021-10-02 PROCEDURE — 83880 ASSAY OF NATRIURETIC PEPTIDE: CPT

## 2021-10-02 PROCEDURE — 71045 X-RAY EXAM CHEST 1 VIEW: CPT

## 2021-10-02 PROCEDURE — 87635 SARS-COV-2 COVID-19 AMP PRB: CPT

## 2021-10-02 PROCEDURE — 85025 COMPLETE CBC W/AUTO DIFF WBC: CPT

## 2021-10-02 PROCEDURE — 6370000000 HC RX 637 (ALT 250 FOR IP): Performed by: EMERGENCY MEDICINE

## 2021-10-02 PROCEDURE — 85379 FIBRIN DEGRADATION QUANT: CPT

## 2021-10-02 RX ORDER — GRANISETRON HYDROCHLORIDE 1 MG/1
1 TABLET, FILM COATED ORAL EVERY 12 HOURS PRN
COMMUNITY
End: 2022-09-05 | Stop reason: ALTCHOICE

## 2021-10-02 RX ORDER — ZONISAMIDE 100 MG/1
100 CAPSULE ORAL NIGHTLY
COMMUNITY

## 2021-10-02 RX ORDER — FLUTICASONE PROPIONATE 50 MCG
1 SPRAY, SUSPENSION (ML) NASAL DAILY
COMMUNITY

## 2021-10-02 RX ORDER — IVABRADINE 5 MG/1
5 TABLET, FILM COATED ORAL 2 TIMES DAILY WITH MEALS
COMMUNITY

## 2021-10-02 RX ORDER — ASPIRIN 81 MG/1
324 TABLET, CHEWABLE ORAL DAILY
Status: DISCONTINUED | OUTPATIENT
Start: 2021-10-02 | End: 2021-10-03 | Stop reason: HOSPADM

## 2021-10-02 RX ORDER — VITAMIN B COMPLEX
1 CAPSULE ORAL DAILY
COMMUNITY

## 2021-10-02 RX ORDER — ATORVASTATIN CALCIUM 20 MG/1
40 TABLET, FILM COATED ORAL NIGHTLY
COMMUNITY

## 2021-10-02 RX ORDER — ASPIRIN 81 MG/1
81 TABLET ORAL DAILY
COMMUNITY

## 2021-10-02 RX ORDER — ACETAMINOPHEN 160 MG
TABLET,DISINTEGRATING ORAL DAILY
COMMUNITY

## 2021-10-02 RX ORDER — BUSPIRONE HYDROCHLORIDE 5 MG/1
5 TABLET ORAL 3 TIMES DAILY
COMMUNITY

## 2021-10-02 RX ORDER — LIDOCAINE 50 MG/G
1 PATCH TOPICAL DAILY
COMMUNITY

## 2021-10-02 RX ORDER — RIMEGEPANT SULFATE 75 MG/75MG
TABLET, ORALLY DISINTEGRATING ORAL DAILY PRN
COMMUNITY

## 2021-10-02 RX ORDER — PANTOPRAZOLE SODIUM 20 MG/1
40 TABLET, DELAYED RELEASE ORAL DAILY
COMMUNITY

## 2021-10-02 RX ADMIN — ASPIRIN 324 MG: 81 TABLET, CHEWABLE ORAL at 19:54

## 2021-10-02 ASSESSMENT — PAIN DESCRIPTION - DESCRIPTORS: DESCRIPTORS: SHOOTING

## 2021-10-02 ASSESSMENT — PAIN SCALES - GENERAL: PAINLEVEL_OUTOF10: 6

## 2021-10-02 ASSESSMENT — PAIN DESCRIPTION - PROGRESSION: CLINICAL_PROGRESSION: GRADUALLY WORSENING

## 2021-10-02 ASSESSMENT — PAIN DESCRIPTION - ORIENTATION: ORIENTATION: LEFT

## 2021-10-02 ASSESSMENT — PAIN DESCRIPTION - FREQUENCY: FREQUENCY: INTERMITTENT

## 2021-10-02 ASSESSMENT — PAIN DESCRIPTION - ONSET: ONSET: GRADUAL

## 2021-10-02 ASSESSMENT — PAIN DESCRIPTION - LOCATION: LOCATION: SHOULDER;ARM;HAND

## 2021-10-02 NOTE — ED PROVIDER NOTES
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[facility]     Factor 5 Leiden mutation, heterozygous ([facility]     Fibromyalgia     Pulmonary emboli (HCC)          SURGICAL HISTORY       Past Surgical History:   Procedure Laterality Date     SECTION      CHOLECYSTECTOMY      TUBAL LIGATION           CURRENT MEDICATIONS       Previous Medications    ALBUTEROL SULFATE (PROAIR RESPICLICK) 749 (90 BASE) MCG/ACT AEROSOL POWDER INHALATION    Inhale 2 puffs into the lungs every 4 hours as needed for Wheezing or Shortness of Breath    APIXABAN (ELIQUIS) 5 MG TABS TABLET    Take by mouth 2 times daily    ASPIRIN 81 MG EC TABLET    Take 81 mg by mouth daily    ATORVASTATIN (LIPITOR) 20 MG TABLET    Take 40 mg by mouth nightly    AZELASTINE HCL (ASTELIN NA)    1 spray by Nasal route 2 times daily    B COMPLEX VITAMINS CAPSULE    Take 1 capsule by mouth daily    BUSPIRONE (BUSPAR) 5 MG TABLET    Take 5 mg by mouth 3 times daily    CHOLECALCIFEROL (VITAMIN D3) 50 MCG ( UT) CAPS    Take by mouth daily    DULOXETINE (CYMBALTA) 60 MG EXTENDED RELEASE CAPSULE    Take 60 mg by mouth daily    FLUTICASONE (FLONASE) 50 MCG/ACT NASAL SPRAY    1 spray by Each Nostril route daily    GRANISETRON (KYTRIL) 1 MG TABLET    Take 1 mg by mouth every 12 hours as needed for Nausea    IVABRADINE (CORLANOR) 5 MG TABS TABLET    Take 5 mg by mouth 2 times daily (with meals) 1.5 tablet twice daily    LIDOCAINE (LIDODERM) 5 %    Place 1 patch onto the skin daily 12 hours on, 12 hours off. MOMETASONE-FORMOTEROL (DULERA) 200-5 MCG/ACT INHALER    Inhale 2 puffs into the lungs every 12 hours    NONFORMULARY    Ajoby monthly injection for migraines.     PANTOPRAZOLE (PROTONIX) 20 MG TABLET    Take 40 mg by mouth daily    RIMEGEPANT SULFATE (NURTEC) 75 MG TBDP    Take by mouth daily as needed    ZONISAMIDE (ZONEGRAN) 100 MG CAPSULE    Take 100 mg by mouth nightly       ALLERGIES     Cefdinir, Imitrex [sumatriptan], and Amoxicillin    FAMILY HISTORY     History reviewed. No pertinent family history. SOCIAL HISTORY       Social History     Socioeconomic History    Marital status:      Spouse name: None    Number of children: None    Years of education: None    Highest education level: None   Occupational History    None   Tobacco Use    Smoking status: Never Smoker    Smokeless tobacco: Never Used   Vaping Use    Vaping Use: Never used   Substance and Sexual Activity    Alcohol use: Never    Drug use: Never    Sexual activity: Yes     Partners: Male   Other Topics Concern    None   Social History Narrative    None     Social Determinants of Health     Financial Resource Strain:     Difficulty of Paying Living Expenses:    Food Insecurity:     Worried About Running Out of Food in the Last Year:     Ran Out of Food in the Last Year:    Transportation Needs:     Lack of Transportation (Medical):  Lack of Transportation (Non-Medical):    Physical Activity:     Days of Exercise per Week:     Minutes of Exercise per Session:    Stress:     Feeling of Stress :    Social Connections:     Frequency of Communication with Friends and Family:     Frequency of Social Gatherings with Friends and Family:     Attends Yazidism Services:     Active Member of Clubs or Organizations:     Attends Club or Organization Meetings:     Marital Status:    Intimate Partner Violence:     Fear of Current or Ex-Partner:     Emotionally Abused:     Physically Abused:     Sexually Abused:          PHYSICAL EXAM    (up to 7 for level 4, 8 or more for level 5)     ED Triage Vitals [10/02/21 1849]   BP Temp Temp Source Pulse Resp SpO2 Height Weight   135/80 98.5 °F (36.9 °C) Temporal 98 20 100 % 5' 4\" (1.626 m) 193 lb (87.5 kg)       Physical Exam  General :Patient is awake, alert, oriented, in no acute distress, nontoxic appearing  HEENT: Pupils are equally round and reactive to light, EOMI, conjunctivae clear.   Oral mucosa is moist, no exudate. Uvula is midline  Neck: Neck is supple, full range of motion, trachea midline  Cardiac: Heart regular rate, rhythm, no murmurs, rubs, or gallops  Lungs: Lungs are clear to auscultation, there is no wheezing, rhonchi, or rales. There is no use of accessory muscles. Chest wall: There is reproducible chest wall pain brought the patient to the emergency department. Abdomen: Abdomen is soft, nontender, nondistended. There is no firm or pulsatile masses, no rebound rigidity or guarding. Musculoskeletal: 5 out of 5 strength in all 4 extremities. No focal muscle deficits are appreciated  Neuro: Motor intact, sensory intact, level of consciousness is normal, cerebellar function is normal, reflexes are grossly normal. No evidence of incontinence or loss of bowel or bladder function, no saddle anesthesia noted   Dermatology: Skin is warm and dry  Psych: Mentation is grossly normal, cognition is grossly normal. Affect is appropriate. DIAGNOSTIC RESULTS     EKG: All EKG's are interpreted by the Emergency Department Physician who either signs or Co-signs this chart in the [address] a cardiologist.    The EKG interpreted by me shows normal sinus rhythm rate of 98 per EDMD    RADIOLOGY:   Non-plain film images such as CT, Ultrasound and MRI are read by the radiologist. Plain radiographic images are visualized and preliminarily interpreted by the emergency physician with the below findings:      ? Radiologist's Report Reviewed:  XR CHEST PORTABLE   Final Result      No acute pulmonary disease.                ED BEDSIDE ULTRASOUND:   Performed by ED Physician - none    LABS:    I have reviewed and interpreted all of the currently available lab results from this visit (ifapplicable):  Results for orders placed or performed during the hospital encounter of 10/02/21   COVID-19, Rapid    Specimen: Nasopharyngeal Swab   Result Value Ref Range    SARS-CoV-2, NAAT Not Detected Not Detected   CBC Auto Differential   Result Value Ref Range    WBC 13.1 (H) 4.0 - 11.0 K/uL    RBC 4.93 3.80 - 5.80 M/uL    Hemoglobin 15.0 11.5 - 16.5 g/dL    Hematocrit 45.3 37.0 - 47.0 %    MCV 91.9 80.0 - 100.0 fL    MCH 30.4 27.0 - 32.0 pg    MCHC 33.1 31.0 - 35.0 g/dL    RDW 12.5 11.0 - 16.0 %    Platelets 322 193 - 107 K/uL    MPV 9.7 6.0 - 10.0 fL    Neutrophils % 77.7 %    Immature Granulocytes % 0.2 0.0 - 5.0 %    Lymphocytes % 16.4 %    Monocytes % 4.8 %    Eosinophils % 0.4 %    Basophils % 0.5 %    Neutrophils Absolute 10.2 (H) 2.0 - 7.5 K/uL    Immature Granulocytes # 0.0 K/uL    Lymphocytes Absolute 2.2 1.5 - 4.0 K/uL    Monocytes Absolute 0.6 0.2 - 0.8 K/uL    Eosinophils Absolute 0.1 0.0 - 0.4 K/uL    Basophils Absolute 0.1 0.0 - 0.1 K/uL   Comprehensive Metabolic Panel w/ Reflex to MG   Result Value Ref Range    Sodium 136 136 - 145 mmol/L    Potassium reflex Magnesium 3.7 3.4 - 5.1 mmol/L    Chloride 100 98 - 107 mmol/L    CO2 25 20 - 30 mmol/L    Anion Gap 11 3 - 16    Glucose 71 (L) 74 - 106 mg/dL    BUN 13 6 - 20 mg/dL    CREATININE 0.8 0.4 - 1.2 mg/dL    GFR Non-African American >60 >59    GFR African American >59 >59    Calcium 9.8 8.5 - 10.5 mg/dL    Total Protein 8.6 (H) 6.4 - 8.3 g/dL    Albumin 4.9 (H) 3.4 - 4.8 g/dL    Albumin/Globulin Ratio 1.3 0.8 - 2.0    Total Bilirubin 0.6 0.3 - 1.2 mg/dL    Alkaline Phosphatase 113 (H) 25 - 100 U/L    ALT 15 4 - 36 U/L    AST 13 8 - 33 U/L    Globulin 3.7 g/dL   Troponin   Result Value Ref Range    Troponin <0.30 <0.30 ng/mL   Brain Natriuretic Peptide   Result Value Ref Range    Pro-BNP 98 0 - 900 pg/mL   D-Dimer, Quantitative   Result Value Ref Range    D-Dimer, Quant 0.60 0.00 - 0.60 ug/mL FEU        All other labs were within normal range or not returned as of this dictation.     EMERGENCY DEPARTMENT COURSE and DIFFERENTIAL DIAGNOSIS/MDM:   Vitals:    Vitals:    10/02/21 1849 10/02/21 1956   BP: 135/80 132/86   Pulse: 98    Resp: 20    Temp: 98.5 °F (36.9 °C) 94 °F (34.4 °C)   TempSrc: Temporal    SpO2: 100% 99%   Weight: 193 lb (87.5 kg)    Height: 5' 4\" (1.626 m)        MEDICATIONS ADMINISTERED IN ED:  Medications   aspirin chewable tablet 324 mg (324 mg Oral Given 10/2/21 1954)       Patient was placed examination room in which time after exam was performed patient was given aspirin 324 mg p.o. Labs were obtained. Review of patient's labs reveal a normal CMP. Cardiac enzymes are within normal limits. White count was 13.1 thousand and hemoglobin was 15,000. D-dimer was negative. EKG revealed normal sinus rhythm rate of 95 per EDMD.  Chest x-ray revealed no acute process per radiology. Patient was instructed to follow-up with her primary physician for outpatient stress test.  Patient was appreciative the care and agrees with the plan above    The patient will follow-up with their PCP in 1-2 days for reevaluation. If the patient or family members have anyfurther concerns or any worsening symptoms they will return to the ED for reevaluation. CONSULTS:  None    PROCEDURES:  Procedures    CRITICAL CARE TIME    Total Critical Care time was 0 minutes, excluding separately reportable procedures. There was a high probability of clinically significant/life threatening deterioration in the patient's condition which required my urgent intervention. FINAL IMPRESSION      1. Chest wall pain Stable   2. Shortness of breath Stable   3. Musculoskeletal pain Stable         DISPOSITION/PLAN   DISPOSITION        PATIENT REFERRED TO:  MD Jana Juárez 4    Schedule an appointment as soon as possible for a visit in 2 days  Follow-up with primary physician 1 to 2 days for outpatient stress test scheduling    [facility]  [place] [id number]..   [facility]  [phone]  In 2 days  If symptoms worsen      DISCHARGE MEDICATIONS:  New Prescriptions    No medications on file       Comment: Please note this report has been produced using speech recognition software and may contain errorsrelated to that system including errors in grammar, punctuation, and spelling, as well as words and phrases that may be inappropriate. If there are any questions or concerns please feel free to contact the dictating providerfor clarification.     Lita Noel MD  Attending Emergency Physician              Lita Noel MD  10/02/21 2039

## 2021-10-02 NOTE — ED TRIAGE NOTES
Pt to ED with complaints of shortness of breath, left shoulder and arm pain \"that shoots into hand\" pt states pain started at 1300 today. Pt denies chest pain.

## 2021-10-03 LAB
EKG ATRIAL RATE: 98 BPM
EKG DIAGNOSIS: NORMAL
EKG P AXIS: 55 DEGREES
EKG P-R INTERVAL: 126 MS
EKG Q-T INTERVAL: 328 MS
EKG QRS DURATION: 74 MS
EKG QTC CALCULATION (BAZETT): 418 MS
EKG R AXIS: 24 DEGREES
EKG T AXIS: 6 DEGREES
EKG VENTRICULAR RATE: 98 BPM

## 2021-10-03 NOTE — ED NOTES
Explained that the doctor thinks it could be a pitched nerve and wants pt to FU with PCP to get PT order. He had already told her that her labs were good. She had no other questions at the time of DC.       Renelle Romberg, RN  10/02/21 2201       Renelle Romberg, RN  10/02/21 2202

## 2021-10-04 ENCOUNTER — OFFICE VISIT (OUTPATIENT)
Dept: INTERNAL MEDICINE | Facility: CLINIC | Age: 31
End: 2021-10-04

## 2021-10-04 VITALS
BODY MASS INDEX: 35.26 KG/M2 | DIASTOLIC BLOOD PRESSURE: 76 MMHG | OXYGEN SATURATION: 100 % | RESPIRATION RATE: 15 BRPM | TEMPERATURE: 97.5 F | HEIGHT: 63 IN | HEART RATE: 80 BPM | WEIGHT: 199 LBS | SYSTOLIC BLOOD PRESSURE: 116 MMHG

## 2021-10-04 DIAGNOSIS — M62.838 MUSCLE SPASM OF SHOULDER REGION: ICD-10-CM

## 2021-10-04 DIAGNOSIS — G89.29 CHRONIC LEFT SHOULDER PAIN: Primary | ICD-10-CM

## 2021-10-04 DIAGNOSIS — L02.222 BOIL, BACK: ICD-10-CM

## 2021-10-04 DIAGNOSIS — M25.512 CHRONIC LEFT SHOULDER PAIN: Primary | ICD-10-CM

## 2021-10-04 PROCEDURE — 99214 OFFICE O/P EST MOD 30 MIN: CPT | Performed by: NURSE PRACTITIONER

## 2021-10-04 NOTE — PROGRESS NOTES
Chief Complaint / Reason:      Chief Complaint   Patient presents with   • Shoulder Pain     shooting into left hand x 1 week       Subjective     HPI  Patient presents today with complaints of left shoulder pain and states that it radiates down into her left hand and symptoms have been going on for about of week but she does have a history of chest pain with left arm pain and she was previously seen at [facility] on 9/29 with chest pain and also seen again 10/2/2021 at [facility].  Patient does have a history of blood clots and states that she has been taking her medication as prescribed.  Patient did have blood work EKG and CT scan.  Patient's chest x-ray revealed no acute process CT scan was negative at one of the facilities and D-dimer was negative but white blood cell count was elevated.  She denies any fever or chills.  Patient states that the pain does radiate down into her fourth and fifth finger.  Patient is right-hand dominant and denies any injury or trauma to the left that she is aware of.  Patient did have previous EMG on 5/25/2021 and does see neurology.  Patient states that the electrophysiologist had previously advised her against muscle relaxers so she does not take any muscle relaxers and cannot take NSAIDs with her blood thinner.  History taken from: patient    PMH/FH/Social History were reviewed and updated appropriately in the electronic medical record.   Past Medical History:   Diagnosis Date   • Abdominal pain    • Abnormal Pap smear of cervix    • Anxiety    • Asthma 2015   • Bleeding disorder (HCC) 11-13-16   • Body piercing     TONGUE, NOSE, TWO IN EACH EAR   • Chest pain 06/22/2021    6-7 months ago -seen at [facility] er and released same day of complaint with resitory infection   • Clotting disorder (HCC)     factor 5   • Constipation    • Coronary artery disease involving native coronary artery of native heart without angina pectoris 6/16/2017   • Diarrhea    • Elevated  cholesterol    • Endometriosis    • Factor 5 Leiden mutation, heterozygous (HCC)    • Fibroid    • Full dentures 2021    advised no adhesives DOS   • GERD (gastroesophageal reflux disease)    • H/O blood clots    • H/O cardiovascular stress test 2021    reported several years ago-exercise wnl   • Headache    • Hiatal hernia    • High cholesterol    • History of recurrent UTIs    • HPV (human papilloma virus) infection    • Hx of gout 2021   • Hypertension    • Kidney infection 2021    history only   • Migraine    • Nausea    • Nausea & vomiting    • Obesity    • Orthostatic hypotension    • Osteoarthritis    • Pollen allergies    • Protein S deficiency (HCC) 2016    labs from hospitalization for PE   • Pulmonary embolism (HCC) 2016    on eliquis since that time   • Recurrent pregnancy loss, antepartum condition or complication    • Sleep apnea     CPAP ASKED TO BRING DOS, has been unable to use due to sinus cyst for 2 months   • Subclinical hyperthyroidism    • Tachycardia    • Tattoo     LOWER BACK   • Varicella      Past Surgical History:   Procedure Laterality Date   •  SECTION        and  and    •  SECTION WITH TUBAL N/A 1/15/2019    Procedure:  SECTION REPEAT WITH TUBAL;  Surgeon: Alva Boyer MD;  Location: [facility] LABOR DELIVERY;  Service: Obstetrics/Gynecology   • CHOLECYSTECTOMY     • COLONOSCOPY N/A 2017    Procedure: COLONOSCOPY WITH BIOPSIES AND ARGON THERMAL ABLATION;  Surgeon: Jignesh Selby MD;  Location: [facility] ENDOSCOPY;  Service:    • DIAGNOSTIC LAPAROSCOPY N/A 2018    Procedure: DIAGNOSTIC LAPAROSCOPY AND ABLATION OF ENDOMETRIOSIS;  Surgeon: Evin Zamudio MD;  Location: [facility] OR;  Service: Obstetrics/Gynecology   • ENDOMETRIAL ABLATION     • ENDOSCOPIC FUNCTIONAL SINUS SURGERY (FESS) Right 2021    Procedure: Right endoscopic total ethmoidectomy, right endoscopic middle meatal antrotomy with cyst  removal, right endoscopic frontal recess exploration with cyst removal;  Surgeon: Saulo Brito MD;  Location: [facility] OR;  Service: ENT;  Laterality: Right;   • ENDOSCOPY N/A 4/20/2017    Procedure: ESOPHAGOGASTRODUODENOSCOPY WITH BIOPSIES AND COLD BIOPSY POLYPECTOMIES;  Surgeon: Jignesh Selby MD;  Location: [facility] ENDOSCOPY;  Service:    • MOUTH SURGERY      full mouth tooth extration     Social History     Socioeconomic History   • Marital status:      Spouse name: Not on file   • Number of children: Not on file   • Years of education: Not on file   • Highest education level: Not on file   Tobacco Use   • Smoking status: Never Smoker   • Smokeless tobacco: Never Used   Vaping Use   • Vaping Use: Never used   Substance and Sexual Activity   • Alcohol use: No   • Drug use: No   • Sexual activity: Yes     Partners: Male     Birth control/protection: None     Comment: PE while on birth control patch     Family History   Problem Relation Age of Onset   • Arthritis Mother    • COPD Mother    • Asthma Mother    • Thyroid disease Mother    • Arthritis Father    • Diabetes Father    • Hypertension Father    • Hyperlipidemia Father    • Kidney disease Father    • Heart attack Father    • Coronary artery disease Father    • Dementia Father    • No Known Problems Son    • Colon cancer Neg Hx    • Liver cancer Neg Hx    • Liver disease Neg Hx    • Stomach cancer Neg Hx    • Esophageal cancer Neg Hx        Review of Systems:   Review of Systems   Constitutional: Positive for fatigue.   Respiratory: Negative.    Cardiovascular: Negative.    Musculoskeletal: Positive for arthralgias and myalgias.   Allergic/Immunologic: Positive for environmental allergies.   Neurological: Positive for weakness and numbness (And tingling along with burning sensation and left arm. ).   Psychiatric/Behavioral: Positive for sleep disturbance and stress.         All other systems were reviewed and are negative.  Exceptions are noted in  the subjective or above.      Objective     Vital Signs  Vitals:    10/04/21 1334   BP: 116/76   Pulse: 80   Resp: 15   Temp: 97.5 °F (36.4 °C)   SpO2: 100%       Body mass index is 35.25 kg/m².    Physical Exam  Constitutional:       Appearance: She is not ill-appearing.   HENT:      Head: Normocephalic.      Right Ear: External ear normal.      Left Ear: External ear normal.   Eyes:      Conjunctiva/sclera: Conjunctivae normal.      Pupils: Pupils are equal, round, and reactive to light.   Cardiovascular:      Rate and Rhythm: Normal rate and regular rhythm.      Pulses: Normal pulses.      Heart sounds: Normal heart sounds.   Pulmonary:      Effort: Pulmonary effort is normal.      Breath sounds: Normal breath sounds.   Abdominal:      General: Abdomen is protuberant. Bowel sounds are normal.      Palpations: Abdomen is soft.      Tenderness: There is no abdominal tenderness. There is no right CVA tenderness, left CVA tenderness, guarding or rebound. Negative signs include McBurney's sign and psoas sign.      Hernia: No hernia is present.   Musculoskeletal:         General: Tenderness and deformity present.      Left shoulder: Bony tenderness and crepitus present. Decreased range of motion.      Cervical back: Normal range of motion and neck supple. Deformity and bony tenderness present. Pain with movement present.   Skin:     General: Skin is warm.      Capillary Refill: Capillary refill takes less than 2 seconds.      Findings: Lesion (Right side of back with no drainage but slightly firm circular erythematous area) present. No bruising.   Neurological:      Mental Status: She is alert and oriented to person, place, and time.      Coordination: Coordination normal.      Gait: Gait normal.   Psychiatric:         Mood and Affect: Mood normal.         Behavior: Behavior normal.         Thought Content: Thought content normal.              Results Review:    I reviewed the patient's previous clinical results.        Medication Review:     Current Outpatient Medications:   •  albuterol sulfate  (90 Base) MCG/ACT inhaler, Inhale 2 puffs Every 4 (Four) Hours As Needed for Wheezing or Shortness of Air., Disp: 18 g, Rfl: 11  •  apixaban (ELIQUIS) 5 MG tablet tablet, Take 1 tablet by mouth 2 (Two) Times a Day., Disp: 180 tablet, Rfl: 3  •  aspirin 81 MG EC tablet, Take 81 mg by mouth Daily., Disp: , Rfl:   •  atorvastatin (LIPITOR) 40 MG tablet, Take 1 tablet by mouth Every Night., Disp: 30 tablet, Rfl: 11  •  azelastine (ASTELIN) 0.1 % nasal spray, 1 spray into the nostril(s) as directed by provider 2 (Two) Times a Day. Use in each nostril as directed, Disp: 1 each, Rfl: 10  •  b complex vitamins capsule, Take 1 capsule by mouth Daily., Disp: 90 capsule, Rfl: 3  •  busPIRone (BUSPAR) 5 MG tablet, Take 1 tablet by mouth 3 (Three) Times a Day., Disp: 90 tablet, Rfl: 4  •  calcium carbonate EX (TUMS EX) 750 MG chewable tablet, Chew 1 tablet Daily. (Patient taking differently: Chew 750 mg Daily As Needed.), Disp: 30 tablet, Rfl: 1  •  Cholecalciferol (Vitamin D3) 50 MCG (2000 UT) capsule, Take 1 capsule by mouth Daily. Start after completing 50,000 IU weekly dose.  Indications: Vitamin D Deficiency (Patient taking differently: Take 2,000 Units by mouth Daily. .  Indications: Vitamin D Deficiency), Disp: 90 capsule, Rfl: 3  •  Corlanor 5 MG tablet tablet, Take 1.5 tablets by mouth 2 (two) times a day., Disp: 90 tablet, Rfl: 6  •  cyclobenzaprine (FLEXERIL) 10 MG tablet, Take 1 tablet by mouth 2 (Two) Times a Day As Needed for Muscle Spasms., Disp: 14 tablet, Rfl: 0  •  DULoxetine (CYMBALTA) 60 MG capsule, Take 1 capsule by mouth Daily., Disp: 90 capsule, Rfl: 3  •  fluticasone (FLONASE) 50 MCG/ACT nasal spray, INSTILL 2 SPRAYS INTO EACH NOSTRIL EVERY DAY AS DIRECTED, Disp: 16 g, Rfl: 11  •  Fremanezumab-vfrm (Ajovy) 225 MG/1.5ML solution auto-injector, Inject 225 mg under the skin into the appropriate area as directed Every  30 (Thirty) Days., Disp: 1 pen, Rfl: 5  •  granisetron (KYTRIL) 1 MG tablet, Take 1 mg by mouth Every 12 (Twelve) Hours As Needed for Nausea or Vomiting. PA APPROVED., Disp: , Rfl:   •  lidocaine (LIDODERM) 5 %, Place 2 patches on the skin as directed by provider Daily. Remove & Discard patch within 12 hours or as directed by MD, Disp: 60 patch, Rfl: 3  •  magnesium hydroxide (MILK OF MAGNESIA) 2400 MG/10ML suspension suspension, Take 5 mL by mouth Daily As Needed., Disp: , Rfl:   •  mometasone-formoterol (Dulera) 200-5 MCG/ACT inhaler, Inhale 2 puffs 2 (Two) Times a Day., Disp: 13 g, Rfl: 11  •  ondansetron ODT (ZOFRAN-ODT) 4 MG disintegrating tablet, Place 1 tablet on the tongue Every 6 (Six) Hours As Needed for Nausea or Vomiting., Disp: 10 tablet, Rfl: 0  •  pantoprazole (PROTONIX) 40 MG EC tablet, Take 1 tablet by mouth Daily., Disp: 30 tablet, Rfl: 0  •  Rimegepant Sulfate (Nurtec) 75 MG tablet dispersible tablet, Take 1 tablet by mouth 1 (One) Time As Needed (migraine) for up to 1 dose., Disp: 8 tablet, Rfl: 2  •  zonisamide (ZONEGRAN) 100 MG capsule, Take 1 capsule by mouth Every Evening., Disp: 30 capsule, Rfl: 2  •  Diclofenac Sodium (VOLTAREN) 1 % gel gel, Apply 4 g topically to the appropriate area as directed 4 (Four) Times a Day As Needed (joint pain)., Disp: 150 g, Rfl: 1  •  Elastic Bandages & Supports (Elbow Support Left/Right) misc, 1 Device Daily., Disp: 1 each, Rfl: 0  •  mupirocin (Bactroban) 2 % ointment, Apply  topically to the appropriate area as directed 2 (Two) Times a Day., Disp: 30 g, Rfl: 0    Diagnoses and all orders for this visit:    Chronic left shoulder pain  -     Diclofenac Sodium (VOLTAREN) 1 % gel gel; Apply 4 g topically to the appropriate area as directed 4 (Four) Times a Day As Needed (joint pain).  -     Elastic Bandages & Supports (Elbow Support Left/Right) misc; 1 Device Daily.  -     Ambulatory Referral to Orthopedic Surgery  Discussed nonpharmacological  interventions  Muscle spasm of shoulder region  Advised patient to apply warm moist heat and avoid carrying purse on left side  Boil, back  -     mupirocin (Bactroban) 2 % ointment; Apply  topically to the appropriate area as directed 2 (Two) Times a Day.    Discussed home care instructions along with worsening signs and symptoms.      Return if symptoms worsen or fail to improve.    Barby Garcia, ARNIE  10/04/2021

## 2021-10-07 ENCOUNTER — PATIENT MESSAGE (OUTPATIENT)
Dept: GASTROENTEROLOGY | Facility: CLINIC | Age: 31
End: 2021-10-07

## 2021-10-07 NOTE — TELEPHONE ENCOUNTER
From: Marilu Godoy  To: Oswaldo Bragg MD  Sent: 10/7/2021 2:26 PM EDT  Subject: Non-Urgent Medical Question    I've been having some abdominal pain mainly toward the lower middle of my stomach. I had a bowel movement earlier and there was red blood in my stool.

## 2021-10-12 ENCOUNTER — OFFICE VISIT (OUTPATIENT)
Dept: ORTHOPEDIC SURGERY | Facility: CLINIC | Age: 31
End: 2021-10-12

## 2021-10-12 VITALS — WEIGHT: 197 LBS | BODY MASS INDEX: 34.91 KG/M2 | TEMPERATURE: 98.2 F | HEIGHT: 63 IN

## 2021-10-12 DIAGNOSIS — M25.512 ARTHRALGIA OF LEFT SHOULDER REGION: Primary | ICD-10-CM

## 2021-10-12 PROCEDURE — 99203 OFFICE O/P NEW LOW 30 MIN: CPT | Performed by: ORTHOPAEDIC SURGERY

## 2021-10-12 NOTE — PROGRESS NOTES
Subjective   Patient ID: Marilu Godoy is a 30 y.o. female  Pain of the Left Shoulder (Ref by ARNIE Reeder for left shoulder pain since 2016, pain is worsening. No known injury. )             History of Present Illness  Right-hand-dominant 30-year-old with history of left shoulder pain since 2016 beginning somewhat worse recently with a burning sensation that radiates from the upper lateral shoulder down to the elbow at times even into the fingers.  She had multiple PEs in the past had a recent CT chest her PCP was concerned about possibly a bone spur in her shoulder and sent her here for evaluation.  She denies history of dislocation shoulder trauma loss of motion weakness or prior treatment with injections or therapy.  Her paresthesias in her hand more in the ulnar side and the left side of her neck is at times is stiff as well.  She is under treatment currently for chronic low back pain had a recent lumbar injection      Review of Systems   Constitutional: Negative for fever.   HENT: Negative for dental problem and voice change.    Eyes: Negative for visual disturbance.   Respiratory: Negative for shortness of breath.    Cardiovascular: Negative for chest pain.   Gastrointestinal: Negative for abdominal pain.   Genitourinary: Negative for dysuria.   Musculoskeletal: Positive for arthralgias. Negative for gait problem and joint swelling.   Skin: Negative for rash.   Neurological: Positive for weakness and numbness. Negative for speech difficulty.   Hematological: Does not bruise/bleed easily.   Psychiatric/Behavioral: Negative for confusion.       Past Medical History:   Diagnosis Date   • Abdominal pain    • Abnormal Pap smear of cervix    • Anxiety    • Asthma 2015   • Bleeding disorder (HCC) 11-13-16   • Body piercing     TONGUE, NOSE, TWO IN EACH EAR   • Chest pain 06/22/2021    6-7 months ago -seen at [facility] and released same day of complaint with resitory infection   • Clotting disorder (HCC)      factor 5   • Constipation    • Coronary artery disease involving native coronary artery of native heart without angina pectoris 2017   • Diarrhea    • Elevated cholesterol    • Endometriosis    • Factor 5 Leiden mutation, heterozygous (HCC)    • Fibroid    • Full dentures 2021    advised no adhesives DOS   • GERD (gastroesophageal reflux disease)    • H/O blood clots    • H/O cardiovascular stress test 2021    reported several years ago-exercise wnl   • Headache    • Hiatal hernia    • High cholesterol    • History of recurrent UTIs    • HPV (human papilloma virus) infection    • Hx of gout 2021   • Hypertension    • Kidney infection 2021    history only   • Migraine    • Nausea    • Nausea & vomiting    • Obesity    • Orthostatic hypotension    • Osteoarthritis    • Pollen allergies    • Protein S deficiency (HCC) 2016    labs from hospitalization for PE   • Pulmonary embolism (HCC) 2016    on eliquis since that time   • Recurrent pregnancy loss, antepartum condition or complication    • Sleep apnea     CPAP ASKED TO BRING DOS, has been unable to use due to sinus cyst for 2 months   • Subclinical hyperthyroidism    • Tachycardia    • Tattoo     LOWER BACK   • Varicella         Past Surgical History:   Procedure Laterality Date   •  SECTION        and  and    •  SECTION WITH TUBAL N/A 1/15/2019    Procedure:  SECTION REPEAT WITH TUBAL;  Surgeon: Alva Boyer MD;  Location: [facility] LABOR DELIVERY;  Service: Obstetrics/Gynecology   • CHOLECYSTECTOMY     • COLONOSCOPY N/A 2017    Procedure: COLONOSCOPY WITH BIOPSIES AND ARGON THERMAL ABLATION;  Surgeon: Jignesh Selby MD;  Location: [facility] ENDOSCOPY;  Service:    • DIAGNOSTIC LAPAROSCOPY N/A 2018    Procedure: DIAGNOSTIC LAPAROSCOPY AND ABLATION OF ENDOMETRIOSIS;  Surgeon: Evin Zamudio MD;  Location: [facility] OR;  Service: Obstetrics/Gynecology   • ENDOMETRIAL  ABLATION     • ENDOSCOPIC FUNCTIONAL SINUS SURGERY (FESS) Right 6/25/2021    Procedure: Right endoscopic total ethmoidectomy, right endoscopic middle meatal antrotomy with cyst removal, right endoscopic frontal recess exploration with cyst removal;  Surgeon: Saulo Brito MD;  Location: [facility] OR;  Service: ENT;  Laterality: Right;   • ENDOSCOPY N/A 4/20/2017    Procedure: ESOPHAGOGASTRODUODENOSCOPY WITH BIOPSIES AND COLD BIOPSY POLYPECTOMIES;  Surgeon: Jignesh Selby MD;  Location: [facility] ENDOSCOPY;  Service:    • MOUTH SURGERY      full mouth tooth extration       Family History   Problem Relation Age of Onset   • Arthritis Mother    • COPD Mother    • Asthma Mother    • Thyroid disease Mother    • Arthritis Father    • Diabetes Father    • Hypertension Father    • Hyperlipidemia Father    • Kidney disease Father    • Heart attack Father    • Coronary artery disease Father    • Dementia Father    • No Known Problems Son    • Colon cancer Neg Hx    • Liver cancer Neg Hx    • Liver disease Neg Hx    • Stomach cancer Neg Hx    • Esophageal cancer Neg Hx        Social History     Socioeconomic History   • Marital status:    Tobacco Use   • Smoking status: Never Smoker   • Smokeless tobacco: Never Used   Vaping Use   • Vaping Use: Never used   Substance and Sexual Activity   • Alcohol use: No   • Drug use: No   • Sexual activity: Yes     Partners: Male     Birth control/protection: None     Comment: PE while on birth control patch       I have reviewed the medical and surgical history, family history, social history, medications, and/or allergies, and the review of systems of this report.    Allergies   Allergen Reactions   • Ceftin [Cefuroxime Axetil] Shortness Of Breath and Rash     Numbness in mouth and throat   • Imitrex [Sumatriptan] Shortness Of Breath and Nausea Only   • Amoxicillin Rash         Current Outpatient Medications:   •  albuterol sulfate  (90 Base) MCG/ACT inhaler, Inhale 2 puffs  Every 4 (Four) Hours As Needed for Wheezing or Shortness of Air., Disp: 18 g, Rfl: 11  •  apixaban (ELIQUIS) 5 MG tablet tablet, Take 1 tablet by mouth 2 (Two) Times a Day., Disp: 180 tablet, Rfl: 3  •  aspirin 81 MG EC tablet, Take 81 mg by mouth Daily., Disp: , Rfl:   •  atorvastatin (LIPITOR) 40 MG tablet, Take 1 tablet by mouth Every Night., Disp: 30 tablet, Rfl: 11  •  azelastine (ASTELIN) 0.1 % nasal spray, 1 spray into the nostril(s) as directed by provider 2 (Two) Times a Day. Use in each nostril as directed, Disp: 1 each, Rfl: 10  •  b complex vitamins capsule, Take 1 capsule by mouth Daily., Disp: 90 capsule, Rfl: 3  •  busPIRone (BUSPAR) 5 MG tablet, Take 1 tablet by mouth 3 (Three) Times a Day., Disp: 90 tablet, Rfl: 4  •  calcium carbonate EX (TUMS EX) 750 MG chewable tablet, Chew 1 tablet Daily. (Patient taking differently: Chew 750 mg Daily As Needed.), Disp: 30 tablet, Rfl: 1  •  Cholecalciferol (Vitamin D3) 50 MCG (2000 UT) capsule, Take 1 capsule by mouth Daily. Start after completing 50,000 IU weekly dose.  Indications: Vitamin D Deficiency (Patient taking differently: Take 2,000 Units by mouth Daily. .  Indications: Vitamin D Deficiency), Disp: 90 capsule, Rfl: 3  •  Corlanor 5 MG tablet tablet, Take 1.5 tablets by mouth 2 (two) times a day., Disp: 90 tablet, Rfl: 6  •  cyclobenzaprine (FLEXERIL) 10 MG tablet, Take 1 tablet by mouth 2 (Two) Times a Day As Needed for Muscle Spasms., Disp: 14 tablet, Rfl: 0  •  Diclofenac Sodium (VOLTAREN) 1 % gel gel, Apply 4 g topically to the appropriate area as directed 4 (Four) Times a Day As Needed (joint pain)., Disp: 150 g, Rfl: 1  •  DULoxetine (CYMBALTA) 60 MG capsule, Take 1 capsule by mouth Daily., Disp: 90 capsule, Rfl: 3  •  Elastic Bandages & Supports (Elbow Support Left/Right) misc, 1 Device Daily., Disp: 1 each, Rfl: 0  •  fluticasone (FLONASE) 50 MCG/ACT nasal spray, INSTILL 2 SPRAYS INTO EACH NOSTRIL EVERY DAY AS DIRECTED, Disp: 16 g, Rfl: 11  •   "Fremanezumab-vfrm (Ajovy) 225 MG/1.5ML solution auto-injector, Inject 225 mg under the skin into the appropriate area as directed Every 30 (Thirty) Days., Disp: 1 pen, Rfl: 5  •  granisetron (KYTRIL) 1 MG tablet, Take 1 mg by mouth Every 12 (Twelve) Hours As Needed for Nausea or Vomiting. PA APPROVED., Disp: , Rfl:   •  lidocaine (LIDODERM) 5 %, Place 2 patches on the skin as directed by provider Daily. Remove & Discard patch within 12 hours or as directed by MD, Disp: 60 patch, Rfl: 3  •  magnesium hydroxide (MILK OF MAGNESIA) 2400 MG/10ML suspension suspension, Take 5 mL by mouth Daily As Needed., Disp: , Rfl:   •  mometasone-formoterol (Dulera) 200-5 MCG/ACT inhaler, Inhale 2 puffs 2 (Two) Times a Day., Disp: 13 g, Rfl: 11  •  mupirocin (Bactroban) 2 % ointment, Apply  topically to the appropriate area as directed 2 (Two) Times a Day., Disp: 30 g, Rfl: 0  •  ondansetron ODT (ZOFRAN-ODT) 4 MG disintegrating tablet, Place 1 tablet on the tongue Every 6 (Six) Hours As Needed for Nausea or Vomiting., Disp: 10 tablet, Rfl: 0  •  pantoprazole (PROTONIX) 40 MG EC tablet, Take 1 tablet by mouth Daily., Disp: 30 tablet, Rfl: 0  •  Rimegepant Sulfate (Nurtec) 75 MG tablet dispersible tablet, Take 1 tablet by mouth 1 (One) Time As Needed (migraine) for up to 1 dose., Disp: 8 tablet, Rfl: 2  •  zonisamide (ZONEGRAN) 100 MG capsule, Take 1 capsule by mouth Every Evening., Disp: 30 capsule, Rfl: 2    Objective   Temp 98.2 °F (36.8 °C)   Ht 160 cm (63\")   Wt 89.4 kg (197 lb)   BMI 34.90 kg/m²    Physical Exam  Constitutional: Patient is oriented to person, place, and time. Patient appears well-developed and well-nourished.   HENT:Head: Normocephalic and atraumatic.   Eyes: EOM are normal. Pupils are equal, round, and reactive to light.   Neck: Normal range of motion. Neck supple.   Cardiovascular: Normal rate.    Pulmonary/Chest: Effort normal and breath sounds normal.   Abdominal: Soft.   Neurological: Patient is alert and " oriented to person, place, and time.   Skin: Skin is warm and dry.   Psychiatric: Patient has a normal mood and affect.   Nursing note and vitals reviewed.       [unfilled]   Left shoulder: Full active range of motion no weakness negative inferior sulcus sign negative anterior apprehension no tenderness proximal biceps tendon impingement sign negative very slight tenderness over the direct lateral acromial area and full cross body adduction    Assessment/Plan   Review of Radiographic Studies:    Radiographic images today of affected area I personally viewed and showed no sign of acute fracture or dislocation.      Procedures     Diagnoses and all orders for this visit:    1. Arthralgia of left shoulder region (Primary)  -     XR Shoulder 2+ View Left; Future  -     MRI Shoulder Left Without Contrast       Physical therapy referral given      Recommendations/Plan:   Work/Activity Status: May perform usual activities as tolerated    Patient agreeable to call or return sooner for any concerns.             Impression:  Left shoulder pain possibly referred from the cervical spine rule out subacromial impingement, chronic anticoagulation due to history of multiple PE  Plan:  MR left shoulder refer to therapy home exercise recheck with me after MRI complete if MR shoulder unremarkable consider referral to spine specialist for evaluation of her cervical spine

## 2021-11-03 ENCOUNTER — OFFICE VISIT (OUTPATIENT)
Dept: INTERNAL MEDICINE | Facility: CLINIC | Age: 31
End: 2021-11-03

## 2021-11-03 VITALS
TEMPERATURE: 98.2 F | BODY MASS INDEX: 35.26 KG/M2 | HEIGHT: 63 IN | SYSTOLIC BLOOD PRESSURE: 130 MMHG | WEIGHT: 199 LBS | DIASTOLIC BLOOD PRESSURE: 88 MMHG | HEART RATE: 92 BPM

## 2021-11-03 DIAGNOSIS — L98.9 SKIN LESION OF BACK: Primary | ICD-10-CM

## 2021-11-03 DIAGNOSIS — E16.2 HYPOGLYCEMIA: ICD-10-CM

## 2021-11-03 DIAGNOSIS — Z23 NEED FOR VACCINATION: ICD-10-CM

## 2021-11-03 PROCEDURE — 0003A COVID-19 (PFIZER): CPT | Performed by: FAMILY MEDICINE

## 2021-11-03 PROCEDURE — 99213 OFFICE O/P EST LOW 20 MIN: CPT | Performed by: FAMILY MEDICINE

## 2021-11-03 PROCEDURE — 91300 COVID-19 (PFIZER): CPT | Performed by: FAMILY MEDICINE

## 2021-11-03 PROCEDURE — 90686 IIV4 VACC NO PRSV 0.5 ML IM: CPT | Performed by: FAMILY MEDICINE

## 2021-11-03 PROCEDURE — 90471 IMMUNIZATION ADMIN: CPT | Performed by: FAMILY MEDICINE

## 2021-11-03 RX ORDER — BLOOD-GLUCOSE METER
KIT MISCELLANEOUS
Qty: 100 EACH | Refills: 12 | Status: SHIPPED | OUTPATIENT
Start: 2021-11-03

## 2021-11-03 RX ORDER — CYCLOBENZAPRINE HCL 10 MG
TABLET ORAL
COMMUNITY
End: 2022-02-17

## 2021-11-03 RX ORDER — BLOOD-GLUCOSE METER
1 KIT MISCELLANEOUS AS NEEDED
Qty: 1 EACH | Refills: 0 | Status: SHIPPED | OUTPATIENT
Start: 2021-11-03

## 2021-11-03 RX ORDER — BLOOD SUGAR DIAGNOSTIC
STRIP MISCELLANEOUS
Qty: 100 EACH | Refills: 12 | Status: SHIPPED | OUTPATIENT
Start: 2021-11-03 | End: 2023-02-09

## 2021-11-03 NOTE — PROGRESS NOTES
"Subjective    Marilu Fiorella Godoy is a 30 y.o. female here for:  Chief Complaint   Patient presents with   • Skin Lesion     on right lower back x 2 months, noticed some bleeding        History per MA reviewed.    Skin lesion on lower back right side that never goes away. Scabs and bleeds.    Notes spells of feeling lightheaded, like going to pass out, can eat and feels better. Would like to be able to check sugars on her own. Has checked before and found sugars low.         The following portions of the patient's history were reviewed and updated as appropriate: allergies, current medications, past family history, past medical history, past social history, past surgical history and problem list.    Review of Systems   Constitutional: Positive for fatigue. Negative for fever.   Musculoskeletal: Positive for arthralgias.   Skin: Positive for skin lesions.   Neurological: Positive for light-headedness.         Objective   Visit Vitals  /88   Pulse 92   Temp 98.2 °F (36.8 °C) (Infrared)   Ht 160 cm (63\")   Wt 90.3 kg (199 lb)   BMI 35.25 kg/m²       Physical Exam  Vitals and nursing note reviewed.   Constitutional:       General: She is not in acute distress.     Appearance: Normal appearance. She is well-developed and well-groomed. She is obese. She is not ill-appearing, toxic-appearing or diaphoretic.      Interventions: Face mask in place.   HENT:      Head: Normocephalic and atraumatic.      Right Ear: Hearing normal.      Left Ear: Hearing normal.   Eyes:      General: Lids are normal. No scleral icterus.     Extraocular Movements: Extraocular movements intact.   Neck:      Trachea: Phonation normal.   Pulmonary:      Effort: Pulmonary effort is normal.   Musculoskeletal:      Cervical back: Neck supple.   Skin:     General: Skin is warm.      Capillary Refill: Capillary refill takes less than 2 seconds.      Coloration: Skin is not jaundiced or pale.          Neurological:      General: No focal deficit " present.      Mental Status: She is alert and oriented to person, place, and time.      Motor: Motor function is intact.   Psychiatric:         Attention and Perception: Attention and perception normal.         Mood and Affect: Mood and affect normal.         Speech: Speech normal.         Behavior: Behavior normal. Behavior is cooperative.         Thought Content: Thought content normal.         Cognition and Memory: Cognition and memory normal.         Judgment: Judgment normal.          For medical decision making review of the following was required:  Lab Results   Component Value Date    WBC 13.1 (H) 10/02/2021    HGB 15.0 10/02/2021    HCT 45.3 10/02/2021    MCV 91.9 10/02/2021     10/02/2021     Lab Results   Component Value Date    GLUCOSE 88 09/29/2021    BUN 11 09/29/2021    CREATININE 0.64 09/29/2021    EGFRIFNONA 109 09/29/2021    EGFRIFAFRI 112 04/02/2021    BCR 17.2 09/29/2021    K 4.2 09/29/2021    CO2 23.5 09/29/2021    CALCIUM 9.6 09/29/2021    PROTENTOTREF 7.3 04/02/2021    ALBUMIN 4.90 09/29/2021    LABIL2 1.8 04/02/2021    AST 12 09/29/2021    ALT 15 09/29/2021     Lab Results   Component Value Date    HGBA1C 4.89 08/04/2021    HGBA1C 4.90 04/24/2017         Assessment/Plan     Problem List Items Addressed This Visit     None      Visit Diagnoses     Skin lesion of back    -  Primary    Relevant Orders    Ambulatory Referral to General Surgery    Hypoglycemia        Relevant Medications    glucose monitor monitoring kit    B-D ULTRA-FINE 33 LANCETS misc    glucose blood test strip    Alcohol Swabs (Alcohol Pads) 70 % pads    Need for vaccination        Relevant Orders    FluLaval/Fluarix/Fluzone >6 Months (9914-5710) (Completed)    COVID-19 Vaccine (Pfizer) (Completed)            Deidre Barker MD

## 2021-11-10 ENCOUNTER — HOSPITAL ENCOUNTER (OUTPATIENT)
Dept: MRI IMAGING | Facility: HOSPITAL | Age: 31
Discharge: HOME | End: 2021-11-10
Admitting: ORTHOPAEDIC SURGERY

## 2021-11-10 PROCEDURE — 73221 MRI JOINT UPR EXTREM W/O DYE: CPT

## 2021-11-11 ENCOUNTER — PATIENT MESSAGE (OUTPATIENT)
Dept: CARDIOLOGY | Facility: CLINIC | Age: 31
End: 2021-11-11

## 2021-11-11 ENCOUNTER — TELEPHONE (OUTPATIENT)
Dept: CARDIOLOGY | Facility: CLINIC | Age: 31
End: 2021-11-11

## 2021-11-11 NOTE — TELEPHONE ENCOUNTER
----- Message from Marilu Godoy sent at 11/11/2021 11:19 AM EST -----  Regarding: Dizziness  Dr. Barker wanted me to contact you about my spells I'm recently having. I get lightheaded and dizzy and it has caused me to fall twice so far. One fall was in the shower and one was at my parents house. It just hits me out of no where and it's sudden.

## 2021-11-14 DIAGNOSIS — G43.711 INTRACTABLE CHRONIC MIGRAINE WITHOUT AURA AND WITH STATUS MIGRAINOSUS: ICD-10-CM

## 2021-11-16 RX ORDER — ZONISAMIDE 100 MG/1
CAPSULE ORAL
Qty: 30 CAPSULE | Refills: 0 | Status: SHIPPED | OUTPATIENT
Start: 2021-11-16 | End: 2021-11-18 | Stop reason: SDUPTHER

## 2021-11-18 ENCOUNTER — OFFICE VISIT (OUTPATIENT)
Dept: NEUROLOGY | Facility: CLINIC | Age: 31
End: 2021-11-18

## 2021-11-18 VITALS
DIASTOLIC BLOOD PRESSURE: 72 MMHG | HEART RATE: 80 BPM | HEIGHT: 63 IN | OXYGEN SATURATION: 99 % | BODY MASS INDEX: 34.55 KG/M2 | WEIGHT: 195 LBS | SYSTOLIC BLOOD PRESSURE: 120 MMHG | TEMPERATURE: 97.5 F

## 2021-11-18 DIAGNOSIS — G43.711 INTRACTABLE CHRONIC MIGRAINE WITHOUT AURA AND WITH STATUS MIGRAINOSUS: ICD-10-CM

## 2021-11-18 PROCEDURE — 99213 OFFICE O/P EST LOW 20 MIN: CPT | Performed by: NURSE PRACTITIONER

## 2021-11-18 RX ORDER — RIMEGEPANT SULFATE 75 MG/75MG
75 TABLET, ORALLY DISINTEGRATING ORAL EVERY OTHER DAY
Qty: 16 TABLET | Refills: 5 | Status: SHIPPED | OUTPATIENT
Start: 2021-11-18 | End: 2021-11-30

## 2021-11-18 RX ORDER — RIMEGEPANT SULFATE 75 MG/75MG
75 TABLET, ORALLY DISINTEGRATING ORAL AS NEEDED
Qty: 4 TABLET | Refills: 0 | COMMUNITY
Start: 2021-11-18 | End: 2022-11-16 | Stop reason: SDUPTHER

## 2021-11-18 RX ORDER — FREMANEZUMAB-VFRM 225 MG/1.5ML
INJECTION SUBCUTANEOUS
COMMUNITY
Start: 2021-11-11 | End: 2021-11-18 | Stop reason: SDUPTHER

## 2021-11-18 RX ORDER — ZONISAMIDE 100 MG/1
100 CAPSULE ORAL EVERY EVENING
Qty: 90 CAPSULE | Refills: 1 | Status: SHIPPED | OUTPATIENT
Start: 2021-11-18

## 2021-11-18 RX ORDER — BLOOD-GLUCOSE METER
EACH MISCELLANEOUS
COMMUNITY
Start: 2021-11-06

## 2021-11-18 RX ORDER — FREMANEZUMAB-VFRM 225 MG/1.5ML
225 INJECTION SUBCUTANEOUS
Qty: 1 PEN | Refills: 11 | Status: SHIPPED | OUTPATIENT
Start: 2021-11-18 | End: 2022-02-17 | Stop reason: ALTCHOICE

## 2021-11-18 NOTE — PATIENT INSTRUCTIONS
Continue doing the Ajovy every 28 days. Set a reminder on your phone to keep consistent.     I have also increased the Nurtec to 16/month. Try taking them every other day the week before your shot is due to help prevent migraine attacks. I usually suggest that patients take the Nurtec Vlcikq-Rnfgeynco-Cpysjn-Saturday.       Migraine Headache  A migraine headache is an intense, throbbing pain on one side or both sides of the head. Migraine headaches may also cause other symptoms, such as nausea, vomiting, and sensitivity to light and noise. A migraine headache can last from 4 hours to 3 days. Talk with your doctor about what things may bring on (trigger) your migraine headaches.  What are the causes?  The exact cause of this condition is not known. However, a migraine may be caused when nerves in the brain become irritated and release chemicals that cause inflammation of blood vessels. This inflammation causes pain. This condition may be triggered or caused by:  · Drinking alcohol.  · Smoking.  · Taking medicines, such as:  ? Medicine used to treat chest pain (nitroglycerin).  ? Birth control pills.  ? Estrogen.  ? Certain blood pressure medicines.  · Eating or drinking products that contain nitrates, glutamate, aspartame, or tyramine. Aged cheeses, chocolate, or caffeine may also be triggers.  · Doing physical activity.  Other things that may trigger a migraine headache include:  · Menstruation.  · Pregnancy.  · Hunger.  · Stress.  · Lack of sleep or too much sleep.  · Weather changes.  · Fatigue.  What increases the risk?  The following factors may make you more likely to experience migraine headaches:  · Being a certain age. This condition is more common in people who are 25-55 years old.  · Being female.  · Having a family history of migraine headaches.  · Being .  · Having a mental health condition, such as depression or anxiety.  · Being obese.  What are the signs or symptoms?  The main symptom of this  condition is pulsating or throbbing pain. This pain may:  · Happen in any area of the head, such as on one side or both sides.  · Interfere with daily activities.  · Get worse with physical activity.  · Get worse with exposure to bright lights or loud noises.  Other symptoms may include:  · Nausea.  · Vomiting.  · Dizziness.  · General sensitivity to bright lights, loud noises, or smells.  Before you get a migraine headache, you may get warning signs (an aura). An aura may include:  · Seeing flashing lights or having blind spots.  · Seeing bright spots, halos, or zigzag lines.  · Having tunnel vision or blurred vision.  · Having numbness or a tingling feeling.  · Having trouble talking.  · Having muscle weakness.  Some people have symptoms after a migraine headache (postdromal phase), such as:  · Feeling tired.  · Difficulty concentrating.  How is this diagnosed?  A migraine headache can be diagnosed based on:  · Your symptoms.  · A physical exam.  · Tests, such as:  ? CT scan or an MRI of the head. These imaging tests can help rule out other causes of headaches.  ? Taking fluid from the spine (lumbar puncture) and analyzing it (cerebrospinal fluid analysis, or CSF analysis).  How is this treated?  This condition may be treated with medicines that:  · Relieve pain.  · Relieve nausea.  · Prevent migraine headaches.  Treatment for this condition may also include:  · Acupuncture.  · Lifestyle changes like avoiding foods that trigger migraine headaches.  · Biofeedback.  · Cognitive behavioral therapy.  Follow these instructions at home:  Medicines  · Take over-the-counter and prescription medicines only as told by your health care provider.  · Ask your health care provider if the medicine prescribed to you:  ? Requires you to avoid driving or using heavy machinery.  ? Can cause constipation. You may need to take these actions to prevent or treat constipation:  § Drink enough fluid to keep your urine pale yellow.  § Take  over-the-counter or prescription medicines.  § Eat foods that are high in fiber, such as beans, whole grains, and fresh fruits and vegetables.  § Limit foods that are high in fat and processed sugars, such as fried or sweet foods.  Lifestyle  · Do not drink alcohol.  · Do not use any products that contain nicotine or tobacco, such as cigarettes, e-cigarettes, and chewing tobacco. If you need help quitting, ask your health care provider.  · Get at least 8 hours of sleep every night.  · Find ways to manage stress, such as meditation, deep breathing, or yoga.  General instructions         · Keep a journal to find out what may trigger your migraine headaches. For example, write down:  ? What you eat and drink.  ? How much sleep you get.  ? Any change to your diet or medicines.  · If you have a migraine headache:  ? Avoid things that make your symptoms worse, such as bright lights.  ? It may help to lie down in a dark, quiet room.  ? Do not drive or use heavy machinery.  ? Ask your health care provider what activities are safe for you while you are experiencing symptoms.  · Keep all follow-up visits as told by your health care provider. This is important.  Contact a health care provider if:  · You develop symptoms that are different or more severe than your usual migraine headache symptoms.  · You have more than 15 headache days in one month.  Get help right away if:  · Your migraine headache becomes severe.  · Your migraine headache lasts longer than 72 hours.  · You have a fever.  · You have a stiff neck.  · You have vision loss.  · Your muscles feel weak or like you cannot control them.  · You start to lose your balance often.  · You have trouble walking.  · You faint.  · You have a seizure.  Summary  · A migraine headache is an intense, throbbing pain on one side or both sides of the head. Migraines may also cause other symptoms, such as nausea, vomiting, and sensitivity to light and noise.  · This condition may be  treated with medicines and lifestyle changes. You may also need to avoid certain things that trigger a migraine headache.  · Keep a journal to find out what may trigger your migraine headaches.  · Contact your health care provider if you have more than 15 headache days in a month or you develop symptoms that are different or more severe than your usual migraine headache symptoms.  This information is not intended to replace advice given to you by your health care provider. Make sure you discuss any questions you have with your health care provider.  Document Revised: 04/10/2020 Document Reviewed: 01/30/2020  Elsevier Patient Education © 2021 [employer]    Sinus Headache    A sinus headache occurs when your sinuses become clogged or swollen. Sinuses are air-filled spaces in your skull that are behind the bones of your face and forehead. Sinus headaches can range from mild to severe.  What are the causes?  A sinus headache can result from various conditions that affect the sinuses. Common causes include:  · Colds.  · Sinus infections.  · Allergies.  Many people confuse sinus headaches with migraines or tension headaches because those headaches can also cause facial pain and nasal symptoms.  What are the signs or symptoms?  The main symptom of this condition is a headache that may feel like pain or pressure in your face, forehead, ears, or upper teeth. People who have a sinus headache often have other symptoms, such as:  · Congested or runny nose.  · Fever.  · Inability to smell.  Weather changes can make symptoms worse.  How is this diagnosed?  This condition may be diagnosed based on:  · A physical exam and medical history.  · Imaging tests, such as a CT scan or MRI, to check for problems with the sinuses.  · Examination of the sinuses using a thin tool with a camera that is inserted through your nose (endoscopy).  How is this treated?  Treatment for this condition depends on the cause.  · Sinus pain that is  caused by a sinus infection may be treated with antibiotic medicine.  · Sinus pain that is caused by allergies may be helped by allergy medicines (antihistamines) and medicated nasal sprays.  · Sinus pain that is caused by congestion may be helped by rinsing out (flushing) the nose and sinuses with saline solution.  · Sinus surgery may be needed in some cases if other treatments do not help.  Follow these instructions at home:  General instructions  · If directed:  ? Apply a warm, moist washcloth to your face to help relieve pain.  ? Use a nasal saline wash.  Medicines    · Take over-the-counter and prescription medicines only as told by your health care provider.  · If you were prescribed an antibiotic medicine, take it as told by your health care provider. Do not stop taking the antibiotic even if you start to feel better.  · If you have congestion, use a nasal spray to help lessen pressure.    Hydrate and humidify  · Drink enough water to keep your urine clear or pale yellow. Staying hydrated will help to thin your mucus.  · Use a cool mist humidifier to keep the humidity level in your home above 50%.  · Inhale steam for 10-15 minutes, 3-4 times a day or as told by your health care provider. You can do this in the bathroom while a hot shower is running.  · Limit your exposure to cool or dry air.  Contact a health care provider if:  · You have a headache more than one time a week.  · You have sensitivity to light or sound.  · You develop a fever.  · You feel nauseous or you vomit.  · Your headaches do not get better with treatment. Many people think that they have a sinus headache when they actually have a migraine or a tension headache.  Get help right away if:  · You have vision problems.  · You have sudden, severe pain in your face or head.  · You have a seizure.  · You are confused.  · You have a stiff neck.  Summary  · A sinus headache occurs when your sinuses become clogged or swollen.  · A sinus headache  can result from various conditions that affect the sinuses, such as a cold, a sinus infection, or an allergy.  · Treatment for this condition depends on the cause. It may include medicine, such as antibiotics or antihistamines.  This information is not intended to replace advice given to you by your health care provider. Make sure you discuss any questions you have with your health care provider.  Document Revised: 11/30/2018 Document Reviewed: 09/28/2018  Elsevier Patient Education © 2021 [employer]

## 2021-11-18 NOTE — PROGRESS NOTES
Follow Up Neurology Office Visit      Patient Name: Marilu Godoy    Referring Physician: No ref. provider found    Chief Complaint:    Chief Complaint   Patient presents with   • Migraine     Pt in office to follow up on migraines. Pt states she is doing better        History of Present Illness: Marilu Godoy is a 30 y.o. female who is here to follow up with Neurology for headaches. She reports that she is starting to feel better in terms of headache reduction, although she does continue to have frequent migraine and non migraine headaches.     MHD/month: 10. At least 3 in week before Ajovy injection    HAD/month: 15    The following portions of the patient's history were reviewed and updated as appropriate: allergies, current medications, past family history, past medical history, past social history, past surgical history and problem list.    Subjective     Review of Systems:   Review of Systems   HENT: Positive for postnasal drip and sinus pressure.    Gastrointestinal: Positive for constipation.   Neurological: Positive for headache.       Medications:   No current facility-administered medications for this visit.    Current Outpatient Medications:   •  albuterol sulfate  (90 Base) MCG/ACT inhaler, Inhale 2 puffs Every 4 (Four) Hours As Needed for Wheezing or Shortness of Air., Disp: 18 g, Rfl: 11  •  Alcohol Swabs (Alcohol Pads) 70 % pads, Check sugars as needed/directed for hypoglycemia., Disp: 100 each, Rfl: 12  •  apixaban (ELIQUIS) 5 MG tablet tablet, Take 1 tablet by mouth 2 (Two) Times a Day., Disp: 180 tablet, Rfl: 3  •  aspirin 81 MG EC tablet, Take 81 mg by mouth Daily., Disp: , Rfl:   •  atorvastatin (LIPITOR) 40 MG tablet, Take 1 tablet by mouth Every Night., Disp: 30 tablet, Rfl: 11  •  azelastine (ASTELIN) 0.1 % nasal spray, 1 spray into the nostril(s) as directed by provider 2 (Two) Times a Day. Use in each nostril as directed, Disp: 1 each, Rfl: 10  •  b complex vitamins  capsule, Take 1 capsule by mouth Daily., Disp: 90 capsule, Rfl: 3  •  B-D ULTRA-FINE 33 LANCETS misc, Check sugars as needed/directed for hypoglycemia., Disp: 100 each, Rfl: 12  •  Blood Glucose Monitoring Suppl (ONE TOUCH ULTRA 2) w/Device kit, use as needed to check blood sugar for hypoglycemia (diabetes), Disp: , Rfl:   •  busPIRone (BUSPAR) 5 MG tablet, Take 1 tablet by mouth 3 (Three) Times a Day., Disp: 90 tablet, Rfl: 4  •  calcium carbonate EX (TUMS EX) 750 MG chewable tablet, Chew 1 tablet Daily. (Patient taking differently: Chew 750 mg Daily As Needed.), Disp: 30 tablet, Rfl: 1  •  Cholecalciferol (Vitamin D3) 50 MCG (2000 UT) capsule, Take 1 capsule by mouth Daily. Start after completing 50,000 IU weekly dose.  Indications: Vitamin D Deficiency (Patient taking differently: Take 2,000 Units by mouth Daily. .  Indications: Vitamin D Deficiency), Disp: 90 capsule, Rfl: 3  •  Corlanor 5 MG tablet tablet, Take 1.5 tablets by mouth 2 (two) times a day., Disp: 90 tablet, Rfl: 6  •  cyclobenzaprine (FLEXERIL) 10 MG tablet, cyclobenzaprine 10 mg tablet  TAKE 1/2 TABLET BY MOUTH THREE TIMES A DAY AS NEEDED FOR MUSCLE SPASM for up to 5 days, Disp: , Rfl:   •  Diclofenac Sodium (VOLTAREN) 1 % gel gel, Apply 4 g topically to the appropriate area as directed 4 (Four) Times a Day As Needed (joint pain)., Disp: 150 g, Rfl: 1  •  DULoxetine (CYMBALTA) 60 MG capsule, Take 1 capsule by mouth Daily., Disp: 90 capsule, Rfl: 3  •  fluticasone (FLONASE) 50 MCG/ACT nasal spray, INSTILL 2 SPRAYS INTO EACH NOSTRIL EVERY DAY AS DIRECTED, Disp: 16 g, Rfl: 11  •  Fremanezumab-vfrm (Ajovy) 225 MG/1.5ML solution auto-injector, Inject 225 mg under the skin into the appropriate area as directed Every 28 (Twenty-Eight) Days., Disp: 1 pen, Rfl: 11  •  glucose blood test strip, USE TO CHECK SUGARS DAILY DUE TO hypoglycemia, Disp: 100 each, Rfl: 12  •  glucose monitor monitoring kit, 1 each As Needed (diabetes). Check sugars as  needed/directed for hypoglycemia., Disp: 1 each, Rfl: 0  •  granisetron (KYTRIL) 1 MG tablet, Take 1 mg by mouth Every 12 (Twelve) Hours As Needed for Nausea or Vomiting. PA APPROVED., Disp: , Rfl:   •  lidocaine (LIDODERM) 5 %, Place 2 patches on the skin as directed by provider Daily. Remove & Discard patch within 12 hours or as directed by MD, Disp: 60 patch, Rfl: 3  •  magnesium hydroxide (MILK OF MAGNESIA) 2400 MG/10ML suspension suspension, Take 5 mL by mouth Daily As Needed., Disp: , Rfl:   •  mometasone-formoterol (Dulera) 200-5 MCG/ACT inhaler, Inhale 2 puffs 2 (Two) Times a Day., Disp: 13 g, Rfl: 11  •  mupirocin (BACTROBAN) 2 % ointment, Apply  topically to the appropriate area as directed 3 (Three) Times a Day., Disp: 15 g, Rfl: 0  •  ondansetron ODT (ZOFRAN-ODT) 4 MG disintegrating tablet, Place 1 tablet on the tongue Every 6 (Six) Hours As Needed for Nausea or Vomiting., Disp: 10 tablet, Rfl: 0  •  pantoprazole (PROTONIX) 40 MG EC tablet, Take 1 tablet by mouth Daily., Disp: 30 tablet, Rfl: 0  •  zonisamide (ZONEGRAN) 100 MG capsule, Take 1 capsule by mouth Every Evening., Disp: 90 capsule, Rfl: 1  •  Rimegepant Sulfate (Nurtec) 75 MG tablet dispersible tablet, Take 1 tablet by mouth Every Other Day. Take M-W-F-Sat., Disp: 16 tablet, Rfl: 5  •  Rimegepant Sulfate (Nurtec) 75 MG tablet dispersible tablet, Take 1 tablet by mouth As Needed (as needed)., Disp: 4 tablet, Rfl: 0    Facility-Administered Medications Ordered in Other Visits:   •  sodium chloride 0.9 % bolus 1,000 mL, 1,000 mL, Intravenous, Once, Calvin Monzon Jr., PA-C  •  sodium chloride 0.9 % flush 10 mL, 10 mL, Intravenous, PRN, Calvin Monzon Jr., PA-C    Allergies:   Allergies   Allergen Reactions   • Ceftin [Cefuroxime Axetil] Shortness Of Breath and Rash     Numbness in mouth and throat   • Imitrex [Sumatriptan] Shortness Of Breath and Nausea Only   • Amoxicillin Rash       Objective     Physical Exam:  Vital Signs:   Vitals:     "11/18/21 1538   BP: 120/72   Pulse: 80   Temp: 97.5 °F (36.4 °C)   SpO2: 99%   Weight: 88.5 kg (195 lb)   Height: 160 cm (63\")   PainSc:   3   PainLoc: Head     Physical Exam  Vitals and nursing note reviewed.   Pulmonary:      Effort: Pulmonary effort is normal.   Neurological:      Mental Status: She is oriented to person, place, and time. Mental status is at baseline.      Gait: Gait is intact.   Psychiatric:         Mood and Affect: Mood normal.         Speech: Speech normal.         Behavior: Behavior normal.       Neurologic Exam     Mental Status   Oriented to person, place, and time.   Attention: normal. Concentration: normal.   Speech: speech is normal   Level of consciousness: alert  Normal comprehension.     Cranial Nerves   Cranial nerves II through XII intact.     Motor Exam   Muscle bulk: normal  Overall muscle tone: normal    Gait, Coordination, and Reflexes     Gait  Gait: normal    Tremor   Resting tremor: absent    Results Review:   I have reviewed the patient's other medical records to include, labs, radiology and referrals.     Assessment / Plan      Assessment/Plan:   Diagnoses and all orders for this visit:    1. Intractable chronic migraine without aura and with status migrainosus  -     Fremanezumab-vfrm (Ajovy) 225 MG/1.5ML solution auto-injector; Inject 225 mg under the skin into the appropriate area as directed Every 28 (Twenty-Eight) Days.  Dispense: 1 pen; Refill: 11  -     Rimegepant Sulfate (Nurtec) 75 MG tablet dispersible tablet; Take 1 tablet by mouth Every Other Day. Take M-W-F-Sat.  Dispense: 16 tablet; Refill: 5  -     zonisamide (ZONEGRAN) 100 MG capsule; Take 1 capsule by mouth Every Evening.  Dispense: 90 capsule; Refill: 1    Other orders  -     Rimegepant Sulfate (Nurtec) 75 MG tablet dispersible tablet; Take 1 tablet by mouth As Needed (as needed).  Dispense: 4 tablet; Refill: 0     Patient improving with Ajovy injections, however continues to have frequent migraine " headaches, reports at least 10/month. We discussed additional daily preventative -gepant therpy, she would like to try Nurtec QOD and prescription sent.     Follow Up:   Return in about 3 months (around 2/18/2022) for Next scheduled follow up.     ARNIE Bower  [facility]   AS THE PROVIDER, I PERSONALLY WORE PPE DURING ENTIRE FACE TO FACE ENCOUNTER IN CLINIC WITH THE PATIENT. PATIENT ALSO WORE PPE DURING ENTIRE FACE TO FACE ENCOUNTER EXCEPT FOR A MAX OF 30 SECONDS DURING NEUROLOGICAL EVALUATION OF CRANIAL NERVES AND THEN MASK WAS PLACED BACK OVER PATIENT FACE FOR REMAINDER OF VISIT. I WASHED MY HANDS BEFORE AND AFTER VISIT.    Please note that portions of this note may have been completed with a voice recognition program. Efforts were made to edit the dictations, but occasionally words are mistranscribed.

## 2021-11-19 ENCOUNTER — HOSPITAL ENCOUNTER (EMERGENCY)
Facility: HOSPITAL | Age: 31
Discharge: HOME | End: 2021-11-19
Attending: EMERGENCY MEDICINE | Admitting: EMERGENCY MEDICINE

## 2021-11-19 ENCOUNTER — APPOINTMENT (OUTPATIENT)
Dept: CT IMAGING | Facility: HOSPITAL | Age: 31
End: 2021-11-19

## 2021-11-19 VITALS
HEART RATE: 80 BPM | HEIGHT: 63 IN | SYSTOLIC BLOOD PRESSURE: 123 MMHG | DIASTOLIC BLOOD PRESSURE: 94 MMHG | WEIGHT: 203 LBS | TEMPERATURE: 99.2 F | BODY MASS INDEX: 35.97 KG/M2 | OXYGEN SATURATION: 99 % | RESPIRATION RATE: 18 BRPM

## 2021-11-19 DIAGNOSIS — N83.201 CYST OF RIGHT OVARY: Primary | ICD-10-CM

## 2021-11-19 DIAGNOSIS — N93.8 DYSFUNCTIONAL UTERINE BLEEDING: ICD-10-CM

## 2021-11-19 LAB
ALBUMIN SERPL-MCNC: 4.9 G/DL (ref 3.5–5.2)
ALBUMIN/GLOB SERPL: 1.5 G/DL
ALP SERPL-CCNC: 111 U/L (ref 39–117)
ALT SERPL W P-5'-P-CCNC: 26 U/L (ref 1–33)
ANION GAP SERPL CALCULATED.3IONS-SCNC: 10.6 MMOL/L (ref 5–15)
AST SERPL-CCNC: 20 U/L (ref 1–32)
B-HCG UR QL: NEGATIVE
BACTERIA UR QL AUTO: ABNORMAL /HPF
BASOPHILS # BLD AUTO: 0.07 10*3/MM3 (ref 0–0.2)
BASOPHILS NFR BLD AUTO: 0.9 % (ref 0–1.5)
BILIRUB SERPL-MCNC: 0.6 MG/DL (ref 0–1.2)
BILIRUB UR QL STRIP: NEGATIVE
BUN SERPL-MCNC: 8 MG/DL (ref 6–20)
BUN/CREAT SERPL: 12.7 (ref 7–25)
CALCIUM SPEC-SCNC: 9.5 MG/DL (ref 8.6–10.5)
CHLORIDE SERPL-SCNC: 100 MMOL/L (ref 98–107)
CLARITY UR: CLEAR
CO2 SERPL-SCNC: 25.4 MMOL/L (ref 22–29)
COLOR UR: YELLOW
CREAT SERPL-MCNC: 0.63 MG/DL (ref 0.57–1)
DEPRECATED RDW RBC AUTO: 40.9 FL (ref 37–54)
EOSINOPHIL # BLD AUTO: 0.05 10*3/MM3 (ref 0–0.4)
EOSINOPHIL NFR BLD AUTO: 0.6 % (ref 0.3–6.2)
ERYTHROCYTE [DISTWIDTH] IN BLOOD BY AUTOMATED COUNT: 12.3 % (ref 12.3–15.4)
GFR SERPL CREATININE-BSD FRML MDRD: 111 ML/MIN/1.73
GLOBULIN UR ELPH-MCNC: 3.3 GM/DL
GLUCOSE SERPL-MCNC: 93 MG/DL (ref 65–99)
GLUCOSE UR STRIP-MCNC: NEGATIVE MG/DL
HCT VFR BLD AUTO: 44.7 % (ref 34–46.6)
HGB BLD-MCNC: 15.1 G/DL (ref 12–15.9)
HGB UR QL STRIP.AUTO: ABNORMAL
HOLD SPECIMEN: NORMAL
HOLD SPECIMEN: NORMAL
HYALINE CASTS UR QL AUTO: ABNORMAL /LPF
IMM GRANULOCYTES # BLD AUTO: 0.02 10*3/MM3 (ref 0–0.05)
IMM GRANULOCYTES NFR BLD AUTO: 0.3 % (ref 0–0.5)
KETONES UR QL STRIP: NEGATIVE
LEUKOCYTE ESTERASE UR QL STRIP.AUTO: NEGATIVE
LIPASE SERPL-CCNC: 25 U/L (ref 13–60)
LYMPHOCYTES # BLD AUTO: 1.76 10*3/MM3 (ref 0.7–3.1)
LYMPHOCYTES NFR BLD AUTO: 22.4 % (ref 19.6–45.3)
MCH RBC QN AUTO: 30.8 PG (ref 26.6–33)
MCHC RBC AUTO-ENTMCNC: 33.8 G/DL (ref 31.5–35.7)
MCV RBC AUTO: 91 FL (ref 79–97)
MONOCYTES # BLD AUTO: 0.53 10*3/MM3 (ref 0.1–0.9)
MONOCYTES NFR BLD AUTO: 6.7 % (ref 5–12)
MUCOUS THREADS URNS QL MICRO: ABNORMAL /HPF
NEUTROPHILS NFR BLD AUTO: 5.44 10*3/MM3 (ref 1.7–7)
NEUTROPHILS NFR BLD AUTO: 69.1 % (ref 42.7–76)
NITRITE UR QL STRIP: NEGATIVE
NRBC BLD AUTO-RTO: 0 /100 WBC (ref 0–0.2)
PH UR STRIP.AUTO: 5.5 [PH] (ref 5–8)
PLATELET # BLD AUTO: 349 10*3/MM3 (ref 140–450)
PMV BLD AUTO: 9.5 FL (ref 6–12)
POTASSIUM SERPL-SCNC: 3.8 MMOL/L (ref 3.5–5.2)
PROT SERPL-MCNC: 8.2 G/DL (ref 6–8.5)
PROT UR QL STRIP: NEGATIVE
RBC # BLD AUTO: 4.91 10*6/MM3 (ref 3.77–5.28)
RBC # UR STRIP: ABNORMAL /HPF
REF LAB TEST METHOD: ABNORMAL
SODIUM SERPL-SCNC: 136 MMOL/L (ref 136–145)
SP GR UR STRIP: 1.02 (ref 1–1.03)
SQUAMOUS #/AREA URNS HPF: ABNORMAL /HPF
UROBILINOGEN UR QL STRIP: ABNORMAL
WBC # UR STRIP: ABNORMAL /HPF
WBC NRBC COR # BLD: 7.87 10*3/MM3 (ref 3.4–10.8)
WHOLE BLOOD HOLD SPECIMEN: NORMAL
WHOLE BLOOD HOLD SPECIMEN: NORMAL

## 2021-11-19 PROCEDURE — 99283 EMERGENCY DEPT VISIT LOW MDM: CPT

## 2021-11-19 PROCEDURE — 85025 COMPLETE CBC W/AUTO DIFF WBC: CPT | Performed by: PHYSICIAN ASSISTANT

## 2021-11-19 PROCEDURE — 96374 THER/PROPH/DIAG INJ IV PUSH: CPT

## 2021-11-19 PROCEDURE — 96375 TX/PRO/DX INJ NEW DRUG ADDON: CPT

## 2021-11-19 PROCEDURE — 96361 HYDRATE IV INFUSION ADD-ON: CPT

## 2021-11-19 PROCEDURE — 80053 COMPREHEN METABOLIC PANEL: CPT | Performed by: PHYSICIAN ASSISTANT

## 2021-11-19 PROCEDURE — 74177 CT ABD & PELVIS W/CONTRAST: CPT

## 2021-11-19 PROCEDURE — 25010000002 KETOROLAC TROMETHAMINE PER 15 MG: Performed by: PHYSICIAN ASSISTANT

## 2021-11-19 PROCEDURE — 81025 URINE PREGNANCY TEST: CPT | Performed by: PHYSICIAN ASSISTANT

## 2021-11-19 PROCEDURE — 83690 ASSAY OF LIPASE: CPT | Performed by: PHYSICIAN ASSISTANT

## 2021-11-19 PROCEDURE — 25010000002 IOPAMIDOL 61 % SOLUTION: Performed by: EMERGENCY MEDICINE

## 2021-11-19 PROCEDURE — 81001 URINALYSIS AUTO W/SCOPE: CPT | Performed by: PHYSICIAN ASSISTANT

## 2021-11-19 PROCEDURE — 25010000002 ONDANSETRON PER 1 MG: Performed by: PHYSICIAN ASSISTANT

## 2021-11-19 RX ORDER — ONDANSETRON 2 MG/ML
4 INJECTION INTRAMUSCULAR; INTRAVENOUS ONCE
Status: COMPLETED | OUTPATIENT
Start: 2021-11-19 | End: 2021-11-19

## 2021-11-19 RX ORDER — SODIUM CHLORIDE 0.9 % (FLUSH) 0.9 %
10 SYRINGE (ML) INJECTION AS NEEDED
Status: DISCONTINUED | OUTPATIENT
Start: 2021-11-19 | End: 2021-11-19 | Stop reason: HOSPADM

## 2021-11-19 RX ORDER — KETOROLAC TROMETHAMINE 30 MG/ML
30 INJECTION, SOLUTION INTRAMUSCULAR; INTRAVENOUS ONCE
Status: COMPLETED | OUTPATIENT
Start: 2021-11-19 | End: 2021-11-19

## 2021-11-19 RX ADMIN — KETOROLAC TROMETHAMINE 30 MG: 30 INJECTION, SOLUTION INTRAMUSCULAR; INTRAVENOUS at 16:05

## 2021-11-19 RX ADMIN — IOPAMIDOL 100 ML: 612 INJECTION, SOLUTION INTRAVENOUS at 14:59

## 2021-11-19 RX ADMIN — ONDANSETRON 4 MG: 2 INJECTION INTRAMUSCULAR; INTRAVENOUS at 16:05

## 2021-11-19 RX ADMIN — SODIUM CHLORIDE 1000 ML: 9 INJECTION, SOLUTION INTRAVENOUS at 14:15

## 2021-11-19 NOTE — ED PROVIDER NOTES
Subjective   30 year-old female presents with vaginal spotting, she states she has had a red to brown spotting of blood vaginally since yesterday, and then developed right lower quadrant abdominal pain today.  She has a history of 3 C-sections, exploratory lap, and a tubal ligation as well as gallbladder surgery and her past medical history.  She describes the pain as sharp and she has pain in her left shoulder as well.  She called her [facility] office today she reports and was told to come to the emergency department for evaluation.  Reports her last menstrual cycle was 2 weeks ago.      History provided by:  Patient   used: No        Review of Systems   Gastrointestinal: Positive for abdominal pain.   Genitourinary: Positive for vaginal bleeding.   All other systems reviewed and are negative.      Past Medical History:   Diagnosis Date   • Abdominal pain    • Abnormal Pap smear of cervix    • Anxiety    • Asthma 2015   • Bleeding disorder (HCC) 11-13-16   • Body piercing     TONGUE, NOSE, TWO IN EACH EAR   • Chest pain 06/22/2021    6-7 months ago -seen at [facility] er and released same day of complaint with resitory infection   • Clotting disorder ([facility])     factor 5   • Constipation    • Coronary artery disease involving native coronary artery of native heart without angina pectoris 6/16/2017   • Diarrhea    • Elevated cholesterol    • Endometriosis    • Factor 5 Leiden mutation, heterozygous ([facility])    • Fibroid    • Full dentures 06/22/2021    advised no adhesives DOS   • GERD (gastroesophageal reflux disease)    • H/O blood clots    • H/O cardiovascular stress test 06/22/2021    reported several years ago-exercise wnl   • Headache    • Hiatal hernia    • High cholesterol    • History of recurrent UTIs    • HPV (human papilloma virus) infection    • Hx of gout 06/22/2021   • Hypertension    • Kidney infection 06/22/2021    history only   • Migraine    • Nausea    • Nausea & vomiting    • Obesity     • Orthostatic hypotension    • Osteoarthritis    • Pollen allergies    • Protein S deficiency (HCC) 2016    labs from hospitalization for PE   • Pulmonary embolism (HCC) 2016    on eliquis since that time   • Recurrent pregnancy loss, antepartum condition or complication    • Sleep apnea     CPAP ASKED TO BRING DOS, has been unable to use due to sinus cyst for 2 months   • Subclinical hyperthyroidism    • Tachycardia    • Tattoo     LOWER BACK   • Varicella        Allergies   Allergen Reactions   • Ceftin [Cefuroxime Axetil] Shortness Of Breath and Rash     Numbness in mouth and throat   • Imitrex [Sumatriptan] Shortness Of Breath and Nausea Only   • Amoxicillin Rash       Past Surgical History:   Procedure Laterality Date   •  SECTION        and  and    •  SECTION WITH TUBAL N/A 1/15/2019    Procedure:  SECTION REPEAT WITH TUBAL;  Surgeon: Alva Boyer MD;  Location: [facility] LABOR DELIVERY;  Service: Obstetrics/Gynecology   • CHOLECYSTECTOMY     • COLONOSCOPY N/A 2017    Procedure: COLONOSCOPY WITH BIOPSIES AND ARGON THERMAL ABLATION;  Surgeon: Jignesh Selby MD;  Location: [facility] ENDOSCOPY;  Service:    • DIAGNOSTIC LAPAROSCOPY N/A 2018    Procedure: DIAGNOSTIC LAPAROSCOPY AND ABLATION OF ENDOMETRIOSIS;  Surgeon: Evin Zamudio MD;  Location: [facility] OR;  Service: Obstetrics/Gynecology   • ENDOMETRIAL ABLATION     • ENDOSCOPIC FUNCTIONAL SINUS SURGERY (FESS) Right 2021    Procedure: Right endoscopic total ethmoidectomy, right endoscopic middle meatal antrotomy with cyst removal, right endoscopic frontal recess exploration with cyst removal;  Surgeon: Saulo Brito MD;  Location: [facility] OR;  Service: ENT;  Laterality: Right;   • ENDOSCOPY N/A 2017    Procedure: ESOPHAGOGASTRODUODENOSCOPY WITH BIOPSIES AND COLD BIOPSY POLYPECTOMIES;  Surgeon: Jignesh Selby MD;  Location: [facility] ENDOSCOPY;  Service:    • MOUTH SURGERY      full  mouth tooth extration       Family History   Problem Relation Age of Onset   • Arthritis Mother    • COPD Mother    • Asthma Mother    • Thyroid disease Mother    • Arthritis Father    • Diabetes Father    • Hypertension Father    • Hyperlipidemia Father    • Kidney disease Father    • Heart attack Father    • Coronary artery disease Father    • Dementia Father    • No Known Problems Son    • Colon cancer Neg Hx    • Liver cancer Neg Hx    • Liver disease Neg Hx    • Stomach cancer Neg Hx    • Esophageal cancer Neg Hx        Social History     Socioeconomic History   • Marital status:    Tobacco Use   • Smoking status: Never Smoker   • Smokeless tobacco: Never Used   Vaping Use   • Vaping Use: Never used   Substance and Sexual Activity   • Alcohol use: No   • Drug use: No   • Sexual activity: Yes     Partners: Male     Birth control/protection: None     Comment: PE while on birth control patch           Objective   Physical Exam  Vitals and nursing note reviewed.   Constitutional:       Appearance: She is well-developed.   Cardiovascular:      Rate and Rhythm: Normal rate and regular rhythm.   Pulmonary:      Effort: Pulmonary effort is normal.      Breath sounds: Normal breath sounds.   Abdominal:      General: Bowel sounds are normal.      Palpations: Abdomen is soft.      Tenderness: There is abdominal tenderness in the right lower quadrant.   Musculoskeletal:         General: Normal range of motion.      Cervical back: Normal range of motion and neck supple.   Skin:     General: Skin is warm and dry.   Neurological:      Mental Status: She is alert and oriented to person, place, and time.      Deep Tendon Reflexes: Reflexes are normal and symmetric.         Procedures           ED Course                                           MDM  Number of Diagnoses or Management Options  Cyst of right ovary: new and requires workup  Dysfunctional uterine bleeding: new and requires workup     Amount and/or Complexity  of Data Reviewed  Clinical lab tests: reviewed  Tests in the radiology section of CPT®: reviewed    Risk of Complications, Morbidity, and/or Mortality  Presenting problems: low  Diagnostic procedures: minimal  Management options: low  General comments: 30 year-old female postoperative pain vaginal bleeding, found to have ovarian cyst, but ascites ruled out, pain controlled with anti-inflammatories, no acute distress vital signs stable to be discharged safely and follow-up with OB/gyn    Patient Progress  Patient progress: stable      Final diagnoses:   Cyst of right ovary   Dysfunctional uterine bleeding       ED Disposition  ED Disposition     ED Disposition Condition Comment    Discharge Stable           Alva Boyer MD  [address]  [id number]  [facility] [address]  [phone]    Schedule an appointment as soon as possible for a visit       [facility] Emergency Department  [address] [place] [id number] [phone]             Medication List      Changed    calcium carbonate  MG chewable tablet  Commonly known as: TUMS EX  Chew 1 tablet Daily.  What changed:   · when to take this  · reasons to take this     Vitamin D3 50 MCG (2000 UT) capsule  Take 1 capsule by mouth Daily. Start after completing 50,000 IU weekly dose.  Indications: Vitamin D Deficiency  What changed: additional instructions             Calvin Monzon Jr., PARichardC  11/19/21 [id number]

## 2021-11-30 ENCOUNTER — OFFICE VISIT (OUTPATIENT)
Dept: SURGERY | Facility: CLINIC | Age: 31
End: 2021-11-30

## 2021-11-30 ENCOUNTER — OFFICE VISIT (OUTPATIENT)
Dept: ORTHOPEDIC SURGERY | Facility: CLINIC | Age: 31
End: 2021-11-30

## 2021-11-30 VITALS
OXYGEN SATURATION: 98 % | HEART RATE: 92 BPM | TEMPERATURE: 97.5 F | HEIGHT: 63 IN | SYSTOLIC BLOOD PRESSURE: 118 MMHG | WEIGHT: 201 LBS | BODY MASS INDEX: 35.61 KG/M2 | RESPIRATION RATE: 18 BRPM | DIASTOLIC BLOOD PRESSURE: 64 MMHG

## 2021-11-30 VITALS — HEIGHT: 63 IN | BODY MASS INDEX: 35.61 KG/M2 | WEIGHT: 201 LBS | RESPIRATION RATE: 18 BRPM

## 2021-11-30 DIAGNOSIS — M54.12 CERVICAL RADICULOPATHY: Primary | ICD-10-CM

## 2021-11-30 DIAGNOSIS — R22.2 NODULE OF SKIN OF BACK: Primary | ICD-10-CM

## 2021-11-30 DIAGNOSIS — M75.52 BURSITIS OF LEFT SHOULDER: ICD-10-CM

## 2021-11-30 PROCEDURE — 20610 DRAIN/INJ JOINT/BURSA W/O US: CPT | Performed by: ORTHOPAEDIC SURGERY

## 2021-11-30 PROCEDURE — 99214 OFFICE O/P EST MOD 30 MIN: CPT | Performed by: ORTHOPAEDIC SURGERY

## 2021-11-30 PROCEDURE — 99202 OFFICE O/P NEW SF 15 MIN: CPT | Performed by: SURGERY

## 2021-11-30 RX ORDER — LIDOCAINE HYDROCHLORIDE 10 MG/ML
2 INJECTION, SOLUTION INFILTRATION; PERINEURAL
Status: COMPLETED | OUTPATIENT
Start: 2021-11-30 | End: 2021-11-30

## 2021-11-30 RX ORDER — TRIAMCINOLONE ACETONIDE 40 MG/ML
40 INJECTION, SUSPENSION INTRA-ARTICULAR; INTRAMUSCULAR
Status: COMPLETED | OUTPATIENT
Start: 2021-11-30 | End: 2021-11-30

## 2021-11-30 RX ADMIN — TRIAMCINOLONE ACETONIDE 40 MG: 40 INJECTION, SUSPENSION INTRA-ARTICULAR; INTRAMUSCULAR at 14:18

## 2021-11-30 RX ADMIN — LIDOCAINE HYDROCHLORIDE 2 ML: 10 INJECTION, SOLUTION INFILTRATION; PERINEURAL at 14:18

## 2021-11-30 NOTE — PROGRESS NOTES
Subjective   Patient ID: Marilu Godoy is a 31 y.o. female  Follow-up and Pain of the Left Shoulder (MRI results)             History of Present Illness  She returns with continued pain behind her left shoulder that seems to radiate down her arm to her lower arm and hand at times, recent left shoulder MRI showed minor subacromial bursitis with no rotator cuff tear or labral pathology.      Review of Systems   Constitutional: Negative for diaphoresis, fever and unexpected weight change.   HENT: Negative for dental problem and sore throat.    Eyes: Negative for visual disturbance.   Respiratory: Negative for shortness of breath.    Cardiovascular: Negative for chest pain.   Gastrointestinal: Negative for abdominal pain, constipation, diarrhea, nausea and vomiting.   Genitourinary: Negative for difficulty urinating and frequency.   Musculoskeletal: Positive for arthralgias (left shoulder).   Neurological: Negative for headaches.   Hematological: Does not bruise/bleed easily.       Past Medical History:   Diagnosis Date   • Abdominal pain    • Abnormal Pap smear of cervix    • Anxiety    • Asthma 2015   • Bleeding disorder ([facility]) 11-13-16   • Body piercing     TONGUE, NOSE, TWO IN EACH EAR   • Chest pain 06/22/2021    6-7 months ago -seen at [facility] er and released same day of complaint with resitory infection   • Clotting disorder ([facility])     factor 5   • Constipation    • Coronary artery disease involving native coronary artery of native heart without angina pectoris 6/16/2017   • Diarrhea    • Elevated cholesterol    • Endometriosis    • Factor 5 Leiden mutation, heterozygous ([facility])    • Fibroid    • Full dentures 06/22/2021    advised no adhesives DOS   • GERD (gastroesophageal reflux disease)    • H/O blood clots    • H/O cardiovascular stress test 06/22/2021    reported several years ago-exercise wnl   • Headache    • Hiatal hernia    • High cholesterol    • History of recurrent UTIs    • HPV (human papilloma virus)  infection    • Hx of gout 2021   • Hypertension    • Kidney infection 2021    history only   • Migraine    • Nausea    • Nausea & vomiting    • Obesity    • Orthostatic hypotension    • Osteoarthritis    • Pollen allergies    • Protein S deficiency (HCC) 2016    labs from hospitalization for PE   • Pulmonary embolism (HCC) 2016    on eliquis since that time   • Recurrent pregnancy loss, antepartum condition or complication    • Sleep apnea     CPAP ASKED TO BRING DOS, has been unable to use due to sinus cyst for 2 months   • Subclinical hyperthyroidism    • Tachycardia    • Tattoo     LOWER BACK   • Varicella         Past Surgical History:   Procedure Laterality Date   •  SECTION        and  and    •  SECTION WITH TUBAL N/A 1/15/2019    Procedure:  SECTION REPEAT WITH TUBAL;  Surgeon: Alva Boyer MD;  Location: [facility] LABOR DELIVERY;  Service: Obstetrics/Gynecology   • CHOLECYSTECTOMY     • COLONOSCOPY N/A 2017    Procedure: COLONOSCOPY WITH BIOPSIES AND ARGON THERMAL ABLATION;  Surgeon: Jignesh Selby MD;  Location: [facility] ENDOSCOPY;  Service:    • DIAGNOSTIC LAPAROSCOPY N/A 2018    Procedure: DIAGNOSTIC LAPAROSCOPY AND ABLATION OF ENDOMETRIOSIS;  Surgeon: Evin Zamudio MD;  Location: [facility] OR;  Service: Obstetrics/Gynecology   • ENDOMETRIAL ABLATION     • ENDOSCOPIC FUNCTIONAL SINUS SURGERY (FESS) Right 2021    Procedure: Right endoscopic total ethmoidectomy, right endoscopic middle meatal antrotomy with cyst removal, right endoscopic frontal recess exploration with cyst removal;  Surgeon: Saulo Brito MD;  Location: [facility] OR;  Service: ENT;  Laterality: Right;   • ENDOSCOPY N/A 2017    Procedure: ESOPHAGOGASTRODUODENOSCOPY WITH BIOPSIES AND COLD BIOPSY POLYPECTOMIES;  Surgeon: Jignesh Selby MD;  Location: [facility] ENDOSCOPY;  Service:    • MOUTH SURGERY      full mouth tooth extration       Family History    Problem Relation Age of Onset   • Arthritis Mother    • COPD Mother    • Asthma Mother    • Thyroid disease Mother    • Arthritis Father    • Diabetes Father    • Hypertension Father    • Hyperlipidemia Father    • Kidney disease Father    • Heart attack Father    • Coronary artery disease Father    • Dementia Father    • No Known Problems Son    • Colon cancer Neg Hx    • Liver cancer Neg Hx    • Liver disease Neg Hx    • Stomach cancer Neg Hx    • Esophageal cancer Neg Hx        Social History     Socioeconomic History   • Marital status:    Tobacco Use   • Smoking status: Never Smoker   • Smokeless tobacco: Never Used   Vaping Use   • Vaping Use: Never used   Substance and Sexual Activity   • Alcohol use: No   • Drug use: No   • Sexual activity: Yes     Partners: Male     Birth control/protection: None     Comment: PE while on birth control patch       I have reviewed the medical and surgical history, family history, social history, medications, and/or allergies, and the review of systems of this report.    Allergies   Allergen Reactions   • Ceftin [Cefuroxime Axetil] Shortness Of Breath and Rash     Numbness in mouth and throat   • Imitrex [Sumatriptan] Shortness Of Breath and Nausea Only   • Amoxicillin Rash         Current Outpatient Medications:   •  albuterol sulfate  (90 Base) MCG/ACT inhaler, Inhale 2 puffs Every 4 (Four) Hours As Needed for Wheezing or Shortness of Air., Disp: 18 g, Rfl: 11  •  Alcohol Swabs (Alcohol Pads) 70 % pads, Check sugars as needed/directed for hypoglycemia., Disp: 100 each, Rfl: 12  •  apixaban (ELIQUIS) 5 MG tablet tablet, Take 1 tablet by mouth 2 (Two) Times a Day., Disp: 180 tablet, Rfl: 3  •  aspirin 81 MG EC tablet, Take 81 mg by mouth Daily., Disp: , Rfl:   •  atorvastatin (LIPITOR) 40 MG tablet, Take 1 tablet by mouth Every Night., Disp: 30 tablet, Rfl: 11  •  azelastine (ASTELIN) 0.1 % nasal spray, 1 spray into the nostril(s) as directed by provider 2  (Two) Times a Day. Use in each nostril as directed, Disp: 1 each, Rfl: 10  •  b complex vitamins capsule, Take 1 capsule by mouth Daily., Disp: 90 capsule, Rfl: 3  •  B-D ULTRA-FINE 33 LANCETS misc, Check sugars as needed/directed for hypoglycemia., Disp: 100 each, Rfl: 12  •  Blood Glucose Monitoring Suppl (ONE TOUCH ULTRA 2) w/Device kit, use as needed to check blood sugar for hypoglycemia (diabetes), Disp: , Rfl:   •  busPIRone (BUSPAR) 5 MG tablet, Take 1 tablet by mouth 3 (Three) Times a Day., Disp: 90 tablet, Rfl: 4  •  calcium carbonate EX (TUMS EX) 750 MG chewable tablet, Chew 1 tablet Daily. (Patient taking differently: Chew 750 mg Daily As Needed.), Disp: 30 tablet, Rfl: 1  •  Cholecalciferol (Vitamin D3) 50 MCG (2000 UT) capsule, Take 1 capsule by mouth Daily. Start after completing 50,000 IU weekly dose.  Indications: Vitamin D Deficiency (Patient taking differently: Take 2,000 Units by mouth Daily. .  Indications: Vitamin D Deficiency), Disp: 90 capsule, Rfl: 3  •  Corlanor 5 MG tablet tablet, Take 1.5 tablets by mouth 2 (two) times a day., Disp: 90 tablet, Rfl: 6  •  cyclobenzaprine (FLEXERIL) 10 MG tablet, cyclobenzaprine 10 mg tablet  TAKE 1/2 TABLET BY MOUTH THREE TIMES A DAY AS NEEDED FOR MUSCLE SPASM for up to 5 days, Disp: , Rfl:   •  Diclofenac Sodium (VOLTAREN) 1 % gel gel, Apply 4 g topically to the appropriate area as directed 4 (Four) Times a Day As Needed (joint pain)., Disp: 150 g, Rfl: 1  •  DULoxetine (CYMBALTA) 60 MG capsule, Take 1 capsule by mouth Daily., Disp: 90 capsule, Rfl: 3  •  fluticasone (FLONASE) 50 MCG/ACT nasal spray, INSTILL 2 SPRAYS INTO EACH NOSTRIL EVERY DAY AS DIRECTED, Disp: 16 g, Rfl: 11  •  Fremanezumab-vfrm (Ajovy) 225 MG/1.5ML solution auto-injector, Inject 225 mg under the skin into the appropriate area as directed Every 28 (Twenty-Eight) Days., Disp: 1 pen, Rfl: 11  •  glucose blood test strip, USE TO CHECK SUGARS DAILY DUE TO hypoglycemia, Disp: 100 each, Rfl:  "12  •  glucose monitor monitoring kit, 1 each As Needed (diabetes). Check sugars as needed/directed for hypoglycemia., Disp: 1 each, Rfl: 0  •  granisetron (KYTRIL) 1 MG tablet, Take 1 mg by mouth Every 12 (Twelve) Hours As Needed for Nausea or Vomiting. PA APPROVED., Disp: , Rfl:   •  lidocaine (LIDODERM) 5 %, Place 2 patches on the skin as directed by provider Daily. Remove & Discard patch within 12 hours or as directed by MD, Disp: 60 patch, Rfl: 3  •  magnesium hydroxide (MILK OF MAGNESIA) 2400 MG/10ML suspension suspension, Take 5 mL by mouth Daily As Needed., Disp: , Rfl:   •  mometasone-formoterol (Dulera) 200-5 MCG/ACT inhaler, Inhale 2 puffs 2 (Two) Times a Day., Disp: 13 g, Rfl: 11  •  mupirocin (BACTROBAN) 2 % ointment, Apply  topically to the appropriate area as directed 3 (Three) Times a Day., Disp: 15 g, Rfl: 0  •  ondansetron ODT (ZOFRAN-ODT) 4 MG disintegrating tablet, Place 1 tablet on the tongue Every 6 (Six) Hours As Needed for Nausea or Vomiting., Disp: 10 tablet, Rfl: 0  •  pantoprazole (PROTONIX) 40 MG EC tablet, Take 1 tablet by mouth Daily., Disp: 30 tablet, Rfl: 0  •  Rimegepant Sulfate (Nurtec) 75 MG tablet dispersible tablet, Take 1 tablet by mouth As Needed (as needed)., Disp: 4 tablet, Rfl: 0  •  zonisamide (ZONEGRAN) 100 MG capsule, Take 1 capsule by mouth Every Evening., Disp: 90 capsule, Rfl: 1    Objective   Resp 18   Ht 160 cm (63\")   Wt 91.2 kg (201 lb)   BMI 35.61 kg/m²    Physical Exam  Constitutional: Patient is oriented to person, place, and time. Patient appears well-developed and well-nourished.   HENT:Head: Normocephalic and atraumatic.   Eyes: EOM are normal. Pupils are equal, round, and reactive to light.   Neck: Normal range of motion. Neck supple.   Cardiovascular: Normal rate.    Pulmonary/Chest: Effort normal and breath sounds normal.   Abdominal: Soft.   Neurological: Patient is alert and oriented to person, place, and time.   Skin: Skin is warm and dry. "   Psychiatric: Patient has a normal mood and affect.   Nursing note and vitals reviewed.       [unfilled]   Left shoulder: Forward elevation 120 abduction 110 2 handbreadth loss of full internal rotation strength intact throughout minimal signs of impingement no tenderness AC joint no atrophy.  Spurling maneuver does seem to reproduce some left posterior shoulder pain and lower arm paresthesias.    Assessment/Plan   Review of Radiographic Studies:    No new imaging done today.      Large Joint Arthrocentesis: L subacromial bursa  Date/Time: 11/30/2021 2:18 PM  Consent given by: patient  Site marked: site marked  Timeout: Immediately prior to procedure a time out was called to verify the correct patient, procedure, equipment, support staff and site/side marked as required   Supporting Documentation  Indications: pain   Procedure Details  Location: shoulder - L subacromial bursa  Preparation: Patient was prepped and draped in the usual sterile fashion  Needle size: 22 G  Medications administered: 40 mg triamcinolone acetonide 40 MG/ML; 2 mL lidocaine 1 %  Patient tolerance: patient tolerated the procedure well with no immediate complications           Diagnoses and all orders for this visit:    1. Cervical radiculopathy (Primary)  -     MRI Cervical Spine Without Contrast  -     Ambulatory Referral to Orthopedic Surgery    2. Bursitis of left shoulder       Orthopedic activities reviewed and patient expressed appreciation      Recommendations/Plan:   Referral: Dr. Sharan Byrne to evaluate cervical spine for cervical spine pathology    Patient agreeable to call or return sooner for any concerns.             Impression:  Left nondominant shoulder minimal subacromial bursitis, left arm radicular pain with paresthesias possible cervical radiculopathy  Plan:  MR cervical spine refer to Dr. Sharan Byrne for consultation regarding left arm pain

## 2021-12-06 NOTE — TELEPHONE ENCOUNTER
I spoke with Ms. Godoy regarding the smart pill study.  I stated that there is no place in [place] that does that particular motility study.  I can check with [facility] GI motility.   No additional comment

## 2021-12-07 ENCOUNTER — PREP FOR SURGERY (OUTPATIENT)
Dept: OTHER | Facility: HOSPITAL | Age: 31
End: 2021-12-07

## 2021-12-07 DIAGNOSIS — M79.89 MASS OF SOFT TISSUE: Primary | ICD-10-CM

## 2021-12-07 RX ORDER — SODIUM CHLORIDE 0.9 % (FLUSH) 0.9 %
10 SYRINGE (ML) INJECTION AS NEEDED
Status: CANCELLED | OUTPATIENT
Start: 2021-12-07

## 2021-12-07 RX ORDER — HEPARIN SODIUM 5000 [USP'U]/ML
5000 INJECTION, SOLUTION INTRAVENOUS; SUBCUTANEOUS ONCE
Status: CANCELLED | OUTPATIENT
Start: 2021-12-07 | End: 2021-12-07

## 2021-12-07 RX ORDER — SODIUM CHLORIDE, SODIUM LACTATE, POTASSIUM CHLORIDE, CALCIUM CHLORIDE 600; 310; 30; 20 MG/100ML; MG/100ML; MG/100ML; MG/100ML
50 INJECTION, SOLUTION INTRAVENOUS CONTINUOUS
Status: CANCELLED | OUTPATIENT
Start: 2021-12-07

## 2021-12-07 RX ORDER — CLINDAMYCIN PHOSPHATE 900 MG/50ML
900 INJECTION, SOLUTION INTRAVENOUS ONCE
Status: CANCELLED | OUTPATIENT
Start: 2021-12-07 | End: 2021-12-07

## 2021-12-07 RX ORDER — SODIUM CHLORIDE 0.9 % (FLUSH) 0.9 %
10 SYRINGE (ML) INJECTION EVERY 12 HOURS SCHEDULED
Status: CANCELLED | OUTPATIENT
Start: 2021-12-07

## 2021-12-08 PROBLEM — M79.89 MASS OF SOFT TISSUE: Status: ACTIVE | Noted: 2021-12-08

## 2021-12-09 NOTE — PRE-PROCEDURE INSTRUCTIONS
PAT phone history completed with pt for upcoming procedure on 12/10/21 with Dr. Smith.      PAT PASS GIVEN/REVIEWED WITH PT.  VERBALIZED UNDERSTANDING OF THE FOLLOWING:  DO NOT EAT, DRINK, SMOKE, USE SMOKELESS TOBACCO OR CHEW GUM AFTER MIDNIGHT THE NIGHT BEFORE SURGERY.  THIS ALSO INCLUDES HARD CANDIES AND MINTS.    DO NOT SHAVE THE AREA TO BE OPERATED ON AT LEAST 48 HOURS PRIOR TO THE PROCEDURE.  DO NOT WEAR MAKE UP OR NAIL POLISH.  DO NOT LEAVE IN ANY PIERCING OR WEAR JEWELRY THE DAY OF SURGERY.      DO NOT USE ADHESIVES IF YOU WEAR DENTURES.    DO NOT WEAR EYE CONTACTS; BRING IN YOUR GLASSES.    ONLY TAKE MEDICATION THE MORNING OF YOUR PROCEDURE IF INSTRUCTED BY YOUR SURGEON WITH ENOUGH WATER TO SWALLOW THE MEDICATION.  IF YOUR SURGEON DID NOT SPECIFY WHICH MEDICATIONS TO TAKE, YOU WILL NEED TO CALL THEIR OFFICE FOR FURTHER INSTRUCTIONS AND DO AS THEY INSTRUCT.    LEAVE ANYTHING YOU CONSIDER VALUABLE AT HOME.    YOU WILL NEED TO ARRANGE FOR SOMEONE TO DRIVE YOU HOME AFTER SURGERY.  IT IS RECOMMENDED THAT YOU DO NOT DRIVE, WORK, DRINK ALCOHOL OR MAKE MAJOR DECISIONS FOR AT LEAST 24 HOURS AFTER YOUR PROCEDURE IS COMPLETE.      THE DAY OF YOUR PROCEDURE, BRING IN THE FOLLOWING IF APPLICABLE:   PICTURE ID AND INSURANCE/MEDICARE OR MEDICAID CARDS/ANY CO-PAY THAT MAY BE DUE   COPY OF ADVANCED DIRECTIVE/LIVING WILL/POWER OR    CPAP/BIPAP/INHALERS   SKIN PREP SHEET   YOUR PREADMISSION TESTING PASS (IF NOT A PHONE HISTORY)

## 2021-12-10 ENCOUNTER — ANESTHESIA EVENT (OUTPATIENT)
Dept: PERIOP | Facility: HOSPITAL | Age: 31
End: 2021-12-10

## 2021-12-10 ENCOUNTER — ANESTHESIA (OUTPATIENT)
Dept: PERIOP | Facility: HOSPITAL | Age: 31
End: 2021-12-10

## 2021-12-10 ENCOUNTER — HOSPITAL ENCOUNTER (OUTPATIENT)
Facility: HOSPITAL | Age: 31
Setting detail: HOSPITAL OUTPATIENT SURGERY
Discharge: HOME | End: 2021-12-10
Attending: SURGERY | Admitting: SURGERY

## 2021-12-10 VITALS
OXYGEN SATURATION: 98 % | BODY MASS INDEX: 35.61 KG/M2 | WEIGHT: 201 LBS | TEMPERATURE: 98.5 F | DIASTOLIC BLOOD PRESSURE: 73 MMHG | HEART RATE: 86 BPM | RESPIRATION RATE: 18 BRPM | HEIGHT: 63 IN | SYSTOLIC BLOOD PRESSURE: 113 MMHG

## 2021-12-10 DIAGNOSIS — M79.89 MASS OF SOFT TISSUE: ICD-10-CM

## 2021-12-10 LAB
B-HCG UR QL: NEGATIVE
EXPIRATION DATE: NORMAL
INTERNAL NEGATIVE CONTROL: NORMAL
INTERNAL POSITIVE CONTROL: NORMAL
Lab: NORMAL

## 2021-12-10 PROCEDURE — 81025 URINE PREGNANCY TEST: CPT | Performed by: SURGERY

## 2021-12-10 PROCEDURE — 21930 EXC BACK LES SC < 3 CM: CPT | Performed by: SURGERY

## 2021-12-10 PROCEDURE — 25010000002 HEPARIN (PORCINE) PER 1000 UNITS: Performed by: SURGERY

## 2021-12-10 PROCEDURE — 0 LIDOCAINE 1 % SOLUTION: Performed by: SURGERY

## 2021-12-10 PROCEDURE — 25010000002 ONDANSETRON PER 1 MG: Performed by: NURSE ANESTHETIST, CERTIFIED REGISTERED

## 2021-12-10 PROCEDURE — 25010000002 PROPOFOL 10 MG/ML EMULSION: Performed by: NURSE ANESTHETIST, CERTIFIED REGISTERED

## 2021-12-10 PROCEDURE — 25010000002 MIDAZOLAM PER 1MG: Performed by: NURSE ANESTHETIST, CERTIFIED REGISTERED

## 2021-12-10 RX ORDER — SODIUM CHLORIDE 0.9 % (FLUSH) 0.9 %
10 SYRINGE (ML) INJECTION EVERY 12 HOURS SCHEDULED
Status: DISCONTINUED | OUTPATIENT
Start: 2021-12-10 | End: 2021-12-10 | Stop reason: HOSPADM

## 2021-12-10 RX ORDER — SODIUM CHLORIDE 0.9 % (FLUSH) 0.9 %
10 SYRINGE (ML) INJECTION AS NEEDED
Status: DISCONTINUED | OUTPATIENT
Start: 2021-12-10 | End: 2021-12-10 | Stop reason: HOSPADM

## 2021-12-10 RX ORDER — HYDROCODONE BITARTRATE AND ACETAMINOPHEN 5; 325 MG/1; MG/1
1 TABLET ORAL ONCE AS NEEDED
Status: DISCONTINUED | OUTPATIENT
Start: 2021-12-10 | End: 2021-12-10 | Stop reason: HOSPADM

## 2021-12-10 RX ORDER — HEPARIN SODIUM 5000 [USP'U]/ML
5000 INJECTION, SOLUTION INTRAVENOUS; SUBCUTANEOUS ONCE
Status: COMPLETED | OUTPATIENT
Start: 2021-12-10 | End: 2021-12-10

## 2021-12-10 RX ORDER — PROPOFOL 10 MG/ML
VIAL (ML) INTRAVENOUS AS NEEDED
Status: DISCONTINUED | OUTPATIENT
Start: 2021-12-10 | End: 2021-12-10 | Stop reason: SURG

## 2021-12-10 RX ORDER — LIDOCAINE HYDROCHLORIDE 10 MG/ML
INJECTION, SOLUTION INFILTRATION; PERINEURAL AS NEEDED
Status: DISCONTINUED | OUTPATIENT
Start: 2021-12-10 | End: 2021-12-10 | Stop reason: HOSPADM

## 2021-12-10 RX ORDER — ONDANSETRON 2 MG/ML
4 INJECTION INTRAMUSCULAR; INTRAVENOUS ONCE AS NEEDED
Status: DISCONTINUED | OUTPATIENT
Start: 2021-12-10 | End: 2021-12-10 | Stop reason: HOSPADM

## 2021-12-10 RX ORDER — CLINDAMYCIN PHOSPHATE 900 MG/50ML
900 INJECTION, SOLUTION INTRAVENOUS ONCE
Status: COMPLETED | OUTPATIENT
Start: 2021-12-10 | End: 2021-12-10

## 2021-12-10 RX ORDER — ONDANSETRON 2 MG/ML
INJECTION INTRAMUSCULAR; INTRAVENOUS AS NEEDED
Status: DISCONTINUED | OUTPATIENT
Start: 2021-12-10 | End: 2021-12-10 | Stop reason: SURG

## 2021-12-10 RX ORDER — SODIUM CHLORIDE, SODIUM LACTATE, POTASSIUM CHLORIDE, CALCIUM CHLORIDE 600; 310; 30; 20 MG/100ML; MG/100ML; MG/100ML; MG/100ML
50 INJECTION, SOLUTION INTRAVENOUS CONTINUOUS
Status: DISCONTINUED | OUTPATIENT
Start: 2021-12-10 | End: 2021-12-10 | Stop reason: HOSPADM

## 2021-12-10 RX ORDER — KETAMINE HYDROCHLORIDE 50 MG/ML
INJECTION, SOLUTION, CONCENTRATE INTRAMUSCULAR; INTRAVENOUS AS NEEDED
Status: DISCONTINUED | OUTPATIENT
Start: 2021-12-10 | End: 2021-12-10 | Stop reason: SURG

## 2021-12-10 RX ORDER — MIDAZOLAM HYDROCHLORIDE 2 MG/2ML
INJECTION, SOLUTION INTRAMUSCULAR; INTRAVENOUS AS NEEDED
Status: DISCONTINUED | OUTPATIENT
Start: 2021-12-10 | End: 2021-12-10 | Stop reason: SURG

## 2021-12-10 RX ORDER — MAGNESIUM HYDROXIDE 1200 MG/15ML
LIQUID ORAL AS NEEDED
Status: DISCONTINUED | OUTPATIENT
Start: 2021-12-10 | End: 2021-12-10 | Stop reason: HOSPADM

## 2021-12-10 RX ORDER — LIDOCAINE HYDROCHLORIDE 20 MG/ML
INJECTION, SOLUTION INTRAVENOUS AS NEEDED
Status: DISCONTINUED | OUTPATIENT
Start: 2021-12-10 | End: 2021-12-10 | Stop reason: SURG

## 2021-12-10 RX ORDER — BUPIVACAINE HYDROCHLORIDE 2.5 MG/ML
INJECTION, SOLUTION EPIDURAL; INFILTRATION; INTRACAUDAL AS NEEDED
Status: DISCONTINUED | OUTPATIENT
Start: 2021-12-10 | End: 2021-12-10 | Stop reason: HOSPADM

## 2021-12-10 RX ORDER — HYDROCODONE BITARTRATE AND ACETAMINOPHEN 5; 325 MG/1; MG/1
1 TABLET ORAL EVERY 4 HOURS PRN
Qty: 8 TABLET | Refills: 0 | Status: SHIPPED | OUTPATIENT
Start: 2021-12-10 | End: 2022-02-22

## 2021-12-10 RX ADMIN — PROPOFOL 50 MG: 10 INJECTION, EMULSION INTRAVENOUS at 11:13

## 2021-12-10 RX ADMIN — PROPOFOL 50 MG: 10 INJECTION, EMULSION INTRAVENOUS at 11:19

## 2021-12-10 RX ADMIN — ONDANSETRON 4 MG: 2 INJECTION INTRAMUSCULAR; INTRAVENOUS at 11:14

## 2021-12-10 RX ADMIN — CLINDAMYCIN PHOSPHATE 900 MG: 900 INJECTION, SOLUTION INTRAVENOUS at 11:13

## 2021-12-10 RX ADMIN — SODIUM CHLORIDE, POTASSIUM CHLORIDE, SODIUM LACTATE AND CALCIUM CHLORIDE 50 ML/HR: 600; 310; 30; 20 INJECTION, SOLUTION INTRAVENOUS at 10:17

## 2021-12-10 RX ADMIN — MIDAZOLAM HYDROCHLORIDE 2 MG: 1 INJECTION, SOLUTION INTRAMUSCULAR; INTRAVENOUS at 11:10

## 2021-12-10 RX ADMIN — KETAMINE HYDROCHLORIDE 10 MG: 50 INJECTION, SOLUTION INTRAMUSCULAR; INTRAVENOUS at 11:13

## 2021-12-10 RX ADMIN — PROPOFOL 50 MG: 10 INJECTION, EMULSION INTRAVENOUS at 11:10

## 2021-12-10 RX ADMIN — HEPARIN SODIUM 5000 UNITS: 5000 INJECTION INTRAVENOUS; SUBCUTANEOUS at 10:17

## 2021-12-10 RX ADMIN — LIDOCAINE HYDROCHLORIDE 100 MG: 20 INJECTION, SOLUTION INTRAVENOUS at 11:14

## 2021-12-10 NOTE — ANESTHESIA PREPROCEDURE EVALUATION
Anesthesia Evaluation     Patient summary reviewed and Nursing notes reviewed   history of anesthetic complications: PONV  NPO Solid Status: > 8 hours  NPO Liquid Status: > 8 hours           Airway   Mallampati: II  TM distance: >3 FB  Neck ROM: full  Possible difficult intubation  Dental      Pulmonary    (+) pneumonia resolved , pulmonary embolism, asthma (),shortness of breath (), sleep apnea (),   Recent URI:      Home oxygen:       Cardiovascular   Exercise tolerance: good (4-7 METS)    (+) hypertension, CAD, orthopnea, hyperlipidemia,       Neuro/Psych  (+) headaches, psychiatric history,     GI/Hepatic/Renal/Endo    (+) obesity,  hiatal hernia, GERD,  renal disease stones,     Musculoskeletal     Abdominal    Substance History      OB/GYN    (-)  Pregnant        Other   arthritis,      ROS/Med Hx Other: Abnormal CT scan, colon  Elevated liver function tests  Right upper quadrant pain  History of pulmonary embolus (PE)  Obesity (BMI 30-39.9)  Heterozygous factor V Leiden mutation (HCC)  Protein S deficiency (HCC)  Labile hypertension  Excessive daytime sleepiness  Obstructive sleep apnea  Orthopnea  Pleuritic pain  Mild intermittent asthma  Hypotension  Bilateral edema of lower extremity  Chronic pain of both knees  Abnormal SPEP  Subclinical hyperthyroidism  Cyst of right ovary  Chronic midline thoracic back pain  Precordial pain  Palpitations  Anti-RNP antibodies present  Decreased pedal pulses  Elevated IgE level  Nonocclusive coronary atherosclerosis of native coronary artery  Pelvic pain  Inappropriate sinus tachycardia  Chronic anticoagulation  History of  section complicating pregnancy  Chronic pain disorder  Chronic low back pain  Lumbar spondylosis  Dyslipidemia  Inappropriate sinus tachycardia  Severe obesity (BMI 35.0-39.9) with comorbidity (HCC)  Mass of soft tissue                      Anesthesia Plan    ASA 3     MAC and general     intravenous induction     Anesthetic plan, all risks,  benefits, and alternatives have been provided, discussed and informed consent has been obtained with: patient.    Plan discussed with CRNA.       Statement Selected

## 2021-12-10 NOTE — ANESTHESIA POSTPROCEDURE EVALUATION
Patient: Marilu Godoy    Procedure Summary     Date: 12/10/21 Room / Location: [facility] OR 1 /  [facility]    Anesthesia Start: 1103 Anesthesia Stop: 1138    Procedure: Excision of soft tissue mass lower back (N/A ) Diagnosis:       Mass of soft tissue      (Mass of soft tissue [M79.89])    Surgeons: Nidia Smith MD Provider: Bayron Monge CRNA    Anesthesia Type: MAC, general ASA Status: 3          Anesthesia Type: MAC, general    Vitals  Vitals Value Taken Time   /73 12/10/21 1211   Temp 98.5 °F (36.9 °C) 12/10/21 1141   Pulse 86 12/10/21 1211   Resp 18 12/10/21 1211   SpO2 98 % 12/10/21 1211           Post Anesthesia Care and Evaluation    Patient location during evaluation: PHASE II  Patient participation: complete - patient participated  Level of consciousness: awake and alert  Pain score: 0  Pain management: satisfactory to patient  Airway patency: patent  Anesthetic complications: No anesthetic complications  PONV Status: none  Cardiovascular status: acceptable and hemodynamically stable  Respiratory status: acceptable  Hydration status: acceptable

## 2021-12-10 NOTE — DISCHARGE INSTRUCTIONS
To assist you in voiding:  Drink plenty of fluids  Listen to running water while attempting to void.    If you are unable to urinate and you have an uncomfortable urge to void or it has been   6 hours since you were discharged, return to the Emergency Room    No pushing, pulling, tugging,  heavy lifting, or strenuous activity.  No major decision making, driving, or drinking alcoholic beverages for 24 hours. ( due to the medications you have  received)  Always use good hand hygiene/washing techniques.  NO driving while taking pain medications.    * if you have an incision:  Check your incision area every day for signs of infection.   Check for:  * more redness, swelling, or pain  *more fluid or blood  *warmth  *pus or bad smell

## 2021-12-12 ENCOUNTER — APPOINTMENT (OUTPATIENT)
Dept: CT IMAGING | Facility: HOSPITAL | Age: 31
End: 2021-12-12

## 2021-12-12 ENCOUNTER — HOSPITAL ENCOUNTER (EMERGENCY)
Facility: HOSPITAL | Age: 31
Discharge: HOME | End: 2021-12-12
Attending: EMERGENCY MEDICINE | Admitting: EMERGENCY MEDICINE

## 2021-12-12 ENCOUNTER — APPOINTMENT (OUTPATIENT)
Dept: GENERAL RADIOLOGY | Facility: HOSPITAL | Age: 31
End: 2021-12-12

## 2021-12-12 VITALS
WEIGHT: 198 LBS | HEIGHT: 63 IN | SYSTOLIC BLOOD PRESSURE: 136 MMHG | OXYGEN SATURATION: 99 % | DIASTOLIC BLOOD PRESSURE: 78 MMHG | TEMPERATURE: 99.4 F | HEART RATE: 90 BPM | BODY MASS INDEX: 35.08 KG/M2 | RESPIRATION RATE: 18 BRPM

## 2021-12-12 DIAGNOSIS — J06.9 VIRAL UPPER RESPIRATORY TRACT INFECTION: Primary | ICD-10-CM

## 2021-12-12 LAB
ALBUMIN SERPL-MCNC: 4.3 G/DL (ref 3.5–5.2)
ALBUMIN/GLOB SERPL: 1.2 G/DL
ALP SERPL-CCNC: 110 U/L (ref 39–117)
ALT SERPL W P-5'-P-CCNC: 13 U/L (ref 1–33)
ANION GAP SERPL CALCULATED.3IONS-SCNC: 9.9 MMOL/L (ref 5–15)
AST SERPL-CCNC: 14 U/L (ref 1–32)
B PARAPERT DNA SPEC QL NAA+PROBE: NOT DETECTED
B PERT DNA SPEC QL NAA+PROBE: NOT DETECTED
BASOPHILS # BLD AUTO: 0.05 10*3/MM3 (ref 0–0.2)
BASOPHILS NFR BLD AUTO: 0.6 % (ref 0–1.5)
BILIRUB SERPL-MCNC: 0.6 MG/DL (ref 0–1.2)
BUN SERPL-MCNC: 9 MG/DL (ref 6–20)
BUN/CREAT SERPL: 12.5 (ref 7–25)
C PNEUM DNA NPH QL NAA+NON-PROBE: NOT DETECTED
CALCIUM SPEC-SCNC: 9.3 MG/DL (ref 8.6–10.5)
CHLORIDE SERPL-SCNC: 100 MMOL/L (ref 98–107)
CO2 SERPL-SCNC: 27.1 MMOL/L (ref 22–29)
CREAT SERPL-MCNC: 0.72 MG/DL (ref 0.57–1)
CRP SERPL-MCNC: 4.64 MG/DL (ref 0–0.5)
D DIMER PPP FEU-MCNC: 1.21 MCGFEU/ML (ref 0–0.57)
D-LACTATE SERPL-SCNC: 1.4 MMOL/L (ref 0.5–2)
DEPRECATED RDW RBC AUTO: 42.2 FL (ref 37–54)
EOSINOPHIL # BLD AUTO: 0.3 10*3/MM3 (ref 0–0.4)
EOSINOPHIL NFR BLD AUTO: 3.9 % (ref 0.3–6.2)
ERYTHROCYTE [DISTWIDTH] IN BLOOD BY AUTOMATED COUNT: 12.4 % (ref 12.3–15.4)
FERRITIN SERPL-MCNC: 115.9 NG/ML (ref 13–150)
FLUAV SUBTYP SPEC NAA+PROBE: NOT DETECTED
FLUBV RNA ISLT QL NAA+PROBE: NOT DETECTED
GFR SERPL CREATININE-BSD FRML MDRD: 94 ML/MIN/1.73
GLOBULIN UR ELPH-MCNC: 3.5 GM/DL
GLUCOSE SERPL-MCNC: 107 MG/DL (ref 65–99)
HADV DNA SPEC NAA+PROBE: DETECTED
HCOV 229E RNA SPEC QL NAA+PROBE: NOT DETECTED
HCOV HKU1 RNA SPEC QL NAA+PROBE: NOT DETECTED
HCOV NL63 RNA SPEC QL NAA+PROBE: NOT DETECTED
HCOV OC43 RNA SPEC QL NAA+PROBE: NOT DETECTED
HCT VFR BLD AUTO: 42.1 % (ref 34–46.6)
HGB BLD-MCNC: 14.2 G/DL (ref 12–15.9)
HMPV RNA NPH QL NAA+NON-PROBE: NOT DETECTED
HPIV1 RNA ISLT QL NAA+PROBE: NOT DETECTED
HPIV2 RNA SPEC QL NAA+PROBE: NOT DETECTED
HPIV3 RNA NPH QL NAA+PROBE: NOT DETECTED
HPIV4 P GENE NPH QL NAA+PROBE: NOT DETECTED
IMM GRANULOCYTES # BLD AUTO: 0.03 10*3/MM3 (ref 0–0.05)
IMM GRANULOCYTES NFR BLD AUTO: 0.4 % (ref 0–0.5)
LDH SERPL-CCNC: 257 U/L (ref 135–214)
LYMPHOCYTES # BLD AUTO: 1.38 10*3/MM3 (ref 0.7–3.1)
LYMPHOCYTES NFR BLD AUTO: 17.7 % (ref 19.6–45.3)
M PNEUMO IGG SER IA-ACNC: NOT DETECTED
MCH RBC QN AUTO: 31.2 PG (ref 26.6–33)
MCHC RBC AUTO-ENTMCNC: 33.7 G/DL (ref 31.5–35.7)
MCV RBC AUTO: 92.5 FL (ref 79–97)
MONOCYTES # BLD AUTO: 0.38 10*3/MM3 (ref 0.1–0.9)
MONOCYTES NFR BLD AUTO: 4.9 % (ref 5–12)
NEUTROPHILS NFR BLD AUTO: 5.64 10*3/MM3 (ref 1.7–7)
NEUTROPHILS NFR BLD AUTO: 72.5 % (ref 42.7–76)
NRBC BLD AUTO-RTO: 0 /100 WBC (ref 0–0.2)
PLATELET # BLD AUTO: 310 10*3/MM3 (ref 140–450)
PMV BLD AUTO: 9.8 FL (ref 6–12)
POTASSIUM SERPL-SCNC: 3.7 MMOL/L (ref 3.5–5.2)
PROCALCITONIN SERPL-MCNC: 0.09 NG/ML (ref 0–0.25)
PROT SERPL-MCNC: 7.8 G/DL (ref 6–8.5)
RBC # BLD AUTO: 4.55 10*6/MM3 (ref 3.77–5.28)
RHINOVIRUS RNA SPEC NAA+PROBE: DETECTED
RSV RNA NPH QL NAA+NON-PROBE: NOT DETECTED
SARS-COV-2 RNA NPH QL NAA+NON-PROBE: NOT DETECTED
SODIUM SERPL-SCNC: 137 MMOL/L (ref 136–145)
WBC NRBC COR # BLD: 7.78 10*3/MM3 (ref 3.4–10.8)

## 2021-12-12 PROCEDURE — 71045 X-RAY EXAM CHEST 1 VIEW: CPT

## 2021-12-12 PROCEDURE — 85025 COMPLETE CBC W/AUTO DIFF WBC: CPT | Performed by: NURSE PRACTITIONER

## 2021-12-12 PROCEDURE — 85379 FIBRIN DEGRADATION QUANT: CPT | Performed by: NURSE PRACTITIONER

## 2021-12-12 PROCEDURE — 25010000002 IOPAMIDOL 61 % SOLUTION: Performed by: EMERGENCY MEDICINE

## 2021-12-12 PROCEDURE — 86140 C-REACTIVE PROTEIN: CPT | Performed by: NURSE PRACTITIONER

## 2021-12-12 PROCEDURE — 99283 EMERGENCY DEPT VISIT LOW MDM: CPT

## 2021-12-12 PROCEDURE — 83605 ASSAY OF LACTIC ACID: CPT | Performed by: NURSE PRACTITIONER

## 2021-12-12 PROCEDURE — 80053 COMPREHEN METABOLIC PANEL: CPT | Performed by: NURSE PRACTITIONER

## 2021-12-12 PROCEDURE — 83615 LACTATE (LD) (LDH) ENZYME: CPT | Performed by: NURSE PRACTITIONER

## 2021-12-12 PROCEDURE — 84145 PROCALCITONIN (PCT): CPT | Performed by: NURSE PRACTITIONER

## 2021-12-12 PROCEDURE — 0202U NFCT DS 22 TRGT SARS-COV-2: CPT | Performed by: NURSE PRACTITIONER

## 2021-12-12 PROCEDURE — 82728 ASSAY OF FERRITIN: CPT | Performed by: NURSE PRACTITIONER

## 2021-12-12 PROCEDURE — 71275 CT ANGIOGRAPHY CHEST: CPT

## 2021-12-12 RX ADMIN — IOPAMIDOL 100 ML: 612 INJECTION, SOLUTION INTRAVENOUS at 13:45

## 2021-12-12 NOTE — ED PROVIDER NOTES
Subjective   Chief Complaint: Cough and congestion    History of Present Illness: 31-year-old female patient comes into the ED today complaining of cough and congestion for period of 10 days.  She states she has had all 3 Covid 19 vaccinations.  Patient states her cough and congestion is in the mid chest, and states that she is coughing up green sputum.    Nurses Notes reviewed and agree, including vitals, allergies, social history and prior medical history.                Review of Systems   Respiratory: Positive for cough, chest tightness, shortness of breath and wheezing.    All other systems reviewed and are negative.      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 and states wasn't cardiac related   • Clotting disorder ([facility])     factor 5   • Constipation    • Coronary artery disease involving native coronary artery of native heart without angina pectoris 6/16/2017   • COVID-19 vaccine series completed     Pfizer-plus booster   • Diarrhea    • Diverticulitis    • Elevated cholesterol    • Endometriosis    • Factor 5 Leiden mutation, heterozygous ([facility])    • Fibroid    • Fibromyalgia    • Full dentures 06/22/2021    advised no adhesives DOS   • GERD (gastroesophageal reflux disease)    • H/O blood clots    • H/O cardiovascular stress test 06/22/2021    reported several years ago-exercise wnl   • Headache    • Heart rate fast    • Hiatal hernia    • High cholesterol    • History of recurrent UTIs    • HPV (human papilloma virus) infection    • Hx of gout 06/22/2021   • Hypertension    • Hypoglycemia    • Inappropriate sinus node tachycardia    • Kidney infection 06/22/2021    history only   • Migraine    •  Nausea & vomiting    • Neurologic orthostatic hypotension (HCC)    • Obesity    • Osteoarthritis    • Pneumonia    • Pollen allergies    • PONV (postoperative nausea and vomiting)    • Protein S deficiency (HCC) 2016    labs from hospitalization for PE   • Pulmonary embolism (HCC) 2016    on eliquis since that time   • Recurrent pregnancy loss, antepartum condition or complication    • Sleep apnea     no CPAP- states insurance took it back   • Subclinical hyperthyroidism    • Tachycardia    • Tattoo     LOWER BACK   • Varicella        Allergies   Allergen Reactions   • Ceftin [Cefuroxime Axetil] Shortness Of Breath and Rash     Numbness in mouth and throat   • Imitrex [Sumatriptan] Shortness Of Breath and Nausea Only   • Amoxicillin Rash       Past Surgical History:   Procedure Laterality Date   •  SECTION        and  and    •  SECTION WITH TUBAL N/A 1/15/2019    Procedure:  SECTION REPEAT WITH TUBAL;  Surgeon: Alva Boyer MD;  Location: [facility] LABOR DELIVERY;  Service: Obstetrics/Gynecology   • CHOLECYSTECTOMY     • COLONOSCOPY N/A 2017    Procedure: COLONOSCOPY WITH BIOPSIES AND ARGON THERMAL ABLATION;  Surgeon: Jignesh Selby MD;  Location: [facility] ENDOSCOPY;  Service:    • DIAGNOSTIC LAPAROSCOPY N/A 2018    Procedure: DIAGNOSTIC LAPAROSCOPY AND ABLATION OF ENDOMETRIOSIS;  Surgeon: Evin Zamudio MD;  Location: [facility] OR;  Service: Obstetrics/Gynecology   • ENDOMETRIAL ABLATION     • ENDOSCOPIC FUNCTIONAL SINUS SURGERY (FESS) Right 2021    Procedure: Right endoscopic total ethmoidectomy, right endoscopic middle meatal antrotomy with cyst removal, right endoscopic frontal recess exploration with cyst removal;  Surgeon: Saulo Brito MD;  Location: [facility] OR;  Service: ENT;  Laterality: Right;   • ENDOSCOPY N/A 2017    Procedure: ESOPHAGOGASTRODUODENOSCOPY WITH BIOPSIES AND COLD BIOPSY POLYPECTOMIES;  Surgeon: Jignesh Selby MD;   Location: [facility] ENDOSCOPY;  Service:    • MOUTH SURGERY      full mouth tooth extration       Family History   Problem Relation Age of Onset   • Arthritis Mother    • COPD Mother    • Asthma Mother    • Thyroid disease Mother    • Arthritis Father    • Diabetes Father    • Hypertension Father    • Hyperlipidemia Father    • Kidney disease Father    • Heart attack Father    • Coronary artery disease Father    • Dementia Father    • No Known Problems Son    • Colon cancer Neg Hx    • Liver cancer Neg Hx    • Liver disease Neg Hx    • Stomach cancer Neg Hx    • Esophageal cancer Neg Hx        Social History     Socioeconomic History   • Marital status:    Tobacco Use   • Smoking status: Never Smoker   • Smokeless tobacco: Never Used   Vaping Use   • Vaping Use: Never used   Substance and Sexual Activity   • Alcohol use: No   • Drug use: No   • Sexual activity: Defer           Objective   Physical Exam  Vitals and nursing note reviewed.   Constitutional:       Appearance: Normal appearance.   HENT:      Head: Normocephalic and atraumatic.   Eyes:      Extraocular Movements: Extraocular movements intact.      Pupils: Pupils are equal, round, and reactive to light.   Cardiovascular:      Rate and Rhythm: Normal rate and regular rhythm.      Pulses: Normal pulses.      Heart sounds: Normal heart sounds.   Pulmonary:      Effort: Pulmonary effort is normal.      Breath sounds: Normal breath sounds.   Abdominal:      General: Abdomen is flat. Bowel sounds are normal.      Palpations: Abdomen is soft.   Musculoskeletal:      Cervical back: Normal range of motion and neck supple.   Skin:     Capillary Refill: Capillary refill takes less than 2 seconds.   Neurological:      General: No focal deficit present.      Mental Status: She is alert and oriented to person, place, and time. Mental status is at baseline.      GCS: GCS eye subscore is 4. GCS verbal subscore is 5. GCS motor subscore is 6.      Sensory: Sensation is  intact.      Motor: Motor function is intact.      Gait: Gait is intact.   Psychiatric:         Attention and Perception: Attention and perception normal.         Mood and Affect: Mood and affect normal.         Speech: Speech normal.         Behavior: Behavior normal. Behavior is cooperative.         Procedures           ED Course  ED Course as of 12/12/21 1436   Sun Dec 12, 2021   1257 Patient positive for upper respiratory infection for adenovirus and rhinovirus. Will discuss findings with patient in discharge patient home []   1309 Patient has elevated D-dimer. Will hold disposition at this time and do CT scan for pulmonary emboli. Patient states she has a history of PE in 2016. [KH]   1436 CT scan shows no pulmonary emboli.  Will discharge patient home at this time []      ED Course User Index  [ANANYA] Nabeel Victoria APRN                                                 [facility]    Final diagnoses:   Viral upper respiratory tract infection       ED Disposition  ED Disposition     ED Disposition Condition Comment    Discharge Stable           Deidre Barker MD  [address] [id number] [phone]    In 1 week  Follow-up         Medication List      Changed    calcium carbonate  MG chewable tablet  Commonly known as: TUMS EX  Chew 1 tablet Daily.  What changed:   · when to take this  · reasons to take this     Vitamin D3 50 MCG (2000 UT) capsule  Take 1 capsule by mouth Daily. Start after completing 50,000 IU weekly dose.  Indications: Vitamin D Deficiency  What changed: additional instructions             Nabeel Victoria APRN  12/12/21 1259       Nabeel Victoria APRN  12/12/21 1436

## 2021-12-13 ENCOUNTER — PATIENT MESSAGE (OUTPATIENT)
Dept: INTERNAL MEDICINE | Facility: CLINIC | Age: 31
End: 2021-12-13

## 2021-12-13 NOTE — TELEPHONE ENCOUNTER
From: Marilu Godoy  To: Deidre Barker MD  Sent: 12/13/2021 8:09 AM EST  Subject: Test results from er    The er said all my test results were normal but when they posted on MyChart that wasn't the case. What do they all mean that are elevated?

## 2021-12-16 LAB
LAB AP CASE REPORT: NORMAL
PATH REPORT.FINAL DX SPEC: NORMAL

## 2021-12-27 ENCOUNTER — HOSPITAL ENCOUNTER (OUTPATIENT)
Dept: MRI IMAGING | Facility: HOSPITAL | Age: 31
Discharge: HOME | End: 2021-12-27
Admitting: ORTHOPAEDIC SURGERY

## 2021-12-27 PROCEDURE — 72141 MRI NECK SPINE W/O DYE: CPT

## 2021-12-27 NOTE — OP NOTE
PROCEDURE DATE: 12/10/2021    SURGEON: Nidia Smith MD, FACS    PREOPERATIVE DIAGNOSIS:     POSTOPERATIVE DIAGNOSIS: Same    PROCEDURE: Excision soft tissue mass of the lower back    ANESTHESIA: MAC    EBL: 0mL    SPECIMENS:      A Back, Lower Tissue · TISSUE PATHOLOGY EXAM   Nahomy Jennings RN 12/10/21 1128 No    Description: skin nodule of back, long stitch lateral and short superior    This specimen was not marked as sent.         INDICATIONS FOR THE PROCEDURE: Ms. Godoy is a 31-year-old female patient recently referred for evaluation of his skin lesion of the lower back described as a dark spot that bleeds frequently and scabs over.  On exam she was found to have a pigmented skin lesion with an underlying nodular component.  The pigmentation was noted to be irregular, and the nodular component was noted to be firm.  Excision was recommended.  It was felt that the excision procedure would require sedation in the operating room due to the depth that would need to be excised.  The patient was counseled accordingly regarding the risks, benefits, and alternatives.  She presents today for scheduled surgical procedure.    DESCRIPTION OF THE PROCEDURE: The patient was seen and examined in the preoperative holding area on the surgical procedure, and her history and physical examination was updated as appropriate.  The surgical site was marked in the holding area.  The patient received appropriate preoperative antibiotics and subcutaneous heparin for DVT prophylaxis.  The patient was then taken to the operating room and placed supine on the operating table.  A timeout was performed using WHO checklist.  Following the satisfactory induction of anesthesia, the patient was turned into a lateral position with the appropriate pressure points padded.    The patient's lower back was then prepped and draped in the usual sterile fashion.  1% lidocaine was infiltrated into the skin surrounding the previously marked nodular  skin mass with associated pigmented lesion.  Once this was done, an elliptical incision was drawn out to encompass the entire pigmented portion of the lesion.  The skin was incised with a 15 blade knife, and deepened through the full-thickness of the skin into the subcutaneous fat.  The tissue was further divided with electrocautery and this was also used for hemostasis.  The excision was taken through the subcutaneous tissue until the entire underlying nodular component was removed.  Once this was done, the specimen was oriented with a short stitch marking the superior margin, and a long stitch marking the lateral margin.  It was then passed off the field and sent to pathology.  Total excised specimen measured 2.2 cm x 1 cm x 1 cm.    After hemostasis was checked and found to be adequate, the wound was closed with interrupted 3-0 Vicryl in the subcutaneous layer.  The skin was closed with a running, subcuticular 4-0 Vicryl suture.  0.25% Marcaine was infiltrated around the wound for postoperative analgesia.  Mastisol and Steri-Strips were applied followed by dry sterile dressing.  The patient was then returned to the supine position, awakened from anesthesia, and taken the recovery room in good condition.  There were no complications, and the procedure was well-tolerated by the patient.  All sponge, needle, and instrument counts were correct at the conclusion of the procedure.    Nidia Smith MD

## 2022-01-02 ENCOUNTER — HOSPITAL ENCOUNTER (EMERGENCY)
Facility: HOSPITAL | Age: 32
Discharge: HOME | End: 2022-01-02
Attending: EMERGENCY MEDICINE | Admitting: EMERGENCY MEDICINE

## 2022-01-02 ENCOUNTER — APPOINTMENT (OUTPATIENT)
Dept: CT IMAGING | Facility: HOSPITAL | Age: 32
End: 2022-01-02

## 2022-01-02 VITALS
WEIGHT: 198 LBS | TEMPERATURE: 98.7 F | HEART RATE: 95 BPM | HEIGHT: 63 IN | OXYGEN SATURATION: 100 % | BODY MASS INDEX: 35.08 KG/M2 | DIASTOLIC BLOOD PRESSURE: 84 MMHG | SYSTOLIC BLOOD PRESSURE: 127 MMHG | RESPIRATION RATE: 18 BRPM

## 2022-01-02 DIAGNOSIS — N30.01 ACUTE CYSTITIS WITH HEMATURIA: Primary | ICD-10-CM

## 2022-01-02 DIAGNOSIS — N83.201 CYST OF RIGHT OVARY: ICD-10-CM

## 2022-01-02 LAB
ALBUMIN SERPL-MCNC: 4.3 G/DL (ref 3.5–5.2)
ALBUMIN/GLOB SERPL: 1.3 G/DL
ALP SERPL-CCNC: 91 U/L (ref 39–117)
ALT SERPL W P-5'-P-CCNC: 17 U/L (ref 1–33)
ANION GAP SERPL CALCULATED.3IONS-SCNC: 10.9 MMOL/L (ref 5–15)
AST SERPL-CCNC: 14 U/L (ref 1–32)
B-HCG UR QL: NEGATIVE
BACTERIA UR QL AUTO: ABNORMAL /HPF
BASOPHILS # BLD AUTO: 0.04 10*3/MM3 (ref 0–0.2)
BASOPHILS NFR BLD AUTO: 0.5 % (ref 0–1.5)
BILIRUB SERPL-MCNC: 0.5 MG/DL (ref 0–1.2)
BILIRUB UR QL STRIP: NEGATIVE
BUN SERPL-MCNC: 10 MG/DL (ref 6–20)
BUN/CREAT SERPL: 15.6 (ref 7–25)
CALCIUM SPEC-SCNC: 9.3 MG/DL (ref 8.6–10.5)
CHLORIDE SERPL-SCNC: 104 MMOL/L (ref 98–107)
CLARITY UR: ABNORMAL
CO2 SERPL-SCNC: 25.1 MMOL/L (ref 22–29)
COLOR UR: YELLOW
CREAT SERPL-MCNC: 0.64 MG/DL (ref 0.57–1)
DEPRECATED RDW RBC AUTO: 41.5 FL (ref 37–54)
EOSINOPHIL # BLD AUTO: 0.07 10*3/MM3 (ref 0–0.4)
EOSINOPHIL NFR BLD AUTO: 0.8 % (ref 0.3–6.2)
ERYTHROCYTE [DISTWIDTH] IN BLOOD BY AUTOMATED COUNT: 12.7 % (ref 12.3–15.4)
GFR SERPL CREATININE-BSD FRML MDRD: 108 ML/MIN/1.73
GLOBULIN UR ELPH-MCNC: 3.3 GM/DL
GLUCOSE SERPL-MCNC: 98 MG/DL (ref 65–99)
GLUCOSE UR STRIP-MCNC: NEGATIVE MG/DL
HCT VFR BLD AUTO: 42.2 % (ref 34–46.6)
HGB BLD-MCNC: 14.4 G/DL (ref 12–15.9)
HGB UR QL STRIP.AUTO: ABNORMAL
HYALINE CASTS UR QL AUTO: ABNORMAL /LPF
IMM GRANULOCYTES # BLD AUTO: 0.11 10*3/MM3 (ref 0–0.05)
IMM GRANULOCYTES NFR BLD AUTO: 1.3 % (ref 0–0.5)
KETONES UR QL STRIP: NEGATIVE
LEUKOCYTE ESTERASE UR QL STRIP.AUTO: ABNORMAL
LIPASE SERPL-CCNC: 22 U/L (ref 13–60)
LYMPHOCYTES # BLD AUTO: 0.97 10*3/MM3 (ref 0.7–3.1)
LYMPHOCYTES NFR BLD AUTO: 11.8 % (ref 19.6–45.3)
MAGNESIUM SERPL-MCNC: 2.1 MG/DL (ref 1.6–2.6)
MCH RBC QN AUTO: 31.1 PG (ref 26.6–33)
MCHC RBC AUTO-ENTMCNC: 34.1 G/DL (ref 31.5–35.7)
MCV RBC AUTO: 91.1 FL (ref 79–97)
MONOCYTES # BLD AUTO: 0.5 10*3/MM3 (ref 0.1–0.9)
MONOCYTES NFR BLD AUTO: 6.1 % (ref 5–12)
MUCOUS THREADS URNS QL MICRO: ABNORMAL /HPF
NEUTROPHILS NFR BLD AUTO: 6.56 10*3/MM3 (ref 1.7–7)
NEUTROPHILS NFR BLD AUTO: 79.5 % (ref 42.7–76)
NITRITE UR QL STRIP: NEGATIVE
NRBC BLD AUTO-RTO: 0 /100 WBC (ref 0–0.2)
PH UR STRIP.AUTO: 6.5 [PH] (ref 5–8)
PLATELET # BLD AUTO: 326 10*3/MM3 (ref 140–450)
PMV BLD AUTO: 9.7 FL (ref 6–12)
POTASSIUM SERPL-SCNC: 4.1 MMOL/L (ref 3.5–5.2)
PROT SERPL-MCNC: 7.6 G/DL (ref 6–8.5)
PROT UR QL STRIP: NEGATIVE
RBC # BLD AUTO: 4.63 10*6/MM3 (ref 3.77–5.28)
RBC # UR STRIP: ABNORMAL /HPF
REF LAB TEST METHOD: ABNORMAL
SODIUM SERPL-SCNC: 140 MMOL/L (ref 136–145)
SP GR UR STRIP: 1.02 (ref 1–1.03)
SQUAMOUS #/AREA URNS HPF: ABNORMAL /HPF
UROBILINOGEN UR QL STRIP: ABNORMAL
WBC # UR STRIP: ABNORMAL /HPF
WBC NRBC COR # BLD: 8.25 10*3/MM3 (ref 3.4–10.8)

## 2022-01-02 PROCEDURE — 96375 TX/PRO/DX INJ NEW DRUG ADDON: CPT

## 2022-01-02 PROCEDURE — 83735 ASSAY OF MAGNESIUM: CPT | Performed by: NURSE PRACTITIONER

## 2022-01-02 PROCEDURE — 96374 THER/PROPH/DIAG INJ IV PUSH: CPT

## 2022-01-02 PROCEDURE — 85025 COMPLETE CBC W/AUTO DIFF WBC: CPT | Performed by: NURSE PRACTITIONER

## 2022-01-02 PROCEDURE — 81025 URINE PREGNANCY TEST: CPT | Performed by: NURSE PRACTITIONER

## 2022-01-02 PROCEDURE — 25010000002 KETOROLAC TROMETHAMINE PER 15 MG: Performed by: NURSE PRACTITIONER

## 2022-01-02 PROCEDURE — 25010000002 ONDANSETRON PER 1 MG: Performed by: NURSE PRACTITIONER

## 2022-01-02 PROCEDURE — 83690 ASSAY OF LIPASE: CPT | Performed by: NURSE PRACTITIONER

## 2022-01-02 PROCEDURE — 80053 COMPREHEN METABOLIC PANEL: CPT | Performed by: NURSE PRACTITIONER

## 2022-01-02 PROCEDURE — 81001 URINALYSIS AUTO W/SCOPE: CPT | Performed by: NURSE PRACTITIONER

## 2022-01-02 PROCEDURE — 99283 EMERGENCY DEPT VISIT LOW MDM: CPT

## 2022-01-02 PROCEDURE — 96361 HYDRATE IV INFUSION ADD-ON: CPT

## 2022-01-02 PROCEDURE — 74176 CT ABD & PELVIS W/O CONTRAST: CPT

## 2022-01-02 RX ORDER — NITROFURANTOIN 25; 75 MG/1; MG/1
100 CAPSULE ORAL 2 TIMES DAILY
Qty: 20 CAPSULE | Refills: 0 | Status: SHIPPED | OUTPATIENT
Start: 2022-01-02 | End: 2022-02-22

## 2022-01-02 RX ORDER — NITROFURANTOIN 25; 75 MG/1; MG/1
100 CAPSULE ORAL ONCE
Status: COMPLETED | OUTPATIENT
Start: 2022-01-02 | End: 2022-01-02

## 2022-01-02 RX ORDER — KETOROLAC TROMETHAMINE 30 MG/ML
30 INJECTION, SOLUTION INTRAMUSCULAR; INTRAVENOUS ONCE
Status: COMPLETED | OUTPATIENT
Start: 2022-01-02 | End: 2022-01-02

## 2022-01-02 RX ORDER — ONDANSETRON 2 MG/ML
4 INJECTION INTRAMUSCULAR; INTRAVENOUS ONCE
Status: COMPLETED | OUTPATIENT
Start: 2022-01-02 | End: 2022-01-02

## 2022-01-02 RX ADMIN — SODIUM CHLORIDE 1000 ML: 9 INJECTION, SOLUTION INTRAVENOUS at 13:12

## 2022-01-02 RX ADMIN — NITROFURANTOIN MONOHYDRATE/MACROCRYSTALLINE 100 MG: 25; 75 CAPSULE ORAL at 15:14

## 2022-01-02 RX ADMIN — ONDANSETRON 4 MG: 2 INJECTION INTRAMUSCULAR; INTRAVENOUS at 13:11

## 2022-01-02 RX ADMIN — KETOROLAC TROMETHAMINE 30 MG: 30 INJECTION, SOLUTION INTRAMUSCULAR; INTRAVENOUS at 13:09

## 2022-01-02 NOTE — ED PROVIDER NOTES
Subjective   31-year-old female patient presents emergency department for evaluation of a 2-day history of right lower quadrant abdominal pain.  Patient states that 2 days ago she began experiencing pain that is characterized as a sharp, heavy sensation that originates her right lower quadrant is now radiating around to her right flank.  Denies any trauma or injury.  No fever.  Patient is sexually active but denies any chance of pregnancy.  Pain seems to be worse when she lies back.  No association with meals.  She reports associated nausea with no active vomiting.  She is also had a 2-day history of nonbloody diarrhea.  No recent foreign travel, no exposure to suspicious foods or water.  No sick contacts that she is aware of.  No fevers, cough, cold or other URI symptoms.  Terms of abdominal surgery she has undergone cholecystectomy in the past.    Patient has past medical history significant for Pulmonary embolus (2016) factor V Leiden mutation, endometriosis, and diverticulitis.  She denies any abnormal vaginal drainage, no urinary symptoms including dysuria, hematuria or frequency.  She underwent a CT scan of abdomen pelvis with benefit of IV contrast on November 19 of this year which revealed pancreas, appendix and other organs to be within normal limits.  Scan was significant also for the 3 cm right ovarian cyst.          Review of Systems  A 10 point review of systems including constitutional, ENT, cardiovascular, respiratory, GI, , musculoskeletal, neuro, skin, psychiatric was performed and it was negative with exception to those detailed in HPI.    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 and  states wasn't cardiac related   • Clotting disorder (HCC)     factor 5   • Constipation    • Coronary artery disease involving native coronary artery of native heart without angina pectoris 2017   • COVID-19 vaccine series completed     Pfizer-plus booster   • Diarrhea    • Diverticulitis    • Elevated cholesterol    • Endometriosis    • Factor 5 Leiden mutation, heterozygous (HCC)    • Fibroid    • Fibromyalgia    • Full dentures 2021    advised no adhesives DOS   • GERD (gastroesophageal reflux disease)    • H/O blood clots    • H/O cardiovascular stress test 2021    reported several years ago-exercise wnl   • Headache    • Heart rate fast    • Hiatal hernia    • High cholesterol    • History of recurrent UTIs    • HPV (human papilloma virus) infection    • Hx of gout 2021   • Hypertension    • Hypoglycemia    • Inappropriate sinus node tachycardia    • Kidney infection 2021    history only   • Migraine    • Nausea & vomiting    • Neurologic orthostatic hypotension (HCC)    • Obesity    • Osteoarthritis    • Pneumonia    • Pollen allergies    • PONV (postoperative nausea and vomiting)    • Protein S deficiency (HCC) 2016    labs from hospitalization for PE   • Pulmonary embolism (HCC) 2016    on eliquis since that time   • Recurrent pregnancy loss, antepartum condition or complication    • Sleep apnea     no CPAP- states insurance took it back   • Subclinical hyperthyroidism    • Tachycardia    • Tattoo     LOWER BACK   • Varicella        Allergies   Allergen Reactions   • Ceftin [Cefuroxime Axetil] Shortness Of Breath and Rash     Numbness in mouth and throat   • Imitrex [Sumatriptan] Shortness Of Breath and Nausea Only   • Amoxicillin Rash       Past Surgical History:   Procedure Laterality Date   •  SECTION        and  and    •  SECTION WITH TUBAL N/A 1/15/2019    Procedure:  SECTION REPEAT WITH TUBAL;  Surgeon: Alva Boyer MD;   Location: [facility] LABOR DELIVERY;  Service: Obstetrics/Gynecology   • CHOLECYSTECTOMY  2015   • COLONOSCOPY N/A 4/20/2017    Procedure: COLONOSCOPY WITH BIOPSIES AND ARGON THERMAL ABLATION;  Surgeon: Jignesh Selby MD;  Location: [facility] ENDOSCOPY;  Service:    • DIAGNOSTIC LAPAROSCOPY N/A 4/2/2018    Procedure: DIAGNOSTIC LAPAROSCOPY AND ABLATION OF ENDOMETRIOSIS;  Surgeon: Evin Zamudio MD;  Location: [facility] OR;  Service: Obstetrics/Gynecology   • ENDOMETRIAL ABLATION     • ENDOSCOPIC FUNCTIONAL SINUS SURGERY (FESS) Right 6/25/2021    Procedure: Right endoscopic total ethmoidectomy, right endoscopic middle meatal antrotomy with cyst removal, right endoscopic frontal recess exploration with cyst removal;  Surgeon: Saulo Brito MD;  Location: [facility] OR;  Service: ENT;  Laterality: Right;   • ENDOSCOPY N/A 4/20/2017    Procedure: ESOPHAGOGASTRODUODENOSCOPY WITH BIOPSIES AND COLD BIOPSY POLYPECTOMIES;  Surgeon: Jignesh Selby MD;  Location: [facility] ENDOSCOPY;  Service:    • LIPOMA EXCISION N/A 12/10/2021    Procedure: Excision of soft tissue mass lower back;  Surgeon: Nidia Smith MD;  Location: [facility] OR;  Service: General;  Laterality: N/A;   • MOUTH SURGERY      full mouth tooth extration       Family History   Problem Relation Age of Onset   • Arthritis Mother    • COPD Mother    • Asthma Mother    • Thyroid disease Mother    • Arthritis Father    • Diabetes Father    • Hypertension Father    • Hyperlipidemia Father    • Kidney disease Father    • Heart attack Father    • Coronary artery disease Father    • Dementia Father    • No Known Problems Son    • Colon cancer Neg Hx    • Liver cancer Neg Hx    • Liver disease Neg Hx    • Stomach cancer Neg Hx    • Esophageal cancer Neg Hx        Social History     Socioeconomic History   • Marital status:    Tobacco Use   • Smoking status: Never Smoker   • Smokeless tobacco: Never Used   Vaping Use   • Vaping Use: Never used   Substance and  Sexual Activity   • Alcohol use: No   • Drug use: No   • Sexual activity: Defer           Objective   Physical Exam  Constitutional:       Appearance: She is well-developed.   Cardiovascular:      Rate and Rhythm: Normal rate and regular rhythm.   Pulmonary:      Effort: Pulmonary effort is normal.      Breath sounds: Normal breath sounds.   Abdominal:      General: Bowel sounds are normal. There is no distension or abdominal bruit. There are no signs of injury.      Palpations: Abdomen is soft. There is no hepatomegaly or mass.      Tenderness: There is abdominal tenderness in the right lower quadrant. There is right CVA tenderness. There is no guarding. Negative signs include Coon's sign and McBurney's sign.   Skin:     General: Skin is warm and dry.      Capillary Refill: Capillary refill takes less than 2 seconds.      Findings: No rash.   Neurological:      General: No focal deficit present.      Mental Status: She is alert.   Psychiatric:         Mood and Affect: Mood normal.     Lab Results (last 24 hours)     Procedure Component Value Units Date/Time    Lipase [[id number]]  (Normal) Collected: 01/02/22 1312    Specimen: Blood Updated: 01/02/22 1343     Lipase 22 U/L     Comprehensive Metabolic Panel [[id number]] Collected: 01/02/22 1312    Specimen: Blood Updated: 01/02/22 1343     Glucose 98 mg/dL      BUN 10 mg/dL      Creatinine 0.64 mg/dL      Sodium 140 mmol/L      Potassium 4.1 mmol/L      Chloride 104 mmol/L      CO2 25.1 mmol/L      Calcium 9.3 mg/dL      Total Protein 7.6 g/dL      Albumin 4.30 g/dL      ALT (SGPT) 17 U/L      AST (SGOT) 14 U/L      Alkaline Phosphatase 91 U/L      Total Bilirubin 0.5 mg/dL      eGFR Non African Amer 108 mL/min/1.73      Globulin 3.3 gm/dL      A/G Ratio 1.3 g/dL      BUN/Creatinine Ratio 15.6     Anion Gap 10.9 mmol/L     Narrative:      GFR Normal >60  Chronic Kidney Disease <60  Kidney Failure <15      Magnesium [[id number]]  (Normal) Collected: 01/02/22 1312     Specimen: Blood Updated: 01/02/22 1343     Magnesium 2.1 mg/dL     Urinalysis With Microscopic If Indicated (No Culture) - Urine, Clean   Catch [[id number]]  (Abnormal) Collected: 01/02/22 1313    Specimen: Urine, Clean Catch Updated: 01/02/22 1336     Color, UA Yellow     Appearance, UA Cloudy     pH, UA 6.5     Specific Gravity, UA 1.021     Glucose, UA Negative     Ketones, UA Negative     Bilirubin, UA Negative     Blood, UA Moderate (2+)     Protein, UA Negative     Leuk Esterase, UA Trace     Nitrite, UA Negative     Urobilinogen, UA 0.2 E.U./dL    Pregnancy, Urine - Urine, Clean Catch [[id number]]  (Normal) Collected:   01/02/22 1313    Specimen: Urine, Clean Catch Updated: 01/02/22 1336     HCG, Urine QL Negative    Urinalysis, Microscopic Only - Urine, Clean Catch [[id number]] Collected:   01/02/22 1313    Specimen: Urine, Clean Catch Updated: 01/02/22 1334    CBC Auto Differential [[id number]]  (Abnormal) Collected: 01/02/22 1312    Specimen: Blood Updated: 01/02/22 1328     WBC 8.25 10*3/mm3      RBC 4.63 10*6/mm3      Hemoglobin 14.4 g/dL      Hematocrit 42.2 %      MCV 91.1 fL      MCH 31.1 pg      MCHC 34.1 g/dL      RDW 12.7 %      RDW-SD 41.5 fl      MPV 9.7 fL      Platelets 326 10*3/mm3      Neutrophil % 79.5 %      Lymphocyte % 11.8 %      Monocyte % 6.1 %      Eosinophil % 0.8 %      Basophil % 0.5 %      Immature Grans % 1.3 %      Neutrophils, Absolute 6.56 10*3/mm3      Lymphocytes, Absolute 0.97 10*3/mm3      Monocytes, Absolute 0.50 10*3/mm3      Eosinophils, Absolute 0.07 10*3/mm3      Basophils, Absolute 0.04 10*3/mm3      Immature Grans, Absolute 0.11 10*3/mm3      nRBC 0.0 /100 WBC         Imaging Results (Last 24 Hours)     ** No results found for the last 24 hours. **     Lab Results (last 24 hours)     Procedure Component Value Units Date/Time    Lipase [[id number]]  (Normal) Collected: 01/02/22 1312    Specimen: Blood Updated: 01/02/22 1343     Lipase 22 U/L     Comprehensive Metabolic  Panel [[id number]] Collected: 01/02/22 1312    Specimen: Blood Updated: 01/02/22 1343     Glucose 98 mg/dL      BUN 10 mg/dL      Creatinine 0.64 mg/dL      Sodium 140 mmol/L      Potassium 4.1 mmol/L      Chloride 104 mmol/L      CO2 25.1 mmol/L      Calcium 9.3 mg/dL      Total Protein 7.6 g/dL      Albumin 4.30 g/dL      ALT (SGPT) 17 U/L      AST (SGOT) 14 U/L      Alkaline Phosphatase 91 U/L      Total Bilirubin 0.5 mg/dL      eGFR Non African Amer 108 mL/min/1.73      Globulin 3.3 gm/dL      A/G Ratio 1.3 g/dL      BUN/Creatinine Ratio 15.6     Anion Gap 10.9 mmol/L     Narrative:      GFR Normal >60  Chronic Kidney Disease <60  Kidney Failure <15      Magnesium [[id number]]  (Normal) Collected: 01/02/22 1312    Specimen: Blood Updated: 01/02/22 1343     Magnesium 2.1 mg/dL     Urinalysis With Microscopic If Indicated (No Culture) - Urine, Clean   Catch [[id number]]  (Abnormal) Collected: 01/02/22 1313    Specimen: Urine, Clean Catch Updated: 01/02/22 1336     Color, UA Yellow     Appearance, UA Cloudy     pH, UA 6.5     Specific Gravity, UA 1.021     Glucose, UA Negative     Ketones, UA Negative     Bilirubin, UA Negative     Blood, UA Moderate (2+)     Protein, UA Negative     Leuk Esterase, UA Trace     Nitrite, UA Negative     Urobilinogen, UA 0.2 E.U./dL    Pregnancy, Urine - Urine, Clean Catch [[id number]]  (Normal) Collected:   01/02/22 1313    Specimen: Urine, Clean Catch Updated: 01/02/22 1336     HCG, Urine QL Negative    Urinalysis, Microscopic Only - Urine, Clean Catch [[id number]] Collected:   01/02/22 1313    Specimen: Urine, Clean Catch Updated: 01/02/22 1334    CBC Auto Differential [[id number]]  (Abnormal) Collected: 01/02/22 1312    Specimen: Blood Updated: 01/02/22 1328     WBC 8.25 10*3/mm3      RBC 4.63 10*6/mm3      Hemoglobin 14.4 g/dL      Hematocrit 42.2 %      MCV 91.1 fL      MCH 31.1 pg      MCHC 34.1 g/dL      RDW 12.7 %      RDW-SD 41.5 fl      MPV 9.7 fL      Platelets 326 10*3/mm3       Neutrophil % 79.5 %      Lymphocyte % 11.8 %      Monocyte % 6.1 %      Eosinophil % 0.8 %      Basophil % 0.5 %      Immature Grans % 1.3 %      Neutrophils, Absolute 6.56 10*3/mm3      Lymphocytes, Absolute 0.97 10*3/mm3      Monocytes, Absolute 0.50 10*3/mm3      Eosinophils, Absolute 0.07 10*3/mm3      Basophils, Absolute 0.04 10*3/mm3      Immature Grans, Absolute 0.11 10*3/mm3      nRBC 0.0 /100 WBC         Imaging Results (Last 24 Hours)     ** No results found for the last 24 hours. **           Procedures           ED Course      31-year-old female patient presents emergency department evaluation of right lower quadrant abdominal pain x2 days with associated nonbloody diarrhea.  She denies any fever, trauma or injury.  No exposure to suspicious foods or foreign water.  No sick contacts that she is aware of.  Differentials include viral gastroenteritis, appendicitis urinary tract infection, renal calculi or diverticulitis.  Will order laboratory studies including urinalysis.  Will defer radiographic evaluation (CT) pending these results.  Will also provide patient with fluid bolus and antiemetic.    Laboratory studies significant for 2+ blood in the patient's urine, as well as bacteria multiple white cells.  Pain persist despite the administration of IV Toradol.  Given patient's colicky report of pain will initiate CT scans stone protocol to evaluate for renal calculi.  discussed findings and plan of care with patient.  She verbalized understanding and agreement with the plan     PROCEDURE: CT ABDOMEN PELVIS STONE PROTOCOL-     INDICATION:  Flank pain, kidney stone suspected     TECHNIQUE:  Thin section axial images were obtained from the lung bases  through the pubic symphysis without oral or IV contrast.  Coronal  reconstruction images were obtained from the axial data.     COMPARISON:  11/19/2021     FINDINGS:  There are no renal or ureteral stones.  There is no  hydronephrosis or perinephric stranding.  The gallbladder is absent.  The  remaining unenhanced solid abdominal organs are within normal limits.       The appendix is normal. There is no small bowel obstruction. The GI  tract is without acute abnormality. A 3 cm right ovarian lesion is  likely a functional cyst. The uterus is unremarkable for age.  There is  no lymphadenopathy or free fluid.  No osseous lesions are identified.  There is bilateral sacroiliitis, left greater than right.     IMPRESSION:  No renal or ureteral stones.  No hydronephrosis or perinephric  stranding.     Right ovarian cyst. This is likely functional in a patient of this age.           This study was performed with techniques to keep radiation doses as low  as reasonably achievable (ALARA). Individualized dose reduction  techniques using automated exposure control or adjustment of mA and/or  kV according to the patient size were employed.      This report was finalized on 1/2/2022 2:26 PM by Jaclyn Cid MD.     Laboratory studies indicative of urinary tract infection.  CT scan abdomen stone protocol significant for a right ovarian cyst similar to previous study.  Discussed findings with the patient.  Will initiate antibiotic therapy for treatment of UTI.  Recommend follow-up with OB/GYN for ovarian cyst if symptoms persist or worsen.  Patient verbalized understanding was in agreement with this plan.                                MDM    Final diagnoses:   Acute cystitis with hematuria   Cyst of right ovary       ED Disposition  ED Disposition     ED Disposition Condition Comment    Discharge Stable           Deidre Barker MD  [address] [place] [address] [phone]      If symptoms worsen         Medication List      New Prescriptions    nitrofurantoin (macrocrystal-monohydrate) 100 MG capsule  Commonly known as: MACROBID  Take 1 capsule by mouth 2 (Two) Times a Day.           Where to Get Your Medications      These medications were sent to [id number] - [place], [place] - [address] - [phone]  - [phone] FX  [address] [address] [place] [address]    Phone: [phone]   · nitrofurantoin (macrocrystal-monohydrate) 100 MG capsule          Elisabeth Rachel, ARNIE  01/02/22 1449       Elisabeth Rachel, ARNIE  01/02/22 1447

## 2022-01-02 NOTE — DISCHARGE INSTRUCTIONS
You are being discharged to the care of yourself and your family.  You were diagnosed with a urinary tract infection as well as an ovarian cyst on your right side.  Take any home medications as prescribed.  Tylenol/Motrin for minor pain or fever management.   It is important to complete course of antibiotics, even if you are feeling better.  Follow up with Primary Care in 2- 3 days if symptoms persist or worsen.  Return to the emergency room for uncontrolled pain, nausea, vomiting,  chest pain, shortness of breath or palpitations.    Thank you for allowing us to participate in your care today; we know that you have a choice in healthcare and  appreciate your confidence [facility].    You have been supplied with 1 new prescription(s) today.

## 2022-01-06 ENCOUNTER — OFFICE VISIT (OUTPATIENT)
Dept: SURGERY | Facility: CLINIC | Age: 32
End: 2022-01-06

## 2022-01-06 ENCOUNTER — TELEPHONE (OUTPATIENT)
Dept: NEUROLOGY | Facility: CLINIC | Age: 32
End: 2022-01-06

## 2022-01-06 VITALS
SYSTOLIC BLOOD PRESSURE: 120 MMHG | OXYGEN SATURATION: 96 % | TEMPERATURE: 98.6 F | WEIGHT: 199.8 LBS | HEART RATE: 92 BPM | BODY MASS INDEX: 35.4 KG/M2 | HEIGHT: 63 IN | DIASTOLIC BLOOD PRESSURE: 80 MMHG

## 2022-01-06 DIAGNOSIS — L98.9 BENIGN SKIN LESION: Primary | ICD-10-CM

## 2022-01-06 PROCEDURE — 99024 POSTOP FOLLOW-UP VISIT: CPT | Performed by: SURGERY

## 2022-01-06 NOTE — TELEPHONE ENCOUNTER
Pharmacy Name:  [facility]    Pharmacy representative name: JOHN PAUL    Pharmacy representative phone number: [phone]    What medication are you calling in regards to: NURTEC PRE AUTH    What question does the pharmacy have:   THE PRE AUTH WAS SUBMITTED 12-8-21.   PA WAS SENT FOR QUANTITY OF 8 TABLETS FOR 30 DAY SUPPLY BUT NOT 16. THE PHARM CAN FILL FOR THE 8 TABLETS FOR 30 DAYS BUT NOT FOR 16 FOR 30 DAYS. IF 16 IS NEEDED, A NEW PRE AUTH WILL NEED TO BE SENT.    THE INSURANCE WILL PAY FOR 8 TABLETS AS EARLY AS 23 DAYS.    THE PHARM NEEDS TO KNOW IF THE RX CAN BE FILLED FOR THE 8 TABLETS AS APPROVED OR SHOULD A NEW PRE AUTH BE SUBMITTED FOR THE QUANTITY OF 16 TABLETS?    PT WILL BE OUT OF THE RX FOR 6 DAYS BEFORE INSURANCE CAN BE REBILLED.

## 2022-01-06 NOTE — PROGRESS NOTES
"Subjective   Marilu Fiorella Godoy is a 31 y.o. female.   Chief Complaint   Patient presents with   • Post-op     Back excision        History of Present Illness   Ms. Godoy returns the office today for routine follow-up after recently undergoing excision of a skin nodule of the back.  Pathology demonstrated dermal scar with accompanying epidermal hyperplasia and superficial epidermal erosion.  She reports that she is having no issues with the surgical site.  She has no new complaints today.  .   The following portions of the patient's history were reviewed and updated as appropriate: allergies, current medications, past family history, past medical history, past social history, past surgical history and problem list.    Review of Systems   Constitutional: Negative for chills, fever and unexpected weight change.   HENT: Negative for hearing loss, trouble swallowing and voice change.    Eyes: Negative for visual disturbance.   Respiratory: Negative for apnea, cough, chest tightness, shortness of breath and wheezing.    Cardiovascular: Negative for chest pain, palpitations and leg swelling.   Gastrointestinal: Negative for abdominal distention, abdominal pain, anal bleeding, blood in stool, constipation, diarrhea, nausea, rectal pain and vomiting.   Endocrine: Negative for cold intolerance and heat intolerance.   Genitourinary: Positive for hematuria. Negative for difficulty urinating, dysuria and flank pain.   Musculoskeletal: Positive for back pain (kidney stones ). Negative for gait problem.   Skin: Negative for color change, rash and wound.   Neurological: Negative for dizziness, syncope, speech difficulty, weakness, light-headedness, numbness and headaches.   Hematological: Negative for adenopathy. Does not bruise/bleed easily.   Psychiatric/Behavioral: Negative for confusion. The patient is not nervous/anxious.        Objective    /80   Pulse 92   Temp 98.6 °F (37 °C) (Temporal)   Ht 160 cm (63\")   Wt " 90.6 kg (199 lb 12.8 oz)   SpO2 96%   BMI 35.39 kg/m²     Physical Exam  Constitutional:       Appearance: She is well-developed.   HENT:      Head: Normocephalic and atraumatic.   Cardiovascular:      Rate and Rhythm: Regular rhythm.   Pulmonary:      Effort: Pulmonary effort is normal.   Skin:     General: Skin is warm and dry.      Comments: Surgical site on the lower back healing appropriately without cellulitis or erythema.   Neurological:      Mental Status: She is alert and oriented to person, place, and time.   Psychiatric:         Behavior: Behavior normal.         Assessment/Plan   Diagnoses and all orders for this visit:    1. Benign skin lesion (Primary)        I had the pleasure of seeing Marilu Godoy in follow-up today for the first  postoperative visit following excision of a benign skin lesion of the lower back..  Overall, Marilu Godoy  is enjoying uncomplicated recovery.  I do not anticipate any ongoing surgical issues and will return the patient back to the care of their PCP.  I have released Marilu Godoy to unrestricted physical activity beginning early. The patient may follow up in this office as needed.

## 2022-01-09 ENCOUNTER — HOSPITAL ENCOUNTER (EMERGENCY)
Facility: HOSPITAL | Age: 32
Discharge: HOME | End: 2022-01-09
Attending: EMERGENCY MEDICINE | Admitting: EMERGENCY MEDICINE

## 2022-01-09 VITALS
WEIGHT: 198 LBS | OXYGEN SATURATION: 98 % | HEART RATE: 125 BPM | HEIGHT: 63 IN | RESPIRATION RATE: 18 BRPM | BODY MASS INDEX: 35.08 KG/M2 | SYSTOLIC BLOOD PRESSURE: 141 MMHG | DIASTOLIC BLOOD PRESSURE: 96 MMHG | TEMPERATURE: 98.7 F

## 2022-01-09 DIAGNOSIS — J06.9 VIRAL URI WITH COUGH: Primary | ICD-10-CM

## 2022-01-09 PROCEDURE — 0202U NFCT DS 22 TRGT SARS-COV-2: CPT | Performed by: NURSE PRACTITIONER

## 2022-01-09 PROCEDURE — 99283 EMERGENCY DEPT VISIT LOW MDM: CPT

## 2022-01-09 NOTE — ED PROVIDER NOTES
EMERGENCY DEPARTMENT ENCOUNTER    Pt Name: Marilu Godoy  MRN: [id number]  Pt :   1990  Room Number:    Date of encounter:  2022  PCP: Deidre Barker MD  ED Provider: ARNIE Magallon    Historian: patient       HPI:  Chief Complaint: flu like sx         Context: Marilu Godoy is a 31 y.o. female who presents to the ED c/o cold symptoms onset  with cough, nasal congestion, runny nose, headache and muscle aches.    Review of systems is negative for fever positive for chills. Positive for recent illness: Patient is on Macrobid for urinary tract infection that is now asymptomatic. Positive for cough without chest pain or shortness of breath. Positive for nausea without vomiting. Positive for diarrhea without abdominal pain. No urinary symptoms.    Patient is a non-smoker. She denies any history of uncontrolled hypertension. She denies any lung disease or disorder. She is not oxygen dependent.      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 and states wasn't cardiac related   • Clotting disorder (HCC)     factor 5   • Constipation    • Coronary artery disease involving native coronary artery of native heart without angina pectoris 2017   • COVID-19 vaccine series completed     Pfizer-plus booster   • Diarrhea    • Diverticulitis    • Elevated cholesterol    • Endometriosis    • Factor 5 Leiden mutation, heterozygous (HCC)    • Fibroid    • Fibromyalgia    • Full dentures 2021    advised no adhesives DOS   • GERD (gastroesophageal reflux disease)    • H/O blood clots    • H/O cardiovascular stress test 2021     reported several years ago-exercise wnl   • Headache    • Heart rate fast    • Hiatal hernia    • High cholesterol    • History of recurrent UTIs    • HPV (human papilloma virus) infection    • Hx of gout 2021   • Hypertension    • Hypoglycemia    • Inappropriate sinus node tachycardia    • Kidney infection 2021    history only   • Migraine    • Nausea & vomiting    • Neurologic orthostatic hypotension (HCC)    • Obesity    • Osteoarthritis    • Pneumonia    • Pollen allergies    • PONV (postoperative nausea and vomiting)    • Protein S deficiency (HCC) 2016    labs from hospitalization for PE   • Pulmonary embolism (HCC) 2016    on eliquis since that time   • Recurrent pregnancy loss, antepartum condition or complication    • Sleep apnea     no CPAP- states insurance took it back   • Subclinical hyperthyroidism    • Tachycardia    • Tattoo     LOWER BACK   • Varicella          PAST SURGICAL HISTORY  Past Surgical History:   Procedure Laterality Date   •  SECTION        and  and    •  SECTION WITH TUBAL N/A 1/15/2019    Procedure:  SECTION REPEAT WITH TUBAL;  Surgeon: lAva Boyer MD;  Location: [facility] LABOR DELIVERY;  Service: Obstetrics/Gynecology   • CHOLECYSTECTOMY     • COLONOSCOPY N/A 2017    Procedure: COLONOSCOPY WITH BIOPSIES AND ARGON THERMAL ABLATION;  Surgeon: Jignesh Selby MD;  Location: [facility] ENDOSCOPY;  Service:    • DIAGNOSTIC LAPAROSCOPY N/A 2018    Procedure: DIAGNOSTIC LAPAROSCOPY AND ABLATION OF ENDOMETRIOSIS;  Surgeon: Evin Zamudio MD;  Location: [facility] OR;  Service: Obstetrics/Gynecology   • ENDOMETRIAL ABLATION     • ENDOSCOPIC FUNCTIONAL SINUS SURGERY (FESS) Right 2021    Procedure: Right endoscopic total ethmoidectomy, right endoscopic middle meatal antrotomy with cyst removal, right endoscopic frontal recess exploration with cyst removal;  Surgeon: Saulo Brito MD;  Location: [facility] OR;   Service: ENT;  Laterality: Right;   • ENDOSCOPY N/A 4/20/2017    Procedure: ESOPHAGOGASTRODUODENOSCOPY WITH BIOPSIES AND COLD BIOPSY POLYPECTOMIES;  Surgeon: Jignesh Selby MD;  Location: [facility] ENDOSCOPY;  Service:    • LIPOMA EXCISION N/A 12/10/2021    Procedure: Excision of soft tissue mass lower back;  Surgeon: Nidia Smith MD;  Location: [facility] OR;  Service: General;  Laterality: N/A;   • MOUTH SURGERY      full mouth tooth extration         FAMILY HISTORY  Family History   Problem Relation Age of Onset   • Arthritis Mother    • COPD Mother    • Asthma Mother    • Thyroid disease Mother    • Arthritis Father    • Diabetes Father    • Hypertension Father    • Hyperlipidemia Father    • Kidney disease Father    • Heart attack Father    • Coronary artery disease Father    • Dementia Father    • No Known Problems Son    • Colon cancer Neg Hx    • Liver cancer Neg Hx    • Liver disease Neg Hx    • Stomach cancer Neg Hx    • Esophageal cancer Neg Hx          SOCIAL HISTORY  Social History     Socioeconomic History   • Marital status:    Tobacco Use   • Smoking status: Never Smoker   • Smokeless tobacco: Never Used   Vaping Use   • Vaping Use: Never used   Substance and Sexual Activity   • Alcohol use: No   • Drug use: No   • Sexual activity: Defer         ALLERGIES  Ceftin [cefuroxime axetil], Imitrex [sumatriptan], and Amoxicillin        REVIEW OF SYSTEMS  Review of Systems     All systems reviewed and negative except for those discussed in HPI.       PHYSICAL EXAM    I have reviewed the triage vital signs and nursing notes.    ED Triage Vitals [01/09/22 1510]   Temp Heart Rate Resp BP SpO2   98.7 °F (37.1 °C) (!) 125 18 141/96 98 %      Temp src Heart Rate Source Patient Position BP Location FiO2 (%)   Oral Monitor Sitting Right arm --       Physical Exam  GENERAL:   Appears very well. Her vital signs are normal. Triage tachycardia has resolved. She is oxygenating well. She is a good historian.   HENT:  Nares patent. Posterior pharynx is benign. TMs are bilaterally unremarkable with good light reflex.  EYES: No scleral icterus.  CV: Regular rhythm, regular rate. No tachycardia. No peripheral edema  RESPIRATORY: Normal effort.  No audible wheezes, rales or rhonchi.  ABDOMEN: Soft, nontender  MUSCULOSKELETAL: No deformities.   NEURO: Alert, moves all extremities, follows commands. No neurosensory deficit or focal weakness. No photophobia or meningeal signs.   SKIN: Warm, dry, no rash visualized.        LAB RESULTS  No results found for this or any previous visit (from the past 24 hour(s)).    If labs were ordered, I independently reviewed the results.        RADIOLOGY  No Radiology Exams Resulted Within Past 24 Hours        PROCEDURES    Procedures    No orders to display       MEDICATIONS GIVEN IN ER    Medications - No data to display        ED Course as of 01/09/22 1708   Sun Jan 09, 2022   1601 Patient's physical exam is satisfactory.  Triage tachycardia is resolved.  Patient is oxygenating well.  She is taking and tolerating fluids and can meet all of her nutritional needs.  She agrees to be swabbed and discharged with plans to accept a call from me in the next few hours with the results of her respiratory panel swab.  We discussed parameters for concern that would warrant return to the emergency department.  Ms. Goody understands and concurs with this outpatient plan [MS]      ED Course User Index  [MS] Jennifer Mcgill APRN             AS OF 17:08 EST VITALS:    BP - 141/96  HR - (!) 125  TEMP - 98.7 °F (37.1 °C) (Oral)  O2 SATS - 98%                  DIAGNOSIS  Final diagnoses:   Viral URI with cough         DISPOSITION    Discharged              Jennifer Mcgill APRN  01/09/22 1708

## 2022-01-09 NOTE — DISCHARGE INSTRUCTIONS
Home to rest.  Maintain your very best hydration and nutrition.  Be certain to add high-dose vitamin C, vitamin D3 and zinc to your daily regimen.  Follow-up with your primary care provider to monitor your recovery.  I will call you in the next few hours with the outcome of your respiratory panel findings today.

## 2022-01-12 ENCOUNTER — PATIENT OUTREACH (OUTPATIENT)
Dept: CASE MANAGEMENT | Facility: OTHER | Age: 32
End: 2022-01-12

## 2022-01-12 NOTE — OUTREACH NOTE
Ambulatory Case Management Note    RN-ACM outreach with patient. Patient had an ED visit at [facility] 01/09/21.  Clinical impression noted as viral URI with cough.  Covid testing negative.  Patient was treated and discharged to home to follow with PCP.  No medication changes noted at discharge.     Care Evaluation    Questions/Answers      Most Recent Value   Suggested Appointments --  [Make an appointment with PCP as recommended.  Patient denied need for assistance with scheduling. ]   Annual Wellness Visit:  --  [Not applicable]   Care Gaps Addressed Flu Shot,  Pneumonia Vaccine   Flu Shot Status Up to Date   Pneumonia Vaccine Status Up to Date   Care Gap Comments COVID-19 Vaccines Up To Date   Other Patient Education/Resources  [facility] Nurse Call Line  [AVS, education, and recommended f/u reviewed.  Nurse Call Line information provided with AVS. Felisa is active.  ]   Advanced Directives: Not Interested At This Time   Medication Adherence Medications understood   Healthy Lifestyle (Self-Efficacy) self-reports important symptoms to medical professional,  recognizes when to contact medical assistance,  recognizes when to stop activity        SDOH updated and reviewed with the patient during this program:     Financial Resource Strain: Low Risk    • Difficulty of Paying Living Expenses: Not hard at all       Physical Activity: Inactive   • Days of Exercise per Week: 0 days   • Minutes of Exercise per Session: 0 min       Food Insecurity: No Food Insecurity   • Worried About Running Out of Food in the Last Year: Never true   • Ran Out of Food in the Last Year: Never true       Social Connections: Moderately Isolated   • Frequency of Communication with Friends and Family: More than three times a week   • Frequency of Social Gatherings with Friends and Family: Twice a week   • Attends Sikhism Services: Never   • Active Member of Clubs or Organizations: No   • Attends Club or  Organization Meetings: Never   • Marital Status:        Transportation Needs: No Transportation Needs   • Lack of Transportation (Medical): No   • Lack of Transportation (Non-Medical): No       Housing Stability: Low Risk    • Unable to Pay for Housing in the Last Year: No   • Number of Places Lived in the Last Year: 1   • Unstable Housing in the Last Year: No       Stress: No Stress Concern Present   • Feeling of Stress : Not at all         Paula Aparicio RN  Ambulatory Case Management    1/12/2022, 15:55 EST

## 2022-01-18 ENCOUNTER — TRANSCRIBE ORDERS (OUTPATIENT)
Dept: ADMINISTRATIVE | Facility: HOSPITAL | Age: 32
End: 2022-01-18

## 2022-01-18 DIAGNOSIS — M47.816 SPONDYLOSIS WITHOUT MYELOPATHY OR RADICULOPATHY, LUMBAR REGION: Primary | ICD-10-CM

## 2022-01-21 RX ORDER — BUSPIRONE HYDROCHLORIDE 5 MG/1
TABLET ORAL
Qty: 90 TABLET | Refills: 0 | Status: SHIPPED | OUTPATIENT
Start: 2022-01-21 | End: 2022-02-21

## 2022-01-25 ENCOUNTER — OFFICE VISIT (OUTPATIENT)
Dept: OBSTETRICS AND GYNECOLOGY | Facility: CLINIC | Age: 32
End: 2022-01-25

## 2022-01-25 VITALS
WEIGHT: 199 LBS | HEIGHT: 65 IN | SYSTOLIC BLOOD PRESSURE: 128 MMHG | DIASTOLIC BLOOD PRESSURE: 72 MMHG | BODY MASS INDEX: 33.15 KG/M2

## 2022-01-25 DIAGNOSIS — N94.6 DYSMENORRHEA: ICD-10-CM

## 2022-01-25 DIAGNOSIS — Z12.39 ENCOUNTER FOR BREAST CANCER SCREENING OTHER THAN MAMMOGRAM: ICD-10-CM

## 2022-01-25 DIAGNOSIS — Z87.42 HISTORY OF ENDOMETRIOSIS: ICD-10-CM

## 2022-01-25 DIAGNOSIS — Z79.01 CURRENT USE OF LONG TERM ANTICOAGULATION: ICD-10-CM

## 2022-01-25 DIAGNOSIS — D68.51 HETEROZYGOUS FACTOR V LEIDEN MUTATION: ICD-10-CM

## 2022-01-25 DIAGNOSIS — D68.59 PROTEIN S DEFICIENCY: ICD-10-CM

## 2022-01-25 DIAGNOSIS — Z01.419 ENCOUNTER FOR GYNECOLOGICAL EXAMINATION (GENERAL) (ROUTINE) WITHOUT ABNORMAL FINDINGS: Primary | ICD-10-CM

## 2022-01-25 DIAGNOSIS — N83.201 RIGHT OVARIAN CYST: ICD-10-CM

## 2022-01-25 DIAGNOSIS — Z86.711 HISTORY OF PULMONARY EMBOLUS (PE): ICD-10-CM

## 2022-01-25 DIAGNOSIS — N92.1 MENORRHAGIA WITH IRREGULAR CYCLE: ICD-10-CM

## 2022-01-25 PROCEDURE — 99385 PREV VISIT NEW AGE 18-39: CPT | Performed by: OBSTETRICS & GYNECOLOGY

## 2022-01-25 PROCEDURE — 99214 OFFICE O/P EST MOD 30 MIN: CPT | Performed by: OBSTETRICS & GYNECOLOGY

## 2022-01-27 LAB
ERYTHROCYTE [DISTWIDTH] IN BLOOD BY AUTOMATED COUNT: 12.8 % (ref 12.3–15.4)
ESTRADIOL SERPL-MCNC: 54.5 PG/ML
FSH SERPL-ACNC: 5.8 MIU/ML
FT4I SERPL CALC-MCNC: 1.8 (ref 1.2–4.9)
HCT VFR BLD AUTO: 44.6 % (ref 34–46.6)
HGB BLD-MCNC: 14.8 G/DL (ref 12–15.9)
MCH RBC QN AUTO: 31.2 PG (ref 26.6–33)
MCHC RBC AUTO-ENTMCNC: 33.2 G/DL (ref 31.5–35.7)
MCV RBC AUTO: 94.1 FL (ref 79–97)
PLATELET # BLD AUTO: 351 10*3/MM3 (ref 140–450)
RBC # BLD AUTO: 4.74 10*6/MM3 (ref 3.77–5.28)
T3RU NFR SERPL: 21 % (ref 24–39)
T4 SERPL-MCNC: 8.4 UG/DL (ref 4.5–12)
TESTOST FREE SERPL-MCNC: 2 PG/ML (ref 0–4.2)
TESTOST SERPL-MCNC: 22 NG/DL (ref 8–60)
TSH SERPL-ACNC: 0.69 UIU/ML (ref 0.45–4.5)
WBC # BLD AUTO: 10.95 10*3/MM3 (ref 3.4–10.8)

## 2022-01-27 NOTE — PROGRESS NOTES
Chief Complaint  Gynecologic Exam     History of Present Illness:  Patient is 31 y.o.  who presents to [facility] here for her annual examination.  Patient also has complaints of menstrual cycles twice monthly.  Patient reports they have been heavy in nature.  Patient reports the onset of symptoms over the last 4 to 5 months.  Patient has also been seen in the emergency room for right lower quadrant pain.  Patient does report having cramping associated with her menstrual cycles as well.  Patient does have a known history of endometriosis.  Patient did have a CT scan as noted.  Patient had had a CT scan several months prior to that as well.  Patient is noted to have a right ovarian cyst.  Patient has a history of factor V Leiden mutation as well as protein S deficiency.  Patient also has a clotting disorder and factor V.  Patient has had DVT as well as PE in the past.  Patient has been on Eliquis since 2016.  She is also on aspirin daily.  Patient reports no changes in her health or medications otherwise.  Patient has had a previous ablation of endometriosis.  She has also had a tubal ligation.    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 and states wasn't cardiac related   • Clotting disorder (HCC)     factor 5   • Constipation    • Coronary artery disease involving native coronary artery of native heart without angina pectoris 2017   • COVID-19 vaccine series completed     Pfizer-plus booster   • Deep vein thrombosis (HCC) 2016   • Diarrhea    • Diverticulitis    • Elevated cholesterol    • Endometriosis    • Factor 5 Leiden mutation, heterozygous (HCC)     • Fibroid    • Fibromyalgia    • Full dentures 06/22/2021    advised no adhesives DOS   • GERD (gastroesophageal reflux disease)    • H/O blood clots    • H/O cardiovascular stress test 06/22/2021    reported several years ago-exercise wnl   • Headache    • Heart rate fast    • Hiatal hernia    • High cholesterol    • History of recurrent UTIs    • HPV (human papilloma virus) infection    • Hx of gout 06/22/2021   • Hypertension    • Hypoglycemia    • Inappropriate sinus node tachycardia    • Kidney infection 06/22/2021    history only   • Migraine    • Nausea & vomiting    • Neurologic orthostatic hypotension (HCC)    • Obesity    • Osteoarthritis    • Ovarian cyst    • Pneumonia    • Pollen allergies    • PONV (postoperative nausea and vomiting)    • Protein S deficiency (HCC) 11/14/2016    labs from hospitalization for PE   • Pulmonary embolism ([facility]) 11/20/2016    on eliquis since that time   • Recurrent pregnancy loss, antepartum condition or complication    • Sleep apnea     no CPAP- states insurance took it back   • Subclinical hyperthyroidism    • Tachycardia    • Tattoo     LOWER BACK   • Varicella      Current Outpatient Medications on File Prior to Visit   Medication Sig Dispense Refill   • albuterol sulfate  (90 Base) MCG/ACT inhaler Inhale 2 puffs Every 4 (Four) Hours As Needed for Wheezing or Shortness of Air. 18 g 11   • Alcohol Swabs (Alcohol Pads) 70 % pads Check sugars as needed/directed for hypoglycemia. 100 each 12   • apixaban (ELIQUIS) 5 MG tablet tablet Take 1 tablet by mouth 2 (Two) Times a Day. 180 tablet 3   • aspirin 81 MG EC tablet Take 81 mg by mouth Daily.     • atorvastatin (LIPITOR) 40 MG tablet Take 1 tablet by mouth Every Night. 30 tablet 11   • azelastine (ASTELIN) 0.1 % nasal spray 1 spray into the nostril(s) as directed by provider 2 (Two) Times a Day. Use in each nostril as directed 1 each 10   • B-D ULTRA-FINE 33 LANCETS misc Check sugars as needed/directed for  hypoglycemia. 100 each 12   • Blood Glucose Monitoring Suppl (ONE TOUCH ULTRA 2) w/Device kit use as needed to check blood sugar for hypoglycemia (diabetes)     • busPIRone (BUSPAR) 5 MG tablet TAKE 1 TABLET BY MOUTH THREE TIMES A DAY 90 tablet 0   • calcium carbonate EX (TUMS EX) 750 MG chewable tablet Chew 1 tablet Daily. (Patient taking differently: Chew 750 mg Daily As Needed.) 30 tablet 1   • Cholecalciferol (Vitamin D3) 50 MCG (2000 UT) capsule Take 1 capsule by mouth Daily. Start after completing 50,000 IU weekly dose.  Indications: Vitamin D Deficiency (Patient taking differently: Take 2,000 Units by mouth Daily. .  Indications: Vitamin D Deficiency) 90 capsule 3   • Corlanor 5 MG tablet tablet Take 1.5 tablets by mouth 2 (two) times a day. 90 tablet 6   • cyclobenzaprine (FLEXERIL) 10 MG tablet cyclobenzaprine 10 mg tablet   TAKE 1/2 TABLET BY MOUTH THREE TIMES A DAY AS NEEDED FOR MUSCLE SPASM for up to 5 days     • Diclofenac Sodium (VOLTAREN) 1 % gel gel Apply 4 g topically to the appropriate area as directed 4 (Four) Times a Day As Needed (joint pain). 150 g 1   • DULoxetine (CYMBALTA) 60 MG capsule Take 1 capsule by mouth Daily. 90 capsule 3   • fluticasone (FLONASE) 50 MCG/ACT nasal spray INSTILL 2 SPRAYS INTO EACH NOSTRIL EVERY DAY AS DIRECTED 16 g 11   • Fremanezumab-vfrm (Ajovy) 225 MG/1.5ML solution auto-injector Inject 225 mg under the skin into the appropriate area as directed Every 28 (Twenty-Eight) Days. 1 pen 11   • glucose blood test strip USE TO CHECK SUGARS DAILY DUE TO hypoglycemia 100 each 12   • glucose monitor monitoring kit 1 each As Needed (diabetes). Check sugars as needed/directed for hypoglycemia. 1 each 0   • granisetron (KYTRIL) 1 MG tablet Take 1 mg by mouth Every 12 (Twelve) Hours As Needed for Nausea or Vomiting. PA APPROVED.     • HYDROcodone-acetaminophen (NORCO) 5-325 MG per tablet Take 1 tablet by mouth Every 4 (Four) Hours As Needed for pain 8 tablet 0   • lidocaine  (LIDODERM) 5 % Place 2 patches on the skin as directed by provider Daily. Remove & Discard patch within 12 hours or as directed by MD 60 patch 3   • mometasone-formoterol (Dulera) 200-5 MCG/ACT inhaler Inhale 2 puffs 2 (Two) Times a Day. 13 g 11   • mupirocin (BACTROBAN) 2 % ointment mupirocin 2 % topical ointment   APPLY TOPICALLY TO THE APPROPRIATE AREA THREE TIMES A DAY FOR 7 DAYS AS DIRECTED     • nitrofurantoin, macrocrystal-monohydrate, (MACROBID) 100 MG capsule Take 1 capsule by mouth 2 (Two) Times a Day. 20 capsule 0   • ondansetron ODT (ZOFRAN-ODT) 4 MG disintegrating tablet Place 1 tablet on the tongue Every 6 (Six) Hours As Needed for Nausea or Vomiting. 10 tablet 0   • pantoprazole (PROTONIX) 40 MG EC tablet Take 1 tablet by mouth Daily. 30 tablet 0   • Rimegepant Sulfate (Nurtec) 75 MG tablet dispersible tablet Take 1 tablet by mouth As Needed (as needed). 4 tablet 0   • zonisamide (ZONEGRAN) 100 MG capsule Take 1 capsule by mouth Every Evening. 90 capsule 1     No current facility-administered medications on file prior to visit.     Allergies   Allergen Reactions   • Ceftin [Cefuroxime Axetil] Shortness Of Breath and Rash     Numbness in mouth and throat   • Imitrex [Sumatriptan] Shortness Of Breath and Nausea Only   • Amoxicillin Rash     Past Surgical History:   Procedure Laterality Date   •  SECTION        and  and    •  SECTION WITH TUBAL N/A 1/15/2019    Procedure:  SECTION REPEAT WITH TUBAL;  Surgeon: Alva Boyer MD;  Location: [facility] LABOR DELIVERY;  Service: Obstetrics/Gynecology   • CHOLECYSTECTOMY     • COLONOSCOPY N/A 2017    Procedure: COLONOSCOPY WITH BIOPSIES AND ARGON THERMAL ABLATION;  Surgeon: Jignesh Selby MD;  Location: [facility] ENDOSCOPY;  Service:    • DIAGNOSTIC LAPAROSCOPY N/A 2018    Procedure: DIAGNOSTIC LAPAROSCOPY AND ABLATION OF ENDOMETRIOSIS;  Surgeon: Evin Zamudio MD;  Location: [facility] OR;  Service:  "Obstetrics/Gynecology   • ENDOMETRIAL ABLATION     • ENDOSCOPIC FUNCTIONAL SINUS SURGERY (FESS) Right 6/25/2021    Procedure: Right endoscopic total ethmoidectomy, right endoscopic middle meatal antrotomy with cyst removal, right endoscopic frontal recess exploration with cyst removal;  Surgeon: Saulo Brito MD;  Location: [facility] OR;  Service: ENT;  Laterality: Right;   • ENDOSCOPY N/A 4/20/2017    Procedure: ESOPHAGOGASTRODUODENOSCOPY WITH BIOPSIES AND COLD BIOPSY POLYPECTOMIES;  Surgeon: Jignesh Selby MD;  Location: [facility] ENDOSCOPY;  Service:    • LAPAROSCOPIC CHOLECYSTECTOMY     • LIPOMA EXCISION N/A 12/10/2021    Procedure: Excision of soft tissue mass lower back;  Surgeon: Nidia Smith MD;  Location: [facility] OR;  Service: General;  Laterality: N/A;   • MOUTH SURGERY      full mouth tooth extration   • WISDOM TOOTH EXTRACTION       Family History   Problem Relation Age of Onset   • Arthritis Mother    • COPD Mother    • Asthma Mother    • Thyroid disease Mother    • Arthritis Father    • Diabetes Father    • Hypertension Father    • Hyperlipidemia Father    • Kidney disease Father    • Heart attack Father    • Coronary artery disease Father    • Dementia Father    • No Known Problems Son    • Colon cancer Neg Hx    • Liver cancer Neg Hx    • Liver disease Neg Hx    • Stomach cancer Neg Hx    • Esophageal cancer Neg Hx      Social History     Socioeconomic History   • Marital status:    Tobacco Use   • Smoking status: Never Smoker   • Smokeless tobacco: Never Used   Vaping Use   • Vaping Use: Never used   Substance and Sexual Activity   • Alcohol use: No   • Drug use: Never   • Sexual activity: Yes     Partners: Male     Birth control/protection: Surgical     Comment: Tubal       Physical Examination:  Vital Signs: /72   Ht 165.1 cm (65\")   Wt 90.3 kg (199 lb)   BMI 33.12 kg/m²     General Appearance: alert, appears stated age, and cooperative  Breasts: Examined in supine " position  Symmetric without masses or skin dimpling  Nipples normal without inversion, lesions or discharge  There are no palpable axillary nodes  Abdomen: no masses, no hepatomegaly, no splenomegaly, soft non-tender, no guarding and no rebound tenderness  Pelvic: Clinical staff was present for exam  External genitalia:  normal appearance of the external genitalia including Bartholin's and [place]'s glands.  :  urethral meatus normal;  Vaginal:  normal pink mucosa without prolapse or lesions.  Cervix:  normal appearance.  Uterus:  normal size, shape and consistency.  Adnexa:  normal bimanual exam of the adnexa.  Pap smear done and specimen sent using Thin-Prep technique    Data Review:  The following data was reviewed by: Alva Boyer MD on 01/25/2022:     Labs:  CBC Auto Differential (01/02/2022 13:12)  Comprehensive Metabolic Panel (01/02/2022 13:12)  Magnesium (01/02/2022 13:12)  Lipase (01/02/2022 13:12)  Urinalysis With Microscopic If Indicated (No Culture) - Urine, Clean Catch (01/02/2022 13:13)  Pregnancy, Urine - Urine, Clean Catch (01/02/2022 13:13)  Urinalysis, Microscopic Only - Urine, Clean Catch (01/02/2022 13:13)  Respiratory Panel PCR w/COVID-19(SARS-CoV-2) CESAR/ASHLIE/RIVKA/PAD/COR/MAD/REVA In-House, NP Swab in UTM/VTM, 3-4 HR TAT - Swab, Nasopharynx (01/09/2022 16:20)    Imaging:  CT Abdomen Pelvis With Contrast (11/19/2021 14:59)  CT Abdomen Pelvis Stone Protocol (01/02/2022 14:19)    Medical Records:  None    Assessment and Plan   Problem List Items Addressed This Visit        Coag and Thromboembolic    History of pulmonary embolus (PE)  Patient with history of PE and clotting disorder as well as factor V Leiden mutation.  Patient also with protein S deficiency as noted.  Patient understands she is not a candidate for hormone replacement therapy or oral contraceptives in combination form.  Patient will continue on her Eliquis.  Patient is to follow-up with transvaginal ultrasound as discussed.     Heterozygous factor V Leiden mutation (HCC)    Protein S deficiency (HCC)      Other Visit Diagnoses     Encounter for gynecological examination (general) (routine) without abnormal findings    -  Primary  Pap was done today.  If she does not receive the results of the Pap within 2 weeks  time, she was instructed to call to find out the results.  I explained to Marilu that the recommendations for Pap smear interval in a low risk patient has lengthened to 3 years time if cytology alone normal or  5 years time if both cytology and HPV testing were normal.  I encouraged her to be seen yearly for a full physical exam including breast and pelvic exam even during the off years when PAP's will not be performed.    Relevant Orders    Pap IG, Rfx HPV ASCU    CBC (No Diff)    Thyroid Panel With TSH    Testosterone, Free, Total    Follicle Stimulating Hormone    Estradiol    Encounter for breast cancer screening other than mammogram      Marilu was counseled regarding having clinical breast exams and breast self-awareness.  Women aged 29-39 years of age should have clinical breast exams every 1-3 years and yearly aged 40 and older.  The patient was counseled regarding breast self-awareness focusing on having a sense of what is normal for her breasts so that she can tell if there are changes.  Even small changes should be reported to provider.    Menorrhagia with irregular cycle      The patient was informed that menstrual flow outside of normal volume, duration, regularity, or frequency is considered abnormal uterine bleeding.  The patient was informed that the normal duration of menstrual flow is usually 5 days and the normal cycle typically lasts between 21-35 days.  The patient was informed that heavy menstrual bleeding has been defined as blood loss greater than 80 mL and given that this is hard to quantify excessive blood loss is based on the patient's perception. The patient was informed that AUB most frequently  occurs in women aged 19-39 years as a result of pregnancy, structural lesions such as polyps or fibroids, anovulatory cycles, use of hormonal contraception, and endometrial hyperplasia.  She was counseled that endometrial cancer is less common but may occur.  It is recommended that she have a pregnancy test, CBC, and TSH.  Her pap smear needs to be up to date.  She needs screening for Chlamydia if she feels she is at high risk.  It is also recommended that she have a transvaginal ultrasound for further evaluation.  If anatomic abnormalities are noted then then surgery may be indicated. An endometrial ablation may also be an option to control bleeding in women who have completed childbearing.  The various options were discussed regarding medical management of her bleeding to include the use of nonsteroidal antiinflammatory drugs, progestins, combination oral contraceptives, a levonorgestrel intrauterine device, or tranexamic acid. The patient is informed if there is no improvement in her symptoms with medical management then she will need additional evaluation to include additional laboratory assessment and endometrial sampling.  Patient will return following her next menstrual cycle for transvaginal ultrasound.  I have discussed with the patient the possibility of a uterine ablation given her need for long-term anticoagulation.  Instructions and precautions are given.  Plan pending results.    Relevant Orders    CBC (No Diff)    Thyroid Panel With TSH    Testosterone, Free, Total    Follicle Stimulating Hormone    Estradiol    US Non-ob Transvaginal    Dysmenorrhea      Marilu Godoy was counseled regarding the various etiologies for dysmenorrhea.  The patient was informed that primary dysmenorrhea is painful menstruation in the absence of pathology.  The various options for dysmenorrhea were discussed to include nonsteroidal antiinflammatory drugs, hormonal suppression, or both.  The patient was informed  secondary dysmenorrhea is a result of pelvic pathology and is more common in patients with severe dysmenorrhea at menarche or progressively worsening dysmenorrhea, abnormal uterine bleeding, mid-cycle or acyclic pain, infertility, family history of endometriosis, dyspareunia, or lack of response to empiric therapy.  Evaluation for secondary causes includes pelvic ultrasonography and possible laparoscopy.  The various treatment options for secondary dysmenorrhea depends upon the etiology as discussed.    Relevant Orders    US Non-ob Transvaginal    History of endometriosis      Patient with known history of endometriosis.  Patient understands she is not a candidate for oral contraceptives or hormones secondary to her history of DVT and PE.    Right ovarian cyst      Instructions and precautions were given.  Patient will follow-up with transvaginal ultrasound for further evaluation.    Relevant Orders    US Non-ob Transvaginal    Current use of long term anticoagulation      Patient is on long-term anticoagulation.  Patient may be a candidate for uterine ablation.  Instructions and precautions are given.  Patient is to follow-up with transvaginal ultrasound for further evaluation.    Relevant Orders    CBC (No Diff)          Follow Up/Instructions:  Follow up as noted.  Patient was given instructions and counseling regarding her condition or for health maintenance advice. Please see specific information pulled into the AVS if appropriate.     Note: Speech recognition transcription software may have been used to dictate portions of this document.  An attempt at proofreading has been made though minor errors in transcription may still be present.    This note was electronically signed.  Alva Boyer M.D.

## 2022-01-31 ENCOUNTER — HOSPITAL ENCOUNTER (EMERGENCY)
Facility: HOSPITAL | Age: 32
Discharge: HOME | End: 2022-01-31
Attending: EMERGENCY MEDICINE | Admitting: EMERGENCY MEDICINE

## 2022-01-31 ENCOUNTER — APPOINTMENT (OUTPATIENT)
Dept: GENERAL RADIOLOGY | Facility: HOSPITAL | Age: 32
End: 2022-01-31

## 2022-01-31 VITALS
HEIGHT: 63 IN | DIASTOLIC BLOOD PRESSURE: 92 MMHG | WEIGHT: 198 LBS | RESPIRATION RATE: 16 BRPM | TEMPERATURE: 97.9 F | SYSTOLIC BLOOD PRESSURE: 127 MMHG | BODY MASS INDEX: 35.08 KG/M2 | HEART RATE: 107 BPM | OXYGEN SATURATION: 98 %

## 2022-01-31 DIAGNOSIS — J06.9 VIRAL URI: Primary | ICD-10-CM

## 2022-01-31 DIAGNOSIS — Z20.822 EXPOSURE TO COVID-19 VIRUS: ICD-10-CM

## 2022-01-31 LAB
FLUAV RNA RESP QL NAA+PROBE: NOT DETECTED
FLUBV RNA RESP QL NAA+PROBE: NOT DETECTED
SARS-COV-2 RNA RESP QL NAA+PROBE: NOT DETECTED

## 2022-01-31 PROCEDURE — 63710000001 ONDANSETRON ODT 4 MG TABLET DISPERSIBLE: Performed by: EMERGENCY MEDICINE

## 2022-01-31 PROCEDURE — 71045 X-RAY EXAM CHEST 1 VIEW: CPT

## 2022-01-31 PROCEDURE — 87636 SARSCOV2 & INF A&B AMP PRB: CPT | Performed by: EMERGENCY MEDICINE

## 2022-01-31 PROCEDURE — 99283 EMERGENCY DEPT VISIT LOW MDM: CPT

## 2022-01-31 RX ORDER — ONDANSETRON 4 MG/1
4 TABLET, ORALLY DISINTEGRATING ORAL ONCE
Status: COMPLETED | OUTPATIENT
Start: 2022-01-31 | End: 2022-01-31

## 2022-01-31 RX ADMIN — ONDANSETRON 4 MG: 4 TABLET, ORALLY DISINTEGRATING ORAL at 21:08

## 2022-02-01 NOTE — ED PROVIDER NOTES
Subjective   31-year-old female presents to the ED with a chief complaint of exposure to COVID-19.  Multiple coworkers of her husbands have tested positive.  She presents complaining of cough.  Cough is not productive of sputum.  She also complains of chills, fatigue, body aches headache and nausea.  No abdominal pain.  No known fever.  No shortness of breath.  No dysuria or hematuria.  No prior treatments or limiting factors.  No prior evaluations.  No other complaints this time.          Review of Systems   Constitutional: Positive for chills and fatigue.   Respiratory: Positive for cough. Negative for shortness of breath and wheezing.    Cardiovascular: Negative for chest pain and palpitations.   Musculoskeletal: Positive for myalgias.   Neurological: Positive for headaches.   All other systems reviewed and are negative.      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 and states wasn't cardiac related   • Clotting disorder (HCC)     factor 5   • Constipation    • Coronary artery disease involving native coronary artery of native heart without angina pectoris 6/16/2017   • COVID-19 vaccine series completed     Pfizer-plus booster   • Deep vein thrombosis (HCC) 11/13/2016   • Diarrhea    • Diverticulitis    • Elevated cholesterol    • Endometriosis    • Factor 5 Leiden mutation, heterozygous ([facility])    • Fibroid    • Fibromyalgia    • Full dentures 06/22/2021    advised no adhesives DOS   • GERD (gastroesophageal reflux disease)    • H/O blood clots    • H/O cardiovascular stress test 06/22/2021    reported several years ago-exercise wnl   • Headache    • Heart rate fast    • Hiatal hernia    • High  cholesterol    • History of recurrent UTIs    • HPV (human papilloma virus) infection    • Hx of gout 2021   • Hypertension    • Hypoglycemia    • Inappropriate sinus node tachycardia    • Kidney infection 2021    history only   • Migraine    • Nausea & vomiting    • Neurologic orthostatic hypotension (HCC)    • Obesity    • Osteoarthritis    • Ovarian cyst    • Pneumonia    • Pollen allergies    • PONV (postoperative nausea and vomiting)    • Protein S deficiency (HCC) 2016    labs from hospitalization for PE   • Pulmonary embolism (HCC) 2016    on eliquis since that time   • Recurrent pregnancy loss, antepartum condition or complication    • Sleep apnea     no CPAP- states insurance took it back   • Subclinical hyperthyroidism    • Tachycardia    • Tattoo     LOWER BACK   • Varicella        Allergies   Allergen Reactions   • Ceftin [Cefuroxime Axetil] Shortness Of Breath and Rash     Numbness in mouth and throat   • Imitrex [Sumatriptan] Shortness Of Breath and Nausea Only   • Amoxicillin Rash       Past Surgical History:   Procedure Laterality Date   •  SECTION        and  and    •  SECTION WITH TUBAL N/A 1/15/2019    Procedure:  SECTION REPEAT WITH TUBAL;  Surgeon: Alva Boyer MD;  Location: [facility] LABOR DELIVERY;  Service: Obstetrics/Gynecology   • CHOLECYSTECTOMY     • COLONOSCOPY N/A 2017    Procedure: COLONOSCOPY WITH BIOPSIES AND ARGON THERMAL ABLATION;  Surgeon: Jignesh Selby MD;  Location: [facility] ENDOSCOPY;  Service:    • DIAGNOSTIC LAPAROSCOPY N/A 2018    Procedure: DIAGNOSTIC LAPAROSCOPY AND ABLATION OF ENDOMETRIOSIS;  Surgeon: Evin Zamudio MD;  Location: [facility] OR;  Service: Obstetrics/Gynecology   • ENDOMETRIAL ABLATION     • ENDOSCOPIC FUNCTIONAL SINUS SURGERY (FESS) Right 2021    Procedure: Right endoscopic total ethmoidectomy, right endoscopic middle meatal antrotomy with cyst removal, right endoscopic  frontal recess exploration with cyst removal;  Surgeon: Saulo Brito MD;  Location: [facility] OR;  Service: ENT;  Laterality: Right;   • ENDOSCOPY N/A 4/20/2017    Procedure: ESOPHAGOGASTRODUODENOSCOPY WITH BIOPSIES AND COLD BIOPSY POLYPECTOMIES;  Surgeon: Jignesh Selby MD;  Location: [facility] ENDOSCOPY;  Service:    • LAPAROSCOPIC CHOLECYSTECTOMY     • LIPOMA EXCISION N/A 12/10/2021    Procedure: Excision of soft tissue mass lower back;  Surgeon: Nidia Smith MD;  Location: [facility] OR;  Service: General;  Laterality: N/A;   • MOUTH SURGERY      full mouth tooth extration   • WISDOM TOOTH EXTRACTION         Family History   Problem Relation Age of Onset   • Arthritis Mother    • COPD Mother    • Asthma Mother    • Thyroid disease Mother    • Arthritis Father    • Diabetes Father    • Hypertension Father    • Hyperlipidemia Father    • Kidney disease Father    • Heart attack Father    • Coronary artery disease Father    • Dementia Father    • No Known Problems Son    • Colon cancer Neg Hx    • Liver cancer Neg Hx    • Liver disease Neg Hx    • Stomach cancer Neg Hx    • Esophageal cancer Neg Hx        Social History     Socioeconomic History   • Marital status:    Tobacco Use   • Smoking status: Never Smoker   • Smokeless tobacco: Never Used   Vaping Use   • Vaping Use: Never used   Substance and Sexual Activity   • Alcohol use: No   • Drug use: Never   • Sexual activity: Yes     Partners: Male     Birth control/protection: Surgical     Comment: Tubal           Objective   Physical Exam  Vitals and nursing note reviewed.   Constitutional:       General: She is not in acute distress.     Appearance: She is well-developed. She is not diaphoretic.   HENT:      Head: Normocephalic and atraumatic.      Nose: Nose normal.   Eyes:      Conjunctiva/sclera: Conjunctivae normal.   Cardiovascular:      Rate and Rhythm: Normal rate and regular rhythm.   Pulmonary:      Effort: Pulmonary effort is normal. No  respiratory distress.      Breath sounds: Normal breath sounds.   Abdominal:      General: There is no distension.      Palpations: Abdomen is soft.      Tenderness: There is no abdominal tenderness. There is no guarding.   Musculoskeletal:         General: No deformity.   Neurological:      Mental Status: She is alert and oriented to person, place, and time.      Cranial Nerves: No cranial nerve deficit.         Procedures           ED Course                                                 MDM  PULSE OXIMETRY INTERPRETATION  Patient had a pulse ox of 98% on room air. This is a normal pulse oximetry reading.      31-year-old female presents to the ED with exposure to COVID-19, body aches, chills, cough headache and nausea.  Covid negative.  Chest x-ray negative for acute process.  Vital signs stable.  Pulse ox appropriate.  Suspect viral URI and could still be Covid given that her exposure was very recent and her symptoms started yesterday.  Appropriate for discharge symptomatic treatment.  Follow-up outpatient as needed.  Patient agreeable to this plan.      Final diagnoses:   Viral URI   Exposure to COVID-19 virus       ED Disposition  ED Disposition     ED Disposition Condition Comment    Discharge Stable           Deidre Barker MD  [address] [place] [address] [phone]               Medication List      Changed    calcium carbonate  MG chewable tablet  Commonly known as: TUMS EX  Chew 1 tablet Daily.  What changed:   · when to take this  · reasons to take this     Vitamin D3 50 MCG (2000 UT) capsule  Take 1 capsule by mouth Daily. Start after completing 50,000 IU weekly dose.  Indications: Vitamin D Deficiency  What changed: additional instructions             Victor Hugo Ayala, DO  02/01/22 0200

## 2022-02-07 ENCOUNTER — LAB (OUTPATIENT)
Dept: LAB | Facility: HOSPITAL | Age: 32
End: 2022-02-07

## 2022-02-07 ENCOUNTER — HOSPITAL ENCOUNTER (OUTPATIENT)
Dept: MRI IMAGING | Facility: HOSPITAL | Age: 32
End: 2022-02-07

## 2022-02-07 DIAGNOSIS — Z03.818 ENCOUNTER FOR PATIENT CONCERN ABOUT EXPOSURE TO INFECTIOUS ORGANISM: Primary | ICD-10-CM

## 2022-02-07 DIAGNOSIS — Z01.419 ENCOUNTER FOR GYNECOLOGICAL EXAMINATION (GENERAL) (ROUTINE) WITHOUT ABNORMAL FINDINGS: ICD-10-CM

## 2022-02-07 PROCEDURE — U0004 COV-19 TEST NON-CDC HGH THRU: HCPCS

## 2022-02-08 LAB — SARS-COV-2 RNA NOSE QL NAA+PROBE: NOT DETECTED

## 2022-02-11 ENCOUNTER — APPOINTMENT (OUTPATIENT)
Dept: CT IMAGING | Facility: HOSPITAL | Age: 32
End: 2022-02-11

## 2022-02-11 ENCOUNTER — APPOINTMENT (OUTPATIENT)
Dept: GENERAL RADIOLOGY | Facility: HOSPITAL | Age: 32
End: 2022-02-11

## 2022-02-11 ENCOUNTER — HOSPITAL ENCOUNTER (EMERGENCY)
Facility: HOSPITAL | Age: 32
Discharge: HOME | End: 2022-02-11
Attending: EMERGENCY MEDICINE | Admitting: EMERGENCY MEDICINE

## 2022-02-11 VITALS
HEART RATE: 87 BPM | BODY MASS INDEX: 35.44 KG/M2 | WEIGHT: 200 LBS | RESPIRATION RATE: 20 BRPM | OXYGEN SATURATION: 99 % | DIASTOLIC BLOOD PRESSURE: 82 MMHG | HEIGHT: 63 IN | TEMPERATURE: 98.4 F | SYSTOLIC BLOOD PRESSURE: 117 MMHG

## 2022-02-11 DIAGNOSIS — H60.501 ACUTE OTITIS EXTERNA OF RIGHT EAR, UNSPECIFIED TYPE: ICD-10-CM

## 2022-02-11 DIAGNOSIS — E87.6 HYPOKALEMIA: ICD-10-CM

## 2022-02-11 DIAGNOSIS — R07.9 CHEST PAIN, UNSPECIFIED TYPE: Primary | ICD-10-CM

## 2022-02-11 LAB
ALBUMIN SERPL-MCNC: 4.6 G/DL (ref 3.5–5.2)
ALBUMIN/GLOB SERPL: 1.6 G/DL
ALP SERPL-CCNC: 98 U/L (ref 39–117)
ALT SERPL W P-5'-P-CCNC: 15 U/L (ref 1–33)
ANION GAP SERPL CALCULATED.3IONS-SCNC: 11.8 MMOL/L (ref 5–15)
APTT PPP: 27 SECONDS (ref 24.5–37.2)
AST SERPL-CCNC: 12 U/L (ref 1–32)
BASOPHILS # BLD AUTO: 0.04 10*3/MM3 (ref 0–0.2)
BASOPHILS NFR BLD AUTO: 0.5 % (ref 0–1.5)
BILIRUB SERPL-MCNC: 0.6 MG/DL (ref 0–1.2)
BUN SERPL-MCNC: 8 MG/DL (ref 6–20)
BUN/CREAT SERPL: 11.3 (ref 7–25)
CALCIUM SPEC-SCNC: 9.4 MG/DL (ref 8.6–10.5)
CHLORIDE SERPL-SCNC: 101 MMOL/L (ref 98–107)
CO2 SERPL-SCNC: 25.2 MMOL/L (ref 22–29)
CREAT SERPL-MCNC: 0.71 MG/DL (ref 0.57–1)
DEPRECATED RDW RBC AUTO: 39.3 FL (ref 37–54)
EOSINOPHIL # BLD AUTO: 0.1 10*3/MM3 (ref 0–0.4)
EOSINOPHIL NFR BLD AUTO: 1.2 % (ref 0.3–6.2)
ERYTHROCYTE [DISTWIDTH] IN BLOOD BY AUTOMATED COUNT: 11.9 % (ref 12.3–15.4)
FLUAV RNA RESP QL NAA+PROBE: NOT DETECTED
FLUBV RNA RESP QL NAA+PROBE: NOT DETECTED
GFR SERPL CREATININE-BSD FRML MDRD: 96 ML/MIN/1.73
GLOBULIN UR ELPH-MCNC: 2.9 GM/DL
GLUCOSE SERPL-MCNC: 91 MG/DL (ref 65–99)
HCG SERPL QL: NEGATIVE
HCT VFR BLD AUTO: 40.4 % (ref 34–46.6)
HGB BLD-MCNC: 13.9 G/DL (ref 12–15.9)
HOLD SPECIMEN: NORMAL
IMM GRANULOCYTES # BLD AUTO: 0.02 10*3/MM3 (ref 0–0.05)
IMM GRANULOCYTES NFR BLD AUTO: 0.2 % (ref 0–0.5)
INR PPP: 1 (ref 0.9–1.1)
LYMPHOCYTES # BLD AUTO: 1.42 10*3/MM3 (ref 0.7–3.1)
LYMPHOCYTES NFR BLD AUTO: 17.3 % (ref 19.6–45.3)
MAGNESIUM SERPL-MCNC: 1.9 MG/DL (ref 1.6–2.6)
MCH RBC QN AUTO: 31.2 PG (ref 26.6–33)
MCHC RBC AUTO-ENTMCNC: 34.4 G/DL (ref 31.5–35.7)
MCV RBC AUTO: 90.6 FL (ref 79–97)
MONOCYTES # BLD AUTO: 0.36 10*3/MM3 (ref 0.1–0.9)
MONOCYTES NFR BLD AUTO: 4.4 % (ref 5–12)
NEUTROPHILS NFR BLD AUTO: 6.27 10*3/MM3 (ref 1.7–7)
NEUTROPHILS NFR BLD AUTO: 76.4 % (ref 42.7–76)
NRBC BLD AUTO-RTO: 0 /100 WBC (ref 0–0.2)
NT-PROBNP SERPL-MCNC: 200.8 PG/ML (ref 0–450)
PLATELET # BLD AUTO: 299 10*3/MM3 (ref 140–450)
PMV BLD AUTO: 9.7 FL (ref 6–12)
POTASSIUM SERPL-SCNC: 3.4 MMOL/L (ref 3.5–5.2)
PROCALCITONIN SERPL-MCNC: 0.04 NG/ML (ref 0–0.25)
PROT SERPL-MCNC: 7.5 G/DL (ref 6–8.5)
PROTHROMBIN TIME: 13.7 SECONDS (ref 12–15.1)
RBC # BLD AUTO: 4.46 10*6/MM3 (ref 3.77–5.28)
SARS-COV-2 RNA RESP QL NAA+PROBE: NOT DETECTED
SODIUM SERPL-SCNC: 138 MMOL/L (ref 136–145)
TROPONIN T SERPL-MCNC: <0.01 NG/ML (ref 0–0.03)
WBC NRBC COR # BLD: 8.21 10*3/MM3 (ref 3.4–10.8)
WHOLE BLOOD HOLD SPECIMEN: NORMAL
WHOLE BLOOD HOLD SPECIMEN: NORMAL

## 2022-02-11 PROCEDURE — 85730 THROMBOPLASTIN TIME PARTIAL: CPT | Performed by: PHYSICIAN ASSISTANT

## 2022-02-11 PROCEDURE — 25010000002 IOPAMIDOL 61 % SOLUTION: Performed by: EMERGENCY MEDICINE

## 2022-02-11 PROCEDURE — 84484 ASSAY OF TROPONIN QUANT: CPT

## 2022-02-11 PROCEDURE — 93005 ELECTROCARDIOGRAM TRACING: CPT

## 2022-02-11 PROCEDURE — 83735 ASSAY OF MAGNESIUM: CPT | Performed by: PHYSICIAN ASSISTANT

## 2022-02-11 PROCEDURE — 71275 CT ANGIOGRAPHY CHEST: CPT

## 2022-02-11 PROCEDURE — 85610 PROTHROMBIN TIME: CPT | Performed by: PHYSICIAN ASSISTANT

## 2022-02-11 PROCEDURE — 84703 CHORIONIC GONADOTROPIN ASSAY: CPT

## 2022-02-11 PROCEDURE — 84145 PROCALCITONIN (PCT): CPT | Performed by: PHYSICIAN ASSISTANT

## 2022-02-11 PROCEDURE — 99283 EMERGENCY DEPT VISIT LOW MDM: CPT

## 2022-02-11 PROCEDURE — 85025 COMPLETE CBC W/AUTO DIFF WBC: CPT

## 2022-02-11 PROCEDURE — 71045 X-RAY EXAM CHEST 1 VIEW: CPT

## 2022-02-11 PROCEDURE — 87636 SARSCOV2 & INF A&B AMP PRB: CPT

## 2022-02-11 PROCEDURE — 80053 COMPREHEN METABOLIC PANEL: CPT

## 2022-02-11 PROCEDURE — 83880 ASSAY OF NATRIURETIC PEPTIDE: CPT

## 2022-02-11 RX ORDER — OFLOXACIN 3 MG/ML
10 SOLUTION AURICULAR (OTIC) DAILY
Qty: 5 ML | Refills: 0 | Status: SHIPPED | OUTPATIENT
Start: 2022-02-11 | End: 2022-02-18

## 2022-02-11 RX ORDER — ASPIRIN 81 MG/1
324 TABLET, CHEWABLE ORAL ONCE
Status: COMPLETED | OUTPATIENT
Start: 2022-02-11 | End: 2022-02-11

## 2022-02-11 RX ORDER — SODIUM CHLORIDE 0.9 % (FLUSH) 0.9 %
10 SYRINGE (ML) INJECTION AS NEEDED
Status: DISCONTINUED | OUTPATIENT
Start: 2022-02-11 | End: 2022-02-11 | Stop reason: HOSPADM

## 2022-02-11 RX ORDER — POTASSIUM CHLORIDE 750 MG/1
40 CAPSULE, EXTENDED RELEASE ORAL ONCE
Status: COMPLETED | OUTPATIENT
Start: 2022-02-11 | End: 2022-02-11

## 2022-02-11 RX ADMIN — ASPIRIN 81 MG CHEWABLE TABLET 324 MG: 81 TABLET CHEWABLE at 16:03

## 2022-02-11 RX ADMIN — SODIUM CHLORIDE 1000 ML: 9 INJECTION, SOLUTION INTRAVENOUS at 14:44

## 2022-02-11 RX ADMIN — POTASSIUM CHLORIDE 40 MEQ: 750 CAPSULE, EXTENDED RELEASE ORAL at 16:27

## 2022-02-11 RX ADMIN — IOPAMIDOL 100 ML: 612 INJECTION, SOLUTION INTRAVENOUS at 15:44

## 2022-02-11 RX ADMIN — LIDOCAINE HYDROCHLORIDE: 20 SOLUTION ORAL; TOPICAL at 16:04

## 2022-02-11 NOTE — ED PROVIDER NOTES
Subjective   History of Present Illness   Patient is a 31-year-old female with history of clotting disorder, factor V and protein S deficiency, CAD, history of DVT and PE presenting to the ER with complaints of chest pain, purulent sputum production, fever, and chills since yesterday.  Patient states that she had an endoscopy performed by Dr. Cornelius at [facility] yesterday for treatment of gastroparesis.  She states that she called them today and they advised her to come to the ER to rule out pneumonia or PE.  Patient has been off of her anticoagulants since Sunday, 5 days for her endoscopy.  She denies any additional symptoms or complaints at this time.    Review of Systems   Constitutional: Positive for chills and fever.   Respiratory: Positive for cough.         Purulent sputum production   Cardiovascular: Positive for chest pain.   All other systems reviewed and are negative.      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 and states wasn't cardiac related   • Clotting disorder (HCC)     factor 5   • Constipation    • Coronary artery disease involving native coronary artery of native heart without angina pectoris 6/16/2017   • COVID-19 vaccine series completed     Pfizer-plus booster   • Deep vein thrombosis (HCC) 11/13/2016   • Diarrhea    • Diverticulitis    • Elevated cholesterol    • Endometriosis    • Factor 5 Leiden mutation, heterozygous ([facility])    • Fibroid    • Fibromyalgia    • Full dentures 06/22/2021    advised no adhesives DOS   • GERD (gastroesophageal reflux disease)    • H/O blood clots    • H/O cardiovascular stress test 06/22/2021    reported several years ago-exercise wnl   • Headache    •  Heart rate fast    • Hiatal hernia    • High cholesterol    • History of recurrent UTIs    • HPV (human papilloma virus) infection    • Hx of gout 2021   • Hypertension    • Hypoglycemia    • Inappropriate sinus node tachycardia    • Kidney infection 2021    history only   • Migraine    • Nausea & vomiting    • Neurologic orthostatic hypotension (HCC)    • Obesity    • Osteoarthritis    • Ovarian cyst    • Pneumonia    • Pollen allergies    • PONV (postoperative nausea and vomiting)    • Protein S deficiency (HCC) 2016    labs from hospitalization for PE   • Pulmonary embolism (HCC) 2016    on eliquis since that time   • Recurrent pregnancy loss, antepartum condition or complication    • Sleep apnea     no CPAP- states insurance took it back   • Subclinical hyperthyroidism    • Tachycardia    • Tattoo     LOWER BACK   • Varicella        Allergies   Allergen Reactions   • Ceftin [Cefuroxime Axetil] Shortness Of Breath and Rash     Numbness in mouth and throat   • Imitrex [Sumatriptan] Shortness Of Breath and Nausea Only   • Amoxicillin Rash       Past Surgical History:   Procedure Laterality Date   •  SECTION        and  and    •  SECTION WITH TUBAL N/A 1/15/2019    Procedure:  SECTION REPEAT WITH TUBAL;  Surgeon: Alva Boyer MD;  Location: [facility] LABOR DELIVERY;  Service: Obstetrics/Gynecology   • CHOLECYSTECTOMY     • COLONOSCOPY N/A 2017    Procedure: COLONOSCOPY WITH BIOPSIES AND ARGON THERMAL ABLATION;  Surgeon: Jignesh Selby MD;  Location: [facility] ENDOSCOPY;  Service:    • DIAGNOSTIC LAPAROSCOPY N/A 2018    Procedure: DIAGNOSTIC LAPAROSCOPY AND ABLATION OF ENDOMETRIOSIS;  Surgeon: Evin Zamudio MD;  Location: [facility] OR;  Service: Obstetrics/Gynecology   • ENDOMETRIAL ABLATION     • ENDOSCOPIC FUNCTIONAL SINUS SURGERY (FESS) Right 2021    Procedure: Right endoscopic total ethmoidectomy, right endoscopic middle meatal  antrotomy with cyst removal, right endoscopic frontal recess exploration with cyst removal;  Surgeon: Saulo Brito MD;  Location: [facility] OR;  Service: ENT;  Laterality: Right;   • ENDOSCOPY N/A 4/20/2017    Procedure: ESOPHAGOGASTRODUODENOSCOPY WITH BIOPSIES AND COLD BIOPSY POLYPECTOMIES;  Surgeon: Jignesh Selby MD;  Location: [facility] ENDOSCOPY;  Service:    • LAPAROSCOPIC CHOLECYSTECTOMY     • LIPOMA EXCISION N/A 12/10/2021    Procedure: Excision of soft tissue mass lower back;  Surgeon: Nidia Smith MD;  Location: [facility] OR;  Service: General;  Laterality: N/A;   • MOUTH SURGERY      full mouth tooth extration   • WISDOM TOOTH EXTRACTION         Family History   Problem Relation Age of Onset   • Arthritis Mother    • COPD Mother    • Asthma Mother    • Thyroid disease Mother    • Arthritis Father    • Diabetes Father    • Hypertension Father    • Hyperlipidemia Father    • Kidney disease Father    • Heart attack Father    • Coronary artery disease Father    • Dementia Father    • No Known Problems Son    • Colon cancer Neg Hx    • Liver cancer Neg Hx    • Liver disease Neg Hx    • Stomach cancer Neg Hx    • Esophageal cancer Neg Hx        Social History     Socioeconomic History   • Marital status:    Tobacco Use   • Smoking status: Never Smoker   • Smokeless tobacco: Never Used   Vaping Use   • Vaping Use: Never used   Substance and Sexual Activity   • Alcohol use: No   • Drug use: Never   • Sexual activity: Yes     Partners: Male     Birth control/protection: Surgical     Comment: Tubal           Objective   Physical Exam  Vitals and nursing note reviewed.   Constitutional:       General: She is not in acute distress.     Appearance: She is not toxic-appearing.   HENT:      Head: Normocephalic and atraumatic.   Eyes:      Extraocular Movements: Extraocular movements intact.   Cardiovascular:      Rate and Rhythm: Tachycardia present.      Heart sounds: Normal heart sounds.   Pulmonary:       Effort: Pulmonary effort is normal.      Breath sounds: Normal breath sounds.   Abdominal:      Palpations: Abdomen is soft.   Musculoskeletal:         General: Normal range of motion.      Cervical back: Normal range of motion and neck supple.   Skin:     General: Skin is warm and dry.   Neurological:      General: No focal deficit present.      Mental Status: She is alert and oriented to person, place, and time.   Psychiatric:         Mood and Affect: Mood normal.         Behavior: Behavior normal.         Procedures           ED Course  ED Course as of 02/11/22 1622   Fri Feb 11, 2022   1442 EKG interpreted by me: sinus rhythm, normal rate, no acute ST/T changes, RV conduction delay, this is a borderline EKG [MP]   1516 COVID19: Not Detected [AP]   1516 Influenza A PCR: Not Detected [AP]   1516 Influenza B PCR: Not Detected [AP]   1516 Protime: 13.7 [AP]   1516 INR: 1.00 [AP]   1516 PTT: 27.0 [AP]   1516 Troponin T: <0.010 [AP]   1516 proBNP: 200.8 [AP]   1516 Magnesium: 1.9 [AP]   1516 HCG Qualitative: Negative [AP]   1517 Glucose: 91 [AP]   1517 BUN: 8 [AP]   1517 Creatinine: 0.71 [AP]   1517 Sodium: 138 [AP]   1517 Potassium(!): 3.4 [AP]   1517 Chloride: 101 [AP]   1517 CO2: 25.2 [AP]   1517 Calcium: 9.4 [AP]   1517 Total Protein: 7.5 [AP]   1517 Albumin: 4.60 [AP]   1517 ALT (SGPT): 15 [AP]   1517 AST (SGOT): 12 [AP]   1517 Alkaline Phosphatase: 98 [AP]   1517 Total Bilirubin: 0.6 [AP]   1517 eGFR Non  Am: 96 [AP]   1517 Globulin: 2.9 [AP]   1517 A/G Ratio: 1.6 [AP]   1517 BUN/Creatinine Ratio: 11.3 [AP]   1517 Anion Gap: 11.8 [AP]   1517 WBC: 8.21 [AP]   1517 RBC: 4.46 [AP]   1517 Hemoglobin: 13.9 [AP]   1517 Hematocrit: 40.4 [AP]   1517 Platelets: 299 [AP]   1609 Procalcitonin: 0.04 [AP]   1610 Narrative & Impression  PROCEDURE: XR CHEST 2 VW-       HISTORY: SOA Triage Protocol     COMPARISON: 1/31/2022.     FINDINGS:  The lungs are clear.       There is no evidence of effusion or other pleural  disease.  The  mediastinum has a normal appearance.      The cardiac silhouette is unremarkable.     IMPRESSION:  Unremarkable chest exam.     This report was signed and finalized on 2/11/2022 3:11 PM by Gregg Sarkar MD.          Specimen Collected: 02/11/22 15:11 Last Resulted: 02/11/22 15:11           [AP]   1610 Narrative & Impression  PROCEDURE: CT CHEST PULMONARY EMBOLISM-     HISTORY: tachycardia, chest pain, h/o clotting disorder and PE in the  past, been off anticoagulants X 1 week for procedure     TECHNIQUE: Thin section axial CT with IV contrast supplemented with 3D  reconstructed MIP images.     FINDINGS:     Pulmonary vessels enhance in a normal fashion without evidence of  embolic disease. Thoracic aorta is normal in caliber without evidence of  aneurysm or dissection.     No acute lung disease is present.  No pleural or pericardial effusion is  seen. No adenopathy or mass lesion is present.     IMPRESSION:  1. No evidence of pulmonary embolism.           This study was performed with techniques to keep radiation doses as low  as reasonably achievable (ALARA). Individualized dose reduction  techniques using automated exposure control or adjustment of vA and/or  kV according to the patient size were employed.      This report was signed and finalized on 2/11/2022 4:05 PM by Gregg Sarkar MD.          Specimen Collected: 02/11/22 15:55         [AP]      ED Course User Index  [AP] Elva Ram PA-C  [MP] Francois Kline MD                                                 [facility]   Patient was evaluated in the ER for chest pain, fever, chills, concern for pneumonia after endoscopy yesterday.  Patient is tachycardic upon arrival but otherwise hemodynamically stable, afebrile, no acute distress.  Patient has a history of clotting disorder, DVT, and PE and has been off anticoagulation for 5 days prior to endoscopy.  Due to this, CT of chest PE protocol was ordered and did rule out a PE.  Labs  remarkable for mild hypokalemia.  Magnesium was normal.  Patient was given oral potassium replacement.  Patient was also given a GI cocktail, aspirin, and fluids during ER visit.  Upon reexamination, patient complained of right ear pain over the last few days and asked if I could check her ear.  Exam of the right ear revealed normal tympanic membrane with normal light reflex but there was some redness and irritation to the canal concerning for otitis externa  Ofloxacin otic drops were prescribed.  Patient is agreeable with plan for discharge.  She was advised to follow-up with her PCP if symptoms persist.  Precautions were given for return to the ER for any new or worsening symptoms.    Final diagnoses:   Chest pain, unspecified type   Hypokalemia   Acute otitis externa of right ear, unspecified type       ED Disposition  ED Disposition     ED Disposition Condition Comment    Discharge Stable           Deidre Barker MD  [address]  [facility] [address] [phone]    Schedule an appointment as soon as possible for a visit   For further outpatient evaluation of today's complaint    [facility] Emergency Department  [address] [address] [phone]  Go to   As needed, If symptoms worsen         Medication List      New Prescriptions    ofloxacin 0.3 % otic solution  Commonly known as: FLOXIN  Administer 10 drops to the right ear Daily for 7 days.        Changed    calcium carbonate  MG chewable tablet  Commonly known as: TUMS EX  Chew 1 tablet Daily.  What changed:   · when to take this  · reasons to take this     Vitamin D3 50 MCG (2000 UT) capsule  Take 1 capsule by mouth Daily. Start after completing 50,000 IU weekly dose.  Indications: Vitamin D Deficiency  What changed: additional instructions           Where to Get Your Medications      These medications were sent to [id number] - [place], [place] - [address] [facility]  - [phone] PH  - [phone] FX  [address], [place] [address]    Phone: [phone]   · ofloxacin 0.3 % otic solution          Elva Ram PA-C  02/11/22 1626

## 2022-02-11 NOTE — DISCHARGE INSTRUCTIONS
Use eardrops as prescribed.  Follow-up with your PCP for further outpatient evaluation.  Return to the ER for any new or worsening symptoms.

## 2022-02-17 ENCOUNTER — TELEPHONE (OUTPATIENT)
Dept: OBSTETRICS AND GYNECOLOGY | Facility: CLINIC | Age: 32
End: 2022-02-17

## 2022-02-17 ENCOUNTER — HOSPITAL ENCOUNTER (OUTPATIENT)
Dept: MRI IMAGING | Facility: HOSPITAL | Age: 32
Discharge: HOME | End: 2022-02-17
Admitting: PHYSICIAN ASSISTANT

## 2022-02-17 ENCOUNTER — OFFICE VISIT (OUTPATIENT)
Dept: NEUROLOGY | Facility: CLINIC | Age: 32
End: 2022-02-17

## 2022-02-17 VITALS
HEART RATE: 97 BPM | WEIGHT: 200 LBS | SYSTOLIC BLOOD PRESSURE: 122 MMHG | OXYGEN SATURATION: 98 % | BODY MASS INDEX: 35.44 KG/M2 | DIASTOLIC BLOOD PRESSURE: 64 MMHG | HEIGHT: 63 IN | TEMPERATURE: 98 F

## 2022-02-17 DIAGNOSIS — M47.816 SPONDYLOSIS WITHOUT MYELOPATHY OR RADICULOPATHY, LUMBAR REGION: ICD-10-CM

## 2022-02-17 DIAGNOSIS — G43.009 MIGRAINE WITHOUT AURA AND WITHOUT STATUS MIGRAINOSUS, NOT INTRACTABLE: Primary | ICD-10-CM

## 2022-02-17 PROCEDURE — 72148 MRI LUMBAR SPINE W/O DYE: CPT

## 2022-02-17 PROCEDURE — 99213 OFFICE O/P EST LOW 20 MIN: CPT | Performed by: NURSE PRACTITIONER

## 2022-02-17 RX ORDER — ATOGEPANT 60 MG/1
60 TABLET ORAL DAILY
Qty: 30 TABLET | Refills: 11 | Status: SHIPPED | OUTPATIENT
Start: 2022-02-17 | End: 2022-11-16 | Stop reason: SDUPTHER

## 2022-02-17 RX ORDER — METHOCARBAMOL 750 MG/1
TABLET, FILM COATED ORAL
COMMUNITY
Start: 2022-01-26 | End: 2022-02-22

## 2022-02-17 NOTE — PATIENT INSTRUCTIONS
You will stop taking the Ajovy shots. Please begin taking the Qulipta pills instead. You can continue to use Nurtec as needed for migraine headaches. Keep your appointment with [facility] on May 23.

## 2022-02-17 NOTE — TELEPHONE ENCOUNTER
----- Message from Alva Boyer MD sent at 2/16/2022  8:13 AM EST -----  Need to inform patient I reviewed her transvaginal ultrasound.  Patient does have a large collection of fluid in her lower uterine segment as well as cervix.  I recommend a fractional D&C with dx hysteroscopy for further evaluation.

## 2022-02-17 NOTE — PROGRESS NOTES
Follow Up Neurology Office Visit      Patient Name: Marilu Godoy    Referring Physician: No ref. provider found    Chief Complaint:    Chief Complaint   Patient presents with   • Follow-up     PT IN OFFICE TO FOLLOW UP ON MIGRAINES       History of Present Illness: Marilu Godoy is a 31 y.o. female who is here to follow up with Neurology for  Headaches. These have improved with current therapy, although she continues to Estimates 2-3 headache days per week.   Planning follow up with [facility] in May for Neurogenic orthostatic hypotension as diagnosed by cardiology.   The following portions of the patient's history were reviewed and updated as appropriate: allergies, current medications, past family history, past medical history, past social history, past surgical history and problem list.    Subjective     Review of Systems:   Review of Systems   Cardiovascular: Positive for palpitations.   Neurological: Positive for headache.   All other systems reviewed and are negative.    Medications:     Current Outpatient Medications:   •  albuterol sulfate  (90 Base) MCG/ACT inhaler, Inhale 2 puffs Every 4 (Four) Hours As Needed for Wheezing or Shortness of Air., Disp: 18 g, Rfl: 11  •  Alcohol Swabs (Alcohol Pads) 70 % pads, Check sugars as needed/directed for hypoglycemia., Disp: 100 each, Rfl: 12  •  apixaban (ELIQUIS) 5 MG tablet tablet, Take 1 tablet by mouth 2 (Two) Times a Day., Disp: 180 tablet, Rfl: 3  •  aspirin 81 MG EC tablet, Take 81 mg by mouth Daily., Disp: , Rfl:   •  atorvastatin (LIPITOR) 40 MG tablet, Take 1 tablet by mouth Every Night., Disp: 30 tablet, Rfl: 11  •  azelastine (ASTELIN) 0.1 % nasal spray, 1 spray into the nostril(s) as directed by provider 2 (Two) Times a Day. Use in each nostril as directed, Disp: 1 each, Rfl: 10  •  B-D ULTRA-FINE 33 LANCETS misc, Check sugars as needed/directed for hypoglycemia., Disp: 100 each, Rfl: 12  •  Blood Glucose Monitoring Suppl (ONE TOUCH  ULTRA 2) w/Device kit, use as needed to check blood sugar for hypoglycemia (diabetes), Disp: , Rfl:   •  calcium carbonate EX (TUMS EX) 750 MG chewable tablet, Chew 1 tablet Daily. (Patient taking differently: Chew 750 mg Daily As Needed.), Disp: 30 tablet, Rfl: 1  •  Cholecalciferol (Vitamin D3) 50 MCG (2000 UT) capsule, Take 1 capsule by mouth Daily. Start after completing 50,000 IU weekly dose.  Indications: Vitamin D Deficiency (Patient taking differently: Take 2,000 Units by mouth Daily. .  Indications: Vitamin D Deficiency), Disp: 90 capsule, Rfl: 3  •  Corlanor 5 MG tablet tablet, Take 1.5 tablets by mouth 2 (two) times a day., Disp: 90 tablet, Rfl: 6  •  Diclofenac Sodium (VOLTAREN) 1 % gel gel, Apply 4 g topically to the appropriate area as directed 4 (Four) Times a Day As Needed (joint pain)., Disp: 150 g, Rfl: 1  •  fluticasone (FLONASE) 50 MCG/ACT nasal spray, INSTILL 2 SPRAYS INTO EACH NOSTRIL EVERY DAY AS DIRECTED, Disp: 16 g, Rfl: 11  •  glucose blood test strip, USE TO CHECK SUGARS DAILY DUE TO hypoglycemia, Disp: 100 each, Rfl: 12  •  glucose monitor monitoring kit, 1 each As Needed (diabetes). Check sugars as needed/directed for hypoglycemia., Disp: 1 each, Rfl: 0  •  granisetron (KYTRIL) 1 MG tablet, Take 1 mg by mouth Every 12 (Twelve) Hours As Needed for Nausea or Vomiting. PA APPROVED., Disp: , Rfl:   •  lidocaine (LIDODERM) 5 %, Place 2 patches on the skin as directed by provider Daily. Remove & Discard patch within 12 hours or as directed by MD, Disp: 60 patch, Rfl: 3  •  mometasone-formoterol (Dulera) 200-5 MCG/ACT inhaler, Inhale 2 puffs 2 (Two) Times a Day., Disp: 13 g, Rfl: 11  •  ondansetron ODT (ZOFRAN-ODT) 4 MG disintegrating tablet, Place 1 tablet on the tongue Every 6 (Six) Hours As Needed for Nausea or Vomiting., Disp: 10 tablet, Rfl: 0  •  pantoprazole (PROTONIX) 40 MG EC tablet, Take 1 tablet by mouth Daily., Disp: 30 tablet, Rfl: 0  •  Rimegepant Sulfate (Nurtec) 75 MG tablet  "dispersible tablet, Take 1 tablet by mouth As Needed (as needed)., Disp: 4 tablet, Rfl: 0  •  zonisamide (ZONEGRAN) 100 MG capsule, Take 1 capsule by mouth Every Evening., Disp: 90 capsule, Rfl: 1  •  Atogepant (Qulipta) 60 MG tablet, Take 1 tablet by mouth Daily., Disp: 30 tablet, Rfl: 11  •  busPIRone (BUSPAR) 5 MG tablet, TAKE 1 TABLET BY MOUTH THREE TIMES A DAY, Disp: 90 tablet, Rfl: 0  •  busPIRone (BUSPAR) 5 MG tablet, TAKE 1 TABLET BY MOUTH THREE TIMES A DAY, Disp: 90 tablet, Rfl: 0  •  DULoxetine (CYMBALTA) 60 MG capsule, Take 1 capsule by mouth Daily., Disp: 90 capsule, Rfl: 3    Allergies:   Allergies   Allergen Reactions   • Ceftin [Cefuroxime Axetil] Shortness Of Breath and Rash     Numbness in mouth and throat   • Imitrex [Sumatriptan] Shortness Of Breath and Nausea Only   • Amoxicillin Rash       Objective     Physical Exam:  Vital Signs:   Vitals:    02/17/22 1054   BP: 122/64   Pulse: 97   Temp: 98 °F (36.7 °C)   SpO2: 98%   Weight: 90.7 kg (200 lb)   Height: 160 cm (63\")   PainSc: 0-No pain     Physical Exam  Vitals and nursing note reviewed.   Neck:      Vascular: No carotid bruit.   Cardiovascular:      Rate and Rhythm: Regular rhythm.      Heart sounds: Normal heart sounds.   Pulmonary:      Effort: Pulmonary effort is normal.   Neurological:      Mental Status: She is oriented to person, place, and time. Mental status is at baseline.      Gait: Gait is intact.      Deep Tendon Reflexes: Strength normal.   Psychiatric:         Speech: Speech normal.       Neurologic Exam     Mental Status   Oriented to person, place, and time.   Attention: normal. Concentration: normal.   Speech: speech is normal   Level of consciousness: alert    Cranial Nerves   Cranial nerves II through XII intact.     Motor Exam   Muscle bulk: normal  Overall muscle tone: normal    Strength   Strength 5/5 throughout.     Gait, Coordination, and Reflexes     Gait  Gait: normal    Tremor   Resting tremor: absent    Results " Review:   I reviewed the patient's new clinical results.  I have reviewed the patient's other medical records to include, labs, radiology and referrals.     Assessment / Plan      Assessment/Plan:   Diagnoses and all orders for this visit:    1. Migraine without aura and without status migrainosus, not intractable (Primary)  -     Atogepant (Qulipta) 60 MG tablet; Take 1 tablet by mouth Daily.  Dispense: 30 tablet; Refill: 11     I have again offered Botox therapy for additional relief of headaches, patient declines. We discussed changing therapy to Qulipta instead of Ajovy, patient agrees and prescription sent today.  She will continue to use Nurtec prn.     Follow Up:   Return in about 3 months (around 5/17/2022) for Next scheduled follow up.     ARNIE Bower  [facility]   AS THE PROVIDER, I PERSONALLY WORE PPE DURING ENTIRE FACE TO FACE ENCOUNTER IN CLINIC WITH THE PATIENT. PATIENT ALSO WORE PPE DURING ENTIRE FACE TO FACE ENCOUNTER EXCEPT FOR A MAX OF 30 SECONDS DURING NEUROLOGICAL EVALUATION OF CRANIAL NERVES AND THEN MASK WAS PLACED BACK OVER PATIENT FACE FOR REMAINDER OF VISIT. I WASHED MY HANDS BEFORE AND AFTER VISIT.    Please note that portions of this note may have been completed with a voice recognition program. Efforts were made to edit the dictations, but occasionally words are mistranscribed.

## 2022-02-20 DIAGNOSIS — G89.29 CHRONIC MUSCULOSKELETAL PAIN: ICD-10-CM

## 2022-02-20 DIAGNOSIS — M79.18 CHRONIC MUSCULOSKELETAL PAIN: ICD-10-CM

## 2022-02-21 ENCOUNTER — PREP FOR SURGERY (OUTPATIENT)
Dept: OTHER | Facility: HOSPITAL | Age: 32
End: 2022-02-21

## 2022-02-21 ENCOUNTER — OFFICE VISIT (OUTPATIENT)
Dept: CARDIOLOGY | Facility: CLINIC | Age: 32
End: 2022-02-21

## 2022-02-21 VITALS
SYSTOLIC BLOOD PRESSURE: 124 MMHG | BODY MASS INDEX: 3.7 KG/M2 | HEART RATE: 113 BPM | DIASTOLIC BLOOD PRESSURE: 82 MMHG | WEIGHT: 20.1 LBS | OXYGEN SATURATION: 98 % | HEIGHT: 62 IN

## 2022-02-21 DIAGNOSIS — E66.9 OBESITY (BMI 30-39.9): ICD-10-CM

## 2022-02-21 DIAGNOSIS — D68.59 PROTEIN S DEFICIENCY: ICD-10-CM

## 2022-02-21 DIAGNOSIS — Z86.711 HISTORY OF PULMONARY EMBOLUS (PE): ICD-10-CM

## 2022-02-21 DIAGNOSIS — N92.1 MENORRHAGIA WITH IRREGULAR CYCLE: Primary | ICD-10-CM

## 2022-02-21 DIAGNOSIS — R00.0 INAPPROPRIATE SINUS TACHYCARDIA: Primary | ICD-10-CM

## 2022-02-21 DIAGNOSIS — D68.51 HETEROZYGOUS FACTOR V LEIDEN MUTATION: ICD-10-CM

## 2022-02-21 DIAGNOSIS — G90.3 NEUROGENIC ORTHOSTATIC HYPOTENSION: ICD-10-CM

## 2022-02-21 DIAGNOSIS — Z79.01 CHRONIC ANTICOAGULATION: ICD-10-CM

## 2022-02-21 DIAGNOSIS — G47.33 OBSTRUCTIVE SLEEP APNEA: ICD-10-CM

## 2022-02-21 PROBLEM — R09.89 DECREASED PEDAL PULSES: Status: RESOLVED | Noted: 2017-06-28 | Resolved: 2022-02-21

## 2022-02-21 PROBLEM — R07.2 PRECORDIAL PAIN: Status: RESOLVED | Noted: 2017-05-16 | Resolved: 2022-02-21

## 2022-02-21 PROBLEM — R00.2 PALPITATIONS: Status: RESOLVED | Noted: 2017-06-16 | Resolved: 2022-02-21

## 2022-02-21 PROBLEM — I95.9 HYPOTENSION: Status: RESOLVED | Noted: 2017-04-10 | Resolved: 2022-02-21

## 2022-02-21 PROBLEM — R06.01 ORTHOPNEA: Status: RESOLVED | Noted: 2017-02-24 | Resolved: 2022-02-21

## 2022-02-21 PROBLEM — R60.0 BILATERAL EDEMA OF LOWER EXTREMITY: Status: RESOLVED | Noted: 2017-04-17 | Resolved: 2022-02-21

## 2022-02-21 PROCEDURE — 99214 OFFICE O/P EST MOD 30 MIN: CPT | Performed by: INTERNAL MEDICINE

## 2022-02-21 RX ORDER — BUSPIRONE HYDROCHLORIDE 5 MG/1
TABLET ORAL
Qty: 90 TABLET | Refills: 0 | Status: SHIPPED | OUTPATIENT
Start: 2022-02-21 | End: 2022-03-22

## 2022-02-21 RX ORDER — DULOXETIN HYDROCHLORIDE 60 MG/1
60 CAPSULE, DELAYED RELEASE ORAL DAILY
Qty: 90 CAPSULE | Refills: 3 | Status: SHIPPED | OUTPATIENT
Start: 2022-02-21 | End: 2022-08-30 | Stop reason: SDUPTHER

## 2022-02-21 RX ORDER — SODIUM CHLORIDE 0.9 % (FLUSH) 0.9 %
10 SYRINGE (ML) INJECTION AS NEEDED
Status: CANCELLED | OUTPATIENT
Start: 2022-02-21

## 2022-02-21 RX ORDER — SODIUM CHLORIDE 0.9 % (FLUSH) 0.9 %
3 SYRINGE (ML) INJECTION EVERY 12 HOURS SCHEDULED
Status: CANCELLED | OUTPATIENT
Start: 2022-02-21

## 2022-02-21 RX ORDER — BUSPIRONE HYDROCHLORIDE 5 MG/1
TABLET ORAL
Qty: 90 TABLET | Refills: 0 | Status: SHIPPED | OUTPATIENT
Start: 2022-02-21

## 2022-02-21 NOTE — TELEPHONE ENCOUNTER
Rx Refill Note  Requested Prescriptions     Pending Prescriptions Disp Refills   • DULoxetine (CYMBALTA) 60 MG capsule 90 capsule 3     Sig: Take 1 capsule by mouth Daily.      Last office visit with prescribing clinician: 11/3/2021      Next office visit with prescribing clinician: Visit date not found            Katalina Quinteros MA  02/21/22, 12:45 EST

## 2022-02-21 NOTE — PROGRESS NOTES
Encounter Date:02/21/2022      Patient ID: Marilu Godoy is a 31 y.o. female.    Deidre Barker MD    Chief Complaint: OTHER      Cardiac PMH: (Old records have been reviewed and summarized below)  1. Inappropriate sinus tachycardia/palpitations  a. Unable to tolerate BBL or CCB secondary to hypotension, intolerant to Northera secondary to hypertension, intolerant to Florinef  b. Echocardiogram 12/15/2016, Dr. delgado: EF 61%  c. Holter monitor 12/13/2016: Rare PAC/PVC, no evidence of atrial arrhythmias, no episodes of SVT/VT, sinoatrial node conduction was normal, no AV block noted  d. Echocardiogram 5/7/2018: EF 65%, no significant valvular disease  e. Treadmill stress test 5/7/2018: Normal treadmill exercise stress test, baseline heart rate 102 bpm, peak heart rate 180 bpm  f. 48-hour Holter monitor 4/19/2018: Predominantly normal sinus rhythm, average heart rate 88 bpm, ( bpm) rare PAC/PVC  g. Echocardiogram 4/1/2019: EF 68%, no significant valvular disease  h. 30-day event monitor 4/1/2019: Predominantly normal sinus rhythm, episodes of sinus tachycardia  2. Orthostatic hypotension: Intolerance to medications including midodrine, Florinef, and Northera  3. Heterozygous factor V Leiden mutation  a. Also protein S deficiency  b. Right sided PE, November 2016, no evidence of DVT  c. On chronic Eliquis therapy  d. Chest pain, dyspnea, 4/9/2018: Presented to local ED, CT chest negative for recurrent PE  4. Obesity              History of Present Illness  Patient presents today for follow-up with a history of inappropriate sinus tachycardia on Corlanor 7.5mg.  Patient states since being on the increased dose she has less frequent episodes from happening daily to occurring a few times a week.  He states these episode last 30 minutes to 1 hour.  They occur at rest but are mostly associated with caffeine and exertion.  She states her associated symptoms are palpitations, shortness of breath,  and fatigue.  She denies chest pain or syncope.  She denies CVA symptoms.  She states her blood pressure is well controlled at home but her heart rate still remains high.  She states she was previously compliant with CPAP machine but had to have sinus surgery and had to stop using her CPAP for a period of time so insurance took it away.  She needs to do a repeat sleep study to get it back.  She states she has not seen neurology yet.  She has an appointment with  neuro on May 23 of this year.  She also states that last week she underwent an endoscopy with the [facility] and it showed cancerous polyps in her stomach.  She now has to undergo a colonoscopy.    Allergies   Allergen Reactions   • Ceftin [Cefuroxime Axetil] Shortness Of Breath and Rash     Numbness in mouth and throat   • Imitrex [Sumatriptan] Shortness Of Breath and Nausea Only   • Amoxicillin Rash       Current Outpatient Medications   Medication Instructions   • albuterol sulfate  (90 Base) MCG/ACT inhaler 2 puffs, Inhalation, Every 4 Hours PRN   • Alcohol Swabs (Alcohol Pads) 70 % pads Check sugars as needed/directed for hypoglycemia.   • apixaban (ELIQUIS) 5 mg, Oral, 2 Times Daily   • aspirin 81 mg, Oral, Daily   • atorvastatin (LIPITOR) 40 mg, Oral, Nightly   • azelastine (ASTELIN) 0.1 % nasal spray 1 spray, Nasal, 2 Times Daily, Use in each nostril as directed   • B-D ULTRA-FINE 33 LANCETS misc Check sugars as needed/directed for hypoglycemia.   • Blood Glucose Monitoring Suppl (ONE TOUCH ULTRA 2) w/Device kit use as needed to check blood sugar for hypoglycemia (diabetes)   • busPIRone (BUSPAR) 5 MG tablet TAKE 1 TABLET BY MOUTH THREE TIMES A DAY   • calcium carbonate EX (TUMS EX) 750 mg, Oral, Daily   • Corlanor 7.5 mg, Oral, 2 times daily   • Diclofenac Sodium (VOLTAREN) 4 g, Topical, 4 Times Daily PRN   • DULoxetine (CYMBALTA) 60 mg, Oral, Daily   • fluticasone (FLONASE) 50 MCG/ACT nasal spray INSTILL 2 SPRAYS INTO EACH NOSTRIL EVERY DAY AS  "DIRECTED   • glucose blood test strip USE TO CHECK SUGARS DAILY DUE TO hypoglycemia   • glucose monitor monitoring kit 1 each, Does not apply, As Needed, Check sugars as needed/directed for hypoglycemia.   • granisetron (KYTRIL) 1 mg, Oral, Every 12 Hours PRN, PA APPROVED.   • HYDROcodone-acetaminophen (NORCO) 5-325 MG per tablet Take 1 tablet by mouth Every 4 (Four) Hours As Needed for pain   • lidocaine (LIDODERM) 5 % 2 patches, Transdermal, Every 24 Hours, Remove & Discard patch within 12 hours or as directed by MD   • methocarbamol (ROBAXIN) 750 MG tablet No dose, route, or frequency recorded.   • mometasone-formoterol (Dulera) 200-5 MCG/ACT inhaler 2 puffs, Inhalation, 2 Times Daily - RT   • mupirocin (BACTROBAN) 2 % ointment mupirocin 2 % topical ointment   APPLY TOPICALLY TO THE APPROPRIATE AREA THREE TIMES A DAY FOR 7 DAYS AS DIRECTED   • nitrofurantoin (macrocrystal-monohydrate) (MACROBID) 100 mg, Oral, 2 Times Daily   • Nurtec 75 mg, Oral, As Needed   • ondansetron ODT (ZOFRAN-ODT) 4 mg, Translingual, Every 6 Hours PRN   • pantoprazole (PROTONIX) 40 mg, Oral, Daily   • Qulipta 60 mg, Oral, Daily   • Vitamin D3 2,000 Units, Oral, Daily, Start after completing 50,000 IU weekly dose.   • zonisamide (ZONEGRAN) 100 mg, Oral, Every Evening       .    Objective:     /82 (BP Location: Left arm, Patient Position: Sitting)   Pulse 113   Ht 157.5 cm (62\")   Wt 9.117 kg (20 lb 1.6 oz)   LMP 02/07/2022   SpO2 98%   BMI 3.68 kg/m²    Body mass index is 3.68 kg/m².     Constitutional:       General: Awake.      Appearance: Normal and healthy appearance. Well-developed and overweight.   HENT:      Head: Normocephalic.   Pulmonary:      Effort: Pulmonary effort is normal. No respiratory distress.      Breath sounds: Normal breath sounds. No wheezing. No rales.      Comments: Bases clear  Chest:      Chest wall: Not tender to palpatation.   Cardiovascular:      Tachycardia present. Regular rhythm.      Murmurs: " There is no murmur.      No gallop. No click. No rub.   Pulses:     Intact distal pulses.   Musculoskeletal: Normal range of motion. Neurological:      Mental Status: Alert.         Lab Review:                 TSH    TSH 7/7/21 8/4/21 1/25/22   TSH 0.801 0.984 0.690                   Procedures               Assessment:      Diagnosis Plan   1. Inappropriate sinus tachycardia   lengthy conversation with the patient that she does not have heart disease.    Inappropriate sinus tachycardia is really a manifestation of an imbalance of the autonomic nervous system.    At this point I think she has satisfactory symptomatic relief from ivabradine.  She is on a reasonable dose of this.  Realistic expectations are to have significant mitigation of their symptoms rather than complete elimination and I think she is on the same page.    Follow-up in 1 year.   2. Neurogenic orthostatic hypotension (HCC)   management per neurology.  No cardiac issue here.   3. Chronic anticoagulation   noncardiac indication for anticoagulation due to clotting disorder see below.   4. Protein S deficiency (HCC)   management per other team members.   5. Heterozygous factor V Leiden mutation (HCC)   management per other team members.   6. History of pulmonary embolus (PE)   management per other team members.   7. Obesity (BMI 30-39.9)   needs to lose weight.  She understands this.  Working towards it.  BMI is 36.8.   8. Obstructive sleep apnea   CPAP is recommended.  She has been noncompliant with this in the past.  She needs to go back and be reassessed for CPAP therapy.  She is going to do so she tells me.     I spent 38 minutes in consultation with this patient which included more than 65% of this time in direct face-to-face counseling, physical examination and discussion of my assessment and findings and shared decision making with the patient.    Pierce Washington, DO, FACC, RS  Cardiac Electrophysiologist  [facility]

## 2022-02-22 PROBLEM — N92.1 MENORRHAGIA WITH IRREGULAR CYCLE: Status: ACTIVE | Noted: 2022-02-22

## 2022-03-04 ENCOUNTER — PRE-ADMISSION TESTING (OUTPATIENT)
Dept: PREADMISSION TESTING | Facility: HOSPITAL | Age: 32
End: 2022-03-04

## 2022-03-04 VITALS — BODY MASS INDEX: 35.97 KG/M2 | WEIGHT: 203 LBS | HEIGHT: 63 IN

## 2022-03-04 DIAGNOSIS — N92.1 MENORRHAGIA WITH IRREGULAR CYCLE: ICD-10-CM

## 2022-03-04 LAB
ANION GAP SERPL CALCULATED.3IONS-SCNC: 8.9 MMOL/L (ref 5–15)
APTT PPP: 26.7 SECONDS (ref 24.5–37.2)
B-HCG UR QL: NEGATIVE
BACTERIA UR QL AUTO: ABNORMAL /HPF
BASOPHILS # BLD AUTO: 0.06 10*3/MM3 (ref 0–0.2)
BASOPHILS NFR BLD AUTO: 0.9 % (ref 0–1.5)
BILIRUB UR QL STRIP: NEGATIVE
BUN SERPL-MCNC: 9 MG/DL (ref 6–20)
BUN/CREAT SERPL: 15 (ref 7–25)
CALCIUM SPEC-SCNC: 9.3 MG/DL (ref 8.6–10.5)
CHLORIDE SERPL-SCNC: 101 MMOL/L (ref 98–107)
CLARITY UR: CLEAR
CO2 SERPL-SCNC: 24.1 MMOL/L (ref 22–29)
COLOR UR: YELLOW
CREAT SERPL-MCNC: 0.6 MG/DL (ref 0.57–1)
DEPRECATED RDW RBC AUTO: 40.8 FL (ref 37–54)
EGFRCR SERPLBLD CKD-EPI 2021: 123.2 ML/MIN/1.73
EOSINOPHIL # BLD AUTO: 0.1 10*3/MM3 (ref 0–0.4)
EOSINOPHIL NFR BLD AUTO: 1.5 % (ref 0.3–6.2)
ERYTHROCYTE [DISTWIDTH] IN BLOOD BY AUTOMATED COUNT: 12.2 % (ref 12.3–15.4)
GLUCOSE SERPL-MCNC: 93 MG/DL (ref 65–99)
GLUCOSE UR STRIP-MCNC: NEGATIVE MG/DL
HCT VFR BLD AUTO: 44.1 % (ref 34–46.6)
HGB BLD-MCNC: 14.9 G/DL (ref 12–15.9)
HGB UR QL STRIP.AUTO: ABNORMAL
HYALINE CASTS UR QL AUTO: ABNORMAL /LPF
IMM GRANULOCYTES # BLD AUTO: 0.02 10*3/MM3 (ref 0–0.05)
IMM GRANULOCYTES NFR BLD AUTO: 0.3 % (ref 0–0.5)
KETONES UR QL STRIP: NEGATIVE
LEUKOCYTE ESTERASE UR QL STRIP.AUTO: NEGATIVE
LYMPHOCYTES # BLD AUTO: 1.73 10*3/MM3 (ref 0.7–3.1)
LYMPHOCYTES NFR BLD AUTO: 26.2 % (ref 19.6–45.3)
MCH RBC QN AUTO: 30.9 PG (ref 26.6–33)
MCHC RBC AUTO-ENTMCNC: 33.8 G/DL (ref 31.5–35.7)
MCV RBC AUTO: 91.5 FL (ref 79–97)
MONOCYTES # BLD AUTO: 0.31 10*3/MM3 (ref 0.1–0.9)
MONOCYTES NFR BLD AUTO: 4.7 % (ref 5–12)
NEUTROPHILS NFR BLD AUTO: 4.38 10*3/MM3 (ref 1.7–7)
NEUTROPHILS NFR BLD AUTO: 66.4 % (ref 42.7–76)
NITRITE UR QL STRIP: NEGATIVE
NRBC BLD AUTO-RTO: 0 /100 WBC (ref 0–0.2)
PH UR STRIP.AUTO: 5.5 [PH] (ref 5–8)
PLATELET # BLD AUTO: 339 10*3/MM3 (ref 140–450)
PMV BLD AUTO: 9.9 FL (ref 6–12)
POTASSIUM SERPL-SCNC: 3.9 MMOL/L (ref 3.5–5.2)
PROT UR QL STRIP: ABNORMAL
RBC # BLD AUTO: 4.82 10*6/MM3 (ref 3.77–5.28)
RBC # UR STRIP: ABNORMAL /HPF
REF LAB TEST METHOD: ABNORMAL
SODIUM SERPL-SCNC: 134 MMOL/L (ref 136–145)
SP GR UR STRIP: 1.02 (ref 1–1.03)
SQUAMOUS #/AREA URNS HPF: ABNORMAL /HPF
UROBILINOGEN UR QL STRIP: ABNORMAL
WBC # UR STRIP: ABNORMAL /HPF
WBC NRBC COR # BLD: 6.6 10*3/MM3 (ref 3.4–10.8)

## 2022-03-04 PROCEDURE — 36415 COLL VENOUS BLD VENIPUNCTURE: CPT

## 2022-03-04 PROCEDURE — 87086 URINE CULTURE/COLONY COUNT: CPT

## 2022-03-04 PROCEDURE — 85025 COMPLETE CBC W/AUTO DIFF WBC: CPT

## 2022-03-04 PROCEDURE — 81001 URINALYSIS AUTO W/SCOPE: CPT

## 2022-03-04 PROCEDURE — 85730 THROMBOPLASTIN TIME PARTIAL: CPT

## 2022-03-04 PROCEDURE — 81025 URINE PREGNANCY TEST: CPT

## 2022-03-04 PROCEDURE — 80048 BASIC METABOLIC PNL TOTAL CA: CPT

## 2022-03-05 LAB — BACTERIA SPEC AEROBE CULT: NORMAL

## 2022-03-09 PROCEDURE — S0260 H&P FOR SURGERY: HCPCS | Performed by: OBSTETRICS & GYNECOLOGY

## 2022-03-09 NOTE — H&P
DAVE Thayer  Marilu Godoy  : 1990  MRN: [id number]  CSN: [id number]    History and Physical  Subjective   Marilu Godoy is a 31 y.o. year old  who presents for surgery due to history of menorrhagia and dysmenorrhea.  Patient also with history of factor V Leiden mutation as well as protein S deficiency.  Patient has been on anticoagulation since .  Patient has been seen and evaluated in the office with transvaginal ultrasound.  Patient was noted to have fluid in her lower uterine segment.  The endometrium measured 15.7 mm.  The ovaries are normal in appearance.  Patient is here for further evaluation with D&C, diagnostic hysteroscopy, and possible NovaSure ablation.    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    • Bleeding disorder (HCC) 16   • Body piercing     TONGUE, NOSE, TWO IN EACH EAR   • Bronchitis    • Chest pain 2021    last CP 2-3 months ago   • Clotting disorder (HCC)     factor 5   • Constipation    • COVID-19 vaccine series completed     Pfizer-plus booster   • Deep vein thrombosis (HCC) 2016   • Diarrhea    • Diverticulitis    • Elevated cholesterol    • Endometriosis    • Factor 5 Leiden mutation, heterozygous ([facility])    • Fibroid    • Fibromyalgia    • Full dentures 2021    advised no adhesives DOS   • Gastroparesis    • GERD (gastroesophageal reflux disease)    • H/O blood clots    • H/O cardiovascular stress test 2021    reported several years ago-exercise wnl   • Headache    • Hiatal hernia    • High cholesterol    • History of recurrent UTIs    • HPV (human papilloma virus) infection    • Hx of echocardiogram    • Hx of gout 2021   • Hypertension    • Hypoglycemia    • Inappropriate sinus node tachycardia    • Kidney infection 2021    history only   • Migraine    • Nausea  & vomiting    • Obesity    • Osteoarthritis    • Ovarian cyst    • Pneumonia    • Pollen allergies    • PONV (postoperative nausea and vomiting)    • Protein S deficiency (HCC) 11/14/2016    labs from hospitalization for PE   • Pulmonary embolism (HCC) 11/20/2016    on eliquis since that time   • Recurrent pregnancy loss, antepartum condition or complication    • Sleep apnea     no CPAP- states insurance took it back   • Tattoo     LOWER BACK   • Varicella      No current facility-administered medications on file prior to encounter.     Current Outpatient Medications on File Prior to Encounter   Medication Sig Dispense Refill   • albuterol sulfate  (90 Base) MCG/ACT inhaler Inhale 2 puffs Every 4 (Four) Hours As Needed for Wheezing or Shortness of Air. 18 g 11   • Alcohol Swabs (Alcohol Pads) 70 % pads Check sugars as needed/directed for hypoglycemia. 100 each 12   • apixaban (ELIQUIS) 5 MG tablet tablet Take 1 tablet by mouth 2 (Two) Times a Day. 180 tablet 3   • aspirin 81 MG EC tablet Take 81 mg by mouth Daily.     • Atogepant (Qulipta) 60 MG tablet Take 1 tablet by mouth Daily. 30 tablet 11   • atorvastatin (LIPITOR) 40 MG tablet Take 1 tablet by mouth Every Night. 30 tablet 11   • azelastine (ASTELIN) 0.1 % nasal spray 1 spray into the nostril(s) as directed by provider 2 (Two) Times a Day. Use in each nostril as directed 1 each 10   • B-D ULTRA-FINE 33 LANCETS misc Check sugars as needed/directed for hypoglycemia. 100 each 12   • Blood Glucose Monitoring Suppl (ONE TOUCH ULTRA 2) w/Device kit use as needed to check blood sugar for hypoglycemia (diabetes)     • busPIRone (BUSPAR) 5 MG tablet TAKE 1 TABLET BY MOUTH THREE TIMES A DAY 90 tablet 0   • busPIRone (BUSPAR) 5 MG tablet TAKE 1 TABLET BY MOUTH THREE TIMES A DAY 90 tablet 0   • calcium carbonate EX (TUMS EX) 750 MG chewable tablet Chew 1 tablet Daily. (Patient taking differently: Chew 750 mg Daily As Needed.) 30 tablet 1   • Cholecalciferol  (Vitamin D3) 50 MCG (2000 UT) capsule Take 1 capsule by mouth Daily. Start after completing 50,000 IU weekly dose.  Indications: Vitamin D Deficiency (Patient not taking: Reported on 3/4/2022) 90 capsule 3   • Corlanor 5 MG tablet tablet Take 1.5 tablets by mouth 2 (two) times a day. 90 tablet 6   • Diclofenac Sodium (VOLTAREN) 1 % gel gel Apply 4 g topically to the appropriate area as directed 4 (Four) Times a Day As Needed (joint pain). 150 g 1   • DULoxetine (CYMBALTA) 60 MG capsule Take 1 capsule by mouth Daily. 90 capsule 3   • fluticasone (FLONASE) 50 MCG/ACT nasal spray INSTILL 2 SPRAYS INTO EACH NOSTRIL EVERY DAY AS DIRECTED 16 g 11   • glucose blood test strip USE TO CHECK SUGARS DAILY DUE TO hypoglycemia 100 each 12   • glucose monitor monitoring kit 1 each As Needed (diabetes). Check sugars as needed/directed for hypoglycemia. 1 each 0   • granisetron (KYTRIL) 1 MG tablet Take 1 mg by mouth Every 12 (Twelve) Hours As Needed for Nausea or Vomiting. PA APPROVED.     • lidocaine (LIDODERM) 5 % Place 2 patches on the skin as directed by provider Daily. Remove & Discard patch within 12 hours or as directed by MD 60 patch 3   • mometasone-formoterol (Dulera) 200-5 MCG/ACT inhaler Inhale 2 puffs 2 (Two) Times a Day. 13 g 11   • ondansetron ODT (ZOFRAN-ODT) 4 MG disintegrating tablet Place 1 tablet on the tongue Every 6 (Six) Hours As Needed for Nausea or Vomiting. 10 tablet 0   • pantoprazole (PROTONIX) 40 MG EC tablet Take 1 tablet by mouth Daily. 30 tablet 0   • Rimegepant Sulfate (Nurtec) 75 MG tablet dispersible tablet Take 1 tablet by mouth As Needed (as needed). 4 tablet 0   • zonisamide (ZONEGRAN) 100 MG capsule Take 1 capsule by mouth Every Evening. 90 capsule 1     Allergies   Allergen Reactions   • Ceftin [Cefuroxime Axetil] Shortness Of Breath and Rash     Numbness in mouth and throat   • Imitrex [Sumatriptan] Shortness Of Breath and Nausea Only   • Amoxicillin Rash     Past Surgical History:    Procedure Laterality Date   •  SECTION        and  and    •  SECTION WITH TUBAL N/A 1/15/2019    Procedure:  SECTION REPEAT WITH TUBAL;  Surgeon: Alva Boyer MD;  Location: [facility] LABOR DELIVERY;  Service: Obstetrics/Gynecology   • COLONOSCOPY N/A 2017    Procedure: COLONOSCOPY WITH BIOPSIES AND ARGON THERMAL ABLATION;  Surgeon: Jignesh Selby MD;  Location: [facility] ENDOSCOPY;  Service:    • DIAGNOSTIC LAPAROSCOPY N/A 2018    Procedure: DIAGNOSTIC LAPAROSCOPY AND ABLATION OF ENDOMETRIOSIS;  Surgeon: Evin Zamudio MD;  Location: [facility] OR;  Service: Obstetrics/Gynecology   • ENDOMETRIAL ABLATION     • ENDOSCOPIC FUNCTIONAL SINUS SURGERY (FESS) Right 2021    Procedure: Right endoscopic total ethmoidectomy, right endoscopic middle meatal antrotomy with cyst removal, right endoscopic frontal recess exploration with cyst removal;  Surgeon: Saulo Brito MD;  Location: [facility] OR;  Service: ENT;  Laterality: Right;   • ENDOSCOPY N/A 2017    Procedure: ESOPHAGOGASTRODUODENOSCOPY WITH BIOPSIES AND COLD BIOPSY POLYPECTOMIES;  Surgeon: Jignesh Selby MD;  Location: [facility] ENDOSCOPY;  Service:    • LAPAROSCOPIC CHOLECYSTECTOMY     • LIPOMA EXCISION N/A 12/10/2021    Procedure: Excision of soft tissue mass lower back;  Surgeon: Nidia Smith MD;  Location: [facility] OR;  Service: General;  Laterality: N/A;   • MOUTH SURGERY      full mouth tooth extration   • WISDOM TOOTH EXTRACTION       Family History   Problem Relation Age of Onset   • Arthritis Mother    • COPD Mother    • Asthma Mother    • Thyroid disease Mother    • Arthritis Father    • Diabetes Father    • Hypertension Father    • Hyperlipidemia Father    • Kidney disease Father    • Heart attack Father    • Coronary artery disease Father    • Dementia Father    • No Known Problems Son    • Colon cancer Neg Hx    • Liver cancer Neg Hx    • Liver disease Neg Hx    • Stomach cancer Neg Hx    •  Esophageal cancer Neg Hx      Social History     Socioeconomic History   • Marital status:    Tobacco Use   • Smoking status: Never Smoker   • Smokeless tobacco: Never Used   Vaping Use   • Vaping Use: Never used   Substance and Sexual Activity   • Alcohol use: No   • Drug use: Never   • Sexual activity: Yes     Partners: Male     Birth control/protection: Surgical     Comment: Tubal     Review of Systems  The following systems were reviewed and negative:  constitution, eyes, ENT, respiratory, cardiovascular, gastrointestinal, genitourinary, integument, breast, hematologic / lymphatic, musculoskeletal, neurological, behavioral/psych, endocrine and allergies / immunologic     Objective  Recent Vitals       2/11/2022 2/17/2022 2/21/2022       BP: 117/82 122/64 124/82     Pulse: 87 97 113     Temp: -- 98 °F (36.7 °C) --     Weight: -- 90.7 kg (200 lb) 9.117 kg (20 lb 1.6 oz)     BMI (Calculated): -- 35.4 3.7         Physical Exam:  General Appearance: alert, appears stated age and cooperative  Head: normocephalic, without obvious abnormality and atraumatic  Eyes: lids and lashes normal, conjunctivae and sclerae normal, no icterus, no pallor and corneas clear  Lungs: clear to auscultation, respirations regular, respirations even and respirations unlabored  Heart: regular rhythm and normal rate, normal S1, S2, no murmur, gallop, or rubs and no click  Abdomen: normal bowel sounds, no masses, no hepatomegaly, no splenomegaly, soft non-tender, no guarding and no rebound tenderness  Extremities: moves extremities well, no edema, no cyanosis and no redness  Skin: no bleeding, bruising or rash and no lesions noted  Psych: normal mood and affect, oriented to person, time and place, thought content organized and appropriate judgment    Recent Labs  Lab Results (last 7 days)     ** No results found for the last 168 hours. **           Recent Imaging  No radiology results for the last 10 days        Assessment    1. Menorrhagia  2. Dysmenorrhea  3. Long-term anticoagulation  4. History of DVT     Plan   1. Diagnostic hysteroscopy, D&C, possible NovaSure ablation.  2. ABx and DVT prophylaxis as indicated.  3. Risks, complications, benefits, and other alternatives discussed.  4. All questions answered and pt in agreement with plan.    Alva Boyer M.D.  3/9/2022  08:54 EST

## 2022-03-10 ENCOUNTER — HOSPITAL ENCOUNTER (OUTPATIENT)
Facility: HOSPITAL | Age: 32
Setting detail: HOSPITAL OUTPATIENT SURGERY
Discharge: HOME | End: 2022-03-10
Attending: OBSTETRICS & GYNECOLOGY | Admitting: OBSTETRICS & GYNECOLOGY

## 2022-03-10 ENCOUNTER — ANESTHESIA EVENT (OUTPATIENT)
Dept: PERIOP | Facility: HOSPITAL | Age: 32
End: 2022-03-10

## 2022-03-10 ENCOUNTER — ANESTHESIA (OUTPATIENT)
Dept: PERIOP | Facility: HOSPITAL | Age: 32
End: 2022-03-10

## 2022-03-10 VITALS
RESPIRATION RATE: 18 BRPM | HEART RATE: 89 BPM | OXYGEN SATURATION: 97 % | SYSTOLIC BLOOD PRESSURE: 123 MMHG | DIASTOLIC BLOOD PRESSURE: 84 MMHG | TEMPERATURE: 97.4 F

## 2022-03-10 DIAGNOSIS — N92.1 MENORRHAGIA WITH IRREGULAR CYCLE: ICD-10-CM

## 2022-03-10 LAB
B-HCG UR QL: NEGATIVE
EXPIRATION DATE: NORMAL
GLUCOSE BLDC GLUCOMTR-MCNC: 103 MG/DL (ref 70–130)
INTERNAL NEGATIVE CONTROL: NORMAL
INTERNAL POSITIVE CONTROL: NORMAL
Lab: NORMAL

## 2022-03-10 PROCEDURE — 81025 URINE PREGNANCY TEST: CPT | Performed by: OBSTETRICS & GYNECOLOGY

## 2022-03-10 PROCEDURE — 82962 GLUCOSE BLOOD TEST: CPT

## 2022-03-10 PROCEDURE — 25010000002 DEXAMETHASONE PER 1 MG: Performed by: NURSE ANESTHETIST, CERTIFIED REGISTERED

## 2022-03-10 PROCEDURE — 25010000002 MIDAZOLAM PER 1MG: Performed by: NURSE ANESTHETIST, CERTIFIED REGISTERED

## 2022-03-10 PROCEDURE — 25010000002 ONDANSETRON PER 1 MG: Performed by: NURSE ANESTHETIST, CERTIFIED REGISTERED

## 2022-03-10 PROCEDURE — 25010000002 PROPOFOL 10 MG/ML EMULSION: Performed by: NURSE ANESTHETIST, CERTIFIED REGISTERED

## 2022-03-10 PROCEDURE — 58563 HYSTEROSCOPY ABLATION: CPT | Performed by: OBSTETRICS & GYNECOLOGY

## 2022-03-10 PROCEDURE — 25010000002 KETOROLAC TROMETHAMINE PER 15 MG: Performed by: NURSE ANESTHETIST, CERTIFIED REGISTERED

## 2022-03-10 PROCEDURE — 0 LIDOCAINE 1 % SOLUTION: Performed by: OBSTETRICS & GYNECOLOGY

## 2022-03-10 RX ORDER — PROMETHAZINE HYDROCHLORIDE 25 MG/1
12.5 TABLET ORAL ONCE AS NEEDED
Status: DISCONTINUED | OUTPATIENT
Start: 2022-03-10 | End: 2022-03-10 | Stop reason: HOSPADM

## 2022-03-10 RX ORDER — HYDROCODONE BITARTRATE AND ACETAMINOPHEN 5; 325 MG/1; MG/1
1 TABLET ORAL EVERY 8 HOURS PRN
Qty: 12 TABLET | Refills: 0 | OUTPATIENT
Start: 2022-03-10 | End: 2022-03-22

## 2022-03-10 RX ORDER — KETOROLAC TROMETHAMINE 30 MG/ML
INJECTION, SOLUTION INTRAMUSCULAR; INTRAVENOUS AS NEEDED
Status: DISCONTINUED | OUTPATIENT
Start: 2022-03-10 | End: 2022-03-10 | Stop reason: SURG

## 2022-03-10 RX ORDER — SODIUM CHLORIDE 0.9 % (FLUSH) 0.9 %
10 SYRINGE (ML) INJECTION AS NEEDED
Status: DISCONTINUED | OUTPATIENT
Start: 2022-03-10 | End: 2022-03-10 | Stop reason: HOSPADM

## 2022-03-10 RX ORDER — ONDANSETRON 2 MG/ML
INJECTION INTRAMUSCULAR; INTRAVENOUS AS NEEDED
Status: DISCONTINUED | OUTPATIENT
Start: 2022-03-10 | End: 2022-03-10 | Stop reason: SURG

## 2022-03-10 RX ORDER — LIDOCAINE HYDROCHLORIDE 20 MG/ML
INJECTION, SOLUTION INTRAVENOUS AS NEEDED
Status: DISCONTINUED | OUTPATIENT
Start: 2022-03-10 | End: 2022-03-10 | Stop reason: SURG

## 2022-03-10 RX ORDER — LIDOCAINE HYDROCHLORIDE 10 MG/ML
INJECTION, SOLUTION INFILTRATION; PERINEURAL AS NEEDED
Status: DISCONTINUED | OUTPATIENT
Start: 2022-03-10 | End: 2022-03-10 | Stop reason: HOSPADM

## 2022-03-10 RX ORDER — PROPOFOL 10 MG/ML
VIAL (ML) INTRAVENOUS AS NEEDED
Status: DISCONTINUED | OUTPATIENT
Start: 2022-03-10 | End: 2022-03-10 | Stop reason: SURG

## 2022-03-10 RX ORDER — SODIUM CHLORIDE 9 MG/ML
INJECTION, SOLUTION INTRAVENOUS CONTINUOUS PRN
Status: COMPLETED | OUTPATIENT
Start: 2022-03-10 | End: 2022-03-10

## 2022-03-10 RX ORDER — ONDANSETRON HYDROCHLORIDE 8 MG/1
8 TABLET, FILM COATED ORAL EVERY 8 HOURS PRN
Qty: 10 TABLET | Refills: 0 | Status: SHIPPED | OUTPATIENT
Start: 2022-03-10 | End: 2022-11-11

## 2022-03-10 RX ORDER — SODIUM CHLORIDE 0.9 % (FLUSH) 0.9 %
3 SYRINGE (ML) INJECTION EVERY 12 HOURS SCHEDULED
Status: DISCONTINUED | OUTPATIENT
Start: 2022-03-10 | End: 2022-03-10 | Stop reason: HOSPADM

## 2022-03-10 RX ORDER — IBUPROFEN 800 MG/1
800 TABLET ORAL EVERY 8 HOURS PRN
Qty: 30 TABLET | Refills: 0 | Status: SHIPPED | OUTPATIENT
Start: 2022-03-10

## 2022-03-10 RX ORDER — ONDANSETRON 4 MG/1
4 TABLET, FILM COATED ORAL ONCE AS NEEDED
Status: DISCONTINUED | OUTPATIENT
Start: 2022-03-10 | End: 2022-03-10 | Stop reason: HOSPADM

## 2022-03-10 RX ORDER — DEXAMETHASONE SODIUM PHOSPHATE 4 MG/ML
INJECTION, SOLUTION INTRA-ARTICULAR; INTRALESIONAL; INTRAMUSCULAR; INTRAVENOUS; SOFT TISSUE AS NEEDED
Status: DISCONTINUED | OUTPATIENT
Start: 2022-03-10 | End: 2022-03-10 | Stop reason: SURG

## 2022-03-10 RX ORDER — ONDANSETRON 2 MG/ML
4 INJECTION INTRAMUSCULAR; INTRAVENOUS ONCE AS NEEDED
Status: DISCONTINUED | OUTPATIENT
Start: 2022-03-10 | End: 2022-03-10 | Stop reason: HOSPADM

## 2022-03-10 RX ORDER — HYDROCODONE BITARTRATE AND ACETAMINOPHEN 5; 325 MG/1; MG/1
1 TABLET ORAL ONCE AS NEEDED
Status: DISCONTINUED | OUTPATIENT
Start: 2022-03-10 | End: 2022-03-10 | Stop reason: HOSPADM

## 2022-03-10 RX ORDER — MIDAZOLAM HYDROCHLORIDE 2 MG/2ML
INJECTION, SOLUTION INTRAMUSCULAR; INTRAVENOUS AS NEEDED
Status: DISCONTINUED | OUTPATIENT
Start: 2022-03-10 | End: 2022-03-10 | Stop reason: SURG

## 2022-03-10 RX ORDER — SODIUM CHLORIDE, SODIUM LACTATE, POTASSIUM CHLORIDE, CALCIUM CHLORIDE 600; 310; 30; 20 MG/100ML; MG/100ML; MG/100ML; MG/100ML
1000 INJECTION, SOLUTION INTRAVENOUS CONTINUOUS
Status: DISCONTINUED | OUTPATIENT
Start: 2022-03-10 | End: 2022-03-10 | Stop reason: HOSPADM

## 2022-03-10 RX ADMIN — PROPOFOL 100 MCG/KG/MIN: 10 INJECTION, EMULSION INTRAVENOUS at 11:15

## 2022-03-10 RX ADMIN — ONDANSETRON 4 MG: 2 INJECTION INTRAMUSCULAR; INTRAVENOUS at 11:18

## 2022-03-10 RX ADMIN — SODIUM CHLORIDE, POTASSIUM CHLORIDE, SODIUM LACTATE AND CALCIUM CHLORIDE 1000 ML: 600; 310; 30; 20 INJECTION, SOLUTION INTRAVENOUS at 09:52

## 2022-03-10 RX ADMIN — DEXAMETHASONE SODIUM PHOSPHATE 4 MG: 4 INJECTION, SOLUTION INTRAMUSCULAR; INTRAVENOUS at 11:18

## 2022-03-10 RX ADMIN — PROPOFOL 100 MG: 10 INJECTION, EMULSION INTRAVENOUS at 11:15

## 2022-03-10 RX ADMIN — LIDOCAINE HYDROCHLORIDE 50 MG: 20 INJECTION, SOLUTION INTRAVENOUS at 11:15

## 2022-03-10 RX ADMIN — MIDAZOLAM HYDROCHLORIDE 2 MG: 1 INJECTION, SOLUTION INTRAMUSCULAR; INTRAVENOUS at 11:08

## 2022-03-10 RX ADMIN — KETOROLAC TROMETHAMINE 15 MG: 30 INJECTION, SOLUTION INTRAMUSCULAR at 11:18

## 2022-03-10 NOTE — OP NOTE
DAVE Jeovany Godoy  : 1990  MRN: [id number]  CSN: [id number]  Date: 3/10/2022    Operative Report    Pre-op Diagnosis:  Menorrhagia with irregular cycle [N92.1]   Abnormal endometrium   Post-op Diagnosis:  Post-Op Diagnosis Codes:     * Menorrhagia with irregular cycle [N92.1]       Same   Procedure: Procedure(s):  DILATATION AND CURETTAGE DIAGNOSTIC HYSTEROSCOPY NOVASURE ENDOMETRIAL ABLATION   Surgeon: Alva Boyer M.D.   Assist: Staff was responsible for performing the following activities: Retraction and Suction and their skilled assistance was necessary for the success of this case.   Anesthesia: IV sedation with 1% lidocaine paracervical block   Estimated Blood Loss: 5 mls   ABx: none   Specimens:  endometrial   Findings: Nabothian cyst noted of the cervix.  Moderate amount of tissue seen and retrieved.   Complications: none   Indications: See H&P       Description of Procedure:  After the appropriate time out and adequate dosing of her anesthesia, the patient had been prepped and draped in the usual sterile fashion.  She was placed in the dorsal lithotomy position using Gera stirrups.  The bladder had been drained with a red rubber catheter per nursing.  A weighted speculum was placed in the vagina.  The anterior lip of the cervix was grasped with a single-tooth tenaculum.  The cervix was injected at the 3 o'clock and 9 o'clock position with 1% lidocaine plain without any complications.  The cervix was then progressively dilated using Ram dilators.  Rigid hysteroscopy was then performed with the above findings noted.  Sharp curettings were then obtained with a good cry throughout with tissue retrieved and sent for pathologic specimen.  The endometrial cavity length was assessed at 6.5 cms and the width had been assessed at 4.3 cms.  After adequate CO2 testing, the NovaSure ablation was performed for a total time of 1 minutes and 11 seconds with a  power setting of 154 gar. The device  was removed.  Repeat hysteroscopy revealed good ablation extending to the cornu as well as the lower uterine segment.  The cervical tenaculum was removed and the cervix was noted to be hemostatic after the application of 2-0 chromic suture in a figure-of-eight fashion.  All instrument and sponge counts were correct at the end of the procedure.  The patient tolerated the procedure well.  There were no complications.  She was taken to the postoperative recovery room in stable condition.    Alva Boyer M.D.  3/10/2022  11:35 EST

## 2022-03-10 NOTE — ANESTHESIA PREPROCEDURE EVALUATION
Anesthesia Evaluation     Patient summary reviewed and Nursing notes reviewed   history of anesthetic complications: PONV  NPO Solid Status: > 8 hours  NPO Liquid Status: > 8 hours           Airway   Mallampati: II  TM distance: >3 FB  Neck ROM: full  Possible difficult intubation  Dental      Pulmonary    (+) pneumonia resolved , pulmonary embolism, asthma,sleep apnea,   Cardiovascular     (+) hypertension, CAD, DVT, hyperlipidemia,     ROS comment: · Estimated EF = 68%.  · Left ventricular systolic function is normal.        Neuro/Psych  (+) headaches, psychiatric history,    GI/Hepatic/Renal/Endo    (+) obesity, morbid obesity, hiatal hernia, GERD well controlled,  renal disease, thyroid problem     Musculoskeletal     Abdominal    Substance History      OB/GYN    (-)  Pregnant        Other   arthritis,      ROS/Med Hx Other: Abnormal CT scan, colon  Elevated liver function tests  Right upper quadrant pain  History of pulmonary embolus (PE)  Obesity (BMI 30-39.9)  Heterozygous factor V Leiden mutation (HCC)  Protein S deficiency (HCC)  Labile hypertension  Excessive daytime sleepiness  Obstructive sleep apnea  Pleuritic pain  Mild intermittent asthma  Chronic pain of both knees  Abnormal SPEP  Subclinical hyperthyroidism  Cyst of right ovary  Chronic midline thoracic back pain  Neurogenic orthostatic hypotension (HCC)  Anti-RNP antibodies present  Elevated IgE level  Nonocclusive coronary atherosclerosis of native coronary artery  Pelvic pain  Inappropriate sinus tachycardia  Chronic anticoagulation  History of  section complicating pregnancy  Chronic pain disorder  Chronic low back pain  Lumbar spondylosis  Dyslipidemia  Severe obesity (BMI 35.0-39.9) with comorbidity (HCC)  Mass of soft tissue  Menorrhagia with irregular cycle                      Anesthesia Plan    ASA 3     MAC and general     intravenous induction     Anesthetic plan, all risks, benefits, and alternatives have been provided,  discussed and informed consent has been obtained with: patient.    Plan discussed with CRNA.        CODE STATUS:

## 2022-03-10 NOTE — ANESTHESIA POSTPROCEDURE EVALUATION
Patient: Marilu Godoy    Procedure Summary     Date: 03/10/22 Room / Location: [facility] OR 2 /  [facility]    Anesthesia Start: 1107 Anesthesia Stop: 1144    Procedure: DILATATION AND CURETTAGE DIAGNOSTIC HYSTEROSCOPY NOVASURE ENDOMETRIAL ABLATION (N/A Vagina) Diagnosis:       Menorrhagia with irregular cycle      (Menorrhagia with irregular cycle [N92.1])    Surgeons: Alva Boyer MD Provider: Blanca Myers CRNA    Anesthesia Type: MAC, general ASA Status: 3          Anesthesia Type: MAC, general    Vitals  Vitals Value Taken Time   /72 03/10/22 1144   Temp 97.4 °F (36.3 °C) 03/10/22 1144   Pulse 87 03/10/22 1144   Resp 16 03/10/22 1144   SpO2 93 % 03/10/22 1144           Post Anesthesia Care and Evaluation    Patient location during evaluation: PHASE II  Patient participation: complete - patient participated  Level of consciousness: awake and alert  Pain score: 0  Pain management: satisfactory to patient  Airway patency: patent  Anesthetic complications: No anesthetic complications  PONV Status: none  Cardiovascular status: acceptable and stable  Respiratory status: acceptable  Hydration status: acceptable

## 2022-03-17 LAB
LAB AP CASE REPORT: NORMAL
PATH REPORT.FINAL DX SPEC: NORMAL

## 2022-03-21 ENCOUNTER — OFFICE VISIT (OUTPATIENT)
Dept: OBSTETRICS AND GYNECOLOGY | Facility: CLINIC | Age: 32
End: 2022-03-21

## 2022-03-21 VITALS
BODY MASS INDEX: 34.52 KG/M2 | DIASTOLIC BLOOD PRESSURE: 80 MMHG | HEIGHT: 63 IN | WEIGHT: 194.8 LBS | SYSTOLIC BLOOD PRESSURE: 120 MMHG

## 2022-03-21 DIAGNOSIS — Z09 POSTOPERATIVE FOLLOW-UP: Primary | ICD-10-CM

## 2022-03-21 PROCEDURE — 99024 POSTOP FOLLOW-UP VISIT: CPT | Performed by: PHYSICIAN ASSISTANT

## 2022-03-21 NOTE — PROGRESS NOTES
Subjective   Chief Complaint   Patient presents with   • Post-op     11 days post-op D&C Hysteroscopy, endometrial ablation.  Doing well       Marilu Godoy is a 31 y.o. year old  presenting to be seen for postop visit.  She is doing well 11 days postop D&C hysteroscopy and endometrial ablation.  She has had normal bowel and bladder function  She had very light spotting right after the procedure and now only a thin discharge.  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    • Bleeding disorder ([facility]) 16   • Body piercing     TONGUE, NOSE, TWO IN EACH EAR   • Bronchitis    • Chest pain 2021    last CP 2-3 months ago   • Clotting disorder ([facility])     factor 5   • Constipation    • COVID-19 vaccine series completed     Pfizer-plus booster   • Deep vein thrombosis ([facility]) 2016   • Diarrhea    • Diverticulitis    • Elevated cholesterol    • Endometriosis    • Factor 5 Leiden mutation, heterozygous ([facility])    • Fibroid    • Fibromyalgia    • Full dentures 2021    advised no adhesives DOS   • Gastroparesis    • GERD (gastroesophageal reflux disease)    • H/O blood clots    • H/O cardiovascular stress test 2021    reported several years ago-exercise wnl   • Headache    • Hiatal hernia    • High cholesterol    • History of recurrent UTIs    • HPV (human papilloma virus) infection    • Hx of echocardiogram    • Hx of gout 2021   • Hypertension    • Hypoglycemia    • Inappropriate sinus node tachycardia    • Kidney infection 2021    history only   • Migraine    • Nausea & vomiting    • Obesity    • Osteoarthritis    • Ovarian cyst    • Pneumonia    • Pollen allergies    • PONV (postoperative nausea and vomiting)    • Protein S deficiency ([facility]) 2016    labs from  hospitalization for PE   • Pulmonary embolism ([facility]) 11/20/2016    on eliquis since that time   • Recurrent pregnancy loss, antepartum condition or complication    • Sleep apnea     no CPAP- states insurance took it back   • Tattoo     LOWER BACK   • Varicella         Current Outpatient Medications:   •  albuterol sulfate  (90 Base) MCG/ACT inhaler, Inhale 2 puffs Every 4 (Four) Hours As Needed for Wheezing or Shortness of Air., Disp: 18 g, Rfl: 11  •  Alcohol Swabs (Alcohol Pads) 70 % pads, Check sugars as needed/directed for hypoglycemia., Disp: 100 each, Rfl: 12  •  apixaban (ELIQUIS) 5 MG tablet tablet, Take 1 tablet by mouth 2 (Two) Times a Day., Disp: 180 tablet, Rfl: 3  •  aspirin 81 MG EC tablet, Take 81 mg by mouth Daily., Disp: , Rfl:   •  Atogepant (Qulipta) 60 MG tablet, Take 1 tablet by mouth Daily., Disp: 30 tablet, Rfl: 11  •  atorvastatin (LIPITOR) 40 MG tablet, Take 1 tablet by mouth Every Night., Disp: 30 tablet, Rfl: 11  •  azelastine (ASTELIN) 0.1 % nasal spray, 1 spray into the nostril(s) as directed by provider 2 (Two) Times a Day. Use in each nostril as directed, Disp: 1 each, Rfl: 10  •  B-D ULTRA-FINE 33 LANCETS misc, Check sugars as needed/directed for hypoglycemia., Disp: 100 each, Rfl: 12  •  Blood Glucose Monitoring Suppl (ONE TOUCH ULTRA 2) w/Device kit, use as needed to check blood sugar for hypoglycemia (diabetes), Disp: , Rfl:   •  busPIRone (BUSPAR) 5 MG tablet, TAKE 1 TABLET BY MOUTH THREE TIMES A DAY, Disp: 90 tablet, Rfl: 0  •  busPIRone (BUSPAR) 5 MG tablet, TAKE 1 TABLET BY MOUTH THREE TIMES A DAY, Disp: 90 tablet, Rfl: 0  •  calcium carbonate EX (TUMS EX) 750 MG chewable tablet, Chew 1 tablet Daily. (Patient taking differently: Chew 750 mg Daily As Needed.), Disp: 30 tablet, Rfl: 1  •  Cholecalciferol (Vitamin D3) 50 MCG (2000 UT) capsule, Take 1 capsule by mouth Daily. Start after completing 50,000 IU weekly dose.  Indications: Vitamin D Deficiency (Patient taking  differently: Take 2,000 Units by mouth Daily. Start after completing 50,000 IU weekly dose.), Disp: 90 capsule, Rfl: 3  •  Corlanor 5 MG tablet tablet, Take 1.5 tablets by mouth 2 (two) times a day., Disp: 90 tablet, Rfl: 6  •  Diclofenac Sodium (VOLTAREN) 1 % gel gel, Apply 4 g topically to the appropriate area as directed 4 (Four) Times a Day As Needed (joint pain)., Disp: 150 g, Rfl: 1  •  DULoxetine (CYMBALTA) 60 MG capsule, Take 1 capsule by mouth Daily., Disp: 90 capsule, Rfl: 3  •  fluticasone (FLONASE) 50 MCG/ACT nasal spray, INSTILL 2 SPRAYS INTO EACH NOSTRIL EVERY DAY AS DIRECTED, Disp: 16 g, Rfl: 11  •  glucose blood test strip, USE TO CHECK SUGARS DAILY DUE TO hypoglycemia, Disp: 100 each, Rfl: 12  •  glucose monitor monitoring kit, 1 each As Needed (diabetes). Check sugars as needed/directed for hypoglycemia., Disp: 1 each, Rfl: 0  •  granisetron (KYTRIL) 1 MG tablet, Take 1 mg by mouth Every 12 (Twelve) Hours As Needed for Nausea or Vomiting. PA APPROVED., Disp: , Rfl:   •  HYDROcodone-acetaminophen (NORCO) 5-325 MG per tablet, Take 1 tablet by mouth Every 8 (Eight) Hours As Needed for Severe or Moderate Pain, Disp: 12 tablet, Rfl: 0  •  ibuprofen (ADVIL,MOTRIN) 800 MG tablet, Take 1 tablet by mouth Every 8 (Eight) Hours As Needed for Mild Pain ., Disp: 30 tablet, Rfl: 0  •  lidocaine (LIDODERM) 5 %, Place 2 patches on the skin as directed by provider Daily. Remove & Discard patch within 12 hours or as directed by MD, Disp: 60 patch, Rfl: 3  •  mometasone-formoterol (Dulera) 200-5 MCG/ACT inhaler, Inhale 2 puffs 2 (Two) Times a Day., Disp: 13 g, Rfl: 11  •  ondansetron (ZOFRAN) 8 MG tablet, Take 1 tablet by mouth Every 8 (Eight) Hours As Needed for Nausea or Vomiting., Disp: 10 tablet, Rfl: 0  •  ondansetron ODT (ZOFRAN-ODT) 4 MG disintegrating tablet, Place 1 tablet on the tongue Every 6 (Six) Hours As Needed for Nausea or Vomiting., Disp: 10 tablet, Rfl: 0  •  pantoprazole (PROTONIX) 40 MG EC tablet,  Take 1 tablet by mouth Daily., Disp: 30 tablet, Rfl: 0  •  Rimegepant Sulfate (Nurtec) 75 MG tablet dispersible tablet, Take 1 tablet by mouth As Needed (as needed)., Disp: 4 tablet, Rfl: 0  •  zonisamide (ZONEGRAN) 100 MG capsule, Take 1 capsule by mouth Every Evening., Disp: 90 capsule, Rfl: 1   Allergies   Allergen Reactions   • Ceftin [Cefuroxime Axetil] Shortness Of Breath and Rash     Numbness in mouth and throat   • Imitrex [Sumatriptan] Shortness Of Breath and Nausea Only   • Amoxicillin Rash      Past Surgical History:   Procedure Laterality Date   •  SECTION        and  and    •  SECTION WITH TUBAL N/A 1/15/2019    Procedure:  SECTION REPEAT WITH TUBAL;  Surgeon: Alva Boyer MD;  Location: [facility] LABOR DELIVERY;  Service: Obstetrics/Gynecology   • COLONOSCOPY N/A 2017    Procedure: COLONOSCOPY WITH BIOPSIES AND ARGON THERMAL ABLATION;  Surgeon: Jignesh Selby MD;  Location: [facility] ENDOSCOPY;  Service:    • D & C HYSTEROSCOPY ENDOMETRIAL ABLATION N/A 3/10/2022    Procedure: DILATATION AND CURETTAGE DIAGNOSTIC HYSTEROSCOPY NOVASURE ENDOMETRIAL ABLATION;  Surgeon: Alva Boyer MD;  Location: [facility] OR;  Service: Obstetrics/Gynecology;  Laterality: N/A;   • DIAGNOSTIC LAPAROSCOPY N/A 2018    Procedure: DIAGNOSTIC LAPAROSCOPY AND ABLATION OF ENDOMETRIOSIS;  Surgeon: Evin Zamudio MD;  Location: [facility] OR;  Service: Obstetrics/Gynecology   • ENDOMETRIAL ABLATION     • ENDOSCOPIC FUNCTIONAL SINUS SURGERY (FESS) Right 2021    Procedure: Right endoscopic total ethmoidectomy, right endoscopic middle meatal antrotomy with cyst removal, right endoscopic frontal recess exploration with cyst removal;  Surgeon: Saulo Brito MD;  Location: [facility] OR;  Service: ENT;  Laterality: Right;   • ENDOSCOPY N/A 2017    Procedure: ESOPHAGOGASTRODUODENOSCOPY WITH BIOPSIES AND COLD BIOPSY POLYPECTOMIES;  Surgeon: Jignesh Selby MD;  Location: [facility]  "ENDOSCOPY;  Service:    • LAPAROSCOPIC CHOLECYSTECTOMY     • LIPOMA EXCISION N/A 12/10/2021    Procedure: Excision of soft tissue mass lower back;  Surgeon: Nidia Smith MD;  Location: [facility];  Service: General;  Laterality: N/A;   • MOUTH SURGERY      full mouth tooth extration   • WISDOM TOOTH EXTRACTION        Social History     Socioeconomic History   • Marital status:    Tobacco Use   • Smoking status: Never Smoker   • Smokeless tobacco: Never Used   Vaping Use   • Vaping Use: Never used   Substance and Sexual Activity   • Alcohol use: No   • Drug use: Never   • Sexual activity: Yes     Partners: Male     Birth control/protection: Surgical     Comment: Tubal      Family History   Problem Relation Age of Onset   • Arthritis Mother    • COPD Mother    • Asthma Mother    • Thyroid disease Mother    • Arthritis Father    • Diabetes Father    • Hypertension Father    • Hyperlipidemia Father    • Kidney disease Father    • Heart attack Father    • Coronary artery disease Father    • Dementia Father    • No Known Problems Son    • Colon cancer Neg Hx    • Liver cancer Neg Hx    • Liver disease Neg Hx    • Stomach cancer Neg Hx    • Esophageal cancer Neg Hx        Review of Systems   Constitutional: Negative for chills, diaphoresis and fever.   Gastrointestinal: Negative for constipation, diarrhea, nausea and vomiting.   Genitourinary: Negative for difficulty urinating, dysuria, pelvic pain and vaginal bleeding.           Objective   /80   Ht 160 cm (63\")   Wt 88.4 kg (194 lb 12.8 oz)   LMP 03/02/2022   Breastfeeding No   BMI 34.51 kg/m²     Physical Exam  Constitutional:       Appearance: Normal appearance. She is well-developed and well-groomed.   Eyes:      General: Lids are normal.      Extraocular Movements: Extraocular movements intact.      Conjunctiva/sclera: Conjunctivae normal.   Skin:     General: Skin is warm and dry.      Findings: No bruising or lesion.   Neurological:      " Mental Status: She is alert.   Psychiatric:         Attention and Perception: Attention normal.         Mood and Affect: Mood normal.         Speech: Speech normal.         Behavior: Behavior is cooperative.            Result Review :   The following data was reviewed by: Taylor Carrasquillo PA-C on 03/21/2022:    Data reviewed: pathology            Assessment and Plan  Diagnoses and all orders for this visit:    1. Postoperative follow-up (Primary)      Patient Instructions   Follow up prn or 1 year for annual             This note was electronically signed.    Taylor Carrasquillo PA-C   March 21, 2022

## 2022-03-23 ENCOUNTER — TELEPHONE (OUTPATIENT)
Dept: OBSTETRICS AND GYNECOLOGY | Facility: CLINIC | Age: 32
End: 2022-03-23

## 2022-03-23 NOTE — TELEPHONE ENCOUNTER
Patient complains of yellowish vaginal discharge and chills, has been unable to check temp, had surgery on 3/10/22

## 2022-03-24 ENCOUNTER — HOSPITAL ENCOUNTER (EMERGENCY)
Facility: HOSPITAL | Age: 32
Discharge: HOME | End: 2022-03-24
Attending: EMERGENCY MEDICINE | Admitting: EMERGENCY MEDICINE

## 2022-03-24 VITALS
SYSTOLIC BLOOD PRESSURE: 138 MMHG | TEMPERATURE: 98.2 F | HEIGHT: 63 IN | RESPIRATION RATE: 18 BRPM | DIASTOLIC BLOOD PRESSURE: 94 MMHG | OXYGEN SATURATION: 99 % | WEIGHT: 202 LBS | BODY MASS INDEX: 35.79 KG/M2 | HEART RATE: 98 BPM

## 2022-03-24 DIAGNOSIS — J02.9 VIRAL PHARYNGITIS: Primary | ICD-10-CM

## 2022-03-24 LAB
ALBUMIN SERPL-MCNC: 4.2 G/DL (ref 3.5–5.2)
ALBUMIN/GLOB SERPL: 1.4 G/DL
ALP SERPL-CCNC: 91 U/L (ref 39–117)
ALT SERPL W P-5'-P-CCNC: 14 U/L (ref 1–33)
ANION GAP SERPL CALCULATED.3IONS-SCNC: 13.8 MMOL/L (ref 5–15)
AST SERPL-CCNC: 14 U/L (ref 1–32)
BACTERIA UR QL AUTO: ABNORMAL /HPF
BASOPHILS # BLD AUTO: 0.04 10*3/MM3 (ref 0–0.2)
BASOPHILS NFR BLD AUTO: 0.5 % (ref 0–1.5)
BILIRUB SERPL-MCNC: 0.8 MG/DL (ref 0–1.2)
BILIRUB UR QL STRIP: NEGATIVE
BUN SERPL-MCNC: 6 MG/DL (ref 6–20)
BUN/CREAT SERPL: 8.7 (ref 7–25)
CALCIUM SPEC-SCNC: 9.1 MG/DL (ref 8.6–10.5)
CHLORIDE SERPL-SCNC: 100 MMOL/L (ref 98–107)
CLARITY UR: ABNORMAL
CO2 SERPL-SCNC: 22.2 MMOL/L (ref 22–29)
COLOR UR: ABNORMAL
CREAT SERPL-MCNC: 0.69 MG/DL (ref 0.57–1)
DEPRECATED RDW RBC AUTO: 37.9 FL (ref 37–54)
EGFRCR SERPLBLD CKD-EPI 2021: 119.2 ML/MIN/1.73
EOSINOPHIL # BLD AUTO: 0.09 10*3/MM3 (ref 0–0.4)
EOSINOPHIL NFR BLD AUTO: 1 % (ref 0.3–6.2)
ERYTHROCYTE [DISTWIDTH] IN BLOOD BY AUTOMATED COUNT: 11.9 % (ref 12.3–15.4)
FLUAV RNA RESP QL NAA+PROBE: NOT DETECTED
FLUBV RNA RESP QL NAA+PROBE: NOT DETECTED
GLOBULIN UR ELPH-MCNC: 3 GM/DL
GLUCOSE SERPL-MCNC: 90 MG/DL (ref 65–99)
GLUCOSE UR STRIP-MCNC: NEGATIVE MG/DL
HCT VFR BLD AUTO: 39.4 % (ref 34–46.6)
HGB BLD-MCNC: 13.7 G/DL (ref 12–15.9)
HGB UR QL STRIP.AUTO: ABNORMAL
HYALINE CASTS UR QL AUTO: ABNORMAL /LPF
IMM GRANULOCYTES # BLD AUTO: 0.02 10*3/MM3 (ref 0–0.05)
IMM GRANULOCYTES NFR BLD AUTO: 0.2 % (ref 0–0.5)
KETONES UR QL STRIP: NEGATIVE
LEUKOCYTE ESTERASE UR QL STRIP.AUTO: NEGATIVE
LYMPHOCYTES # BLD AUTO: 1.47 10*3/MM3 (ref 0.7–3.1)
LYMPHOCYTES NFR BLD AUTO: 16.6 % (ref 19.6–45.3)
MCH RBC QN AUTO: 30.8 PG (ref 26.6–33)
MCHC RBC AUTO-ENTMCNC: 34.8 G/DL (ref 31.5–35.7)
MCV RBC AUTO: 88.5 FL (ref 79–97)
MONOCYTES # BLD AUTO: 0.56 10*3/MM3 (ref 0.1–0.9)
MONOCYTES NFR BLD AUTO: 6.3 % (ref 5–12)
MUCOUS THREADS URNS QL MICRO: ABNORMAL /HPF
NEUTROPHILS NFR BLD AUTO: 6.66 10*3/MM3 (ref 1.7–7)
NEUTROPHILS NFR BLD AUTO: 75.4 % (ref 42.7–76)
NITRITE UR QL STRIP: NEGATIVE
NRBC BLD AUTO-RTO: 0 /100 WBC (ref 0–0.2)
PH UR STRIP.AUTO: <=5 [PH] (ref 5–8)
PLATELET # BLD AUTO: 265 10*3/MM3 (ref 140–450)
PMV BLD AUTO: 10 FL (ref 6–12)
POTASSIUM SERPL-SCNC: 3.6 MMOL/L (ref 3.5–5.2)
PROT SERPL-MCNC: 7.2 G/DL (ref 6–8.5)
PROT UR QL STRIP: ABNORMAL
RBC # BLD AUTO: 4.45 10*6/MM3 (ref 3.77–5.28)
RBC # UR STRIP: ABNORMAL /HPF
REF LAB TEST METHOD: ABNORMAL
S PYO AG THROAT QL: NEGATIVE
SARS-COV-2 RNA RESP QL NAA+PROBE: NOT DETECTED
SODIUM SERPL-SCNC: 136 MMOL/L (ref 136–145)
SP GR UR STRIP: 1.03 (ref 1–1.03)
SQUAMOUS #/AREA URNS HPF: ABNORMAL /HPF
UROBILINOGEN UR QL STRIP: ABNORMAL
WBC # UR STRIP: ABNORMAL /HPF
WBC NRBC COR # BLD: 8.84 10*3/MM3 (ref 3.4–10.8)

## 2022-03-24 PROCEDURE — 81001 URINALYSIS AUTO W/SCOPE: CPT | Performed by: PHYSICIAN ASSISTANT

## 2022-03-24 PROCEDURE — 87880 STREP A ASSAY W/OPTIC: CPT | Performed by: PHYSICIAN ASSISTANT

## 2022-03-24 PROCEDURE — 85025 COMPLETE CBC W/AUTO DIFF WBC: CPT | Performed by: PHYSICIAN ASSISTANT

## 2022-03-24 PROCEDURE — C9803 HOPD COVID-19 SPEC COLLECT: HCPCS

## 2022-03-24 PROCEDURE — 87636 SARSCOV2 & INF A&B AMP PRB: CPT | Performed by: PHYSICIAN ASSISTANT

## 2022-03-24 PROCEDURE — 36415 COLL VENOUS BLD VENIPUNCTURE: CPT

## 2022-03-24 PROCEDURE — 80053 COMPREHEN METABOLIC PANEL: CPT | Performed by: PHYSICIAN ASSISTANT

## 2022-03-24 PROCEDURE — 87081 CULTURE SCREEN ONLY: CPT | Performed by: PHYSICIAN ASSISTANT

## 2022-03-24 PROCEDURE — 99283 EMERGENCY DEPT VISIT LOW MDM: CPT

## 2022-03-24 NOTE — ED PROVIDER NOTES
Subjective   31-year-old female who presents to the emergency department chief complaint headache, diffuse body aches, sore throat, fatigue, nauseated.  Patient states that started 2 days ago.  Has had subjective low-grade fevers.  Denies any associated cough, congestion.      History provided by:  Patient   used: No    Flu Symptoms  Presenting symptoms: fatigue, headache, myalgias, nausea and sore throat    Presenting symptoms: no cough and no shortness of breath    Severity:  Moderate  Onset quality:  Gradual  Duration:  2 days  Progression:  Worsening  Chronicity:  New  Relieved by:  None tried  Worsened by:  Nothing  Ineffective treatments:  None tried  Associated symptoms: chills and nasal congestion    Risk factors: not elderly, no diabetes problem, no heart disease, no immunocompromised state, no kidney disease, no liver disease, not pregnant and no sick contacts        Review of Systems   Constitutional: Positive for chills and fatigue.   HENT: Positive for congestion and sore throat.    Eyes: Negative.  Negative for redness and itching.   Respiratory: Negative.  Negative for cough, choking, chest tightness and shortness of breath.    Cardiovascular: Negative.  Negative for chest pain, palpitations and leg swelling.   Gastrointestinal: Positive for nausea.   Endocrine: Negative.  Negative for heat intolerance and polyphagia.   Genitourinary: Negative.  Negative for dyspareunia, flank pain, frequency, genital sores and hematuria.   Musculoskeletal: Positive for myalgias.   Neurological: Positive for headaches.   Hematological: Negative.  Negative for adenopathy. Does not bruise/bleed easily.   Psychiatric/Behavioral: Negative.  Negative for behavioral problems, confusion, decreased concentration, dysphoric mood and hallucinations. The patient is not nervous/anxious and is not hyperactive.    All other systems reviewed and are negative.      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    • Bleeding disorder ([facility]) 16   • Body piercing     TONGUE, NOSE, TWO IN EACH EAR   • Bronchitis    • Chest pain 2021    last CP 2-3 months ago   • Clotting disorder ([facility])     factor 5   • Constipation    • COVID-19 vaccine series completed     Pfizer-plus booster   • Deep vein thrombosis ([facility]) 2016   • Diarrhea    • Diverticulitis    • Elevated cholesterol    • Endometriosis    • Factor 5 Leiden mutation, heterozygous ([facility])    • Fibroid    • Fibromyalgia    • Full dentures 2021    advised no adhesives DOS   • Gastroparesis    • GERD (gastroesophageal reflux disease)    • H/O blood clots    • H/O cardiovascular stress test 2021    reported several years ago-exercise wnl   • Headache    • Hiatal hernia    • High cholesterol    • History of recurrent UTIs    • HPV (human papilloma virus) infection    • Hx of echocardiogram    • Hx of gout 2021   • Hypertension    • Hypoglycemia    • Inappropriate sinus node tachycardia    • Kidney infection 2021    history only   • Migraine    • Nausea & vomiting    • Obesity    • Osteoarthritis    • Ovarian cyst    • Pneumonia    • Pollen allergies    • PONV (postoperative nausea and vomiting)    • Protein S deficiency ([facility]) 2016    labs from hospitalization for PE   • Pulmonary embolism ([facility]) 2016    on eliquis since that time   • Recurrent pregnancy loss, antepartum condition or complication    • Sleep apnea     no CPAP- states insurance took it back   • Tattoo     LOWER BACK   • Varicella        Allergies   Allergen Reactions   • Ceftin [Cefuroxime Axetil] Shortness Of Breath and Rash     Numbness in mouth and throat   • Imitrex [Sumatriptan] Shortness Of Breath and Nausea Only   • Amoxicillin Rash       Past Surgical History:   Procedure Laterality Date   •  SECTION         and  and    •  SECTION WITH TUBAL N/A 1/15/2019    Procedure:  SECTION REPEAT WITH TUBAL;  Surgeon: Alva Boyer MD;  Location: [facility] LABOR DELIVERY;  Service: Obstetrics/Gynecology   • COLONOSCOPY N/A 2017    Procedure: COLONOSCOPY WITH BIOPSIES AND ARGON THERMAL ABLATION;  Surgeon: Jignesh Selby MD;  Location: [facility] ENDOSCOPY;  Service:    • D & C HYSTEROSCOPY ENDOMETRIAL ABLATION N/A 3/10/2022    Procedure: DILATATION AND CURETTAGE DIAGNOSTIC HYSTEROSCOPY NOVASURE ENDOMETRIAL ABLATION;  Surgeon: Alva Boyer MD;  Location: [facility] OR;  Service: Obstetrics/Gynecology;  Laterality: N/A;   • DIAGNOSTIC LAPAROSCOPY N/A 2018    Procedure: DIAGNOSTIC LAPAROSCOPY AND ABLATION OF ENDOMETRIOSIS;  Surgeon: Evin Zamudio MD;  Location: [facility] OR;  Service: Obstetrics/Gynecology   • ENDOMETRIAL ABLATION     • ENDOSCOPIC FUNCTIONAL SINUS SURGERY (FESS) Right 2021    Procedure: Right endoscopic total ethmoidectomy, right endoscopic middle meatal antrotomy with cyst removal, right endoscopic frontal recess exploration with cyst removal;  Surgeon: Saulo Brito MD;  Location: [facility] OR;  Service: ENT;  Laterality: Right;   • ENDOSCOPY N/A 2017    Procedure: ESOPHAGOGASTRODUODENOSCOPY WITH BIOPSIES AND COLD BIOPSY POLYPECTOMIES;  Surgeon: Jignesh Selby MD;  Location: [facility] ENDOSCOPY;  Service:    • LAPAROSCOPIC CHOLECYSTECTOMY     • LIPOMA EXCISION N/A 12/10/2021    Procedure: Excision of soft tissue mass lower back;  Surgeon: Nidia Smith MD;  Location: [facility] OR;  Service: General;  Laterality: N/A;   • MOUTH SURGERY      full mouth tooth extration   • WISDOM TOOTH EXTRACTION         Family History   Problem Relation Age of Onset   • Arthritis Mother    • COPD Mother    • Asthma Mother    • Thyroid disease Mother    • Arthritis Father    • Diabetes Father    • Hypertension Father    • Hyperlipidemia Father    • Kidney disease Father    • Heart attack  Father    • Coronary artery disease Father    • Dementia Father    • No Known Problems Son    • Colon cancer Neg Hx    • Liver cancer Neg Hx    • Liver disease Neg Hx    • Stomach cancer Neg Hx    • Esophageal cancer Neg Hx        Social History     Socioeconomic History   • Marital status:    Tobacco Use   • Smoking status: Never Smoker   • Smokeless tobacco: Never Used   Vaping Use   • Vaping Use: Never used   Substance and Sexual Activity   • Alcohol use: No   • Drug use: Never   • Sexual activity: Yes     Partners: Male     Birth control/protection: Surgical     Comment: Tubal           Objective   Physical Exam  Vitals and nursing note reviewed.   Constitutional:       General: She is not in acute distress.     Appearance: She is well-developed and normal weight. She is not ill-appearing or toxic-appearing.   HENT:      Head: Normocephalic and atraumatic.      Right Ear: Tympanic membrane and ear canal normal. No drainage, swelling or tenderness.      Left Ear: Tympanic membrane and ear canal normal. No drainage, swelling or tenderness.  No middle ear effusion. Tympanic membrane is not erythematous.      Nose: No congestion.      Mouth/Throat:      Mouth: Mucous membranes are moist.      Pharynx: No pharyngeal swelling, oropharyngeal exudate, posterior oropharyngeal erythema or uvula swelling.      Tonsils: Tonsillar exudate present. 1+ on the right. 1+ on the left.   Eyes:      Extraocular Movements:      Right eye: Normal extraocular motion.      Left eye: Normal extraocular motion.      Conjunctiva/sclera: Conjunctivae normal.      Pupils: Pupils are equal, round, and reactive to light.   Neck:      Thyroid: No thyromegaly.   Cardiovascular:      Rate and Rhythm: Normal rate and regular rhythm.      Heart sounds: Normal heart sounds. No murmur heard.    No friction rub. No gallop.   Abdominal:      General: There is no distension.      Palpations: Abdomen is soft. There is no mass.      Tenderness:  There is no abdominal tenderness. There is no guarding or rebound.   Musculoskeletal:      Cervical back: Normal range of motion and neck supple.   Skin:     General: Skin is warm.      Capillary Refill: Capillary refill takes less than 2 seconds.      Coloration: Skin is not pale.      Findings: No rash.   Neurological:      General: No focal deficit present.      Mental Status: She is alert and oriented to person, place, and time.   Psychiatric:         Mood and Affect: Mood normal.         Behavior: Behavior normal.         Procedures           ED Course                                                 MDM    Final diagnoses:   Viral pharyngitis       ED Disposition  ED Disposition     ED Disposition   Discharge    Condition   Stable    Comment   --             Deidre Barker MD  [address]  [facility][note 3] [address] [phone]    Call in 1 day           Medication List      Changed    calcium carbonate  MG chewable tablet  Commonly known as: TUMS EX  Chew 1 tablet Daily.  What changed:   · when to take this  · reasons to take this             Kwasi Waller PA-C  03/24/22 [id number]

## 2022-03-26 LAB — BACTERIA SPEC AEROBE CULT: NORMAL

## 2022-03-28 NOTE — TELEPHONE ENCOUNTER
Need to see if the discharge has an odor.  This can be common after an endometrial ablation as patient is stopping out the lining of her uterus.  Patient needs to check her temperature.  If no fever or discharge with odor then patient needs to follow-up if continued symptoms over the next 2 weeks.

## 2022-03-29 ENCOUNTER — APPOINTMENT (OUTPATIENT)
Dept: CT IMAGING | Facility: HOSPITAL | Age: 32
End: 2022-03-29

## 2022-03-29 ENCOUNTER — HOSPITAL ENCOUNTER (EMERGENCY)
Facility: HOSPITAL | Age: 32
Discharge: HOME | End: 2022-03-29
Attending: EMERGENCY MEDICINE | Admitting: EMERGENCY MEDICINE

## 2022-03-29 VITALS
TEMPERATURE: 98.8 F | HEART RATE: 95 BPM | WEIGHT: 199.2 LBS | HEIGHT: 63 IN | OXYGEN SATURATION: 97 % | SYSTOLIC BLOOD PRESSURE: 136 MMHG | RESPIRATION RATE: 18 BRPM | BODY MASS INDEX: 35.3 KG/M2 | DIASTOLIC BLOOD PRESSURE: 82 MMHG

## 2022-03-29 DIAGNOSIS — K52.9 COLITIS: Primary | ICD-10-CM

## 2022-03-29 DIAGNOSIS — N83.209 CYST OF OVARY, UNSPECIFIED LATERALITY: ICD-10-CM

## 2022-03-29 LAB
ALBUMIN SERPL-MCNC: 4.3 G/DL (ref 3.5–5.2)
ALBUMIN/GLOB SERPL: 1.4 G/DL
ALP SERPL-CCNC: 110 U/L (ref 39–117)
ALT SERPL W P-5'-P-CCNC: 14 U/L (ref 1–33)
ANION GAP SERPL CALCULATED.3IONS-SCNC: 10.4 MMOL/L (ref 5–15)
AST SERPL-CCNC: 14 U/L (ref 1–32)
BACTERIA UR QL AUTO: ABNORMAL /HPF
BASOPHILS # BLD AUTO: 0.04 10*3/MM3 (ref 0–0.2)
BASOPHILS NFR BLD AUTO: 0.5 % (ref 0–1.5)
BILIRUB SERPL-MCNC: 0.6 MG/DL (ref 0–1.2)
BILIRUB UR QL STRIP: NEGATIVE
BUN SERPL-MCNC: 6 MG/DL (ref 6–20)
BUN/CREAT SERPL: 9.1 (ref 7–25)
CALCIUM SPEC-SCNC: 9.3 MG/DL (ref 8.6–10.5)
CHLORIDE SERPL-SCNC: 102 MMOL/L (ref 98–107)
CLARITY UR: CLEAR
CO2 SERPL-SCNC: 24.6 MMOL/L (ref 22–29)
COLOR UR: YELLOW
CREAT SERPL-MCNC: 0.66 MG/DL (ref 0.57–1)
D-LACTATE SERPL-SCNC: 1.2 MMOL/L (ref 0.5–2)
DEPRECATED RDW RBC AUTO: 37.7 FL (ref 37–54)
EGFRCR SERPLBLD CKD-EPI 2021: 120.4 ML/MIN/1.73
EOSINOPHIL # BLD AUTO: 0.09 10*3/MM3 (ref 0–0.4)
EOSINOPHIL NFR BLD AUTO: 1 % (ref 0.3–6.2)
ERYTHROCYTE [DISTWIDTH] IN BLOOD BY AUTOMATED COUNT: 11.8 % (ref 12.3–15.4)
GLOBULIN UR ELPH-MCNC: 3 GM/DL
GLUCOSE SERPL-MCNC: 103 MG/DL (ref 65–99)
GLUCOSE UR STRIP-MCNC: NEGATIVE MG/DL
HCT VFR BLD AUTO: 38.5 % (ref 34–46.6)
HGB BLD-MCNC: 13.5 G/DL (ref 12–15.9)
HGB UR QL STRIP.AUTO: ABNORMAL
HYALINE CASTS UR QL AUTO: ABNORMAL /LPF
IMM GRANULOCYTES # BLD AUTO: 0.02 10*3/MM3 (ref 0–0.05)
IMM GRANULOCYTES NFR BLD AUTO: 0.2 % (ref 0–0.5)
KETONES UR QL STRIP: NEGATIVE
LEUKOCYTE ESTERASE UR QL STRIP.AUTO: ABNORMAL
LYMPHOCYTES # BLD AUTO: 1.28 10*3/MM3 (ref 0.7–3.1)
LYMPHOCYTES NFR BLD AUTO: 14.9 % (ref 19.6–45.3)
MCH RBC QN AUTO: 31 PG (ref 26.6–33)
MCHC RBC AUTO-ENTMCNC: 35.1 G/DL (ref 31.5–35.7)
MCV RBC AUTO: 88.5 FL (ref 79–97)
MONOCYTES # BLD AUTO: 0.51 10*3/MM3 (ref 0.1–0.9)
MONOCYTES NFR BLD AUTO: 5.9 % (ref 5–12)
NEUTROPHILS NFR BLD AUTO: 6.65 10*3/MM3 (ref 1.7–7)
NEUTROPHILS NFR BLD AUTO: 77.5 % (ref 42.7–76)
NITRITE UR QL STRIP: NEGATIVE
NRBC BLD AUTO-RTO: 0 /100 WBC (ref 0–0.2)
PH UR STRIP.AUTO: 6 [PH] (ref 5–8)
PLATELET # BLD AUTO: 327 10*3/MM3 (ref 140–450)
PMV BLD AUTO: 9.8 FL (ref 6–12)
POTASSIUM SERPL-SCNC: 3.7 MMOL/L (ref 3.5–5.2)
PROT SERPL-MCNC: 7.3 G/DL (ref 6–8.5)
PROT UR QL STRIP: NEGATIVE
RBC # BLD AUTO: 4.35 10*6/MM3 (ref 3.77–5.28)
RBC # UR STRIP: ABNORMAL /HPF
REF LAB TEST METHOD: ABNORMAL
SODIUM SERPL-SCNC: 137 MMOL/L (ref 136–145)
SP GR UR STRIP: 1.02 (ref 1–1.03)
SQUAMOUS #/AREA URNS HPF: ABNORMAL /HPF
UROBILINOGEN UR QL STRIP: ABNORMAL
WBC # UR STRIP: ABNORMAL /HPF
WBC NRBC COR # BLD: 8.59 10*3/MM3 (ref 3.4–10.8)

## 2022-03-29 PROCEDURE — 99283 EMERGENCY DEPT VISIT LOW MDM: CPT

## 2022-03-29 PROCEDURE — 74177 CT ABD & PELVIS W/CONTRAST: CPT

## 2022-03-29 PROCEDURE — 25010000002 IOPAMIDOL 61 % SOLUTION: Performed by: EMERGENCY MEDICINE

## 2022-03-29 PROCEDURE — 25010000002 KETOROLAC TROMETHAMINE PER 15 MG: Performed by: NURSE PRACTITIONER

## 2022-03-29 PROCEDURE — 85025 COMPLETE CBC W/AUTO DIFF WBC: CPT | Performed by: NURSE PRACTITIONER

## 2022-03-29 PROCEDURE — 83605 ASSAY OF LACTIC ACID: CPT | Performed by: NURSE PRACTITIONER

## 2022-03-29 PROCEDURE — 36415 COLL VENOUS BLD VENIPUNCTURE: CPT

## 2022-03-29 PROCEDURE — 80053 COMPREHEN METABOLIC PANEL: CPT | Performed by: NURSE PRACTITIONER

## 2022-03-29 PROCEDURE — 96374 THER/PROPH/DIAG INJ IV PUSH: CPT

## 2022-03-29 PROCEDURE — 81001 URINALYSIS AUTO W/SCOPE: CPT | Performed by: NURSE PRACTITIONER

## 2022-03-29 RX ORDER — CIPROFLOXACIN 500 MG/1
500 TABLET, FILM COATED ORAL 2 TIMES DAILY
Qty: 20 TABLET | Refills: 0 | Status: SHIPPED | OUTPATIENT
Start: 2022-03-29 | End: 2022-04-08

## 2022-03-29 RX ORDER — METRONIDAZOLE 500 MG/1
500 TABLET ORAL 3 TIMES DAILY
Qty: 21 TABLET | Refills: 0 | Status: SHIPPED | OUTPATIENT
Start: 2022-03-29 | End: 2022-04-05

## 2022-03-29 RX ORDER — KETOROLAC TROMETHAMINE 30 MG/ML
30 INJECTION, SOLUTION INTRAMUSCULAR; INTRAVENOUS EVERY 6 HOURS PRN
Status: DISCONTINUED | OUTPATIENT
Start: 2022-03-29 | End: 2022-03-29 | Stop reason: HOSPADM

## 2022-03-29 RX ADMIN — IOPAMIDOL 100 ML: 612 INJECTION, SOLUTION INTRAVENOUS at 16:18

## 2022-03-29 RX ADMIN — KETOROLAC TROMETHAMINE 30 MG: 30 INJECTION, SOLUTION INTRAMUSCULAR at 16:45

## 2022-03-30 ENCOUNTER — TELEPHONE (OUTPATIENT)
Dept: OBSTETRICS AND GYNECOLOGY | Facility: CLINIC | Age: 32
End: 2022-03-30

## 2022-03-30 RX ORDER — FLUCONAZOLE 150 MG/1
TABLET ORAL
Qty: 2 TABLET | Refills: 0 | Status: SHIPPED | OUTPATIENT
Start: 2022-03-30 | End: 2022-05-05

## 2022-03-30 NOTE — TELEPHONE ENCOUNTER
Patient is post op and has been to ER, she advised she was put on two different antibiotics and is requesting Diflucan for yeast infection. She does have appointment scheduled for follow up.

## 2022-04-05 ENCOUNTER — OFFICE VISIT (OUTPATIENT)
Dept: OBSTETRICS AND GYNECOLOGY | Facility: CLINIC | Age: 32
End: 2022-04-05

## 2022-04-05 VITALS
WEIGHT: 202 LBS | BODY MASS INDEX: 35.79 KG/M2 | OXYGEN SATURATION: 99 % | HEIGHT: 63 IN | SYSTOLIC BLOOD PRESSURE: 142 MMHG | DIASTOLIC BLOOD PRESSURE: 78 MMHG | HEART RATE: 148 BPM

## 2022-04-05 DIAGNOSIS — N89.8 VAGINAL DISCHARGE: Primary | ICD-10-CM

## 2022-04-05 DIAGNOSIS — R10.2 PELVIC PAIN: ICD-10-CM

## 2022-04-05 DIAGNOSIS — R00.2 PALPITATIONS: ICD-10-CM

## 2022-04-05 DIAGNOSIS — N83.201 CYST OF RIGHT OVARY: ICD-10-CM

## 2022-04-05 PROCEDURE — 99214 OFFICE O/P EST MOD 30 MIN: CPT | Performed by: OBSTETRICS & GYNECOLOGY

## 2022-04-06 ENCOUNTER — OFFICE VISIT (OUTPATIENT)
Dept: ORTHOPEDIC SURGERY | Facility: CLINIC | Age: 32
End: 2022-04-06

## 2022-04-06 VITALS — TEMPERATURE: 97.8 F | WEIGHT: 199.6 LBS | BODY MASS INDEX: 35.37 KG/M2 | HEIGHT: 63 IN

## 2022-04-06 DIAGNOSIS — M25.512 ARTHRALGIA OF LEFT SHOULDER REGION: Primary | ICD-10-CM

## 2022-04-06 DIAGNOSIS — M62.838 TRAPEZIUS MUSCLE SPASM: ICD-10-CM

## 2022-04-06 DIAGNOSIS — M75.52 BURSITIS OF LEFT SHOULDER: ICD-10-CM

## 2022-04-06 DIAGNOSIS — M54.12 CERVICAL RADICULOPATHY: ICD-10-CM

## 2022-04-06 PROCEDURE — 20610 DRAIN/INJ JOINT/BURSA W/O US: CPT | Performed by: PHYSICIAN ASSISTANT

## 2022-04-06 PROCEDURE — 99213 OFFICE O/P EST LOW 20 MIN: CPT | Performed by: PHYSICIAN ASSISTANT

## 2022-04-06 RX ORDER — LIDOCAINE HYDROCHLORIDE 10 MG/ML
2 INJECTION, SOLUTION INFILTRATION; PERINEURAL
Status: COMPLETED | OUTPATIENT
Start: 2022-04-06 | End: 2022-04-06

## 2022-04-06 RX ORDER — METHYLPREDNISOLONE ACETATE 40 MG/ML
40 INJECTION, SUSPENSION INTRA-ARTICULAR; INTRALESIONAL; INTRAMUSCULAR; SOFT TISSUE
Status: COMPLETED | OUTPATIENT
Start: 2022-04-06 | End: 2022-04-06

## 2022-04-06 RX ORDER — METHOCARBAMOL 750 MG/1
750 TABLET, FILM COATED ORAL NIGHTLY PRN
Qty: 14 TABLET | Refills: 0 | Status: SHIPPED | OUTPATIENT
Start: 2022-04-06

## 2022-04-06 RX ADMIN — LIDOCAINE HYDROCHLORIDE 2 ML: 10 INJECTION, SOLUTION INFILTRATION; PERINEURAL at 10:21

## 2022-04-06 RX ADMIN — METHYLPREDNISOLONE ACETATE 40 MG: 40 INJECTION, SUSPENSION INTRA-ARTICULAR; INTRALESIONAL; INTRAMUSCULAR; SOFT TISSUE at 10:21

## 2022-04-06 NOTE — PROGRESS NOTES
"Subjective   Patient ID: Marilu Godoy is a 31 y.o. right hand dominant female  Pain of the Left Shoulder (Shoulder pain past several months. She saw Dr. Duenas in Dec 2021. Injection was given, helped for about a day or two, then pain started back. Went to  3-4 weeks ago, was told muscle felt tight and was advised to come back to Ortho)         History of Present Illness  Patient presents with complaints of left posterior shoulder pain that has been ongoing for several months.  She denies any specific injury or trauma.  Patient was seen and evaluated by Dr. Duenas in our orthopedic clinic December 2021.  A MRI of the left shoulder revealed bursitis.  She did receive a Kenalog subacromial bursa injection which provided 2 to 3 days of relief.  She recently visited the [facility] urgent care 4 weeks prior was told she had \"tightness to the left shoulder muscles\" and advised to follow-up with orthopedics.  Of note-patient did have a MRI of the cervical spine and was evaluated by Dr. Byrne.  Per the patient she was told from the spine subspecialist \"nothing appeared to be coming from the neck\"    Patient still experiencing radiating pain into the left forearm with numbness and tingling.  She has tried Flexeril w/o improvement in symptoms.  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    • Bleeding disorder ([facility]) 11-13-16   • Body piercing     TONGUE, NOSE, TWO IN EACH EAR   • Bronchitis    • Chest pain 06/22/2021    last CP 2-3 months ago   • Clotting disorder ([facility])     factor 5   • Constipation    • COVID-19 vaccine series completed     Pfizer-plus booster   • Deep vein thrombosis (HCC) 11/13/2016   • Diarrhea    • Diverticulitis    • Elevated cholesterol    • " Endometriosis    • Factor 5 Leiden mutation, heterozygous (HCC)    • Fibroid    • Fibromyalgia    • Full dentures 2021    advised no adhesives DOS   • Gastroparesis    • GERD (gastroesophageal reflux disease)    • H/O blood clots    • H/O cardiovascular stress test 2021    reported several years ago-exercise wnl   • Headache    • Hiatal hernia    • High cholesterol    • History of recurrent UTIs    • HPV (human papilloma virus) infection    • Hx of echocardiogram    • Hx of gout 2021   • Hypertension    • Hypoglycemia    • Inappropriate sinus node tachycardia    • Kidney infection 2021    history only   • Migraine    • Nausea & vomiting    • Obesity    • Osteoarthritis    • Ovarian cyst    • Pneumonia    • Pollen allergies    • PONV (postoperative nausea and vomiting)    • Protein S deficiency ([facility]) 2016    labs from hospitalization for PE   • Pulmonary embolism ([facility]) 2016    on eliquis since that time   • Recurrent pregnancy loss, antepartum condition or complication    • Sleep apnea     no CPAP- states insurance took it back   • Tattoo     LOWER BACK   • Varicella         Past Surgical History:   Procedure Laterality Date   •  SECTION        and  and    •  SECTION WITH TUBAL N/A 1/15/2019    Procedure:  SECTION REPEAT WITH TUBAL;  Surgeon: Alva Boyer MD;  Location: [facility] LABOR DELIVERY;  Service: Obstetrics/Gynecology   • COLONOSCOPY N/A 2017    Procedure: COLONOSCOPY WITH BIOPSIES AND ARGON THERMAL ABLATION;  Surgeon: Jignesh Selby MD;  Location: [facility] ENDOSCOPY;  Service:    • D & C HYSTEROSCOPY ENDOMETRIAL ABLATION N/A 3/10/2022    Procedure: DILATATION AND CURETTAGE DIAGNOSTIC HYSTEROSCOPY NOVASURE ENDOMETRIAL ABLATION;  Surgeon: Alva Boyer MD;  Location: [facility] OR;  Service: Obstetrics/Gynecology;  Laterality: N/A;   • DIAGNOSTIC LAPAROSCOPY N/A 2018    Procedure: DIAGNOSTIC LAPAROSCOPY AND ABLATION OF ENDOMETRIOSIS;   Surgeon: Evin Zamudio MD;  Location: [facility] OR;  Service: Obstetrics/Gynecology   • ENDOMETRIAL ABLATION     • ENDOSCOPIC FUNCTIONAL SINUS SURGERY (FESS) Right 6/25/2021    Procedure: Right endoscopic total ethmoidectomy, right endoscopic middle meatal antrotomy with cyst removal, right endoscopic frontal recess exploration with cyst removal;  Surgeon: Saulo Brito MD;  Location: [facility] OR;  Service: ENT;  Laterality: Right;   • ENDOSCOPY N/A 4/20/2017    Procedure: ESOPHAGOGASTRODUODENOSCOPY WITH BIOPSIES AND COLD BIOPSY POLYPECTOMIES;  Surgeon: Jignesh Selby MD;  Location: [facility] ENDOSCOPY;  Service:    • LAPAROSCOPIC CHOLECYSTECTOMY     • LIPOMA EXCISION N/A 12/10/2021    Procedure: Excision of soft tissue mass lower back;  Surgeon: Nidia Smith MD;  Location: [facility] OR;  Service: General;  Laterality: N/A;   • MOUTH SURGERY      full mouth tooth extration   • WISDOM TOOTH EXTRACTION         Family History   Problem Relation Age of Onset   • Arthritis Mother    • COPD Mother    • Asthma Mother    • Thyroid disease Mother    • Arthritis Father    • Diabetes Father    • Hypertension Father    • Hyperlipidemia Father    • Kidney disease Father    • Heart attack Father    • Coronary artery disease Father    • Dementia Father    • No Known Problems Son    • Colon cancer Neg Hx    • Liver cancer Neg Hx    • Liver disease Neg Hx    • Stomach cancer Neg Hx    • Esophageal cancer Neg Hx        Social History     Socioeconomic History   • Marital status:    Tobacco Use   • Smoking status: Never Smoker   • Smokeless tobacco: Never Used   Vaping Use   • Vaping Use: Never used   Substance and Sexual Activity   • Alcohol use: No   • Drug use: Never   • Sexual activity: Yes     Partners: Male     Birth control/protection: Surgical     Comment: Tubal         Current Outpatient Medications:   •  albuterol sulfate  (90 Base) MCG/ACT inhaler, Inhale 2 puffs Every 4 (Four) Hours As Needed for  Wheezing or Shortness of Air., Disp: 18 g, Rfl: 11  •  Alcohol Swabs (Alcohol Pads) 70 % pads, Check sugars as needed/directed for hypoglycemia., Disp: 100 each, Rfl: 12  •  apixaban (ELIQUIS) 5 MG tablet tablet, Take 1 tablet by mouth 2 (Two) Times a Day., Disp: 180 tablet, Rfl: 3  •  aspirin 81 MG EC tablet, Take 81 mg by mouth Daily., Disp: , Rfl:   •  Atogepant (Qulipta) 60 MG tablet, Take 1 tablet by mouth Daily., Disp: 30 tablet, Rfl: 11  •  atorvastatin (LIPITOR) 40 MG tablet, Take 1 tablet by mouth Every Night., Disp: 30 tablet, Rfl: 11  •  azelastine (ASTELIN) 0.1 % nasal spray, 1 spray into the nostril(s) as directed by provider 2 (Two) Times a Day. Use in each nostril as directed, Disp: 1 each, Rfl: 10  •  B-D ULTRA-FINE 33 LANCETS misc, Check sugars as needed/directed for hypoglycemia., Disp: 100 each, Rfl: 12  •  Blood Glucose Monitoring Suppl (ONE TOUCH ULTRA 2) w/Device kit, use as needed to check blood sugar for hypoglycemia (diabetes), Disp: , Rfl:   •  busPIRone (BUSPAR) 5 MG tablet, TAKE 1 TABLET BY MOUTH THREE TIMES A DAY, Disp: 90 tablet, Rfl: 0  •  calcium carbonate EX (TUMS EX) 750 MG chewable tablet, Chew 1 tablet Daily. (Patient taking differently: Chew 750 mg Daily As Needed.), Disp: 30 tablet, Rfl: 1  •  Cholecalciferol (Vitamin D3) 50 MCG (2000 UT) capsule, Take 1 capsule by mouth Daily. Start after completing 50,000 IU weekly dose.  Indications: Vitamin D Deficiency (Patient taking differently: Take 2,000 Units by mouth Daily. Start after completing 50,000 IU weekly dose.), Disp: 90 capsule, Rfl: 3  •  ciprofloxacin (CIPRO) 500 MG tablet, Take 1 tablet by mouth 2 (Two) Times a Day for 10 days., Disp: 20 tablet, Rfl: 0  •  Corlanor 5 MG tablet tablet, Take 1.5 tablets by mouth 2 (two) times a day., Disp: 90 tablet, Rfl: 6  •  Diclofenac Sodium (VOLTAREN) 1 % gel gel, Apply 4 g topically to the appropriate area as directed 4 (Four) Times a Day As Needed (joint pain)., Disp: 150 g, Rfl:  1  •  DULoxetine (CYMBALTA) 60 MG capsule, Take 1 capsule by mouth Daily., Disp: 90 capsule, Rfl: 3  •  fluconazole (Diflucan) 150 MG tablet, One po now and repeat in 3 days, Disp: 2 tablet, Rfl: 0  •  fluticasone (FLONASE) 50 MCG/ACT nasal spray, INSTILL 2 SPRAYS INTO EACH NOSTRIL EVERY DAY AS DIRECTED, Disp: 16 g, Rfl: 11  •  glucose blood test strip, USE TO CHECK SUGARS DAILY DUE TO hypoglycemia, Disp: 100 each, Rfl: 12  •  glucose monitor monitoring kit, 1 each As Needed (diabetes). Check sugars as needed/directed for hypoglycemia., Disp: 1 each, Rfl: 0  •  granisetron (KYTRIL) 1 MG tablet, Take 1 mg by mouth Every 12 (Twelve) Hours As Needed for Nausea or Vomiting. PA APPROVED., Disp: , Rfl:   •  ibuprofen (ADVIL,MOTRIN) 800 MG tablet, Take 1 tablet by mouth Every 8 (Eight) Hours As Needed for Mild Pain ., Disp: 30 tablet, Rfl: 0  •  lidocaine (LIDODERM) 5 %, Place 2 patches on the skin as directed by provider Daily. Remove & Discard patch within 12 hours or as directed by MD, Disp: 60 patch, Rfl: 3  •  mometasone-formoterol (Dulera) 200-5 MCG/ACT inhaler, Inhale 2 puffs 2 (Two) Times a Day., Disp: 13 g, Rfl: 11  •  ondansetron (ZOFRAN) 8 MG tablet, Take 1 tablet by mouth Every 8 (Eight) Hours As Needed for Nausea or Vomiting., Disp: 10 tablet, Rfl: 0  •  pantoprazole (PROTONIX) 40 MG EC tablet, Take 1 tablet by mouth Daily., Disp: 30 tablet, Rfl: 0  •  Rimegepant Sulfate (Nurtec) 75 MG tablet dispersible tablet, Take 1 tablet by mouth As Needed (as needed)., Disp: 4 tablet, Rfl: 0  •  zonisamide (ZONEGRAN) 100 MG capsule, Take 1 capsule by mouth Every Evening., Disp: 90 capsule, Rfl: 1  •  methocarbamol (ROBAXIN) 750 MG tablet, Take 1 tablet by mouth At Night As Needed for Muscle Spasms., Disp: 14 tablet, Rfl: 0    Allergies   Allergen Reactions   • Ceftin [Cefuroxime Axetil] Shortness Of Breath and Rash     Numbness in mouth and throat   • Imitrex [Sumatriptan] Shortness Of Breath and Nausea Only   •  "Amoxicillin Rash       Review of Systems   Constitutional: Negative for fever.   HENT: Negative for dental problem and voice change.    Eyes: Negative for visual disturbance.   Respiratory: Negative for shortness of breath.    Cardiovascular: Negative for chest pain.   Gastrointestinal: Negative for abdominal pain.   Genitourinary: Negative for dysuria.   Musculoskeletal: Positive for arthralgias. Negative for gait problem and joint swelling.   Skin: Negative for rash.   Neurological: Negative for speech difficulty.   Hematological: Does not bruise/bleed easily.   Psychiatric/Behavioral: Negative for confusion.        I have reviewed the medical and surgical history, family history, social history, medications, and/or allergies, and the review of systems of this report.    Objective   Temp 97.8 °F (36.6 °C)   Ht 160 cm (63\")   Wt 90.5 kg (199 lb 9.6 oz)   BMI 35.36 kg/m²    Physical Exam  Vitals and nursing note reviewed.   Constitutional:       Appearance: Normal appearance.   Pulmonary:      Effort: Pulmonary effort is normal.   Neurological:      Mental Status: She is alert and oriented to person, place, and time.       Left Shoulder Exam     Tenderness   The patient is experiencing tenderness in the acromion.    Range of Motion   Active abduction: 130   Forward flexion: 130   Internal rotation 0 degrees: Lumbar   Internal rotation 90 degrees: 70     Tests   Impingement: positive  Drop arm: negative    Other   Erythema: absent  Sensation: normal  Pulse: present            Extremity DVT signs are negative by clinical screen.   Neurologic Exam     Mental Status   Oriented to person, place, and time.        Negative Spurling's test, negative winged scapula left upper extremity.  Negative shoulder shrug  Positive empty can test left upper extremity        Assessment/Plan   Independent Review of Radiographic Studies:    No new imaging done today.      Large Joint Arthrocentesis: L subacromial bursa  Date/Time: " 4/6/2022 10:21 AM  Consent given by: patient  Site marked: site marked  Timeout: Immediately prior to procedure a time out was called to verify the correct patient, procedure, equipment, support staff and site/side marked as required   Supporting Documentation  Indications: pain   Procedure Details  Location: shoulder - L subacromial bursa  Preparation: Patient was prepped and draped in the usual sterile fashion  Needle size: 22 G  Approach: posterior  Medications administered: 40 mg methylPREDNISolone acetate 40 MG/ML; 2 mL lidocaine 1 %  Patient tolerance: patient tolerated the procedure well with no immediate complications             Diagnoses and all orders for this visit:    1. Arthralgia of left shoulder region (Primary)  -     Ambulatory Referral to Physical Therapy Evaluate and treat, Ortho; Electrotherapy, Heat; Tens (Home); ROM, Strengthening  -     EMG & Nerve Conduction Test  -     methocarbamol (ROBAXIN) 750 MG tablet; Take 1 tablet by mouth At Night As Needed for Muscle Spasms.  Dispense: 14 tablet; Refill: 0    2. Bursitis of left shoulder  -     Ambulatory Referral to Physical Therapy Evaluate and treat, Ortho; Electrotherapy, Heat; Tens (Home); ROM, Strengthening  -     EMG & Nerve Conduction Test  -     methocarbamol (ROBAXIN) 750 MG tablet; Take 1 tablet by mouth At Night As Needed for Muscle Spasms.  Dispense: 14 tablet; Refill: 0    3. Cervical radiculopathy  -     Ambulatory Referral to Physical Therapy Evaluate and treat, Ortho; Electrotherapy, Heat; Tens (Home); ROM, Strengthening  -     methocarbamol (ROBAXIN) 750 MG tablet; Take 1 tablet by mouth At Night As Needed for Muscle Spasms.  Dispense: 14 tablet; Refill: 0    4. Trapezius muscle spasm  -     methocarbamol (ROBAXIN) 750 MG tablet; Take 1 tablet by mouth At Night As Needed for Muscle Spasms.  Dispense: 14 tablet; Refill: 0    Other orders  -     Large Joint Arthrocentesis       Discussion of orthopedic goals  Risk, benefits, and  merits of treatment alternatives reviewed with the patient and questions answered  Call or notify for any adverse effect from injection therapy  Reduced physical activity as appropriate  Weight bearing parameters reviewed  Ice, heat, and/or modalities as beneficial    Recommendations/Plan:  Exercise, medications, injections, other patient advice, and return appointment as noted.  Patient is encouraged to call or return for any issues or concerns.    Follow up in 3 months or prn  Will change Flexeril to robaxin  Use warm heat to right shoulder and enroll in PT    Patient agreeable to call or return sooner for any concerns.               EMR Dragon-transcription disclaimer:  This encounter note is an electronic transcription of spoken language to printed text.  Electronic transcription of spoken language may permit erroneous or at times nonsensical words or phrases to be inadvertently transcribed.  Although I have reviewed the note for such errors, some may still exist

## 2022-04-07 LAB
A VAGINAE DNA VAG QL NAA+PROBE: NORMAL SCORE
BVAB2 DNA VAG QL NAA+PROBE: NORMAL SCORE
C ALBICANS DNA VAG QL NAA+PROBE: NEGATIVE
C GLABRATA DNA VAG QL NAA+PROBE: NEGATIVE
C TRACH DNA VAG QL NAA+PROBE: NEGATIVE
MEGA1 DNA VAG QL NAA+PROBE: NORMAL SCORE
N GONORRHOEA DNA VAG QL NAA+PROBE: NEGATIVE
T VAGINALIS DNA VAG QL NAA+PROBE: NEGATIVE

## 2022-04-08 ENCOUNTER — OFFICE VISIT (OUTPATIENT)
Dept: INTERNAL MEDICINE | Facility: CLINIC | Age: 32
End: 2022-04-08

## 2022-04-08 VITALS
WEIGHT: 196.8 LBS | TEMPERATURE: 97.5 F | DIASTOLIC BLOOD PRESSURE: 82 MMHG | OXYGEN SATURATION: 97 % | BODY MASS INDEX: 34.87 KG/M2 | HEART RATE: 123 BPM | SYSTOLIC BLOOD PRESSURE: 130 MMHG | RESPIRATION RATE: 16 BRPM | HEIGHT: 63 IN

## 2022-04-08 DIAGNOSIS — K31.7 FUNDIC GLAND POLYPOSIS OF STOMACH: ICD-10-CM

## 2022-04-08 DIAGNOSIS — N83.8 OVARIAN MASS: ICD-10-CM

## 2022-04-08 DIAGNOSIS — R19.7 DIARRHEA, UNSPECIFIED TYPE: Primary | ICD-10-CM

## 2022-04-08 DIAGNOSIS — R00.0 INAPPROPRIATE SINUS TACHYCARDIA: ICD-10-CM

## 2022-04-08 DIAGNOSIS — R53.83 FATIGUE, UNSPECIFIED TYPE: ICD-10-CM

## 2022-04-08 DIAGNOSIS — M24.9 HYPERMOBILITY OF JOINT: ICD-10-CM

## 2022-04-08 DIAGNOSIS — K52.9 COLITIS: ICD-10-CM

## 2022-04-08 DIAGNOSIS — R79.9 ABNORMAL BLOOD CHEMISTRY: ICD-10-CM

## 2022-04-08 DIAGNOSIS — R29.818 DISABILITY DUE TO NEUROLOGICAL DISORDER: ICD-10-CM

## 2022-04-08 DIAGNOSIS — K31.3 PYLOROSPASM: ICD-10-CM

## 2022-04-08 DIAGNOSIS — G90.3 NEUROGENIC ORTHOSTATIC HYPOTENSION: ICD-10-CM

## 2022-04-08 PROBLEM — E66.01 SEVERE OBESITY (BMI 35.0-39.9) WITH COMORBIDITY (HCC): Status: RESOLVED | Noted: 2021-02-08 | Resolved: 2022-04-08

## 2022-04-08 PROBLEM — K31.84 GASTROPARESIS: Status: ACTIVE | Noted: 2021-11-02

## 2022-04-08 PROBLEM — E44.1 MILD PROTEIN-CALORIE MALNUTRITION (HCC): Status: ACTIVE | Noted: 2021-11-02

## 2022-04-08 PROCEDURE — 99417 PROLNG OP E/M EACH 15 MIN: CPT | Performed by: FAMILY MEDICINE

## 2022-04-08 PROCEDURE — 99215 OFFICE O/P EST HI 40 MIN: CPT | Performed by: FAMILY MEDICINE

## 2022-04-08 NOTE — PROGRESS NOTES
Chief Complaint  Pelvic Pain (Patient is 4 week post op ablation, complains of yellowish vaginal discharge and pelvic pain. Was seen in ER on 3/29/2022. )     History of Present Illness:  Patient is 31 y.o.  who presents to [facility] for follow-up evaluation of her pelvic pain as well as vaginal discharge.  Patient had a previous D&C with NovaSure ablation 4 weeks ago.  Patient reports having scant bleeding after her procedure.  Patient then had a pink-orange discharge.  Patient was seen in the emergency room on .  Patient was seen secondary to severe pelvic pain.  Patient had a CT scan with IV contrast only.  Patient was given Cipro and Flagyl.  Patient has completed her antibiotics.  Patient also reports over the last several weeks having elevations in her heart rate.  Patient reports at times her heart rate has been into the 140s to 150s.  Patient has been evaluated by cardiology.  Patient has been referred to a neurologist at .  Patient reports she was told she may have autonomic dysfunction.  Patient reports now having a yellow vaginal discharge.  She denies any fever at present.  Patient is scheduled for an EGD as well as colonoscopy.  The patient does have a follow-up ointment this week with her primary care provider as well.    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    • Bleeding disorder ([facility]) 16   • Body piercing     TONGUE, NOSE, TWO IN EACH EAR   • Bronchitis    • Chest pain 2021    last CP 2-3 months ago   • Clotting disorder ([facility])     factor 5   • Constipation    • COVID-19 vaccine series completed     Pfizer-plus booster   • Deep vein thrombosis ([facility]) 2016   • Diarrhea    • Diverticulitis    • Elevated cholesterol    • Endometriosis    • Factor 5 Leiden mutation, heterozygous ([facility])    •  Fibroid    • Fibromyalgia    • Full dentures 06/22/2021    advised no adhesives DOS   • Gastroparesis    • GERD (gastroesophageal reflux disease)    • H/O blood clots    • H/O cardiovascular stress test 06/22/2021    reported several years ago-exercise wnl   • Headache    • Hiatal hernia    • High cholesterol    • History of recurrent UTIs    • HPV (human papilloma virus) infection    • Hx of echocardiogram    • Hx of gout 06/22/2021   • Hypertension    • Hypoglycemia    • Inappropriate sinus node tachycardia    • Kidney infection 06/22/2021    history only   • Migraine    • Nausea & vomiting    • Obesity    • Osteoarthritis    • Ovarian cyst    • Pneumonia    • Pollen allergies    • PONV (postoperative nausea and vomiting)    • Protein S deficiency (HCC) 11/14/2016    labs from hospitalization for PE   • Pulmonary embolism (HCC) 11/20/2016    on eliquis since that time   • Recurrent pregnancy loss, antepartum condition or complication    • Sleep apnea     no CPAP- states insurance took it back   • Tattoo     LOWER BACK   • Varicella      Current Outpatient Medications on File Prior to Visit   Medication Sig Dispense Refill   • albuterol sulfate  (90 Base) MCG/ACT inhaler Inhale 2 puffs Every 4 (Four) Hours As Needed for Wheezing or Shortness of Air. 18 g 11   • Alcohol Swabs (Alcohol Pads) 70 % pads Check sugars as needed/directed for hypoglycemia. 100 each 12   • apixaban (ELIQUIS) 5 MG tablet tablet Take 1 tablet by mouth 2 (Two) Times a Day. 180 tablet 3   • aspirin 81 MG EC tablet Take 81 mg by mouth Daily.     • Atogepant (Qulipta) 60 MG tablet Take 1 tablet by mouth Daily. 30 tablet 11   • atorvastatin (LIPITOR) 40 MG tablet Take 1 tablet by mouth Every Night. 30 tablet 11   • azelastine (ASTELIN) 0.1 % nasal spray 1 spray into the nostril(s) as directed by provider 2 (Two) Times a Day. Use in each nostril as directed 1 each 10   • B-D ULTRA-FINE 33 LANCETS misc Check sugars as needed/directed for  hypoglycemia. 100 each 12   • Blood Glucose Monitoring Suppl (ONE TOUCH ULTRA 2) w/Device kit use as needed to check blood sugar for hypoglycemia (diabetes)     • busPIRone (BUSPAR) 5 MG tablet TAKE 1 TABLET BY MOUTH THREE TIMES A DAY 90 tablet 0   • calcium carbonate EX (TUMS EX) 750 MG chewable tablet Chew 1 tablet Daily. (Patient taking differently: Chew 750 mg Daily As Needed.) 30 tablet 1   • Cholecalciferol (Vitamin D3) 50 MCG (2000 UT) capsule Take 1 capsule by mouth Daily. Start after completing 50,000 IU weekly dose.  Indications: Vitamin D Deficiency (Patient taking differently: Take 2,000 Units by mouth Daily. Start after completing 50,000 IU weekly dose.) 90 capsule 3   • ciprofloxacin (CIPRO) 500 MG tablet Take 1 tablet by mouth 2 (Two) Times a Day for 10 days. 20 tablet 0   • Corlanor 5 MG tablet tablet Take 1.5 tablets by mouth 2 (two) times a day. 90 tablet 6   • Diclofenac Sodium (VOLTAREN) 1 % gel gel Apply 4 g topically to the appropriate area as directed 4 (Four) Times a Day As Needed (joint pain). 150 g 1   • DULoxetine (CYMBALTA) 60 MG capsule Take 1 capsule by mouth Daily. 90 capsule 3   • fluconazole (Diflucan) 150 MG tablet One po now and repeat in 3 days 2 tablet 0   • fluticasone (FLONASE) 50 MCG/ACT nasal spray INSTILL 2 SPRAYS INTO EACH NOSTRIL EVERY DAY AS DIRECTED 16 g 11   • glucose blood test strip USE TO CHECK SUGARS DAILY DUE TO hypoglycemia 100 each 12   • glucose monitor monitoring kit 1 each As Needed (diabetes). Check sugars as needed/directed for hypoglycemia. 1 each 0   • granisetron (KYTRIL) 1 MG tablet Take 1 mg by mouth Every 12 (Twelve) Hours As Needed for Nausea or Vomiting. PA APPROVED.     • ibuprofen (ADVIL,MOTRIN) 800 MG tablet Take 1 tablet by mouth Every 8 (Eight) Hours As Needed for Mild Pain . 30 tablet 0   • lidocaine (LIDODERM) 5 % Place 2 patches on the skin as directed by provider Daily. Remove & Discard patch within 12 hours or as directed by MD 60 patch 3    • mometasone-formoterol (Dulera) 200-5 MCG/ACT inhaler Inhale 2 puffs 2 (Two) Times a Day. 13 g 11   • ondansetron (ZOFRAN) 8 MG tablet Take 1 tablet by mouth Every 8 (Eight) Hours As Needed for Nausea or Vomiting. 10 tablet 0   • pantoprazole (PROTONIX) 40 MG EC tablet Take 1 tablet by mouth Daily. 30 tablet 0   • Rimegepant Sulfate (Nurtec) 75 MG tablet dispersible tablet Take 1 tablet by mouth As Needed (as needed). 4 tablet 0   • zonisamide (ZONEGRAN) 100 MG capsule Take 1 capsule by mouth Every Evening. 90 capsule 1     No current facility-administered medications on file prior to visit.     Allergies   Allergen Reactions   • Ceftin [Cefuroxime Axetil] Shortness Of Breath and Rash     Numbness in mouth and throat   • Imitrex [Sumatriptan] Shortness Of Breath and Nausea Only   • Amoxicillin Rash     Past Surgical History:   Procedure Laterality Date   •  SECTION        and  and    •  SECTION WITH TUBAL N/A 1/15/2019    Procedure:  SECTION REPEAT WITH TUBAL;  Surgeon: Alva Boyer MD;  Location: [facility] LABOR DELIVERY;  Service: Obstetrics/Gynecology   • COLONOSCOPY N/A 2017    Procedure: COLONOSCOPY WITH BIOPSIES AND ARGON THERMAL ABLATION;  Surgeon: Jignesh Selby MD;  Location: [facility] ENDOSCOPY;  Service:    • D & C HYSTEROSCOPY ENDOMETRIAL ABLATION N/A 3/10/2022    Procedure: DILATATION AND CURETTAGE DIAGNOSTIC HYSTEROSCOPY NOVASURE ENDOMETRIAL ABLATION;  Surgeon: Alva Boyer MD;  Location: [facility] OR;  Service: Obstetrics/Gynecology;  Laterality: N/A;   • DIAGNOSTIC LAPAROSCOPY N/A 2018    Procedure: DIAGNOSTIC LAPAROSCOPY AND ABLATION OF ENDOMETRIOSIS;  Surgeon: Evin Zamudio MD;  Location: [facility] OR;  Service: Obstetrics/Gynecology   • ENDOMETRIAL ABLATION     • ENDOSCOPIC FUNCTIONAL SINUS SURGERY (FESS) Right 2021    Procedure: Right endoscopic total ethmoidectomy, right endoscopic middle meatal antrotomy with cyst removal, right endoscopic  "frontal recess exploration with cyst removal;  Surgeon: Saulo Brito MD;  [facility];  Service: ENT;  Laterality: Right;   • ENDOSCOPY N/A 4/20/2017    Procedure: ESOPHAGOGASTRODUODENOSCOPY WITH BIOPSIES AND COLD BIOPSY POLYPECTOMIES;  Surgeon: Jignesh Selby MD;  [facility];  Service:    • LAPAROSCOPIC CHOLECYSTECTOMY     • LIPOMA EXCISION N/A 12/10/2021    Procedure: Excision of soft tissue mass lower back;  Surgeon: Nidia Smith MD;  [facility];  Service: General;  Laterality: N/A;   • MOUTH SURGERY      full mouth tooth extration   • WISDOM TOOTH EXTRACTION       Family History   Problem Relation Age of Onset   • Arthritis Mother    • COPD Mother    • Asthma Mother    • Thyroid disease Mother    • Arthritis Father    • Diabetes Father    • Hypertension Father    • Hyperlipidemia Father    • Kidney disease Father    • Heart attack Father    • Coronary artery disease Father    • Dementia Father    • No Known Problems Son    • Colon cancer Neg Hx    • Liver cancer Neg Hx    • Liver disease Neg Hx    • Stomach cancer Neg Hx    • Esophageal cancer Neg Hx      Social History     Socioeconomic History   • Marital status:    Tobacco Use   • Smoking status: Never Smoker   • Smokeless tobacco: Never Used   Vaping Use   • Vaping Use: Never used   Substance and Sexual Activity   • Alcohol use: No   • Drug use: Never   • Sexual activity: Yes     Partners: Male     Birth control/protection: Surgical     Comment: Tubal       Physical Examination:  Vital Signs: /78   Pulse (!) 148   Ht 160 cm (63\")   Wt 91.6 kg (202 lb)   SpO2 99%   BMI 35.78 kg/m²     General Appearance: alert, appears stated age, and cooperative  Breasts: Not performed.  Abdomen: no masses, no hepatomegaly, no splenomegaly, soft non-tender, no guarding and no rebound tenderness  Pelvic: Clinical staff was present for exam  External genitalia:  normal appearance of the external genitalia including " Bartholin's and [place]'s glands.  :  urethral meatus normal;  Vaginal:  normal pink mucosa without prolapse or lesions. discharge present -  mucousy and yellow;  Cervix:  normal appearance.  Uterus:  normal size, shape and consistency.  Adnexa:  normal bimanual exam of the adnexa.  Cultures obtained    Data Review:  The following data was reviewed by: Alva Boyer MD on 04/05/2022:     Labs:  Rapid Strep A Screen - Swab, Throat (03/24/2022 16:06)  COVID-19 and FLU A/B PCR - Swab, Nasopharynx (03/24/2022 16:06)  Beta Strep Culture, Throat - Swab, Throat (03/24/2022 16:06)  Comprehensive Metabolic Panel (03/24/2022 17:06)  CBC & Differential (03/24/2022 17:06)  Urinalysis With Microscopic If Indicated (No Culture) - Urine, Clean Catch (03/24/2022 17:17)  Urinalysis, Microscopic Only - Urine, Clean Catch (03/24/2022 17:17)  CBC & Differential (03/29/2022 16:10)  Comprehensive Metabolic Panel (03/29/2022 16:10)  Urinalysis With Microscopic If Indicated (No Culture) - Urine, Clean Catch (03/29/2022 16:10)  Urinalysis, Microscopic Only - Urine, Clean Catch (03/29/2022 16:10)  Lactic Acid, Plasma (03/29/2022 16:32)    Imaging:  CT Abdomen Pelvis With Contrast (03/29/2022 16:17)    Medical Records:  None    Assessment and Plan   Problem List Items Addressed This Visit        Gastrointestinal Abdominal     Pelvic pain  Cultures are obtained today.  We will schedule for transvaginal ultrasound.  Patient is also to keep her appointment with GI.  Patient is to call her gastroenterologist however to discuss the CT findings as discussed.    Relevant Orders    US Non-ob Transvaginal       Genitourinary and Reproductive     Cyst of right ovary  Patient with small possible cyst on her right ovary on CT scan.  I do not feel however this is the etiology for her pain.  We will schedule follow-up transvaginal ultrasound however for further evaluation.  Instructions and precautions have been given.    Relevant Orders    US Non-ob  Transvaginal      Other Visit Diagnoses     Vaginal discharge    -  Primary  Cultures are obtained today.  Will await the results of the culture.  Instructions and precautions are given.  Patient has been informed often will have a vaginal discharge for several weeks following an ablation.  Patient is to call for culture results.  Patient will also follow-up for transvaginal ultrasound as discussed.    Relevant Orders    NuSwab VG+ - Swab, Vagina (Completed)    US Non-ob Transvaginal    Palpitations      Patient with complaints of palpitations as well as elevated heart rate as noted.  Patient had a questionable enlarged heart on her CT scan.  Patient was recently seen by cardiology.  Recommend patient discuss with the primary care provider as well.  Patient is also to keep her appointment with neurology as discussed.          Follow Up/Instructions:  Follow up as noted.  Patient was given instructions and counseling regarding her condition or for health maintenance advice. Please see specific information pulled into the AVS if appropriate.     Note: Speech recognition transcription software may have been used to dictate portions of this document.  An attempt at proofreading has been made though minor errors in transcription may still be present.    This note was electronically signed.  Alva Boyer M.D.

## 2022-04-09 NOTE — PROGRESS NOTES
04/08/2022      Assessment/Plan   Problem List Items Addressed This Visit        Cardiac and Vasculature    Inappropriate sinus tachycardia    Relevant Orders    Magnesium    TSH+Free T4    Vitamin B12    Holter Monitor - 48 Hour       Neuro    Neurogenic orthostatic hypotension (HCC)      Other Visit Diagnoses     Diarrhea, unspecified type    -  Primary    Relevant Orders    Clostridium Difficile Toxin, PCR - Stool, Per Rectum    Gastrointestinal Panel, PCR - Stool, Per Rectum    Fatigue, unspecified type        Relevant Orders    Magnesium    TSH+Free T4    Vitamin D 25 Hydroxy    Vitamin B12    Folate    Homocysteine    Methylmalonic Acid, Serum    CBC (No Diff)    Comprehensive Metabolic Panel    C-reactive Protein    Sedimentation Rate    Inflammatory Bowel Disease Panel    Ovarian mass        Right    Relevant Orders        Abnormal blood chemistry        Relevant Orders    Magnesium    TSH+Free T4    Vitamin D 25 Hydroxy    Vitamin B12    Folate    Homocysteine    Methylmalonic Acid, Serum    CBC (No Diff)    Comprehensive Metabolic Panel    C-reactive Protein    Sedimentation Rate    Inflammatory Bowel Disease Panel        Fundic gland polyposis of stomach        See Care Everywhere 2/15/22 note from [facility]. With age <40 a familial polyposis syndrome should be considered. Follow up [facility] as scheduled.    Pylorospasm        2/10/22 EGD s/p Botox injection    Hypermobility of joint        she is able to flex thumbs back to wrist; soft skin texture. with some of her other issues EDS is on the differential    Disability due to neurological disorder        I do not complete disability paperwork but it would be extremely difficult for her to maintain employment due to her many medical issues        Discussion:    30 yo female well known to me with many medical issues. Medically complex. I have long suspected an autoimmune issue but this has yet to be proven. Encouraged follow up with GI as scheduled,  should also keep the neurology appointment. Upon chart review the next time we do labs I suggest we order adrenal labs along with tryptase (evaluation for mast cell issue). Raquel Danlos Syndrome is also on differential.     Discussed with patient her many issues make diagnosis not clear cut and that today we would proceed with labs and a repeat monitor (as she's finding heart rates up to the 160s at home). Stool studies ordered, finish antibiotics she was previously prescribed for colitis by ER. Past colonoscopy did not show evidence of inflammatory bowel disease but she needs to discuss further with specialists at [facility]. Needs to let them know about this recent diagnosis of colitis as this may impact when they want to do the colonoscopy.     Regarding mild heart enlargement on ct scan it may be worth follow up with Dr. Byrnes. Her 2019 echo was within normal limits.         Return in about 4 weeks (around 5/6/2022) for follow up multiple issues.      Subjective    Marilu Godoy is a 31 y.o. female here for:  Chief Complaint   Patient presents with   • Hospital Follow Up Visit     Colitis        History per MA reviewed.    Patient recently in ER and diagnosed with colitis.  Reports being prescribed two antibiotics and is almost finished with course yet still has abdominal pain and diarrhea  Stools very loose, watery, with bad odor    Heart rate has been high. Saw Dr. Washington who released her. Has referred her to neurology at  but doesn't go until sometime in May. Finds heart rate jumps up and she has shortness of breath when this happens. Asks if she could do a heart monitor. Considering following up with local cardiologist. Imaging from ER showed mild heart enlargement which alarms her. Blood pressure drops continue to be an issue.    Followed by gastroenterology provider in [place]. Reports having upper scope with polyps found that could develop into cancer. Has follow up with [place] later this  "month.  Colonoscopy being discussed. There have been concerns for possible inflammatory bowel disease but this has never been diagnosed.     I have discussed with her many times over the last approx 5 years my concerns for autoimmune disease. She has seen rheumatology in [place] and diagnosed with fibromyalgia. Patient has some joint hypermobility and has been told she's double jointed.         The following portions of the patient's history were reviewed and updated as appropriate: allergies, current medications, past family history, past medical history, past social history, past surgical history and problem list.    Review of Systems      Objective   Visit Vitals  /82 (BP Location: Left arm, Patient Position: Sitting, Cuff Size: Adult)   Pulse (!) 123   Temp 97.5 °F (36.4 °C) (Temporal)   Resp 16   Ht 160 cm (63\")   Wt 89.3 kg (196 lb 12.8 oz)   SpO2 97%   BMI 34.86 kg/m²       Physical Exam  Vitals and nursing note reviewed.   Constitutional:       General: She is not in acute distress.     Appearance: Normal appearance. She is well-developed and well-groomed. She is not ill-appearing, toxic-appearing or diaphoretic.      Interventions: Face mask in place.   HENT:      Head: Normocephalic and atraumatic.      Right Ear: Hearing normal.      Left Ear: Hearing normal.   Eyes:      General: Lids are normal. No scleral icterus.     Extraocular Movements: Extraocular movements intact.   Neck:      Trachea: Phonation normal.   Pulmonary:      Effort: Pulmonary effort is normal.   Abdominal:      Comments: Stool sample provided today while she was in clinic. Foul smell, brown liquid stool   Musculoskeletal:      Cervical back: Neck supple.      Comments: Hypermobility of thumbs, able to bend back to wrists   Skin:     General: Skin is warm.      Capillary Refill: Capillary refill takes less than 2 seconds.      Coloration: Skin is not jaundiced or pale.      Comments: Skin on back of hands is soft (no lotion) " and is stretchy   Neurological:      General: No focal deficit present.      Mental Status: She is alert and oriented to person, place, and time.      Motor: Motor function is intact.   Psychiatric:         Attention and Perception: Attention and perception normal.         Mood and Affect: Affect normal. Mood is anxious.         Speech: Speech is rapid and pressured.         Behavior: Behavior normal. Behavior is cooperative.         Thought Content: Thought content normal.         Cognition and Memory: Cognition and memory normal.         Judgment: Judgment normal.             For medical decision making review of the following was required:  CMP    CMP 3/4/22 3/24/22 3/29/22   Glucose 93 90 103 (A)   BUN 9 6 6   Creatinine 0.60 0.69 0.66   Sodium 134 (A) 136 137   Potassium 3.9 3.6 3.7   Chloride 101 100 102   Calcium 9.3 9.1 9.3   Albumin  4.20 4.30   Total Bilirubin  0.8 0.6   Alkaline Phosphatase  91 110   AST (SGOT)  14 14   ALT (SGPT)  14 14   (A) Abnormal value       Comments are available for some flowsheets but are not being displayed.           Lab Results   Component Value Date    WBC 8.59 03/29/2022    HGB 13.5 03/29/2022    HCT 38.5 03/29/2022    MCV 88.5 03/29/2022     03/29/2022     Lab Results   Component Value Date    HGBA1C 4.89 08/04/2021    HGBA1C 4.90 04/24/2017     Lab Results   Component Value Date    TSH 0.690 01/25/2022     Lab Results   Component Value Date    FREET4 1.12 08/04/2021     ED Provider Notes by Nabeel Victoria APRN (03/29/2022 15:50)  Final diagnoses:   Colitis   Cyst of ovary, unspecified laterality   CT Abdomen Pelvis With Contrast (03/29/2022 16:17)    Progress Notes by Pierce Washington DO (02/21/2022 13:15)   Diagnosis Plan   1. Inappropriate sinus tachycardia   lengthy conversation with the patient that she does not have heart disease.     Inappropriate sinus tachycardia is really a manifestation of an imbalance of the autonomic nervous system.     At this point I  think she has satisfactory symptomatic relief from ivabradine.  She is on a reasonable dose of this.  Realistic expectations are to have significant mitigation of their symptoms rather than complete elimination and I think she is on the same page.     Follow-up in 1 year.   2. Neurogenic orthostatic hypotension (HCC)   management per neurology.  No cardiac issue here.     2/15/22 telephone note from [facility]:  I called Ms. Godoy today to discuss pathology results from her EGD w/ [facility] on 2/10. During that procedure, she was noted to have a few 2-4mm polyps suggestive of fundic gland polyps. Two of these polyps were resected, and path came back as fundic gland polyps with low grade dysplasia.    Pt will need repeat EGD (to remove remaining FGPs) as well as colonoscopy to screen for colonic neoplasia, as the presence of dysplasia in FGPs is more commonly seen with syndromic FGPs (ie, those associated with familial adenomatous polyposis syndrome) than with sporadic FGPs.     Also reviewed EGD with [facility] report 2/10/22. Dr. Cantu.    Reviewed older records as well:  [facility] (04/20/2017) colonoscopy Dr. Selby. Fundic gland polyps noted in gastric body. No abnormal findings to colon biopsy samples.    Myositis Panel III (06/16/2017 11:05)  RNP antibodies elevated, 107.5 (negative <20, positive >25) [repeats have been in normal range]    Metanephrines, Frac. Free, Plasma (08/21/2017 08:51) --normal   Chromogranin A (08/21/2017 08:51) --normal   5 HIAA, Urine, Quantitative, 24 Hour (08/21/2017 08:51) --normal     Mannose Binding Lectin (MBL) (10/09/2017 14:19)  282 ng/mL (low 0-50; intermediate ; normal >500)    Note from Dr. Acosta GI motility clinic 11/2/21 reviewed along with labs ordered by Dr. Rivera 8/4/21 ([facility])    MRI Brain With & Without Contrast (03/15/2021 12:56)  --no Chiari malformation reported.    I spent 86 minutes caring for Marilu Godoy on this date of service. This time  includes time spent by me in the following activities: preparing for the visit, reviewing tests, performing a medically appropriate examination and/or evaluation, counseling and educating the patient/family/caregiver, ordering medications, tests, or procedures and documenting information in the medical record.       Deidre Barker MD

## 2022-04-10 LAB — Lab: NORMAL

## 2022-04-11 DIAGNOSIS — E53.8 FOLIC ACID DEFICIENCY: ICD-10-CM

## 2022-04-11 DIAGNOSIS — E55.9 VITAMIN D DEFICIENCY: Primary | ICD-10-CM

## 2022-04-11 RX ORDER — CHOLECALCIFEROL (VITAMIN D3) 1250 MCG
50000 CAPSULE ORAL
Qty: 12 CAPSULE | Refills: 2 | Status: SHIPPED | OUTPATIENT
Start: 2022-04-11 | End: 2022-05-12

## 2022-04-11 RX ORDER — LEVOMEFOLATE CALCIUM 15 MG
15 TABLET ORAL DAILY
Qty: 90 TABLET | Refills: 3 | Status: SHIPPED | OUTPATIENT
Start: 2022-04-11

## 2022-04-12 ENCOUNTER — HOSPITAL ENCOUNTER (EMERGENCY)
Facility: HOSPITAL | Age: 32
Discharge: HOME | End: 2022-04-12
Attending: EMERGENCY MEDICINE | Admitting: EMERGENCY MEDICINE

## 2022-04-12 ENCOUNTER — OFFICE VISIT (OUTPATIENT)
Dept: OBSTETRICS AND GYNECOLOGY | Facility: CLINIC | Age: 32
End: 2022-04-12

## 2022-04-12 ENCOUNTER — APPOINTMENT (OUTPATIENT)
Dept: GENERAL RADIOLOGY | Facility: HOSPITAL | Age: 32
End: 2022-04-12

## 2022-04-12 VITALS
OXYGEN SATURATION: 98 % | HEIGHT: 63 IN | BODY MASS INDEX: 35.44 KG/M2 | RESPIRATION RATE: 18 BRPM | SYSTOLIC BLOOD PRESSURE: 116 MMHG | DIASTOLIC BLOOD PRESSURE: 78 MMHG | HEART RATE: 103 BPM | TEMPERATURE: 97.8 F | WEIGHT: 200 LBS

## 2022-04-12 VITALS
HEIGHT: 63 IN | DIASTOLIC BLOOD PRESSURE: 80 MMHG | BODY MASS INDEX: 34.73 KG/M2 | SYSTOLIC BLOOD PRESSURE: 128 MMHG | WEIGHT: 196 LBS

## 2022-04-12 DIAGNOSIS — R10.9 ABDOMINAL PAIN, UNSPECIFIED ABDOMINAL LOCATION: ICD-10-CM

## 2022-04-12 DIAGNOSIS — N83.201 BILATERAL OVARIAN CYSTS: ICD-10-CM

## 2022-04-12 DIAGNOSIS — R42 DIZZINESS: ICD-10-CM

## 2022-04-12 DIAGNOSIS — R07.9 CHEST PAIN, UNSPECIFIED TYPE: Primary | ICD-10-CM

## 2022-04-12 DIAGNOSIS — N89.8 VAGINAL DISCHARGE: Primary | ICD-10-CM

## 2022-04-12 DIAGNOSIS — K31.84 GASTROPARESIS: ICD-10-CM

## 2022-04-12 DIAGNOSIS — R00.0 TACHYCARDIA: ICD-10-CM

## 2022-04-12 DIAGNOSIS — R10.2 PELVIC PAIN: ICD-10-CM

## 2022-04-12 DIAGNOSIS — N83.202 BILATERAL OVARIAN CYSTS: ICD-10-CM

## 2022-04-12 DIAGNOSIS — R00.2 PALPITATIONS: ICD-10-CM

## 2022-04-12 DIAGNOSIS — Z79.01 CURRENT USE OF LONG TERM ANTICOAGULATION: ICD-10-CM

## 2022-04-12 LAB
ADV 40+41 DNA STL QL NAA+NON-PROBE: NOT DETECTED
ALBUMIN SERPL-MCNC: 4.9 G/DL (ref 3.5–5.2)
ALBUMIN/GLOB SERPL: 1.4 G/DL
ALP SERPL-CCNC: 114 U/L (ref 39–117)
ALT SERPL W P-5'-P-CCNC: 17 U/L (ref 1–33)
ANION GAP SERPL CALCULATED.3IONS-SCNC: 13.6 MMOL/L (ref 5–15)
AST SERPL-CCNC: 13 U/L (ref 1–32)
ASTRO TYP 1-8 RNA STL QL NAA+NON-PROBE: NOT DETECTED
BASOPHILS # BLD AUTO: 0.07 10*3/MM3 (ref 0–0.2)
BASOPHILS NFR BLD AUTO: 0.5 % (ref 0–1.5)
BILIRUB SERPL-MCNC: 0.6 MG/DL (ref 0–1.2)
BUN SERPL-MCNC: 9 MG/DL (ref 6–20)
BUN/CREAT SERPL: 10.1 (ref 7–25)
C CAYETANENSIS DNA STL QL NAA+NON-PROBE: NOT DETECTED
C COLI+JEJ+UPSA DNA STL QL NAA+NON-PROBE: NOT DETECTED
C DIF TOX TCDA+TCDB STL QL NAA+NON-PROBE: NOT DETECTED
C DIFF TOX GENS STL QL NAA+PROBE: NEGATIVE
CALCIUM SPEC-SCNC: 9.7 MG/DL (ref 8.6–10.5)
CHLORIDE SERPL-SCNC: 101 MMOL/L (ref 98–107)
CO2 SERPL-SCNC: 25.4 MMOL/L (ref 22–29)
CREAT SERPL-MCNC: 0.89 MG/DL (ref 0.57–1)
CRYPTOSP DNA STL QL NAA+NON-PROBE: NOT DETECTED
DEPRECATED RDW RBC AUTO: 39.1 FL (ref 37–54)
E COLI O157 DNA STL QL NAA+NON-PROBE: NORMAL
E HISTOLYT DNA STL QL NAA+NON-PROBE: NOT DETECTED
EAEC PAA PLAS AGGR+AATA ST NAA+NON-PRB: NOT DETECTED
EC STX1+STX2 GENES STL QL NAA+NON-PROBE: NOT DETECTED
EGFRCR SERPLBLD CKD-EPI 2021: 89 ML/MIN/1.73
EOSINOPHIL # BLD AUTO: 0.05 10*3/MM3 (ref 0–0.4)
EOSINOPHIL NFR BLD AUTO: 0.4 % (ref 0.3–6.2)
EPEC EAE GENE STL QL NAA+NON-PROBE: NOT DETECTED
ERYTHROCYTE [DISTWIDTH] IN BLOOD BY AUTOMATED COUNT: 12.1 % (ref 12.3–15.4)
ETEC LTA+ST1A+ST1B TOX ST NAA+NON-PROBE: NOT DETECTED
FLUAV RNA RESP QL NAA+PROBE: NOT DETECTED
FLUBV RNA RESP QL NAA+PROBE: NOT DETECTED
G LAMBLIA DNA STL QL NAA+NON-PROBE: NOT DETECTED
GLOBULIN UR ELPH-MCNC: 3.5 GM/DL
GLUCOSE SERPL-MCNC: 94 MG/DL (ref 65–99)
HCG SERPL QL: NEGATIVE
HCT VFR BLD AUTO: 44.2 % (ref 34–46.6)
HGB BLD-MCNC: 15.3 G/DL (ref 12–15.9)
HOLD SPECIMEN: NORMAL
IMM GRANULOCYTES # BLD AUTO: 0.05 10*3/MM3 (ref 0–0.05)
IMM GRANULOCYTES NFR BLD AUTO: 0.4 % (ref 0–0.5)
LYMPHOCYTES # BLD AUTO: 2.08 10*3/MM3 (ref 0.7–3.1)
LYMPHOCYTES NFR BLD AUTO: 15.5 % (ref 19.6–45.3)
MCH RBC QN AUTO: 30.8 PG (ref 26.6–33)
MCHC RBC AUTO-ENTMCNC: 34.6 G/DL (ref 31.5–35.7)
MCV RBC AUTO: 88.9 FL (ref 79–97)
MONOCYTES # BLD AUTO: 0.55 10*3/MM3 (ref 0.1–0.9)
MONOCYTES NFR BLD AUTO: 4.1 % (ref 5–12)
NEUTROPHILS NFR BLD AUTO: 10.63 10*3/MM3 (ref 1.7–7)
NEUTROPHILS NFR BLD AUTO: 79.1 % (ref 42.7–76)
NOROVIRUS GI+II RNA STL QL NAA+NON-PROBE: NOT DETECTED
NRBC BLD AUTO-RTO: 0 /100 WBC (ref 0–0.2)
NT-PROBNP SERPL-MCNC: 40 PG/ML (ref 0–450)
P SHIGELLOIDES DNA STL QL NAA+NON-PROBE: NOT DETECTED
PLATELET # BLD AUTO: 382 10*3/MM3 (ref 140–450)
PMV BLD AUTO: 9.7 FL (ref 6–12)
POTASSIUM SERPL-SCNC: 3.5 MMOL/L (ref 3.5–5.2)
PROT SERPL-MCNC: 8.4 G/DL (ref 6–8.5)
RBC # BLD AUTO: 4.97 10*6/MM3 (ref 3.77–5.28)
RVA RNA STL QL NAA+NON-PROBE: NOT DETECTED
S ENT+BONG DNA STL QL NAA+NON-PROBE: NOT DETECTED
SAPO I+II+IV+V RNA STL QL NAA+NON-PROBE: NOT DETECTED
SARS-COV-2 RNA RESP QL NAA+PROBE: NOT DETECTED
SHIGELLA SP+EIEC IPAH ST NAA+NON-PROBE: NOT DETECTED
SODIUM SERPL-SCNC: 140 MMOL/L (ref 136–145)
TROPONIN T SERPL-MCNC: <0.01 NG/ML (ref 0–0.03)
TROPONIN T SERPL-MCNC: <0.01 NG/ML (ref 0–0.03)
V CHOL+PARA+VUL DNA STL QL NAA+NON-PROBE: NOT DETECTED
V CHOLERAE DNA STL QL NAA+NON-PROBE: NOT DETECTED
WBC NRBC COR # BLD: 13.43 10*3/MM3 (ref 3.4–10.8)
WHOLE BLOOD HOLD SPECIMEN: NORMAL
WHOLE BLOOD HOLD SPECIMEN: NORMAL
Y ENTEROCOL DNA STL QL NAA+NON-PROBE: NOT DETECTED

## 2022-04-12 PROCEDURE — 99284 EMERGENCY DEPT VISIT MOD MDM: CPT

## 2022-04-12 PROCEDURE — 99214 OFFICE O/P EST MOD 30 MIN: CPT | Performed by: OBSTETRICS & GYNECOLOGY

## 2022-04-12 PROCEDURE — 84703 CHORIONIC GONADOTROPIN ASSAY: CPT

## 2022-04-12 PROCEDURE — 84484 ASSAY OF TROPONIN QUANT: CPT

## 2022-04-12 PROCEDURE — 80053 COMPREHEN METABOLIC PANEL: CPT

## 2022-04-12 PROCEDURE — 93005 ELECTROCARDIOGRAM TRACING: CPT

## 2022-04-12 PROCEDURE — 87636 SARSCOV2 & INF A&B AMP PRB: CPT | Performed by: PHYSICIAN ASSISTANT

## 2022-04-12 PROCEDURE — 85025 COMPLETE CBC W/AUTO DIFF WBC: CPT

## 2022-04-12 PROCEDURE — 71045 X-RAY EXAM CHEST 1 VIEW: CPT

## 2022-04-12 PROCEDURE — 84484 ASSAY OF TROPONIN QUANT: CPT | Performed by: PHYSICIAN ASSISTANT

## 2022-04-12 PROCEDURE — 36415 COLL VENOUS BLD VENIPUNCTURE: CPT

## 2022-04-12 PROCEDURE — 83880 ASSAY OF NATRIURETIC PEPTIDE: CPT | Performed by: PHYSICIAN ASSISTANT

## 2022-04-12 RX ORDER — SODIUM CHLORIDE 0.9 % (FLUSH) 0.9 %
10 SYRINGE (ML) INJECTION AS NEEDED
Status: DISCONTINUED | OUTPATIENT
Start: 2022-04-12 | End: 2022-04-12 | Stop reason: HOSPADM

## 2022-04-12 RX ORDER — ASPIRIN 325 MG
325 TABLET ORAL ONCE
Status: COMPLETED | OUTPATIENT
Start: 2022-04-12 | End: 2022-04-12

## 2022-04-12 RX ORDER — ACETAMINOPHEN 500 MG
1000 TABLET ORAL ONCE
Status: COMPLETED | OUTPATIENT
Start: 2022-04-12 | End: 2022-04-12

## 2022-04-12 RX ADMIN — ACETAMINOPHEN 1000 MG: 500 TABLET ORAL at 19:30

## 2022-04-12 RX ADMIN — SODIUM CHLORIDE 1000 ML: 9 INJECTION, SOLUTION INTRAVENOUS at 18:12

## 2022-04-12 RX ADMIN — ASPIRIN 325 MG ORAL TABLET 325 MG: 325 PILL ORAL at 17:18

## 2022-04-12 NOTE — ED PROVIDER NOTES
Subjective   History of Present Illness   Patient is a 31-year-old female presenting to the ER with complaints of chest pain, palpitations, dizziness, and shortness of breath over the last couple of days.  Patient states that she has seen an electro cardiologist and they recently discharged her and told her they thought that her tachycardia was because of her autonomic nervous system.  He states that she was on Corlanor previously. She states that over the last couple days she has had episodes where her heart rate will go up.  She states that it is gone up to 165 bpm while she is just lying there.  She states that it happens randomly and when it occurs, she experiences dizziness, lightheadedness, shortness of breath, and chest pain.  She states she currently has achy left-sided chest pain that is mild.  She denies fever, recent sick contacts, and additional symptoms or complaints at this time.  Patient is concerned because she was here 2 weeks ago for abdominal pain and on her CT, it mentioned mild heart enlargement.  She states she had never been told this before.    Review of Systems   Cardiovascular: Positive for chest pain and palpitations.        Tachycardia     Neurological: Positive for dizziness and light-headedness.   All other systems reviewed and are negative.      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    • Bleeding disorder ([facility]) 11-13-16   • Body piercing     TONGUE, NOSE, TWO IN EACH EAR   • Bronchitis    • Chest pain 06/22/2021    last CP 2-3 months ago   • Clotting disorder ([facility])     factor 5   • Constipation    • COVID-19 vaccine series completed     Pfizer-plus booster   • Deep vein thrombosis ([facility]) 11/13/2016   • Diarrhea    • Diverticulitis    • Elevated cholesterol    • Endometriosis    • Factor 5 Leiden mutation, heterozygous ([facility])    • Fibroid     • Fibromyalgia    • Full dentures 2021    advised no adhesives DOS   • Gastroparesis    • GERD (gastroesophageal reflux disease)    • H/O blood clots    • H/O cardiovascular stress test 2021    reported several years ago-exercise wnl   • Headache    • Hiatal hernia    • High cholesterol    • History of recurrent UTIs    • HPV (human papilloma virus) infection    • Hx of echocardiogram    • Hx of gout 2021   • Hypertension    • Hypoglycemia    • Inappropriate sinus node tachycardia    • Kidney infection 2021    history only   • Migraine    • Nausea & vomiting    • Obesity    • Osteoarthritis    • Ovarian cyst    • Pneumonia    • Pollen allergies    • PONV (postoperative nausea and vomiting)    • Protein S deficiency (HCC) 2016    labs from hospitalization for PE   • Pulmonary embolism (HCC) 2016    on eliquis since that time   • Recurrent pregnancy loss, antepartum condition or complication    • Sleep apnea     no CPAP- states insurance took it back   • Tattoo     LOWER BACK   • Varicella        Allergies   Allergen Reactions   • Ceftin [Cefuroxime Axetil] Shortness Of Breath and Rash     Numbness in mouth and throat   • Imitrex [Sumatriptan] Shortness Of Breath and Nausea Only   • Amoxicillin Rash       Past Surgical History:   Procedure Laterality Date   •  SECTION        and  and    •  SECTION WITH TUBAL N/A 1/15/2019    Procedure:  SECTION REPEAT WITH TUBAL;  Surgeon: Alva Boyer MD;  Location: [facility] LABOR DELIVERY;  Service: Obstetrics/Gynecology   • COLONOSCOPY N/A 2017    Procedure: COLONOSCOPY WITH BIOPSIES AND ARGON THERMAL ABLATION;  Surgeon: Jignesh Selby MD;  Location: [facility] ENDOSCOPY;  Service:    • D & C HYSTEROSCOPY ENDOMETRIAL ABLATION N/A 3/10/2022    Procedure: DILATATION AND CURETTAGE DIAGNOSTIC HYSTEROSCOPY NOVASURE ENDOMETRIAL ABLATION;  Surgeon: Alva Boyer MD;  Location: [facility] OR;  Service:  Obstetrics/Gynecology;  Laterality: N/A;   • DIAGNOSTIC LAPAROSCOPY N/A 4/2/2018    Procedure: DIAGNOSTIC LAPAROSCOPY AND ABLATION OF ENDOMETRIOSIS;  Surgeon: Evin Zamudio MD;  Location: [facility] OR;  Service: Obstetrics/Gynecology   • ENDOMETRIAL ABLATION     • ENDOSCOPIC FUNCTIONAL SINUS SURGERY (FESS) Right 6/25/2021    Procedure: Right endoscopic total ethmoidectomy, right endoscopic middle meatal antrotomy with cyst removal, right endoscopic frontal recess exploration with cyst removal;  Surgeon: Saulo Brito MD;  Location: [facility] OR;  Service: ENT;  Laterality: Right;   • ENDOSCOPY N/A 4/20/2017    Procedure: ESOPHAGOGASTRODUODENOSCOPY WITH BIOPSIES AND COLD BIOPSY POLYPECTOMIES;  Surgeon: Jignesh Selby MD;  Location: [facility] ENDOSCOPY;  Service:    • LAPAROSCOPIC CHOLECYSTECTOMY     • LIPOMA EXCISION N/A 12/10/2021    Procedure: Excision of soft tissue mass lower back;  Surgeon: Nidia Smith MD;  Location: [facility] OR;  Service: General;  Laterality: N/A;   • MOUTH SURGERY      full mouth tooth extration   • WISDOM TOOTH EXTRACTION         Family History   Problem Relation Age of Onset   • Arthritis Mother    • COPD Mother    • Asthma Mother    • Thyroid disease Mother    • Arthritis Father    • Diabetes Father    • Hypertension Father    • Hyperlipidemia Father    • Kidney disease Father    • Heart attack Father    • Coronary artery disease Father    • Dementia Father    • No Known Problems Son    • Colon cancer Neg Hx    • Liver cancer Neg Hx    • Liver disease Neg Hx    • Stomach cancer Neg Hx    • Esophageal cancer Neg Hx        Social History     Socioeconomic History   • Marital status:    Tobacco Use   • Smoking status: Never Smoker   • Smokeless tobacco: Never Used   Vaping Use   • Vaping Use: Never used   Substance and Sexual Activity   • Alcohol use: No   • Drug use: Never   • Sexual activity: Yes     Partners: Male     Birth control/protection: Surgical     Comment: Tubal            Objective   Physical Exam  Vitals and nursing note reviewed.   Constitutional:       General: She is not in acute distress.     Appearance: She is not toxic-appearing.   HENT:      Head: Normocephalic and atraumatic.   Eyes:      Extraocular Movements: Extraocular movements intact.   Cardiovascular:      Rate and Rhythm: Tachycardia present.      Heart sounds: Normal heart sounds.   Pulmonary:      Effort: Pulmonary effort is normal.      Breath sounds: Normal breath sounds.   Abdominal:      Palpations: Abdomen is soft.      Tenderness: There is no abdominal tenderness.   Musculoskeletal:         General: Normal range of motion.      Cervical back: Normal range of motion and neck supple.   Skin:     General: Skin is warm and dry.   Neurological:      General: No focal deficit present.      Mental Status: She is alert and oriented to person, place, and time.   Psychiatric:         Mood and Affect: Mood normal.         Behavior: Behavior normal.         Procedures           ED Course  ED Course as of 04/12/22 2010 Tue Apr 12, 2022 1754 Troponin T: <0.010 [AP]   1755 proBNP: 40.0 [AP]   1755 WBC(!): 13.43 [AP]   1755 RBC: 4.97 [AP]   1755 Hemoglobin: 15.3 [AP]   1755 Hematocrit: 44.2 [AP]   1755 Platelets: 382 [AP]   1755 Glucose: 94 [AP]   1755 BUN: 9 [AP]   1755 Creatinine: 0.89 [AP]   1755 Sodium: 140 [AP]   1755 Potassium: 3.5 [AP]   1755 Chloride: 101 [AP]   1755 CO2: 25.4 [AP]   1755 Calcium: 9.7 [AP]   1755 Total Protein: 8.4 [AP]   1755 Albumin: 4.90 [AP]   1755 ALT (SGPT): 17 [AP]   1755 AST (SGOT): 13 [AP]   1755 Alkaline Phosphatase: 114 [AP]   1755 Total Bilirubin: 0.6 [AP]   1755 Globulin: 3.5 [AP]   1755 A/G Ratio: 1.4 [AP]   1755 BUN/Creatinine Ratio: 10.1 [AP]   1755 Anion Gap: 13.6 [AP]   1755 eGFR: 89.0 [AP]   1919 COVID19: Not Detected [AP]   1919 Influenza A PCR: Not Detected [AP]   1919 Influenza B PCR: Not Detected [AP]   2000 Troponin T: <0.010 [AP]      ED Course User Index  [AP]  Elva Ram PA-C                                                 [facility]   Patient evaluated in the ER for tachycardia, palpitations, chest pain, dizziness.  Patient is tachycardic upon arrival but otherwise hemodynamically stable, no acute distress, nontoxic-appearing on exam.  Patient has seen an electro cardiologist and been charged from their service and told that her symptoms are likely due to abnormality of the autonomic nervous system.  Labs are unremarkable.  Patient has BNP of 40, 2 - troponins, negative flu and Covid test, normal liver and kidney function.  No electrolyte abnormalities.  She does have leukocytosis which is nonspecific.  Chest x-ray with no obvious acute cardiopulmonary abnormality.  EKG reviewed with ED attending and reveals sinus tachycardia, no obvious ischemic changes.  Patient was given fluids and tachycardia did improve.  She is agreeable with plans for discharge.  She was advised to follow-up with her PCP and cardiologist for further outpatient evaluation if symptoms persist.  Precautions were given for return to the ER for any new or worsening symptoms.    Final diagnoses:   Chest pain, unspecified type   Tachycardia   Dizziness       ED Disposition  ED Disposition     ED Disposition   Discharge    Condition   Stable    Comment   --             Deidre Barker MD  [address]  [address]  [facility] [address] [phone]    Schedule an appointment as soon as possible for a visit   for further outpatient evaluation of today's complaints    Marc Byrnes MD  [address] [facility] [address]  [address]  [facility] [address] [phone]    Schedule an appointment as soon as possible for a visit   for further evaluation of today's complaints    [facility] Emergency Department  [address] [facility] [address] [phone]  Go to   As needed, If symptoms worsen         Medication List      Changed    calcium carbonate  MG chewable  tablet  Commonly known as: TUMS EX  Chew 1 tablet Daily.  What changed:   · when to take this  · reasons to take this             Elva Ram PA-C  04/12/22 2011

## 2022-04-12 NOTE — ED PROVIDER NOTES
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    • Bleeding disorder ([facility]) 11-13-16   • Body piercing     TONGUE, NOSE, TWO IN EACH EAR   • Bronchitis    • Chest pain 06/22/2021    last CP 2-3 months ago   • Clotting disorder ([facility])     factor 5   • Constipation    • COVID-19 vaccine series completed     Pfizer-plus booster   • Deep vein thrombosis ([facility]) 11/13/2016   • Diarrhea    • Diverticulitis    • Elevated cholesterol    • Endometriosis    • Factor 5 Leiden mutation, heterozygous ([facility])    • Fibroid    • Fibromyalgia    • Full dentures 06/22/2021    advised no adhesives DOS   • Gastroparesis    • GERD (gastroesophageal reflux disease)    • H/O blood clots    • H/O cardiovascular stress test 06/22/2021    reported several years ago-exercise wnl   • Headache    • Hiatal hernia    • High cholesterol    • History of recurrent UTIs    • HPV (human papilloma virus) infection    • Hx of echocardiogram    • Hx of gout 06/22/2021   • Hypertension    • Hypoglycemia    • Inappropriate sinus node tachycardia    • Kidney infection 06/22/2021    history only   • Migraine    • Nausea & vomiting    • Obesity    • Osteoarthritis    • Ovarian cyst    • Pneumonia    • Pollen allergies    • PONV (postoperative nausea and vomiting)    • Protein S deficiency ([facility]) 11/14/2016    labs from hospitalization for PE   • Pulmonary embolism ([facility]) 11/20/2016    on eliquis since that time   • Recurrent pregnancy loss, antepartum condition or complication    • Sleep apnea     no CPAP- states insurance took it back   • Tattoo     LOWER BACK   • Varicella        Allergies   Allergen Reactions   • Ceftin [Cefuroxime Axetil] Shortness Of Breath and Rash     Numbness in mouth and throat   • Imitrex [Sumatriptan] Shortness Of Breath and Nausea Only    • Amoxicillin Rash       Past Surgical History:   Procedure Laterality Date   •  SECTION        and  and    •  SECTION WITH TUBAL N/A 1/15/2019    Procedure:  SECTION REPEAT WITH TUBAL;  Surgeon: Alva Boyer MD;  Location: [facility] LABOR DELIVERY;  Service: Obstetrics/Gynecology   • COLONOSCOPY N/A 2017    Procedure: COLONOSCOPY WITH BIOPSIES AND ARGON THERMAL ABLATION;  Surgeon: Jignesh Selby MD;  Location: [facility] ENDOSCOPY;  Service:    • D & C HYSTEROSCOPY ENDOMETRIAL ABLATION N/A 3/10/2022    Procedure: DILATATION AND CURETTAGE DIAGNOSTIC HYSTEROSCOPY NOVASURE ENDOMETRIAL ABLATION;  Surgeon: Alva Boyer MD;  Location: [facility] OR;  Service: Obstetrics/Gynecology;  Laterality: N/A;   • DIAGNOSTIC LAPAROSCOPY N/A 2018    Procedure: DIAGNOSTIC LAPAROSCOPY AND ABLATION OF ENDOMETRIOSIS;  Surgeon: Evin Zamudio MD;  Location: [facility] OR;  Service: Obstetrics/Gynecology   • ENDOMETRIAL ABLATION     • ENDOSCOPIC FUNCTIONAL SINUS SURGERY (FESS) Right 2021    Procedure: Right endoscopic total ethmoidectomy, right endoscopic middle meatal antrotomy with cyst removal, right endoscopic frontal recess exploration with cyst removal;  Surgeon: Saulo Brito MD;  Location: [facility] OR;  Service: ENT;  Laterality: Right;   • ENDOSCOPY N/A 2017    Procedure: ESOPHAGOGASTRODUODENOSCOPY WITH BIOPSIES AND COLD BIOPSY POLYPECTOMIES;  Surgeon: Jignesh Selby MD;  Location: [facility] ENDOSCOPY;  Service:    • LAPAROSCOPIC CHOLECYSTECTOMY     • LIPOMA EXCISION N/A 12/10/2021    Procedure: Excision of soft tissue mass lower back;  Surgeon: Nidia Smith MD;  Location: [facility] OR;  Service: General;  Laterality: N/A;   • MOUTH SURGERY      full mouth tooth extration   • WISDOM TOOTH EXTRACTION         Family History   Problem Relation Age of Onset   • Arthritis Mother    • COPD Mother    • Asthma Mother    • Thyroid disease Mother    • Arthritis Father    • Diabetes  Father    • Hypertension Father    • Hyperlipidemia Father    • Kidney disease Father    • Heart attack Father    • Coronary artery disease Father    • Dementia Father    • No Known Problems Son    • Colon cancer Neg Hx    • Liver cancer Neg Hx    • Liver disease Neg Hx    • Stomach cancer Neg Hx    • Esophageal cancer Neg Hx        Social History     Socioeconomic History   • Marital status:    Tobacco Use   • Smoking status: Never Smoker   • Smokeless tobacco: Never Used   Vaping Use   • Vaping Use: Never used   Substance and Sexual Activity   • Alcohol use: No   • Drug use: Never   • Sexual activity: Yes     Partners: Male     Birth control/protection: Surgical     Comment: Tubal           Objective   Physical Exam    Procedures           ED Course  ED Course as of 04/12/22 1920 Tue Apr 12, 2022   1754 Troponin T: <0.010 [AP]   1755 proBNP: 40.0 [AP]   1755 WBC(!): 13.43 [AP]   1755 RBC: 4.97 [AP]   1755 Hemoglobin: 15.3 [AP]   1755 Hematocrit: 44.2 [AP]   1755 Platelets: 382 [AP]   1755 Glucose: 94 [AP]   1755 BUN: 9 [AP]   1755 Creatinine: 0.89 [AP]   1755 Sodium: 140 [AP]   1755 Potassium: 3.5 [AP]   1755 Chloride: 101 [AP]   1755 CO2: 25.4 [AP]   1755 Calcium: 9.7 [AP]   1755 Total Protein: 8.4 [AP]   1755 Albumin: 4.90 [AP]   1755 ALT (SGPT): 17 [AP]   1755 AST (SGOT): 13 [AP]   1755 Alkaline Phosphatase: 114 [AP]   1755 Total Bilirubin: 0.6 [AP]   1755 Globulin: 3.5 [AP]   1755 A/G Ratio: 1.4 [AP]   1755 BUN/Creatinine Ratio: 10.1 [AP]   1755 Anion Gap: 13.6 [AP]   1755 eGFR: 89.0 [AP]   1919 COVID19: Not Detected [AP]   1919 Influenza A PCR: Not Detected [AP]   1919 Influenza B PCR: Not Detected [AP]      ED Course User Index  [AP] Elva Ram PA-C                                                 MDM    Final diagnoses:   None       ED Disposition  ED Disposition     None          No follow-up provider specified.       Medication List      No changes were made to your prescriptions during this  visit.

## 2022-04-13 NOTE — PROGRESS NOTES
Chief Complaint  Follow-up (Transvaginal ultrasound- post op pelvic pain. )     History of Present Illness:  Patient is 31 y.o.  who presents to [facility] for follow-up of her pelvic pain and vaginal discharge.  Patient did have cultures as noted.  Patient reports she has continued to have a light pink discharge.  Patient has not had any heavy bleeding.  Patient had her uterine ablation on March 10.  Patient is scheduled for EGD and colonoscopy on May 9.  Patient is followed by gastroenterology at the [facility].  Patient does report having intermittent episodes of diarrhea.  Patient did recently have labs with her primary care provider.  Patient does report being told in the past she may have Crohn's disease.  Patient did have a biopsy of a gastric polyp which showed low-grade dysplasia as noted.  Patient is having removal of the polyp in May.  Those labs are reviewed today as noted.  Patient has continued on her anticoagulation.  Patient also reports having continued palpitations and elevated heart rate.  Patient does have an appointment with cardiology.    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    • Bleeding disorder (HCC) 16   • Body piercing     TONGUE, NOSE, TWO IN EACH EAR   • Bronchitis    • Chest pain 2021    last CP 2-3 months ago   • Clotting disorder (HCC)     factor 5   • Constipation    • COVID-19 vaccine series completed     Pfizer-plus booster   • Deep vein thrombosis (HCC) 2016   • Diarrhea    • Diverticulitis    • Elevated cholesterol    • Endometriosis    • Factor 5 Leiden mutation, heterozygous ([facility])    • Fibroid    • Fibromyalgia    • Full dentures 2021    advised no adhesives DOS   • Gastroparesis    • GERD (gastroesophageal reflux disease)    • H/O blood clots    • H/O  cardiovascular stress test 06/22/2021    reported several years ago-exercise wnl   • Headache    • Hiatal hernia    • High cholesterol    • History of recurrent UTIs    • HPV (human papilloma virus) infection    • Hx of echocardiogram    • Hx of gout 06/22/2021   • Hypertension    • Hypoglycemia    • Inappropriate sinus node tachycardia    • Kidney infection 06/22/2021    history only   • Migraine    • Nausea & vomiting    • Obesity    • Osteoarthritis    • Ovarian cyst    • Pneumonia    • Pollen allergies    • PONV (postoperative nausea and vomiting)    • Protein S deficiency (HCC) 11/14/2016    labs from hospitalization for PE   • Pulmonary embolism (HCC) 11/20/2016    on eliquis since that time   • Recurrent pregnancy loss, antepartum condition or complication    • Sleep apnea     no CPAP- states insurance took it back   • Tattoo     LOWER BACK   • Varicella      Current Outpatient Medications on File Prior to Visit   Medication Sig Dispense Refill   • albuterol sulfate  (90 Base) MCG/ACT inhaler Inhale 2 puffs Every 4 (Four) Hours As Needed for Wheezing or Shortness of Air. 18 g 11   • Alcohol Swabs (Alcohol Pads) 70 % pads Check sugars as needed/directed for hypoglycemia. 100 each 12   • apixaban (ELIQUIS) 5 MG tablet tablet Take 1 tablet by mouth 2 (Two) Times a Day. 180 tablet 3   • aspirin 81 MG EC tablet Take 81 mg by mouth Daily.     • Atogepant (Qulipta) 60 MG tablet Take 1 tablet by mouth Daily. 30 tablet 11   • atorvastatin (LIPITOR) 40 MG tablet Take 1 tablet by mouth Every Night. 30 tablet 11   • azelastine (ASTELIN) 0.1 % nasal spray 1 spray into the nostril(s) as directed by provider 2 (Two) Times a Day. Use in each nostril as directed 1 each 10   • B-D ULTRA-FINE 33 LANCETS misc Check sugars as needed/directed for hypoglycemia. 100 each 12   • Blood Glucose Monitoring Suppl (ONE TOUCH ULTRA 2) w/Device kit use as needed to check blood sugar for hypoglycemia (diabetes)     • busPIRone  (BUSPAR) 5 MG tablet TAKE 1 TABLET BY MOUTH THREE TIMES A DAY 90 tablet 0   • calcium carbonate EX (TUMS EX) 750 MG chewable tablet Chew 1 tablet Daily. (Patient taking differently: Chew 750 mg Daily As Needed.) 30 tablet 1   • Cholecalciferol (Vitamin D3) 1.25 MG (30093 UT) capsule Take 1 capsule by mouth Every 7 (Seven) Days. Indications: Vitamin D Deficiency 12 capsule 2   • Cholecalciferol (Vitamin D3) 50 MCG (2000 UT) capsule Take 1 capsule by mouth Daily. Start after completing 50,000 IU weekly dose.  Indications: Vitamin D Deficiency (Patient taking differently: Take 2,000 Units by mouth Daily. Start after completing 50,000 IU weekly dose.) 90 capsule 3   • Corlanor 5 MG tablet tablet Take 1.5 tablets by mouth 2 (two) times a day. 90 tablet 6   • Diclofenac Sodium (VOLTAREN) 1 % gel gel Apply 4 g topically to the appropriate area as directed 4 (Four) Times a Day As Needed (joint pain). 150 g 1   • DULoxetine (CYMBALTA) 60 MG capsule Take 1 capsule by mouth Daily. 90 capsule 3   • fluconazole (Diflucan) 150 MG tablet One po now and repeat in 3 days 2 tablet 0   • fluticasone (FLONASE) 50 MCG/ACT nasal spray INSTILL 2 SPRAYS INTO EACH NOSTRIL EVERY DAY AS DIRECTED 16 g 11   • glucose blood test strip USE TO CHECK SUGARS DAILY DUE TO hypoglycemia 100 each 12   • glucose monitor monitoring kit 1 each As Needed (diabetes). Check sugars as needed/directed for hypoglycemia. 1 each 0   • granisetron (KYTRIL) 1 MG tablet Take 1 mg by mouth Every 12 (Twelve) Hours As Needed for Nausea or Vomiting. PA APPROVED.     • ibuprofen (ADVIL,MOTRIN) 800 MG tablet Take 1 tablet by mouth Every 8 (Eight) Hours As Needed for Mild Pain . 30 tablet 0   • l-methylfolate 15 MG tablet tablet Take 1 tablet by mouth Daily. Indications: folic acid deficiency 90 tablet 3   • lidocaine (LIDODERM) 5 % Place 2 patches on the skin as directed by provider Daily. Remove & Discard patch within 12 hours or as directed by MD 60 patch 3   •  methocarbamol (ROBAXIN) 750 MG tablet Take 1 tablet by mouth At Night As Needed for Muscle Spasms. 14 tablet 0   • mometasone-formoterol (Dulera) 200-5 MCG/ACT inhaler Inhale 2 puffs 2 (Two) Times a Day. 13 g 11   • ondansetron (ZOFRAN) 8 MG tablet Take 1 tablet by mouth Every 8 (Eight) Hours As Needed for Nausea or Vomiting. 10 tablet 0   • pantoprazole (PROTONIX) 40 MG EC tablet Take 1 tablet by mouth Daily. 30 tablet 0   • Rimegepant Sulfate (Nurtec) 75 MG tablet dispersible tablet Take 1 tablet by mouth As Needed (as needed). 4 tablet 0   • zonisamide (ZONEGRAN) 100 MG capsule Take 1 capsule by mouth Every Evening. 90 capsule 1     No current facility-administered medications on file prior to visit.     Allergies   Allergen Reactions   • Ceftin [Cefuroxime Axetil] Shortness Of Breath and Rash     Numbness in mouth and throat   • Imitrex [Sumatriptan] Shortness Of Breath and Nausea Only   • Amoxicillin Rash     Past Surgical History:   Procedure Laterality Date   •  SECTION        and  and    •  SECTION WITH TUBAL N/A 1/15/2019    Procedure:  SECTION REPEAT WITH TUBAL;  Surgeon: Alva Boyer MD;  Location: [facility] LABOR DELIVERY;  Service: Obstetrics/Gynecology   • COLONOSCOPY N/A 2017    Procedure: COLONOSCOPY WITH BIOPSIES AND ARGON THERMAL ABLATION;  Surgeon: Jignesh Selby MD;  Location: [facility] ENDOSCOPY;  Service:    • D & C HYSTEROSCOPY ENDOMETRIAL ABLATION N/A 3/10/2022    Procedure: DILATATION AND CURETTAGE DIAGNOSTIC HYSTEROSCOPY NOVASURE ENDOMETRIAL ABLATION;  Surgeon: Alva Boyer MD;  Location: [facility] OR;  Service: Obstetrics/Gynecology;  Laterality: N/A;   • DIAGNOSTIC LAPAROSCOPY N/A 2018    Procedure: DIAGNOSTIC LAPAROSCOPY AND ABLATION OF ENDOMETRIOSIS;  Surgeon: Evin Zamudio MD;  Location: [facility] OR;  Service: Obstetrics/Gynecology   • ENDOMETRIAL ABLATION     • ENDOSCOPIC FUNCTIONAL SINUS SURGERY (FESS) Right 2021    Procedure: Right  "endoscopic total ethmoidectomy, right endoscopic middle meatal antrotomy with cyst removal, right endoscopic frontal recess exploration with cyst removal;  Surgeon: Saulo Brito MD;  Location: [facility] OR;  Service: ENT;  Laterality: Right;   • ENDOSCOPY N/A 4/20/2017    Procedure: ESOPHAGOGASTRODUODENOSCOPY WITH BIOPSIES AND COLD BIOPSY POLYPECTOMIES;  Surgeon: Jignesh Selby MD;  Location: [facility] ENDOSCOPY;  Service:    • LAPAROSCOPIC CHOLECYSTECTOMY     • LIPOMA EXCISION N/A 12/10/2021    Procedure: Excision of soft tissue mass lower back;  Surgeon: Nidia Smith MD;  Location: [facility] OR;  Service: General;  Laterality: N/A;   • MOUTH SURGERY      full mouth tooth extration   • WISDOM TOOTH EXTRACTION       Family History   Problem Relation Age of Onset   • Arthritis Mother    • COPD Mother    • Asthma Mother    • Thyroid disease Mother    • Arthritis Father    • Diabetes Father    • Hypertension Father    • Hyperlipidemia Father    • Kidney disease Father    • Heart attack Father    • Coronary artery disease Father    • Dementia Father    • No Known Problems Son    • Colon cancer Neg Hx    • Liver cancer Neg Hx    • Liver disease Neg Hx    • Stomach cancer Neg Hx    • Esophageal cancer Neg Hx      Social History     Socioeconomic History   • Marital status:    Tobacco Use   • Smoking status: Never Smoker   • Smokeless tobacco: Never Used   Vaping Use   • Vaping Use: Never used   Substance and Sexual Activity   • Alcohol use: No   • Drug use: Never   • Sexual activity: Yes     Partners: Male     Birth control/protection: Surgical     Comment: Tubal       Physical Examination:  Vital Signs: /80   Ht 160 cm (63\")   Wt 88.9 kg (196 lb)   BMI 34.72 kg/m²     General Appearance: alert, appears stated age, and cooperative  Breasts: Not performed.  Abdomen: no masses, no hepatomegaly, no splenomegaly, soft non-tender, no guarding and no rebound tenderness  Pelvic: Not performed.    Data " Review:  The following data was reviewed by: Alva Boyer MD on 04/12/2022:     Labs:  Magnesium (04/08/2022 15:03)  TSH+Free T4 (04/08/2022 15:03)  Vitamin D 25 Hydroxy (04/08/2022 15:03)  Vitamin B12 (04/08/2022 15:03)  Folate (04/08/2022 15:03)  Homocysteine (04/08/2022 15:03)  Methylmalonic Acid, Serum (04/08/2022 15:03)  CBC (No Diff) (04/08/2022 15:03)  Comprehensive Metabolic Panel (04/08/2022 15:03)  C-reactive Protein (04/08/2022 15:03)  Sedimentation Rate (04/08/2022 15:03)  Inflammatory Bowel Disease Panel (04/08/2022 15:03)   (04/08/2022 15:03)  Clostridium Difficile Toxin, PCR - Stool, Per Rectum (04/08/2022 16:17)  Gastrointestinal Panel, PCR - Stool, Per Rectum (04/08/2022 16:17)    Imaging:  US Non-ob Transvaginal (04/12/2022 14:01)    Medical Records:  None    Assessment and Plan   Problem List Items Addressed This Visit        Gastrointestinal Abdominal     Pelvic pain    Patient with pelvic pain is noted.  Patient is to follow-up with gastroenterology as discussed.  Patient is to call and inform them regarding her CT findings as well as recent labs.  Patient voices understanding.        Gastroparesis      Other Visit Diagnoses     Vaginal discharge    -  Primary  Patient informed regarding her cultures.  I have discussed with the patient that she may continue to have a watery discharge for several weeks following her procedure.  Her cultures remain negative.  Instructions and precautions are given.  Patient is to follow-up as discussed.    Palpitations      Patient with continued palpitations as noted.  Patient is to follow-up with cardiology as discussed.    Abdominal pain, unspecified abdominal location      Patient with continued abdominal and pelvic pain is noted.  Patient did have CT scan recently as discussed with the patient.  Patient is to contact her gastroenterologist and inform them regarding her recent labs as well as CT findings.  Patient voices understanding.  The patient  previously had elevated ASCA in 11/27/2017.  Upon review of her medical records patient was having diarrhea at that time as well.  I have discussed with the patient the possibility of inflammatory bowel disease.  Patient is to follow-up with her gastroenterologist as discussed.    Bilateral ovarian cysts      Transvaginal ultrasound is obtained today.  The patient is noted to have small simple follicular cyst bilaterally.  Patient is informed regarding those findings.    Current use of long term anticoagulation              Follow Up/Instructions:  Follow up as noted.  Patient was given instructions and counseling regarding her condition or for health maintenance advice. Please see specific information pulled into the AVS if appropriate.     Note: Speech recognition transcription software may have been used to dictate portions of this document.  An attempt at proofreading has been made though minor errors in transcription may still be present.    This note was electronically signed.  Alva Boyer M.D.

## 2022-04-13 NOTE — DISCHARGE INSTRUCTIONS
Drink plenty of fluids to stay hydrated. Follow up with Cardiology and your PCP for further outpatient evaluation if symptoms persist.  Return to the ER for any new or worsening symptoms.

## 2022-04-14 ENCOUNTER — OFFICE VISIT (OUTPATIENT)
Dept: CARDIOLOGY | Facility: CLINIC | Age: 32
End: 2022-04-14

## 2022-04-14 VITALS
SYSTOLIC BLOOD PRESSURE: 130 MMHG | HEART RATE: 101 BPM | HEIGHT: 63 IN | OXYGEN SATURATION: 98 % | WEIGHT: 200 LBS | DIASTOLIC BLOOD PRESSURE: 88 MMHG | BODY MASS INDEX: 35.44 KG/M2

## 2022-04-14 DIAGNOSIS — R00.0 INAPPROPRIATE SINUS TACHYCARDIA: ICD-10-CM

## 2022-04-14 DIAGNOSIS — G47.33 OSA (OBSTRUCTIVE SLEEP APNEA): ICD-10-CM

## 2022-04-14 DIAGNOSIS — R07.2 PRECORDIAL PAIN: Primary | ICD-10-CM

## 2022-04-14 DIAGNOSIS — G47.33 OBSTRUCTIVE SLEEP APNEA: ICD-10-CM

## 2022-04-14 DIAGNOSIS — R42 DIZZINESS: ICD-10-CM

## 2022-04-14 DIAGNOSIS — R06.02 SHORTNESS OF BREATH: ICD-10-CM

## 2022-04-14 DIAGNOSIS — Z86.711 HISTORY OF PULMONARY EMBOLUS (PE): ICD-10-CM

## 2022-04-14 PROCEDURE — 99214 OFFICE O/P EST MOD 30 MIN: CPT | Performed by: NURSE PRACTITIONER

## 2022-04-14 NOTE — PROGRESS NOTES
"             [facility] Cardiology Office Follow Up Note    Marilu Godoy  [id number]  04/14/2022    Primary Care Provider: Deidre Barker MD   Referring Provider: No ref. provider found    Chief Complaint: ED follow-up for chest pain    History of Present Illness:   Mrs. Marilu Godoy is a 31 y.o. female who presents to the Cardiology Clinic for ED follow-up of chest pain.  Although she was seen for chest pain and palpitations, she reports that palpitations are her chief complaint.  The patient has a longstanding history of sinus tachycardia with a diagnosis of \"inappropriate sinus tachycardia\".Additional history includes hyperlipidemia, protein S deficiency, factor V Leiden mutation, pulmonary embolism on long-term anticoagulation, AMBAR (not currently on CPAP therapy), and severe obesity.  The patient was seen 2 days ago at the [facility] ED for chest pain, palpitations, dizziness, and shortness of breath.  At that time she reported that electrophysiology had discharged her from their service and told her to continue to pursue autonomic nervous system dysfunction.  She received 325 mg of aspirin in the ED but had negative troponin x2 and a normal BNP.  She was negative for COVID-19 virus and flu.  Her chest x-ray showed no acute process.  She also received 1000 mg of acetaminophen and was discharged with instructions to drink plenty of fluids.  She presents today for ED follow-up and evaluation of chest pain.  The patient reports that for the past 1 to 2 weeks she has experienced left upper chest wall pain x2-3 episodes per day.  She states that these can occur with activity or rest and do not radiate.  She states that these episodes may last 30 to 45 minutes before spontaneously resolving.  She describes the sensation as \"burning\", \"cramping\", and \"heavy\".  She rates the pain 6-7/10 on the pain scale.  There are no known precipitating, relieving, or exacerbating factors.  She " "will experience associated symptoms of shortness of breath and nausea.  She also reports chronic diarrhea as a result of longstanding \"GI issues.  She recently was evaluated at [facility] for gastroparesis.  Additionally, she reports palpitations and heart rates as high as 160 per home pulse oximeter.  She reports drinking plenty of fluids with \"light yellow\" urine.  She denies dyspnea on exertion but states she may have shortness of breath during periods of tachycardia.  Dr. Barker recently ordered a heart monitor for her but these results are pending.  She also has an appointment to be seen by  neurology for this autonomic nervous system dysfunction with an appointment in May to establish care.  She also notes that she may experience dizziness with palpitations.  She specifically denies syncope, but states there have been times when she felt as if she might \"pass out\".  She denies orthopnea and lower extremity edema.  She reports that because she did not use her CPAP, her insurance took this away from her.  She drinks 1 can of Mountain Dew per day.  She denies illicit drug use.  She is a lifelong non-smoker.  She offers no other complaints or concerns at this time.        Past Cardiac Testin. Last Coronary Angio: None  2. Prior Stress Testin2018              1. Normal treadmill exercise stress test  3. Last Echo: 2019          1. Estimated EF = 68%.          2. Left ventricular systolic function is normal.    2018.          1. Left ventricular systolic function is normal. Estimated EF = 65%.  12/15/2016          1. All left ventricular wall segments contract normally.          2. Left ventricular function is normal.  Estimated EF = 61%.   4. Prior Holter Monitor: 2020          1. A normal monitor study.               2. Normal cardiac monitor.              3. No correlation between patient's symptoms and underlying arrhythmia or ectopy.              4. Mild sinus " tachycardia with all reported symptoms.       4/1/2019              1. A normal monitor study.              2. Palpitations, dizziness and chest pain were reported during the monitoring period. Palpitations had no correlations. Patient reported frequent episodes of palpitations. Dizziness had no correlations. Patient reported frequent episodes of dizziness. Chest pain had no correlations.            3. Patient reported frequent episodes of chest pain. No complications noted. The predominant rhythm noted during the testing period was sinus tachycardia. Premature atrial contractions did not appear during monitoring. There was no evidence of atrial arrhythmias.               4. There were no episodes of supraventricular tachycardia. Premature ventricular contractions did not appear during monitoring. There were no episodes of ventricular tachycardia. Sinoatrial node conduction was normal. No atrioventricular block noted.       4/26/2018              1. Rhythm is sinus with sinus arrhythmia at times              2. MT and QRS are within normal limits              3. Patient diary was submitted, patient triggered events noted.              4. Sinus rhythm with average of 88 bpm.              5. Supraventricular ectopy average less than 0.1%              6. Ventricular ectopy average less than 0.1%  5. CT Angiogram: 2017              1. Coronary CT angiogram with 3-D reconstruction which showed a coronary calcium score of 10.              2. Early minor soft plaque accumulation in the mid left anterior descending.  This however did not constitute a luminal narrowing.       Review of Systems:   Review of Systems   Constitutional: Positive for fatigue. Negative for activity change, chills, diaphoresis, fever and unexpected weight gain.   Eyes: Negative for blurred vision and visual disturbance.   Respiratory: Positive for shortness of breath. Negative for apnea, cough, chest tightness and wheezing.    Cardiovascular:  Positive for chest pain and palpitations. Negative for leg swelling.   Gastrointestinal: Positive for diarrhea and nausea. Negative for abdominal distention, blood in stool, GERD and indigestion.   Endocrine: Negative for cold intolerance and heat intolerance.   Genitourinary: Negative for hematuria.   Musculoskeletal: Negative for gait problem, joint swelling and myalgias.   Skin: Negative for color change, pallor and wound.   Neurological: Positive for dizziness, weakness and light-headedness. Negative for seizures, syncope, numbness, headache and confusion.        Reports near-syncope   Hematological: Does not bruise/bleed easily.   Psychiatric/Behavioral: Negative for behavioral problems, sleep disturbance, suicidal ideas and depressed mood.     I have reviewed and confirmed the accuracy of the ROS as documented by the MA/LPN/RN ARNIE Ritter    I have reviewed and/or updated the patient's past medical, past surgical, family, social history, problem list and allergies as appropriate.     Medications:     Current Outpatient Medications:   •  albuterol sulfate  (90 Base) MCG/ACT inhaler, Inhale 2 puffs Every 4 (Four) Hours As Needed for Wheezing or Shortness of Air., Disp: 18 g, Rfl: 11  •  Alcohol Swabs (Alcohol Pads) 70 % pads, Check sugars as needed/directed for hypoglycemia., Disp: 100 each, Rfl: 12  •  apixaban (ELIQUIS) 5 MG tablet tablet, Take 1 tablet by mouth 2 (Two) Times a Day., Disp: 180 tablet, Rfl: 3  •  aspirin 81 MG EC tablet, Take 81 mg by mouth Daily., Disp: , Rfl:   •  Atogepant (Qulipta) 60 MG tablet, Take 1 tablet by mouth Daily., Disp: 30 tablet, Rfl: 11  •  atorvastatin (LIPITOR) 40 MG tablet, Take 1 tablet by mouth Every Night., Disp: 30 tablet, Rfl: 11  •  azelastine (ASTELIN) 0.1 % nasal spray, 1 spray into the nostril(s) as directed by provider 2 (Two) Times a Day. Use in each nostril as directed, Disp: 1 each, Rfl: 10  •  B-D ULTRA-FINE 33 LANCETS misc, Check sugars  as needed/directed for hypoglycemia., Disp: 100 each, Rfl: 12  •  Blood Glucose Monitoring Suppl (ONE TOUCH ULTRA 2) w/Device kit, use as needed to check blood sugar for hypoglycemia (diabetes), Disp: , Rfl:   •  busPIRone (BUSPAR) 5 MG tablet, TAKE 1 TABLET BY MOUTH THREE TIMES A DAY, Disp: 90 tablet, Rfl: 0  •  calcium carbonate EX (TUMS EX) 750 MG chewable tablet, Chew 1 tablet Daily. (Patient taking differently: Chew 750 mg Daily As Needed.), Disp: 30 tablet, Rfl: 1  •  Cholecalciferol (Vitamin D3) 1.25 MG (98604 UT) capsule, Take 1 capsule by mouth Every 7 (Seven) Days. Indications: Vitamin D Deficiency, Disp: 12 capsule, Rfl: 2  •  Corlanor 5 MG tablet tablet, Take 1.5 tablets by mouth 2 (two) times a day., Disp: 90 tablet, Rfl: 6  •  Diclofenac Sodium (VOLTAREN) 1 % gel gel, Apply 4 g topically to the appropriate area as directed 4 (Four) Times a Day As Needed (joint pain)., Disp: 150 g, Rfl: 1  •  DULoxetine (CYMBALTA) 60 MG capsule, Take 1 capsule by mouth Daily., Disp: 90 capsule, Rfl: 3  •  fluconazole (Diflucan) 150 MG tablet, One po now and repeat in 3 days, Disp: 2 tablet, Rfl: 0  •  fluticasone (FLONASE) 50 MCG/ACT nasal spray, INSTILL 2 SPRAYS INTO EACH NOSTRIL EVERY DAY AS DIRECTED, Disp: 16 g, Rfl: 11  •  glucose blood test strip, USE TO CHECK SUGARS DAILY DUE TO hypoglycemia, Disp: 100 each, Rfl: 12  •  glucose monitor monitoring kit, 1 each As Needed (diabetes). Check sugars as needed/directed for hypoglycemia., Disp: 1 each, Rfl: 0  •  granisetron (KYTRIL) 1 MG tablet, Take 1 mg by mouth Every 12 (Twelve) Hours As Needed for Nausea or Vomiting. PA APPROVED., Disp: , Rfl:   •  ibuprofen (ADVIL,MOTRIN) 800 MG tablet, Take 1 tablet by mouth Every 8 (Eight) Hours As Needed for Mild Pain ., Disp: 30 tablet, Rfl: 0  •  l-methylfolate 15 MG tablet tablet, Take 1 tablet by mouth Daily. Indications: folic acid deficiency, Disp: 90 tablet, Rfl: 3  •  lidocaine (LIDODERM) 5 %, Place 2 patches on the skin  "as directed by provider Daily. Remove & Discard patch within 12 hours or as directed by MD, Disp: 60 patch, Rfl: 3  •  methocarbamol (ROBAXIN) 750 MG tablet, Take 1 tablet by mouth At Night As Needed for Muscle Spasms., Disp: 14 tablet, Rfl: 0  •  mometasone-formoterol (Dulera) 200-5 MCG/ACT inhaler, Inhale 2 puffs 2 (Two) Times a Day., Disp: 13 g, Rfl: 11  •  ondansetron (ZOFRAN) 8 MG tablet, Take 1 tablet by mouth Every 8 (Eight) Hours As Needed for Nausea or Vomiting., Disp: 10 tablet, Rfl: 0  •  pantoprazole (PROTONIX) 40 MG EC tablet, Take 1 tablet by mouth Daily., Disp: 30 tablet, Rfl: 0  •  Rimegepant Sulfate (Nurtec) 75 MG tablet dispersible tablet, Take 1 tablet by mouth As Needed (as needed)., Disp: 4 tablet, Rfl: 0  •  zonisamide (ZONEGRAN) 100 MG capsule, Take 1 capsule by mouth Every Evening., Disp: 90 capsule, Rfl: 1  •  Cholecalciferol (Vitamin D3) 50 MCG (2000 UT) capsule, Take 1 capsule by mouth Daily. Start after completing 50,000 IU weekly dose.  Indications: Vitamin D Deficiency (Patient taking differently: Take 2,000 Units by mouth Daily. Start after completing 50,000 IU weekly dose.), Disp: 90 capsule, Rfl: 3    Physical Exam:  Vital Signs:   Vitals:    04/14/22 1245   BP: 130/88   BP Location: Right arm   Patient Position: Sitting   Cuff Size: Adult   Pulse: 101   SpO2: 98%   Weight: 90.7 kg (200 lb)   Height: 160 cm (63\")     Body mass index is 35.43 kg/m².    Physical Exam  Vitals and nursing note reviewed.   Constitutional:       General: She is not in acute distress.     Appearance: Normal appearance. She is well-developed.   HENT:      Head: Normocephalic and atraumatic.      Mouth/Throat:      Mouth: Mucous membranes are moist.   Eyes:      General: No scleral icterus.     Extraocular Movements: Extraocular movements intact.   Neck:      Trachea: Trachea normal.   Cardiovascular:      Rate and Rhythm: Normal rate and regular rhythm.      Pulses: Normal pulses.      Heart sounds: Normal " "heart sounds. No murmur heard.    No friction rub. No gallop.   Pulmonary:      Effort: Pulmonary effort is normal.      Breath sounds: Normal breath sounds.   Abdominal:      Palpations: Abdomen is soft.      Tenderness: There is no abdominal tenderness.   Musculoskeletal:         General: Normal range of motion.      Cervical back: Neck supple.      Right lower leg: No edema.      Left lower leg: No edema.   Skin:     General: Skin is warm and dry.      Findings: No bruising, lesion or rash.   Neurological:      Mental Status: She is alert and oriented to person, place, and time.      Motor: No weakness.      Gait: Gait normal.   Psychiatric:         Mood and Affect: Mood normal.         Behavior: Behavior normal. Behavior is cooperative.         Thought Content: Thought content does not include suicidal ideation.         Results Review:   I reviewed the patient's new clinical results.      Assessment / Plan:     1. Precordial pain (Primary)  --Recent EKG showed sinus tachycardia  --Plan for treadmill stress test to rule out ischemia    2. Sinus tachycardia  --Previous intolerance to beta-blockers due to \"low heart rate\"  --48-hour outpatient cardiac monitor study (ordered by PCP), PENDING    3. Palpitations  --4/22, TSH and Free T4 within normal limits  --EKG shows NSR today  --72-hour Holter monitor ordered by PCP, results pending    4. Shortness of breath/dizziness  --Multiple echocardiograms with normal LVEF  --4/19, LVEF 68%  --Plan for echocardiogram    5. AMBAR (obstructive sleep apnea)  --Referral to sleep medicine to reestablish care    6. History of pulmonary embolism  --Chronically anticoagulated with Eliquis       Follow-up will be predicated by results of cardiac testing.  Encouraged patient to stay hydrated to the extent that her urine is pale to clear yellow.  Encouraged her to keep her appointment to establish care with  neurology.    Preventative Cardiology:   Tobacco Cessation: N/A   Advance Care " Planning: ACP discussion was declined by the patient. Patient does not have an advance directive, declines further assistance.     Follow Up:   TBD    Thank you for allowing me to participate in the care of your patient. Please to not hesitate to contact me with additional questions or concerns.     ARNIE Godinez

## 2022-04-15 LAB
25(OH)D3+25(OH)D2 SERPL-MCNC: 9.1 NG/ML (ref 30–100)
ALBUMIN SERPL-MCNC: 4.7 G/DL (ref 3.5–5.2)
ALBUMIN/GLOB SERPL: 1.5 G/DL
ALP SERPL-CCNC: 102 U/L (ref 39–117)
ALT SERPL-CCNC: 16 U/L (ref 1–33)
AST SERPL-CCNC: 13 U/L (ref 1–32)
BAKER'S YEAST IGA QN: 58.8 UNITS (ref 0–24.9)
BAKER'S YEAST IGG QN: 23.8 UNITS (ref 0–24.9)
BILIRUB SERPL-MCNC: 0.4 MG/DL (ref 0–1.2)
BUN SERPL-MCNC: 12 MG/DL (ref 6–20)
BUN/CREAT SERPL: 18.5 (ref 7–25)
CALCIUM SERPL-MCNC: 10 MG/DL (ref 8.6–10.5)
CANCER AG125 SERPL-ACNC: 28.1 U/ML (ref 0–38.1)
CHLORIDE SERPL-SCNC: 101 MMOL/L (ref 98–107)
CO2 SERPL-SCNC: 26.5 MMOL/L (ref 22–29)
CREAT SERPL-MCNC: 0.65 MG/DL (ref 0.57–1)
CRP SERPL-MCNC: <0.3 MG/DL (ref 0–0.5)
EGFRCR SERPLBLD CKD-EPI 2021: 120.9 ML/MIN/1.73
ERYTHROCYTE [DISTWIDTH] IN BLOOD BY AUTOMATED COUNT: 12.5 % (ref 12.3–15.4)
ERYTHROCYTE [SEDIMENTATION RATE] IN BLOOD BY WESTERGREN METHOD: 38 MM/HR (ref 0–20)
FOLATE SERPL-MCNC: 4.07 NG/ML (ref 4.78–24.2)
GLOBULIN SER CALC-MCNC: 3.1 GM/DL
GLUCOSE SERPL-MCNC: 88 MG/DL (ref 65–99)
HCT VFR BLD AUTO: 43.9 % (ref 34–46.6)
HCYS SERPL-SCNC: 9.7 UMOL/L (ref 0–14.5)
HGB BLD-MCNC: 14.9 G/DL (ref 12–15.9)
MAGNESIUM SERPL-MCNC: 2.2 MG/DL (ref 1.6–2.6)
MCH RBC QN AUTO: 30.8 PG (ref 26.6–33)
MCHC RBC AUTO-ENTMCNC: 33.9 G/DL (ref 31.5–35.7)
MCV RBC AUTO: 90.7 FL (ref 79–97)
METHYLMALONATE SERPL-SCNC: 158 NMOL/L (ref 0–378)
P-ANCA ATYPICAL TITR SER IF: ABNORMAL TITER
PLATELET # BLD AUTO: 421 10*3/MM3 (ref 140–450)
POTASSIUM SERPL-SCNC: 4 MMOL/L (ref 3.5–5.2)
PROT SERPL-MCNC: 7.8 G/DL (ref 6–8.5)
RBC # BLD AUTO: 4.84 10*6/MM3 (ref 3.77–5.28)
SODIUM SERPL-SCNC: 139 MMOL/L (ref 136–145)
T4 FREE SERPL-MCNC: 1.13 NG/DL (ref 0.93–1.7)
TSH SERPL DL<=0.005 MIU/L-ACNC: 0.92 UIU/ML (ref 0.27–4.2)
VIT B12 SERPL-MCNC: 458 PG/ML (ref 211–946)
WBC # BLD AUTO: 14.29 10*3/MM3 (ref 3.4–10.8)

## 2022-04-22 ENCOUNTER — TREATMENT (OUTPATIENT)
Dept: PHYSICAL THERAPY | Facility: CLINIC | Age: 32
End: 2022-04-22

## 2022-04-22 ENCOUNTER — TELEPHONE (OUTPATIENT)
Dept: OBSTETRICS AND GYNECOLOGY | Facility: CLINIC | Age: 32
End: 2022-04-22

## 2022-04-22 DIAGNOSIS — M25.512 CHRONIC LEFT SHOULDER PAIN: ICD-10-CM

## 2022-04-22 DIAGNOSIS — G89.29 CHRONIC LEFT SHOULDER PAIN: ICD-10-CM

## 2022-04-22 DIAGNOSIS — M75.42 IMPINGEMENT SYNDROME OF LEFT SHOULDER: Primary | ICD-10-CM

## 2022-04-22 PROCEDURE — 97162 PT EVAL MOD COMPLEX 30 MIN: CPT | Performed by: PHYSICAL THERAPIST

## 2022-04-22 NOTE — TELEPHONE ENCOUNTER
----- Message from Marilu Godoy sent at 4/22/2022  1:51 PM EDT -----  Regarding: Bleeding  It was just spotting but I woke up this morning and it's heavier. I attached pic in last message

## 2022-04-22 NOTE — PROGRESS NOTES
Physical Therapy Initial Evaluation and Plan of Care      Patient: Marilu Godoy   : 1990  Diagnosis/ICD-10 Code:  Impingement syndrome of left shoulder [M75.42]  Referring practitioner: MARÍA Ruiz*    Subjective Evaluation    History of Present Illness  Mechanism of injury: Patient reports that she has been having pain around her L shoulder blade and it seems to be going into her neck and down the L arm. She states that she does have numbness/tingling down L UE. This has been going on since December but it is getting worse. She states that she has had injections twice that help from a few days to a week then it returns. She states that she does have pain when she moves the L shoulder at times. No pain with neck movement.     Patient reports that she is supposed to be having nerve testing done in .       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 She takes it at night before bed. )  Aggravating factors: overhead activity, lifting, prolonged positioning, outstretched reach, repetitive movement, movement and sleeping  Progression: worsening    Social Support  Lives with: young children and spouse    Hand dominance: right    Diagnostic Tests  X-ray: normal    Treatments  Previous treatment: injection treatment and medication  Patient Goals  Patient goals for therapy: increased strength, independence with ADLs/IADLs, return to sport/leisure activities, increased motion and decreased pain             Objective          Postural Observations  Seated posture: fair  Standing posture: fair    Additional Postural Observation Details  Slightly rounded shoulder noted in sitting.     Palpation   Left   Hypertonic in the upper trapezius.   Tenderness of the infraspinatus, lower trapezius, middle trapezius and supraspinatus.      Right   Hypertonic in the upper trapezius.     Cervical/Thoracic Screen   Cervical range of motion within normal limits  Cervical range of motion within normal limits with the following exceptions: No radicular symptoms noted with testing.   Thoracic range of motion within normal limits    Neurological Testing     Sensation     Shoulder   Left Shoulder   Intact: light touch    Right Shoulder   Intact: light touch    Active Range of Motion   Left Shoulder   Flexion: 130 degrees   Abduction: 122 degrees   External rotation 0°: 50 degrees     Right Shoulder   Flexion: 155 degrees   Abduction: 150 degrees   External rotation 0°: 68 degrees     Scapular Mobility   Left Shoulder   Scapular mobility: fair    Right Shoulder   Scapular mobility: fair    Strength/Myotome Testing     Left Shoulder     Planes of Motion   Flexion: 4+   Extension: 4+   Abduction: 4-   Adduction: 4+   External rotation at 0°: 4-   Internal rotation at 0°: 4     Right Shoulder     Planes of Motion   Flexion: 4+   Extension: 4+   Abduction: 4+   Adduction: 4+   External rotation at 0°: 4+   Internal rotation at 0°: 4+     Tests   Cervical     Left   Negative ULTT1, ULTT2, ULTT3 and ULTT4.     Left Shoulder   Positive Hawkin's and Neer's.   Negative drop arm, empty can and painful arc.           Assessment & Plan     Assessment  Impairments: abnormal or restricted ROM, activity intolerance, impaired physical strength, lacks appropriate home exercise program and pain with function  Functional Limitations: carrying objects, lifting, sleeping, pulling, pushing, uncomfortable because of pain, reaching behind back, reaching overhead and unable to perform repetitive tasks  Assessment details: Patient is a 31 year old female who comes to physical therapy with complaints of L shoulder pain. Signs and symptoms are consistent with impingement of L shoulder. No neuro signs/symptoms noted during exam. Numbness patient describes could be related to  inflammation in L shoulder. The patient currently has pain, decreased ROM, decreased strength, and inability to perform many essential functional activities. Patient given HEP to assist with above listed red flags. Pt will benefit from skilled PT services to address the above issues.       Goals  Plan Goals: SHORT TERM GOALS:     2 weeks  1. Pt I w/ HEP  2. Pt to demonstrate PROM of the left shoulder to WFL to improve ability to perform ADL's  3. Pt to demonstrate ability to perform 30 minutes continuous activity in the clinic without increase in pain     LONG TERM GOALS:   6 weeks  1. Pt to demonstrate AROM of the left shoulder to WNL to allow ability to perform all necessary functional activities  2. Pt to demonstrate ability to lift 5# OH with the left arm without increase in pain  3. Pt to report being able to work in home without increase in pain in the shoulder  4. Pt to tolerate 60 minutes continuous activity in the clinic without increase in pain         Plan  Therapy options: will be seen for skilled therapy services  Planned modality interventions: cryotherapy, dry needling, electrical stimulation/Grenadian stimulation and ultrasound  Planned therapy interventions: flexibility, functional ROM exercises, home exercise program, joint mobilization, therapeutic activities, stretching, strengthening, soft tissue mobilization and manual therapy  Frequency: 1x week  Duration in weeks: 6  Treatment plan discussed with: patient        Manual Therapy:         mins  17665;  Therapeutic Exercise:         mins  47770;     Neuromuscular Portia:        mins  04984;    Therapeutic Activity:          mins  11391;     Gait Training:           mins  20184;     Ultrasound:          mins  04001;    Electrical Stimulation:         mins  37106 ( );  Dry Needling          mins self-pay    Timed Treatment:      mins   Total Treatment:     32   mins    PT SIGNATURE: MICHAEL Easton License: [id number]  DATE TREATMENT  INITIATED: 4/22/2022    Initial Certification  Certification Period: 7/20/2022  I certify that the therapy services are furnished while this patient is under my care.  The services outlined above are required by this patient, and will be reviewed every 90 days.     PHYSICIAN: Luther Borges PA-C      DATE:     Please sign and return via fax to [phone].. Thank you, [facility].

## 2022-04-23 NOTE — TELEPHONE ENCOUNTER
Please inform patient her bleeding may have picked up as this may be her normal cycle.  If bleeding continues heavier more than 7 days then have patient follow-up.

## 2022-04-24 ENCOUNTER — APPOINTMENT (OUTPATIENT)
Dept: ULTRASOUND IMAGING | Facility: HOSPITAL | Age: 32
End: 2022-04-24

## 2022-04-24 ENCOUNTER — HOSPITAL ENCOUNTER (EMERGENCY)
Facility: HOSPITAL | Age: 32
Discharge: HOME | End: 2022-04-24
Attending: EMERGENCY MEDICINE | Admitting: EMERGENCY MEDICINE

## 2022-04-24 ENCOUNTER — APPOINTMENT (OUTPATIENT)
Dept: GENERAL RADIOLOGY | Facility: HOSPITAL | Age: 32
End: 2022-04-24

## 2022-04-24 VITALS
HEART RATE: 86 BPM | OXYGEN SATURATION: 99 % | SYSTOLIC BLOOD PRESSURE: 135 MMHG | WEIGHT: 200 LBS | TEMPERATURE: 98.6 F | HEIGHT: 63 IN | DIASTOLIC BLOOD PRESSURE: 82 MMHG | RESPIRATION RATE: 16 BRPM | BODY MASS INDEX: 35.44 KG/M2

## 2022-04-24 DIAGNOSIS — M25.562 ACUTE PAIN OF LEFT KNEE: Primary | ICD-10-CM

## 2022-04-24 PROCEDURE — 73562 X-RAY EXAM OF KNEE 3: CPT

## 2022-04-24 PROCEDURE — 99283 EMERGENCY DEPT VISIT LOW MDM: CPT

## 2022-04-24 PROCEDURE — 93971 EXTREMITY STUDY: CPT

## 2022-04-24 RX ORDER — HYDROCODONE BITARTRATE AND ACETAMINOPHEN 5; 325 MG/1; MG/1
1 TABLET ORAL ONCE
Status: COMPLETED | OUTPATIENT
Start: 2022-04-24 | End: 2022-04-24

## 2022-04-24 RX ADMIN — HYDROCODONE BITARTRATE AND ACETAMINOPHEN 1 TABLET: 5; 325 TABLET ORAL at 17:41

## 2022-04-24 NOTE — ED PROVIDER NOTES
Subjective   Chief Complaint: Left knee pain    History of Present Illness: This is a 31-year-old female patient comes into the ED today complaining of left knee pain.  Patient states she has a history of clotting disorder and is on Eliquis for previous DVT and PE.  Patient states that she has pain behind her knee extending into her calf and inner thigh.  Patient not complaining of any shortness of breath at this time.  Patient rates her pain as 7 out of 10 made worse by movement and palpation better by nonmovement    Nurses Notes reviewed and agree, including vitals, allergies, social history and prior medical history.                Review of Systems   Musculoskeletal: Positive for gait problem and joint swelling.   All other systems reviewed and are negative.      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    • Bleeding disorder (HCC) 11-13-16   • Body piercing     TONGUE, NOSE, TWO IN EACH EAR   • Bronchitis    • Chest pain 06/22/2021    last CP 2-3 months ago   • Clotting disorder ([facility])     factor 5   • Constipation    • COVID-19 vaccine series completed     Pfizer-plus booster   • Deep vein thrombosis (HCC) 11/13/2016   • Diarrhea    • Diverticulitis    • Elevated cholesterol    • Endometriosis    • Factor 5 Leiden mutation, heterozygous ([facility])    • Fibroid    • Fibromyalgia    • Full dentures 06/22/2021    advised no adhesives DOS   • Gastroparesis    • GERD (gastroesophageal reflux disease)    • H/O blood clots    • H/O cardiovascular stress test 06/22/2021    reported several years ago-exercise wnl   • Headache    • Hiatal hernia    • High cholesterol    • History of recurrent UTIs    • HPV (human papilloma virus) infection    • Hx of echocardiogram    • Hx of gout 06/22/2021   • Hypertension    • Hypoglycemia    • Inappropriate sinus node tachycardia    • Kidney  infection 2021    history only   • Migraine    • Nausea & vomiting    • Obesity    • Osteoarthritis    • Ovarian cyst    • Pneumonia    • Pollen allergies    • PONV (postoperative nausea and vomiting)    • Protein S deficiency (HCC) 2016    labs from hospitalization for PE   • Pulmonary embolism (HCC) 2016    on eliquis since that time   • Recurrent pregnancy loss, antepartum condition or complication    • Sleep apnea     no CPAP- states insurance took it back   • Tattoo     LOWER BACK   • Varicella        Allergies   Allergen Reactions   • Ceftin [Cefuroxime Axetil] Shortness Of Breath and Rash     Numbness in mouth and throat   • Imitrex [Sumatriptan] Shortness Of Breath and Nausea Only   • Amoxicillin Rash       Past Surgical History:   Procedure Laterality Date   •  SECTION        and  and    •  SECTION WITH TUBAL N/A 1/15/2019    Procedure:  SECTION REPEAT WITH TUBAL;  Surgeon: Alva Boyer MD;  Location: [facility] LABOR DELIVERY;  Service: Obstetrics/Gynecology   • COLONOSCOPY N/A 2017    Procedure: COLONOSCOPY WITH BIOPSIES AND ARGON THERMAL ABLATION;  Surgeon: Jignesh Selby MD;  Location: [facility] ENDOSCOPY;  Service:    • D & C HYSTEROSCOPY ENDOMETRIAL ABLATION N/A 3/10/2022    Procedure: DILATATION AND CURETTAGE DIAGNOSTIC HYSTEROSCOPY NOVASURE ENDOMETRIAL ABLATION;  Surgeon: Alva Boyer MD;  Location: [facility] OR;  Service: Obstetrics/Gynecology;  Laterality: N/A;   • DIAGNOSTIC LAPAROSCOPY N/A 2018    Procedure: DIAGNOSTIC LAPAROSCOPY AND ABLATION OF ENDOMETRIOSIS;  Surgeon: Evin Zamudio MD;  Location: [facility] OR;  Service: Obstetrics/Gynecology   • ENDOMETRIAL ABLATION     • ENDOSCOPIC FUNCTIONAL SINUS SURGERY (FESS) Right 2021    Procedure: Right endoscopic total ethmoidectomy, right endoscopic middle meatal antrotomy with cyst removal, right endoscopic frontal recess exploration with cyst removal;  Surgeon: Saulo Brito,  MD;  Location: [facility] OR;  Service: ENT;  Laterality: Right;   • ENDOSCOPY N/A 4/20/2017    Procedure: ESOPHAGOGASTRODUODENOSCOPY WITH BIOPSIES AND COLD BIOPSY POLYPECTOMIES;  Surgeon: Jignesh Selby MD;  Location: [facility] ENDOSCOPY;  Service:    • LAPAROSCOPIC CHOLECYSTECTOMY     • LIPOMA EXCISION N/A 12/10/2021    Procedure: Excision of soft tissue mass lower back;  Surgeon: Nidia Smith MD;  Location: [facility] OR;  Service: General;  Laterality: N/A;   • MOUTH SURGERY      full mouth tooth extration   • WISDOM TOOTH EXTRACTION         Family History   Problem Relation Age of Onset   • Arthritis Mother    • COPD Mother    • Asthma Mother    • Thyroid disease Mother    • Arthritis Father    • Diabetes Father    • Hypertension Father    • Hyperlipidemia Father    • Kidney disease Father    • Heart attack Father    • Coronary artery disease Father    • Dementia Father    • No Known Problems Son    • Colon cancer Neg Hx    • Liver cancer Neg Hx    • Liver disease Neg Hx    • Stomach cancer Neg Hx    • Esophageal cancer Neg Hx        Social History     Socioeconomic History   • Marital status:    Tobacco Use   • Smoking status: Never Smoker   • Smokeless tobacco: Never Used   Vaping Use   • Vaping Use: Never used   Substance and Sexual Activity   • Alcohol use: No   • Drug use: Never   • Sexual activity: Yes     Partners: Male     Birth control/protection: Surgical     Comment: Tubal           Objective   Physical Exam  Vitals and nursing note reviewed.   Constitutional:       Appearance: Normal appearance.   HENT:      Head: Normocephalic and atraumatic.   Eyes:      Extraocular Movements: Extraocular movements intact.      Pupils: Pupils are equal, round, and reactive to light.   Cardiovascular:      Rate and Rhythm: Normal rate and regular rhythm.      Pulses: Normal pulses.      Heart sounds: Normal heart sounds.   Pulmonary:      Effort: Pulmonary effort is normal.      Breath sounds: Normal breath  sounds.   Abdominal:      General: Abdomen is flat. Bowel sounds are normal.      Palpations: Abdomen is soft.   Musculoskeletal:         General: Swelling and tenderness present.      Cervical back: Normal range of motion and neck supple.      Left lower leg: Edema present.   Skin:     Capillary Refill: Capillary refill takes less than 2 seconds.   Neurological:      General: No focal deficit present.      Mental Status: She is alert and oriented to person, place, and time. Mental status is at baseline.      GCS: GCS eye subscore is 4. GCS verbal subscore is 5. GCS motor subscore is 6.      Sensory: Sensation is intact.      Motor: Motor function is intact.      Gait: Gait is intact.   Psychiatric:         Attention and Perception: Attention and perception normal.         Mood and Affect: Mood and affect normal.         Speech: Speech normal.         Behavior: Behavior normal. Behavior is cooperative.         Procedures           ED Course  ED Course as of 04/24/22 1846   Sun Apr 24, 2022 1843 Ultrasound is negative for DVT.  X-rays negative for any fractures. [KH]      ED Course User Index  [] Nabeel Victoria APRN                                                 [facility]    Final diagnoses:   Acute pain of left knee       ED Disposition  ED Disposition     ED Disposition   Discharge    Condition   Stable    Comment   --             Deidre Barker MD  [address] [id number]  [phone]    In 1 week           Medication List      Changed    calcium carbonate  MG chewable tablet  Commonly known as: TUMS EX  Chew 1 tablet Daily.  What changed:   · when to take this  · reasons to take this             Nabeel Victoria APRN  04/24/22 1846       Nabeel Victoria APRN  04/24/22 3478

## 2022-04-29 ENCOUNTER — PRIOR AUTHORIZATION (OUTPATIENT)
Dept: INTERNAL MEDICINE | Facility: CLINIC | Age: 32
End: 2022-04-29

## 2022-04-29 ENCOUNTER — OFFICE VISIT (OUTPATIENT)
Dept: INTERNAL MEDICINE | Facility: CLINIC | Age: 32
End: 2022-04-29

## 2022-04-29 VITALS
DIASTOLIC BLOOD PRESSURE: 84 MMHG | HEIGHT: 63 IN | TEMPERATURE: 97.3 F | BODY MASS INDEX: 34.91 KG/M2 | HEART RATE: 118 BPM | OXYGEN SATURATION: 99 % | SYSTOLIC BLOOD PRESSURE: 130 MMHG | WEIGHT: 197 LBS

## 2022-04-29 DIAGNOSIS — M25.562 ACUTE PAIN OF LEFT KNEE: Primary | ICD-10-CM

## 2022-04-29 PROCEDURE — 99213 OFFICE O/P EST LOW 20 MIN: CPT | Performed by: NURSE PRACTITIONER

## 2022-04-29 NOTE — TELEPHONE ENCOUNTER
l-methylfolate 15 MG tablet tablet     And     Cholecalciferol (Vitamin D3) 1.25 MG         PA's submitted

## 2022-05-02 ENCOUNTER — TELEPHONE (OUTPATIENT)
Dept: ORTHOPEDIC SURGERY | Facility: CLINIC | Age: 32
End: 2022-05-02

## 2022-05-02 NOTE — TELEPHONE ENCOUNTER
----- Message from Luther Borges PA-C sent at 5/2/2022 12:40 PM EDT -----  Regarding: FW: left knee pain  Can you help out with this?  ----- Message -----  From: Barby Hein APRN  Sent: 4/29/2022   1:46 PM EDT  To: Luther Borges PA-C  Subject: left knee pain                                   Carmen has been seeing you for shoulder pain.  She is now having left knee pain without injury.  XR and doppler US were normal in ED.  Is it ok to schedule an appointment with you for this, or will she need a new referral?      Thanks  Mckenzie

## 2022-05-03 ENCOUNTER — TREATMENT (OUTPATIENT)
Dept: PHYSICAL THERAPY | Facility: CLINIC | Age: 32
End: 2022-05-03

## 2022-05-03 DIAGNOSIS — G89.29 CHRONIC LEFT SHOULDER PAIN: Primary | ICD-10-CM

## 2022-05-03 DIAGNOSIS — M25.512 CHRONIC LEFT SHOULDER PAIN: Primary | ICD-10-CM

## 2022-05-03 DIAGNOSIS — M75.42 IMPINGEMENT SYNDROME OF LEFT SHOULDER: ICD-10-CM

## 2022-05-03 PROCEDURE — 97530 THERAPEUTIC ACTIVITIES: CPT | Performed by: PHYSICAL THERAPIST

## 2022-05-03 PROCEDURE — 97140 MANUAL THERAPY 1/> REGIONS: CPT | Performed by: PHYSICAL THERAPIST

## 2022-05-03 PROCEDURE — 97110 THERAPEUTIC EXERCISES: CPT | Performed by: PHYSICAL THERAPIST

## 2022-05-03 NOTE — PROGRESS NOTES
Physical Therapy Daily Progress Note    Patient Information  Marilu Godoy  1990      Visit # : 2    Marilu Godoy reports 6/10 pain today at rest.  Patient reports that her shoulder may feel a little worse following the exercises at home. She has been doing the exercises around 2-3 times a day. She states that she just notices more soreness in the shoulder since the exercises.         Objective Pt presents to PT today with no distress noted.     Moderate tenderness noted along medial border of L scapula.     Attempted to progress patient today, however, she had increased pain with all exercises.     L Shoulder AROM  Flexion: 142  Abduction: 126   ER 0°: 62      See Exercise, Manual, and Modality Logs for complete treatment.     Assessment/Plan  Patient tolerated session fair. She had increased pain with all exercises. She tolerated manual therapy well with no increased pain reported. Patient had reports of slightly reduced irritation following moist heat to L scapula. She is demonstrating improved L shoulder AROM. Patient encouraged to continue HEP and utilize ice/heat as needed at home.       Progress per Plan of Care  PT will continue to monitor patients signs and symptoms and progress patient as tolerated.     Visit Diagnoses:    ICD-10-CM ICD-9-CM   1. Chronic left shoulder pain  M25.512 719.41    G89.29 338.29   2. Impingement syndrome of left shoulder  M75.42 726.2            Manual Therapy:    14     mins  05748;  Therapeutic Exercise:    15     mins  62047;     Neuromuscular Portia:        mins  76664;    Therapeutic Activity:     10     mins  72093;     Gait Training:           mins  14127;     Ultrasound:          mins  53417;    Electrical Stimulation:         mins  74179 ( );  Dry Needling          mins self-pay    Timed Treatment:   39   mins   Total Treatment:     50   mins          Batool Young PT  Physical Therapist

## 2022-05-04 ENCOUNTER — PATIENT MESSAGE (OUTPATIENT)
Dept: INTERNAL MEDICINE | Facility: CLINIC | Age: 32
End: 2022-05-04

## 2022-05-04 NOTE — TELEPHONE ENCOUNTER
From: Marilu Godoy  To: Deidre Barker MD  Sent: 5/4/2022 4:40 PM EDT  Subject: Abscess    [facility] on my jawbone. They lanced it and drained it. And told me to come back or get into your office if it got worse. She mentioned it possibly being staph. The pain is down into the side of my neck now.

## 2022-05-06 ENCOUNTER — APPOINTMENT (OUTPATIENT)
Dept: CT IMAGING | Facility: HOSPITAL | Age: 32
End: 2022-05-06

## 2022-05-06 ENCOUNTER — HOSPITAL ENCOUNTER (EMERGENCY)
Facility: HOSPITAL | Age: 32
Discharge: HOME | End: 2022-05-06
Attending: EMERGENCY MEDICINE | Admitting: EMERGENCY MEDICINE

## 2022-05-06 VITALS
BODY MASS INDEX: 34.91 KG/M2 | DIASTOLIC BLOOD PRESSURE: 82 MMHG | SYSTOLIC BLOOD PRESSURE: 124 MMHG | OXYGEN SATURATION: 99 % | WEIGHT: 197 LBS | HEART RATE: 89 BPM | TEMPERATURE: 98.4 F | RESPIRATION RATE: 16 BRPM | HEIGHT: 63 IN

## 2022-05-06 DIAGNOSIS — K08.89 PAIN, DENTAL: Primary | ICD-10-CM

## 2022-05-06 DIAGNOSIS — R10.9 ABDOMINAL PAIN, UNSPECIFIED ABDOMINAL LOCATION: ICD-10-CM

## 2022-05-06 LAB
ALBUMIN SERPL-MCNC: 4.4 G/DL (ref 3.5–5.2)
ALBUMIN/GLOB SERPL: 1.3 G/DL
ALP SERPL-CCNC: 107 U/L (ref 39–117)
ALT SERPL W P-5'-P-CCNC: 16 U/L (ref 1–33)
ANION GAP SERPL CALCULATED.3IONS-SCNC: 10.9 MMOL/L (ref 5–15)
AST SERPL-CCNC: 14 U/L (ref 1–32)
B-HCG UR QL: NEGATIVE
BACTERIA UR QL AUTO: ABNORMAL /HPF
BASOPHILS # BLD AUTO: 0.05 10*3/MM3 (ref 0–0.2)
BASOPHILS NFR BLD AUTO: 0.5 % (ref 0–1.5)
BILIRUB SERPL-MCNC: 0.5 MG/DL (ref 0–1.2)
BILIRUB UR QL STRIP: NEGATIVE
BUN SERPL-MCNC: 15 MG/DL (ref 6–20)
BUN/CREAT SERPL: 15.8 (ref 7–25)
CALCIUM SPEC-SCNC: 9.6 MG/DL (ref 8.6–10.5)
CHLORIDE SERPL-SCNC: 101 MMOL/L (ref 98–107)
CLARITY UR: ABNORMAL
CO2 SERPL-SCNC: 25.1 MMOL/L (ref 22–29)
COLOR UR: YELLOW
CREAT SERPL-MCNC: 0.95 MG/DL (ref 0.57–1)
DEPRECATED RDW RBC AUTO: 41.8 FL (ref 37–54)
EGFRCR SERPLBLD CKD-EPI 2021: 82.3 ML/MIN/1.73
EOSINOPHIL # BLD AUTO: 0.09 10*3/MM3 (ref 0–0.4)
EOSINOPHIL NFR BLD AUTO: 0.9 % (ref 0.3–6.2)
ERYTHROCYTE [DISTWIDTH] IN BLOOD BY AUTOMATED COUNT: 12.7 % (ref 12.3–15.4)
GLOBULIN UR ELPH-MCNC: 3.4 GM/DL
GLUCOSE SERPL-MCNC: 95 MG/DL (ref 65–99)
GLUCOSE UR STRIP-MCNC: NEGATIVE MG/DL
HCT VFR BLD AUTO: 40.4 % (ref 34–46.6)
HGB BLD-MCNC: 13.7 G/DL (ref 12–15.9)
HGB UR QL STRIP.AUTO: NEGATIVE
HYALINE CASTS UR QL AUTO: ABNORMAL /LPF
IMM GRANULOCYTES # BLD AUTO: 0.03 10*3/MM3 (ref 0–0.05)
IMM GRANULOCYTES NFR BLD AUTO: 0.3 % (ref 0–0.5)
KETONES UR QL STRIP: NEGATIVE
LEUKOCYTE ESTERASE UR QL STRIP.AUTO: ABNORMAL
LYMPHOCYTES # BLD AUTO: 1.49 10*3/MM3 (ref 0.7–3.1)
LYMPHOCYTES NFR BLD AUTO: 15.5 % (ref 19.6–45.3)
MCH RBC QN AUTO: 30.6 PG (ref 26.6–33)
MCHC RBC AUTO-ENTMCNC: 33.9 G/DL (ref 31.5–35.7)
MCV RBC AUTO: 90.4 FL (ref 79–97)
MONOCYTES # BLD AUTO: 0.46 10*3/MM3 (ref 0.1–0.9)
MONOCYTES NFR BLD AUTO: 4.8 % (ref 5–12)
NEUTROPHILS NFR BLD AUTO: 7.49 10*3/MM3 (ref 1.7–7)
NEUTROPHILS NFR BLD AUTO: 78 % (ref 42.7–76)
NITRITE UR QL STRIP: NEGATIVE
NRBC BLD AUTO-RTO: 0 /100 WBC (ref 0–0.2)
PH UR STRIP.AUTO: <=5 [PH] (ref 5–8)
PLATELET # BLD AUTO: 347 10*3/MM3 (ref 140–450)
PMV BLD AUTO: 9.4 FL (ref 6–12)
POTASSIUM SERPL-SCNC: 4.5 MMOL/L (ref 3.5–5.2)
PROT SERPL-MCNC: 7.8 G/DL (ref 6–8.5)
PROT UR QL STRIP: NEGATIVE
RBC # BLD AUTO: 4.47 10*6/MM3 (ref 3.77–5.28)
RBC # UR STRIP: ABNORMAL /HPF
REF LAB TEST METHOD: ABNORMAL
SODIUM SERPL-SCNC: 137 MMOL/L (ref 136–145)
SP GR UR STRIP: 1.01 (ref 1–1.03)
SQUAMOUS #/AREA URNS HPF: ABNORMAL /HPF
UROBILINOGEN UR QL STRIP: ABNORMAL
WBC # UR STRIP: ABNORMAL /HPF
WBC NRBC COR # BLD: 9.61 10*3/MM3 (ref 3.4–10.8)

## 2022-05-06 PROCEDURE — 85025 COMPLETE CBC W/AUTO DIFF WBC: CPT | Performed by: NURSE PRACTITIONER

## 2022-05-06 PROCEDURE — 81001 URINALYSIS AUTO W/SCOPE: CPT | Performed by: NURSE PRACTITIONER

## 2022-05-06 PROCEDURE — 80053 COMPREHEN METABOLIC PANEL: CPT | Performed by: NURSE PRACTITIONER

## 2022-05-06 PROCEDURE — 99283 EMERGENCY DEPT VISIT LOW MDM: CPT

## 2022-05-06 PROCEDURE — 81025 URINE PREGNANCY TEST: CPT | Performed by: NURSE PRACTITIONER

## 2022-05-06 PROCEDURE — 25010000002 IOPAMIDOL 61 % SOLUTION: Performed by: EMERGENCY MEDICINE

## 2022-05-06 PROCEDURE — 74177 CT ABD & PELVIS W/CONTRAST: CPT

## 2022-05-06 RX ORDER — SODIUM CHLORIDE 0.9 % (FLUSH) 0.9 %
10 SYRINGE (ML) INJECTION AS NEEDED
Status: DISCONTINUED | OUTPATIENT
Start: 2022-05-06 | End: 2022-05-06 | Stop reason: HOSPADM

## 2022-05-06 RX ORDER — ONDANSETRON 4 MG/1
4 TABLET, ORALLY DISINTEGRATING ORAL 4 TIMES DAILY PRN
Qty: 30 TABLET | Refills: 0 | OUTPATIENT
Start: 2022-05-06 | End: 2022-05-07

## 2022-05-06 RX ADMIN — IOPAMIDOL 100 ML: 612 INJECTION, SOLUTION INTRAVENOUS at 14:06

## 2022-05-06 NOTE — ED PROVIDER NOTES
Subjective   Chief Complaint: Abdominal pain, dental pain    History of Present Illness: This is a 24-year-old female patient comes into the ED today complaining of worsening abdominal pain, chills and dental pain.  Patient states that she had a dental abscess/skin abscess excised on Tuesday and was placed on 2 different antibiotics for possibly being MRSA.  Patient was told that if she started feeling worse to come to the emergency room.  Patient states that she woke up this morning with abdominal pain, chills, fatigue.    Nurses Notes reviewed and agree, including vitals, allergies, social history and prior medical history.                Review of Systems   Constitutional: Positive for chills and fatigue.   HENT: Positive for dental problem.    Gastrointestinal: Positive for abdominal pain.   Skin: Positive for wound.       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    • Bleeding disorder (HCC) 11-13-16   • Body piercing     TONGUE, NOSE, TWO IN EACH EAR   • Bronchitis    • Chest pain 06/22/2021    last CP 2-3 months ago   • Clotting disorder ([facility])     factor 5   • Constipation    • COVID-19 vaccine series completed     Pfizer-plus booster   • Deep vein thrombosis (HCC) 11/13/2016   • Diarrhea    • Diverticulitis    • Elevated cholesterol    • Endometriosis    • Factor 5 Leiden mutation, heterozygous ([facility])    • Fibroid    • Fibromyalgia    • Full dentures 06/22/2021    advised no adhesives DOS   • Gastroparesis    • GERD (gastroesophageal reflux disease)    • H/O blood clots    • H/O cardiovascular stress test 06/22/2021    reported several years ago-exercise wnl   • Headache    • Hiatal hernia    • High cholesterol    • History of recurrent UTIs    • HPV (human papilloma virus) infection    • Hx of echocardiogram    • Hx of gout 06/22/2021   • Hypertension    •  Hypoglycemia    • Inappropriate sinus node tachycardia    • Kidney infection 2021    history only   • Migraine    • Nausea & vomiting    • Obesity    • Osteoarthritis    • Ovarian cyst    • Pneumonia    • Pollen allergies    • PONV (postoperative nausea and vomiting)    • Protein S deficiency (HCC) 2016    labs from hospitalization for PE   • Pulmonary embolism (HCC) 2016    on eliquis since that time   • Recurrent pregnancy loss, antepartum condition or complication    • Sleep apnea     no CPAP- states insurance took it back   • Tattoo     LOWER BACK   • Varicella        Allergies   Allergen Reactions   • Ceftin [Cefuroxime Axetil] Shortness Of Breath and Rash     Numbness in mouth and throat   • Cefuroxime Hives, Rash and Shortness Of Breath     Numbness in mouth and throat  Numbness in mouth and throat   • Sumatriptan Shortness Of Breath and Nausea Only     Other reaction(s): Nausea Only, Other (See Comments)  Worsening migraine  Worsening migraine   • Amoxicillin Rash, Hives and Itching       Past Surgical History:   Procedure Laterality Date   •  SECTION        and  and    •  SECTION WITH TUBAL N/A 1/15/2019    Procedure:  SECTION REPEAT WITH TUBAL;  Surgeon: Alva Boyer MD;  Location: [facility] LABOR DELIVERY;  Service: Obstetrics/Gynecology   • COLONOSCOPY N/A 2017    Procedure: COLONOSCOPY WITH BIOPSIES AND ARGON THERMAL ABLATION;  Surgeon: Jignesh Selby MD;  Location: [facility] ENDOSCOPY;  Service:    • D & C HYSTEROSCOPY ENDOMETRIAL ABLATION N/A 3/10/2022    Procedure: DILATATION AND CURETTAGE DIAGNOSTIC HYSTEROSCOPY NOVASURE ENDOMETRIAL ABLATION;  Surgeon: Alva Boyer MD;  Location: [facility] OR;  Service: Obstetrics/Gynecology;  Laterality: N/A;   • DIAGNOSTIC LAPAROSCOPY N/A 2018    Procedure: DIAGNOSTIC LAPAROSCOPY AND ABLATION OF ENDOMETRIOSIS;  Surgeon: Evin Zamudio MD;  Location: [facility] OR;  Service: Obstetrics/Gynecology   •  ENDOMETRIAL ABLATION     • ENDOSCOPIC FUNCTIONAL SINUS SURGERY (FESS) Right 6/25/2021    Procedure: Right endoscopic total ethmoidectomy, right endoscopic middle meatal antrotomy with cyst removal, right endoscopic frontal recess exploration with cyst removal;  Surgeon: Saulo Brito MD;  Location: [facility] OR;  Service: ENT;  Laterality: Right;   • ENDOSCOPY N/A 4/20/2017    Procedure: ESOPHAGOGASTRODUODENOSCOPY WITH BIOPSIES AND COLD BIOPSY POLYPECTOMIES;  Surgeon: Jignesh Selby MD;  Location: [facility] ENDOSCOPY;  Service:    • LAPAROSCOPIC CHOLECYSTECTOMY     • LIPOMA EXCISION N/A 12/10/2021    Procedure: Excision of soft tissue mass lower back;  Surgeon: Nidia Smith MD;  Location: [facility] OR;  Service: General;  Laterality: N/A;   • MOUTH SURGERY      full mouth tooth extration   • WISDOM TOOTH EXTRACTION         Family History   Problem Relation Age of Onset   • Arthritis Mother    • COPD Mother    • Asthma Mother    • Thyroid disease Mother    • Arthritis Father    • Diabetes Father    • Hypertension Father    • Hyperlipidemia Father    • Kidney disease Father    • Heart attack Father    • Coronary artery disease Father    • Dementia Father    • No Known Problems Son    • Colon cancer Neg Hx    • Liver cancer Neg Hx    • Liver disease Neg Hx    • Stomach cancer Neg Hx    • Esophageal cancer Neg Hx        Social History     Socioeconomic History   • Marital status:    Tobacco Use   • Smoking status: Never Smoker   • Smokeless tobacco: Never Used   Vaping Use   • Vaping Use: Never used   Substance and Sexual Activity   • Alcohol use: No   • Drug use: Never   • Sexual activity: Yes     Partners: Male     Birth control/protection: Surgical     Comment: Tubal           Objective   Physical Exam  Vitals and nursing note reviewed.   Constitutional:       Appearance: Normal appearance.   HENT:      Head: Normocephalic and atraumatic.   Eyes:      Extraocular Movements: Extraocular movements intact.       Pupils: Pupils are equal, round, and reactive to light.   Cardiovascular:      Rate and Rhythm: Normal rate and regular rhythm.      Pulses: Normal pulses.      Heart sounds: Normal heart sounds.   Pulmonary:      Effort: Pulmonary effort is normal.      Breath sounds: Normal breath sounds.   Abdominal:      General: Abdomen is flat. Bowel sounds are normal.      Palpations: Abdomen is soft.   Musculoskeletal:      Cervical back: Normal range of motion and neck supple.   Skin:     Capillary Refill: Capillary refill takes less than 2 seconds.   Neurological:      General: No focal deficit present.      Mental Status: She is alert and oriented to person, place, and time. Mental status is at baseline.      GCS: GCS eye subscore is 4. GCS verbal subscore is 5. GCS motor subscore is 6.      Sensory: Sensation is intact.      Motor: Motor function is intact.      Gait: Gait is intact.   Psychiatric:         Attention and Perception: Attention and perception normal.         Mood and Affect: Mood and affect normal.         Speech: Speech normal.         Behavior: Behavior normal. Behavior is cooperative.         Procedures           ED Course                                                 MDM    Final diagnoses:   Pain, dental   Abdominal pain, unspecified abdominal location       ED Disposition  ED Disposition     ED Disposition   Discharge    Condition   Stable    Comment   --             Deidre Barker MD  [address] [place] [address] [phone]    In 1 week  Follow-up         Medication List      New Prescriptions    ondansetron ODT 4 MG disintegrating tablet  Commonly known as: ZOFRAN-ODT  Place 1 tablet on the tongue 4 (Four) Times a Day As Needed for Nausea or Vomiting for up to 30 doses.        Changed    calcium carbonate  MG chewable tablet  Commonly known as: TUMS EX  Chew 1 tablet Daily.  What changed:   · when to take this  · reasons to take this           Where to Get Your  Medications      These medications were sent to [id number] - [place], [place] - [address] - [phone]  - [phone] FX  [address] [address] [place] [address]    Phone: [phone]   · ondansetron ODT 4 MG disintegrating tablet          Nabeel Victoria, APRN  05/06/22 1457       Nabeel Victoria, APRN  05/06/22 1504

## 2022-05-11 ENCOUNTER — OFFICE VISIT (OUTPATIENT)
Dept: ORTHOPEDIC SURGERY | Facility: CLINIC | Age: 32
End: 2022-05-11

## 2022-05-11 ENCOUNTER — OFFICE VISIT (OUTPATIENT)
Dept: OBSTETRICS AND GYNECOLOGY | Facility: CLINIC | Age: 32
End: 2022-05-11

## 2022-05-11 VITALS
DIASTOLIC BLOOD PRESSURE: 68 MMHG | SYSTOLIC BLOOD PRESSURE: 126 MMHG | HEIGHT: 63 IN | WEIGHT: 197 LBS | BODY MASS INDEX: 34.91 KG/M2

## 2022-05-11 VITALS — WEIGHT: 197 LBS | TEMPERATURE: 98.6 F | HEIGHT: 63 IN | BODY MASS INDEX: 34.91 KG/M2

## 2022-05-11 DIAGNOSIS — M25.562 ARTHRALGIA OF LEFT KNEE: Primary | ICD-10-CM

## 2022-05-11 DIAGNOSIS — Z79.01 CURRENT USE OF LONG TERM ANTICOAGULATION: ICD-10-CM

## 2022-05-11 DIAGNOSIS — N94.6 DYSMENORRHEA: ICD-10-CM

## 2022-05-11 DIAGNOSIS — R93.5 ABNORMAL CT OF THE ABDOMEN: ICD-10-CM

## 2022-05-11 DIAGNOSIS — R10.2 PELVIC PAIN: ICD-10-CM

## 2022-05-11 DIAGNOSIS — N92.0 MENORRHAGIA WITH REGULAR CYCLE: Primary | ICD-10-CM

## 2022-05-11 DIAGNOSIS — Z13.79 ENCOUNTER FOR GENETIC TESTING OF FEMALE: ICD-10-CM

## 2022-05-11 DIAGNOSIS — R10.9 ABDOMINAL PAIN, UNSPECIFIED ABDOMINAL LOCATION: ICD-10-CM

## 2022-05-11 DIAGNOSIS — M70.50 PES ANSERINE BURSITIS: ICD-10-CM

## 2022-05-11 PROCEDURE — 99213 OFFICE O/P EST LOW 20 MIN: CPT | Performed by: PHYSICIAN ASSISTANT

## 2022-05-11 PROCEDURE — 99214 OFFICE O/P EST MOD 30 MIN: CPT | Performed by: OBSTETRICS & GYNECOLOGY

## 2022-05-11 NOTE — PROGRESS NOTES
Subjective   Patient ID: Marilu Godoy is a 31 y.o. right hand dominant female  Pain of the Left Knee (States she has been having pain for about a month, maybe a little longer. No trauma.)         History of Present Illness  Patient presents with complaints of left anterior medial knee pain that has been ongoing for 1 month.  She states at times the area along the inner aspect of the knee will swell and become tender to palpation but subsides with rest and elevation.  There has been no injury or trauma.  She often reports pain that radiates from the back of the left knee down into the left foot with associated paresthesias.  Patient is very concerned that she may have Ehler's Danlos syndrome.  She mentions that her primary care doctor mention testing but it would have to be done in [place] per the patient.    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    • Bleeding disorder ([facility]) 11-13-16   • Body piercing     TONGUE, NOSE, TWO IN EACH EAR   • Bronchitis    • Chest pain 06/22/2021    last CP 2-3 months ago   • Clotting disorder ([facility])     factor 5   • Constipation    • COVID-19 vaccine series completed     Pfizer-plus booster   • Deep vein thrombosis ([facility]) 11/13/2016   • Diarrhea    • Diverticulitis    • Elevated cholesterol    • Endometriosis    • Factor 5 Leiden mutation, heterozygous ([facility])    • Fibroid    • Fibromyalgia    • Full dentures 06/22/2021    advised no adhesives DOS   • Gastroparesis    • GERD (gastroesophageal reflux  disease)    • H/O blood clots    • H/O cardiovascular stress test 2021    reported several years ago-exercise wnl   • Headache    • Hiatal hernia    • High cholesterol    • History of recurrent UTIs    • HPV (human papilloma virus) infection    • Hx of echocardiogram    • Hx of gout 2021   • Hypertension    • Hypoglycemia    • Inappropriate sinus node tachycardia    • Kidney infection 2021    history only   • Migraine    • Nausea & vomiting    • Obesity    • Osteoarthritis    • Ovarian cyst    • Pneumonia    • Pollen allergies    • PONV (postoperative nausea and vomiting)    • Protein S deficiency (HCC) 2016    labs from hospitalization for PE   • Pulmonary embolism (HCC) 2016    on eliquis since that time   • Recurrent pregnancy loss, antepartum condition or complication    • Sleep apnea     no CPAP- states insurance took it back   • Tattoo     LOWER BACK   • Varicella         Past Surgical History:   Procedure Laterality Date   •  SECTION        and  and    •  SECTION WITH TUBAL N/A 1/15/2019    Procedure:  SECTION REPEAT WITH TUBAL;  Surgeon: Alva Boyer MD;  Location: [facility] LABOR DELIVERY;  Service: Obstetrics/Gynecology   • COLONOSCOPY N/A 2017    Procedure: COLONOSCOPY WITH BIOPSIES AND ARGON THERMAL ABLATION;  Surgeon: Jignesh Selby MD;  Location: [facility] ENDOSCOPY;  Service:    • D & C HYSTEROSCOPY ENDOMETRIAL ABLATION N/A 3/10/2022    Procedure: DILATATION AND CURETTAGE DIAGNOSTIC HYSTEROSCOPY NOVASURE ENDOMETRIAL ABLATION;  Surgeon: Alva Boyer MD;  Location: [facility] OR;  Service: Obstetrics/Gynecology;  Laterality: N/A;   • DIAGNOSTIC LAPAROSCOPY N/A 2018    Procedure: DIAGNOSTIC LAPAROSCOPY AND ABLATION OF ENDOMETRIOSIS;  Surgeon: Evin Zamudio MD;  Location: [facility] OR;  Service: Obstetrics/Gynecology   • ENDOMETRIAL ABLATION     • ENDOSCOPIC FUNCTIONAL SINUS SURGERY (FESS) Right 2021    Procedure: Right  endoscopic total ethmoidectomy, right endoscopic middle meatal antrotomy with cyst removal, right endoscopic frontal recess exploration with cyst removal;  Surgeon: Saulo Brito MD;  Location: [facility] OR;  Service: ENT;  Laterality: Right;   • ENDOSCOPY N/A 4/20/2017    Procedure: ESOPHAGOGASTRODUODENOSCOPY WITH BIOPSIES AND COLD BIOPSY POLYPECTOMIES;  Surgeon: Jignesh Selby MD;  Location: [facility] ENDOSCOPY;  Service:    • LAPAROSCOPIC CHOLECYSTECTOMY     • LIPOMA EXCISION N/A 12/10/2021    Procedure: Excision of soft tissue mass lower back;  Surgeon: Nidia Smith MD;  Location: [facility] OR;  Service: General;  Laterality: N/A;   • MOUTH SURGERY      full mouth tooth extration   • WISDOM TOOTH EXTRACTION         Family History   Problem Relation Age of Onset   • Arthritis Mother    • COPD Mother    • Asthma Mother    • Thyroid disease Mother    • Arthritis Father    • Diabetes Father    • Hypertension Father    • Hyperlipidemia Father    • Kidney disease Father    • Heart attack Father    • Coronary artery disease Father    • Dementia Father    • No Known Problems Son    • Colon cancer Neg Hx    • Liver cancer Neg Hx    • Liver disease Neg Hx    • Stomach cancer Neg Hx    • Esophageal cancer Neg Hx        Social History     Socioeconomic History   • Marital status:    Tobacco Use   • Smoking status: Never Smoker   • Smokeless tobacco: Never Used   Vaping Use   • Vaping Use: Never used   Substance and Sexual Activity   • Alcohol use: No   • Drug use: Never   • Sexual activity: Yes     Partners: Male     Birth control/protection: Surgical     Comment: Tubal         Current Outpatient Medications:   •  albuterol sulfate  (90 Base) MCG/ACT inhaler, Inhale 2 puffs Every 4 (Four) Hours As Needed for Wheezing or Shortness of Air., Disp: 18 g, Rfl: 11  •  Alcohol Swabs (Alcohol Pads) 70 % pads, Check sugars as needed/directed for hypoglycemia., Disp: 100 each, Rfl: 12  •  apixaban (ELIQUIS) 5 MG  tablet tablet, Take 1 tablet by mouth 2 (Two) Times a Day., Disp: 180 tablet, Rfl: 3  •  aspirin 81 MG EC tablet, Take 81 mg by mouth Daily., Disp: , Rfl:   •  Atogepant (Qulipta) 60 MG tablet, Take 1 tablet by mouth Daily., Disp: 30 tablet, Rfl: 11  •  atorvastatin (LIPITOR) 40 MG tablet, Take 1 tablet by mouth Every Night., Disp: 30 tablet, Rfl: 11  •  azelastine (ASTELIN) 0.1 % nasal spray, 1 spray into the nostril(s) as directed by provider 2 (Two) Times a Day. Use in each nostril as directed, Disp: 1 each, Rfl: 10  •  B-D ULTRA-FINE 33 LANCETS misc, Check sugars as needed/directed for hypoglycemia., Disp: 100 each, Rfl: 12  •  Blood Glucose Monitoring Suppl (ONE TOUCH ULTRA 2) w/Device kit, use as needed to check blood sugar for hypoglycemia (diabetes), Disp: , Rfl:   •  busPIRone (BUSPAR) 5 MG tablet, TAKE 1 TABLET BY MOUTH THREE TIMES A DAY, Disp: 90 tablet, Rfl: 0  •  calcium carbonate EX (TUMS EX) 750 MG chewable tablet, Chew 1 tablet Daily. (Patient taking differently: Chew 750 mg Daily As Needed.), Disp: 30 tablet, Rfl: 1  •  Cholecalciferol (Vitamin D3) 1.25 MG (41031 UT) capsule, Take 1 capsule by mouth Every 7 (Seven) Days. Indications: Vitamin D Deficiency, Disp: 12 capsule, Rfl: 2  •  Cholecalciferol (Vitamin D3) 50 MCG (2000 UT) capsule, Take 1 capsule by mouth Daily. Start after completing 50,000 IU weekly dose.  Indications: Vitamin D Deficiency (Patient taking differently: Take 2,000 Units by mouth Daily. Start after completing 50,000 IU weekly dose.), Disp: 90 capsule, Rfl: 3  •  clindamycin (CLEOCIN) 300 MG capsule, Take 1 capsule by mouth 3 (Three) Times a Day for 10 days., Disp: 30 capsule, Rfl: 0  •  Corlanor 5 MG tablet tablet, Take 1.5 tablets by mouth 2 (two) times a day., Disp: 90 tablet, Rfl: 6  •  Diclofenac Sodium (VOLTAREN) 1 % gel gel, Apply 4 g topically to the appropriate area as directed 4 (Four) Times a Day As Needed (joint pain)., Disp: 150 g, Rfl: 1  •  DULoxetine (CYMBALTA)  60 MG capsule, Take 1 capsule by mouth Daily., Disp: 90 capsule, Rfl: 3  •  fluconazole (DIFLUCAN) 150 MG tablet, Take 1 tablet by mouth Every 3 (Three) Days., Disp: 2 tablet, Rfl: 0  •  fluticasone (FLONASE) 50 MCG/ACT nasal spray, INSTILL 2 SPRAYS INTO EACH NOSTRIL EVERY DAY AS DIRECTED, Disp: 16 g, Rfl: 11  •  glucose blood test strip, USE TO CHECK SUGARS DAILY DUE TO hypoglycemia, Disp: 100 each, Rfl: 12  •  glucose monitor monitoring kit, 1 each As Needed (diabetes). Check sugars as needed/directed for hypoglycemia., Disp: 1 each, Rfl: 0  •  granisetron (KYTRIL) 1 MG tablet, Take 1 mg by mouth Every 12 (Twelve) Hours As Needed for Nausea or Vomiting. PA APPROVED., Disp: , Rfl:   •  ibuprofen (ADVIL,MOTRIN) 800 MG tablet, Take 1 tablet by mouth Every 8 (Eight) Hours As Needed for Mild Pain ., Disp: 30 tablet, Rfl: 0  •  l-methylfolate 15 MG tablet tablet, Take 1 tablet by mouth Daily. Indications: folic acid deficiency, Disp: 90 tablet, Rfl: 3  •  lidocaine (LIDODERM) 5 %, Place 2 patches on the skin as directed by provider Daily. Remove & Discard patch within 12 hours or as directed by MD, Disp: 60 patch, Rfl: 3  •  methocarbamol (ROBAXIN) 750 MG tablet, Take 1 tablet by mouth At Night As Needed for Muscle Spasms., Disp: 14 tablet, Rfl: 0  •  mometasone-formoterol (Dulera) 200-5 MCG/ACT inhaler, Inhale 2 puffs 2 (Two) Times a Day., Disp: 13 g, Rfl: 11  •  mupirocin (Bactroban) 2 % cream, Apply 1 application topically to the appropriate area as directed 3 (Three) Times a Day., Disp: 1 each, Rfl: 0  •  ondansetron (ZOFRAN) 8 MG tablet, Take 1 tablet by mouth Every 8 (Eight) Hours As Needed for Nausea or Vomiting., Disp: 10 tablet, Rfl: 0  •  pantoprazole (PROTONIX) 40 MG EC tablet, Take 1 tablet by mouth Daily., Disp: 30 tablet, Rfl: 0  •  Rimegepant Sulfate (Nurtec) 75 MG tablet dispersible tablet, Take 1 tablet by mouth As Needed (as needed)., Disp: 4 tablet, Rfl: 0  •  sulfamethoxazole-trimethoprim (Bactrim  "DS) 800-160 MG per tablet, Take 1 tablet by mouth 2 (Two) Times a Day., Disp: 14 tablet, Rfl: 0  •  zonisamide (ZONEGRAN) 100 MG capsule, Take 1 capsule by mouth Every Evening., Disp: 90 capsule, Rfl: 1    Allergies   Allergen Reactions   • Ceftin [Cefuroxime Axetil] Shortness Of Breath and Rash     Numbness in mouth and throat   • Cefuroxime Hives, Rash and Shortness Of Breath     Numbness in mouth and throat  Numbness in mouth and throat   • Sumatriptan Shortness Of Breath and Nausea Only     Other reaction(s): Nausea Only, Other (See Comments)  Worsening migraine  Worsening migraine   • Amoxicillin Rash, Hives and Itching       Review of Systems   Constitutional: Negative for fever.   HENT: Negative for dental problem and voice change.    Eyes: Negative for visual disturbance.   Respiratory: Negative for shortness of breath.    Cardiovascular: Negative for chest pain.   Gastrointestinal: Negative for abdominal pain.   Genitourinary: Negative for dysuria.   Musculoskeletal: Positive for arthralgias (left knee). Negative for gait problem and joint swelling.   Skin: Negative for rash.   Neurological: Negative for speech difficulty.   Hematological: Does not bruise/bleed easily.   Psychiatric/Behavioral: Negative for confusion.       I have reviewed the medical and surgical history, family history, social history, medications, and/or allergies, and the review of systems of this report.    Objective   Temp 98.6 °F (37 °C)   Ht 160 cm (62.99\")   Wt 89.4 kg (197 lb)   LMP 04/24/2022 (Exact Date)   BMI 34.91 kg/m²    Physical Exam  Vitals and nursing note reviewed.   Constitutional:       Appearance: Normal appearance.   Pulmonary:      Effort: Pulmonary effort is normal.   Musculoskeletal:      Left knee:      Instability Tests: Medial Issa test negative and lateral Issa test negative.   Neurological:      Mental Status: She is alert and oriented to person, place, and time.   Psychiatric:         Behavior: " Behavior normal.       Left Knee Exam     Tenderness   The patient is experiencing tenderness in the pes anserinus and lateral joint line.    Range of Motion   Extension: 0   Flexion: 120     Tests   Issa:  Medial - negative Lateral - negative  Drawer:  Anterior - negative     Posterior - negative    Other   Erythema: absent  Sensation: normal           Extremity DVT signs are negative on physical exam with negative Tobias sign, no calf pain, no palpable cords and no skin tone change   Neurologic Exam     Mental Status   Oriented to person, place, and time.              Assessment & Plan   Independent Review of Radiographic Studies:    AP standing of both knees and lateral of left knee knee, indication to evaluate pain, no comparison views or not available, shows no acute fracture or dislocation evident.      Procedures       Diagnoses and all orders for this visit:    1. Arthralgia of left knee (Primary)  -     XR Knee 1 or 2 View Left; Future  -     MRI Knee Left Without Contrast    2. Pes anserine bursitis  -     MRI Knee Left Without Contrast    3. Encounter for genetic testing of female  -     Ambulatory Referral to Rheumatology       Orthopedic activities reviewed and patient expressed appreciation  Discussion of orthopedic goals  Risk, benefits, and merits of treatment alternatives reviewed with the patient and questions answered  Ice, heat, and/or modalities as beneficial    Recommendations/Plan:  Exercise, medications, injections, other patient advice, and return appointment as noted.  Patient is encouraged to call or return for any issues or concerns.  May apply topical anti-inflammatory to the left knee as needed.  Follow-up after MRI.    Patient is very concerned about possible Raquel-Danlos syndrome.  She would like a referral for  testing of this  Patient agreeable to call or return sooner for any concerns.      EMR Dragon-transcription disclaimer:  This encounter note is an electronic transcription  of spoken language to printed text.  Electronic transcription of spoken language may permit erroneous or at times nonsensical words or phrases to be inadvertently transcribed.  Although I have reviewed the note for such errors, some may still exist

## 2022-05-12 ENCOUNTER — OFFICE VISIT (OUTPATIENT)
Dept: SURGERY | Facility: CLINIC | Age: 32
End: 2022-05-12

## 2022-05-12 ENCOUNTER — OFFICE VISIT (OUTPATIENT)
Dept: INTERNAL MEDICINE | Facility: CLINIC | Age: 32
End: 2022-05-12

## 2022-05-12 VITALS
TEMPERATURE: 98.6 F | HEART RATE: 87 BPM | RESPIRATION RATE: 18 BRPM | HEIGHT: 63 IN | WEIGHT: 197.6 LBS | SYSTOLIC BLOOD PRESSURE: 126 MMHG | DIASTOLIC BLOOD PRESSURE: 82 MMHG | BODY MASS INDEX: 35.01 KG/M2 | OXYGEN SATURATION: 97 %

## 2022-05-12 VITALS
WEIGHT: 197 LBS | OXYGEN SATURATION: 99 % | DIASTOLIC BLOOD PRESSURE: 84 MMHG | BODY MASS INDEX: 34.91 KG/M2 | HEART RATE: 76 BPM | SYSTOLIC BLOOD PRESSURE: 140 MMHG | HEIGHT: 63 IN | TEMPERATURE: 97.3 F

## 2022-05-12 DIAGNOSIS — L02.01 ABSCESS OF FACE: Primary | ICD-10-CM

## 2022-05-12 DIAGNOSIS — R19.7 DIARRHEA, UNSPECIFIED TYPE: ICD-10-CM

## 2022-05-12 DIAGNOSIS — M35.7 HYPERMOBILITY SYNDROME: Primary | ICD-10-CM

## 2022-05-12 DIAGNOSIS — E53.8 FOLIC ACID DEFICIENCY: ICD-10-CM

## 2022-05-12 DIAGNOSIS — E55.9 VITAMIN D DEFICIENCY: ICD-10-CM

## 2022-05-12 DIAGNOSIS — G90.3 NEUROGENIC ORTHOSTATIC HYPOTENSION: ICD-10-CM

## 2022-05-12 DIAGNOSIS — M79.18 CHRONIC MUSCULOSKELETAL PAIN: ICD-10-CM

## 2022-05-12 DIAGNOSIS — G89.29 CHRONIC MUSCULOSKELETAL PAIN: ICD-10-CM

## 2022-05-12 PROCEDURE — 99215 OFFICE O/P EST HI 40 MIN: CPT | Performed by: FAMILY MEDICINE

## 2022-05-12 PROCEDURE — 99212 OFFICE O/P EST SF 10 MIN: CPT | Performed by: SURGERY

## 2022-05-12 NOTE — PROGRESS NOTES
Chief Complaint  Follow-up (Patient complains of heavy and painful menstrual cycles since D&C and ablation. )     History of Present Illness:  Patient is 31 y.o.  who presents to [facility] here for follow-up evaluation of her menorrhagia and dysmenorrhea.  Patient has had 1 menstrual cycle since her procedure.  She reports it was extremely heavy and painful.  She reports no improvement in her menstrual cycles.  Patient did have prolonged spotting and discharge following her procedure.  Patient has had a transvaginal ultrasound since her procedure as well.  Patient has also been seen in the emergency room recently.  Patient has an abscess of her face which has been packed and drained.  Patient is scheduled to see a general surgeon tomorrow.  She was sent to the emergency room.  She had complaints of abdominal pain.  Patient did have recent CT scan as noted.  The patient has also been seen in the emergency room following her last visit for evaluation of shortness of breath and knee pain.  Patient had imaging at that time.  Patient does have follow-up appointment next week for an EDG and removal of a polyp.  Patient will have her procedure at [facility].  Patient has remained on her anticoagulation.  The patient does report many years ago having a Mirena IUD.  She reports having pain with the IUD.  Patient reports this was approximately 10 years ago.    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    • Bleeding disorder (HCC) 16   • Body piercing     TONGUE, NOSE, TWO IN EACH EAR   • Bronchitis    • Chest pain 2021    last CP 2-3 months ago   • Clotting disorder (HCC)     factor 5   • Constipation    • COVID-19 vaccine series completed     Pfizer-plus booster   • Deep vein thrombosis (HCC) 2016   • Diarrhea     • Diverticulitis    • Elevated cholesterol    • Endometriosis    • Factor 5 Leiden mutation, heterozygous (Formerly [facility])    • Fibroid    • Fibromyalgia    • Full dentures 06/22/2021    advised no adhesives DOS   • Gastroparesis    • GERD (gastroesophageal reflux disease)    • H/O blood clots    • H/O cardiovascular stress test 06/22/2021    reported several years ago-exercise wnl   • Headache    • Hiatal hernia    • High cholesterol    • History of recurrent UTIs    • HPV (human papilloma virus) infection    • Hx of echocardiogram    • Hx of gout 06/22/2021   • Hypertension    • Hypoglycemia    • Inappropriate sinus node tachycardia    • Kidney infection 06/22/2021    history only   • Migraine    • Nausea & vomiting    • Obesity    • Osteoarthritis    • Ovarian cyst    • Pneumonia    • Pollen allergies    • PONV (postoperative nausea and vomiting)    • Protein S deficiency (Former[facility]) 11/14/2016    labs from hospitalization for PE   • Pulmonary embolism (Formerly [facility]) 11/20/2016    on eliquis since that time   • Recurrent pregnancy loss, antepartum condition or complication    • Sleep apnea     no CPAP- states insurance took it back   • Tattoo     LOWER BACK   • Varicella      Current Outpatient Medications on File Prior to Visit   Medication Sig Dispense Refill   • albuterol sulfate  (90 Base) MCG/ACT inhaler Inhale 2 puffs Every 4 (Four) Hours As Needed for Wheezing or Shortness of Air. 18 g 11   • Alcohol Swabs (Alcohol Pads) 70 % pads Check sugars as needed/directed for hypoglycemia. 100 each 12   • apixaban (ELIQUIS) 5 MG tablet tablet Take 1 tablet by mouth 2 (Two) Times a Day. 180 tablet 3   • aspirin 81 MG EC tablet Take 81 mg by mouth Daily.     • Atogepant (Qulipta) 60 MG tablet Take 1 tablet by mouth Daily. 30 tablet 11   • atorvastatin (LIPITOR) 40 MG tablet Take 1 tablet by mouth Every Night. 30 tablet 11   • azelastine (ASTELIN) 0.1 % nasal spray 1 spray into the nostril(s) as directed by provider 2 (Two) Times a  Day. Use in each nostril as directed 1 each 10   • B-D ULTRA-FINE 33 LANCETS misc Check sugars as needed/directed for hypoglycemia. 100 each 12   • Blood Glucose Monitoring Suppl (ONE TOUCH ULTRA 2) w/Device kit use as needed to check blood sugar for hypoglycemia (diabetes)     • busPIRone (BUSPAR) 5 MG tablet TAKE 1 TABLET BY MOUTH THREE TIMES A DAY 90 tablet 0   • calcium carbonate EX (TUMS EX) 750 MG chewable tablet Chew 1 tablet Daily. (Patient taking differently: Chew 750 mg Daily As Needed.) 30 tablet 1   • Cholecalciferol (Vitamin D3) 1.25 MG (36601 UT) capsule Take 1 capsule by mouth Every 7 (Seven) Days. Indications: Vitamin D Deficiency 12 capsule 2   • Cholecalciferol (Vitamin D3) 50 MCG (2000 UT) capsule Take 1 capsule by mouth Daily. Start after completing 50,000 IU weekly dose.  Indications: Vitamin D Deficiency (Patient taking differently: Take 2,000 Units by mouth Daily. Start after completing 50,000 IU weekly dose.) 90 capsule 3   • clindamycin (CLEOCIN) 300 MG capsule Take 1 capsule by mouth 3 (Three) Times a Day for 10 days. 30 capsule 0   • Corlanor 5 MG tablet tablet Take 1.5 tablets by mouth 2 (two) times a day. 90 tablet 6   • Diclofenac Sodium (VOLTAREN) 1 % gel gel Apply 4 g topically to the appropriate area as directed 4 (Four) Times a Day As Needed (joint pain). 150 g 1   • DULoxetine (CYMBALTA) 60 MG capsule Take 1 capsule by mouth Daily. 90 capsule 3   • fluconazole (DIFLUCAN) 150 MG tablet Take 1 tablet by mouth Every 3 (Three) Days. 2 tablet 0   • fluticasone (FLONASE) 50 MCG/ACT nasal spray INSTILL 2 SPRAYS INTO EACH NOSTRIL EVERY DAY AS DIRECTED 16 g 11   • glucose blood test strip USE TO CHECK SUGARS DAILY DUE TO hypoglycemia 100 each 12   • glucose monitor monitoring kit 1 each As Needed (diabetes). Check sugars as needed/directed for hypoglycemia. 1 each 0   • granisetron (KYTRIL) 1 MG tablet Take 1 mg by mouth Every 12 (Twelve) Hours As Needed for Nausea or Vomiting. PA APPROVED.      • ibuprofen (ADVIL,MOTRIN) 800 MG tablet Take 1 tablet by mouth Every 8 (Eight) Hours As Needed for Mild Pain . 30 tablet 0   • l-methylfolate 15 MG tablet tablet Take 1 tablet by mouth Daily. Indications: folic acid deficiency 90 tablet 3   • lidocaine (LIDODERM) 5 % Place 2 patches on the skin as directed by provider Daily. Remove & Discard patch within 12 hours or as directed by MD 60 patch 3   • methocarbamol (ROBAXIN) 750 MG tablet Take 1 tablet by mouth At Night As Needed for Muscle Spasms. 14 tablet 0   • mometasone-formoterol (Dulera) 200-5 MCG/ACT inhaler Inhale 2 puffs 2 (Two) Times a Day. 13 g 11   • mupirocin (Bactroban) 2 % cream Apply 1 application topically to the appropriate area as directed 3 (Three) Times a Day. 1 each 0   • ondansetron (ZOFRAN) 8 MG tablet Take 1 tablet by mouth Every 8 (Eight) Hours As Needed for Nausea or Vomiting. 10 tablet 0   • pantoprazole (PROTONIX) 40 MG EC tablet Take 1 tablet by mouth Daily. 30 tablet 0   • Rimegepant Sulfate (Nurtec) 75 MG tablet dispersible tablet Take 1 tablet by mouth As Needed (as needed). 4 tablet 0   • sulfamethoxazole-trimethoprim (Bactrim DS) 800-160 MG per tablet Take 1 tablet by mouth 2 (Two) Times a Day. 14 tablet 0   • zonisamide (ZONEGRAN) 100 MG capsule Take 1 capsule by mouth Every Evening. 90 capsule 1     No current facility-administered medications on file prior to visit.     Allergies   Allergen Reactions   • Ceftin [Cefuroxime Axetil] Shortness Of Breath and Rash     Numbness in mouth and throat   • Cefuroxime Hives, Rash and Shortness Of Breath     Numbness in mouth and throat  Numbness in mouth and throat   • Sumatriptan Shortness Of Breath and Nausea Only     Other reaction(s): Nausea Only, Other (See Comments)  Worsening migraine  Worsening migraine   • Amoxicillin Rash, Hives and Itching     Past Surgical History:   Procedure Laterality Date   •  SECTION        and  and 2019   •  SECTION WITH TUBAL  N/A 1/15/2019    Procedure:  SECTION REPEAT WITH TUBAL;  Surgeon: Alva Boyer MD;  Location: [facility] LABOR DELIVERY;  Service: Obstetrics/Gynecology   • COLONOSCOPY N/A 2017    Procedure: COLONOSCOPY WITH BIOPSIES AND ARGON THERMAL ABLATION;  Surgeon: Jignesh Selby MD;  Location: [facility] ENDOSCOPY;  Service:    • D & C HYSTEROSCOPY ENDOMETRIAL ABLATION N/A 3/10/2022    Procedure: DILATATION AND CURETTAGE DIAGNOSTIC HYSTEROSCOPY NOVASURE ENDOMETRIAL ABLATION;  Surgeon: Alva Boyer MD;  Location: [facility] OR;  Service: Obstetrics/Gynecology;  Laterality: N/A;   • DIAGNOSTIC LAPAROSCOPY N/A 2018    Procedure: DIAGNOSTIC LAPAROSCOPY AND ABLATION OF ENDOMETRIOSIS;  Surgeon: Evin Zamudio MD;  Location: [facility] OR;  Service: Obstetrics/Gynecology   • ENDOMETRIAL ABLATION     • ENDOSCOPIC FUNCTIONAL SINUS SURGERY (FESS) Right 2021    Procedure: Right endoscopic total ethmoidectomy, right endoscopic middle meatal antrotomy with cyst removal, right endoscopic frontal recess exploration with cyst removal;  Surgeon: Saulo Brito MD;  Location: [facility] OR;  Service: ENT;  Laterality: Right;   • ENDOSCOPY N/A 2017    Procedure: ESOPHAGOGASTRODUODENOSCOPY WITH BIOPSIES AND COLD BIOPSY POLYPECTOMIES;  Surgeon: Jignesh Selby MD;  Location: [facility] ENDOSCOPY;  Service:    • LAPAROSCOPIC CHOLECYSTECTOMY     • LIPOMA EXCISION N/A 12/10/2021    Procedure: Excision of soft tissue mass lower back;  Surgeon: Nidia Smith MD;  Location: [facility] OR;  Service: General;  Laterality: N/A;   • MOUTH SURGERY      full mouth tooth extration   • WISDOM TOOTH EXTRACTION       Family History   Problem Relation Age of Onset   • Arthritis Mother    • COPD Mother    • Asthma Mother    • Thyroid disease Mother    • Arthritis Father    • Diabetes Father    • Hypertension Father    • Hyperlipidemia Father    • Kidney disease Father    • Heart attack Father    • Coronary artery disease Father    • Dementia  "Father    • No Known Problems Son    • Colon cancer Neg Hx    • Liver cancer Neg Hx    • Liver disease Neg Hx    • Stomach cancer Neg Hx    • Esophageal cancer Neg Hx      Social History     Socioeconomic History   • Marital status:    Tobacco Use   • Smoking status: Never Smoker   • Smokeless tobacco: Never Used   Vaping Use   • Vaping Use: Never used   Substance and Sexual Activity   • Alcohol use: No   • Drug use: Never   • Sexual activity: Yes     Partners: Male     Birth control/protection: Surgical     Comment: Tubal       Physical Examination:  Vital Signs: /68   Ht 160 cm (63\")   Wt 89.4 kg (197 lb)   BMI 34.90 kg/m²     General Appearance: alert, appears stated age, and cooperative  Breasts: Not performed.  Abdomen: no masses, no hepatomegaly, no splenomegaly, soft non-tender, no guarding and no rebound tenderness  Pelvic: Not performed.    Data Review:  The following data was reviewed by: Alva Boyer MD on 05/11/2022:     Labs:  Comprehensive Metabolic Panel (05/06/2022 13:09)  CBC & Differential (05/06/2022 13:09)  Urinalysis, Microscopic Only - Urine, Clean Catch (05/06/2022 12:56)  Urinalysis With Microscopic If Indicated (No Culture) - Urine, Clean Catch (05/06/2022 12:56)  Pregnancy, Urine - Urine, Clean Catch (05/06/2022 12:56)  Specimen Status Report (05/03/2022 17:56)  Culture, Routine - Swab, Face (05/03/2022 17:56)  Anaerobic & Aerobic Culture ([facility] Only) - Swab, Face (05/03/2022 17:56)  CBC & Differential (04/12/2022 17:09)  Comprehensive Metabolic Panel (04/12/2022 17:09)  Troponin (04/12/2022 17:09)  hCG, Serum, Qualitative (04/12/2022 17:09)  BNP (04/12/2022 17:09)  COVID-19 and FLU A/B PCR - Swab, Nasopharynx (04/12/2022 18:13)  Troponin (04/12/2022 19:35)    Imaging:  CT Abdomen Pelvis With Contrast (05/06/2022 14:06) - images reviewed with Dr. Sarkar on 5/13/2022.  Varicocele is measuring 7.4 mm; it was present on imaging as far back as 2017 and there is no significant " change.  XR Chest 1 View (04/12/2022 18:43)  US Venous Doppler Lower Extremity Left (duplex) (04/24/2022 17:55)  XR Knee 3 View Left (04/24/2022 18:09)    Medical Records:  None    Assessment and Plan   Problem List Items Addressed This Visit        Gastrointestinal Abdominal     Pelvic pain  Pt with continued pelvic pain as noted.  I have discussed the various options for management of her symptoms.  Pt to consider Mirena IUD as discussed.  I informed patient I would not recommend hysterectomy until last resort given her medical and surgical history.    Relevant Orders    US Non-ob Transvaginal      Other Visit Diagnoses     Menorrhagia with regular cycle    -  Primary  Patient with continued menorrhagia as noted.  I had a long discussion with the patient regarding the other alternatives for management of her symptoms.  Patient will follow-up after her next menstrual cycle with repeat imaging with transvaginal ultrasound.  Instructions and precautions have been given.    Relevant Orders    US Non-ob Transvaginal    Dysmenorrhea      Patient will follow up with repeat ultrasound as discussed.    Relevant Orders    US Non-ob Transvaginal    Abdominal pain, unspecified abdominal location      Patient is to keep her follow-up appointment at the [facility].  Patient does have an appointment with general surgery tomorrow as noted.    Current use of long term anticoagulation        Abnormal CT of the abdomen      Pt with recent abnormality ?varicocele as noted.  Will review CT scan with radiology as discussed.          Follow Up/Instructions:  Follow up as noted.  Patient was given instructions and counseling regarding her condition or for health maintenance advice. Please see specific information pulled into the AVS if appropriate.     Note: Speech recognition transcription software may have been used to dictate portions of this document.  An attempt at proofreading has been made though minor errors in  transcription may still be present.    This note was electronically signed.  Alva Boyer M.D.

## 2022-05-12 NOTE — PROGRESS NOTES
"05/12/2022      [unfilled]  Problem List Items Addressed This Visit        Neuro    Neurogenic orthostatic hypotension (HCC)    Relevant Orders    Ambulatory Referral to Orthopedic Surgery (Completed)      Other Visit Diagnoses     Hypermobility syndrome    -  Primary    Relevant Orders    Ambulatory Referral to Orthopedic Surgery (Completed)    Chronic musculoskeletal pain        Relevant Orders    Ambulatory Referral to Orthopedic Surgery (Completed)    Vitamin D deficiency        sent daily 5000 IU as ins not covering weekly dose    Relevant Medications    vitamin D3 (Vitamin D3 Maximum Strength) 125 MCG (5000 UT) capsule capsule    Folic acid deficiency        encouraged use of L-methylfolate 15 mg daily otc as ins not covering    Diarrhea, unspecified type        follow up with [facility], s/p pill cam. discuss abnormal IBD panel, Has cscope pending             Subjective    Marilu Godoy is a 31 y.o. female here for:  Chief Complaint   Patient presents with   • Fatigue     Follow up on lab results        History per MA reviewed.    Visit with Dr. Smith (gen surg) today regarding facial abscess  Visit with ortho ([facility]) yesterday -- referral to rheumatology, MRI knee left.  Visit with ob/gyn ([facility]) yesterday    abscess on face resolving. Released by surgery earlier today.    Insurance not covering weekly vitamin D despite covering in the past.    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 She has had dislocations of joints and considers herself \"double jointed\". She can bend her 5th fingers more than 90 degrees back and can touch her wrists with her thumbs. She has stretch marks but likely due to pregnancies. No known family history. She has been referred to rheumatology by orthopedics given joint hypermobility. She has had bouts of rashes that were like hives and has ongoing issues with hypotension and tachycardia.          The following portions " "of the patient's history were reviewed and updated as appropriate: allergies, current medications, past family history, past medical history, past social history, past surgical history and problem list.    Review of Systems   Constitutional: Positive for fatigue.   Cardiovascular: Positive for palpitations.   Gastrointestinal: Positive for abdominal pain.   Musculoskeletal: Positive for arthralgias.   Neurological: Positive for dizziness and light-headedness.   Psychiatric/Behavioral: Positive for stress.         Objective   Visit Vitals  /82 (BP Location: Right arm, Patient Position: Sitting, Cuff Size: Adult)   Pulse 87   Temp 98.6 °F (37 °C) (Temporal)   Resp 18   Ht 160 cm (63\")   Wt 89.6 kg (197 lb 9.6 oz)   LMP 04/24/2022 (Exact Date)   SpO2 97%   BMI 35.00 kg/m²       Physical Exam  Vitals and nursing note reviewed.   Constitutional:       General: She is not in acute distress.     Appearance: Normal appearance. She is well-developed and well-groomed. She is obese. She is not ill-appearing, toxic-appearing or diaphoretic.      Interventions: Face mask in place.   HENT:      Head: Normocephalic and atraumatic.      Right Ear: Hearing normal.      Left Ear: Hearing normal.   Eyes:      General: Lids are normal. No scleral icterus.     Extraocular Movements: Extraocular movements intact.   Neck:      Trachea: Phonation normal.   Pulmonary:      Effort: Pulmonary effort is normal.   Musculoskeletal:      Cervical back: Neck supple.      Comments: Hypermobility noted to both thumbs (bend back to wrist), both 5th digits (extend >90 degrees), some hyperextension of both knees. Almost able to get hands flat on floor without bending knees   Skin:     General: Skin is warm.      Coloration: Skin is not jaundiced or pale.      Findings: Lesion (area of incision/drainage on right jaw healing) present.      Comments: Some laxity to skin back of right hand    Striae abdominal wall   Neurological:      General: No " focal deficit present.      Mental Status: She is alert and oriented to person, place, and time.      Motor: Motor function is intact.   Psychiatric:         Attention and Perception: Attention and perception normal.         Mood and Affect: Mood and affect normal.         Speech: Speech normal.         Behavior: Behavior normal. Behavior is cooperative.         Thought Content: Thought content normal.         Cognition and Memory: Cognition and memory normal.         Judgment: Judgment normal.           For medical decision making review of the following was required:  Component      Latest Ref Rng & Units 11/27/2017 4/8/2022   Saccharomyces cerevisiae Ab IgG      0.0 - 24.9 Units <20.0 23.8   Saccharomyces cerevisiae Ab IgA      0.0 - 24.9 Units 26.6 (H) 58.8 (H)   Atypical pANCA      Neg:<1:20 titer <1:20 <1:20     Component      Latest Ref Rng & Units 8/4/2021 4/8/2022   Sed Rate      0 - 20 mm/hr 8 38 (H)     Component      Latest Ref Rng & Units 12/12/2021 4/8/2022   C-Reactive Protein      0.00 - 0.50 mg/dL 4.64 (H) <0.30     Component      Latest Ref Rng & Units 1/14/2021 7/7/2021 4/8/2022   Folate      4.78 - 24.20 ng/mL 6.57 4.51 (L) 4.07 (L)     Component      Latest Ref Rng & Units 8/4/2021 4/8/2022   Homocystine, Plasma (Quant)      0.0 - 14.5 umol/L <0.3 9.7     Component      Latest Ref Rng & Units 1/14/2021 7/7/2021 4/8/2022   25 Hydroxy, Vitamin D      30.0 - 100.0 ng/ml 31.8 20.1 (L) 9.1 (L)       I spent 47 minutes caring for Marilu Godoy on this date of service. This time includes time spent by me in the following activities: preparing for the visit, reviewing tests, performing a medically appropriate examination and/or evaluation, counseling and educating the patient/family/caregiver, ordering medications, tests, or procedures and documenting information in the medical record.       Deidre Barker MD

## 2022-05-12 NOTE — PROGRESS NOTES
Subjective   Marilu Godoy is a 31 y.o. female.   Chief Complaint   Patient presents with   • Abscess     Right jaw x 2 weeks     History of Present Illness   Patient is here for an evaluation and treatment of an abscess on the face. Patient has been seen in urgent care multiple times. She had a previous I&D and has taken clindamycin and Bactrim for treatment. Patient complains of open sore on her right jaw. Patient states that it occasionally itches but doesn't hurt.  The following portions of the patient's history were reviewed and updated as appropriate: allergies, current medications, past family history, past medical history, past social history, past surgical history and problem list.    Review of Systems   Constitutional: Negative for chills, fever and unexpected weight change.   HENT: Negative for hearing loss, trouble swallowing and voice change.    Eyes: Negative for visual disturbance.   Respiratory: Negative for apnea, cough, chest tightness, shortness of breath and wheezing.    Cardiovascular: Negative for chest pain, palpitations and leg swelling.   Gastrointestinal: Negative for abdominal distention, abdominal pain, anal bleeding, blood in stool, constipation, diarrhea, nausea, rectal pain and vomiting.   Endocrine: Negative for cold intolerance and heat intolerance.   Genitourinary: Negative for difficulty urinating, dysuria and flank pain.   Musculoskeletal: Negative for back pain and gait problem.   Skin: Negative for color change, rash and wound.   Neurological: Negative for dizziness, syncope, speech difficulty, weakness, light-headedness, numbness and headaches.   Hematological: Negative for adenopathy. Does not bruise/bleed easily.   Psychiatric/Behavioral: Negative for confusion. The patient is not nervous/anxious.        Objective   Physical Exam    Assessment & Plan   Diagnoses and all orders for this visit:    1. Abscess of face (Primary)  -     US Soft Tissue; Future

## 2022-05-13 ENCOUNTER — TREATMENT (OUTPATIENT)
Dept: PHYSICAL THERAPY | Facility: CLINIC | Age: 32
End: 2022-05-13

## 2022-05-13 DIAGNOSIS — G89.29 CHRONIC LEFT SHOULDER PAIN: Primary | ICD-10-CM

## 2022-05-13 DIAGNOSIS — M75.42 IMPINGEMENT SYNDROME OF LEFT SHOULDER: ICD-10-CM

## 2022-05-13 DIAGNOSIS — M25.512 CHRONIC LEFT SHOULDER PAIN: Primary | ICD-10-CM

## 2022-05-13 PROCEDURE — 97530 THERAPEUTIC ACTIVITIES: CPT | Performed by: PHYSICAL THERAPIST

## 2022-05-13 PROCEDURE — 97110 THERAPEUTIC EXERCISES: CPT | Performed by: PHYSICAL THERAPIST

## 2022-05-13 PROCEDURE — 97140 MANUAL THERAPY 1/> REGIONS: CPT | Performed by: PHYSICAL THERAPIST

## 2022-05-13 NOTE — PROGRESS NOTES
Physical Therapy Daily Progress Note    Patient Information  Marilu Godoy  1990      Visit # : 3    Marilu Godoy reports 5-6/10 pain today at rest.  Patient reports that her shoulder blade and shoulder continues to be about the same. She states that she doesn't think PT is helping. She states when she does too much the shoulder really hurts.         Objective Pt presents to PT today with no distress noted.     Patient had complaints of soreness in shoulder blade with all exercises today.     Mild-moderate tenderness continues to be noted along medial border of scapula.     Unable to progress patient today due to increased irritation with activity.       See Exercise, Manual, and Modality Logs for complete treatment.     Assessment/Plan  Patient tolerated session fair. She continues to have increased pain with all exercises. Patient tolerated manual therapy well and had no reports of discomfort. Improved tissue pliability following manual therapy noted. Moist heat applied to L scapula with no skin irritation noted. Patient encouraged to continue HEP and utilize ice/heat as needed at home.       Progress per Plan of Care   PT will continue to monitor patients signs and symptoms and progress patient as tolerated.     Visit Diagnoses:    ICD-10-CM ICD-9-CM   1. Chronic left shoulder pain  M25.512 719.41    G89.29 338.29   2. Impingement syndrome of left shoulder  M75.42 726.2            Manual Therapy:    18     mins  92936;  Therapeutic Exercise:    14     mins  69041;     Neuromuscular Portia:        mins  21214;    Therapeutic Activity:     10     mins  29731;     Gait Training:           mins  99982;     Ultrasound:          mins  96664;    Electrical Stimulation:         mins  39138 ( );  Dry Needling          mins self-pay    Timed Treatment:   42   mins   Total Treatment:     45   mins          Batool Young PT  Physical Therapist

## 2022-05-18 ENCOUNTER — OFFICE VISIT (OUTPATIENT)
Dept: NEUROLOGY | Facility: CLINIC | Age: 32
End: 2022-05-18

## 2022-05-18 VITALS
WEIGHT: 202 LBS | HEIGHT: 63 IN | SYSTOLIC BLOOD PRESSURE: 120 MMHG | TEMPERATURE: 97.8 F | BODY MASS INDEX: 35.79 KG/M2 | OXYGEN SATURATION: 100 % | HEART RATE: 92 BPM | DIASTOLIC BLOOD PRESSURE: 80 MMHG

## 2022-05-18 DIAGNOSIS — G43.009 MIGRAINE WITHOUT AURA AND WITHOUT STATUS MIGRAINOSUS, NOT INTRACTABLE: Primary | ICD-10-CM

## 2022-05-18 PROCEDURE — 99213 OFFICE O/P EST LOW 20 MIN: CPT | Performed by: NURSE PRACTITIONER

## 2022-05-18 RX ORDER — ONDANSETRON 4 MG/1
TABLET, ORALLY DISINTEGRATING ORAL
COMMUNITY
End: 2022-05-18

## 2022-05-18 RX ORDER — CYCLOBENZAPRINE HCL 10 MG
TABLET ORAL
COMMUNITY
Start: 2022-02-04

## 2022-05-18 NOTE — PROGRESS NOTES
Follow Up Neurology Office Visit      Patient Name: Marilu Godoy    Referring Physician: No ref. provider found    Chief Complaint:    Chief Complaint   Patient presents with   • Follow-up     Pt in office to follow up on migraines       History of Present Illness: Marilu Godoy is a 31 y.o. female who is here to follow up with Neurology for headaches.  Continues on Qulipta. States headaches have been better, but continues to report 2-3 per week, consistent with previous estimate. Denies worsening constipation on Qulipta.   Using Nurtec prn for headaches with good effect  EMG with Samantha on 6/2  Pending rheum evaluation, ?EDS (hypermobility)    The following portions of the patient's history were reviewed and updated as appropriate: allergies, current medications, past family history, past medical history, past social history, past surgical history and problem list.    Subjective     Review of Systems:   Review of Systems   Cardiovascular: Positive for palpitations.   Gastrointestinal: Positive for constipation.   Musculoskeletal: Positive for arthralgias.   Neurological: Positive for dizziness, light-headedness and headache.   Psychiatric/Behavioral: Positive for stress.   All other systems reviewed and are negative.    Medications:     Current Outpatient Medications:   •  albuterol sulfate  (90 Base) MCG/ACT inhaler, Inhale 2 puffs Every 4 (Four) Hours As Needed for Wheezing or Shortness of Air., Disp: 18 g, Rfl: 11  •  Alcohol Swabs (Alcohol Pads) 70 % pads, Check sugars as needed/directed for hypoglycemia., Disp: 100 each, Rfl: 12  •  apixaban (ELIQUIS) 5 MG tablet tablet, Take 1 tablet by mouth 2 (Two) Times a Day., Disp: 180 tablet, Rfl: 3  •  aspirin 81 MG EC tablet, Take 81 mg by mouth Daily., Disp: , Rfl:   •  Atogepant (Qulipta) 60 MG tablet, Take 1 tablet by mouth Daily., Disp: 30 tablet, Rfl: 11  •  atorvastatin (LIPITOR) 40 MG tablet, Take 1 tablet by mouth Every Night., Disp:  30 tablet, Rfl: 11  •  azelastine (ASTELIN) 0.1 % nasal spray, 1 spray into the nostril(s) as directed by provider 2 (Two) Times a Day. Use in each nostril as directed, Disp: 1 each, Rfl: 10  •  B-D ULTRA-FINE 33 LANCETS misc, Check sugars as needed/directed for hypoglycemia., Disp: 100 each, Rfl: 12  •  Blood Glucose Monitoring Suppl (ONE TOUCH ULTRA 2) w/Device kit, use as needed to check blood sugar for hypoglycemia (diabetes), Disp: , Rfl:   •  busPIRone (BUSPAR) 5 MG tablet, TAKE 1 TABLET BY MOUTH THREE TIMES A DAY, Disp: 90 tablet, Rfl: 0  •  calcium carbonate EX (TUMS EX) 750 MG chewable tablet, Chew 1 tablet Daily. (Patient taking differently: Chew 750 mg Daily As Needed.), Disp: 30 tablet, Rfl: 1  •  Corlanor 5 MG tablet tablet, Take 1.5 tablets by mouth 2 (two) times a day., Disp: 90 tablet, Rfl: 6  •  cyclobenzaprine (FLEXERIL) 10 MG tablet,  15 Each, TAKE 1/2 TABLET BY MOUTH THREE TIMES A DAY AS NEEDED FOR MUSCLE SPASM for up to 5 days, 0 Refill(s), Disp: , Rfl:   •  Diclofenac Sodium (VOLTAREN) 1 % gel gel, Apply 4 g topically to the appropriate area as directed 4 (Four) Times a Day As Needed (joint pain)., Disp: 150 g, Rfl: 1  •  DULoxetine (CYMBALTA) 60 MG capsule, Take 1 capsule by mouth Daily., Disp: 90 capsule, Rfl: 3  •  fluconazole (DIFLUCAN) 150 MG tablet, Take 1 tablet by mouth Every 3 (Three) Days., Disp: 2 tablet, Rfl: 0  •  fluticasone (FLONASE) 50 MCG/ACT nasal spray, INSTILL 2 SPRAYS INTO EACH NOSTRIL EVERY DAY AS DIRECTED, Disp: 16 g, Rfl: 11  •  glucose blood test strip, USE TO CHECK SUGARS DAILY DUE TO hypoglycemia, Disp: 100 each, Rfl: 12  •  glucose monitor monitoring kit, 1 each As Needed (diabetes). Check sugars as needed/directed for hypoglycemia., Disp: 1 each, Rfl: 0  •  granisetron (KYTRIL) 1 MG tablet, Take 1 mg by mouth Every 12 (Twelve) Hours As Needed for Nausea or Vomiting. PA APPROVED., Disp: , Rfl:   •  ibuprofen (ADVIL,MOTRIN) 800 MG tablet, Take 1 tablet by mouth Every  8 (Eight) Hours As Needed for Mild Pain ., Disp: 30 tablet, Rfl: 0  •  l-methylfolate 15 MG tablet tablet, Take 1 tablet by mouth Daily. Indications: folic acid deficiency, Disp: 90 tablet, Rfl: 3  •  Lidocaine (LIDODERM EX),  1 Patch, TransDermal, Daily, 0 Refill(s), Disp: , Rfl:   •  lidocaine (LIDODERM) 5 %, Place 2 patches on the skin as directed by provider Daily. Remove & Discard patch within 12 hours or as directed by MD, Disp: 60 patch, Rfl: 3  •  methocarbamol (ROBAXIN) 750 MG tablet, Take 1 tablet by mouth At Night As Needed for Muscle Spasms., Disp: 14 tablet, Rfl: 0  •  mometasone-formoterol (Dulera) 200-5 MCG/ACT inhaler, Inhale 2 puffs 2 (Two) Times a Day., Disp: 13 g, Rfl: 11  •  mupirocin (Bactroban) 2 % cream, Apply 1 application topically to the appropriate area as directed 3 (Three) Times a Day., Disp: 1 each, Rfl: 0  •  ondansetron (ZOFRAN) 8 MG tablet, Take 1 tablet by mouth Every 8 (Eight) Hours As Needed for Nausea or Vomiting., Disp: 10 tablet, Rfl: 0  •  pantoprazole (PROTONIX) 40 MG EC tablet, Take 1 tablet by mouth Daily., Disp: 30 tablet, Rfl: 0  •  Rimegepant Sulfate (Nurtec) 75 MG tablet dispersible tablet, Take 1 tablet by mouth As Needed (as needed)., Disp: 4 tablet, Rfl: 0  •  vitamin D3 (Vitamin D3 Maximum Strength) 125 MCG (5000 UT) capsule capsule, Take 1 capsule by mouth Daily., Disp: 90 capsule, Rfl: 1  •  zonisamide (ZONEGRAN) 100 MG capsule, Take 1 capsule by mouth Every Evening., Disp: 90 capsule, Rfl: 1    Allergies:   Allergies   Allergen Reactions   • Ceftin [Cefuroxime Axetil] Shortness Of Breath and Rash     Numbness in mouth and throat   • Cefuroxime Hives, Rash and Shortness Of Breath     Numbness in mouth and throat  Numbness in mouth and throat   • Sumatriptan Shortness Of Breath and Nausea Only     Other reaction(s): Nausea Only, Other (See Comments)  Worsening migraine  Worsening migraine   • Amoxicillin Rash, Hives and Itching       Objective     Physical  "Exam:  Vital Signs:   Vitals:    05/18/22 1056   BP: 120/80   Pulse: 92   Temp: 97.8 °F (36.6 °C)   SpO2: 100%   Weight: 91.6 kg (202 lb)   Height: 160 cm (63\")   PainSc: 0-No pain     Physical Exam  Vitals and nursing note reviewed.   Constitutional:       Appearance: Normal appearance.   Pulmonary:      Effort: Pulmonary effort is normal.   Neurological:      Mental Status: She is oriented to person, place, and time. Mental status is at baseline.      Coordination: Romberg Test normal.      Gait: Gait is intact.      Deep Tendon Reflexes: Strength normal.   Psychiatric:         Mood and Affect: Mood normal.         Speech: Speech normal.         Behavior: Behavior normal.       Neurologic Exam     Mental Status   Oriented to person, place, and time.   Attention: normal. Concentration: normal.   Speech: speech is normal   Level of consciousness: alert  Normal comprehension.     Cranial Nerves   Cranial nerves II through XII intact.     Motor Exam   Muscle bulk: normal  Overall muscle tone: normal    Strength   Strength 5/5 throughout.     Gait, Coordination, and Reflexes     Gait  Gait: normal    Coordination   Romberg: negative    Tremor   Resting tremor: absent    Results Review:   I reviewed the patient's new clinical results.  I have reviewed the patient's other medical records to include, labs, radiology and referrals.     Assessment / Plan      Assessment/Plan:   Diagnoses and all orders for this visit:    1. Migraine without aura and without status migrainosus, not intractable (Primary)    Continue daily Qulipta and use Nurtec as needed.  Although patient initially ports headache improved, she continues to report 2-3 migraine episodes per week, consistent with previous assessment.  I offered additional oral medications today, she declined.  We again discussed Botox, she does not wish to proceed with this due to desire to avoid injections.    Follow Up:   Return in about 6 months (around 11/18/2022) for Next " scheduled follow up.     ARNIE Bower  [facility]   AS THE PROVIDER, I PERSONALLY WORE PPE DURING ENTIRE FACE TO FACE ENCOUNTER IN CLINIC WITH THE PATIENT. PATIENT ALSO WORE PPE DURING ENTIRE FACE TO FACE ENCOUNTER EXCEPT FOR A MAX OF 30 SECONDS DURING NEUROLOGICAL EVALUATION OF CRANIAL NERVES AND THEN MASK WAS PLACED BACK OVER PATIENT FACE FOR REMAINDER OF VISIT. I WASHED MY HANDS BEFORE AND AFTER VISIT.    Please note that portions of this note may have been completed with a voice recognition program. Efforts were made to edit the dictations, but occasionally words are mistranscribed.

## 2022-05-20 ENCOUNTER — TELEPHONE (OUTPATIENT)
Dept: OBSTETRICS AND GYNECOLOGY | Facility: CLINIC | Age: 32
End: 2022-05-20

## 2022-05-20 NOTE — TELEPHONE ENCOUNTER
Okay to inform pt yes I did review the CT scan with the radiologist.  The varicocele or dilated vein was previously seen on all scans dating back to 2017 and is unchanged; he reports that a lot of radiologist do not put it in their report as it is usually insignificant.  Yes the patient needs to wait for a menses for her TVS.

## 2022-05-20 NOTE — TELEPHONE ENCOUNTER
"----- Message from Marilu Godoy sent at 5/19/2022  9:58 PM EDT -----  Regarding: ultrasound  Dr. Boyer is wanting me to have an ultrasound Tuesday.  I haven't started my period yet so I didn't know if she still wanted to do it or to wait.  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  I was seeing if she had figured out anything about that yet.  "

## 2022-05-25 ENCOUNTER — TELEPHONE (OUTPATIENT)
Dept: OBSTETRICS AND GYNECOLOGY | Facility: CLINIC | Age: 32
End: 2022-05-25

## 2022-05-25 NOTE — TELEPHONE ENCOUNTER
----- Message from Alva Boyer MD sent at 5/24/2022  2:50 PM EDT -----  Okay to inform patient I have reviewed her transvaginal ultrasound.  The patient's endometrium looks good measuring 8.75 mm.  Ovaries appear normal.

## 2022-05-27 ENCOUNTER — HOSPITAL ENCOUNTER (OUTPATIENT)
Dept: GENERAL RADIOLOGY | Facility: HOSPITAL | Age: 32
Discharge: HOME | End: 2022-05-27
Admitting: ANESTHESIOLOGY

## 2022-05-27 ENCOUNTER — TRANSCRIBE ORDERS (OUTPATIENT)
Dept: GENERAL RADIOLOGY | Facility: HOSPITAL | Age: 32
End: 2022-05-27

## 2022-05-27 DIAGNOSIS — M25.552 LEFT HIP PAIN: Primary | ICD-10-CM

## 2022-05-27 DIAGNOSIS — M25.551 RIGHT HIP PAIN: ICD-10-CM

## 2022-05-27 DIAGNOSIS — M25.552 LEFT HIP PAIN: ICD-10-CM

## 2022-05-27 PROCEDURE — 73522 X-RAY EXAM HIPS BI 3-4 VIEWS: CPT

## 2022-05-31 ENCOUNTER — TRANSCRIBE ORDERS (OUTPATIENT)
Dept: ADMINISTRATIVE | Facility: HOSPITAL | Age: 32
End: 2022-05-31

## 2022-05-31 DIAGNOSIS — R00.0 TACHYCARDIA, UNSPECIFIED: Primary | ICD-10-CM

## 2022-06-02 ENCOUNTER — PROCEDURE VISIT (OUTPATIENT)
Dept: NEUROLOGY | Facility: CLINIC | Age: 32
End: 2022-06-02

## 2022-06-02 DIAGNOSIS — M79.602 PAIN OF LEFT UPPER EXTREMITY: Primary | ICD-10-CM

## 2022-06-02 PROCEDURE — 95909 NRV CNDJ TST 5-6 STUDIES: CPT | Performed by: PSYCHIATRY & NEUROLOGY

## 2022-06-02 PROCEDURE — 95886 MUSC TEST DONE W/N TEST COMP: CPT | Performed by: PSYCHIATRY & NEUROLOGY

## 2022-06-02 NOTE — PROGRESS NOTES
[facility]   Electrodiagnostic Laboratory    Nerve Conduction & EMG Report        Patient:  Marilu Godoy   Patient ID: [id number]   YOB: 1990  Sex:  female      Exam Physician:  Sharan Singh MD  Refer Physician:  MARÍA Ruiz*    Electromyogram and Nerve Conduction Velocity Procedure Note    Hx: 31 y.o. right handed female with complaint of pain involving the left arm. Symptoms have been present for several months and were provoked by no clear event. Significant past medical history includes nothing suggestive of neuropathy.  Family history no family history of nerve or muscle disease.    Exam: Motor power is normal. There is no atrophy. There are no fasciculations. Deep tendon reflexes are present and symmetrical. Sensory exam is normal.      Edx studies of the L UE were performed to evaluate for radiculopathy.     NCS Examination   For sensory nerve conduction studies, the amplitude is measured peak-to-peak, the latency reported is the distal peak latency, and the conduction velocity, if measured, is determined from onset latencies and is over the forearm.   For motor nerve conduction studies, the amplitude is measured baseline-to-peak, the latency reported is the distal onset latency, the conduction velocity is calculated over the forearm, and the F wave latency is the minimum latency.   Unless otherwise noted, the hand temperature was monitored continuously and remained between 32°C and 36°C during the performance of the NCSs.        Sensory NCS      Nerve / Sites Rec. Site Onset Lat Peak Lat NP Amp PP Amp Segments Distance Velocity     ms ms µV µV  mm m/s   L Median - Digit II (Antidromic)      Wrist Dig II 2.34 3.13 58.9 114.3 Wrist - Dig  55   L Ulnar - Digit V (Antidromic)      Wrist Dig V 1.82 2.71 29.8 50.7 Wrist - Dig V 110 60   L Radial - Anatomical snuff box (Forearm)      Forearm Wrist 1.82 2.19 5.0 13.3 Forearm - Wrist 100 55              Motor NCS      Nerve / Sites Muscle Latency Amplitude Amp % Duration Segments Distance Lat Diff Velocity     ms mV % ms  mm ms m/s   L Median - APB      Wrist APB 2.60 9.1 100 6.61 Wrist - APB 70        Elbow APB 6.25 8.6 95.2 6.82 Elbow - Wrist 220 3.65 60   L Ulnar - ADM      Wrist ADM 2.71 10.4 100 5.26 Wrist - ADM 70        B.Elbow ADM 6.20 10.6 102 5.42 B.Elbow - Wrist 190 3.49 54      A.Elbow ADM 7.03 11.6 112 5.42 A.Elbow - B.Elbow 90 0.83 108           F  Wave      Nerve F Lat M Lat F-M Lat    ms ms ms   L Median - APB 24.1 2.7 21.4   L Ulnar - ADM 23.6 2.4 21.1               EMG Examination   The study was performed with a concentric needle electrode. Fibrillation and fasciculation activity is graded from none (0) to continuous (4+). The configuration and recruitment pattern of motor unit action potentials under voluntary control, if not normal, are described below         EMG Summary Table     Spontaneous MUAP Recruitment   Muscle Nerve Roots IA Fib PSW Fasc H.F. Amp Dur. PPP Pattern   L. Abductor pollicis brevis Median C8-T1 N None None None None N N N N   L. First dorsal interosseous Ulnar C8-T1 N None None None None N N N N   L. Pronator teres Median C6-C7 N None None None None N N N N   L. Triceps brachii Radial C6-C8 N None None None None N N N N   L. Teres major Subscapular C5-C7 N None None None None N N N N   L. Supraspinatus Suprascapular C5-C6 N None None None None N N N N   L. Biceps brachii Musculocutaneous C5-C6 N None None None None N N N N   L. Cervical paraspinals Spinal C4-C8 N None None None None N N N N   L. Deltoid Axillary C5-C6 N None None None None N N N N       Summary    The motor conduction test was normal in all 2 of the tested nerves: L Median - APB, L Ulnar - ADM.    The sensory conduction test was performed on 3 nerve(s). The results were normal in 2 nerve(s): L Median - Digit II (Antidromic), L Ulnar - Digit V (Antidromic). Results outside the specified normal range were  found in 1 nerve(s), as follows:  • In the L Radial - Anatomical snuff box (Forearm) study  o the peak amplitude result was reduced for Forearm stimulation    The F wave study was normal in all 2 of the tested nerves: L Median - APB, L Ulnar - ADM.    The needle EMG study was normal in all 9 tested muscles: L. Abductor pollicis brevis, L. First dorsal interosseous, L. Pronator teres, L. Triceps brachii, L. Teres major, L. Supraspinatus, L. Biceps brachii, L. Cervical paraspinals, L. Deltoid.           Conclusion: Normal NCV and EMG of the left upper extremity          Instrument used:  Teca Synergy        Performed by:          Sharan Singh MD

## 2022-06-03 ENCOUNTER — HOSPITAL ENCOUNTER (OUTPATIENT)
Dept: GENERAL RADIOLOGY | Facility: HOSPITAL | Age: 32
Discharge: HOME | End: 2022-06-03
Admitting: PSYCHIATRY & NEUROLOGY

## 2022-06-03 DIAGNOSIS — R00.0 TACHYCARDIA, UNSPECIFIED: ICD-10-CM

## 2022-06-03 PROCEDURE — 93660 TILT TABLE EVALUATION: CPT

## 2022-06-03 PROCEDURE — 93660 TILT TABLE EVALUATION: CPT | Performed by: INTERNAL MEDICINE

## 2022-06-15 ENCOUNTER — APPOINTMENT (OUTPATIENT)
Dept: CT IMAGING | Facility: HOSPITAL | Age: 32
End: 2022-06-15

## 2022-06-15 ENCOUNTER — HOSPITAL ENCOUNTER (EMERGENCY)
Facility: HOSPITAL | Age: 32
Discharge: HOME | End: 2022-06-16
Attending: FAMILY MEDICINE | Admitting: FAMILY MEDICINE

## 2022-06-15 DIAGNOSIS — R10.30 LOWER ABDOMINAL PAIN: Primary | ICD-10-CM

## 2022-06-15 DIAGNOSIS — R30.0 DYSURIA: ICD-10-CM

## 2022-06-15 LAB
ALBUMIN SERPL-MCNC: 4.5 G/DL (ref 3.5–5.2)
ALBUMIN/GLOB SERPL: 1.3 G/DL
ALP SERPL-CCNC: 101 U/L (ref 39–117)
ALT SERPL W P-5'-P-CCNC: 25 U/L (ref 1–33)
ANION GAP SERPL CALCULATED.3IONS-SCNC: 13.9 MMOL/L (ref 5–15)
AST SERPL-CCNC: 19 U/L (ref 1–32)
BASOPHILS # BLD AUTO: 0.03 10*3/MM3 (ref 0–0.2)
BASOPHILS NFR BLD AUTO: 0.3 % (ref 0–1.5)
BILIRUB SERPL-MCNC: 0.8 MG/DL (ref 0–1.2)
BILIRUB UR QL STRIP: NEGATIVE
BUN SERPL-MCNC: 11 MG/DL (ref 6–20)
BUN/CREAT SERPL: 14.1 (ref 7–25)
CALCIUM SPEC-SCNC: 9.6 MG/DL (ref 8.6–10.5)
CHLORIDE SERPL-SCNC: 103 MMOL/L (ref 98–107)
CLARITY UR: ABNORMAL
CO2 SERPL-SCNC: 23.1 MMOL/L (ref 22–29)
COLOR UR: YELLOW
CREAT SERPL-MCNC: 0.78 MG/DL (ref 0.57–1)
DEPRECATED RDW RBC AUTO: 40.3 FL (ref 37–54)
EGFRCR SERPLBLD CKD-EPI 2021: 104.3 ML/MIN/1.73
EOSINOPHIL # BLD AUTO: 0.07 10*3/MM3 (ref 0–0.4)
EOSINOPHIL NFR BLD AUTO: 0.8 % (ref 0.3–6.2)
ERYTHROCYTE [DISTWIDTH] IN BLOOD BY AUTOMATED COUNT: 12.2 % (ref 12.3–15.4)
GLOBULIN UR ELPH-MCNC: 3.5 GM/DL
GLUCOSE SERPL-MCNC: 96 MG/DL (ref 65–99)
GLUCOSE UR STRIP-MCNC: NEGATIVE MG/DL
HCG SERPL QL: NEGATIVE
HCT VFR BLD AUTO: 42 % (ref 34–46.6)
HGB BLD-MCNC: 14.5 G/DL (ref 12–15.9)
HGB UR QL STRIP.AUTO: NEGATIVE
HOLD SPECIMEN: NORMAL
IMM GRANULOCYTES # BLD AUTO: 0.02 10*3/MM3 (ref 0–0.05)
IMM GRANULOCYTES NFR BLD AUTO: 0.2 % (ref 0–0.5)
KETONES UR QL STRIP: ABNORMAL
LEUKOCYTE ESTERASE UR QL STRIP.AUTO: NEGATIVE
LIPASE SERPL-CCNC: 25 U/L (ref 13–60)
LYMPHOCYTES # BLD AUTO: 1.16 10*3/MM3 (ref 0.7–3.1)
LYMPHOCYTES NFR BLD AUTO: 12.5 % (ref 19.6–45.3)
MCH RBC QN AUTO: 30.9 PG (ref 26.6–33)
MCHC RBC AUTO-ENTMCNC: 34.5 G/DL (ref 31.5–35.7)
MCV RBC AUTO: 89.4 FL (ref 79–97)
MONOCYTES # BLD AUTO: 0.46 10*3/MM3 (ref 0.1–0.9)
MONOCYTES NFR BLD AUTO: 5 % (ref 5–12)
NEUTROPHILS NFR BLD AUTO: 7.53 10*3/MM3 (ref 1.7–7)
NEUTROPHILS NFR BLD AUTO: 81.2 % (ref 42.7–76)
NITRITE UR QL STRIP: NEGATIVE
NRBC BLD AUTO-RTO: 0 /100 WBC (ref 0–0.2)
PH UR STRIP.AUTO: 5.5 [PH] (ref 5–8)
PLATELET # BLD AUTO: 317 10*3/MM3 (ref 140–450)
PMV BLD AUTO: 9.6 FL (ref 6–12)
POTASSIUM SERPL-SCNC: 3.9 MMOL/L (ref 3.5–5.2)
PROT SERPL-MCNC: 8 G/DL (ref 6–8.5)
PROT UR QL STRIP: NEGATIVE
RBC # BLD AUTO: 4.7 10*6/MM3 (ref 3.77–5.28)
SODIUM SERPL-SCNC: 140 MMOL/L (ref 136–145)
SP GR UR STRIP: 1.03 (ref 1–1.03)
UROBILINOGEN UR QL STRIP: ABNORMAL
WBC NRBC COR # BLD: 9.27 10*3/MM3 (ref 3.4–10.8)
WHOLE BLOOD HOLD COAG: NORMAL
WHOLE BLOOD HOLD SPECIMEN: NORMAL

## 2022-06-15 PROCEDURE — 99283 EMERGENCY DEPT VISIT LOW MDM: CPT

## 2022-06-15 PROCEDURE — 85025 COMPLETE CBC W/AUTO DIFF WBC: CPT | Performed by: FAMILY MEDICINE

## 2022-06-15 PROCEDURE — 81003 URINALYSIS AUTO W/O SCOPE: CPT | Performed by: FAMILY MEDICINE

## 2022-06-15 PROCEDURE — 84703 CHORIONIC GONADOTROPIN ASSAY: CPT | Performed by: FAMILY MEDICINE

## 2022-06-15 PROCEDURE — 25010000002 KETOROLAC TROMETHAMINE PER 15 MG: Performed by: FAMILY MEDICINE

## 2022-06-15 PROCEDURE — 74176 CT ABD & PELVIS W/O CONTRAST: CPT

## 2022-06-15 PROCEDURE — 25010000002 ONDANSETRON PER 1 MG: Performed by: FAMILY MEDICINE

## 2022-06-15 PROCEDURE — 80053 COMPREHEN METABOLIC PANEL: CPT | Performed by: FAMILY MEDICINE

## 2022-06-15 PROCEDURE — 96374 THER/PROPH/DIAG INJ IV PUSH: CPT

## 2022-06-15 PROCEDURE — 83690 ASSAY OF LIPASE: CPT | Performed by: FAMILY MEDICINE

## 2022-06-15 PROCEDURE — 96375 TX/PRO/DX INJ NEW DRUG ADDON: CPT

## 2022-06-15 RX ORDER — SODIUM CHLORIDE 0.9 % (FLUSH) 0.9 %
10 SYRINGE (ML) INJECTION AS NEEDED
Status: DISCONTINUED | OUTPATIENT
Start: 2022-06-15 | End: 2022-06-16 | Stop reason: HOSPADM

## 2022-06-15 RX ORDER — ONDANSETRON 2 MG/ML
4 INJECTION INTRAMUSCULAR; INTRAVENOUS ONCE
Status: COMPLETED | OUTPATIENT
Start: 2022-06-15 | End: 2022-06-15

## 2022-06-15 RX ORDER — KETOROLAC TROMETHAMINE 30 MG/ML
15 INJECTION, SOLUTION INTRAMUSCULAR; INTRAVENOUS ONCE
Status: COMPLETED | OUTPATIENT
Start: 2022-06-15 | End: 2022-06-15

## 2022-06-15 RX ADMIN — ONDANSETRON 4 MG: 2 INJECTION INTRAMUSCULAR; INTRAVENOUS at 22:36

## 2022-06-15 RX ADMIN — SODIUM CHLORIDE 1000 ML: 9 INJECTION, SOLUTION INTRAVENOUS at 22:35

## 2022-06-15 RX ADMIN — KETOROLAC TROMETHAMINE 15 MG: 30 INJECTION, SOLUTION INTRAMUSCULAR; INTRAVENOUS at 22:37

## 2022-06-16 VITALS
WEIGHT: 197 LBS | OXYGEN SATURATION: 98 % | DIASTOLIC BLOOD PRESSURE: 79 MMHG | SYSTOLIC BLOOD PRESSURE: 118 MMHG | BODY MASS INDEX: 34.91 KG/M2 | HEART RATE: 88 BPM | HEIGHT: 63 IN | RESPIRATION RATE: 18 BRPM | TEMPERATURE: 99 F

## 2022-06-16 RX ORDER — NITROFURANTOIN 25; 75 MG/1; MG/1
100 CAPSULE ORAL 2 TIMES DAILY
Qty: 10 CAPSULE | Refills: 0 | Status: SHIPPED | OUTPATIENT
Start: 2022-06-16 | End: 2022-06-21

## 2022-06-16 RX ORDER — NITROFURANTOIN 25; 75 MG/1; MG/1
100 CAPSULE ORAL ONCE
Status: COMPLETED | OUTPATIENT
Start: 2022-06-16 | End: 2022-06-16

## 2022-06-16 RX ORDER — ONDANSETRON 4 MG/1
4 TABLET, ORALLY DISINTEGRATING ORAL EVERY 6 HOURS PRN
Qty: 10 TABLET | Refills: 0 | Status: SHIPPED | OUTPATIENT
Start: 2022-06-16 | End: 2022-08-25

## 2022-06-16 RX ADMIN — NITROFURANTOIN MONOHYDRATE/MACROCRYSTALLINE 100 MG: 25; 75 CAPSULE ORAL at 00:46

## 2022-06-16 NOTE — ED PROVIDER NOTES
Subjective   31-year-old female presents emergency department complaining of dysuria and lower abdominal cramping.  Patient reports that she feels like she is getting a UTI so she came in to be evaluated.      History provided by:  Patient  Abdominal Pain  Pain location:  Suprapubic  Pain quality: cramping    Pain radiates to:  Does not radiate  Pain severity:  Mild  Onset quality:  Gradual  Timing:  Intermittent  Progression:  Waxing and waning  Chronicity:  New  Relieved by:  Nothing  Worsened by:  Nothing  Ineffective treatments:  None tried  Associated symptoms: vomiting    Associated symptoms: no chest pain, no dysuria and no fever    Vomiting  The primary symptoms include abdominal pain and vomiting. Primary symptoms do not include fever or dysuria.   Difficulty Urinating  Associated symptoms: abdominal pain and vomiting    Associated symptoms: no fever        Review of Systems   Constitutional: Negative.  Negative for fever.   HENT: Negative.    Respiratory: Negative.    Cardiovascular: Negative.  Negative for chest pain.   Gastrointestinal: Positive for abdominal pain and vomiting.   Endocrine: Negative.    Genitourinary: Positive for difficulty urinating. Negative for dysuria.   Skin: Negative.    Neurological: Negative.    Psychiatric/Behavioral: Negative.    All other systems reviewed and are negative.      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    • Bleeding disorder ([facility]) 11-13-16   • Body piercing     TONGUE, NOSE, TWO IN EACH EAR   • Bronchitis    • Chest pain 06/22/2021    last CP 2-3 months ago   • Clotting disorder (HCC)     factor 5   • Constipation    • COVID-19 vaccine series completed     Pfizer-plus booster   • Deep vein thrombosis (HCC) 11/13/2016   • Diarrhea    • Diverticulitis    • Elevated cholesterol    • Endometriosis    • Factor 5 Leiden  mutation, heterozygous ([facility])    • Fibroid    • Fibromyalgia    • Full dentures 2021    advised no adhesives DOS   • Gastroparesis    • GERD (gastroesophageal reflux disease)    • H/O blood clots    • H/O cardiovascular stress test 2021    reported several years ago-exercise wnl   • Headache    • Hiatal hernia    • High cholesterol    • History of recurrent UTIs    • HPV (human papilloma virus) infection    • Hx of echocardiogram    • Hx of gout 2021   • Hypertension    • Hypoglycemia    • Inappropriate sinus node tachycardia    • Kidney infection 2021    history only   • Migraine    • Nausea & vomiting    • Obesity    • Osteoarthritis    • Ovarian cyst    • Pneumonia    • Pollen allergies    • PONV (postoperative nausea and vomiting)    • Protein S deficiency ([facility]) 2016    labs from hospitalization for PE   • Pulmonary embolism ([facility]) 2016    on eliquis since that time   • Recurrent pregnancy loss, antepartum condition or complication    • Sleep apnea     no CPAP- states insurance took it back   • Tattoo     LOWER BACK   • Varicella        Allergies   Allergen Reactions   • Ceftin [Cefuroxime Axetil] Shortness Of Breath and Rash     Numbness in mouth and throat   • Cefuroxime Hives, Rash and Shortness Of Breath     Numbness in mouth and throat  Numbness in mouth and throat   • Sumatriptan Shortness Of Breath and Nausea Only     Other reaction(s): Nausea Only, Other (See Comments)  Worsening migraine  Worsening migraine   • Amoxicillin Rash, Hives and Itching       Past Surgical History:   Procedure Laterality Date   •  SECTION        and  and    •  SECTION WITH TUBAL N/A 1/15/2019    Procedure:  SECTION REPEAT WITH TUBAL;  Surgeon: Alva Boyer MD;  Location: [facility] LABOR DELIVERY;  Service: Obstetrics/Gynecology   • COLONOSCOPY N/A 2017    Procedure: COLONOSCOPY WITH BIOPSIES AND ARGON THERMAL ABLATION;  Surgeon: Jignesh Selby MD;   Location: [facility] ENDOSCOPY;  Service:    • D & C HYSTEROSCOPY ENDOMETRIAL ABLATION N/A 3/10/2022    Procedure: DILATATION AND CURETTAGE DIAGNOSTIC HYSTEROSCOPY NOVASURE ENDOMETRIAL ABLATION;  Surgeon: Alva Boyer MD;  Location: [facility] OR;  Service: Obstetrics/Gynecology;  Laterality: N/A;   • DIAGNOSTIC LAPAROSCOPY N/A 4/2/2018    Procedure: DIAGNOSTIC LAPAROSCOPY AND ABLATION OF ENDOMETRIOSIS;  Surgeon: Evin Zamudio MD;  Location: [facility] OR;  Service: Obstetrics/Gynecology   • ENDOMETRIAL ABLATION     • ENDOSCOPIC FUNCTIONAL SINUS SURGERY (FESS) Right 6/25/2021    Procedure: Right endoscopic total ethmoidectomy, right endoscopic middle meatal antrotomy with cyst removal, right endoscopic frontal recess exploration with cyst removal;  Surgeon: Saulo Brito MD;  Location: [facility] OR;  Service: ENT;  Laterality: Right;   • ENDOSCOPY N/A 4/20/2017    Procedure: ESOPHAGOGASTRODUODENOSCOPY WITH BIOPSIES AND COLD BIOPSY POLYPECTOMIES;  Surgeon: Jignesh Selby MD;  Location: [facility] ENDOSCOPY;  Service:    • LAPAROSCOPIC CHOLECYSTECTOMY     • LIPOMA EXCISION N/A 12/10/2021    Procedure: Excision of soft tissue mass lower back;  Surgeon: Niida Smith MD;  Location: [facility] OR;  Service: General;  Laterality: N/A;   • MOUTH SURGERY      full mouth tooth extration   • WISDOM TOOTH EXTRACTION         Family History   Problem Relation Age of Onset   • Arthritis Mother    • COPD Mother    • Asthma Mother    • Thyroid disease Mother    • Arthritis Father    • Diabetes Father    • Hypertension Father    • Hyperlipidemia Father    • Kidney disease Father    • Heart attack Father    • Coronary artery disease Father    • Dementia Father    • No Known Problems Son    • Colon cancer Neg Hx    • Liver cancer Neg Hx    • Liver disease Neg Hx    • Stomach cancer Neg Hx    • Esophageal cancer Neg Hx        Social History     Socioeconomic History   • Marital status:    Tobacco Use   • Smoking status: Never  Smoker   • Smokeless tobacco: Never Used   Vaping Use   • Vaping Use: Never used   Substance and Sexual Activity   • Alcohol use: No   • Drug use: Never   • Sexual activity: Yes     Partners: Male     Birth control/protection: Surgical     Comment: Tubal           Objective   Physical Exam  Vitals and nursing note reviewed.   Constitutional:       Appearance: She is well-developed. She is obese.   HENT:      Head: Normocephalic and atraumatic.      Mouth/Throat:      Mouth: Mucous membranes are moist.   Eyes:      Extraocular Movements: Extraocular movements intact.   Cardiovascular:      Rate and Rhythm: Normal rate and regular rhythm.   Pulmonary:      Effort: Pulmonary effort is normal.      Breath sounds: Normal breath sounds.   Abdominal:      General: Abdomen is flat. Bowel sounds are normal.      Palpations: Abdomen is soft.      Tenderness: There is abdominal tenderness in the suprapubic area.   Skin:     General: Skin is warm.      Capillary Refill: Capillary refill takes less than 2 seconds.   Neurological:      General: No focal deficit present.      Mental Status: She is alert.   Psychiatric:         Mood and Affect: Mood normal.         Behavior: Behavior normal.         Procedures           ED Course                                                 MDM  Number of Diagnoses or Management Options  Dysuria: new and requires workup  Lower abdominal pain: new and requires workup  Diagnosis management comments: 31-year-old female presents emergency department with suprapubic abdominal cramping and vomiting and nausea and feeling like she is getting a UTI from the dysuria.  Patient gets recurrent UTIs.  Patient reports to the ER for evaluation patient is hemodynamically stable.  Patient is afebrile.  Laboratory data is obtained CBC and CMP are essentially nondiagnostic.  Lipase is within normal limits.  Urinalysis shows concerns for a mild cystitis.  CT scan is obtained that shows no acute abnormality.   Patient is feeling fine has had no nausea or vomiting since here we will treat her for cystitis and advised her to follow-up outpatient.  Patient verbalized understanding is in agreement with this treatment plan of advised her that if symptoms worsen or she develops a fever to return to the emergency department       Amount and/or Complexity of Data Reviewed  Clinical lab tests: ordered and reviewed  Tests in the radiology section of CPT®: reviewed and ordered  Tests in the medicine section of CPT®: reviewed and ordered  Independent visualization of images, tracings, or specimens: yes        Final diagnoses:   Lower abdominal pain   Dysuria       ED Disposition  ED Disposition     ED Disposition   Discharge    Condition   Stable    Comment   --             No follow-up provider specified.       Medication List      New Prescriptions    nitrofurantoin (macrocrystal-monohydrate) 100 MG capsule  Commonly known as: MACROBID  Take 1 capsule by mouth 2 (Two) Times a Day for 5 days.     ondansetron ODT 4 MG disintegrating tablet  Commonly known as: ZOFRAN-ODT  Place 1 tablet on the tongue Every 6 (Six) Hours As Needed for Nausea or Vomiting.        Changed    calcium carbonate  MG chewable tablet  Commonly known as: TUMS EX  Chew 1 tablet Daily.  What changed:   · when to take this  · reasons to take this           Where to Get Your Medications      These medications were sent to [id number] - [place], [place] - [address] - [phone]  - [phone] FX [address] [address] [place] [address]    Phone: [phone]   · nitrofurantoin (macrocrystal-monohydrate) 100 MG capsule  · ondansetron ODT 4 MG disintegrating tablet          Aicha Alcantar DO  06/16/22 1392

## 2022-06-20 ENCOUNTER — HOSPITAL ENCOUNTER (OUTPATIENT)
Dept: MRI IMAGING | Facility: HOSPITAL | Age: 32
Discharge: HOME | End: 2022-06-20
Admitting: PHYSICIAN ASSISTANT

## 2022-06-20 PROCEDURE — 73721 MRI JNT OF LWR EXTRE W/O DYE: CPT

## 2022-06-22 ENCOUNTER — TELEPHONE (OUTPATIENT)
Dept: ORTHOPEDIC SURGERY | Facility: CLINIC | Age: 32
End: 2022-06-22

## 2022-06-22 NOTE — TELEPHONE ENCOUNTER
Reviewed the mri results with the patient.  She mentions she is having some low back pain but denies radiating pain.  Most of her pain is localized to the right knee.  I would like for her to make an appointment to come by the clinic for an injection in her right knee.  If no relief from the injection we may need to examine her lower spine.   Purse String (Simple) Text: Given the location of the defect and the characteristics of the surrounding skin a purse string closure was deemed most appropriate.  Undermining was performed circumfirentially around the surgical defect.  A purse string suture was then placed and tightened.

## 2022-06-27 ENCOUNTER — OFFICE VISIT (OUTPATIENT)
Dept: ORTHOPEDIC SURGERY | Facility: CLINIC | Age: 32
End: 2022-06-27

## 2022-06-27 VITALS — BODY MASS INDEX: 34.91 KG/M2 | TEMPERATURE: 98.6 F | HEIGHT: 63 IN | WEIGHT: 197 LBS

## 2022-06-27 DIAGNOSIS — M25.562 PATELLOFEMORAL ARTHRALGIA OF LEFT KNEE: ICD-10-CM

## 2022-06-27 DIAGNOSIS — M25.562 ARTHRALGIA OF LEFT KNEE: Primary | ICD-10-CM

## 2022-06-27 PROCEDURE — 20610 DRAIN/INJ JOINT/BURSA W/O US: CPT | Performed by: PHYSICIAN ASSISTANT

## 2022-06-27 RX ORDER — LIDOCAINE HYDROCHLORIDE 20 MG/ML
2 INJECTION, SOLUTION INFILTRATION; PERINEURAL
Status: COMPLETED | OUTPATIENT
Start: 2022-06-27 | End: 2022-06-27

## 2022-06-27 RX ORDER — METHYLPREDNISOLONE ACETATE 40 MG/ML
40 INJECTION, SUSPENSION INTRA-ARTICULAR; INTRALESIONAL; INTRAMUSCULAR; SOFT TISSUE
Status: COMPLETED | OUTPATIENT
Start: 2022-06-27 | End: 2022-06-27

## 2022-06-27 RX ADMIN — METHYLPREDNISOLONE ACETATE 40 MG: 40 INJECTION, SUSPENSION INTRA-ARTICULAR; INTRALESIONAL; INTRAMUSCULAR; SOFT TISSUE at 13:28

## 2022-06-27 RX ADMIN — LIDOCAINE HYDROCHLORIDE 2 ML: 20 INJECTION, SOLUTION INFILTRATION; PERINEURAL at 13:28

## 2022-06-27 NOTE — PROGRESS NOTES
Subjective   Marilu Godoy is a 31 y.o. female here today for injection therapy.    Chief Complaint   Patient presents with   • Left Knee - Injections   Patient presents for a left knee cortisone injection.  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    • Bleeding disorder ([facility]) 11-13-16   • Body piercing     TONGUE, NOSE, TWO IN EACH EAR   • Bronchitis    • Chest pain 06/22/2021    last CP 2-3 months ago   • Clotting disorder ([facility])     factor 5   • Constipation    • COVID-19 vaccine series completed     Pfizer-plus booster   • Deep vein thrombosis ([facility]) 11/13/2016   • Diarrhea    • Diverticulitis    • Elevated cholesterol    • Endometriosis    • Factor 5 Leiden mutation, heterozygous ([facility])    • Fibroid    • Fibromyalgia    • Full dentures 06/22/2021    advised no adhesives DOS   • Gastroparesis    • GERD (gastroesophageal reflux disease)    • H/O blood clots    • H/O cardiovascular stress test 06/22/2021    reported several years ago-exercise wnl   • Headache    • Hiatal hernia    • High cholesterol    • History of recurrent UTIs    • HPV (human papilloma virus) infection    • Hx of echocardiogram    • Hx of gout 06/22/2021   • Hypertension    • Hypoglycemia    • Inappropriate sinus node tachycardia    • Kidney infection 06/22/2021    history only   • Migraine    • Nausea & vomiting    • Obesity    • Osteoarthritis    • Ovarian cyst    • Pneumonia    • Pollen allergies    • PONV (postoperative nausea and vomiting)    • Protein S deficiency ([facility]) 11/14/2016    labs from hospitalization for PE   • Pulmonary embolism ([facility]) 11/20/2016    on eliquis since that time   • Recurrent pregnancy loss, antepartum condition or complication    • Sleep  apnea     no [facility]- states insurance took it back   • Tattoo     LOWER BACK   • Varicella          Past Surgical History:   Procedure Laterality Date   •  SECTION        and  and    •  SECTION WITH TUBAL N/A 1/15/2019    Procedure:  SECTION REPEAT WITH TUBAL;  Surgeon: Alva Boyer MD;  Location: [facility] LABOR DELIVERY;  Service: Obstetrics/Gynecology   • COLONOSCOPY N/A 2017    Procedure: COLONOSCOPY WITH BIOPSIES AND ARGON THERMAL ABLATION;  Surgeon: Jignesh Selby MD;  Location: [facility] ENDOSCOPY;  Service:    • D & C HYSTEROSCOPY ENDOMETRIAL ABLATION N/A 3/10/2022    Procedure: DILATATION AND CURETTAGE DIAGNOSTIC HYSTEROSCOPY NOVASURE ENDOMETRIAL ABLATION;  Surgeon: Alva Boyer MD;  Location: [facility] OR;  Service: Obstetrics/Gynecology;  Laterality: N/A;   • DIAGNOSTIC LAPAROSCOPY N/A 2018    Procedure: DIAGNOSTIC LAPAROSCOPY AND ABLATION OF ENDOMETRIOSIS;  Surgeon: Evin Zamudio MD;  Location: [facility] OR;  Service: Obstetrics/Gynecology   • ENDOMETRIAL ABLATION     • ENDOSCOPIC FUNCTIONAL SINUS SURGERY (FESS) Right 2021    Procedure: Right endoscopic total ethmoidectomy, right endoscopic middle meatal antrotomy with cyst removal, right endoscopic frontal recess exploration with cyst removal;  Surgeon: Saulo Brito MD;  Location: [facility] OR;  Service: ENT;  Laterality: Right;   • ENDOSCOPY N/A 2017    Procedure: ESOPHAGOGASTRODUODENOSCOPY WITH BIOPSIES AND COLD BIOPSY POLYPECTOMIES;  Surgeon: Jignesh Selby MD;  Location: [facility] ENDOSCOPY;  Service:    • LAPAROSCOPIC CHOLECYSTECTOMY     • LIPOMA EXCISION N/A 12/10/2021    Procedure: Excision of soft tissue mass lower back;  Surgeon: Nidia Smith MD;  Location: [facility] OR;  Service: General;  Laterality: N/A;   • MOUTH SURGERY      full mouth tooth extration   • WISDOM TOOTH EXTRACTION         Allergies   Allergen Reactions   • Ceftin [Cefuroxime Axetil] Shortness Of Breath and Rash      "Numbness in mouth and throat   • Cefuroxime Hives, Rash and Shortness Of Breath     Numbness in mouth and throat  Numbness in mouth and throat   • Sumatriptan Shortness Of Breath and Nausea Only     Other reaction(s): Nausea Only, Other (See Comments)  Worsening migraine  Worsening migraine   • Amoxicillin Rash, Hives and Itching       Objective   Temp 98.6 °F (37 °C)   Ht 160 cm (62.99\")   Wt 89.4 kg (197 lb)   BMI 34.91 kg/m²    Physical Exam    + Left knee patella creitus    Skin exam stable with no erythema, ecchymosis or rash.  No new swelling.  No motor or sensory changes.  Distal pulse intact.    Large Joint Arthrocentesis: L knee  Date/Time: 6/27/2022 1:28 PM  Consent given by: patient  Site marked: site marked  Timeout: Immediately prior to procedure a time out was called to verify the correct patient, procedure, equipment, support staff and site/side marked as required   Supporting Documentation  Indications: pain   Procedure Details  Location: knee - L knee  Preparation: Patient was prepped and draped in the usual sterile fashion  Needle size: 22 G  Approach: anterolateral  Medications administered: 2 mL lidocaine 2%; 40 mg methylPREDNISolone acetate 40 MG/ML  Patient tolerance: patient tolerated the procedure well with no immediate complications        Narrative & Impression   PROCEDURE: MRI KNEE LEFT  WITHOUT CONTRAST-        HISTORY: pes anserine bursitis, knee pain and radiating pain;  M25.562-Pain in left knee; M70.50-Other bursitis of knee, unspecified  knee     TECHNIQUE: Multiplanar MR without gadolinium enhancement.     FINDINGS:     ARTICULAR CARTILAGE: No focal defects.     MARROW SIGNAL: Normal.      JOINT FLUID: A small cyst is seen along the tibial attachment of the ACL  compatible with a pericruciate ganglion cyst.     MENISCI: No tear.     LIGAMENT: Intact.           IMPRESSION:  No ligamentous or meniscal tear.     This report was signed and finalized on 6/21/2022 8:51 AM by " Gregg Sarkar MD.       Assessment & Plan      Diagnosis Plan   1. Arthralgia of left knee     2. Patellofemoral arthralgia of left knee         Discussion of orthopaedic goals and activities and patient and/or guardian expressed appreciation.  Guided on proper techniques for mobility, strength, agility and/or conditioning exercises  Ice, heat, and/or modalities as beneficial  Watch for signs and symptoms of infection  Call or notify for any adverse effect from injection therapy    Recommendations:  Follow up in 3 months or prn  Patient agreeable to call or return sooner for any concerns.

## 2022-07-22 ENCOUNTER — OFFICE VISIT (OUTPATIENT)
Dept: INTERNAL MEDICINE | Facility: CLINIC | Age: 32
End: 2022-07-22

## 2022-07-22 VITALS
WEIGHT: 188 LBS | HEIGHT: 63 IN | OXYGEN SATURATION: 99 % | TEMPERATURE: 97.3 F | HEART RATE: 104 BPM | SYSTOLIC BLOOD PRESSURE: 110 MMHG | DIASTOLIC BLOOD PRESSURE: 78 MMHG | BODY MASS INDEX: 33.31 KG/M2

## 2022-07-22 DIAGNOSIS — R59.0 ENLARGED LYMPH NODES IN ARMPIT: Primary | ICD-10-CM

## 2022-07-22 PROCEDURE — 99213 OFFICE O/P EST LOW 20 MIN: CPT | Performed by: NURSE PRACTITIONER

## 2022-07-22 NOTE — PROGRESS NOTES
Office Visit      Patient Name: Marilu Godyo  : 1990   MRN: [id number]     Chief Complaint:    Chief Complaint   Patient presents with   • Adenopathy       History of Present Illness: Marilu Godoy is a 31 y.o. female who is here today with reports of tender, enlarged lymph node in left axilla that has been flaring up for the past year or longer. Not associated with activity, stress, illness. No fever, chills, unexplained weight loss, fatigue, weakness, malaise.      Subjective      I have reviewed and the following portions of the patient's history were updated as appropriate: past family history, past medical history, past social history, past surgical history and problem list.      Current Outpatient Medications:   •  albuterol sulfate  (90 Base) MCG/ACT inhaler, Inhale 2 puffs Every 4 (Four) Hours As Needed for Wheezing or Shortness of Air., Disp: 18 g, Rfl: 11  •  Alcohol Swabs (Alcohol Pads) 70 % pads, Check sugars as needed/directed for hypoglycemia., Disp: 100 each, Rfl: 12  •  apixaban (ELIQUIS) 5 MG tablet tablet, Take 1 tablet by mouth 2 (Two) Times a Day., Disp: 180 tablet, Rfl: 3  •  aspirin 81 MG EC tablet, Take 81 mg by mouth Daily., Disp: , Rfl:   •  Atogepant (Qulipta) 60 MG tablet, Take 1 tablet by mouth Daily., Disp: 30 tablet, Rfl: 11  •  atorvastatin (LIPITOR) 40 MG tablet, Take 1 tablet by mouth Every Night., Disp: 30 tablet, Rfl: 11  •  azelastine (ASTELIN) 0.1 % nasal spray, 1 spray into the nostril(s) as directed by provider 2 (Two) Times a Day. Use in each nostril as directed, Disp: 1 each, Rfl: 10  •  B-D ULTRA-FINE 33 LANCETS misc, Check sugars as needed/directed for hypoglycemia., Disp: 100 each, Rfl: 12  •  Blood Glucose Monitoring Suppl (ONE TOUCH ULTRA 2) w/Device kit, use as needed to check blood sugar for hypoglycemia (diabetes), Disp: , Rfl:   •  busPIRone (BUSPAR) 5 MG tablet, TAKE 1 TABLET BY MOUTH THREE TIMES A DAY, Disp: 90 tablet, Rfl: 0  •   calcium carbonate EX (TUMS EX) 750 MG chewable tablet, Chew 1 tablet Daily. (Patient taking differently: Chew 750 mg Daily As Needed.), Disp: 30 tablet, Rfl: 1  •  Corlanor 5 MG tablet tablet, Take 1.5 tablets by mouth 2 (two) times a day., Disp: 90 tablet, Rfl: 6  •  cyclobenzaprine (FLEXERIL) 10 MG tablet,  15 Each, TAKE 1/2 TABLET BY MOUTH THREE TIMES A DAY AS NEEDED FOR MUSCLE SPASM for up to 5 days, 0 Refill(s), Disp: , Rfl:   •  Diclofenac Sodium (VOLTAREN) 1 % gel gel, Apply 4 g topically to the appropriate area as directed 4 (Four) Times a Day As Needed (joint pain)., Disp: 150 g, Rfl: 1  •  DULoxetine (CYMBALTA) 60 MG capsule, Take 1 capsule by mouth Daily., Disp: 90 capsule, Rfl: 3  •  fluconazole (DIFLUCAN) 150 MG tablet, Take 1 tablet by mouth Every 3 (Three) Days., Disp: 2 tablet, Rfl: 0  •  fluticasone (FLONASE) 50 MCG/ACT nasal spray, INSTILL 2 SPRAYS INTO EACH NOSTRIL EVERY DAY AS DIRECTED, Disp: 16 g, Rfl: 11  •  glucose blood test strip, USE TO CHECK SUGARS DAILY DUE TO hypoglycemia, Disp: 100 each, Rfl: 12  •  glucose monitor monitoring kit, 1 each As Needed (diabetes). Check sugars as needed/directed for hypoglycemia., Disp: 1 each, Rfl: 0  •  granisetron (KYTRIL) 1 MG tablet, Take 1 mg by mouth Every 12 (Twelve) Hours As Needed for Nausea or Vomiting. PA APPROVED., Disp: , Rfl:   •  ibuprofen (ADVIL,MOTRIN) 800 MG tablet, Take 1 tablet by mouth Every 8 (Eight) Hours As Needed for Mild Pain ., Disp: 30 tablet, Rfl: 0  •  l-methylfolate 15 MG tablet tablet, Take 1 tablet by mouth Daily. Indications: folic acid deficiency, Disp: 90 tablet, Rfl: 3  •  Lidocaine (LIDODERM EX),  1 Patch, TransDermal, Daily, 0 Refill(s), Disp: , Rfl:   •  lidocaine (LIDODERM) 5 %, Place 2 patches on the skin as directed by provider Daily. Remove & Discard patch within 12 hours or as directed by MD, Disp: 60 patch, Rfl: 3  •  methocarbamol (ROBAXIN) 750 MG tablet, Take 1 tablet by mouth At Night As Needed for Muscle  "Spasms., Disp: 14 tablet, Rfl: 0  •  mometasone-formoterol (Dulera) 200-5 MCG/ACT inhaler, Inhale 2 puffs 2 (Two) Times a Day., Disp: 13 g, Rfl: 11  •  mupirocin (Bactroban) 2 % cream, Apply 1 application topically to the appropriate area as directed 3 (Three) Times a Day., Disp: 1 each, Rfl: 0  •  ondansetron (ZOFRAN) 8 MG tablet, Take 1 tablet by mouth Every 8 (Eight) Hours As Needed for Nausea or Vomiting., Disp: 10 tablet, Rfl: 0  •  ondansetron ODT (ZOFRAN-ODT) 4 MG disintegrating tablet, Place 1 tablet on the tongue Every 6 (Six) Hours As Needed for Nausea or Vomiting., Disp: 10 tablet, Rfl: 0  •  pantoprazole (PROTONIX) 40 MG EC tablet, Take 1 tablet by mouth Daily., Disp: 30 tablet, Rfl: 0  •  Rimegepant Sulfate (Nurtec) 75 MG tablet dispersible tablet, Take 1 tablet by mouth As Needed (as needed)., Disp: 4 tablet, Rfl: 0  •  vitamin D3 (Vitamin D3 Maximum Strength) 125 MCG (5000 UT) capsule capsule, Take 1 capsule by mouth Daily., Disp: 90 capsule, Rfl: 1  •  zonisamide (ZONEGRAN) 100 MG capsule, Take 1 capsule by mouth Every Evening., Disp: 90 capsule, Rfl: 1    Allergies   Allergen Reactions   • Ceftin [Cefuroxime Axetil] Shortness Of Breath and Rash     Numbness in mouth and throat   • Cefuroxime Hives, Rash and Shortness Of Breath     Numbness in mouth and throat  Numbness in mouth and throat   • Sumatriptan Shortness Of Breath and Nausea Only     Other reaction(s): Nausea Only, Other (See Comments)  Worsening migraine  Worsening migraine   • Amoxicillin Rash, Hives and Itching       Objective     Physical Exam:  Vital Signs:   Vitals:    07/22/22 1750   BP: 110/78   Pulse: 104   Temp: 97.3 °F (36.3 °C)   SpO2: 99%   Weight: 85.3 kg (188 lb)   Height: 160 cm (62.99\")     Body mass index is 33.31 kg/m².    Physical Exam  Constitutional:       Appearance: She is not ill-appearing.   HENT:      Head: Normocephalic.      Right Ear: External ear normal.      Left Ear: External ear normal.   Eyes:      " Conjunctiva/sclera: Conjunctivae normal.      Pupils: Pupils are equal, round, and reactive to light.   Cardiovascular:      Rate and Rhythm: Normal rate and regular rhythm.      Pulses:           Radial pulses are 2+ on the right side and 2+ on the left side.        Dorsalis pedis pulses are 2+ on the right side and 2+ on the left side.      Heart sounds: Normal heart sounds.   Pulmonary:      Effort: Pulmonary effort is normal.      Breath sounds: Normal breath sounds.   Chest:   Breasts:      Left: Axillary adenopathy present. No supraclavicular adenopathy.       Musculoskeletal:      Cervical back: Normal range of motion and neck supple.      Right lower leg: No edema.      Left lower leg: No edema.   Lymphadenopathy:      Head:      Left side of head: No submental, submandibular, tonsillar or occipital adenopathy.      Cervical: No cervical adenopathy.      Upper Body:      Left upper body: Axillary adenopathy present. No supraclavicular, pectoral or epitrochlear adenopathy.   Skin:     General: Skin is warm.      Capillary Refill: Capillary refill takes less than 2 seconds.   Neurological:      Mental Status: She is alert and oriented to person, place, and time.      Coordination: Coordination normal.      Gait: Gait normal.   Psychiatric:         Mood and Affect: Mood normal.         Behavior: Behavior normal.         Thought Content: Thought content normal.             Assessment / Plan      Assessment/Plan:   Diagnoses and all orders for this visit:    1. Enlarged lymph nodes in armpit (Primary)  -     US nonvascular extremity limited; Future  -     CBC & Differential             Follow Up:   Return in about 3 months (around 10/22/2022) for Annual.    Patient was given instructions and counseling regarding her condition or for health maintenance advice. Please see specific information pulled into the AVS if appropriate.       [facility]     Please note that portions of this note may  have been completed with a voice recognition program. Efforts were made to edit dictation, but occasionally words are mistranscribed.

## 2022-08-01 ENCOUNTER — APPOINTMENT (OUTPATIENT)
Dept: CT IMAGING | Facility: HOSPITAL | Age: 32
End: 2022-08-01

## 2022-08-01 ENCOUNTER — HOSPITAL ENCOUNTER (EMERGENCY)
Facility: HOSPITAL | Age: 32
Discharge: HOME | End: 2022-08-01
Attending: FAMILY MEDICINE | Admitting: FAMILY MEDICINE

## 2022-08-01 VITALS
BODY MASS INDEX: 35.7 KG/M2 | HEIGHT: 62 IN | WEIGHT: 194 LBS | OXYGEN SATURATION: 99 % | RESPIRATION RATE: 20 BRPM | DIASTOLIC BLOOD PRESSURE: 70 MMHG | TEMPERATURE: 98.7 F | SYSTOLIC BLOOD PRESSURE: 110 MMHG | HEART RATE: 87 BPM

## 2022-08-01 DIAGNOSIS — R10.31 RIGHT LOWER QUADRANT ABDOMINAL PAIN: ICD-10-CM

## 2022-08-01 DIAGNOSIS — K59.00 CONSTIPATION, UNSPECIFIED CONSTIPATION TYPE: Primary | ICD-10-CM

## 2022-08-01 LAB
ALBUMIN SERPL-MCNC: 4.4 G/DL (ref 3.5–5.2)
ALBUMIN/GLOB SERPL: 1.4 G/DL
ALP SERPL-CCNC: 100 U/L (ref 39–117)
ALT SERPL W P-5'-P-CCNC: 17 U/L (ref 1–33)
ANION GAP SERPL CALCULATED.3IONS-SCNC: 10.9 MMOL/L (ref 5–15)
AST SERPL-CCNC: 14 U/L (ref 1–32)
B-HCG UR QL: NEGATIVE
BASOPHILS # BLD AUTO: 0.05 10*3/MM3 (ref 0–0.2)
BASOPHILS NFR BLD AUTO: 0.6 % (ref 0–1.5)
BILIRUB SERPL-MCNC: 0.4 MG/DL (ref 0–1.2)
BILIRUB UR QL STRIP: NEGATIVE
BUN SERPL-MCNC: 9 MG/DL (ref 6–20)
BUN/CREAT SERPL: 13.2 (ref 7–25)
CALCIUM SPEC-SCNC: 9.5 MG/DL (ref 8.6–10.5)
CHLORIDE SERPL-SCNC: 105 MMOL/L (ref 98–107)
CLARITY UR: ABNORMAL
CO2 SERPL-SCNC: 24.1 MMOL/L (ref 22–29)
COLOR UR: YELLOW
CREAT SERPL-MCNC: 0.68 MG/DL (ref 0.57–1)
DEPRECATED RDW RBC AUTO: 40.4 FL (ref 37–54)
EGFRCR SERPLBLD CKD-EPI 2021: 119.6 ML/MIN/1.73
EOSINOPHIL # BLD AUTO: 0.19 10*3/MM3 (ref 0–0.4)
EOSINOPHIL NFR BLD AUTO: 2.2 % (ref 0.3–6.2)
ERYTHROCYTE [DISTWIDTH] IN BLOOD BY AUTOMATED COUNT: 12.2 % (ref 12.3–15.4)
GLOBULIN UR ELPH-MCNC: 3.1 GM/DL
GLUCOSE SERPL-MCNC: 105 MG/DL (ref 65–99)
GLUCOSE UR STRIP-MCNC: NEGATIVE MG/DL
HCT VFR BLD AUTO: 39.4 % (ref 34–46.6)
HGB BLD-MCNC: 13.7 G/DL (ref 12–15.9)
HGB UR QL STRIP.AUTO: NEGATIVE
HOLD SPECIMEN: NORMAL
HOLD SPECIMEN: NORMAL
IMM GRANULOCYTES # BLD AUTO: 0.02 10*3/MM3 (ref 0–0.05)
IMM GRANULOCYTES NFR BLD AUTO: 0.2 % (ref 0–0.5)
KETONES UR QL STRIP: NEGATIVE
LEUKOCYTE ESTERASE UR QL STRIP.AUTO: NEGATIVE
LIPASE SERPL-CCNC: 29 U/L (ref 13–60)
LYMPHOCYTES # BLD AUTO: 2.27 10*3/MM3 (ref 0.7–3.1)
LYMPHOCYTES NFR BLD AUTO: 26.2 % (ref 19.6–45.3)
MCH RBC QN AUTO: 31.3 PG (ref 26.6–33)
MCHC RBC AUTO-ENTMCNC: 34.8 G/DL (ref 31.5–35.7)
MCV RBC AUTO: 90 FL (ref 79–97)
MONOCYTES # BLD AUTO: 0.44 10*3/MM3 (ref 0.1–0.9)
MONOCYTES NFR BLD AUTO: 5.1 % (ref 5–12)
NEUTROPHILS NFR BLD AUTO: 5.71 10*3/MM3 (ref 1.7–7)
NEUTROPHILS NFR BLD AUTO: 65.7 % (ref 42.7–76)
NITRITE UR QL STRIP: NEGATIVE
NRBC BLD AUTO-RTO: 0 /100 WBC (ref 0–0.2)
PH UR STRIP.AUTO: 7.5 [PH] (ref 5–8)
PLATELET # BLD AUTO: 310 10*3/MM3 (ref 140–450)
PMV BLD AUTO: 9.9 FL (ref 6–12)
POTASSIUM SERPL-SCNC: 3.7 MMOL/L (ref 3.5–5.2)
PROT SERPL-MCNC: 7.5 G/DL (ref 6–8.5)
PROT UR QL STRIP: ABNORMAL
RBC # BLD AUTO: 4.38 10*6/MM3 (ref 3.77–5.28)
SODIUM SERPL-SCNC: 140 MMOL/L (ref 136–145)
SP GR UR STRIP: 1.03 (ref 1–1.03)
UROBILINOGEN UR QL STRIP: ABNORMAL
WBC NRBC COR # BLD: 8.68 10*3/MM3 (ref 3.4–10.8)
WHOLE BLOOD HOLD COAG: NORMAL
WHOLE BLOOD HOLD SPECIMEN: NORMAL

## 2022-08-01 PROCEDURE — 74177 CT ABD & PELVIS W/CONTRAST: CPT

## 2022-08-01 PROCEDURE — 81025 URINE PREGNANCY TEST: CPT

## 2022-08-01 PROCEDURE — 96374 THER/PROPH/DIAG INJ IV PUSH: CPT

## 2022-08-01 PROCEDURE — 25010000002 KETOROLAC TROMETHAMINE PER 15 MG

## 2022-08-01 PROCEDURE — 96375 TX/PRO/DX INJ NEW DRUG ADDON: CPT

## 2022-08-01 PROCEDURE — 85025 COMPLETE CBC W/AUTO DIFF WBC: CPT

## 2022-08-01 PROCEDURE — 81003 URINALYSIS AUTO W/O SCOPE: CPT | Performed by: FAMILY MEDICINE

## 2022-08-01 PROCEDURE — 83690 ASSAY OF LIPASE: CPT

## 2022-08-01 PROCEDURE — 99283 EMERGENCY DEPT VISIT LOW MDM: CPT

## 2022-08-01 PROCEDURE — 25010000002 IOPAMIDOL 61 % SOLUTION: Performed by: FAMILY MEDICINE

## 2022-08-01 PROCEDURE — 80053 COMPREHEN METABOLIC PANEL: CPT

## 2022-08-01 PROCEDURE — 25010000002 ONDANSETRON PER 1 MG

## 2022-08-01 RX ORDER — ONDANSETRON 2 MG/ML
4 INJECTION INTRAMUSCULAR; INTRAVENOUS ONCE
Status: COMPLETED | OUTPATIENT
Start: 2022-08-01 | End: 2022-08-01

## 2022-08-01 RX ORDER — POLYETHYLENE GLYCOL 3350 17 G/17G
17 POWDER, FOR SOLUTION ORAL DAILY
Qty: 255 G | Refills: 0 | Status: SHIPPED | OUTPATIENT
Start: 2022-08-01 | End: 2022-11-11

## 2022-08-01 RX ORDER — SODIUM CHLORIDE 0.9 % (FLUSH) 0.9 %
10 SYRINGE (ML) INJECTION AS NEEDED
Status: DISCONTINUED | OUTPATIENT
Start: 2022-08-01 | End: 2022-08-02 | Stop reason: HOSPADM

## 2022-08-01 RX ORDER — MAGNESIUM CARB/ALUMINUM HYDROX 105-160MG
300 TABLET,CHEWABLE ORAL ONCE
Status: COMPLETED | OUTPATIENT
Start: 2022-08-01 | End: 2022-08-01

## 2022-08-01 RX ORDER — KETOROLAC TROMETHAMINE 30 MG/ML
30 INJECTION, SOLUTION INTRAMUSCULAR; INTRAVENOUS ONCE
Status: COMPLETED | OUTPATIENT
Start: 2022-08-01 | End: 2022-08-01

## 2022-08-01 RX ADMIN — MAGNESIUM CITRATE 300 ML: 1.75 LIQUID ORAL at 22:06

## 2022-08-01 RX ADMIN — KETOROLAC TROMETHAMINE 30 MG: 30 INJECTION, SOLUTION INTRAMUSCULAR; INTRAVENOUS at 20:05

## 2022-08-01 RX ADMIN — ONDANSETRON 4 MG: 2 INJECTION INTRAMUSCULAR; INTRAVENOUS at 20:05

## 2022-08-01 RX ADMIN — SODIUM CHLORIDE 1000 ML: 9 INJECTION, SOLUTION INTRAVENOUS at 20:04

## 2022-08-01 RX ADMIN — IOPAMIDOL 100 ML: 612 INJECTION, SOLUTION INTRAVENOUS at 20:43

## 2022-08-01 NOTE — ED PROVIDER NOTES
Subjective   Patient is a 31-year-old female here today with right lower quadrant pain. Started approximately 3 days ago. Has been increasing in persistence and intensity. Has been experiencing nausea, vomiting, diarrhea, and constipation. No fever, but is having chills. Pain is primarily staying to there right lower region.  She states that today she had a relatively normal bowel movement, followed by diarrhea, followed by very dry stool.  History of a tubal ligation and ovarian cysts.        Review of Systems   Constitutional: Positive for chills. Negative for appetite change and fever.   Gastrointestinal: Positive for abdominal pain, constipation, diarrhea, nausea and vomiting. Negative for abdominal distention and blood in stool.   Genitourinary: Negative for difficulty urinating, dysuria, flank pain, frequency, hematuria and urgency.   Musculoskeletal: Negative for back pain.   Neurological: Negative for headaches.   All other systems reviewed and are negative.      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    • Bleeding disorder (HCC) 11-13-16   • Body piercing     TONGUE, NOSE, TWO IN EACH EAR   • Bronchitis    • Chest pain 06/22/2021    last CP 2-3 months ago   • Clotting disorder (HCC)     factor 5   • Constipation    • COVID-19 vaccine series completed     Pfizer-plus booster   • Deep vein thrombosis (HCC) 11/13/2016   • Diarrhea    • Diverticulitis    • Elevated cholesterol    • Endometriosis    • Factor 5 Leiden mutation, heterozygous (HCC)    • Fibroid    • Fibromyalgia    • Full dentures 06/22/2021    advised no adhesives DOS   • Gastroparesis    • GERD (gastroesophageal reflux disease)    • H/O blood clots    • H/O cardiovascular stress test 06/22/2021    reported several years ago-exercise wnl   • Headache    • Hiatal hernia    • High cholesterol    •  History of recurrent UTIs    • HPV (human papilloma virus) infection    • Hx of echocardiogram    • Hx of gout 2021   • Hypertension    • Hypoglycemia    • Inappropriate sinus node tachycardia    • Kidney infection 2021    history only   • Migraine    • Nausea & vomiting    • Obesity    • Osteoarthritis    • Ovarian cyst    • Pneumonia    • Pollen allergies    • PONV (postoperative nausea and vomiting)    • Protein S deficiency (HCC) 2016    labs from hospitalization for PE   • Pulmonary embolism (HCC) 2016    on eliquis since that time   • Recurrent pregnancy loss, antepartum condition or complication    • Sleep apnea     no CPAP- states insurance took it back   • Tattoo     LOWER BACK   • Varicella        Allergies   Allergen Reactions   • Ceftin [Cefuroxime Axetil] Shortness Of Breath and Rash     Numbness in mouth and throat   • Cefuroxime Hives, Rash and Shortness Of Breath     Numbness in mouth and throat  Numbness in mouth and throat   • Sumatriptan Shortness Of Breath and Nausea Only     Other reaction(s): Nausea Only, Other (See Comments)  Worsening migraine  Worsening migraine   • Amoxicillin Rash, Hives and Itching       Past Surgical History:   Procedure Laterality Date   •  SECTION        and  and    •  SECTION WITH TUBAL N/A 1/15/2019    Procedure:  SECTION REPEAT WITH TUBAL;  Surgeon: Avla Boyer MD;  Location: [facility] LABOR DELIVERY;  Service: Obstetrics/Gynecology   • COLONOSCOPY N/A 2017    Procedure: COLONOSCOPY WITH BIOPSIES AND ARGON THERMAL ABLATION;  Surgeon: Jignesh Selby MD;  Location: [facility] ENDOSCOPY;  Service:    • D & C HYSTEROSCOPY ENDOMETRIAL ABLATION N/A 3/10/2022    Procedure: DILATATION AND CURETTAGE DIAGNOSTIC HYSTEROSCOPY NOVASURE ENDOMETRIAL ABLATION;  Surgeon: Alva Boyer MD;  Location: [facility] OR;  Service: Obstetrics/Gynecology;  Laterality: N/A;   • DIAGNOSTIC LAPAROSCOPY N/A 2018    Procedure: DIAGNOSTIC  LAPAROSCOPY AND ABLATION OF ENDOMETRIOSIS;  Surgeon: Evin Zamudio MD;  Location: [facility] OR;  Service: Obstetrics/Gynecology   • ENDOMETRIAL ABLATION     • ENDOSCOPIC FUNCTIONAL SINUS SURGERY (FESS) Right 6/25/2021    Procedure: Right endoscopic total ethmoidectomy, right endoscopic middle meatal antrotomy with cyst removal, right endoscopic frontal recess exploration with cyst removal;  Surgeon: Saulo Brito MD;  Location: [facility] OR;  Service: ENT;  Laterality: Right;   • ENDOSCOPY N/A 4/20/2017    Procedure: ESOPHAGOGASTRODUODENOSCOPY WITH BIOPSIES AND COLD BIOPSY POLYPECTOMIES;  Surgeon: Jignesh Selby MD;  Location: [facility] ENDOSCOPY;  Service:    • LAPAROSCOPIC CHOLECYSTECTOMY     • LIPOMA EXCISION N/A 12/10/2021    Procedure: Excision of soft tissue mass lower back;  Surgeon: Nidia Smith MD;  Location: [facility] OR;  Service: General;  Laterality: N/A;   • MOUTH SURGERY      full mouth tooth extration   • WISDOM TOOTH EXTRACTION         Family History   Problem Relation Age of Onset   • Arthritis Mother    • COPD Mother    • Asthma Mother    • Thyroid disease Mother    • Arthritis Father    • Diabetes Father    • Hypertension Father    • Hyperlipidemia Father    • Kidney disease Father    • Heart attack Father    • Coronary artery disease Father    • Dementia Father    • No Known Problems Son    • Colon cancer Neg Hx    • Liver cancer Neg Hx    • Liver disease Neg Hx    • Stomach cancer Neg Hx    • Esophageal cancer Neg Hx        Social History     Socioeconomic History   • Marital status:    Tobacco Use   • Smoking status: Never Smoker   • Smokeless tobacco: Never Used   Vaping Use   • Vaping Use: Never used   Substance and Sexual Activity   • Alcohol use: No   • Drug use: Never   • Sexual activity: Yes     Partners: Male     Birth control/protection: Surgical     Comment: Tubal           Objective   Physical Exam  Vitals and nursing note reviewed.   Constitutional:        Appearance: Normal appearance. She is normal weight.   HENT:      Head: Normocephalic and atraumatic.   Cardiovascular:      Rate and Rhythm: Normal rate and regular rhythm.      Pulses: Normal pulses.      Heart sounds: Normal heart sounds.   Pulmonary:      Effort: Pulmonary effort is normal.      Breath sounds: Normal breath sounds.   Abdominal:      General: Abdomen is flat. Bowel sounds are normal. There is no distension.      Palpations: Abdomen is soft.      Tenderness: There is abdominal tenderness in the right lower quadrant. Negative signs include Coon's sign and Rovsing's sign.   Musculoskeletal:      Right lower leg: No edema.      Left lower leg: No edema.   Skin:     General: Skin is warm and dry.      Capillary Refill: Capillary refill takes less than 2 seconds.   Neurological:      General: No focal deficit present.      Mental Status: She is alert and oriented to person, place, and time.   Psychiatric:         Mood and Affect: Mood normal.         Behavior: Behavior normal.         Procedures           ED Course  ED Course as of 08/02/22 1116   Mon Aug 01, 2022   2011 WBC: 8.68 [TA]   2011 RBC: 4.38 [TA]   2011 Hemoglobin: 13.7 [TA]   2011 Hematocrit: 39.4 [TA]   2011 Platelets: 310 [TA]   2011 Protein, UA(!): Trace [TA]   2011 Blood, UA: Negative [TA]   2011 Leukocytes, UA: Negative [TA]   2011 Nitrite, UA: Negative [TA]   2011 Lipase: 29 [TA]   2011 HCG, Urine QL: Negative [TA]   2011 Glucose(!): 105 [TA]   2011 BUN: 9 [TA]   2011 Creatinine: 0.68 [TA]   2011 Sodium: 140 [TA]   2011 Potassium: 3.7 [TA]   2011 eGFR: 119.6 [TA]   2119 CT Abdomen Pelvis With Contrast  FINDINGS:  Abdomen: The gallbladder has been removed.  The solid abdominal  organs and ureters are unremarkable.  There is no right lower  quadrant inflammation.  The GI tract is unremarkable, including  the appendix.  Pelvis: There is a possible bicornuate uterus.  The uterus, ovaries and urinary bladder are otherwise normal.  There  is no pelvic or abdominal ascites, adenopathy or acute  osseous abnormality.     IMPRESSION:  No acute abnormality. [TA]      ED Course User Index  [TA] Margarito Tim, APRN                                           MDM  Number of Diagnoses or Management Options  Constipation, unspecified constipation type  Right lower quadrant abdominal pain  Diagnosis management comments: Patient is a 31-year-old female here today with right lower quadrant pain.  She does not appear to be in acute distress vital signs are within normal limits.  Physical exam described above, initial treatment to include IV, labs, IV fluids and medication.    Labs appear to be within the normal ranges.  CT abdomen pelvis without contrast ordered.      No acute abnormalities noted on the CT. reviewing the images shows large amounts of stool, especially in the ascending colon and cecum that could explain the patient's pain correlates with her variation in stools.  We will treat patient for constipation with magnesium citrate provided here in the emergency department that she can take at home.  Followed by MiraLAX to use daily for the next 1 to 2 weeks to help soften stools.  Patient is agreeable to plan of care and is ready for discharge.       Amount and/or Complexity of Data Reviewed  Clinical lab tests: reviewed and ordered  Tests in the radiology section of CPT®: ordered and reviewed  Tests in the medicine section of CPT®: ordered and reviewed  Discussion of test results with the performing providers: yes  Discuss the patient with other providers: yes    Patient Progress  Patient progress: stable      Final diagnoses:   Constipation, unspecified constipation type   Right lower quadrant abdominal pain       ED Disposition  ED Disposition     ED Disposition   Discharge    Condition   Stable    Comment   --             Deidre Barker MD  [address] [place] [address] [phone]    Schedule an appointment as soon as possible  for a visit   As needed         Medication List      New Prescriptions    polyethylene glycol 17 GM/SCOOP powder  Commonly known as: MIRALAX  Take 17 g by mouth Daily.        Changed    calcium carbonate  MG chewable tablet  Commonly known as: TUMS EX  Chew 1 tablet Daily.  What changed:   · when to take this  · reasons to take this           Where to Get Your Medications      These medications were sent to [id number] - [place], [place] - [address] - [phone]  - [phone] FX  [address] [address] [place] [address]    Phone: [phone]   · polyethylene glycol 17 GM/SCOOP powder          Margarito Tim, APRN  08/02/22 1113

## 2022-08-02 NOTE — DISCHARGE INSTRUCTIONS
Take the Mag citrate at home to help stimulate a bowel movement. Then use Miralax daily for 1-2 weeks. Increase your fluid intake. Your CT scan and labs don't show any evidence of infection or inflammation. Follow-up with your primary provider for a further evaluation.

## 2022-08-12 PROBLEM — E53.8 FOLIC ACID DEFICIENCY: Status: ACTIVE | Noted: 2022-08-12

## 2022-08-12 PROBLEM — R79.9 ABNORMAL BLOOD CHEMISTRY: Status: ACTIVE | Noted: 2022-08-12

## 2022-08-12 PROBLEM — E55.9 VITAMIN D DEFICIENCY: Status: ACTIVE | Noted: 2022-08-12

## 2022-08-12 PROBLEM — I26.99 PULMONARY THROMBOEMBOLISM (HCC): Status: ACTIVE | Noted: 2022-05-19

## 2022-08-25 ENCOUNTER — OFFICE VISIT (OUTPATIENT)
Dept: INTERNAL MEDICINE | Facility: CLINIC | Age: 32
End: 2022-08-25

## 2022-08-25 VITALS
WEIGHT: 192 LBS | SYSTOLIC BLOOD PRESSURE: 125 MMHG | HEIGHT: 63 IN | OXYGEN SATURATION: 100 % | DIASTOLIC BLOOD PRESSURE: 78 MMHG | TEMPERATURE: 98.6 F | BODY MASS INDEX: 34.02 KG/M2 | HEART RATE: 92 BPM | RESPIRATION RATE: 15 BRPM

## 2022-08-25 DIAGNOSIS — R59.0 ENLARGED LYMPH NODES IN ARMPIT: ICD-10-CM

## 2022-08-25 DIAGNOSIS — R53.83 FATIGUE, UNSPECIFIED TYPE: ICD-10-CM

## 2022-08-25 DIAGNOSIS — L50.9 HIVES: ICD-10-CM

## 2022-08-25 DIAGNOSIS — Z00.00 PHYSICAL EXAM, ANNUAL: Primary | ICD-10-CM

## 2022-08-25 DIAGNOSIS — Z13.79 ENCOUNTER FOR GENETIC SCREENING: ICD-10-CM

## 2022-08-25 DIAGNOSIS — E55.9 VITAMIN D DEFICIENCY: ICD-10-CM

## 2022-08-25 PROCEDURE — 99395 PREV VISIT EST AGE 18-39: CPT | Performed by: NURSE PRACTITIONER

## 2022-08-25 PROCEDURE — 3008F BODY MASS INDEX DOCD: CPT | Performed by: NURSE PRACTITIONER

## 2022-08-25 PROCEDURE — 2014F MENTAL STATUS ASSESS: CPT | Performed by: NURSE PRACTITIONER

## 2022-08-25 RX ORDER — HYOSCYAMINE SULFATE 0.125 MG
TABLET ORAL
COMMUNITY
Start: 2022-08-12

## 2022-08-25 RX ORDER — MUPIROCIN CALCIUM 20 MG/G
1 CREAM TOPICAL 3 TIMES DAILY
Qty: 30 G | Refills: 1 | Status: SHIPPED | OUTPATIENT
Start: 2022-08-25

## 2022-08-30 ENCOUNTER — HOSPITAL ENCOUNTER (OUTPATIENT)
Dept: ULTRASOUND IMAGING | Facility: HOSPITAL | Age: 32
Discharge: HOME | End: 2022-08-30

## 2022-08-30 ENCOUNTER — LAB (OUTPATIENT)
Dept: LAB | Facility: HOSPITAL | Age: 32
End: 2022-08-30

## 2022-08-30 DIAGNOSIS — R59.0 ENLARGED LYMPH NODES IN ARMPIT: ICD-10-CM

## 2022-08-30 DIAGNOSIS — M79.18 CHRONIC MUSCULOSKELETAL PAIN: ICD-10-CM

## 2022-08-30 DIAGNOSIS — G89.29 CHRONIC MUSCULOSKELETAL PAIN: ICD-10-CM

## 2022-08-30 DIAGNOSIS — D68.51 HETEROZYGOUS FACTOR V LEIDEN MUTATION: ICD-10-CM

## 2022-08-30 DIAGNOSIS — D68.59 PROTEIN S DEFICIENCY: ICD-10-CM

## 2022-08-30 LAB
CHOLEST SERPL-MCNC: 222 MG/DL (ref 0–200)
HDLC SERPL-MCNC: 31 MG/DL (ref 40–60)
LDLC SERPL CALC-MCNC: 167 MG/DL (ref 0–100)
LDLC/HDLC SERPL: 5.31 {RATIO}
T-UPTAKE NFR SERPL: 1.12 TBI (ref 0.8–1.3)
T4 SERPL-MCNC: 7.66 MCG/DL (ref 4.5–11.7)
TRIGL SERPL-MCNC: 132 MG/DL (ref 0–150)
TSH SERPL DL<=0.05 MIU/L-ACNC: 0.69 UIU/ML (ref 0.27–4.2)
VLDLC SERPL-MCNC: 24 MG/DL (ref 5–40)

## 2022-08-30 PROCEDURE — 86376 MICROSOMAL ANTIBODY EACH: CPT | Performed by: NURSE PRACTITIONER

## 2022-08-30 PROCEDURE — 36415 COLL VENOUS BLD VENIPUNCTURE: CPT | Performed by: NURSE PRACTITIONER

## 2022-08-30 PROCEDURE — 84436 ASSAY OF TOTAL THYROXINE: CPT | Performed by: NURSE PRACTITIONER

## 2022-08-30 PROCEDURE — 84445 ASSAY OF TSI GLOBULIN: CPT | Performed by: NURSE PRACTITIONER

## 2022-08-30 PROCEDURE — 80061 LIPID PANEL: CPT | Performed by: NURSE PRACTITIONER

## 2022-08-30 PROCEDURE — 84479 ASSAY OF THYROID (T3 OR T4): CPT | Performed by: NURSE PRACTITIONER

## 2022-08-30 PROCEDURE — 84443 ASSAY THYROID STIM HORMONE: CPT | Performed by: NURSE PRACTITIONER

## 2022-08-30 PROCEDURE — 83527 ASSAY OF INSULIN: CPT | Performed by: NURSE PRACTITIONER

## 2022-08-30 PROCEDURE — 83525 ASSAY OF INSULIN: CPT | Performed by: NURSE PRACTITIONER

## 2022-08-30 PROCEDURE — 76882 US LMTD JT/FCL EVL NVASC XTR: CPT

## 2022-08-30 RX ORDER — FLUTICASONE PROPIONATE 50 MCG
2 SPRAY, SUSPENSION (ML) NASAL DAILY
Qty: 16 G | Refills: 11 | Status: SHIPPED | OUTPATIENT
Start: 2022-08-30 | End: 2023-02-02 | Stop reason: SDUPTHER

## 2022-08-30 RX ORDER — ATORVASTATIN CALCIUM 40 MG/1
40 TABLET, FILM COATED ORAL NIGHTLY
Qty: 30 TABLET | Refills: 11 | Status: SHIPPED | OUTPATIENT
Start: 2022-08-30

## 2022-08-30 RX ORDER — ALBUTEROL SULFATE 90 UG/1
2 AEROSOL, METERED RESPIRATORY (INHALATION) EVERY 4 HOURS PRN
Qty: 18 G | Refills: 11 | Status: SHIPPED | OUTPATIENT
Start: 2022-08-30

## 2022-08-30 RX ORDER — DULOXETIN HYDROCHLORIDE 60 MG/1
60 CAPSULE, DELAYED RELEASE ORAL DAILY
Qty: 90 CAPSULE | Refills: 3 | Status: SHIPPED | OUTPATIENT
Start: 2022-08-30

## 2022-08-30 NOTE — TELEPHONE ENCOUNTER
Caller: Marilu Godoy    Relationship: Self    Best call back number: [phone]    Requested Prescriptions:   Requested Prescriptions     Pending Prescriptions Disp Refills   • atorvastatin (LIPITOR) 40 MG tablet 30 tablet 11     Sig: Take 1 tablet by mouth Every Night.   • apixaban (ELIQUIS) 5 MG tablet tablet 180 tablet 3     Sig: Take 1 tablet by mouth 2 (Two) Times a Day.   • DULoxetine (CYMBALTA) 60 MG capsule 90 capsule 3     Sig: Take 1 capsule by mouth Daily.        Pharmacy where request should be sent: [id number] [address]  - [phone] [facility] [phone] FX     Additional details provided by patient: OUT OF MEDICATIONS    Does the patient have less than a 3 day supply:  [x] Yes  [] No    Samantha Angel Rep   08/30/22 12:36 EDT

## 2022-08-30 NOTE — TELEPHONE ENCOUNTER
Rx Refill Note  Requested Prescriptions     Pending Prescriptions Disp Refills   • atorvastatin (LIPITOR) 40 MG tablet 30 tablet 11     Sig: Take 1 tablet by mouth Every Night.   • apixaban (ELIQUIS) 5 MG tablet tablet 180 tablet 3     Sig: Take 1 tablet by mouth 2 (Two) Times a Day.   • DULoxetine (CYMBALTA) 60 MG capsule 90 capsule 3     Sig: Take 1 capsule by mouth Daily.      Last office visit with prescribing clinician: 5/12/2022      Next office visit with prescribing clinician: Visit date not found            Melva Davis LPN  08/30/22, 15:26 EDT

## 2022-08-31 LAB — THYROPEROXIDASE AB SERPL-ACNC: 12 IU/ML (ref 0–34)

## 2022-08-31 RX ORDER — ACYCLOVIR 50 MG/G
1 OINTMENT TOPICAL 2 TIMES DAILY
Qty: 30 G | Refills: 0 | Status: SHIPPED | OUTPATIENT
Start: 2022-08-31 | End: 2022-11-16

## 2022-08-31 RX ORDER — PERMETHRIN 50 MG/G
1 CREAM TOPICAL ONCE
Qty: 60 G | Refills: 0 | Status: SHIPPED | OUTPATIENT
Start: 2022-08-31 | End: 2022-08-31

## 2022-09-02 LAB — TSI SER-ACNC: <0.1 IU/L (ref 0–0.55)

## 2022-09-05 ENCOUNTER — HOSPITAL ENCOUNTER (EMERGENCY)
Facility: HOSPITAL | Age: 32
Discharge: HOME | End: 2022-09-05
Attending: EMERGENCY MEDICINE
Payer: MEDICAID

## 2022-09-05 VITALS
RESPIRATION RATE: 20 BRPM | TEMPERATURE: 98.6 F | HEIGHT: 63 IN | BODY MASS INDEX: 35.08 KG/M2 | SYSTOLIC BLOOD PRESSURE: 133 MMHG | DIASTOLIC BLOOD PRESSURE: 82 MMHG | OXYGEN SATURATION: 98 % | HEART RATE: 113 BPM | WEIGHT: 198 LBS

## 2022-09-05 DIAGNOSIS — B34.9 VIRAL ILLNESS: Primary | ICD-10-CM

## 2022-09-05 LAB
RAPID INFLUENZA  B AGN: NEGATIVE
RAPID INFLUENZA A AGN: NEGATIVE
S PYO AG THROAT QL: NEGATIVE
SARS-COV-2, NAAT: NOT DETECTED

## 2022-09-05 PROCEDURE — 87804 INFLUENZA ASSAY W/OPTIC: CPT

## 2022-09-05 PROCEDURE — 87880 STREP A ASSAY W/OPTIC: CPT

## 2022-09-05 PROCEDURE — 87635 SARS-COV-2 COVID-19 AMP PRB: CPT

## 2022-09-05 PROCEDURE — 99283 EMERGENCY DEPT VISIT LOW MDM: CPT

## 2022-09-05 RX ORDER — ONDANSETRON 4 MG/1
4 TABLET, ORALLY DISINTEGRATING ORAL EVERY 8 HOURS PRN
Qty: 10 TABLET | Refills: 0 | Status: SHIPPED | OUTPATIENT
Start: 2022-09-05

## 2022-09-05 ASSESSMENT — LIFESTYLE VARIABLES
HOW OFTEN DO YOU HAVE A DRINK CONTAINING ALCOHOL: NEVER
HOW MANY STANDARD DRINKS CONTAINING ALCOHOL DO YOU HAVE ON A TYPICAL DAY: PATIENT DOES NOT DRINK

## 2022-09-05 ASSESSMENT — PAIN - FUNCTIONAL ASSESSMENT: PAIN_FUNCTIONAL_ASSESSMENT: NONE - DENIES PAIN

## 2022-09-05 NOTE — ED TRIAGE NOTES
Pt came into ED with shortness of breath. Says her father and daughter both tested positive on an at home Covid test yesterday, she tested negative. Pt complains of SOB, fatigue, nausea, muscle aches. Pt is in no visual distress.  Waylon Velasco, Student LPN

## 2022-09-05 NOTE — ED NOTES
Pt informed of d/c instructions. Pt verbalizes understanding.       Bonifacio Parson RN  09/05/22 3590

## 2022-09-05 NOTE — ED PROVIDER NOTES
Nicole Rolle [address]      Pt Name: Rodo Andrade  MRN: [id number]  YOB: 1990  Date of evaluation: 9/5/2022  Provider: Madelin Plasencia MD    CHIEF COMPLAINT       Chief Complaint   Patient presents with    Shortness of Breath    Cough    Fatigue    Headache         HISTORY OF PRESENT ILLNESS  (Location/Symptom, Timing/Onset, Context/Setting, Quality, Duration, Modifying Factors, Severity.)   Lulú Franco is a 32 y.o. female who presents to the emergency department of having been exposed to [place] with a child and father that are positive she took a negative home test herself which was negative. She started feeling bad about 24 hours ago fatigue nausea headache denies fever she has had some head congestion shortness of breath and nausea she denies any sore throat or cough she states she has had a burning type of feeling in her chest no vomiting or diarrhea of note the patient does have a history of pulmonary emboli and is on Eliquis      Nursing notes were reviewed. REVIEW OFSYSTEMS    (2-9 systems for level 4, 10 or more for level 5)   ROS:  General:  No fevers, no chills, no weakness  Cardiovascular:  No chest pain, no palpitations  Respiratory:  No shortness of breath, +cough, no wheezing  Gastrointestinal:  No pain, no nausea, no vomiting, no diarrhea  Musculoskeletal:  No muscle pain, no joint pain  Skin:  No rash, no easy bruising  Neurologic:  No speech problems, no headache, no extremity weakness  Psychiatric:  No anxiety  Genitourinary:  No dysuria, no hematuria    Except as noted above the remainder of the review of systems was reviewed and negative.        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[facility].)     Factor 5 Leiden mutation, heterozygous ([email or web address]     Fibromyalgia     Pulmonary emboli ([email or web address]          SURGICAL HISTORY       Past Surgical History: Procedure Laterality Date     SECTION      CHOLECYSTECTOMY      TUBAL LIGATION           CURRENT MEDICATIONS       Previous Medications    ALBUTEROL SULFATE (PROAIR RESPICLICK) 540 (90 BASE) MCG/ACT AEROSOL POWDER INHALATION    Inhale 2 puffs into the lungs every 4 hours as needed for Wheezing or Shortness of Breath    APIXABAN (ELIQUIS) 5 MG TABS TABLET    Take by mouth 2 times daily    ASPIRIN 81 MG EC TABLET    Take 81 mg by mouth daily    ATORVASTATIN (LIPITOR) 20 MG TABLET    Take 40 mg by mouth nightly    AZELASTINE HCL (ASTELIN NA)    1 spray by Nasal route 2 times daily    B COMPLEX VITAMINS CAPSULE    Take 1 capsule by mouth daily    BUSPIRONE (BUSPAR) 5 MG TABLET    Take 5 mg by mouth 3 times daily    CHOLECALCIFEROL (VITAMIN D3) 50 MCG (2000 UT) CAPS    Take by mouth daily    DULOXETINE (CYMBALTA) 60 MG EXTENDED RELEASE CAPSULE    Take 60 mg by mouth daily    FLUTICASONE (FLONASE) 50 MCG/ACT NASAL SPRAY    1 spray by Each Nostril route daily    IVABRADINE (CORLANOR) 5 MG TABS TABLET    Take 5 mg by mouth 2 times daily (with meals) 1.5 tablet twice daily    LIDOCAINE (LIDODERM) 5 %    Place 1 patch onto the skin daily 12 hours on, 12 hours off. MOMETASONE-FORMOTEROL (DULERA) 200-5 MCG/ACT INHALER    Inhale 2 puffs into the lungs every 12 hours    NONFORMULARY    Ajoby monthly injection for migraines. PANTOPRAZOLE (PROTONIX) 20 MG TABLET    Take 40 mg by mouth daily    RIMEGEPANT SULFATE (NURTEC) 75 MG TBDP    Take by mouth daily as needed    ZONISAMIDE (ZONEGRAN) 100 MG CAPSULE    Take 100 mg by mouth nightly       ALLERGIES     Cefdinir, Imitrex [sumatriptan], and Amoxicillin    FAMILY HISTORY     History reviewed. No pertinent family history.        SOCIAL HISTORY       Social History     Socioeconomic History    Marital status:      Spouse name: None    Number of children: None    Years of education: None    Highest education level: None   Tobacco Use    Smoking status: Never Smokeless tobacco: Never   Vaping Use    Vaping Use: Never used   Substance and Sexual Activity    Alcohol use: Never    Drug use: Never    Sexual activity: Yes     Partners: Male         PHYSICAL EXAM    (up to 7 for level 4, 8 or more for level 5)     ED Triage Vitals [09/05/22 1443]   BP Temp Temp Source Heart Rate Resp SpO2 Height Weight   (!) 140/92 98.6 °F (37 °C) Oral (!) 113 20 99 % 5' 3\" (1.6 m) 198 lb (89.8 kg)       Physical Exam  General :Patient is awake, alert, oriented, in no acute distress, nontoxic appearing  HEENT: Pupils are equally round and reactive to light, EOMI, conjunctivae clear. Neck: Neck is supple, full range of motion, trachea midline  Cardiac: Heart regular rate, rhythm, no murmurs, rubs, or gallops  Lungs: Lungs are clear to auscultation, there is no wheezing, rhonchi, or rales. There is no use of accessory muscles. Chest wall: There is no tenderness to palpation over the chest wall or over ribs  Abdomen: Abdomen is soft, nontender, nondistended. There is no firm or pulsatile masses, no rebound rigidity or guarding. Musculoskeletal: 5 out of 5 strength in all 4 extremities. No focal muscle deficits are appreciated  Neuro: Motor intact, sensory intact, level of consciousness is normal, Dermatology: Skin is warm and dry  Psych: Mentation is grossly normal, cognition is grossly normal. Affect is appropriate.       DIAGNOSTIC RESULTS     EKG: All EKG's are interpreted by the Emergency Department Physician who either signs or Co-signs this chart in the [address] a cardiologist.    The EKG interpreted by me shows     RADIOLOGY:   Non-plain film images such as CT, Ultrasound and MRI are read by the radiologist. Plain radiographic images are visualized and preliminarily interpreted by the emergency physician with the below findings:      [] Radiologist's Report Reviewed:  No orders to display         ED BEDSIDE ULTRASOUND:   Performed by ED Physician - none    LABS:    I have reviewed and procedures. There was a high probability of clinically significant/life threatening deterioration in the patient's condition which required my urgent intervention. FINAL IMPRESSION      1. Viral illness New Problem         DISPOSITION/PLAN   DISPOSITION Decision To Discharge 09/05/2022 05:13:40 PM   discharge to home    PATIENT REFERRED TO:  MD Jana Mcintyre     Schedule an appointment as soon as possible for a visit in 3 days      DISCHARGE MEDICATIONS:  New Prescriptions    ONDANSETRON (ZOFRAN ODT) 4 MG DISINTEGRATING TABLET    Take 1 tablet by mouth every 8 hours as needed for Nausea or Vomiting       Comment: Please note this report has been produced using speech recognition software and may contain errorsrelated to that system including errors in grammar, punctuation, and spelling, as well as words and phrases that may be inappropriate. If there are any questions or concerns please feel free to contact the dictating providerfor clarification.     Joyce Anaya MD  Attending Emergency Physician               Joyce Anaya MD  09/05/22 6091

## 2022-09-07 ENCOUNTER — HOSPITAL ENCOUNTER (EMERGENCY)
Facility: HOSPITAL | Age: 32
Discharge: HOME | End: 2022-09-07
Attending: EMERGENCY MEDICINE | Admitting: EMERGENCY MEDICINE

## 2022-09-07 ENCOUNTER — APPOINTMENT (OUTPATIENT)
Dept: CT IMAGING | Facility: HOSPITAL | Age: 32
End: 2022-09-07

## 2022-09-07 ENCOUNTER — APPOINTMENT (OUTPATIENT)
Dept: GENERAL RADIOLOGY | Facility: HOSPITAL | Age: 32
End: 2022-09-07

## 2022-09-07 VITALS
HEART RATE: 101 BPM | SYSTOLIC BLOOD PRESSURE: 115 MMHG | DIASTOLIC BLOOD PRESSURE: 70 MMHG | WEIGHT: 198 LBS | OXYGEN SATURATION: 97 % | HEIGHT: 63 IN | RESPIRATION RATE: 18 BRPM | BODY MASS INDEX: 35.08 KG/M2 | TEMPERATURE: 100.3 F

## 2022-09-07 DIAGNOSIS — U07.1 COVID-19: Primary | ICD-10-CM

## 2022-09-07 LAB
ALBUMIN SERPL-MCNC: 4.4 G/DL (ref 3.5–5.2)
ALBUMIN/GLOB SERPL: 1.3 G/DL
ALP SERPL-CCNC: 103 U/L (ref 39–117)
ALT SERPL W P-5'-P-CCNC: 20 U/L (ref 1–33)
ANION GAP SERPL CALCULATED.3IONS-SCNC: 11.5 MMOL/L (ref 5–15)
AST SERPL-CCNC: 15 U/L (ref 1–32)
BASOPHILS # BLD AUTO: 0.02 10*3/MM3 (ref 0–0.2)
BASOPHILS NFR BLD AUTO: 0.2 % (ref 0–1.5)
BILIRUB SERPL-MCNC: 0.9 MG/DL (ref 0–1.2)
BUN SERPL-MCNC: 14 MG/DL (ref 6–20)
BUN/CREAT SERPL: 19.2 (ref 7–25)
CALCIUM SPEC-SCNC: 9.3 MG/DL (ref 8.6–10.5)
CHLORIDE SERPL-SCNC: 99 MMOL/L (ref 98–107)
CO2 SERPL-SCNC: 27.5 MMOL/L (ref 22–29)
CREAT SERPL-MCNC: 0.73 MG/DL (ref 0.57–1)
DEPRECATED RDW RBC AUTO: 41.7 FL (ref 37–54)
EGFRCR SERPLBLD CKD-EPI 2021: 112.9 ML/MIN/1.73
EOSINOPHIL # BLD AUTO: 0.09 10*3/MM3 (ref 0–0.4)
EOSINOPHIL NFR BLD AUTO: 1.1 % (ref 0.3–6.2)
ERYTHROCYTE [DISTWIDTH] IN BLOOD BY AUTOMATED COUNT: 13 % (ref 12.3–15.4)
GLOBULIN UR ELPH-MCNC: 3.4 GM/DL
GLUCOSE SERPL-MCNC: 96 MG/DL (ref 65–99)
HCT VFR BLD AUTO: 43.2 % (ref 34–46.6)
HGB BLD-MCNC: 15.2 G/DL (ref 12–15.9)
HOLD SPECIMEN: NORMAL
HOLD SPECIMEN: NORMAL
IMM GRANULOCYTES # BLD AUTO: 0.06 10*3/MM3 (ref 0–0.05)
IMM GRANULOCYTES NFR BLD AUTO: 0.7 % (ref 0–0.5)
INSULIN FREE SERPL-ACNC: 15 UU/ML
INSULIN SERPL-ACNC: 15 UU/ML
LYMPHOCYTES # BLD AUTO: 0.75 10*3/MM3 (ref 0.7–3.1)
LYMPHOCYTES NFR BLD AUTO: 9.1 % (ref 19.6–45.3)
MCH RBC QN AUTO: 31.2 PG (ref 26.6–33)
MCHC RBC AUTO-ENTMCNC: 35.2 G/DL (ref 31.5–35.7)
MCV RBC AUTO: 88.7 FL (ref 79–97)
MONOCYTES # BLD AUTO: 0.78 10*3/MM3 (ref 0.1–0.9)
MONOCYTES NFR BLD AUTO: 9.5 % (ref 5–12)
NEUTROPHILS NFR BLD AUTO: 6.5 10*3/MM3 (ref 1.7–7)
NEUTROPHILS NFR BLD AUTO: 79.4 % (ref 42.7–76)
NRBC BLD AUTO-RTO: 0 /100 WBC (ref 0–0.2)
NT-PROBNP SERPL-MCNC: 51.5 PG/ML (ref 0–450)
PLATELET # BLD AUTO: 293 10*3/MM3 (ref 140–450)
PMV BLD AUTO: 9.6 FL (ref 6–12)
POTASSIUM SERPL-SCNC: 3.9 MMOL/L (ref 3.5–5.2)
PROT SERPL-MCNC: 7.8 G/DL (ref 6–8.5)
RBC # BLD AUTO: 4.87 10*6/MM3 (ref 3.77–5.28)
SODIUM SERPL-SCNC: 138 MMOL/L (ref 136–145)
TROPONIN T SERPL-MCNC: <0.01 NG/ML (ref 0–0.03)
WBC NRBC COR # BLD: 8.2 10*3/MM3 (ref 3.4–10.8)
WHOLE BLOOD HOLD COAG: NORMAL
WHOLE BLOOD HOLD SPECIMEN: NORMAL

## 2022-09-07 PROCEDURE — 85025 COMPLETE CBC W/AUTO DIFF WBC: CPT

## 2022-09-07 PROCEDURE — 93005 ELECTROCARDIOGRAM TRACING: CPT

## 2022-09-07 PROCEDURE — 71275 CT ANGIOGRAPHY CHEST: CPT

## 2022-09-07 PROCEDURE — 96375 TX/PRO/DX INJ NEW DRUG ADDON: CPT

## 2022-09-07 PROCEDURE — 99284 EMERGENCY DEPT VISIT MOD MDM: CPT

## 2022-09-07 PROCEDURE — 71045 X-RAY EXAM CHEST 1 VIEW: CPT

## 2022-09-07 PROCEDURE — 25010000002 FENTANYL CITRATE (PF) 50 MCG/ML SOLUTION: Performed by: EMERGENCY MEDICINE

## 2022-09-07 PROCEDURE — 25010000002 IOPAMIDOL 61 % SOLUTION: Performed by: EMERGENCY MEDICINE

## 2022-09-07 PROCEDURE — 83880 ASSAY OF NATRIURETIC PEPTIDE: CPT

## 2022-09-07 PROCEDURE — 80053 COMPREHEN METABOLIC PANEL: CPT

## 2022-09-07 PROCEDURE — 25010000002 DEXAMETHASONE SODIUM PHOSPHATE 10 MG/ML SOLUTION: Performed by: EMERGENCY MEDICINE

## 2022-09-07 PROCEDURE — 94640 AIRWAY INHALATION TREATMENT: CPT

## 2022-09-07 PROCEDURE — 96374 THER/PROPH/DIAG INJ IV PUSH: CPT

## 2022-09-07 PROCEDURE — 84484 ASSAY OF TROPONIN QUANT: CPT

## 2022-09-07 RX ORDER — SODIUM CHLORIDE 0.9 % (FLUSH) 0.9 %
10 SYRINGE (ML) INJECTION AS NEEDED
Status: DISCONTINUED | OUTPATIENT
Start: 2022-09-07 | End: 2022-09-07 | Stop reason: HOSPADM

## 2022-09-07 RX ORDER — FENTANYL CITRATE 50 UG/ML
50 INJECTION, SOLUTION INTRAMUSCULAR; INTRAVENOUS ONCE
Status: COMPLETED | OUTPATIENT
Start: 2022-09-07 | End: 2022-09-07

## 2022-09-07 RX ORDER — DEXAMETHASONE SODIUM PHOSPHATE 10 MG/ML
10 INJECTION, SOLUTION INTRAMUSCULAR; INTRAVENOUS ONCE
Status: COMPLETED | OUTPATIENT
Start: 2022-09-07 | End: 2022-09-07

## 2022-09-07 RX ORDER — ALBUTEROL SULFATE 90 UG/1
2 AEROSOL, METERED RESPIRATORY (INHALATION) ONCE
Status: COMPLETED | OUTPATIENT
Start: 2022-09-07 | End: 2022-09-07

## 2022-09-07 RX ADMIN — FENTANYL CITRATE 50 MCG: 50 INJECTION, SOLUTION INTRAMUSCULAR; INTRAVENOUS at 15:17

## 2022-09-07 RX ADMIN — SODIUM CHLORIDE 1000 ML: 9 INJECTION, SOLUTION INTRAVENOUS at 15:17

## 2022-09-07 RX ADMIN — ALBUTEROL SULFATE 2 PUFF: 90 AEROSOL, METERED RESPIRATORY (INHALATION) at 15:17

## 2022-09-07 RX ADMIN — IOPAMIDOL 100 ML: 612 INJECTION, SOLUTION INTRAVENOUS at 15:58

## 2022-09-07 RX ADMIN — DEXAMETHASONE SODIUM PHOSPHATE 10 MG: 10 INJECTION INTRAMUSCULAR; INTRAVENOUS at 15:17

## 2022-09-07 NOTE — ED PROVIDER NOTES
Subjective   PIT    31-year-old female presenting with several complaints.  She states that for the last few days she has had productive cough, diffuse nonspecific chest pain, shortness of breath.  No vomiting or diarrhea.  No known sick contacts.  Tested positive for COVID yesterday.  She also has a history of PE and is on Eliquis.  She called her primary doctor who instructed her to come here for evaluation.          Review of Systems   Constitutional: Positive for fever.   HENT: Negative.    Eyes: Negative.    Respiratory: Positive for cough and shortness of breath.    Cardiovascular: Positive for chest pain.   Gastrointestinal: Negative.    Genitourinary: Negative.    Musculoskeletal: Negative.    Skin: Negative.    Neurological: Negative.    Psychiatric/Behavioral: Negative.        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    • Bleeding disorder (Formerly [facility]) 11-13-16   • Body piercing     TONGUE, NOSE, TWO IN EACH EAR   • Bronchitis    • Chest pain 06/22/2021    last CP 2-3 months ago   • Clotting disorder (Formerly [facility])     factor 5   • Constipation    • COVID-19 vaccine series completed     Pfizer-plus booster   • Deep vein thrombosis ([facility]) 11/13/2016   • Diarrhea    • Diverticulitis    • Elevated cholesterol    • Endometriosis    • Factor 5 Leiden mutation, heterozygous (Formerly [facility])    • Fibroid    • Fibromyalgia    • Full dentures 06/22/2021    advised no adhesives DOS   • Gastroparesis    • GERD (gastroesophageal reflux disease)    • H/O blood clots    • H/O cardiovascular stress test 06/22/2021    reported several years ago-exercise wnl   • Headache    • Hiatal hernia    • High cholesterol    • History of recurrent UTIs    • HPV (human papilloma virus) infection    • Hx of echocardiogram    • Hx of gout 06/22/2021   • Hypertension    • Hypoglycemia    • Inappropriate sinus node  tachycardia    • Kidney infection 2021    history only   • Migraine    • Nausea & vomiting    • Obesity    • Osteoarthritis    • Ovarian cyst    • Pneumonia    • Pollen allergies    • PONV (postoperative nausea and vomiting)    • Protein S deficiency (HCC) 2016    labs from hospitalization for PE   • Pulmonary embolism (HCC) 2016    on eliquis since that time   • Recurrent pregnancy loss, antepartum condition or complication    • Sleep apnea     no CPAP- states insurance took it back   • Tattoo     LOWER BACK   • Varicella        Allergies   Allergen Reactions   • Ceftin [Cefuroxime Axetil] Shortness Of Breath and Rash     Numbness in mouth and throat   • Cefuroxime Hives, Rash and Shortness Of Breath     Numbness in mouth and throat  Numbness in mouth and throat   • Sumatriptan Shortness Of Breath and Nausea Only     Other reaction(s): Nausea Only, Other (See Comments)  Worsening migraine  Worsening migraine   • Amoxicillin Rash, Hives and Itching       Past Surgical History:   Procedure Laterality Date   •  SECTION        and  and    •  SECTION WITH TUBAL N/A 1/15/2019    Procedure:  SECTION REPEAT WITH TUBAL;  Surgeon: Alva Boyer MD;  Location: [facility] LABOR DELIVERY;  Service: Obstetrics/Gynecology   • COLONOSCOPY N/A 2017    Procedure: COLONOSCOPY WITH BIOPSIES AND ARGON THERMAL ABLATION;  Surgeon: Jignesh Selby MD;  Location: [facility] ENDOSCOPY;  Service:    • D & C HYSTEROSCOPY ENDOMETRIAL ABLATION N/A 3/10/2022    Procedure: DILATATION AND CURETTAGE DIAGNOSTIC HYSTEROSCOPY NOVASURE ENDOMETRIAL ABLATION;  Surgeon: Alva Boyer MD;  Location: [facility] OR;  Service: Obstetrics/Gynecology;  Laterality: N/A;   • DIAGNOSTIC LAPAROSCOPY N/A 2018    Procedure: DIAGNOSTIC LAPAROSCOPY AND ABLATION OF ENDOMETRIOSIS;  Surgeon: Evin Zamudio MD;  Location: [facility] OR;  Service: Obstetrics/Gynecology   • ENDOMETRIAL ABLATION     • ENDOSCOPIC  FUNCTIONAL SINUS SURGERY (FESS) Right 6/25/2021    Procedure: Right endoscopic total ethmoidectomy, right endoscopic middle meatal antrotomy with cyst removal, right endoscopic frontal recess exploration with cyst removal;  Surgeon: Saulo Brito MD;  Location: [facility] OR;  Service: ENT;  Laterality: Right;   • ENDOSCOPY N/A 4/20/2017    Procedure: ESOPHAGOGASTRODUODENOSCOPY WITH BIOPSIES AND COLD BIOPSY POLYPECTOMIES;  Surgeon: Jignesh Selby MD;  Location: [facility] ENDOSCOPY;  Service:    • LAPAROSCOPIC CHOLECYSTECTOMY     • LIPOMA EXCISION N/A 12/10/2021    Procedure: Excision of soft tissue mass lower back;  Surgeon: Nidia Smith MD;  Location: [facility] OR;  Service: General;  Laterality: N/A;   • MOUTH SURGERY      full mouth tooth extration   • WISDOM TOOTH EXTRACTION         Family History   Problem Relation Age of Onset   • Arthritis Mother    • COPD Mother    • Asthma Mother    • Thyroid disease Mother    • Arthritis Father    • Diabetes Father    • Hypertension Father    • Hyperlipidemia Father    • Kidney disease Father    • Heart attack Father    • Coronary artery disease Father    • Dementia Father    • No Known Problems Son    • Colon cancer Neg Hx    • Liver cancer Neg Hx    • Liver disease Neg Hx    • Stomach cancer Neg Hx    • Esophageal cancer Neg Hx        Social History     Socioeconomic History   • Marital status:    Tobacco Use   • Smoking status: Never Smoker   • Smokeless tobacco: Never Used   Vaping Use   • Vaping Use: Never used   Substance and Sexual Activity   • Alcohol use: No   • Drug use: Never   • Sexual activity: Yes     Partners: Male     Birth control/protection: Surgical     Comment: Tubal           Objective   Physical Exam  Constitutional:       General: She is not in acute distress.     Appearance: Normal appearance. She is not ill-appearing, toxic-appearing or diaphoretic.   HENT:      Head: Normocephalic and atraumatic.      Right Ear: External ear normal.       Left Ear: External ear normal.      Nose: Nose normal.      Mouth/Throat:      Mouth: Mucous membranes are moist.      Pharynx: Oropharynx is clear.   Eyes:      Extraocular Movements: Extraocular movements intact.      Conjunctiva/sclera: Conjunctivae normal.      Pupils: Pupils are equal, round, and reactive to light.   Cardiovascular:      Rate and Rhythm: Regular rhythm.      Pulses: Normal pulses.      Heart sounds: Normal heart sounds.      Comments: Mild tachycardia  Pulmonary:      Effort: Pulmonary effort is normal. No respiratory distress.      Breath sounds: Normal breath sounds.   Abdominal:      General: Bowel sounds are normal. There is no distension.      Tenderness: There is no abdominal tenderness.   Musculoskeletal:         General: No swelling, tenderness or deformity. Normal range of motion.      Cervical back: Normal range of motion.   Skin:     General: Skin is warm and dry.      Capillary Refill: Capillary refill takes less than 2 seconds.      Findings: No rash.   Neurological:      General: No focal deficit present.      Mental Status: She is alert and oriented to person, place, and time.   Psychiatric:         Mood and Affect: Mood normal.         Behavior: Behavior normal.         Procedures           ED Course                                           MDM  Number of Diagnoses or Management Options  COVID-19  Diagnosis management comments: 31-year-old female with several complaints.  Well-developed, well-nourished lady in no distress with exam as above.  She is mildly tachycardic.  Her exam is otherwise unremarkable.  Will obtain labs, imaging.  Will give symptomatic treatment.  Disposition pending.    DDx: COVID, pneumonia, PE, musculoskeletal pain    EKG interpreted by me: Sinus tachycardia, no acute ST changes, some nonspecific T waves, this is an abnormal EKG    Work-up here is overall reassuring. She is resting comfortably. Will discharge home with outpatient follow up.      Final  diagnoses:   COVID-19          Francois Kline MD  09/07/22 [id number]

## 2022-09-27 ENCOUNTER — HOSPITAL ENCOUNTER (EMERGENCY)
Facility: HOSPITAL | Age: 32
Discharge: HOME | End: 2022-09-27
Attending: FAMILY MEDICINE | Admitting: FAMILY MEDICINE

## 2022-09-27 VITALS
BODY MASS INDEX: 33.13 KG/M2 | HEART RATE: 89 BPM | SYSTOLIC BLOOD PRESSURE: 125 MMHG | WEIGHT: 187 LBS | OXYGEN SATURATION: 98 % | RESPIRATION RATE: 16 BRPM | HEIGHT: 63 IN | TEMPERATURE: 98.4 F | DIASTOLIC BLOOD PRESSURE: 85 MMHG

## 2022-09-27 DIAGNOSIS — R10.9 ABDOMINAL PAIN, UNSPECIFIED ABDOMINAL LOCATION: Primary | ICD-10-CM

## 2022-09-27 LAB
ALBUMIN SERPL-MCNC: 4.1 G/DL (ref 3.5–5.2)
ALBUMIN/GLOB SERPL: 1.4 G/DL
ALP SERPL-CCNC: 90 U/L (ref 39–117)
ALT SERPL W P-5'-P-CCNC: 24 U/L (ref 1–33)
ANION GAP SERPL CALCULATED.3IONS-SCNC: 8.5 MMOL/L (ref 5–15)
AST SERPL-CCNC: 16 U/L (ref 1–32)
BASOPHILS # BLD AUTO: 0.02 10*3/MM3 (ref 0–0.2)
BASOPHILS NFR BLD AUTO: 0.3 % (ref 0–1.5)
BILIRUB SERPL-MCNC: 0.4 MG/DL (ref 0–1.2)
BILIRUB UR QL STRIP: NEGATIVE
BUN SERPL-MCNC: 7 MG/DL (ref 6–20)
BUN/CREAT SERPL: 11.7 (ref 7–25)
CALCIUM SPEC-SCNC: 9.2 MG/DL (ref 8.6–10.5)
CHLORIDE SERPL-SCNC: 106 MMOL/L (ref 98–107)
CLARITY UR: CLEAR
CO2 SERPL-SCNC: 25.5 MMOL/L (ref 22–29)
COLOR UR: YELLOW
CREAT SERPL-MCNC: 0.6 MG/DL (ref 0.57–1)
DEPRECATED RDW RBC AUTO: 42.1 FL (ref 37–54)
EGFRCR SERPLBLD CKD-EPI 2021: 123.2 ML/MIN/1.73
EOSINOPHIL # BLD AUTO: 0.18 10*3/MM3 (ref 0–0.4)
EOSINOPHIL NFR BLD AUTO: 2.3 % (ref 0.3–6.2)
ERYTHROCYTE [DISTWIDTH] IN BLOOD BY AUTOMATED COUNT: 13 % (ref 12.3–15.4)
GLOBULIN UR ELPH-MCNC: 3 GM/DL
GLUCOSE SERPL-MCNC: 99 MG/DL (ref 65–99)
GLUCOSE UR STRIP-MCNC: NEGATIVE MG/DL
HCT VFR BLD AUTO: 36.7 % (ref 34–46.6)
HGB BLD-MCNC: 12.9 G/DL (ref 12–15.9)
HGB UR QL STRIP.AUTO: NEGATIVE
IMM GRANULOCYTES # BLD AUTO: 0.01 10*3/MM3 (ref 0–0.05)
IMM GRANULOCYTES NFR BLD AUTO: 0.1 % (ref 0–0.5)
KETONES UR QL STRIP: NEGATIVE
LEUKOCYTE ESTERASE UR QL STRIP.AUTO: NEGATIVE
LYMPHOCYTES # BLD AUTO: 1.81 10*3/MM3 (ref 0.7–3.1)
LYMPHOCYTES NFR BLD AUTO: 23.2 % (ref 19.6–45.3)
MCH RBC QN AUTO: 31.3 PG (ref 26.6–33)
MCHC RBC AUTO-ENTMCNC: 35.1 G/DL (ref 31.5–35.7)
MCV RBC AUTO: 89.1 FL (ref 79–97)
MONOCYTES # BLD AUTO: 0.47 10*3/MM3 (ref 0.1–0.9)
MONOCYTES NFR BLD AUTO: 6 % (ref 5–12)
NEUTROPHILS NFR BLD AUTO: 5.3 10*3/MM3 (ref 1.7–7)
NEUTROPHILS NFR BLD AUTO: 68.1 % (ref 42.7–76)
NITRITE UR QL STRIP: NEGATIVE
NRBC BLD AUTO-RTO: 0 /100 WBC (ref 0–0.2)
PH UR STRIP.AUTO: 7.5 [PH] (ref 5–8)
PLATELET # BLD AUTO: 281 10*3/MM3 (ref 140–450)
PMV BLD AUTO: 9.7 FL (ref 6–12)
POTASSIUM SERPL-SCNC: 4 MMOL/L (ref 3.5–5.2)
PROT SERPL-MCNC: 7.1 G/DL (ref 6–8.5)
PROT UR QL STRIP: NEGATIVE
RBC # BLD AUTO: 4.12 10*6/MM3 (ref 3.77–5.28)
SODIUM SERPL-SCNC: 140 MMOL/L (ref 136–145)
SP GR UR STRIP: 1.02 (ref 1–1.03)
UROBILINOGEN UR QL STRIP: NORMAL
WBC NRBC COR # BLD: 7.79 10*3/MM3 (ref 3.4–10.8)

## 2022-09-27 PROCEDURE — 81003 URINALYSIS AUTO W/O SCOPE: CPT | Performed by: NURSE PRACTITIONER

## 2022-09-27 PROCEDURE — 80053 COMPREHEN METABOLIC PANEL: CPT | Performed by: NURSE PRACTITIONER

## 2022-09-27 PROCEDURE — 99283 EMERGENCY DEPT VISIT LOW MDM: CPT

## 2022-09-27 PROCEDURE — 85025 COMPLETE CBC W/AUTO DIFF WBC: CPT | Performed by: NURSE PRACTITIONER

## 2022-09-27 PROCEDURE — 63710000001 ONDANSETRON ODT 4 MG TABLET DISPERSIBLE: Performed by: NURSE PRACTITIONER

## 2022-09-27 RX ORDER — ONDANSETRON 4 MG/1
4 TABLET, ORALLY DISINTEGRATING ORAL ONCE
Status: COMPLETED | OUTPATIENT
Start: 2022-09-27 | End: 2022-09-27

## 2022-09-27 RX ADMIN — ONDANSETRON 4 MG: 4 TABLET, ORALLY DISINTEGRATING ORAL at 21:22

## 2022-10-01 ENCOUNTER — HOSPITAL ENCOUNTER (OUTPATIENT)
Dept: GENERAL RADIOLOGY | Facility: HOSPITAL | Age: 32
Discharge: HOME | End: 2022-10-01
Admitting: NURSE PRACTITIONER

## 2022-10-01 ENCOUNTER — TRANSCRIBE ORDERS (OUTPATIENT)
Dept: GENERAL RADIOLOGY | Facility: HOSPITAL | Age: 32
End: 2022-10-01

## 2022-10-01 DIAGNOSIS — R10.9 ABDOMINAL PAIN, UNSPECIFIED ABDOMINAL LOCATION: ICD-10-CM

## 2022-10-01 DIAGNOSIS — R10.9 ABDOMINAL PAIN, UNSPECIFIED ABDOMINAL LOCATION: Primary | ICD-10-CM

## 2022-10-01 PROCEDURE — 74018 RADEX ABDOMEN 1 VIEW: CPT

## 2022-10-14 ENCOUNTER — HOSPITAL ENCOUNTER (EMERGENCY)
Facility: HOSPITAL | Age: 32
Discharge: HOME | End: 2022-10-15
Attending: EMERGENCY MEDICINE | Admitting: EMERGENCY MEDICINE

## 2022-10-14 ENCOUNTER — APPOINTMENT (OUTPATIENT)
Dept: CT IMAGING | Facility: HOSPITAL | Age: 32
End: 2022-10-14

## 2022-10-14 DIAGNOSIS — H57.02 ANISOCORIA: Primary | ICD-10-CM

## 2022-10-14 LAB
ALBUMIN SERPL-MCNC: 4.4 G/DL (ref 3.5–5.2)
ALBUMIN/GLOB SERPL: 1.5 G/DL
ALP SERPL-CCNC: 91 U/L (ref 39–117)
ALT SERPL W P-5'-P-CCNC: 19 U/L (ref 1–33)
ANION GAP SERPL CALCULATED.3IONS-SCNC: 9.3 MMOL/L (ref 5–15)
AST SERPL-CCNC: 13 U/L (ref 1–32)
B-HCG UR QL: NEGATIVE
BASOPHILS # BLD AUTO: 0.05 10*3/MM3 (ref 0–0.2)
BASOPHILS NFR BLD AUTO: 0.5 % (ref 0–1.5)
BILIRUB SERPL-MCNC: 0.3 MG/DL (ref 0–1.2)
BILIRUB UR QL STRIP: NEGATIVE
BUN SERPL-MCNC: 11 MG/DL (ref 6–20)
BUN/CREAT SERPL: 18.6 (ref 7–25)
CALCIUM SPEC-SCNC: 8.9 MG/DL (ref 8.6–10.5)
CHLORIDE SERPL-SCNC: 102 MMOL/L (ref 98–107)
CLARITY UR: CLEAR
CO2 SERPL-SCNC: 25.7 MMOL/L (ref 22–29)
COLOR UR: YELLOW
CREAT SERPL-MCNC: 0.59 MG/DL (ref 0.57–1)
DEPRECATED RDW RBC AUTO: 41.1 FL (ref 37–54)
EGFRCR SERPLBLD CKD-EPI 2021: 123.7 ML/MIN/1.73
EOSINOPHIL # BLD AUTO: 0.14 10*3/MM3 (ref 0–0.4)
EOSINOPHIL NFR BLD AUTO: 1.3 % (ref 0.3–6.2)
ERYTHROCYTE [DISTWIDTH] IN BLOOD BY AUTOMATED COUNT: 12.6 % (ref 12.3–15.4)
GLOBULIN UR ELPH-MCNC: 2.9 GM/DL
GLUCOSE SERPL-MCNC: 91 MG/DL (ref 65–99)
GLUCOSE UR STRIP-MCNC: NEGATIVE MG/DL
HCT VFR BLD AUTO: 38.2 % (ref 34–46.6)
HGB BLD-MCNC: 13.5 G/DL (ref 12–15.9)
HGB UR QL STRIP.AUTO: NEGATIVE
HOLD SPECIMEN: NORMAL
HOLD SPECIMEN: NORMAL
IMM GRANULOCYTES # BLD AUTO: 0.03 10*3/MM3 (ref 0–0.05)
IMM GRANULOCYTES NFR BLD AUTO: 0.3 % (ref 0–0.5)
KETONES UR QL STRIP: ABNORMAL
LEUKOCYTE ESTERASE UR QL STRIP.AUTO: NEGATIVE
LYMPHOCYTES # BLD AUTO: 2.57 10*3/MM3 (ref 0.7–3.1)
LYMPHOCYTES NFR BLD AUTO: 24.1 % (ref 19.6–45.3)
MCH RBC QN AUTO: 31.6 PG (ref 26.6–33)
MCHC RBC AUTO-ENTMCNC: 35.3 G/DL (ref 31.5–35.7)
MCV RBC AUTO: 89.5 FL (ref 79–97)
MONOCYTES # BLD AUTO: 0.61 10*3/MM3 (ref 0.1–0.9)
MONOCYTES NFR BLD AUTO: 5.7 % (ref 5–12)
NEUTROPHILS NFR BLD AUTO: 68.1 % (ref 42.7–76)
NEUTROPHILS NFR BLD AUTO: 7.28 10*3/MM3 (ref 1.7–7)
NITRITE UR QL STRIP: NEGATIVE
NRBC BLD AUTO-RTO: 0 /100 WBC (ref 0–0.2)
PH UR STRIP.AUTO: 5.5 [PH] (ref 5–8)
PLATELET # BLD AUTO: 351 10*3/MM3 (ref 140–450)
PMV BLD AUTO: 9.7 FL (ref 6–12)
POTASSIUM SERPL-SCNC: 3.8 MMOL/L (ref 3.5–5.2)
PROT SERPL-MCNC: 7.3 G/DL (ref 6–8.5)
PROT UR QL STRIP: NEGATIVE
RBC # BLD AUTO: 4.27 10*6/MM3 (ref 3.77–5.28)
SODIUM SERPL-SCNC: 137 MMOL/L (ref 136–145)
SP GR UR STRIP: >=1.03 (ref 1–1.03)
UROBILINOGEN UR QL STRIP: ABNORMAL
WBC NRBC COR # BLD: 10.68 10*3/MM3 (ref 3.4–10.8)
WHOLE BLOOD HOLD COAG: NORMAL
WHOLE BLOOD HOLD SPECIMEN: NORMAL

## 2022-10-14 PROCEDURE — 25010000002 IOPAMIDOL 61 % SOLUTION: Performed by: EMERGENCY MEDICINE

## 2022-10-14 PROCEDURE — 99284 EMERGENCY DEPT VISIT MOD MDM: CPT

## 2022-10-14 PROCEDURE — 70450 CT HEAD/BRAIN W/O DYE: CPT

## 2022-10-14 PROCEDURE — 70498 CT ANGIOGRAPHY NECK: CPT

## 2022-10-14 PROCEDURE — 80053 COMPREHEN METABOLIC PANEL: CPT | Performed by: EMERGENCY MEDICINE

## 2022-10-14 PROCEDURE — 93005 ELECTROCARDIOGRAM TRACING: CPT | Performed by: EMERGENCY MEDICINE

## 2022-10-14 PROCEDURE — 81003 URINALYSIS AUTO W/O SCOPE: CPT | Performed by: EMERGENCY MEDICINE

## 2022-10-14 PROCEDURE — 85025 COMPLETE CBC W/AUTO DIFF WBC: CPT | Performed by: EMERGENCY MEDICINE

## 2022-10-14 PROCEDURE — 70496 CT ANGIOGRAPHY HEAD: CPT

## 2022-10-14 PROCEDURE — 81025 URINE PREGNANCY TEST: CPT | Performed by: EMERGENCY MEDICINE

## 2022-10-14 PROCEDURE — 80306 DRUG TEST PRSMV INSTRMNT: CPT | Performed by: EMERGENCY MEDICINE

## 2022-10-14 RX ORDER — SODIUM CHLORIDE 0.9 % (FLUSH) 0.9 %
10 SYRINGE (ML) INJECTION AS NEEDED
Status: DISCONTINUED | OUTPATIENT
Start: 2022-10-14 | End: 2022-10-15 | Stop reason: HOSPADM

## 2022-10-14 RX ADMIN — IOPAMIDOL 100 ML: 612 INJECTION, SOLUTION INTRAVENOUS at 23:33

## 2022-10-15 VITALS
DIASTOLIC BLOOD PRESSURE: 87 MMHG | HEART RATE: 74 BPM | WEIGHT: 188 LBS | OXYGEN SATURATION: 99 % | HEIGHT: 63 IN | SYSTOLIC BLOOD PRESSURE: 154 MMHG | TEMPERATURE: 98.3 F | BODY MASS INDEX: 33.31 KG/M2 | RESPIRATION RATE: 18 BRPM

## 2022-10-15 LAB
AMPHET+METHAMPHET UR QL: NEGATIVE
AMPHETAMINES UR QL: NEGATIVE
BARBITURATES UR QL SCN: NEGATIVE
BENZODIAZ UR QL SCN: NEGATIVE
BUPRENORPHINE SERPL-MCNC: NEGATIVE NG/ML
CANNABINOIDS SERPL QL: NEGATIVE
COCAINE UR QL: NEGATIVE
METHADONE UR QL SCN: NEGATIVE
OPIATES UR QL: NEGATIVE
OXYCODONE UR QL SCN: NEGATIVE
PCP UR QL SCN: NEGATIVE
PROPOXYPH UR QL: NEGATIVE
TRICYCLICS UR QL SCN: NEGATIVE

## 2022-10-15 PROCEDURE — 96375 TX/PRO/DX INJ NEW DRUG ADDON: CPT

## 2022-10-15 PROCEDURE — 96374 THER/PROPH/DIAG INJ IV PUSH: CPT

## 2022-10-15 PROCEDURE — 25010000002 KETOROLAC TROMETHAMINE PER 15 MG: Performed by: EMERGENCY MEDICINE

## 2022-10-15 PROCEDURE — 25010000002 PROCHLORPERAZINE 10 MG/2ML SOLUTION: Performed by: EMERGENCY MEDICINE

## 2022-10-15 PROCEDURE — 25010000002 DIPHENHYDRAMINE PER 50 MG: Performed by: EMERGENCY MEDICINE

## 2022-10-15 RX ORDER — BUTALBITAL, ACETAMINOPHEN AND CAFFEINE 50; 325; 40 MG/1; MG/1; MG/1
1 TABLET ORAL EVERY 6 HOURS PRN
Qty: 9 TABLET | Refills: 0 | Status: SHIPPED | OUTPATIENT
Start: 2022-10-15

## 2022-10-15 RX ORDER — DIPHENHYDRAMINE HYDROCHLORIDE 50 MG/ML
25 INJECTION INTRAMUSCULAR; INTRAVENOUS ONCE
Status: COMPLETED | OUTPATIENT
Start: 2022-10-15 | End: 2022-10-15

## 2022-10-15 RX ORDER — PROCHLORPERAZINE EDISYLATE 5 MG/ML
10 INJECTION INTRAMUSCULAR; INTRAVENOUS ONCE
Status: COMPLETED | OUTPATIENT
Start: 2022-10-15 | End: 2022-10-15

## 2022-10-15 RX ORDER — KETOROLAC TROMETHAMINE 30 MG/ML
10 INJECTION, SOLUTION INTRAMUSCULAR; INTRAVENOUS ONCE
Status: COMPLETED | OUTPATIENT
Start: 2022-10-15 | End: 2022-10-15

## 2022-10-15 RX ADMIN — KETOROLAC TROMETHAMINE 10 MG: 30 INJECTION, SOLUTION INTRAMUSCULAR; INTRAVENOUS at 00:52

## 2022-10-15 RX ADMIN — PROCHLORPERAZINE EDISYLATE 10 MG: 5 INJECTION INTRAMUSCULAR; INTRAVENOUS at 00:53

## 2022-10-15 RX ADMIN — SODIUM CHLORIDE 1000 ML: 9 INJECTION, SOLUTION INTRAVENOUS at 00:51

## 2022-10-15 RX ADMIN — DIPHENHYDRAMINE HYDROCHLORIDE 25 MG: 50 INJECTION, SOLUTION INTRAMUSCULAR; INTRAVENOUS at 00:53

## 2022-10-15 NOTE — DISCHARGE INSTRUCTIONS
Your pupils have been found to be unequal here in the emergency department today.  Your CT scans have not showed any abnormalities.  The majority of the time this will last for a few days and then resolve on its own.  If your symptoms have not gone away by Monday morning myself and the neurologist have recommended evaluation by an ophthalmologist and potentially obtaining an MRI of your brain.  As we discussed this could be done by admitting you to the hospital or we can help arrange this as an outpatient.  I believe it is reasonable to continue to monitor this at home as was your preference, however if your symptoms begin to worsen or do not improve you need to return to the emergency department.

## 2022-10-15 NOTE — CONSULTS
[facility]   Teleneurology Note    Patient Name: Marilu Godoy  : 1990  MRN: [id number]  Primary Care Physician: Deidre Barker MD       Subjective   Teleneurology Initial Data     Arrival Date Telestroke Site: 10/14/22 Arrival Time Telestroke Site:    Neurologist Evaluation Date: 10/14/22 Neurologist Evaluation Time:    Date Last Known Well: 10/14/22 Time Last Known Well:      History      CC: blurred vision    HPI: 31-year-old female with history of PE on Eliquis and migraine who presents with blurred vision and left eye dilation.  Last known normal around 7 PM.  Patient noted acute onset of blurred vision and realized that the left pupil was larger than the right. Currently, she reports persistent left eye blurred vision and denies vision loss, speech difficulties, focal weakness or sensory changes.  She reports mild occipital headache.  She tells me that her current headache is different than her usual migraine.      Stroke Risk Factors/ Pertinent Data     Stroke risk factors: dyslipidemia, headache/ migrane, sleep apnea  Anticoagulants prior to arrival: apixaban (Eliquis)  Antiplatelets prior to arrival: none     Pre- Stroke Modified Arie Scale     Pre-Stroke Modified [place] Scale: 0 - No Symptoms at all.    NIH Stroke Scale           Interval: baseline  1a. Level of Consciousness: 0-->Alert, keenly responsive  1b. LOC Questions: 0-->Answers both questions correctly  1c. LOC Commands: 0-->Performs both tasks correctly  2. Best Gaze: 0-->Normal  3. Visual: 0-->No visual loss  4. Facial Palsy: 0-->Normal symmetrical movements  5a. Motor Arm, Left: 0-->No drift, limb holds 90 (or 45) degrees for full 10 secs  5b. Motor Arm, Right: 0-->No drift, limb holds 90 (or 45) degrees for full 10 secs  6a. Motor Leg, Left: 0-->No drift, leg holds 30 degree position for full 5 secs  6b. Motor Leg, Right: 0-->No drift, leg holds 30 degree position for full 5 secs  7. Limb Ataxia:  0-->Absent  8. Sensory: 1-->Mild-to-moderate sensory loss, patient feels pinprick is less sharp or is dull on the affected side, or there is a loss of superficial pain with pinprick, but patient is aware of being touched  9. Best Language: 0-->No aphasia, normal  10. Dysarthria: 0-->Normal  11. Extinction and Inattention (formerly Neglect): 0-->No abnormality  Total (NIH Stroke Scale): 1     Review of Systems     Review of Systems   Constitutional: Negative.    Eyes: Positive for visual disturbance.   Respiratory: Negative.    Cardiovascular: Negative.    Gastrointestinal: Negative.    Endocrine: Negative.    Genitourinary: Negative.    Musculoskeletal: Negative.    Skin: Negative.    Neurological: Positive for headaches.   Hematological: Negative.    Psychiatric/Behavioral: Negative.        Objective   Exam     Vitals:    10/15/22 0000   BP: 117/91   Pulse: 100   Resp:    Temp:    SpO2: 97%       Exam performed with the help of support staff from the referring site  Neurological Exam    General Appearance: In no apparent distress    Neurological:  Mental status: Awake, Alert and Oriented to name, date, location and birthdate. Following commands.    Cranial Nerves:   Left eye mydriasis with both pupils reactive to light  Visual fields: Full to confrontation bilaterally  Eye motility: Full motility both eyes all directions  Facial sensation: Decreased sensation to light touch in the left face  Facial motor: No facial droop or facial asymmetry    Motor: Antigravity in arms and legs and moving them spontaneously and to command.    Sensation: No sensory loss to light touch in face, arms or legs B/L    Coordination:  Finger to nose: Intact  Gait: Not tested    Result Review    Results          Personal review of CNS imaging:(Official report by radiologist pending)  Imaging  CT Imaging Review: CT Imaging reviewed, NO acute infarct/ hemorrhage seen  CTA Imaging Review: CTA Imaging reviewed, NO large vessel occlusion or  severe stenosis seen  ASPECTS Involved Locations: none  ASPECTS Score: 10    Thrombolytic   Thrombolytics: tPA not given  Tissue Plasminogen Activator (tPA) Exclusion Criteria: Onset unknown or GREATER than 4.5 hours, Current use of direct thrombin inhibitors or direct factor Xa inhibitors in the past 48 hours     Assessment & Plan   Assessment/ Plan     Assessment/Plan:  Acute Stroke Evaluation: Stroke not suspected/ Other (Specify)- the risks of IV thrombolytic outweigh the benefits of treatment     31-year-old female with history of PE on Eliquis and migraine who presents with blurred vision.  She does have right eye mydriasis on exam, with pupil reactive to light and normal extraocular movements.  She reports decreased sensation in the left lower face with no other focal neurological deficit on exam.  CT head is negative for acute intracranial bleeding or acute ischemia.  CTA, per my reading, with no intracranial LVO or aneurysms. Her symptoms are difficult to co-localize. Differential diagnosis includes pharmacologic pupillary dilatation (however, she denies recent use of her prescribed albuterol), benign unilateral mydriasis related to migraine and opthalmological conditions.  Neurological exam and brain images are reassuring against a structural lesions in the brainstem. I would recommend to obtain drug screen. May consider obtaining brain MRI and Ophthalmology evaluation if mydriais persists for more than 24 to 48 hours. May continue home AC and good control of vascular risk factors. Please obtain swallow evaluation prior to administer oral medications. May try migraine cocktail (compazine, ketorolac, benadryl and IV fluids).             Disposition     Disposition: The patient will remain at the referring institution for further evaluation and management    Medical Decision Making  Medical Data Reviewed: Data reviewed including: clinical labs, radiology and/or medical tests, Independent review of CNS  images    I, Connie Guillory MD, saw the patient on 10/14/22 at 2350 for an initial in-patient or emergency room telememedicine face to face consult using interactive technology for  . The location of the patient was  . I was located at  . I was assisted with the exam by  .    I have proceeded with this evaluation at the request of the referring practitioner as it is felt to be an emergency setting and no appropriate specialist is available to perform this evaluation. The [facility] has reported that this is the correct patient and has obtained consent from the patient/surrogate to perform this telemedicine evaluation(including obtaining history, performing examination and reviewing data provided by the patient an/or originating site of care provider)    I have introduced myself to the patient, provided my credentials, disclosed my location, and determined that, based on review of the patient's chart and discussion with the patient's primary team, telemedicine via a HIPAA compliant, real-time, face-to-face two-way, interactive audio and video platform is an appropriate and effective means of providing the service.    The patient/surrogate has a right to refuse this evaluation as they have been explained risks including potential loss of confidentiality, benefits, alternatives, and the potential need for subsequent face-to-face care. In this evaluation, we will be providing recommendations only.  The ultimate decision to follow or not to follow these recommendations will be left to the bedside treating/requesting practitioner.    The patient/surrogate has been notified that other healthcare professionals including technical person may be involved in this A/V evaluation.  All laws concerning confidentiality and patient access to medical records and copies of medical records apply to telemedicine.  The patient/surrogate has received the [facility]'s Health Notice of Privacy Practices.    Connie  Johnna Guillory MD

## 2022-10-15 NOTE — ED PROVIDER NOTES
TRIAGE CHIEF COMPLAINT:     Nursing and triage notes reviewed    Chief Complaint   Patient presents with   • Eye Problem   • Dizziness      HPI: Marilu Godoy is a 31 y.o. female who presents to the emergency department complaining of dizziness, headache, and unequal pupils.  Patient states 3 or 4 hours previously she started having symptoms.  She states she noticed some blurry vision primarily in her left eye.  When she looked in the mirror she noticed that the left pupil was much larger than that on the right side.  She states she has developed a mild pounding headache in the posterior aspect of her head since.  She states she is felt a little dizzy with walking around.  She does not have any pain in her eye.  She denies pain with range of motion of her eyes.  She denies any facial droop.  No numbness, tingling, or weakness in her extremities.    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    • Bleeding disorder ([facility]) 11-13-16   • Body piercing     TONGUE, NOSE, TWO IN EACH EAR   • Bronchitis    • Chest pain 06/22/2021    last CP 2-3 months ago   • Clotting disorder ([facility])     factor 5   • Constipation    • COVID-19 vaccine series completed     Pfizer-plus booster   • Deep vein thrombosis ([facility]) 11/13/2016   • Diarrhea    • Diverticulitis    • Elevated cholesterol    • Endometriosis    • Factor 5 Leiden mutation, heterozygous ([facility])    • Fibroid    • Fibromyalgia    • Full dentures 06/22/2021    advised no adhesives DOS   • Gastroparesis    • GERD (gastroesophageal reflux disease)    • H/O blood clots    • H/O cardiovascular stress test 06/22/2021    reported several years ago-exercise wnl   • Headache    • Hiatal hernia    • High cholesterol    • History of recurrent UTIs    • HPV  (human papilloma virus) infection    • Hx of echocardiogram    • Hx of gout 2021   • Hypertension    • Hypoglycemia    • Inappropriate sinus node tachycardia    • Kidney infection 2021    history only   • Migraine    • Nausea & vomiting    • Obesity    • Osteoarthritis    • Ovarian cyst    • Pneumonia    • Pollen allergies    • PONV (postoperative nausea and vomiting)    • Protein S deficiency ([facility]) 2016    labs from hospitalization for PE   • Pulmonary embolism ([facility]CC) 2016    on eliquis since that time   • Recurrent pregnancy loss, antepartum condition or complication    • Sleep apnea     no CPAP- states insurance took it back   • Tattoo     LOWER BACK   • Varicella         FAMILY HISTORY:   Family History   Problem Relation Age of Onset   • Arthritis Mother    • COPD Mother    • Asthma Mother    • Thyroid disease Mother    • Arthritis Father    • Diabetes Father    • Hypertension Father    • Hyperlipidemia Father    • Kidney disease Father    • Heart attack Father    • Coronary artery disease Father    • Dementia Father    • No Known Problems Son    • Colon cancer Neg Hx    • Liver cancer Neg Hx    • Liver disease Neg Hx    • Stomach cancer Neg Hx    • Esophageal cancer Neg Hx         SOCIAL HISTORY:   Social History     Socioeconomic History   • Marital status:    Tobacco Use   • Smoking status: Never   • Smokeless tobacco: Never   Vaping Use   • Vaping Use: Never used   Substance and Sexual Activity   • Alcohol use: No   • Drug use: Never   • Sexual activity: Yes     Partners: Male     Birth control/protection: Surgical     Comment: Tubal        SURGICAL HISTORY:   Past Surgical History:   Procedure Laterality Date   •  SECTION        and  and    •  SECTION WITH TUBAL N/A 1/15/2019    Procedure:  SECTION REPEAT WITH TUBAL;  Surgeon: Alva Boyer MD;  Location: [facility] LABOR DELIVERY;  Service: Obstetrics/Gynecology   • COLONOSCOPY N/A 2017     Procedure: COLONOSCOPY WITH BIOPSIES AND ARGON THERMAL ABLATION;  Surgeon: Jignesh Selby MD;  Location: [facility] ENDOSCOPY;  Service:    • D & C HYSTEROSCOPY ENDOMETRIAL ABLATION N/A 3/10/2022    Procedure: DILATATION AND CURETTAGE DIAGNOSTIC HYSTEROSCOPY NOVASURE ENDOMETRIAL ABLATION;  Surgeon: Alva Boyer MD;  Location: [facility] OR;  Service: Obstetrics/Gynecology;  Laterality: N/A;   • DIAGNOSTIC LAPAROSCOPY N/A 4/2/2018    Procedure: DIAGNOSTIC LAPAROSCOPY AND ABLATION OF ENDOMETRIOSIS;  Surgeon: Evin Zamudio MD;  Location: [facility] OR;  Service: Obstetrics/Gynecology   • ENDOMETRIAL ABLATION     • ENDOSCOPIC FUNCTIONAL SINUS SURGERY (FESS) Right 6/25/2021    Procedure: Right endoscopic total ethmoidectomy, right endoscopic middle meatal antrotomy with cyst removal, right endoscopic frontal recess exploration with cyst removal;  Surgeon: Saulo Brito MD;  Location: [facility] OR;  Service: ENT;  Laterality: Right;   • ENDOSCOPY N/A 4/20/2017    Procedure: ESOPHAGOGASTRODUODENOSCOPY WITH BIOPSIES AND COLD BIOPSY POLYPECTOMIES;  Surgeon: Jignesh Selby MD;  Location: [facility] ENDOSCOPY;  Service:    • LAPAROSCOPIC CHOLECYSTECTOMY     • LIPOMA EXCISION N/A 12/10/2021    Procedure: Excision of soft tissue mass lower back;  Surgeon: Nidia Smith MD;  Location: [facility] OR;  Service: General;  Laterality: N/A;   • MOUTH SURGERY      full mouth tooth extration   • WISDOM TOOTH EXTRACTION          CURRENT MEDICATIONS:      Medication List      CONTINUE taking these medications    Alcohol Pads 70 % pads  Check sugars as needed/directed for hypoglycemia.     B-D ULTRA-FINE 33 LANCETS misc  Check sugars as needed/directed for hypoglycemia.     glucose monitor monitoring kit  1 each As Needed (diabetes). Check sugars as needed/directed for hypoglycemia.     ONE TOUCH ULTRA 2 w/Device kit        ASK your doctor about these medications    acyclovir 5 % ointment  Commonly known as: Zovirax  Apply 1 application  topically to the appropriate area as directed 2 (Two) Times a Day. Apply to finger     albuterol sulfate  (90 Base) MCG/ACT inhaler  Commonly known as: PROVENTIL HFA;VENTOLIN HFA;PROAIR HFA  Inhale 2 puffs Every 4 (Four) Hours As Needed for Wheezing or Shortness of Air.     apixaban 5 MG tablet tablet  Commonly known as: ELIQUIS  Take 1 tablet by mouth 2 (Two) Times a Day.     aspirin 81 MG EC tablet     atorvastatin 40 MG tablet  Commonly known as: LIPITOR  Take 1 tablet by mouth Every Night.     azelastine 0.1 % nasal spray  Commonly known as: ASTELIN  1 spray into the nostril(s) as directed by provider 2 (Two) Times a Day. Use in each nostril as directed     busPIRone 5 MG tablet  Commonly known as: BUSPAR  TAKE 1 TABLET BY MOUTH THREE TIMES A DAY     calcium carbonate  MG chewable tablet  Commonly known as: TUMS EX  Chew 1 tablet Daily.     Corlanor 5 MG tablet tablet  Generic drug: ivabradine HCl  Take 1.5 tablets by mouth 2 (two) times a day.     cyclobenzaprine 10 MG tablet  Commonly known as: FLEXERIL     Diclofenac Sodium 1 % gel gel  Commonly known as: VOLTAREN  Apply 4 g topically to the appropriate area as directed 4 (Four) Times a Day As Needed (joint pain).     Dulera 200-5 MCG/ACT inhaler  Generic drug: mometasone-formoterol  Inhale 2 puffs 2 (Two) Times a Day.     DULoxetine 60 MG capsule  Commonly known as: CYMBALTA  Take 1 capsule by mouth Daily.     fluconazole 150 MG tablet  Commonly known as: DIFLUCAN  Take 1 tablet by mouth Every 3 (Three) Days.     fluticasone 50 MCG/ACT nasal spray  Commonly known as: FLONASE  2 sprays into the nostril(s) as directed by provider Daily.     glucose blood test strip  USE TO CHECK SUGARS DAILY DUE TO hypoglycemia     granisetron 1 MG tablet  Commonly known as: KYTRIL     hyoscyamine 0.125 MG tablet  Commonly known as: ANASPAZ,LEVSIN     ibuprofen 800 MG tablet  Commonly known as: ADVIL,MOTRIN  Take 1 tablet by mouth Every 8 (Eight) Hours As Needed  for Mild Pain .     l-methylfolate 15 MG tablet tablet  Take 1 tablet by mouth Daily. Indications: folic acid deficiency     * lidocaine 5 %  Commonly known as: LIDODERM  Place 2 patches on the skin as directed by provider Daily. Remove & Discard patch within 12 hours or as directed by MD     * LIDODERM EX     methocarbamol 750 MG tablet  Commonly known as: ROBAXIN  Take 1 tablet by mouth At Night As Needed for Muscle Spasms.     mupirocin 2 % cream  Commonly known as: Bactroban  Apply 1 application topically to the appropriate area as directed 3 (Three) Times a Day.     Nurtec 75 MG tablet dispersible tablet  Generic drug: Rimegepant Sulfate  Take 1 tablet by mouth As Needed (as needed).     ondansetron 8 MG tablet  Commonly known as: ZOFRAN  Take 1 tablet by mouth Every 8 (Eight) Hours As Needed for Nausea or Vomiting.     pantoprazole 40 MG EC tablet  Commonly known as: PROTONIX  Take 1 tablet by mouth Daily.     polyethylene glycol 17 GM/SCOOP powder  Commonly known as: MIRALAX  Take 17 g by mouth Daily.     Qulipta 60 MG tablet  Generic drug: Atogepant  Take 1 tablet by mouth Daily.     triamcinolone 0.1 % ointment  Commonly known as: KENALOG  Apply 1 application topically to the appropriate area as directed 2 (Two) Times a Day.     vitamin D3 125 MCG (5000 UT) capsule capsule  Commonly known as: Vitamin D3 Maximum Strength  Take 1 capsule by mouth Daily.     zonisamide 100 MG capsule  Commonly known as: ZONEGRAN  Take 1 capsule by mouth Every Evening.         * This list has 2 medication(s) that are the same as other medications prescribed for you. Read the directions carefully, and ask your doctor or other care provider to review them with you.                 ALLERGIES: Ceftin [cefuroxime axetil], Cefuroxime, Sumatriptan, and Amoxicillin     PHYSICAL EXAM:   VITAL SIGNS:   Vitals:    10/14/22 2152   BP: 126/89   Pulse: 102   Resp: 18   Temp: 98.3 °F (36.8 °C)   SpO2: 99%      CONSTITUTIONAL: Awake, oriented,  appears nontoxic   HENT: Atraumatic, normocephalic, oral mucosa pink and moist, airway patent. Nares patent without drainage. External ears normal.   EYES: Conjunctivae clear, the left pupil is approximately 3 mm larger than that of the right.  Right side is approximately 2 to 3 mm and the left side is 5-6.  Both are reactive to light.  There is full range of motion including no pain with range of motion.  NECK: Trachea midline, nontender, supple   CARDIOVASCULAR: Normal heart rate, Normal rhythm, No murmurs, rubs, gallops   PULMONARY/CHEST: Clear to auscultation, no rhonchi, wheezes, or rales. Symmetrical breath sounds   ABDOMINAL: Nondistended, soft, nontender - no rebound or guarding.  NEUROLOGIC: Pupil findings as mentioned above.  No facial droop.  Extraocular movements are intact.  Normal strength and sensation in all extremities.  EXTREMITIES: No clubbing, cyanosis, or edema   SKIN: Warm, Dry, No erythema, No rash     ED COURSE / MEDICAL DECISION MAKING:   Marilu Godoy is a 31 y.o. female who presents to the emergency department for evaluation of a headache, dizziness, Anisocoria.  Patient does exhibit mismatched pupils on examination.  The remainder of the exam is largely unremarkable.    I spoke with the neurologist on-call upon patient's arrival to discuss the case.  The neurologist recommended CT scanning of the head as well as CT angiograms but indicated that it is rare to have an intracranial abnormality that causes only unequal pupils.    EKG interpreted by me reveals sinus rhythm with rate of 86 bpm.  There is low QRS voltage in chest leads.  This is an atypical appearing EKG.    Laboratory testing is largely unremarkable.  Urinalysis and urine drug screen are unremarkable.    CT scan of the head including CT angiograms of the head and neck per radiology interpretation do not reveal any obvious acute process    Patient was evaluated by the neurologist via the telehealth monitor.  They did not  appreciate any other neurological deficits either.     I also examined patient in the dark and patient's unequal pupils are still present in the dark and do not appear better or worse in the dark compared to the light.    Unclear etiology of patient's symptoms, however per neurology consultation this seems less likely related to acute ischemic event given it is only affected the left eye.  More likely benign nature related to either patient's headache, medication.  Neurologist has recommended observing patient for 24 to 48 hours to see if there is any improvement and at that time potentially obtaining ophthalmology evaluation or MRI if her symptoms do not improve.  I spoke with patient about this and offered admission versus observation at home and referral for further outpatient testing.  Patient stated she had several children at home so if she could just observe at home and see how her symptoms go she would prefer that possibility.  I feel this is reasonable.  We did treat patient's headache in the emergency department with significant improvement.    I will help provide information for further outpatient testing in the next few days if patient's symptoms do not improve, also stated patient could come back to the emergency department Monday and we can help arrange testing at that time if her symptoms have not improved.  I also advised that she return immediately should her symptoms worsen or change or should she develop new symptoms.  Patient felt comfortable with this plan of care and was ultimately discharged in good condition.    DECISION TO DISCHARGE/ADMIT: see ED care timeline     FINAL IMPRESSION:   1 --Anisocoria  2 --   3 --     Electronically signed by: Kezia Shirley MD, 10/14/2022 22:48 Kezia Miller MD  10/15/22 0422

## 2022-11-16 ENCOUNTER — OFFICE VISIT (OUTPATIENT)
Dept: ORTHOPEDIC SURGERY | Facility: CLINIC | Age: 32
End: 2022-11-16

## 2022-11-16 ENCOUNTER — OFFICE VISIT (OUTPATIENT)
Dept: NEUROLOGY | Facility: CLINIC | Age: 32
End: 2022-11-16

## 2022-11-16 VITALS — BODY MASS INDEX: 32.61 KG/M2 | HEIGHT: 64 IN | TEMPERATURE: 98 F | WEIGHT: 191 LBS

## 2022-11-16 VITALS
WEIGHT: 191 LBS | OXYGEN SATURATION: 99 % | TEMPERATURE: 97.8 F | HEART RATE: 95 BPM | SYSTOLIC BLOOD PRESSURE: 120 MMHG | BODY MASS INDEX: 33.84 KG/M2 | DIASTOLIC BLOOD PRESSURE: 72 MMHG | HEIGHT: 63 IN

## 2022-11-16 DIAGNOSIS — G43.009 MIGRAINE WITHOUT AURA AND WITHOUT STATUS MIGRAINOSUS, NOT INTRACTABLE: Primary | ICD-10-CM

## 2022-11-16 DIAGNOSIS — M25.562 PATELLOFEMORAL ARTHRALGIA OF LEFT KNEE: ICD-10-CM

## 2022-11-16 DIAGNOSIS — M25.562 ARTHRALGIA OF LEFT KNEE: Primary | ICD-10-CM

## 2022-11-16 PROCEDURE — 20610 DRAIN/INJ JOINT/BURSA W/O US: CPT | Performed by: PHYSICIAN ASSISTANT

## 2022-11-16 PROCEDURE — 99213 OFFICE O/P EST LOW 20 MIN: CPT | Performed by: PHYSICIAN ASSISTANT

## 2022-11-16 PROCEDURE — 99213 OFFICE O/P EST LOW 20 MIN: CPT | Performed by: NURSE PRACTITIONER

## 2022-11-16 RX ORDER — ATOGEPANT 60 MG/1
60 TABLET ORAL DAILY
Qty: 30 TABLET | Refills: 11 | Status: SHIPPED | OUTPATIENT
Start: 2022-11-16

## 2022-11-16 RX ORDER — RIMEGEPANT SULFATE 75 MG/75MG
75 TABLET, ORALLY DISINTEGRATING ORAL TAKE AS DIRECTED
Qty: 16 TABLET | Refills: 5 | Status: SHIPPED | OUTPATIENT
Start: 2022-11-16

## 2022-11-16 RX ORDER — METHYLPREDNISOLONE ACETATE 40 MG/ML
40 INJECTION, SUSPENSION INTRA-ARTICULAR; INTRALESIONAL; INTRAMUSCULAR; SOFT TISSUE
Status: COMPLETED | OUTPATIENT
Start: 2022-11-16 | End: 2022-11-16

## 2022-11-16 RX ORDER — LIDOCAINE HYDROCHLORIDE 10 MG/ML
2 INJECTION, SOLUTION INFILTRATION; PERINEURAL
Status: COMPLETED | OUTPATIENT
Start: 2022-11-16 | End: 2022-11-16

## 2022-11-16 RX ADMIN — LIDOCAINE HYDROCHLORIDE 2 ML: 10 INJECTION, SOLUTION INFILTRATION; PERINEURAL at 11:14

## 2022-11-16 RX ADMIN — METHYLPREDNISOLONE ACETATE 40 MG: 40 INJECTION, SUSPENSION INTRA-ARTICULAR; INTRALESIONAL; INTRAMUSCULAR; SOFT TISSUE at 11:14

## 2022-11-16 NOTE — PROGRESS NOTES
Follow Up Neurology Office Visit      Patient Name: Marilu Godoy    Referring Physician: No ref. provider found    Chief Complaint:    Chief Complaint   Patient presents with   • Follow-up     Patient in office to follow up on migraines       History of Present Illness: Marilu Godoy is a 31 y.o. female who is here to follow up with Neurology for headaches.  She reports no significant change in headache pattern.  She continues on daily Qulipta, although she does report some difficulty in obtaining this from pharmacy.  She reports using Nurtec as needed for migraine attacks.  Of note, she did present to ER about 1 month ago with anisocoria.  She underwent head CT and vessel imaging, unremarkable.  Attributed to possible complex migraine or medication side effect.  This has since resolved without focal deficit.    The following portions of the patient's history were reviewed and updated as appropriate: allergies, current medications, past family history, past medical history, past social history, past surgical history and problem list.    Subjective     Review of Systems:   Review of Systems   Eyes: Negative for visual disturbance.   Gastrointestinal: Positive for indigestion. Negative for constipation.   Musculoskeletal: Positive for arthralgias and myalgias.   Neurological: Positive for dizziness and headache.   Hematological: Bruises/bleeds easily.   All other systems reviewed and are negative.    Medications:     Current Outpatient Medications:   •  albuterol sulfate  (90 Base) MCG/ACT inhaler, Inhale 2 puffs Every 4 (Four) Hours As Needed for Wheezing or Shortness of Air., Disp: 18 g, Rfl: 11  •  Alcohol Swabs (Alcohol Pads) 70 % pads, Check sugars as needed/directed for hypoglycemia., Disp: 100 each, Rfl: 12  •  apixaban (ELIQUIS) 5 MG tablet tablet, Take 1 tablet by mouth 2 (Two) Times a Day., Disp: 180 tablet, Rfl: 3  •  aspirin 81 MG EC tablet, Take 81 mg by mouth Daily., Disp: , Rfl:    •  Atogepant (Qulipta) 60 MG tablet, Take 1 tablet by mouth Daily., Disp: 30 tablet, Rfl: 11  •  atorvastatin (LIPITOR) 40 MG tablet, Take 1 tablet by mouth Every Night., Disp: 30 tablet, Rfl: 11  •  azelastine (ASTELIN) 0.1 % nasal spray, 1 spray into the nostril(s) as directed by provider 2 (Two) Times a Day. Use in each nostril as directed, Disp: 1 each, Rfl: 10  •  B-D ULTRA-FINE 33 LANCETS misc, Check sugars as needed/directed for hypoglycemia., Disp: 100 each, Rfl: 12  •  Blood Glucose Monitoring Suppl (ONE TOUCH ULTRA 2) w/Device kit, use as needed to check blood sugar for hypoglycemia (diabetes), Disp: , Rfl:   •  busPIRone (BUSPAR) 5 MG tablet, TAKE 1 TABLET BY MOUTH THREE TIMES A DAY, Disp: 90 tablet, Rfl: 0  •  butalbital-acetaminophen-caffeine (FIORICET, ESGIC) -40 MG per tablet, Take 1 tablet by mouth Every 6 (Six) Hours As Needed for Headache., Disp: 9 tablet, Rfl: 0  •  calcium carbonate EX (TUMS EX) 750 MG chewable tablet, Chew 1 tablet Daily. (Patient taking differently: Chew 750 mg Daily As Needed.), Disp: 30 tablet, Rfl: 1  •  Corlanor 5 MG tablet tablet, Take 1.5 tablets by mouth 2 (two) times a day., Disp: 90 tablet, Rfl: 6  •  cyclobenzaprine (FLEXERIL) 10 MG tablet,  15 Each, TAKE 1/2 TABLET BY MOUTH THREE TIMES A DAY AS NEEDED FOR MUSCLE SPASM for up to 5 days, 0 Refill(s), Disp: , Rfl:   •  Diclofenac Sodium (VOLTAREN) 1 % gel gel, Apply 4 g topically to the appropriate area as directed 4 (Four) Times a Day As Needed (joint pain)., Disp: 150 g, Rfl: 1  •  DULoxetine (CYMBALTA) 60 MG capsule, Take 1 capsule by mouth Daily., Disp: 90 capsule, Rfl: 3  •  fluticasone (FLONASE) 50 MCG/ACT nasal spray, 2 sprays into the nostril(s) as directed by provider Daily., Disp: 16 g, Rfl: 11  •  glucose blood test strip, USE TO CHECK SUGARS DAILY DUE TO hypoglycemia, Disp: 100 each, Rfl: 12  •  glucose monitor monitoring kit, 1 each As Needed (diabetes). Check sugars as needed/directed for  hypoglycemia., Disp: 1 each, Rfl: 0  •  hyoscyamine (ANASPAZ,LEVSIN) 0.125 MG tablet, TAKE 1 TABLET BY MOUTH EVERY SIX HOURS AS NEEDED FOR CRAMPING OR DIARRHEA, Disp: , Rfl:   •  ibuprofen (ADVIL,MOTRIN) 800 MG tablet, Take 1 tablet by mouth Every 8 (Eight) Hours As Needed for Mild Pain ., Disp: 30 tablet, Rfl: 0  •  l-methylfolate 15 MG tablet tablet, Take 1 tablet by mouth Daily. Indications: folic acid deficiency, Disp: 90 tablet, Rfl: 3  •  lidocaine (LIDODERM) 5 %, Place 2 patches on the skin as directed by provider Daily. Remove & Discard patch within 12 hours or as directed by MD, Disp: 60 patch, Rfl: 3  •  methocarbamol (ROBAXIN) 750 MG tablet, Take 1 tablet by mouth At Night As Needed for Muscle Spasms., Disp: 14 tablet, Rfl: 0  •  metoclopramide (REGLAN) 5 MG tablet, metoclopramide 5 mg tablet  TAKE 1 TABLET BY MOUTH FOUR TIMES A DAY, Disp: , Rfl:   •  mometasone-formoterol (Dulera) 200-5 MCG/ACT inhaler, Inhale 2 puffs 2 (Two) Times a Day., Disp: 13 g, Rfl: 11  •  mupirocin (Bactroban) 2 % cream, Apply 1 application topically to the appropriate area as directed 3 (Three) Times a Day., Disp: 30 g, Rfl: 1  •  ondansetron ODT (ZOFRAN-ODT) 4 MG disintegrating tablet, ondansetron 4 mg disintegrating tablet  DISSOLVE 1 TABLET IN MOUTH EVERY SIX HOURS AS NEEDED FOR NAUSEA AND VOMITING, Disp: , Rfl:   •  pantoprazole (PROTONIX) 40 MG EC tablet, Take 1 tablet by mouth Daily., Disp: 30 tablet, Rfl: 0  •  Prucalopride Succinate (Motegrity) 2 MG tablet, Motegrity 2 mg tablet  TAKE 1 TABLET BY MOUTH EVERY DAY, Disp: , Rfl:   •  Scopolamine 1 MG/3DAYS patch, scopolamine 1 mg over 3 days transdermal patch, Disp: , Rfl:   •  vitamin D3 (Vitamin D3 Maximum Strength) 125 MCG (5000 UT) capsule capsule, Take 1 capsule by mouth Daily., Disp: 90 capsule, Rfl: 1  •  zonisamide (ZONEGRAN) 100 MG capsule, Take 1 capsule by mouth Every Evening., Disp: 90 capsule, Rfl: 1  •  Rimegepant Sulfate (Nurtec) 75 MG tablet dispersible  "tablet, Place 1 tablet under the tongue Take As Directed., Disp: 16 tablet, Rfl: 5  No current facility-administered medications for this visit.    Allergies:   Allergies   Allergen Reactions   • Ceftin [Cefuroxime Axetil] Shortness Of Breath and Rash     Numbness in mouth and throat   • Cefuroxime Hives, Rash and Shortness Of Breath     Numbness in mouth and throat  Numbness in mouth and throat   • Sumatriptan Shortness Of Breath and Nausea Only     Other reaction(s): Nausea Only, Other (See Comments)  Worsening migraine  Worsening migraine   • Amoxicillin Rash, Hives and Itching       Objective     Physical Exam:  Vital Signs:   Vitals:    11/16/22 1127   BP: 120/72   BP Location: Left arm   Patient Position: Sitting   Cuff Size: Adult   Pulse: 95   Temp: 97.8 °F (36.6 °C)   SpO2: 99%   Weight: 86.6 kg (191 lb)   Height: 160 cm (63\")   PainSc: 0-No pain     Physical Exam  Vitals and nursing note reviewed.   Constitutional:       Appearance: Normal appearance.   Pulmonary:      Effort: Pulmonary effort is normal.   Neurological:      Mental Status: She is oriented to person, place, and time. Mental status is at baseline.      Cranial Nerves: Cranial nerves 2-12 are intact.      Motor: Motor strength is normal.      Gait: Gait is intact.   Psychiatric:         Mood and Affect: Mood normal.         Speech: Speech normal.         Behavior: Behavior normal.       Neurologic Exam     Mental Status   Oriented to person, place, and time.   Attention: normal. Concentration: normal.   Speech: speech is normal   Level of consciousness: alert  Normal comprehension.     Cranial Nerves   Cranial nerves II through XII intact.     Motor Exam   Muscle bulk: normal  Overall muscle tone: normal    Strength   Strength 5/5 throughout.     Gait, Coordination, and Reflexes     Gait  Gait: normal    Tremor   Resting tremor: absent    Results Review:   I have reviewed the patient's other medical records to include, labs, radiology and " referrals.     Assessment / Plan      Assessment/Plan:   Diagnoses and all orders for this visit:    1. Migraine without aura and without status migrainosus, not intractable (Primary)  -     Rimegepant Sulfate (Nurtec) 75 MG tablet dispersible tablet; Place 1 tablet under the tongue Take As Directed.  Dispense: 16 tablet; Refill: 5  -     Atogepant (Qulipta) 60 MG tablet; Take 1 tablet by mouth Daily.  Dispense: 30 tablet; Refill: 11       Follow Up:   Return in about 6 months (around 5/16/2023) for Next scheduled follow up.     ARNIE Bower  [facility]   AS THE PROVIDER, I PERSONALLY WORE PPE DURING ENTIRE FACE TO FACE ENCOUNTER IN CLINIC WITH THE PATIENT. PATIENT ALSO WORE PPE DURING ENTIRE FACE TO FACE ENCOUNTER EXCEPT FOR A MAX OF 30 SECONDS DURING NEUROLOGICAL EVALUATION OF CRANIAL NERVES AND THEN MASK WAS PLACED BACK OVER PATIENT FACE FOR REMAINDER OF VISIT. I WASHED MY HANDS BEFORE AND AFTER VISIT.    Please note that portions of this note may have been completed with a voice recognition program. Efforts were made to edit the dictations, but occasionally words are mistranscribed.

## 2022-11-16 NOTE — PROGRESS NOTES
"Subjective   Patient ID: Marilu Godoy is a 31 y.o. right hand dominant female  Follow-up of the Left Knee (States she has been having pain for a few weeks, she is having weakness in it as well. The cortisone injection she had in June helped the pain be tolerable. )         History of Present Illness    Patient is following up for chronic left knee pain. She does notice some \"weakness and instability of the left knee\" the cortisone injection helped some, but the pain returned over the last few weeks and has been more severe.         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    • Bleeding disorder ([facility]) 11-13-16   • Body piercing     TONGUE, NOSE, TWO IN EACH EAR   • Bronchitis    • Chest pain 06/22/2021    last CP 2-3 months ago   • Clotting disorder ([facility])     factor 5   • Constipation    • COVID-19 vaccine series completed     Pfizer-plus booster   • Deep vein thrombosis ([facility]) 11/13/2016   • Diarrhea    • Diverticulitis    • Elevated cholesterol    • Endometriosis    • Factor 5 Leiden mutation, heterozygous ([facility])    • Fibroid    • Fibromyalgia    • Full dentures 06/22/2021    advised no adhesives DOS   • Gastroparesis    • GERD (gastroesophageal reflux disease)    • H/O blood clots    • H/O cardiovascular stress test 06/22/2021    reported several years ago-exercise wnl   • Headache    • Hiatal hernia    • High cholesterol    • History of recurrent UTIs    • HPV (human papilloma virus) infection    • Hx of echocardiogram    • Hx of gout 06/22/2021   • Hypertension    • Hypoglycemia    • Inappropriate sinus node tachycardia    • Kidney infection 06/22/2021    history only   • Migraine    • Nausea & vomiting    • Obesity    • Osteoarthritis    • Ovarian cyst    • Pneumonia    • Pollen allergies    • PONV (postoperative nausea and vomiting)    • Protein S deficiency " (HCC) 2016    labs from hospitalization for PE   • Pulmonary embolism (HCC) 2016    on eliquis since that time   • Recurrent pregnancy loss, antepartum condition or complication    • Sleep apnea     no CPAP- states insurance took it back   • Tattoo     LOWER BACK   • Varicella         Past Surgical History:   Procedure Laterality Date   •  SECTION        and  and    •  SECTION WITH TUBAL N/A 1/15/2019    Procedure:  SECTION REPEAT WITH TUBAL;  Surgeon: Alva Boyer MD;  Location: [facility] LABOR DELIVERY;  Service: Obstetrics/Gynecology   • COLONOSCOPY N/A 2017    Procedure: COLONOSCOPY WITH BIOPSIES AND ARGON THERMAL ABLATION;  Surgeon: Jignesh Selby MD;  Location: [facility] ENDOSCOPY;  Service:    • D & C HYSTEROSCOPY ENDOMETRIAL ABLATION N/A 3/10/2022    Procedure: DILATATION AND CURETTAGE DIAGNOSTIC HYSTEROSCOPY NOVASURE ENDOMETRIAL ABLATION;  Surgeon: Alva Boyer MD;  Location: [facility] OR;  Service: Obstetrics/Gynecology;  Laterality: N/A;   • DIAGNOSTIC LAPAROSCOPY N/A 2018    Procedure: DIAGNOSTIC LAPAROSCOPY AND ABLATION OF ENDOMETRIOSIS;  Surgeon: Evin Zamudio MD;  Location: [facility] OR;  Service: Obstetrics/Gynecology   • ENDOMETRIAL ABLATION     • ENDOSCOPIC FUNCTIONAL SINUS SURGERY (FESS) Right 2021    Procedure: Right endoscopic total ethmoidectomy, right endoscopic middle meatal antrotomy with cyst removal, right endoscopic frontal recess exploration with cyst removal;  Surgeon: Saulo Brito MD;  Location: [facility] OR;  Service: ENT;  Laterality: Right;   • ENDOSCOPY N/A 2017    Procedure: ESOPHAGOGASTRODUODENOSCOPY WITH BIOPSIES AND COLD BIOPSY POLYPECTOMIES;  Surgeon: Jignesh Selby MD;  Location: [facility] ENDOSCOPY;  Service:    • LAPAROSCOPIC CHOLECYSTECTOMY     • LIPOMA EXCISION N/A 12/10/2021    Procedure: Excision of soft tissue mass lower back;  Surgeon: Nidia Smith MD;  Location: [facility] OR;  Service: General;   Laterality: N/A;   • MOUTH SURGERY      full mouth tooth extration   • WISDOM TOOTH EXTRACTION         Family History   Problem Relation Age of Onset   • Arthritis Mother    • COPD Mother    • Asthma Mother    • Thyroid disease Mother    • Arthritis Father    • Diabetes Father    • Hypertension Father    • Hyperlipidemia Father    • Kidney disease Father    • Heart attack Father    • Coronary artery disease Father    • Dementia Father    • No Known Problems Son    • Colon cancer Neg Hx    • Liver cancer Neg Hx    • Liver disease Neg Hx    • Stomach cancer Neg Hx    • Esophageal cancer Neg Hx        Social History     Socioeconomic History   • Marital status:    Tobacco Use   • Smoking status: Never   • Smokeless tobacco: Never   Vaping Use   • Vaping Use: Never used   Substance and Sexual Activity   • Alcohol use: No   • Drug use: Never   • Sexual activity: Yes     Partners: Male     Birth control/protection: Surgical     Comment: Tubal         Current Outpatient Medications:   •  acyclovir (Zovirax) 5 % ointment, Apply 1 application topically to the appropriate area as directed 2 (Two) Times a Day. Apply to finger, Disp: 30 g, Rfl: 0  •  albuterol sulfate  (90 Base) MCG/ACT inhaler, Inhale 2 puffs Every 4 (Four) Hours As Needed for Wheezing or Shortness of Air., Disp: 18 g, Rfl: 11  •  Alcohol Swabs (Alcohol Pads) 70 % pads, Check sugars as needed/directed for hypoglycemia., Disp: 100 each, Rfl: 12  •  apixaban (ELIQUIS) 5 MG tablet tablet, Take 1 tablet by mouth 2 (Two) Times a Day., Disp: 180 tablet, Rfl: 3  •  aspirin 81 MG EC tablet, Take 81 mg by mouth Daily., Disp: , Rfl:   •  Atogepant (Qulipta) 60 MG tablet, Take 1 tablet by mouth Daily., Disp: 30 tablet, Rfl: 11  •  atorvastatin (LIPITOR) 40 MG tablet, Take 1 tablet by mouth Every Night., Disp: 30 tablet, Rfl: 11  •  azelastine (ASTELIN) 0.1 % nasal spray, 1 spray into the nostril(s) as directed by provider 2 (Two) Times a Day. Use in each  nostril as directed, Disp: 1 each, Rfl: 10  •  B-D ULTRA-FINE 33 LANCETS misc, Check sugars as needed/directed for hypoglycemia., Disp: 100 each, Rfl: 12  •  Blood Glucose Monitoring Suppl (ONE TOUCH ULTRA 2) w/Device kit, use as needed to check blood sugar for hypoglycemia (diabetes), Disp: , Rfl:   •  busPIRone (BUSPAR) 5 MG tablet, TAKE 1 TABLET BY MOUTH THREE TIMES A DAY, Disp: 90 tablet, Rfl: 0  •  butalbital-acetaminophen-caffeine (FIORICET, ESGIC) -40 MG per tablet, Take 1 tablet by mouth Every 6 (Six) Hours As Needed for Headache., Disp: 9 tablet, Rfl: 0  •  calcium carbonate EX (TUMS EX) 750 MG chewable tablet, Chew 1 tablet Daily. (Patient taking differently: Chew 750 mg Daily As Needed.), Disp: 30 tablet, Rfl: 1  •  Corlanor 5 MG tablet tablet, Take 1.5 tablets by mouth 2 (two) times a day., Disp: 90 tablet, Rfl: 6  •  cyclobenzaprine (FLEXERIL) 10 MG tablet,  15 Each, TAKE 1/2 TABLET BY MOUTH THREE TIMES A DAY AS NEEDED FOR MUSCLE SPASM for up to 5 days, 0 Refill(s), Disp: , Rfl:   •  Diclofenac Sodium (VOLTAREN) 1 % gel gel, Apply 4 g topically to the appropriate area as directed 4 (Four) Times a Day As Needed (joint pain)., Disp: 150 g, Rfl: 1  •  DULoxetine (CYMBALTA) 60 MG capsule, Take 1 capsule by mouth Daily., Disp: 90 capsule, Rfl: 3  •  fluticasone (FLONASE) 50 MCG/ACT nasal spray, 2 sprays into the nostril(s) as directed by provider Daily., Disp: 16 g, Rfl: 11  •  glucose blood test strip, USE TO CHECK SUGARS DAILY DUE TO hypoglycemia, Disp: 100 each, Rfl: 12  •  glucose monitor monitoring kit, 1 each As Needed (diabetes). Check sugars as needed/directed for hypoglycemia., Disp: 1 each, Rfl: 0  •  hyoscyamine (ANASPAZ,LEVSIN) 0.125 MG tablet, TAKE 1 TABLET BY MOUTH EVERY SIX HOURS AS NEEDED FOR CRAMPING OR DIARRHEA, Disp: , Rfl:   •  ibuprofen (ADVIL,MOTRIN) 800 MG tablet, Take 1 tablet by mouth Every 8 (Eight) Hours As Needed for Mild Pain ., Disp: 30 tablet, Rfl: 0  •  l-methylfolate 15  MG tablet tablet, Take 1 tablet by mouth Daily. Indications: folic acid deficiency, Disp: 90 tablet, Rfl: 3  •  Lidocaine (LIDODERM EX),  1 Patch, TransDermal, Daily, 0 Refill(s), Disp: , Rfl:   •  lidocaine (LIDODERM) 5 %, Place 2 patches on the skin as directed by provider Daily. Remove & Discard patch within 12 hours or as directed by MD, Disp: 60 patch, Rfl: 3  •  methocarbamol (ROBAXIN) 750 MG tablet, Take 1 tablet by mouth At Night As Needed for Muscle Spasms., Disp: 14 tablet, Rfl: 0  •  metoclopramide (REGLAN) 5 MG tablet, metoclopramide 5 mg tablet  TAKE 1 TABLET BY MOUTH FOUR TIMES A DAY, Disp: , Rfl:   •  mometasone-formoterol (Dulera) 200-5 MCG/ACT inhaler, Inhale 2 puffs 2 (Two) Times a Day., Disp: 13 g, Rfl: 11  •  mupirocin (Bactroban) 2 % cream, Apply 1 application topically to the appropriate area as directed 3 (Three) Times a Day., Disp: 30 g, Rfl: 1  •  ondansetron ODT (ZOFRAN-ODT) 4 MG disintegrating tablet, ondansetron 4 mg disintegrating tablet  DISSOLVE 1 TABLET IN MOUTH EVERY SIX HOURS AS NEEDED FOR NAUSEA AND VOMITING, Disp: , Rfl:   •  pantoprazole (PROTONIX) 40 MG EC tablet, Take 1 tablet by mouth Daily., Disp: 30 tablet, Rfl: 0  •  Prucalopride Succinate (Motegrity) 2 MG tablet, Motegrity 2 mg tablet  TAKE 1 TABLET BY MOUTH EVERY DAY, Disp: , Rfl:   •  Rimegepant Sulfate (Nurtec) 75 MG tablet dispersible tablet, Take 1 tablet by mouth As Needed (as needed)., Disp: 4 tablet, Rfl: 0  •  Scopolamine 1 MG/3DAYS patch, scopolamine 1 mg over 3 days transdermal patch, Disp: , Rfl:   •  triamcinolone (KENALOG) 0.1 % ointment, Apply 1 application topically to the appropriate area as directed 2 (Two) Times a Day., Disp: 80 g, Rfl: 1  •  vitamin D3 (Vitamin D3 Maximum Strength) 125 MCG (5000 UT) capsule capsule, Take 1 capsule by mouth Daily., Disp: 90 capsule, Rfl: 1  •  zonisamide (ZONEGRAN) 100 MG capsule, Take 1 capsule by mouth Every Evening., Disp: 90 capsule, Rfl: 1    Allergies   Allergen  "Reactions   • Ceftin [Cefuroxime Axetil] Shortness Of Breath and Rash     Numbness in mouth and throat   • Cefuroxime Hives, Rash and Shortness Of Breath     Numbness in mouth and throat  Numbness in mouth and throat   • Sumatriptan Shortness Of Breath and Nausea Only     Other reaction(s): Nausea Only, Other (See Comments)  Worsening migraine  Worsening migraine   • Amoxicillin Rash, Hives and Itching       Review of Systems   Constitutional: Negative for fever.   HENT: Negative for dental problem and voice change.    Eyes: Negative for visual disturbance.   Respiratory: Negative for shortness of breath.    Cardiovascular: Negative for chest pain.   Gastrointestinal: Negative for abdominal pain.   Genitourinary: Negative for dysuria.   Musculoskeletal: Positive for arthralgias (left knee). Negative for gait problem and joint swelling.   Skin: Negative for rash.   Neurological: Negative for speech difficulty.   Hematological: Does not bruise/bleed easily.   Psychiatric/Behavioral: Negative for confusion.       I have reviewed the medical and surgical history, family history, social history, medications, and/or allergies, and the review of systems of this report.    Objective   Temp 98 °F (36.7 °C)   Ht 161.3 cm (63.5\")   Wt 86.6 kg (191 lb)   BMI 33.30 kg/m²    Physical Exam  Ortho Exam   Extremity DVT signs are negative on physical exam with negative Tobias sign, no calf pain, no palpable cords and no skin tone change   Neurologic Exam     + left knee patella crepitus      Assessment & Plan   Independent Review of Radiographic Studies:    AP standing of both knees and lateral of left knee, indication to evaluate pain, with prior comparison views, shows no acute fracture or dislocation evident.      Large Joint Arthrocentesis: L knee  Date/Time: 11/16/2022 11:14 AM  Consent given by: patient  Site marked: site marked  Timeout: Immediately prior to procedure a time out was called to verify the correct patient, " procedure, equipment, support staff and site/side marked as required   Supporting Documentation  Indications: pain   Procedure Details  Location: knee - L knee  Preparation: Patient was prepped and draped in the usual sterile fashion  Needle size: 22 G  Approach: anterolateral  Medications administered: 40 mg methylPREDNISolone acetate 40 MG/ML; 2 mL lidocaine 1 %  Patient tolerance: patient tolerated the procedure well with no immediate complications             Diagnoses and all orders for this visit:    1. Arthralgia of left knee (Primary)  -     XR Knee 1 or 2 View Left    2. Patellofemoral arthralgia of left knee    Other orders  -     Large Joint Arthrocentesis       Orthopedic activities reviewed and patient expressed appreciation  Discussion of orthopedic goals  Risk, benefits, and merits of treatment alternatives reviewed with the patient and questions answered  Call or notify for any adverse effect from injection therapy    Recommendations/Plan:    Patient agreeable to call or return sooner for any concerns.    She would like to try visco injection    EMR Dragon-transcription disclaimer:  This encounter note is an electronic transcription of spoken language to printed text.  Electronic transcription of spoken language may permit erroneous or at times nonsensical words or phrases to be inadvertently transcribed.  Although I have reviewed the note for such errors, some may still exist

## 2022-11-18 ENCOUNTER — HOSPITAL ENCOUNTER (EMERGENCY)
Facility: HOSPITAL | Age: 32
Discharge: HOME | End: 2022-11-18
Attending: EMERGENCY MEDICINE | Admitting: EMERGENCY MEDICINE

## 2022-11-18 VITALS
TEMPERATURE: 98 F | DIASTOLIC BLOOD PRESSURE: 87 MMHG | HEIGHT: 64 IN | BODY MASS INDEX: 32.1 KG/M2 | WEIGHT: 188 LBS | OXYGEN SATURATION: 98 % | HEART RATE: 105 BPM | SYSTOLIC BLOOD PRESSURE: 128 MMHG | RESPIRATION RATE: 18 BRPM

## 2022-11-18 DIAGNOSIS — J06.9 VIRAL UPPER RESPIRATORY INFECTION: Primary | ICD-10-CM

## 2022-11-18 LAB
B PARAPERT DNA SPEC QL NAA+PROBE: NOT DETECTED
B PERT DNA SPEC QL NAA+PROBE: NOT DETECTED
C PNEUM DNA NPH QL NAA+NON-PROBE: NOT DETECTED
FLUAV SUBTYP SPEC NAA+PROBE: NOT DETECTED
FLUBV RNA ISLT QL NAA+PROBE: NOT DETECTED
HADV DNA SPEC NAA+PROBE: NOT DETECTED
HCOV 229E RNA SPEC QL NAA+PROBE: NOT DETECTED
HCOV HKU1 RNA SPEC QL NAA+PROBE: NOT DETECTED
HCOV NL63 RNA SPEC QL NAA+PROBE: NOT DETECTED
HCOV OC43 RNA SPEC QL NAA+PROBE: NOT DETECTED
HMPV RNA NPH QL NAA+NON-PROBE: NOT DETECTED
HPIV1 RNA ISLT QL NAA+PROBE: NOT DETECTED
HPIV2 RNA SPEC QL NAA+PROBE: NOT DETECTED
HPIV3 RNA NPH QL NAA+PROBE: NOT DETECTED
HPIV4 P GENE NPH QL NAA+PROBE: NOT DETECTED
M PNEUMO IGG SER IA-ACNC: NOT DETECTED
RHINOVIRUS RNA SPEC NAA+PROBE: DETECTED
RSV RNA NPH QL NAA+NON-PROBE: NOT DETECTED
SARS-COV-2 RNA NPH QL NAA+NON-PROBE: NOT DETECTED

## 2022-11-18 PROCEDURE — 0202U NFCT DS 22 TRGT SARS-COV-2: CPT | Performed by: EMERGENCY MEDICINE

## 2022-11-18 PROCEDURE — 99283 EMERGENCY DEPT VISIT LOW MDM: CPT

## 2022-11-18 RX ORDER — IBUPROFEN 800 MG/1
800 TABLET ORAL ONCE
Status: COMPLETED | OUTPATIENT
Start: 2022-11-18 | End: 2022-11-18

## 2022-11-18 RX ADMIN — IBUPROFEN 800 MG: 800 TABLET, FILM COATED ORAL at 01:50

## 2022-11-18 NOTE — ED PROVIDER NOTES
Subjective   History of Present Illness  31-year-old female presenting with several complaints.  She states that for the last 3 days she has had body aches, sore throat, congestion and fatigue.  No vomiting or diarrhea.  No chest pain or shortness of breath.  She is being seen with her 3-year-old daughter who has similar symptoms.        Review of Systems   Constitutional: Positive for fatigue.   HENT: Positive for congestion and sore throat.    Eyes: Negative.    Respiratory: Negative.    Cardiovascular: Negative.    Gastrointestinal: Negative.    Genitourinary: Negative.    Musculoskeletal: Positive for arthralgias.   Skin: Negative.    Neurological: Negative.    Psychiatric/Behavioral: Negative.        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    • Bleeding disorder ([facility]) 11-13-16   • Body piercing     TONGUE, NOSE, TWO IN EACH EAR   • Bronchitis    • Chest pain 06/22/2021    last CP 2-3 months ago   • Clotting disorder ([facility])     factor 5   • Constipation    • COVID-19 vaccine series completed     Pfizer-plus booster   • Deep vein thrombosis ([facility]) 11/13/2016   • Diarrhea    • Diverticulitis    • Elevated cholesterol    • Endometriosis    • Factor 5 Leiden mutation, heterozygous ([facility])    • Fibroid    • Fibromyalgia    • Full dentures 06/22/2021    advised no adhesives DOS   • Gastroparesis    • GERD (gastroesophageal reflux disease)    • H/O blood clots    • H/O cardiovascular stress test 06/22/2021    reported several years ago-exercise wnl   • Headache    • Hiatal hernia    • High cholesterol    • History of recurrent UTIs    • HPV (human papilloma virus) infection    • Hx of echocardiogram    • Hx of gout 06/22/2021   • Hypertension    • Hypoglycemia    • Inappropriate sinus node tachycardia    • Kidney infection 06/22/2021    history only   • Migraine    • Nausea &  vomiting    • Obesity    • Osteoarthritis    • Ovarian cyst    • Pneumonia    • Pollen allergies    • PONV (postoperative nausea and vomiting)    • Protein S deficiency (HCC) 2016    labs from hospitalization for PE   • Pulmonary embolism (HCC) 2016    on eliquis since that time   • Recurrent pregnancy loss, antepartum condition or complication    • Sleep apnea     no CPAP- states insurance took it back   • Tattoo     LOWER BACK   • Varicella        Allergies   Allergen Reactions   • Ceftin [Cefuroxime Axetil] Shortness Of Breath and Rash     Numbness in mouth and throat   • Cefuroxime Hives, Rash and Shortness Of Breath     Numbness in mouth and throat  Numbness in mouth and throat   • Sumatriptan Shortness Of Breath and Nausea Only     Other reaction(s): Nausea Only, Other (See Comments)  Worsening migraine  Worsening migraine   • Amoxicillin Rash, Hives and Itching       Past Surgical History:   Procedure Laterality Date   •  SECTION        and  and    •  SECTION WITH TUBAL N/A 1/15/2019    Procedure:  SECTION REPEAT WITH TUBAL;  Surgeon: Alva Boyer MD;  Location: [facility] LABOR DELIVERY;  Service: Obstetrics/Gynecology   • COLONOSCOPY N/A 2017    Procedure: COLONOSCOPY WITH BIOPSIES AND ARGON THERMAL ABLATION;  Surgeon: Jignesh Selby MD;  Location: [facility] ENDOSCOPY;  Service:    • D & C HYSTEROSCOPY ENDOMETRIAL ABLATION N/A 3/10/2022    Procedure: DILATATION AND CURETTAGE DIAGNOSTIC HYSTEROSCOPY NOVASURE ENDOMETRIAL ABLATION;  Surgeon: Alva Boyer MD;  Location: [facility] OR;  Service: Obstetrics/Gynecology;  Laterality: N/A;   • DIAGNOSTIC LAPAROSCOPY N/A 2018    Procedure: DIAGNOSTIC LAPAROSCOPY AND ABLATION OF ENDOMETRIOSIS;  Surgeon: Evin Zamudio MD;  Location: [facility] OR;  Service: Obstetrics/Gynecology   • ENDOMETRIAL ABLATION     • ENDOSCOPIC FUNCTIONAL SINUS SURGERY (FESS) Right 2021    Procedure: Right endoscopic total  ethmoidectomy, right endoscopic middle meatal antrotomy with cyst removal, right endoscopic frontal recess exploration with cyst removal;  Surgeon: Saulo Brito MD;  [facility];  Service: ENT;  Laterality: Right;   • ENDOSCOPY N/A 4/20/2017    Procedure: ESOPHAGOGASTRODUODENOSCOPY WITH BIOPSIES AND COLD BIOPSY POLYPECTOMIES;  Surgeon: Jignesh Selby MD;  [facility];  Service:    • LAPAROSCOPIC CHOLECYSTECTOMY     • LIPOMA EXCISION N/A 12/10/2021    Procedure: Excision of soft tissue mass lower back;  Surgeon: Nidia Smith MD;  [facility];  Service: General;  Laterality: N/A;   • MOUTH SURGERY      full mouth tooth extration   • WISDOM TOOTH EXTRACTION         Family History   Problem Relation Age of Onset   • Arthritis Mother    • COPD Mother    • Asthma Mother    • Thyroid disease Mother    • Arthritis Father    • Diabetes Father    • Hypertension Father    • Hyperlipidemia Father    • Kidney disease Father    • Heart attack Father    • Coronary artery disease Father    • Dementia Father    • No Known Problems Son    • Colon cancer Neg Hx    • Liver cancer Neg Hx    • Liver disease Neg Hx    • Stomach cancer Neg Hx    • Esophageal cancer Neg Hx        Social History     Socioeconomic History   • Marital status:    Tobacco Use   • Smoking status: Never   • Smokeless tobacco: Never   Vaping Use   • Vaping Use: Never used   Substance and Sexual Activity   • Alcohol use: No   • Drug use: Never   • Sexual activity: Yes     Partners: Male     Birth control/protection: Surgical     Comment: Tubal           Objective   Physical Exam  Constitutional:       General: She is not in acute distress.     Appearance: Normal appearance. She is not ill-appearing, toxic-appearing or diaphoretic.   HENT:      Head: Normocephalic and atraumatic.      Right Ear: External ear normal.      Left Ear: External ear normal.      Nose: Nose normal.      Mouth/Throat:      Mouth: Mucous  membranes are moist.      Pharynx: Oropharynx is clear.      Comments: No swelling or exudate, uvula midline  Eyes:      Extraocular Movements: Extraocular movements intact.      Conjunctiva/sclera: Conjunctivae normal.      Pupils: Pupils are equal, round, and reactive to light.   Cardiovascular:      Rate and Rhythm: Normal rate and regular rhythm.      Pulses: Normal pulses.      Heart sounds: Normal heart sounds.   Pulmonary:      Effort: Pulmonary effort is normal. No respiratory distress.      Breath sounds: Normal breath sounds.   Musculoskeletal:         General: No swelling, tenderness or deformity. Normal range of motion.      Cervical back: Normal range of motion.   Skin:     General: Skin is warm and dry.      Capillary Refill: Capillary refill takes less than 2 seconds.      Findings: No rash.   Neurological:      General: No focal deficit present.      Mental Status: She is alert and oriented to person, place, and time.   Psychiatric:         Mood and Affect: Mood normal.         Behavior: Behavior normal.         Procedures           ED Course                                           MDM  Number of Diagnoses or Management Options  Viral upper respiratory infection  Diagnosis management comments: 31-year-old female with several complaints.  Well-developed, well-nourished but nontoxic obese lady in no distress with exam as above.  Her vital signs are normal.  Her exam is nonfocal.  Will obtain viral panel and treat symptomatically.  Disposition is likely to home.    DDx: Viral illness    Viral panel positive for rhinovirus. Will discharge home.       Final diagnoses:   Viral upper respiratory infection          Francois Kline MD  11/18/22 [id number]

## 2022-11-28 DIAGNOSIS — M70.50 PES ANSERINE BURSITIS: ICD-10-CM

## 2022-11-28 DIAGNOSIS — M25.562 PATELLOFEMORAL ARTHRALGIA OF LEFT KNEE: ICD-10-CM

## 2022-11-28 DIAGNOSIS — M25.562 ARTHRALGIA OF LEFT KNEE: Primary | ICD-10-CM

## 2022-12-06 ENCOUNTER — OFFICE VISIT (OUTPATIENT)
Dept: INTERNAL MEDICINE | Facility: CLINIC | Age: 32
End: 2022-12-06

## 2022-12-06 VITALS
WEIGHT: 185.8 LBS | BODY MASS INDEX: 31.72 KG/M2 | HEART RATE: 85 BPM | HEIGHT: 64 IN | OXYGEN SATURATION: 95 % | RESPIRATION RATE: 16 BRPM | TEMPERATURE: 98.4 F | DIASTOLIC BLOOD PRESSURE: 82 MMHG | SYSTOLIC BLOOD PRESSURE: 122 MMHG

## 2022-12-06 DIAGNOSIS — Z23 NEED FOR COVID-19 VACCINE: ICD-10-CM

## 2022-12-06 DIAGNOSIS — Z51.81 MEDICATION MONITORING ENCOUNTER: ICD-10-CM

## 2022-12-06 DIAGNOSIS — E55.9 VITAMIN D DEFICIENCY: ICD-10-CM

## 2022-12-06 DIAGNOSIS — R23.3 EASY BRUISABILITY: Primary | ICD-10-CM

## 2022-12-06 DIAGNOSIS — Z23 NEED FOR INFLUENZA VACCINATION: ICD-10-CM

## 2022-12-06 DIAGNOSIS — L81.9 DISCOLORATION OF SKIN: ICD-10-CM

## 2022-12-06 DIAGNOSIS — N64.4 BREAST PAIN: ICD-10-CM

## 2022-12-06 PROBLEM — G90.8 INTESTINAL AUTONOMIC NEUROPATHY: Status: ACTIVE | Noted: 2022-11-27

## 2022-12-06 PROBLEM — M62.84 SARCOPENIA: Status: ACTIVE | Noted: 2022-11-27

## 2022-12-06 PROBLEM — K92.9 DISEASE OF DIGESTIVE SYSTEM, UNSPECIFIED: Status: ACTIVE | Noted: 2022-11-27

## 2022-12-06 PROBLEM — G90.89 INTESTINAL AUTONOMIC NEUROPATHY: Status: ACTIVE | Noted: 2022-11-27

## 2022-12-06 PROCEDURE — 99214 OFFICE O/P EST MOD 30 MIN: CPT | Performed by: FAMILY MEDICINE

## 2022-12-06 PROCEDURE — 0124A PR ADM SARSCOV2 30MCG/0.3ML BST: CPT | Performed by: FAMILY MEDICINE

## 2022-12-06 PROCEDURE — 90471 IMMUNIZATION ADMIN: CPT | Performed by: FAMILY MEDICINE

## 2022-12-06 PROCEDURE — 91312 COVID-19 (PFIZER) BIVALENT BOOSTER 12+YRS: CPT | Performed by: FAMILY MEDICINE

## 2022-12-06 PROCEDURE — 90686 IIV4 VACC NO PRSV 0.5 ML IM: CPT | Performed by: FAMILY MEDICINE

## 2022-12-06 NOTE — PROGRESS NOTES
"Chief Complaint  Bleeding/Bruising (Bruising on right breast, noticed about a week ago)    Subjective        Marilu Godoy presents to [facility]  History of Present Illness  No known injury. Discoloration to outer aspect of right breast. Area itself doesn't hurt. Has had pain in the right and left breast, though. No nipple discharge on right. Family history of various cancers.       Objective   Vital Signs:  /82 (BP Location: Left arm, Patient Position: Sitting, Cuff Size: Adult)   Pulse 85   Temp 98.4 °F (36.9 °C) (Temporal)   Resp 16   Ht 161.3 cm (63.5\")   Wt 84.3 kg (185 lb 12.8 oz)   SpO2 95%   BMI 32.40 kg/m²   Estimated body mass index is 32.4 kg/m² as calculated from the following:    Height as of this encounter: 161.3 cm (63.5\").    Weight as of this encounter: 84.3 kg (185 lb 12.8 oz).          Physical Exam  Vitals and nursing note reviewed.   Constitutional:       General: She is not in acute distress.     Appearance: Normal appearance. She is well-developed and well-groomed. She is not ill-appearing, toxic-appearing or diaphoretic.      Interventions: Face mask in place.   HENT:      Head: Normocephalic and atraumatic.      Right Ear: Hearing normal.      Left Ear: Hearing normal.   Eyes:      General: Lids are normal. No scleral icterus.     Extraocular Movements: Extraocular movements intact.   Neck:      Trachea: Phonation normal.   Pulmonary:      Effort: Pulmonary effort is normal.   Chest:       Musculoskeletal:      Cervical back: Neck supple.   Skin:     Coloration: Skin is not jaundiced or pale.   Neurological:      General: No focal deficit present.      Mental Status: She is alert and oriented to person, place, and time.      Motor: Motor function is intact.   Psychiatric:         Attention and Perception: Attention and perception normal.         Mood and Affect: Mood and affect normal.         Speech: Speech normal.         Behavior: " Behavior normal. Behavior is cooperative.         Thought Content: Thought content normal.         Cognition and Memory: Cognition and memory normal.         Judgment: Judgment normal.        CLINICAL PHOTOGRAPHS OTHER - Right breast bruise (12/06/2022)    Result Review :  The following data was reviewed by: Deidre Barker MD on 12/06/2022:  Component      Latest Ref Rng & Units 10/14/2022   WBC      3.40 - 10.80 10*3/mm3 10.68   RBC      3.77 - 5.28 10*6/mm3 4.27   Hemoglobin      12.0 - 15.9 g/dL 13.5   Hematocrit      34.0 - 46.6 % 38.2   MCV      79.0 - 97.0 fL 89.5   MCH      26.6 - 33.0 pg 31.6   MCHC      31.5 - 35.7 g/dL 35.3   RDW      12.3 - 15.4 % 12.6   MPV      6.0 - 12.0 fL 9.7   Platelets      140 - 450 10*3/mm3 351     Reviewed via care everywhere labs from [facility]  10/12/22: vitamin C normal. Vitamin D low at 15. Normal RITESH panel. Many other labs available for review.  5/19/22: EGD and colonoscopy. Fundic Gland polyps.    Reviewed via care everywhere labs from   10/6/22 Vasculitis profile normal/negative.                Assessment and Plan   Diagnoses and all orders for this visit:    1. Easy bruisability (Primary)  -     Peripheral Blood Smear  -     von Willebrand Disease Reflexive Profile  -     Protime-INR  -     APTT    2. Vitamin D deficiency  -     Vitamin D,25-Hydroxy    3. Medication monitoring encounter  -     Vitamin D,25-Hydroxy    4. Need for influenza vaccination  -     FluLaval/Fluzone >6 mos (0353-3611)    5. Need for COVID-19 vaccine  -     COVID-19 Bivalent Booster (Pfizer) 12+yrs    6. Breast pain  -     Mammo Diagnostic Digital Tomosynthesis Bilateral With CAD; Future  -     US breast right complete; Future    7. Discoloration of skin  -     Mammo Diagnostic Digital Tomosynthesis Bilateral With CAD; Future  -     US breast right complete; Future           I spent 35 minutes caring for Marilu on this date of service. This time includes time spent by me in the following  activities:preparing for the visit, reviewing tests, performing a medically appropriate examination and/or evaluation , counseling and educating the patient/family/caregiver, ordering medications, tests, or procedures and documenting information in the medical record  Follow Up   Return for As Needed.  Patient was given instructions and counseling regarding her condition or for health maintenance advice. Please see specific information pulled into the AVS if appropriate.

## 2022-12-08 RX ORDER — CHOLECALCIFEROL (VITAMIN D3) 1250 MCG
50000 CAPSULE ORAL
Qty: 12 CAPSULE | Refills: 1 | Status: SHIPPED | OUTPATIENT
Start: 2022-12-08

## 2022-12-08 NOTE — PROGRESS NOTES
One lab panel pending (von Willebrand panel).    Vitamin D deficient. Vitamin D is important to keep bones strong. Deficiencies can also lead to aches/pains, fatigue, etc. Sending high dose supplement. Take this with food as on an empty stomach it may cause nausea. When you finish this supplement start taking vitamin D3 2000 IU daily over the counter. Also, try to get 15 min of sun exposure to face and arms daily to build levels naturally.    Platelets (blood counts), PT/PTT normal (all related to bruising).

## 2022-12-15 LAB
25(OH)D3+25(OH)D2 SERPL-MCNC: 10.4 NG/ML (ref 30–100)
APTT PPP: 27 SEC (ref 24–33)
BASOPHILS # BLD AUTO: 0.1 X10E3/UL (ref 0–0.2)
BASOPHILS NFR BLD AUTO: 1 %
EOSINOPHIL # BLD AUTO: 0 X10E3/UL (ref 0–0.4)
EOSINOPHIL NFR BLD AUTO: 0 %
ERYTHROCYTE [DISTWIDTH] IN BLOOD BY AUTOMATED COUNT: 12.4 % (ref 11.7–15.4)
FACT VIII ACT/NOR PPP: 137 %
HCT VFR BLD AUTO: 41.8 % (ref 34–46.6)
HGB BLD-MCNC: 14.1 G/DL (ref 11.1–15.9)
IMM GRANULOCYTES # BLD AUTO: 0 X10E3/UL (ref 0–0.1)
IMM GRANULOCYTES NFR BLD AUTO: 0 %
INR PPP: 1 (ref 0.9–1.2)
LYMPHOCYTES # BLD AUTO: 1.9 X10E3/UL (ref 0.7–3.1)
LYMPHOCYTES NFR BLD AUTO: 20 %
MCH RBC QN AUTO: 31.1 PG (ref 26.6–33)
MCHC RBC AUTO-ENTMCNC: 33.7 G/DL (ref 31.5–35.7)
MCV RBC AUTO: 92 FL (ref 79–97)
MONOCYTES # BLD AUTO: 0.5 X10E3/UL (ref 0.1–0.9)
MONOCYTES NFR BLD AUTO: 6 %
NEUTROPHILS # BLD AUTO: 7 X10E3/UL (ref 1.4–7)
NEUTROPHILS NFR BLD AUTO: 73 %
PATH INTERP BLD-IMP: NORMAL
PATH INTERP BLD-IMP: NORMAL
PATH REV BLD -IMP: NORMAL
PATHOLOGIST NAME: NORMAL
PLATELET # BLD AUTO: 369 X10E3/UL (ref 150–450)
PROTHROMBIN TIME: 10.1 SEC (ref 9.1–12)
RBC # BLD AUTO: 4.54 X10E6/UL (ref 3.77–5.28)
VW ACTIVITY/VW ANTIGEN RATIO: 0.7
VWF AG ACT/NOR PPP IA: 120 %
VWF:RCO ACT/NOR PPP PL AGG: 83 %
WBC # BLD AUTO: 9.5 X10E3/UL (ref 3.4–10.8)

## 2022-12-20 ENCOUNTER — PATIENT MESSAGE (OUTPATIENT)
Dept: NEUROLOGY | Facility: CLINIC | Age: 32
End: 2022-12-20

## 2022-12-21 ENCOUNTER — OFFICE VISIT (OUTPATIENT)
Dept: OBSTETRICS AND GYNECOLOGY | Facility: CLINIC | Age: 32
End: 2022-12-21

## 2022-12-21 VITALS
WEIGHT: 188 LBS | DIASTOLIC BLOOD PRESSURE: 72 MMHG | SYSTOLIC BLOOD PRESSURE: 128 MMHG | BODY MASS INDEX: 32.1 KG/M2 | HEIGHT: 64 IN

## 2022-12-21 DIAGNOSIS — Z79.01 CURRENT USE OF LONG TERM ANTICOAGULATION: ICD-10-CM

## 2022-12-21 DIAGNOSIS — N64.89 HEMATOMA OF RIGHT BREAST: ICD-10-CM

## 2022-12-21 DIAGNOSIS — N93.9 VAGINAL BLEEDING: ICD-10-CM

## 2022-12-21 DIAGNOSIS — N63.10 MASS OF RIGHT BREAST, UNSPECIFIED QUADRANT: Primary | ICD-10-CM

## 2022-12-21 PROCEDURE — 99214 OFFICE O/P EST MOD 30 MIN: CPT | Performed by: OBSTETRICS & GYNECOLOGY

## 2022-12-21 RX ORDER — RIMEGEPANT SULFATE 75 MG/75MG
75 TABLET, ORALLY DISINTEGRATING ORAL AS NEEDED
Qty: 8 TABLET | Refills: 0 | COMMUNITY
Start: 2022-12-21 | End: 2023-01-03 | Stop reason: SDUPTHER

## 2022-12-21 NOTE — PROGRESS NOTES
Chief Complaint  Breast Mass (Patient complains of right breast lump x 1 week, denies any pain or discharge from nipples. )     History of Present Illness:  Patient is 32 y.o.  who presents to [facility] here for evaluation of a breast nodule present for the last week.  Patient has a feature on her cell phone of a large hematoma of her right breast.  Patient reports just waking up around Thanksgiving with a large bruise.  She denies any injury to her breast.  She reports the bruising stayed present until just recently.  She denies any pain or discomfort from her breast.  She reports however recently noticing a small nodule.  Patient saw her primary care provider.  She did have labs as well.  She has been scheduled for a diagnostic mammogram and ultrasound in February.  Patient also has complaints of a recent episode of vaginal bleeding or spotting.  She reports it has been dark brown in coloration.  It has been light.  She has not had any vaginal bleeding or spotting since her uterine ablation.  The patient has remained on baby aspirin and Eliquis.  She reports no changes in her anticoagulation.  She has not been on any recent steroids.  She denies any changes in her health otherwise.    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    • Bleeding disorder ([facility]ly [facility]) 16   • Body piercing     TONGUE, NOSE, TWO IN EACH EAR   • Bronchitis    • Chest pain 2021    last CP 2-3 months ago   • Clotting disorder ([facility])     factor 5   • Constipation    • COVID-19 vaccine series completed     Pfizer-plus booster   • Deep vein thrombosis ([facility]) 2016   • Diarrhea    • Diverticulitis    • Elevated cholesterol    • Endometriosis    • Factor 5 Leiden mutation, heterozygous ([facility])    • Fibroid    • Fibromyalgia    • Full dentures 2021     advised no adhesives DOS   • Gastroparesis    • GERD (gastroesophageal reflux disease)    • H/O blood clots    • H/O cardiovascular stress test 06/22/2021    reported several years ago-exercise wnl   • Headache    • Hiatal hernia    • High cholesterol    • History of recurrent UTIs    • HPV (human papilloma virus) infection    • Hx of echocardiogram    • Hx of gout 06/22/2021   • Hypertension    • Hypoglycemia    • Inappropriate sinus node tachycardia    • Kidney infection 06/22/2021    history only   • Migraine    • Nausea & vomiting    • Obesity    • Osteoarthritis    • Ovarian cyst    • Pneumonia    • Pollen allergies    • PONV (postoperative nausea and vomiting)    • Protein S deficiency (HCC) 11/14/2016    labs from hospitalization for PE   • Pulmonary embolism (HCC) 11/20/2016    on eliquis since that time   • Recurrent pregnancy loss, antepartum condition or complication    • Sleep apnea     no CPAP- states insurance took it back   • Tattoo     LOWER BACK   • Varicella      Current Outpatient Medications on File Prior to Visit   Medication Sig Dispense Refill   • albuterol sulfate  (90 Base) MCG/ACT inhaler Inhale 2 puffs Every 4 (Four) Hours As Needed for Wheezing or Shortness of Air. 18 g 11   • Alcohol Swabs (Alcohol Pads) 70 % pads Check sugars as needed/directed for hypoglycemia. 100 each 12   • apixaban (ELIQUIS) 5 MG tablet tablet Take 1 tablet by mouth 2 (Two) Times a Day. 180 tablet 3   • aspirin 81 MG EC tablet Take 81 mg by mouth Daily.     • Atogepant (Qulipta) 60 MG tablet Take 1 tablet by mouth Daily. 30 tablet 11   • atorvastatin (LIPITOR) 40 MG tablet Take 1 tablet by mouth Every Night. 30 tablet 11   • azelastine (ASTELIN) 0.1 % nasal spray 1 spray into the nostril(s) as directed by provider 2 (Two) Times a Day. Use in each nostril as directed 1 each 10   • B-D ULTRA-FINE 33 LANCETS misc Check sugars as needed/directed for hypoglycemia. 100 each 12   • Blood Glucose Monitoring Suppl  (ONE TOUCH ULTRA 2) w/Device kit use as needed to check blood sugar for hypoglycemia (diabetes)     • busPIRone (BUSPAR) 5 MG tablet TAKE 1 TABLET BY MOUTH THREE TIMES A DAY 90 tablet 0   • butalbital-acetaminophen-caffeine (FIORICET, ESGIC) -40 MG per tablet Take 1 tablet by mouth Every 6 (Six) Hours As Needed for Headache. 9 tablet 0   • calcium carbonate EX (TUMS EX) 750 MG chewable tablet Chew 1 tablet Daily. (Patient taking differently: Chew 750 mg Daily As Needed.) 30 tablet 1   • Cholecalciferol (Vitamin D3) 1.25 MG (52087 UT) capsule Take 1 capsule by mouth Every 7 (Seven) Days. Indications: Vitamin D Deficiency 12 capsule 1   • Corlanor 5 MG tablet tablet Take 1.5 tablets by mouth 2 (two) times a day. 90 tablet 6   • cyclobenzaprine (FLEXERIL) 10 MG tablet   15 Each, TAKE 1/2 TABLET BY MOUTH THREE TIMES A DAY AS NEEDED FOR MUSCLE SPASM for up to 5 days, 0 Refill(s)     • Diclofenac Sodium (VOLTAREN) 1 % gel gel Apply 4 g topically to the appropriate area as directed 4 (Four) Times a Day As Needed (joint pain). 150 g 1   • DULoxetine (CYMBALTA) 60 MG capsule Take 1 capsule by mouth Daily. 90 capsule 3   • fluticasone (FLONASE) 50 MCG/ACT nasal spray 2 sprays into the nostril(s) as directed by provider Daily. 16 g 11   • glucose blood test strip USE TO CHECK SUGARS DAILY DUE TO hypoglycemia 100 each 12   • glucose monitor monitoring kit 1 each As Needed (diabetes). Check sugars as needed/directed for hypoglycemia. 1 each 0   • hyoscyamine (ANASPAZ,LEVSIN) 0.125 MG tablet TAKE 1 TABLET BY MOUTH EVERY SIX HOURS AS NEEDED FOR CRAMPING OR DIARRHEA     • ibuprofen (ADVIL,MOTRIN) 800 MG tablet Take 1 tablet by mouth Every 8 (Eight) Hours As Needed for Mild Pain . 30 tablet 0   • l-methylfolate 15 MG tablet tablet Take 1 tablet by mouth Daily. Indications: folic acid deficiency 90 tablet 3   • lidocaine (LIDODERM) 5 % Place 2 patches on the skin as directed by provider Daily. Remove & Discard patch within 12  hours or as directed by MD 60 patch 3   • methocarbamol (ROBAXIN) 750 MG tablet Take 1 tablet by mouth At Night As Needed for Muscle Spasms. 14 tablet 0   • metoclopramide (REGLAN) 5 MG tablet metoclopramide 5 mg tablet   TAKE 1 TABLET BY MOUTH FOUR TIMES A DAY     • mometasone-formoterol (Dulera) 200-5 MCG/ACT inhaler Inhale 2 puffs 2 (Two) Times a Day. 13 g 11   • mupirocin (Bactroban) 2 % cream Apply 1 application topically to the appropriate area as directed 3 (Three) Times a Day. 30 g 1   • ondansetron ODT (ZOFRAN-ODT) 4 MG disintegrating tablet ondansetron 4 mg disintegrating tablet   DISSOLVE 1 TABLET IN MOUTH EVERY SIX HOURS AS NEEDED FOR NAUSEA AND VOMITING     • pantoprazole (PROTONIX) 40 MG EC tablet Take 1 tablet by mouth Daily. 30 tablet 0   • Prucalopride Succinate (Motegrity) 2 MG tablet Motegrity 2 mg tablet   TAKE 1 TABLET BY MOUTH EVERY DAY     • Rimegepant Sulfate (Nurtec) 75 MG tablet dispersible tablet Place 1 tablet under the tongue Take As Directed. 16 tablet 5   • Scopolamine 1 MG/3DAYS patch scopolamine 1 mg over 3 days transdermal patch     • zonisamide (ZONEGRAN) 100 MG capsule Take 1 capsule by mouth Every Evening. 90 capsule 1     No current facility-administered medications on file prior to visit.     Allergies   Allergen Reactions   • Ceftin [Cefuroxime Axetil] Shortness Of Breath and Rash     Numbness in mouth and throat   • Cefuroxime Hives, Rash and Shortness Of Breath     Numbness in mouth and throat  Numbness in mouth and throat   • Sumatriptan Shortness Of Breath and Nausea Only     Other reaction(s): Nausea Only, Other (See Comments)  Worsening migraine  Worsening migraine   • Amoxicillin Rash, Hives and Itching     Past Surgical History:   Procedure Laterality Date   •  SECTION        and  and 2019   •  SECTION WITH TUBAL N/A 1/15/2019    Procedure:  SECTION REPEAT WITH TUBAL;  Surgeon: Alva Boyer MD;  Location: [facility] LABOR DELIVERY;   Service: Obstetrics/Gynecology   • COLONOSCOPY N/A 4/20/2017    Procedure: COLONOSCOPY WITH BIOPSIES AND ARGON THERMAL ABLATION;  Surgeon: Jignesh Selby MD;  Location: [facility] ENDOSCOPY;  Service:    • D & C HYSTEROSCOPY ENDOMETRIAL ABLATION N/A 3/10/2022    Procedure: DILATATION AND CURETTAGE DIAGNOSTIC HYSTEROSCOPY NOVASURE ENDOMETRIAL ABLATION;  Surgeon: Alva Boyer MD;  Location: [facility] OR;  Service: Obstetrics/Gynecology;  Laterality: N/A;   • DIAGNOSTIC LAPAROSCOPY N/A 4/2/2018    Procedure: DIAGNOSTIC LAPAROSCOPY AND ABLATION OF ENDOMETRIOSIS;  Surgeon: Evin Zamudio MD;  Location: [facility] OR;  Service: Obstetrics/Gynecology   • ENDOMETRIAL ABLATION     • ENDOSCOPIC FUNCTIONAL SINUS SURGERY (FESS) Right 6/25/2021    Procedure: Right endoscopic total ethmoidectomy, right endoscopic middle meatal antrotomy with cyst removal, right endoscopic frontal recess exploration with cyst removal;  Surgeon: Saulo Brito MD;  Location: [facility] OR;  Service: ENT;  Laterality: Right;   • ENDOSCOPY N/A 4/20/2017    Procedure: ESOPHAGOGASTRODUODENOSCOPY WITH BIOPSIES AND COLD BIOPSY POLYPECTOMIES;  Surgeon: Jignesh Selby MD;  Location: [facility] ENDOSCOPY;  Service:    • LAPAROSCOPIC CHOLECYSTECTOMY     • LIPOMA EXCISION N/A 12/10/2021    Procedure: Excision of soft tissue mass lower back;  Surgeon: Nidia Smith MD;  Location: [facility] OR;  Service: General;  Laterality: N/A;   • MOUTH SURGERY      full mouth tooth extration   • WISDOM TOOTH EXTRACTION       Family History   Problem Relation Age of Onset   • Arthritis Mother    • COPD Mother    • Asthma Mother    • Thyroid disease Mother    • Arthritis Father    • Diabetes Father    • Hypertension Father    • Hyperlipidemia Father    • Kidney disease Father    • Heart attack Father    • Coronary artery disease Father    • Dementia Father    • No Known Problems Son    • Colon cancer Neg Hx    • Liver cancer Neg Hx    • Liver disease Neg Hx    • Stomach cancer  "Neg Hx    • Esophageal cancer Neg Hx      Social History     Socioeconomic History   • Marital status:    Tobacco Use   • Smoking status: Never   • Smokeless tobacco: Never   Vaping Use   • Vaping Use: Never used   Substance and Sexual Activity   • Alcohol use: No   • Drug use: Never   • Sexual activity: Yes     Partners: Male     Birth control/protection: Surgical     Comment: Tubal       Physical Examination:  Vital Signs: /72   Ht 161.3 cm (63.5\")   Wt 85.3 kg (188 lb)   BMI 32.78 kg/m²     General Appearance: alert, appears stated age, and cooperative  Breasts: Examined in supine position  Symmetric without skin dimpling; positive small linear nodule measuring approximately 2 cm in the right breast at the 11 o'clock position.  Nipples normal without inversion, lesions or discharge  There are no palpable axillary nodes  Abdomen: no masses, no hepatomegaly, no splenomegaly, soft non-tender, no guarding, and no rebound tenderness  Pelvic: Not performed    Data Review:  The following data was reviewed by: Alva Boyer MD on 12/21/2022:     Labs:  Peripheral Blood Smear (12/06/2022 10:16)  Vitamin D,25-Hydroxy (12/06/2022 10:16)  von Willebrand Disease Reflexive Profile (12/06/2022 10:16)  Protime-INR (12/06/2022 10:16)  APTT (12/06/2022 10:16)    Imaging:    Medical Records:  None    Assessment and Plan   1. Mass of right breast, unspecified quadrant  Patient with mass of the right breast as noted.  We will schedule right breast ultrasound.  Patient may also need referral to general surgeon.  She has seen Dr. Smith in the past.  - US Breast Right Complete; Future    2. Hematoma of right breast  Patient with recent bruising of her right breast.  I suspect this is a resolving hematoma.  Will obtain right breast ultrasound as noted.  Plan pending results.  - US Breast Right Complete; Future    3. Vaginal bleeding  Patient with recent episode of vaginal bleeding as noted.  We will schedule transvaginal " ultrasound to evaluate the endometrium.  Plan pending results.  - US Non-ob Transvaginal; Future    4. Current use of long term anticoagulation  Patient is on long-term anticoagulation.  Patient did have recent bruising as well as vaginal bleeding as noted.  Her labs have been reviewed with her primary care provider.    Follow Up/Instructions:  Follow up as noted.  Patient was given instructions and counseling regarding her condition or for health maintenance advice. Please see specific information pulled into the AVS if appropriate.     Note: Speech recognition transcription software may have been used to dictate portions of this document.  An attempt at proofreading has been made though minor errors in transcription may still be present.    This note was electronically signed.  Alva Boyer M.D.

## 2022-12-29 ENCOUNTER — TELEPHONE (OUTPATIENT)
Dept: SURGERY | Facility: CLINIC | Age: 32
End: 2022-12-29

## 2022-12-29 ENCOUNTER — TELEPHONE (OUTPATIENT)
Dept: OBSTETRICS AND GYNECOLOGY | Facility: CLINIC | Age: 32
End: 2022-12-29

## 2022-12-29 NOTE — TELEPHONE ENCOUNTER
----- Message from Marilu Godoy sent at 12/29/2022 10:47 AM EST -----  Regarding: Breast Ultrasound   Contact: [phone]  Can you see when Dr. Christie Smith can get me in to do the breast ultrasound? Scheduling called and they don't have any openings until after my mammogram appointment on February 6th. Also have you got the vaginal ultrasound read yet?

## 2022-12-29 NOTE — TELEPHONE ENCOUNTER
Left message for patient to call back.  Per Dr. Smith she can see Ms. Godoy this Tuesday 1-3-23 at 2:30.  Please schedule if patient agrees to this time.

## 2022-12-29 NOTE — TELEPHONE ENCOUNTER
Called patient and explained that we have no control over Dr. Smith's scheduling. Advised her to call that office.

## 2022-12-29 NOTE — TELEPHONE ENCOUNTER
Caller: ALEN VALDEZO (CYNTHIA)    Relationship: SELF    Best call back number: [phone]    What is the best time to reach you: ANYTIME    Who are you requesting to speak with (clinical staff, provider,  specific staff member): CLINICAL    Do you know the name of the person who called: NA    What was the call regarding: PT IS ESTABLISHED WITH DR HUBER. CALLING TO GET IN ASAP AS SHE HAS A KNOT ON HER RIGHT BREAST (PHYSICAL EVALUATION)  AND HAS A FLORA AND US CAMILA ON 2-6-23 PER DR NAYAK. DR NAYAK DOES NOT SEE FOR THIS AND PT WOULD LIKE TO BE SEEN ASAP.      Do you require a callback: YES    PLEASE CALL [facility]      PER ARMIDA SEND A TE

## 2022-12-30 ENCOUNTER — APPOINTMENT (OUTPATIENT)
Dept: ULTRASOUND IMAGING | Facility: HOSPITAL | Age: 32
End: 2022-12-30

## 2022-12-30 NOTE — PROGRESS NOTES
Subjective   Marilu Godoy is a 32 y.o. female.   Chief Complaint   Patient presents with   • Breast Mass     Right        History of Present Illness     Ms. Godoy is a 32-year-old female patient known to me from prior encounters she comes the office today for evaluation of a right breast mass.  She reports that she first noticed this about 2 weeks ago.  At that same time, she noticed a very large area of ecchymoses over the breast without pain, or known traumatic injury.  She was seen By Dr. Boyer of OB/GYN on 12/21/2022 at which time she reported waking up around Thanksgiving with the a forementioned large bruise on the breast.  She was found on exam to have a small nodule approximately 2 cm in the right breast at the 11 o'clock position.  It was noted at that time that the patient had diagnostic mammography previously ordered by her PCP.  An order for a right breast ultrasound was added by gynecology.  Today, she comes in for further evaluation of this issue.  None of the imaging studies have been completed.  She tells me that these were not scheduled until early February.  She continues to deny or recall any traumatic injury to the right breast.  She reports that the large area of ecchymoses has now resolved.  It is worth noting that she is on both a baby aspirin, and Eliquis in the setting of known heterozygous factor V Leiden mutation, and protein S deficiency with a personal history of prior DVT, PE, and recurrent pregnancy loss.    The patient reports that the nodule is still present in the right breast, and has not changed.  She denies any discharge from the nipple.  She denies any overlying skin change.  She does have a family history significant for a paternal aunt with breast cancer.  The patient shows me photographs on her cell phone of the previous ecchymosis of the breast, which is rather impressive, consistent with a large breast hematoma.      The following portions of the patient's history  were reviewed and updated as appropriate: allergies, current medications, past family history, past medical history, past social history, past surgical history and problem list.    Review of Systems   Constitutional: Negative for chills, fever and unexpected weight change.   HENT: Negative for hearing loss, trouble swallowing and voice change.    Eyes: Negative for visual disturbance.   Respiratory: Negative for apnea, cough, chest tightness, shortness of breath and wheezing.    Cardiovascular: Negative for chest pain, palpitations and leg swelling.   Gastrointestinal: Negative for abdominal distention, abdominal pain, anal bleeding, blood in stool, constipation, diarrhea, nausea, rectal pain and vomiting.   Endocrine: Negative for cold intolerance and heat intolerance.   Genitourinary: Negative for difficulty urinating, dysuria and flank pain.   Musculoskeletal: Negative for back pain and gait problem.   Skin: Negative for color change, rash and wound.   Neurological: Negative for dizziness, syncope, speech difficulty, weakness, light-headedness, numbness and headaches.   Hematological: Negative for adenopathy. Does not bruise/bleed easily.   Psychiatric/Behavioral: Negative for confusion. The patient is not nervous/anxious.        Objective    /88   Pulse 95   Temp 98.2 °F (36.8 °C) (Temporal)   Ht 161.3 cm (63.5\")   Wt 85.1 kg (187 lb 9.6 oz)   SpO2 98%   BMI 32.71 kg/m²     Physical Exam  Constitutional:       Appearance: She is well-developed.   HENT:      Head: Normocephalic and atraumatic.   Eyes:      General: No scleral icterus.  Cardiovascular:      Rate and Rhythm: Regular rhythm.   Pulmonary:      Effort: Pulmonary effort is normal.   Chest:      Comments: In the right breast, at the 10 o'clock position, there is a small, approximately 1 cm, somewhat mobile nodule which is tender to palpation.  There is no residual ecchymoses or palpable hematoma.  Skin:     General: Skin is warm and dry.    Neurological:      Mental Status: She is alert and oriented to person, place, and time.   Psychiatric:         Behavior: Behavior normal.         Assessment & Plan   Diagnoses and all orders for this visit:    1. Mass of right breast, unspecified quadrant (Primary)  -     US breast right limited; Future        I recommended to Ms. Godoy that we proceed with a limited ultrasound of the right breast in the office today for further investigation.  She was agreeable.  This was subsequently performed without difficulty, and demonstrated a small cyst in the right breast at the 10 o'clock position in the area of patient concern corresponding to the palpable nodule on her physical exam.  There was also evidence of a resolving hematoma.  No other abnormalities were appreciated.  I recommended that she keep her scheduled appointment for diagnostic mammography early next month, and follow-up with me once this has been completed.  The source of her apparent breast hematoma is not entirely clear given the lack of a known traumatic injury.  However, given that she is on both full dose anticoagulation, and antiplatelet therapy, certainly her risk of hematoma is much greater than the average patient, and even very subtle seemingly insignificant traumatic injury could result in bleeding and development of a hematoma.  The small cyst identified on exam corresponds to the area of palpable abnormality, and it does not appear suspicious or require biopsy today.  The patient is agreeable to returning after her mammogram is performed at which time further recommendations will be made.

## 2023-01-03 ENCOUNTER — OFFICE VISIT (OUTPATIENT)
Dept: SURGERY | Facility: CLINIC | Age: 33
End: 2023-01-03
Payer: COMMERCIAL

## 2023-01-03 VITALS
OXYGEN SATURATION: 98 % | HEIGHT: 64 IN | TEMPERATURE: 98.2 F | HEART RATE: 95 BPM | WEIGHT: 187.6 LBS | DIASTOLIC BLOOD PRESSURE: 88 MMHG | SYSTOLIC BLOOD PRESSURE: 120 MMHG | BODY MASS INDEX: 32.03 KG/M2

## 2023-01-03 DIAGNOSIS — N63.10 MASS OF RIGHT BREAST, UNSPECIFIED QUADRANT: Primary | ICD-10-CM

## 2023-01-03 PROCEDURE — 3079F DIAST BP 80-89 MM HG: CPT | Performed by: SURGERY

## 2023-01-03 PROCEDURE — 99213 OFFICE O/P EST LOW 20 MIN: CPT | Performed by: SURGERY

## 2023-01-03 PROCEDURE — 1159F MED LIST DOCD IN RCRD: CPT | Performed by: SURGERY

## 2023-01-03 PROCEDURE — 3074F SYST BP LT 130 MM HG: CPT | Performed by: SURGERY

## 2023-01-03 PROCEDURE — 1160F RVW MEDS BY RX/DR IN RCRD: CPT | Performed by: SURGERY

## 2023-01-10 ENCOUNTER — TELEPHONE (OUTPATIENT)
Dept: OBSTETRICS AND GYNECOLOGY | Facility: CLINIC | Age: 33
End: 2023-01-10
Payer: COMMERCIAL

## 2023-01-10 NOTE — TELEPHONE ENCOUNTER
----- Message from Alva Boyer MD sent at 1/10/2023  2:38 PM EST -----  Okay to inform the patient I reviewed her transvaginal ultrasound.  The uterus and endometrium appear normal.  The patient has a small simple appearing cyst of the left ovary measuring 1.8 cm.

## 2023-01-16 ENCOUNTER — HOSPITAL ENCOUNTER (EMERGENCY)
Facility: HOSPITAL | Age: 33
Discharge: HOME | End: 2023-01-16
Attending: EMERGENCY MEDICINE | Admitting: EMERGENCY MEDICINE
Payer: COMMERCIAL

## 2023-01-16 VITALS
HEART RATE: 112 BPM | HEIGHT: 64 IN | SYSTOLIC BLOOD PRESSURE: 120 MMHG | BODY MASS INDEX: 32.44 KG/M2 | RESPIRATION RATE: 16 BRPM | TEMPERATURE: 98.5 F | DIASTOLIC BLOOD PRESSURE: 86 MMHG | OXYGEN SATURATION: 98 % | WEIGHT: 190 LBS

## 2023-01-16 DIAGNOSIS — J02.9 SORE THROAT: Primary | ICD-10-CM

## 2023-01-16 DIAGNOSIS — R09.81 NASAL CONGESTION: ICD-10-CM

## 2023-01-16 DIAGNOSIS — J34.89 RHINORRHEA: ICD-10-CM

## 2023-01-16 LAB
FLUAV RNA RESP QL NAA+PROBE: NOT DETECTED
FLUBV RNA RESP QL NAA+PROBE: NOT DETECTED
S PYO AG THROAT QL: NEGATIVE
SARS-COV-2 RNA RESP QL NAA+PROBE: NOT DETECTED

## 2023-01-16 PROCEDURE — 87880 STREP A ASSAY W/OPTIC: CPT | Performed by: EMERGENCY MEDICINE

## 2023-01-16 PROCEDURE — 87081 CULTURE SCREEN ONLY: CPT | Performed by: EMERGENCY MEDICINE

## 2023-01-16 PROCEDURE — 87636 SARSCOV2 & INF A&B AMP PRB: CPT | Performed by: EMERGENCY MEDICINE

## 2023-01-16 PROCEDURE — C9803 HOPD COVID-19 SPEC COLLECT: HCPCS

## 2023-01-16 PROCEDURE — 99283 EMERGENCY DEPT VISIT LOW MDM: CPT

## 2023-01-16 RX ORDER — FLUTICASONE PROPIONATE 50 MCG
2 SPRAY, SUSPENSION (ML) NASAL DAILY
Qty: 9.9 ML | Refills: 0 | Status: SHIPPED | OUTPATIENT
Start: 2023-01-16 | End: 2023-04-06

## 2023-01-17 NOTE — DISCHARGE INSTRUCTIONS
Have sent Flonase to your pharmacy for congestion.  Utilize Tylenol Motrin for body aches.  Follow-up with your primary care in the neck several days for further evaluation.  Return to emergency room for any worsening symptoms.

## 2023-01-17 NOTE — ED PROVIDER NOTES
Subjective  History of Present Illness:    This is a 32-year-old female presents emergency room today with chief complaint of flulike symptoms.  She states that she has had a sore throat, muscle aches, low-grade fevers, runny nose that began several days ago.  She denies a cough.  Denies any vomiting.  Took a Tylenol prior to arrival.  Reports positive exposure to son who also has strep at this time.      Nurses Notes reviewed and agree, including vitals, allergies, social history and prior medical history.     REVIEW OF SYSTEMS: All systems reviewed and not pertinent unless noted.  Review of Systems   Constitutional: Positive for fever.   HENT: Positive for rhinorrhea and sore throat. Negative for trouble swallowing.    Musculoskeletal: Positive for myalgias.   All other systems reviewed and are negative.      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) 11-13-16   • Body piercing     TONGUE, NOSE, TWO IN EACH EAR   • Bronchitis    • Chest pain 06/22/2021    last CP 2-3 months ago   • Clotting disorder ([facility])     factor 5   • Constipation    • COVID-19 vaccine series completed     Pfizer-plus booster   • Deep vein thrombosis ([facility]) 11/13/2016   • Diarrhea    • Diverticulitis    • Elevated cholesterol    • Endometriosis    • Factor 5 Leiden mutation, heterozygous ([facility])    • Fibroid    • Fibromyalgia    • Full dentures 06/22/2021    advised no adhesives DOS   • Gastroparesis    • GERD (gastroesophageal reflux disease)    • H/O blood clots    • H/O cardiovascular stress test 06/22/2021    reported several years ago-exercise wnl   • Headache    • Hiatal hernia    • High cholesterol    • History of recurrent UTIs    • HPV (human papilloma virus) infection    • Hx of echocardiogram    • Hx of gout 06/22/2021   • Hypertension    • Hypoglycemia    •  Inappropriate sinus node tachycardia    • Kidney infection 2021    history only   • Migraine    • Nausea & vomiting    • Obesity    • Osteoarthritis    • Ovarian cyst    • Pneumonia    • Pollen allergies    • PONV (postoperative nausea and vomiting)    • Protein S deficiency ([facility]) 2016    labs from hospitalization for PE   • Pulmonary embolism ([facility]) 2016    on eliquis since that time   • Recurrent pregnancy loss, antepartum condition or complication    • Sleep apnea     no CPAP- states insurance took it back   • Tattoo     LOWER BACK   • Varicella        Allergies:    Ceftin [cefuroxime axetil], Cefuroxime, Sumatriptan, and Amoxicillin      Past Surgical History:   Procedure Laterality Date   •  SECTION        and  and    •  SECTION WITH TUBAL N/A 1/15/2019    Procedure:  SECTION REPEAT WITH TUBAL;  Surgeon: Alva Boyer MD;  Location: [facility] LABOR DELIVERY;  Service: Obstetrics/Gynecology   • COLONOSCOPY N/A 2017    Procedure: COLONOSCOPY WITH BIOPSIES AND ARGON THERMAL ABLATION;  Surgeon: Jignesh Selby MD;  Location: [facility] ENDOSCOPY;  Service:    • D & C HYSTEROSCOPY ENDOMETRIAL ABLATION N/A 3/10/2022    Procedure: DILATATION AND CURETTAGE DIAGNOSTIC HYSTEROSCOPY NOVASURE ENDOMETRIAL ABLATION;  Surgeon: Alva Boyer MD;  Location: [facility] OR;  Service: Obstetrics/Gynecology;  Laterality: N/A;   • DIAGNOSTIC LAPAROSCOPY N/A 2018    Procedure: DIAGNOSTIC LAPAROSCOPY AND ABLATION OF ENDOMETRIOSIS;  Surgeon: Evin Zamudio MD;  Location: [facility] OR;  Service: Obstetrics/Gynecology   • ENDOMETRIAL ABLATION     • ENDOSCOPIC FUNCTIONAL SINUS SURGERY (FESS) Right 2021    Procedure: Right endoscopic total ethmoidectomy, right endoscopic middle meatal antrotomy with cyst removal, right endoscopic frontal recess exploration with cyst removal;  Surgeon: Saulo Brito MD;  Location: [facility] OR;  Service: ENT;  Laterality: Right;   • ENDOSCOPY  "N/A 4/20/2017    Procedure: ESOPHAGOGASTRODUODENOSCOPY WITH BIOPSIES AND COLD BIOPSY POLYPECTOMIES;  Surgeon: Jignesh Selby MD;  Location: [facility] ENDOSCOPY;  Service:    • LAPAROSCOPIC CHOLECYSTECTOMY     • LIPOMA EXCISION N/A 12/10/2021    Procedure: Excision of soft tissue mass lower back;  Surgeon: Nidia Smith MD;  Location: [facility] OR;  Service: General;  Laterality: N/A;   • MOUTH SURGERY      full mouth tooth extration   • WISDOM TOOTH EXTRACTION           Social History     Socioeconomic History   • Marital status:    Tobacco Use   • Smoking status: Never   • Smokeless tobacco: Never   Vaping Use   • Vaping Use: Never used   Substance and Sexual Activity   • Alcohol use: No   • Drug use: Never   • Sexual activity: Yes     Partners: Male     Birth control/protection: Surgical     Comment: Tubal         Family History   Problem Relation Age of Onset   • Arthritis Mother    • COPD Mother    • Asthma Mother    • Thyroid disease Mother    • Arthritis Father    • Diabetes Father    • Hypertension Father    • Hyperlipidemia Father    • Kidney disease Father    • Heart attack Father    • Coronary artery disease Father    • Dementia Father    • No Known Problems Son    • Colon cancer Neg Hx    • Liver cancer Neg Hx    • Liver disease Neg Hx    • Stomach cancer Neg Hx    • Esophageal cancer Neg Hx        Objective  Physical Exam:  /86 (BP Location: Left arm, Patient Position: Sitting)   Pulse 112   Temp 98.5 °F (36.9 °C) (Oral)   Resp 16   Ht 161.3 cm (63.5\")   Wt 86.2 kg (190 lb)   SpO2 98%   BMI 33.13 kg/m²      Physical Exam  Vitals and nursing note reviewed.   Constitutional:       General: She is not in acute distress.     Appearance: She is obese. She is not ill-appearing, toxic-appearing or diaphoretic.   HENT:      Head: Normocephalic and atraumatic.      Nose: Nose normal. No congestion or rhinorrhea.      Mouth/Throat:      Mouth: Mucous membranes are moist.      Pharynx: Oropharynx " is clear. No oropharyngeal exudate.      Comments: Posterior oropharynx erythema, tonsils non exudates, not enlarged.  No uvular deviation.  Absence of teeth.  Eyes:      Extraocular Movements: Extraocular movements intact.      Conjunctiva/sclera: Conjunctivae normal.   Cardiovascular:      Rate and Rhythm: Normal rate and regular rhythm.      Pulses: Normal pulses.      Heart sounds: Normal heart sounds.   Pulmonary:      Effort: Pulmonary effort is normal. No respiratory distress.      Breath sounds: Normal breath sounds. No wheezing or rales.   Abdominal:      General: Abdomen is flat. There is no distension.      Palpations: Abdomen is soft.      Tenderness: There is no abdominal tenderness. There is no guarding.   Musculoskeletal:         General: Normal range of motion.      Cervical back: Normal range of motion and neck supple.   Skin:     General: Skin is warm and dry.      Capillary Refill: Capillary refill takes less than 2 seconds.      Coloration: Skin is not pale.   Neurological:      General: No focal deficit present.      Mental Status: She is alert.   Psychiatric:         Mood and Affect: Mood normal.         Behavior: Behavior normal.         Thought Content: Thought content normal.         Judgment: Judgment normal.         Procedures    ED Course:    ED Course as of 01/16/23 2126 Mon Jan 16, 2023 2020 Will obtain rapid strep and COVID flu swab. [JR]   2053 Rapid strep and COVID flu swab negative.  She can be discharged home. [JR]      ED Course User Index  [JR] Lee Negron PA-C       Lab Results (last 24 hours)     Procedure Component Value Units Date/Time    COVID-19 and FLU A/B PCR - Swab, Nasopharynx [[id number]]  (Normal) Collected: 01/16/23 2004    Specimen: Swab from Nasopharynx Updated: 01/16/23 2027     COVID19 Not Detected     Influenza A PCR Not Detected     Influenza B PCR Not Detected    Narrative:      Fact sheet for providers:  https://www.fda.gov/media/364891/download    Fact sheet for patients: https://www.fda.gov/media/440106/download    Test performed by PCR.    Rapid Strep A Screen - Swab, Throat [[id number]]  (Normal) Collected: 01/16/23 2004    Specimen: Swab from Throat Updated: 01/16/23 2024     Strep A Ag Negative    Beta Strep Culture, Throat - Swab, Throat [[id number]] Collected: 01/16/23 2004    Specimen: Swab from Throat Updated: 01/16/23 2023           No radiology results from the last 24 hrs       MDM    Initial impression of presenting illness: This is a 32-year-old female presents emergency room today with chief complaint of flulike symptoms.  She states that she has had a sore throat, muscle aches, low-grade fevers, runny nose that began several days ago.  She denies a cough.  Denies any vomiting.  Took a Tylenol prior to arrival.  Reports positive exposure to son who also has strep at this time    DDX: includes but is not limited to: Strep throat, viral illness    Patient arrives hemodynamically stable, afebrile, slightly tachycardic.  With vitals interpreted by myself.     Pertinent features from physical exam: Posterior oropharynx erythema, uvula nondeviated, no concern for peritonsillar abscess.  Tonsils not enlarged and without exudate..  Abdominal exam benign.    Initial diagnostic plan: Obtain rapid strep and COVID flu swab.  Strep will be cultured.  Results from initial plan were reviewed and interpreted by me revealing negative strep, negative COVID flu    Diagnostic information from other sources: N/A    Interventions / Re-evaluation: Withheld any further administration of medications due to taking over-the-counter medications prior to arrival.  Patient is nontoxic, ready for discharge home at this time.    Results/clinical rationale were discussed with patient at bedside.  Discussed that although the results of her strep and COVID flu are negative, she presents with a viral cohort of symptoms and that the  nature of her illness today is likely viral.  Discussed supportive care with the patient.  Patient agreeable to plan.    Consultations/Discussion of results with other physicians: N/A    Disposition plan: Discharge home with supportive care measures.  We will send Flonase for runny nose and congestion.  Discussed Motrin and Tylenol for body aches and sore throat.  Strep a rapid swab will be automatically cultured per protocol.  Patient discharged home in good condition.  -----    Final diagnoses:   Sore throat   Rhinorrhea   Nasal congestion        Lee Negron PA-C  01/16/23 2121

## 2023-01-19 LAB — BACTERIA SPEC AEROBE CULT: NORMAL

## 2023-01-20 ENCOUNTER — APPOINTMENT (OUTPATIENT)
Dept: GENERAL RADIOLOGY | Facility: HOSPITAL | Age: 33
End: 2023-01-20
Payer: COMMERCIAL

## 2023-01-20 ENCOUNTER — HOSPITAL ENCOUNTER (EMERGENCY)
Facility: HOSPITAL | Age: 33
Discharge: HOME | End: 2023-01-20
Attending: EMERGENCY MEDICINE | Admitting: EMERGENCY MEDICINE
Payer: COMMERCIAL

## 2023-01-20 VITALS
HEIGHT: 64 IN | OXYGEN SATURATION: 99 % | SYSTOLIC BLOOD PRESSURE: 145 MMHG | TEMPERATURE: 98.1 F | WEIGHT: 190 LBS | RESPIRATION RATE: 13 BRPM | DIASTOLIC BLOOD PRESSURE: 78 MMHG | HEART RATE: 81 BPM | BODY MASS INDEX: 32.44 KG/M2

## 2023-01-20 DIAGNOSIS — S90.122A CONTUSION OF LEFT LESSER TOE(S) W/O DAMAGE TO NAIL, INIT: Primary | ICD-10-CM

## 2023-01-20 PROCEDURE — 99282 EMERGENCY DEPT VISIT SF MDM: CPT

## 2023-01-20 PROCEDURE — 73630 X-RAY EXAM OF FOOT: CPT

## 2023-01-21 ENCOUNTER — HOSPITAL ENCOUNTER (EMERGENCY)
Facility: HOSPITAL | Age: 33
Discharge: HOME | End: 2023-01-21
Attending: HOSPITALIST
Payer: MEDICAID

## 2023-01-21 ENCOUNTER — APPOINTMENT (OUTPATIENT)
Dept: CT IMAGING | Facility: HOSPITAL | Age: 33
End: 2023-01-21
Payer: MEDICAID

## 2023-01-21 ENCOUNTER — APPOINTMENT (OUTPATIENT)
Dept: GENERAL RADIOLOGY | Facility: HOSPITAL | Age: 33
End: 2023-01-21
Payer: MEDICAID

## 2023-01-21 VITALS
WEIGHT: 190 LBS | BODY MASS INDEX: 32.44 KG/M2 | DIASTOLIC BLOOD PRESSURE: 84 MMHG | TEMPERATURE: 98.2 F | HEIGHT: 64 IN | SYSTOLIC BLOOD PRESSURE: 125 MMHG | OXYGEN SATURATION: 99 % | HEART RATE: 100 BPM | RESPIRATION RATE: 17 BRPM

## 2023-01-21 DIAGNOSIS — R06.02 SHORTNESS OF BREATH: ICD-10-CM

## 2023-01-21 DIAGNOSIS — R07.9 CHEST PAIN, UNSPECIFIED TYPE: Primary | ICD-10-CM

## 2023-01-21 DIAGNOSIS — R07.9 CHEST PAIN, RULE OUT ACUTE MYOCARDIAL INFARCTION: ICD-10-CM

## 2023-01-21 LAB
A/G RATIO: 1.3 (ref 0.8–2)
ALBUMIN SERPL-MCNC: 4.5 G/DL (ref 3.4–4.8)
ALP BLD-CCNC: 112 U/L (ref 25–100)
ALT SERPL-CCNC: 14 U/L (ref 4–36)
ANION GAP SERPL CALCULATED.3IONS-SCNC: 13 MMOL/L (ref 3–16)
AST SERPL-CCNC: 14 U/L (ref 8–33)
BASOPHILS ABSOLUTE: 0.1 K/UL (ref 0–0.1)
BASOPHILS RELATIVE PERCENT: 0.5 %
BILIRUB SERPL-MCNC: 0.4 MG/DL (ref 0.3–1.2)
BUN BLDV-MCNC: 11 MG/DL (ref 6–20)
CALCIUM SERPL-MCNC: 9.6 MG/DL (ref 8.5–10.5)
CHLORIDE BLD-SCNC: 99 MMOL/L (ref 98–107)
CO2: 26 MMOL/L (ref 20–30)
CREAT SERPL-MCNC: 0.6 MG/DL (ref 0.4–1.2)
EOSINOPHILS ABSOLUTE: 0.1 K/UL (ref 0–0.4)
EOSINOPHILS RELATIVE PERCENT: 0.9 %
GFR SERPL CREATININE-BSD FRML MDRD: >60 ML/MIN/{1.73_M2}
GLOBULIN: 3.6 G/DL
GLUCOSE BLD-MCNC: 97 MG/DL (ref 74–106)
HCT VFR BLD CALC: 46.8 % (ref 37–47)
HEMOGLOBIN: 15.7 G/DL (ref 11.5–16.5)
IMMATURE GRANULOCYTES #: 0 K/UL
IMMATURE GRANULOCYTES %: 0.3 % (ref 0–5)
LYMPHOCYTES ABSOLUTE: 2.5 K/UL (ref 1.5–4)
LYMPHOCYTES RELATIVE PERCENT: 24.1 %
MCH RBC QN AUTO: 30.9 PG (ref 27–32)
MCHC RBC AUTO-ENTMCNC: 33.5 G/DL (ref 31–35)
MCV RBC AUTO: 92.1 FL (ref 80–100)
MONOCYTES ABSOLUTE: 0.5 K/UL (ref 0.2–0.8)
MONOCYTES RELATIVE PERCENT: 5 %
NEUTROPHILS ABSOLUTE: 7.3 K/UL (ref 2–7.5)
NEUTROPHILS RELATIVE PERCENT: 69.2 %
PDW BLD-RTO: 12.2 % (ref 11–16)
PLATELET # BLD: 399 K/UL (ref 150–400)
PMV BLD AUTO: 9.4 FL (ref 6–10)
POTASSIUM SERPL-SCNC: 3.5 MMOL/L (ref 3.4–5.1)
RBC # BLD: 5.08 M/UL (ref 3.8–5.8)
SODIUM BLD-SCNC: 138 MMOL/L (ref 136–145)
TOTAL PROTEIN: 8.1 G/DL (ref 6.4–8.3)
TROPONIN: <0.3 NG/ML
TROPONIN: <0.3 NG/ML
WBC # BLD: 10.5 K/UL (ref 4–11)

## 2023-01-21 PROCEDURE — 71260 CT THORAX DX C+: CPT | Performed by: HOSPITALIST

## 2023-01-21 PROCEDURE — 85025 COMPLETE CBC W/AUTO DIFF WBC: CPT

## 2023-01-21 PROCEDURE — 84484 ASSAY OF TROPONIN QUANT: CPT

## 2023-01-21 PROCEDURE — 80053 COMPREHEN METABOLIC PANEL: CPT

## 2023-01-21 PROCEDURE — 6360000004 HC RX CONTRAST MEDICATION: Performed by: HOSPITALIST

## 2023-01-21 PROCEDURE — 6370000000 HC RX 637 (ALT 250 FOR IP): Performed by: HOSPITALIST

## 2023-01-21 PROCEDURE — 2580000003 HC RX 258: Performed by: HOSPITALIST

## 2023-01-21 PROCEDURE — 36415 COLL VENOUS BLD VENIPUNCTURE: CPT

## 2023-01-21 PROCEDURE — 71045 X-RAY EXAM CHEST 1 VIEW: CPT

## 2023-01-21 PROCEDURE — 99285 EMERGENCY DEPT VISIT HI MDM: CPT

## 2023-01-21 RX ORDER — ASPIRIN 81 MG/1
324 TABLET, CHEWABLE ORAL ONCE
Status: COMPLETED | OUTPATIENT
Start: 2023-01-21 | End: 2023-01-21

## 2023-01-21 RX ORDER — 0.9 % SODIUM CHLORIDE 0.9 %
1000 INTRAVENOUS SOLUTION INTRAVENOUS ONCE
Status: COMPLETED | OUTPATIENT
Start: 2023-01-21 | End: 2023-01-21

## 2023-01-21 RX ADMIN — SODIUM CHLORIDE 1000 ML: 9 INJECTION, SOLUTION INTRAVENOUS at 17:46

## 2023-01-21 RX ADMIN — IOPAMIDOL 100 ML: 755 INJECTION, SOLUTION INTRAVENOUS at 18:23

## 2023-01-21 RX ADMIN — ASPIRIN 81 MG 324 MG: 81 TABLET ORAL at 17:46

## 2023-01-21 ASSESSMENT — PAIN - FUNCTIONAL ASSESSMENT
PAIN_FUNCTIONAL_ASSESSMENT: 0-10
PAIN_FUNCTIONAL_ASSESSMENT: NONE - DENIES PAIN

## 2023-01-21 ASSESSMENT — PAIN DESCRIPTION - DESCRIPTORS: DESCRIPTORS: BURNING;CRAMPING

## 2023-01-21 ASSESSMENT — PAIN DESCRIPTION - ONSET: ONSET: SUDDEN

## 2023-01-21 ASSESSMENT — PAIN DESCRIPTION - LOCATION: LOCATION: CHEST

## 2023-01-21 ASSESSMENT — LIFESTYLE VARIABLES
HOW MANY STANDARD DRINKS CONTAINING ALCOHOL DO YOU HAVE ON A TYPICAL DAY: PATIENT DOES NOT DRINK
HOW OFTEN DO YOU HAVE A DRINK CONTAINING ALCOHOL: NEVER

## 2023-01-21 ASSESSMENT — HEART SCORE: ECG: 0

## 2023-01-21 NOTE — ED PROVIDER NOTES
Subjective  History of Present Illness:    Chief Complaint:   Chief Complaint   Patient presents with   • Foot Pain      History of Present Illness: Marilu Godoy is a 32 y.o. female who presents to the emergency department complaining of left fourth toe pain.  Patient states about an hour prior to arrival she dropped a ceramic coffee mug onto her left fourth toe.  No other injuries.  She states that she has fractured this toe in the past.  She is able to bear weight without difficulty.  Onset: 1 hour prior to arrival  Duration: Single inciting injury with ongoing pain  Exacerbating / Alleviating factors: Worse with manipulation and palpation of the left fourth toe  Associated symptoms: None      Nurses Notes reviewed and agree, including vitals, allergies, social history and prior medical history.     Review of Systems   Constitutional: Negative.    HENT: Negative.    Eyes: Negative.    Respiratory: Negative.    Cardiovascular: Negative.    Gastrointestinal: Negative.    Genitourinary: Negative.    Musculoskeletal:        Left fourth toe injury/pain   Skin: Negative.    Neurological: Negative.    Psychiatric/Behavioral: Negative.        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    • Bleeding disorder (HCC) 11-13-16   • Body piercing     TONGUE, NOSE, TWO IN EACH EAR   • Bronchitis    • Chest pain 06/22/2021    last CP 2-3 months ago   • Clotting disorder ([facility])     factor 5   • Constipation    • COVID-19 vaccine series completed     Pfizer-plus booster   • Deep vein thrombosis (HCC) 11/13/2016   • Diarrhea    • Diverticulitis    • Elevated cholesterol    • Endometriosis    • Factor 5 Leiden mutation, heterozygous ([facility])    • Fibroid    • Fibromyalgia    • Full dentures 06/22/2021    advised no adhesives DOS   • Gastroparesis    • GERD (gastroesophageal reflux disease)     • H/O blood clots    • H/O cardiovascular stress test 2021    reported several years ago-exercise wnl   • Headache    • Hiatal hernia    • High cholesterol    • History of recurrent UTIs    • HPV (human papilloma virus) infection    • Hx of echocardiogram    • Hx of gout 2021   • Hypertension    • Hypoglycemia    • Inappropriate sinus node tachycardia    • Kidney infection 2021    history only   • Migraine    • Nausea & vomiting    • Obesity    • Osteoarthritis    • Ovarian cyst    • Pneumonia    • Pollen allergies    • PONV (postoperative nausea and vomiting)    • Protein S deficiency (HCC) 2016    labs from hospitalization for PE   • Pulmonary embolism (HCC) 2016    on eliquis since that time   • Recurrent pregnancy loss, antepartum condition or complication    • Sleep apnea     no CPAP- states insurance took it back   • Tattoo     LOWER BACK   • Varicella        Allergies:    Ceftin [cefuroxime axetil], Cefuroxime, Sumatriptan, and Amoxicillin      Past Surgical History:   Procedure Laterality Date   •  SECTION        and  and    •  SECTION WITH TUBAL N/A 1/15/2019    Procedure:  SECTION REPEAT WITH TUBAL;  Surgeon: Alva Boyer MD;  Location: [facility] LABOR DELIVERY;  Service: Obstetrics/Gynecology   • COLONOSCOPY N/A 2017    Procedure: COLONOSCOPY WITH BIOPSIES AND ARGON THERMAL ABLATION;  Surgeon: Jignesh Selby MD;  Location: [facility] ENDOSCOPY;  Service:    • D & C HYSTEROSCOPY ENDOMETRIAL ABLATION N/A 3/10/2022    Procedure: DILATATION AND CURETTAGE DIAGNOSTIC HYSTEROSCOPY NOVASURE ENDOMETRIAL ABLATION;  Surgeon: Alva Boyer MD;  Location: [facility] OR;  Service: Obstetrics/Gynecology;  Laterality: N/A;   • DIAGNOSTIC LAPAROSCOPY N/A 2018    Procedure: DIAGNOSTIC LAPAROSCOPY AND ABLATION OF ENDOMETRIOSIS;  Surgeon: Evin Zamudio MD;  Location: [facility] OR;  Service: Obstetrics/Gynecology   • ENDOMETRIAL ABLATION     •  "ENDOSCOPIC FUNCTIONAL SINUS SURGERY (FESS) Right 6/25/2021    Procedure: Right endoscopic total ethmoidectomy, right endoscopic middle meatal antrotomy with cyst removal, right endoscopic frontal recess exploration with cyst removal;  Surgeon: Saulo Brito MD;  Location: [facility] OR;  Service: ENT;  Laterality: Right;   • ENDOSCOPY N/A 4/20/2017    Procedure: ESOPHAGOGASTRODUODENOSCOPY WITH BIOPSIES AND COLD BIOPSY POLYPECTOMIES;  Surgeon: Jignesh Selby MD;  Location: [facility] ENDOSCOPY;  Service:    • LAPAROSCOPIC CHOLECYSTECTOMY     • LIPOMA EXCISION N/A 12/10/2021    Procedure: Excision of soft tissue mass lower back;  Surgeon: Nidia Smith MD;  Location: [facility] OR;  Service: General;  Laterality: N/A;   • MOUTH SURGERY      full mouth tooth extration   • WISDOM TOOTH EXTRACTION           Social History     Socioeconomic History   • Marital status:    Tobacco Use   • Smoking status: Never   • Smokeless tobacco: Never   Vaping Use   • Vaping Use: Never used   Substance and Sexual Activity   • Alcohol use: No   • Drug use: Never   • Sexual activity: Yes     Partners: Male     Birth control/protection: Surgical     Comment: Tubal         Family History   Problem Relation Age of Onset   • Arthritis Mother    • COPD Mother    • Asthma Mother    • Thyroid disease Mother    • Arthritis Father    • Diabetes Father    • Hypertension Father    • Hyperlipidemia Father    • Kidney disease Father    • Heart attack Father    • Coronary artery disease Father    • Dementia Father    • No Known Problems Son    • Colon cancer Neg Hx    • Liver cancer Neg Hx    • Liver disease Neg Hx    • Stomach cancer Neg Hx    • Esophageal cancer Neg Hx        Objective  Physical Exam:  /84 (BP Location: Left arm, Patient Position: Sitting)   Pulse 95   Temp 98.1 °F (36.7 °C) (Oral)   Resp 24   Ht 161.3 cm (63.5\")   Wt 86.2 kg (190 lb)   SpO2 97%   BMI 33.13 kg/m²      Physical Exam  Vitals and nursing note reviewed. "   Constitutional:       General: She is not in acute distress.     Appearance: Normal appearance. She is normal weight. She is not ill-appearing, toxic-appearing or diaphoretic.   HENT:      Head: Normocephalic and atraumatic.   Eyes:      Extraocular Movements: Extraocular movements intact.   Cardiovascular:      Rate and Rhythm: Normal rate and regular rhythm.      Pulses: Normal pulses.   Pulmonary:      Effort: Pulmonary effort is normal.   Abdominal:      General: Abdomen is flat.   Musculoskeletal:         General: Normal range of motion.      Cervical back: Normal range of motion.      Comments: Tenderness and soft tissue swelling to the left fourth toe.  Nail is intact, no laceration, no deformity.   Skin:     General: Skin is warm and dry.   Neurological:      General: No focal deficit present.      Mental Status: She is alert.   Psychiatric:         Mood and Affect: Mood normal.         Behavior: Behavior normal.           Procedures    ED Course:         Lab Results (last 24 hours)     ** No results found for the last 24 hours. **           No radiology results from the last 24 hrs       MDM    Marilu Godoy is a 32 y.o. female who presents to the emergency department for evaluation of left fourth toe pain    Differential diagnosis includes contusion, fracture among other etiologies.    Plain films of the left foot ordered for further evaluation of the patient's presentation.    Chart review including any outside testing was negative for any acute process    Plan for disposition is home with outpatient follow-up.  Patient/family comfortable with and understanding of the plan.      Final diagnoses:   Contusion of left lesser toe(s) w/o damage to nail, Stew Cheng PA-C  01/20/23 6609

## 2023-01-21 NOTE — ED PROVIDER NOTES
[address] ENCOUNTER      Pt Name: Alan Dumont  MRN: [id number]  YOB: 1990  Date of evaluation: 1/21/2023  Provider: Christiano Zeng DO    CHIEF COMPLAINT       Chief Complaint   Patient presents with    Chest Pain     Since last night           HISTORY OF PRESENT ILLNESS  (Location/Symptom, Timing/Onset, Context/Setting, Quality, Duration, Modifying Factors, Severity.)   Christine Franco is a 28 y.o. female who presents to the emergency department for chest pain and shortness of breath. Patient states that she was actually at [facility] in the emergency department last night being checked out because she thought she had broke her pinky toe by dropping a coffee mug on it. She states that while she was there in the emergency department they kept telling her that her respiratory rate was high. She states it was 24. Patient states that she is continued to have some increased respiratory rate and shortness of breath since that time of discharge. She states she was having some mild intermittent chest pain last night also. However she states upon awaking today around 9 or 930 she has had continuous chest pain all day. She rates it as a 7 out of 10 right now and advises that is probably the worse now than it has been all day today. She describes it more as a pressure sensation. She states its in the center of her chest and goes up towards her left shoulder area. She denies any trauma or injury to the area. Denies any thing that she is done at home is made it any better or any worse. She denies any nausea vomiting with the symptoms but states she has had some diarrhea for couple of days. Denies any diaphoresis with the symptoms. She states that she does feel short of breath but she is not sure if her shortness of breath and or chest pain are associated or there are 2 different things.   She states that she has had a history of pulmonary embolism in the past.  She is currently on Eliquis. She advises that she has 3 clotting disorders. Patient denies any headaches out of ordinary. Denies any loss of taste or smell. Denies any earache or sore throat. Denies any cough out of ordinary. Denies any body aches. Denies any fevers or chills. Denies any sick contacts that she is aware of. Patient states that she has never been diagnosed with hypertension she states her blood pressure usually runs a little low. She has been diagnosed with hypercholesterolemia and does take medication for this but she cannot remember what it is called. She denies any diagnosis of diabetes. She is not a smoker. She states that her father had open heart surgery in his 45s but she is not sure why he had the surgery for if it is for coronary artery bypass graft with some type of valvular surgery but we will still count this as a primary relatives with CAD. Nursing notes were reviewed. REVIEW OFSYSTEMS    (2-9 systems for level 4, 10 or more for level 5)   ROS:  General:  No fevers, no chills, no weakness  Cardiovascular:  +chest pain, no palpitations  Respiratory:  + shortness of breath, no cough, no wheezing  Gastrointestinal:  No pain, no nausea, no vomiting, no diarrhea  Musculoskeletal:  No muscle pain, no joint pain  Skin:  No rash, no easy bruising  Neurologic:  No speech problems, no headache, no extremity weakness  Psychiatric:  No anxiety  Genitourinary:  No dysuria, no hematuria    Except as noted above the remainder of the review of systems was reviewed and negative.        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[facility]     Factor 5 Leiden mutation, heterozygous ([facility]     Fibromyalgia     Pulmonary emboli ([facility]          SURGICAL HISTORY       Past Surgical History:   Procedure Laterality Date     SECTION      CHOLECYSTECTOMY      TUBAL LIGATION CURRENT MEDICATIONS       Previous Medications    ALBUTEROL SULFATE (PROAIR RESPICLICK) 183 (90 BASE) MCG/ACT AEROSOL POWDER INHALATION    Inhale 2 puffs into the lungs every 4 hours as needed for Wheezing or Shortness of Breath    APIXABAN (ELIQUIS) 5 MG TABS TABLET    Take by mouth 2 times daily    ASPIRIN 81 MG EC TABLET    Take 81 mg by mouth daily    ATORVASTATIN (LIPITOR) 20 MG TABLET    Take 40 mg by mouth nightly    AZELASTINE HCL (ASTELIN NA)    1 spray by Nasal route 2 times daily    B COMPLEX VITAMINS CAPSULE    Take 1 capsule by mouth daily    BUSPIRONE (BUSPAR) 5 MG TABLET    Take 5 mg by mouth 3 times daily    CHOLECALCIFEROL (VITAMIN D3) 50 MCG (2000 UT) CAPS    Take by mouth daily    DULOXETINE (CYMBALTA) 60 MG EXTENDED RELEASE CAPSULE    Take 60 mg by mouth daily    FLUTICASONE (FLONASE) 50 MCG/ACT NASAL SPRAY    1 spray by Each Nostril route daily    IVABRADINE (CORLANOR) 5 MG TABS TABLET    Take 5 mg by mouth 2 times daily (with meals) 1.5 tablet twice daily    LIDOCAINE (LIDODERM) 5 %    Place 1 patch onto the skin daily 12 hours on, 12 hours off. MOMETASONE-FORMOTEROL (DULERA) 200-5 MCG/ACT INHALER    Inhale 2 puffs into the lungs every 12 hours    NONFORMULARY    Ajoby monthly injection for migraines. ONDANSETRON (ZOFRAN ODT) 4 MG DISINTEGRATING TABLET    Take 1 tablet by mouth every 8 hours as needed for Nausea or Vomiting    PANTOPRAZOLE (PROTONIX) 20 MG TABLET    Take 40 mg by mouth daily    RIMEGEPANT SULFATE (NURTEC) 75 MG TBDP    Take by mouth daily as needed    ZONISAMIDE (ZONEGRAN) 100 MG CAPSULE    Take 100 mg by mouth nightly       ALLERGIES     Cefdinir, Imitrex [sumatriptan], and Amoxicillin    FAMILY HISTORY     History reviewed. No pertinent family history.        SOCIAL HISTORY       Social History     Socioeconomic History    Marital status:      Spouse name: None    Number of children: None    Years of education: None    Highest education level: None Tobacco Use    Smoking status: Never    Smokeless tobacco: Never   Vaping Use    Vaping Use: Never used   Substance and Sexual Activity    Alcohol use: Never    Drug use: Never    Sexual activity: Yes     Partners: Male         PHYSICAL EXAM    (up to 7 for level 4, 8 or more for level 5)     ED Triage Vitals   BP Temp Temp src Pulse Resp SpO2 Height Weight   -- -- -- -- -- -- -- --       Physical Exam  General :Patient is awake, alert, oriented, in no acute distress, nontoxic appearing  HEENT: Pupils are equally round and reactive to light, EOMI, conjunctivae clear. Oral mucosa is moist, no exudate. Uvula is midline  Cardiac: Heart regular rate, rhythm, no murmurs, rubs, or gallops  Lungs: Lungs are clear to auscultation, there is no wheezing, rhonchi, or rales. There is no use of accessory muscles. Musculoskeletal: No focal muscle deficits are appreciated  Neuro: Motor intact, sensory intact, level of consciousness is normal  Dermatology: Skin is warm and dry  Psych: Mentation is grossly normal, cognition is grossly normal. Affect is appropriate. DIAGNOSTIC RESULTS     EKG: All EKG's are interpreted by the Emergency Department Physician who either signs or Co-signs this chart in the [address] a cardiologist.    The EKG interpreted by me shows sinus tachycardia. Low voltage precordial leads. Heart rates 110 bpm, OR interval is 65 ms, QRS durations 84, QT is 319 QTC is 433 ms. No acute ST elevations concerning for acute myocardial infarction. No ST depressions concerning for acute myocardial ischemia. RADIOLOGY:   Non-plain film images such as CT, Ultrasound and MRI are read by the radiologist. Plain radiographic images are visualized and preliminarily interpreted by the emergency physician with the below findings:      [] Radiologist's Report Reviewed:  XR CHEST PORTABLE   Final Result      No acute pulmonary disease.          CT CHEST PULMONARY EMBOLISM W CONTRAST    (Results Pending)         ED BEDSIDE ULTRASOUND:   Performed by ED Physician - none    LABS:    I have reviewed and interpreted all of the currently available lab results from this visit (ifapplicable):  Results for orders placed or performed during the hospital encounter of 01/21/23   CBC with Auto Differential   Result Value Ref Range    WBC 10.5 4.0 - 11.0 K/uL    RBC 5.08 3.80 - 5.80 M/uL    Hemoglobin 15.7 11.5 - 16.5 g/dL    Hematocrit 46.8 37.0 - 47.0 %    MCV 92.1 80.0 - 100.0 fL    MCH 30.9 27.0 - 32.0 pg    MCHC 33.5 31.0 - 35.0 g/dL    RDW 12.2 11.0 - 16.0 %    Platelets 209 453 - 879 K/uL    MPV 9.4 6.0 - 10.0 fL    Neutrophils % 69.2 %    Immature Granulocytes % 0.3 0.0 - 5.0 %    Lymphocytes % 24.1 %    Monocytes % 5.0 %    Eosinophils % 0.9 %    Basophils % 0.5 %    Neutrophils Absolute 7.3 2.0 - 7.5 K/uL    Immature Granulocytes # 0.0 K/uL    Lymphocytes Absolute 2.5 1.5 - 4.0 K/uL    Monocytes Absolute 0.5 0.2 - 0.8 K/uL    Eosinophils Absolute 0.1 0.0 - 0.4 K/uL    Basophils Absolute 0.1 0.0 - 0.1 K/uL   Comprehensive Metabolic Panel   Result Value Ref Range    Sodium 138 136 - 145 mmol/L    Potassium 3.5 3.4 - 5.1 mmol/L    Chloride 99 98 - 107 mmol/L    CO2 26 20 - 30 mmol/L    Anion Gap 13 3 - 16    Glucose 97 74 - 106 mg/dL    BUN 11 6 - 20 mg/dL    Creatinine 0.6 0.4 - 1.2 mg/dL    Est, Glom Filt Rate >60 >59    Calcium 9.6 8.5 - 10.5 mg/dL    Total Protein 8.1 6.4 - 8.3 g/dL    Albumin 4.5 3.4 - 4.8 g/dL    Albumin/Globulin Ratio 1.3 0.8 - 2.0    Total Bilirubin 0.4 0.3 - 1.2 mg/dL    Alkaline Phosphatase 112 (H) 25 - 100 U/L    ALT 14 4 - 36 U/L    AST 14 8 - 33 U/L    Globulin 3.6 Not Established g/dL   Troponin   Result Value Ref Range    Troponin <0.30 <0.30 ng/mL        All other labs were within normal range or not returned as of this dictation.     EMERGENCY DEPARTMENT COURSE and DIFFERENTIAL DIAGNOSIS/MDM:   Vitals:    Vitals:    01/21/23 1726 01/21/23 1733   BP:  (!) 138/99   Pulse:  (!) 113   Resp:  24   Temp: 98.2 °F (36.8 °C)   TempSrc:  Oral   SpO2:  100%   Weight: 190 lb (86.2 kg)    Height: 5' 3.5\" (1.613 m)        MEDICATIONS ADMINISTERED IN ED:  Medications   0.9 % sodium chloride bolus (1,000 mLs IntraVENous New Bag 1/21/23 1746)   aspirin chewable tablet 324 mg (324 mg Oral Given 1/21/23 1746)   iopamidol (ISOVUE-370) 76 % injection 100 mL (100 mLs IntraVENous Given 1/21/23 1823)       After initial evaluation examination I did have conversation with the patient about upcoming plan, treatment and possible disposition which they are agreeable to the times dictation. Patient vies we give her fluid bolus normal saline 1 L for her mild tachycardia. Heart rates around 113. Patient's pulse ox has been 99 to 100% advised her that statistical likelihood that she has a pulmonary emboli or 1 its even a large enough to be significant would statistically be low with a normal pulse ox. Patient states that when she had her initial pulmonary emboli her pulse ox was \"normal\". Patient states that she normally has D-dimer performed at [facility] and that is always usually elevated and she ends up having to have a CT scan of her chest performed. Advised with her previous history we will just go ahead and perform the CT of her chest to rule out pulmonary emboli. Likelihood though is statistically low since she is on Eliquis she states that she has never had any clotting problems since starting that medication. She states that her last pulmonary emboli was back in 2016. Patient advised we going give her aspirin 324 mg here for her chest discomfort. She was also offered sublingual nitro but she refused because she did not want to have a headache. Advised that this could help with her chest discomfort but again she did not want the headache associated with the medication even though offering her Tylenol or Motrin for her headache. Patient will have CT chest pulmonary embolism with contrast protocol performed. She also have CBC, CMP, troponin and a 3-hour troponin performed. Patient's final disposition will determine once her radiological diagnostic studies been performed reviewed however based on the patient's presentation she would definitely have a heart score less than 3 she is actually right around a 2 which puts her at a statistically low likelihood of having a major acute cardiac event within the next 6 weeks. This being said as long she has 2 negative troponins. Statistically speaking she is at a 2.5% chance. This means that she is not at a 0% risk but she has had a minimally low chance and usually safe to be discharged home. I discussed this with the patient and she does state her understanding. Advised that to completely rule her out we would need to do the initial troponin now and the 3-hour troponin since her chest pain has not been ongoing for greater than 8 hours she is just that market 6 hours. Otherwise she is resting comfortably stretcher no acute distress nontoxic-appearing. History: 0  EC  Patient Age: 1  *Risk factors for Atherosclerotic disease: Hypercholesterolemia; Obesity; Positive family History  Risk Factors: 2  Troponin: 0  Heart Score Total: 2     Blood work showed count 10,500, hemoglobin is 15.7, hematocrit 46.8, platelet counts 293. Comprehensive metabolic panel was benign except for an alkaline phosphatase 112 mildly elevated. Troponin was nondetectable less than 0.30.  3-hour troponin pending at time of shift change. Portable chest radiograph read by radiology as no acute pulmonary disease    CT chest with pulmonary embolism protocol pending at shift change    Patient's final disposition would not be determined prior to shift change. Final disposition return by the oncoming physician. 19:00p.m. I have signed out Joya Franco's Emergency Department care to Dr. Garrett Fisher.  We discussed the pertinent history, physical exam, completed/pending test results (if applicable) and current treatment plan. Please refer to his/her chart for the patients remaining Emergency Department course and final disposition. Is this patient to be included in the SEP-1 Core Measure due to severe sepsis or septic shock? No   Exclusion criteria - the patient is NOT to be included for SEP-1 Core Measure due to: Infection is not suspected          CONSULTS:  None    PROCEDURES:  Procedures    CRITICAL CARE TIME    Total Critical Care time was 0 minutes, excluding separately reportable procedures. There was a high probability of clinically significant/life threatening deterioration in the patient's condition which required my urgent intervention. FINAL IMPRESSION      1. Chest pain, unspecified type    2. Shortness of breath          DISPOSITION/PLAN   DISPOSITION        PATIENT REFERRED TO:  No follow-up provider specified. DISCHARGE MEDICATIONS:  New Prescriptions    No medications on file       Comment: Please note this report has been produced using speech recognition software and may contain errorsrelated to that system including errors in grammar, punctuation, and spelling, as well as words and phrases that may be inappropriate. If there are any questions or concerns please feel free to contact the dictating providerfor clarification.     Capo Garcia DO  Attending Emergency Physician               Capo Garcia DO  01/21/23 7119

## 2023-01-21 NOTE — DISCHARGE INSTRUCTIONS
Your x-ray does not show any evidence of a fracture.  Your injury is most likely a contusion or a bruise.  Please take Tylenol and Motrin as needed for pain.

## 2023-01-22 NOTE — ED PROVIDER NOTES
Emergency Department Encounter  Location: [address] [place] [address] [address]    Patient: Brandon Betancourt  MRN: [id number]  : 1990  Date of evaluation: 2023  ED Provider: Jeane Hunter MD    [employer] Joan was checked out to me by Dr. Tita Cha pending radiological report for CTA and for second troponin scheduled for 8:30 PM.. Please see his/her initial documentation for details of the patient's initial ED presentation, physical exam and completed studies. In brief, Brandon Betancourt is a 28 y.o. female that presented to the emergency department with complaint of chest discomfort and shortness of breath that started last night that was intermittent in nature but had been present for approximately 7 hours prior to arrival.  Patient's care was taken over from Dr. Tita Cha pending CTA of the chest and repeat troponin at 8:30 PM.  Patient states that she has had several PEs in the past and is currently on Eliquis. Patient was seen at [facility] yesterday after she injured her toe and they state they are concerned that her respiratory rate was 24. Patient states that her discomfort in her chest is reproducible on palpation and with changes in position.     I have reviewed and interpreted all of the currently available lab results and diagnostics from this visit:  Results for orders placed or performed during the hospital encounter of 23   CBC with Auto Differential   Result Value Ref Range    WBC 10.5 4.0 - 11.0 K/uL    RBC 5.08 3.80 - 5.80 M/uL    Hemoglobin 15.7 11.5 - 16.5 g/dL    Hematocrit 46.8 37.0 - 47.0 %    MCV 92.1 80.0 - 100.0 fL    MCH 30.9 27.0 - 32.0 pg    MCHC 33.5 31.0 - 35.0 g/dL    RDW 12.2 11.0 - 16.0 %    Platelets 873 686 - 680 K/uL    MPV 9.4 6.0 - 10.0 fL    Neutrophils % 69.2 %    Immature Granulocytes % 0.3 0.0 - 5.0 %    Lymphocytes % 24.1 %    Monocytes % 5.0 %    Eosinophils % 0.9 %    Basophils % 0.5 %    Neutrophils Absolute 7.3 2.0 - 7.5 K/uL    Immature Granulocytes # 0.0 K/uL    Lymphocytes Absolute 2.5 1.5 - 4.0 K/uL    Monocytes Absolute 0.5 0.2 - 0.8 K/uL    Eosinophils Absolute 0.1 0.0 - 0.4 K/uL    Basophils Absolute 0.1 0.0 - 0.1 K/uL   Comprehensive Metabolic Panel   Result Value Ref Range    Sodium 138 136 - 145 mmol/L    Potassium 3.5 3.4 - 5.1 mmol/L    Chloride 99 98 - 107 mmol/L    CO2 26 20 - 30 mmol/L    Anion Gap 13 3 - 16    Glucose 97 74 - 106 mg/dL    BUN 11 6 - 20 mg/dL    Creatinine 0.6 0.4 - 1.2 mg/dL    Est, Glom Filt Rate >60 >59    Calcium 9.6 8.5 - 10.5 mg/dL    Total Protein 8.1 6.4 - 8.3 g/dL    Albumin 4.5 3.4 - 4.8 g/dL    Albumin/Globulin Ratio 1.3 0.8 - 2.0    Total Bilirubin 0.4 0.3 - 1.2 mg/dL    Alkaline Phosphatase 112 (H) 25 - 100 U/L    ALT 14 4 - 36 U/L    AST 14 8 - 33 U/L    Globulin 3.6 Not Established g/dL   Troponin   Result Value Ref Range    Troponin <0.30 <0.30 ng/mL   Troponin   Result Value Ref Range    Troponin <0.30 <0.30 ng/mL     XR CHEST PORTABLE    Result Date: 1/21/2023  EXAM: PORTABLE AP CHEST X-RAY INDICATION: chest pain, short of breath, COMPARISON: 10/2/2021 FINDINGS: There is no focal consolidation, pleural effusion, or pneumothorax. The cardiomediastinal silhouette is normal. The visible bony thorax is intact. No acute pulmonary disease. CT CHEST PULMONARY EMBOLISM W CONTRAST    Result Date: 1/21/2023  EXAM: CT ANGIOGRAPHY CHEST WITH CONTRAST (PULMONARY EMBOLUS PROTOCOL) INDICATION: History of PE, shortness of breath, tachycardia, chest pain, Chest pain COMPARISON: None. TECHNIQUE: Axial CT imaging obtained through the chest using CT pulmonary angiogram protocol. Axial images, multiplanar reformatted images and maximum intensity projection images were reviewed. Up-to-date CT equipment and radiation dose reduction techniques were employed. IV Contrast Media and volume: 80 cc Isovue 370 FINDINGS: The diagnostic quality is adequate.  No filling defects are present in the pulmonary arteries to suggest emboli. There is no evidence of right heart strain. The aorta is normal in caliber without evidence of aneurysm or dissection. The lungs are free of focal consolidations. No suspicious pulmonary nodules are seen. There is no pleural effusion or pneumothorax. The heart is mildly enlarged. There is no pericardial effusion. The trachea is patent. No supraclavicular, axillary, hilar or mediastinal lymphadenopathy is visible. The osseous thorax is intact. Limited evaluation of the upper abdomen is unremarkable. 1.  No evidence of pulmonary embolism. 2.  No acute process in the chest. 3.  Mild cardiomegaly. Final ED Course and MDM: In brief, Gaurav Smallwood is a 28 y.o. female whose care was signed out to me by the outgoing provider. In brief, please see above HPI, examination and treatment plan per Dr. Leeanna Yates prior to shift change. Prior to shift change patient was given 324 mg of aspirin p.o. CTA was obtained prior to shift change and report per radiology was negative for PE or acute process but did note mild cardiomegaly. Chest x-ray prior to shift change also reported no acute process per radiology. Review of patient's labs that were obtained prior to shift change shows CMP within normal limits along with a white count of 10.5 thousand hemoglobin 15.7. Patient's initial troponin was reported as negative. EKG prior to shift change shows sinus tachycardia rate of 110 per EDMD.  Patient reports that she is already on Eliquis secondary to coagulopathy. Patient ruled out for acute MI secondary to over 3-hour observation on telemetry and 2 negative troponins.   Results reviewed with patient and and an action plan put in place for patient to follow with Dr. Nathaniel Fagan who is patient's cardiologist for scheduling for outpatient stress test.  Patient was appreciative the care and agrees with plan above    ED Medication Orders (From admission, onward)      Start Ordered     Status Ordering Provider    01/21/23 1823 01/21/23 1823  iopamidol (ISOVUE-370) 76 % injection 100 mL  IMG ONCE PRN         Last MAR action: Given - by Sujatha Kum[facility] RAUL    01/21/23 1745 01/21/23 1739  0.9 % sodium chloride bolus  ONCE         Last MAR action: Stopped - by Marcus Brown on 01/21/23 at 2122 RICE, RAUL    01/21/23 1745 01/21/23 1739  aspirin chewable tablet 324 mg  ONCE         Last MAR action: Given - by Batool Harrington on 01/21/23 at 1746 RAUL IVAN            Final Impression      1. Chest pain, unspecified type Stable   2. Shortness of breath Stable   3.  Chest pain, rule out acute myocardial infarction Stable       DISPOSITION Decision To Discharge 01/21/2023 09:21:01 PM     (Please note that portions of this note may have been completed with a voice recognition program. Efforts were made to edit the dictations but occasionally words are mis-transcribed.)    Gurmeet Navarro MD  [address] [place] [address]  01/21/23 2122

## 2023-01-23 ENCOUNTER — TELEPHONE (OUTPATIENT)
Dept: CARDIOLOGY | Facility: CLINIC | Age: 33
End: 2023-01-23
Payer: COMMERCIAL

## 2023-01-23 ENCOUNTER — HOSPITAL ENCOUNTER (EMERGENCY)
Facility: HOSPITAL | Age: 33
Discharge: HOME | End: 2023-01-23
Attending: EMERGENCY MEDICINE | Admitting: EMERGENCY MEDICINE
Payer: COMMERCIAL

## 2023-01-23 VITALS
HEIGHT: 64 IN | OXYGEN SATURATION: 97 % | DIASTOLIC BLOOD PRESSURE: 72 MMHG | HEART RATE: 84 BPM | WEIGHT: 190 LBS | TEMPERATURE: 98.6 F | RESPIRATION RATE: 20 BRPM | SYSTOLIC BLOOD PRESSURE: 127 MMHG | BODY MASS INDEX: 32.44 KG/M2

## 2023-01-23 DIAGNOSIS — G43.109 OCULAR MIGRAINE: Primary | ICD-10-CM

## 2023-01-23 PROCEDURE — 96374 THER/PROPH/DIAG INJ IV PUSH: CPT

## 2023-01-23 PROCEDURE — 25010000002 KETOROLAC TROMETHAMINE PER 15 MG: Performed by: PHYSICIAN ASSISTANT

## 2023-01-23 PROCEDURE — 99283 EMERGENCY DEPT VISIT LOW MDM: CPT

## 2023-01-23 PROCEDURE — 96375 TX/PRO/DX INJ NEW DRUG ADDON: CPT

## 2023-01-23 PROCEDURE — 25010000002 METOCLOPRAMIDE PER 10 MG: Performed by: PHYSICIAN ASSISTANT

## 2023-01-23 PROCEDURE — 25010000002 DIPHENHYDRAMINE PER 50 MG: Performed by: PHYSICIAN ASSISTANT

## 2023-01-23 RX ORDER — DIPHENHYDRAMINE HYDROCHLORIDE 50 MG/ML
25 INJECTION INTRAMUSCULAR; INTRAVENOUS ONCE
Status: COMPLETED | OUTPATIENT
Start: 2023-01-23 | End: 2023-01-23

## 2023-01-23 RX ORDER — ERYTHROMYCIN 5 MG/G
1 OINTMENT OPHTHALMIC ONCE
Status: COMPLETED | OUTPATIENT
Start: 2023-01-23 | End: 2023-01-23

## 2023-01-23 RX ORDER — METOCLOPRAMIDE HYDROCHLORIDE 5 MG/ML
10 INJECTION INTRAMUSCULAR; INTRAVENOUS ONCE
Status: COMPLETED | OUTPATIENT
Start: 2023-01-23 | End: 2023-01-23

## 2023-01-23 RX ORDER — KETOROLAC TROMETHAMINE 30 MG/ML
30 INJECTION, SOLUTION INTRAMUSCULAR; INTRAVENOUS ONCE
Status: COMPLETED | OUTPATIENT
Start: 2023-01-23 | End: 2023-01-23

## 2023-01-23 RX ADMIN — METOCLOPRAMIDE 10 MG: 5 INJECTION, SOLUTION INTRAMUSCULAR; INTRAVENOUS at 21:27

## 2023-01-23 RX ADMIN — SODIUM CHLORIDE 1000 ML: 9 INJECTION, SOLUTION INTRAVENOUS at 21:27

## 2023-01-23 RX ADMIN — ERYTHROMYCIN 1 APPLICATION: 5 OINTMENT OPHTHALMIC at 21:30

## 2023-01-23 RX ADMIN — KETOROLAC TROMETHAMINE 30 MG: 30 INJECTION, SOLUTION INTRAMUSCULAR; INTRAVENOUS at 21:27

## 2023-01-23 RX ADMIN — DIPHENHYDRAMINE HYDROCHLORIDE 25 MG: 50 INJECTION, SOLUTION INTRAMUSCULAR; INTRAVENOUS at 21:27

## 2023-01-23 NOTE — Clinical Note
[facility] EMERGENCY DEPARTMENT  [address] [facility] [address]  Phone: [phone]    Marilu Godoy was seen and treated in our emergency department on 1/23/2023.  She may return to work on 01/25/2023.         Thank you for choosing [facility].    Elva Ram PA-C

## 2023-01-23 NOTE — TELEPHONE ENCOUNTER
Reviewed CT results.  I also reviewed her treadmill stress test, echo, and tilt table from 6/2022-- all normal.      She can be scheduled for March.

## 2023-01-23 NOTE — TELEPHONE ENCOUNTER
Pt was asking for sooner Appointment than 3/3/23 due to CT. CT is in care everywhere, Please advise

## 2023-01-23 NOTE — TELEPHONE ENCOUNTER
Sent information to patient via [employer]. Advised for her to reach out to scheduling when ready.

## 2023-01-23 NOTE — TELEPHONE ENCOUNTER
Caller: Marilu Godoy    Relationship: Self    Best call back number: [phone]     What is the best time to reach you: ANY    Who are you requesting to speak with (clinical staff, provider,  specific staff member): [facility] STAFF    Do you know the name of the person who called: PT    What was the call regarding: PT WAS SEEN IN ER OVER WEEKEND-HAD A CT PREFORMED AND WAS TOLD HER HEART WAS MILDLY ENLARGED AND TO F.U WITH BREEDING. HUB 1ST AVAIL IS 3/3/23    Do you require a callback: YES

## 2023-01-24 NOTE — DISCHARGE INSTRUCTIONS
Take migraine medications as needed as prescribed by neurology.  Take over-the-counter migraine medications as needed per directions on the package.  Drink plenty of fluids to stay hydrated.  Follow-up with your PCP for further outpatient evaluation as needed.  Follow-up with your neurologist for further outpatient evaluation of migraines.  Return to the ER for new or worsening symptoms or acute concerns.

## 2023-01-24 NOTE — ED PROVIDER NOTES
Subjective   History of Present Illness   Patient is a 32-year-old female with history of anxiety, asthma, bleeding disorder, constipation, DVT, among others presenting to the ER with complaints of pain behind her left eye.  She states that it feels like a migraine.  She states that she has had drainage from her left eye.  She states she has wiped a lot of it away and there is still a lot there now.  She states that symptoms have been going on for 2 days.  She took Tylenol at around 5:30 PM but denies any additional medications.  She states she has had migraines before but not behind her left eye like this.  It appears that she follows with neurology for migraines.  She was seen in the ER in October for anisocoria without other focal neurologic deficits.  This was thought to be secondary to complex migraine or medication side effects.  She has followed with neurology since then and started on medication for migraines.  She does state that she has had some intermittent blurry vision but believes this is secondary to the drainage.  She denies nausea, vomiting, and additional symptoms or complaints at this time.    Review of Systems   Eyes: Positive for discharge.   Neurological: Positive for headaches.   All other systems reviewed and are negative.      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    • Bleeding disorder (formerly [facility]) 11-13-16   • Body piercing     TONGUE, NOSE, TWO IN EACH EAR   • Bronchitis    • Chest pain 06/22/2021    last CP 2-3 months ago   • Clotting disorder (formerly [facility])     factor 5   • Constipation    • COVID-19 vaccine series completed     Pfizer-plus booster   • Deep vein thrombosis (formerly [facility]) 11/13/2016   • Diarrhea    • Diverticulitis    • Elevated cholesterol    • Endometriosis    • Factor 5 Leiden mutation, heterozygous (formerly [facility])    • Fibroid    • Fibromyalgia    • Full  dentures 2021    advised no adhesives DOS   • Gastroparesis    • GERD (gastroesophageal reflux disease)    • H/O blood clots    • H/O cardiovascular stress test 2021    reported several years ago-exercise wnl   • Headache    • Hiatal hernia    • High cholesterol    • History of recurrent UTIs    • HPV (human papilloma virus) infection    • Hx of echocardiogram    • Hx of gout 2021   • Hypertension    • Hypoglycemia    • Inappropriate sinus node tachycardia    • Kidney infection 2021    history only   • Migraine    • Nausea & vomiting    • Obesity    • Osteoarthritis    • Ovarian cyst    • Pneumonia    • Pollen allergies    • PONV (postoperative nausea and vomiting)    • Protein S deficiency (HCC) 2016    labs from hospitalization for PE   • Pulmonary embolism (HCC) 2016    on eliquis since that time   • Recurrent pregnancy loss, antepartum condition or complication    • Sleep apnea     no CPAP- states insurance took it back   • Tattoo     LOWER BACK   • Varicella        Allergies   Allergen Reactions   • Ceftin [Cefuroxime Axetil] Shortness Of Breath and Rash     Numbness in mouth and throat   • Cefuroxime Hives, Rash and Shortness Of Breath     Numbness in mouth and throat  Numbness in mouth and throat   • Sumatriptan Shortness Of Breath and Nausea Only     Other reaction(s): Nausea Only, Other (See Comments)  Worsening migraine  Worsening migraine   • Amoxicillin Rash, Hives and Itching       Past Surgical History:   Procedure Laterality Date   •  SECTION        and  and    •  SECTION WITH TUBAL N/A 1/15/2019    Procedure:  SECTION REPEAT WITH TUBAL;  Surgeon: Alva Boyer MD;  Location: [facility] LABOR DELIVERY;  Service: Obstetrics/Gynecology   • COLONOSCOPY N/A 2017    Procedure: COLONOSCOPY WITH BIOPSIES AND ARGON THERMAL ABLATION;  Surgeon: Jignesh Selby MD;  Location: [facility] ENDOSCOPY;  Service:    • D & C HYSTEROSCOPY ENDOMETRIAL  ABLATION N/A 3/10/2022    Procedure: DILATATION AND CURETTAGE DIAGNOSTIC HYSTEROSCOPY NOVASURE ENDOMETRIAL ABLATION;  Surgeon: Alva Boyer MD;  Location: [facility] OR;  Service: Obstetrics/Gynecology;  Laterality: N/A;   • DIAGNOSTIC LAPAROSCOPY N/A 4/2/2018    Procedure: DIAGNOSTIC LAPAROSCOPY AND ABLATION OF ENDOMETRIOSIS;  Surgeon: Evin Zamudio MD;  Location: [facility] OR;  Service: Obstetrics/Gynecology   • ENDOMETRIAL ABLATION     • ENDOSCOPIC FUNCTIONAL SINUS SURGERY (FESS) Right 6/25/2021    Procedure: Right endoscopic total ethmoidectomy, right endoscopic middle meatal antrotomy with cyst removal, right endoscopic frontal recess exploration with cyst removal;  Surgeon: Saulo Brito MD;  Location: [facility] OR;  Service: ENT;  Laterality: Right;   • ENDOSCOPY N/A 4/20/2017    Procedure: ESOPHAGOGASTRODUODENOSCOPY WITH BIOPSIES AND COLD BIOPSY POLYPECTOMIES;  Surgeon: Jignesh Selby MD;  Location: [facility] ENDOSCOPY;  Service:    • LAPAROSCOPIC CHOLECYSTECTOMY     • LIPOMA EXCISION N/A 12/10/2021    Procedure: Excision of soft tissue mass lower back;  Surgeon: Nidia Smith MD;  Location: [facility] OR;  Service: General;  Laterality: N/A;   • MOUTH SURGERY      full mouth tooth extration   • WISDOM TOOTH EXTRACTION         Family History   Problem Relation Age of Onset   • Arthritis Mother    • COPD Mother    • Asthma Mother    • Thyroid disease Mother    • Arthritis Father    • Diabetes Father    • Hypertension Father    • Hyperlipidemia Father    • Kidney disease Father    • Heart attack Father    • Coronary artery disease Father    • Dementia Father    • No Known Problems Son    • Colon cancer Neg Hx    • Liver cancer Neg Hx    • Liver disease Neg Hx    • Stomach cancer Neg Hx    • Esophageal cancer Neg Hx        Social History     Socioeconomic History   • Marital status:    Tobacco Use   • Smoking status: Never   • Smokeless tobacco: Never   Vaping Use   • Vaping Use: Never used  "  Substance and Sexual Activity   • Alcohol use: No   • Drug use: Never   • Sexual activity: Yes     Partners: Male     Birth control/protection: Surgical     Comment: Tubal           Objective   Physical Exam  Vitals and nursing note reviewed.   Constitutional:       General: She is not in acute distress.     Appearance: She is not toxic-appearing.   HENT:      Head: Normocephalic and atraumatic.      Right Ear: Tympanic membrane, ear canal and external ear normal.      Left Ear: Tympanic membrane, ear canal and external ear normal.      Nose: Nose normal.   Eyes:      General:         Left eye: Discharge (purulent) present.     Extraocular Movements: Extraocular movements intact.      Pupils: Pupils are equal, round, and reactive to light.   Cardiovascular:      Rate and Rhythm: Normal rate.   Pulmonary:      Effort: Pulmonary effort is normal. No respiratory distress.   Abdominal:      General: There is no distension.      Palpations: Abdomen is soft.   Musculoskeletal:         General: Normal range of motion.      Cervical back: Normal range of motion and neck supple.   Skin:     General: Skin is warm and dry.   Neurological:      General: No focal deficit present.      Mental Status: She is alert.   Psychiatric:         Mood and Affect: Mood normal.         Behavior: Behavior normal.         Procedures           ED Course                                 /72   Pulse 84   Temp 98.6 °F (37 °C) (Oral)   Resp 20   Ht 161.3 cm (63.5\")   Wt 86.2 kg (190 lb)   SpO2 97%   BMI 33.13 kg/m²             MDM   Patient evaluated in the ER for pain behind left eye and drainage from left eye.  She is afebrile and hemodynamically stable, nontoxic-appearing on exam.  No focal neurologic deficits.  Pupils are equal, round, reactive to light.  Differential diagnosis includes but is not limited to ocular migraine, conjunctivitis, tension headache, among others.  Initial plan is to provide patient with a migraine " cocktail including Reglan, Benadryl, IV fluids, Toradol as well as erythromycin ointment as patient does have some injection of the left eye and purulent drainage.  Will re-evaluate after migraine cocktail.    After migraine cocktail, patient states that symptoms have improved.  She still rates her headache at 5 out of 10 in severity but states it is much improved as it was 8 or 9 out of 10 upon arrival.  She was offered further treatment and head CT but states that she would prefer to go home and sleep as symptoms are tolerable now.  She was advised to follow-up with her PCP and neurologist for further outpatient evaluation of migraines.  Advised to continue medications prescribed by neurology for migraines and to take over-the-counter migraine medications as needed per directions on the package.  Also advised that she drink plenty of fluids to stay hydrated.  Precautions were given for return to the ER for any new or worsening symptoms.    Final diagnoses:   Ocular migraine       ED Disposition  ED Disposition     ED Disposition   Discharge    Condition   Stable    Comment   --             Deidre Barker MD  [address]  [facility] [address] [phone]    Schedule an appointment as soon as possible for a visit   For further outpatient evaluation as needed    Sandy Catalan, APRN  [address]  [facility] [address] [phone]    Schedule an appointment as soon as possible for a visit   For further outpatient evaluation of migraines if symptoms persist    [facility] Emergency Department  [address] [address] [phone]  Go to   As needed, If symptoms worsen         Medication List      Changed    calcium carbonate  MG chewable tablet  Commonly known as: TUMS EX  Chew 1 tablet Daily.  What changed:   · when to take this  · reasons to take this             Elva Ram PA-C  01/23/23 [id number]

## 2023-01-25 RX ORDER — ERYTHROMYCIN 5 MG/G
OINTMENT OPHTHALMIC EVERY 6 HOURS
Qty: 3.5 G | Refills: 0 | Status: SHIPPED | OUTPATIENT
Start: 2023-01-25 | End: 2023-02-14

## 2023-02-04 ENCOUNTER — APPOINTMENT (OUTPATIENT)
Dept: CT IMAGING | Facility: HOSPITAL | Age: 33
End: 2023-02-04
Payer: COMMERCIAL

## 2023-02-04 ENCOUNTER — HOSPITAL ENCOUNTER (EMERGENCY)
Facility: HOSPITAL | Age: 33
Discharge: HOME | End: 2023-02-04
Attending: EMERGENCY MEDICINE | Admitting: EMERGENCY MEDICINE
Payer: COMMERCIAL

## 2023-02-04 VITALS
HEART RATE: 111 BPM | HEIGHT: 60 IN | DIASTOLIC BLOOD PRESSURE: 58 MMHG | RESPIRATION RATE: 16 BRPM | WEIGHT: 190 LBS | TEMPERATURE: 99.7 F | BODY MASS INDEX: 37.3 KG/M2 | SYSTOLIC BLOOD PRESSURE: 104 MMHG | OXYGEN SATURATION: 97 %

## 2023-02-04 DIAGNOSIS — R11.0 NAUSEA: ICD-10-CM

## 2023-02-04 DIAGNOSIS — R10.31 RIGHT LOWER QUADRANT ABDOMINAL PAIN: Primary | ICD-10-CM

## 2023-02-04 DIAGNOSIS — R52 BODY ACHES: ICD-10-CM

## 2023-02-04 LAB
ALBUMIN SERPL-MCNC: 4.3 G/DL (ref 3.5–5.2)
ALBUMIN/GLOB SERPL: 1.2 G/DL
ALP SERPL-CCNC: 106 U/L (ref 39–117)
ALT SERPL W P-5'-P-CCNC: 15 U/L (ref 1–33)
ANION GAP SERPL CALCULATED.3IONS-SCNC: 13.6 MMOL/L (ref 5–15)
AST SERPL-CCNC: 14 U/L (ref 1–32)
B-HCG UR QL: NEGATIVE
BASOPHILS # BLD AUTO: 0.03 10*3/MM3 (ref 0–0.2)
BASOPHILS NFR BLD AUTO: 0.3 % (ref 0–1.5)
BILIRUB SERPL-MCNC: 0.8 MG/DL (ref 0–1.2)
BILIRUB UR QL STRIP: NEGATIVE
BUN SERPL-MCNC: 10 MG/DL (ref 6–20)
BUN/CREAT SERPL: 14.3 (ref 7–25)
CALCIUM SPEC-SCNC: 9.3 MG/DL (ref 8.6–10.5)
CHLORIDE SERPL-SCNC: 98 MMOL/L (ref 98–107)
CLARITY UR: CLEAR
CO2 SERPL-SCNC: 25.4 MMOL/L (ref 22–29)
COLOR UR: YELLOW
CREAT SERPL-MCNC: 0.7 MG/DL (ref 0.57–1)
DEPRECATED RDW RBC AUTO: 40.6 FL (ref 37–54)
EGFRCR SERPLBLD CKD-EPI 2021: 118 ML/MIN/1.73
EOSINOPHIL # BLD AUTO: 0.02 10*3/MM3 (ref 0–0.4)
EOSINOPHIL NFR BLD AUTO: 0.2 % (ref 0.3–6.2)
ERYTHROCYTE [DISTWIDTH] IN BLOOD BY AUTOMATED COUNT: 12.4 % (ref 12.3–15.4)
FLUAV RNA RESP QL NAA+PROBE: NOT DETECTED
FLUBV RNA RESP QL NAA+PROBE: NOT DETECTED
GLOBULIN UR ELPH-MCNC: 3.5 GM/DL
GLUCOSE SERPL-MCNC: 109 MG/DL (ref 65–99)
GLUCOSE UR STRIP-MCNC: NEGATIVE MG/DL
HCT VFR BLD AUTO: 44.8 % (ref 34–46.6)
HGB BLD-MCNC: 15.1 G/DL (ref 12–15.9)
HGB UR QL STRIP.AUTO: NEGATIVE
IMM GRANULOCYTES # BLD AUTO: 0.03 10*3/MM3 (ref 0–0.05)
IMM GRANULOCYTES NFR BLD AUTO: 0.3 % (ref 0–0.5)
KETONES UR QL STRIP: ABNORMAL
LEUKOCYTE ESTERASE UR QL STRIP.AUTO: NEGATIVE
LIPASE SERPL-CCNC: 19 U/L (ref 13–60)
LYMPHOCYTES # BLD AUTO: 0.26 10*3/MM3 (ref 0.7–3.1)
LYMPHOCYTES NFR BLD AUTO: 2.4 % (ref 19.6–45.3)
MCH RBC QN AUTO: 30.6 PG (ref 26.6–33)
MCHC RBC AUTO-ENTMCNC: 33.7 G/DL (ref 31.5–35.7)
MCV RBC AUTO: 90.9 FL (ref 79–97)
MONOCYTES # BLD AUTO: 0.22 10*3/MM3 (ref 0.1–0.9)
MONOCYTES NFR BLD AUTO: 2 % (ref 5–12)
NEUTROPHILS NFR BLD AUTO: 10.23 10*3/MM3 (ref 1.7–7)
NEUTROPHILS NFR BLD AUTO: 94.8 % (ref 42.7–76)
NITRITE UR QL STRIP: NEGATIVE
NRBC BLD AUTO-RTO: 0 /100 WBC (ref 0–0.2)
PH UR STRIP.AUTO: <=5 [PH] (ref 5–8)
PLATELET # BLD AUTO: 295 10*3/MM3 (ref 140–450)
PMV BLD AUTO: 9.8 FL (ref 6–12)
POTASSIUM SERPL-SCNC: 3.5 MMOL/L (ref 3.5–5.2)
PROT SERPL-MCNC: 7.8 G/DL (ref 6–8.5)
PROT UR QL STRIP: NEGATIVE
RBC # BLD AUTO: 4.93 10*6/MM3 (ref 3.77–5.28)
SARS-COV-2 RNA RESP QL NAA+PROBE: NOT DETECTED
SODIUM SERPL-SCNC: 137 MMOL/L (ref 136–145)
SP GR UR STRIP: 1.02 (ref 1–1.03)
UROBILINOGEN UR QL STRIP: ABNORMAL
WBC NRBC COR # BLD: 10.79 10*3/MM3 (ref 3.4–10.8)

## 2023-02-04 PROCEDURE — 25010000002 KETOROLAC TROMETHAMINE PER 15 MG: Performed by: PHYSICIAN ASSISTANT

## 2023-02-04 PROCEDURE — 25010000002 ONDANSETRON PER 1 MG: Performed by: PHYSICIAN ASSISTANT

## 2023-02-04 PROCEDURE — 83690 ASSAY OF LIPASE: CPT | Performed by: PHYSICIAN ASSISTANT

## 2023-02-04 PROCEDURE — 87636 SARSCOV2 & INF A&B AMP PRB: CPT | Performed by: PHYSICIAN ASSISTANT

## 2023-02-04 PROCEDURE — 74177 CT ABD & PELVIS W/CONTRAST: CPT

## 2023-02-04 PROCEDURE — 25010000002 IOPAMIDOL 61 % SOLUTION: Performed by: EMERGENCY MEDICINE

## 2023-02-04 PROCEDURE — 81003 URINALYSIS AUTO W/O SCOPE: CPT | Performed by: PHYSICIAN ASSISTANT

## 2023-02-04 PROCEDURE — 85025 COMPLETE CBC W/AUTO DIFF WBC: CPT | Performed by: PHYSICIAN ASSISTANT

## 2023-02-04 PROCEDURE — 96374 THER/PROPH/DIAG INJ IV PUSH: CPT

## 2023-02-04 PROCEDURE — 80053 COMPREHEN METABOLIC PANEL: CPT | Performed by: PHYSICIAN ASSISTANT

## 2023-02-04 PROCEDURE — 96375 TX/PRO/DX INJ NEW DRUG ADDON: CPT

## 2023-02-04 PROCEDURE — 81025 URINE PREGNANCY TEST: CPT | Performed by: PHYSICIAN ASSISTANT

## 2023-02-04 PROCEDURE — 99284 EMERGENCY DEPT VISIT MOD MDM: CPT

## 2023-02-04 RX ORDER — ONDANSETRON 4 MG/1
4 TABLET, ORALLY DISINTEGRATING ORAL EVERY 6 HOURS PRN
Qty: 8 TABLET | Refills: 0 | Status: SHIPPED | OUTPATIENT
Start: 2023-02-04 | End: 2023-02-06

## 2023-02-04 RX ORDER — DICYCLOMINE HYDROCHLORIDE 10 MG/1
20 CAPSULE ORAL ONCE
Status: COMPLETED | OUTPATIENT
Start: 2023-02-04 | End: 2023-02-04

## 2023-02-04 RX ORDER — SODIUM CHLORIDE 0.9 % (FLUSH) 0.9 %
10 SYRINGE (ML) INJECTION AS NEEDED
Status: DISCONTINUED | OUTPATIENT
Start: 2023-02-04 | End: 2023-02-04 | Stop reason: HOSPADM

## 2023-02-04 RX ORDER — ACETAMINOPHEN 325 MG/1
650 TABLET ORAL EVERY 6 HOURS PRN
Status: DISCONTINUED | OUTPATIENT
Start: 2023-02-04 | End: 2023-02-04 | Stop reason: HOSPADM

## 2023-02-04 RX ORDER — ONDANSETRON 2 MG/ML
4 INJECTION INTRAMUSCULAR; INTRAVENOUS ONCE
Status: COMPLETED | OUTPATIENT
Start: 2023-02-04 | End: 2023-02-04

## 2023-02-04 RX ORDER — KETOROLAC TROMETHAMINE 30 MG/ML
30 INJECTION, SOLUTION INTRAMUSCULAR; INTRAVENOUS EVERY 6 HOURS PRN
Status: DISCONTINUED | OUTPATIENT
Start: 2023-02-04 | End: 2023-02-04 | Stop reason: HOSPADM

## 2023-02-04 RX ORDER — DICYCLOMINE HCL 20 MG
20 TABLET ORAL EVERY 6 HOURS
Qty: 6 TABLET | Refills: 0 | Status: SHIPPED | OUTPATIENT
Start: 2023-02-04 | End: 2023-02-06

## 2023-02-04 RX ADMIN — IOPAMIDOL 100 ML: 612 INJECTION, SOLUTION INTRAVENOUS at 15:06

## 2023-02-04 RX ADMIN — SODIUM CHLORIDE 500 ML: 9 INJECTION, SOLUTION INTRAVENOUS at 16:20

## 2023-02-04 RX ADMIN — ACETAMINOPHEN 650 MG: 325 TABLET, FILM COATED ORAL at 16:18

## 2023-02-04 RX ADMIN — DICYCLOMINE HYDROCHLORIDE 20 MG: 10 CAPSULE ORAL at 16:18

## 2023-02-04 RX ADMIN — SODIUM CHLORIDE 1000 ML: 9 INJECTION, SOLUTION INTRAVENOUS at 13:07

## 2023-02-04 RX ADMIN — KETOROLAC TROMETHAMINE 30 MG: 30 INJECTION, SOLUTION INTRAMUSCULAR; INTRAVENOUS at 16:18

## 2023-02-04 RX ADMIN — ONDANSETRON 4 MG: 2 INJECTION INTRAMUSCULAR; INTRAVENOUS at 13:06

## 2023-02-04 NOTE — ED PROVIDER NOTES
Subjective  History of Present Illness:    Chief Complaint: Abdominal pain  History of Present Illness: Patient is a 32 year old female with history of abdominal pain, anxiety, asthma, bleeding disorder, clotting disorder, constipation, diverticulitis, fibromyalgia, fibroids, endometriosis, gastroparesis, GERD, and ovarian cysts presenting to the ER for evaluation of right-sided abdominal pain.  Patient states her pain started around 4 AM this morning in her right lower quadrant.  She states she is also had vomiting, loss of appetite and muscle aches.  She states the pain feels burning in nature, was seen in urgent treatment center and they referred her over here to rule out appendicitis.  She states she took Zofran this morning but no other medications for her pain.  She denies any known sick contacts.  She denies any known fever, vision loss or changes, dizziness, syncope, chest pain, cough, shortness of breath, dysuria, hematuria, melena or hematochezia, or any other symptoms.  Onset: This morning  Duration: Persistent  Exacerbating / Alleviating factors: None  Associated symptoms: Chills, body aches, vomiting      Nurses Notes reviewed and agree, including vitals, allergies, social history and prior medical history.     REVIEW OF SYSTEMS: All systems reviewed and not pertinent unless noted.  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    • Bleeding disorder (HCC) 11-13-16   • Body piercing     TONGUE, NOSE, TWO IN EACH EAR   • Bronchitis    • Chest pain 06/22/2021    last CP 2-3 months ago   • Clotting disorder ([facility])     factor 5   • Constipation    • COVID-19  vaccine series completed     Pfizer-plus booster   • Deep vein thrombosis ([facility]) 2016   • Diarrhea    • Diverticulitis    • Elevated cholesterol    • Endometriosis    • Factor 5 Leiden mutation, heterozygous ([facility])    • Fibroid    • Fibromyalgia    • Full dentures 2021    advised no adhesives DOS   • Gastroparesis    • GERD (gastroesophageal reflux disease)    • H/O blood clots    • H/O cardiovascular stress test 2021    reported several years ago-exercise wnl   • Headache    • Hiatal hernia    • High cholesterol    • History of recurrent UTIs    • HPV (human papilloma virus) infection    • Hx of echocardiogram    • Hx of gout 2021   • Hypertension    • Hypoglycemia    • Inappropriate sinus node tachycardia    • Kidney infection 2021    history only   • Migraine    • Nausea & vomiting    • Obesity    • Osteoarthritis    • Ovarian cyst    • Pneumonia    • Pollen allergies    • PONV (postoperative nausea and vomiting)    • Protein S deficiency ([facility]) 2016    labs from hospitalization for PE   • Pulmonary embolism ([facility]) 2016    on eliquis since that time   • Recurrent pregnancy loss, antepartum condition or complication    • Sleep apnea     no CPAP- states insurance took it back   • Tattoo     LOWER BACK   • Varicella        Allergies:    Ceftin [cefuroxime axetil], Cefuroxime, Sumatriptan, and Amoxicillin      Past Surgical History:   Procedure Laterality Date   •  SECTION        and  and    •  SECTION WITH TUBAL N/A 1/15/2019    Procedure:  SECTION REPEAT WITH TUBAL;  Surgeon: Alva Boyer MD;  Location: [facility] LABOR DELIVERY;  Service: Obstetrics/Gynecology   • COLONOSCOPY N/A 2017    Procedure: COLONOSCOPY WITH BIOPSIES AND ARGON THERMAL ABLATION;  Surgeon: Jignesh Selby MD;  Location: [facility] ENDOSCOPY;  Service:    • D & C HYSTEROSCOPY ENDOMETRIAL ABLATION N/A 3/10/2022    Procedure: DILATATION AND CURETTAGE DIAGNOSTIC HYSTEROSCOPY  NOVASURE ENDOMETRIAL ABLATION;  Surgeon: Alva Boyer MD;  Location: [facility] OR;  Service: Obstetrics/Gynecology;  Laterality: N/A;   • DIAGNOSTIC LAPAROSCOPY N/A 4/2/2018    Procedure: DIAGNOSTIC LAPAROSCOPY AND ABLATION OF ENDOMETRIOSIS;  Surgeon: Evin Zamudio MD;  Location: [facility];  Service: Obstetrics/Gynecology   • ENDOMETRIAL ABLATION     • ENDOSCOPIC FUNCTIONAL SINUS SURGERY (FESS) Right 6/25/2021    Procedure: Right endoscopic total ethmoidectomy, right endoscopic middle meatal antrotomy with cyst removal, right endoscopic frontal recess exploration with cyst removal;  Surgeon: Saulo Brito MD;  Location: [facility] OR;  Service: ENT;  Laterality: Right;   • ENDOSCOPY N/A 4/20/2017    Procedure: ESOPHAGOGASTRODUODENOSCOPY WITH BIOPSIES AND COLD BIOPSY POLYPECTOMIES;  Surgeon: Jignesh Selby MD;  Location: [facility] ENDOSCOPY;  Service:    • LAPAROSCOPIC CHOLECYSTECTOMY     • LIPOMA EXCISION N/A 12/10/2021    Procedure: Excision of soft tissue mass lower back;  Surgeon: Nidia Smith MD;  Location: [facility] OR;  Service: General;  Laterality: N/A;   • MOUTH SURGERY      full mouth tooth extration   • WISDOM TOOTH EXTRACTION           Social History     Socioeconomic History   • Marital status:    Tobacco Use   • Smoking status: Never   • Smokeless tobacco: Never   Vaping Use   • Vaping Use: Never used   Substance and Sexual Activity   • Alcohol use: No   • Drug use: Never   • Sexual activity: Yes     Partners: Male     Birth control/protection: Surgical     Comment: Tubal         Family History   Problem Relation Age of Onset   • Arthritis Mother    • COPD Mother    • Asthma Mother    • Thyroid disease Mother    • Arthritis Father    • Diabetes Father    • Hypertension Father    • Hyperlipidemia Father    • Kidney disease Father    • Heart attack Father    • Coronary artery disease Father    • Dementia Father    • No Known Problems Son    • Colon cancer Neg Hx    • Liver cancer Neg Hx   "  • Liver disease Neg Hx    • Stomach cancer Neg Hx    • Esophageal cancer Neg Hx        Objective  Physical Exam:  /58 (BP Location: Left arm, Patient Position: Sitting)   Pulse 111   Temp 99.7 °F (37.6 °C) (Oral)   Resp 16   Ht 153.3 cm (60.35\")   Wt 86.2 kg (190 lb)   SpO2 97%   BMI 36.68 kg/m²      Physical Exam    CONSTITUTIONAL: Well developed, no acute distress, nontoxic in appearance.  She is awake, alert, speaking in full sentences.  VITAL SIGNS: per nursing, reviewed and noted  SKIN: exposed skin with no rashes, ulcerations or petechiae  EYES: Grossly EOMI, no icterus  ENT: Normal voice.  Patient maintained wearing a mask throughout patient encounter due to coronavirus pandemic  RESPIRATORY:  No increased work of breathing. No retractions.  Lung sounds clear to auscultation bilaterally  CARDIOVASCULAR: Tachycardic, regular rhythm  GI: Bowel sounds are hypoactive.  Patient does have some tenderness in the right mid abdomen without significant guarding or rigidity.   MUSCULOSKELETAL:  No tenderness. Full ROM. Strength and tone grossly normal.    NEUROLOGIC: Alert, oriented x 3. No gross deficits. GCS 15.   PSYCH: appropriate affect.      Procedures    ED Course:        ED Course as of 02/04/23 1816   Sat Feb 04, 2023   1240 HCG, Urine QL: Negative [LA]   1328 Color, UA: Yellow [LA]   1328 Appearance, UA: Clear [LA]   1328 pH, UA: <=5.0 [LA]   1328 Specific Gravity, UA: 1.025 [LA]   1328 Glucose: Negative [LA]   1328 Ketones, UA(!): Trace [LA]   1328 Bilirubin, UA: Negative [LA]   1328 Blood, UA: Negative [LA]   1328 Protein, UA: Negative [LA]   1328 Leukocytes, UA: Negative [LA]   1328 Nitrite, UA: Negative [LA]   1328 Urobilinogen, UA: 0.2 E.U./dL [LA]   1342 Lipase: 19 [LA]   1342 COVID19: Not Detected [LA]   1342 Influenza A PCR: Not Detected [LA]   1342 Influenza B PCR: Not Detected [LA]   1342 Glucose(!): 109 [LA]   1342 WBC: 10.79 [LA]   1342 Hemoglobin: 15.1 [LA]   1342 Platelets: 295 " [LA]   1342 Neutrophil Rel %(!): 94.8 [LA]   1342 Lymphocyte Rel %(!): 2.4 [LA]   1342 Monocyte Rel %(!): 2.0 [LA]   1342 Eosinophil Rel %(!): 0.2 [LA]   1342 Basophil Rel %: 0.3 [LA]   1342 Immature Granulocyte Rel %: 0.3 [LA]   1342 Neutrophils Absolute(!): 10.23 [LA]   1342 Lymphocytes Absolute(!): 0.26 [LA]   1342 Monocytes Absolute: 0.22 [LA]   1342 Eosinophils Absolute: 0.02 [LA]   1342 Basophils Absolute: 0.03 [LA]   1342 Immature Grans, Absolute: 0.03 [LA]   1342 nRBC: 0.0 [LA]   1342 Discussed with Dr. Ayala, he recommended CT if we are needing to rule out appendicitis.  Do not believe ultrasound to be conclusive.  Patient is in agreement with this. [LA]   1553 CT SCAN OF THE ABDOMEN AND PELVIS WITH CONTRAST    2/4/2023 2:57 PM      HISTORY: Right lower quadrant abdominal pain. Fever and vomiting.     COMPARISON: CT abdomen pelvis August 2022     PROCEDURE: The patient was injected with IV contrast.  Axial images were  obtained from the lung bases to the pubic symphysis by computed  tomography. This study was performed with techniques to keep radiation  doses as low as reasonably achievable, (ALARA). Individualized dose  reduction techniques using automated exposure control or adjustment of  mA and/or kV according to the patient size were employed.     FINDINGS:      ABDOMEN: Clear lung bases.     Liver, spleen, pancreas, and adrenals are unremarkable. Normal kidneys.  Normal caliber abdominal aorta. Fluid throughout the small bowel and  ascending colon. No colonic wall thickening. No free fluid or  adenopathy. The retrocecal appendix is normal without inflammation.     PELVIS: Urinary bladder is unremarkable. Bilateral tubal occlusion  clips. Two left ovarian cysts, largest 2.9 cm. No free fluid or  adenopathy. No acute osseous abnormalities.           IMPRESSION:  No acute findings in the abdomen or pelvis. Normal appendix. Mild fluid  throughout the small bowel and proximal colon, nonspecific. No  evidence  of colitis.     This report was signed and finalized on 2/4/2023 3:48 PM by Dr Gurpreet Baer DO. [LA]   0457 Discussed findings with the patient.  Still has some mild tachycardia, denies chest pain, shortness of breath. States she has a headache, body aches, abdominal pain.  Will give additional fluid bolus, discussed findings of the CT scan.  Will give Bentyl and Toradol as well, discussed this likely viral in nature [LA]      ED Course User Index  [LA] Antonietta Lopez PA-C       Lab Results (last 24 hours)     Procedure Component Value Units Date/Time    Pregnancy, Urine - Urine, Clean Catch [[id number]]  (Normal) Collected: 02/04/23 1250    Specimen: Urine, Clean Catch Updated: 02/04/23 1300     HCG, Urine QL Negative    Urinalysis With Microscopic If Indicated (No Culture) - Urine, Clean Catch [[id number]]  (Abnormal) Collected: 02/04/23 1250    Specimen: Urine, Clean Catch Updated: 02/04/23 1258     Color, UA Yellow     Appearance, UA Clear     pH, UA <=5.0     Specific Gravity, UA 1.025     Glucose, UA Negative     Ketones, UA Trace     Bilirubin, UA Negative     Blood, UA Negative     Protein, UA Negative     Leuk Esterase, UA Negative     Nitrite, UA Negative     Urobilinogen, UA 0.2 E.U./dL    Narrative:      Urine microscopic not indicated.    CBC & Differential [[id number]]  (Abnormal) Collected: 02/04/23 1309    Specimen: Blood Updated: 02/04/23 1330    Narrative:      The following orders were created for panel order CBC & Differential.  Procedure                               Abnormality         Status                     ---------                               -----------         ------                     CBC Auto Differential[[id number]]        Abnormal            Final result                 Please view results for these tests on the individual orders.    Comprehensive Metabolic Panel [[id number]]  (Abnormal) Collected: 02/04/23 1309    Specimen: Blood Updated: 02/04/23 1340     Glucose 109  mg/dL      BUN 10 mg/dL      Creatinine 0.70 mg/dL      Sodium 137 mmol/L      Potassium 3.5 mmol/L      Chloride 98 mmol/L      CO2 25.4 mmol/L      Calcium 9.3 mg/dL      Total Protein 7.8 g/dL      Albumin 4.3 g/dL      ALT (SGPT) 15 U/L      AST (SGOT) 14 U/L      Alkaline Phosphatase 106 U/L      Total Bilirubin 0.8 mg/dL      Globulin 3.5 gm/dL      A/G Ratio 1.2 g/dL      BUN/Creatinine Ratio 14.3     Anion Gap 13.6 mmol/L      eGFR 118.0 mL/min/1.73     Narrative:      GFR Normal >60  Chronic Kidney Disease <60  Kidney Failure <15      Lipase [[id number]]  (Normal) Collected: 02/04/23 1309    Specimen: Blood Updated: 02/04/23 1340     Lipase 19 U/L     CBC Auto Differential [[id number]]  (Abnormal) Collected: 02/04/23 1309    Specimen: Blood Updated: 02/04/23 1330     WBC 10.79 10*3/mm3      RBC 4.93 10*6/mm3      Hemoglobin 15.1 g/dL      Hematocrit 44.8 %      MCV 90.9 fL      MCH 30.6 pg      MCHC 33.7 g/dL      RDW 12.4 %      RDW-SD 40.6 fl      MPV 9.8 fL      Platelets 295 10*3/mm3      Neutrophil % 94.8 %      Lymphocyte % 2.4 %      Monocyte % 2.0 %      Eosinophil % 0.2 %      Basophil % 0.3 %      Immature Grans % 0.3 %      Neutrophils, Absolute 10.23 10*3/mm3      Lymphocytes, Absolute 0.26 10*3/mm3      Monocytes, Absolute 0.22 10*3/mm3      Eosinophils, Absolute 0.02 10*3/mm3      Basophils, Absolute 0.03 10*3/mm3      Immature Grans, Absolute 0.03 10*3/mm3      nRBC 0.0 /100 WBC     COVID-19 and FLU A/B PCR - Swab, Nasopharynx [[id number]]  (Normal) Collected: 02/04/23 1309    Specimen: Swab from Nasopharynx Updated: 02/04/23 1339     COVID19 Not Detected     Influenza A PCR Not Detected     Influenza B PCR Not Detected    Narrative:      Fact sheet for providers: https://www.fda.gov/media/935468/download    Fact sheet for patients: https://www.fda.gov/media/052685/download    Test performed by PCR.           CT Abdomen Pelvis With Contrast    Result Date: 2/4/2023  CT SCAN OF THE ABDOMEN AND  PELVIS WITH CONTRAST    2/4/2023 2:57 PM  HISTORY: Right lower quadrant abdominal pain. Fever and vomiting.  COMPARISON: CT abdomen pelvis August 2022  PROCEDURE: The patient was injected with IV contrast.  Axial images were obtained from the lung bases to the pubic symphysis by computed tomography. This study was performed with techniques to keep radiation doses as low as reasonably achievable, (ALARA). Individualized dose reduction techniques using automated exposure control or adjustment of mA and/or kV according to the patient size were employed.  FINDINGS:  ABDOMEN: Clear lung bases.  Liver, spleen, pancreas, and adrenals are unremarkable. Normal kidneys. Normal caliber abdominal aorta. Fluid throughout the small bowel and ascending colon. No colonic wall thickening. No free fluid or adenopathy. The retrocecal appendix is normal without inflammation.  PELVIS: Urinary bladder is unremarkable. Bilateral tubal occlusion clips. Two left ovarian cysts, largest 2.9 cm. No free fluid or adenopathy. No acute osseous abnormalities.        Impression: No acute findings in the abdomen or pelvis. Normal appendix. Mild fluid throughout the small bowel and proximal colon, nonspecific. No evidence of colitis.  This report was signed and finalized on 2/4/2023 3:48 PM by Dr Gurpreet Baer DO.         MDM    Initial impression of presenting illness: Patient is a 30-year-old female presenting to the ER for evaluation of abdominal pain.  On arrival, she is tachycardic, has a temp of 99.8 with mildly elevated blood pressure.  She does not appear septic or toxic.    DDX: includes but is not limited to: Colitis, gastroenteritis, appendicitis, viral illness, urinary tract infection, and other concerns.    Patient arrives in stable condition with vitals interpreted by myself.     Pertinent features from physical exam: Tachycardia, temp of 99.8, mild abdominal tenderness without guarding or rigidity    Initial diagnostic plan: We will  "obtain basic labs, lipase, urinalysis, UPT, COVID and influenza swab.  Will initiate IV fluids, IV Zofran.  Discussed with the patient that an ultrasound is likely not going to give us definitive answers such as a CT scan, she is agreeable for this    Results from initial plan were reviewed and interpreted by me revealing stable lab work.  She had no leukocytosis or anemia.  She had a mild elevation of glucose at 109 but no other significant lecture abnormalities.  Lipase was not elevated.  COVID and influenza were negative.  Urine had no signs of infection.  Discussed CT scan with the patient and given her pain she did want to have this performed.  This was read by radiology as \"no acute findings in abdomen pelvis, normal appendix, mild fluid throughout the small bowel and proximal colon, nonspecific, no evidence of colitis\".  They did also comment on ovarian cyst but they were on the left side which is not where patient's pain is located.  Given body aches, chills, believe she likely has a viral illness causing a gastroenteritis.    Diagnostic information from other sources: Chart review    Interventions / Re-evaluation: Patient vital signs were stable, heart rate did improve.    Results/clinical rationale were discussed with patient.  Discussed that it could be a viral etiology to her symptoms.  We can give her Bentyl, Zofran, discussed diet changes, follow-up and strict return precautions.  She verbalized understanding and was in agreement with this plan of care    Consultations/Discussion of results with other physicians: Dr. Ayala    Disposition plan: Discharge  -----    Final diagnoses:   Right lower quadrant abdominal pain   Nausea   Body aches        Antonietta Lopez PA-C  02/04/23 1816    "

## 2023-02-04 NOTE — DISCHARGE INSTRUCTIONS
You likely have a viral illness causing your symptoms.  Drink plenty of fluids to stay well-hydrated, try to eat a bland diet for the next few days.  You can take Bentyl as needed for abdominal spasms, Zofran as needed for nausea and vomiting.  Alternate heat and take Tylenol for fevers or body aches.  Try to follow-up with your primary care provider in the next few days to reevaluate symptoms and ensure you are improving.  Return to the ER for any change, worsening of symptoms, or any additional concerns.

## 2023-02-04 NOTE — ED NOTES
Pt states she was sent here from [facility] for possible appendix issues

## 2023-02-06 ENCOUNTER — HOSPITAL ENCOUNTER (OUTPATIENT)
Dept: MAMMOGRAPHY | Facility: HOSPITAL | Age: 33
Discharge: HOME | End: 2023-02-06
Payer: COMMERCIAL

## 2023-02-06 ENCOUNTER — HOSPITAL ENCOUNTER (OUTPATIENT)
Dept: ULTRASOUND IMAGING | Facility: HOSPITAL | Age: 33
End: 2023-02-06
Payer: COMMERCIAL

## 2023-02-06 ENCOUNTER — HOSPITAL ENCOUNTER (OUTPATIENT)
Dept: ULTRASOUND IMAGING | Facility: HOSPITAL | Age: 33
Discharge: HOME | End: 2023-02-06
Payer: COMMERCIAL

## 2023-02-06 ENCOUNTER — PATIENT MESSAGE (OUTPATIENT)
Dept: GASTROENTEROLOGY | Facility: CLINIC | Age: 33
End: 2023-02-06
Payer: COMMERCIAL

## 2023-02-06 DIAGNOSIS — L81.9 DISCOLORATION OF SKIN: ICD-10-CM

## 2023-02-06 DIAGNOSIS — N64.4 BREAST PAIN: ICD-10-CM

## 2023-02-06 PROCEDURE — 77066 DX MAMMO INCL CAD BI: CPT

## 2023-02-06 PROCEDURE — G0279 TOMOSYNTHESIS, MAMMO: HCPCS

## 2023-02-06 NOTE — TELEPHONE ENCOUNTER
From: Marilu Godoy  To: Oswaldo Bragg  Sent: 2/6/2023 12:32 PM EST  Subject: CT scan    I was seen in [place] emergency dept Saturday due to severe pain in my right lower abdomen and low grade fever and vomiting. The CT scan said something about fluid in my bowels/colon. I asked Dr. Barker about it and she advised me to contact you.

## 2023-02-08 ENCOUNTER — HOSPITAL ENCOUNTER (OUTPATIENT)
Facility: HOSPITAL | Age: 33
Discharge: HOME | End: 2023-02-08
Payer: MEDICAID

## 2023-02-08 ENCOUNTER — OFFICE VISIT (OUTPATIENT)
Dept: CARDIOLOGY | Facility: CLINIC | Age: 33
End: 2023-02-08
Payer: COMMERCIAL

## 2023-02-08 VITALS
OXYGEN SATURATION: 97 % | HEART RATE: 90 BPM | HEIGHT: 64 IN | WEIGHT: 191 LBS | RESPIRATION RATE: 16 BRPM | BODY MASS INDEX: 32.61 KG/M2 | DIASTOLIC BLOOD PRESSURE: 82 MMHG | SYSTOLIC BLOOD PRESSURE: 122 MMHG

## 2023-02-08 DIAGNOSIS — R07.2 PRECORDIAL PAIN: Primary | ICD-10-CM

## 2023-02-08 DIAGNOSIS — I26.99 PULMONARY THROMBOEMBOLISM: ICD-10-CM

## 2023-02-08 DIAGNOSIS — D68.59 PROTEIN S DEFICIENCY: ICD-10-CM

## 2023-02-08 DIAGNOSIS — R00.0 INAPPROPRIATE SINUS TACHYCARDIA: ICD-10-CM

## 2023-02-08 DIAGNOSIS — R00.2 PALPITATIONS: ICD-10-CM

## 2023-02-08 DIAGNOSIS — E66.09 CLASS 1 OBESITY DUE TO EXCESS CALORIES WITH SERIOUS COMORBIDITY AND BODY MASS INDEX (BMI) OF 33.0 TO 33.9 IN ADULT: ICD-10-CM

## 2023-02-08 DIAGNOSIS — E78.5 DYSLIPIDEMIA: ICD-10-CM

## 2023-02-08 DIAGNOSIS — I25.10 CORONARY ARTERY DISEASE INVOLVING NATIVE CORONARY ARTERY OF NATIVE HEART WITHOUT ANGINA PECTORIS: ICD-10-CM

## 2023-02-08 DIAGNOSIS — D68.51 HETEROZYGOUS FACTOR V LEIDEN MUTATION: ICD-10-CM

## 2023-02-08 DIAGNOSIS — R06.02 SOB (SHORTNESS OF BREATH): ICD-10-CM

## 2023-02-08 PROBLEM — E66.811 CLASS 1 OBESITY DUE TO EXCESS CALORIES WITH SERIOUS COMORBIDITY AND BODY MASS INDEX (BMI) OF 33.0 TO 33.9 IN ADULT: Status: ACTIVE | Noted: 2020-10-07

## 2023-02-08 PROCEDURE — 99214 OFFICE O/P EST MOD 30 MIN: CPT | Performed by: INTERNAL MEDICINE

## 2023-02-08 PROCEDURE — 93271 ECG/MONITORING AND ANALYSIS: CPT

## 2023-02-08 NOTE — PROGRESS NOTES
"    Subjective:     Encounter Date:02/08/2023      Patient ID: Marilu Godoy is a 32 y.o. female.    Chief Complaint: Chest pain and palpitations  HPI  This is a 32-year-old female patient who was recently seen in the emergency room for acute onset of chest discomfort, shortness of breath and palpitations.  The patient indicates [place] when she developed sudden onset of maximum intensity chest discomfort described as a tightness across her central chest.  It did not radiate.  It was associated with shortness of breath and palpitations.  She felt that her heart was racing.  The patient presented to the emergency room where her twelve-lead electrocardiogram showed no ischemic ST-T wave changes or injury current.  Cardiac troponins were normal.  The patient apparently underwent a CT scan of the chest and was told that her heart was \"possibly enlarged\".  She has no personal history of myocardial infarction or coronary revascularization.  She has a history of factor V Leiden mutation (heterozygous), as well as protein S deficiency.  She has a history of recurrent DVT.  She has a history of prior pulmonary embolus.  She is on lifelong anticoagulation with Eliquis.  She has had no bleeding complications related to anticoagulation therapy.  She is known to have minor coronary atherosclerosis by coronary CT angiogram demonstrating a coronary calcium score of 10 and calcific plaque identified in a small first diagonal branch with no evidence of significant stenosis or occlusion.  The remainder of her coronary arterial tree is normal.  The patient has indicated that she continues to have episodes of palpitations in which she feels that her heart is \"racing\".  She is a lifelong non-smoker.  She reports compliance with her medications with no perceived side effects  The following portions of the patient's history were reviewed and updated as appropriate: allergies, current medications, past family " history, past medical history, past social history, past surgical history and problem  Review of Systems   Constitutional: Negative for chills, diaphoresis, fever, malaise/fatigue, weight gain and weight loss.   HENT: Negative for ear discharge, hearing loss, hoarse voice and nosebleeds.    Eyes: Negative for discharge, double vision, pain and photophobia.   Cardiovascular: Positive for chest pain and palpitations. Negative for claudication, cyanosis, dyspnea on exertion, irregular heartbeat, leg swelling, near-syncope, orthopnea, paroxysmal nocturnal dyspnea and syncope.   Respiratory: Positive for shortness of breath. Negative for cough, hemoptysis, sputum production and wheezing.    Endocrine: Negative for cold intolerance, heat intolerance, polydipsia, polyphagia and polyuria.   Hematologic/Lymphatic: Negative for adenopathy and bleeding problem. Does not bruise/bleed easily.   Skin: Negative for color change, flushing, itching and rash.   Musculoskeletal: Negative for muscle cramps, muscle weakness, myalgias and stiffness.   Gastrointestinal: Negative for abdominal pain, diarrhea, hematemesis, hematochezia, nausea and vomiting.   Genitourinary: Negative for dysuria, frequency and nocturia.   Neurological: Negative for focal weakness, loss of balance, numbness, paresthesias and seizures.   Psychiatric/Behavioral: Negative for altered mental status, hallucinations and suicidal ideas.   Allergic/Immunologic: Negative for HIV exposure, hives and persistent infections.            Current Outpatient Medications:   •  albuterol sulfate  (90 Base) MCG/ACT inhaler, Inhale 2 puffs Every 4 (Four) Hours As Needed for Wheezing or Shortness of Air., Disp: 18 g, Rfl: 11  •  Alcohol Swabs (Alcohol Pads) 70 % pads, Check sugars as needed/directed for hypoglycemia., Disp: 100 each, Rfl: 12  •  apixaban (ELIQUIS) 5 MG tablet tablet, Take 1 tablet by mouth 2 (Two) Times a Day., Disp: 180 tablet, Rfl: 3  •  aspirin 81 MG EC  tablet, Take 81 mg by mouth Daily., Disp: , Rfl:   •  Atogepant (Qulipta) 60 MG tablet, Take 1 tablet by mouth Daily., Disp: 30 tablet, Rfl: 11  •  atorvastatin (LIPITOR) 40 MG tablet, Take 1 tablet by mouth Every Night., Disp: 30 tablet, Rfl: 11  •  azelastine (ASTELIN) 0.1 % nasal spray, 1 spray into the nostril(s) as directed by provider 2 (Two) Times a Day. Use in each nostril as directed, Disp: 1 each, Rfl: 10  •  B-D ULTRA-FINE 33 LANCETS misc, Check sugars as needed/directed for hypoglycemia., Disp: 100 each, Rfl: 12  •  Blood Glucose Monitoring Suppl (ONE TOUCH ULTRA 2) w/Device kit, use as needed to check blood sugar for hypoglycemia (diabetes), Disp: , Rfl:   •  busPIRone (BUSPAR) 5 MG tablet, TAKE 1 TABLET BY MOUTH THREE TIMES A DAY, Disp: 90 tablet, Rfl: 0  •  butalbital-acetaminophen-caffeine (FIORICET, ESGIC) -40 MG per tablet, Take 1 tablet by mouth Every 6 (Six) Hours As Needed for Headache., Disp: 9 tablet, Rfl: 0  •  calcium carbonate EX (TUMS EX) 750 MG chewable tablet, Chew 1 tablet Daily. (Patient taking differently: Chew 750 mg Daily As Needed.), Disp: 30 tablet, Rfl: 1  •  Cholecalciferol (Vitamin D3) 1.25 MG (46100 UT) capsule, Take 1 capsule by mouth Every 7 (Seven) Days. Indications: Vitamin D Deficiency, Disp: 12 capsule, Rfl: 1  •  Corlanor 5 MG tablet tablet, Take 1.5 tablets by mouth 2 (two) times a day., Disp: 90 tablet, Rfl: 6  •  cyclobenzaprine (FLEXERIL) 10 MG tablet,  15 Each, TAKE 1/2 TABLET BY MOUTH THREE TIMES A DAY AS NEEDED FOR MUSCLE SPASM for up to 5 days, 0 Refill(s), Disp: , Rfl:   •  Diclofenac Sodium (VOLTAREN) 1 % gel gel, Apply 4 g topically to the appropriate area as directed 4 (Four) Times a Day As Needed (joint pain)., Disp: 150 g, Rfl: 1  •  DULoxetine (CYMBALTA) 60 MG capsule, Take 1 capsule by mouth Daily., Disp: 90 capsule, Rfl: 3  •  erythromycin (ROMYCIN) 5 MG/GM ophthalmic ointment, Administer  to the right eye Every 6 (Six) Hours., Disp: 3.5 g, Rfl:  0  •  fluticasone (FLONASE) 50 MCG/ACT nasal spray, 2 sprays into the nostril(s) as directed by provider Daily for 7 days., Disp: 9.9 mL, Rfl: 0  •  glucose blood test strip, USE TO CHECK SUGARS DAILY DUE TO hypoglycemia, Disp: 100 each, Rfl: 12  •  glucose monitor monitoring kit, 1 each As Needed (diabetes). Check sugars as needed/directed for hypoglycemia., Disp: 1 each, Rfl: 0  •  hyoscyamine (ANASPAZ,LEVSIN) 0.125 MG tablet, TAKE 1 TABLET BY MOUTH EVERY SIX HOURS AS NEEDED FOR CRAMPING OR DIARRHEA, Disp: , Rfl:   •  ibuprofen (ADVIL,MOTRIN) 800 MG tablet, Take 1 tablet by mouth Every 8 (Eight) Hours As Needed for Mild Pain ., Disp: 30 tablet, Rfl: 0  •  l-methylfolate 15 MG tablet tablet, Take 1 tablet by mouth Daily. Indications: folic acid deficiency, Disp: 90 tablet, Rfl: 3  •  lidocaine (LIDODERM) 5 %, Place 2 patches on the skin as directed by provider Daily. Remove & Discard patch within 12 hours or as directed by MD, Disp: 60 patch, Rfl: 3  •  methocarbamol (ROBAXIN) 750 MG tablet, Take 1 tablet by mouth At Night As Needed for Muscle Spasms., Disp: 14 tablet, Rfl: 0  •  metoclopramide (REGLAN) 5 MG tablet, metoclopramide 5 mg tablet  TAKE 1 TABLET BY MOUTH FOUR TIMES A DAY, Disp: , Rfl:   •  mometasone-formoterol (Dulera) 200-5 MCG/ACT inhaler, Inhale 2 puffs 2 (Two) Times a Day., Disp: 13 g, Rfl: 11  •  mupirocin (Bactroban) 2 % cream, Apply 1 application topically to the appropriate area as directed 3 (Three) Times a Day., Disp: 30 g, Rfl: 1  •  ondansetron ODT (ZOFRAN-ODT) 4 MG disintegrating tablet, ondansetron 4 mg disintegrating tablet  DISSOLVE 1 TABLET IN MOUTH EVERY SIX HOURS AS NEEDED FOR NAUSEA AND VOMITING, Disp: , Rfl:   •  pantoprazole (PROTONIX) 40 MG EC tablet, Take 1 tablet by mouth Daily., Disp: 30 tablet, Rfl: 0  •  predniSONE (DELTASONE) 20 MG tablet, Take 2 tablets by mouth Daily for 5 days., Disp: 10 tablet, Rfl: 0  •  Prucalopride Succinate (Motegrity) 2 MG tablet, Motegrity 2 mg  "tablet  TAKE 1 TABLET BY MOUTH EVERY DAY, Disp: , Rfl:   •  Rimegepant Sulfate (Nurtec) 75 MG tablet dispersible tablet, Place 1 tablet under the tongue Take As Directed., Disp: 16 tablet, Rfl: 5  •  Scopolamine 1 MG/3DAYS patch, scopolamine 1 mg over 3 days transdermal patch, Disp: , Rfl:   •  zonisamide (ZONEGRAN) 100 MG capsule, Take 1 capsule by mouth Every Evening., Disp: 90 capsule, Rfl: 1    Objective:   Vitals and nursing note reviewed.   Constitutional:       Appearance: Healthy appearance. Not in distress.   Neck:      Vascular: No JVR. JVD normal.   Pulmonary:      Effort: Pulmonary effort is normal.      Breath sounds: Normal breath sounds. No wheezing. No rhonchi. No rales.   Chest:      Chest wall: Not tender to palpatation.   Cardiovascular:      PMI at left midclavicular line. Normal rate. Regular rhythm. Normal S1. Normal S2.      Murmurs: There is no murmur.      No gallop. No click. No rub.   Pulses:     Intact distal pulses.   Edema:     Peripheral edema absent.   Abdominal:      General: Bowel sounds are normal.      Palpations: Abdomen is soft.      Tenderness: There is no abdominal tenderness.   Musculoskeletal: Normal range of motion.         General: No tenderness. Skin:     General: Skin is warm and dry.   Neurological:      General: No focal deficit present.      Mental Status: Alert and oriented to person, place and time.       Blood pressure 122/82, pulse 90, resp. rate 16, height 161.3 cm (63.5\"), weight 86.6 kg (191 lb), SpO2 97 %, not currently breastfeeding.   Lab Review:     Assessment:       1. Precordial pain  Atypical chest pain.  Inability to do treadmill exercise stress testing due to obesity and limited effort tolerance.  - Stress Test With Myocardial Perfusion (2 Day)  - Adult Transthoracic Echo Complete W/ Cont if Necessary Per Protocol    2. SOB (shortness of breath)  Multifactorial.  - Stress Test With Myocardial Perfusion (2 Day)  - Adult Transthoracic Echo Complete W/ " "Cont if Necessary Per Protocol    3. Palpitations  Possible arrhythmia versus ectopy.  - Stress Test With Myocardial Perfusion (2 Day)  - Adult Transthoracic Echo Complete W/ Cont if Necessary Per Protocol  - Mobile Cardiac Outpatient Telemetry; Future    4. Coronary artery disease involving native coronary artery of native heart without angina pectoris    - Stress Test With Myocardial Perfusion (2 Day)  - Adult Transthoracic Echo Complete W/ Cont if Necessary Per Protocol    5. Class 1 obesity due to excess calories with serious comorbidity and body mass index (BMI) of 33.0 to 33.9 in adult    - Stress Test With Myocardial Perfusion (2 Day)    6. Dyslipidemia  Tolerating statin based cholesterol-lowering therapy without side effects.    7. Pulmonary thromboembolism (HCC)      8. Protein S deficiency (HCC)  Lifelong anticoagulation therapy.    9. Heterozygous factor V Leiden mutation (HCC)  Lifelong anticoagulation therapy.    10. Inappropriate sinus tachycardia  The patient has previously been intolerant to beta-blockade and use of nondihydropyridine calcium channel blockers due to the development of symptomatic hypotension.  She has been using Corlanor \"off label\".  - Mobile Cardiac Outpatient Telemetry; Future    Procedures    Plan:     I have recommended outpatient cardiac monitor.    I have recommended a vasodilator nuclear stress test utilizing a 2-day imaging protocol with both supine and prone stress images in order to help mitigate for attenuation artifact which is anticipated given the patient's body habitus.    I have recommended an echocardiogram.    No changes in medications have been made at today's visit.    Further recommendations will be predicated on the results of her outpatient testing.      "

## 2023-02-08 NOTE — PROGRESS NOTES
Subjective   Marilu Godoy is a 32 y.o. female.   Chief Complaint   Patient presents with   • Follow-up     mammogram       History of Present Illness   Patient is in the office today to follow up from her recent mammogram. The mammogram showed no abnormalities. Patient states the cyst she has on her right breast is starting to get tender, thinks the cyst may have started to enlarge.      The following portions of the patient's history were reviewed and updated as appropriate: allergies, current medications, past family history, past medical history, past social history, past surgical history and problem list.    Review of Systems   Constitutional: Negative for chills, fever and unexpected weight change.   HENT: Negative for hearing loss, trouble swallowing and voice change.    Eyes: Negative for visual disturbance.   Respiratory: Negative for apnea, cough, chest tightness, shortness of breath and wheezing.    Cardiovascular: Negative for chest pain, palpitations and leg swelling.   Gastrointestinal: Negative for abdominal distention, abdominal pain, anal bleeding, blood in stool, constipation, diarrhea, nausea, rectal pain and vomiting.   Endocrine: Negative for cold intolerance and heat intolerance.   Genitourinary: Negative for difficulty urinating, dysuria and flank pain.   Musculoskeletal: Negative for back pain and gait problem.   Skin: Negative for color change, rash and wound.   Neurological: Negative for dizziness, syncope, speech difficulty, weakness, light-headedness, numbness and headaches.   Hematological: Negative for adenopathy. Does not bruise/bleed easily.   Psychiatric/Behavioral: Negative for confusion. The patient is not nervous/anxious.        Objective   Physical Exam    Assessment & Plan   Diagnoses and all orders for this visit:    1. Breast pain (Primary)

## 2023-02-09 ENCOUNTER — OFFICE VISIT (OUTPATIENT)
Dept: SURGERY | Facility: CLINIC | Age: 33
End: 2023-02-09
Payer: COMMERCIAL

## 2023-02-09 VITALS
HEART RATE: 90 BPM | TEMPERATURE: 96.8 F | OXYGEN SATURATION: 99 % | DIASTOLIC BLOOD PRESSURE: 80 MMHG | BODY MASS INDEX: 33.84 KG/M2 | HEIGHT: 63 IN | SYSTOLIC BLOOD PRESSURE: 120 MMHG | WEIGHT: 191 LBS

## 2023-02-09 DIAGNOSIS — N64.4 BREAST PAIN: Primary | ICD-10-CM

## 2023-02-10 NOTE — PROGRESS NOTES
Subjective   Marilu Godoy is a 32 y.o. female.   Chief Complaint   Patient presents with   • Follow-up     Mammogram        History of Present Illness   Ms. Godoy returns the office today for ultrasound follow-up regarding her previous right breast abnormality and associated breast pain.Recall that she underwent an ultrasound of the right breast in early January which demonstrated a small cyst in the right breast as well as a superficial complex fluid collection thought to represent a resolving hematoma.  Mammography performed on 2/6/2023 was negative, BI-RADS 1.  Although an ultrasound had been previously ordered to be performed simultaneously with her mammogram, this was not completed at [facility].  She  reports no changes today.    The following portions of the patient's history were reviewed and updated as appropriate: allergies, current medications, past family history, past medical history, past social history, past surgical history and problem list.    Review of Systems   Constitutional: Negative for chills, fever and unexpected weight change.   HENT: Negative for hearing loss, trouble swallowing and voice change.    Eyes: Negative for visual disturbance.   Respiratory: Negative for apnea, cough, chest tightness, shortness of breath and wheezing.    Cardiovascular: Negative for chest pain, palpitations and leg swelling.   Gastrointestinal: Negative for abdominal distention, abdominal pain, anal bleeding, blood in stool, constipation, diarrhea, nausea, rectal pain and vomiting.   Endocrine: Negative for cold intolerance and heat intolerance.   Genitourinary: Negative for difficulty urinating, dysuria and flank pain.   Musculoskeletal: Negative for back pain and gait problem.   Skin: Negative for color change, rash and wound.   Neurological: Negative for dizziness, syncope, speech difficulty, weakness, light-headedness, numbness and headaches.   Hematological: Negative for adenopathy. Does not bruise/bleed  "easily.   Psychiatric/Behavioral: Negative for confusion. The patient is not nervous/anxious.    I have reviewed and confirmed the accuracy of the ROS as documented by the MA/LPN/RN Nidia Smith MD        Objective    /84 (BP Location: Left arm, Patient Position: Sitting, Cuff Size: Adult)   Pulse (!) 130   Temp 98.2 °F (36.8 °C) (Temporal)   Resp 16   Ht 161.3 cm (63.5\")   Wt 86.2 kg (190 lb)   SpO2 100%   BMI 33.13 kg/m²     Physical Exam  Constitutional:       Appearance: She is well-developed.   HENT:      Head: Normocephalic and atraumatic.   Cardiovascular:      Rate and Rhythm: Regular rhythm.   Pulmonary:      Effort: Pulmonary effort is normal.   Chest:   Breasts:     Right: Normal.      Left: Normal.   Musculoskeletal:      Cervical back: Neck supple.   Skin:     General: Skin is warm and dry.   Neurological:      Mental Status: She is alert and oriented to person, place, and time.   Psychiatric:         Behavior: Behavior normal.         Assessment & Plan   Diagnoses and all orders for this visit:    1. Mass of right breast, unspecified quadrant (Primary)  -     US breast right limited; Future    2. Breast pain, right      Ms. Godoy is a 32-year-old female patient who was seen in early January after she developed acute onset significant ecchymoses of the right breast with no reported injury.  Imaging has suggested a resolving hematoma.  Repeat ultrasound was performed in the office today of the right breast.  The previously identified cyst at the 10 o'clock position is no longer visible.  At the 11 o'clock position there is a subtle complex cyst measuring 5 mm x 5 mm x 3 mm.  A tiny cyst seen in the same region measuring 3 mm x 3 mm x 2 mm.  There are no solid masses.  The area felt to previously represent resolving hematoma has essentially resolved.  I discussed these findings with the patient, and recommended no acute surgical intervention.  We discussed strategies for dealing with " breast pain.  From the standpoint of additional breast imaging, she will need to undergo annual mammography beginning at age 40.  Certainly, diagnostic imaging can be pursued should additional breast related problems or complaints arise in the interim.  She may follow-up in this office on an as-needed basis.

## 2023-02-14 ENCOUNTER — OFFICE VISIT (OUTPATIENT)
Dept: SURGERY | Facility: CLINIC | Age: 33
End: 2023-02-14
Payer: COMMERCIAL

## 2023-02-14 VITALS
HEART RATE: 130 BPM | TEMPERATURE: 98.2 F | DIASTOLIC BLOOD PRESSURE: 84 MMHG | OXYGEN SATURATION: 100 % | RESPIRATION RATE: 16 BRPM | WEIGHT: 190 LBS | HEIGHT: 64 IN | BODY MASS INDEX: 32.44 KG/M2 | SYSTOLIC BLOOD PRESSURE: 120 MMHG

## 2023-02-14 DIAGNOSIS — N63.10 MASS OF RIGHT BREAST, UNSPECIFIED QUADRANT: Primary | ICD-10-CM

## 2023-02-14 DIAGNOSIS — N64.4 BREAST PAIN, RIGHT: ICD-10-CM

## 2023-02-14 PROCEDURE — 99213 OFFICE O/P EST LOW 20 MIN: CPT | Performed by: SURGERY

## 2023-02-14 RX ORDER — EPINASTINE HCL 0.05 %
DROPS OPHTHALMIC (EYE)
COMMUNITY
Start: 2023-02-09 | End: 2023-04-06

## 2023-02-27 ENCOUNTER — HOSPITAL ENCOUNTER (OUTPATIENT)
Dept: CARDIOLOGY | Facility: HOSPITAL | Age: 33
Discharge: HOME | End: 2023-02-27
Admitting: INTERNAL MEDICINE
Payer: COMMERCIAL

## 2023-02-27 VITALS — HEIGHT: 64 IN | WEIGHT: 190.04 LBS | BODY MASS INDEX: 32.44 KG/M2

## 2023-02-27 LAB
BH CV ECHO LEFT VENTRICLE GLOBAL LONGITUDINAL STRAIN: -19.7 %
BH CV ECHO MEAS - AO MAX PG: 10 MMHG
BH CV ECHO MEAS - AO MEAN PG: 5 MMHG
BH CV ECHO MEAS - AO ROOT DIAM: 2.45 CM
BH CV ECHO MEAS - AO V2 MAX: 158 CM/SEC
BH CV ECHO MEAS - AO V2 VTI: 27.1 CM
BH CV ECHO MEAS - AVA(I,D): 2.02 CM2
BH CV ECHO MEAS - EDV(CUBED): 70.4 ML
BH CV ECHO MEAS - EDV(MOD-SP2): 64 ML
BH CV ECHO MEAS - EDV(MOD-SP4): 81.5 ML
BH CV ECHO MEAS - EF(MOD-BP): 72.5 %
BH CV ECHO MEAS - EF(MOD-SP2): 70.6 %
BH CV ECHO MEAS - EF(MOD-SP4): 74.4 %
BH CV ECHO MEAS - ESV(CUBED): 14.3 ML
BH CV ECHO MEAS - ESV(MOD-SP2): 18.8 ML
BH CV ECHO MEAS - ESV(MOD-SP4): 20.9 ML
BH CV ECHO MEAS - FS: 41.2 %
BH CV ECHO MEAS - IVS/LVPW: 1.37 CM
BH CV ECHO MEAS - IVSD: 1.1 CM
BH CV ECHO MEAS - LA DIMENSION: 3.3 CM
BH CV ECHO MEAS - LAT PEAK E' VEL: 11.7 CM/SEC
BH CV ECHO MEAS - LV DIASTOLIC VOL/BSA (35-75): 42.6 CM2
BH CV ECHO MEAS - LV MASS(C)D: 196.7 GRAMS
BH CV ECHO MEAS - LV MAX PG: 4.9 MMHG
BH CV ECHO MEAS - LV MEAN PG: 3 MMHG
BH CV ECHO MEAS - LV SYSTOLIC VOL/BSA (12-30): 10.9 CM2
BH CV ECHO MEAS - LV V1 MAX: 111 CM/SEC
BH CV ECHO MEAS - LV V1 VTI: 23 CM
BH CV ECHO MEAS - LVIDD: 4.1 CM
BH CV ECHO MEAS - LVIDS: 2.43 CM
BH CV ECHO MEAS - LVOT AREA: 2.38 CM2
BH CV ECHO MEAS - LVOT DIAM: 1.74 CM
BH CV ECHO MEAS - LVPWD: 1.1 CM
BH CV ECHO MEAS - MED PEAK E' VEL: 10.9 CM/SEC
BH CV ECHO MEAS - MV A MAX VEL: 59.7 CM/SEC
BH CV ECHO MEAS - MV DEC TIME: 0.17 MSEC
BH CV ECHO MEAS - MV E MAX VEL: 96 CM/SEC
BH CV ECHO MEAS - MV E/A: 1.61
BH CV ECHO MEAS - MV MAX PG: 3.3 MMHG
BH CV ECHO MEAS - MV MEAN PG: 2 MMHG
BH CV ECHO MEAS - MV V2 VTI: 23.9 CM
BH CV ECHO MEAS - MVA(VTI): 2.29 CM2
BH CV ECHO MEAS - PA ACC TIME: 0.1 SEC
BH CV ECHO MEAS - PA PR(ACCEL): 33.1 MMHG
BH CV ECHO MEAS - PA V2 MAX: 96.1 CM/SEC
BH CV ECHO MEAS - RAP SYSTOLE: 3 MMHG
BH CV ECHO MEAS - SI(MOD-SP2): 23.6 ML/M2
BH CV ECHO MEAS - SI(MOD-SP4): 31.7 ML/M2
BH CV ECHO MEAS - SV(LVOT): 54.7 ML
BH CV ECHO MEAS - SV(MOD-SP2): 45.2 ML
BH CV ECHO MEAS - SV(MOD-SP4): 60.6 ML
BH CV ECHO MEAS - TAPSE (>1.6): 3.2 CM
BH CV ECHO MEASUREMENTS AVERAGE E/E' RATIO: 8.5
BH CV XLRA - RV BASE: 2.8 CM
BH CV XLRA - RV LENGTH: 8.5 CM
BH CV XLRA - RV MID: 1.86 CM
BH CV XLRA - TDI S': 11.3 CM/SEC
LEFT ATRIUM VOLUME INDEX: 14.6 ML/M2
LV EF 2D ECHO EST: 73 %
MAXIMAL PREDICTED HEART RATE: 188 BPM
STRESS TARGET HR: 160 BPM

## 2023-02-27 PROCEDURE — 93306 TTE W/DOPPLER COMPLETE: CPT

## 2023-02-27 PROCEDURE — 93306 TTE W/DOPPLER COMPLETE: CPT | Performed by: INTERNAL MEDICINE

## 2023-02-27 PROCEDURE — 93356 MYOCRD STRAIN IMG SPCKL TRCK: CPT | Performed by: INTERNAL MEDICINE

## 2023-02-27 PROCEDURE — 93356 MYOCRD STRAIN IMG SPCKL TRCK: CPT

## 2023-02-28 ENCOUNTER — TELEPHONE (OUTPATIENT)
Dept: CARDIOLOGY | Facility: CLINIC | Age: 33
End: 2023-02-28
Payer: COMMERCIAL

## 2023-02-28 DIAGNOSIS — I25.10 CORONARY ARTERY DISEASE INVOLVING NATIVE CORONARY ARTERY OF NATIVE HEART WITHOUT ANGINA PECTORIS: ICD-10-CM

## 2023-02-28 DIAGNOSIS — R07.9 CHEST PAIN, UNSPECIFIED TYPE: ICD-10-CM

## 2023-02-28 DIAGNOSIS — R07.2 PRECORDIAL PAIN: Primary | ICD-10-CM

## 2023-03-06 ENCOUNTER — HOSPITAL ENCOUNTER (EMERGENCY)
Facility: HOSPITAL | Age: 33
Discharge: HOME | End: 2023-03-06
Attending: EMERGENCY MEDICINE | Admitting: EMERGENCY MEDICINE
Payer: COMMERCIAL

## 2023-03-06 ENCOUNTER — APPOINTMENT (OUTPATIENT)
Dept: GENERAL RADIOLOGY | Facility: HOSPITAL | Age: 33
End: 2023-03-06
Payer: COMMERCIAL

## 2023-03-06 VITALS
SYSTOLIC BLOOD PRESSURE: 143 MMHG | BODY MASS INDEX: 33.66 KG/M2 | WEIGHT: 190 LBS | DIASTOLIC BLOOD PRESSURE: 96 MMHG | OXYGEN SATURATION: 100 % | TEMPERATURE: 98.6 F | RESPIRATION RATE: 20 BRPM | HEIGHT: 63 IN | HEART RATE: 108 BPM

## 2023-03-06 DIAGNOSIS — R07.9 CHEST PAIN, UNSPECIFIED TYPE: Primary | ICD-10-CM

## 2023-03-06 LAB
ALBUMIN SERPL-MCNC: 4.3 G/DL (ref 3.5–5.2)
ALBUMIN/GLOB SERPL: 1.2 G/DL
ALP SERPL-CCNC: 92 U/L (ref 39–117)
ALT SERPL W P-5'-P-CCNC: 14 U/L (ref 1–33)
ANION GAP SERPL CALCULATED.3IONS-SCNC: 13.3 MMOL/L (ref 5–15)
AST SERPL-CCNC: 17 U/L (ref 1–32)
BASOPHILS # BLD AUTO: 0.06 10*3/MM3 (ref 0–0.2)
BASOPHILS NFR BLD AUTO: 0.5 % (ref 0–1.5)
BILIRUB SERPL-MCNC: 0.4 MG/DL (ref 0–1.2)
BUN SERPL-MCNC: 9 MG/DL (ref 6–20)
BUN/CREAT SERPL: 14.3 (ref 7–25)
CALCIUM SPEC-SCNC: 9.6 MG/DL (ref 8.6–10.5)
CHLORIDE SERPL-SCNC: 101 MMOL/L (ref 98–107)
CO2 SERPL-SCNC: 22.7 MMOL/L (ref 22–29)
CREAT SERPL-MCNC: 0.63 MG/DL (ref 0.57–1)
DEPRECATED RDW RBC AUTO: 39.4 FL (ref 37–54)
EGFRCR SERPLBLD CKD-EPI 2021: 121 ML/MIN/1.73
EOSINOPHIL # BLD AUTO: 0.08 10*3/MM3 (ref 0–0.4)
EOSINOPHIL NFR BLD AUTO: 0.7 % (ref 0.3–6.2)
ERYTHROCYTE [DISTWIDTH] IN BLOOD BY AUTOMATED COUNT: 12 % (ref 12.3–15.4)
GLOBULIN UR ELPH-MCNC: 3.5 GM/DL
GLUCOSE SERPL-MCNC: 96 MG/DL (ref 65–99)
HCT VFR BLD AUTO: 44.3 % (ref 34–46.6)
HGB BLD-MCNC: 15.1 G/DL (ref 12–15.9)
HOLD SPECIMEN: NORMAL
HOLD SPECIMEN: NORMAL
IMM GRANULOCYTES # BLD AUTO: 0.05 10*3/MM3 (ref 0–0.05)
IMM GRANULOCYTES NFR BLD AUTO: 0.4 % (ref 0–0.5)
LYMPHOCYTES # BLD AUTO: 1.75 10*3/MM3 (ref 0.7–3.1)
LYMPHOCYTES NFR BLD AUTO: 15 % (ref 19.6–45.3)
MCH RBC QN AUTO: 30.9 PG (ref 26.6–33)
MCHC RBC AUTO-ENTMCNC: 34.1 G/DL (ref 31.5–35.7)
MCV RBC AUTO: 90.6 FL (ref 79–97)
MONOCYTES # BLD AUTO: 0.55 10*3/MM3 (ref 0.1–0.9)
MONOCYTES NFR BLD AUTO: 4.7 % (ref 5–12)
NEUTROPHILS NFR BLD AUTO: 78.7 % (ref 42.7–76)
NEUTROPHILS NFR BLD AUTO: 9.16 10*3/MM3 (ref 1.7–7)
NRBC BLD AUTO-RTO: 0 /100 WBC (ref 0–0.2)
PLATELET # BLD AUTO: 317 10*3/MM3 (ref 140–450)
PMV BLD AUTO: 9.9 FL (ref 6–12)
POTASSIUM SERPL-SCNC: 3.8 MMOL/L (ref 3.5–5.2)
PROT SERPL-MCNC: 7.8 G/DL (ref 6–8.5)
RBC # BLD AUTO: 4.89 10*6/MM3 (ref 3.77–5.28)
SODIUM SERPL-SCNC: 137 MMOL/L (ref 136–145)
TROPONIN T SERPL HS-MCNC: <6 NG/L
WBC NRBC COR # BLD: 11.65 10*3/MM3 (ref 3.4–10.8)
WHOLE BLOOD HOLD COAG: NORMAL
WHOLE BLOOD HOLD SPECIMEN: NORMAL

## 2023-03-06 PROCEDURE — 71045 X-RAY EXAM CHEST 1 VIEW: CPT

## 2023-03-06 PROCEDURE — 80053 COMPREHEN METABOLIC PANEL: CPT | Performed by: NURSE PRACTITIONER

## 2023-03-06 PROCEDURE — 93005 ELECTROCARDIOGRAM TRACING: CPT

## 2023-03-06 PROCEDURE — 99283 EMERGENCY DEPT VISIT LOW MDM: CPT

## 2023-03-06 PROCEDURE — 93005 ELECTROCARDIOGRAM TRACING: CPT | Performed by: EMERGENCY MEDICINE

## 2023-03-06 PROCEDURE — 93005 ELECTROCARDIOGRAM TRACING: CPT | Performed by: NURSE PRACTITIONER

## 2023-03-06 PROCEDURE — 85025 COMPLETE CBC W/AUTO DIFF WBC: CPT | Performed by: NURSE PRACTITIONER

## 2023-03-06 PROCEDURE — 84484 ASSAY OF TROPONIN QUANT: CPT | Performed by: NURSE PRACTITIONER

## 2023-03-06 RX ORDER — SODIUM CHLORIDE 0.9 % (FLUSH) 0.9 %
10 SYRINGE (ML) INJECTION AS NEEDED
Status: DISCONTINUED | OUTPATIENT
Start: 2023-03-06 | End: 2023-03-06 | Stop reason: HOSPADM

## 2023-03-06 NOTE — ED PROVIDER NOTES
Subjective  History of Present Illness:    Chief Complaint: Chest pain, shortness of breath  History of Present Illness: 32-year-old female patient comes into the ED today complaining of chest pain, shortness of breath that started last night and is progressively worsened over the interval.  Patient called her cardiologist and she was told to come to the emergency room for immediate evaluation.      Nurses Notes reviewed and agree, including vitals, allergies, social history and prior medical history.       Allergies:    Ceftin [cefuroxime axetil], Cefuroxime, Sumatriptan, and Amoxicillin      Past Surgical History:   Procedure Laterality Date   •  SECTION        and  and    •  SECTION WITH TUBAL N/A 01/15/2019    Procedure:  SECTION REPEAT WITH TUBAL;  Surgeon: Alva Boyer MD;  Location: [facility] LABOR DELIVERY;  Service: Obstetrics/Gynecology   • COLONOSCOPY N/A 2017    Procedure: COLONOSCOPY WITH BIOPSIES AND ARGON THERMAL ABLATION;  Surgeon: Jignesh Selby MD;  Location: [facility] ENDOSCOPY;  Service:    • D & C HYSTEROSCOPY ENDOMETRIAL ABLATION N/A 03/10/2022    Procedure: DILATATION AND CURETTAGE DIAGNOSTIC HYSTEROSCOPY NOVASURE ENDOMETRIAL ABLATION;  Surgeon: Alva Boyer MD;  Location: [facility] OR;  Service: Obstetrics/Gynecology;  Laterality: N/A;   • DIAGNOSTIC LAPAROSCOPY N/A 2018    Procedure: DIAGNOSTIC LAPAROSCOPY AND ABLATION OF ENDOMETRIOSIS;  Surgeon: Evin Zamudio MD;  Location: [facility] OR;  Service: Obstetrics/Gynecology   • ENDOMETRIAL ABLATION     • ENDOSCOPIC FUNCTIONAL SINUS SURGERY (FESS) Right 2021    Procedure: Right endoscopic total ethmoidectomy, right endoscopic middle meatal antrotomy with cyst removal, right endoscopic frontal recess exploration with cyst removal;  Surgeon: Saulo Brito MD;  Location: [facility] OR;  Service: ENT;  Laterality: Right;   • ENDOSCOPY N/A 2017    Procedure: ESOPHAGOGASTRODUODENOSCOPY WITH  BIOPSIES AND COLD BIOPSY POLYPECTOMIES;  Surgeon: Jignesh Selby MD;  Location: [facility] ENDOSCOPY;  Service:    • LAPAROSCOPIC CHOLECYSTECTOMY     • LIPOMA EXCISION N/A 12/10/2021    Procedure: Excision of soft tissue mass lower back;  Surgeon: Nidia Smith MD;  Location: [facility] OR;  Service: General;  Laterality: N/A;   • MOUTH SURGERY      full mouth tooth extration   • WISDOM TOOTH EXTRACTION           Social History     Socioeconomic History   • Marital status:    Tobacco Use   • Smoking status: Never     Passive exposure: Past   • Smokeless tobacco: Never   Vaping Use   • Vaping Use: Never used   Substance and Sexual Activity   • Alcohol use: No   • Drug use: Never   • Sexual activity: Yes     Partners: Male     Birth control/protection: Surgical     Comment: Tubal         Family History   Problem Relation Age of Onset   • Arthritis Mother    • COPD Mother    • Asthma Mother    • Thyroid disease Mother    • Arthritis Father    • Diabetes Father    • Hypertension Father    • Hyperlipidemia Father    • Kidney disease Father    • Heart attack Father    • Coronary artery disease Father    • Dementia Father    • No Known Problems Son    • Colon cancer Neg Hx    • Liver cancer Neg Hx    • Liver disease Neg Hx    • Stomach cancer Neg Hx    • Esophageal cancer Neg Hx        REVIEW OF SYSTEMS: All systems reviewed and not pertinent unless noted.    Review of Systems   Respiratory: Positive for chest tightness and shortness of breath.    Cardiovascular: Positive for chest pain.   All other systems reviewed and are negative.      Objective    Physical Exam  Vitals and nursing note reviewed.   Constitutional:       Appearance: Normal appearance.   HENT:      Head: Normocephalic and atraumatic.   Eyes:      Extraocular Movements: Extraocular movements intact.      Pupils: Pupils are equal, round, and reactive to light.   Cardiovascular:      Rate and Rhythm: Normal rate and regular rhythm.      Pulses:            Carotid pulses are 1+ on the right side and 1+ on the left side.       Radial pulses are 1+ on the right side and 1+ on the left side.        Dorsalis pedis pulses are 1+ on the right side and 1+ on the left side.        Posterior tibial pulses are 1+ on the right side and 1+ on the left side.      Heart sounds: Normal heart sounds.   Pulmonary:      Effort: Pulmonary effort is normal.      Breath sounds: Normal breath sounds.   Abdominal:      General: Abdomen is flat. Bowel sounds are normal.      Palpations: Abdomen is soft.   Musculoskeletal:      Cervical back: Normal range of motion and neck supple.   Skin:     Capillary Refill: Capillary refill takes less than 2 seconds.   Neurological:      General: No focal deficit present.      Mental Status: She is alert and oriented to person, place, and time. Mental status is at baseline.      GCS: GCS eye subscore is 4. GCS verbal subscore is 5. GCS motor subscore is 6.      Sensory: Sensation is intact.      Motor: Motor function is intact.      Gait: Gait is intact.   Psychiatric:         Attention and Perception: Attention and perception normal.         Mood and Affect: Mood and affect normal.         Speech: Speech normal.         Behavior: Behavior normal. Behavior is cooperative.       HEART Score for Major Cardiac Events - MDCalc  Calculated on Mar 06 2023 5:37 PM  1 points -> Low Score (0-3 points) Risk of MACE of 0.9-1.7%.    Procedures    ED Course:    ED Course as of 03/06/23 1739   Mon Mar 06, 2023   1515 EKG interpreted by me reveals sinus tachycardia rate 104 low voltage EKG nonspecific T wave change.  No ectopy no ischemic changes [PF]   1628 EKG interpreted by me reveals sinus tachycardia 114 low voltage EKG no ectopy no ischemic changes nonspecific T wave changes [PF]      ED Course User Index  [PF] Hakan Goldsmith,        Lab Results (last 24 hours)     Procedure Component Value Units Date/Time    CBC & Differential [[id number]]  (Abnormal) Collected:  03/06/23 1649    Specimen: Blood Updated: 03/06/23 1708    Narrative:      The following orders were created for panel order CBC & Differential.  Procedure                               Abnormality         Status                     ---------                               -----------         ------                     CBC Auto Differential[[id number]]        Abnormal            Final result                 Please view results for these tests on the individual orders.    Comprehensive Metabolic Panel [[id number]] Collected: 03/06/23 1649    Specimen: Blood Updated: 03/06/23 1725     Glucose 96 mg/dL      BUN 9 mg/dL      Creatinine 0.63 mg/dL      Sodium 137 mmol/L      Potassium 3.8 mmol/L      Chloride 101 mmol/L      CO2 22.7 mmol/L      Calcium 9.6 mg/dL      Total Protein 7.8 g/dL      Albumin 4.3 g/dL      ALT (SGPT) 14 U/L      AST (SGOT) 17 U/L      Alkaline Phosphatase 92 U/L      Total Bilirubin 0.4 mg/dL      Globulin 3.5 gm/dL      A/G Ratio 1.2 g/dL      BUN/Creatinine Ratio 14.3     Anion Gap 13.3 mmol/L      eGFR 121.0 mL/min/1.73     Narrative:      GFR Normal >60  Chronic Kidney Disease <60  Kidney Failure <15      High Sensitivity Troponin T [[id number]]  (Normal) Collected: 03/06/23 1649    Specimen: Blood Updated: 03/06/23 1727     HS Troponin T <6 ng/L     Narrative:      High Sensitive Troponin T Reference Range:  <10.0 ng/L- Negative Female for AMI  <15.0 ng/L- Negative Male for AMI  >=10 - Abnormal Female indicating possible myocardial injury.  >=15 - Abnormal Male indicating possible myocardial injury.   Clinicians would have to utilize clinical acumen, EKG, Troponin, and serial changes to determine if it is an Acute Myocardial Infarction or myocardial injury due to an underlying chronic condition.         CBC Auto Differential [[id number]]  (Abnormal) Collected: 03/06/23 1649    Specimen: Blood Updated: 03/06/23 1708     WBC 11.65 10*3/mm3      RBC 4.89 10*6/mm3      Hemoglobin 15.1 g/dL       Hematocrit 44.3 %      MCV 90.6 fL      MCH 30.9 pg      MCHC 34.1 g/dL      RDW 12.0 %      RDW-SD 39.4 fl      MPV 9.9 fL      Platelets 317 10*3/mm3      Neutrophil % 78.7 %      Lymphocyte % 15.0 %      Monocyte % 4.7 %      Eosinophil % 0.7 %      Basophil % 0.5 %      Immature Grans % 0.4 %      Neutrophils, Absolute 9.16 10*3/mm3      Lymphocytes, Absolute 1.75 10*3/mm3      Monocytes, Absolute 0.55 10*3/mm3      Eosinophils, Absolute 0.08 10*3/mm3      Basophils, Absolute 0.06 10*3/mm3      Immature Grans, Absolute 0.05 10*3/mm3      nRBC 0.0 /100 WBC            No radiology results from the last 24 hrs     Medical Decision Making  32-year-old female patient comes into the ED today complaining of chest pain shortness of breath that started last night and then increased over the interval.  Respiratory neuro GCS 15 alert and oriented x3 responds appropriately to questions respiratory clear to auscultation cardiac tachycardia positive pulses bilateral upper and lower extremity abdomen soft nontender to palpation    DDX: includes but is not limited to: NSTEMI, unstable angina, chest pain unspecified    Amount and/or Complexity of Data Reviewed  Labs: ordered. Decision-making details documented in ED Course.  Radiology: ordered. Decision-making details documented in ED Course.  ECG/medicine tests: ordered. Decision-making details documented in ED Course.  Discussion of management or test interpretation with external provider(s): Discussed assessment, treatment and plan with ER attending    Risk  Prescription drug management.    Risk Details: Patient has been diagnosed with chest pain unspecified and will be discharged home.  Patient requested to follow-up with primary care provider within the next 7 days for reevaluation.                  Final diagnoses:   Chest pain, unspecified type        Nabeel Victoria, ARNIE  03/06/23 1739       Nabeel Victoria, ARNIE  03/06/23 1810

## 2023-03-10 ENCOUNTER — OFFICE VISIT (OUTPATIENT)
Dept: INTERNAL MEDICINE | Facility: CLINIC | Age: 33
End: 2023-03-10
Payer: COMMERCIAL

## 2023-03-10 VITALS
DIASTOLIC BLOOD PRESSURE: 80 MMHG | HEIGHT: 64 IN | SYSTOLIC BLOOD PRESSURE: 118 MMHG | HEART RATE: 98 BPM | TEMPERATURE: 97.5 F | WEIGHT: 191.6 LBS | BODY MASS INDEX: 32.71 KG/M2 | RESPIRATION RATE: 16 BRPM | OXYGEN SATURATION: 96 %

## 2023-03-10 DIAGNOSIS — R00.0 TACHYCARDIA: ICD-10-CM

## 2023-03-10 DIAGNOSIS — I51.7 LVH (LEFT VENTRICULAR HYPERTROPHY): ICD-10-CM

## 2023-03-10 DIAGNOSIS — R09.89 LABILE HYPERTENSION: ICD-10-CM

## 2023-03-10 DIAGNOSIS — K30 GASTRIC MOTILITY DISORDER: ICD-10-CM

## 2023-03-10 DIAGNOSIS — R53.82 CHRONIC FATIGUE: ICD-10-CM

## 2023-03-10 DIAGNOSIS — R07.9 CHEST PAIN, UNSPECIFIED TYPE: Primary | ICD-10-CM

## 2023-03-10 PROCEDURE — 1160F RVW MEDS BY RX/DR IN RCRD: CPT | Performed by: FAMILY MEDICINE

## 2023-03-10 PROCEDURE — 1159F MED LIST DOCD IN RCRD: CPT | Performed by: FAMILY MEDICINE

## 2023-03-10 PROCEDURE — 99214 OFFICE O/P EST MOD 30 MIN: CPT | Performed by: FAMILY MEDICINE

## 2023-03-10 PROCEDURE — 3074F SYST BP LT 130 MM HG: CPT | Performed by: FAMILY MEDICINE

## 2023-03-10 PROCEDURE — 3079F DIAST BP 80-89 MM HG: CPT | Performed by: FAMILY MEDICINE

## 2023-03-10 RX ORDER — PANTOPRAZOLE SODIUM 40 MG/1
40 TABLET, DELAYED RELEASE ORAL DAILY
Qty: 14 TABLET | Refills: 0 | Status: SHIPPED | OUTPATIENT
Start: 2023-03-10

## 2023-03-10 NOTE — PROGRESS NOTES
"Chief Complaint  Hospital Follow Up Visit (Elevated BP and chest pain)    Subjective        Marilu Godoy presents to [facility] PRIMARY [facility]ARE  History of Present Illness  Continues to have heart rate and blood pressure fluctuations along with chest pain. She finished a monitor last night that cardiology ordered. Was supposed to have a nuclear stress test and insurance denied coverage. Has another test scheduled through cardiology. Stimulator approved through [facility] gastroenterology and will be placed next month approx. Staying tired. Was told by cardiologist not to exert herself too much at this time. Limiting caffeine. Says she drinsk maybe a couple of times on weekends.      Objective   Vital Signs:  /80 (BP Location: Right arm, Patient Position: Sitting, Cuff Size: Adult)   Pulse 98   Temp 97.5 °F (36.4 °C) (Temporal)   Resp 16   Ht 161.3 cm (63.5\")   Wt 86.9 kg (191 lb 9.6 oz)   SpO2 96%   BMI 33.41 kg/m²   Estimated body mass index is 33.41 kg/m² as calculated from the following:    Height as of this encounter: 161.3 cm (63.5\").    Weight as of this encounter: 86.9 kg (191 lb 9.6 oz).             Physical Exam  Vitals and nursing note reviewed.   Constitutional:       General: She is not in acute distress.     Appearance: Normal appearance. She is well-developed and well-groomed. She is obese. She is not ill-appearing, toxic-appearing or diaphoretic.      Interventions: Face mask in place.   HENT:      Head: Normocephalic and atraumatic.      Right Ear: Hearing normal.      Left Ear: Hearing normal.   Eyes:      General: Lids are normal. No scleral icterus.     Extraocular Movements: Extraocular movements intact.   Neck:      Trachea: Phonation normal.   Cardiovascular:      Rate and Rhythm: Normal rate and regular rhythm.   Pulmonary:      Effort: Pulmonary effort is normal.   Musculoskeletal:      Cervical back: Neck supple.   Skin:     Coloration: Skin is not jaundiced or " pale.   Neurological:      General: No focal deficit present.      Mental Status: She is alert and oriented to person, place, and time.      Motor: Motor function is intact.   Psychiatric:         Attention and Perception: Attention and perception normal.         Mood and Affect: Mood and affect normal.         Speech: Speech normal.         Behavior: Behavior normal. Behavior is cooperative.         Thought Content: Thought content normal.         Cognition and Memory: Cognition and memory normal.         Judgment: Judgment normal.        Result Review :  The following data was reviewed by: Deidre Barker MD on 03/10/2023:  ED Provider Notes by Nabeel Victoria APRN (03/06/2023 16:20)  XR Chest 1 View (03/06/2023 17:17)  High Sensitivity Troponin T (03/06/2023 16:49)  Comprehensive Metabolic Panel (03/06/2023 16:49)  CBC & Differential (03/06/2023 16:49)  Adult Transthoracic Echo Complete W/ Cont if Necessary Per Protocol (02/27/2023 15:35)    Progress Notes by Marc Byrnes MD (02/08/2023 11:30)                 Assessment and Plan   Diagnoses and all orders for this visit:    1. Chest pain, unspecified type (Primary)  -     pantoprazole (PROTONIX) 40 MG EC tablet; Take 1 tablet by mouth Daily.  Dispense: 14 tablet; Refill: 0    2. Tachycardia    3. Labile hypertension    4. LVH (left ventricular hypertrophy)    5. Gastric motility disorder  -     pantoprazole (PROTONIX) 40 MG EC tablet; Take 1 tablet by mouth Daily.  Dispense: 14 tablet; Refill: 0    6. Chronic fatigue    pt asked about echo. Briefly discussed and stressed need to follow up with cardiology. Keep scheduled appts with specialists. Possibly chest pain related to GI issue. Protonix 40 mg was on med list from 3/2021 (30 day supply no refills). Refilled x 2 weeks. Agree with cardiologist not to exert self too much at this time. Limit caffeine. Fatigue may be related to underlying health issues which are still being worked up.          Follow  Up   Return for As Needed.  Patient was given instructions and counseling regarding her condition or for health maintenance advice. Please see specific information pulled into the AVS if appropriate.

## 2023-03-12 NOTE — TELEPHONE ENCOUNTER
Insurance denied.  The patient sent a message saying she was in the ED again last week and wants to know if there is another test that can be ordered.

## 2023-03-20 DIAGNOSIS — K30 GASTRIC MOTILITY DISORDER: ICD-10-CM

## 2023-03-20 DIAGNOSIS — R07.9 CHEST PAIN, UNSPECIFIED TYPE: ICD-10-CM

## 2023-03-20 RX ORDER — PANTOPRAZOLE SODIUM 40 MG/1
TABLET, DELAYED RELEASE ORAL
Qty: 14 TABLET | Refills: 0 | OUTPATIENT
Start: 2023-03-20

## 2023-03-20 NOTE — TELEPHONE ENCOUNTER
Rx Refill Note  Requested Prescriptions     Pending Prescriptions Disp Refills   • pantoprazole (PROTONIX) 40 MG EC tablet [Pharmacy Med Name: Pantoprazole Sodium Oral Tablet Delayed Release 40 MG] 14 tablet 0     Sig: TAKE 1 TABLET BY MOUTH EVERY DAY      Will deny for reason of DUPLICATE  {    Emelia Pardo MA  03/20/23, 15:24 EDT

## 2023-03-22 ENCOUNTER — TRANSCRIBE ORDERS (OUTPATIENT)
Dept: LAB | Facility: HOSPITAL | Age: 33
End: 2023-03-22
Payer: COMMERCIAL

## 2023-03-22 ENCOUNTER — LAB (OUTPATIENT)
Dept: LAB | Facility: HOSPITAL | Age: 33
End: 2023-03-22
Payer: COMMERCIAL

## 2023-03-22 DIAGNOSIS — K31.84 GASTROPARESIS: Primary | ICD-10-CM

## 2023-03-22 DIAGNOSIS — K31.84 GASTROPARESIS: ICD-10-CM

## 2023-03-22 LAB
ALBUMIN SERPL-MCNC: 4.4 G/DL (ref 3.5–5.2)
ALBUMIN/GLOB SERPL: 1.3 G/DL
ALP SERPL-CCNC: 92 U/L (ref 39–117)
ALT SERPL W P-5'-P-CCNC: 17 U/L (ref 1–33)
ANION GAP SERPL CALCULATED.3IONS-SCNC: 10 MMOL/L (ref 5–15)
AST SERPL-CCNC: 21 U/L (ref 1–32)
BILIRUB SERPL-MCNC: 0.5 MG/DL (ref 0–1.2)
BUN SERPL-MCNC: 9 MG/DL (ref 6–20)
BUN/CREAT SERPL: 13.4 (ref 7–25)
CALCIUM SPEC-SCNC: 9.3 MG/DL (ref 8.6–10.5)
CHLORIDE SERPL-SCNC: 104 MMOL/L (ref 98–107)
CO2 SERPL-SCNC: 23 MMOL/L (ref 22–29)
CREAT SERPL-MCNC: 0.67 MG/DL (ref 0.57–1)
CRP SERPL-MCNC: 0.52 MG/DL (ref 0–0.5)
DEPRECATED RDW RBC AUTO: 40.3 FL (ref 37–54)
EGFRCR SERPLBLD CKD-EPI 2021: 119.3 ML/MIN/1.73
ERYTHROCYTE [DISTWIDTH] IN BLOOD BY AUTOMATED COUNT: 12.2 % (ref 12.3–15.4)
ERYTHROCYTE [SEDIMENTATION RATE] IN BLOOD: 35 MM/HR (ref 0–20)
FIBRINOGEN PPP-MCNC: 445 MG/DL (ref 209–518)
GLOBULIN UR ELPH-MCNC: 3.4 GM/DL
GLUCOSE SERPL-MCNC: 85 MG/DL (ref 65–99)
HBA1C MFR BLD: 4.9 % (ref 4.8–5.6)
HCT VFR BLD AUTO: 43 % (ref 34–46.6)
HGB BLD-MCNC: 15 G/DL (ref 12–15.9)
MCH RBC QN AUTO: 31.8 PG (ref 26.6–33)
MCHC RBC AUTO-ENTMCNC: 34.9 G/DL (ref 31.5–35.7)
MCV RBC AUTO: 91.1 FL (ref 79–97)
PLATELET # BLD AUTO: 356 10*3/MM3 (ref 140–450)
PMV BLD AUTO: 10.2 FL (ref 6–12)
POTASSIUM SERPL-SCNC: 4 MMOL/L (ref 3.5–5.2)
PREALB SERPL-MCNC: 23.3 MG/DL (ref 20–40)
PROT SERPL-MCNC: 7.8 G/DL (ref 6–8.5)
RBC # BLD AUTO: 4.72 10*6/MM3 (ref 3.77–5.28)
SODIUM SERPL-SCNC: 137 MMOL/L (ref 136–145)
WBC NRBC COR # BLD: 8.78 10*3/MM3 (ref 3.4–10.8)

## 2023-03-22 PROCEDURE — 85240 CLOT FACTOR VIII AHG 1 STAGE: CPT

## 2023-03-22 PROCEDURE — 87081 CULTURE SCREEN ONLY: CPT

## 2023-03-22 PROCEDURE — 83036 HEMOGLOBIN GLYCOSYLATED A1C: CPT

## 2023-03-22 PROCEDURE — 80053 COMPREHEN METABOLIC PANEL: CPT

## 2023-03-22 PROCEDURE — 84134 ASSAY OF PREALBUMIN: CPT

## 2023-03-22 PROCEDURE — 85652 RBC SED RATE AUTOMATED: CPT

## 2023-03-22 PROCEDURE — 85027 COMPLETE CBC AUTOMATED: CPT

## 2023-03-22 PROCEDURE — 86140 C-REACTIVE PROTEIN: CPT

## 2023-03-22 PROCEDURE — 36415 COLL VENOUS BLD VENIPUNCTURE: CPT

## 2023-03-22 PROCEDURE — 85384 FIBRINOGEN ACTIVITY: CPT

## 2023-03-23 LAB — MRSA SPEC QL CULT: NORMAL

## 2023-03-24 LAB — FACT VIII ACT/NOR PPP: 165 % (ref 56–140)

## 2023-04-06 ENCOUNTER — PATIENT MESSAGE (OUTPATIENT)
Dept: INTERNAL MEDICINE | Facility: CLINIC | Age: 33
End: 2023-04-06
Payer: COMMERCIAL

## 2023-04-06 ENCOUNTER — OFFICE VISIT (OUTPATIENT)
Dept: CARDIOLOGY | Facility: CLINIC | Age: 33
End: 2023-04-06
Payer: COMMERCIAL

## 2023-04-06 VITALS
SYSTOLIC BLOOD PRESSURE: 118 MMHG | WEIGHT: 192 LBS | HEART RATE: 87 BPM | OXYGEN SATURATION: 98 % | BODY MASS INDEX: 34.02 KG/M2 | DIASTOLIC BLOOD PRESSURE: 80 MMHG | HEIGHT: 63 IN

## 2023-04-06 DIAGNOSIS — R94.39 ABNORMAL CARDIOVASCULAR STRESS TEST: ICD-10-CM

## 2023-04-06 DIAGNOSIS — Z86.711 HISTORY OF PULMONARY EMBOLUS (PE): ICD-10-CM

## 2023-04-06 DIAGNOSIS — E78.5 DYSLIPIDEMIA: ICD-10-CM

## 2023-04-06 DIAGNOSIS — R07.2 PRECORDIAL PAIN: ICD-10-CM

## 2023-04-06 DIAGNOSIS — I25.10 CORONARY ARTERY DISEASE INVOLVING NATIVE CORONARY ARTERY OF NATIVE HEART WITHOUT ANGINA PECTORIS: Primary | ICD-10-CM

## 2023-04-06 DIAGNOSIS — D68.59 PROTEIN S DEFICIENCY: ICD-10-CM

## 2023-04-06 DIAGNOSIS — R00.2 PALPITATIONS: ICD-10-CM

## 2023-04-06 DIAGNOSIS — D68.51 HETEROZYGOUS FACTOR V LEIDEN MUTATION: ICD-10-CM

## 2023-04-06 DIAGNOSIS — E66.09 CLASS 1 OBESITY DUE TO EXCESS CALORIES WITH SERIOUS COMORBIDITY AND BODY MASS INDEX (BMI) OF 33.0 TO 33.9 IN ADULT: ICD-10-CM

## 2023-04-06 DIAGNOSIS — R00.0 INAPPROPRIATE SINUS TACHYCARDIA: ICD-10-CM

## 2023-04-06 PROBLEM — R07.9 CHEST PAIN: Status: ACTIVE | Noted: 2021-06-22

## 2023-04-06 PROBLEM — R07.9 CHEST PAIN: Status: ACTIVE | Noted: 2017-03-10

## 2023-04-06 PROCEDURE — 3079F DIAST BP 80-89 MM HG: CPT | Performed by: INTERNAL MEDICINE

## 2023-04-06 PROCEDURE — 3074F SYST BP LT 130 MM HG: CPT | Performed by: INTERNAL MEDICINE

## 2023-04-06 PROCEDURE — 99214 OFFICE O/P EST MOD 30 MIN: CPT | Performed by: INTERNAL MEDICINE

## 2023-04-06 PROCEDURE — 1159F MED LIST DOCD IN RCRD: CPT | Performed by: INTERNAL MEDICINE

## 2023-04-06 PROCEDURE — 1160F RVW MEDS BY RX/DR IN RCRD: CPT | Performed by: INTERNAL MEDICINE

## 2023-04-06 NOTE — PROGRESS NOTES
Subjective:     Encounter Date:04/06/2023      Patient ID: Marilu Godoy is a 32 y.o. female.    Chief Complaint: Chest pain  HPI  This is a 32-year-old female patient who presents to cardiology clinic for routine follow-up.  The patient reports that her chest discomfort has remained unchanged in regards to quality, frequency, duration and intensity.  She underwent a treadmill exercise stress test which showed no objective evidence of ischemia at cardiac workload achieved.  However, her overall effort tolerance was so poor the possibility remains that myocardial ischemia could have been manifest with a higher cardiac workload.  She also underwent an echocardiogram demonstrating borderline left ventricular hypertrophy but was otherwise normal.  Specifically LV systolic function was normal globally and regionally.  Left ventricular diastolic function was normal.  There was no evidence of pulmonary hypertension, valvular heart disease or pericardial disease.  The patient also wore an outpatient cardiac monitor showing no evidence of arrhythmia or frequent/complex atrial or ventricular ectopy.  The patient was initially referred for a vasodilator nuclear stress test which was declined by her insurance even though I suspected her effort tolerance would be inadequate for treadmill stress testing.  The patient's insurance also refused approval for a coronary CT angiogram.  She reports compliance with her medications with no perceived side effects.  She has had no bleeding complications related to anticoagulation therapy with Eliquis.  She is known to have a hypercoagulable state with previous DVT/PE.  The following portions of the patient's history were reviewed and updated as appropriate: allergies, current medications, past family history, past medical history, past social history, past surgical history and problem  Review of Systems   Constitutional: Negative for chills, diaphoresis, fever, malaise/fatigue,  weight gain and weight loss.   HENT: Negative for ear discharge, hearing loss, hoarse voice and nosebleeds.    Eyes: Negative for discharge, double vision, pain and photophobia.   Cardiovascular: Positive for chest pain and dyspnea on exertion. Negative for claudication, cyanosis, irregular heartbeat, leg swelling, near-syncope, orthopnea, palpitations, paroxysmal nocturnal dyspnea and syncope.   Respiratory: Negative for cough, hemoptysis, shortness of breath, sputum production and wheezing.    Endocrine: Negative for cold intolerance, heat intolerance, polydipsia, polyphagia and polyuria.   Hematologic/Lymphatic: Negative for adenopathy and bleeding problem. Does not bruise/bleed easily.   Skin: Negative for color change, flushing, itching and rash.   Musculoskeletal: Negative for muscle cramps, muscle weakness, myalgias and stiffness.   Gastrointestinal: Negative for abdominal pain, diarrhea, hematemesis, hematochezia, nausea and vomiting.   Genitourinary: Negative for dysuria, frequency and nocturia.   Neurological: Negative for focal weakness, loss of balance, numbness, paresthesias and seizures.   Psychiatric/Behavioral: Negative for altered mental status, hallucinations and suicidal ideas.   Allergic/Immunologic: Negative for HIV exposure, hives and persistent infections.           Current Outpatient Medications:   •  albuterol sulfate  (90 Base) MCG/ACT inhaler, Inhale 2 puffs Every 4 (Four) Hours As Needed for Wheezing or Shortness of Air., Disp: 18 g, Rfl: 11  •  apixaban (ELIQUIS) 5 MG tablet tablet, Take 1 tablet by mouth 2 (Two) Times a Day., Disp: 180 tablet, Rfl: 3  •  aspirin 81 MG EC tablet, Take 1 tablet by mouth Daily., Disp: , Rfl:   •  Atogepant (Qulipta) 60 MG tablet, Take 1 tablet by mouth Daily., Disp: 30 tablet, Rfl: 11  •  atorvastatin (LIPITOR) 40 MG tablet, Take 1 tablet by mouth Every Night., Disp: 30 tablet, Rfl: 11  •  azelastine (ASTELIN) 0.1 % nasal spray, 1 spray into the  nostril(s) as directed by provider 2 (Two) Times a Day. Use in each nostril as directed, Disp: 1 each, Rfl: 10  •  B-D ULTRA-FINE 33 LANCETS misc, Check sugars as needed/directed for hypoglycemia., Disp: 100 each, Rfl: 12  •  Blood Glucose Monitoring Suppl (ONE TOUCH ULTRA 2) w/Device kit, use as needed to check blood sugar for hypoglycemia (diabetes), Disp: , Rfl:   •  busPIRone (BUSPAR) 5 MG tablet, TAKE 1 TABLET BY MOUTH THREE TIMES A DAY, Disp: 90 tablet, Rfl: 0  •  butalbital-acetaminophen-caffeine (FIORICET, ESGIC) -40 MG per tablet, Take 1 tablet by mouth Every 6 (Six) Hours As Needed for Headache., Disp: 9 tablet, Rfl: 0  •  calcium carbonate EX (TUMS EX) 750 MG chewable tablet, Chew 1 tablet Daily. (Patient taking differently: Chew 1 tablet Daily As Needed.), Disp: 30 tablet, Rfl: 1  •  Cholecalciferol (Vitamin D3) 1.25 MG (72136 UT) capsule, Take 1 capsule by mouth Every 7 (Seven) Days. Indications: Vitamin D Deficiency, Disp: 12 capsule, Rfl: 1  •  Corlanor 5 MG tablet tablet, Take 1.5 tablets by mouth 2 (two) times a day., Disp: 90 tablet, Rfl: 6  •  cyclobenzaprine (FLEXERIL) 10 MG tablet,  15 Each, TAKE 1/2 TABLET BY MOUTH THREE TIMES A DAY AS NEEDED FOR MUSCLE SPASM for up to 5 days, 0 Refill(s), Disp: , Rfl:   •  Diclofenac Sodium (VOLTAREN) 1 % gel gel, Apply 4 g topically to the appropriate area as directed 4 (Four) Times a Day As Needed (joint pain)., Disp: 150 g, Rfl: 1  •  DULoxetine (CYMBALTA) 60 MG capsule, Take 1 capsule by mouth Daily., Disp: 90 capsule, Rfl: 3  •  fluticasone (FLONASE) 50 MCG/ACT nasal spray, 2 sprays into the nostril(s) as directed by provider Daily for 7 days., Disp: 9.9 mL, Rfl: 0  •  glucose blood test strip, USE TO CHECK SUGARS DAILY DUE TO hypoglycemia, Disp: 100 each, Rfl: 12  •  glucose monitor monitoring kit, 1 each As Needed (diabetes). Check sugars as needed/directed for hypoglycemia., Disp: 1 each, Rfl: 0  •  hyoscyamine (ANASPAZ,LEVSIN) 0.125 MG tablet,  TAKE 1 TABLET BY MOUTH EVERY SIX HOURS AS NEEDED FOR CRAMPING OR DIARRHEA, Disp: , Rfl:   •  ibuprofen (ADVIL,MOTRIN) 800 MG tablet, Take 1 tablet by mouth Every 8 (Eight) Hours As Needed for Mild Pain ., Disp: 30 tablet, Rfl: 0  •  l-methylfolate 15 MG tablet tablet, Take 1 tablet by mouth Daily. Indications: folic acid deficiency, Disp: 90 tablet, Rfl: 3  •  lidocaine (LIDODERM) 5 %, Place 2 patches on the skin as directed by provider Daily. Remove & Discard patch within 12 hours or as directed by MD, Disp: 60 patch, Rfl: 3  •  methocarbamol (ROBAXIN) 750 MG tablet, Take 1 tablet by mouth At Night As Needed for Muscle Spasms., Disp: 14 tablet, Rfl: 0  •  metoclopramide (REGLAN) 5 MG tablet, metoclopramide 5 mg tablet  TAKE 1 TABLET BY MOUTH FOUR TIMES A DAY, Disp: , Rfl:   •  mometasone-formoterol (Dulera) 200-5 MCG/ACT inhaler, Inhale 2 puffs 2 (Two) Times a Day., Disp: 13 g, Rfl: 11  •  mupirocin (Bactroban) 2 % cream, Apply 1 application topically to the appropriate area as directed 3 (Three) Times a Day., Disp: 30 g, Rfl: 1  •  ondansetron ODT (ZOFRAN-ODT) 4 MG disintegrating tablet, ondansetron 4 mg disintegrating tablet  DISSOLVE 1 TABLET IN MOUTH EVERY SIX HOURS AS NEEDED FOR NAUSEA AND VOMITING, Disp: , Rfl:   •  pantoprazole (PROTONIX) 40 MG EC tablet, Take 1 tablet by mouth Daily., Disp: 14 tablet, Rfl: 0  •  Prucalopride Succinate (Motegrity) 2 MG tablet, Motegrity 2 mg tablet  TAKE 1 TABLET BY MOUTH EVERY DAY, Disp: , Rfl:   •  Rimegepant Sulfate (Nurtec) 75 MG tablet dispersible tablet, Place 1 tablet under the tongue Take As Directed., Disp: 16 tablet, Rfl: 5  •  Scopolamine 1 MG/3DAYS patch, scopolamine 1 mg over 3 days transdermal patch, Disp: , Rfl:   •  zonisamide (ZONEGRAN) 100 MG capsule, Take 1 capsule by mouth Every Evening., Disp: 90 capsule, Rfl: 1    Objective:   Vitals and nursing note reviewed.   Constitutional:       Appearance: Healthy appearance. Not in distress.   Neck:       "Vascular: No JVR. JVD normal.   Pulmonary:      Effort: Pulmonary effort is normal.      Breath sounds: Normal breath sounds. No wheezing. No rhonchi. No rales.   Chest:      Chest wall: Not tender to palpatation.   Cardiovascular:      PMI at left midclavicular line. Normal rate. Regular rhythm. Normal S1. Normal S2.      Murmurs: There is no murmur.      No gallop. No click. No rub.   Pulses:     Intact distal pulses.   Edema:     Peripheral edema absent.   Abdominal:      General: Bowel sounds are normal.      Palpations: Abdomen is soft.      Tenderness: There is no abdominal tenderness.   Musculoskeletal: Normal range of motion.         General: No tenderness. Skin:     General: Skin is warm and dry.   Neurological:      General: No focal deficit present.      Mental Status: Alert and oriented to person, place and time.       Blood pressure 118/80, pulse 87, height 160 cm (63\"), weight 87.1 kg (192 lb), SpO2 98 %, not currently breastfeeding.   Lab Review:     Assessment:       1. Coronary artery disease involving native coronary artery of native heart without angina pectoris      2. Precordial pain  Atypical chest pain.  Nondiagnostic treadmill stress test.    3. Dyslipidemia  Tolerating statin based cholesterol-lowering therapy without side effects.    4. Inappropriate sinus tachycardia  Heart rate control with Corlanor use.    5. Heterozygous factor V Leiden mutation (HCC)  Lifelong anticoagulation with Eliquis    6. History of pulmonary embolus (PE)  Known hypercoagulable state.    7. Protein S deficiency      8. Class 1 obesity due to excess calories with serious comorbidity and body mass index (BMI) of 33.0 to 33.9 in adult      9. Abnormal cardiovascular stress test  Cannot reliably exclude ischemic heart disease or obstructive coronary artery disease due to poor effort tolerance.    Procedures    Plan:     I have recommended coronary CT angiography.    No changes in medications have been made at today's " visit.    Further recommendations will be predicated on the results of her outpatient testing.

## 2023-04-13 NOTE — TELEPHONE ENCOUNTER
From: Marilu Godoy  To: Deidre Barker  Sent: 4/6/2023 1:37 PM EDT  Subject: Bloodwork    Can you look at the blood work that [place] had me to do here for them? Dr. Byrnes mentioned something about one being positive for Lupus

## 2023-05-02 ENCOUNTER — TELEPHONE (OUTPATIENT)
Dept: CARDIOLOGY | Facility: CLINIC | Age: 33
End: 2023-05-02
Payer: COMMERCIAL

## 2023-05-22 ENCOUNTER — TELEPHONE (OUTPATIENT)
Dept: CARDIOLOGY | Facility: CLINIC | Age: 33
End: 2023-05-22
Payer: COMMERCIAL

## 2023-05-22 NOTE — TELEPHONE ENCOUNTER
----- Message from Marilu Godoy sent at 5/22/2023  9:10 AM EDT -----  Regarding: CT scan  Contact: [phone]  Where insurance denied my CT scan again is there anything else to do? And do I need to keep my appointment with your office since the CT scan got denied?

## 2023-05-24 ENCOUNTER — PATIENT MESSAGE (OUTPATIENT)
Dept: INTERNAL MEDICINE | Facility: CLINIC | Age: 33
End: 2023-05-24
Payer: COMMERCIAL

## 2023-05-24 DIAGNOSIS — M79.18 CHRONIC MUSCULOSKELETAL PAIN: ICD-10-CM

## 2023-05-24 DIAGNOSIS — R07.9 CHEST PAIN, UNSPECIFIED TYPE: ICD-10-CM

## 2023-05-24 DIAGNOSIS — D68.51 HETEROZYGOUS FACTOR V LEIDEN MUTATION: ICD-10-CM

## 2023-05-24 DIAGNOSIS — D68.59 PROTEIN S DEFICIENCY: ICD-10-CM

## 2023-05-24 DIAGNOSIS — G89.29 CHRONIC MUSCULOSKELETAL PAIN: ICD-10-CM

## 2023-05-24 DIAGNOSIS — E16.2 HYPOGLYCEMIA: ICD-10-CM

## 2023-05-24 DIAGNOSIS — M54.50 LUMBAR BACK PAIN: ICD-10-CM

## 2023-05-24 DIAGNOSIS — K30 GASTRIC MOTILITY DISORDER: ICD-10-CM

## 2023-05-24 RX ORDER — PANTOPRAZOLE SODIUM 40 MG/1
40 TABLET, DELAYED RELEASE ORAL DAILY
Qty: 90 TABLET | Refills: 3 | Status: SHIPPED | OUTPATIENT
Start: 2023-05-24

## 2023-05-24 RX ORDER — MOMETASONE FUROATE 50 UG/1
2 SPRAY, METERED NASAL DAILY
Qty: 3 EACH | Refills: 3 | Status: SHIPPED | OUTPATIENT
Start: 2023-05-24

## 2023-05-24 RX ORDER — ALBUTEROL SULFATE 90 UG/1
2 AEROSOL, METERED RESPIRATORY (INHALATION) EVERY 4 HOURS PRN
Qty: 18 G | Refills: 11 | Status: SHIPPED | OUTPATIENT
Start: 2023-05-24

## 2023-05-24 RX ORDER — DULOXETIN HYDROCHLORIDE 60 MG/1
60 CAPSULE, DELAYED RELEASE ORAL DAILY
Qty: 90 CAPSULE | Refills: 3 | Status: SHIPPED | OUTPATIENT
Start: 2023-05-24

## 2023-05-24 RX ORDER — ATORVASTATIN CALCIUM 40 MG/1
40 TABLET, FILM COATED ORAL NIGHTLY
Qty: 90 TABLET | Refills: 3 | Status: SHIPPED | OUTPATIENT
Start: 2023-05-24

## 2023-05-24 RX ORDER — BLOOD-GLUCOSE METER
KIT MISCELLANEOUS
Qty: 100 EACH | Refills: 12 | Status: SHIPPED | OUTPATIENT
Start: 2023-05-24

## 2023-05-24 RX ORDER — LIDOCAINE 50 MG/G
2 PATCH TOPICAL EVERY 24 HOURS
Qty: 60 PATCH | Refills: 3 | Status: SHIPPED | OUTPATIENT
Start: 2023-05-24

## 2023-05-25 RX ORDER — METHOCARBAMOL 750 MG/1
TABLET ORAL
Qty: 12 CAPSULE | Refills: 0 | OUTPATIENT
Start: 2023-05-25

## 2023-05-30 ENCOUNTER — OFFICE VISIT (OUTPATIENT)
Dept: INTERNAL MEDICINE | Facility: CLINIC | Age: 33
End: 2023-05-30

## 2023-05-30 ENCOUNTER — HOSPITAL ENCOUNTER (OUTPATIENT)
Dept: GENERAL RADIOLOGY | Facility: HOSPITAL | Age: 33
Discharge: HOME | End: 2023-05-30
Admitting: FAMILY MEDICINE

## 2023-05-30 VITALS
SYSTOLIC BLOOD PRESSURE: 132 MMHG | WEIGHT: 190.2 LBS | OXYGEN SATURATION: 97 % | TEMPERATURE: 97.3 F | HEIGHT: 64 IN | DIASTOLIC BLOOD PRESSURE: 82 MMHG | BODY MASS INDEX: 32.47 KG/M2 | RESPIRATION RATE: 16 BRPM | HEART RATE: 96 BPM

## 2023-05-30 DIAGNOSIS — M79.672 LEFT FOOT PAIN: Primary | ICD-10-CM

## 2023-05-30 DIAGNOSIS — M79.672 LEFT FOOT PAIN: ICD-10-CM

## 2023-05-30 DIAGNOSIS — M25.472 LEFT ANKLE SWELLING: ICD-10-CM

## 2023-05-30 PROCEDURE — 73630 X-RAY EXAM OF FOOT: CPT

## 2023-05-30 NOTE — PROGRESS NOTES
"Chief Complaint  Joint Swelling (Pt states about a week to two weeks ago her left ankle started swelling with some pain, was seen at  and xray was normal )    Subjective        Marilu Godoy presents to [facility]  History of Present Illness  Xray of ankle not foot  Started with pain in midfoot left  No recent injuries  Had ankle injury left many years ago      Objective   Vital Signs:  /82 (BP Location: Left arm, Patient Position: Sitting, Cuff Size: Adult)   Pulse 96   Temp 97.3 °F (36.3 °C) (Temporal)   Resp 16   Ht 161.3 cm (63.5\")   Wt 86.3 kg (190 lb 3.2 oz)   SpO2 97%   BMI 33.16 kg/m²   Estimated body mass index is 33.16 kg/m² as calculated from the following:    Height as of this encounter: 161.3 cm (63.5\").    Weight as of this encounter: 86.3 kg (190 lb 3.2 oz).       BMI is >= 30 and <35. (Class 1 Obesity). The following options were offered after discussion;: weight loss educational material (shared in after visit summary)      Physical Exam  Vitals and nursing note reviewed.   Musculoskeletal:      Left ankle: Swelling (lateral aspect) present. No deformity, ecchymosis or lacerations. Tenderness (posterior to malleolus between it and achilles) present. No lateral malleolus tenderness. Normal range of motion.      Left foot: No swelling or deformity.      Comments: No erythema. No warmth to ankle lateral left        Result Review :                   Assessment and Plan   Diagnoses and all orders for this visit:    1. Left foot pain (Primary)  -     XR Foot 3+ View Left; Future    2. Left ankle swelling    outside [facility]--imaging not available.  encouraged ace bandage, ice. Supportive shoes. Topical pain meds over the counter.   Chance of clot low, anticoagulated with Eliquis  Low suspicion for gout         Follow Up   Return for As Needed.  Patient was given instructions and counseling regarding her condition or for health maintenance advice. Please see " specific information pulled into the AVS if appropriate.

## 2023-05-31 ENCOUNTER — PATIENT MESSAGE (OUTPATIENT)
Dept: INTERNAL MEDICINE | Facility: CLINIC | Age: 33
End: 2023-05-31

## 2023-05-31 DIAGNOSIS — M79.672 LEFT FOOT PAIN: Primary | ICD-10-CM

## 2023-05-31 DIAGNOSIS — M25.472 LEFT ANKLE SWELLING: ICD-10-CM

## 2023-06-05 RX ORDER — METHOCARBAMOL 750 MG/1
TABLET ORAL
Qty: 12 CAPSULE | Refills: 0 | Status: SHIPPED | OUTPATIENT
Start: 2023-06-05

## 2023-06-06 ENCOUNTER — PATIENT MESSAGE (OUTPATIENT)
Dept: INTERNAL MEDICINE | Facility: CLINIC | Age: 33
End: 2023-06-06
Payer: COMMERCIAL

## 2023-06-06 DIAGNOSIS — Z11.1 SCREENING FOR TUBERCULOSIS: Primary | ICD-10-CM

## 2023-08-07 ENCOUNTER — OFFICE VISIT (OUTPATIENT)
Dept: INTERNAL MEDICINE | Facility: CLINIC | Age: 33
End: 2023-08-07
Payer: COMMERCIAL

## 2023-08-07 VITALS
OXYGEN SATURATION: 99 % | TEMPERATURE: 97.5 F | HEART RATE: 85 BPM | WEIGHT: 191.6 LBS | DIASTOLIC BLOOD PRESSURE: 70 MMHG | SYSTOLIC BLOOD PRESSURE: 110 MMHG | HEIGHT: 64 IN | BODY MASS INDEX: 32.71 KG/M2

## 2023-08-07 DIAGNOSIS — R53.83 OTHER FATIGUE: ICD-10-CM

## 2023-08-07 DIAGNOSIS — R42 DIZZINESS: Primary | ICD-10-CM

## 2023-08-07 DIAGNOSIS — H60.391 OTHER INFECTIVE ACUTE OTITIS EXTERNA OF RIGHT EAR: ICD-10-CM

## 2023-08-07 PROCEDURE — 3074F SYST BP LT 130 MM HG: CPT | Performed by: STUDENT IN AN ORGANIZED HEALTH CARE EDUCATION/TRAINING PROGRAM

## 2023-08-07 PROCEDURE — 3078F DIAST BP <80 MM HG: CPT | Performed by: STUDENT IN AN ORGANIZED HEALTH CARE EDUCATION/TRAINING PROGRAM

## 2023-08-07 PROCEDURE — 99214 OFFICE O/P EST MOD 30 MIN: CPT | Performed by: STUDENT IN AN ORGANIZED HEALTH CARE EDUCATION/TRAINING PROGRAM

## 2023-08-07 RX ORDER — CIPROFLOXACIN AND DEXAMETHASONE 3; 1 MG/ML; MG/ML
4 SUSPENSION/ DROPS AURICULAR (OTIC) 2 TIMES DAILY
Qty: 7.5 ML | Refills: 0 | Status: SHIPPED | OUTPATIENT
Start: 2023-08-07

## 2023-08-07 NOTE — PROGRESS NOTES
Subjective   Marilu Godoy is a 32 y.o. female.     History of Present Illness  Patient presents with complaints of 1 month of nausea and dizziness.  States it is not all the time its intermittent but it is getting more frequent.  States it is happening every 2 to 3 days now.  States when it happens she lies down in a dark room and it goes away.  Had a gastric stimulator placed 3 weeks ago for idiopathic gastroparesis.  States that has helped with her abdominal pain and bloating.  Has been seeing cardiology Dr Terrazas, for unknown palpitations.  States they increased her Corlanor on her last visit and told her to follow-up as needed.  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    Review of Systems   All other systems reviewed and are negative.    Objective   Physical Exam  Vitals and nursing note reviewed.   Constitutional:       Appearance: Normal appearance.   HENT:      Head: Normocephalic and atraumatic.      Right Ear: External ear normal. Drainage present.      Left Ear: External ear normal.      Ears:      Comments: Right ear canal extremely erythemic     Nose: Nose normal.      Mouth/Throat:      Mouth: Mucous membranes are moist.      Pharynx: Oropharynx is clear. No oropharyngeal exudate or posterior oropharyngeal erythema.   Eyes:      Extraocular Movements: Extraocular movements intact.      Conjunctiva/sclera: Conjunctivae normal.      Pupils: Pupils are equal, round, and reactive to light.   Cardiovascular:      Rate and Rhythm: Normal rate and regular rhythm.      Pulses: Normal  pulses.      Heart sounds: Normal heart sounds.   Pulmonary:      Effort: Pulmonary effort is normal.      Breath sounds: Normal breath sounds.   Abdominal:      General: Abdomen is flat. Bowel sounds are normal.      Palpations: Abdomen is soft.   Musculoskeletal:         General: Normal range of motion.      Cervical back: Normal range of motion.   Skin:     General: Skin is warm.      Capillary Refill: Capillary refill takes less than 2 seconds.   Neurological:      General: No focal deficit present.      Mental Status: She is alert and oriented to person, place, and time. Mental status is at baseline.   Psychiatric:         Mood and Affect: Mood normal.         Behavior: Behavior normal.         Thought Content: Thought content normal.         Judgment: Judgment normal.       Assessment & Plan   Diagnoses and all orders for this visit:    1. Dizziness (Primary)  -     KIERA With / DsDNA, RNP, Sjogrens A / B, Mederos; Future    2. Other fatigue  -     CBC & Differential  -     Comprehensive Metabolic Panel  -     Hemoglobin A1c  -     TSH  -     Vitamin B12  -     Vitamin D,25-Hydroxy  -     T3, Free  -     T4, Free  -     KIERA With / DsDNA, RNP, Sjogrens A / B, Mederos; Future    3. Other infective acute otitis externa of right ear  -     ciprofloxacin-dexAMETHasone (Ciprodex) 0.3-0.1 % otic suspension; Administer 4 drops to the right ear 2 (Two) Times a Day.  Dispense: 7.5 mL; Refill: 0    Doing some labs for her fatigue.  Including checking for rheumatological causes thyroid, vitamin deficiencies blood counts, kidney liver and electrolytes.  Treating otitis externa with Ciprodex  Patient has Zofran already at home that she can take if she has spells of nausea that do not go away with sitting in a dark room  Patient has a follow-up on Wednesday with her neurologist.  I have concerns for patient possibly having autonomic dysregulation, due to her unknown cause of palpitations and idiopathic gastroparesis, along with  her myriad of other symptoms.

## 2023-08-08 DIAGNOSIS — E55.9 VITAMIN D DEFICIENCY: Primary | ICD-10-CM

## 2023-08-08 LAB
25(OH)D3+25(OH)D2 SERPL-MCNC: 9 NG/ML (ref 30–100)
ALBUMIN SERPL-MCNC: 4.7 G/DL (ref 3.5–5.2)
ALBUMIN/GLOB SERPL: 1.9 G/DL
ALP SERPL-CCNC: 94 U/L (ref 39–117)
ALT SERPL-CCNC: 12 U/L (ref 1–33)
AST SERPL-CCNC: 15 U/L (ref 1–32)
BASOPHILS # BLD AUTO: 0.06 10*3/MM3 (ref 0–0.2)
BASOPHILS NFR BLD AUTO: 0.8 % (ref 0–1.5)
BILIRUB SERPL-MCNC: 0.6 MG/DL (ref 0–1.2)
BUN SERPL-MCNC: 9 MG/DL (ref 6–20)
BUN/CREAT SERPL: 13.4 (ref 7–25)
CALCIUM SERPL-MCNC: 9.4 MG/DL (ref 8.6–10.5)
CHLORIDE SERPL-SCNC: 101 MMOL/L (ref 98–107)
CO2 SERPL-SCNC: 26.3 MMOL/L (ref 22–29)
CREAT SERPL-MCNC: 0.67 MG/DL (ref 0.57–1)
EGFRCR SERPLBLD CKD-EPI 2021: 119.3 ML/MIN/1.73
EOSINOPHIL # BLD AUTO: 0.12 10*3/MM3 (ref 0–0.4)
EOSINOPHIL NFR BLD AUTO: 1.6 % (ref 0.3–6.2)
ERYTHROCYTE [DISTWIDTH] IN BLOOD BY AUTOMATED COUNT: 12.8 % (ref 12.3–15.4)
GLOBULIN SER CALC-MCNC: 2.5 GM/DL
GLUCOSE SERPL-MCNC: 87 MG/DL (ref 65–99)
HBA1C MFR BLD: 5 % (ref 4.8–5.6)
HCT VFR BLD AUTO: 40.3 % (ref 34–46.6)
HGB BLD-MCNC: 14 G/DL (ref 12–15.9)
IMM GRANULOCYTES # BLD AUTO: 0.02 10*3/MM3 (ref 0–0.05)
IMM GRANULOCYTES NFR BLD AUTO: 0.3 % (ref 0–0.5)
LYMPHOCYTES # BLD AUTO: 1.91 10*3/MM3 (ref 0.7–3.1)
LYMPHOCYTES NFR BLD AUTO: 25.4 % (ref 19.6–45.3)
MCH RBC QN AUTO: 31.4 PG (ref 26.6–33)
MCHC RBC AUTO-ENTMCNC: 34.7 G/DL (ref 31.5–35.7)
MCV RBC AUTO: 90.4 FL (ref 79–97)
MONOCYTES # BLD AUTO: 0.41 10*3/MM3 (ref 0.1–0.9)
MONOCYTES NFR BLD AUTO: 5.5 % (ref 5–12)
NEUTROPHILS # BLD AUTO: 5 10*3/MM3 (ref 1.7–7)
NEUTROPHILS NFR BLD AUTO: 66.4 % (ref 42.7–76)
NRBC BLD AUTO-RTO: 0 /100 WBC (ref 0–0.2)
PLATELET # BLD AUTO: 340 10*3/MM3 (ref 140–450)
POTASSIUM SERPL-SCNC: 4 MMOL/L (ref 3.5–5.2)
PROT SERPL-MCNC: 7.2 G/DL (ref 6–8.5)
RBC # BLD AUTO: 4.46 10*6/MM3 (ref 3.77–5.28)
SODIUM SERPL-SCNC: 139 MMOL/L (ref 136–145)
T3FREE SERPL-MCNC: 3.4 PG/ML (ref 2–4.4)
T4 FREE SERPL-MCNC: 1.14 NG/DL (ref 0.93–1.7)
TSH SERPL DL<=0.005 MIU/L-ACNC: 0.79 UIU/ML (ref 0.27–4.2)
VIT B12 SERPL-MCNC: 437 PG/ML (ref 211–946)
WBC # BLD AUTO: 7.52 10*3/MM3 (ref 3.4–10.8)

## 2023-08-08 RX ORDER — ERGOCALCIFEROL 1.25 MG/1
1 CAPSULE ORAL WEEKLY
Qty: 5 CAPSULE | Refills: 11 | Status: SHIPPED | OUTPATIENT
Start: 2023-08-08

## 2023-08-09 ENCOUNTER — TELEPHONE (OUTPATIENT)
Dept: NEUROLOGY | Facility: CLINIC | Age: 33
End: 2023-08-09
Payer: COMMERCIAL

## 2023-08-09 NOTE — TELEPHONE ENCOUNTER
Provider: ARNIE VALENTIN    Caller: CHIQUITA    Relationship to Patient: SELF    Phone Number: [phone]    Reason for Call: PT CALLED TO RESCHEDULE APPT FORM 8-8-23.  HUB CANNOT SCHEDULED AS OF YET.  PT THOUGHT THE APPT WAS FOR TODAY (8-9-23)    PLEASE CALL & ADVISE

## 2023-08-10 ENCOUNTER — OFFICE VISIT (OUTPATIENT)
Dept: NEUROLOGY | Facility: CLINIC | Age: 33
End: 2023-08-10
Payer: COMMERCIAL

## 2023-08-10 VITALS
DIASTOLIC BLOOD PRESSURE: 72 MMHG | HEIGHT: 64 IN | HEART RATE: 84 BPM | TEMPERATURE: 99.3 F | BODY MASS INDEX: 32.27 KG/M2 | SYSTOLIC BLOOD PRESSURE: 118 MMHG | WEIGHT: 189 LBS | OXYGEN SATURATION: 98 %

## 2023-08-10 DIAGNOSIS — G43.711 INTRACTABLE CHRONIC MIGRAINE WITHOUT AURA AND WITH STATUS MIGRAINOSUS: ICD-10-CM

## 2023-08-10 DIAGNOSIS — R42 DIZZINESS: ICD-10-CM

## 2023-08-10 DIAGNOSIS — G90.3 NEUROGENIC ORTHOSTATIC HYPOTENSION: ICD-10-CM

## 2023-08-10 DIAGNOSIS — G43.009 MIGRAINE WITHOUT AURA AND WITHOUT STATUS MIGRAINOSUS, NOT INTRACTABLE: Primary | ICD-10-CM

## 2023-08-10 RX ORDER — RIMEGEPANT SULFATE 75 MG/75MG
75 TABLET, ORALLY DISINTEGRATING ORAL AS NEEDED
Qty: 9 TABLET | Refills: 3 | Status: SHIPPED | OUTPATIENT
Start: 2023-08-10 | End: 2023-08-19

## 2023-08-10 RX ORDER — RIMEGEPANT SULFATE 75 MG/75MG
75 TABLET, ORALLY DISINTEGRATING ORAL AS NEEDED
Qty: 6 TABLET | Refills: 0 | COMMUNITY
Start: 2023-08-10 | End: 2023-08-10

## 2023-08-10 NOTE — PROGRESS NOTES
Follow Up Office Visit      Patient Name: Marilu Godoy  : 1990   MRN: [id number]     Chief Complaint:    Chief Complaint   Patient presents with    Follow-up     Migraines;  patient reports daily headaches; Patient stated Qulipta is no longer effective; patient reports that Nurtec helps somewhat but does not given complete relief of symptoms; patient has triptan allergy, tried and failed propanolol,Topiramate, Tramadol, Ajovy and  Ubrelvy tried Aimovig in the past with success       History of Present Illness: Marilu Godoy is a 32 y.o. female who is here today to follow up with Neurology for migraine HA. She was seen last in clinic  (Marc). She describes her migraine as a dull pounding to center of forehead and will radiate toward back of head. She will have aura with spots and floaters before it starts. + Light and sound sensitivity and N/V.  Last vision exam ; eyes were normal. She likes to sleep them off in a cold dark room. Migraines will last 2-24 hours. Migraine Days Month . She is taking Nurtec PRN for abortive and needs refills. Qulipta Daily was working well but just stopped! Also taking Zonegran but this doesn't do anything. She wants to resume Aimovig again; it worked best. Scared of Botox. Previously Tried and Failed: Ajovy (inusrance issues), Qulipta, Nurtec PRN and QOD, Imitrex (worsened), Maxalt, Propranolol, Topamax, Tramadol, Zonegrean, Fioricet.HA have worse in past 1.5 months when she got a gastric stimulator for gastroparesis. Abortive medication to work the best is Nurtec; will not completely terminate but will relieve > 70% usually.     Dizziness: New. Onset x 5 months. Waxes and wanes. Worse with position changes from laying to sitting; only occurs with position changes. Currently taking Ciprodex for EOM. Went for a tilt test and was normal for autonomic dysregulation and this was normal. No syncope. No tinnitus.     RISK FACTORS: Factor 5, Factor  8, Protien S Deficiency, PE with Eliquis use, HL, Obesity, Gastroparesis, Hyperthyroidism, Chronic Pain, AMBAR,     Recent Imaging:     CTA Neck 10/2022 -noncontributory  CTA Head 10/2022- noncontributory  CT head WO 10/2022 -noncontributory   Pertinent Medical History:    Subjective      Review of Systems:   Review of Systems   Constitutional:  Positive for fatigue.   HENT: Negative.     Eyes: Negative.  Negative for blurred vision, double vision and visual disturbance.   Respiratory: Negative.  Negative for cough and shortness of breath.    Cardiovascular: Negative.  Negative for chest pain and palpitations.   Gastrointestinal: Negative.    Endocrine: Negative.    Genitourinary: Negative.    Musculoskeletal: Negative.    Skin: Negative.    Allergic/Immunologic: Negative.    Neurological:  Positive for dizziness and headache. Negative for tremors, seizures, syncope, facial asymmetry, speech difficulty, weakness, light-headedness, numbness, memory problem and confusion.   Hematological: Negative.    Psychiatric/Behavioral: Negative.     All other systems reviewed and are negative.    I have reviewed and the following portions of the patient's history were updated as appropriate: past family history, past medical history, past social history, past surgical history and problem list.    Medications:     Current Outpatient Medications:     albuterol sulfate  (90 Base) MCG/ACT inhaler, Inhale 2 puffs Every 4 (Four) Hours As Needed for Wheezing or Shortness of Air., Disp: 18 g, Rfl: 11    apixaban (ELIQUIS) 5 MG tablet tablet, Take 1 tablet by mouth 2 (Two) Times a Day., Disp: 90 tablet, Rfl: 3    aspirin 81 MG EC tablet, Take 1 tablet by mouth Daily., Disp: , Rfl:     Atogepant (Qulipta) 60 MG tablet, Take 1 tablet by mouth Daily., Disp: 30 tablet, Rfl: 11    atorvastatin (LIPITOR) 40 MG tablet, Take 1 tablet by mouth Every Night., Disp: 90 tablet, Rfl: 3    azelastine (ASTELIN) 0.1 % nasal spray, 1 spray into the  nostril(s) as directed by provider 2 (Two) Times a Day. Use in each nostril as directed, Disp: 1 each, Rfl: 10    B-D ULTRA-FINE 33 LANCETS misc, Check sugars as needed/directed for hypoglycemia., Disp: 100 each, Rfl: 12    Blood Glucose Monitoring Suppl (ONE TOUCH ULTRA 2) w/Device kit, use as needed to check blood sugar for hypoglycemia (diabetes), Disp: , Rfl:     calcium carbonate EX (TUMS EX) 750 MG chewable tablet, Chew 1 tablet Daily. (Patient taking differently: Chew 1 tablet Daily As Needed.), Disp: 30 tablet, Rfl: 1    ciprofloxacin-dexAMETHasone (Ciprodex) 0.3-0.1 % otic suspension, Administer 4 drops to the right ear 2 (Two) Times a Day., Disp: 7.5 mL, Rfl: 0    Corlanor 5 MG tablet tablet, Take 1.5 tablets by mouth 2 (two) times a day., Disp: 90 tablet, Rfl: 6    cyclobenzaprine (FLEXERIL) 10 MG tablet,  15 Each, TAKE 1/2 TABLET BY MOUTH THREE TIMES A DAY AS NEEDED FOR MUSCLE SPASM for up to 5 days, 0 Refill(s), Disp: , Rfl:     Diclofenac Sodium (VOLTAREN) 1 % gel gel, Apply 4 g topically to the appropriate area as directed 4 (Four) Times a Day As Needed (joint pain)., Disp: 150 g, Rfl: 1    DULoxetine (CYMBALTA) 60 MG capsule, Take 1 capsule by mouth Daily., Disp: 90 capsule, Rfl: 3    fluticasone (FLONASE) 50 MCG/ACT nasal spray, 2 sprays into the nostril(s) as directed by provider Daily for 7 days., Disp: 9.9 mL, Rfl: 0    glucose blood test strip, USE TO CHECK SUGARS DAILY DUE TO hypoglycemia, Disp: 100 each, Rfl: 12    glucose monitor monitoring kit, 1 each As Needed (diabetes). Check sugars as needed/directed for hypoglycemia., Disp: 1 each, Rfl: 0    hyoscyamine (ANASPAZ,LEVSIN) 0.125 MG tablet, TAKE 1 TABLET BY MOUTH EVERY SIX HOURS AS NEEDED FOR CRAMPING OR DIARRHEA, Disp: , Rfl:     ibuprofen (ADVIL,MOTRIN) 800 MG tablet, Take 1 tablet by mouth Every 8 (Eight) Hours As Needed for Mild Pain ., Disp: 30 tablet, Rfl: 0    l-methylfolate 15 MG tablet tablet, Take 1 tablet by mouth Daily.  Indications: folic acid deficiency, Disp: 90 tablet, Rfl: 3    lidocaine (LIDODERM) 5 %, Place 2 patches on the skin as directed by provider Daily. Remove & Discard patch within 12 hours or as directed by MD, Disp: 60 patch, Rfl: 3    methocarbamol (ROBAXIN) 750 MG tablet, Take 1 tablet by mouth At Night As Needed for Muscle Spasms., Disp: 14 tablet, Rfl: 0    mometasone (NASONEX) 50 MCG/ACT nasal spray, 2 sprays into the nostril(s) as directed by provider Daily., Disp: 3 each, Rfl: 3    mometasone-formoterol (Dulera) 200-5 MCG/ACT inhaler, Inhale 2 puffs 2 (Two) Times a Day., Disp: 13 g, Rfl: 11    mupirocin (Bactroban) 2 % cream, Apply 1 application topically to the appropriate area as directed 3 (Three) Times a Day., Disp: 30 g, Rfl: 1    ondansetron ODT (ZOFRAN-ODT) 4 MG disintegrating tablet, ondansetron 4 mg disintegrating tablet  DISSOLVE 1 TABLET IN MOUTH EVERY SIX HOURS AS NEEDED FOR NAUSEA AND VOMITING, Disp: , Rfl:     pantoprazole (PROTONIX) 40 MG EC tablet, Take 1 tablet by mouth Daily., Disp: 90 tablet, Rfl: 3    Vitamin D, Ergocalciferol, 17862 units capsule, Take 1 capsule by mouth 1 (One) Time Per Week., Disp: 5 capsule, Rfl: 11    Erenumab-aooe (AIMOVIG) 140 MG/ML auto-injector, Inject 1 mL under the skin into the appropriate area as directed Every 30 (Thirty) Days., Disp: 1 mL, Rfl: 3    Rimegepant Sulfate (Nurtec) 75 MG tablet dispersible tablet, Take 1 tablet by mouth As Needed (Migraines) for up to 9 days., Disp: 9 tablet, Rfl: 3    Allergies:   Allergies   Allergen Reactions    Ceftin [Cefuroxime Axetil] Shortness Of Breath and Rash     Numbness in mouth and throat    Cefuroxime Hives, Rash and Shortness Of Breath     Numbness in mouth and throat  Numbness in mouth and throat    Sumatriptan Shortness Of Breath and Nausea Only     Other reaction(s): Nausea Only, Other (See Comments)  Worsening migraine  Worsening migraine    Amoxicillin Rash, Hives and Itching       Objective     Physical  "Exam:  Vital Signs:   Vitals:    08/10/23 1339   BP: 118/72   Pulse: 84   Temp: 99.3 øF (37.4 øC)   SpO2: 98%   Weight: 85.7 kg (189 lb)   Height: 161.3 cm (63.5\")   PainSc:   5   PainLoc: Head     Body mass index is 32.95 kg/mý.    Physical Exam  Vitals and nursing note reviewed.   Constitutional:       General: She is not in acute distress.     Appearance: Normal appearance. She is normal weight.   HENT:      Head: Normocephalic.      Nose: Nose normal.      Mouth/Throat:      Mouth: Mucous membranes are moist.      Pharynx: Oropharynx is clear.   Eyes:      Extraocular Movements: Extraocular movements intact.      Conjunctiva/sclera: Conjunctivae normal.      Pupils: Pupils are equal, round, and reactive to light.   Cardiovascular:      Rate and Rhythm: Normal rate and regular rhythm.      Pulses: Normal pulses.      Heart sounds: Normal heart sounds.   Pulmonary:      Effort: Pulmonary effort is normal.      Breath sounds: Normal breath sounds.   Musculoskeletal:         General: Normal range of motion.      Cervical back: Normal range of motion and neck supple. No rigidity.   Skin:     General: Skin is warm and dry.      Capillary Refill: Capillary refill takes less than 2 seconds.   Neurological:      General: No focal deficit present.      Mental Status: She is alert and oriented to person, place, and time.      Cranial Nerves: Cranial nerves 2-12 are intact.      Motor: Motor strength is normal.      Coordination: Finger-Nose-Finger Test, Heel to Shin Test and Romberg Test normal.      Gait: Gait is intact.      Deep Tendon Reflexes:      Reflex Scores:       Tricep reflexes are 2+ on the right side and 2+ on the left side.       Bicep reflexes are 2+ on the right side and 2+ on the left side.       Brachioradialis reflexes are 2+ on the right side and 2+ on the left side.       Patellar reflexes are 2+ on the right side and 2+ on the left side.       Achilles reflexes are 2+ on the right side and 2+ on the " left side.  Psychiatric:         Mood and Affect: Mood normal.         Speech: Speech normal.         Behavior: Behavior normal.         Thought Content: Thought content normal.         Judgment: Judgment normal.       Neurologic Exam     Mental Status   Oriented to person, place, and time.   Registration: recalls 3 of 3 objects. Recall at 5 minutes: recalls 3 of 3 objects. Follows 3 step commands.   Attention: normal. Concentration: normal.   Speech: speech is normal   Level of consciousness: alert  Knowledge: good.     Cranial Nerves   Cranial nerves II through XII intact.     CN III, IV, VI   Pupils are equal, round, and reactive to light.    Motor Exam   Muscle bulk: normal  Overall muscle tone: normal  Right arm tone: normal  Left arm tone: normal  Right arm pronator drift: absent  Left arm pronator drift: absent  Right leg tone: normal  Left leg tone: normal    Strength   Strength 5/5 throughout.     Sensory Exam   Light touch normal.     Gait, Coordination, and Reflexes     Gait  Gait: normal    Coordination   Romberg: negative  Finger to nose coordination: normal  Heel to shin coordination: normal    Tremor   Resting tremor: absent  Intention tremor: absent  Action tremor: absent    Reflexes   Right brachioradialis: 2+  Left brachioradialis: 2+  Right biceps: 2+  Left biceps: 2+  Right triceps: 2+  Left triceps: 2+  Right patellar: 2+  Left patellar: 2+  Right achilles: 2+  Left achilles: 2+  Right : 2+  Left : 2+  Right plantar: normal  Left plantar: normal  Right Rayo: absent  Left Rayo: absent  Right ankle clonus: absent  Left ankle clonus: absent     Assessment / Plan      Assessment/Plan:   Diagnoses and all orders for this visit:    1. Migraine without aura and without status migrainosus, not intractable (Primary)  -     Rimegepant Sulfate (Nurtec) 75 MG tablet dispersible tablet; Take 1 tablet by mouth As Needed (Migraines) for up to 9 days.  Dispense: 9 tablet; Refill: 3  -     CT Head  With & Without Contrast; Future  -     Erenumab-aooe (AIMOVIG) 140 MG/ML auto-injector; Inject 1 mL under the skin into the appropriate area as directed Every 30 (Thirty) Days.  Dispense: 1 mL; Refill: 3    2. Intractable chronic migraine without aura and with status migrainosus  -     Rimegepant Sulfate (Nurtec) 75 MG tablet dispersible tablet; Take 1 tablet by mouth As Needed (Migraines) for up to 9 days.  Dispense: 9 tablet; Refill: 3  -     CT Head With & Without Contrast; Future  -     Erenumab-aooe (AIMOVIG) 140 MG/ML auto-injector; Inject 1 mL under the skin into the appropriate area as directed Every 30 (Thirty) Days.  Dispense: 1 mL; Refill: 3    3. Neurogenic orthostatic hypotension  -     CT Head With & Without Contrast; Future    4. Dizziness  -     CT Head With & Without Contrast; Future         Follow Up:   No follow-ups on file.    DC Qulipta-not working. DC Nurtec QOD-not working. DC Zonegran-not working. Will begin Aimovig #1; SE reviewed with first dose given in office today. We will continue Nurtec #9 but change to PRN dosing for abortive therapy; SE reviewed. Nurtec Samples provided. CT Head W/WO for Migraines and neurodegenerative diseases --unable to have MRI due to gastric stimulator implant. Anticipatory Guidance and Safety of conditions reviewed.     RTC PRN or within 12 weeks or sooner with issues     Barby Guzman, DNP, APRN, FNP-C  [facility]

## 2023-08-24 ENCOUNTER — LAB (OUTPATIENT)
Dept: LAB | Facility: HOSPITAL | Age: 33
End: 2023-08-24
Payer: COMMERCIAL

## 2023-08-24 ENCOUNTER — HOSPITAL ENCOUNTER (OUTPATIENT)
Dept: CT IMAGING | Facility: HOSPITAL | Age: 33
Discharge: HOME | End: 2023-08-24
Payer: COMMERCIAL

## 2023-08-24 DIAGNOSIS — G43.711 INTRACTABLE CHRONIC MIGRAINE WITHOUT AURA AND WITH STATUS MIGRAINOSUS: ICD-10-CM

## 2023-08-24 DIAGNOSIS — G43.909 ACUTE MIGRAINE: Primary | ICD-10-CM

## 2023-08-24 DIAGNOSIS — R42 DIZZINESS: ICD-10-CM

## 2023-08-24 DIAGNOSIS — G43.009 MIGRAINE WITHOUT AURA AND WITHOUT STATUS MIGRAINOSUS, NOT INTRACTABLE: ICD-10-CM

## 2023-08-24 DIAGNOSIS — G90.3 NEUROGENIC ORTHOSTATIC HYPOTENSION: ICD-10-CM

## 2023-08-24 PROCEDURE — 86038 ANTINUCLEAR ANTIBODIES: CPT | Performed by: STUDENT IN AN ORGANIZED HEALTH CARE EDUCATION/TRAINING PROGRAM

## 2023-08-24 PROCEDURE — 25510000001 IOPAMIDOL 61 % SOLUTION: Performed by: NURSE PRACTITIONER

## 2023-08-24 PROCEDURE — 70470 CT HEAD/BRAIN W/O & W/DYE: CPT

## 2023-08-24 RX ORDER — RIMEGEPANT SULFATE 75 MG/75MG
75 TABLET, ORALLY DISINTEGRATING ORAL AS NEEDED
Qty: 10 TABLET | Refills: 3 | Status: SHIPPED | OUTPATIENT
Start: 2023-08-24

## 2023-08-24 RX ADMIN — IOPAMIDOL 100 ML: 612 INJECTION, SOLUTION INTRAVENOUS at 13:53

## 2023-09-08 RX ORDER — METHOCARBAMOL 750 MG/1
TABLET ORAL
Qty: 12 CAPSULE | Refills: 0 | OUTPATIENT
Start: 2023-09-08

## 2023-09-20 DIAGNOSIS — E16.2 HYPOGLYCEMIA: ICD-10-CM

## 2023-09-20 DIAGNOSIS — D68.51 HETEROZYGOUS FACTOR V LEIDEN MUTATION: ICD-10-CM

## 2023-09-20 DIAGNOSIS — D68.59 PROTEIN S DEFICIENCY: ICD-10-CM

## 2023-09-20 RX ORDER — BLOOD-GLUCOSE METER
KIT MISCELLANEOUS
Qty: 100 EACH | Refills: 12 | Status: SHIPPED | OUTPATIENT
Start: 2023-09-20

## 2023-09-20 RX ORDER — ALBUTEROL SULFATE 90 UG/1
2 AEROSOL, METERED RESPIRATORY (INHALATION) EVERY 4 HOURS PRN
Qty: 18 G | Refills: 11 | Status: SHIPPED | OUTPATIENT
Start: 2023-09-20

## 2023-10-07 ENCOUNTER — APPOINTMENT (OUTPATIENT)
Dept: GENERAL RADIOLOGY | Facility: HOSPITAL | Age: 33
End: 2023-10-07
Payer: COMMERCIAL

## 2023-10-07 ENCOUNTER — HOSPITAL ENCOUNTER (EMERGENCY)
Facility: HOSPITAL | Age: 33
Discharge: HOME | End: 2023-10-07
Attending: EMERGENCY MEDICINE
Payer: COMMERCIAL

## 2023-10-07 VITALS
TEMPERATURE: 98.7 F | HEART RATE: 110 BPM | SYSTOLIC BLOOD PRESSURE: 133 MMHG | DIASTOLIC BLOOD PRESSURE: 89 MMHG | WEIGHT: 184.2 LBS | OXYGEN SATURATION: 99 % | RESPIRATION RATE: 18 BRPM | HEIGHT: 64 IN | BODY MASS INDEX: 31.45 KG/M2

## 2023-10-07 DIAGNOSIS — R05.1 ACUTE COUGH: Primary | ICD-10-CM

## 2023-10-07 DIAGNOSIS — J06.9 UPPER RESPIRATORY TRACT INFECTION, UNSPECIFIED TYPE: ICD-10-CM

## 2023-10-07 LAB
FLUAV SUBTYP SPEC NAA+PROBE: NOT DETECTED
FLUBV RNA ISLT QL NAA+PROBE: NOT DETECTED
GLUCOSE BLDC GLUCOMTR-MCNC: 126 MG/DL (ref 70–130)
SARS-COV-2 RNA RESP QL NAA+PROBE: NOT DETECTED

## 2023-10-07 PROCEDURE — 87636 SARSCOV2 & INF A&B AMP PRB: CPT | Performed by: EMERGENCY MEDICINE

## 2023-10-07 PROCEDURE — 82948 REAGENT STRIP/BLOOD GLUCOSE: CPT

## 2023-10-07 PROCEDURE — 71045 X-RAY EXAM CHEST 1 VIEW: CPT

## 2023-10-07 PROCEDURE — 99283 EMERGENCY DEPT VISIT LOW MDM: CPT

## 2023-10-07 RX ORDER — GUAIFENESIN 600 MG/1
1200 TABLET, EXTENDED RELEASE ORAL 2 TIMES DAILY
Qty: 12 TABLET | Refills: 0 | Status: SHIPPED | OUTPATIENT
Start: 2023-10-07 | End: 2023-10-10

## 2023-10-07 RX ORDER — PREDNISONE 20 MG/1
20 TABLET ORAL 2 TIMES DAILY
Qty: 10 TABLET | Refills: 0 | Status: SHIPPED | OUTPATIENT
Start: 2023-10-07 | End: 2023-10-12

## 2023-10-07 NOTE — Clinical Note
[facility] EMERGENCY DEPARTMENT  [address] [address]  Phone: [phone]    Marilu Godoy was seen and treated in our emergency department on 10/7/2023.  She may return to work on 10/10/2023.         Thank you for choosing [facility].    Kezia Shirley MD

## 2023-10-07 NOTE — Clinical Note
[facility] EMERGENCY DEPARTMENT  [address] [address]  Phone: [phone]    Marilu Godoy was seen and treated in our emergency department on 10/7/2023.  She may return to work on 10/10/2023.         Thank you for choosing [facility].    Kezai Shirley MD

## 2023-10-08 NOTE — ED PROVIDER NOTES
Subjective  History of Present Illness:    Chief Complaint: Cough  History of Present Illness: Patient is a 32-year-old female with history of anxiety, asthma, bronchitis, cholelithiasis, DVT, diverticulitis, endometriosis, factor V Leiden mutation, fibroids, fibromyalgia, GERD, headaches, hiatal hernia, kidney infections, migraines, obesity, ovarian cyst and sleep apnea presenting to the ER for evaluation of cough.  Patient states that she has had body aches, intermittent fever, and headache for the past couple of days.  She is also had a dry nonproductive cough.  She went to her primary care provider yesterday and was told she had an URI, was reportedly given Bactrim and Tessalon Perles which she says are helping.  She states she does use an albuterol inhaler.  She states today she was concerned because after coughing she had some blurred vision.  Denies any vision loss, severe headache, extremity weakness, difficulty speaking or swallowing, chest pain, abdominal pain, emesis, diarrhea, or any other symptoms.  Onset: Few days   Duration: Persistent   Exacerbating / Alleviating factors: None  Associated symptoms: None      Nurses Notes reviewed and agree, including vitals, allergies, social history and prior medical history.     REVIEW OF SYSTEMS: All systems reviewed and not pertinent unless noted.  Review of Systems      Positive for: Cough, congestion, blurred vision, fever    Negative for: Rash, dizziness, syncope, hemoptysis, abdominal pain, emesis, diarrhea, dysuria.    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     Bleeding disorder 11/13/2016    Body piercing     TONGUE, NOSE, TWO IN EACH EAR    Bronchitis     Chest pain 06/22/2021    last CP 2-3 months ago    Cholelithiasis     Clotting disorder     factor 5    Constipation     COVID-19 vaccine series  completed     Pfizer-plus booster    Deep vein thrombosis 2016    Diarrhea     Diverticulitis     Diverticulosis     Elevated cholesterol     Endometriosis     Factor 5 Leiden mutation, heterozygous     Fibroid     Fibromyalgia     Fibromyalgia, primary     Full dentures 2021    advised no adhesives DOS    Gastroparesis     GERD (gastroesophageal reflux disease)     H/O blood clots     H/O cardiovascular stress test 2021    reported several years ago-exercise wnl    Headache     Hiatal hernia     High cholesterol     History of recurrent UTIs     HPV (human papilloma virus) infection     Hx of echocardiogram     Hx of gout 2021    Hypertension     Hypoglycemia     Inappropriate sinus node tachycardia     Kidney infection 2021    history only    Low back pain     Migraine     Nausea & vomiting     Obesity     Osteoarthritis     Ovarian cyst     Pneumonia     Pollen allergies     PONV (postoperative nausea and vomiting)     Protein S deficiency 2016    labs from hospitalization for PE    Pulmonary embolism 2016    on eliquis since that time    Recurrent pregnancy loss, antepartum condition or complication     Sleep apnea     no CPAP- states insurance took it back    Tattoo     LOWER BACK    Varicella        Allergies:    Ceftin [cefuroxime axetil], Cefuroxime, Sumatriptan, and Amoxicillin      Past Surgical History:   Procedure Laterality Date     SECTION        and  and      SECTION WITH TUBAL N/A 01/15/2019    Procedure:  SECTION REPEAT WITH TUBAL;  Surgeon: Alva Boyer MD;  Location: [facility] LABOR DELIVERY;  Service: Obstetrics/Gynecology    COLONOSCOPY N/A 2017    Procedure: COLONOSCOPY WITH BIOPSIES AND ARGON THERMAL ABLATION;  Surgeon: Jignesh Selby MD;  Location: [facility] ENDOSCOPY;  Service:     D & C HYSTEROSCOPY ENDOMETRIAL ABLATION N/A 03/10/2022    Procedure: DILATATION AND CURETTAGE DIAGNOSTIC HYSTEROSCOPY NOVASURE  ENDOMETRIAL ABLATION;  Surgeon: Alva Boyer MD;  Location: [facility] OR;  Service: Obstetrics/Gynecology;  Laterality: N/A;    DIAGNOSTIC LAPAROSCOPY N/A 04/02/2018    Procedure: DIAGNOSTIC LAPAROSCOPY AND ABLATION OF ENDOMETRIOSIS;  Surgeon: Evin Zamudio MD;  Location: [facility];  Service: Obstetrics/Gynecology    ENDOMETRIAL ABLATION      ENDOSCOPIC FUNCTIONAL SINUS SURGERY (FESS) Right 06/25/2021    Procedure: Right endoscopic total ethmoidectomy, right endoscopic middle meatal antrotomy with cyst removal, right endoscopic frontal recess exploration with cyst removal;  Surgeon: Saulo Brito MD;  Location: [facility] OR;  Service: ENT;  Laterality: Right;    ENDOSCOPY N/A 04/20/2017    Procedure: ESOPHAGOGASTRODUODENOSCOPY WITH BIOPSIES AND COLD BIOPSY POLYPECTOMIES;  Surgeon: Jignesh Selby MD;  Location: [facility] ENDOSCOPY;  Service:     GASTRIC STIMULATOR IMPLANT SURGERY      LAPAROSCOPIC CHOLECYSTECTOMY      LIPOMA EXCISION N/A 12/10/2021    Procedure: Excision of soft tissue mass lower back;  Surgeon: Nidia Smith MD;  Location: [facility] OR;  Service: General;  Laterality: N/A;    MOUTH SURGERY      full mouth tooth extration    SINUS SURGERY      TUBAL ABDOMINAL LIGATION  01/15/2019    WISDOM TOOTH EXTRACTION           Social History     Socioeconomic History    Marital status:    Tobacco Use    Smoking status: Never     Passive exposure: Past    Smokeless tobacco: Never   Vaping Use    Vaping Use: Never used   Substance and Sexual Activity    Alcohol use: No    Drug use: Never    Sexual activity: Yes     Partners: Male     Birth control/protection: Surgical     Comment: Tubal         Family History   Problem Relation Age of Onset    Arthritis Mother     COPD Mother     Asthma Mother     Thyroid disease Mother     Arthritis Father     Diabetes Father     Hypertension Father     Hyperlipidemia Father     Kidney disease Father     Heart attack Father     Coronary artery disease Father      "Dementia Father     No Known Problems Son     Colon cancer Neg Hx     Liver cancer Neg Hx     Liver disease Neg Hx     Stomach cancer Neg Hx     Esophageal cancer Neg Hx        Objective  Physical Exam:  /89 (BP Location: Left arm, Patient Position: Sitting)   Pulse 110   Temp 98.7 °F (37.1 °C) (Oral)   Resp 18   Ht 161.3 cm (63.5\")   Wt 83.6 kg (184 lb 3.2 oz)   SpO2 99%   BMI 32.12 kg/m²      Physical Exam    CONSTITUTIONAL: Well developed, no acute distress, nontoxic in appearance.  She is awake and alert, speaking in full sentences.  VITAL SIGNS: per nursing, reviewed and noted  SKIN: exposed skin with no rashes, ulcerations or petechiae  EYES: Grossly EOMI, no icterus, PERRL  ENT: Normal voice.  Nares patent, no facial asymmetry  RESPIRATORY:  No increased work of breathing. No retractions.  Lung sounds clear to auscultation bilaterally  CARDIOVASCULAR: Mild tachycardia, regular rhythm  GI: Abdomen soft, nontender to palpation  MUSCULOSKELETAL:  No tenderness. Full ROM. Strength and tone grossly normal.  no spasms.   NEUROLOGIC: Alert, oriented x 3. No gross deficits. GCS 15.   PSYCH: appropriate affect.      Procedures    ED Course:        ED Course as of 10/07/23 2205   Sat Oct 07, 2023   2028 COVID19: Not Detected [LR]   2028 Influenza A PCR: Not Detected [LR]   2028 Influenza B PCR: Not Detected [LR]   2028 Glucose: 126 [LR]      ED Course User Index  [LR] Antonietta Davis PA-C       Lab Results (last 24 hours)       Procedure Component Value Units Date/Time    COVID-19 and FLU A/B PCR - Swab, Nasopharynx [[id number]]  (Normal) Collected: 10/07/23 2002    Specimen: Swab from Nasopharynx Updated: 10/07/23 2028     COVID19 Not Detected     Influenza A PCR Not Detected     Influenza B PCR Not Detected    Narrative:      Fact sheet for providers: https://www.fda.gov/media/911875/download    Fact sheet for patients: https://www.fda.gov/media/498004/download    Test performed by PCR.  Fact sheet for " providers: https://www.fda.gov/media/927113/download    Fact sheet for patients: https://www.fda.gov/media/892265/download    Test performed by PCR.    POC Glucose Once [[id number]]  (Normal) Collected: 10/07/23 2023    Specimen: Blood Updated: 10/07/23 2025     Glucose 126 mg/dL      Comment: Serial Number: [id number]:  [id number]                No radiology results from the last 24 hrs       MDM     Amount and/or Complexity of Data Reviewed  Clinical lab tests: reviewed  Tests in the radiology section of CPT®: reviewed  Independent visualization of images, tracings, or specimens: yes        Initial impression of presenting illness: Patient is a 32-year-old female presenting to the ER for evaluation of cough    DDX: includes but is not limited to: URI, viral illness, and other concerns.  Lung sounds are clear, oxygen saturation within normal limits, low concern for pneumonia.  Patient is already on an antibiotic per her report.    Patient arrives in overall stable condition with vitals interpreted by myself.     Pertinent features from physical exam: Mild tachycardia, normotensive, afebrile, saturating within normal limits, lung sounds are clear to auscultation bilaterally.    Initial diagnostic plan: Will obtain COVID influenza swab, chest x-ray and glucose    Results from initial plan were reviewed and interpreted by me revealing overall stable work-up.  COVID and influenza were negative.  Chest x-ray reviewed without acute cardiopulmonary abnormality.  Glucose was 126.    Diagnostic information from other sources: Chart review    Interventions / Re-evaluation: Patient remained stable throughout visit.  Discussed findings.    Results/clinical rationale were discussed with patient.  Discussed that she can continue the antibiotic given by her provider which will cover for any underlying bacterial infection.  We will add steroids and Mucinex.  Discussed continue her inhaler and following up closely with her  primary care provider.  Discussed very strict return precautions to the ER.  She verbalized understanding and was in agreement with this plan of care    Consultations/Discussion of results with other physicians: None    Disposition plan: Discharge  -----    Final diagnoses:   Acute cough   Upper respiratory tract infection, unspecified type          Antonietta Davis PA-C  10/07/23 3239

## 2023-10-08 NOTE — DISCHARGE INSTRUCTIONS
Continue home medications as directed including any medicines that were prescribed by your provider.  You can take burst of steroids to help with underlying inflammation and use Mucinex to help with chest congestion.  Drink plenty of fluids to stay well-hydrated.   You can continue your inhaler as directed.  You can take over-the-counter cough medications as needed.  Follow-up with your primary care provider as early as possible.  Return to the ER for any change, worsening symptoms, or any additional concerns.

## 2023-11-02 ENCOUNTER — OFFICE VISIT (OUTPATIENT)
Dept: NEUROLOGY | Facility: CLINIC | Age: 33
End: 2023-11-02
Payer: COMMERCIAL

## 2023-11-02 VITALS
SYSTOLIC BLOOD PRESSURE: 118 MMHG | HEIGHT: 64 IN | BODY MASS INDEX: 32.68 KG/M2 | HEART RATE: 100 BPM | DIASTOLIC BLOOD PRESSURE: 72 MMHG | WEIGHT: 191.4 LBS | OXYGEN SATURATION: 99 % | RESPIRATION RATE: 14 BRPM | TEMPERATURE: 98 F

## 2023-11-02 DIAGNOSIS — G43.711 INTRACTABLE CHRONIC MIGRAINE WITHOUT AURA AND WITH STATUS MIGRAINOSUS: Primary | ICD-10-CM

## 2023-11-02 DIAGNOSIS — R42 DIZZINESS: ICD-10-CM

## 2023-11-02 DIAGNOSIS — G43.009 MIGRAINE WITHOUT AURA AND WITHOUT STATUS MIGRAINOSUS, NOT INTRACTABLE: ICD-10-CM

## 2023-11-02 DIAGNOSIS — G43.909 ACUTE MIGRAINE: ICD-10-CM

## 2023-11-02 RX ORDER — EPINEPHRINE 0.3 MG/.3ML
INJECTION SUBCUTANEOUS SEE ADMIN INSTRUCTIONS
COMMUNITY
Start: 2023-09-25

## 2023-11-02 RX ORDER — RIMEGEPANT SULFATE 75 MG/75MG
75 TABLET, ORALLY DISINTEGRATING ORAL AS NEEDED
Qty: 10 TABLET | Refills: 3 | Status: SHIPPED | OUTPATIENT
Start: 2023-11-02

## 2023-11-02 NOTE — PROGRESS NOTES
Follow Up Office Visit      Patient Name: Marilu Godoy  : 1990   MRN: [id number]     Chief Complaint:    Chief Complaint   Patient presents with    Follow-up     Patient is in office to follow up on migraines. Patient reports  HA days in the past month.        History of Present Illness: Marilu Godoy is a 32 y.o. female who is here today to follow up with neurology for migraine disorder.  She was seen in clinic last  ([facility]). MDM . She reports good tolerance and compliance with Aimovig 140 mg Monthly (Due 11/10/23) and Nurtec PRN as abortive. Dizziness has resolved. Last vision exam < 1 year ago. She started Immunotherapy and feels this was a migriane trigger. Tried and Failed: Ajovy, Qulipta, Nurtec PRN and QOD, Imitrex, Maxalt, Propranolol, Topamax, Tramadol, Zonegrean, Fioricet.    Taken From Previous Encounter:     She describes her migraine as a dull pounding to center of forehead and will radiate toward back of head. She will have aura with spots and floaters before it starts. + Light and sound sensitivity and N/V.  Last vision exam ; eyes were normal. She likes to sleep them off in a cold dark room. Migraines will last 2-24 hours. HA have worse in past 1.5 months when she got a gastric stimulator for gastroparesis.      Dizziness:  Stable. Seems to have resolved with migraine management. Onset x 5 months. Waxes and wanes. Worse with position changes from laying to sitting; only occurs with position changes. Currently taking Ciprodex for EOM. Went for a tilt test and was normal for autonomic dysregulation and this was normal. No syncope. No tinnitus.      Recent Imaging:      CT Head W/WO  -noncontributory  CTA Neck 10/2022 -noncontributory  CTA Head 10/2022- noncontributory  CT head WO 10/2022 -noncontributory    Pertinent Medical History: Factor V, Factor VIII, Protein S Deficiency, PE with Eliquis use, HL, Obesity, Gastroparesis, Hyperthyroidism, chronic  Pain, AMBAR, HTN    Subjective      Review of Systems:   Review of Systems   Constitutional: Negative.  Negative for fatigue.   HENT: Negative.     Eyes: Negative.    Respiratory: Negative.     Cardiovascular: Negative.    Gastrointestinal: Negative.    Endocrine: Negative.    Genitourinary: Negative.    Musculoskeletal: Negative.    Skin: Negative.    Allergic/Immunologic: Negative.    Neurological:  Positive for headache. Negative for dizziness.   Hematological: Negative.    Psychiatric/Behavioral: Negative.     All other systems reviewed and are negative.      I have reviewed and the following portions of the patient's history were updated as appropriate: past family history, past medical history, past social history, past surgical history and problem list.    Medications:     Current Outpatient Medications:     albuterol sulfate  (90 Base) MCG/ACT inhaler, Inhale 2 puffs Every 4 (Four) Hours As Needed for Wheezing or Shortness of Air., Disp: 18 g, Rfl: 11    apixaban (ELIQUIS) 5 MG tablet tablet, Take 1 tablet by mouth 2 (Two) Times a Day., Disp: 90 tablet, Rfl: 3    aspirin 81 MG EC tablet, Take 1 tablet by mouth Daily., Disp: , Rfl:     atorvastatin (LIPITOR) 40 MG tablet, Take 1 tablet by mouth Every Night., Disp: 90 tablet, Rfl: 3    azelastine (ASTELIN) 0.1 % nasal spray, 1 spray into the nostril(s) as directed by provider 2 (Two) Times a Day. Use in each nostril as directed, Disp: 1 each, Rfl: 10    B-D ULTRA-FINE 33 LANCETS misc, Check sugars as needed/directed for hypoglycemia., Disp: 100 each, Rfl: 12    Blood Glucose Monitoring Suppl (ONE TOUCH ULTRA 2) w/Device kit, use as needed to check blood sugar for hypoglycemia (diabetes), Disp: , Rfl:     calcium carbonate EX (TUMS EX) 750 MG chewable tablet, Chew 1 tablet Daily. (Patient taking differently: Chew 1 tablet Daily As Needed.), Disp: 30 tablet, Rfl: 1    Corlanor 5 MG tablet tablet, Take 1.5 tablets by mouth 2 (two) times a day., Disp: 90  tablet, Rfl: 6    cyclobenzaprine (FLEXERIL) 10 MG tablet,  15 Each, TAKE 1/2 TABLET BY MOUTH THREE TIMES A DAY AS NEEDED FOR MUSCLE SPASM for up to 5 days, 0 Refill(s), Disp: , Rfl:     Diclofenac Sodium (VOLTAREN) 1 % gel gel, Apply 4 g topically to the appropriate area as directed 4 (Four) Times a Day As Needed (joint pain)., Disp: 150 g, Rfl: 1    DULoxetine (CYMBALTA) 60 MG capsule, Take 1 capsule by mouth Daily., Disp: 90 capsule, Rfl: 3    EPINEPHrine (EPIPEN) 0.3 MG/0.3ML solution auto-injector injection, Inject  into the appropriate muscle as directed by prescriber See Admin Instructions. inject syringe as directed for allergic reaction, Disp: , Rfl:     Erenumab-aooe (AIMOVIG) 140 MG/ML auto-injector, Inject 1 mL under the skin into the appropriate area as directed Every 30 (Thirty) Days., Disp: 1 mL, Rfl: 3    glucose blood test strip, USE TO CHECK SUGARS DAILY DUE TO hypoglycemia, Disp: 100 each, Rfl: 12    glucose monitor monitoring kit, 1 each As Needed (diabetes). Check sugars as needed/directed for hypoglycemia., Disp: 1 each, Rfl: 0    ibuprofen (ADVIL,MOTRIN) 800 MG tablet, Take 1 tablet by mouth Every 8 (Eight) Hours As Needed for Mild Pain ., Disp: 30 tablet, Rfl: 0    l-methylfolate 15 MG tablet tablet, Take 1 tablet by mouth Daily. Indications: folic acid deficiency, Disp: 90 tablet, Rfl: 3    lidocaine (LIDODERM) 5 %, Place 2 patches on the skin as directed by provider Daily. Remove & Discard patch within 12 hours or as directed by MD, Disp: 60 patch, Rfl: 3    methocarbamol (ROBAXIN) 750 MG tablet, Take 1 tablet by mouth At Night As Needed for Muscle Spasms., Disp: 14 tablet, Rfl: 0    mometasone (NASONEX) 50 MCG/ACT nasal spray, 2 sprays into the nostril(s) as directed by provider Daily., Disp: 3 each, Rfl: 3    mometasone-formoterol (Dulera) 200-5 MCG/ACT inhaler, Inhale 2 puffs 2 (Two) Times a Day., Disp: 13 g, Rfl: 11    mupirocin (Bactroban) 2 % cream, Apply 1 application topically to  the appropriate area as directed 3 (Three) Times a Day., Disp: 30 g, Rfl: 1    ondansetron ODT (ZOFRAN-ODT) 4 MG disintegrating tablet, ondansetron 4 mg disintegrating tablet  DISSOLVE 1 TABLET IN MOUTH EVERY SIX HOURS AS NEEDED FOR NAUSEA AND VOMITING, Disp: , Rfl:     pantoprazole (PROTONIX) 40 MG EC tablet, Take 1 tablet by mouth Daily., Disp: 90 tablet, Rfl: 3    Rimegepant Sulfate (Nurtec) 75 MG tablet dispersible tablet, Take 1 tablet by mouth As Needed (Migraine)., Disp: 10 tablet, Rfl: 3    Vitamin D, Ergocalciferol, 56958 units capsule, Take 1 capsule by mouth 1 (One) Time Per Week., Disp: 5 capsule, Rfl: 11    cholecalciferol (VITAMIN D3) 1.25 MG (35245 UT) capsule, Take 1 capsule by mouth Every 7 (Seven) Days., Disp: , Rfl:     ciprofloxacin-dexAMETHasone (Ciprodex) 0.3-0.1 % otic suspension, Administer 4 drops to the right ear 2 (Two) Times a Day. (Patient not taking: Reported on 11/2/2023), Disp: 7.5 mL, Rfl: 0    fluticasone (FLONASE) 50 MCG/ACT nasal spray, 2 sprays into the nostril(s) as directed by provider Daily for 7 days., Disp: 9.9 mL, Rfl: 0    hyoscyamine (ANASPAZ,LEVSIN) 0.125 MG tablet, TAKE 1 TABLET BY MOUTH EVERY SIX HOURS AS NEEDED FOR CRAMPING OR DIARRHEA (Patient not taking: Reported on 11/2/2023), Disp: , Rfl:     Allergies:   Allergies   Allergen Reactions    Ceftin [Cefuroxime Axetil] Shortness Of Breath and Rash     Numbness in mouth and throat    Cefuroxime Hives, Rash and Shortness Of Breath     Numbness in mouth and throat  Numbness in mouth and throat    Sumatriptan Shortness Of Breath and Nausea Only     Other reaction(s): Nausea Only, Other (See Comments)  Worsening migraine  Worsening migraine    Amoxicillin Rash, Hives and Itching       Objective     Physical Exam:  Vital Signs:   Vitals:    11/02/23 1312   BP: 118/72   BP Location: Right arm   Patient Position: Sitting   Cuff Size: Adult   Pulse: 100   Resp: 14   Temp: 98 °F (36.7 °C)   TempSrc: Infrared   SpO2: 99%  "  Weight: 86.8 kg (191 lb 6.4 oz)   Height: 161.3 cm (63.5\")   PainSc: 0-No pain     Body mass index is 33.37 kg/m².    Physical Exam  Vitals and nursing note reviewed.   Constitutional:       General: She is not in acute distress.     Appearance: Normal appearance. She is normal weight.   HENT:      Head: Normocephalic.      Nose: Nose normal.      Mouth/Throat:      Mouth: Mucous membranes are moist.      Pharynx: Oropharynx is clear.   Eyes:      Extraocular Movements: Extraocular movements intact.      Conjunctiva/sclera: Conjunctivae normal.      Pupils: Pupils are equal, round, and reactive to light.   Cardiovascular:      Rate and Rhythm: Normal rate and regular rhythm.      Pulses: Normal pulses.      Heart sounds: Normal heart sounds.   Pulmonary:      Effort: Pulmonary effort is normal.      Breath sounds: Normal breath sounds.   Musculoskeletal:         General: Normal range of motion.      Cervical back: Normal range of motion and neck supple. No rigidity.   Skin:     General: Skin is warm and dry.      Capillary Refill: Capillary refill takes less than 2 seconds.   Neurological:      General: No focal deficit present.      Mental Status: She is alert and oriented to person, place, and time.      Cranial Nerves: Cranial nerves 2-12 are intact.      Coordination: Romberg Test normal.      Gait: Gait is intact.      Deep Tendon Reflexes:      Reflex Scores:       Tricep reflexes are 2+ on the right side and 2+ on the left side.       Bicep reflexes are 2+ on the right side and 2+ on the left side.       Patellar reflexes are 2+ on the right side and 2+ on the left side.  Psychiatric:         Mood and Affect: Mood normal.         Speech: Speech normal.         Behavior: Behavior normal.         Thought Content: Thought content normal.         Judgment: Judgment normal.         Neurologic Exam     Mental Status   Oriented to person, place, and time.   Follows 3 step commands.   Attention: normal. " Concentration: normal.   Speech: speech is normal   Level of consciousness: alert  Knowledge: good.     Cranial Nerves   Cranial nerves II through XII intact.     CN III, IV, VI   Pupils are equal, round, and reactive to light.    Motor Exam   Muscle bulk: normal  Overall muscle tone: normal  Right arm tone: normal  Left arm tone: normal  Right arm pronator drift: absent  Left arm pronator drift: absent  Right leg tone: normal  Left leg tone: normal    Strength   Right deltoid: 5/5  Left deltoid: 5/5  Right biceps: 5/5  Left biceps: 5/5  Right quadriceps: 5/5  Left quadriceps: 5/5  Right hamstrin/5  Left hamstrin/5    Sensory Exam   Light touch normal.     Gait, Coordination, and Reflexes     Gait  Gait: normal    Coordination   Romberg: negative    Tremor   Resting tremor: absent  Intention tremor: absent  Action tremor: absent    Reflexes   Right biceps: 2+  Left biceps: 2+  Right triceps: 2+  Left triceps: 2+  Right patellar: 2+  Left patellar: 2+  Right Rayo: absent  Left Rayo: absent       Assessment / Plan      Assessment/Plan:   Diagnoses and all orders for this visit:    1. Intractable chronic migraine without aura and with status migrainosus (Primary)  -     Erenumab-aooe (AIMOVIG) 140 MG/ML auto-injector; Inject 1 mL under the skin into the appropriate area as directed Every 30 (Thirty) Days.  Dispense: 1 mL; Refill: 3    2. Migraine without aura and without status migrainosus, not intractable  -     Erenumab-aooe (AIMOVIG) 140 MG/ML auto-injector; Inject 1 mL under the skin into the appropriate area as directed Every 30 (Thirty) Days.  Dispense: 1 mL; Refill: 3    3. Acute migraine  -     Rimegepant Sulfate (Nurtec) 75 MG tablet dispersible tablet; Take 1 tablet by mouth As Needed (Migraine).  Dispense: 10 tablet; Refill: 3    4. Dizziness         Follow Up:   Return in about 3 months (around 2024), or if symptoms worsen or fail to improve.    Anticipatory guidance and safety reviewed    Patient education provided   Reviewed CT results and discussed   Continue Aimovig 140 mg monthly #3; side effects reviewed  Continue Nurtec 75 mg PRN #10; SE reviewed     RTC PRN or within 12 weeks or sooner with issues     Barby Guzman, DNP, APRN, FNP-C  [facility] Neurology [facility] Medicine

## 2023-11-27 ENCOUNTER — HOSPITAL ENCOUNTER (OUTPATIENT)
Dept: GENERAL RADIOLOGY | Facility: HOSPITAL | Age: 33
Discharge: HOME | End: 2023-11-27
Payer: COMMERCIAL

## 2023-11-27 ENCOUNTER — TRANSCRIBE ORDERS (OUTPATIENT)
Dept: GENERAL RADIOLOGY | Facility: HOSPITAL | Age: 33
End: 2023-11-27
Payer: COMMERCIAL

## 2023-11-27 DIAGNOSIS — M54.6 PAIN IN THORACIC SPINE: Primary | ICD-10-CM

## 2023-11-27 DIAGNOSIS — M54.6 PAIN IN THORACIC SPINE: ICD-10-CM

## 2023-11-27 PROCEDURE — 72070 X-RAY EXAM THORAC SPINE 2VWS: CPT

## 2023-11-30 ENCOUNTER — TRANSCRIBE ORDERS (OUTPATIENT)
Dept: ADMINISTRATIVE | Facility: HOSPITAL | Age: 33
End: 2023-11-30
Payer: COMMERCIAL

## 2023-11-30 DIAGNOSIS — M54.14 RADICULOPATHY, THORACIC REGION: Primary | ICD-10-CM

## 2023-12-05 ENCOUNTER — APPOINTMENT (OUTPATIENT)
Dept: CT IMAGING | Facility: HOSPITAL | Age: 33
End: 2023-12-05
Payer: COMMERCIAL

## 2023-12-05 ENCOUNTER — HOSPITAL ENCOUNTER (EMERGENCY)
Facility: HOSPITAL | Age: 33
Discharge: HOME | End: 2023-12-05
Attending: EMERGENCY MEDICINE | Admitting: EMERGENCY MEDICINE
Payer: COMMERCIAL

## 2023-12-05 VITALS
BODY MASS INDEX: 33.31 KG/M2 | WEIGHT: 188 LBS | RESPIRATION RATE: 18 BRPM | SYSTOLIC BLOOD PRESSURE: 131 MMHG | TEMPERATURE: 98.1 F | HEIGHT: 63 IN | HEART RATE: 94 BPM | OXYGEN SATURATION: 97 % | DIASTOLIC BLOOD PRESSURE: 82 MMHG

## 2023-12-05 DIAGNOSIS — R10.31 RIGHT LOWER QUADRANT ABDOMINAL PAIN: Primary | ICD-10-CM

## 2023-12-05 LAB
ALBUMIN SERPL-MCNC: 4 G/DL (ref 3.5–5.2)
ALBUMIN/GLOB SERPL: 1.3 G/DL
ALP SERPL-CCNC: 87 U/L (ref 39–117)
ALT SERPL W P-5'-P-CCNC: 14 U/L (ref 1–33)
ANION GAP SERPL CALCULATED.3IONS-SCNC: 10.7 MMOL/L (ref 5–15)
AST SERPL-CCNC: 12 U/L (ref 1–32)
BASOPHILS # BLD AUTO: 0.03 10*3/MM3 (ref 0–0.2)
BASOPHILS NFR BLD AUTO: 0.4 % (ref 0–1.5)
BILIRUB SERPL-MCNC: 0.3 MG/DL (ref 0–1.2)
BILIRUB UR QL STRIP: NEGATIVE
BUN SERPL-MCNC: 13 MG/DL (ref 6–20)
BUN/CREAT SERPL: 15.9 (ref 7–25)
CALCIUM SPEC-SCNC: 9.2 MG/DL (ref 8.6–10.5)
CHLORIDE SERPL-SCNC: 103 MMOL/L (ref 98–107)
CLARITY UR: ABNORMAL
CO2 SERPL-SCNC: 25.3 MMOL/L (ref 22–29)
COLOR UR: YELLOW
CREAT SERPL-MCNC: 0.82 MG/DL (ref 0.57–1)
DEPRECATED RDW RBC AUTO: 40.1 FL (ref 37–54)
EGFRCR SERPLBLD CKD-EPI 2021: 97 ML/MIN/1.73
EOSINOPHIL # BLD AUTO: 0.1 10*3/MM3 (ref 0–0.4)
EOSINOPHIL NFR BLD AUTO: 1.2 % (ref 0.3–6.2)
ERYTHROCYTE [DISTWIDTH] IN BLOOD BY AUTOMATED COUNT: 12.3 % (ref 12.3–15.4)
GLOBULIN UR ELPH-MCNC: 3 GM/DL
GLUCOSE SERPL-MCNC: 92 MG/DL (ref 65–99)
GLUCOSE UR STRIP-MCNC: NEGATIVE MG/DL
HCT VFR BLD AUTO: 38 % (ref 34–46.6)
HGB BLD-MCNC: 13 G/DL (ref 12–15.9)
HGB UR QL STRIP.AUTO: NEGATIVE
HOLD SPECIMEN: NORMAL
HOLD SPECIMEN: NORMAL
IMM GRANULOCYTES # BLD AUTO: 0.02 10*3/MM3 (ref 0–0.05)
IMM GRANULOCYTES NFR BLD AUTO: 0.2 % (ref 0–0.5)
KETONES UR QL STRIP: NEGATIVE
LEUKOCYTE ESTERASE UR QL STRIP.AUTO: NEGATIVE
LIPASE SERPL-CCNC: 25 U/L (ref 13–60)
LYMPHOCYTES # BLD AUTO: 1.81 10*3/MM3 (ref 0.7–3.1)
LYMPHOCYTES NFR BLD AUTO: 22.6 % (ref 19.6–45.3)
MCH RBC QN AUTO: 30.9 PG (ref 26.6–33)
MCHC RBC AUTO-ENTMCNC: 34.2 G/DL (ref 31.5–35.7)
MCV RBC AUTO: 90.3 FL (ref 79–97)
MONOCYTES # BLD AUTO: 0.54 10*3/MM3 (ref 0.1–0.9)
MONOCYTES NFR BLD AUTO: 6.7 % (ref 5–12)
NEUTROPHILS NFR BLD AUTO: 5.52 10*3/MM3 (ref 1.7–7)
NEUTROPHILS NFR BLD AUTO: 68.9 % (ref 42.7–76)
NITRITE UR QL STRIP: NEGATIVE
NRBC BLD AUTO-RTO: 0 /100 WBC (ref 0–0.2)
PH UR STRIP.AUTO: 6 [PH] (ref 5–8)
PLATELET # BLD AUTO: 316 10*3/MM3 (ref 140–450)
PMV BLD AUTO: 9.6 FL (ref 6–12)
POTASSIUM SERPL-SCNC: 4 MMOL/L (ref 3.5–5.2)
PROT SERPL-MCNC: 7 G/DL (ref 6–8.5)
PROT UR QL STRIP: NEGATIVE
RBC # BLD AUTO: 4.21 10*6/MM3 (ref 3.77–5.28)
SODIUM SERPL-SCNC: 139 MMOL/L (ref 136–145)
SP GR UR STRIP: 1.02 (ref 1–1.03)
UROBILINOGEN UR QL STRIP: ABNORMAL
WBC NRBC COR # BLD AUTO: 8.02 10*3/MM3 (ref 3.4–10.8)
WHOLE BLOOD HOLD COAG: NORMAL
WHOLE BLOOD HOLD SPECIMEN: NORMAL

## 2023-12-05 PROCEDURE — 96374 THER/PROPH/DIAG INJ IV PUSH: CPT

## 2023-12-05 PROCEDURE — 25010000002 DEXAMETHASONE SODIUM PHOSPHATE 10 MG/ML SOLUTION: Performed by: PHYSICIAN ASSISTANT

## 2023-12-05 PROCEDURE — 99285 EMERGENCY DEPT VISIT HI MDM: CPT

## 2023-12-05 PROCEDURE — 25010000002 DIPHENHYDRAMINE PER 50 MG: Performed by: PHYSICIAN ASSISTANT

## 2023-12-05 PROCEDURE — 25810000003 SODIUM CHLORIDE 0.9 % SOLUTION: Performed by: PHYSICIAN ASSISTANT

## 2023-12-05 PROCEDURE — 83690 ASSAY OF LIPASE: CPT | Performed by: PHYSICIAN ASSISTANT

## 2023-12-05 PROCEDURE — 80053 COMPREHEN METABOLIC PANEL: CPT | Performed by: PHYSICIAN ASSISTANT

## 2023-12-05 PROCEDURE — 74177 CT ABD & PELVIS W/CONTRAST: CPT

## 2023-12-05 PROCEDURE — 96375 TX/PRO/DX INJ NEW DRUG ADDON: CPT

## 2023-12-05 PROCEDURE — 96361 HYDRATE IV INFUSION ADD-ON: CPT

## 2023-12-05 PROCEDURE — 25510000001 IOPAMIDOL 61 % SOLUTION: Performed by: EMERGENCY MEDICINE

## 2023-12-05 PROCEDURE — 81003 URINALYSIS AUTO W/O SCOPE: CPT

## 2023-12-05 PROCEDURE — 85025 COMPLETE CBC W/AUTO DIFF WBC: CPT | Performed by: PHYSICIAN ASSISTANT

## 2023-12-05 RX ORDER — DEXAMETHASONE SODIUM PHOSPHATE 10 MG/ML
10 INJECTION, SOLUTION INTRAMUSCULAR; INTRAVENOUS ONCE
Status: COMPLETED | OUTPATIENT
Start: 2023-12-05 | End: 2023-12-05

## 2023-12-05 RX ORDER — DIPHENHYDRAMINE HYDROCHLORIDE 50 MG/ML
25 INJECTION INTRAMUSCULAR; INTRAVENOUS ONCE
Status: COMPLETED | OUTPATIENT
Start: 2023-12-05 | End: 2023-12-05

## 2023-12-05 RX ORDER — FAMOTIDINE 10 MG/ML
20 INJECTION, SOLUTION INTRAVENOUS ONCE
Status: COMPLETED | OUTPATIENT
Start: 2023-12-05 | End: 2023-12-05

## 2023-12-05 RX ORDER — SODIUM CHLORIDE 0.9 % (FLUSH) 0.9 %
10 SYRINGE (ML) INJECTION AS NEEDED
Status: DISCONTINUED | OUTPATIENT
Start: 2023-12-05 | End: 2023-12-05 | Stop reason: HOSPADM

## 2023-12-05 RX ADMIN — DEXAMETHASONE SODIUM PHOSPHATE 10 MG: 10 INJECTION INTRAMUSCULAR; INTRAVENOUS at 14:22

## 2023-12-05 RX ADMIN — IOPAMIDOL 100 ML: 612 INJECTION, SOLUTION INTRAVENOUS at 14:12

## 2023-12-05 RX ADMIN — SODIUM CHLORIDE 1000 ML: 9 INJECTION, SOLUTION INTRAVENOUS at 14:19

## 2023-12-05 RX ADMIN — DIPHENHYDRAMINE HYDROCHLORIDE 25 MG: 50 INJECTION INTRAMUSCULAR; INTRAVENOUS at 14:20

## 2023-12-05 RX ADMIN — FAMOTIDINE 20 MG: 10 INJECTION INTRAVENOUS at 14:21

## 2023-12-05 NOTE — ED PROVIDER NOTES
Subjective   History of Present Illness  33-year-old female with right-sided lower abdominal pain, burning with urination, and blood and mucus in her urine she reports.  Symptoms for 1 to 2 days.  No vaginal discharge, no fever or chills.  Symptoms are with urinating.    History provided by:  Patient   used: No        Review of Systems   Gastrointestinal:  Positive for abdominal pain.   Genitourinary:  Positive for difficulty urinating.   All other systems reviewed and are negative.      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     Bleeding disorder 11/13/2016    Body piercing     TONGUE, NOSE, TWO IN EACH EAR    Bronchitis     Chest pain 06/22/2021    last CP 2-3 months ago    Cholelithiasis     Clotting disorder     factor 5    Constipation     COVID-19 vaccine series completed     Pfizer-plus booster    Deep vein thrombosis 11/13/2016    Diarrhea     Diverticulitis     Diverticulosis     Elevated cholesterol     Endometriosis     Factor 5 Leiden mutation, heterozygous     Fibroid     Fibromyalgia     Fibromyalgia, primary     Full dentures 06/22/2021    advised no adhesives DOS    Gastroparesis     GERD (gastroesophageal reflux disease)     H/O blood clots     H/O cardiovascular stress test 06/22/2021    reported several years ago-exercise wnl    Headache     Hiatal hernia     High cholesterol     History of recurrent UTIs     HPV (human papilloma virus) infection     Hx of echocardiogram     Hx of gout 06/22/2021    Hypertension     Hypoglycemia     Inappropriate sinus node tachycardia     Kidney infection 06/22/2021    history only    Low back pain     Migraine     Nausea & vomiting     Obesity     Osteoarthritis     Ovarian cyst     Pneumonia     Pollen allergies     PONV (postoperative nausea and vomiting)     Protein S deficiency  2016    labs from hospitalization for PE    Pulmonary embolism 2016    on eliquis since that time    Recurrent pregnancy loss, antepartum condition or complication     Sleep apnea     no CPAP- states insurance took it back    Tattoo     LOWER BACK    Varicella        Allergies   Allergen Reactions    Ceftin [Cefuroxime Axetil] Shortness Of Breath and Rash     Numbness in mouth and throat    Cefuroxime Hives, Rash and Shortness Of Breath     Numbness in mouth and throat  Numbness in mouth and throat    Sumatriptan Shortness Of Breath and Nausea Only     Other reaction(s): Nausea Only, Other (See Comments)  Worsening migraine  Worsening migraine    Amoxicillin Rash, Hives and Itching       Past Surgical History:   Procedure Laterality Date     SECTION        and  and      SECTION WITH TUBAL N/A 01/15/2019    Procedure:  SECTION REPEAT WITH TUBAL;  Surgeon: Alva Boyer MD;  Location: [facility] LABOR DELIVERY;  Service: Obstetrics/Gynecology    COLONOSCOPY N/A 2017    Procedure: COLONOSCOPY WITH BIOPSIES AND ARGON THERMAL ABLATION;  Surgeon: Jignesh Selby MD;  Location: [facility] ENDOSCOPY;  Service:     D & C HYSTEROSCOPY ENDOMETRIAL ABLATION N/A 03/10/2022    Procedure: DILATATION AND CURETTAGE DIAGNOSTIC HYSTEROSCOPY NOVASURE ENDOMETRIAL ABLATION;  Surgeon: Alva Boyer MD;  Location: [facility] OR;  Service: Obstetrics/Gynecology;  Laterality: N/A;    DIAGNOSTIC LAPAROSCOPY N/A 2018    Procedure: DIAGNOSTIC LAPAROSCOPY AND ABLATION OF ENDOMETRIOSIS;  Surgeon: Evin Zamudio MD;  Location: [facility] OR;  Service: Obstetrics/Gynecology    ENDOMETRIAL ABLATION      ENDOSCOPIC FUNCTIONAL SINUS SURGERY (FESS) Right 2021    Procedure: Right endoscopic total ethmoidectomy, right endoscopic middle meatal antrotomy with cyst removal, right endoscopic frontal recess exploration with cyst removal;  Surgeon: Saulo Brito MD;  Location: [facility] OR;   Service: ENT;  Laterality: Right;    ENDOSCOPY N/A 04/20/2017    Procedure: ESOPHAGOGASTRODUODENOSCOPY WITH BIOPSIES AND COLD BIOPSY POLYPECTOMIES;  Surgeon: Jignesh Selby MD;  Location: [facility] ENDOSCOPY;  Service:     GASTRIC STIMULATOR IMPLANT SURGERY      LAPAROSCOPIC CHOLECYSTECTOMY      LIPOMA EXCISION N/A 12/10/2021    Procedure: Excision of soft tissue mass lower back;  Surgeon: Nidia Smith MD;  Location: [facility] OR;  Service: General;  Laterality: N/A;    MOUTH SURGERY      full mouth tooth extration    SINUS SURGERY      TUBAL ABDOMINAL LIGATION  01/15/2019    WISDOM TOOTH EXTRACTION         Family History   Problem Relation Age of Onset    Arthritis Mother     COPD Mother     Asthma Mother     Thyroid disease Mother     Arthritis Father     Diabetes Father     Hypertension Father     Hyperlipidemia Father     Kidney disease Father     Heart attack Father     Coronary artery disease Father     Dementia Father     No Known Problems Son     Colon cancer Neg Hx     Liver cancer Neg Hx     Liver disease Neg Hx     Stomach cancer Neg Hx     Esophageal cancer Neg Hx        Social History     Socioeconomic History    Marital status:    Tobacco Use    Smoking status: Never     Passive exposure: Past    Smokeless tobacco: Never   Vaping Use    Vaping Use: Never used   Substance and Sexual Activity    Alcohol use: No    Drug use: Never    Sexual activity: Yes     Partners: Male     Birth control/protection: Surgical     Comment: Tubal           Objective   Physical Exam  Vitals and nursing note reviewed.   Constitutional:       Appearance: She is well-developed.   Cardiovascular:      Rate and Rhythm: Normal rate and regular rhythm.   Pulmonary:      Effort: Pulmonary effort is normal.      Breath sounds: Normal breath sounds.   Abdominal:      General: Bowel sounds are normal.      Palpations: Abdomen is soft.      Tenderness: There is abdominal tenderness in the right lower quadrant.   Musculoskeletal:          General: Normal range of motion.      Cervical back: Normal range of motion and neck supple.   Skin:     General: Skin is warm and dry.   Neurological:      Mental Status: She is alert and oriented to person, place, and time.      Deep Tendon Reflexes: Reflexes are normal and symmetric.         Procedures           ED Course                                             Medical Decision Making  Problems Addressed:  Right lower quadrant abdominal pain: complicated acute illness or injury    Amount and/or Complexity of Data Reviewed  Labs: ordered.  Radiology: ordered.    Risk  Prescription drug management.        Final diagnoses:   Right lower quadrant abdominal pain       ED Disposition  ED Disposition       ED Disposition   Discharge    Condition   Stable    Comment   --               [facility] EMERGENCY DEPARTMENT  [address] [address] [phone]    If symptoms worsen         Medication List        Changed      calcium carbonate  MG chewable tablet  Commonly known as: TUMS EX  Chew 1 tablet Daily.  What changed:   when to take this  reasons to take this                 Calvin Monzon Jr., PA-C  12/05/23 [id number]

## 2023-12-14 ENCOUNTER — OFFICE VISIT (OUTPATIENT)
Dept: OBSTETRICS AND GYNECOLOGY | Facility: CLINIC | Age: 33
End: 2023-12-14
Payer: COMMERCIAL

## 2023-12-14 ENCOUNTER — TRANSCRIBE ORDERS (OUTPATIENT)
Dept: ADMINISTRATIVE | Facility: HOSPITAL | Age: 33
End: 2023-12-14
Payer: COMMERCIAL

## 2023-12-14 VITALS
DIASTOLIC BLOOD PRESSURE: 76 MMHG | SYSTOLIC BLOOD PRESSURE: 110 MMHG | BODY MASS INDEX: 33.63 KG/M2 | HEIGHT: 63 IN | WEIGHT: 189.8 LBS

## 2023-12-14 DIAGNOSIS — N76.0 ACUTE VAGINITIS: Primary | ICD-10-CM

## 2023-12-14 DIAGNOSIS — M54.14 THORACIC RADICULITIS: Primary | ICD-10-CM

## 2023-12-14 PROCEDURE — 3078F DIAST BP <80 MM HG: CPT | Performed by: PHYSICIAN ASSISTANT

## 2023-12-14 PROCEDURE — 3074F SYST BP LT 130 MM HG: CPT | Performed by: PHYSICIAN ASSISTANT

## 2023-12-14 PROCEDURE — 1160F RVW MEDS BY RX/DR IN RCRD: CPT | Performed by: PHYSICIAN ASSISTANT

## 2023-12-14 PROCEDURE — 1159F MED LIST DOCD IN RCRD: CPT | Performed by: PHYSICIAN ASSISTANT

## 2023-12-14 PROCEDURE — 99214 OFFICE O/P EST MOD 30 MIN: CPT | Performed by: PHYSICIAN ASSISTANT

## 2023-12-14 RX ORDER — FLUCONAZOLE 150 MG/1
TABLET ORAL
Qty: 1 TABLET | Refills: 0 | Status: SHIPPED | OUTPATIENT
Start: 2023-12-14

## 2023-12-14 RX ORDER — CLOTRIMAZOLE AND BETAMETHASONE DIPROPIONATE 10; .64 MG/G; MG/G
1 CREAM TOPICAL 2 TIMES DAILY
Qty: 15 G | Refills: 0 | Status: SHIPPED | OUTPATIENT
Start: 2023-12-14

## 2023-12-14 NOTE — PROGRESS NOTES
Subjective   Chief Complaint   Patient presents with    Follow-up     Patient is here to discuss recent CT scan: Enlarged uterus    Vaginal Itching     C/O vaginal itching and burning       Marilu Godoy is a 33 y.o. year old  presenting to be seen for follow-up.  Patient had been seen recently in the emergency department for some abdominal pain.  A CAT scan had mentioned uterus appeared to be prominent.  She has had an endometrial ablation.  She does not have menses post ablation but she does occasionally have some vaginal bleeding spotting and mucousy discharge.  On Tuesday of this week she began having some vulvar and vaginal itching and burning.  She has not noted any discharge.  She took 1 Diflucan tablet she had at home yesterday.  She had pelvic ultrasound done in July on another ER visit that noted the uterus to measure normal size and no fibroids or ovarian abnormalities were noted at that time.    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     Bleeding disorder 2016    Body piercing     TONGUE, NOSE, TWO IN EACH EAR    Bronchitis     Chest pain 2021    last CP 2-3 months ago    Cholelithiasis     Clotting disorder     factor 5    Constipation     COVID-19 vaccine series completed     Pfizer-plus booster    Deep vein thrombosis 2016    Diarrhea     Diverticulitis     Diverticulosis     Elevated cholesterol     Endometriosis     Factor 5 Leiden mutation, heterozygous     Fibroid     Fibromyalgia     Fibromyalgia, primary     Full dentures 2021    advised no adhesives DOS    Gastroparesis     GERD (gastroesophageal reflux disease)     H/O blood clots     H/O cardiovascular stress test 2021    reported several years ago-exercise wnl    Headache     Hiatal hernia     High cholesterol     History of recurrent UTIs      HPV (human papilloma virus) infection     Hx of echocardiogram     Hx of gout 06/22/2021    Hypertension     Hypoglycemia     Inappropriate sinus node tachycardia     Kidney infection 06/22/2021    history only    Low back pain     Migraine     Nausea & vomiting     Obesity     Osteoarthritis     Ovarian cyst     Pneumonia     Pollen allergies     PONV (postoperative nausea and vomiting)     Protein S deficiency 11/14/2016    labs from hospitalization for PE    Pulmonary embolism 11/20/2016    on eliquis since that time    Recurrent pregnancy loss, antepartum condition or complication     Sleep apnea     no CPAP- states insurance took it back    Tattoo     LOWER BACK    Varicella         Current Outpatient Medications:     albuterol sulfate  (90 Base) MCG/ACT inhaler, Inhale 2 puffs Every 4 (Four) Hours As Needed for Wheezing or Shortness of Air., Disp: 18 g, Rfl: 11    apixaban (ELIQUIS) 5 MG tablet tablet, Take 1 tablet by mouth 2 (Two) Times a Day., Disp: 90 tablet, Rfl: 3    aspirin 81 MG EC tablet, Take 1 tablet by mouth Daily., Disp: , Rfl:     atorvastatin (LIPITOR) 40 MG tablet, Take 1 tablet by mouth Every Night., Disp: 90 tablet, Rfl: 3    azelastine (ASTELIN) 0.1 % nasal spray, 1 spray into the nostril(s) as directed by provider 2 (Two) Times a Day. Use in each nostril as directed, Disp: 1 each, Rfl: 10    B-D ULTRA-FINE 33 LANCETS misc, Check sugars as needed/directed for hypoglycemia., Disp: 100 each, Rfl: 12    Blood Glucose Monitoring Suppl (ONE TOUCH ULTRA 2) w/Device kit, use as needed to check blood sugar for hypoglycemia (diabetes), Disp: , Rfl:     calcium carbonate EX (TUMS EX) 750 MG chewable tablet, Chew 1 tablet Daily. (Patient taking differently: Chew 1 tablet Daily As Needed.), Disp: 30 tablet, Rfl: 1    cholecalciferol (VITAMIN D3) 1.25 MG (96272 UT) capsule, Take 1 capsule by mouth Every 7 (Seven) Days., Disp: , Rfl:     ciprofloxacin-dexAMETHasone (Ciprodex) 0.3-0.1 % otic  suspension, Administer 4 drops to the right ear 2 (Two) Times a Day., Disp: 7.5 mL, Rfl: 0    Corlanor 5 MG tablet tablet, Take 1.5 tablets by mouth 2 (two) times a day., Disp: 90 tablet, Rfl: 6    cyclobenzaprine (FLEXERIL) 10 MG tablet,  15 Each, TAKE 1/2 TABLET BY MOUTH THREE TIMES A DAY AS NEEDED FOR MUSCLE SPASM for up to 5 days, 0 Refill(s), Disp: , Rfl:     Diclofenac Sodium (VOLTAREN) 1 % gel gel, Apply 4 g topically to the appropriate area as directed 4 (Four) Times a Day As Needed (joint pain)., Disp: 150 g, Rfl: 1    DULoxetine (CYMBALTA) 60 MG capsule, Take 1 capsule by mouth Daily., Disp: 90 capsule, Rfl: 3    EPINEPHrine (EPIPEN) 0.3 MG/0.3ML solution auto-injector injection, Inject  into the appropriate muscle as directed by prescriber See Admin Instructions. inject syringe as directed for allergic reaction, Disp: , Rfl:     Erenumab-aooe (AIMOVIG) 140 MG/ML auto-injector, Inject 1 mL under the skin into the appropriate area as directed Every 30 (Thirty) Days., Disp: 1 mL, Rfl: 3    glucose blood test strip, USE TO CHECK SUGARS DAILY DUE TO hypoglycemia, Disp: 100 each, Rfl: 12    glucose monitor monitoring kit, 1 each As Needed (diabetes). Check sugars as needed/directed for hypoglycemia., Disp: 1 each, Rfl: 0    l-methylfolate 15 MG tablet tablet, Take 1 tablet by mouth Daily. Indications: folic acid deficiency, Disp: 90 tablet, Rfl: 3    lidocaine (LIDODERM) 5 %, Place 2 patches on the skin as directed by provider Daily. Remove & Discard patch within 12 hours or as directed by MD, Disp: 60 patch, Rfl: 3    methocarbamol (ROBAXIN) 750 MG tablet, Take 1 tablet by mouth At Night As Needed for Muscle Spasms., Disp: 14 tablet, Rfl: 0    mometasone (NASONEX) 50 MCG/ACT nasal spray, 2 sprays into the nostril(s) as directed by provider Daily., Disp: 3 each, Rfl: 3    mometasone-formoterol (Dulera) 200-5 MCG/ACT inhaler, Inhale 2 puffs 2 (Two) Times a Day., Disp: 13 g, Rfl: 11    mupirocin (Bactroban) 2 %  cream, Apply 1 application topically to the appropriate area as directed 3 (Three) Times a Day., Disp: 30 g, Rfl: 1    pantoprazole (PROTONIX) 40 MG EC tablet, Take 1 tablet by mouth Daily., Disp: 90 tablet, Rfl: 3    Rimegepant Sulfate (Nurtec) 75 MG tablet dispersible tablet, Take 1 tablet by mouth As Needed (Migraine)., Disp: 10 tablet, Rfl: 3    Vitamin D, Ergocalciferol, 28961 units capsule, Take 1 capsule by mouth 1 (One) Time Per Week., Disp: 5 capsule, Rfl: 11    clotrimazole-betamethasone (Lotrisone) 1-0.05 % cream, Apply 1 application  topically to the appropriate area as directed 2 (Two) Times a Day., Disp: 15 g, Rfl: 0    fluconazole (Diflucan) 150 MG tablet, Take on Saturday morning, Disp: 1 tablet, Rfl: 0    fluticasone (FLONASE) 50 MCG/ACT nasal spray, 2 sprays into the nostril(s) as directed by provider Daily for 7 days., Disp: 9.9 mL, Rfl: 0   Allergies   Allergen Reactions    Ceftin [Cefuroxime Axetil] Shortness Of Breath and Rash     Numbness in mouth and throat    Cefuroxime Hives, Rash and Shortness Of Breath     Numbness in mouth and throat  Numbness in mouth and throat    Sumatriptan Shortness Of Breath and Nausea Only     Other reaction(s): Nausea Only, Other (See Comments)  Worsening migraine  Worsening migraine    Amoxicillin Rash, Hives and Itching      Past Surgical History:   Procedure Laterality Date     SECTION        and  and      SECTION WITH TUBAL N/A 01/15/2019    Procedure:  SECTION REPEAT WITH TUBAL;  Surgeon: Alva Boyer MD;  Location: [facility] LABOR DELIVERY;  Service: Obstetrics/Gynecology    COLONOSCOPY N/A 2017    Procedure: COLONOSCOPY WITH BIOPSIES AND ARGON THERMAL ABLATION;  Surgeon: Jignesh Selby MD;  Location: [facility] ENDOSCOPY;  Service:     D & C HYSTEROSCOPY ENDOMETRIAL ABLATION N/A 03/10/2022    Procedure: DILATATION AND CURETTAGE DIAGNOSTIC HYSTEROSCOPY NOVASURE ENDOMETRIAL ABLATION;  Surgeon: Alva Boyer MD;  Location:  [facility] OR;  Service: Obstetrics/Gynecology;  Laterality: N/A;    DIAGNOSTIC LAPAROSCOPY N/A 04/02/2018    Procedure: DIAGNOSTIC LAPAROSCOPY AND ABLATION OF ENDOMETRIOSIS;  Surgeon: Evin Zamudio MD;  Location: [facility] OR;  Service: Obstetrics/Gynecology    ENDOMETRIAL ABLATION      ENDOSCOPIC FUNCTIONAL SINUS SURGERY (FESS) Right 06/25/2021    Procedure: Right endoscopic total ethmoidectomy, right endoscopic middle meatal antrotomy with cyst removal, right endoscopic frontal recess exploration with cyst removal;  Surgeon: Saulo Brito MD;  Location: [facility] OR;  Service: ENT;  Laterality: Right;    ENDOSCOPY N/A 04/20/2017    Procedure: ESOPHAGOGASTRODUODENOSCOPY WITH BIOPSIES AND COLD BIOPSY POLYPECTOMIES;  Surgeon: Jignesh Selby MD;  Location: [facility] ENDOSCOPY;  Service:     GASTRIC STIMULATOR IMPLANT SURGERY      LAPAROSCOPIC CHOLECYSTECTOMY      LIPOMA EXCISION N/A 12/10/2021    Procedure: Excision of soft tissue mass lower back;  Surgeon: Nidia Smith MD;  Location: [facility] OR;  Service: General;  Laterality: N/A;    MOUTH SURGERY      full mouth tooth extration    SINUS SURGERY      TUBAL ABDOMINAL LIGATION  01/15/2019    WISDOM TOOTH EXTRACTION        Social History     Socioeconomic History    Marital status:    Tobacco Use    Smoking status: Never     Passive exposure: Past    Smokeless tobacco: Never   Vaping Use    Vaping Use: Never used   Substance and Sexual Activity    Alcohol use: No    Drug use: Never    Sexual activity: Yes     Partners: Male     Birth control/protection: Surgical     Comment: Tubal      Family History   Problem Relation Age of Onset    Arthritis Mother     COPD Mother     Asthma Mother     Thyroid disease Mother     Arthritis Father     Diabetes Father     Hypertension Father     Hyperlipidemia Father     Kidney disease Father     Heart attack Father     Coronary artery disease Father     Dementia Father     No Known Problems Son     Colon cancer Neg Hx   "   Liver cancer Neg Hx     Liver disease Neg Hx     Stomach cancer Neg Hx     Esophageal cancer Neg Hx        Review of Systems   Constitutional:  Negative for chills, diaphoresis and fever.   Gastrointestinal:  Negative for constipation, diarrhea, nausea and vomiting.   Genitourinary:  Positive for vaginal pain. Negative for difficulty urinating, dysuria, flank pain and menstrual problem.           Objective   /76   Ht 160 cm (63\")   Wt 86.1 kg (189 lb 12.8 oz)   BMI 33.62 kg/m²     Physical Exam  Constitutional:       Appearance: Normal appearance. She is well-developed and well-groomed.   Eyes:      General: Lids are normal.      Extraocular Movements: Extraocular movements intact.      Conjunctiva/sclera: Conjunctivae normal.   Abdominal:      General: There is no distension.      Palpations: Abdomen is soft.      Tenderness: There is no abdominal tenderness.   Genitourinary:     Labia:         Right: No rash, tenderness or lesion.         Left: No rash, tenderness or lesion.       Urethra: No prolapse, urethral pain, urethral swelling or urethral lesion.      Vagina: Vaginal discharge and erythema present. No tenderness or lesions.      Cervix: No cervical motion tenderness, discharge, friability or lesion.      Uterus: Not enlarged and not tender.       Adnexa:         Right: No mass or tenderness.          Left: No mass or tenderness.        Comments: Thick white discharge and vaginal mucosa red  No lesions     Neurological:      General: No focal deficit present.      Mental Status: She is alert and oriented to person, place, and time.   Psychiatric:         Attention and Perception: Attention normal.         Mood and Affect: Mood normal.         Speech: Speech normal.         Behavior: Behavior is cooperative.            Result Review :           CT Abdomen Pelvis With Contrast (12/05/2023 14:12)   US Pelvis Complete (07/10/2023 14:12)       Assessment and Plan  Diagnoses and all orders for this " visit:    1. Acute vaginitis (Primary)    Other orders  -     fluconazole (Diflucan) 150 MG tablet; Take on Saturday morning  Dispense: 1 tablet; Refill: 0  -     clotrimazole-betamethasone (Lotrisone) 1-0.05 % cream; Apply 1 application  topically to the appropriate area as directed 2 (Two) Times a Day.  Dispense: 15 g; Refill: 0      Patient Instructions   Exam is consistent with yeast vaginitis so we will treat with another Diflucan to take Saturday morning and also Lotrisone cream for external use.  Reviewed previous pelvic ultrasound done in July which noted normal uterine measurements.  Patient is offered the option of returning for follow-up ultrasound to make sure no change in uterine size and she would like to do this.           This note was electronically signed.    Taylor Carrasquillo PA-C   December 14, 2023

## 2023-12-14 NOTE — PATIENT INSTRUCTIONS
Exam is consistent with yeast vaginitis so we will treat with another Diflucan to take Saturday morning and also Lotrisone cream for external use.  Reviewed previous pelvic ultrasound done in July which noted normal uterine measurements.  Patient is offered the option of returning for follow-up ultrasound to make sure no change in uterine size and she would like to do this.

## 2023-12-18 LAB
A VAGINAE DNA VAG QL NAA+PROBE: ABNORMAL SCORE
BVAB2 DNA VAG QL NAA+PROBE: ABNORMAL SCORE
C ALBICANS DNA VAG QL NAA+PROBE: POSITIVE
C GLABRATA DNA VAG QL NAA+PROBE: NEGATIVE
C TRACH DNA VAG QL NAA+PROBE: NEGATIVE
MEGA1 DNA VAG QL NAA+PROBE: ABNORMAL SCORE
N GONORRHOEA DNA VAG QL NAA+PROBE: NEGATIVE
T VAGINALIS DNA VAG QL NAA+PROBE: NEGATIVE

## 2023-12-18 RX ORDER — METRONIDAZOLE 500 MG/1
500 TABLET ORAL 2 TIMES DAILY
Qty: 14 TABLET | Refills: 0 | Status: SHIPPED | OUTPATIENT
Start: 2023-12-18 | End: 2023-12-25

## 2023-12-18 NOTE — PROGRESS NOTES
Please inform patient her swab has returned positive for bacterial vaginosis and yeast.  She was treated for yeast at the office visit.  I have sent in metronidazole to [facility] pharmacy for the bacterial vaginosis

## 2023-12-29 ENCOUNTER — TELEPHONE (OUTPATIENT)
Dept: OBSTETRICS AND GYNECOLOGY | Facility: CLINIC | Age: 33
End: 2023-12-29
Payer: COMMERCIAL

## 2024-01-01 NOTE — ED TRIAGE NOTES
INITIAL OFFICE VISIT      [facility]  2024             There were no vitals taken for this visit.  Weight:         YOUR BABY AT BIRTH TO 2 WEEKS OF AGE    Key points at this age…     Breast milk is the best nutrition.    Car seats save lives--make sure to install and use them correctly!    “Back to sleep” is recommended--it’s the safest sleeping position for your young infant!    FEEDING: Breast milk is the best food for Juanpablo Mcgowan at this age; it is beneficial in many ways to both babies and moms! It can be challenging early on, so ask for help from your doctors and the lactation specialists at the hospital. Breastfeeding moms should make sure their doctors know about any medications they are taking.     If you are bottle feeding, make sure to prepare formula exactly as directed (standard formula preparation is one scoop of formula in 2 ounces of water). You should hold your baby upright (not lying down) while feeding and never prop the bottle so that it stays in your baby’s mouth without you holding it (this can cause dangerous choking episodes as well as ear infections).     ABOUT VITAMIN D: All babies ( or formula fed) need extra vitamin D. Vitamin D is very important for bone health (especially preventing rickets) as well as other important health aspects. Breast milk is the “perfect” food except that it does not contain enough vitamin D for your baby. Formula has some vitamin D in it, but your baby needs extra until they are taking ~32-33 ounces of formula daily. We recommend 400 IU of vitamin D once daily. Vitamin D drops are available in two forms-- a concentrated drop which provides the full dose in one DROP or a liquid product which is dosed at one ml (or one cc-- the syringes provided with the drops are labeled with the correct dose). Make sure to follow the instructions on the bottle exactly for proper dosing! A  baby should continue this for as  Patient states she was opening a can and it would not open fully. She attempted to use a fork to assist in opening the rest of the way when her hand slipped and she sliced her thumb on the can lid. Injury occurred around 16:00 patient has had it wrapped up and waiting for  to get home to bring her to ER. Bleeding stopped/controlled. Placed in chlorhexidine soak at this time. long as they are primarily breastfeeding; by the time a formula-fed baby goes through one bottle of vitamin D drops they are likely getting enough vitamin D in their formula.     BEHAVIOR & CRYING: Touch and hold and carry your baby as often as you can. Cuddling, caressing, talking and singing to your baby makes them feel secure and loved. You will not spoil them when they are this young! Be sure to focus on them when you are feeding them; this is an important time for bonding and social development (don't be distracted by your cell phone or computer-- focus on your baby!). It’s also important to remember that other children in the house may be jealous of the new baby, and sometimes they may act out to get your attention. Try to set aside short periods of time every day devoted exclusively to your older child (or children). Also, try to find simple ways that they can help care for the baby-- this will help them connect better.      CAR SAFETY:  Your baby should be secured in a rear-facing infant car safety seat whenever riding in the car for at least the first year of life.This will protect your baby in case of an accident, plus it is the law. When choosing a car seat, you can check on any safety concerns for a particular model at www.safercar.gov. You can also search for local inspection stations on that site. (Or at www.safekidswi.org.) Correct installation of the car seat is critical (at least 7 out of 10 car seats are estimated to be incorrectly installed or used!). A certified car seat  can ensure your baby is as safely secured as possible. You should also register your car seat with the , so that you will be notified of any future problems or recalls with that seat. The straps need to be very snug to protect your baby in case of an accident (so no thick coats or snowsuits while riding in the car during the winter!). Never leave a child alone in a car, even for a very short time.      SAFE  SLEEPING: The safest place for a  to sleep is in their own crib in their parent’s room; this is recommended until at least 6 months of age. They should always be placed on their back to sleep (not on their tummy or their side). This “back to sleep” position decreases the risk of crib death (SIDS). Avoid loose, soft bedding (blankets, comforters, sheepskins, quilts, pillows, pillow-like bumper pads) and soft toys in the baby’s crib because they are a risk for suffocation (and SIDS). “Sleep sacks” and swaddling with thin blankets are okay. Cribs (including Pack-n-Plays) should be certified by [facility] (a safety agency for baby products). Safety criteria for cribs include: slats or bars no more than 2-3/8 inches (6cm) apart, a snug-fitting mattress, and a distance of no less than 26 inches from the mattress to the top rail. Sleeping in or with other devices (car seats, swings, bed sharing devices, cushions) are not recommended; likewise, sleeping on sofas or in armchairs is very dangerous for small infants.     TUMMY TIME: It is safe to start “tummy time” as soon as you bring your baby home. (Laying on your chest or across your lap counts!) It should always be done when your baby is awake. Start with a few minutes at a time, and increase it as your baby grows older. (Never leave your baby alone on their tummy.) Tummy time strengthens their neck and shoulder muscles and helps with their development. (It also prevents lopsided or flat heads that come from lying in the same position on their back all the time.)    UMBILICAL CORD CARE: The umbilical cord stump and the area around it should be left completely dry until the cord  stump falls off. (Washing it or using alcohol wipes will just delay how long it takes to fall off.) Keep it open to air as much as possible. Give your baby sponge baths early on; don’t put them in a tub with their belly button underwater until the cord has fallen off and the base of it appears  dry. A tiny bit of oozing blood when the cord falls off is normal.     OTHER SAFETY GUIDELINES:  Burns:  Adjust your hot-water heater to 120-125°F or use the “low” setting. This will decrease the risk of scald burns (and save money on your utility bills!).     Smoke and carbon monoxide detectors: Make sure that detectors are on each level of your home, especially near sleeping areas. Check the batteries regularly and replace them at least twice a year. Discuss a “fire escape plan” with all family members.     No smoking! Exposure to tobacco smoke puts children at risk for lots of problems (asthma and other severe breathing problems, crib death [SIDS], ear infections and even behavior and learning problems). If you smoke, this is the time to talk to your doctor about quitting. If you can’t quit, keep all smoking (including e-cigarettes and vaping) outside the home (not just in another room), and all smokers should change their clothes and wash their hands before handling the baby.     Tap water is safe! Tap water is safe for formula preparation. News reports in 2016 raised concerns about a risk of lead in the [place] city water supply. This water supply is very safe--there is no lead in the water that leaves the local water treatment center. In some older homes (built in 1951), there may be lead in the service pipe lines (which carry water from the main water pipe to individual homes). When water sits in these pipes for prolonged periods, some lead may dissolve into the water. As a precaution, the [facility] recommends a water filter at the tap of homes which have lead service lines. If you are not sure when your home was built, do an internet search for \"[place] lead awareness and drinking water safety\". From this web page, you can search for your address to find out if your home was built with lead service lines. There are also helpful hints regarding water safety on this site. Another option is to call  the Customer Service line at (960) [phone]. If it would make you feel better, you could also get a filter for your faucet or tap. This would help save you money (and the environment--which is going to be important to your baby's future!).     OTHER  ISSUES:   Body temperature: A rectal temperature is most accurate way to check for fever in this age group. Normal rectal temperatures range between 97.9 and 100.3°F. A fever is defined as a rectal temperature of 100.4°F or higher. Call the doctor or have Juanpablo Mcgowan seen if they have a fever. In a baby this young, a fever needs to be evaluated. Do not give Tylenol or ibuprofen at this age.     Bowel movements:  Once your baby is feeding well, how often they poop may vary from once every feeding to once every three to four days ( babies often poop less than once a day).  It is normal for babies to strain and turn a little red in the face with bowel movements, as long as the stool they pass is soft.  If you feel Juanpablo Mcgowan is very uncomfortable, call the clinic. Blood in the stool may be normal early on in nursing infants, but you should call us to discuss it if you do see any blood.      There is no immunization history on file for this patient.       Follow up visits: After the  period, we usually recommend your baby be seen at 2 weeks of age, and then at two months of age. Of course, we will see you anytime for any concerns and to follow up on any problems.     Thank you for entrusting your care to [facility]!    Also, check out “Children’s Health” on the [facility] Blog for updates on timely topics regarding children’s health!

## 2024-01-02 ENCOUNTER — DOCUMENTATION (OUTPATIENT)
Dept: OBSTETRICS AND GYNECOLOGY | Facility: CLINIC | Age: 34
End: 2024-01-02
Payer: COMMERCIAL

## 2024-01-02 ENCOUNTER — APPOINTMENT (OUTPATIENT)
Dept: CT IMAGING | Facility: HOSPITAL | Age: 34
End: 2024-01-02
Payer: COMMERCIAL

## 2024-01-02 ENCOUNTER — HOSPITAL ENCOUNTER (EMERGENCY)
Facility: HOSPITAL | Age: 34
Discharge: HOME | End: 2024-01-02
Attending: STUDENT IN AN ORGANIZED HEALTH CARE EDUCATION/TRAINING PROGRAM | Admitting: STUDENT IN AN ORGANIZED HEALTH CARE EDUCATION/TRAINING PROGRAM
Payer: COMMERCIAL

## 2024-01-02 VITALS
TEMPERATURE: 97.9 F | HEART RATE: 75 BPM | SYSTOLIC BLOOD PRESSURE: 119 MMHG | BODY MASS INDEX: 33.66 KG/M2 | RESPIRATION RATE: 18 BRPM | HEIGHT: 63 IN | DIASTOLIC BLOOD PRESSURE: 74 MMHG | WEIGHT: 190 LBS | OXYGEN SATURATION: 99 %

## 2024-01-02 DIAGNOSIS — N30.90 CYSTITIS: Primary | ICD-10-CM

## 2024-01-02 DIAGNOSIS — R19.7 DIARRHEA, UNSPECIFIED TYPE: ICD-10-CM

## 2024-01-02 LAB
ALBUMIN SERPL-MCNC: 4.7 G/DL (ref 3.5–5.2)
ALBUMIN/GLOB SERPL: 1.2 G/DL
ALP SERPL-CCNC: 102 U/L (ref 39–117)
ALT SERPL W P-5'-P-CCNC: 21 U/L (ref 1–33)
ANION GAP SERPL CALCULATED.3IONS-SCNC: 12 MMOL/L (ref 5–15)
AST SERPL-CCNC: 16 U/L (ref 1–32)
B-HCG UR QL: NEGATIVE
BACTERIA UR QL AUTO: ABNORMAL /HPF
BASOPHILS # BLD AUTO: 0.04 10*3/MM3 (ref 0–0.2)
BASOPHILS NFR BLD AUTO: 0.4 % (ref 0–1.5)
BILIRUB SERPL-MCNC: 0.5 MG/DL (ref 0–1.2)
BILIRUB UR QL STRIP: NEGATIVE
BUN SERPL-MCNC: 10 MG/DL (ref 6–20)
BUN/CREAT SERPL: 15.6 (ref 7–25)
CALCIUM SPEC-SCNC: 9.3 MG/DL (ref 8.6–10.5)
CHLORIDE SERPL-SCNC: 101 MMOL/L (ref 98–107)
CLARITY UR: CLEAR
CO2 SERPL-SCNC: 24 MMOL/L (ref 22–29)
COLOR UR: YELLOW
CREAT SERPL-MCNC: 0.64 MG/DL (ref 0.57–1)
DEPRECATED RDW RBC AUTO: 40.3 FL (ref 37–54)
EGFRCR SERPLBLD CKD-EPI 2021: 119.8 ML/MIN/1.73
EOSINOPHIL # BLD AUTO: 0.08 10*3/MM3 (ref 0–0.4)
EOSINOPHIL NFR BLD AUTO: 0.9 % (ref 0.3–6.2)
ERYTHROCYTE [DISTWIDTH] IN BLOOD BY AUTOMATED COUNT: 12.3 % (ref 12.3–15.4)
FLUAV RNA RESP QL NAA+PROBE: NOT DETECTED
FLUBV RNA RESP QL NAA+PROBE: NOT DETECTED
GLOBULIN UR ELPH-MCNC: 4 GM/DL
GLUCOSE SERPL-MCNC: 99 MG/DL (ref 65–99)
GLUCOSE UR STRIP-MCNC: NEGATIVE MG/DL
HCT VFR BLD AUTO: 45.6 % (ref 34–46.6)
HGB BLD-MCNC: 15.5 G/DL (ref 12–15.9)
HGB UR QL STRIP.AUTO: NEGATIVE
HOLD SPECIMEN: NORMAL
HYALINE CASTS UR QL AUTO: ABNORMAL /LPF
IMM GRANULOCYTES # BLD AUTO: 0.03 10*3/MM3 (ref 0–0.05)
IMM GRANULOCYTES NFR BLD AUTO: 0.3 % (ref 0–0.5)
KETONES UR QL STRIP: NEGATIVE
LEUKOCYTE ESTERASE UR QL STRIP.AUTO: ABNORMAL
LIPASE SERPL-CCNC: 26 U/L (ref 13–60)
LYMPHOCYTES # BLD AUTO: 1.38 10*3/MM3 (ref 0.7–3.1)
LYMPHOCYTES NFR BLD AUTO: 15.2 % (ref 19.6–45.3)
MCH RBC QN AUTO: 30.3 PG (ref 26.6–33)
MCHC RBC AUTO-ENTMCNC: 34 G/DL (ref 31.5–35.7)
MCV RBC AUTO: 89.2 FL (ref 79–97)
MONOCYTES # BLD AUTO: 0.35 10*3/MM3 (ref 0.1–0.9)
MONOCYTES NFR BLD AUTO: 3.9 % (ref 5–12)
NEUTROPHILS NFR BLD AUTO: 7.19 10*3/MM3 (ref 1.7–7)
NEUTROPHILS NFR BLD AUTO: 79.3 % (ref 42.7–76)
NITRITE UR QL STRIP: NEGATIVE
NRBC BLD AUTO-RTO: 0 /100 WBC (ref 0–0.2)
PH UR STRIP.AUTO: <=5 [PH] (ref 5–8)
PLATELET # BLD AUTO: 333 10*3/MM3 (ref 140–450)
PMV BLD AUTO: 9.3 FL (ref 6–12)
POTASSIUM SERPL-SCNC: 4.1 MMOL/L (ref 3.5–5.2)
PROT SERPL-MCNC: 8.7 G/DL (ref 6–8.5)
PROT UR QL STRIP: NEGATIVE
RBC # BLD AUTO: 5.11 10*6/MM3 (ref 3.77–5.28)
RBC # UR STRIP: ABNORMAL /HPF
REF LAB TEST METHOD: ABNORMAL
SARS-COV-2 RNA RESP QL NAA+PROBE: NOT DETECTED
SODIUM SERPL-SCNC: 137 MMOL/L (ref 136–145)
SP GR UR STRIP: 1.02 (ref 1–1.03)
SQUAMOUS #/AREA URNS HPF: ABNORMAL /HPF
UROBILINOGEN UR QL STRIP: ABNORMAL
WBC # UR STRIP: ABNORMAL /HPF
WBC NRBC COR # BLD AUTO: 9.07 10*3/MM3 (ref 3.4–10.8)
WHOLE BLOOD HOLD COAG: NORMAL
YEAST URNS QL MICRO: ABNORMAL /HPF

## 2024-01-02 PROCEDURE — 85025 COMPLETE CBC W/AUTO DIFF WBC: CPT | Performed by: STUDENT IN AN ORGANIZED HEALTH CARE EDUCATION/TRAINING PROGRAM

## 2024-01-02 PROCEDURE — 74176 CT ABD & PELVIS W/O CONTRAST: CPT

## 2024-01-02 PROCEDURE — 81025 URINE PREGNANCY TEST: CPT | Performed by: STUDENT IN AN ORGANIZED HEALTH CARE EDUCATION/TRAINING PROGRAM

## 2024-01-02 PROCEDURE — 25810000003 SODIUM CHLORIDE 0.9 % SOLUTION: Performed by: NURSE PRACTITIONER

## 2024-01-02 PROCEDURE — 25010000002 ONDANSETRON PER 1 MG: Performed by: NURSE PRACTITIONER

## 2024-01-02 PROCEDURE — 80053 COMPREHEN METABOLIC PANEL: CPT | Performed by: STUDENT IN AN ORGANIZED HEALTH CARE EDUCATION/TRAINING PROGRAM

## 2024-01-02 PROCEDURE — 83690 ASSAY OF LIPASE: CPT | Performed by: STUDENT IN AN ORGANIZED HEALTH CARE EDUCATION/TRAINING PROGRAM

## 2024-01-02 PROCEDURE — 87636 SARSCOV2 & INF A&B AMP PRB: CPT | Performed by: NURSE PRACTITIONER

## 2024-01-02 PROCEDURE — 81001 URINALYSIS AUTO W/SCOPE: CPT | Performed by: STUDENT IN AN ORGANIZED HEALTH CARE EDUCATION/TRAINING PROGRAM

## 2024-01-02 PROCEDURE — 96374 THER/PROPH/DIAG INJ IV PUSH: CPT

## 2024-01-02 PROCEDURE — 99284 EMERGENCY DEPT VISIT MOD MDM: CPT

## 2024-01-02 RX ORDER — FLUTICASONE PROPIONATE 50 MCG
SPRAY, SUSPENSION (ML) NASAL
Qty: 16 G | Refills: 0 | OUTPATIENT
Start: 2024-01-02

## 2024-01-02 RX ORDER — FLUCONAZOLE 150 MG/1
TABLET ORAL
Qty: 2 TABLET | Refills: 0 | Status: SHIPPED | OUTPATIENT
Start: 2024-01-02

## 2024-01-02 RX ORDER — SODIUM CHLORIDE 0.9 % (FLUSH) 0.9 %
10 SYRINGE (ML) INJECTION AS NEEDED
Status: DISCONTINUED | OUTPATIENT
Start: 2024-01-02 | End: 2024-01-02 | Stop reason: HOSPADM

## 2024-01-02 RX ORDER — NITROFURANTOIN 25; 75 MG/1; MG/1
100 CAPSULE ORAL 2 TIMES DAILY
Qty: 14 CAPSULE | Refills: 0 | Status: SHIPPED | OUTPATIENT
Start: 2024-01-02 | End: 2024-01-09

## 2024-01-02 RX ORDER — ONDANSETRON 2 MG/ML
4 INJECTION INTRAMUSCULAR; INTRAVENOUS ONCE
Status: COMPLETED | OUTPATIENT
Start: 2024-01-02 | End: 2024-01-02

## 2024-01-02 RX ADMIN — SODIUM CHLORIDE 1000 ML: 9 INJECTION, SOLUTION INTRAVENOUS at 11:44

## 2024-01-02 RX ADMIN — ONDANSETRON 4 MG: 2 INJECTION INTRAMUSCULAR; INTRAVENOUS at 11:44

## 2024-01-02 NOTE — ED PROVIDER NOTES
Subjective  History of Present Illness:    Chief Complaint: Diarrhea, abdominal pain  History of Present Illness: This is a 33-year-old female patient comes into the ED today complaining of diarrhea, abdominal pain that started earlier today.  Patient states she has a history of gastroparesis with a stimulator implanted.        Nurses Notes reviewed and agree, including vitals, allergies, social history and prior medical history.       Allergies:    Ceftin [cefuroxime axetil], Cefuroxime, Sumatriptan, and Amoxicillin      Past Surgical History:   Procedure Laterality Date     SECTION        and  and      SECTION WITH TUBAL N/A 01/15/2019    Procedure:  SECTION REPEAT WITH TUBAL;  Surgeon: Alva Boyer MD;  Location: [facility] LABOR DELIVERY;  Service: Obstetrics/Gynecology    COLONOSCOPY N/A 2017    Procedure: COLONOSCOPY WITH BIOPSIES AND ARGON THERMAL ABLATION;  Surgeon: Jignesh Selby MD;  Location: [facility] ENDOSCOPY;  Service:     D & C HYSTEROSCOPY ENDOMETRIAL ABLATION N/A 03/10/2022    Procedure: DILATATION AND CURETTAGE DIAGNOSTIC HYSTEROSCOPY NOVASURE ENDOMETRIAL ABLATION;  Surgeon: Alva Boyer MD;  Location: [facility] OR;  Service: Obstetrics/Gynecology;  Laterality: N/A;    DIAGNOSTIC LAPAROSCOPY N/A 2018    Procedure: DIAGNOSTIC LAPAROSCOPY AND ABLATION OF ENDOMETRIOSIS;  Surgeon: Evin Zamudio MD;  Location: [facility] OR;  Service: Obstetrics/Gynecology    ENDOMETRIAL ABLATION      ENDOSCOPIC FUNCTIONAL SINUS SURGERY (FESS) Right 2021    Procedure: Right endoscopic total ethmoidectomy, right endoscopic middle meatal antrotomy with cyst removal, right endoscopic frontal recess exploration with cyst removal;  Surgeon: Saulo Brito MD;  Location: [facility] OR;  Service: ENT;  Laterality: Right;    ENDOSCOPY N/A 2017    Procedure: ESOPHAGOGASTRODUODENOSCOPY WITH BIOPSIES AND COLD BIOPSY POLYPECTOMIES;  Surgeon: Jignesh Selby MD;  Location:  [facility] ENDOSCOPY;  Service:     GASTRIC STIMULATOR IMPLANT SURGERY      LAPAROSCOPIC CHOLECYSTECTOMY      LIPOMA EXCISION N/A 12/10/2021    Procedure: Excision of soft tissue mass lower back;  Surgeon: Nidia Smith MD;  Location: [facility] OR;  Service: General;  Laterality: N/A;    MOUTH SURGERY      full mouth tooth extration    SINUS SURGERY      TUBAL ABDOMINAL LIGATION  01/15/2019    WISDOM TOOTH EXTRACTION           Social History     Socioeconomic History    Marital status:    Tobacco Use    Smoking status: Never     Passive exposure: Past    Smokeless tobacco: Never   Vaping Use    Vaping Use: Never used   Substance and Sexual Activity    Alcohol use: No    Drug use: Never    Sexual activity: Yes     Partners: Male     Birth control/protection: Surgical     Comment: Tubal         Family History   Problem Relation Age of Onset    Arthritis Mother     COPD Mother     Asthma Mother     Thyroid disease Mother     Arthritis Father     Diabetes Father     Hypertension Father     Hyperlipidemia Father     Kidney disease Father     Heart attack Father     Coronary artery disease Father     Dementia Father     No Known Problems Son     Colon cancer Neg Hx     Liver cancer Neg Hx     Liver disease Neg Hx     Stomach cancer Neg Hx     Esophageal cancer Neg Hx        REVIEW OF SYSTEMS: All systems reviewed and not pertinent unless noted.    Review of Systems   Gastrointestinal:  Positive for abdominal pain and diarrhea.   All other systems reviewed and are negative.      Objective    Physical Exam  Vitals and nursing note reviewed.   Constitutional:       Appearance: Normal appearance.   HENT:      Head: Normocephalic and atraumatic.   Eyes:      Extraocular Movements: Extraocular movements intact.      Pupils: Pupils are equal, round, and reactive to light.   Cardiovascular:      Rate and Rhythm: Normal rate and regular rhythm.      Pulses: Normal pulses.      Heart sounds: Normal heart sounds.   Pulmonary:       Effort: Pulmonary effort is normal.      Breath sounds: Normal breath sounds.   Abdominal:      General: Abdomen is flat. Bowel sounds are normal.      Palpations: Abdomen is soft.      Tenderness: There is abdominal tenderness in the right lower quadrant and left lower quadrant.   Musculoskeletal:      Cervical back: Normal range of motion and neck supple.   Skin:     Capillary Refill: Capillary refill takes less than 2 seconds.   Neurological:      General: No focal deficit present.      Mental Status: She is alert and oriented to person, place, and time. Mental status is at baseline.      GCS: GCS eye subscore is 4. GCS verbal subscore is 5. GCS motor subscore is 6.      Sensory: Sensation is intact.      Motor: Motor function is intact.      Gait: Gait is intact.   Psychiatric:         Attention and Perception: Attention and perception normal.         Mood and Affect: Mood and affect normal.         Speech: Speech normal.         Behavior: Behavior normal. Behavior is cooperative.           Procedures    ED Course:         Lab Results (last 24 hours)       Procedure Component Value Units Date/Time    Comprehensive Metabolic Panel [[id number]]  (Abnormal) Collected: 01/02/24 1135    Specimen: Blood Updated: 01/02/24 1203     Glucose 99 mg/dL      BUN 10 mg/dL      Creatinine 0.64 mg/dL      Sodium 137 mmol/L      Potassium 4.1 mmol/L      Chloride 101 mmol/L      CO2 24.0 mmol/L      Calcium 9.3 mg/dL      Total Protein 8.7 g/dL      Albumin 4.7 g/dL      ALT (SGPT) 21 U/L      AST (SGOT) 16 U/L      Alkaline Phosphatase 102 U/L      Total Bilirubin 0.5 mg/dL      Globulin 4.0 gm/dL      A/G Ratio 1.2 g/dL      BUN/Creatinine Ratio 15.6     Anion Gap 12.0 mmol/L      eGFR 119.8 mL/min/1.73     Narrative:      GFR Normal >60  Chronic Kidney Disease <60  Kidney Failure <15      CBC & Differential [[id number]]  (Abnormal) Collected: 01/02/24 1135    Specimen: Blood Updated: 01/02/24 1143    Narrative:      The  following orders were created for panel order CBC & Differential.  Procedure                               Abnormality         Status                     ---------                               -----------         ------                     CBC Auto Differential[[id number]]        Abnormal            Final result                 Please view results for these tests on the individual orders.    CBC Auto Differential [[id number]]  (Abnormal) Collected: 01/02/24 1135    Specimen: Blood Updated: 01/02/24 1143     WBC 9.07 10*3/mm3      RBC 5.11 10*6/mm3      Hemoglobin 15.5 g/dL      Hematocrit 45.6 %      MCV 89.2 fL      MCH 30.3 pg      MCHC 34.0 g/dL      RDW 12.3 %      RDW-SD 40.3 fl      MPV 9.3 fL      Platelets 333 10*3/mm3      Neutrophil % 79.3 %      Lymphocyte % 15.2 %      Monocyte % 3.9 %      Eosinophil % 0.9 %      Basophil % 0.4 %      Immature Grans % 0.3 %      Neutrophils, Absolute 7.19 10*3/mm3      Lymphocytes, Absolute 1.38 10*3/mm3      Monocytes, Absolute 0.35 10*3/mm3      Eosinophils, Absolute 0.08 10*3/mm3      Basophils, Absolute 0.04 10*3/mm3      Immature Grans, Absolute 0.03 10*3/mm3      nRBC 0.0 /100 WBC     Lipase [[id number]]  (Normal) Collected: 01/02/24 1135    Specimen: Blood Updated: 01/02/24 1312     Lipase 26 U/L     Urinalysis With Microscopic If Indicated (No Culture) - Urine, Clean Catch [[id number]]  (Abnormal) Collected: 01/02/24 1137    Specimen: Urine, Clean Catch Updated: 01/02/24 1146     Color, UA Yellow     Appearance, UA Clear     pH, UA <=5.0     Specific Gravity, UA 1.022     Glucose, UA Negative     Ketones, UA Negative     Bilirubin, UA Negative     Blood, UA Negative     Protein, UA Negative     Leuk Esterase, UA Moderate (2+)     Nitrite, UA Negative     Urobilinogen, UA 0.2 E.U./dL    Pregnancy, Urine - Urine, Clean Catch [[id number]]  (Normal) Collected: 01/02/24 1137    Specimen: Urine, Clean Catch Updated: 01/02/24 1146     HCG, Urine QL Negative    Urinalysis,  Microscopic Only - Urine, Clean Catch [[id number]]  (Abnormal) Collected: 01/02/24 1137    Specimen: Urine, Clean Catch Updated: 01/02/24 1202     RBC, UA 3-5 /HPF      WBC, UA 6-10 /HPF      Bacteria, UA 2+ /HPF      Squamous Epithelial Cells, UA 3-6 /HPF      Yeast, UA       Small/1+ Budding Yeast w/Hyphae     /HPF     Hyaline Casts, UA None Seen /LPF      Methodology Manual Light Microscopy    COVID PRE-OP / PRE-PROCEDURE SCREENING ORDER (NO ISOLATION) - Swab, Nasopharynx [[id number]]  (Normal) Collected: 01/02/24 1303    Specimen: Swab from Nasopharynx Updated: 01/02/24 1334    Narrative:      The following orders were created for panel order COVID PRE-OP / PRE-PROCEDURE SCREENING ORDER (NO ISOLATION) - Swab, Nasopharynx.  Procedure                               Abnormality         Status                     ---------                               -----------         ------                     COVID-19 and FLU A/B PCR...[[id number]]  Normal              Final result                 Please view results for these tests on the individual orders.    COVID-19 and FLU A/B PCR, 1 HR TAT - Swab, Nasopharynx [[id number]]  (Normal) Collected: 01/02/24 1303    Specimen: Swab from Nasopharynx Updated: 01/02/24 1334     COVID19 Not Detected     Influenza A PCR Not Detected     Influenza B PCR Not Detected    Narrative:      Fact sheet for providers: https://www.fda.gov/media/716042/download    Fact sheet for patients: https://www.fda.gov/media/066844/download    Test performed by PCR.             CT Abdomen Pelvis Without Contrast    Result Date: 1/2/2024  PROCEDURE: CT ABDOMEN PELVIS WO CONTRAST-  HISTORY: Right upper quadrant pain  COMPARISON: 12/5/2023.  PROCEDURE: Axial images were obtained from the lung bases through the pubic symphysis without intravenous contrast.  FINDINGS:  ABDOMEN: The lung bases are clear. The heart size is normal. The limited noncontrast images of the liver are normal. The gallbladder is surgically  absent. The spleen is normal. No adrenal masses are seen. The pancreas has an unremarkable unenhanced appearance. The aorta is normal in caliber. There is no significant free fluid or adenopathy. There is no nephrolithiasis. There is no hydronephrosis. Limited noncontrast images of the bowel are unremarkable. There are postoperative changes of the stomach. A gastric stimulator is present.  PELVIS: The appendix is normal. The uterus is lobulated likely due to fibroids.. The urinary bladder is unremarkable. There is no significant fluid or adenopathy.      Impression: No acute process.    This study was performed with techniques to keep radiation doses as low as reasonably achievable (ALARA). Individualized dose reduction techniques using automated exposure control or adjustment of mA and/or kV according to the patient size were employed.    Images were reviewed, interpreted, and dictated by Dr. Gregg Sarkar MD Transcribed by Clyde Rojo PA-C.  This report was signed and finalized on 1/2/2024 1:38 PM by Gregg Sarkar MD.        Medical Decision Making  This is a 33-year-old female patient comes into the ED today complaining of diarrhea, abdominal pain that started earlier today.  Patient states she has a history of gastroparesis with a stimulator implanted.      DDX: includes but is not limited to: Appendicitis, colitis, urinary tract infection, diverticulitis, diverticulosis, bowel obstruction, other    Problems Addressed:  Cystitis: complicated acute illness or injury    Amount and/or Complexity of Data Reviewed  External Data Reviewed:      Details: I have personally reviewed labs, radiology EKG and notes from patient's chart  Labs: ordered. Decision-making details documented in ED Course.     Details: I have personally reviewed and documented all results  Radiology: ordered.     Details: I have personally reviewed and documented all results  Discussion of management or test interpretation with external  provider(s): Discussed assessment, treatment and plan with ER attending    Risk  Prescription drug management.  Risk Details: I have discussed with patient the finding of the test preformed today. Patient has been diagnosed with cystitis and will be discharged home.  Patient requested to follow-up with primary care provider within the next 7 days for reevaluation. Strict return precautions have been given and patient verbalizes understanding                  Final diagnoses:   Cystitis          Nabeel Victoria, ARNIE  01/02/24 140

## 2024-01-02 NOTE — PROGRESS NOTES
Patient is informed of results of ultrasound done 12/27/2023.  Uterus is minimally enlarged.  There are 3 small fibroids noted with the largest fibroid 2.7 cm.  Ovaries look okay.  No free fluid.  Patient is encouraged to have a follow-up ultrasound in about 1 year if she has no significant changes with menses or pelvic pain.  This would be for surveillance of the fibroids.  Patient is agreeable.  She request Diflucan sent in for yeast infection also.  She is encouraged to schedule annual exam/Pap smear in the next 1 to 2 months also.

## 2024-01-05 ENCOUNTER — TELEPHONE (OUTPATIENT)
Dept: OBSTETRICS AND GYNECOLOGY | Facility: CLINIC | Age: 34
End: 2024-01-05
Payer: COMMERCIAL

## 2024-01-05 NOTE — TELEPHONE ENCOUNTER
----- Message from Marilu Godoy sent at 1/5/2024  5:53 AM EST -----  Regarding: Yeast infection   Contact: [phone]  I had a urine test done on January 2nd at the [facility]. It said small/1+ budding yeast w/ Hyphae. Is there any different treatment that's needed to get rid of that?

## 2024-01-10 ENCOUNTER — OFFICE VISIT (OUTPATIENT)
Age: 34
End: 2024-01-10
Payer: COMMERCIAL

## 2024-01-10 VITALS
TEMPERATURE: 101.4 F | BODY MASS INDEX: 33.75 KG/M2 | SYSTOLIC BLOOD PRESSURE: 110 MMHG | WEIGHT: 190.5 LBS | HEIGHT: 63 IN | HEART RATE: 104 BPM | DIASTOLIC BLOOD PRESSURE: 80 MMHG | OXYGEN SATURATION: 99 %

## 2024-01-10 DIAGNOSIS — J11.1 FLU: ICD-10-CM

## 2024-01-10 DIAGNOSIS — R50.9 FEVER, UNSPECIFIED FEVER CAUSE: Primary | ICD-10-CM

## 2024-01-10 LAB
EXPIRATION DATE: ABNORMAL
EXPIRATION DATE: NORMAL
FLUAV AG UPPER RESP QL IA.RAPID: NOT DETECTED
FLUBV AG UPPER RESP QL IA.RAPID: DETECTED
INTERNAL CONTROL: ABNORMAL
INTERNAL CONTROL: NORMAL
Lab: ABNORMAL
Lab: NORMAL
S PYO AG THROAT QL: NEGATIVE
SARS-COV-2 AG UPPER RESP QL IA.RAPID: NOT DETECTED

## 2024-01-10 PROCEDURE — 3079F DIAST BP 80-89 MM HG: CPT | Performed by: FAMILY MEDICINE

## 2024-01-10 PROCEDURE — 87428 SARSCOV & INF VIR A&B AG IA: CPT | Performed by: FAMILY MEDICINE

## 2024-01-10 PROCEDURE — 3074F SYST BP LT 130 MM HG: CPT | Performed by: FAMILY MEDICINE

## 2024-01-10 PROCEDURE — 87880 STREP A ASSAY W/OPTIC: CPT | Performed by: FAMILY MEDICINE

## 2024-01-10 PROCEDURE — 99213 OFFICE O/P EST LOW 20 MIN: CPT | Performed by: FAMILY MEDICINE

## 2024-01-10 RX ORDER — PREDNISONE 5 MG/1
1 TABLET ORAL TAKE AS DIRECTED
Qty: 21 TABLET | Refills: 0 | Status: SHIPPED | OUTPATIENT
Start: 2024-01-10

## 2024-01-10 NOTE — PROGRESS NOTES
Follow Up Office Visit      Date: 01/10/2024   Patient Name: Marilu Godoy  : 1990   MRN: [id number]     Chief Complaint:    Chief Complaint   Patient presents with    Sore Throat       History of Present Illness: Marilu Godoy is a 33 y.o. female who is here today for cough with green production, congestion, runny nose, fatigue, muscle aches.  Sxs for about the last 4-5 days.    Subjective      Review of Systems:   Review of Systems   Constitutional:  Positive for chills and fever.   HENT:  Positive for congestion.    Eyes:  Negative for discharge.   Respiratory:  Positive for cough.    Cardiovascular:  Negative for chest pain.   Gastrointestinal:  Negative for nausea and vomiting.       I have reviewed the patients family history, social history, past medical history, past surgical history and have updated it as appropriate.     Medications:     Current Outpatient Medications:     albuterol sulfate  (90 Base) MCG/ACT inhaler, Inhale 2 puffs Every 4 (Four) Hours As Needed for Wheezing or Shortness of Air., Disp: 18 g, Rfl: 11    apixaban (ELIQUIS) 5 MG tablet tablet, Take 1 tablet by mouth 2 (Two) Times a Day., Disp: 90 tablet, Rfl: 3    aspirin 81 MG EC tablet, Take 1 tablet by mouth Daily., Disp: , Rfl:     atorvastatin (LIPITOR) 40 MG tablet, Take 1 tablet by mouth Every Night., Disp: 90 tablet, Rfl: 3    azelastine (ASTELIN) 0.1 % nasal spray, 1 spray into the nostril(s) as directed by provider 2 (Two) Times a Day. Use in each nostril as directed, Disp: 1 each, Rfl: 10    B-D ULTRA-FINE 33 LANCETS misc, Check sugars as needed/directed for hypoglycemia., Disp: 100 each, Rfl: 12    Blood Glucose Monitoring Suppl (ONE TOUCH ULTRA 2) w/Device kit, use as needed to check blood sugar for hypoglycemia (diabetes), Disp: , Rfl:     calcium carbonate EX (TUMS EX) 750 MG chewable tablet, Chew 1 tablet Daily. (Patient taking differently: Chew 1 tablet Daily As Needed.), Disp: 30 tablet, Rfl:  1    cholecalciferol (VITAMIN D3) 1.25 MG (46482 UT) capsule, Take 1 capsule by mouth Every 7 (Seven) Days., Disp: , Rfl:     ciprofloxacin-dexAMETHasone (Ciprodex) 0.3-0.1 % otic suspension, Administer 4 drops to the right ear 2 (Two) Times a Day., Disp: 7.5 mL, Rfl: 0    clotrimazole-betamethasone (Lotrisone) 1-0.05 % cream, Apply 1 application  topically to the appropriate area as directed 2 (Two) Times a Day., Disp: 15 g, Rfl: 0    Corlanor 5 MG tablet tablet, Take 1.5 tablets by mouth 2 (two) times a day., Disp: 90 tablet, Rfl: 6    cyclobenzaprine (FLEXERIL) 10 MG tablet,  15 Each, TAKE 1/2 TABLET BY MOUTH THREE TIMES A DAY AS NEEDED FOR MUSCLE SPASM for up to 5 days, 0 Refill(s), Disp: , Rfl:     Diclofenac Sodium (VOLTAREN) 1 % gel gel, Apply 4 g topically to the appropriate area as directed 4 (Four) Times a Day As Needed (joint pain)., Disp: 150 g, Rfl: 1    DULoxetine (CYMBALTA) 60 MG capsule, Take 1 capsule by mouth Daily., Disp: 90 capsule, Rfl: 3    EPINEPHrine (EPIPEN) 0.3 MG/0.3ML solution auto-injector injection, Inject  into the appropriate muscle as directed by prescriber See Admin Instructions. inject syringe as directed for allergic reaction, Disp: , Rfl:     Erenumab-aooe (AIMOVIG) 140 MG/ML auto-injector, Inject 1 mL under the skin into the appropriate area as directed Every 30 (Thirty) Days., Disp: 1 mL, Rfl: 3    fluconazole (Diflucan) 150 MG tablet, Take on Saturday morning, Disp: 1 tablet, Rfl: 0    fluconazole (Diflucan) 150 MG tablet, One po now and repeat in 3 days, Disp: 2 tablet, Rfl: 0    glucose blood test strip, USE TO CHECK SUGARS DAILY DUE TO hypoglycemia, Disp: 100 each, Rfl: 12    glucose monitor monitoring kit, 1 each As Needed (diabetes). Check sugars as needed/directed for hypoglycemia., Disp: 1 each, Rfl: 0    l-methylfolate 15 MG tablet tablet, Take 1 tablet by mouth Daily. Indications: folic acid deficiency, Disp: 90 tablet, Rfl: 3    lidocaine (LIDODERM) 5 %, Place 2  patches on the skin as directed by provider Daily. Remove & Discard patch within 12 hours or as directed by MD, Disp: 60 patch, Rfl: 3    methocarbamol (ROBAXIN) 750 MG tablet, Take 1 tablet by mouth At Night As Needed for Muscle Spasms., Disp: 14 tablet, Rfl: 0    mometasone (NASONEX) 50 MCG/ACT nasal spray, 2 sprays into the nostril(s) as directed by provider Daily., Disp: 3 each, Rfl: 3    mometasone-formoterol (Dulera) 200-5 MCG/ACT inhaler, Inhale 2 puffs 2 (Two) Times a Day., Disp: 13 g, Rfl: 11    mupirocin (Bactroban) 2 % cream, Apply 1 application topically to the appropriate area as directed 3 (Three) Times a Day., Disp: 30 g, Rfl: 1    pantoprazole (PROTONIX) 40 MG EC tablet, Take 1 tablet by mouth Daily., Disp: 90 tablet, Rfl: 3    Rimegepant Sulfate (Nurtec) 75 MG tablet dispersible tablet, Take 1 tablet by mouth As Needed (Migraine)., Disp: 10 tablet, Rfl: 3    Vitamin D, Ergocalciferol, 71942 units capsule, Take 1 capsule by mouth 1 (One) Time Per Week., Disp: 5 capsule, Rfl: 11    fluticasone (FLONASE) 50 MCG/ACT nasal spray, 2 sprays into the nostril(s) as directed by provider Daily for 7 days., Disp: 9.9 mL, Rfl: 0    predniSONE 5 MG (21) tablet therapy pack dose pack, Take 1 tablet by mouth Take As Directed. Take as directed on package instructions., Disp: 21 tablet, Rfl: 0    Allergies:   Allergies   Allergen Reactions    Ceftin [Cefuroxime Axetil] Shortness Of Breath and Rash     Numbness in mouth and throat    Cefuroxime Hives, Rash and Shortness Of Breath     Numbness in mouth and throat  Numbness in mouth and throat    Sumatriptan Shortness Of Breath and Nausea Only     Other reaction(s): Nausea Only, Other (See Comments)  Worsening migraine  Worsening migraine    Amoxicillin Rash, Hives and Itching       Objective     Physical Exam: Please see above  Vital Signs:   Vitals:    01/10/24 1056   BP: 110/80   BP Location: Left arm   Patient Position: Sitting   Cuff Size: Adult   Pulse: 104  "  Temp: (!) 101.4 °F (38.6 °C)   TempSrc: Temporal   SpO2: 99%   Weight: 86.4 kg (190 lb 8 oz)   Height: 160 cm (62.99\")     Body mass index is 33.75 kg/m².    Physical Exam  Constitutional:       Appearance: Normal appearance.   HENT:      Head: Normocephalic.      Right Ear: Tympanic membrane normal.      Left Ear: Tympanic membrane normal.      Mouth/Throat:      Lips: Pink.      Mouth: Mucous membranes are moist.   Cardiovascular:      Rate and Rhythm: Normal rate and regular rhythm.   Pulmonary:      Effort: Pulmonary effort is normal.      Breath sounds: Normal breath sounds.   Lymphadenopathy:      Cervical: Cervical adenopathy present.   Neurological:      Mental Status: She is alert.         Procedures    Results:   Labs:   Hemoglobin A1C   Date Value Ref Range Status   08/07/2023 5.00 4.80 - 5.60 % Final     Comment:     Hemoglobin A1C Ranges:  Increased Risk for Diabetes  5.7% to 6.4%  Diabetes                     >= 6.5%  Diabetic Goal                < 7.0%     03/22/2023 4.90 4.80 - 5.60 % Final     TSH   Date Value Ref Range Status   08/07/2023 0.790 0.270 - 4.200 uIU/mL Final   08/30/2022 0.686 0.270 - 4.200 uIU/mL Final        POCT Results (if applicable):   Results for orders placed or performed in visit on 01/10/24   POCT SARS-CoV-2 Antigen GINA + Flu    Specimen: Swab   Result Value Ref Range    SARS Antigen Not Detected Not Detected, Presumptive Negative    Influenza A Antigen GINA Not Detected Not Detected    Influenza B Antigen GINA Detected (A) Not Detected    Internal Control Passed Passed    Lot Number [id number]     Expiration Date 03/11/2024    POCT rapid strep A    Specimen: Swab   Result Value Ref Range    Rapid Strep A Screen Negative Negative, VALID, INVALID, Not Performed    Internal Control Passed Passed    Lot Number [id number]     Expiration Date 07/27/2024      *Note: Due to a large number of results and/or encounters for the requested time period, some results have not been displayed. A " complete set of results can be found in Results Review.       Imaging:   No valid procedures specified.       Assessment / Plan      Assessment/Plan:   Diagnoses and all orders for this visit:    1. Fever, unspecified fever cause (Primary)  -     POCT SARS-CoV-2 Antigen GINA + Flu  -     POCT rapid strep A    2. Flu  -     predniSONE 5 MG (21) tablet therapy pack dose pack; Take 1 tablet by mouth Take As Directed. Take as directed on package instructions.  Dispense: 21 tablet; Refill: 0               Vaccine Counseling:      Follow Up:   No follow-ups on file.      Louie El, DO   [facility]

## 2024-01-12 RX ORDER — AZITHROMYCIN 250 MG/1
TABLET, FILM COATED ORAL
Qty: 6 TABLET | Refills: 0 | Status: SHIPPED | OUTPATIENT
Start: 2024-01-12

## 2024-01-12 RX ORDER — FLUCONAZOLE 100 MG/1
100 TABLET ORAL DAILY
Qty: 1 TABLET | Refills: 0 | Status: SHIPPED | OUTPATIENT
Start: 2024-01-12

## 2024-01-17 ENCOUNTER — HOSPITAL ENCOUNTER (OUTPATIENT)
Dept: CT IMAGING | Facility: HOSPITAL | Age: 34
Discharge: HOME | End: 2024-01-17
Payer: COMMERCIAL

## 2024-01-17 DIAGNOSIS — M54.14 RADICULOPATHY, THORACIC REGION: ICD-10-CM

## 2024-01-17 PROCEDURE — 72128 CT CHEST SPINE W/O DYE: CPT

## 2024-01-24 DIAGNOSIS — M25.522 ARTHRALGIA OF ELBOW, LEFT: Primary | ICD-10-CM

## 2024-01-25 ENCOUNTER — OFFICE VISIT (OUTPATIENT)
Dept: ORTHOPEDIC SURGERY | Facility: CLINIC | Age: 34
End: 2024-01-25
Payer: COMMERCIAL

## 2024-01-25 VITALS — WEIGHT: 190.8 LBS | BODY MASS INDEX: 33.81 KG/M2 | HEIGHT: 63 IN | TEMPERATURE: 98.2 F

## 2024-01-25 DIAGNOSIS — M77.8 LEFT ELBOW TENDINITIS: Primary | ICD-10-CM

## 2024-01-25 PROCEDURE — 99213 OFFICE O/P EST LOW 20 MIN: CPT | Performed by: PHYSICIAN ASSISTANT

## 2024-01-25 RX ORDER — TIZANIDINE 4 MG/1
TABLET ORAL
COMMUNITY

## 2024-01-25 NOTE — PROGRESS NOTES
Subjective   Patient ID: Marilu Godoy is a 33 y.o. right hand dominant female is being seen for orthopaedic evaluation today for left elbow pain   Pain of the Left Elbow (Reports pain for 2 weeks, no known injury. )         Pain  Associated symptoms include arthralgias (left elbow).        Patient presents with left elbow discomfort ongoing for 2 weeks.  No injury or trauma.  She does endorse occasional numbness and tingling to the top of the left hand.  Movement seems to increase her discomfort.    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     Bleeding disorder 11/13/2016    Body piercing     TONGUE, NOSE, TWO IN EACH EAR    Bronchitis     Chest pain 06/22/2021    last CP 2-3 months ago    Cholelithiasis     Clotting disorder     factor 5    Constipation     COVID-19 vaccine series completed     Pfizer-plus booster    Deep vein thrombosis 11/13/2016    Diarrhea     Diverticulitis     Diverticulosis     Elevated cholesterol     Endometriosis     Factor 5 Leiden mutation, heterozygous     Fibroid     Fibromyalgia     Fibromyalgia, primary     Full dentures 06/22/2021    advised no adhesives DOS    Gastroparesis     GERD (gastroesophageal reflux disease)     H/O blood clots     H/O cardiovascular stress test 06/22/2021    reported several years ago-exercise wnl    Headache     Hiatal hernia     High cholesterol     History of recurrent UTIs     HPV (human papilloma virus) infection     Hx of echocardiogram     Hx of gout 06/22/2021    Hypertension     Hypoglycemia     Inappropriate sinus node tachycardia     Kidney infection 06/22/2021    history only    Low back pain     Migraine     Nausea & vomiting     Obesity     Osteoarthritis     Ovarian cyst     Pneumonia     Pollen allergies     PONV (postoperative nausea and vomiting)     Protein S deficiency  2016    labs from hospitalization for PE    Pulmonary embolism 2016    on eliquis since that time    Recurrent pregnancy loss, antepartum condition or complication     Sleep apnea     no CPAP- states insurance took it back    Tattoo     LOWER BACK    Varicella         Past Surgical History:   Procedure Laterality Date     SECTION        and  and      SECTION WITH TUBAL N/A 01/15/2019    Procedure:  SECTION REPEAT WITH TUBAL;  Surgeon: Alva Boyer MD;  Location: [facility] LABOR DELIVERY;  Service: Obstetrics/Gynecology    COLONOSCOPY N/A 2017    Procedure: COLONOSCOPY WITH BIOPSIES AND ARGON THERMAL ABLATION;  Surgeon: Jignesh Selby MD;  Location: [facility] ENDOSCOPY;  Service:     D & C HYSTEROSCOPY ENDOMETRIAL ABLATION N/A 03/10/2022    Procedure: DILATATION AND CURETTAGE DIAGNOSTIC HYSTEROSCOPY NOVASURE ENDOMETRIAL ABLATION;  Surgeon: Alva Boyer MD;  Location: [facility] OR;  Service: Obstetrics/Gynecology;  Laterality: N/A;    DIAGNOSTIC LAPAROSCOPY N/A 2018    Procedure: DIAGNOSTIC LAPAROSCOPY AND ABLATION OF ENDOMETRIOSIS;  Surgeon: Evin Zamudio MD;  Location: [facility] OR;  Service: Obstetrics/Gynecology    ENDOMETRIAL ABLATION      ENDOSCOPIC FUNCTIONAL SINUS SURGERY (FESS) Right 2021    Procedure: Right endoscopic total ethmoidectomy, right endoscopic middle meatal antrotomy with cyst removal, right endoscopic frontal recess exploration with cyst removal;  Surgeon: Saulo Brito MD;  Location: [facility] OR;  Service: ENT;  Laterality: Right;    ENDOSCOPY N/A 2017    Procedure: ESOPHAGOGASTRODUODENOSCOPY WITH BIOPSIES AND COLD BIOPSY POLYPECTOMIES;  Surgeon: Jignesh Selby MD;  Location: [facility] ENDOSCOPY;  Service:     GASTRIC STIMULATOR IMPLANT SURGERY      LAPAROSCOPIC CHOLECYSTECTOMY      LIPOMA EXCISION N/A 12/10/2021    Procedure: Excision of soft tissue mass lower back;  Surgeon: Nidia Smith MD;  Location: [facility] OR;   "Service: General;  Laterality: N/A;    MOUTH SURGERY      full mouth tooth extration    SINUS SURGERY      TUBAL ABDOMINAL LIGATION  01/15/2019    WISDOM TOOTH EXTRACTION         Family History   Problem Relation Age of Onset    Arthritis Mother     COPD Mother     Asthma Mother     Thyroid disease Mother     Arthritis Father     Diabetes Father     Hypertension Father     Hyperlipidemia Father     Kidney disease Father     Heart attack Father     Coronary artery disease Father     Dementia Father     No Known Problems Son     Colon cancer Neg Hx     Liver cancer Neg Hx     Liver disease Neg Hx     Stomach cancer Neg Hx     Esophageal cancer Neg Hx         Social History     Socioeconomic History    Marital status:    Tobacco Use    Smoking status: Never     Passive exposure: Past    Smokeless tobacco: Never   Vaping Use    Vaping Use: Never used   Substance and Sexual Activity    Alcohol use: No    Drug use: Never    Sexual activity: Yes     Partners: Male     Birth control/protection: Surgical     Comment: Tubal       Allergies   Allergen Reactions    Ceftin [Cefuroxime Axetil] Shortness Of Breath and Rash     Numbness in mouth and throat    Cefuroxime Hives, Rash and Shortness Of Breath     Numbness in mouth and throat  Numbness in mouth and throat    Sumatriptan Shortness Of Breath and Nausea Only     Other reaction(s): Nausea Only, Other (See Comments)  Worsening migraine  Worsening migraine    Amoxicillin Rash, Hives and Itching       Review of Systems   Musculoskeletal:  Positive for arthralgias (left elbow).       Objective   Temp 98.2 °F (36.8 °C)   Ht 160 cm (62.99\")   Wt 86.5 kg (190 lb 12.8 oz)   BMI 33.81 kg/m²   Physical Exam  Vitals and nursing note reviewed.   Constitutional:       Appearance: Normal appearance.   Pulmonary:      Effort: Pulmonary effort is normal.   Musculoskeletal:      Left elbow: No deformity. Normal range of motion. Tenderness present in radial head and medial " epicondyle.      Comments: Neg. Hook test Left elbow   Neurological:      Mental Status: She is alert and oriented to person, place, and time.       Ortho Exam    Neurologic Exam     Mental Status   Oriented to person, place, and time.            Assessment & Plan   Independent Review of Radiographic Studies:    AP, oblique and lateral of the left elbow, indication to evaluate pain, no prior comparison views or not available, shows no acute fracture or dislocation evident.      Procedures  [x] No procedures were performed in office today.    Diagnoses and all orders for this visit:    1. Left elbow tendinitis (Primary)  -     Diclofenac Sodium (VOLTAREN) 1 % gel gel; Apply 4 g topically to the appropriate area as directed 3 (Three) Times a Day.  Dispense: 100 g; Refill: 1       Orthopedic activities reviewed and patient expressed appreciation  Regular exercise as tolerated  Risk, benefits, and merits of treatment alternatives reviewed with the patient and questions answered    Recommendations/Plan:  Exercise, medications, injections, other patient advice, and return appointment as noted.  Patient is encouraged to call or return for any issues or concerns.  Patient cannot have MRI due to gastric stimulator.  Follow up PRN  If no improvement with brace, ice , heat and topical voltaren gel, she will need to enroll in formal PT    Patient agreeable to call or return sooner for any concerns.    BMI is >= 30 and <35. (Class 1 Obesity). The following options were offered after discussion;: weight loss educational material (shared in after visit summary)   Healthy Eating, Adult  Healthy eating may help you get and keep a healthy body weight, reduce the risk of chronic disease, and live a long and productive life. It is important to follow a healthy eating pattern. Your nutritional and calorie needs should be met mainly by different nutrient-rich foods.  What are tips for following this plan?  Reading food labels  Read  "labels and choose the following:  Reduced or low sodium products.  Juices with 100% fruit juice.  Foods with low saturated fats (<3 g per serving) and high polyunsaturated and monounsaturated fats.  Foods with whole grains, such as whole wheat, cracked wheat, brown rice, and wild rice.  Whole grains that are fortified with folic acid. This is recommended for females who are pregnant or who want to become pregnant.  Read labels and do not eat or drink the following:  Foods or drinks with added sugars. These include foods that contain brown sugar, corn sweetener, corn syrup, dextrose, fructose, glucose, high-fructose corn syrup, honey, invert sugar, lactose, malt syrup, maltose, molasses, raw sugar, sucrose, trehalose, or turbinado sugar.  Limit your intake of added sugars to less than 10% of your total daily calories. Do not eat more than the following amounts of added sugar per day:  6 teaspoons (25 g) for females.  9 teaspoons (38 g) for males.  Foods that contain processed or refined starches and grains.  Refined grain products, such as white flour, degermed cornmeal, white bread, and white rice.  Shopping  Choose nutrient-rich snacks, such as vegetables, whole fruits, and nuts. Avoid high-calorie and high-sugar snacks, such as potato chips, fruit snacks, and candy.  Use oil-based dressings and spreads on foods instead of solid fats such as butter, margarine, sour cream, or cream cheese.  Limit pre-made sauces, mixes, and \"instant\" products such as flavored rice, instant noodles, and ready-made pasta.  Try more plant-protein sources, such as tofu, tempeh, black beans, edamame, lentils, nuts, and seeds.  Explore eating plans such as the Mediterranean diet or vegetarian diet.  Try heart-healthy dips made with beans and healthy fats like hummus and guacamole. Vegetables go great with these.  Cooking  Use oil to sauté or stir-haji foods instead of solid fats such as butter, margarine, or lard.  Try baking, boiling, " grilling, or broiling instead of frying.  Remove the fatty part of meats before cooking.  Steam vegetables in water or broth.  Meal planning    At meals, imagine dividing your plate into fourths:  One-half of your plate is fruits and vegetables.  One-fourth of your plate is whole grains.  One-fourth of your plate is protein, especially lean meats, poultry, eggs, tofu, beans, or nuts.  Include low-fat dairy as part of your daily diet.  Lifestyle  Choose healthy options in all settings, including home, work, school, restaurants, or stores.  Prepare your food safely:  Wash your hands after handling raw meats.  Where you prepare food, keep surfaces clean by regularly washing with hot, soapy water.  Keep raw meats separate from ready-to-eat foods, such as fruits and vegetables.  , meat, poultry, and eggs to the recommended temperature. Get a food thermometer.  Store foods at safe temperatures. In general:  Keep cold foods at 40°F (4.4°C) or below.  Keep hot foods at 140°F (60°C) or above.  Keep your freezer at 0°F (-17.8°C) or below.  Foods are not safe to eat if they have been between the temperatures of °F (4.4-60°C) for more than 2 hours.  What foods should I eat?  Fruits  Aim to eat 1½-2½ cups of fresh, canned (in natural juice), or frozen fruits each day. One cup of fruit equals 1 small apple, 1 large banana, 8 large strawberries, 1 cup (237 g) canned fruit, ½ cup (82 g) dried fruit, or 1 cup (240 mL) 100% juice.  Vegetables  Aim to eat 2-4 cups of fresh and frozen vegetables each day, including different varieties and colors. One cup of vegetables equals 1 cup (91 g) broccoli or cauliflower florets, 2 medium carrots, 2 cups (150 g) raw, leafy greens, 1 large tomato, 1 large richardson pepper, 1 large sweet potato, or 1 medium white potato.  Grains  Aim to eat 5-10 ounce-equivalents of whole grains each day. Examples of 1 ounce-equivalent of grains include 1 slice of bread, 1 cup (40 g) ready-to-eat  cereal, 3 cups (24 g) popcorn, or ½ cup (93 g) cooked rice.  Meats and other proteins  Try to eat 5-7 ounce-equivalents of protein each day. Examples of 1 ounce-equivalent of protein include 1 egg, ½ oz nuts (12 almonds, 24 pistachios, or 7 walnut halves), 1/4 cup (90 g) cooked beans, 6 tablespoons (90 g) hummus or 1 tablespoon (16 g) peanut butter. A cut of meat or fish that is the size of a deck of cards is about 3-4 ounce-equivalents (85 g).  Of the protein you eat each week, try to have at least 8 sounce (227 g) of seafood. This is about 2 servings per week. This includes salmon, trout, herring, sardines, and anchovies.  Dairy  Aim to eat 3 cup-equivalents of fat-free or low-fat dairy each day. Examples of 1 cup-equivalent of dairy include 1 cup (240 mL) milk, 8 ounces (250 g) yogurt, 1½ ounces (44 g) natural cheese, or 1 cup (240 mL) fortified soy milk.  Fats and oils  Aim for about 5 teaspoons (21 g) of fats and oils per day. Choose monounsaturated fats, such as canola and olive oils, mayonnaise made with olive oil or avocado oil, avocados, peanut butter, and most nuts, or polyunsaturated fats, such as sunflower, corn, and soybean oils, walnuts, pine nuts, sesame seeds, sunflower seeds, and flaxseed.  Beverages  Aim for 6 eight-ounce glasses of water per day. Limit coffee to 3-5 eight-ounce cups per day.  Limit caffeinated beverages that have added calories, such as soda and energy drinks.  If you drink alcohol:  Limit how much you have to:  0-1 drink a day if you are female.  0-2 drinks a day if you are male.  Know how much alcohol is in your drink. In the [place] one drink is one 12 oz bottle of beer (355 mL), one 5 oz glass of wine (148 mL), or one 1½ oz glass of hard liquor (44 mL).  Seasoning and other foods  Try not to add too much salt to your food. Try using herbs and spices instead of salt.  Try not to add sugar to food.  This information is based on [place] nutrition guidelines. To learn more, visit  choosemyplate.gov. Exact amounts may vary. You may need different amounts.  This information is not intended to replace advice given to you by your health care provider. Make sure you discuss any questions you have with your health care provider.  Document Revised: 09/18/2023 Document Reviewed: 09/18/2023  Elsevier Patient Education © 2023 [employer]

## 2024-01-29 NOTE — TELEPHONE ENCOUNTER
Attempted to call patient with holter monitor results, no answer- VM left. Her holter monitor is consistent with tachycardic episodes and a maximum heart rate of 148. Diary shows her logging symptoms during tachycardia. I have sent this for a STAT scan and have advised her to make an appointment with EP as she is an established patient with known inappropriate sinus tachycardia on medication therapy, symptoms had previously been controlled. This is likely contributing to her fatigue.    [Lower back] : lower back [Left Leg] : left leg [Right Leg] : right leg [7] : 7 [Constant] : constant [Household chores] : household chores [Sleep] : sleep [Rest] : rest [Meds] : meds [Heat] : heat [Disabled] : Work status: disabled [] : Post Surgical Visit: no [FreeTextEntry6] : PIN AND NEDDLE, BOWEL ISSUE  [FreeTextEntry7] : [facility] SHOULDER  [de-identified] : FELISHA, REACHING OVER HEAD HT [de-identified] : 01/15/2023 [de-identified] : PATIENT IS PRESENTING WITH ACUTE/SUB-ACUTE RADICULAR PAIN WITH IMPAIRMENT IN ADLs AND FUNCTIONALITY. THE PATIENT HAS FAILED  TO CONSERVATIVE CARE INCLUDING NSAID THERAPY AND/OR PHYSICAL THERAPY 2-3X A WEEK FOR 6 WEEK.

## 2024-01-31 ENCOUNTER — OFFICE VISIT (OUTPATIENT)
Dept: NEUROLOGY | Facility: CLINIC | Age: 34
End: 2024-01-31
Payer: COMMERCIAL

## 2024-01-31 VITALS
OXYGEN SATURATION: 98 % | TEMPERATURE: 98.6 F | DIASTOLIC BLOOD PRESSURE: 72 MMHG | WEIGHT: 191.6 LBS | RESPIRATION RATE: 14 BRPM | HEIGHT: 63 IN | SYSTOLIC BLOOD PRESSURE: 126 MMHG | BODY MASS INDEX: 33.95 KG/M2 | HEART RATE: 90 BPM

## 2024-01-31 DIAGNOSIS — R42 DIZZINESS: ICD-10-CM

## 2024-01-31 DIAGNOSIS — M79.602 PAIN OF LEFT UPPER EXTREMITY: ICD-10-CM

## 2024-01-31 DIAGNOSIS — G43.909 ACUTE MIGRAINE: ICD-10-CM

## 2024-01-31 DIAGNOSIS — G43.711 INTRACTABLE CHRONIC MIGRAINE WITHOUT AURA AND WITH STATUS MIGRAINOSUS: Primary | ICD-10-CM

## 2024-01-31 PROCEDURE — 1160F RVW MEDS BY RX/DR IN RCRD: CPT | Performed by: NURSE PRACTITIONER

## 2024-01-31 PROCEDURE — 1159F MED LIST DOCD IN RCRD: CPT | Performed by: NURSE PRACTITIONER

## 2024-01-31 PROCEDURE — 99214 OFFICE O/P EST MOD 30 MIN: CPT | Performed by: NURSE PRACTITIONER

## 2024-01-31 PROCEDURE — 3078F DIAST BP <80 MM HG: CPT | Performed by: NURSE PRACTITIONER

## 2024-01-31 PROCEDURE — 3074F SYST BP LT 130 MM HG: CPT | Performed by: NURSE PRACTITIONER

## 2024-01-31 RX ORDER — CYCLOBENZAPRINE HCL 10 MG
10 TABLET ORAL DAILY PRN
Qty: 90 TABLET | Refills: 0 | Status: SHIPPED | OUTPATIENT
Start: 2024-01-31

## 2024-01-31 RX ORDER — RIMEGEPANT SULFATE 75 MG/75MG
75 TABLET, ORALLY DISINTEGRATING ORAL EVERY OTHER DAY
Qty: 16 TABLET | Refills: 3 | Status: SHIPPED | OUTPATIENT
Start: 2024-01-31

## 2024-01-31 NOTE — PROGRESS NOTES
Follow Up Office Visit      Patient Name: Marilu Godoy  : 1990   MRN: [id number]     Chief Complaint:    Chief Complaint   Patient presents with    Follow-up     Migraine; pt reports 5/30 HA days in the past month.       History of Present Illness: Marilu Godoy is a 33 y.o. female who is here today to follow up with Neurology for Migraine HA and Dizziness. She was last seen in clinic  ([facility]). She reports good tolerance and compliance with with Aimovig 140 mg Monthly injections for prevention and Nurtec 75 mg PRN as abortive. Dizziness has resolved. Last vision exam < 1 year ago. She started Immunotherapy and feels this was a migraines trigger. Tried and Failed: Ajovy, Qulipta, Nurtec PRN and QOD, Imitrex, Maxalt, Propranolol, Topamax, Tramadol, Zonegran, Fioricet. She describes her migraine as a dull pounding to center of forehead and will radiate toward back of head. She will have aura with spots and floaters before it starts. + Light and sound sensitivity and N/V. She likes to sleep them off in a cold dark room. Migraines will last 2-24 hours.     Requesting Flexeril refill for cervical neck strain and LUE shoulder strain. Using PRN.     Recent Imaging:      CT Head W/WO  -noncontributory  CTA Neck 10/2022 -noncontributory  CTA Head 10/2022- noncontributory  CT head WO 10/2022 -noncontributory     Pertinent Medical History: Factor V, Factor VIII, Protein S Deficiency, PE with Eliquis use, HL, Obesity, Gastroparesis, Hyperthyroidism, chronic Pain, AMBAR, HTN    Subjective      Review of Systems:   Review of Systems   Constitutional: Negative.    HENT: Negative.     Eyes: Negative.    Respiratory: Negative.     Cardiovascular: Negative.    Gastrointestinal: Negative.    Endocrine: Negative.    Genitourinary: Negative.    Musculoskeletal: Negative.    Skin: Negative.    Allergic/Immunologic: Negative.    Neurological:  Positive for headache.   Hematological: Negative.     Psychiatric/Behavioral: Negative.     All other systems reviewed and are negative.      I have reviewed and the following portions of the patient's history were updated as appropriate: past family history, past medical history, past social history, past surgical history and problem list.    Medications:     Current Outpatient Medications:     albuterol sulfate  (90 Base) MCG/ACT inhaler, Inhale 2 puffs Every 4 (Four) Hours As Needed for Wheezing or Shortness of Air., Disp: 18 g, Rfl: 11    apixaban (ELIQUIS) 5 MG tablet tablet, Take 1 tablet by mouth 2 (Two) Times a Day., Disp: 90 tablet, Rfl: 3    aspirin 81 MG EC tablet, Take 1 tablet by mouth Daily., Disp: , Rfl:     atorvastatin (LIPITOR) 40 MG tablet, Take 1 tablet by mouth Every Night., Disp: 90 tablet, Rfl: 3    azelastine (ASTELIN) 0.1 % nasal spray, 1 spray into the nostril(s) as directed by provider 2 (Two) Times a Day. Use in each nostril as directed, Disp: 1 each, Rfl: 10    B-D ULTRA-FINE 33 LANCETS misc, Check sugars as needed/directed for hypoglycemia., Disp: 100 each, Rfl: 12    Blood Glucose Monitoring Suppl (ONE TOUCH ULTRA 2) w/Device kit, use as needed to check blood sugar for hypoglycemia (diabetes), Disp: , Rfl:     calcium carbonate EX (TUMS EX) 750 MG chewable tablet, Chew 1 tablet Daily. (Patient taking differently: Chew 1 tablet Daily As Needed.), Disp: 30 tablet, Rfl: 1    cholecalciferol (VITAMIN D3) 1.25 MG (26961 UT) capsule, Take 1 capsule by mouth Every 7 (Seven) Days., Disp: , Rfl:     clotrimazole-betamethasone (Lotrisone) 1-0.05 % cream, Apply 1 application  topically to the appropriate area as directed 2 (Two) Times a Day., Disp: 15 g, Rfl: 0    Corlanor 5 MG tablet tablet, Take 1.5 tablets by mouth 2 (two) times a day., Disp: 90 tablet, Rfl: 6    cyclobenzaprine (FLEXERIL) 10 MG tablet, Take 1 tablet by mouth Daily As Needed for Muscle Spasms., Disp: 90 tablet, Rfl: 0    Diclofenac Sodium (VOLTAREN) 1 % gel gel, Apply 4  g topically to the appropriate area as directed 4 (Four) Times a Day As Needed (joint pain)., Disp: 150 g, Rfl: 1    Diclofenac Sodium (VOLTAREN) 1 % gel gel, Apply 4 g topically to the appropriate area as directed 3 (Three) Times a Day., Disp: 100 g, Rfl: 1    DULoxetine (CYMBALTA) 60 MG capsule, Take 1 capsule by mouth Daily., Disp: 90 capsule, Rfl: 3    EPINEPHrine (EPIPEN) 0.3 MG/0.3ML solution auto-injector injection, Inject  into the appropriate muscle as directed by prescriber See Admin Instructions. inject syringe as directed for allergic reaction, Disp: , Rfl:     Erenumab-aooe (AIMOVIG) 140 MG/ML auto-injector, Inject 1 mL under the skin into the appropriate area as directed Every 30 (Thirty) Days., Disp: 1 mL, Rfl: 3    glucose blood test strip, USE TO CHECK SUGARS DAILY DUE TO hypoglycemia, Disp: 100 each, Rfl: 12    glucose monitor monitoring kit, 1 each As Needed (diabetes). Check sugars as needed/directed for hypoglycemia., Disp: 1 each, Rfl: 0    l-methylfolate 15 MG tablet tablet, Take 1 tablet by mouth Daily. Indications: folic acid deficiency, Disp: 90 tablet, Rfl: 3    lidocaine (LIDODERM) 5 %, Place 2 patches on the skin as directed by provider Daily. Remove & Discard patch within 12 hours or as directed by MD, Disp: 60 patch, Rfl: 3    methocarbamol (ROBAXIN) 750 MG tablet, Take 1 tablet by mouth At Night As Needed for Muscle Spasms., Disp: 14 tablet, Rfl: 0    mometasone (NASONEX) 50 MCG/ACT nasal spray, 2 sprays into the nostril(s) as directed by provider Daily., Disp: 3 each, Rfl: 3    mometasone-formoterol (Dulera) 200-5 MCG/ACT inhaler, Inhale 2 puffs 2 (Two) Times a Day., Disp: 13 g, Rfl: 11    mupirocin (Bactroban) 2 % cream, Apply 1 application topically to the appropriate area as directed 3 (Three) Times a Day., Disp: 30 g, Rfl: 1    pantoprazole (PROTONIX) 40 MG EC tablet, Take 1 tablet by mouth Daily., Disp: 90 tablet, Rfl: 3    Rimegepant Sulfate (Nurtec) 75 MG tablet dispersible  "tablet, Take 1 tablet by mouth Every Other Day., Disp: 16 tablet, Rfl: 3    tiZANidine (ZANAFLEX) 4 MG tablet, Take 1 tablet 3 times a day by oral route as directed for 30 days., Disp: , Rfl:     Vitamin D, Ergocalciferol, 87498 units capsule, Take 1 capsule by mouth 1 (One) Time Per Week., Disp: 5 capsule, Rfl: 11    fluticasone (FLONASE) 50 MCG/ACT nasal spray, 2 sprays into the nostril(s) as directed by provider Daily for 7 days., Disp: 9.9 mL, Rfl: 0    Allergies:   Allergies   Allergen Reactions    Ceftin [Cefuroxime Axetil] Shortness Of Breath and Rash     Numbness in mouth and throat    Cefuroxime Hives, Rash and Shortness Of Breath     Numbness in mouth and throat  Numbness in mouth and throat    Sumatriptan Shortness Of Breath and Nausea Only     Other reaction(s): Nausea Only, Other (See Comments)  Worsening migraine  Worsening migraine    Amoxicillin Rash, Hives and Itching       Objective     Physical Exam:  Vital Signs:   Vitals:    01/31/24 0931   BP: 126/72   BP Location: Right arm   Patient Position: Sitting   Cuff Size: Adult   Pulse: 90   Resp: 14   Temp: 98.6 °F (37 °C)   TempSrc: Infrared   SpO2: 98%   Weight: 86.9 kg (191 lb 9.6 oz)   Height: 160 cm (62.99\")   PainSc:   5   PainLoc: Head     Body mass index is 33.95 kg/m².    Physical Exam  Vitals and nursing note reviewed.   Constitutional:       General: She is not in acute distress.     Appearance: Normal appearance. She is normal weight.   HENT:      Head: Normocephalic.      Nose: Nose normal.      Mouth/Throat:      Mouth: Mucous membranes are moist.      Pharynx: Oropharynx is clear.   Eyes:      Extraocular Movements: Extraocular movements intact.      Conjunctiva/sclera: Conjunctivae normal.   Musculoskeletal:         General: Normal range of motion.      Cervical back: Normal range of motion and neck supple. No rigidity.   Skin:     General: Skin is warm and dry.      Capillary Refill: Capillary refill takes less than 2 seconds. "   Neurological:      General: No focal deficit present.      Mental Status: She is alert and oriented to person, place, and time.   Psychiatric:         Mood and Affect: Mood normal.         Behavior: Behavior normal.         Thought Content: Thought content normal.         Judgment: Judgment normal.         Neurologic Exam     Mental Status   Oriented to person, place, and time.        Assessment / Plan      Assessment/Plan:   Diagnoses and all orders for this visit:    1. Intractable chronic migraine without aura and with status migrainosus (Primary)  -     Erenumab-aooe (AIMOVIG) 140 MG/ML auto-injector; Inject 1 mL under the skin into the appropriate area as directed Every 30 (Thirty) Days.  Dispense: 1 mL; Refill: 3  -     Rimegepant Sulfate (Nurtec) 75 MG tablet dispersible tablet; Take 1 tablet by mouth Every Other Day.  Dispense: 16 tablet; Refill: 3    2. Acute migraine  -     Rimegepant Sulfate (Nurtec) 75 MG tablet dispersible tablet; Take 1 tablet by mouth Every Other Day.  Dispense: 16 tablet; Refill: 3    3. Dizziness    4. Pain of left upper extremity  -     cyclobenzaprine (FLEXERIL) 10 MG tablet; Take 1 tablet by mouth Daily As Needed for Muscle Spasms.  Dispense: 90 tablet; Refill: 0         Follow Up:   Return in about 3 months (around 4/30/2024), or if symptoms worsen or fail to improve.    Anticipatory guidance and safety reviewed   Patient education provided   Continue Aimovig 140 mg monthly #3; side effects reviewed  Continue Nurtec 75 mg PRN #10; SE reviewed. Samples provided.   Continue Cyclobenzaprine 10 mg PRN; SE reviewed      RTC PRN or within 12 weeks or sooner with issues     Barby Guzman, DNP, APRN, FNP-C  [facility]

## 2024-02-01 DIAGNOSIS — R20.2 PARESTHESIA OF LEFT ARM: ICD-10-CM

## 2024-02-01 DIAGNOSIS — M25.522 ARTHRALGIA OF ELBOW, LEFT: Primary | ICD-10-CM

## 2024-03-06 ENCOUNTER — TELEPHONE (OUTPATIENT)
Dept: CARDIOLOGY | Facility: CLINIC | Age: 34
End: 2024-03-06
Payer: COMMERCIAL

## 2024-03-06 NOTE — TELEPHONE ENCOUNTER
"----- Message from Airam Meyer MA sent at 3/6/2024 11:31 AM EST -----  Regarding: FW: Heart rate  Contact: [phone]  Please advise  ----- Message -----  From: Marilu Godoy \"CYNTHIA\"  Sent: 3/5/2024   3:53 PM EST  To: [facility]  Subject: Heart rate                                       My heart rate has been jumping up again at rest. And I've noticed a few times it feels like my heart is skipping beats and it feels like it catches my breathe when it happens.  "

## 2024-03-06 NOTE — TELEPHONE ENCOUNTER
She has had an extensive workup, but I understand that her insurance also denied some tests that Dr. Byrnes ordered last year.  Please schedule her a follow-up appointment with him.

## 2024-03-21 ENCOUNTER — OFFICE VISIT (OUTPATIENT)
Dept: CARDIOLOGY | Facility: CLINIC | Age: 34
End: 2024-03-21
Payer: COMMERCIAL

## 2024-03-21 ENCOUNTER — OFFICE VISIT (OUTPATIENT)
Dept: INTERNAL MEDICINE | Facility: CLINIC | Age: 34
End: 2024-03-21
Payer: COMMERCIAL

## 2024-03-21 VITALS
RESPIRATION RATE: 16 BRPM | SYSTOLIC BLOOD PRESSURE: 120 MMHG | BODY MASS INDEX: 36.07 KG/M2 | HEART RATE: 96 BPM | WEIGHT: 196 LBS | HEIGHT: 62 IN | OXYGEN SATURATION: 100 % | DIASTOLIC BLOOD PRESSURE: 84 MMHG

## 2024-03-21 VITALS
HEIGHT: 62 IN | DIASTOLIC BLOOD PRESSURE: 68 MMHG | HEART RATE: 86 BPM | BODY MASS INDEX: 35.7 KG/M2 | OXYGEN SATURATION: 100 % | WEIGHT: 194 LBS | SYSTOLIC BLOOD PRESSURE: 108 MMHG

## 2024-03-21 DIAGNOSIS — N76.0 BACTERIAL VAGINOSIS: ICD-10-CM

## 2024-03-21 DIAGNOSIS — B96.89 BACTERIAL VAGINOSIS: ICD-10-CM

## 2024-03-21 DIAGNOSIS — I25.10 CORONARY ARTERY DISEASE INVOLVING NATIVE CORONARY ARTERY OF NATIVE HEART WITHOUT ANGINA PECTORIS: ICD-10-CM

## 2024-03-21 DIAGNOSIS — Z79.01 CHRONIC ANTICOAGULATION: ICD-10-CM

## 2024-03-21 DIAGNOSIS — Z86.711 HISTORY OF PULMONARY EMBOLUS (PE): ICD-10-CM

## 2024-03-21 DIAGNOSIS — B37.9 CANDIDIASIS: ICD-10-CM

## 2024-03-21 DIAGNOSIS — I47.11 INAPPROPRIATE SINUS TACHYCARDIA: ICD-10-CM

## 2024-03-21 DIAGNOSIS — E78.5 DYSLIPIDEMIA: ICD-10-CM

## 2024-03-21 DIAGNOSIS — R00.2 PALPITATIONS: Primary | ICD-10-CM

## 2024-03-21 DIAGNOSIS — D68.59 PROTEIN S DEFICIENCY: ICD-10-CM

## 2024-03-21 DIAGNOSIS — I10 PRIMARY HYPERTENSION: ICD-10-CM

## 2024-03-21 DIAGNOSIS — R80.9 PROTEINURIA, UNSPECIFIED TYPE: ICD-10-CM

## 2024-03-21 DIAGNOSIS — D68.51 HETEROZYGOUS FACTOR V LEIDEN MUTATION: ICD-10-CM

## 2024-03-21 DIAGNOSIS — N89.8 VAGINAL ITCHING: Primary | ICD-10-CM

## 2024-03-21 LAB
BILIRUB BLD-MCNC: NEGATIVE MG/DL
CLARITY, POC: ABNORMAL
COLOR UR: ABNORMAL
EXPIRATION DATE: ABNORMAL
GLUCOSE UR STRIP-MCNC: NEGATIVE MG/DL
KETONES UR QL: NEGATIVE
LEUKOCYTE EST, POC: ABNORMAL
Lab: ABNORMAL
NITRITE UR-MCNC: NEGATIVE MG/ML
PH UR: 6 [PH] (ref 5–8)
PROT UR STRIP-MCNC: ABNORMAL MG/DL
RBC # UR STRIP: NEGATIVE /UL
SP GR UR: 1.02 (ref 1–1.03)
UROBILINOGEN UR QL: NORMAL

## 2024-03-21 PROCEDURE — 3074F SYST BP LT 130 MM HG: CPT | Performed by: STUDENT IN AN ORGANIZED HEALTH CARE EDUCATION/TRAINING PROGRAM

## 2024-03-21 PROCEDURE — 3079F DIAST BP 80-89 MM HG: CPT | Performed by: STUDENT IN AN ORGANIZED HEALTH CARE EDUCATION/TRAINING PROGRAM

## 2024-03-21 RX ORDER — FLUCONAZOLE 150 MG/1
TABLET ORAL
Qty: 2 TABLET | Refills: 0 | Status: SHIPPED | OUTPATIENT
Start: 2024-03-21

## 2024-03-21 RX ORDER — METRONIDAZOLE 500 MG/1
500 TABLET ORAL 2 TIMES DAILY
Qty: 14 TABLET | Refills: 0 | Status: SHIPPED | OUTPATIENT
Start: 2024-03-21 | End: 2024-03-28

## 2024-03-21 NOTE — ASSESSMENT & PLAN NOTE
Advised proper hygiene, wipe from front to back  Call clinic if with alarming symptoms such as pelvic pain and fever

## 2024-03-21 NOTE — PROGRESS NOTES
"    Office Note     Name: Marilu Godoy    : 1990     MRN: [id number]     Chief Complaint  Vaginal Itching (Off and on for a while)    Subjective     History of Present Illness:  Marilu Godoy is a 33 y.o. female who presents today for vaginal itching. Hx of BV and candidiasis s/p antibiotics and difucan (1-2 months ago). No vaginal discharge, pain, irregular bleeding. No fever, no chills.   Occasional dyspareunia    Family History:   Family History   Problem Relation Age of Onset    Arthritis Mother     COPD Mother     Asthma Mother     Thyroid disease Mother     Arthritis Father     Diabetes Father     Hypertension Father     Hyperlipidemia Father     Kidney disease Father     Heart attack Father     Coronary artery disease Father     Dementia Father     No Known Problems Son     Colon cancer Neg Hx     Liver cancer Neg Hx     Liver disease Neg Hx     Stomach cancer Neg Hx     Esophageal cancer Neg Hx        Social History:   Social History     Socioeconomic History    Marital status:    Tobacco Use    Smoking status: Never     Passive exposure: Past    Smokeless tobacco: Never   Vaping Use    Vaping status: Never Used   Substance and Sexual Activity    Alcohol use: No    Drug use: Never    Sexual activity: Yes     Partners: Male     Birth control/protection: Surgical     Comment: Tubal       Health Maintenance   Topic Date Due    ANNUAL PHYSICAL  2023    LIPID PANEL  2023    INFLUENZA VACCINE  2024 (Originally 2023)    COVID-19 Vaccine (2023- season) 2025 (Originally 2023)    PAP SMEAR  2024    BMI FOLLOWUP  2025    TDAP/TD VACCINES (4 - Td or Tdap) 2032    HEPATITIS C SCREENING  Completed    Pneumococcal Vaccine 0-64  Completed       Objective     Vital Signs  /84   Pulse 96   Resp 16   Ht 157.5 cm (62.01\")   Wt 88.9 kg (196 lb)   SpO2 100%   BMI 35.84 kg/m²   Estimated body mass index is 35.84 kg/m² as calculated " "from the following:    Height as of this encounter: 157.5 cm (62.01\").    Weight as of this encounter: 88.9 kg (196 lb).        Physical Exam  Vitals and nursing note reviewed. Exam conducted with a chaperone present.   Abdominal:      General: There is no distension.      Tenderness: There is no abdominal tenderness.   Genitourinary:     Comments: No pelvic pain, no CVA tenderness, erythematous labia minora, + vaginal discharge whitish to grayish         Procedures     Assessment and Plan     Diagnoses and all orders for this visit:    1. Vaginal itching (Primary)  Assessment & Plan:  Advised proper hygiene, wipe from front to back  Call clinic if with alarming symptoms such as pelvic pain and fever    Orders:  -     fluconazole (Diflucan) 150 MG tablet; Once daily for 2 days  Dispense: 2 tablet; Refill: 0  -     NuSwab VG+ & HSV; Future  -     POC Urinalysis Dipstick, Automated    2. Candidiasis  -     fluconazole (Diflucan) 150 MG tablet; Once daily for 2 days  Dispense: 2 tablet; Refill: 0    3. Bacterial vaginosis  -     metroNIDAZOLE (Flagyl) 500 MG tablet; Take 1 tablet by mouth 2 (Two) Times a Day for 7 days.  Dispense: 14 tablet; Refill: 0    4. Proteinuria, unspecified type  -     Urinalysis With Microscopic - Urine, Clean Catch; Future         Counseling was given to patient for the following topics: instructions for management, risks and benefits of treatment options, and importance of treatment compliance.    Follow Up  No follow-ups on file.    MD KANE Thompson [facility]  [address] [place] [id number] [phone]  "

## 2024-03-21 NOTE — PROGRESS NOTES
Subjective:     Encounter Date:03/21/2024      Patient ID: Marilu Godoy is a 33 y.o. female.    Chief Complaint: Palpitations  HPI  The patient reports increasingly frequent palpitations.  The patient indicates that over the last few months her palpitations have increased to near daily.  The duration is variable.  They occur both at rest and with activity.  There is associated dizziness but no syncope.  The patient's last outpatient cardiac monitor was a year ago showing no abnormalities.  She reports compliance with her medications with no perceived side effects.  The following portions of the patient's history were reviewed and updated as appropriate: allergies, current medications, past family history, past medical history, past social history, past surgical history and problem  Review of Systems   Constitutional: Negative for chills, diaphoresis, fever, malaise/fatigue, weight gain and weight loss.   HENT:  Negative for ear discharge, hearing loss, hoarse voice and nosebleeds.    Eyes:  Negative for discharge, double vision, pain and photophobia.   Cardiovascular:  Positive for palpitations. Negative for chest pain, claudication, cyanosis, dyspnea on exertion, irregular heartbeat, leg swelling, near-syncope, orthopnea, paroxysmal nocturnal dyspnea and syncope.   Respiratory:  Negative for cough, hemoptysis, sputum production and wheezing.    Endocrine: Negative for cold intolerance, heat intolerance, polydipsia, polyphagia and polyuria.   Hematologic/Lymphatic: Negative for adenopathy and bleeding problem. Does not bruise/bleed easily.   Skin:  Negative for color change, flushing, itching and rash.   Musculoskeletal:  Negative for muscle cramps, muscle weakness, myalgias and stiffness.   Gastrointestinal:  Negative for abdominal pain, diarrhea, hematemesis, hematochezia, nausea and vomiting.   Genitourinary:  Negative for dysuria, frequency and nocturia.   Neurological:  Negative for focal weakness,  loss of balance, numbness, paresthesias and seizures.   Psychiatric/Behavioral:  Negative for altered mental status, hallucinations and suicidal ideas.    Allergic/Immunologic: Negative for HIV exposure, hives and persistent infections.           Current Outpatient Medications:     albuterol sulfate  (90 Base) MCG/ACT inhaler, Inhale 2 puffs Every 4 (Four) Hours As Needed for Wheezing or Shortness of Air., Disp: 18 g, Rfl: 11    apixaban (ELIQUIS) 5 MG tablet tablet, Take 1 tablet by mouth 2 (Two) Times a Day., Disp: 90 tablet, Rfl: 3    aspirin 81 MG EC tablet, Take 1 tablet by mouth Daily., Disp: , Rfl:     atorvastatin (LIPITOR) 40 MG tablet, Take 1 tablet by mouth Every Night., Disp: 90 tablet, Rfl: 3    B-D ULTRA-FINE 33 LANCETS misc, Check sugars as needed/directed for hypoglycemia., Disp: 100 each, Rfl: 12    Blood Glucose Monitoring Suppl (ONE TOUCH ULTRA 2) w/Device kit, use as needed to check blood sugar for hypoglycemia (diabetes), Disp: , Rfl:     calcium carbonate EX (TUMS EX) 750 MG chewable tablet, Chew 1 tablet Daily. (Patient taking differently: Chew 1 tablet Daily As Needed.), Disp: 30 tablet, Rfl: 1    cholecalciferol (VITAMIN D3) 1.25 MG (69854 UT) capsule, Take 1 capsule by mouth Every 7 (Seven) Days., Disp: , Rfl:     clotrimazole-betamethasone (Lotrisone) 1-0.05 % cream, Apply 1 application  topically to the appropriate area as directed 2 (Two) Times a Day., Disp: 15 g, Rfl: 0    Corlanor 5 MG tablet tablet, Take 1.5 tablets by mouth 2 (two) times a day., Disp: 90 tablet, Rfl: 6    cyclobenzaprine (FLEXERIL) 10 MG tablet, Take 1 tablet by mouth Daily As Needed for Muscle Spasms., Disp: 90 tablet, Rfl: 0    Diclofenac Sodium (VOLTAREN) 1 % gel gel, Apply 4 g topically to the appropriate area as directed 4 (Four) Times a Day As Needed (joint pain)., Disp: 150 g, Rfl: 1    DULoxetine (CYMBALTA) 60 MG capsule, Take 1 capsule by mouth Daily., Disp: 90 capsule, Rfl: 3    EPINEPHrine (EPIPEN)  0.3 MG/0.3ML solution auto-injector injection, Inject  into the appropriate muscle as directed by prescriber See Admin Instructions. inject syringe as directed for allergic reaction, Disp: , Rfl:     Erenumab-aooe (AIMOVIG) 140 MG/ML auto-injector, Inject 1 mL under the skin into the appropriate area as directed Every 30 (Thirty) Days., Disp: 1 mL, Rfl: 3    glucose blood test strip, USE TO CHECK SUGARS DAILY DUE TO hypoglycemia, Disp: 100 each, Rfl: 12    glucose monitor monitoring kit, 1 each As Needed (diabetes). Check sugars as needed/directed for hypoglycemia., Disp: 1 each, Rfl: 0    lidocaine (LIDODERM) 5 %, Place 2 patches on the skin as directed by provider Daily. Remove & Discard patch within 12 hours or as directed by MD, Disp: 60 patch, Rfl: 3    methocarbamol (ROBAXIN) 750 MG tablet, Take 1 tablet by mouth At Night As Needed for Muscle Spasms., Disp: 14 tablet, Rfl: 0    mometasone (NASONEX) 50 MCG/ACT nasal spray, 2 sprays into the nostril(s) as directed by provider Daily., Disp: 3 each, Rfl: 3    mometasone-formoterol (Dulera) 200-5 MCG/ACT inhaler, Inhale 2 puffs 2 (Two) Times a Day., Disp: 13 g, Rfl: 11    mupirocin (Bactroban) 2 % cream, Apply 1 application topically to the appropriate area as directed 3 (Three) Times a Day., Disp: 30 g, Rfl: 1    pantoprazole (PROTONIX) 40 MG EC tablet, Take 1 tablet by mouth Daily., Disp: 90 tablet, Rfl: 3    Rimegepant Sulfate (Nurtec) 75 MG tablet dispersible tablet, Take 1 tablet by mouth Every Other Day., Disp: 16 tablet, Rfl: 3    tiZANidine (ZANAFLEX) 4 MG tablet, Take 1 tablet 3 times a day by oral route as directed for 30 days., Disp: , Rfl:     Vitamin D, Ergocalciferol, 30794 units capsule, Take 1 capsule by mouth 1 (One) Time Per Week., Disp: 5 capsule, Rfl: 11    azelastine (ASTELIN) 0.1 % nasal spray, 1 spray into the nostril(s) as directed by provider 2 (Two) Times a Day. Use in each nostril as directed (Patient not taking: Reported on 3/21/2024),  "Disp: 1 each, Rfl: 10    Diclofenac Sodium (VOLTAREN) 1 % gel gel, Apply 4 g topically to the appropriate area as directed 3 (Three) Times a Day. (Patient not taking: Reported on 3/21/2024), Disp: 100 g, Rfl: 1    fluticasone (FLONASE) 50 MCG/ACT nasal spray, 2 sprays into the nostril(s) as directed by provider Daily for 7 days., Disp: 9.9 mL, Rfl: 0    l-methylfolate 15 MG tablet tablet, Take 1 tablet by mouth Daily. Indications: folic acid deficiency (Patient not taking: Reported on 3/21/2024), Disp: 90 tablet, Rfl: 3    Objective:   Vitals and nursing note reviewed.   Constitutional:       Appearance: Healthy appearance. Not in distress.   Neck:      Vascular: No JVR. JVD normal.   Pulmonary:      Effort: Pulmonary effort is normal.      Breath sounds: Normal breath sounds. No wheezing. No rhonchi. No rales.   Chest:      Chest wall: Not tender to palpatation.   Cardiovascular:      PMI at left midclavicular line. Normal rate. Regular rhythm. Normal S1. Normal S2.       Murmurs: There is no murmur.      No gallop.  No click. No rub.   Pulses:     Intact distal pulses.   Edema:     Peripheral edema absent.   Abdominal:      General: Bowel sounds are normal.      Palpations: Abdomen is soft.      Tenderness: There is no abdominal tenderness.   Musculoskeletal: Normal range of motion.         General: No tenderness. Skin:     General: Skin is warm and dry.   Neurological:      General: No focal deficit present.      Mental Status: Alert and oriented to person, place and time.       Blood pressure 108/68, pulse 86, height 157.5 cm (62\"), weight 88 kg (194 lb), SpO2 100%, not currently breastfeeding.   Lab Review:     Assessment:       1. Coronary artery disease involving native coronary artery of native heart without angina pectoris  We had attempted to get a vasodilator nuclear stress test previously but this was denied by her insurance.  As alternative testing we had requested a CT coronary angiogram which was also " denied by her insurance.    2. Dyslipidemia  Tolerating statin based cholesterol-lowering therapy.    3. Inappropriate sinus tachycardia  Appropriate heart rate control with beta-blockade.    4. Palpitations  Possible arrhythmia versus ectopy.  Increase in symptom frequency duration and intensity since her last evaluation.    5. Heterozygous factor V Leiden mutation  Previous DVT/PE    6. History of pulmonary embolus (PE)      7. Protein S deficiency      8. Primary hypertension  Acceptable blood pressure control    9. Chronic anticoagulation  Lifelong anticoagulation recommended    Procedures    Plan:       I have recommended an outpatient cardiac monitor    No changes to medications made at today's visit    Further recommendations predicated on the results of her heart monitor

## 2024-03-26 LAB
A VAGINAE DNA VAG QL NAA+PROBE: ABNORMAL SCORE
BVAB2 DNA VAG QL NAA+PROBE: ABNORMAL SCORE
C ALBICANS DNA VAG QL NAA+PROBE: POSITIVE
C GLABRATA DNA VAG QL NAA+PROBE: NEGATIVE
C TRACH DNA VAG QL NAA+PROBE: NEGATIVE
HSV1 DNA SPEC QL NAA+PROBE: NEGATIVE
HSV2 DNA SPEC QL NAA+PROBE: NEGATIVE
MEGA1 DNA VAG QL NAA+PROBE: ABNORMAL SCORE
N GONORRHOEA DNA VAG QL NAA+PROBE: NEGATIVE
T VAGINALIS DNA VAG QL NAA+PROBE: NEGATIVE

## 2024-04-09 ENCOUNTER — OFFICE VISIT (OUTPATIENT)
Dept: OBSTETRICS AND GYNECOLOGY | Facility: CLINIC | Age: 34
End: 2024-04-09
Payer: COMMERCIAL

## 2024-04-09 VITALS
BODY MASS INDEX: 36.55 KG/M2 | WEIGHT: 198.6 LBS | SYSTOLIC BLOOD PRESSURE: 120 MMHG | HEIGHT: 62 IN | DIASTOLIC BLOOD PRESSURE: 80 MMHG

## 2024-04-09 DIAGNOSIS — Z98.890 S/P ENDOMETRIAL ABLATION: ICD-10-CM

## 2024-04-09 DIAGNOSIS — N92.6 IRREGULAR MENSTRUAL BLEEDING: Primary | ICD-10-CM

## 2024-04-09 DIAGNOSIS — R10.2 PELVIC PAIN: ICD-10-CM

## 2024-04-09 PROCEDURE — 1159F MED LIST DOCD IN RCRD: CPT | Performed by: PHYSICIAN ASSISTANT

## 2024-04-09 PROCEDURE — 1160F RVW MEDS BY RX/DR IN RCRD: CPT | Performed by: PHYSICIAN ASSISTANT

## 2024-04-09 PROCEDURE — 3079F DIAST BP 80-89 MM HG: CPT | Performed by: PHYSICIAN ASSISTANT

## 2024-04-09 PROCEDURE — 3074F SYST BP LT 130 MM HG: CPT | Performed by: PHYSICIAN ASSISTANT

## 2024-04-09 PROCEDURE — 99213 OFFICE O/P EST LOW 20 MIN: CPT | Performed by: PHYSICIAN ASSISTANT

## 2024-04-09 NOTE — PROGRESS NOTES
Subjective   Chief Complaint   Patient presents with    Follow-up     C/O persistent yeast and BV, resolved with at this time.       Marilu Godoy is a 33 y.o. year old  presenting to be seen for history of vaginal infection.  Was treated for yeast infection 3/21/2024 by her pcp. Swab confirmed daniel albicans. Reports symptoms resolved after taking fluconazole.  She had yeast infection and bacterial vaginosis confirmed by swab 2023.   She is not diabetic. Does not think she eats excessive sugar.  circumcised  She had Novasure endometrial ablation 2022. Menses stopped for a while but now having random light bleeds lasting 2-3 days that can occur multiple times monthly. Had tubal ligation prior to Novasure. 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     Bleeding disorder 2016    Body piercing     TONGUE, NOSE, TWO IN EACH EAR    Bronchitis     Chest pain 2021    last CP 2-3 months ago    Cholelithiasis     Clotting disorder     factor 5    Constipation     Coronary artery disease 2017    COVID-19 vaccine series completed     Pfizer-plus booster    Deep vein thrombosis 2016    Diarrhea     Disease of thyroid gland     Diverticulitis     Diverticulosis     Elevated cholesterol     Endometriosis     Factor 5 Leiden mutation, heterozygous     Fibroid     Fibromyalgia     Fibromyalgia, primary     Full dentures 2021    advised no adhesives DOS    Gastroparesis     GERD (gastroesophageal reflux disease)     H/O blood clots     H/O cardiovascular stress test 2021    reported several years ago-exercise wnl    Headache     Hiatal hernia     High cholesterol     History of recurrent UTIs     HPV (human papilloma virus) infection     Hx of echocardiogram      Hx of gout 06/22/2021    Hypertension     Hypoglycemia     Inappropriate sinus node tachycardia     Kidney infection 06/22/2021    history only    Low back pain     Migraine     Nausea & vomiting     Obesity     Osteoarthritis     Ovarian cyst     Pneumonia     Pollen allergies     PONV (postoperative nausea and vomiting)     Protein S deficiency 11/14/2016    labs from hospitalization for PE    Pulmonary embolism 11/20/2016    on eliquis since that time    Recurrent pregnancy loss, antepartum condition or complication     Sleep apnea     no CPAP- states insurance took it back    Tattoo     LOWER BACK    Tinnitus     Varicella         Current Outpatient Medications:     albuterol sulfate  (90 Base) MCG/ACT inhaler, Inhale 2 puffs Every 4 (Four) Hours As Needed for Wheezing or Shortness of Air., Disp: 18 g, Rfl: 11    apixaban (ELIQUIS) 5 MG tablet tablet, Take 1 tablet by mouth 2 (Two) Times a Day., Disp: 90 tablet, Rfl: 3    aspirin 81 MG EC tablet, Take 1 tablet by mouth Daily., Disp: , Rfl:     atorvastatin (LIPITOR) 40 MG tablet, Take 1 tablet by mouth Every Night., Disp: 90 tablet, Rfl: 3    B-D ULTRA-FINE 33 LANCETS misc, Check sugars as needed/directed for hypoglycemia., Disp: 100 each, Rfl: 12    Blood Glucose Monitoring Suppl (ONE TOUCH ULTRA 2) w/Device kit, use as needed to check blood sugar for hypoglycemia (diabetes), Disp: , Rfl:     cholecalciferol (VITAMIN D3) 1.25 MG (06499 UT) capsule, Take 1 capsule by mouth Every 7 (Seven) Days., Disp: , Rfl:     clotrimazole-betamethasone (Lotrisone) 1-0.05 % cream, Apply 1 application  topically to the appropriate area as directed 2 (Two) Times a Day., Disp: 15 g, Rfl: 0    Corlanor 5 MG tablet tablet, Take 1.5 tablets by mouth 2 (two) times a day., Disp: 90 tablet, Rfl: 6    cyclobenzaprine (FLEXERIL) 10 MG tablet, Take 1 tablet by mouth Daily As Needed for Muscle Spasms., Disp: 90 tablet, Rfl: 0    Diclofenac Sodium (VOLTAREN) 1 % gel gel, Apply 4 g  topically to the appropriate area as directed 4 (Four) Times a Day As Needed (joint pain)., Disp: 150 g, Rfl: 1    DULoxetine (CYMBALTA) 60 MG capsule, Take 1 capsule by mouth Daily., Disp: 90 capsule, Rfl: 3    EPINEPHrine (EPIPEN) 0.3 MG/0.3ML solution auto-injector injection, Inject  into the appropriate muscle as directed by prescriber See Admin Instructions. inject syringe as directed for allergic reaction, Disp: , Rfl:     Erenumab-aooe (AIMOVIG) 140 MG/ML auto-injector, Inject 1 mL under the skin into the appropriate area as directed Every 30 (Thirty) Days., Disp: 1 mL, Rfl: 3    glucose blood test strip, USE TO CHECK SUGARS DAILY DUE TO hypoglycemia, Disp: 100 each, Rfl: 12    glucose monitor monitoring kit, 1 each As Needed (diabetes). Check sugars as needed/directed for hypoglycemia., Disp: 1 each, Rfl: 0    l-methylfolate 15 MG tablet tablet, Take 1 tablet by mouth Daily. Indications: folic acid deficiency, Disp: 90 tablet, Rfl: 3    lidocaine (LIDODERM) 5 %, Place 2 patches on the skin as directed by provider Daily. Remove & Discard patch within 12 hours or as directed by MD, Disp: 60 patch, Rfl: 3    methocarbamol (ROBAXIN) 750 MG tablet, Take 1 tablet by mouth At Night As Needed for Muscle Spasms., Disp: 14 tablet, Rfl: 0    mometasone (NASONEX) 50 MCG/ACT nasal spray, 2 sprays into the nostril(s) as directed by provider Daily., Disp: 3 each, Rfl: 3    mupirocin (Bactroban) 2 % cream, Apply 1 application topically to the appropriate area as directed 3 (Three) Times a Day., Disp: 30 g, Rfl: 1    pantoprazole (PROTONIX) 40 MG EC tablet, Take 1 tablet by mouth Daily., Disp: 90 tablet, Rfl: 3    Rimegepant Sulfate (Nurtec) 75 MG tablet dispersible tablet, Take 1 tablet by mouth Every Other Day., Disp: 16 tablet, Rfl: 3    tiZANidine (ZANAFLEX) 4 MG tablet, Take 1 tablet 3 times a day by oral route as directed for 30 days., Disp: , Rfl:     Vitamin D, Ergocalciferol, 65586 units capsule, Take 1 capsule by  mouth 1 (One) Time Per Week., Disp: 5 capsule, Rfl: 11   Allergies   Allergen Reactions    Ceftin [Cefuroxime Axetil] Shortness Of Breath and Rash     Numbness in mouth and throat    Cefuroxime Hives, Rash and Shortness Of Breath     Numbness in mouth and throat  Numbness in mouth and throat    Sumatriptan Shortness Of Breath and Nausea Only     Other reaction(s): Nausea Only, Other (See Comments)  Worsening migraine  Worsening migraine    Amoxicillin Rash, Hives and Itching      Past Surgical History:   Procedure Laterality Date     SECTION        and  and      SECTION WITH TUBAL N/A 01/15/2019    Procedure:  SECTION REPEAT WITH TUBAL;  Surgeon: Alva Boyer MD;  Location: [facility] LABOR DELIVERY;  Service: Obstetrics/Gynecology    COLONOSCOPY N/A 2017    Procedure: COLONOSCOPY WITH BIOPSIES AND ARGON THERMAL ABLATION;  Surgeon: Jignesh Selby MD;  Location: [facility] ENDOSCOPY;  Service:     D & C HYSTEROSCOPY ENDOMETRIAL ABLATION N/A 03/10/2022    Procedure: DILATATION AND CURETTAGE DIAGNOSTIC HYSTEROSCOPY NOVASURE ENDOMETRIAL ABLATION;  Surgeon: Alva Boyer MD;  Location: [facility] OR;  Service: Obstetrics/Gynecology;  Laterality: N/A;    DIAGNOSTIC LAPAROSCOPY N/A 2018    Procedure: DIAGNOSTIC LAPAROSCOPY AND ABLATION OF ENDOMETRIOSIS;  Surgeon: Evin Zamudio MD;  Location: [facility] OR;  Service: Obstetrics/Gynecology    ENDOMETRIAL ABLATION      ENDOSCOPIC FUNCTIONAL SINUS SURGERY (FESS) Right 2021    Procedure: Right endoscopic total ethmoidectomy, right endoscopic middle meatal antrotomy with cyst removal, right endoscopic frontal recess exploration with cyst removal;  Surgeon: Saulo Brito MD;  Location: [facility] OR;  Service: ENT;  Laterality: Right;    ENDOSCOPY N/A 2017    Procedure: ESOPHAGOGASTRODUODENOSCOPY WITH BIOPSIES AND COLD BIOPSY POLYPECTOMIES;  Surgeon: Jignesh Selby MD;  Location: [facility] ENDOSCOPY;  Service:     GASTRIC  "STIMULATOR IMPLANT SURGERY      LAPAROSCOPIC CHOLECYSTECTOMY      LIPOMA EXCISION N/A 12/10/2021    Procedure: Excision of soft tissue mass lower back;  Surgeon: Nidia Smith MD;  Location: [facility];  Service: General;  Laterality: N/A;    MOUTH SURGERY      full mouth tooth extration    SINUS SURGERY      TUBAL ABDOMINAL LIGATION  01/15/2019    WISDOM TOOTH EXTRACTION        Social History     Socioeconomic History    Marital status:    Tobacco Use    Smoking status: Never     Passive exposure: Past    Smokeless tobacco: Never   Vaping Use    Vaping status: Never Used   Substance and Sexual Activity    Alcohol use: No    Drug use: Never    Sexual activity: Yes     Partners: Male     Birth control/protection: Surgical     Comment: Tubal      Family History   Problem Relation Age of Onset    Arthritis Mother     COPD Mother     Asthma Mother     Thyroid disease Mother     Arthritis Father     Diabetes Father     Hypertension Father     Hyperlipidemia Father     Kidney disease Father     Heart attack Father     Coronary artery disease Father     Dementia Father     No Known Problems Son     Colon cancer Neg Hx     Liver cancer Neg Hx     Liver disease Neg Hx     Stomach cancer Neg Hx     Esophageal cancer Neg Hx        Review of Systems   Constitutional:  Negative for chills, diaphoresis and fever.   Gastrointestinal:  Negative for constipation, diarrhea, nausea and vomiting.   Genitourinary:  Positive for menstrual problem and pelvic pain. Negative for difficulty urinating, dysuria, vaginal discharge and vaginal pain.           Objective   /80   Ht 157.5 cm (62.01\")   Wt 90.1 kg (198 lb 9.6 oz)   BMI 36.31 kg/m²     Physical Exam  Constitutional:       Appearance: Normal appearance. She is well-developed and well-groomed.   Eyes:      General: Lids are normal.      Extraocular Movements: Extraocular movements intact.      Conjunctiva/sclera: Conjunctivae normal.   Abdominal:      General: There " is no distension.      Palpations: Abdomen is soft.      Tenderness: There is no abdominal tenderness.   Genitourinary:     Labia:         Right: No rash, tenderness or lesion.         Left: No rash, tenderness or lesion.       Urethra: No prolapse, urethral pain, urethral swelling or urethral lesion.      Vagina: No vaginal discharge, tenderness or lesions.      Cervix: No cervical motion tenderness, discharge, friability or lesion.      Uterus: Not enlarged and not tender.       Adnexa:         Right: No mass or tenderness.          Left: No mass or tenderness.     Neurological:      Mental Status: She is alert.   Psychiatric:         Attention and Perception: Attention normal.         Mood and Affect: Mood normal.         Speech: Speech normal.         Behavior: Behavior is cooperative.            Result Review :           NuSwab VG+ - , (12/14/2023 16:13)   NuSwab VG+ & HSV (03/21/2024 14:23)   Op Note by Alva Boyer MD (03/10/2022 11:23)           Assessment and Plan  Diagnoses and all orders for this visit:    1. Irregular menstrual bleeding (Primary)  -     US Non-ob Transvaginal    2. Pelvic pain  -     US Non-ob Transvaginal    3. S/P endometrial ablation  -     US Non-ob Transvaginal      Patient Instructions   No findings on exam to suggest vaginal infection  RTO for pelvic ultrasound           This note was electronically signed.    aTylor Carrasquillo PA-C   April 9, 2024

## 2024-04-14 ENCOUNTER — PATIENT MESSAGE (OUTPATIENT)
Dept: INTERNAL MEDICINE | Facility: CLINIC | Age: 34
End: 2024-04-14
Payer: COMMERCIAL

## 2024-04-15 NOTE — TELEPHONE ENCOUNTER
From: Marilu Godoy  To: Deidre Barker  Sent: 4/14/2024 11:03 PM EDT  Subject: Yeast infection    I'm getting a yeast infection. Can you send over some diflucan?

## 2024-04-16 ENCOUNTER — TELEPHONE (OUTPATIENT)
Dept: OBSTETRICS AND GYNECOLOGY | Facility: CLINIC | Age: 34
End: 2024-04-16
Payer: COMMERCIAL

## 2024-04-16 RX ORDER — FLUCONAZOLE 150 MG/1
TABLET ORAL
Qty: 2 TABLET | Refills: 0 | Status: SHIPPED | OUTPATIENT
Start: 2024-04-16

## 2024-04-16 NOTE — TELEPHONE ENCOUNTER
----- Message from Marilu Godoy sent at 4/16/2024 12:40 AM EDT -----  Regarding: Yeast  Contact: [phone]  I had taken the Diflucan and the symptoms eased up but now they are coming back. Is there any way you could send over some more Diflucan?

## 2024-04-24 ENCOUNTER — DOCUMENTATION (OUTPATIENT)
Dept: OBSTETRICS AND GYNECOLOGY | Facility: CLINIC | Age: 34
End: 2024-04-24
Payer: COMMERCIAL

## 2024-04-24 ENCOUNTER — PROCEDURE VISIT (OUTPATIENT)
Dept: NEUROLOGY | Facility: CLINIC | Age: 34
End: 2024-04-24
Payer: COMMERCIAL

## 2024-04-24 DIAGNOSIS — R20.2 PARESTHESIA OF LEFT ARM: ICD-10-CM

## 2024-04-24 DIAGNOSIS — M25.522 ARTHRALGIA OF ELBOW, LEFT: Primary | ICD-10-CM

## 2024-04-24 LAB
Lab: 37
TOAL ENROLLMENT DAYS: 30

## 2024-04-24 NOTE — PROGRESS NOTES
[facility]   Electrodiagnostic Laboratory    Nerve Conduction & EMG Report        Patient: Marilu Godoy   Patient ID: [id number]   YOB: 1990  Sex: female      Exam Physician:  Sharan Singh MD  Refer Physician:  MARÍA Ruiz*    Electromyogram and Nerve Conduction Velocity Procedure Note    Hx: 33 y.o. right handed female with complaint of pain involving the left arm. Symptoms have been present for several months and were provoked by activity. Significant past medical history includes nothing suggestive of neuropathy.  Family history no family history of nerve or muscle disease.    Exam: Motor power is normal. There is no atrophy. There are no fasciculations. Deep tendon reflexes are present and symmetrical. Sensory exam is normal.      Edx studies of the L UE were performed to evaluate for peripheral nerve entrapment.     NCS Examination   For sensory nerve conduction studies, the amplitude is measured peak-to-peak, the latency reported is the distal peak latency, and the conduction velocity, if measured, is determined from onset latencies and is over the forearm.   For motor nerve conduction studies, the amplitude is measured baseline-to-peak, the latency reported is the distal onset latency, the conduction velocity is calculated over the forearm, and the F wave latency is the minimum latency.   Unless otherwise noted, the hand temperature was monitored continuously and remained between 32°C and 36°C during the performance of the NCSs.          Nerve Conduction Studies  Anti Sensory Summary Table     Stim Site NR Norm Peak (ms) O-P Amp (µV) Norm O-P Amp Onset (ms) Site1 Site2 Delta-0 (ms) Dist (cm) John (m/s) Norm John (m/s)   Left Median Anti Sensory (2nd Digit)   Wrist    <3.6 65.1 >10 2.2 Wrist 2nd Digit 2.2 14.0 64    Left Radial Anti Sensory (Base 1st Digit)   Wrist    <3.1 32.1  1.2 Wrist Base 1st Digit 1.2 0.0     Left Ulnar Anti Sensory (5th Digit)    Wrist    <3.7 65.8 >15.0 2.6 Wrist 5th Digit 2.6 0.0       Motor Summary Table     Stim Site NR Onset (ms) Norm Onset (ms) O-P Amp (mV) Norm O-P Amp Site1 Site2 Delta-0 (ms) Dist (cm) John (m/s) Norm John (m/s)   Left Median Motor (Abd Poll Brev)   Wrist    2.9 <4.2 5.2 >5 Elbow Wrist 3.4 22.0 65 >50   Elbow    6.3  6.8          Left Ulnar Motor (Abd Dig Minimi)   Wrist    3.2 <4.2 10.1 >3 B Elbow Wrist 2.7 18.0 67 >53   B Elbow    5.9  6.2  A Elbow B Elbow 1.5 9.0 60 >53   A Elbow    7.4  9.5            F Wave Studies     NR F-Lat (ms) Lat Norm (ms) L-R F-Lat (ms) L-R Lat Norm   Left Median (Mrkrs) (Abd Poll Brev)      28.44 <33  <2.2   Left Ulnar (Mrkrs) (Abd Dig Min)      29.45 <36  <2.5         EMG Examination   The study was performed with a concentric needle electrode. Fibrillation and fasciculation activity is graded from none (0) to continuous (4+). The configuration and recruitment pattern of motor unit action potentials under voluntary control, if not normal, are described bel       Side Muscle Nerve Root Ins Act Fibs Psw Amp Dur Poly Recrt Int Pat Comment   Left Deltoid Axillary C5-6 Nml Nml Nml Nml Nml 0 Nml Nml    Left Triceps Radial C6-7-8 Nml Nml Nml Nml Nml 0 Nml Nml    Left Biceps Musculocut C5-6 Nml Nml Nml Nml Nml 0 Nml Nml    Left PronatorTeres Median C6-7 Nml Nml Nml Nml Nml 0 Nml Nml    Left FlexCarpiUln Ulnar C8,T1 Nml Nml Nml Nml Nml 0 Nml Nml    Left 1stDorInt Ulnar C8-T1 Nml Nml Nml Nml Nml 0 Nml Nml    Left Abd Poll Brev Median C8-T1 Nml Nml Nml Nml Nml 0 Nml Nml    Left Cervical Parasp Low Rami C7-8 Nml Nml Nml Nml Nml 0 Nml Nml          NCV FINDINGS:  All nerve conduction studies (as indicated in the following tables) were within normal limits.  All F Wave latencies were within normal limits.      EMG FINDINGS:    All examined muscles (as indicated in the following table) showed no evidence of electrical instability.         Conclusion: Normal NCV and EMG of the left upper  extremity          Instrument used:  Teca Synergy        Performed by:          Sharan Singh MD

## 2024-04-24 NOTE — PROGRESS NOTES
Patient is informed of results of pelvic ultrasound done yesterday.  Uterus is anteverted and has 3 small fibroids less than 2 cm.  Fibroids are intramural.  Endometrium is 5.9 mm.  Bilateral ovaries appear normal.  No free fluid or adnexal masses.  Patient feels that her bleeding is manageable at this time.  She is advised that if she has an increase in her bleeding or pain given that she has had a NovaSure ablation with a tubal ligation prior to the ablation that she could be experiencing post tubal sterilization ablation syndrome.  If continued issues she is encouraged to return to discuss potential hysterectomy with MD.

## 2024-04-25 ENCOUNTER — OFFICE VISIT (OUTPATIENT)
Dept: NEUROLOGY | Facility: CLINIC | Age: 34
End: 2024-04-25
Payer: COMMERCIAL

## 2024-04-25 VITALS
HEART RATE: 79 BPM | OXYGEN SATURATION: 98 % | HEIGHT: 62 IN | BODY MASS INDEX: 36.29 KG/M2 | TEMPERATURE: 97.8 F | RESPIRATION RATE: 14 BRPM | DIASTOLIC BLOOD PRESSURE: 74 MMHG | SYSTOLIC BLOOD PRESSURE: 126 MMHG | WEIGHT: 197.2 LBS

## 2024-04-25 DIAGNOSIS — G43.711 INTRACTABLE CHRONIC MIGRAINE WITHOUT AURA AND WITH STATUS MIGRAINOSUS: ICD-10-CM

## 2024-04-25 DIAGNOSIS — G43.909 ACUTE MIGRAINE: ICD-10-CM

## 2024-04-25 RX ORDER — ZAVEGEPANT 10 MG/.1ML
10 SPRAY NASAL DAILY PRN
Qty: 6 EACH | Refills: 3 | Status: SHIPPED | OUTPATIENT
Start: 2024-04-25

## 2024-04-25 RX ORDER — RIMEGEPANT SULFATE 75 MG/75MG
75 TABLET, ORALLY DISINTEGRATING ORAL EVERY OTHER DAY
Qty: 16 TABLET | Refills: 3 | Status: SHIPPED | OUTPATIENT
Start: 2024-04-25

## 2024-04-25 RX ORDER — RIMEGEPANT SULFATE 75 MG/75MG
75 TABLET, ORALLY DISINTEGRATING ORAL AS NEEDED
Qty: 4 TABLET | Refills: 0 | COMMUNITY
Start: 2024-04-25

## 2024-04-25 RX ORDER — ZAVEGEPANT 10 MG/.1ML
10 SPRAY NASAL ONCE
Qty: 1 EACH | Refills: 0 | COMMUNITY
Start: 2024-04-25 | End: 2024-04-25

## 2024-04-25 NOTE — PROGRESS NOTES
Follow Up Office Visit      Patient Name: Marilu Godoy  : 1990   MRN: [id number]     Chief Complaint:    Chief Complaint   Patient presents with    Follow-up     Migraine; pt reports 15/30 HA days       History of Present Illness: Marilu Godoy is a 33 y.o. female who is here today to follow up with Neurology for Migriane LORA. She was last seen in clinic  ([facility]).  She has been taking Aimovig 140 mg monthly for prevention and using Nurtec 75 mg QOD for prevention; she reports good tolerance and compliance with medication .  No dizziness. MDM 15/30.  She does report an increase in migraines and admits that her last injection of Aimovig was given this week; she was about 10 days overdue for her medication. No issues with pharmacy; just forgets to give herself the medication.  Triggers: allergic rhinitis and seasonal allergies. She is doing immunotherapy for allergies. Using Tylenol as a rescue agent.     Taken from previous encounter:    Tried and Failed: Ajovy, Qulipta, Nurtec PRN and QOD, Imitrex, Maxalt, Propranolol, Topamax, Tramadol, Zonegran, Fioricet. She describes her migraine as a dull pounding to center of forehead and will radiate toward back of head. She will have aura with spots and floaters before it starts. + Light and sound sensitivity and N/V. She likes to sleep them off in a cold dark room. Migraines will last 2-24 hours.     She is not using Flexeril at this time.    Recent Imaging:    EMG/NCS LUE  - noncontributory  CT Head W/WO  -noncontributory  CTA Neck 10/2022 -noncontributory  CTA Head 10/2022- noncontributory  CT head WO 10/2022 -noncontributory     Pertinent Medical History: Factor V, Factor VIII, Protein S Deficiency, PE with Eliquis use, HL, Obesity, Gastroparesis, Hyperthyroidism, chronic Pain, AMBAR, HTN    Subjective      Review of Systems:   Review of Systems   Constitutional: Negative.    HENT: Negative.     Eyes: Negative.    Respiratory:  Negative.     Cardiovascular: Negative.    Gastrointestinal: Negative.    Endocrine: Negative.    Genitourinary: Negative.    Musculoskeletal: Negative.    Skin: Negative.    Allergic/Immunologic: Negative.    Neurological:  Positive for headache.   Hematological: Negative.    Psychiatric/Behavioral: Negative.     All other systems reviewed and are negative.      I have reviewed and the following portions of the patient's history were updated as appropriate: past family history, past medical history, past social history, past surgical history and problem list.    Medications:     Current Outpatient Medications:     albuterol sulfate  (90 Base) MCG/ACT inhaler, Inhale 2 puffs Every 4 (Four) Hours As Needed for Wheezing or Shortness of Air., Disp: 18 g, Rfl: 11    apixaban (ELIQUIS) 5 MG tablet tablet, Take 1 tablet by mouth 2 (Two) Times a Day., Disp: 90 tablet, Rfl: 3    aspirin 81 MG EC tablet, Take 1 tablet by mouth Daily., Disp: , Rfl:     atorvastatin (LIPITOR) 40 MG tablet, Take 1 tablet by mouth Every Night., Disp: 90 tablet, Rfl: 3    B-D ULTRA-FINE 33 LANCETS misc, Check sugars as needed/directed for hypoglycemia., Disp: 100 each, Rfl: 12    Blood Glucose Monitoring Suppl (ONE TOUCH ULTRA 2) w/Device kit, use as needed to check blood sugar for hypoglycemia (diabetes), Disp: , Rfl:     cholecalciferol (VITAMIN D3) 1.25 MG (78449 UT) capsule, Take 1 capsule by mouth Every 7 (Seven) Days., Disp: , Rfl:     clotrimazole-betamethasone (Lotrisone) 1-0.05 % cream, Apply 1 application  topically to the appropriate area as directed 2 (Two) Times a Day., Disp: 15 g, Rfl: 0    Corlanor 5 MG tablet tablet, Take 1.5 tablets by mouth 2 (two) times a day., Disp: 90 tablet, Rfl: 6    Diclofenac Sodium (VOLTAREN) 1 % gel gel, Apply 4 g topically to the appropriate area as directed 4 (Four) Times a Day As Needed (joint pain)., Disp: 150 g, Rfl: 1    DULoxetine (CYMBALTA) 60 MG capsule, Take 1 capsule by mouth Daily.,  Disp: 90 capsule, Rfl: 3    EPINEPHrine (EPIPEN) 0.3 MG/0.3ML solution auto-injector injection, Inject  into the appropriate muscle as directed by prescriber See Admin Instructions. inject syringe as directed for allergic reaction, Disp: , Rfl:     Erenumab-aooe (AIMOVIG) 140 MG/ML auto-injector, Inject 1 mL under the skin into the appropriate area as directed Every 30 (Thirty) Days., Disp: 1 mL, Rfl: 3    fluconazole (Diflucan) 150 MG tablet, Take one pill now and repeat in 72 hours, Disp: 2 tablet, Rfl: 0    glucose blood test strip, USE TO CHECK SUGARS DAILY DUE TO hypoglycemia, Disp: 100 each, Rfl: 12    glucose monitor monitoring kit, 1 each As Needed (diabetes). Check sugars as needed/directed for hypoglycemia., Disp: 1 each, Rfl: 0    l-methylfolate 15 MG tablet tablet, Take 1 tablet by mouth Daily. Indications: folic acid deficiency, Disp: 90 tablet, Rfl: 3    lidocaine (LIDODERM) 5 %, Place 2 patches on the skin as directed by provider Daily. Remove & Discard patch within 12 hours or as directed by MD, Disp: 60 patch, Rfl: 3    methocarbamol (ROBAXIN) 750 MG tablet, Take 1 tablet by mouth At Night As Needed for Muscle Spasms., Disp: 14 tablet, Rfl: 0    mometasone (NASONEX) 50 MCG/ACT nasal spray, 2 sprays into the nostril(s) as directed by provider Daily., Disp: 3 each, Rfl: 3    mupirocin (Bactroban) 2 % cream, Apply 1 application topically to the appropriate area as directed 3 (Three) Times a Day., Disp: 30 g, Rfl: 1    pantoprazole (PROTONIX) 40 MG EC tablet, Take 1 tablet by mouth Daily., Disp: 90 tablet, Rfl: 3    Rimegepant Sulfate (Nurtec) 75 MG tablet dispersible tablet, Take 1 tablet by mouth Every Other Day., Disp: 16 tablet, Rfl: 3    tiZANidine (ZANAFLEX) 4 MG tablet, Take 1 tablet 3 times a day by oral route as directed for 30 days., Disp: , Rfl:     Vitamin D, Ergocalciferol, 93454 units capsule, Take 1 capsule by mouth 1 (One) Time Per Week., Disp: 5 capsule, Rfl: 11    Zavegepant HCl  "(Zavzpret) 10 MG/ACT solution, 10 mg into the nostril(s) as directed by provider Daily As Needed (Migraine)., Disp: 6 each, Rfl: 3    Allergies:   Allergies   Allergen Reactions    Ceftin [Cefuroxime Axetil] Shortness Of Breath and Rash     Numbness in mouth and throat    Cefuroxime Hives, Rash and Shortness Of Breath     Numbness in mouth and throat  Numbness in mouth and throat    Iodinated Contrast Media Shortness Of Breath and Rash    Sumatriptan Shortness Of Breath and Nausea Only     Other reaction(s): Nausea Only, Other (See Comments)  Worsening migraine  Worsening migraine    Amoxicillin Rash, Hives and Itching       Objective     Physical Exam:  Vital Signs:   Vitals:    04/25/24 1120   BP: 126/74   BP Location: Left arm   Patient Position: Sitting   Cuff Size: Adult   Pulse: 79   Resp: 14   Temp: 97.8 °F (36.6 °C)   TempSrc: Infrared   SpO2: 98%   Weight: 89.4 kg (197 lb 3.2 oz)   Height: 157.5 cm (62.01\")   PainSc: 0-No pain     Body mass index is 36.06 kg/m².    Physical Exam  Vitals and nursing note reviewed.   Constitutional:       Appearance: Normal appearance.   HENT:      Head: Normocephalic.      Nose: Nose normal.      Mouth/Throat:      Mouth: Mucous membranes are moist.      Pharynx: Oropharynx is clear.   Eyes:      Extraocular Movements: Extraocular movements intact.      Conjunctiva/sclera: Conjunctivae normal.   Musculoskeletal:      Cervical back: Normal range of motion and neck supple.   Skin:     General: Skin is warm and dry.      Capillary Refill: Capillary refill takes less than 2 seconds.   Neurological:      General: No focal deficit present.      Mental Status: She is alert and oriented to person, place, and time.      Cranial Nerves: Cranial nerves 2-12 are intact.      Gait: Gait is intact.   Psychiatric:         Mood and Affect: Mood normal.         Speech: Speech normal.         Behavior: Behavior normal.         Neurologic Exam     Mental Status   Oriented to person, place, and " time.   Speech: speech is normal   Level of consciousness: alert  Knowledge: good.     Cranial Nerves   Cranial nerves II through XII intact.     Motor Exam   Muscle bulk: normal  Overall muscle tone: normal    Sensory Exam   Light touch normal.     Gait, Coordination, and Reflexes     Gait  Gait: normal       Assessment / Plan      Assessment/Plan:   Diagnoses and all orders for this visit:    1. Intractable chronic migraine without aura and with status migrainosus  -     Erenumab-aooe (AIMOVIG) 140 MG/ML auto-injector; Inject 1 mL under the skin into the appropriate area as directed Every 30 (Thirty) Days.  Dispense: 1 mL; Refill: 3  -     Rimegepant Sulfate (Nurtec) 75 MG tablet dispersible tablet; Take 1 tablet by mouth Every Other Day.  Dispense: 16 tablet; Refill: 3    2. Acute migraine  -     Zavegepant HCl (Zavzpret) 10 MG/ACT solution; 10 mg into the nostril(s) as directed by provider Daily As Needed (Migraine).  Dispense: 6 each; Refill: 3         Follow Up:     Anticipatory guidance and safety reviewed   Patient education provided   Continue Aimovig 140 mg monthly (prevention); SE reviewed  Continue Nurtec 75 mg QOD #16 (prevention); SE reviewed. Samples provided.   DC Flexeril  Begin Zavspret 10 mg PRN (abortive); SE reviewed. Samples and coupons provided     RTC PRN or within 12 weeks or sooner with issues    Barby Guzman, DNP, APRN, FNP-C  [facility] Neurology and Sleep Medicine

## 2024-04-29 ENCOUNTER — OFFICE VISIT (OUTPATIENT)
Dept: ORTHOPEDIC SURGERY | Facility: CLINIC | Age: 34
End: 2024-04-29
Payer: COMMERCIAL

## 2024-04-29 VITALS — TEMPERATURE: 98.6 F | WEIGHT: 197.4 LBS | HEIGHT: 62 IN | BODY MASS INDEX: 36.33 KG/M2

## 2024-04-29 DIAGNOSIS — R20.2 PARESTHESIA OF LEFT ARM: Primary | ICD-10-CM

## 2024-04-29 DIAGNOSIS — M54.12 CERVICAL RADICULAR PAIN: ICD-10-CM

## 2024-04-29 PROCEDURE — 1159F MED LIST DOCD IN RCRD: CPT | Performed by: PHYSICIAN ASSISTANT

## 2024-04-29 PROCEDURE — 1160F RVW MEDS BY RX/DR IN RCRD: CPT | Performed by: PHYSICIAN ASSISTANT

## 2024-04-29 PROCEDURE — 99213 OFFICE O/P EST LOW 20 MIN: CPT | Performed by: PHYSICIAN ASSISTANT

## 2024-04-29 NOTE — PROGRESS NOTES
Subjective   Patient ID: Marilu Godoy is a 33 y.o. right hand dominant female   Follow-up, Pain, and Numbness of the Left Elbow (EMG/NCV results)         History of Present Illness    Patient presents to review EMG results of the left upper extremity.  She is still experiencing discomfort to the left elbow with associated intermittent numbness and tingling to the left fourth and fifth digits.  Patient also endorses occasional neck stiffness with occasional pain.  She has never been evaluated by spine subspecialist.  Patient had previous x-rays of the left elbow which were negative for acute process.  She is not a candidate for MRI due to having a gastric stimulator.      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     Bleeding disorder 11/13/2016    Body piercing     TONGUE, NOSE, TWO IN EACH EAR    Bronchitis     Chest pain 06/22/2021    last CP 2-3 months ago    Cholelithiasis     Clotting disorder     factor 5    Constipation     Coronary artery disease 6/16/2017    COVID-19 vaccine series completed     Pfizer-plus booster    Deep vein thrombosis 11/13/2016    Diarrhea     Disease of thyroid gland     Diverticulitis     Diverticulosis     Elevated cholesterol     Endometriosis     Factor 5 Leiden mutation, heterozygous     Fibroid     Fibromyalgia     Fibromyalgia, primary     Full dentures 06/22/2021    advised no adhesives DOS    Gastroparesis     GERD (gastroesophageal reflux disease)     H/O blood clots     H/O cardiovascular stress test 06/22/2021    reported several years ago-exercise wnl    Headache     Hiatal hernia     High cholesterol     History of recurrent UTIs     HPV (human papilloma virus) infection     Hx of echocardiogram     Hx of gout 06/22/2021    Hypertension     Hypoglycemia     Inappropriate sinus node tachycardia     Kidney infection  2021    history only    Low back pain     Migraine     Nausea & vomiting     Obesity     Osteoarthritis     Ovarian cyst     Pneumonia     Pollen allergies     PONV (postoperative nausea and vomiting)     Protein S deficiency 2016    labs from hospitalization for PE    Pulmonary embolism 2016    on eliquis since that time    Recurrent pregnancy loss, antepartum condition or complication     Sleep apnea     no CPAP- states insurance took it back    Tattoo     LOWER BACK    Tinnitus     Varicella         Past Surgical History:   Procedure Laterality Date     SECTION        and  and      SECTION WITH TUBAL N/A 01/15/2019    Procedure:  SECTION REPEAT WITH TUBAL;  Surgeon: Alva Boyer MD;  Location: [facility] LABOR DELIVERY;  Service: Obstetrics/Gynecology    COLONOSCOPY N/A 2017    Procedure: COLONOSCOPY WITH BIOPSIES AND ARGON THERMAL ABLATION;  Surgeon: Jignesh Selby MD;  Location: [facility] ENDOSCOPY;  Service:     D & C HYSTEROSCOPY ENDOMETRIAL ABLATION N/A 03/10/2022    Procedure: DILATATION AND CURETTAGE DIAGNOSTIC HYSTEROSCOPY NOVASURE ENDOMETRIAL ABLATION;  Surgeon: Alva Boyer MD;  Location: [facility] OR;  Service: Obstetrics/Gynecology;  Laterality: N/A;    DIAGNOSTIC LAPAROSCOPY N/A 2018    Procedure: DIAGNOSTIC LAPAROSCOPY AND ABLATION OF ENDOMETRIOSIS;  Surgeon: Evin Zamudio MD;  Location: [facility] OR;  Service: Obstetrics/Gynecology    ENDOMETRIAL ABLATION      ENDOSCOPIC FUNCTIONAL SINUS SURGERY (FESS) Right 2021    Procedure: Right endoscopic total ethmoidectomy, right endoscopic middle meatal antrotomy with cyst removal, right endoscopic frontal recess exploration with cyst removal;  Surgeon: Saulo Brito MD;  Location: [facility] OR;  Service: ENT;  Laterality: Right;    ENDOSCOPY N/A 2017    Procedure: ESOPHAGOGASTRODUODENOSCOPY WITH BIOPSIES AND COLD BIOPSY POLYPECTOMIES;  Surgeon: Jignesh Selby MD;  Location:   "[facility] ENDOSCOPY;  Service:     GASTRIC STIMULATOR IMPLANT SURGERY      LAPAROSCOPIC CHOLECYSTECTOMY      LIPOMA EXCISION N/A 12/10/2021    Procedure: Excision of soft tissue mass lower back;  Surgeon: Nidia Smith MD;  Location: [facility];  Service: General;  Laterality: N/A;    MOUTH SURGERY      full mouth tooth extration    SINUS SURGERY      TUBAL ABDOMINAL LIGATION  01/15/2019    WISDOM TOOTH EXTRACTION         Family History   Problem Relation Age of Onset    Arthritis Mother     COPD Mother     Asthma Mother     Thyroid disease Mother     Arthritis Father     Diabetes Father     Hypertension Father     Hyperlipidemia Father     Kidney disease Father     Heart attack Father     Coronary artery disease Father     Dementia Father     No Known Problems Son     Colon cancer Neg Hx     Liver cancer Neg Hx     Liver disease Neg Hx     Stomach cancer Neg Hx     Esophageal cancer Neg Hx         Social History     Socioeconomic History    Marital status:    Tobacco Use    Smoking status: Never     Passive exposure: Past    Smokeless tobacco: Never   Vaping Use    Vaping status: Never Used   Substance and Sexual Activity    Alcohol use: No    Drug use: Never    Sexual activity: Yes     Partners: Male     Birth control/protection: Surgical     Comment: Tubal       Allergies   Allergen Reactions    Ceftin [Cefuroxime Axetil] Shortness Of Breath and Rash     Numbness in mouth and throat    Cefuroxime Hives, Rash and Shortness Of Breath     Numbness in mouth and throat  Numbness in mouth and throat    Iodinated Contrast Media Shortness Of Breath and Rash    Sumatriptan Shortness Of Breath and Nausea Only     Other reaction(s): Nausea Only, Other (See Comments)  Worsening migraine  Worsening migraine    Amoxicillin Rash, Hives and Itching       Review of Systems   Musculoskeletal:  Positive for arthralgias (left elbow).       Objective   Temp 98.6 °F (37 °C)   Ht 157.5 cm (62.01\")   Wt 89.5 kg (197 lb 6.4 oz)   " "BMI 36.09 kg/m²   Physical Exam  Vitals and nursing note reviewed.   Constitutional:       Appearance: Normal appearance.   Pulmonary:      Effort: Pulmonary effort is normal.   Musculoskeletal:      Left elbow: No deformity. Normal range of motion. Tenderness present. No medial epicondyle, lateral epicondyle or olecranon process tenderness.      Left forearm: No deformity or bony tenderness.      Left hand: No swelling or deformity. Normal range of motion. Decreased sensation of the ulnar distribution.   Neurological:      Mental Status: She is alert and oriented to person, place, and time.       Left Elbow Exam     Range of Motion   Extension:  normal   Flexion:  normal   Pronation:  normal   Supination:  normal     Tests   Varus: negative  Valgus: negative           Neurologic Exam     Mental Status   Oriented to person, place, and time.      Negative Tinel signs of the left wrist.    Negative Spurling's test      Assessment & Plan   Independent Review of Radiographic Studies:    No new imaging done today.  Reviewed imaging with patient at a prior visit.  Laboratory and Other Studies:    I did review the EMG results from Dr. Bragg.  The EMG of the left upper extremity is within normal limits.    Procedures      Diagnoses and all orders for this visit:    1. Paresthesia of left arm (Primary)  -     Ambulatory Referral to Orthopedic Surgery    2. Cervical radicular pain  -     Ambulatory Referral to Orthopedic Surgery       Risk, benefits, and merits of treatment alternatives reviewed with the patient and questions answered    Recommendations/Plan:    I did explain to the patient my concern for possible cervical radiculopathy affecting the C7-C8 nerve root versus early cubital tunnel syndrome that has not been seen on EMG as of yet.  I would like for the patient to be evaluated by spine subspecialist due to her sensation of numbness and tingling as well as cervical \"tightness and stiffness\"  Patient is in " agreement with the plan of care.  I will place a referral to spine subspecialty.    Patient agreeable to call or return sooner for any concerns.

## 2024-05-03 ENCOUNTER — OFFICE VISIT (OUTPATIENT)
Dept: CARDIOLOGY | Facility: CLINIC | Age: 34
End: 2024-05-03
Payer: COMMERCIAL

## 2024-05-03 VITALS
HEART RATE: 100 BPM | BODY MASS INDEX: 35.51 KG/M2 | OXYGEN SATURATION: 93 % | DIASTOLIC BLOOD PRESSURE: 74 MMHG | SYSTOLIC BLOOD PRESSURE: 118 MMHG | WEIGHT: 193 LBS | HEIGHT: 62 IN

## 2024-05-03 DIAGNOSIS — I10 PRIMARY HYPERTENSION: ICD-10-CM

## 2024-05-03 DIAGNOSIS — D68.59 PROTEIN S DEFICIENCY: ICD-10-CM

## 2024-05-03 DIAGNOSIS — Z86.711 HISTORY OF PULMONARY EMBOLUS (PE): ICD-10-CM

## 2024-05-03 DIAGNOSIS — D68.51 HETEROZYGOUS FACTOR V LEIDEN MUTATION: ICD-10-CM

## 2024-05-03 DIAGNOSIS — Z79.01 CHRONIC ANTICOAGULATION: ICD-10-CM

## 2024-05-03 DIAGNOSIS — E66.9 OBESITY (BMI 30-39.9): ICD-10-CM

## 2024-05-03 DIAGNOSIS — R00.2 PALPITATIONS: Primary | ICD-10-CM

## 2024-05-03 NOTE — PROGRESS NOTES
Subjective:     Encounter Date:05/03/2024      Patient ID: Marilu Godoy is a 33 y.o. female.    Chief Complaint: Palpitations  HPI  This is a 33-year-old female patient who presents to cardiology clinic for follow-up after outpatient cardiac monitoring for palpitations.  The patient had 20 symptomatic activations all of which occurred during sinus rhythm.  There was no arrhythmia or frequent/complex atrial or ventricular ectopy.  She reports compliance with her medications with no perceived side effects.  She reports doing well overall.  The following portions of the patient's history were reviewed and updated as appropriate: allergies, current medications, past family history, past medical history, past social history, past surgical history and problem  Review of Systems   Constitutional: Negative for chills, diaphoresis, fever, malaise/fatigue, weight gain and weight loss.   HENT:  Negative for ear discharge, hearing loss, hoarse voice and nosebleeds.    Eyes:  Negative for discharge, double vision, pain and photophobia.   Cardiovascular:  Positive for palpitations. Negative for chest pain, claudication, cyanosis, dyspnea on exertion, irregular heartbeat, leg swelling, near-syncope, orthopnea and paroxysmal nocturnal dyspnea.   Respiratory:  Negative for cough, hemoptysis, sputum production and wheezing.    Endocrine: Negative for cold intolerance, heat intolerance, polydipsia, polyphagia and polyuria.   Hematologic/Lymphatic: Negative for adenopathy and bleeding problem. Does not bruise/bleed easily.   Skin:  Negative for color change, flushing, itching and rash.   Musculoskeletal:  Negative for muscle cramps, muscle weakness, myalgias and stiffness.   Gastrointestinal:  Negative for abdominal pain, diarrhea, hematemesis, hematochezia, nausea and vomiting.   Genitourinary:  Negative for dysuria, frequency and nocturia.   Neurological:  Negative for focal weakness, loss of balance, numbness,  paresthesias and seizures.   Psychiatric/Behavioral:  Negative for altered mental status, hallucinations and suicidal ideas.    Allergic/Immunologic: Negative for HIV exposure, hives and persistent infections.           Current Outpatient Medications:     albuterol sulfate  (90 Base) MCG/ACT inhaler, Inhale 2 puffs Every 4 (Four) Hours As Needed for Wheezing or Shortness of Air., Disp: 18 g, Rfl: 11    apixaban (ELIQUIS) 5 MG tablet tablet, Take 1 tablet by mouth 2 (Two) Times a Day., Disp: 90 tablet, Rfl: 3    aspirin 81 MG EC tablet, Take 1 tablet by mouth Daily., Disp: , Rfl:     atorvastatin (LIPITOR) 40 MG tablet, Take 1 tablet by mouth Every Night., Disp: 90 tablet, Rfl: 3    B-D ULTRA-FINE 33 LANCETS misc, Check sugars as needed/directed for hypoglycemia., Disp: 100 each, Rfl: 12    Blood Glucose Monitoring Suppl (ONE TOUCH ULTRA 2) w/Device kit, use as needed to check blood sugar for hypoglycemia (diabetes), Disp: , Rfl:     cholecalciferol (VITAMIN D3) 1.25 MG (42587 UT) capsule, Take 1 capsule by mouth Every 7 (Seven) Days., Disp: , Rfl:     clotrimazole-betamethasone (Lotrisone) 1-0.05 % cream, Apply 1 application  topically to the appropriate area as directed 2 (Two) Times a Day., Disp: 15 g, Rfl: 0    Corlanor 5 MG tablet tablet, Take 1.5 tablets by mouth 2 (two) times a day., Disp: 90 tablet, Rfl: 6    Diclofenac Sodium (VOLTAREN) 1 % gel gel, Apply 4 g topically to the appropriate area as directed 4 (Four) Times a Day As Needed (joint pain)., Disp: 150 g, Rfl: 1    DULoxetine (CYMBALTA) 60 MG capsule, Take 1 capsule by mouth Daily., Disp: 90 capsule, Rfl: 3    EPINEPHrine (EPIPEN) 0.3 MG/0.3ML solution auto-injector injection, Inject  into the appropriate muscle as directed by prescriber See Admin Instructions. inject syringe as directed for allergic reaction, Disp: , Rfl:     Erenumab-aooe (AIMOVIG) 140 MG/ML auto-injector, Inject 1 mL under the skin into the appropriate area as directed Every  30 (Thirty) Days., Disp: 1 mL, Rfl: 3    glucose blood test strip, USE TO CHECK SUGARS DAILY DUE TO hypoglycemia, Disp: 100 each, Rfl: 12    glucose monitor monitoring kit, 1 each As Needed (diabetes). Check sugars as needed/directed for hypoglycemia., Disp: 1 each, Rfl: 0    l-methylfolate 15 MG tablet tablet, Take 1 tablet by mouth Daily. Indications: folic acid deficiency, Disp: 90 tablet, Rfl: 3    lidocaine (LIDODERM) 5 %, Place 2 patches on the skin as directed by provider Daily. Remove & Discard patch within 12 hours or as directed by MD, Disp: 60 patch, Rfl: 3    methocarbamol (ROBAXIN) 750 MG tablet, Take 1 tablet by mouth At Night As Needed for Muscle Spasms., Disp: 14 tablet, Rfl: 0    mometasone (NASONEX) 50 MCG/ACT nasal spray, 2 sprays into the nostril(s) as directed by provider Daily., Disp: 3 each, Rfl: 3    pantoprazole (PROTONIX) 40 MG EC tablet, Take 1 tablet by mouth Daily., Disp: 90 tablet, Rfl: 3    Rimegepant Sulfate (Nurtec) 75 MG tablet dispersible tablet, Take 1 tablet by mouth Every Other Day., Disp: 16 tablet, Rfl: 3    tiZANidine (ZANAFLEX) 4 MG tablet, Take 1 tablet 3 times a day by oral route as directed for 30 days., Disp: , Rfl:     Vitamin D, Ergocalciferol, 95245 units capsule, Take 1 capsule by mouth 1 (One) Time Per Week., Disp: 5 capsule, Rfl: 11    Zavegepant HCl (Zavzpret) 10 MG/ACT solution, 10 mg into the nostril(s) as directed by provider Daily As Needed (Migraine)., Disp: 6 each, Rfl: 3    Objective:   Vitals and nursing note reviewed.   Constitutional:       Appearance: Healthy appearance. Not in distress.   Neck:      Vascular: No JVR. JVD normal.   Pulmonary:      Effort: Pulmonary effort is normal.      Breath sounds: Normal breath sounds. No wheezing. No rhonchi. No rales.   Chest:      Chest wall: Not tender to palpatation.   Cardiovascular:      PMI at left midclavicular line. Normal rate. Regular rhythm. Normal S1. Normal S2.       Murmurs: There is no murmur.       "No gallop.  No click. No rub.   Pulses:     Intact distal pulses.   Edema:     Peripheral edema absent.   Abdominal:      General: Bowel sounds are normal.      Palpations: Abdomen is soft.      Tenderness: There is no abdominal tenderness.   Musculoskeletal: Normal range of motion.         General: No tenderness. Skin:     General: Skin is warm and dry.   Neurological:      General: No focal deficit present.      Mental Status: Alert and oriented to person, place and time.       Blood pressure 118/74, pulse 100, height 157.5 cm (62.01\"), weight 87.5 kg (193 lb), SpO2 93%, not currently breastfeeding.   Lab Review:     Assessment:       1. Palpitations  No evidence of arrhythmia or frequent/complex atrial or ventricular ectopy.  No symptom correlation.    2. Primary hypertension  Acceptable blood pressure control.    3. Chronic anticoagulation  No bleeding complications.    4. Heterozygous factor V Leiden mutation  Recognized hypercoagulable state with prior thromboembolic disease.  Lifelong anticoagulation indicated.    5. History of pulmonary embolus (PE)      6. Protein S deficiency      7. Obesity (BMI 30-39.9)  Body mass index is greater than 35.  The obesity pattern is central.  This is due to excess calorie intake.    Procedures    Plan:     The patient has been reassured regarding the benign nature of her cardiac test results.    No further cardiovascular testing indicated.    No changes in medications made at today's visit.      "

## 2024-05-28 RX ORDER — FLUCONAZOLE 150 MG/1
TABLET ORAL
Qty: 2 TABLET | Refills: 0 | Status: SHIPPED | OUTPATIENT
Start: 2024-05-28

## 2024-06-14 DIAGNOSIS — M79.18 CHRONIC MUSCULOSKELETAL PAIN: ICD-10-CM

## 2024-06-14 DIAGNOSIS — K30 GASTRIC MOTILITY DISORDER: ICD-10-CM

## 2024-06-14 DIAGNOSIS — G89.29 CHRONIC MUSCULOSKELETAL PAIN: ICD-10-CM

## 2024-06-14 DIAGNOSIS — D68.51 HETEROZYGOUS FACTOR V LEIDEN MUTATION: ICD-10-CM

## 2024-06-14 DIAGNOSIS — G43.711 INTRACTABLE CHRONIC MIGRAINE WITHOUT AURA AND WITH STATUS MIGRAINOSUS: ICD-10-CM

## 2024-06-14 DIAGNOSIS — M54.50 LUMBAR BACK PAIN: ICD-10-CM

## 2024-06-14 DIAGNOSIS — D68.59 PROTEIN S DEFICIENCY: ICD-10-CM

## 2024-06-14 DIAGNOSIS — R07.9 CHEST PAIN, UNSPECIFIED TYPE: ICD-10-CM

## 2024-06-14 RX ORDER — MOMETASONE FUROATE 50 UG/1
2 SPRAY, METERED NASAL DAILY
Qty: 3 EACH | Refills: 0 | Status: SHIPPED | OUTPATIENT
Start: 2024-06-14

## 2024-06-14 RX ORDER — PANTOPRAZOLE SODIUM 40 MG/1
40 TABLET, DELAYED RELEASE ORAL DAILY
Qty: 90 TABLET | Refills: 0 | Status: SHIPPED | OUTPATIENT
Start: 2024-06-14

## 2024-06-14 RX ORDER — ATORVASTATIN CALCIUM 40 MG/1
40 TABLET, FILM COATED ORAL NIGHTLY
Qty: 90 TABLET | Refills: 0 | Status: SHIPPED | OUTPATIENT
Start: 2024-06-14

## 2024-06-14 RX ORDER — LIDOCAINE 50 MG/G
2 PATCH TOPICAL EVERY 24 HOURS
Qty: 60 PATCH | Refills: 3 | Status: SHIPPED | OUTPATIENT
Start: 2024-06-14

## 2024-06-14 RX ORDER — DULOXETIN HYDROCHLORIDE 60 MG/1
60 CAPSULE, DELAYED RELEASE ORAL DAILY
Qty: 90 CAPSULE | Refills: 0 | Status: SHIPPED | OUTPATIENT
Start: 2024-06-14

## 2024-07-08 ENCOUNTER — HOSPITAL ENCOUNTER (OUTPATIENT)
Dept: GENERAL RADIOLOGY | Facility: HOSPITAL | Age: 34
Discharge: HOME | End: 2024-07-08
Payer: COMMERCIAL

## 2024-07-08 ENCOUNTER — TRANSCRIBE ORDERS (OUTPATIENT)
Dept: GENERAL RADIOLOGY | Facility: HOSPITAL | Age: 34
End: 2024-07-08
Payer: COMMERCIAL

## 2024-07-08 DIAGNOSIS — M25.552 LEFT HIP PAIN: ICD-10-CM

## 2024-07-08 DIAGNOSIS — M25.551 RIGHT HIP PAIN: ICD-10-CM

## 2024-07-08 DIAGNOSIS — M25.551 RIGHT HIP PAIN: Primary | ICD-10-CM

## 2024-07-08 PROCEDURE — 73521 X-RAY EXAM HIPS BI 2 VIEWS: CPT

## 2024-07-24 ENCOUNTER — TRANSCRIBE ORDERS (OUTPATIENT)
Dept: ADMINISTRATIVE | Facility: HOSPITAL | Age: 34
End: 2024-07-24
Payer: COMMERCIAL

## 2024-07-24 DIAGNOSIS — R10.9 ACUTE ABDOMINAL PAIN: Primary | ICD-10-CM

## 2024-08-19 ENCOUNTER — HOSPITAL ENCOUNTER (OUTPATIENT)
Facility: HOSPITAL | Age: 34
Discharge: HOME | End: 2024-08-19
Admitting: NURSE PRACTITIONER
Payer: COMMERCIAL

## 2024-08-19 DIAGNOSIS — R10.9 ACUTE ABDOMINAL PAIN: ICD-10-CM

## 2024-08-19 PROCEDURE — 76700 US EXAM ABDOM COMPLETE: CPT

## 2024-08-20 ENCOUNTER — HOSPITAL ENCOUNTER (EMERGENCY)
Facility: HOSPITAL | Age: 34
Discharge: HOME | End: 2024-08-20
Attending: EMERGENCY MEDICINE
Payer: MEDICAID

## 2024-08-20 VITALS
DIASTOLIC BLOOD PRESSURE: 63 MMHG | HEIGHT: 63 IN | RESPIRATION RATE: 16 BRPM | WEIGHT: 20 LBS | OXYGEN SATURATION: 97 % | SYSTOLIC BLOOD PRESSURE: 102 MMHG | BODY MASS INDEX: 3.54 KG/M2 | HEART RATE: 111 BPM | TEMPERATURE: 100 F

## 2024-08-20 DIAGNOSIS — U07.1 COVID-19: Primary | ICD-10-CM

## 2024-08-20 DIAGNOSIS — B34.9 VIRAL SYNDROME: ICD-10-CM

## 2024-08-20 LAB — S PYO AG THROAT QL: NEGATIVE

## 2024-08-20 PROCEDURE — 6370000000 HC RX 637 (ALT 250 FOR IP): Performed by: EMERGENCY MEDICINE

## 2024-08-20 PROCEDURE — 99283 EMERGENCY DEPT VISIT LOW MDM: CPT

## 2024-08-20 PROCEDURE — 87880 STREP A ASSAY W/OPTIC: CPT

## 2024-08-20 RX ORDER — ALBUTEROL SULFATE 2.5 MG/3ML
2.5 SOLUTION RESPIRATORY (INHALATION) EVERY 6 HOURS PRN
Qty: 120 EACH | Refills: 3 | Status: SHIPPED | OUTPATIENT
Start: 2024-08-20

## 2024-08-20 RX ORDER — ALBUTEROL SULFATE 90 UG/1
2 AEROSOL, METERED RESPIRATORY (INHALATION) EVERY 6 HOURS PRN
Qty: 18 G | Refills: 3 | Status: SHIPPED | OUTPATIENT
Start: 2024-08-20

## 2024-08-20 RX ORDER — ACETAMINOPHEN 500 MG
1000 TABLET ORAL ONCE
Status: COMPLETED | OUTPATIENT
Start: 2024-08-20 | End: 2024-08-20

## 2024-08-20 RX ORDER — ONDANSETRON 4 MG/1
4 TABLET, FILM COATED ORAL 3 TIMES DAILY PRN
Qty: 15 TABLET | Refills: 0 | Status: SHIPPED | OUTPATIENT
Start: 2024-08-20

## 2024-08-20 RX ORDER — ONDANSETRON 4 MG/1
4 TABLET, FILM COATED ORAL ONCE
Status: COMPLETED | OUTPATIENT
Start: 2024-08-20 | End: 2024-08-20

## 2024-08-20 RX ADMIN — ONDANSETRON HYDROCHLORIDE 4 MG: 4 TABLET, FILM COATED ORAL at 10:51

## 2024-08-20 RX ADMIN — ACETAMINOPHEN 1000 MG: 500 TABLET ORAL at 10:51

## 2024-08-20 NOTE — ED NOTES
Patient with c/o multiple family members with positive covid, patient tested positive about 0300 this am at home. Patient with c/o fever, body aches, cough, sore throat and diarrhea. Patient last took tylenol about 0330.

## 2024-08-20 NOTE — ED NOTES
Dc instructions given to patient at this time, patient to continue Tylenol every 4 hours for fever as needed and to  rx from [facility] for zofran, patient with no other questions or concerns.

## 2024-08-22 NOTE — ED PROVIDER NOTES
findings:        Interpretation per the Radiologist below, if available at the time of this note:    No orders to display     No results found.    No results found.    PROCEDURES   Unless otherwise noted below, none     Procedures    CRITICAL CARE TIME       EMERGENCY DEPARTMENT COURSE and DIFFERENTIAL DIAGNOSIS/MDM:   Vitals:    Vitals:    08/20/24 1143 08/20/24 1145 08/20/24 1204 08/20/24 1204   BP:  113/70 102/63    Pulse:    (!) 111   Resp:       Temp:    100 °F (37.8 °C)   TempSrc:    Oral   SpO2: 98% 97%     Weight:       Height:           Patient was given the following medications:  Medications   acetaminophen (TYLENOL) tablet 1,000 mg (1,000 mg Oral Given 8/20/24 1051)   ondansetron (ZOFRAN) tablet 4 mg (4 mg Oral Given 8/20/24 1051)            Is this patient to be included in the SEP-1 Core Measure due to severe sepsis or septic shock?       PAST MEDICAL HISTORY      has a past medical history of Alpha-1-proteinase inhibitor deficiency (HCC), Factor 5 Leiden mutation, heterozygous ([facility]), Fibromyalgia, Gastroparesis, and Pulmonary emboli ([facility]).     CC/HPI Summary, DDx, ED Course, and Reassessment: Patient reports with flu like illness and a positive COVID test.  His examination is normal he is mildly febrile and tachycardic due to the fever.  He was medicated with Tylenol and a rapid strep screen was done due to sore throat complaint to make sure it is not strep and it was negative and patient was discharged in stable condition.    Disposition Considerations (include 1 Tests not done, Shared Decision Making, Pt Expectation of Test or Tx.):     I am the Primary Clinician of Record.    FINAL IMPRESSION      1. COVID-19    2. Viral syndrome          DISPOSITION/PLAN     DISPOSITION Decision To Discharge 08/20/2024 12:06:57 PM  Condition at Disposition: Good      PATIENT REFERRED TO:  Janet Torres MD  [address] [facility] [place] [address]    In 2 days      [facility]  [address] [facility]

## 2024-09-25 ENCOUNTER — TRANSCRIBE ORDERS (OUTPATIENT)
Dept: LAB | Facility: HOSPITAL | Age: 34
End: 2024-09-25
Payer: COMMERCIAL

## 2024-09-25 ENCOUNTER — LAB (OUTPATIENT)
Dept: LAB | Facility: HOSPITAL | Age: 34
End: 2024-09-25
Payer: COMMERCIAL

## 2024-09-25 DIAGNOSIS — K31.84 GASTROPARESIS: ICD-10-CM

## 2024-09-25 DIAGNOSIS — R10.9 STOMACH ACHE: ICD-10-CM

## 2024-09-25 DIAGNOSIS — K31.84 GASTROPARESIS: Primary | ICD-10-CM

## 2024-09-25 DIAGNOSIS — R11.2 NAUSEA AND VOMITING, UNSPECIFIED VOMITING TYPE: ICD-10-CM

## 2024-09-25 LAB
ALBUMIN SERPL-MCNC: 4.2 G/DL (ref 3.5–5.2)
ALBUMIN/GLOB SERPL: 1.4 G/DL
ALP SERPL-CCNC: 88 U/L (ref 39–117)
ALT SERPL W P-5'-P-CCNC: 20 U/L (ref 1–33)
ANION GAP SERPL CALCULATED.3IONS-SCNC: 11 MMOL/L (ref 5–15)
AST SERPL-CCNC: 16 U/L (ref 1–32)
BILIRUB SERPL-MCNC: 0.6 MG/DL (ref 0–1.2)
BUN SERPL-MCNC: 9 MG/DL (ref 6–20)
BUN/CREAT SERPL: 14.5 (ref 7–25)
CALCIUM SPEC-SCNC: 9.4 MG/DL (ref 8.6–10.5)
CHLORIDE SERPL-SCNC: 102 MMOL/L (ref 98–107)
CO2 SERPL-SCNC: 24 MMOL/L (ref 22–29)
CREAT SERPL-MCNC: 0.62 MG/DL (ref 0.57–1)
EGFRCR SERPLBLD CKD-EPI 2021: 120.8 ML/MIN/1.73
GLOBULIN UR ELPH-MCNC: 2.9 GM/DL
GLUCOSE SERPL-MCNC: 90 MG/DL (ref 65–99)
POTASSIUM SERPL-SCNC: 4 MMOL/L (ref 3.5–5.2)
PROT SERPL-MCNC: 7.1 G/DL (ref 6–8.5)
SODIUM SERPL-SCNC: 137 MMOL/L (ref 136–145)

## 2024-09-25 PROCEDURE — 36415 COLL VENOUS BLD VENIPUNCTURE: CPT

## 2024-09-25 PROCEDURE — 80053 COMPREHEN METABOLIC PANEL: CPT

## 2024-09-26 DIAGNOSIS — G89.29 CHRONIC MUSCULOSKELETAL PAIN: ICD-10-CM

## 2024-09-26 DIAGNOSIS — M79.18 CHRONIC MUSCULOSKELETAL PAIN: ICD-10-CM

## 2024-09-26 RX ORDER — DULOXETIN HYDROCHLORIDE 60 MG/1
CAPSULE, DELAYED RELEASE ORAL
Qty: 90 CAPSULE | Refills: 0 | Status: SHIPPED | OUTPATIENT
Start: 2024-09-26

## 2024-10-29 ENCOUNTER — OFFICE VISIT (OUTPATIENT)
Dept: NEUROLOGY | Facility: CLINIC | Age: 34
End: 2024-10-29
Payer: COMMERCIAL

## 2024-10-29 VITALS
HEART RATE: 83 BPM | TEMPERATURE: 98.2 F | OXYGEN SATURATION: 98 % | RESPIRATION RATE: 14 BRPM | SYSTOLIC BLOOD PRESSURE: 116 MMHG | DIASTOLIC BLOOD PRESSURE: 82 MMHG | BODY MASS INDEX: 37.28 KG/M2 | HEIGHT: 62 IN | WEIGHT: 202.6 LBS

## 2024-10-29 DIAGNOSIS — G43.E11 INTRACTABLE CHRONIC MIGRAINE WITH AURA WITH STATUS MIGRAINOSUS: Primary | ICD-10-CM

## 2024-10-29 DIAGNOSIS — G43.909 ACUTE MIGRAINE: ICD-10-CM

## 2024-10-29 PROCEDURE — 99214 OFFICE O/P EST MOD 30 MIN: CPT | Performed by: NURSE PRACTITIONER

## 2024-10-29 PROCEDURE — 3074F SYST BP LT 130 MM HG: CPT | Performed by: NURSE PRACTITIONER

## 2024-10-29 PROCEDURE — 1159F MED LIST DOCD IN RCRD: CPT | Performed by: NURSE PRACTITIONER

## 2024-10-29 PROCEDURE — 3079F DIAST BP 80-89 MM HG: CPT | Performed by: NURSE PRACTITIONER

## 2024-10-29 PROCEDURE — 1160F RVW MEDS BY RX/DR IN RCRD: CPT | Performed by: NURSE PRACTITIONER

## 2024-10-29 RX ORDER — ZAVEGEPANT 10 MG/.1ML
10 SPRAY NASAL DAILY PRN
Qty: 6 EACH | Refills: 3 | Status: SHIPPED | OUTPATIENT
Start: 2024-10-29

## 2024-10-29 RX ORDER — RIMEGEPANT SULFATE 75 MG/75MG
75 TABLET, ORALLY DISINTEGRATING ORAL EVERY OTHER DAY
Qty: 16 TABLET | Refills: 3 | Status: SHIPPED | OUTPATIENT
Start: 2024-10-29

## 2024-10-29 NOTE — PROGRESS NOTES
Follow Up Office Visit      Patient Name: Marilu Godoy  : 1990   MRN: [id number]     Chief Complaint:    Chief Complaint   Patient presents with    Follow-up     Migraine; pt reports  HA days       History of Present Illness: Marilu Godoy is a 33 y.o. female who is here today to follow up with neurology for migraine HA.  She was last seen in clinic  ([facility]). She is here today with her boyfriend.     She has been using Aimovig 140 mg Monthly with next injection due 11/3/24. MDM . Worsening HA frequency and intensity x 2 months.  Sometimes headaches feel like a burning sensation-no rash.  She has also been taking Nurtec 75 mg QOD and reports good tolerance and compliance. She is using Zavzpret 10 mg PRN as a rescue agent and this will sometimes terminate her migraines. Last vision exam ; wears glasses.     Taken from previous encounter:    Tried and Failed: Ajovy, Qulipta, Nurtec PRN and QOD, Imitrex, Maxalt, Propranolol, Topamax, Tramadol, Zonegran, Fioricet, Aimovig, Zavzpret, Duloxetine. She describes her migraine as a dull pounding to center of forehead and will radiate toward back of head. She will have aura with spots and floaters before it starts. + Light and sound sensitivity and N/V. She likes to sleep them off in a cold dark room. Migraines will last 2-24 hours.     Recent Imaging:     EMG/NCS LUE  - noncontributory  CT Head W/WO  -noncontributory  CTA Neck 10/2022 -noncontributory  CTA Head 10/2022- noncontributory  CT head WO 10/2022 -noncontributory     Pertinent Medical History: Factor V, Factor VIII, Protein S Deficiency, PE with Eliquis use, HL, Obesity, Gastroparesis, Hyperthyroidism, chronic Pain, AMBAR, HTN    Subjective      Review of Systems:   Review of Systems   Constitutional: Negative.    HENT: Negative.     Eyes: Negative.    Respiratory: Negative.     Cardiovascular: Negative.    Gastrointestinal: Negative.    Endocrine: Negative.     Genitourinary: Negative.    Musculoskeletal: Negative.    Skin: Negative.    Allergic/Immunologic: Negative.    Neurological:  Positive for headache.   Hematological: Negative.    Psychiatric/Behavioral: Negative.     All other systems reviewed and are negative.      I have reviewed and the following portions of the patient's history were updated as appropriate: past family history, past medical history, past social history, past surgical history and problem list.    Medications:     Current Outpatient Medications:     albuterol sulfate  (90 Base) MCG/ACT inhaler, Inhale 2 puffs Every 4 (Four) Hours As Needed for Wheezing or Shortness of Air., Disp: 18 g, Rfl: 11    apixaban (ELIQUIS) 5 MG tablet tablet, Take 1 tablet by mouth 2 (Two) Times a Day., Disp: 90 tablet, Rfl: 3    aspirin 81 MG EC tablet, Take 1 tablet by mouth Daily., Disp: , Rfl:     atorvastatin (LIPITOR) 40 MG tablet, Take 1 tablet by mouth Every Night. APPT NEEDED FOR ADDITIONAL REFILLS, Disp: 90 tablet, Rfl: 0    B-D ULTRA-FINE 33 LANCETS misc, Check sugars as needed/directed for hypoglycemia., Disp: 100 each, Rfl: 12    Blood Glucose Monitoring Suppl (ONE TOUCH ULTRA 2) w/Device kit, use as needed to check blood sugar for hypoglycemia (diabetes), Disp: , Rfl:     cholecalciferol (VITAMIN D3) 1.25 MG (73302 UT) capsule, Take 1 capsule by mouth Every 7 (Seven) Days., Disp: , Rfl:     clotrimazole-betamethasone (Lotrisone) 1-0.05 % cream, Apply 1 application  topically to the appropriate area as directed 2 (Two) Times a Day., Disp: 15 g, Rfl: 0    Corlanor 5 MG tablet tablet, Take 1.5 tablets by mouth 2 (two) times a day., Disp: 90 tablet, Rfl: 6    Diclofenac Sodium (VOLTAREN) 1 % gel gel, Apply 4 g topically to the appropriate area as directed 4 (Four) Times a Day As Needed (joint pain)., Disp: 150 g, Rfl: 1    DULoxetine (CYMBALTA) 60 MG capsule, TAKE 1 CAPSULE BY MOUTH EVERY DAY. NEED APPOINTMENT FOR FURTHER REFILLS, Disp: 90 capsule,  Rfl: 0    EPINEPHrine (EPIPEN) 0.3 MG/0.3ML solution auto-injector injection, Inject  into the appropriate muscle as directed by prescriber See Admin Instructions. inject syringe as directed for allergic reaction, Disp: , Rfl:     Erenumab-aooe (AIMOVIG) 140 MG/ML auto-injector, Inject 1 mL under the skin into the appropriate area as directed Every 30 (Thirty) Days., Disp: 1 mL, Rfl: 3    fluconazole (Diflucan) 150 MG tablet, One po now and repeat in 3 days, Disp: 2 tablet, Rfl: 0    glucose blood test strip, USE TO CHECK SUGARS DAILY DUE TO hypoglycemia, Disp: 100 each, Rfl: 12    glucose monitor monitoring kit, 1 each As Needed (diabetes). Check sugars as needed/directed for hypoglycemia., Disp: 1 each, Rfl: 0    l-methylfolate 15 MG tablet tablet, Take 1 tablet by mouth Daily. Indications: folic acid deficiency, Disp: 90 tablet, Rfl: 3    lidocaine (LIDODERM) 5 %, Place 2 patches on the skin as directed by provider Daily. Remove & Discard patch within 12 hours or as directed by MD, Disp: 60 patch, Rfl: 3    methocarbamol (ROBAXIN) 750 MG tablet, Take 1 tablet by mouth At Night As Needed for Muscle Spasms., Disp: 14 tablet, Rfl: 0    mometasone (NASONEX) 50 MCG/ACT nasal spray, 2 sprays into the nostril(s) as directed by provider Daily. APPT NEEDED FOR ADDITIONAL REFILLS, Disp: 3 each, Rfl: 0    pantoprazole (PROTONIX) 40 MG EC tablet, Take 1 tablet by mouth Daily. APPT NEEDED FOR ADDITIONAL REFILLS, Disp: 90 tablet, Rfl: 0    Rimegepant Sulfate (Nurtec) 75 MG tablet dispersible tablet, Take 1 tablet by mouth Every Other Day., Disp: 16 tablet, Rfl: 3    tiZANidine (ZANAFLEX) 4 MG tablet, Take 1 tablet 3 times a day by oral route as directed for 30 days., Disp: , Rfl:     Vitamin D, Ergocalciferol, 17084 units capsule, Take 1 capsule by mouth 1 (One) Time Per Week., Disp: 5 capsule, Rfl: 11    Zavegepant HCl (Zavzpret) 10 MG/ACT solution, Administer 10 mg into the nostril(s) as directed by provider Daily As Needed  "(Migraine)., Disp: 6 each, Rfl: 3    Allergies:   Allergies   Allergen Reactions    Ceftin [Cefuroxime Axetil] Shortness Of Breath and Rash     Numbness in mouth and throat    Cefuroxime Hives, Rash and Shortness Of Breath     Numbness in mouth and throat  Numbness in mouth and throat    Iodinated Contrast Media Shortness Of Breath and Rash    Sumatriptan Shortness Of Breath and Nausea Only     Other reaction(s): Nausea Only, Other (See Comments)  Worsening migraine  Worsening migraine    Amoxicillin Rash, Hives and Itching       Objective     Physical Exam:  Vital Signs:   Vitals:    10/29/24 1339   BP: 116/82   BP Location: Right arm   Patient Position: Sitting   Cuff Size: Adult   Pulse: 83   Resp: 14   Temp: 98.2 °F (36.8 °C)   TempSrc: Infrared   SpO2: 98%   Weight: 91.9 kg (202 lb 9.6 oz)   Height: 157.5 cm (62.01\")   PainSc:   6   PainLoc: Head     Body mass index is 37.05 kg/m².    Physical Exam  Vitals and nursing note reviewed.   Constitutional:       General: She is not in acute distress.     Appearance: Normal appearance.   HENT:      Head: Normocephalic.      Nose: Nose normal.      Mouth/Throat:      Mouth: Mucous membranes are moist.      Pharynx: Oropharynx is clear.   Eyes:      Extraocular Movements: Extraocular movements intact.      Conjunctiva/sclera: Conjunctivae normal.   Musculoskeletal:      Cervical back: Normal range of motion and neck supple.   Skin:     General: Skin is warm and dry.      Capillary Refill: Capillary refill takes less than 2 seconds.   Neurological:      General: No focal deficit present.      Mental Status: She is alert and oriented to person, place, and time.   Psychiatric:         Mood and Affect: Mood normal.         Behavior: Behavior normal.         Neurological Exam  Mental Status  Alert. Oriented to person, place, and time.    Cranial Nerves  CN III, IV, VI: Extraocular movements intact bilaterally.       Assessment / Plan      Assessment/Plan:   Diagnoses and all " orders for this visit:    1. Intractable chronic migraine with aura with status migrainosus (Primary)  -     Ambulatory Referral to Disease State Management  -     Rimegepant Sulfate (Nurtec) 75 MG tablet dispersible tablet; Take 1 tablet by mouth Every Other Day.  Dispense: 16 tablet; Refill: 3  -     Erenumab-aooe (AIMOVIG) 140 MG/ML auto-injector; Inject 1 mL under the skin into the appropriate area as directed Every 30 (Thirty) Days.  Dispense: 1 mL; Refill: 3    2. Acute migraine  -     Zavegepant HCl (Zavzpret) 10 MG/ACT solution; Administer 10 mg into the nostril(s) as directed by provider Daily As Needed (Migraine).  Dispense: 6 each; Refill: 3         Follow Up:   Return in about 3 months (around 1/29/2025), or if symptoms worsen or fail to improve.    Anticipatory guidance and safety reviewed   Patient education provided   Continue Aimovig 140 mg monthly (prevention); SE reviewed  Continue Nurtec 75 mg QOD #16 (prevention); SE reviewed.  Begin Zavspret 10 mg PRN (abortive); SE reviewed.   DSM Referral for Botox therapy      RTC PRN or within 12 weeks or sooner with issues    Barby Guzman, DNP, APRN, FNP-C  [facility]

## 2024-11-16 ENCOUNTER — PATIENT MESSAGE (OUTPATIENT)
Dept: INTERNAL MEDICINE | Facility: CLINIC | Age: 34
End: 2024-11-16
Payer: COMMERCIAL

## 2024-11-18 ENCOUNTER — OFFICE VISIT (OUTPATIENT)
Age: 34
End: 2024-11-18
Payer: COMMERCIAL

## 2024-11-18 ENCOUNTER — LAB (OUTPATIENT)
Dept: LAB | Facility: HOSPITAL | Age: 34
End: 2024-11-18
Payer: COMMERCIAL

## 2024-11-18 VITALS
WEIGHT: 202 LBS | SYSTOLIC BLOOD PRESSURE: 120 MMHG | OXYGEN SATURATION: 99 % | TEMPERATURE: 98.6 F | HEIGHT: 62 IN | BODY MASS INDEX: 37.17 KG/M2 | DIASTOLIC BLOOD PRESSURE: 72 MMHG | HEART RATE: 100 BPM

## 2024-11-18 DIAGNOSIS — R21 RASH: Primary | ICD-10-CM

## 2024-11-18 DIAGNOSIS — R21 RASH: ICD-10-CM

## 2024-11-18 LAB
ALBUMIN SERPL-MCNC: 4.3 G/DL (ref 3.5–5.2)
ALBUMIN/GLOB SERPL: 1.5 G/DL
ALP SERPL-CCNC: 83 U/L (ref 39–117)
ALT SERPL W P-5'-P-CCNC: 22 U/L (ref 1–33)
ANION GAP SERPL CALCULATED.3IONS-SCNC: 12.3 MMOL/L (ref 5–15)
AST SERPL-CCNC: 18 U/L (ref 1–32)
BILIRUB SERPL-MCNC: 0.3 MG/DL (ref 0–1.2)
BILIRUB UR QL STRIP: NEGATIVE
BUN SERPL-MCNC: 9 MG/DL (ref 6–20)
BUN/CREAT SERPL: 12.7 (ref 7–25)
CALCIUM SPEC-SCNC: 9.2 MG/DL (ref 8.6–10.5)
CHLORIDE SERPL-SCNC: 101 MMOL/L (ref 98–107)
CLARITY UR: ABNORMAL
CO2 SERPL-SCNC: 23.7 MMOL/L (ref 22–29)
COLOR UR: YELLOW
CREAT SERPL-MCNC: 0.71 MG/DL (ref 0.57–1)
EGFRCR SERPLBLD CKD-EPI 2021: 115.3 ML/MIN/1.73
GLOBULIN UR ELPH-MCNC: 2.9 GM/DL
GLUCOSE SERPL-MCNC: 92 MG/DL (ref 65–99)
GLUCOSE UR STRIP-MCNC: NEGATIVE MG/DL
HGB UR QL STRIP.AUTO: NEGATIVE
HOLD SPECIMEN: NORMAL
KETONES UR QL STRIP: NEGATIVE
LEUKOCYTE ESTERASE UR QL STRIP.AUTO: NEGATIVE
NITRITE UR QL STRIP: NEGATIVE
PH UR STRIP.AUTO: 5.5 [PH] (ref 5–8)
POTASSIUM SERPL-SCNC: 4.1 MMOL/L (ref 3.5–5.2)
PROT SERPL-MCNC: 7.2 G/DL (ref 6–8.5)
PROT UR QL STRIP: NEGATIVE
SODIUM SERPL-SCNC: 137 MMOL/L (ref 136–145)
SP GR UR STRIP: 1.02 (ref 1–1.03)
UROBILINOGEN UR QL STRIP: ABNORMAL

## 2024-11-18 PROCEDURE — 80053 COMPREHEN METABOLIC PANEL: CPT

## 2024-11-18 PROCEDURE — 86431 RHEUMATOID FACTOR QUANT: CPT

## 2024-11-18 PROCEDURE — 81003 URINALYSIS AUTO W/O SCOPE: CPT

## 2024-11-18 PROCEDURE — 36415 COLL VENOUS BLD VENIPUNCTURE: CPT

## 2024-11-18 PROCEDURE — 3078F DIAST BP <80 MM HG: CPT | Performed by: FAMILY MEDICINE

## 2024-11-18 PROCEDURE — 85027 COMPLETE CBC AUTOMATED: CPT

## 2024-11-18 PROCEDURE — 3074F SYST BP LT 130 MM HG: CPT | Performed by: FAMILY MEDICINE

## 2024-11-18 PROCEDURE — 85652 RBC SED RATE AUTOMATED: CPT

## 2024-11-18 PROCEDURE — 86038 ANTINUCLEAR ANTIBODIES: CPT

## 2024-11-18 PROCEDURE — 86160 COMPLEMENT ANTIGEN: CPT

## 2024-11-18 PROCEDURE — 99213 OFFICE O/P EST LOW 20 MIN: CPT | Performed by: FAMILY MEDICINE

## 2024-11-18 PROCEDURE — 1125F AMNT PAIN NOTED PAIN PRSNT: CPT | Performed by: FAMILY MEDICINE

## 2024-11-18 RX ORDER — PREDNISONE 5 MG/1
1 TABLET ORAL TAKE AS DIRECTED
Qty: 21 TABLET | Refills: 0 | Status: SHIPPED | OUTPATIENT
Start: 2024-11-18

## 2024-11-18 RX ORDER — PERMETHRIN 50 MG/G
1 CREAM TOPICAL ONCE
Qty: 1 G | Refills: 0 | Status: SHIPPED | OUTPATIENT
Start: 2024-11-18 | End: 2024-11-18

## 2024-11-18 NOTE — PROGRESS NOTES
Follow Up Office Visit      Date: 2024   Patient Name: Marilu Godoy  : 1990   MRN: [id number]     Chief Complaint:    Chief Complaint   Patient presents with    Rash     That is on the arms ,hands and back and some on the legs has been on and off for awhile       History of Present Illness: Marilu Godoy is a 33 y.o. female who is here today for rash on arm.  States that the rash has been going on for about a year, but states that it will come and go.  State sthat the spots itch and burn, and now has some scarring on her arms from the rash.  Patient states that she has seen her primary care doctor for this, with consideration for lupus being mentioned.  Patient is also seen dermatology several years ago for this rash and has had a punch biopsy performed which reportedly showed may be an allergic reaction to medication, however it was not to figure out which medicine might of been causing the allergic reaction.    Subjective      Review of Systems:   Review of Systems   Constitutional:  Negative for appetite change and unexpected weight loss.   HENT:  Negative for trouble swallowing.    Eyes:  Negative for blurred vision and double vision.   Respiratory:  Negative for cough and shortness of breath.    Cardiovascular:  Negative for chest pain and leg swelling.   Gastrointestinal:  Negative for blood in stool.   Endocrine: Negative for cold intolerance, heat intolerance and polyuria.   Musculoskeletal:  Negative for joint swelling.   Skin:  Positive for rash. Negative for color change and bruise.   Neurological:  Negative for numbness and memory problem.   Hematological:  Does not bruise/bleed easily.   Psychiatric/Behavioral:  Negative for suicidal ideas and depressed mood. The patient is not nervous/anxious.        I have reviewed the patients family history, social history, past medical history, past surgical history and have updated it as appropriate.     Medications:     Current  Outpatient Medications:     albuterol sulfate  (90 Base) MCG/ACT inhaler, Inhale 2 puffs Every 4 (Four) Hours As Needed for Wheezing or Shortness of Air., Disp: 18 g, Rfl: 11    apixaban (ELIQUIS) 5 MG tablet tablet, Take 1 tablet by mouth 2 (Two) Times a Day., Disp: 90 tablet, Rfl: 3    aspirin 81 MG EC tablet, Take 1 tablet by mouth Daily., Disp: , Rfl:     atorvastatin (LIPITOR) 40 MG tablet, Take 1 tablet by mouth Every Night. APPT NEEDED FOR ADDITIONAL REFILLS, Disp: 90 tablet, Rfl: 0    B-D ULTRA-FINE 33 LANCETS misc, Check sugars as needed/directed for hypoglycemia., Disp: 100 each, Rfl: 12    Blood Glucose Monitoring Suppl (ONE TOUCH ULTRA 2) w/Device kit, use as needed to check blood sugar for hypoglycemia (diabetes), Disp: , Rfl:     cholecalciferol (VITAMIN D3) 1.25 MG (41982 UT) capsule, Take 1 capsule by mouth Every 7 (Seven) Days., Disp: , Rfl:     clotrimazole-betamethasone (Lotrisone) 1-0.05 % cream, Apply 1 application  topically to the appropriate area as directed 2 (Two) Times a Day., Disp: 15 g, Rfl: 0    Corlanor 5 MG tablet tablet, Take 1.5 tablets by mouth 2 (two) times a day., Disp: 90 tablet, Rfl: 6    Diclofenac Sodium (VOLTAREN) 1 % gel gel, Apply 4 g topically to the appropriate area as directed 4 (Four) Times a Day As Needed (joint pain)., Disp: 150 g, Rfl: 1    DULoxetine (CYMBALTA) 60 MG capsule, TAKE 1 CAPSULE BY MOUTH EVERY DAY. NEED APPOINTMENT FOR FURTHER REFILLS, Disp: 90 capsule, Rfl: 0    EPINEPHrine (EPIPEN) 0.3 MG/0.3ML solution auto-injector injection, Inject  into the appropriate muscle as directed by prescriber See Admin Instructions. inject syringe as directed for allergic reaction, Disp: , Rfl:     Erenumab-aooe (AIMOVIG) 140 MG/ML auto-injector, Inject 1 mL under the skin into the appropriate area as directed Every 30 (Thirty) Days., Disp: 1 mL, Rfl: 3    fluconazole (Diflucan) 150 MG tablet, One po now and repeat in 3 days, Disp: 2 tablet, Rfl: 0    glucose blood  test strip, USE TO CHECK SUGARS DAILY DUE TO hypoglycemia, Disp: 100 each, Rfl: 12    glucose monitor monitoring kit, 1 each As Needed (diabetes). Check sugars as needed/directed for hypoglycemia., Disp: 1 each, Rfl: 0    l-methylfolate 15 MG tablet tablet, Take 1 tablet by mouth Daily. Indications: folic acid deficiency, Disp: 90 tablet, Rfl: 3    lidocaine (LIDODERM) 5 %, Place 2 patches on the skin as directed by provider Daily. Remove & Discard patch within 12 hours or as directed by MD, Disp: 60 patch, Rfl: 3    methocarbamol (ROBAXIN) 750 MG tablet, Take 1 tablet by mouth At Night As Needed for Muscle Spasms., Disp: 14 tablet, Rfl: 0    mometasone (NASONEX) 50 MCG/ACT nasal spray, 2 sprays into the nostril(s) as directed by provider Daily. APPT NEEDED FOR ADDITIONAL REFILLS, Disp: 3 each, Rfl: 0    pantoprazole (PROTONIX) 40 MG EC tablet, Take 1 tablet by mouth Daily. APPT NEEDED FOR ADDITIONAL REFILLS, Disp: 90 tablet, Rfl: 0    Rimegepant Sulfate (Nurtec) 75 MG tablet dispersible tablet, Take 1 tablet by mouth Every Other Day., Disp: 16 tablet, Rfl: 3    tiZANidine (ZANAFLEX) 4 MG tablet, Take 1 tablet 3 times a day by oral route as directed for 30 days., Disp: , Rfl:     Vitamin D, Ergocalciferol, 73334 units capsule, Take 1 capsule by mouth 1 (One) Time Per Week., Disp: 5 capsule, Rfl: 11    Zavegepant HCl (Zavzpret) 10 MG/ACT solution, Administer 10 mg into the nostril(s) as directed by provider Daily As Needed (Migraine)., Disp: 6 each, Rfl: 3    permethrin (ELIMITE) 5 % cream, Apply 1 Application topically to the appropriate area as directed 1 (One) Time for 1 dose., Disp: 1 g, Rfl: 0    predniSONE 5 MG (21) tablet therapy pack dose pack, Take 1 tablet by mouth Take As Directed. Take as directed on package instructions., Disp: 21 tablet, Rfl: 0    Allergies:   Allergies   Allergen Reactions    Ceftin [Cefuroxime Axetil] Shortness Of Breath and Rash     Numbness in mouth and throat    Cefuroxime Hives,  "Rash and Shortness Of Breath     Numbness in mouth and throat  Numbness in mouth and throat    Iodinated Contrast Media Shortness Of Breath and Rash    Sumatriptan Shortness Of Breath and Nausea Only     Other reaction(s): Nausea Only, Other (See Comments)  Worsening migraine  Worsening migraine    Amoxicillin Rash, Hives and Itching       Objective     Physical Exam: Please see above  Vital Signs:   Vitals:    11/18/24 1244   BP: 120/72   Pulse: 100   Temp: 98.6 °F (37 °C)   SpO2: 99%   Weight: 91.6 kg (202 lb)   Height: 157.5 cm (62.01\")     Body mass index is 36.93 kg/m².    Physical Exam  Vitals and nursing note reviewed.   Constitutional:       Appearance: Normal appearance.   HENT:      Head: Normocephalic and atraumatic.   Eyes:      General: Lids are normal.      Conjunctiva/sclera: Conjunctivae normal.   Cardiovascular:      Rate and Rhythm: Normal rate and regular rhythm.   Pulmonary:      Effort: Pulmonary effort is normal.      Breath sounds: Normal breath sounds and air entry.   Abdominal:      General: Abdomen is flat. Bowel sounds are normal.      Palpations: Abdomen is soft.   Musculoskeletal:      Cervical back: Full passive range of motion without pain and normal range of motion.   Skin:     Comments: Patient with scabbed over lesions along her forearm as well as mid back and a few scattered spots on her lower extremities.  No lesion or rash noted between the patient's fingers.   Neurological:      General: No focal deficit present.      Mental Status: She is alert and oriented to person, place, and time.   Psychiatric:         Attention and Perception: Attention normal.         Mood and Affect: Mood normal.         Behavior: Behavior normal. Behavior is cooperative.         Procedures    Results:   Labs:   Hemoglobin A1C   Date Value Ref Range Status   08/07/2023 5.00 4.80 - 5.60 % Final     Comment:     Hemoglobin A1C Ranges:  Increased Risk for Diabetes  5.7% to 6.4%  Diabetes                    "  >= 6.5%  Diabetic Goal                < 7.0%     03/22/2023 4.90 4.80 - 5.60 % Final     TSH   Date Value Ref Range Status   08/07/2023 0.790 0.270 - 4.200 uIU/mL Final   08/30/2022 0.686 0.270 - 4.200 uIU/mL Final        POCT Results (if applicable):   Results for orders placed or performed in visit on 09/25/24   Comprehensive Metabolic Panel    Collection Time: 09/25/24 11:26 AM    Specimen: Blood   Result Value Ref Range    Glucose 90 65 - 99 mg/dL    BUN 9 6 - 20 mg/dL    Creatinine 0.62 0.57 - 1.00 mg/dL    Sodium 137 136 - 145 mmol/L    Potassium 4.0 3.5 - 5.2 mmol/L    Chloride 102 98 - 107 mmol/L    CO2 24.0 22.0 - 29.0 mmol/L    Calcium 9.4 8.6 - 10.5 mg/dL    Total Protein 7.1 6.0 - 8.5 g/dL    Albumin 4.2 3.5 - 5.2 g/dL    ALT (SGPT) 20 1 - 33 U/L    AST (SGOT) 16 1 - 32 U/L    Alkaline Phosphatase 88 39 - 117 U/L    Total Bilirubin 0.6 0.0 - 1.2 mg/dL    Globulin 2.9 gm/dL    A/G Ratio 1.4 g/dL    BUN/Creatinine Ratio 14.5 7.0 - 25.0    Anion Gap 11.0 5.0 - 15.0 mmol/L    eGFR 120.8 >60.0 mL/min/1.73     *Note: Due to a large number of results and/or encounters for the requested time period, some results have not been displayed. A complete set of results can be found in Results Review.       Imaging:   No valid procedures specified.       Assessment / Plan      Assessment/Plan:   Diagnoses and all orders for this visit:    1. Rash (Primary)  -     CBC (No Diff); Future  -     Comprehensive metabolic panel; Future  -     Urinalysis With Culture If Indicated -; Future  -     C3+C4+KIERA+RA; Future  -     Sedimentation Rate; Future  -     permethrin (ELIMITE) 5 % cream; Apply 1 Application topically to the appropriate area as directed 1 (One) Time for 1 dose.  Dispense: 1 g; Refill: 0  -     predniSONE 5 MG (21) tablet therapy pack dose pack; Take 1 tablet by mouth Take As Directed. Take as directed on package instructions.  Dispense: 21 tablet; Refill: 0  -     Ambulatory Referral to Dermatology  - Did  encourage patient to follow-up with her primary care physician for further workup of this as workup has already been started by the on and the patient has several chronic issues that probably need to be taken into account.               Vaccine Counseling:      Follow Up:   No follow-ups on file.      Louie El, DO   [facility] [place] [facility]

## 2024-11-19 LAB
ANA SER QL: NEGATIVE
C3 SERPL-MCNC: 183 MG/DL (ref 82–167)
C4 SERPL-MCNC: 62 MG/DL (ref 12–38)
DEPRECATED RDW RBC AUTO: 41.9 FL (ref 37–54)
ERYTHROCYTE [DISTWIDTH] IN BLOOD BY AUTOMATED COUNT: 12.5 % (ref 12.3–15.4)
ERYTHROCYTE [SEDIMENTATION RATE] IN BLOOD: 10 MM/HR (ref 0–20)
HCT VFR BLD AUTO: 39.8 % (ref 34–46.6)
HGB BLD-MCNC: 14 G/DL (ref 12–15.9)
MCH RBC QN AUTO: 32.5 PG (ref 26.6–33)
MCHC RBC AUTO-ENTMCNC: 35.2 G/DL (ref 31.5–35.7)
MCV RBC AUTO: 92.3 FL (ref 79–97)
PLATELET # BLD AUTO: 350 10*3/MM3 (ref 140–450)
PMV BLD AUTO: 10 FL (ref 6–12)
RBC # BLD AUTO: 4.31 10*6/MM3 (ref 3.77–5.28)
RHEUMATOID FACT SERPL-ACNC: <10 IU/ML
WBC NRBC COR # BLD AUTO: 8.52 10*3/MM3 (ref 3.4–10.8)

## 2024-11-22 DIAGNOSIS — R21 RASH: Primary | ICD-10-CM

## 2025-01-16 ENCOUNTER — HOSPITAL ENCOUNTER (EMERGENCY)
Facility: HOSPITAL | Age: 35
Discharge: HOME | End: 2025-01-16
Attending: EMERGENCY MEDICINE
Payer: COMMERCIAL

## 2025-01-16 ENCOUNTER — APPOINTMENT (OUTPATIENT)
Dept: GENERAL RADIOLOGY | Facility: HOSPITAL | Age: 35
End: 2025-01-16
Payer: COMMERCIAL

## 2025-01-16 VITALS
WEIGHT: 201.94 LBS | TEMPERATURE: 97.7 F | BODY MASS INDEX: 37.16 KG/M2 | RESPIRATION RATE: 19 BRPM | HEART RATE: 107 BPM | OXYGEN SATURATION: 93 % | SYSTOLIC BLOOD PRESSURE: 134 MMHG | DIASTOLIC BLOOD PRESSURE: 93 MMHG | HEIGHT: 62 IN

## 2025-01-16 DIAGNOSIS — R06.00 DYSPNEA, UNSPECIFIED TYPE: Primary | ICD-10-CM

## 2025-01-16 DIAGNOSIS — D68.59 PROTEIN S DEFICIENCY: ICD-10-CM

## 2025-01-16 DIAGNOSIS — D68.51 HETEROZYGOUS FACTOR V LEIDEN MUTATION: ICD-10-CM

## 2025-01-16 LAB
ALBUMIN SERPL-MCNC: 4.2 G/DL (ref 3.5–5.2)
ALBUMIN/GLOB SERPL: 1.3 G/DL
ALP SERPL-CCNC: 85 U/L (ref 39–117)
ALT SERPL W P-5'-P-CCNC: 15 U/L (ref 1–33)
ANION GAP SERPL CALCULATED.3IONS-SCNC: 11.3 MMOL/L (ref 5–15)
AST SERPL-CCNC: 13 U/L (ref 1–32)
BASOPHILS # BLD AUTO: 0.04 10*3/MM3 (ref 0–0.2)
BASOPHILS NFR BLD AUTO: 0.4 % (ref 0–1.5)
BILIRUB SERPL-MCNC: 0.3 MG/DL (ref 0–1.2)
BUN SERPL-MCNC: 10 MG/DL (ref 6–20)
BUN/CREAT SERPL: 13.2 (ref 7–25)
CALCIUM SPEC-SCNC: 9.2 MG/DL (ref 8.6–10.5)
CHLORIDE SERPL-SCNC: 102 MMOL/L (ref 98–107)
CO2 SERPL-SCNC: 23.7 MMOL/L (ref 22–29)
CREAT SERPL-MCNC: 0.76 MG/DL (ref 0.57–1)
D DIMER PPP FEU-MCNC: 0.39 MCGFEU/ML (ref 0–0.5)
DEPRECATED RDW RBC AUTO: 39.5 FL (ref 37–54)
EGFRCR SERPLBLD CKD-EPI 2021: 105.6 ML/MIN/1.73
EOSINOPHIL # BLD AUTO: 0.15 10*3/MM3 (ref 0–0.4)
EOSINOPHIL NFR BLD AUTO: 1.6 % (ref 0.3–6.2)
ERYTHROCYTE [DISTWIDTH] IN BLOOD BY AUTOMATED COUNT: 12 % (ref 12.3–15.4)
GEN 5 1HR TROPONIN T REFLEX: <6 NG/L
GLOBULIN UR ELPH-MCNC: 3.3 GM/DL
GLUCOSE SERPL-MCNC: 93 MG/DL (ref 65–99)
HCT VFR BLD AUTO: 40.5 % (ref 34–46.6)
HGB BLD-MCNC: 14 G/DL (ref 12–15.9)
HOLD SPECIMEN: NORMAL
HOLD SPECIMEN: NORMAL
IMM GRANULOCYTES # BLD AUTO: 0.03 10*3/MM3 (ref 0–0.05)
IMM GRANULOCYTES NFR BLD AUTO: 0.3 % (ref 0–0.5)
LYMPHOCYTES # BLD AUTO: 2.68 10*3/MM3 (ref 0.7–3.1)
LYMPHOCYTES NFR BLD AUTO: 28.3 % (ref 19.6–45.3)
MCH RBC QN AUTO: 30.9 PG (ref 26.6–33)
MCHC RBC AUTO-ENTMCNC: 34.6 G/DL (ref 31.5–35.7)
MCV RBC AUTO: 89.4 FL (ref 79–97)
MONOCYTES # BLD AUTO: 0.62 10*3/MM3 (ref 0.1–0.9)
MONOCYTES NFR BLD AUTO: 6.5 % (ref 5–12)
NEUTROPHILS NFR BLD AUTO: 5.95 10*3/MM3 (ref 1.7–7)
NEUTROPHILS NFR BLD AUTO: 62.9 % (ref 42.7–76)
NRBC BLD AUTO-RTO: 0 /100 WBC (ref 0–0.2)
NT-PROBNP SERPL-MCNC: 60.3 PG/ML (ref 0–450)
PLATELET # BLD AUTO: 334 10*3/MM3 (ref 140–450)
PMV BLD AUTO: 9.9 FL (ref 6–12)
POTASSIUM SERPL-SCNC: 3.6 MMOL/L (ref 3.5–5.2)
PROT SERPL-MCNC: 7.5 G/DL (ref 6–8.5)
RBC # BLD AUTO: 4.53 10*6/MM3 (ref 3.77–5.28)
SODIUM SERPL-SCNC: 137 MMOL/L (ref 136–145)
TROPONIN T NUMERIC DELTA: NORMAL
TROPONIN T SERPL HS-MCNC: <6 NG/L
WBC NRBC COR # BLD AUTO: 9.47 10*3/MM3 (ref 3.4–10.8)
WHOLE BLOOD HOLD COAG: NORMAL
WHOLE BLOOD HOLD SPECIMEN: NORMAL

## 2025-01-16 PROCEDURE — 83880 ASSAY OF NATRIURETIC PEPTIDE: CPT

## 2025-01-16 PROCEDURE — 85379 FIBRIN DEGRADATION QUANT: CPT | Performed by: PHYSICIAN ASSISTANT

## 2025-01-16 PROCEDURE — 36415 COLL VENOUS BLD VENIPUNCTURE: CPT

## 2025-01-16 PROCEDURE — 99284 EMERGENCY DEPT VISIT MOD MDM: CPT | Performed by: EMERGENCY MEDICINE

## 2025-01-16 PROCEDURE — 85025 COMPLETE CBC W/AUTO DIFF WBC: CPT

## 2025-01-16 PROCEDURE — 93005 ELECTROCARDIOGRAM TRACING: CPT | Performed by: EMERGENCY MEDICINE

## 2025-01-16 PROCEDURE — 80053 COMPREHEN METABOLIC PANEL: CPT

## 2025-01-16 PROCEDURE — 84484 ASSAY OF TROPONIN QUANT: CPT | Performed by: EMERGENCY MEDICINE

## 2025-01-16 PROCEDURE — 84484 ASSAY OF TROPONIN QUANT: CPT

## 2025-01-16 PROCEDURE — 93005 ELECTROCARDIOGRAM TRACING: CPT

## 2025-01-16 PROCEDURE — 71045 X-RAY EXAM CHEST 1 VIEW: CPT

## 2025-01-16 RX ORDER — SODIUM CHLORIDE 0.9 % (FLUSH) 0.9 %
10 SYRINGE (ML) INJECTION AS NEEDED
Status: DISCONTINUED | OUTPATIENT
Start: 2025-01-16 | End: 2025-01-17 | Stop reason: HOSPADM

## 2025-01-16 RX ADMIN — APIXABAN 5 MG: 2.5 TABLET, FILM COATED ORAL at 22:18

## 2025-01-16 NOTE — PROGRESS NOTES
[facility] Cardiology    Office Consult     Marilu Godoy  [id number]  01/21/2025    Referred By: No ref. provider found    Chief Complaint   Patient presents with    Chest Pain     Radiating to left shoulder blade    Shortness of Breath    Dizziness     Dizziness is present when having chest pain and shortness of breath.     Coronary Artery Disease    Fatigue       Subjective     History of Present Illness:   Marilu Godoy is a 34 y.o. female who presents to the Cardiology Clinic for evaluation of chest pain and palpitations.  She recently wore a MCOT earlier this year in March with complaints of palpitations.  The MCOT noted 20 symptomatic activations all of which occurred during sinus rhythm.  There was no arrhythmia or frequent/complex atrial or ventricular ectopy.  She discussed these results with Dr. Byrnes.  In 2023, the patient reported that her chest discomfort has remained unchanged in regards to quality, frequency, duration and intensity and she underwent a treadmill exercise stress test which showed no objective evidence of ischemia at cardiac workload achieved. However, her overall effort tolerance was so poor the possibility remains that myocardial ischemia could have been manifest with a higher cardiac workload.This cardiology office had attempted to get a vasodilator nuclear stress test previously but this was denied by her insurance. As alternative testing we had requested a CT coronary angiogram which was also denied by her insurance. Patient was recently in the emergency department 1/16/2024 with complaints of dyspnea and had a full workup at that time.  Her labs were reassuring with normal troponin levels, normal d-dimer level, normal BNP, and no acute process noted on CXR.  EKG without any acute changes.  Patient had reportedly been out of her Eliquis for about 1 week.  She was given a prescription for Eliquis and was discharged home.      Patient arrives today for  follow up.  States she has had worsening palpitations for the past several months.  States that she has also had shortness of breath and smothering intermittently for months as well, described as more frequent over the past couple weeks.  Also complaints of chest tightness/ and left sided chest pain radiating to shoulder blade.  States she often gets the shortness of breath with chest tightness.  States the tightness has occurred daily.  States the palpitations have been occurring daily.  States that she has cut back on caffeine without relief of palpitations.      Past Cardiac Testin. Last Coronary Angio: None  2. Prior Stress Testing: 3/2023--No ECG evidence of myocardial ischemia.  Negative clinical evidence of myocardial ischemia.  Findings consistent with a normal ECG stress test.  No objective evidence of ischemia at cardiac workload achieved  3. Last Echo: 2023-- Left ventricular ejection fraction appears to be greater than 70%.  Left ventricular wall thickness is consistent with mild concentric hypertrophy.  Left ventricular diastolic function was normal.  4. Prior Holter Monitor: 2024--Normal cardiac monitor with 20 symptomatic activations all occurring during sinus rhythm.       Review of Systems   Constitutional:  Negative for activity change and fatigue.   Respiratory:  Positive for chest tightness and shortness of breath.    Cardiovascular:  Negative for chest pain, palpitations and leg swelling.   Neurological:  Negative for dizziness.   All other systems reviewed and are negative.       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     Bleeding disorder 2016    Body piercing     TONGUE, NOSE, TWO IN EACH EAR    Bronchitis     Chest pain 2021    last CP 2-3 months ago    Cholelithiasis     Clotting disorder     factor 5     Constipation     Coronary artery disease 2017    COVID-19 vaccine series completed     Pfizer-plus booster    Deep vein thrombosis 2016    Diarrhea     Disease of thyroid gland     Diverticulitis     Diverticulosis     Elevated cholesterol     Endometriosis     Factor 5 Leiden mutation, heterozygous     Fibroid     Fibromyalgia     Fibromyalgia, primary     Full dentures 2021    advised no adhesives DOS    Gastroparesis     GERD (gastroesophageal reflux disease)     H/O blood clots     H/O cardiovascular stress test 2021    reported several years ago-exercise wnl    Headache     Hiatal hernia     High cholesterol     History of recurrent UTIs     HPV (human papilloma virus) infection     Hx of echocardiogram     Hx of gout 2021    Hypertension     Hypoglycemia     Inappropriate sinus node tachycardia     Kidney infection 2021    history only    Low back pain     Migraine     Nausea & vomiting     Obesity     Osteoarthritis     Ovarian cyst     Pneumonia     Pollen allergies     PONV (postoperative nausea and vomiting)     Protein S deficiency 2016    labs from hospitalization for PE    Pulmonary embolism 2016    on eliquis since that time    Recurrent pregnancy loss, antepartum condition or complication     Sleep apnea     no CPAP- states insurance took it back    Tattoo     LOWER BACK    Tinnitus     Varicella        Past Surgical History:   Procedure Laterality Date     SECTION        and  and      SECTION WITH TUBAL N/A 01/15/2019    Procedure:  SECTION REPEAT WITH TUBAL;  Surgeon: Alva Boyer MD;  Location: [facility] LABOR DELIVERY;  Service: Obstetrics/Gynecology    COLONOSCOPY N/A 2017    Procedure: COLONOSCOPY WITH BIOPSIES AND ARGON THERMAL ABLATION;  Surgeon: Jignesh Selby MD;  Location: [facility] ENDOSCOPY;  Service:     D & C HYSTEROSCOPY ENDOMETRIAL ABLATION N/A 03/10/2022    Procedure: DILATATION AND CURETTAGE  DIAGNOSTIC HYSTEROSCOPY NOVASURE ENDOMETRIAL ABLATION;  Surgeon: Alva Boyer MD;  Location: [facility] OR;  Service: Obstetrics/Gynecology;  Laterality: N/A;    DIAGNOSTIC LAPAROSCOPY N/A 04/02/2018    Procedure: DIAGNOSTIC LAPAROSCOPY AND ABLATION OF ENDOMETRIOSIS;  Surgeon: Evin Zamudio MD;  Location: [facility] OR;  Service: Obstetrics/Gynecology    ENDOMETRIAL ABLATION      ENDOSCOPIC FUNCTIONAL SINUS SURGERY (FESS) Right 06/25/2021    Procedure: Right endoscopic total ethmoidectomy, right endoscopic middle meatal antrotomy with cyst removal, right endoscopic frontal recess exploration with cyst removal;  Surgeon: Saulo Brito MD;  Location: [facility] OR;  Service: ENT;  Laterality: Right;    ENDOSCOPY N/A 04/20/2017    Procedure: ESOPHAGOGASTRODUODENOSCOPY WITH BIOPSIES AND COLD BIOPSY POLYPECTOMIES;  Surgeon: Jignesh Selby MD;  Location: [facility] ENDOSCOPY;  Service:     GASTRIC STIMULATOR IMPLANT SURGERY      LAPAROSCOPIC CHOLECYSTECTOMY      LIPOMA EXCISION N/A 12/10/2021    Procedure: Excision of soft tissue mass lower back;  Surgeon: Nidia Smith MD;  Location: [facility] OR;  Service: General;  Laterality: N/A;    MOUTH SURGERY      full mouth tooth extration    SINUS SURGERY      TUBAL ABDOMINAL LIGATION  01/15/2019    WISDOM TOOTH EXTRACTION         Family History   Problem Relation Age of Onset    Arthritis Mother     COPD Mother     Asthma Mother     Thyroid disease Mother     Arthritis Father     Diabetes Father     Hypertension Father     Hyperlipidemia Father     Kidney disease Father     Heart attack Father     Coronary artery disease Father     Dementia Father     No Known Problems Son     Colon cancer Neg Hx     Liver cancer Neg Hx     Liver disease Neg Hx     Stomach cancer Neg Hx     Esophageal cancer Neg Hx        Social History     Socioeconomic History    Marital status:    Tobacco Use    Smoking status: Never     Passive exposure: Past    Smokeless tobacco: Never   Vaping  Use    Vaping status: Never Used   Substance and Sexual Activity    Alcohol use: No    Drug use: Never    Sexual activity: Yes     Partners: Male     Birth control/protection: Surgical     Comment: Tubal         Current Outpatient Medications:     albuterol (PROVENTIL) (2.5 MG/3ML) 0.083% nebulizer solution, 2.5 mg., Disp: , Rfl:     albuterol sulfate  (90 Base) MCG/ACT inhaler, Inhale 2 puffs Every 4 (Four) Hours As Needed for Wheezing or Shortness of Air., Disp: 18 g, Rfl: 11    apixaban (ELIQUIS) 5 MG tablet tablet, Take 1 tablet by mouth 2 (Two) Times a Day., Disp: 60 tablet, Rfl: 11    aspirin 81 MG EC tablet, Take 1 tablet by mouth Daily., Disp: , Rfl:     atorvastatin (LIPITOR) 40 MG tablet, Take 1 tablet by mouth Every Night. APPT NEEDED FOR ADDITIONAL REFILLS, Disp: 90 tablet, Rfl: 0    B-D ULTRA-FINE 33 LANCETS misc, Check sugars as needed/directed for hypoglycemia., Disp: 100 each, Rfl: 12    Blood Glucose Monitoring Suppl (ONE TOUCH ULTRA 2) w/Device kit, use as needed to check blood sugar for hypoglycemia (diabetes), Disp: , Rfl:     Blood Pressure kit, Monitor blood pressure daily due to labile blood pressures, Disp: 1 each, Rfl: 0    cholecalciferol (VITAMIN D3) 1.25 MG (62624 UT) capsule, Take 1 capsule by mouth Every 7 (Seven) Days., Disp: , Rfl:     Corlanor 5 MG tablet tablet, Take 1.5 tablets by mouth 2 (two) times a day., Disp: 90 tablet, Rfl: 6    Diclofenac Sodium (VOLTAREN) 1 % gel gel, Apply 4 g topically to the appropriate area as directed 4 (Four) Times a Day As Needed (joint pain)., Disp: 150 g, Rfl: 1    DULoxetine (CYMBALTA) 60 MG capsule, TAKE 1 CAPSULE BY MOUTH EVERY DAY. NEED APPOINTMENT FOR FURTHER REFILLS, Disp: 90 capsule, Rfl: 0    EPINEPHrine (EPIPEN) 0.3 MG/0.3ML solution auto-injector injection, Inject  into the appropriate muscle as directed by prescriber See Admin Instructions. inject syringe as directed for allergic reaction, Disp: , Rfl:     Erenumab-aooe (AIMOVIG)  140 MG/ML auto-injector, Inject 1 mL under the skin into the appropriate area as directed Every 30 (Thirty) Days., Disp: 1 mL, Rfl: 6    glucose blood test strip, USE TO CHECK SUGARS DAILY DUE TO hypoglycemia, Disp: 100 each, Rfl: 12    glucose monitor monitoring kit, 1 each As Needed (diabetes). Check sugars as needed/directed for hypoglycemia., Disp: 1 each, Rfl: 0    levocetirizine (XYZAL) 5 MG tablet, Take 1 tablet by mouth every night at bedtime., Disp: , Rfl:     lidocaine (LIDODERM) 5 %, Place 2 patches on the skin as directed by provider Daily. Remove & Discard patch within 12 hours or as directed by MD, Disp: 60 patch, Rfl: 3    linaclotide (Linzess) 145 MCG capsule capsule, Take 1 capsule by mouth Daily., Disp: , Rfl:     methocarbamol (ROBAXIN) 750 MG tablet, Take 1 tablet by mouth At Night As Needed for Muscle Spasms., Disp: 14 tablet, Rfl: 0    mometasone (NASONEX) 50 MCG/ACT nasal spray, 2 sprays into the nostril(s) as directed by provider Daily. APPT NEEDED FOR ADDITIONAL REFILLS, Disp: 3 each, Rfl: 0    pantoprazole (PROTONIX) 40 MG EC tablet, Take 1 tablet by mouth Daily. APPT NEEDED FOR ADDITIONAL REFILLS, Disp: 90 tablet, Rfl: 0    rimegepant sulfate (Nurtec) 75 MG tablet, Take 1 tablet by mouth Every Other Day., Disp: 16 tablet, Rfl: 6    Symbicort 160-4.5 MCG/ACT inhaler, INHALE 2 PUFFS BY MOUTH EVERY 12 HOURS WITH SPACER. RINSE MOUTH AFTER EACH USE., Disp: , Rfl:     tiZANidine (ZANAFLEX) 4 MG tablet, Take 1 tablet 3 times a day by oral route as directed for 30 days., Disp: , Rfl:     Vitamin D, Ergocalciferol, 71326 units capsule, Take 1 capsule by mouth 1 (One) Time Per Week., Disp: 5 capsule, Rfl: 11      Allergies   Allergen Reactions    Ceftin [Cefuroxime Axetil] Shortness Of Breath and Rash     Numbness in mouth and throat    Cefuroxime Hives, Rash and Shortness Of Breath     Numbness in mouth and throat  Numbness in mouth and throat    Iodinated Contrast Media Shortness Of Breath and  "Rash    Sumatriptan Shortness Of Breath and Nausea Only     Other reaction(s): Nausea Only, Other (See Comments)  Worsening migraine  Worsening migraine    Amoxicillin Rash, Hives and Itching       Objective     Vitals:    01/21/25 1524   BP: 122/82   Pulse: 97   Resp: 17   SpO2: 99%   Weight: 93.5 kg (206 lb 3.2 oz)   Height: 157.5 cm (62\")     Body mass index is 37.71 kg/m².    Physical Exam  Vitals and nursing note reviewed.   Constitutional:       General: She is not in acute distress.     Appearance: Normal appearance. She is obese. She is not ill-appearing or toxic-appearing.   HENT:      Head: Normocephalic.      Mouth/Throat:      Mouth: Mucous membranes are moist.   Eyes:      Pupils: Pupils are equal, round, and reactive to light.   Cardiovascular:      Rate and Rhythm: Normal rate and regular rhythm.      Pulses: Normal pulses.      Heart sounds: Normal heart sounds. No murmur heard.  Pulmonary:      Effort: Pulmonary effort is normal.      Breath sounds: Normal breath sounds. No wheezing, rhonchi or rales.   Musculoskeletal:      Right lower leg: No edema.      Left lower leg: No edema.   Skin:     General: Skin is warm and dry.      Capillary Refill: Capillary refill takes less than 2 seconds.   Neurological:      Mental Status: She is alert and oriented to person, place, and time. Mental status is at baseline.   Psychiatric:         Mood and Affect: Mood normal.         Behavior: Behavior normal.         Thought Content: Thought content normal.         Results Review:   I reviewed the patient's new clinical results.      ECG 12 Lead    Date/Time: 1/21/2025 4:17 PM  Performed by: Rowena Fernandez APRN    Authorized by: Rowena Fernandez APRN  Rhythm: sinus rhythm  Rate: normal  QRS axis: normal    Clinical impression: normal ECG          Assessment & Plan  Hospital discharge follow-up         Chest pain, atypical  -EKG notes NSR in office today  -Typical and atypical features noted, with risk factors " "present  -Will proceed with nuclear stress test to rule out ischemia  -Patient instructed with any new, changing, or worsening symptoms to proceed to the ER for further evaluation, verbalized understanding  -Echocardiogram ordered  -Return to clinic in 6 weeks to discuss testing with Dr. Byrnes  Orders:    Stress Test With Myocardial Perfusion (2 Day); Future    Shortness of breath  -Reports worsening dyspnea recently worse with laying flat  -Will repeat echocardiogram to assess systolic/ diastolic function  -Cannot rule out anginal component--will obtain nuclear stress test to rule out ischemia  -Previously unable to reach target workload with GXT  -Euvolemic on exam today  Orders:    Adult Transthoracic Echo Complete W/ Cont if Necessary Per Protocol; Future    Palpitations  -Has been almost a year since last monitor study  -Reports increased frequency, daily now  -Will order 14 day holter monitor to rule out arrhythmia vs ectopy  -Minimal caffeine intake noted, stay hydrated  Orders:    Holter Monitor - 72 Hour Up To 15 Days; Future    Heterozygous factor V Leiden mutation  -Followed by Dr. Barker (PCP)  -Continue Eliquis         Obstructive sleep apnea  -States it has been awhile since her last sleep study and she was noted to have \" a touch of it\"  -Referral for sleep study placed  Orders:    Ambulatory Referral to Sleep Medicine    Obesity (BMI 30-39.9)  -BMI-37  -Diet and exercise recommended for weight loss         Dyslipidemia  -Tolerating statin therapy without difficulty  -Lipid panels per PCP         Preventative Cardiology:   Tobacco Cessation: N/A   Advance Care Planning: ACP discussion was held with the patient during this visit. Patient does not have an advance directive, declines further assistance.     Follow Up:   Return in about 6 weeks (around 3/4/2025) for Next scheduled follow up--Dr. Byrnes.      Thank you for allowing me to participate in the care of your patient. Please to not hesitate " to contact me with additional questions or concerns.     Rowena Fernandez, APRN

## 2025-01-17 NOTE — ED PROVIDER NOTES
EMERGENCY DEPARTMENT ENCOUNTER    Pt Name: Marilu Godoy  MRN: [id number]  Pt :   1990  Room Number:  15/15  Date of encounter:  2025  PCP: Deidre Barker MD  ED Provider: Stew Sousa PA-C    Historian: Patient      HPI:  Chief Complaint   Patient presents with    Shortness of Breath          Context: Marilu Godoy is a 34 y.o. female who presents to the ED c/o shortness of breath, tachycardia and dyspnea with exertion and left-sided chest tightness.  History of factor VIII deficiency history of PEs also history of factor V Leiden mutation on Eliquis but has been without fo about a week as her prescription bottle was lost and she is not followed with her PCP to get it refilled.  Denies pain or swelling in her lower extremities.  No cough or URI symptoms.  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     Bleeding disorder 2016    Body piercing     TONGUE, NOSE, TWO IN EACH EAR    Bronchitis     Chest pain 2021    last CP 2-3 months ago    Cholelithiasis     Clotting disorder     factor 5    Constipation     Coronary artery disease 2017    COVID-19 vaccine series completed     Pfizer-plus booster    Deep vein thrombosis 2016    Diarrhea     Disease of thyroid gland     Diverticulitis     Diverticulosis     Elevated cholesterol     Endometriosis     Factor 5 Leiden mutation, heterozygous     Fibroid     Fibromyalgia     Fibromyalgia, primary     Full dentures 2021    advised no adhesives DOS    Gastroparesis     GERD (gastroesophageal reflux disease)     H/O blood clots     H/O cardiovascular stress test 2021    reported several years ago-exercise wnl    Headache     Hiatal hernia     High cholesterol     History of recurrent UTIs     HPV (human papilloma virus) infection     Hx of  echocardiogram     Hx of gout 2021    Hypertension     Hypoglycemia     Inappropriate sinus node tachycardia     Kidney infection 2021    history only    Low back pain     Migraine     Nausea & vomiting     Obesity     Osteoarthritis     Ovarian cyst     Pneumonia     Pollen allergies     PONV (postoperative nausea and vomiting)     Protein S deficiency 2016    labs from hospitalization for PE    Pulmonary embolism 2016    on eliquis since that time    Recurrent pregnancy loss, antepartum condition or complication     Sleep apnea     no CPAP- states insurance took it back    Tattoo     LOWER BACK    Tinnitus     Varicella          PAST SURGICAL HISTORY  Past Surgical History:   Procedure Laterality Date     SECTION        and  and      SECTION WITH TUBAL N/A 01/15/2019    Procedure:  SECTION REPEAT WITH TUBAL;  Surgeon: Alva Boyer MD;  Location: [facility] LABOR DELIVERY;  Service: Obstetrics/Gynecology    COLONOSCOPY N/A 2017    Procedure: COLONOSCOPY WITH BIOPSIES AND ARGON THERMAL ABLATION;  Surgeon: Jignesh Selby MD;  Location: [facility] ENDOSCOPY;  Service:     D & C HYSTEROSCOPY ENDOMETRIAL ABLATION N/A 03/10/2022    Procedure: DILATATION AND CURETTAGE DIAGNOSTIC HYSTEROSCOPY NOVASURE ENDOMETRIAL ABLATION;  Surgeon: Alva Boyer MD;  Location: [facility] OR;  Service: Obstetrics/Gynecology;  Laterality: N/A;    DIAGNOSTIC LAPAROSCOPY N/A 2018    Procedure: DIAGNOSTIC LAPAROSCOPY AND ABLATION OF ENDOMETRIOSIS;  Surgeon: Evin Zamudio MD;  Location: [facility] OR;  Service: Obstetrics/Gynecology    ENDOMETRIAL ABLATION      ENDOSCOPIC FUNCTIONAL SINUS SURGERY (FESS) Right 2021    Procedure: Right endoscopic total ethmoidectomy, right endoscopic middle meatal antrotomy with cyst removal, right endoscopic frontal recess exploration with cyst removal;  Surgeon: Saulo Brito MD;  Location: [facility] OR;  Service: ENT;  Laterality:  Right;    ENDOSCOPY N/A 04/20/2017    Procedure: ESOPHAGOGASTRODUODENOSCOPY WITH BIOPSIES AND COLD BIOPSY POLYPECTOMIES;  Surgeon: Jignesh Selby MD;  Location: [facility] ENDOSCOPY;  Service:     GASTRIC STIMULATOR IMPLANT SURGERY      LAPAROSCOPIC CHOLECYSTECTOMY      LIPOMA EXCISION N/A 12/10/2021    Procedure: Excision of soft tissue mass lower back;  Surgeon: Nidia Smith MD;  Location: [facility] OR;  Service: General;  Laterality: N/A;    MOUTH SURGERY      full mouth tooth extration    SINUS SURGERY      TUBAL ABDOMINAL LIGATION  01/15/2019    WISDOM TOOTH EXTRACTION           FAMILY HISTORY  Family History   Problem Relation Age of Onset    Arthritis Mother     COPD Mother     Asthma Mother     Thyroid disease Mother     Arthritis Father     Diabetes Father     Hypertension Father     Hyperlipidemia Father     Kidney disease Father     Heart attack Father     Coronary artery disease Father     Dementia Father     No Known Problems Son     Colon cancer Neg Hx     Liver cancer Neg Hx     Liver disease Neg Hx     Stomach cancer Neg Hx     Esophageal cancer Neg Hx          SOCIAL HISTORY  Social History     Socioeconomic History    Marital status:    Tobacco Use    Smoking status: Never     Passive exposure: Past    Smokeless tobacco: Never   Vaping Use    Vaping status: Never Used   Substance and Sexual Activity    Alcohol use: No    Drug use: Never    Sexual activity: Yes     Partners: Male     Birth control/protection: Surgical     Comment: Tubal         ALLERGIES  Ceftin [cefuroxime axetil], Cefuroxime, Iodinated contrast media, Sumatriptan, and Amoxicillin        REVIEW OF SYSTEMS  Review of Systems   Constitutional: Negative.    HENT: Negative.     Eyes: Negative.    Respiratory:  Positive for chest tightness and shortness of breath.    Cardiovascular: Negative.    Gastrointestinal: Negative.    Genitourinary: Negative.    Musculoskeletal: Negative.    Skin: Negative.    Neurological: Negative.     Psychiatric/Behavioral: Negative.          All systems reviewed and negative except for those discussed in HPI.       PHYSICAL EXAM    I have reviewed the triage vital signs and nursing notes.    ED Triage Vitals [01/16/25 1846]   Temp Heart Rate Resp BP SpO2   97.7 °F (36.5 °C) 113 19 170/99 100 %      Temp src Heart Rate Source Patient Position BP Location FiO2 (%)   Oral Monitor Sitting Left arm --       Physical Exam  Vitals and nursing note reviewed.   Constitutional:       General: She is not in acute distress.     Appearance: She is well-developed and normal weight. She is not ill-appearing, toxic-appearing or diaphoretic.   HENT:      Head: Normocephalic and atraumatic.   Eyes:      Extraocular Movements: Extraocular movements intact.   Cardiovascular:      Rate and Rhythm: Regular rhythm. Tachycardia present.   Pulmonary:      Effort: Pulmonary effort is normal. No tachypnea, accessory muscle usage or respiratory distress.      Breath sounds: Normal breath sounds. No decreased breath sounds, wheezing, rhonchi or rales.   Musculoskeletal:         General: Normal range of motion.      Cervical back: Normal range of motion.      Right lower leg: No tenderness. No edema.      Left lower leg: No tenderness. No edema.   Skin:     General: Skin is warm and dry.   Neurological:      General: No focal deficit present.      Mental Status: She is alert.   Psychiatric:         Mood and Affect: Mood normal.         Behavior: Behavior normal.            LAB RESULTS  Recent Results (from the past 24 hours)   Comprehensive Metabolic Panel    Collection Time: 01/16/25  7:02 PM    Specimen: Blood   Result Value Ref Range    Glucose 93 65 - 99 mg/dL    BUN 10 6 - 20 mg/dL    Creatinine 0.76 0.57 - 1.00 mg/dL    Sodium 137 136 - 145 mmol/L    Potassium 3.6 3.5 - 5.2 mmol/L    Chloride 102 98 - 107 mmol/L    CO2 23.7 22.0 - 29.0 mmol/L    Calcium 9.2 8.6 - 10.5 mg/dL    Total Protein 7.5 6.0 - 8.5 g/dL    Albumin 4.2 3.5 -  5.2 g/dL    ALT (SGPT) 15 1 - 33 U/L    AST (SGOT) 13 1 - 32 U/L    Alkaline Phosphatase 85 39 - 117 U/L    Total Bilirubin 0.3 0.0 - 1.2 mg/dL    Globulin 3.3 gm/dL    A/G Ratio 1.3 g/dL    BUN/Creatinine Ratio 13.2 7.0 - 25.0    Anion Gap 11.3 5.0 - 15.0 mmol/L    eGFR 105.6 >60.0 mL/min/1.73   BNP    Collection Time: 01/16/25  7:02 PM    Specimen: Blood   Result Value Ref Range    proBNP 60.3 0.0 - 450.0 pg/mL   High Sensitivity Troponin T    Collection Time: 01/16/25  7:02 PM    Specimen: Blood   Result Value Ref Range    HS Troponin T <6 <14 ng/L   Green Top (Gel)    Collection Time: 01/16/25  7:02 PM   Result Value Ref Range    Extra Tube Hold for add-ons.    Lavender Top    Collection Time: 01/16/25  7:02 PM   Result Value Ref Range    Extra Tube hold for add-on    Gold Top - SST    Collection Time: 01/16/25  7:02 PM   Result Value Ref Range    Extra Tube Hold for add-ons.    Light Blue Top    Collection Time: 01/16/25  7:02 PM   Result Value Ref Range    Extra Tube Hold for add-ons.    CBC Auto Differential    Collection Time: 01/16/25  7:02 PM    Specimen: Blood   Result Value Ref Range    WBC 9.47 3.40 - 10.80 10*3/mm3    RBC 4.53 3.77 - 5.28 10*6/mm3    Hemoglobin 14.0 12.0 - 15.9 g/dL    Hematocrit 40.5 34.0 - 46.6 %    MCV 89.4 79.0 - 97.0 fL    MCH 30.9 26.6 - 33.0 pg    MCHC 34.6 31.5 - 35.7 g/dL    RDW 12.0 (L) 12.3 - 15.4 %    RDW-SD 39.5 37.0 - 54.0 fl    MPV 9.9 6.0 - 12.0 fL    Platelets 334 140 - 450 10*3/mm3    Neutrophil % 62.9 42.7 - 76.0 %    Lymphocyte % 28.3 19.6 - 45.3 %    Monocyte % 6.5 5.0 - 12.0 %    Eosinophil % 1.6 0.3 - 6.2 %    Basophil % 0.4 0.0 - 1.5 %    Immature Grans % 0.3 0.0 - 0.5 %    Neutrophils, Absolute 5.95 1.70 - 7.00 10*3/mm3    Lymphocytes, Absolute 2.68 0.70 - 3.10 10*3/mm3    Monocytes, Absolute 0.62 0.10 - 0.90 10*3/mm3    Eosinophils, Absolute 0.15 0.00 - 0.40 10*3/mm3    Basophils, Absolute 0.04 0.00 - 0.20 10*3/mm3    Immature Grans, Absolute 0.03 0.00 - 0.05  10*3/mm3    nRBC 0.0 0.0 - 0.2 /100 WBC   D-dimer, Quantitative    Collection Time: 01/16/25  7:02 PM    Specimen: Blood   Result Value Ref Range    D-Dimer, Quantitative 0.39 0.00 - 0.50 MCGFEU/mL   High Sensitivity Troponin T 1Hr    Collection Time: 01/16/25  8:30 PM    Specimen: Blood   Result Value Ref Range    HS Troponin T <6 <14 ng/L    Troponin T Numeric Delta         If labs were ordered, I independently reviewed the results and considered them in treating the patient.        RADIOLOGY  No Radiology Exams Resulted Within Past 24 Hours        PROCEDURES    Procedures    Interpretations    O2 Sat: The patients oxygen saturation was 96% on Room Air.  This was independently interpreted by me as Normal    EKG: I reviewed and independently interpreted the EKG as sinus rhythm with a rate of 94 bpm.  No ST segment elevation    Cardiac Monitoring: I reviewed and independently interpreted the Rhythm Strip as Sinus Tachycardia rate of 101    Radiology: I ordered and independently reviewed the above noted radiographic studies.  I viewed images of Chest Xray which showed No pulmonary process per my independent interpretation. See radiologist's dictation for official interpretation.     MEDICATIONS GIVEN IN ER    Medications   sodium chloride 0.9 % flush 10 mL (has no administration in time range)   apixaban (ELIQUIS) tablet 5 mg (has no administration in time range)         MEDICAL DECISION MAKING, PROGRESS, and CONSULTS    All labs, if obtained, have been independently reviewed by me.  All radiology studies, if obtained, have been reviewed by me and the radiologist dictating the report.  All EKG's, if obtained, have been independently viewed and interpreted by me      Discussion below represents my analysis of pertinent findings related to patient's condition, differential diagnosis, treatment plan and final disposition.      Differential diagnosis:    PE, anxiety, ACS, pneumonia    Additional  Sources:  None      Orders placed during this visit:  Orders Placed This Encounter   Procedures    XR Chest 1 View    Dillon Draw    Comprehensive Metabolic Panel    BNP    High Sensitivity Troponin T    CBC Auto Differential    High Sensitivity Troponin T 1Hr    D-dimer, Quantitative    NPO Diet NPO Type: Strict NPO    Undress & Gown    Continuous Pulse Oximetry    Vital Signs    Oxygen Therapy- Nasal Cannula; Titrate 1-6 LPM Per SpO2; 90 - 95%    ECG 12 Lead ED Triage Standing Order; SOA    Insert Peripheral IV    CBC & Differential    Green Top (Gel)    Lavender Top    Gold Top - SST    Light Blue Top         Additional orders considered but not ordered:  None    ED Course:    Consultants:  None    ED Course as of 01/16/25 2210   Thu Jan 16, 2025 1951 proBNP: 60.3 [TM]   1952 HS Troponin T: <6 [TM]   2009 D-Dimer, Quant: 0.39 [TM]   2207 D-dimer was ordered as patient states she has had several IV contrasted CT scans for to rule out PE however the last when she had she developed anaphylactic reaction.  D-dimer negative here.  Troponins negative x 2.  Eliquis given here with prescription sent home with instructions to follow-up with her PCP and return for new or worsening symptoms. [TM]      ED Course User Index  [TM] Stew Sousa PA-C           After my consideration of clinical presentation and any laboratory/radiology studies obtained, I discussed the findings with the patient/patient representative who is in agreement with the treatment plan and the final disposition. Risks and benefits of discharge were discussed.     AS OF 22:10 EST VITALS:    BP - 133/87  HR - 101  TEMP - 97.7 °F (36.5 °C) (Oral)  O2 SATS - 96%    I reviewed the patients prescription monitoring report if available prior to discharge    DIAGNOSIS  Final diagnoses:   Dyspnea, unspecified type         DISPOSITION  ED Disposition       ED Disposition   Discharge    Condition   Stable    Comment   --                   Please  note that portions of this document were completed with voice recognition software.        Stew Sousa PA-C  01/16/25 3146

## 2025-01-21 ENCOUNTER — OFFICE VISIT (OUTPATIENT)
Dept: NEUROLOGY | Facility: CLINIC | Age: 35
End: 2025-01-21
Payer: COMMERCIAL

## 2025-01-21 ENCOUNTER — OFFICE VISIT (OUTPATIENT)
Dept: CARDIOLOGY | Facility: CLINIC | Age: 35
End: 2025-01-21
Payer: COMMERCIAL

## 2025-01-21 ENCOUNTER — OFFICE VISIT (OUTPATIENT)
Dept: INTERNAL MEDICINE | Facility: CLINIC | Age: 35
End: 2025-01-21
Payer: COMMERCIAL

## 2025-01-21 VITALS
RESPIRATION RATE: 14 BRPM | HEIGHT: 62 IN | WEIGHT: 206 LBS | OXYGEN SATURATION: 100 % | BODY MASS INDEX: 37.91 KG/M2 | HEART RATE: 99 BPM | DIASTOLIC BLOOD PRESSURE: 88 MMHG | SYSTOLIC BLOOD PRESSURE: 132 MMHG | TEMPERATURE: 98.9 F

## 2025-01-21 VITALS
DIASTOLIC BLOOD PRESSURE: 82 MMHG | OXYGEN SATURATION: 99 % | TEMPERATURE: 98.4 F | HEART RATE: 100 BPM | BODY MASS INDEX: 37.54 KG/M2 | HEIGHT: 62 IN | SYSTOLIC BLOOD PRESSURE: 130 MMHG | WEIGHT: 204 LBS

## 2025-01-21 VITALS
HEIGHT: 62 IN | DIASTOLIC BLOOD PRESSURE: 82 MMHG | WEIGHT: 206.2 LBS | OXYGEN SATURATION: 99 % | BODY MASS INDEX: 37.94 KG/M2 | HEART RATE: 97 BPM | RESPIRATION RATE: 17 BRPM | SYSTOLIC BLOOD PRESSURE: 122 MMHG

## 2025-01-21 DIAGNOSIS — D68.51 HETEROZYGOUS FACTOR V LEIDEN MUTATION: ICD-10-CM

## 2025-01-21 DIAGNOSIS — R06.02 SHORTNESS OF BREATH: ICD-10-CM

## 2025-01-21 DIAGNOSIS — G43.E11 INTRACTABLE CHRONIC MIGRAINE WITH AURA WITH STATUS MIGRAINOSUS: Primary | ICD-10-CM

## 2025-01-21 DIAGNOSIS — R09.89 LABILE BLOOD PRESSURE: Primary | ICD-10-CM

## 2025-01-21 DIAGNOSIS — G47.33 OBSTRUCTIVE SLEEP APNEA: ICD-10-CM

## 2025-01-21 DIAGNOSIS — R07.89 CHEST PAIN, ATYPICAL: ICD-10-CM

## 2025-01-21 DIAGNOSIS — G43.909 ACUTE MIGRAINE: ICD-10-CM

## 2025-01-21 DIAGNOSIS — Z09 HOSPITAL DISCHARGE FOLLOW-UP: Primary | ICD-10-CM

## 2025-01-21 DIAGNOSIS — D68.59 PROTEIN S DEFICIENCY: ICD-10-CM

## 2025-01-21 DIAGNOSIS — R10.30 LOWER ABDOMINAL PAIN: ICD-10-CM

## 2025-01-21 DIAGNOSIS — E66.9 OBESITY (BMI 30-39.9): ICD-10-CM

## 2025-01-21 DIAGNOSIS — R19.5 ABNORMAL FINDINGS IN STOOL: ICD-10-CM

## 2025-01-21 DIAGNOSIS — R07.89 CHEST PRESSURE: ICD-10-CM

## 2025-01-21 DIAGNOSIS — R00.0 TACHYCARDIA: ICD-10-CM

## 2025-01-21 DIAGNOSIS — E78.5 DYSLIPIDEMIA: ICD-10-CM

## 2025-01-21 DIAGNOSIS — R00.2 PALPITATIONS: ICD-10-CM

## 2025-01-21 PROBLEM — M20.41 ACQUIRED HAMMER TOE OF RIGHT FOOT: Status: ACTIVE | Noted: 2024-02-11

## 2025-01-21 PROBLEM — G57.62 MORTON'S NEUROMA OF LEFT FOOT: Status: ACTIVE | Noted: 2023-12-18

## 2025-01-21 PROBLEM — D68.2 FACTOR V DEFICIENCY: Status: ACTIVE | Noted: 2025-01-21

## 2025-01-21 PROBLEM — M25.572 SINUS TARSI SYNDROME OF LEFT ANKLE: Status: ACTIVE | Noted: 2023-07-01

## 2025-01-21 PROBLEM — K21.9 GERD (GASTROESOPHAGEAL REFLUX DISEASE): Status: ACTIVE | Noted: 2025-01-21

## 2025-01-21 PROBLEM — D66 FACTOR VIII DEFICIENCY: Status: ACTIVE | Noted: 2025-01-21

## 2025-01-21 PROBLEM — G57.12 MERALGIA PARESTHETICA OF LEFT SIDE: Status: ACTIVE | Noted: 2024-01-24

## 2025-01-21 PROBLEM — M24.173 DERANGEMENT OF ANKLE: Status: ACTIVE | Noted: 2023-06-26

## 2025-01-21 PROBLEM — M20.42 ACQUIRED HAMMER TOE OF LEFT FOOT: Status: ACTIVE | Noted: 2024-02-11

## 2025-01-21 PROCEDURE — 1160F RVW MEDS BY RX/DR IN RCRD: CPT | Performed by: FAMILY MEDICINE

## 2025-01-21 PROCEDURE — 3075F SYST BP GE 130 - 139MM HG: CPT | Performed by: FAMILY MEDICINE

## 2025-01-21 PROCEDURE — 1159F MED LIST DOCD IN RCRD: CPT | Performed by: FAMILY MEDICINE

## 2025-01-21 PROCEDURE — 3079F DIAST BP 80-89 MM HG: CPT | Performed by: FAMILY MEDICINE

## 2025-01-21 PROCEDURE — 99214 OFFICE O/P EST MOD 30 MIN: CPT | Performed by: NURSE PRACTITIONER

## 2025-01-21 PROCEDURE — 3075F SYST BP GE 130 - 139MM HG: CPT | Performed by: NURSE PRACTITIONER

## 2025-01-21 PROCEDURE — 1159F MED LIST DOCD IN RCRD: CPT | Performed by: NURSE PRACTITIONER

## 2025-01-21 PROCEDURE — 1160F RVW MEDS BY RX/DR IN RCRD: CPT | Performed by: NURSE PRACTITIONER

## 2025-01-21 PROCEDURE — 1126F AMNT PAIN NOTED NONE PRSNT: CPT | Performed by: FAMILY MEDICINE

## 2025-01-21 PROCEDURE — 99214 OFFICE O/P EST MOD 30 MIN: CPT | Performed by: FAMILY MEDICINE

## 2025-01-21 PROCEDURE — 3079F DIAST BP 80-89 MM HG: CPT | Performed by: NURSE PRACTITIONER

## 2025-01-21 RX ORDER — LEVOCETIRIZINE DIHYDROCHLORIDE 5 MG/1
5 TABLET, FILM COATED ORAL
COMMUNITY

## 2025-01-21 RX ORDER — BUDESONIDE AND FORMOTEROL FUMARATE DIHYDRATE 160; 4.5 UG/1; UG/1
AEROSOL RESPIRATORY (INHALATION)
COMMUNITY
Start: 2024-09-25

## 2025-01-21 RX ORDER — ALBUTEROL SULFATE 0.83 MG/ML
2.5 SOLUTION RESPIRATORY (INHALATION)
COMMUNITY
Start: 2024-08-20

## 2025-01-21 RX ORDER — BLOOD PRESSURE TEST KIT
KIT MISCELLANEOUS
Qty: 1 EACH | Refills: 0 | Status: SHIPPED | OUTPATIENT
Start: 2025-01-21

## 2025-01-21 NOTE — ASSESSMENT & PLAN NOTE
-Has been almost a year since last monitor study  -Reports increased frequency, daily now  -Will order 14 day holter monitor to rule out arrhythmia vs ectopy  -Minimal caffeine intake noted, stay hydrated  Orders:    Holter Monitor - 72 Hour Up To 15 Days; Future

## 2025-01-21 NOTE — ASSESSMENT & PLAN NOTE
"-States it has been awhile since her last sleep study and she was noted to have \" a touch of it\"  -Referral for sleep study placed  Orders:    Ambulatory Referral to Sleep Medicine    "

## 2025-01-21 NOTE — PROGRESS NOTES
Follow Up Office Visit      Patient Name: Marilu Godoy  : 1990   MRN: [id number]     Chief Complaint:    Chief Complaint   Patient presents with    Follow-up     Migraine; pt reports 15/30 HA days       History of Present Illness: Marilu Godoy is a 34 y.o. female who is here today to follow up with neurology for migraine HA. She was last seen in clinic 10/24 ([facility]).     MDM 15/30. She has been taking Nurtec 75 mg QOD and Aimovig 140 mg Monthly with next injection due 25. She has been referred to [facility] for Botox therapy and she is going for first botox injection . She has been using Zavzpret 10 mg PRN  which does not help. She is also taking Duloxetine 60 mg Daily for depression and this is managed by PCP (Lucero).     Recent Imaging:     EMG/NCS LUE  - noncontributory  CT Head W/WO  -noncontributory  CTA Neck 10/2022 -noncontributory  CTA Head 10/2022- noncontributory  CT head WO 10/2022 -noncontributory     Pertinent Medical History: Factor V, Factor VIII, Protein S Deficiency, PE with Eliquis use, HL, Obesity, Gastroparesis, Hyperthyroidism, chronic Pain, AMBAR, HTN    Subjective      Review of Systems:   Review of Systems   Constitutional: Negative.    HENT: Negative.     Eyes: Negative.    Respiratory: Negative.     Cardiovascular: Negative.    Gastrointestinal: Negative.    Endocrine: Negative.    Genitourinary: Negative.    Musculoskeletal: Negative.    Skin: Negative.    Allergic/Immunologic: Negative.    Neurological:  Positive for headache.   Hematological: Negative.    Psychiatric/Behavioral: Negative.     All other systems reviewed and are negative.      I have reviewed and the following portions of the patient's history were updated as appropriate: past family history, past medical history, past social history, past surgical history and problem list.    Medications:     Current Outpatient Medications:     albuterol sulfate  (90 Base) MCG/ACT  inhaler, Inhale 2 puffs Every 4 (Four) Hours As Needed for Wheezing or Shortness of Air., Disp: 18 g, Rfl: 11    apixaban (ELIQUIS) 5 MG tablet tablet, Take 1 tablet by mouth 2 (Two) Times a Day., Disp: 90 tablet, Rfl: 0    aspirin 81 MG EC tablet, Take 1 tablet by mouth Daily., Disp: , Rfl:     atorvastatin (LIPITOR) 40 MG tablet, Take 1 tablet by mouth Every Night. APPT NEEDED FOR ADDITIONAL REFILLS, Disp: 90 tablet, Rfl: 0    B-D ULTRA-FINE 33 LANCETS misc, Check sugars as needed/directed for hypoglycemia., Disp: 100 each, Rfl: 12    Blood Glucose Monitoring Suppl (ONE TOUCH ULTRA 2) w/Device kit, use as needed to check blood sugar for hypoglycemia (diabetes), Disp: , Rfl:     cholecalciferol (VITAMIN D3) 1.25 MG (09689 UT) capsule, Take 1 capsule by mouth Every 7 (Seven) Days., Disp: , Rfl:     clotrimazole-betamethasone (Lotrisone) 1-0.05 % cream, Apply 1 application  topically to the appropriate area as directed 2 (Two) Times a Day., Disp: 15 g, Rfl: 0    Corlanor 5 MG tablet tablet, Take 1.5 tablets by mouth 2 (two) times a day., Disp: 90 tablet, Rfl: 6    Diclofenac Sodium (VOLTAREN) 1 % gel gel, Apply 4 g topically to the appropriate area as directed 4 (Four) Times a Day As Needed (joint pain)., Disp: 150 g, Rfl: 1    DULoxetine (CYMBALTA) 60 MG capsule, TAKE 1 CAPSULE BY MOUTH EVERY DAY. NEED APPOINTMENT FOR FURTHER REFILLS, Disp: 90 capsule, Rfl: 0    EPINEPHrine (EPIPEN) 0.3 MG/0.3ML solution auto-injector injection, Inject  into the appropriate muscle as directed by prescriber See Admin Instructions. inject syringe as directed for allergic reaction, Disp: , Rfl:     Erenumab-aooe (AIMOVIG) 140 MG/ML auto-injector, Inject 1 mL under the skin into the appropriate area as directed Every 30 (Thirty) Days., Disp: 1 mL, Rfl: 6    fluconazole (Diflucan) 150 MG tablet, One po now and repeat in 3 days, Disp: 2 tablet, Rfl: 0    glucose blood test strip, USE TO CHECK SUGARS DAILY DUE TO hypoglycemia, Disp: 100  each, Rfl: 12    glucose monitor monitoring kit, 1 each As Needed (diabetes). Check sugars as needed/directed for hypoglycemia., Disp: 1 each, Rfl: 0    l-methylfolate 15 MG tablet tablet, Take 1 tablet by mouth Daily. Indications: folic acid deficiency, Disp: 90 tablet, Rfl: 3    lidocaine (LIDODERM) 5 %, Place 2 patches on the skin as directed by provider Daily. Remove & Discard patch within 12 hours or as directed by MD, Disp: 60 patch, Rfl: 3    methocarbamol (ROBAXIN) 750 MG tablet, Take 1 tablet by mouth At Night As Needed for Muscle Spasms., Disp: 14 tablet, Rfl: 0    mometasone (NASONEX) 50 MCG/ACT nasal spray, 2 sprays into the nostril(s) as directed by provider Daily. APPT NEEDED FOR ADDITIONAL REFILLS, Disp: 3 each, Rfl: 0    pantoprazole (PROTONIX) 40 MG EC tablet, Take 1 tablet by mouth Daily. APPT NEEDED FOR ADDITIONAL REFILLS, Disp: 90 tablet, Rfl: 0    predniSONE 5 MG (21) tablet therapy pack dose pack, Take 1 tablet by mouth Take As Directed. Take as directed on package instructions., Disp: 21 tablet, Rfl: 0    rimegepant sulfate (Nurtec) 75 MG tablet, Take 1 tablet by mouth Every Other Day., Disp: 16 tablet, Rfl: 6    tiZANidine (ZANAFLEX) 4 MG tablet, Take 1 tablet 3 times a day by oral route as directed for 30 days., Disp: , Rfl:     Vitamin D, Ergocalciferol, 11137 units capsule, Take 1 capsule by mouth 1 (One) Time Per Week., Disp: 5 capsule, Rfl: 11    Allergies:   Allergies   Allergen Reactions    Ceftin [Cefuroxime Axetil] Shortness Of Breath and Rash     Numbness in mouth and throat    Cefuroxime Hives, Rash and Shortness Of Breath     Numbness in mouth and throat  Numbness in mouth and throat    Iodinated Contrast Media Shortness Of Breath and Rash    Sumatriptan Shortness Of Breath and Nausea Only     Other reaction(s): Nausea Only, Other (See Comments)  Worsening migraine  Worsening migraine    Amoxicillin Rash, Hives and Itching       Objective     Physical Exam:  Vital Signs:   Vitals:  "   01/21/25 1307   BP: 132/88   BP Location: Right arm   Patient Position: Sitting   Cuff Size: Adult   Pulse: 99   Resp: 14   Temp: 98.9 °F (37.2 °C)   TempSrc: Infrared   SpO2: 100%   Weight: 93.4 kg (206 lb)   Height: 157.5 cm (62.01\")   PainSc: 0-No pain     Body mass index is 37.67 kg/m².    Physical Exam  Vitals and nursing note reviewed.   Constitutional:       General: She is not in acute distress.     Appearance: Normal appearance.   HENT:      Head: Normocephalic.      Nose: Nose normal.      Mouth/Throat:      Mouth: Mucous membranes are moist.      Pharynx: Oropharynx is clear.   Eyes:      Extraocular Movements: Extraocular movements intact.      Conjunctiva/sclera: Conjunctivae normal.   Musculoskeletal:      Cervical back: Normal range of motion and neck supple.   Skin:     General: Skin is warm and dry.      Capillary Refill: Capillary refill takes less than 2 seconds.   Neurological:      Mental Status: She is alert and oriented to person, place, and time.   Psychiatric:         Mood and Affect: Mood normal.         Behavior: Behavior normal.         Neurological Exam  Mental Status  Alert. Oriented to person, place, and time.    Cranial Nerves  CN III, IV, VI: Extraocular movements intact bilaterally.       Assessment / Plan      Assessment/Plan:   Diagnoses and all orders for this visit:    1. Intractable chronic migraine with aura with status migrainosus (Primary)  -     rimegepant sulfate (Nurtec) 75 MG tablet; Take 1 tablet by mouth Every Other Day.  Dispense: 16 tablet; Refill: 6  -     Erenumab-aooe (AIMOVIG) 140 MG/ML auto-injector; Inject 1 mL under the skin into the appropriate area as directed Every 30 (Thirty) Days.  Dispense: 1 mL; Refill: 6    2. Acute migraine         Follow Up:   Return in about 3 months (around 4/21/2025), or if symptoms worsen or fail to improve.    Anticipatory Guidance and Safety Reviewed  Patient Education Provided  Continue Aimovig 140 mg Monthly (prevention); " SE reviewed   Continue Nurtec 75 mg QOD (prevention); SE reviewed  Keep FU with [facility] for [facility]      RTC PRN or within 12 weeks or sooner with issues     Barby Guzman, ROXANNA, APRN, FNP-C  [facility]

## 2025-01-21 NOTE — PROGRESS NOTES
"Chief Complaint  Hospital Follow Up Visit (SOA, chest tightness)    Subjective        Marilu Fiorella Godoy presents to [facility]  History of Present Illness  ER follow up  Has had chest heaviness, shortness of breath and chest heaviness, with laying down, tachycardia up to 130 while sitting  Goes to cardiology later today  Was supposed to have a stress test last year and insurance would not cover  Worries about heart failure? Fluid around heart?  Discussed shortness of breath with allergist who didn't feel due to asthma, possibly heart related    Had run out of Eliquis but found a small supply to hold her over until she picks up at pharmacy  Not sure if needs PA    Has had lower abdominal pain and coffee ground appearing stools  Goes to [facility] for gastric motility issues  Had colonoscopy that was normal in 2022 (per Care Everywhere)  EGD in recent months  No recent cold symptoms, some sniffles which she attributes to allergies      Objective   Vital Signs:  /82   Pulse 100   Temp 98.4 °F (36.9 °C)   Ht 157.5 cm (62.01\")   Wt 92.5 kg (204 lb)   SpO2 99%   BMI 37.30 kg/m²   Estimated body mass index is 37.3 kg/m² as calculated from the following:    Height as of this encounter: 157.5 cm (62.01\").    Weight as of this encounter: 92.5 kg (204 lb).            Physical Exam  Vitals and nursing note reviewed.   Constitutional:       General: She is not in acute distress.     Appearance: Normal appearance. She is well-developed and well-groomed. She is obese. She is not ill-appearing, toxic-appearing or diaphoretic.   HENT:      Head: Normocephalic and atraumatic.      Right Ear: Hearing normal.      Left Ear: Hearing normal.   Eyes:      General: Lids are normal. No scleral icterus.     Extraocular Movements: Extraocular movements intact.   Neck:      Trachea: Phonation normal.   Pulmonary:      Effort: Pulmonary effort is normal.      Breath sounds: Normal breath sounds. No rales. "   Musculoskeletal:      Cervical back: Neck supple.   Skin:     Coloration: Skin is not jaundiced or pale.   Neurological:      General: No focal deficit present.      Mental Status: She is alert and oriented to person, place, and time.      Motor: Motor function is intact.   Psychiatric:         Attention and Perception: Attention and perception normal.         Mood and Affect: Mood and affect normal.         Speech: Speech normal.         Behavior: Behavior normal. Behavior is cooperative.         Thought Content: Thought content normal.         Cognition and Memory: Cognition and memory normal.         Judgment: Judgment normal.        Result Review :  The following data was reviewed by: Deidre Barker MD on 01/21/2025:  ED Provider Notes by Stew Sousa PA-C (01/16/2025 22:05)   Labs from ER reviewed no red flags  XR Chest 1 View (01/16/2025 19:39)  reviewed with patient no worrisome findings    ECG 12 Lead ED Triage Standing Order; SOA (01/16/2025 19:20) new Q wave aVF compared to last available EKG:  ECG 12 Lead (03/28/2024 14:11)             Assessment and Plan   Diagnoses and all orders for this visit:    1. Labile blood pressure (Primary)  -     Blood Pressure kit; Monitor blood pressure daily due to labile blood pressures  Dispense: 1 each; Refill: 0    2. Tachycardia    3. Chest pressure    4. Protein S deficiency  -     apixaban (ELIQUIS) 5 MG tablet tablet; Take 1 tablet by mouth 2 (Two) Times a Day.  Dispense: 60 tablet; Refill: 11    5. Heterozygous factor V Leiden mutation  -     apixaban (ELIQUIS) 5 MG tablet tablet; Take 1 tablet by mouth 2 (Two) Times a Day.  Dispense: 60 tablet; Refill: 11    6. Abnormal findings in stool    7. Lower abdominal pain      Discuss tachycardia, chest pressure/heaviness with cardiology today. May need updated Holter/event monitor, echo, possibly cardiac testing as previously ordered by Dr. Byrnes. Not sure if insurance will cover the blood  pressure cuff but it would be helpful to have on hand and she does not have one at this time  She is already established with GI at [facility], encouraged her to reach out to them regarding abdominal pain, abnormal stools.  Refilled Eliquis, was sent by ER previously, but may need PA. No samples available in our office today.        I spent 30 minutes caring for Marilu on this date of service. This time includes time spent by me in the following activities:preparing for the visit, reviewing tests, performing a medically appropriate examination and/or evaluation , counseling and educating the patient/family/caregiver, ordering medications, tests, or procedures, and documenting information in the medical record  Follow Up   Return in about 4 months (around 5/21/2025) for Annual physical.  Patient was given instructions and counseling regarding her condition or for health maintenance advice. Please see specific information pulled into the AVS if appropriate.

## 2025-01-22 ENCOUNTER — PATIENT MESSAGE (OUTPATIENT)
Dept: INTERNAL MEDICINE | Facility: CLINIC | Age: 35
End: 2025-01-22
Payer: COMMERCIAL

## 2025-01-22 DIAGNOSIS — M25.512 ARTHRALGIA OF LEFT SHOULDER REGION: Primary | ICD-10-CM

## 2025-01-23 ENCOUNTER — HOSPITAL ENCOUNTER (EMERGENCY)
Facility: HOSPITAL | Age: 35
Discharge: HOME | End: 2025-01-23
Attending: EMERGENCY MEDICINE
Payer: COMMERCIAL

## 2025-01-23 ENCOUNTER — APPOINTMENT (OUTPATIENT)
Dept: CT IMAGING | Facility: HOSPITAL | Age: 35
End: 2025-01-23
Payer: COMMERCIAL

## 2025-01-23 ENCOUNTER — OFFICE VISIT (OUTPATIENT)
Dept: ORTHOPEDIC SURGERY | Facility: CLINIC | Age: 35
End: 2025-01-23
Payer: COMMERCIAL

## 2025-01-23 VITALS
HEIGHT: 62 IN | SYSTOLIC BLOOD PRESSURE: 144 MMHG | WEIGHT: 200.8 LBS | BODY MASS INDEX: 36.95 KG/M2 | DIASTOLIC BLOOD PRESSURE: 86 MMHG

## 2025-01-23 VITALS
HEART RATE: 118 BPM | OXYGEN SATURATION: 100 % | WEIGHT: 206 LBS | BODY MASS INDEX: 35.17 KG/M2 | SYSTOLIC BLOOD PRESSURE: 174 MMHG | TEMPERATURE: 98 F | DIASTOLIC BLOOD PRESSURE: 91 MMHG | HEIGHT: 64 IN | RESPIRATION RATE: 20 BRPM

## 2025-01-23 DIAGNOSIS — M54.12 CERVICAL RADICULAR PAIN: ICD-10-CM

## 2025-01-23 DIAGNOSIS — R10.84 GENERALIZED ABDOMINAL PAIN: Primary | ICD-10-CM

## 2025-01-23 DIAGNOSIS — R20.2 PARESTHESIA OF LEFT ARM: ICD-10-CM

## 2025-01-23 DIAGNOSIS — M25.512 ARTHRALGIA OF LEFT SHOULDER REGION: Primary | ICD-10-CM

## 2025-01-23 LAB
ALBUMIN SERPL-MCNC: 4.5 G/DL (ref 3.5–5.2)
ALBUMIN/GLOB SERPL: 1.4 G/DL
ALP SERPL-CCNC: 91 U/L (ref 39–117)
ALT SERPL W P-5'-P-CCNC: 18 U/L (ref 1–33)
ANION GAP SERPL CALCULATED.3IONS-SCNC: 9.6 MMOL/L (ref 5–15)
AST SERPL-CCNC: 17 U/L (ref 1–32)
BACTERIA UR QL AUTO: ABNORMAL /HPF
BASOPHILS # BLD AUTO: 0.05 10*3/MM3 (ref 0–0.2)
BASOPHILS NFR BLD AUTO: 0.6 % (ref 0–1.5)
BILIRUB SERPL-MCNC: 0.5 MG/DL (ref 0–1.2)
BILIRUB UR QL STRIP: NEGATIVE
BUN SERPL-MCNC: 7 MG/DL (ref 6–20)
BUN/CREAT SERPL: 9.9 (ref 7–25)
CALCIUM SPEC-SCNC: 9.6 MG/DL (ref 8.6–10.5)
CHLORIDE SERPL-SCNC: 103 MMOL/L (ref 98–107)
CLARITY UR: CLEAR
CO2 SERPL-SCNC: 26.4 MMOL/L (ref 22–29)
COLOR UR: YELLOW
CREAT SERPL-MCNC: 0.71 MG/DL (ref 0.57–1)
DEPRECATED RDW RBC AUTO: 40.7 FL (ref 37–54)
EGFRCR SERPLBLD CKD-EPI 2021: 114.6 ML/MIN/1.73
EOSINOPHIL # BLD AUTO: 0.12 10*3/MM3 (ref 0–0.4)
EOSINOPHIL NFR BLD AUTO: 1.4 % (ref 0.3–6.2)
ERYTHROCYTE [DISTWIDTH] IN BLOOD BY AUTOMATED COUNT: 12.3 % (ref 12.3–15.4)
GLOBULIN UR ELPH-MCNC: 3.2 GM/DL
GLUCOSE SERPL-MCNC: 95 MG/DL (ref 65–99)
GLUCOSE UR STRIP-MCNC: NEGATIVE MG/DL
HCT VFR BLD AUTO: 42.3 % (ref 34–46.6)
HEMOCCULT STL QL: NEGATIVE
HGB BLD-MCNC: 14.4 G/DL (ref 12–15.9)
HGB UR QL STRIP.AUTO: ABNORMAL
HOLD SPECIMEN: NORMAL
HOLD SPECIMEN: NORMAL
HYALINE CASTS UR QL AUTO: ABNORMAL /LPF
IMM GRANULOCYTES # BLD AUTO: 0.02 10*3/MM3 (ref 0–0.05)
IMM GRANULOCYTES NFR BLD AUTO: 0.2 % (ref 0–0.5)
KETONES UR QL STRIP: NEGATIVE
LEUKOCYTE ESTERASE UR QL STRIP.AUTO: NEGATIVE
LIPASE SERPL-CCNC: 24 U/L (ref 13–60)
LYMPHOCYTES # BLD AUTO: 1.96 10*3/MM3 (ref 0.7–3.1)
LYMPHOCYTES NFR BLD AUTO: 22.9 % (ref 19.6–45.3)
MCH RBC QN AUTO: 31 PG (ref 26.6–33)
MCHC RBC AUTO-ENTMCNC: 34 G/DL (ref 31.5–35.7)
MCV RBC AUTO: 91 FL (ref 79–97)
MONOCYTES # BLD AUTO: 0.51 10*3/MM3 (ref 0.1–0.9)
MONOCYTES NFR BLD AUTO: 6 % (ref 5–12)
NEUTROPHILS NFR BLD AUTO: 5.89 10*3/MM3 (ref 1.7–7)
NEUTROPHILS NFR BLD AUTO: 68.9 % (ref 42.7–76)
NITRITE UR QL STRIP: NEGATIVE
NRBC BLD AUTO-RTO: 0 /100 WBC (ref 0–0.2)
PH UR STRIP.AUTO: 6 [PH] (ref 5–8)
PLATELET # BLD AUTO: 354 10*3/MM3 (ref 140–450)
PMV BLD AUTO: 9.6 FL (ref 6–12)
POTASSIUM SERPL-SCNC: 3.7 MMOL/L (ref 3.5–5.2)
PROT SERPL-MCNC: 7.7 G/DL (ref 6–8.5)
PROT UR QL STRIP: NEGATIVE
RBC # BLD AUTO: 4.65 10*6/MM3 (ref 3.77–5.28)
RBC # UR STRIP: ABNORMAL /HPF
REF LAB TEST METHOD: ABNORMAL
SODIUM SERPL-SCNC: 139 MMOL/L (ref 136–145)
SP GR UR STRIP: 1.02 (ref 1–1.03)
SQUAMOUS #/AREA URNS HPF: ABNORMAL /HPF
UROBILINOGEN UR QL STRIP: ABNORMAL
WBC # UR STRIP: ABNORMAL /HPF
WBC NRBC COR # BLD AUTO: 8.55 10*3/MM3 (ref 3.4–10.8)
WHOLE BLOOD HOLD COAG: NORMAL
WHOLE BLOOD HOLD SPECIMEN: NORMAL

## 2025-01-23 PROCEDURE — 80053 COMPREHEN METABOLIC PANEL: CPT | Performed by: EMERGENCY MEDICINE

## 2025-01-23 PROCEDURE — 99284 EMERGENCY DEPT VISIT MOD MDM: CPT | Performed by: EMERGENCY MEDICINE

## 2025-01-23 PROCEDURE — 3077F SYST BP >= 140 MM HG: CPT | Performed by: PHYSICIAN ASSISTANT

## 2025-01-23 PROCEDURE — 3079F DIAST BP 80-89 MM HG: CPT | Performed by: PHYSICIAN ASSISTANT

## 2025-01-23 PROCEDURE — 85025 COMPLETE CBC W/AUTO DIFF WBC: CPT

## 2025-01-23 PROCEDURE — 1159F MED LIST DOCD IN RCRD: CPT | Performed by: PHYSICIAN ASSISTANT

## 2025-01-23 PROCEDURE — 82272 OCCULT BLD FECES 1-3 TESTS: CPT | Performed by: NURSE PRACTITIONER

## 2025-01-23 PROCEDURE — 81001 URINALYSIS AUTO W/SCOPE: CPT | Performed by: EMERGENCY MEDICINE

## 2025-01-23 PROCEDURE — 1160F RVW MEDS BY RX/DR IN RCRD: CPT | Performed by: PHYSICIAN ASSISTANT

## 2025-01-23 PROCEDURE — 74176 CT ABD & PELVIS W/O CONTRAST: CPT

## 2025-01-23 PROCEDURE — 99213 OFFICE O/P EST LOW 20 MIN: CPT | Performed by: PHYSICIAN ASSISTANT

## 2025-01-23 PROCEDURE — 83690 ASSAY OF LIPASE: CPT | Performed by: EMERGENCY MEDICINE

## 2025-01-23 RX ORDER — SODIUM CHLORIDE 0.9 % (FLUSH) 0.9 %
10 SYRINGE (ML) INJECTION AS NEEDED
Status: DISCONTINUED | OUTPATIENT
Start: 2025-01-23 | End: 2025-01-23 | Stop reason: HOSPADM

## 2025-01-23 NOTE — PROGRESS NOTES
Subjective   Patient ID: Marilu Godoy is a 34 y.o. right hand dominant female is being seen for orthopaedic evaluation today for left shoulder pain  Pain of the Left Shoulder (Reports pain for 3-4 days in the shoulder blade area. Does not remember if she followed up with Dr. Byrne for her neck. No known injury)         Pain    Patient presents for evaluation of left superior posterior shoulder pain ongoing for 4 days.  Denies injury or trauma.  She does endorse chronic neck discomfort exacerbated when she turns or tilts her head to the left.  She is also still experiencing intermittent numbness and tingling that radiates into the Left upper arm down into her left hand.    Patient did have a recent EMG of the left upper extremity April 2024 that was negative for radiculopathy or neuropathy.  Recently referred to [facility] to rule out cervical radiculopathy.  However, she states the provider there was focused on her left elbow and she did not receive treatment for her neck.                                                    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     Bleeding disorder 11/13/2016    Body piercing     TONGUE, NOSE, TWO IN EACH EAR    Bronchitis     Chest pain 06/22/2021    last CP 2-3 months ago    Cholelithiasis     Clotting disorder     factor 5    Constipation     Coronary artery disease 6/16/2017    COVID-19 vaccine series completed     Pfizer-plus booster    Deep vein thrombosis 11/13/2016    Diarrhea     Disease of thyroid gland     Diverticulitis     Diverticulosis     Elevated cholesterol     Endometriosis     Factor 5 Leiden mutation, heterozygous     Fibroid     Fibromyalgia     Fibromyalgia, primary     Full dentures 06/22/2021    advised no adhesives DOS    Gastroparesis     GERD (gastroesophageal reflux  disease)     H/O blood clots     H/O cardiovascular stress test 2021    reported several years ago-exercise wnl    Headache     Hiatal hernia     High cholesterol     History of recurrent UTIs     HPV (human papilloma virus) infection     Hx of echocardiogram     Hx of gout 2021    Hypertension     Hypoglycemia     Inappropriate sinus node tachycardia     Kidney infection 2021    history only    Low back pain     Migraine     Nausea & vomiting     Obesity     Osteoarthritis     Ovarian cyst     Pneumonia     Pollen allergies     PONV (postoperative nausea and vomiting)     Protein S deficiency 2016    labs from hospitalization for PE    Pulmonary embolism 2016    on eliquis since that time    Recurrent pregnancy loss, antepartum condition or complication     Sleep apnea     no CPAP- states insurance took it back    Tattoo     LOWER BACK    Tinnitus     Varicella         Past Surgical History:   Procedure Laterality Date     SECTION        and  and      SECTION WITH TUBAL N/A 01/15/2019    Procedure:  SECTION REPEAT WITH TUBAL;  Surgeon: Alva Boyer MD;  Location: [facility] LABOR DELIVERY;  Service: Obstetrics/Gynecology    COLONOSCOPY N/A 2017    Procedure: COLONOSCOPY WITH BIOPSIES AND ARGON THERMAL ABLATION;  Surgeon: Jignesh Selby MD;  Location: [facility] ENDOSCOPY;  Service:     D & C HYSTEROSCOPY ENDOMETRIAL ABLATION N/A 03/10/2022    Procedure: DILATATION AND CURETTAGE DIAGNOSTIC HYSTEROSCOPY NOVASURE ENDOMETRIAL ABLATION;  Surgeon: Alva Boyer MD;  Location: [facility] OR;  Service: Obstetrics/Gynecology;  Laterality: N/A;    DIAGNOSTIC LAPAROSCOPY N/A 2018    Procedure: DIAGNOSTIC LAPAROSCOPY AND ABLATION OF ENDOMETRIOSIS;  Surgeon: Evin Zamudio MD;  Location: [facility] OR;  Service: Obstetrics/Gynecology    ENDOMETRIAL ABLATION      ENDOSCOPIC FUNCTIONAL SINUS SURGERY (FESS) Right 2021    Procedure: Right endoscopic  total ethmoidectomy, right endoscopic middle meatal antrotomy with cyst removal, right endoscopic frontal recess exploration with cyst removal;  Surgeon: Saulo Brito MD;  Location: [facility] OR;  Service: ENT;  Laterality: Right;    ENDOSCOPY N/A 04/20/2017    Procedure: ESOPHAGOGASTRODUODENOSCOPY WITH BIOPSIES AND COLD BIOPSY POLYPECTOMIES;  Surgeon: Jignesh Selby MD;  Location: [facility] ENDOSCOPY;  Service:     GASTRIC STIMULATOR IMPLANT SURGERY      LAPAROSCOPIC CHOLECYSTECTOMY      LIPOMA EXCISION N/A 12/10/2021    Procedure: Excision of soft tissue mass lower back;  Surgeon: Nidia Smith MD;  Location: [facility] OR;  Service: General;  Laterality: N/A;    MOUTH SURGERY      full mouth tooth extration    SINUS SURGERY      TUBAL ABDOMINAL LIGATION  01/15/2019    WISDOM TOOTH EXTRACTION         Family History   Problem Relation Age of Onset    Arthritis Mother     COPD Mother     Asthma Mother     Thyroid disease Mother     Arthritis Father     Diabetes Father     Hypertension Father     Hyperlipidemia Father     Kidney disease Father     Heart attack Father     Coronary artery disease Father     Dementia Father     No Known Problems Son     Colon cancer Neg Hx     Liver cancer Neg Hx     Liver disease Neg Hx     Stomach cancer Neg Hx     Esophageal cancer Neg Hx         Social History     Socioeconomic History    Marital status:    Tobacco Use    Smoking status: Never     Passive exposure: Past    Smokeless tobacco: Never   Vaping Use    Vaping status: Never Used   Substance and Sexual Activity    Alcohol use: No    Drug use: Never    Sexual activity: Yes     Partners: Male     Birth control/protection: Tubal ligation, Surgical     Comment: Tubal       Allergies   Allergen Reactions    Ceftin [Cefuroxime Axetil] Shortness Of Breath and Rash     Numbness in mouth and throat    Cefuroxime Hives, Rash and Shortness Of Breath     Numbness in mouth and throat  Numbness in mouth and throat    Iodinated  "Contrast Media Shortness Of Breath and Rash    Sumatriptan Shortness Of Breath and Nausea Only     Other reaction(s): Nausea Only, Other (See Comments)  Worsening migraine  Worsening migraine    Amoxicillin Rash, Hives and Itching       Review of Systems   Musculoskeletal:  Positive for arthralgias (left shoulder).       Objective   /86   Ht 157.5 cm (62\")   Wt 91.1 kg (200 lb 12.8 oz)   BMI 36.73 kg/m²   Physical Exam  Vitals and nursing note reviewed.   Constitutional:       Appearance: Normal appearance.   Musculoskeletal:      Left shoulder: Tenderness present. No deformity. Normal range of motion. Normal strength.        Arms:    Neurological:      Mental Status: She is alert and oriented to person, place, and time.       Back Exam     Tenderness   Back tenderness location: left paracervical region.      Left Shoulder Exam     Range of Motion   Active abduction:  150   Passive abduction:  160   Forward flexion:  160   Internal rotation 90 degrees:  90     Tests   Apprehension: negative  [place] test: negative  Drop arm: negative  Sulcus: absent         Left shoulder reveals a faint positive [place]'s test.  This would be the indication of me wanting to try the cortisone injection.    Neurological Exam  Mental Status  Alert. Oriented to person, place, and time.     Left shoulder: Negative Keisha's test.  Negative winged scapula.  Compression with lateral tilt of head and neck only produced pain to the base of the neck without radicular symptoms.     Assessment & Plan   Independent Review of Radiographic Studies:    X-ray of the left shoulder 3 view performed in the clinic independently reviewed for the evaluation of pain.  No comparison films available to view.  No acute fracture or dislocation.    X-ray of the cervical spine 4 views performed in the clinic independently reviewed by myself for the evaluation of cervical pain.  No comparison films are available to review.  No acute fracture or " malalignment.  There is loss of lordosis.      Procedures      Diagnoses and all orders for this visit:    1. Arthralgia of left shoulder region (Primary)  -     XR Spine Cervical Complete With Flex Ext    2. Cervical radicular pain  -     Ambulatory Referral to Orthopedic Surgery    3. Paresthesia of left arm  -     Ambulatory Referral to Orthopedic Surgery       Discussion of orthopedic goals  Orthopedic activities reviewed and patient expressed appreciation  Risk, benefits, and merits of treatment alternatives reviewed with the patient and questions answered  Ice, heat, and/or modalities as beneficial    Recommendations/Plan:    MDM:  I suspect most of her symptoms are related to cervical radiculopathy.  I suspect she has trapezius muscle tightness presumably from cervical radiculopathy.  Her physical exam didn't really illustrate internal rotator cuff or labral pathology.  Patient is not a candidate for an MRI.  It would not be recommended to jump into a shoulder diagnostic arthroscopy without at least trying a cortisone shot followed by physical therapy.  I would like to obtain notes from [facility] and spine to see if the referral may have been misconstrued for elbow symptoms rather than cervical spine.    I would like for the patient to be evaluated by a spine subspecialist as our clinic does not offer treatment and/or evaluation of the cervical thoracic or lumbar spine.    We decided to proceed with a left shoulder cortisone injection to hopefully serve as a therapeutic ( + [place]'s test) as well as diagnostic measure in her left shoulder issues.  If she does not obtain any relief from today's shoulder injection I would highly recommend her to keep the referral appointment to a spine subspecialist.  Patient request to be evaluated in [place] by a spine subspecialist.    Exercise, medications, injections, other patient advice, and return appointment as noted.  Patient is encouraged to call or return  for any issues or concerns.  Patient cannot have a MRI due to having a gastric stimulator.  She is not a candidate for CT myelogram due to listed severe allergic reaction to IV contrast.    If the cervical subspecialist deems a CT without contrast necessary I will leave this up to them to order this imaging modality.      Patient agreeable to call or return sooner for any concerns.

## 2025-01-23 NOTE — ED PROVIDER NOTES
Pt Name: Marilu Godoy  MRN: [id number]  : 1990  Date of Encounter: 2025    PCP: Deidre Barker MD      Subjective    History of Present Illness:    Chief Complaint: Coffee-ground stool, abdominal pain    History of Present Illness: Marilu Godoy is a 34 y.o. female who presents to the ER complaining of coffee-ground stool, abdominal pain that started 4 days ago.  Patient went to the doctor this morning and was told to come here due to a possible GI bleed.  She confirms history of gastroparesis.     Triage Vitals:    ED Triage Vitals [25 1439]   Temp Heart Rate Resp BP SpO2   98 °F (36.7 °C) 118 20 174/91 100 %      Temp src Heart Rate Source Patient Position BP Location FiO2 (%)   Oral Monitor Sitting Left arm --       Nurses Notes reviewed and agree, including vitals, allergies, social history and prior medical history.     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     Bleeding disorder 2016    Body piercing     TONGUE, NOSE, TWO IN EACH EAR    Bronchitis     Chest pain 2021    last CP 2-3 months ago    Cholelithiasis     Clotting disorder     factor 5    Constipation     Coronary artery disease 2017    COVID-19 vaccine series completed     Pfizer-plus booster    Deep vein thrombosis 2016    Diarrhea     Disease of thyroid gland     Diverticulitis     Diverticulosis     Elevated cholesterol     Endometriosis     Factor 5 Leiden mutation, heterozygous     Fibroid     Fibromyalgia     Fibromyalgia, primary     Full dentures 2021    advised no adhesives DOS    Gastroparesis     GERD (gastroesophageal reflux disease)     H/O blood clots     H/O cardiovascular stress test 2021     reported several years ago-exercise wnl    Headache     Hiatal hernia     High cholesterol     History of recurrent UTIs     HPV (human papilloma virus) infection     Hx of echocardiogram     Hx of gout 2021    Hypertension     Hypoglycemia     Inappropriate sinus node tachycardia     Kidney infection 2021    history only    Low back pain     Migraine     Nausea & vomiting     Obesity     Osteoarthritis     Ovarian cyst     Pneumonia     Pollen allergies     PONV (postoperative nausea and vomiting)     Protein S deficiency 2016    labs from hospitalization for PE    Pulmonary embolism 2016    on eliquis since that time    Recurrent pregnancy loss, antepartum condition or complication     Sleep apnea     no CPAP- states insurance took it back    Tattoo     LOWER BACK    Tinnitus     Varicella        Past Surgical History:   Procedure Laterality Date     SECTION        and  and      SECTION WITH TUBAL N/A 01/15/2019    Procedure:  SECTION REPEAT WITH TUBAL;  Surgeon: Alva Boyer MD;  Location: [facility] LABOR DELIVERY;  Service: Obstetrics/Gynecology    COLONOSCOPY N/A 2017    Procedure: COLONOSCOPY WITH BIOPSIES AND ARGON THERMAL ABLATION;  Surgeon: Jignesh Selby MD;  Location: [facility] ENDOSCOPY;  Service:     D & C HYSTEROSCOPY ENDOMETRIAL ABLATION N/A 03/10/2022    Procedure: DILATATION AND CURETTAGE DIAGNOSTIC HYSTEROSCOPY NOVASURE ENDOMETRIAL ABLATION;  Surgeon: Alva Boyer MD;  Location: [facility] OR;  Service: Obstetrics/Gynecology;  Laterality: N/A;    DIAGNOSTIC LAPAROSCOPY N/A 2018    Procedure: DIAGNOSTIC LAPAROSCOPY AND ABLATION OF ENDOMETRIOSIS;  Surgeon: Evin Zamudio MD;  Location: [facility] OR;  Service: Obstetrics/Gynecology    ENDOMETRIAL ABLATION      ENDOSCOPIC FUNCTIONAL SINUS SURGERY (FESS) Right 2021    Procedure: Right endoscopic total ethmoidectomy, right endoscopic middle meatal antrotomy with cyst removal,  right endoscopic frontal recess exploration with cyst removal;  Surgeon: Saulo Brito MD;  Location: [facility] OR;  Service: ENT;  Laterality: Right;    ENDOSCOPY N/A 04/20/2017    Procedure: ESOPHAGOGASTRODUODENOSCOPY WITH BIOPSIES AND COLD BIOPSY POLYPECTOMIES;  Surgeon: Jignesh Selby MD;  Location: [facility] ENDOSCOPY;  Service:     GASTRIC STIMULATOR IMPLANT SURGERY      LAPAROSCOPIC CHOLECYSTECTOMY      LIPOMA EXCISION N/A 12/10/2021    Procedure: Excision of soft tissue mass lower back;  Surgeon: Nidia Smith MD;  Location: [facility] OR;  Service: General;  Laterality: N/A;    MOUTH SURGERY      full mouth tooth extration    SINUS SURGERY      TUBAL ABDOMINAL LIGATION  01/15/2019    WISDOM TOOTH EXTRACTION         Social History     Socioeconomic History    Marital status:    Tobacco Use    Smoking status: Never     Passive exposure: Past    Smokeless tobacco: Never   Vaping Use    Vaping status: Never Used   Substance and Sexual Activity    Alcohol use: No    Drug use: Never    Sexual activity: Yes     Partners: Male     Birth control/protection: Tubal ligation, Surgical     Comment: Tubal       Family History   Problem Relation Age of Onset    Arthritis Mother     COPD Mother     Asthma Mother     Thyroid disease Mother     Arthritis Father     Diabetes Father     Hypertension Father     Hyperlipidemia Father     Kidney disease Father     Heart attack Father     Coronary artery disease Father     Dementia Father     No Known Problems Son     Colon cancer Neg Hx     Liver cancer Neg Hx     Liver disease Neg Hx     Stomach cancer Neg Hx     Esophageal cancer Neg Hx        REVIEW OF SYSTEMS:     All systems reviewed and not pertinent unless noted.    Review of Systems   Gastrointestinal:  Positive for abdominal pain and blood in stool.   All other systems reviewed and are negative.      Objective    Physical Exam  Vitals and nursing note reviewed. Exam conducted with a chaperone present.    Constitutional:       Appearance: Normal appearance.   HENT:      Head: Normocephalic and atraumatic.   Eyes:      Extraocular Movements: Extraocular movements intact.      Pupils: Pupils are equal, round, and reactive to light.   Cardiovascular:      Rate and Rhythm: Normal rate and regular rhythm.      Pulses: Normal pulses.      Heart sounds: Normal heart sounds.   Pulmonary:      Effort: Pulmonary effort is normal.      Breath sounds: Normal breath sounds.   Abdominal:      General: Abdomen is flat. Bowel sounds are normal.      Palpations: Abdomen is soft.   Genitourinary:     Rectum: Normal.   Musculoskeletal:      Cervical back: Normal range of motion and neck supple.   Skin:     General: Skin is warm and dry.      Capillary Refill: Capillary refill takes less than 2 seconds.   Neurological:      General: No focal deficit present.      Mental Status: She is alert and oriented to person, place, and time. Mental status is at baseline.      GCS: GCS eye subscore is 4. GCS verbal subscore is 5. GCS motor subscore is 6.      Sensory: Sensation is intact.      Motor: Motor function is intact.      Gait: Gait is intact.   Psychiatric:         Attention and Perception: Attention and perception normal.         Mood and Affect: Mood and affect normal.         Speech: Speech normal.         Behavior: Behavior normal. Behavior is cooperative.                           Procedures    ED Course:    No orders to display            Orders placed during this visit:    Orders Placed This Encounter   Procedures    CT Abdomen Pelvis Without Contrast    Lithia Draw    Comprehensive Metabolic Panel    Lipase    Urinalysis With Microscopic If Indicated (No Culture) - Urine, Clean Catch    CBC Auto Differential    Occult Blood X 1, Stool - Stool, Per Rectum    Urinalysis, Microscopic Only - Urine, Clean Catch    NPO Diet NPO Type: Strict NPO    Undress & Gown    Insert Peripheral IV    CBC & Differential    Green Top (Gel)     Lavender Top    Gold Top - SST    Light Blue Top       LAB Results:    Lab Results (last 24 hours)       Procedure Component Value Units Date/Time    CBC & Differential [[id number]]  (Normal) Collected: 01/23/25 1447    Specimen: Blood Updated: 01/23/25 1453    Narrative:      The following orders were created for panel order CBC & Differential.  Procedure                               Abnormality         Status                     ---------                               -----------         ------                     CBC Auto Differential[[id number]]        Normal              Final result                 Please view results for these tests on the individual orders.    Comprehensive Metabolic Panel [[id number]] Collected: 01/23/25 1447    Specimen: Blood Updated: 01/23/25 1521     Glucose 95 mg/dL      BUN 7 mg/dL      Creatinine 0.71 mg/dL      Sodium 139 mmol/L      Potassium 3.7 mmol/L      Chloride 103 mmol/L      CO2 26.4 mmol/L      Calcium 9.6 mg/dL      Total Protein 7.7 g/dL      Albumin 4.5 g/dL      ALT (SGPT) 18 U/L      AST (SGOT) 17 U/L      Alkaline Phosphatase 91 U/L      Total Bilirubin 0.5 mg/dL      Globulin 3.2 gm/dL      A/G Ratio 1.4 g/dL      BUN/Creatinine Ratio 9.9     Anion Gap 9.6 mmol/L      eGFR 114.6 mL/min/1.73     Narrative:      GFR Categories in Chronic Kidney Disease (CKD)      GFR Category          GFR (mL/min/1.73)    Interpretation  G1                     90 or greater         Normal or high (1)  G2                      60-89                Mild decrease (1)  G3a                   45-59                Mild to moderate decrease  G3b                   30-44                Moderate to severe decrease  G4                    15-29                Severe decrease  G5                    14 or less           Kidney failure          (1)In the absence of evidence of kidney disease, neither GFR category G1 or G2 fulfill the criteria for CKD.    eGFR calculation 2021 CKD-EPI creatinine  equation, which does not include race as a factor    Lipase [[id number]]  (Normal) Collected: 01/23/25 1447    Specimen: Blood Updated: 01/23/25 1521     Lipase 24 U/L     CBC Auto Differential [[id number]]  (Normal) Collected: 01/23/25 1447    Specimen: Blood Updated: 01/23/25 1453     WBC 8.55 10*3/mm3      RBC 4.65 10*6/mm3      Hemoglobin 14.4 g/dL      Hematocrit 42.3 %      MCV 91.0 fL      MCH 31.0 pg      MCHC 34.0 g/dL      RDW 12.3 %      RDW-SD 40.7 fl      MPV 9.6 fL      Platelets 354 10*3/mm3      Neutrophil % 68.9 %      Lymphocyte % 22.9 %      Monocyte % 6.0 %      Eosinophil % 1.4 %      Basophil % 0.6 %      Immature Grans % 0.2 %      Neutrophils, Absolute 5.89 10*3/mm3      Lymphocytes, Absolute 1.96 10*3/mm3      Monocytes, Absolute 0.51 10*3/mm3      Eosinophils, Absolute 0.12 10*3/mm3      Basophils, Absolute 0.05 10*3/mm3      Immature Grans, Absolute 0.02 10*3/mm3      nRBC 0.0 /100 WBC     Occult Blood X 1, Stool - Stool, Per Rectum [[id number]]  (Normal) Collected: 01/23/25 1526    Specimen: Stool from Per Rectum Updated: 01/23/25 1534     Fecal Occult Blood Negative    Urinalysis With Microscopic If Indicated (No Culture) - Urine, Clean Catch [[id number]]  (Abnormal) Collected: 01/23/25 1527    Specimen: Urine, Clean Catch Updated: 01/23/25 1538     Color, UA Yellow     Appearance, UA Clear     pH, UA 6.0     Specific Gravity, UA 1.023     Glucose, UA Negative     Ketones, UA Negative     Bilirubin, UA Negative     Blood, UA Trace     Protein, UA Negative     Leuk Esterase, UA Negative     Nitrite, UA Negative     Urobilinogen, UA 0.2 E.U./dL    Urinalysis, Microscopic Only - Urine, Clean Catch [[id number]]  (Abnormal) Collected: 01/23/25 1527    Specimen: Urine, Clean Catch Updated: 01/23/25 1620     RBC, UA None Seen /HPF      WBC, UA 0-2 /HPF      Bacteria, UA None Seen /HPF      Squamous Epithelial Cells, UA 3-6 /HPF      Hyaline Casts, UA None Seen /LPF      Methodology Manual Light  Microscopy             If labs were ordered, I have independently reviewed the results and considered them in the diagnosis and treatment plan for the patient    RADIOLOGY    CT Abdomen Pelvis Without Contrast    Result Date: 1/23/2025  PROCEDURE: CT ABDOMEN PELVIS WO CONTRAST-  HISTORY: Abdominal pain, flank pain  COMPARISON: 01/02/2024  TECHNIQUE: Noncontrast exam  CT ABDOMEN:  Solid organs are unremarkable. The bowel shows no evidence of obstruction. There is no free air or free fluid within the abdomen. Gastric stimulator is seen along the lesser curvature of the stomach. Patient is status post cholecystectomy.  There is no bowel obstruction.  CT PELVIS: No bowel dilatation is seen. There is no free fluid. Appendix is normal. Uterus is mildly enlarged probably due to fibroids. Ovaries are unremarkable.      Impression: 1. No evidence of urinary tract stone disease or hydronephrosis. 2. No evidence of bowel obstruction  This study was performed with techniques to keep radiation doses as low as reasonably achievable (ALARA). Individualized dose reduction techniques using automated exposure control or adjustment of vA and/or kV according to the patient size were employed.  This report was signed and finalized on 1/23/2025 4:38 PM by Gregg Sarkar MD.        If I have ordered, I have independently reviewed the above noted radiographic studies.  Please see the radiologist dictation for the official interpretation    Medications given to patient in the ER    Medications   sodium chloride 0.9 % flush 10 mL (has no administration in time range)       AS OF 16:58 EST VITALS:    BP - 174/91  HR - 118  TEMP - 98 °F (36.7 °C) (Oral)  O2 SATS - 100%         Shared Decision Making: After my consideration of the clinical presentation and laboratory/radiology studies obtained, I have discussed the findings with the patient/patient representative who is in agreement with the treatment plan and final disposition. Risks and  benefits of discharge and/or observation admission were discussed.  Final disposition of the patient will be discharged home.  Patient is requested to follow-up with primary care provider and specialist in 1 week following final discharge.      Medical Decision Making  Marilu Godoy is a 34 y.o. female who presents to the ER complaining of coffee-ground stool, abdominal pain that started 4 days ago.  Patient went to the doctor this morning and was told to come here due to a possible GI bleed.  She confirms history of gastroparesis.     DDX: includes but is not limited to: GI bleed, gastroparesis, viral gastroenteritis, cholecystitis, cholelithiasis, appendicitis, other    Problems Addressed:  Generalized abdominal pain: complicated acute illness or injury    Amount and/or Complexity of Data Reviewed  External Data Reviewed: labs, radiology and notes.     Details: I have personally reviewed labs, radiology EKG and notes from patient's chart  Labs: ordered. Decision-making details documented in ED Course.     Details: I have personally reviewed and documented all results  Radiology: ordered. Decision-making details documented in ED Course.     Details: I have personally reviewed and documented all results  Discussion of management or test interpretation with external provider(s): Discussed assessment, treatment and plan with ER attending    Risk  Prescription drug management.  Risk Details: I have discussed with patient the finding of the test preformed today. Patient has been diagnosed with generalized abdominal pain and will be discharged home. Advised on return precautions the importance of close follow-up with primary care provider.  Strict return precautions have been given and patient verbalizes understanding        Final diagnoses:   Generalized abdominal pain       Please note that portions of this document were completed using voice recognition dictation software.       Nabeel Victoria, APRN  01/23/25  [id number]

## 2025-01-29 ENCOUNTER — DISEASE STATE MANAGEMENT VISIT (OUTPATIENT)
Dept: PHARMACY | Facility: HOSPITAL | Age: 35
End: 2025-01-29
Payer: COMMERCIAL

## 2025-01-29 DIAGNOSIS — G43.009 MIGRAINE WITHOUT AURA AND WITHOUT STATUS MIGRAINOSUS, NOT INTRACTABLE: Primary | ICD-10-CM

## 2025-02-10 ENCOUNTER — OFFICE VISIT (OUTPATIENT)
Dept: OBSTETRICS AND GYNECOLOGY | Facility: CLINIC | Age: 35
End: 2025-02-10
Payer: COMMERCIAL

## 2025-02-10 VITALS
HEIGHT: 62 IN | SYSTOLIC BLOOD PRESSURE: 116 MMHG | BODY MASS INDEX: 37.84 KG/M2 | WEIGHT: 205.6 LBS | DIASTOLIC BLOOD PRESSURE: 78 MMHG

## 2025-02-10 DIAGNOSIS — N89.8 VAGINAL DISCHARGE: ICD-10-CM

## 2025-02-10 DIAGNOSIS — Z98.890 S/P ENDOMETRIAL ABLATION: Primary | ICD-10-CM

## 2025-02-10 RX ORDER — LUBIPROSTONE 24 UG/1
CAPSULE ORAL
COMMUNITY
Start: 2025-02-09

## 2025-02-10 RX ORDER — OMEPRAZOLE 40 MG/1
40 CAPSULE, DELAYED RELEASE ORAL DAILY
COMMUNITY
Start: 2025-01-30 | End: 2026-01-30

## 2025-02-10 NOTE — PROGRESS NOTES
Subjective   Chief Complaint   Patient presents with    Follow-up     Vaginal swab       Marilu Godoy is a 34 y.o. year old  presenting to be seen for vaginal swab.  Patient recently had pelvic ultrasound done noting some fluid in cervical canal. Ultrasound was done for surveillance of known fibroids.   Previous endometrial ablation. 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     Bleeding disorder 2016    Body piercing     TONGUE, NOSE, TWO IN EACH EAR    Bronchitis     Chest pain 2021    last CP 2-3 months ago    Cholelithiasis     Clotting disorder     factor 5    Constipation     Coronary artery disease 2017    COVID-19 vaccine series completed     Pfizer-plus booster    Deep vein thrombosis 2016    Diarrhea     Disease of thyroid gland     Diverticulitis     Diverticulosis     Elevated cholesterol     Endometriosis     Factor 5 Leiden mutation, heterozygous     Fibroid     Fibromyalgia     Fibromyalgia, primary     Full dentures 2021    advised no adhesives DOS    Gastroparesis     GERD (gastroesophageal reflux disease)     H/O blood clots     H/O cardiovascular stress test 2021    reported several years ago-exercise wnl    Headache     Hiatal hernia     High cholesterol     History of recurrent UTIs     HPV (human papilloma virus) infection     Hx of echocardiogram     Hx of gout 2021    Hypertension     Hypoglycemia     Inappropriate sinus node tachycardia     Kidney infection 2021    history only    Low back pain     Migraine     Nausea & vomiting     Obesity     Osteoarthritis     Ovarian cyst     Pneumonia     Pollen allergies     PONV (postoperative nausea and vomiting)     Protein S deficiency 2016    labs from  hospitalization for PE    Pulmonary embolism 11/20/2016    on eliquis since that time    Recurrent pregnancy loss, antepartum condition or complication     Sleep apnea     no CPAP- states insurance took it back    Tattoo     LOWER BACK    Tinnitus     Varicella         Current Outpatient Medications:     albuterol (PROVENTIL) (2.5 MG/3ML) 0.083% nebulizer solution, 2.5 mg., Disp: , Rfl:     albuterol sulfate  (90 Base) MCG/ACT inhaler, Inhale 2 puffs Every 4 (Four) Hours As Needed for Wheezing or Shortness of Air., Disp: 18 g, Rfl: 11    apixaban (ELIQUIS) 5 MG tablet tablet, Take 1 tablet by mouth 2 (Two) Times a Day., Disp: 60 tablet, Rfl: 11    aspirin 81 MG EC tablet, Take 1 tablet by mouth Daily., Disp: , Rfl:     atorvastatin (LIPITOR) 40 MG tablet, Take 1 tablet by mouth Every Night. APPT NEEDED FOR ADDITIONAL REFILLS, Disp: 90 tablet, Rfl: 0    B-D ULTRA-FINE 33 LANCETS misc, Check sugars as needed/directed for hypoglycemia., Disp: 100 each, Rfl: 12    Blood Glucose Monitoring Suppl (ONE TOUCH ULTRA 2) w/Device kit, use as needed to check blood sugar for hypoglycemia (diabetes), Disp: , Rfl:     Blood Pressure kit, Monitor blood pressure daily due to labile blood pressures, Disp: 1 each, Rfl: 0    cholecalciferol (VITAMIN D3) 1.25 MG (73676 UT) capsule, Take 1 capsule by mouth Every 7 (Seven) Days., Disp: , Rfl:     Corlanor 5 MG tablet tablet, Take 1.5 tablets by mouth 2 (two) times a day., Disp: 90 tablet, Rfl: 6    Diclofenac Sodium (VOLTAREN) 1 % gel gel, Apply 4 g topically to the appropriate area as directed 4 (Four) Times a Day As Needed (joint pain)., Disp: 150 g, Rfl: 1    DULoxetine (CYMBALTA) 60 MG capsule, TAKE 1 CAPSULE BY MOUTH EVERY DAY. NEED APPOINTMENT FOR FURTHER REFILLS, Disp: 90 capsule, Rfl: 0    EPINEPHrine (EPIPEN) 0.3 MG/0.3ML solution auto-injector injection, Inject  into the appropriate muscle as directed by prescriber See Admin Instructions. inject syringe as directed for  allergic reaction, Disp: , Rfl:     Erenumab-aooe (AIMOVIG) 140 MG/ML auto-injector, Inject 1 mL under the skin into the appropriate area as directed Every 30 (Thirty) Days., Disp: 1 mL, Rfl: 6    glucose blood test strip, USE TO CHECK SUGARS DAILY DUE TO hypoglycemia, Disp: 100 each, Rfl: 12    glucose monitor monitoring kit, 1 each As Needed (diabetes). Check sugars as needed/directed for hypoglycemia., Disp: 1 each, Rfl: 0    levocetirizine (XYZAL) 5 MG tablet, Take 1 tablet by mouth every night at bedtime., Disp: , Rfl:     lidocaine (LIDODERM) 5 %, Place 2 patches on the skin as directed by provider Daily. Remove & Discard patch within 12 hours or as directed by MD, Disp: 60 patch, Rfl: 3    lubiprostone (AMITIZA) 24 MCG capsule, , Disp: , Rfl:     methocarbamol (ROBAXIN) 750 MG tablet, Take 1 tablet by mouth At Night As Needed for Muscle Spasms., Disp: 14 tablet, Rfl: 0    mometasone (NASONEX) 50 MCG/ACT nasal spray, 2 sprays into the nostril(s) as directed by provider Daily. APPT NEEDED FOR ADDITIONAL REFILLS, Disp: 3 each, Rfl: 0    omeprazole (priLOSEC) 40 MG capsule, Take 1 capsule by mouth Daily., Disp: , Rfl:     pantoprazole (PROTONIX) 40 MG EC tablet, Take 1 tablet by mouth Daily. APPT NEEDED FOR ADDITIONAL REFILLS, Disp: 90 tablet, Rfl: 0    rimegepant sulfate (Nurtec) 75 MG tablet, Take 1 tablet by mouth Every Other Day., Disp: 16 tablet, Rfl: 6    Symbicort 160-4.5 MCG/ACT inhaler, INHALE 2 PUFFS BY MOUTH EVERY 12 HOURS WITH SPACER. RINSE MOUTH AFTER EACH USE., Disp: , Rfl:     tiZANidine (ZANAFLEX) 4 MG tablet, Take 1 tablet 3 times a day by oral route as directed for 30 days., Disp: , Rfl:     Vitamin D, Ergocalciferol, 44233 units capsule, Take 1 capsule by mouth 1 (One) Time Per Week., Disp: 5 capsule, Rfl: 11    Current Facility-Administered Medications:     OnabotulinumtoxinA 200 Units, 200 Units, Intramuscular, Once, Rosanbalm, Marylou Delores, APRN   Allergies   Allergen Reactions    Ceftin  [Cefuroxime Axetil] Shortness Of Breath and Rash     Numbness in mouth and throat    Cefuroxime Hives, Rash and Shortness Of Breath     Numbness in mouth and throat  Numbness in mouth and throat    Iodinated Contrast Media Shortness Of Breath and Rash    Sumatriptan Shortness Of Breath and Nausea Only     Other reaction(s): Nausea Only, Other (See Comments)  Worsening migraine  Worsening migraine    Amoxicillin Rash, Hives and Itching      Past Surgical History:   Procedure Laterality Date     SECTION        and  and      SECTION WITH TUBAL N/A 01/15/2019    Procedure:  SECTION REPEAT WITH TUBAL;  Surgeon: Alva Boyer MD;  Location: [facility] LABOR DELIVERY;  Service: Obstetrics/Gynecology    COLONOSCOPY N/A 2017    Procedure: COLONOSCOPY WITH BIOPSIES AND ARGON THERMAL ABLATION;  Surgeon: Jignesh Selby MD;  Location: [facility] ENDOSCOPY;  Service:     D & C HYSTEROSCOPY ENDOMETRIAL ABLATION N/A 03/10/2022    Procedure: DILATATION AND CURETTAGE DIAGNOSTIC HYSTEROSCOPY NOVASURE ENDOMETRIAL ABLATION;  Surgeon: Alva Boyer MD;  Location: [facility] OR;  Service: Obstetrics/Gynecology;  Laterality: N/A;    DIAGNOSTIC LAPAROSCOPY N/A 2018    Procedure: DIAGNOSTIC LAPAROSCOPY AND ABLATION OF ENDOMETRIOSIS;  Surgeon: Evin Zamudio MD;  Location: [facility] OR;  Service: Obstetrics/Gynecology    ENDOMETRIAL ABLATION      ENDOSCOPIC FUNCTIONAL SINUS SURGERY (FESS) Right 2021    Procedure: Right endoscopic total ethmoidectomy, right endoscopic middle meatal antrotomy with cyst removal, right endoscopic frontal recess exploration with cyst removal;  Surgeon: Saulo Brito MD;  Location: [facility] OR;  Service: ENT;  Laterality: Right;    ENDOSCOPY N/A 2017    Procedure: ESOPHAGOGASTRODUODENOSCOPY WITH BIOPSIES AND COLD BIOPSY POLYPECTOMIES;  Surgeon: Jignesh Selby MD;  Location: [facility] ENDOSCOPY;  Service:     GASTRIC STIMULATOR IMPLANT SURGERY      LAPAROSCOPIC  "CHOLECYSTECTOMY      LIPOMA EXCISION N/A 12/10/2021    Procedure: Excision of soft tissue mass lower back;  Surgeon: Nidia Smith MD;  Location: [facility];  Service: General;  Laterality: N/A;    MOUTH SURGERY      full mouth tooth extration    SINUS SURGERY      TUBAL ABDOMINAL LIGATION  01/15/2019    WISDOM TOOTH EXTRACTION        Social History     Socioeconomic History    Marital status:    Tobacco Use    Smoking status: Never     Passive exposure: Past    Smokeless tobacco: Never   Vaping Use    Vaping status: Never Used   Substance and Sexual Activity    Alcohol use: No    Drug use: Never    Sexual activity: Yes     Partners: Male     Birth control/protection: Tubal ligation, Surgical     Comment: Tubal      Family History   Problem Relation Age of Onset    Arthritis Mother     COPD Mother     Asthma Mother     Thyroid disease Mother     Arthritis Father     Diabetes Father     Hypertension Father     Hyperlipidemia Father     Kidney disease Father     Heart attack Father     Coronary artery disease Father     Dementia Father     No Known Problems Son     Colon cancer Neg Hx     Liver cancer Neg Hx     Liver disease Neg Hx     Stomach cancer Neg Hx     Esophageal cancer Neg Hx        Review of Systems   Constitutional: Negative.    Gastrointestinal:  Positive for abdominal pain.   Genitourinary:  Positive for vaginal discharge. Negative for menstrual problem.           Objective   /78   Ht 157.5 cm (62\")   Wt 93.3 kg (205 lb 9.6 oz)   BMI 37.60 kg/m²     Physical Exam  Exam conducted with a chaperone present.   Constitutional:       Appearance: Normal appearance. She is well-developed and well-groomed. She is obese.   Eyes:      General: Lids are normal.      Extraocular Movements: Extraocular movements intact.      Conjunctiva/sclera: Conjunctivae normal.   Genitourinary:     Labia:         Right: No rash, tenderness or lesion.         Left: No rash, tenderness or lesion.       Urethra: " No prolapse, urethral pain, urethral swelling or urethral lesion.      Vagina: No vaginal discharge, tenderness or lesions.      Cervix: No cervical motion tenderness, discharge, friability or lesion.   Neurological:      Mental Status: She is alert.   Psychiatric:         Attention and Perception: Attention normal.         Mood and Affect: Mood normal.         Speech: Speech normal.         Behavior: Behavior is cooperative.            Result Review :           US Non-ob Transvaginal (01/29/2025 16:09)         Assessment and Plan  Diagnoses and all orders for this visit:    1. S/P endometrial ablation (Primary)    2. Vaginal discharge  -     NuSwab VG+ - Swab, Vagina      Patient Instructions   Patient reassured no concerning findings on exam  Will contact with swab results when available  RTO prn           This note was electronically signed.    Taylor Carrasquillo PA-C   February 10, 2025

## 2025-02-10 NOTE — PATIENT INSTRUCTIONS
Patient reassured no concerning findings on exam  Will contact with swab results when available  RTO prn

## 2025-02-12 ENCOUNTER — DISEASE STATE MANAGEMENT VISIT (OUTPATIENT)
Dept: PHARMACY | Facility: HOSPITAL | Age: 35
End: 2025-02-12
Payer: COMMERCIAL

## 2025-02-12 VITALS
TEMPERATURE: 98 F | BODY MASS INDEX: 37.39 KG/M2 | WEIGHT: 211 LBS | OXYGEN SATURATION: 100 % | DIASTOLIC BLOOD PRESSURE: 94 MMHG | SYSTOLIC BLOOD PRESSURE: 139 MMHG | HEIGHT: 63 IN | HEART RATE: 98 BPM

## 2025-02-12 DIAGNOSIS — G43.009 MIGRAINE WITHOUT AURA AND WITHOUT STATUS MIGRAINOSUS, NOT INTRACTABLE: Primary | ICD-10-CM

## 2025-02-12 PROCEDURE — 25010000002 ONABOTULINUMTOXINA 200 UNITS RECONSTITUTED SOLUTION: Performed by: NURSE PRACTITIONER

## 2025-02-12 RX ADMIN — ONABOTULINUMTOXINA 155 UNITS: 200 INJECTION, POWDER, LYOPHILIZED, FOR SOLUTION INTRADERMAL; INTRAMUSCULAR at 14:13

## 2025-02-12 NOTE — PROGRESS NOTES
Botox Procedure Note       Patient Name: Marilu Godoy  : 1990   MRN: [id number]     Chief Complaint:  Here for Botox injections and the Botox is facility supplied (will be billed by the hospital).      History of Present Illness: Marilu Godoy is a 34 y.o. female who is here today for Botox injections for migraines and this will be her first set of Botox injections for chronic migraines.     We have discussed risk and benefits of this Botox procedure and common side effects including headache, neck pain, neck stiffness or weakness, ptosis, flu-like symptoms as well as more serious possible adverse effects including possible dysphagia, respiratory distress or even death (death has only been reported once with adults for Botox for migraines in another state when mixed with lidocaine solution which we do not use lidocaine solution in our practice for mixing Botox). The patient verbally understands and accepts risks and agrees with moving forward with Botox injections for chronic migraine prevention.    Verbal and written consent has been obtained from the patient prior to beginning treatment injections.     Subjective      Review of Systems:   Review of Systems    The following portions of the patient's history were reviewed an updated as appropriate: past family history, past medical history, past social history, past surgical history.     Medications:     Current Outpatient Medications:     albuterol (PROVENTIL) (2.5 MG/3ML) 0.083% nebulizer solution, 2.5 mg., Disp: , Rfl:     albuterol sulfate  (90 Base) MCG/ACT inhaler, Inhale 2 puffs Every 4 (Four) Hours As Needed for Wheezing or Shortness of Air., Disp: 18 g, Rfl: 11    apixaban (ELIQUIS) 5 MG tablet tablet, Take 1 tablet by mouth 2 (Two) Times a Day., Disp: 60 tablet, Rfl: 11    aspirin 81 MG EC tablet, Take 1 tablet by mouth Daily., Disp: , Rfl:     atorvastatin (LIPITOR) 40 MG tablet, Take 1 tablet by mouth Every Night. APPT  NEEDED FOR ADDITIONAL REFILLS, Disp: 90 tablet, Rfl: 0    B-D ULTRA-FINE 33 LANCETS misc, Check sugars as needed/directed for hypoglycemia., Disp: 100 each, Rfl: 12    Blood Glucose Monitoring Suppl (ONE TOUCH ULTRA 2) w/Device kit, use as needed to check blood sugar for hypoglycemia (diabetes), Disp: , Rfl:     Blood Pressure kit, Monitor blood pressure daily due to labile blood pressures, Disp: 1 each, Rfl: 0    cholecalciferol (VITAMIN D3) 1.25 MG (96123 UT) capsule, Take 1 capsule by mouth Every 7 (Seven) Days., Disp: , Rfl:     Corlanor 5 MG tablet tablet, Take 1.5 tablets by mouth 2 (two) times a day., Disp: 90 tablet, Rfl: 6    Diclofenac Sodium (VOLTAREN) 1 % gel gel, Apply 4 g topically to the appropriate area as directed 4 (Four) Times a Day As Needed (joint pain)., Disp: 150 g, Rfl: 1    DULoxetine (CYMBALTA) 60 MG capsule, TAKE 1 CAPSULE BY MOUTH EVERY DAY. NEED APPOINTMENT FOR FURTHER REFILLS, Disp: 90 capsule, Rfl: 0    EPINEPHrine (EPIPEN) 0.3 MG/0.3ML solution auto-injector injection, Inject  into the appropriate muscle as directed by prescriber See Admin Instructions. inject syringe as directed for allergic reaction, Disp: , Rfl:     Erenumab-aooe (AIMOVIG) 140 MG/ML auto-injector, Inject 1 mL under the skin into the appropriate area as directed Every 30 (Thirty) Days., Disp: 1 mL, Rfl: 6    glucose blood test strip, USE TO CHECK SUGARS DAILY DUE TO hypoglycemia, Disp: 100 each, Rfl: 12    glucose monitor monitoring kit, 1 each As Needed (diabetes). Check sugars as needed/directed for hypoglycemia., Disp: 1 each, Rfl: 0    levocetirizine (XYZAL) 5 MG tablet, Take 1 tablet by mouth every night at bedtime., Disp: , Rfl:     lidocaine (LIDODERM) 5 %, Place 2 patches on the skin as directed by provider Daily. Remove & Discard patch within 12 hours or as directed by MD, Disp: 60 patch, Rfl: 3    lubiprostone (AMITIZA) 24 MCG capsule, , Disp: , Rfl:     methocarbamol (ROBAXIN) 750 MG tablet, Take 1 tablet  by mouth At Night As Needed for Muscle Spasms., Disp: 14 tablet, Rfl: 0    mometasone (NASONEX) 50 MCG/ACT nasal spray, 2 sprays into the nostril(s) as directed by provider Daily. APPT NEEDED FOR ADDITIONAL REFILLS, Disp: 3 each, Rfl: 0    omeprazole (priLOSEC) 40 MG capsule, Take 1 capsule by mouth Daily., Disp: , Rfl:     pantoprazole (PROTONIX) 40 MG EC tablet, Take 1 tablet by mouth Daily. APPT NEEDED FOR ADDITIONAL REFILLS, Disp: 90 tablet, Rfl: 0    rimegepant sulfate (Nurtec) 75 MG tablet, Take 1 tablet by mouth Every Other Day., Disp: 16 tablet, Rfl: 6    Symbicort 160-4.5 MCG/ACT inhaler, INHALE 2 PUFFS BY MOUTH EVERY 12 HOURS WITH SPACER. RINSE MOUTH AFTER EACH USE., Disp: , Rfl:     tiZANidine (ZANAFLEX) 4 MG tablet, Take 1 tablet 3 times a day by oral route as directed for 30 days., Disp: , Rfl:     Vitamin D, Ergocalciferol, 45958 units capsule, Take 1 capsule by mouth 1 (One) Time Per Week., Disp: 5 capsule, Rfl: 11    Current Facility-Administered Medications:     OnabotulinumtoxinA 200 Units, 200 Units, Intramuscular, Once, KareennbalMarylou casascia, APRN    OnabotulinumtoxinA 200 Units, 200 Units, Intramuscular, Q3 Months, Marylou Roger APRKAISER, 155 Units at 02/12/25 1413    Allergies:   Allergies   Allergen Reactions    Ceftin [Cefuroxime Axetil] Shortness Of Breath and Rash     Numbness in mouth and throat    Cefuroxime Hives, Rash and Shortness Of Breath     Numbness in mouth and throat  Numbness in mouth and throat    Iodinated Contrast Media Shortness Of Breath and Rash    Sumatriptan Shortness Of Breath and Nausea Only     Other reaction(s): Nausea Only, Other (See Comments)  Worsening migraine  Worsening migraine    Amoxicillin Rash, Hives and Itching       Objective     Physical Exam:  Vital Signs:   Vitals:    02/12/25 1348   BP: 139/94   BP Location: Right arm   Patient Position: Sitting   Cuff Size: Adult   Pulse: 98   Temp: 98 °F (36.7 °C)   SpO2: 100%   Weight: 95.7 kg (211 lb)  "  Height: 160 cm (63\")   PainSc: 0-No pain     Body mass index is 37.38 kg/m².     Physical Exam      BOTOX PROCEDURE:     Date of Last Injection: n/a  Response: n/a  Side Effects: n/a  : Allergromana  Lot #: [id number]  Expiration Date: 09/2026  NDC: [id number]    200 unit vial Botox was re-constituted with 4 mL 0.9 NS to 5 unit/0.1 mL using standard techniques.  10 units were given at the  muscle in 2 divided sites  5 units were given at the Procerus muscle  20 units were given at the Frontalis muscle in 4 divided sites  40 units were given at the Temporalis muscle in 8 divided sites  30 units were given at the Occipitalis muscle in 6 divided sites  20 units were given at the Cervical paraspinal muscle in 4 divided sites  30 units were given at the Trapezius muscle in 6 divided sites  For a total of 155 units divided between 31 sites and 45 units of wastage    Patient tolerated procedure well with no immediate complications.     Assessment / Plan      Assessment/Plan:   Diagnoses and all orders for this visit:    1. Migraine without aura and without status migrainosus, not intractable (Primary)  -     OnabotulinumtoxinA 200 Units       Follow Up:   It has been determined that Marilu Godoy has chronic migraine headaches. We will proceed with a 12 week regimen of 200 units of Botox injections, to treat the patient's chronic migraines.      Return in about 3 months (around 5/12/2025) for Botox.      ARNIE Gaxiola, FNP-C  [facility]   "

## 2025-02-13 LAB
A VAGINAE DNA VAG QL NAA+PROBE: NORMAL SCORE
BVAB2 DNA VAG QL NAA+PROBE: NORMAL SCORE
C ALBICANS DNA VAG QL NAA+PROBE: NEGATIVE
C GLABRATA DNA VAG QL NAA+PROBE: NEGATIVE
C TRACH DNA SPEC QL NAA+PROBE: NEGATIVE
MEGA1 DNA VAG QL NAA+PROBE: NORMAL SCORE
N GONORRHOEA DNA VAG QL NAA+PROBE: NEGATIVE
T VAGINALIS DNA VAG QL NAA+PROBE: NEGATIVE

## 2025-02-24 ENCOUNTER — TELEPHONE (OUTPATIENT)
Dept: GASTROENTEROLOGY | Facility: CLINIC | Age: 35
End: 2025-02-24
Payer: COMMERCIAL

## 2025-02-24 DIAGNOSIS — R07.9 CHEST PAIN, UNSPECIFIED TYPE: Primary | ICD-10-CM

## 2025-02-24 NOTE — TELEPHONE ENCOUNTER
Pt was Rx'ed Dexamethasone and was asking if this was okay to take prior to her Nuclear stress test. She has not taken this yet just as a precaution. Please advise

## 2025-02-25 NOTE — TELEPHONE ENCOUNTER
Hub staff attempted to follow warm transfer process and was unsuccessful     Caller: CHIQUITA JONES    Relationship to patient: SELF    Best call back number: [phone] (home)     Patient is needing: PLEASE CALL PT WHEN AVAILABLE.

## 2025-03-05 ENCOUNTER — TELEPHONE (OUTPATIENT)
Dept: CARDIOLOGY | Facility: CLINIC | Age: 35
End: 2025-03-05
Payer: COMMERCIAL

## 2025-03-05 NOTE — TELEPHONE ENCOUNTER
Name: Marilu Godoy      Relationship: Self      Best Callback Number: [phone]      HUB PROVIDED THE RELAY MESSAGE FROM THE OFFICE      PATIENT: VOICED UNDERSTANDING AND HAS NO FURTHER QUESTIONS AT THIS TIME    ADDITIONAL INFORMATION: PATIENT STILL WANTS TO KEEP THIS APPOINTMENT DUE TO HER RECENT ISSUES WITH SHORTNESS OF BREATH AND FLUCTUATING HEART RATE. SHE WILL SET UP A TESTING FOLLOW UP AT THE OFFICE AFTER HER APPOINTMENT.

## 2025-03-06 ENCOUNTER — OFFICE VISIT (OUTPATIENT)
Dept: CARDIOLOGY | Facility: CLINIC | Age: 35
End: 2025-03-06
Payer: COMMERCIAL

## 2025-03-06 VITALS
HEIGHT: 64 IN | HEART RATE: 114 BPM | RESPIRATION RATE: 18 BRPM | SYSTOLIC BLOOD PRESSURE: 114 MMHG | WEIGHT: 199.4 LBS | BODY MASS INDEX: 34.04 KG/M2 | DIASTOLIC BLOOD PRESSURE: 88 MMHG

## 2025-03-06 DIAGNOSIS — I10 PRIMARY HYPERTENSION: ICD-10-CM

## 2025-03-06 DIAGNOSIS — Z86.711 HISTORY OF PULMONARY EMBOLUS (PE): ICD-10-CM

## 2025-03-06 DIAGNOSIS — D68.2 FACTOR V DEFICIENCY: ICD-10-CM

## 2025-03-06 DIAGNOSIS — I25.10 CORONARY ARTERY DISEASE INVOLVING NATIVE CORONARY ARTERY OF NATIVE HEART WITHOUT ANGINA PECTORIS: ICD-10-CM

## 2025-03-06 DIAGNOSIS — R00.2 PALPITATIONS: ICD-10-CM

## 2025-03-06 DIAGNOSIS — I47.11 INAPPROPRIATE SINUS TACHYCARDIA: ICD-10-CM

## 2025-03-06 DIAGNOSIS — E66.9 OBESITY (BMI 30-39.9): ICD-10-CM

## 2025-03-06 DIAGNOSIS — D66 FACTOR VIII DEFICIENCY: ICD-10-CM

## 2025-03-06 DIAGNOSIS — D68.51 HETEROZYGOUS FACTOR V LEIDEN MUTATION: ICD-10-CM

## 2025-03-06 DIAGNOSIS — E78.5 DYSLIPIDEMIA: ICD-10-CM

## 2025-03-06 DIAGNOSIS — R07.2 PRECORDIAL PAIN: Primary | ICD-10-CM

## 2025-03-06 DIAGNOSIS — D68.59 PROTEIN S DEFICIENCY: ICD-10-CM

## 2025-03-06 DIAGNOSIS — Z79.01 CHRONIC ANTICOAGULATION: ICD-10-CM

## 2025-03-06 RX ORDER — SCOPOLAMINE 1 MG/3D
1 PATCH, EXTENDED RELEASE TRANSDERMAL
COMMUNITY
Start: 2025-03-02

## 2025-03-06 NOTE — PROGRESS NOTES
Subjective:     Encounter Date:03/06/2025      Patient ID: Marilu Godoy is a 34 y.o. female.    Chief Complaint: Tachycardia  HPI  This is a 34-year-old female patient who presents to cardiology clinic to discuss results of outpatient testing.  The patient had an echocardiogram which was normal.  The echocardiogram showed no evidence of ventricular hypertrophy or cardiac chamber enlargement.  Left ventricular systolic and diastolic function was normal.  There was no evidence of valvular pathology of significance, pericardial disease, pulmonary hypertension or intracardiac shunting.  The left ventricular ejection fraction was normal and there were no regional wall motion abnormalities. The patient also had a normal cardiac monitor.  She had normal heart rate variability.  Her average heart rate was 85 bpm.  Her slowest heart rate was 48 and was sinus bradycardia during sleeping hours.  Her fastest recorded heart rate was 157 bpm which was sinus tachycardia during waking hours.  The patient was scheduled to have an outpatient vasodilator positron emission tomography myocardial perfusion scan done in [place] but has not had this study performed.  The patient is concerned about her tachycardia with a sense that her heart is racing primarily at night.  She has been seen in the emergency room on multiple occasions with no documented arrhythmia on cardiac monitoring.  Twelve-lead electrocardiogram remains normal.  Cardiac troponins have been serially normal.  The following portions of the patient's history were reviewed and updated as appropriate: allergies, current medications, past family history, past medical history, past social history, past surgical history and problem  Review of Systems   Constitutional: Negative for chills, diaphoresis, fever, malaise/fatigue, weight gain and weight loss.   HENT:  Negative for ear discharge, hearing loss, hoarse voice and nosebleeds.    Eyes:  Negative for discharge,  double vision, pain and photophobia.   Cardiovascular:  Positive for palpitations. Negative for chest pain, claudication, cyanosis, dyspnea on exertion, irregular heartbeat, leg swelling, near-syncope, orthopnea, paroxysmal nocturnal dyspnea and syncope.   Respiratory:  Negative for cough, hemoptysis, sputum production and wheezing.    Endocrine: Negative for cold intolerance, heat intolerance, polydipsia, polyphagia and polyuria.   Hematologic/Lymphatic: Negative for adenopathy and bleeding problem. Does not bruise/bleed easily.   Skin:  Negative for color change, flushing, itching and rash.   Musculoskeletal:  Negative for muscle cramps, muscle weakness, myalgias and stiffness.   Gastrointestinal:  Negative for abdominal pain, diarrhea, hematemesis, hematochezia, nausea and vomiting.   Genitourinary:  Negative for dysuria, frequency and nocturia.   Neurological:  Negative for focal weakness, loss of balance, numbness, paresthesias and seizures.   Psychiatric/Behavioral:  Negative for altered mental status, hallucinations and suicidal ideas.    Allergic/Immunologic: Negative for HIV exposure, hives and persistent infections.           Current Outpatient Medications:     albuterol (PROVENTIL) (2.5 MG/3ML) 0.083% nebulizer solution, 2.5 mg., Disp: , Rfl:     albuterol sulfate  (90 Base) MCG/ACT inhaler, Inhale 2 puffs Every 4 (Four) Hours As Needed for Wheezing or Shortness of Air., Disp: 18 g, Rfl: 11    apixaban (ELIQUIS) 5 MG tablet tablet, Take 1 tablet by mouth 2 (Two) Times a Day., Disp: 60 tablet, Rfl: 11    aspirin 81 MG EC tablet, Take 1 tablet by mouth Daily., Disp: , Rfl:     atorvastatin (LIPITOR) 40 MG tablet, Take 1 tablet by mouth Every Night. APPT NEEDED FOR ADDITIONAL REFILLS, Disp: 90 tablet, Rfl: 0    B-D ULTRA-FINE 33 LANCETS misc, Check sugars as needed/directed for hypoglycemia., Disp: 100 each, Rfl: 12    Blood Glucose Monitoring Suppl (ONE TOUCH ULTRA 2) w/Device kit, use as needed to  check blood sugar for hypoglycemia (diabetes), Disp: , Rfl:     Blood Pressure kit, Monitor blood pressure daily due to labile blood pressures, Disp: 1 each, Rfl: 0    cholecalciferol (VITAMIN D3) 1.25 MG (57846 UT) capsule, Take 1 capsule by mouth Every 7 (Seven) Days., Disp: , Rfl:     Corlanor 5 MG tablet tablet, Take 1.5 tablets by mouth 2 (two) times a day., Disp: 90 tablet, Rfl: 6    Diclofenac Sodium (VOLTAREN) 1 % gel gel, Apply 4 g topically to the appropriate area as directed 4 (Four) Times a Day As Needed (joint pain)., Disp: 150 g, Rfl: 1    DULoxetine (CYMBALTA) 60 MG capsule, TAKE 1 CAPSULE BY MOUTH EVERY DAY. NEED APPOINTMENT FOR FURTHER REFILLS, Disp: 90 capsule, Rfl: 0    EPINEPHrine (EPIPEN) 0.3 MG/0.3ML solution auto-injector injection, Inject  into the appropriate muscle as directed by prescriber See Admin Instructions. inject syringe as directed for allergic reaction, Disp: , Rfl:     Erenumab-aooe (AIMOVIG) 140 MG/ML auto-injector, Inject 1 mL under the skin into the appropriate area as directed Every 30 (Thirty) Days., Disp: 1 mL, Rfl: 6    glucose blood test strip, USE TO CHECK SUGARS DAILY DUE TO hypoglycemia, Disp: 100 each, Rfl: 12    glucose monitor monitoring kit, 1 each As Needed (diabetes). Check sugars as needed/directed for hypoglycemia., Disp: 1 each, Rfl: 0    levocetirizine (XYZAL) 5 MG tablet, Take 1 tablet by mouth every night at bedtime., Disp: , Rfl:     lidocaine (LIDODERM) 5 %, Place 2 patches on the skin as directed by provider Daily. Remove & Discard patch within 12 hours or as directed by MD, Disp: 60 patch, Rfl: 3    lubiprostone (AMITIZA) 24 MCG capsule, , Disp: , Rfl:     methocarbamol (ROBAXIN) 750 MG tablet, Take 1 tablet by mouth At Night As Needed for Muscle Spasms., Disp: 14 tablet, Rfl: 0    mometasone (NASONEX) 50 MCG/ACT nasal spray, 2 sprays into the nostril(s) as directed by provider Daily. APPT NEEDED FOR ADDITIONAL REFILLS, Disp: 3 each, Rfl: 0     omeprazole (priLOSEC) 40 MG capsule, Take 1 capsule by mouth Daily., Disp: , Rfl:     pantoprazole (PROTONIX) 40 MG EC tablet, Take 1 tablet by mouth Daily. APPT NEEDED FOR ADDITIONAL REFILLS, Disp: 90 tablet, Rfl: 0    rimegepant sulfate (Nurtec) 75 MG tablet, Take 1 tablet by mouth Every Other Day., Disp: 16 tablet, Rfl: 6    Scopolamine 1 MG/3DAYS patch, Place 1 patch on the skin as directed by provider Every 72 (Seventy-Two) Hours., Disp: , Rfl:     Symbicort 160-4.5 MCG/ACT inhaler, INHALE 2 PUFFS BY MOUTH EVERY 12 HOURS WITH SPACER. RINSE MOUTH AFTER EACH USE., Disp: , Rfl:     tiZANidine (ZANAFLEX) 4 MG tablet, Take 1 tablet 3 times a day by oral route as directed for 30 days., Disp: , Rfl:     Vitamin D, Ergocalciferol, 94224 units capsule, Take 1 capsule by mouth 1 (One) Time Per Week., Disp: 5 capsule, Rfl: 11    Current Facility-Administered Medications:     OnabotulinumtoxinA 200 Units, 200 Units, Intramuscular, Once, Rosanbalm, Marylou Delores, APRN    OnabotulinumtoxinA 200 Units, 200 Units, Intramuscular, Q3 Months, Marylou Roger APRKAISER, 155 Units at 02/12/25 1413    Objective:   Vitals and nursing note reviewed.   Constitutional:       Appearance: Healthy appearance. Not in distress.   Neck:      Vascular: No JVR. JVD normal.   Pulmonary:      Effort: Pulmonary effort is normal.      Breath sounds: Normal breath sounds. No wheezing. No rhonchi. No rales.   Chest:      Chest wall: Not tender to palpatation.   Cardiovascular:      PMI at left midclavicular line. Normal rate. Regular rhythm. Normal S1. Normal S2.       Murmurs: There is no murmur.      No gallop.  No click. No rub.   Pulses:     Intact distal pulses.   Edema:     Peripheral edema absent.   Abdominal:      General: Bowel sounds are normal.      Palpations: Abdomen is soft.      Tenderness: There is no abdominal tenderness.   Musculoskeletal: Normal range of motion.         General: No tenderness. Skin:     General: Skin is warm  "and dry.   Neurological:      General: No focal deficit present.      Mental Status: Alert and oriented to person, place and time.       Blood pressure 114/88, pulse 114, resp. rate 18, height 162.6 cm (64\"), weight 90.4 kg (199 lb 6.4 oz), not currently breastfeeding.   Lab Review:     Assessment:       1. Precordial pain  Noncardiac chest pain.    2. Coronary artery disease involving native coronary artery of native heart without angina pectoris  Minor coronary atherosclerosis by multiple imaging modalities.  No evidence of ischemic heart disease by numerous outpatient stress test.    3. Dyslipidemia  Tolerating statin based cholesterol-lowering therapy.    4. Inappropriate sinus tachycardia  Typical autonomic dysfunction.    5. Palpitations  No evidence of arrhythmia or frequent/complex atrial or ventricular ectopy.  Multiple normal cardiac monitors.    6. Primary hypertension  Acceptable blood pressure control.    7. Chronic anticoagulation  The patient is on uncomplicated lifelong anticoagulation therapy with DOAC due to prior pulmonary embolus and hypercoagulable state.    8. Factor V deficiency      9. Factor VIII deficiency      10. Heterozygous factor V Leiden mutation      11. History of pulmonary embolus (PE)      12. Protein S deficiency      13. Obesity (BMI 30-39.9)  BMI is greater than 34.  The obesity pattern is central.  This is due to excess calorie intake.  There are multiple comorbidities.      ECG 12 Lead ED Triage Standing Order; SOA    Date/Time: 3/6/2025 3:18 PM  Performed by: Marc Byrnes MD    Authorized by: Marvin Sorensen DO  Comparison: compared with previous ECG   Similar to previous ECG  Rhythm: sinus tachycardia  Rate: tachycardic  BPM: 109  QRS axis: normal    Clinical impression: normal ECG          Plan:     I have gone back and counted all of the outpatient cardiac test the patient has been done since 2015.  The patient has had a staggering 33 cardiac outpatient " tests/procedures performed all of which were normal.  Despite my reassurance the patient continues to feel that she has serious cardiac pathology.    I have recommended referral to psychiatry, as the patient clearly has no understanding/comprehension of the magnitude of her previous testing or the significance of multiple favorable findings.  She declines formal psychiatric evaluation.    The patient is agreeable for a second opinion from electrophysiology.  She may be a candidate for further escalation of Corlanor therapy, assuming she is compliant with the medication.  She has been intolerant to beta-blocker therapy in the past due to side effects and/or development of symptomatic hypotension.

## 2025-04-17 ENCOUNTER — OFFICE VISIT (OUTPATIENT)
Dept: ORTHOPEDIC SURGERY | Facility: CLINIC | Age: 35
End: 2025-04-17
Payer: COMMERCIAL

## 2025-04-17 VITALS
SYSTOLIC BLOOD PRESSURE: 120 MMHG | WEIGHT: 201.4 LBS | HEIGHT: 64 IN | DIASTOLIC BLOOD PRESSURE: 88 MMHG | BODY MASS INDEX: 34.38 KG/M2

## 2025-04-17 DIAGNOSIS — R20.2 PARESTHESIAS IN RIGHT HAND: ICD-10-CM

## 2025-04-17 DIAGNOSIS — M25.531 RIGHT WRIST PAIN: Primary | ICD-10-CM

## 2025-04-17 PROCEDURE — 1159F MED LIST DOCD IN RCRD: CPT | Performed by: PHYSICIAN ASSISTANT

## 2025-04-17 PROCEDURE — 99213 OFFICE O/P EST LOW 20 MIN: CPT | Performed by: PHYSICIAN ASSISTANT

## 2025-04-17 PROCEDURE — 1160F RVW MEDS BY RX/DR IN RCRD: CPT | Performed by: PHYSICIAN ASSISTANT

## 2025-04-17 PROCEDURE — 3074F SYST BP LT 130 MM HG: CPT | Performed by: PHYSICIAN ASSISTANT

## 2025-04-17 PROCEDURE — 3079F DIAST BP 80-89 MM HG: CPT | Performed by: PHYSICIAN ASSISTANT

## 2025-04-17 NOTE — PROGRESS NOTES
Subjective   Patient ID: Marilu Godoy is a 34 y.o. right hand dominant female  Pain of the Right Wrist (Reports pain for 2-3 weeks, no known injury. Presents is wrist brace )         History of Present Illness  Patient presents for the evaluation of right wrist numbness and tingling to the right thumb, third long finger, 4th and 5th digits.  She had been experiencing arthralgia to the right wrist without injury or trauma for at least 6 months.  Symptoms have been worsening over the last 2 to 3 weeks.  She was evaluated at an urgent care, provided oral steroids with a Velcro wrist brace.  She states the brace does help with the symptoms.  As long as she is not moving the wrist she has very little to no symptoms.                                                   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     Bleeding disorder 11/13/2016    Body piercing     TONGUE, NOSE, TWO IN EACH EAR    Bronchitis     Chest pain 06/22/2021    last CP 2-3 months ago    Cholelithiasis     Clotting disorder 11-13-16    factor 5    Constipation     Coronary artery disease 6/16/2017    COVID-19 vaccine series completed     Pfizer-plus booster    Deep vein thrombosis 11/13/2016    Diarrhea     Disease of thyroid gland     Diverticulitis     Diverticulitis of colon     Diverticulosis     Elevated cholesterol     Endometriosis     Factor 5 Leiden mutation, heterozygous     Fibroid     Fibromyalgia     Fibromyalgia, primary     Full dentures 06/22/2021    advised no adhesives DOS    Gastroparesis     GERD (gastroesophageal reflux disease)     H/O blood clots     H/O cardiovascular stress test 06/22/2021    reported several years ago-exercise wnl    Headache     Hiatal hernia     High cholesterol     History of pulmonary embolism     History of recurrent UTIs      HPV (human papilloma virus) infection     Hx of echocardiogram     Hx of gout 2021    Hypertension     Hypoglycemia     Inappropriate sinus node tachycardia     Kidney infection 2021    history only    Low back pain     Lung nodule     Migraine     Migraines     Nausea & vomiting     Obesity     Osteoarthritis     Ovarian cyst     Pneumonia     Pollen allergies     PONV (postoperative nausea and vomiting)     Protein S deficiency 2016    labs from hospitalization for PE    Pulmonary embolism 2016    on eliquis since that time    Recurrent pregnancy loss, antepartum condition or complication     Sleep apnea     no CPAP- states insurance took it back    Sleep apnea, obstructive     Tattoo     LOWER BACK    Tinnitus     Varicella         Past Surgical History:   Procedure Laterality Date     SECTION        and  and      SECTION WITH TUBAL N/A 01/15/2019    Procedure:  SECTION REPEAT WITH TUBAL;  Surgeon: Alva Boyer MD;  Location: [facility] LABOR DELIVERY;  Service: Obstetrics/Gynecology    COLONOSCOPY N/A 2017    Procedure: COLONOSCOPY WITH BIOPSIES AND ARGON THERMAL ABLATION;  Surgeon: Jignesh Selby MD;  Location: [facility] ENDOSCOPY;  Service:     D & C HYSTEROSCOPY ENDOMETRIAL ABLATION N/A 03/10/2022    Procedure: DILATATION AND CURETTAGE DIAGNOSTIC HYSTEROSCOPY NOVASURE ENDOMETRIAL ABLATION;  Surgeon: Alva Boyer MD;  Location: [facility] OR;  Service: Obstetrics/Gynecology;  Laterality: N/A;    DIAGNOSTIC LAPAROSCOPY N/A 2018    Procedure: DIAGNOSTIC LAPAROSCOPY AND ABLATION OF ENDOMETRIOSIS;  Surgeon: Evin Zamudio MD;  Location: [facility] OR;  Service: Obstetrics/Gynecology    ENDOMETRIAL ABLATION      ENDOSCOPIC FUNCTIONAL SINUS SURGERY (FESS) Right 2021    Procedure: Right endoscopic total ethmoidectomy, right endoscopic middle meatal antrotomy with cyst removal, right endoscopic frontal recess exploration with cyst removal;   Surgeon: Saulo Brito MD;  Location: [facility] OR;  Service: ENT;  Laterality: Right;    ENDOSCOPY N/A 04/20/2017    Procedure: ESOPHAGOGASTRODUODENOSCOPY WITH BIOPSIES AND COLD BIOPSY POLYPECTOMIES;  Surgeon: Jignesh Selby MD;  Location: [facility] ENDOSCOPY;  Service:     GASTRIC STIMULATOR IMPLANT SURGERY      LAPAROSCOPIC CHOLECYSTECTOMY      LIPOMA EXCISION N/A 12/10/2021    Procedure: Excision of soft tissue mass lower back;  Surgeon: Nidia Smith MD;  Location: [facility] OR;  Service: General;  Laterality: N/A;    MOUTH SURGERY      full mouth tooth extration    PELVIC LAPAROSCOPY      SINUS SURGERY      TUBAL ABDOMINAL LIGATION  01/15/2019    WISDOM TOOTH EXTRACTION         Family History   Problem Relation Age of Onset    Arthritis Mother     COPD Mother     Asthma Mother     Thyroid disease Mother     Depression Mother     Miscarriages / Stillbirths Mother     Heart disease Mother     Hyperlipidemia Mother     Kidney disease Mother     Arthritis Father     Diabetes Father     Hypertension Father     Hyperlipidemia Father     Kidney disease Father     Heart attack Father     Coronary artery disease Father     Dementia Father     Depression Father     Heart disease Father     Neuropathy Father     No Known Problems Son     Breast cancer Paternal Aunt     Colon cancer Neg Hx     Liver cancer Neg Hx     Liver disease Neg Hx     Stomach cancer Neg Hx     Esophageal cancer Neg Hx        Social History     Socioeconomic History    Marital status:    Tobacco Use    Smoking status: Never     Passive exposure: Past    Smokeless tobacco: Never   Vaping Use    Vaping status: Never Used   Substance and Sexual Activity    Alcohol use: No    Drug use: Never    Sexual activity: Yes     Partners: Male     Birth control/protection: Tubal ligation, Surgical     Comment: Tubal         Current Outpatient Medications:     albuterol (PROVENTIL) (2.5 MG/3ML) 0.083% nebulizer solution, 2.5 mg., Disp: , Rfl:     albuterol  sulfate  (90 Base) MCG/ACT inhaler, Inhale 2 puffs Every 4 (Four) Hours As Needed for Wheezing or Shortness of Air., Disp: 18 g, Rfl: 11    apixaban (ELIQUIS) 5 MG tablet tablet, Take 1 tablet by mouth 2 (Two) Times a Day., Disp: 60 tablet, Rfl: 11    aspirin 81 MG EC tablet, Take 1 tablet by mouth Daily., Disp: , Rfl:     atorvastatin (LIPITOR) 40 MG tablet, Take 1 tablet by mouth Every Night. APPT NEEDED FOR ADDITIONAL REFILLS, Disp: 90 tablet, Rfl: 0    B-D ULTRA-FINE 33 LANCETS misc, Check sugars as needed/directed for hypoglycemia., Disp: 100 each, Rfl: 12    Blood Glucose Monitoring Suppl (ONE TOUCH ULTRA 2) w/Device kit, use as needed to check blood sugar for hypoglycemia (diabetes), Disp: , Rfl:     Blood Pressure kit, Monitor blood pressure daily due to labile blood pressures, Disp: 1 each, Rfl: 0    cholecalciferol (VITAMIN D3) 1.25 MG (83042 UT) capsule, Take 1 capsule by mouth Every 7 (Seven) Days., Disp: , Rfl:     Corlanor 5 MG tablet tablet, Take 1.5 tablets by mouth 2 (two) times a day., Disp: 90 tablet, Rfl: 6    Diclofenac Sodium (VOLTAREN) 1 % gel gel, Apply 4 g topically to the appropriate area as directed 4 (Four) Times a Day As Needed (joint pain)., Disp: 150 g, Rfl: 1    DULoxetine (CYMBALTA) 60 MG capsule, TAKE 1 CAPSULE BY MOUTH EVERY DAY. NEED APPOINTMENT FOR FURTHER REFILLS, Disp: 90 capsule, Rfl: 0    EPINEPHrine (EPIPEN) 0.3 MG/0.3ML solution auto-injector injection, Inject  into the appropriate muscle as directed by prescriber See Admin Instructions. inject syringe as directed for allergic reaction, Disp: , Rfl:     Erenumab-aooe (AIMOVIG) 140 MG/ML auto-injector, Inject 1 mL under the skin into the appropriate area as directed Every 30 (Thirty) Days., Disp: 1 mL, Rfl: 6    glucose blood test strip, USE TO CHECK SUGARS DAILY DUE TO hypoglycemia, Disp: 100 each, Rfl: 12    glucose monitor monitoring kit, 1 each As Needed (diabetes). Check sugars as needed/directed for  hypoglycemia., Disp: 1 each, Rfl: 0    levocetirizine (XYZAL) 5 MG tablet, Take 1 tablet by mouth every night at bedtime., Disp: , Rfl:     lidocaine (LIDODERM) 5 %, Place 2 patches on the skin as directed by provider Daily. Remove & Discard patch within 12 hours or as directed by MD, Disp: 60 patch, Rfl: 3    lubiprostone (AMITIZA) 24 MCG capsule, , Disp: , Rfl:     methocarbamol (ROBAXIN) 750 MG tablet, Take 1 tablet by mouth At Night As Needed for Muscle Spasms., Disp: 14 tablet, Rfl: 0    mometasone (NASONEX) 50 MCG/ACT nasal spray, 2 sprays into the nostril(s) as directed by provider Daily. APPT NEEDED FOR ADDITIONAL REFILLS, Disp: 3 each, Rfl: 0    omeprazole (priLOSEC) 40 MG capsule, Take 1 capsule by mouth Daily., Disp: , Rfl:     pantoprazole (PROTONIX) 40 MG EC tablet, Take 1 tablet by mouth Daily. APPT NEEDED FOR ADDITIONAL REFILLS, Disp: 90 tablet, Rfl: 0    rimegepant sulfate (Nurtec) 75 MG tablet, Take 1 tablet by mouth Every Other Day., Disp: 16 tablet, Rfl: 6    Scopolamine 1 MG/3DAYS patch, Place 1 patch on the skin as directed by provider Every 72 (Seventy-Two) Hours., Disp: , Rfl:     Symbicort 160-4.5 MCG/ACT inhaler, INHALE 2 PUFFS BY MOUTH EVERY 12 HOURS WITH SPACER. RINSE MOUTH AFTER EACH USE., Disp: , Rfl:     tiZANidine (ZANAFLEX) 4 MG tablet, Take 1 tablet 3 times a day by oral route as directed for 30 days., Disp: , Rfl:     Vitamin D, Ergocalciferol, 46659 units capsule, Take 1 capsule by mouth 1 (One) Time Per Week., Disp: 5 capsule, Rfl: 11    Current Facility-Administered Medications:     OnabotulinumtoxinA 200 Units, 200 Units, Intramuscular, Once, Marylou Roger, APRN    OnabotulinumtoxinA 200 Units, 200 Units, Intramuscular, Q3 Months, Marylou Roger APRKAISER, 155 Units at 02/12/25 1413    Allergies   Allergen Reactions    Ceftin [Cefuroxime Axetil] Shortness Of Breath and Rash     Numbness in mouth and throat    Cefuroxime Hives, Rash and Shortness Of Breath      "Numbness in mouth and throat  Numbness in mouth and throat    Iodinated Contrast Media Shortness Of Breath and Rash    Sumatriptan Shortness Of Breath and Nausea Only     Other reaction(s): Nausea Only, Other (See Comments)  Worsening migraine  Worsening migraine    Amoxicillin Rash, Hives and Itching       Review of Systems  See HPI    I have reviewed the medical and surgical history, family history, social history, medications, and/or allergies, and the review of systems of this report.    Objective   /88   Ht 162.6 cm (64\")   Wt 91.4 kg (201 lb 6.4 oz)   BMI 34.57 kg/m²    Physical Exam  Vitals and nursing note reviewed.   Constitutional:       Appearance: Normal appearance.   Pulmonary:      Effort: Pulmonary effort is normal.   Musculoskeletal:      Right elbow: No deformity. Normal range of motion. No tenderness.      Right forearm: No edema, deformity, tenderness or bony tenderness.      Right wrist: Tenderness (volar-palmar aspect) present. No deformity, effusion, bony tenderness or snuff box tenderness. Normal pulse.      Right hand: No swelling or deformity. Normal range of motion. Normal sensation. Normal capillary refill. Normal pulse.   Neurological:      Mental Status: She is alert and oriented to person, place, and time.       Ortho Exam     Neurological Exam  Mental Status  Alert. Oriented to person, place, and time.       On today's physical exam, the patient has no numbness or tingling.    Patient is able to make a composite fist to the right hand.  Assessment & Plan   Independent Review of Radiographic Studies:    X-ray of the right wrist 3 view performed in our clinic independently reviewed for the evaluation of wrist arthralgia.  No comparison films available to review.  No acute fracture or dislocation.  No acute osseous lesion.      Procedures       Diagnoses and all orders for this visit:    1. Right wrist pain (Primary)  -     XR Wrist 3+ View Right    2. Paresthesias in right " hand  -     EMG & Nerve Conduction Test       Discussion of orthopedic goals  Risk, benefits, and merits of treatment alternatives reviewed with the patient and questions answered  Ice, heat, and/or modalities as beneficial    Recommendations/Plan:  Patient is encouraged to call or return for any issues or concerns.  Patient to continue using the wrist brace.  May take over-the-counter analgesics and/or topicals as directed over-the-counter.    I will order an EMG of the right upper extremity.  Patient to contact our clinic once she has the EMG completed to secure a close follow-up visit.    Patient agreeable to call or return sooner for any concerns.

## 2025-05-07 ENCOUNTER — DISEASE STATE MANAGEMENT VISIT (OUTPATIENT)
Dept: PHARMACY | Facility: HOSPITAL | Age: 35
End: 2025-05-07
Payer: COMMERCIAL

## 2025-05-07 VITALS
DIASTOLIC BLOOD PRESSURE: 79 MMHG | SYSTOLIC BLOOD PRESSURE: 129 MMHG | HEART RATE: 79 BPM | BODY MASS INDEX: 34.67 KG/M2 | WEIGHT: 202 LBS | OXYGEN SATURATION: 97 % | TEMPERATURE: 97.1 F

## 2025-05-07 DIAGNOSIS — G43.009 MIGRAINE WITHOUT AURA AND WITHOUT STATUS MIGRAINOSUS, NOT INTRACTABLE: Primary | ICD-10-CM

## 2025-05-07 PROCEDURE — 25010000002 ONABOTULINUMTOXINA 200 UNITS RECONSTITUTED SOLUTION: Performed by: NURSE PRACTITIONER

## 2025-05-07 RX ADMIN — ONABOTULINUMTOXINA 155 UNITS: 200 INJECTION, POWDER, LYOPHILIZED, FOR SOLUTION INTRADERMAL; INTRAMUSCULAR at 14:26

## 2025-05-07 NOTE — PROGRESS NOTES
Botox Procedure Note       Patient Name: Marilu Godoy  : 1990   MRN: [id number]     Chief Complaint:  Here for Botox injections and the Botox is facility supplied (will be billed by the hospital).      History of Present Illness: Marilu Godoy is a 34 y.o. female who is here today for Botox injections for migraines and she has had 75-80% improvement in her migraines with Botox injections.  This will be her 2nd set of Botox injections.     We have discussed risk and benefits of this Botox procedure and common side effects including headache, neck pain, neck stiffness or weakness, ptosis, flu-like symptoms as well as more serious possible adverse effects including possible dysphagia, respiratory distress or even death (death has only been reported once with adults for Botox for migraines in another state when mixed with lidocaine solution which we do not use lidocaine solution in our practice for mixing Botox). The patient verbally understands and accepts risks and agrees with moving forward with Botox injections for chronic migraine prevention.    Verbal and written consent has been obtained from the patient prior to beginning treatment injections.     Subjective      Review of Systems:   Review of Systems    The following portions of the patient's history were reviewed an updated as appropriate: past family history, past medical history, past social history, past surgical history.     Medications:     Current Outpatient Medications:     albuterol (PROVENTIL) (2.5 MG/3ML) 0.083% nebulizer solution, 2.5 mg., Disp: , Rfl:     albuterol sulfate  (90 Base) MCG/ACT inhaler, Inhale 2 puffs Every 4 (Four) Hours As Needed for Wheezing or Shortness of Air., Disp: 18 g, Rfl: 11    apixaban (ELIQUIS) 5 MG tablet tablet, Take 1 tablet by mouth 2 (Two) Times a Day., Disp: 60 tablet, Rfl: 11    aspirin 81 MG EC tablet, Take 1 tablet by mouth Daily., Disp: , Rfl:     atorvastatin (LIPITOR) 40 MG  tablet, Take 1 tablet by mouth Every Night. APPT NEEDED FOR ADDITIONAL REFILLS, Disp: 90 tablet, Rfl: 0    B-D ULTRA-FINE 33 LANCETS misc, Check sugars as needed/directed for hypoglycemia., Disp: 100 each, Rfl: 12    Blood Glucose Monitoring Suppl (ONE TOUCH ULTRA 2) w/Device kit, use as needed to check blood sugar for hypoglycemia (diabetes), Disp: , Rfl:     Blood Pressure kit, Monitor blood pressure daily due to labile blood pressures, Disp: 1 each, Rfl: 0    cholecalciferol (VITAMIN D3) 1.25 MG (16823 UT) capsule, Take 1 capsule by mouth Every 7 (Seven) Days., Disp: , Rfl:     Corlanor 5 MG tablet tablet, Take 1.5 tablets by mouth 2 (two) times a day., Disp: 90 tablet, Rfl: 6    Diclofenac Sodium (VOLTAREN) 1 % gel gel, Apply 4 g topically to the appropriate area as directed 4 (Four) Times a Day As Needed (joint pain)., Disp: 150 g, Rfl: 1    DULoxetine (CYMBALTA) 60 MG capsule, TAKE 1 CAPSULE BY MOUTH EVERY DAY. NEED APPOINTMENT FOR FURTHER REFILLS, Disp: 90 capsule, Rfl: 0    EPINEPHrine (EPIPEN) 0.3 MG/0.3ML solution auto-injector injection, Inject  into the appropriate muscle as directed by prescriber See Admin Instructions. inject syringe as directed for allergic reaction, Disp: , Rfl:     Erenumab-aooe (AIMOVIG) 140 MG/ML auto-injector, Inject 1 mL under the skin into the appropriate area as directed Every 30 (Thirty) Days., Disp: 1 mL, Rfl: 6    glucose blood test strip, USE TO CHECK SUGARS DAILY DUE TO hypoglycemia, Disp: 100 each, Rfl: 12    glucose monitor monitoring kit, 1 each As Needed (diabetes). Check sugars as needed/directed for hypoglycemia., Disp: 1 each, Rfl: 0    levocetirizine (XYZAL) 5 MG tablet, Take 1 tablet by mouth every night at bedtime., Disp: , Rfl:     lidocaine (LIDODERM) 5 %, Place 2 patches on the skin as directed by provider Daily. Remove & Discard patch within 12 hours or as directed by MD, Disp: 60 patch, Rfl: 3    lubiprostone (AMITIZA) 24 MCG capsule, , Disp: , Rfl:      methocarbamol (ROBAXIN) 750 MG tablet, Take 1 tablet by mouth At Night As Needed for Muscle Spasms., Disp: 14 tablet, Rfl: 0    mometasone (NASONEX) 50 MCG/ACT nasal spray, 2 sprays into the nostril(s) as directed by provider Daily. APPT NEEDED FOR ADDITIONAL REFILLS, Disp: 3 each, Rfl: 0    omeprazole (priLOSEC) 40 MG capsule, Take 1 capsule by mouth Daily., Disp: , Rfl:     pantoprazole (PROTONIX) 40 MG EC tablet, Take 1 tablet by mouth Daily. APPT NEEDED FOR ADDITIONAL REFILLS, Disp: 90 tablet, Rfl: 0    rimegepant sulfate (Nurtec) 75 MG tablet, Take 1 tablet by mouth Every Other Day., Disp: 16 tablet, Rfl: 6    Scopolamine 1 MG/3DAYS patch, Place 1 patch on the skin as directed by provider Every 72 (Seventy-Two) Hours., Disp: , Rfl:     Symbicort 160-4.5 MCG/ACT inhaler, INHALE 2 PUFFS BY MOUTH EVERY 12 HOURS WITH SPACER. RINSE MOUTH AFTER EACH USE., Disp: , Rfl:     tiZANidine (ZANAFLEX) 4 MG tablet, Take 1 tablet 3 times a day by oral route as directed for 30 days., Disp: , Rfl:     Vitamin D, Ergocalciferol, 24390 units capsule, Take 1 capsule by mouth 1 (One) Time Per Week., Disp: 5 capsule, Rfl: 11    Current Facility-Administered Medications:     OnabotulinumtoxinA 200 Units, 200 Units, Intramuscular, Q3 Months, Marylou Roger, ARNIE, 155 Units at 05/07/25 1426    Allergies:   Allergies   Allergen Reactions    Ceftin [Cefuroxime Axetil] Shortness Of Breath and Rash     Numbness in mouth and throat    Cefuroxime Hives, Rash and Shortness Of Breath     Numbness in mouth and throat  Numbness in mouth and throat    Iodinated Contrast Media Shortness Of Breath and Rash    Sumatriptan Shortness Of Breath and Nausea Only     Other reaction(s): Nausea Only, Other (See Comments)  Worsening migraine  Worsening migraine    Amoxicillin Rash, Hives and Itching       Objective     Physical Exam:  Vital Signs:   Vitals:    05/07/25 1407   BP: 129/79   BP Location: Left arm   Patient Position: Sitting   Cuff  Size: Adult   Pulse: 79   Temp: 97.1 °F (36.2 °C)   SpO2: 97%   Weight: 91.6 kg (202 lb)   PainSc: 0-No pain     Body mass index is 34.67 kg/m².     Physical Exam      BOTOX PROCEDURE:     Date of Last Injection:   Response: good  Side Effects: none  : allergromana  Lot #: [id number]  Expiration Date: 10/2027  NDC: [id number]    200 unit vial Botox was re-constituted with 4 mL 0.9 NS to 5 unit/0.1 mL using standard techniques.  10 units were given at the  muscle in 2 divided sites  5 units were given at the Procerus muscle  20 units were given at the Frontalis muscle in 4 divided sites  40 units were given at the Temporalis muscle in 8 divided sites  30 units were given at the Occipitalis muscle in 6 divided sites  20 units were given at the Cervical paraspinal muscle in 4 divided sites  30 units were given at the Trapezius muscle in 6 divided sites  For a total of 155 units divided between 31 sites and 45 units of wastage    Patient tolerated procedure well with no immediate complications.     Assessment / Plan      Assessment/Plan:   Diagnoses and all orders for this visit:    1. Migraine without aura and without status migrainosus, not intractable (Primary)         Follow Up:   It has been determined that Marilu Godoy has chronic migraine headaches. We will proceed with a 12 week regimen of 200 units of Botox injections, to treat the patient's chronic migraines.      Return in about 3 months (around 8/7/2025) for Botox.      ARNIE Gaxiola, FNP-C  [facility]

## 2025-05-21 ENCOUNTER — OFFICE VISIT (OUTPATIENT)
Dept: INTERNAL MEDICINE | Facility: CLINIC | Age: 35
End: 2025-05-21
Payer: COMMERCIAL

## 2025-05-21 VITALS
OXYGEN SATURATION: 98 % | BODY MASS INDEX: 34.79 KG/M2 | HEIGHT: 64 IN | DIASTOLIC BLOOD PRESSURE: 82 MMHG | TEMPERATURE: 97.8 F | WEIGHT: 203.8 LBS | HEART RATE: 78 BPM | SYSTOLIC BLOOD PRESSURE: 132 MMHG

## 2025-05-21 DIAGNOSIS — R53.83 OTHER FATIGUE: ICD-10-CM

## 2025-05-21 DIAGNOSIS — E78.5 DYSLIPIDEMIA: ICD-10-CM

## 2025-05-21 DIAGNOSIS — R00.0 TACHYCARDIA: ICD-10-CM

## 2025-05-21 DIAGNOSIS — R07.89 CHEST PRESSURE: ICD-10-CM

## 2025-05-21 DIAGNOSIS — R52 SHOOTING PAIN: ICD-10-CM

## 2025-05-21 DIAGNOSIS — E55.9 VITAMIN D DEFICIENCY: ICD-10-CM

## 2025-05-21 DIAGNOSIS — R79.9 ABNORMAL BLOOD CHEMISTRY: ICD-10-CM

## 2025-05-21 DIAGNOSIS — R25.2 LEG CRAMPING: ICD-10-CM

## 2025-05-21 DIAGNOSIS — M25.552 LEFT HIP PAIN: ICD-10-CM

## 2025-05-21 DIAGNOSIS — Z00.00 ANNUAL PHYSICAL EXAM: Primary | ICD-10-CM

## 2025-05-21 DIAGNOSIS — D25.1 INTRAMURAL LEIOMYOMA OF UTERUS: ICD-10-CM

## 2025-05-21 DIAGNOSIS — R09.89 LABILE BLOOD PRESSURE: ICD-10-CM

## 2025-05-21 DIAGNOSIS — E53.8 FOLIC ACID DEFICIENCY: ICD-10-CM

## 2025-05-21 RX ORDER — ONDANSETRON 4 MG/1
TABLET, ORALLY DISINTEGRATING ORAL
COMMUNITY
Start: 2025-05-11

## 2025-05-21 NOTE — PROGRESS NOTES
05/21/2025  Chief Complaint   Patient presents with    Annual Exam       Patient Care Team:  Deidre Barker MD as PCP - General (Family Medicine)  Pierce Washington DO as Consulting Physician (Cardiology)  Antione Acosta MD (Gastroenterology)]       Marilu Godoy is here for her annual preventive exam. History per MA reviewed.     Wants to see another cardiologist  Has been treated for issues with heart rate, labile blood pressure, etc x years    Chronic back pain  Cannot have MRI with stimulator on (gastroparesis)  Has developed allergy to IV contrast she says  Having pain in left hip  Pain shooting down legs  Calf muscle on left cramps with ambulation  Has been seeing ortho for upper extremity issue, going to have nerve study done    Goes to gyn, will have pap there  Discussion of possible intervention for large fibroids she says  Aware hormone replacement therapy will not be an option with her clot history    Followed by specialist in [place] for GI issues  Gastroparesis--idiopathic             Health Maintenance   Topic Date Due    ANNUAL PHYSICAL  08/25/2023    COVID-19 Vaccine (6 - 2024-25 season) 09/01/2024    PAP SMEAR  01/25/2025    INFLUENZA VACCINE  07/01/2025    TDAP/TD VACCINES (4 - Td or Tdap) 11/11/2032    HEPATITIS C SCREENING  Completed    Pneumococcal Vaccine 0-49  Completed       Immunization History   Administered Date(s) Administered    COVID-19 (MODERNA) 1st,2nd,3rd Dose Monovalent 09/01/2021    COVID-19 (PFIZER) BIVALENT 12+YRS 12/06/2022    COVID-19 (PFIZER) Purple Cap Monovalent 03/30/2021, 04/20/2021, 11/03/2021    Fluzone (or Fluarix & Flulaval for VFC) >6mos 01/13/2021, 11/03/2021, 12/06/2022    Influenza, Unspecified 11/15/2016, 12/06/2022    Pneumococcal Conjugate 20-Valent (PCV20) 08/25/2022    Pneumococcal Polysaccharide (PPSV23) 11/15/2016    Tdap 08/03/2014, 01/16/2019, 11/11/2022    flucelvax quad pfs =>4 YRS 01/16/2019         The following portions of the  "patient's history were reviewed and updated as appropriate: allergies, current medications, past family history, past medical history, past social history, past surgical history and problem list.    Objective   Visit Vitals  /82   Pulse 78   Temp 97.8 °F (36.6 °C)   Ht 162.6 cm (64\")   Wt 92.4 kg (203 lb 12.8 oz)   SpO2 98%   BMI 34.98 kg/m²              Physical Exam  Vitals and nursing note reviewed.   Constitutional:       General: She is not in acute distress.     Appearance: Normal appearance. She is well-developed and well-groomed. She is obese. She is not ill-appearing, toxic-appearing or diaphoretic.   HENT:      Head: Normocephalic and atraumatic. Hair is normal.      Right Ear: Hearing, tympanic membrane, ear canal and external ear normal.      Left Ear: Hearing, tympanic membrane, ear canal and external ear normal.      Nose: Nose normal.      Mouth/Throat:      Lips: Pink. No lesions.      Mouth: Mucous membranes are moist.      Dentition: Abnormal dentition.   Eyes:      General: Lids are normal. Gaze aligned appropriately. No scleral icterus.        Right eye: No discharge.         Left eye: No discharge.      Extraocular Movements: Extraocular movements intact.      Conjunctiva/sclera: Conjunctivae normal.      Pupils: Pupils are equal, round, and reactive to light.   Neck:      Thyroid: No thyromegaly.      Trachea: Trachea and phonation normal. No tracheal deviation.   Cardiovascular:      Rate and Rhythm: Normal rate and regular rhythm.      Heart sounds: Normal heart sounds. No murmur heard.     No friction rub. No gallop.   Pulmonary:      Effort: Pulmonary effort is normal.      Breath sounds: Normal breath sounds and air entry.   Abdominal:      General: Bowel sounds are normal. There is no distension.      Palpations: Abdomen is soft. Abdomen is not rigid. There is no mass.      Tenderness: There is no abdominal tenderness. There is no guarding or rebound.       Musculoskeletal:         " General: No tenderness or deformity.      Cervical back: Neck supple.      Right lower leg: No edema.      Left lower leg: No edema.   Skin:     General: Skin is warm.      Capillary Refill: Capillary refill takes less than 2 seconds.      Coloration: Skin is not cyanotic, jaundiced or pale.      Findings: No erythema or rash.      Nails: There is no clubbing.   Neurological:      General: No focal deficit present.      Mental Status: She is alert and oriented to person, place, and time.      Cranial Nerves: No cranial nerve deficit.      Motor: No tremor, atrophy, abnormal muscle tone or seizure activity.      Gait: Gait is intact. Gait normal.   Psychiatric:         Attention and Perception: Attention and perception normal.         Mood and Affect: Mood and affect normal.         Speech: Speech normal.         Behavior: Behavior normal. Behavior is cooperative.         Thought Content: Thought content normal.         Cognition and Memory: Cognition and memory normal.         Judgment: Judgment normal.         Lab Results   Component Value Date    CHLPL 222 (H) 09/25/2020    TRIG 132 08/30/2022    HDL 31 (L) 08/30/2022     (H) 08/30/2022     Lab Results   Component Value Date    TSH 0.790 08/07/2023     Lab Results   Component Value Date    FREET4 1.14 08/07/2023     Lab Results   Component Value Date    HGBA1C 5.00 08/07/2023    HGBA1C 4.90 03/22/2023    HGBA1C 4.89 08/04/2021     US Non-ob Transvaginal (01/29/2025 16:09)   CT Abdomen Pelvis Without Contrast (01/23/2025 16:13)   MRI Lumbar Spine Without Contrast (02/17/2022 12:00)     Assessment   Diagnoses and all orders for this visit:    1. Annual physical exam (Primary)    2. Chest pressure  -     Ambulatory Referral to Cardiology    3. Tachycardia  -     Ambulatory Referral to Cardiology  -     TSH+Free T4  -     Magnesium    4. Labile blood pressure  -     Ambulatory Referral to Cardiology  -     TSH+Free T4  -     Aldosterone / Renin Ratio    5. Left  hip pain  -     Ambulatory Referral to Orthopedic Surgery    6. Leg cramping  -     Ambulatory Referral to Orthopedic Surgery  -     Magnesium  -     CK    7. Shooting pain  -     Ambulatory Referral to Orthopedic Surgery    8. Other fatigue  -     Vitamin B12  -     Folate  -     CBC & Differential  -     Comprehensive Metabolic Panel  -     TSH+Free T4  -     Vitamin D,25-Hydroxy  -     Iron Profile  -     Ferritin    9. Dyslipidemia  -     Lipoprotein A (LPA)  -     Lipid Panel    10. Folic acid deficiency  -     Vitamin B12  -     Folate  -     CBC & Differential    11. Vitamin D deficiency  -     Vitamin D,25-Hydroxy    12. Intramural leiomyoma of uterus    13. Abnormal blood chemistry  -     Hemoglobin A1c  -     Vitamin B12  -     Folate  -     CBC & Differential  -     Comprehensive Metabolic Panel  -     Lipoprotein A (LPA)  -     Lipid Panel  -     TSH+Free T4  -     Vitamin D,25-Hydroxy  -     Iron Profile  -     Ferritin  -     Magnesium  -     Aldosterone / Renin Ratio         Health maintenance information provided via patient plan (after visit summary).   Counseled on age appropriate health screenings and immunizations.  Encouraged exercise and healthy diet.    BMI is >= 30 and <35. (Class 1 Obesity). The following options were offered after discussion;: Information on healthy weight added to patient's after visit summary.      Cardiac: establish with new provider per her request. Will need EP on board as well (saw Dr. Washington previously)  GI: follow up with specialist in [place]. Stimulator for gastroparesis limits work up for other issues. Chronic vitamin D deficiency, does not have inflammatory bowel disease per previous scopes per patient.  Gyn: emphasized to patient she needs to keep her ovaries if possible for hormones. Not candidate for menopausal hormonal replacement due to her clotting issues. Can see menopause earlier than she would have transitioned with hysterectomies.   Heme: chronic  anticoagulation, life long.   Musculoskeletal: see ortho regarding hip. Limited on imaging modalities due to stimulator. Has seen pain management in past for back. May need nerve study for legs. Describing possible neurogenic claudication with left calf.   Resp: sleep apnea could be causing many of her issues (cardiac complaints, fatigue, etc).     Return in about 3 months (around 8/21/2025) for follow up.     Deidre Barker MD    I spent 40 minutes caring for Marilu Godoy on this date of service. This time includes time spent by me in the following activities: preparing for the visit, reviewing tests, performing a medically appropriate examination and/or evaluation, counseling and educating the patient/family/caregiver, ordering medications, tests, or procedures and documenting information in the medical record.     I spent 30 minutes on the separately reported service of [id number]. This time is not included in the time used to support the E/M service also reported today.

## 2025-05-22 RX ORDER — FLUCONAZOLE 150 MG/1
150 TABLET ORAL EVERY OTHER DAY
Qty: 2 TABLET | Refills: 0 | Status: SHIPPED | OUTPATIENT
Start: 2025-05-22 | End: 2025-05-25

## 2025-05-23 PROBLEM — E78.2 MIXED HYPERLIPIDEMIA: Status: ACTIVE | Noted: 2020-10-07

## 2025-05-30 LAB
25(OH)D3+25(OH)D2 SERPL-MCNC: 11 NG/ML (ref 30–100)
ALBUMIN SERPL-MCNC: 4.4 G/DL (ref 3.9–4.9)
ALDOST SERPL-MCNC: 2.3 NG/DL (ref 0–30)
ALDOST/RENIN PLAS-RTO: 1.2 {RATIO} (ref 0–30)
ALP SERPL-CCNC: 97 IU/L (ref 44–121)
ALT SERPL-CCNC: 15 IU/L (ref 0–32)
AST SERPL-CCNC: 13 IU/L (ref 0–40)
BASOPHILS # BLD AUTO: 0.1 X10E3/UL (ref 0–0.2)
BASOPHILS NFR BLD AUTO: 1 %
BILIRUB SERPL-MCNC: 0.4 MG/DL (ref 0–1.2)
BUN SERPL-MCNC: 7 MG/DL (ref 6–20)
BUN/CREAT SERPL: 10 (ref 9–23)
CALCIUM SERPL-MCNC: 9.1 MG/DL (ref 8.7–10.2)
CHLORIDE SERPL-SCNC: 104 MMOL/L (ref 96–106)
CHOLEST SERPL-MCNC: 252 MG/DL (ref 100–199)
CK SERPL-CCNC: 46 U/L (ref 32–182)
CO2 SERPL-SCNC: 21 MMOL/L (ref 20–29)
CREAT SERPL-MCNC: 0.71 MG/DL (ref 0.57–1)
EGFRCR SERPLBLD CKD-EPI 2021: 114 ML/MIN/1.73
EOSINOPHIL # BLD AUTO: 0.1 X10E3/UL (ref 0–0.4)
EOSINOPHIL NFR BLD AUTO: 1 %
ERYTHROCYTE [DISTWIDTH] IN BLOOD BY AUTOMATED COUNT: 12.1 % (ref 11.7–15.4)
FERRITIN SERPL-MCNC: 113 NG/ML (ref 15–150)
FOLATE SERPL-MCNC: 4.4 NG/ML
GLOBULIN SER CALC-MCNC: 2.7 G/DL (ref 1.5–4.5)
GLUCOSE SERPL-MCNC: 94 MG/DL (ref 70–99)
HBA1C MFR BLD: 5.2 % (ref 4.8–5.6)
HCT VFR BLD AUTO: 44.1 % (ref 34–46.6)
HDLC SERPL-MCNC: 40 MG/DL
HGB BLD-MCNC: 14.8 G/DL (ref 11.1–15.9)
IMM GRANULOCYTES # BLD AUTO: 0 X10E3/UL (ref 0–0.1)
IMM GRANULOCYTES NFR BLD AUTO: 0 %
IRON SATN MFR SERPL: 20 % (ref 15–55)
IRON SERPL-MCNC: 65 UG/DL (ref 27–159)
LDLC SERPL CALC-MCNC: 161 MG/DL (ref 0–99)
LPA SERPL-SCNC: <8.4 NMOL/L
LYMPHOCYTES # BLD AUTO: 2.6 X10E3/UL (ref 0.7–3.1)
LYMPHOCYTES NFR BLD AUTO: 26 %
MAGNESIUM SERPL-MCNC: 2.1 MG/DL (ref 1.6–2.3)
MCH RBC QN AUTO: 31.6 PG (ref 26.6–33)
MCHC RBC AUTO-ENTMCNC: 33.6 G/DL (ref 31.5–35.7)
MCV RBC AUTO: 94 FL (ref 79–97)
MONOCYTES # BLD AUTO: 0.5 X10E3/UL (ref 0.1–0.9)
MONOCYTES NFR BLD AUTO: 5 %
NEUTROPHILS # BLD AUTO: 6.6 X10E3/UL (ref 1.4–7)
NEUTROPHILS NFR BLD AUTO: 67 %
PLATELET # BLD AUTO: 358 X10E3/UL (ref 150–450)
POTASSIUM SERPL-SCNC: 4.1 MMOL/L (ref 3.5–5.2)
PROT SERPL-MCNC: 7.1 G/DL (ref 6–8.5)
RBC # BLD AUTO: 4.69 X10E6/UL (ref 3.77–5.28)
RENIN PLAS-CCNC: 1.95 NG/ML/HR (ref 0.17–5.38)
SODIUM SERPL-SCNC: 141 MMOL/L (ref 134–144)
T4 FREE SERPL-MCNC: 1.04 NG/DL (ref 0.82–1.77)
TIBC SERPL-MCNC: 333 UG/DL (ref 250–450)
TRIGL SERPL-MCNC: 270 MG/DL (ref 0–149)
TSH SERPL DL<=0.005 MIU/L-ACNC: 0.88 UIU/ML (ref 0.45–4.5)
UIBC SERPL-MCNC: 268 UG/DL (ref 131–425)
VIT B12 SERPL-MCNC: 393 PG/ML (ref 232–1245)
VLDLC SERPL CALC-MCNC: 51 MG/DL (ref 5–40)
WBC # BLD AUTO: 9.9 X10E3/UL (ref 3.4–10.8)

## 2025-06-11 ENCOUNTER — TELEPHONE (OUTPATIENT)
Dept: ORTHOPEDIC SURGERY | Facility: CLINIC | Age: 35
End: 2025-06-11
Payer: COMMERCIAL

## 2025-06-11 ENCOUNTER — TELEPHONE (OUTPATIENT)
Dept: INTERNAL MEDICINE | Facility: CLINIC | Age: 35
End: 2025-06-11
Payer: COMMERCIAL

## 2025-06-11 NOTE — TELEPHONE ENCOUNTER
From Jennifer in ortho.-    Dr. Barker referred this patient to us. She has appt with Elliott tomorrow. He reviewed her hip x-rays from 7/2024, which are WNL. Her symptoms of shooting pain and cramping are consistent with low back. He said she needs to be referred to spine specialist. I left message on patient's vmx asking her to call us back so we can let her know this and so we can cancel her appt tomorrow. Just wanted to let you know if she wants to put referral in to spine. Thank you!

## 2025-06-11 NOTE — TELEPHONE ENCOUNTER
Left message for patient to return call. She has appt with Elliott 6/12/25 for cramping in leg and shooting pain. Elliott reviewed hip x-rays, which are normal. Her symptoms are consistent with low back, she will need to see spine specialist, per Elliott.

## 2025-06-12 ENCOUNTER — OFFICE VISIT (OUTPATIENT)
Dept: ORTHOPEDIC SURGERY | Facility: CLINIC | Age: 35
End: 2025-06-12
Payer: COMMERCIAL

## 2025-06-12 VITALS
HEIGHT: 64 IN | DIASTOLIC BLOOD PRESSURE: 86 MMHG | WEIGHT: 204 LBS | BODY MASS INDEX: 34.83 KG/M2 | SYSTOLIC BLOOD PRESSURE: 118 MMHG

## 2025-06-12 DIAGNOSIS — M25.552 LEFT HIP PAIN: Primary | ICD-10-CM

## 2025-06-12 DIAGNOSIS — M76.892 HIP FLEXOR TENDINITIS, LEFT: ICD-10-CM

## 2025-06-12 PROCEDURE — 3074F SYST BP LT 130 MM HG: CPT | Performed by: PHYSICIAN ASSISTANT

## 2025-06-12 PROCEDURE — 99213 OFFICE O/P EST LOW 20 MIN: CPT | Performed by: PHYSICIAN ASSISTANT

## 2025-06-12 PROCEDURE — 1159F MED LIST DOCD IN RCRD: CPT | Performed by: PHYSICIAN ASSISTANT

## 2025-06-12 PROCEDURE — 3079F DIAST BP 80-89 MM HG: CPT | Performed by: PHYSICIAN ASSISTANT

## 2025-06-12 PROCEDURE — 1160F RVW MEDS BY RX/DR IN RCRD: CPT | Performed by: PHYSICIAN ASSISTANT

## 2025-06-12 NOTE — PROGRESS NOTES
Subjective   Patient ID: Marilu Godoy is a 34 y.o. right hand dominant female  Pain of the Left Hip (Reports for 5 months an off and on sharp pain that is in the hip joint that almost causes her to fall. States its becoming more frequent. )         Pain    Patient presents with left anterior hip sharp pains that come and go x 5 months. No injury or trauma. Denies numbness or tingling to LLE. The pain is anterior and worst with hip flexion.  She will experience a sharp stabbing sensation to the left hip with associated catching.  Her PCP recently evaluated her, ordered x-rays of the left hip and advised her to follow-up with orthopedics.                                                   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     Bleeding disorder 11/13/2016    Body piercing     TONGUE, NOSE, TWO IN EACH EAR    Bronchitis     Chest pain 06/22/2021    last CP 2-3 months ago    Cholelithiasis     Clotting disorder 11-13-16    factor 5    Constipation     Coronary artery disease 6/16/2017    COVID-19 vaccine series completed     Pfizer-plus booster    Deep vein thrombosis 11/13/2016    Diarrhea     Disease of thyroid gland     Diverticulitis     Diverticulitis of colon     Diverticulosis     Elevated cholesterol     Endometriosis     Factor 5 Leiden mutation, heterozygous     Fibroid     Fibromyalgia     Fibromyalgia, primary     Full dentures 06/22/2021    advised no adhesives DOS    Gastroparesis     GERD (gastroesophageal reflux disease)     H/O blood clots     H/O cardiovascular stress test 06/22/2021    reported several years ago-exercise wnl    Headache     Hiatal hernia     High cholesterol     History of pulmonary embolism     History of recurrent UTIs     HPV (human papilloma virus) infection     Hx of echocardiogram     Hx of  gout 2021    Hypertension     Hypoglycemia     Inappropriate sinus node tachycardia     Kidney infection 2021    history only    Low back pain     Lung nodule     Migraine     Migraines     Nausea & vomiting     Obesity     Osteoarthritis     Ovarian cyst     Pneumonia     Pollen allergies     PONV (postoperative nausea and vomiting)     Protein S deficiency 2016    labs from hospitalization for PE    Pulmonary embolism 2016    on eliquis since that time    Recurrent pregnancy loss, antepartum condition or complication     Sleep apnea     no CPAP- states insurance took it back    Sleep apnea, obstructive     Tattoo     LOWER BACK    Tinnitus     Varicella         Past Surgical History:   Procedure Laterality Date     SECTION        and  and      SECTION WITH TUBAL N/A 01/15/2019    Procedure:  SECTION REPEAT WITH TUBAL;  Surgeon: Alva Boyer MD;  Location: [facility] LABOR DELIVERY;  Service: Obstetrics/Gynecology    COLONOSCOPY N/A 2017    Procedure: COLONOSCOPY WITH BIOPSIES AND ARGON THERMAL ABLATION;  Surgeon: Jignesh Selby MD;  Location: [facility] ENDOSCOPY;  Service:     D & C HYSTEROSCOPY ENDOMETRIAL ABLATION N/A 03/10/2022    Procedure: DILATATION AND CURETTAGE DIAGNOSTIC HYSTEROSCOPY NOVASURE ENDOMETRIAL ABLATION;  Surgeon: Alva Boyer MD;  Location: [facility] OR;  Service: Obstetrics/Gynecology;  Laterality: N/A;    DIAGNOSTIC LAPAROSCOPY N/A 2018    Procedure: DIAGNOSTIC LAPAROSCOPY AND ABLATION OF ENDOMETRIOSIS;  Surgeon: Evin Zamudio MD;  Location: [facility] OR;  Service: Obstetrics/Gynecology    ENDOMETRIAL ABLATION      ENDOSCOPIC FUNCTIONAL SINUS SURGERY (FESS) Right 2021    Procedure: Right endoscopic total ethmoidectomy, right endoscopic middle meatal antrotomy with cyst removal, right endoscopic frontal recess exploration with cyst removal;  Surgeon: Saulo Brito MD;  Location: [facility] OR;  Service: ENT;   Laterality: Right;    ENDOSCOPY N/A 04/20/2017    Procedure: ESOPHAGOGASTRODUODENOSCOPY WITH BIOPSIES AND COLD BIOPSY POLYPECTOMIES;  Surgeon: Jignesh Selby MD;  Location: [facility] ENDOSCOPY;  Service:     GASTRIC STIMULATOR IMPLANT SURGERY      LAPAROSCOPIC CHOLECYSTECTOMY      LIPOMA EXCISION N/A 12/10/2021    Procedure: Excision of soft tissue mass lower back;  Surgeon: Nidia Smith MD;  Location: [facility] OR;  Service: General;  Laterality: N/A;    MOUTH SURGERY      full mouth tooth extration    PELVIC LAPAROSCOPY      SINUS SURGERY      TUBAL ABDOMINAL LIGATION  01/15/2019    WISDOM TOOTH EXTRACTION         Family History   Problem Relation Age of Onset    Arthritis Mother     COPD Mother     Asthma Mother     Thyroid disease Mother     Depression Mother     Miscarriages / Stillbirths Mother     Heart disease Mother     Hyperlipidemia Mother     Kidney disease Mother     Arthritis Father     Diabetes Father     Hypertension Father     Hyperlipidemia Father     Kidney disease Father     Heart attack Father     Coronary artery disease Father     Dementia Father     Depression Father     Heart disease Father     Neuropathy Father     No Known Problems Son     Breast cancer Paternal Aunt     Colon cancer Neg Hx     Liver cancer Neg Hx     Liver disease Neg Hx     Stomach cancer Neg Hx     Esophageal cancer Neg Hx        Social History     Socioeconomic History    Marital status:    Tobacco Use    Smoking status: Never     Passive exposure: Past    Smokeless tobacco: Never   Vaping Use    Vaping status: Never Used   Substance and Sexual Activity    Alcohol use: No    Drug use: Never    Sexual activity: Yes     Partners: Male     Birth control/protection: Tubal ligation, Surgical     Comment: Tubal         Current Outpatient Medications:     albuterol (PROVENTIL) (2.5 MG/3ML) 0.083% nebulizer solution, 2.5 mg., Disp: , Rfl:     albuterol sulfate  (90 Base) MCG/ACT inhaler, Inhale 2 puffs Every 4  (Four) Hours As Needed for Wheezing or Shortness of Air., Disp: 18 g, Rfl: 11    apixaban (ELIQUIS) 5 MG tablet tablet, Take 1 tablet by mouth 2 (Two) Times a Day., Disp: 60 tablet, Rfl: 11    aspirin 81 MG EC tablet, Take 1 tablet by mouth Daily., Disp: , Rfl:     B-D ULTRA-FINE 33 LANCETS misc, Check sugars as needed/directed for hypoglycemia., Disp: 100 each, Rfl: 12    Blood Glucose Monitoring Suppl (ONE TOUCH ULTRA 2) w/Device kit, use as needed to check blood sugar for hypoglycemia (diabetes), Disp: , Rfl:     Blood Pressure kit, Monitor blood pressure daily due to labile blood pressures, Disp: 1 each, Rfl: 0    Corlanor 5 MG tablet tablet, Take 1.5 tablets by mouth 2 (two) times a day., Disp: 90 tablet, Rfl: 6    Diclofenac Sodium (VOLTAREN) 1 % gel gel, Apply 4 g topically to the appropriate area as directed 4 (Four) Times a Day As Needed (joint pain)., Disp: 150 g, Rfl: 1    EPINEPHrine (EPIPEN) 0.3 MG/0.3ML solution auto-injector injection, Inject  into the appropriate muscle as directed by prescriber See Admin Instructions. inject syringe as directed for allergic reaction (Patient not taking: Reported on 5/21/2025), Disp: , Rfl:     Erenumab-aooe (AIMOVIG) 140 MG/ML auto-injector, Inject 1 mL under the skin into the appropriate area as directed Every 30 (Thirty) Days., Disp: 1 mL, Rfl: 6    glucose blood test strip, USE TO CHECK SUGARS DAILY DUE TO hypoglycemia, Disp: 100 each, Rfl: 12    glucose monitor monitoring kit, 1 each As Needed (diabetes). Check sugars as needed/directed for hypoglycemia., Disp: 1 each, Rfl: 0    levocetirizine (XYZAL) 5 MG tablet, Take 1 tablet by mouth every night at bedtime., Disp: , Rfl:     lidocaine (LIDODERM) 5 %, Place 2 patches on the skin as directed by provider Daily. Remove & Discard patch within 12 hours or as directed by MD, Disp: 60 patch, Rfl: 3    lubiprostone (AMITIZA) 24 MCG capsule, , Disp: , Rfl:     methocarbamol (ROBAXIN) 750 MG tablet, Take 1 tablet by  mouth At Night As Needed for Muscle Spasms., Disp: 14 tablet, Rfl: 0    mometasone (NASONEX) 50 MCG/ACT nasal spray, 2 sprays into the nostril(s) as directed by provider Daily. APPT NEEDED FOR ADDITIONAL REFILLS, Disp: 3 each, Rfl: 0    omeprazole (priLOSEC) 40 MG capsule, Take 1 capsule by mouth Daily., Disp: , Rfl:     ondansetron ODT (ZOFRAN-ODT) 4 MG disintegrating tablet, DISSOLVE 1 TABLET IN MOUTH EVERY 4 TO 6 HOURS AS NEEDED FOR NAUSEA AND VOMITING, Disp: , Rfl:     pantoprazole (PROTONIX) 40 MG EC tablet, Take 1 tablet by mouth Daily. APPT NEEDED FOR ADDITIONAL REFILLS, Disp: 90 tablet, Rfl: 0    rimegepant sulfate (Nurtec) 75 MG tablet, Take 1 tablet by mouth Every Other Day., Disp: 16 tablet, Rfl: 6    Scopolamine 1 MG/3DAYS patch, Place 1 patch on the skin as directed by provider Every 72 (Seventy-Two) Hours., Disp: , Rfl:     Symbicort 160-4.5 MCG/ACT inhaler, INHALE 2 PUFFS BY MOUTH EVERY 12 HOURS WITH SPACER. RINSE MOUTH AFTER EACH USE., Disp: , Rfl:     tiZANidine (ZANAFLEX) 4 MG tablet, Take 1 tablet 3 times a day by oral route as directed for 30 days., Disp: , Rfl:     vitamin D3 (Vitamin D3 Maximum Strength) 125 MCG (5000 UT) capsule capsule, Take 1 capsule by mouth Daily. Indications: Vitamin D Deficiency, Disp: 90 capsule, Rfl: 3    Current Facility-Administered Medications:     OnabotulinumtoxinA 200 Units, 200 Units, Intramuscular, Q3 Months, Marylou Roger, ARNIE, 155 Units at 05/07/25 1426    Allergies   Allergen Reactions    Ceftin [Cefuroxime Axetil] Shortness Of Breath and Rash     Numbness in mouth and throat    Cefuroxime Hives, Rash and Shortness Of Breath     Numbness in mouth and throat  Numbness in mouth and throat    Iodinated Contrast Media Shortness Of Breath and Rash    Sumatriptan Shortness Of Breath and Nausea Only     Other reaction(s): Nausea Only, Other (See Comments)  Worsening migraine  Worsening migraine    Amoxicillin Rash, Hives and Itching       Review of  "Systems   Musculoskeletal:  Positive for arthralgias (left hip).       I have reviewed the medical and surgical history, family history, social history, medications, and/or allergies, and the review of systems of this report.    Objective   /86   Ht 162.6 cm (64\")   Wt 92.5 kg (204 lb)   BMI 35.02 kg/m²    Physical Exam  Vitals and nursing note reviewed.   Constitutional:       Appearance: Normal appearance.   Musculoskeletal:      Left hip: Tenderness present. No deformity. Normal strength.   Neurological:      Mental Status: She is alert and oriented to person, place, and time.       Left Hip Exam     Tenderness   The patient is experiencing tenderness in the anterior.    Range of Motion   Abduction:  30   Adduction:  20   Flexion:  90     Muscle Strength   Abduction: 4/5   Adduction: 4/5   Flexion: 4/5     Tests   YANNICK: negative  Dayna: negative  Fadir:  Negative FADIR test    Other   Pulse: present    Comments:  Neg. SLR  Neg. Stinchfield test           Extremity DVT signs are negative on physical exam with negative Tobias sign, no calf pain, no palpable cords, and no skin tone change   Neurological Exam  Mental Status  Alert. Oriented to person, place, and time.    Motor                                               Right                     Left  Hip adduction                                                  4         There is no abnormal gait.    Assessment & Plan   Independent Review of Radiographic Studies:    No new imaging done today.  Reviewed images and agree with radiologist interpretation.    Study Result    Narrative & Impression   PROCEDURE: XR HIPS BILATERAL W OR WO PELVIS 2 VIEW-     HISTORY: pain; M25.551-Pain in right hip, bilateral hip pain for 1 year.     COMPARISON: None.     FINDINGS:  A three view exam demonstrates no acute fracture or  dislocation. The joint spaces appear unremarkable. No soft tissue  abnormality is seen. Bilateral tubal ligation clips noted.     IMPRESSION:  No " acute bony abnormality.                 This report was signed and finalized on 7/8/2024 4:22 PM by Antonietta Ochoa MD.        Procedures       Diagnoses and all orders for this visit:    1. Left hip pain (Primary)  -     [place] Guided Pain Management Large Joint  -     Ambulatory Referral to Physical Therapy for Evaluation & Treatment    2. Hip flexor tendinitis, left  -     Ambulatory Referral to Physical Therapy for Evaluation & Treatment       Discussion of orthopedic goals  Orthopedic activities reviewed and patient expressed appreciation  Risk, benefits, and merits of treatment alternatives reviewed with the patient and questions answered    Recommendations/Plan:  Exercise, medications, injections, other patient advice, and return appointment as noted.  Patient is encouraged to call or return for any issues or concerns.    Patient cannot have MRI due to gastric stimulator.   I suspect patient may have a labral tear and/or hip flexor tendinopathy.  She is not a candidate for MRI.  We will start with physical therapy and a cortisone injection for a presumed labral tear.  If no improvement after 12 weeks of PT and the cortisone injection, patient may need to be evaluated by a hip subspecialist to see if a diagnostic hip arthroscopy is an option.    Discussed the differential diagnosis which could be stemming from the lower spine however, we will start with the hip and try physical therapy with the injection.  Patient verbalizes her understanding and is content with the plan of care.  Patient agreeable to call or return sooner for any concerns.                   EMR Dragon-transcription disclaimer:  This encounter note is an electronic transcription of spoken language to printed text.  Electronic transcription of spoken language may permit erroneous or at times nonsensical words or phrases to be inadvertently transcribed.  Although I have reviewed the note for such errors, some may still exist

## 2025-06-17 NOTE — TELEPHONE ENCOUNTER
Jennifer told me that they could not reach pt. to cancel appt. so they saw her for the appt. Please disregard.

## 2025-07-01 ENCOUNTER — OFFICE VISIT (OUTPATIENT)
Dept: INTERNAL MEDICINE | Facility: CLINIC | Age: 35
End: 2025-07-01
Payer: COMMERCIAL

## 2025-07-01 VITALS
WEIGHT: 211 LBS | OXYGEN SATURATION: 100 % | TEMPERATURE: 100.3 F | HEIGHT: 64 IN | RESPIRATION RATE: 18 BRPM | DIASTOLIC BLOOD PRESSURE: 82 MMHG | SYSTOLIC BLOOD PRESSURE: 128 MMHG | HEART RATE: 112 BPM | BODY MASS INDEX: 36.02 KG/M2

## 2025-07-01 DIAGNOSIS — E53.8 FOLATE DEFICIENCY: ICD-10-CM

## 2025-07-01 DIAGNOSIS — M79.604 BILATERAL LEG PAIN: Primary | ICD-10-CM

## 2025-07-01 DIAGNOSIS — E53.8 B12 DEFICIENCY: ICD-10-CM

## 2025-07-01 DIAGNOSIS — M79.605 BILATERAL LEG PAIN: Primary | ICD-10-CM

## 2025-07-01 DIAGNOSIS — G62.9 NEUROPATHY: ICD-10-CM

## 2025-07-01 PROCEDURE — 3079F DIAST BP 80-89 MM HG: CPT | Performed by: PHYSICIAN ASSISTANT

## 2025-07-01 PROCEDURE — 1159F MED LIST DOCD IN RCRD: CPT | Performed by: PHYSICIAN ASSISTANT

## 2025-07-01 PROCEDURE — 99213 OFFICE O/P EST LOW 20 MIN: CPT | Performed by: PHYSICIAN ASSISTANT

## 2025-07-01 PROCEDURE — 1160F RVW MEDS BY RX/DR IN RCRD: CPT | Performed by: PHYSICIAN ASSISTANT

## 2025-07-01 PROCEDURE — 1125F AMNT PAIN NOTED PAIN PRSNT: CPT | Performed by: PHYSICIAN ASSISTANT

## 2025-07-01 PROCEDURE — 3074F SYST BP LT 130 MM HG: CPT | Performed by: PHYSICIAN ASSISTANT

## 2025-07-01 RX ORDER — FOLIC ACID 1 MG/1
1 TABLET ORAL DAILY
Qty: 90 TABLET | Refills: 3 | Status: SHIPPED | OUTPATIENT
Start: 2025-07-01

## 2025-07-01 RX ORDER — LANOLIN ALCOHOL/MO/W.PET/CERES
1000 CREAM (GRAM) TOPICAL DAILY
Qty: 90 TABLET | Refills: 3 | Status: SHIPPED | OUTPATIENT
Start: 2025-07-01

## 2025-07-01 NOTE — PROGRESS NOTES
Office Visit      Patient Name: Marilu Godoy  : 1990   MRN: [id number]     Chief Complaint:    Chief Complaint   Patient presents with    Leg Pain     Been going on for a while, has gotten worse in the last month, calves down to feet, even when sitting in a recliner the calves feel tight and sharp pains       History of Present Illness: Marilu Godoy is a 34 y.o. female who is here today to discuss bilateral leg pain.  She reports bilateral lower leg pain with feeling of tightness in bilateral calf muscles with sharp pains.  Symptoms have been ongoing long-term but seem to have worsened in the last month.  Ankles swell unless she keeps them elevated, left worse than right. Muscles tight and burning sensation used to be triggered by prolonged standing but now occurs even at rest.  Labs completed 1 week ago by an outside specialist revealed normal kidney function, normal potassium and normal CK. She is anticoagulated with Eliquis twice daily and ASA 81 mg daily.      Subjective      I have reviewed and the following portions of the patient's history were updated as appropriate: past family history, past medical history, past social history, past surgical history and problem list.      Current Outpatient Medications:     albuterol (PROVENTIL) (2.5 MG/3ML) 0.083% nebulizer solution, 2.5 mg., Disp: , Rfl:     albuterol sulfate  (90 Base) MCG/ACT inhaler, Inhale 2 puffs Every 4 (Four) Hours As Needed for Wheezing or Shortness of Air., Disp: 18 g, Rfl: 11    apixaban (ELIQUIS) 5 MG tablet tablet, Take 1 tablet by mouth 2 (Two) Times a Day., Disp: 60 tablet, Rfl: 11    aspirin 81 MG EC tablet, Take 1 tablet by mouth Daily., Disp: , Rfl:     B-D ULTRA-FINE 33 LANCETS misc, Check sugars as needed/directed for hypoglycemia., Disp: 100 each, Rfl: 12    Blood Glucose Monitoring Suppl (ONE TOUCH ULTRA 2) w/Device kit, use as needed to check blood sugar for hypoglycemia (diabetes), Disp: , Rfl:      Blood Pressure kit, Monitor blood pressure daily due to labile blood pressures, Disp: 1 each, Rfl: 0    Corlanor 5 MG tablet tablet, Take 1.5 tablets by mouth 2 (two) times a day., Disp: 90 tablet, Rfl: 6    Diclofenac Sodium (VOLTAREN) 1 % gel gel, Apply 4 g topically to the appropriate area as directed 4 (Four) Times a Day As Needed (joint pain)., Disp: 150 g, Rfl: 1    EPINEPHrine (EPIPEN) 0.3 MG/0.3ML solution auto-injector injection, Inject  into the appropriate muscle as directed by prescriber See Admin Instructions. inject syringe as directed for allergic reaction, Disp: , Rfl:     Erenumab-aooe (AIMOVIG) 140 MG/ML auto-injector, Inject 1 mL under the skin into the appropriate area as directed Every 30 (Thirty) Days., Disp: 1 mL, Rfl: 6    glucose blood test strip, USE TO CHECK SUGARS DAILY DUE TO hypoglycemia, Disp: 100 each, Rfl: 12    glucose monitor monitoring kit, 1 each As Needed (diabetes). Check sugars as needed/directed for hypoglycemia., Disp: 1 each, Rfl: 0    levocetirizine (XYZAL) 5 MG tablet, Take 1 tablet by mouth every night at bedtime., Disp: , Rfl:     lidocaine (LIDODERM) 5 %, Place 2 patches on the skin as directed by provider Daily. Remove & Discard patch within 12 hours or as directed by MD, Disp: 60 patch, Rfl: 3    lubiprostone (AMITIZA) 24 MCG capsule, , Disp: , Rfl:     methocarbamol (ROBAXIN) 750 MG tablet, Take 1 tablet by mouth At Night As Needed for Muscle Spasms., Disp: 14 tablet, Rfl: 0    mometasone (NASONEX) 50 MCG/ACT nasal spray, 2 sprays into the nostril(s) as directed by provider Daily. APPT NEEDED FOR ADDITIONAL REFILLS, Disp: 3 each, Rfl: 0    omeprazole (priLOSEC) 40 MG capsule, Take 1 capsule by mouth Daily., Disp: , Rfl:     ondansetron ODT (ZOFRAN-ODT) 4 MG disintegrating tablet, DISSOLVE 1 TABLET IN MOUTH EVERY 4 TO 6 HOURS AS NEEDED FOR NAUSEA AND VOMITING, Disp: , Rfl:     pantoprazole (PROTONIX) 40 MG EC tablet, Take 1 tablet by mouth Daily. APPT NEEDED FOR  "ADDITIONAL REFILLS, Disp: 90 tablet, Rfl: 0    rimegepant sulfate (Nurtec) 75 MG tablet, Take 1 tablet by mouth Every Other Day., Disp: 16 tablet, Rfl: 6    Scopolamine 1 MG/3DAYS patch, Place 1 patch on the skin as directed by provider Every 72 (Seventy-Two) Hours., Disp: , Rfl:     Symbicort 160-4.5 MCG/ACT inhaler, INHALE 2 PUFFS BY MOUTH EVERY 12 HOURS WITH SPACER. RINSE MOUTH AFTER EACH USE., Disp: , Rfl:     tiZANidine (ZANAFLEX) 4 MG tablet, Take 1 tablet 3 times a day by oral route as directed for 30 days., Disp: , Rfl:     vitamin D3 (Vitamin D3 Maximum Strength) 125 MCG (5000 UT) capsule capsule, Take 1 capsule by mouth Daily. Indications: Vitamin D Deficiency, Disp: 90 capsule, Rfl: 3    folic acid (FOLVITE) 1 MG tablet, Take 1 tablet by mouth Daily., Disp: 90 tablet, Rfl: 3    vitamin B-12 (CYANOCOBALAMIN) 1000 MCG tablet, Take 1 tablet by mouth Daily., Disp: 90 tablet, Rfl: 3    Current Facility-Administered Medications:     OnabotulinumtoxinA 200 Units, 200 Units, Intramuscular, Q3 Months, Marylou Roger, APRN, 155 Units at 05/07/25 1426    Allergies   Allergen Reactions    Ceftin [Cefuroxime Axetil] Shortness Of Breath and Rash     Numbness in mouth and throat    Cefuroxime Hives, Rash and Shortness Of Breath     Numbness in mouth and throat  Numbness in mouth and throat    Iodinated Contrast Media Shortness Of Breath and Rash    Sumatriptan Shortness Of Breath and Nausea Only     Other reaction(s): Nausea Only, Other (See Comments)  Worsening migraine  Worsening migraine    Amoxicillin Rash, Hives and Itching       Objective     Vital Signs:   Vitals:    07/01/25 1304   BP: 128/82   Pulse: 112   Resp: 18   Temp: 100.3 °F (37.9 °C)   SpO2: 100%   Weight: 95.7 kg (211 lb)   Height: 161.3 cm (63.5\")   PainSc: 5    PainLoc: Leg     Body mass index is 36.79 kg/m².    Physical Exam  Vitals and nursing note reviewed.   Constitutional:       General: She is not in acute distress.     Appearance: " She is well-developed. She is obese. She is not ill-appearing, toxic-appearing or diaphoretic.   HENT:      Head: Normocephalic and atraumatic.   Eyes:      General: No scleral icterus.     Extraocular Movements: Extraocular movements intact.      Conjunctiva/sclera: Conjunctivae normal.      Pupils: Pupils are equal, round, and reactive to light.   Cardiovascular:      Rate and Rhythm: Normal rate and regular rhythm.      Heart sounds: Normal heart sounds. No murmur heard.     No friction rub. No gallop.   Pulmonary:      Effort: Pulmonary effort is normal. No respiratory distress.      Breath sounds: Normal breath sounds. No stridor. No wheezing, rhonchi or rales.   Chest:      Chest wall: No tenderness.   Musculoskeletal:         General: No tenderness or deformity. Normal range of motion.      Cervical back: Normal range of motion and neck supple.      Right lower leg: No swelling, deformity, lacerations, tenderness (Negative Homans' sign) or bony tenderness. Pitting Edema (trace) present.      Left lower leg: No swelling, deformity, lacerations, tenderness (Negative Homans' sign) or bony tenderness. Pitting Edema (trace) present.   Skin:     General: Skin is warm and dry.      Capillary Refill: Capillary refill takes less than 2 seconds.      Coloration: Skin is not jaundiced or pale.      Findings: No rash.   Neurological:      General: No focal deficit present.      Mental Status: She is alert and oriented to person, place, and time. Mental status is at baseline.      Cranial Nerves: No cranial nerve deficit.      Sensory: No sensory deficit.      Motor: No weakness or abnormal muscle tone.      Coordination: Coordination normal.      Gait: Gait normal.   Psychiatric:         Mood and Affect: Mood normal.         Behavior: Behavior normal.         Thought Content: Thought content normal.         Judgment: Judgment normal.         Common labs          1/16/2025    19:02 1/23/2025    14:47 5/21/2025    15:34    Common Labs   Glucose 93  95  94    BUN 10  7  7    Creatinine 0.76  0.71  0.71    Sodium 137  139  141    Potassium 3.6  3.7  4.1    Chloride 102  103  104    Calcium 9.2  9.6  9.1    Albumin 4.2  4.5  4.4    Total Bilirubin 0.3  0.5  0.4    Alkaline Phosphatase 85  91  97    AST (SGOT) 13  17  13    ALT (SGPT) 15  18  15    WBC 9.47  8.55  9.9    Hemoglobin 14.0  14.4  14.8    Hematocrit 40.5  42.3  44.1    Platelets 334  354  358    Total Cholesterol   252    Triglycerides   270    HDL Cholesterol   40    LDL Cholesterol    161    Hemoglobin A1C   5.2          Assessment / Plan      Assessment/Plan:   Diagnoses and all orders for this visit:    1. Bilateral leg pain (Primary)  -     Magnesium    2. Neuropathy    3. B12 deficiency  -     vitamin B-12 (CYANOCOBALAMIN) 1000 MCG tablet; Take 1 tablet by mouth Daily.  Dispense: 90 tablet; Refill: 3    4. Folate deficiency  -     folic acid (FOLVITE) 1 MG tablet; Take 1 tablet by mouth Daily.  Dispense: 90 tablet; Refill: 3         Lateral lower extremity edema likely contributing to bilateral lower leg pain.  She was encouraged to begin wearing compression stockings daily while awake and to lay flat in a bed for sleep at night.  Will recheck magnesium level today due to cramping sensation and she did have normal potassium and CK levels on outside labs 1 week ago.    Around 6 weeks ago she was notified by her PCP of B12 and folate levels being in low normal range but she has not started taking recommended supplements.  She is encouraged to begin supplementation today and prescriptions for B12 and folic acid were sent to her pharmacy.  L-methylfolate would likely be better than folic acid but would be less likely to be covered by insurance.          Follow Up:   Return if symptoms worsen or fail to improve.    Patient was given instructions and counseling regarding her condition or for health maintenance advice. Please see specific information pulled into the AVS if  appropriate.     Mirlande Ordaz PA-C  [facility] [address] [facility]     Please note that portions of this note may have been completed with a voice recognition program.

## 2025-07-02 LAB — MAGNESIUM SERPL-MCNC: 2.2 MG/DL (ref 1.6–2.6)

## 2025-07-02 NOTE — PROGRESS NOTES
Cardiology New Patient Note     Name: Marilu Godoy  :   1990  PCP: Deidre Barker MD  Date:   2025  Department: E [place] [facility]  [address]  [facility] [address]  Fax [phone]  Phone [phone]    Chief Complaint   Patient presents with    Establish Care     Pt reports she asked for a referral for second opinion.     Hypertension    Shortness of Breath    Leg Swelling     Bilateral  x1 month. Wearing compression socks.     Problem list:  History of DVT/PE  Factor V mutation/protein S deficiency  Followed by Dr Barker  On Eliquis  Last pulmonary embolism 2016  Chest pain   Stress Testing 3/2023: No ECG evidence of myocardial ischemia. Negative clinical evidence of myocardial ischemia.  Findings consistent with a normal ECG stress test.  No objective evidence of ischemia at cardiac workload achieved  Palpitations  10-day Holter monitor 2025: predominant rhythm sinus rhythm with average heart rate 87, maximum heart rate 157, no sustained arrhythmias  Prior Holter Monitor: 2024--Normal cardiac monitor with 20 symptomatic activations all occurring during sinus rhythm.  Echo 2025: EF 66 to 70%, normal diastolic function, no significant valvular dysfunction  Echo 2023: Left ventricular ejection fraction appears to be greater than 70%.  Left ventricular wall thickness is consistent with mild concentric hypertrophy.  Left ventricular diastolic function was normal.  Hyperlipidemia  2025 , triglycerides 270  Obstructive sleep apnea  Previously stopped her CPAP  Recently referred to sleep medicine  Bilateral lower leg pain    Subjective     History of Present Illness  Marilu Godoy is a 34 y.o. female who presents today as a new patient. Previously followed with Dr. Byrnes at [facility] in [place].  Looking back with his last office note from 3/6/2025, he states this  patient has underwent over 30  cardiac outpatient tests/procedures since 2015 all of which were normal. He recommended referral to psychiatry and EP however patient declined & requested a second opinion with a new cardiologist.    A recent Holter monitor and echocardiogram were performed in January and February of this year with no acute findings.  Nuclear stress test was ordered to further evaluate for any cardiac ischemia however I do not see where this has been performed yet.  She continues to report palpitations. She is intolerant to beta-blocker therapy in the past due to side effects and or development of symptomatic hypotension.    Recently saw her primary care provider this week for lower extremity edema and pain.  They placed her on folic acid, magnesium, and vitamin B 12 supplements & advised her to begin wearing compression stockings and elevate legs at night. Patient continues to report palpitations, chest tightness, shortness of breath, and lower extremity edema.    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     Bleeding disorder 11/13/2016    Body piercing     TONGUE, NOSE, TWO IN EACH EAR    Bronchitis     Chest pain 06/22/2021    last CP 2-3 months ago    Cholelithiasis     Clotting disorder 11-13-16    factor 5    Constipation     Coronary artery disease 6/16/2017    COVID-19 vaccine series completed     Pfizer-plus booster    Deep vein thrombosis 11/13/2016    Diarrhea     Disease of thyroid gland     Diverticulitis     Diverticulitis of colon     Diverticulosis     Elevated cholesterol     Endometriosis     Factor 5 Leiden mutation, heterozygous     Fibroid     Fibromyalgia     Fibromyalgia, primary     Full dentures 06/22/2021    advised no adhesives DOS    Gastroparesis     GERD (gastroesophageal reflux disease)     H/O  blood clots     H/O cardiovascular stress test 2021    reported several years ago-exercise wnl    Headache     Hiatal hernia     High cholesterol     History of pulmonary embolism     History of recurrent UTIs     HPV (human papilloma virus) infection     Hx of echocardiogram     Hx of gout 2021    Hypertension     Hypoglycemia     Inappropriate sinus node tachycardia     Kidney infection 2021    history only    Low back pain     Lung nodule     Migraine     Migraines     Nausea & vomiting     Obesity     Osteoarthritis     Ovarian cyst     Pneumonia     Pollen allergies     PONV (postoperative nausea and vomiting)     Protein S deficiency 2016    labs from hospitalization for PE    Pulmonary embolism 2016    on eliquis since that time    Recurrent pregnancy loss, antepartum condition or complication     Sleep apnea     no CPAP- states insurance took it back    Sleep apnea, obstructive     Tattoo     LOWER BACK    Tinnitus     Varicella       Past Surgical History:   Procedure Laterality Date     SECTION        and  and      SECTION WITH TUBAL N/A 01/15/2019    Procedure:  SECTION REPEAT WITH TUBAL;  Surgeon: Alva Boyer MD;  Location: [facility] LABOR DELIVERY;  Service: Obstetrics/Gynecology    COLONOSCOPY N/A 2017    Procedure: COLONOSCOPY WITH BIOPSIES AND ARGON THERMAL ABLATION;  Surgeon: Jignesh Selby MD;  Location: [facility] ENDOSCOPY;  Service:     D & C HYSTEROSCOPY ENDOMETRIAL ABLATION N/A 03/10/2022    Procedure: DILATATION AND CURETTAGE DIAGNOSTIC HYSTEROSCOPY NOVASURE ENDOMETRIAL ABLATION;  Surgeon: Alva Boyer MD;  Location: [facility] OR;  Service: Obstetrics/Gynecology;  Laterality: N/A;    DIAGNOSTIC LAPAROSCOPY N/A 2018    Procedure: DIAGNOSTIC LAPAROSCOPY AND ABLATION OF ENDOMETRIOSIS;  Surgeon: Evin Zamudio MD;  Location: [facility] OR;  Service: Obstetrics/Gynecology    ENDOMETRIAL ABLATION      ENDOSCOPIC FUNCTIONAL  SINUS SURGERY (FESS) Right 06/25/2021    Procedure: Right endoscopic total ethmoidectomy, right endoscopic middle meatal antrotomy with cyst removal, right endoscopic frontal recess exploration with cyst removal;  Surgeon: Saulo Brito MD;  Location: [facility] OR;  Service: ENT;  Laterality: Right;    ENDOSCOPY N/A 04/20/2017    Procedure: ESOPHAGOGASTRODUODENOSCOPY WITH BIOPSIES AND COLD BIOPSY POLYPECTOMIES;  Surgeon: Jignesh Selby MD;  Location: [facility] ENDOSCOPY;  Service:     GASTRIC STIMULATOR IMPLANT SURGERY      LAPAROSCOPIC CHOLECYSTECTOMY      LIPOMA EXCISION N/A 12/10/2021    Procedure: Excision of soft tissue mass lower back;  Surgeon: Nidia Smith MD;  Location: [facility] OR;  Service: General;  Laterality: N/A;    MOUTH SURGERY      full mouth tooth extration    PELVIC LAPAROSCOPY      SINUS SURGERY      TUBAL ABDOMINAL LIGATION  01/15/2019    WISDOM TOOTH EXTRACTION       Family History   Problem Relation Age of Onset    Arthritis Mother     COPD Mother     Asthma Mother     Thyroid disease Mother     Depression Mother     Miscarriages / Stillbirths Mother     Heart disease Mother     Hyperlipidemia Mother     Kidney disease Mother     Arthritis Father     Diabetes Father     Hypertension Father     Hyperlipidemia Father     Kidney disease Father     Heart attack Father     Coronary artery disease Father     Dementia Father     Depression Father     Heart disease Father     Neuropathy Father     No Known Problems Son     Breast cancer Paternal Aunt     Colon cancer Neg Hx     Liver cancer Neg Hx     Liver disease Neg Hx     Stomach cancer Neg Hx     Esophageal cancer Neg Hx      Social History     Socioeconomic History    Marital status:    Tobacco Use    Smoking status: Never     Passive exposure: Past    Smokeless tobacco: Never   Vaping Use    Vaping status: Never Used   Substance and Sexual Activity    Alcohol use: No    Drug use: Never    Sexual activity: Yes     Partners: Male      Birth control/protection: Tubal ligation, Surgical     Comment: Tubal     Allergies   Allergen Reactions    Ceftin [Cefuroxime Axetil] Shortness Of Breath and Rash     Numbness in mouth and throat    Cefuroxime Hives, Rash and Shortness Of Breath     Numbness in mouth and throat  Numbness in mouth and throat    Iodinated Contrast Media Shortness Of Breath and Rash    Sumatriptan Shortness Of Breath and Nausea Only     Other reaction(s): Nausea Only, Other (See Comments)  Worsening migraine  Worsening migraine    Amoxicillin Rash, Hives and Itching       Current Outpatient Medications:     albuterol (PROVENTIL) (2.5 MG/3ML) 0.083% nebulizer solution, 2.5 mg., Disp: , Rfl:     albuterol sulfate  (90 Base) MCG/ACT inhaler, Inhale 2 puffs Every 4 (Four) Hours As Needed for Wheezing or Shortness of Air., Disp: 18 g, Rfl: 11    apixaban (ELIQUIS) 5 MG tablet tablet, Take 1 tablet by mouth 2 (Two) Times a Day., Disp: 60 tablet, Rfl: 11    aspirin 81 MG EC tablet, Take 1 tablet by mouth Daily., Disp: , Rfl:     B-D ULTRA-FINE 33 LANCETS misc, Check sugars as needed/directed for hypoglycemia., Disp: 100 each, Rfl: 12    Blood Glucose Monitoring Suppl (ONE TOUCH ULTRA 2) w/Device kit, use as needed to check blood sugar for hypoglycemia (diabetes), Disp: , Rfl:     Blood Pressure kit, Monitor blood pressure daily due to labile blood pressures, Disp: 1 each, Rfl: 0    Corlanor 5 MG tablet tablet, Take 1.5 tablets by mouth 2 (two) times a day., Disp: 90 tablet, Rfl: 6    Diclofenac Sodium (VOLTAREN) 1 % gel gel, Apply 4 g topically to the appropriate area as directed 4 (Four) Times a Day As Needed (joint pain)., Disp: 150 g, Rfl: 1    EPINEPHrine (EPIPEN) 0.3 MG/0.3ML solution auto-injector injection, Inject  into the appropriate muscle as directed by prescriber See Admin Instructions. inject syringe as directed for allergic reaction, Disp: , Rfl:     Erenumab-aooe (AIMOVIG) 140 MG/ML auto-injector, Inject 1 mL under the  skin into the appropriate area as directed Every 30 (Thirty) Days., Disp: 1 mL, Rfl: 6    folic acid (FOLVITE) 1 MG tablet, Take 1 tablet by mouth Daily., Disp: 90 tablet, Rfl: 3    glucose blood test strip, USE TO CHECK SUGARS DAILY DUE TO hypoglycemia, Disp: 100 each, Rfl: 12    glucose monitor monitoring kit, 1 each As Needed (diabetes). Check sugars as needed/directed for hypoglycemia., Disp: 1 each, Rfl: 0    levocetirizine (XYZAL) 5 MG tablet, Take 1 tablet by mouth every night at bedtime., Disp: , Rfl:     lidocaine (LIDODERM) 5 %, Place 2 patches on the skin as directed by provider Daily. Remove & Discard patch within 12 hours or as directed by MD, Disp: 60 patch, Rfl: 3    methocarbamol (ROBAXIN) 750 MG tablet, Take 1 tablet by mouth At Night As Needed for Muscle Spasms., Disp: 14 tablet, Rfl: 0    mometasone (NASONEX) 50 MCG/ACT nasal spray, 2 sprays into the nostril(s) as directed by provider Daily. APPT NEEDED FOR ADDITIONAL REFILLS, Disp: 3 each, Rfl: 0    omeprazole (priLOSEC) 40 MG capsule, Take 1 capsule by mouth Daily., Disp: , Rfl:     ondansetron ODT (ZOFRAN-ODT) 4 MG disintegrating tablet, DISSOLVE 1 TABLET IN MOUTH EVERY 4 TO 6 HOURS AS NEEDED FOR NAUSEA AND VOMITING, Disp: , Rfl:     pantoprazole (PROTONIX) 40 MG EC tablet, Take 1 tablet by mouth Daily. APPT NEEDED FOR ADDITIONAL REFILLS, Disp: 90 tablet, Rfl: 0    rimegepant sulfate (Nurtec) 75 MG tablet, Take 1 tablet by mouth Every Other Day., Disp: 16 tablet, Rfl: 6    Scopolamine 1 MG/3DAYS patch, Place 1 patch on the skin as directed by provider Every 72 (Seventy-Two) Hours., Disp: , Rfl:     Symbicort 160-4.5 MCG/ACT inhaler, INHALE 2 PUFFS BY MOUTH EVERY 12 HOURS WITH SPACER. RINSE MOUTH AFTER EACH USE., Disp: , Rfl:     tiZANidine (ZANAFLEX) 4 MG tablet, Take 1 tablet 3 times a day by oral route as directed for 30 days., Disp: , Rfl:     vitamin B-12 (CYANOCOBALAMIN) 1000 MCG tablet, Take 1 tablet by mouth Daily., Disp: 90 tablet, Rfl:  "3    vitamin D3 (Vitamin D3 Maximum Strength) 125 MCG (5000 UT) capsule capsule, Take 1 capsule by mouth Daily. Indications: Vitamin D Deficiency, Disp: 90 capsule, Rfl: 3    OnabotulinumtoxinA (BOTOX IJ), Inject  as directed Every 3 (Three) Months. For migraines, Disp: , Rfl:     Current Facility-Administered Medications:     OnabotulinumtoxinA 200 Units, 200 Units, Intramuscular, Q3 Months, Marylou Roger, ARNIE, 155 Units at 05/07/25 1426    Review of Systems   Constitutional:  Positive for fatigue.   Respiratory:  Positive for chest tightness and shortness of breath.    Cardiovascular:  Positive for chest pain, palpitations and leg swelling.   Gastrointestinal: Negative.    Endocrine: Negative.    Genitourinary: Negative.    Musculoskeletal: Negative.    Neurological: Negative.    Hematological: Negative.        Objective     Vital Signs:  /78 (BP Location: Right arm, Patient Position: Sitting, Cuff Size: Adult)   Pulse 90   Ht 161.3 cm (63.5\")   Wt 95 kg (209 lb 6.4 oz)   SpO2 100%   BMI 36.51 kg/m²   Estimated body mass index is 36.51 kg/m² as calculated from the following:    Height as of this encounter: 161.3 cm (63.5\").    Weight as of this encounter: 95 kg (209 lb 6.4 oz).       Physical Exam  Vitals reviewed.   Constitutional:       Appearance: Normal appearance.   Eyes:      Extraocular Movements: Extraocular movements intact.      Pupils: Pupils are equal, round, and reactive to light.   Cardiovascular:      Rate and Rhythm: Normal rate and regular rhythm.   Pulmonary:      Effort: Pulmonary effort is normal.      Breath sounds: Normal breath sounds.   Abdominal:      General: Bowel sounds are normal.      Palpations: Abdomen is soft.   Musculoskeletal:         General: Normal range of motion.      Cervical back: Normal range of motion and neck supple.      Right lower leg: Edema present.      Left lower leg: Edema present.   Skin:     General: Skin is warm and dry.      Capillary " Refill: Capillary refill takes less than 2 seconds.   Neurological:      General: No focal deficit present.      Mental Status: She is alert and oriented to person, place, and time.   Psychiatric:         Mood and Affect: Mood normal.         Behavior: Behavior normal.           Lab Results   Component Value Date    GLUCOSE 94 05/21/2025    BUN 7 05/21/2025    CREATININE 0.71 05/21/2025    EGFRIFNONA 96 02/11/2022    EGFRIFAFRI 112 04/02/2021    BCR 10 05/21/2025    K 4.1 05/21/2025    CO2 21 05/21/2025    CALCIUM 9.1 05/21/2025    ALBUMIN 4.4 05/21/2025    AST 13 05/21/2025    ALT 15 05/21/2025     Lab Results   Component Value Date    CHOL 222 (H) 08/30/2022    CHLPL 252 (H) 05/21/2025    TRIG 270 (H) 05/21/2025    HDL 40 05/21/2025     (H) 05/21/2025      Lab Results   Component Value Date    WBC 9.9 05/21/2025    RBC 4.69 05/21/2025    HGB 14.8 05/21/2025    HCT 44.1 05/21/2025    MCV 94 05/21/2025     05/21/2025     Lab Results   Component Value Date    TSH 0.875 05/21/2025     Lab Results   Component Value Date    HGBA1C 5.2 05/21/2025     Assessment and Plan     Assessment & Plan  Bilateral lower extremity edema  Patient reports increased lower extremity edema and calf pain while walking  Will order bilateral lower extremity venous and arterial duplex for further evaluation  Continue compression stockings, weight loss, elevation as able    Orders:    Duplex Lower Extremity Art / Grafts - Bilateral CAR; Future    Venous w Reflux Lower Extremity - Bilateral CAR; Future    proBNP; Future    Chest pain, unspecified type  Recently underwent Holter monitor and echocardiogram with [facility] cardiology [place]- no acute findings  Will perform nuclear stress test to rule out any cardiac ischemia    Orders:    Stress Test With Myocardial Perfusion One Day; Future    Palpitations  Recently underwent 10-day Holter monitor and echocardiogram with [facility] cardiology [place]- no acute findings  She  continues to report palpitations. She is intolerant to beta-blocker therapy in the past due to side effects and or development of symptomatic hypotension  Patient requests referral to EP for second opinion regarding ablation    Orders:    Basic Metabolic Panel; Future    Ambulatory Referral to Cardiac Electrophysiology    Shortness of breath  Recently underwent Holter monitor and echocardiogram with potential cardiology [place]- perla acute findings  Will perform nuclear stress test to rule out any cardiac ischemia    Orders:    CBC & Differential; Future    proBNP; Future    Hyperlipidemia LDL goal <100  Not within guidelines, most recent   Patient reports previously being on a statin however reports was taken off by her PCP as she is of childbearing age   Will repeat full lipid panel prior to follow-up appointment  Consider reinitiation of statin or Nexlizet at follow-up appointment    Orders:    Hepatic Function Panel; Future    High Sensitivity CRP; Future    Lipoprotein A (LPA); Future    Lipid Panel; Future    Factor V deficiency  Continue Eliquis 5 mg twice daily  Previously followed with hematology however PCP manages now       Follow Up  Return in about 6 weeks (around 8/14/2025).    ARNIE Iglesias

## 2025-07-03 ENCOUNTER — OFFICE VISIT (OUTPATIENT)
Age: 35
End: 2025-07-03
Payer: COMMERCIAL

## 2025-07-03 ENCOUNTER — LAB (OUTPATIENT)
Facility: HOSPITAL | Age: 35
End: 2025-07-03
Payer: COMMERCIAL

## 2025-07-03 VITALS
SYSTOLIC BLOOD PRESSURE: 128 MMHG | HEART RATE: 90 BPM | DIASTOLIC BLOOD PRESSURE: 78 MMHG | WEIGHT: 209.4 LBS | HEIGHT: 64 IN | BODY MASS INDEX: 35.75 KG/M2 | OXYGEN SATURATION: 100 %

## 2025-07-03 DIAGNOSIS — E78.5 HYPERLIPIDEMIA LDL GOAL <100: ICD-10-CM

## 2025-07-03 DIAGNOSIS — R06.02 SHORTNESS OF BREATH: ICD-10-CM

## 2025-07-03 DIAGNOSIS — R60.0 BILATERAL LOWER EXTREMITY EDEMA: ICD-10-CM

## 2025-07-03 DIAGNOSIS — R07.9 CHEST PAIN, UNSPECIFIED TYPE: ICD-10-CM

## 2025-07-03 DIAGNOSIS — R60.0 BILATERAL LOWER EXTREMITY EDEMA: Primary | ICD-10-CM

## 2025-07-03 DIAGNOSIS — R00.2 PALPITATIONS: ICD-10-CM

## 2025-07-03 DIAGNOSIS — D68.2 FACTOR V DEFICIENCY: ICD-10-CM

## 2025-07-03 LAB
ALBUMIN SERPL-MCNC: 4.4 G/DL (ref 3.5–5.2)
ALP SERPL-CCNC: 87 U/L (ref 39–117)
ALT SERPL W P-5'-P-CCNC: 16 U/L (ref 1–33)
ANION GAP SERPL CALCULATED.3IONS-SCNC: 11 MMOL/L (ref 5–15)
AST SERPL-CCNC: 15 U/L (ref 1–32)
BASOPHILS # BLD AUTO: 0.04 10*3/MM3 (ref 0–0.2)
BASOPHILS NFR BLD AUTO: 0.6 % (ref 0–1.5)
BILIRUB CONJ SERPL-MCNC: 0.2 MG/DL (ref 0–0.3)
BILIRUB INDIRECT SERPL-MCNC: 0.5 MG/DL
BILIRUB SERPL-MCNC: 0.7 MG/DL (ref 0–1.2)
BUN SERPL-MCNC: 10 MG/DL (ref 6–20)
BUN/CREAT SERPL: 12.7 (ref 7–25)
CALCIUM SPEC-SCNC: 9.5 MG/DL (ref 8.6–10.5)
CHLORIDE SERPL-SCNC: 101 MMOL/L (ref 98–107)
CHOLEST SERPL-MCNC: 256 MG/DL (ref 0–200)
CO2 SERPL-SCNC: 25 MMOL/L (ref 22–29)
CREAT SERPL-MCNC: 0.79 MG/DL (ref 0.57–1)
CRP SERPL-MCNC: 0.4 MG/DL (ref 0.01–0.5)
DEPRECATED RDW RBC AUTO: 42.8 FL (ref 37–54)
EGFRCR SERPLBLD CKD-EPI 2021: 100.8 ML/MIN/1.73
EOSINOPHIL # BLD AUTO: 0.12 10*3/MM3 (ref 0–0.4)
EOSINOPHIL NFR BLD AUTO: 1.8 % (ref 0.3–6.2)
ERYTHROCYTE [DISTWIDTH] IN BLOOD BY AUTOMATED COUNT: 12.7 % (ref 12.3–15.4)
GLUCOSE SERPL-MCNC: 99 MG/DL (ref 65–99)
HCT VFR BLD AUTO: 42.3 % (ref 34–46.6)
HDLC SERPL-MCNC: 41 MG/DL (ref 40–60)
HGB BLD-MCNC: 14.2 G/DL (ref 12–15.9)
IMM GRANULOCYTES # BLD AUTO: 0.02 10*3/MM3 (ref 0–0.05)
IMM GRANULOCYTES NFR BLD AUTO: 0.3 % (ref 0–0.5)
LDLC SERPL CALC-MCNC: 193 MG/DL (ref 0–100)
LDLC/HDLC SERPL: 4.64 {RATIO}
LYMPHOCYTES # BLD AUTO: 1.82 10*3/MM3 (ref 0.7–3.1)
LYMPHOCYTES NFR BLD AUTO: 27.2 % (ref 19.6–45.3)
MCH RBC QN AUTO: 30.9 PG (ref 26.6–33)
MCHC RBC AUTO-ENTMCNC: 33.6 G/DL (ref 31.5–35.7)
MCV RBC AUTO: 92.2 FL (ref 79–97)
MONOCYTES # BLD AUTO: 0.39 10*3/MM3 (ref 0.1–0.9)
MONOCYTES NFR BLD AUTO: 5.8 % (ref 5–12)
NEUTROPHILS NFR BLD AUTO: 4.29 10*3/MM3 (ref 1.7–7)
NEUTROPHILS NFR BLD AUTO: 64.3 % (ref 42.7–76)
NRBC BLD AUTO-RTO: 0 /100 WBC (ref 0–0.2)
NT-PROBNP SERPL-MCNC: <36 PG/ML (ref 0–450)
PLATELET # BLD AUTO: 347 10*3/MM3 (ref 140–450)
PMV BLD AUTO: 10.1 FL (ref 6–12)
POTASSIUM SERPL-SCNC: 4.4 MMOL/L (ref 3.5–5.2)
PROT SERPL-MCNC: 7.7 G/DL (ref 6–8.5)
RBC # BLD AUTO: 4.59 10*6/MM3 (ref 3.77–5.28)
SODIUM SERPL-SCNC: 137 MMOL/L (ref 136–145)
TRIGL SERPL-MCNC: 123 MG/DL (ref 0–150)
VLDLC SERPL-MCNC: 22 MG/DL (ref 5–40)
WBC NRBC COR # BLD AUTO: 6.68 10*3/MM3 (ref 3.4–10.8)

## 2025-07-03 PROCEDURE — 83880 ASSAY OF NATRIURETIC PEPTIDE: CPT

## 2025-07-03 PROCEDURE — 36415 COLL VENOUS BLD VENIPUNCTURE: CPT

## 2025-07-03 PROCEDURE — 80076 HEPATIC FUNCTION PANEL: CPT

## 2025-07-03 PROCEDURE — 85025 COMPLETE CBC W/AUTO DIFF WBC: CPT

## 2025-07-03 PROCEDURE — 83695 ASSAY OF LIPOPROTEIN(A): CPT

## 2025-07-03 PROCEDURE — 80048 BASIC METABOLIC PNL TOTAL CA: CPT

## 2025-07-03 PROCEDURE — 80061 LIPID PANEL: CPT

## 2025-07-03 PROCEDURE — 86141 C-REACTIVE PROTEIN HS: CPT

## 2025-07-07 LAB — LPA SERPL-SCNC: <8.4 NMOL/L

## 2025-07-24 ENCOUNTER — HOSPITAL ENCOUNTER (OUTPATIENT)
Dept: GENERAL RADIOLOGY | Facility: HOSPITAL | Age: 35
Discharge: HOME | End: 2025-07-24
Payer: COMMERCIAL

## 2025-07-24 PROCEDURE — 25010000002 LIDOCAINE 1 % SOLUTION: Performed by: PHYSICIAN ASSISTANT

## 2025-07-24 PROCEDURE — 77002 NEEDLE LOCALIZATION BY XRAY: CPT

## 2025-07-24 PROCEDURE — 25010000002 METHYLPREDNISOLONE PER 80 MG: Performed by: PHYSICIAN ASSISTANT

## 2025-07-24 RX ORDER — LIDOCAINE HYDROCHLORIDE 10 MG/ML
5 INJECTION, SOLUTION INFILTRATION; PERINEURAL ONCE
Status: COMPLETED | OUTPATIENT
Start: 2025-07-24 | End: 2025-07-24

## 2025-07-24 RX ORDER — METHYLPREDNISOLONE ACETATE 80 MG/ML
80 INJECTION, SUSPENSION INTRA-ARTICULAR; INTRALESIONAL; INTRAMUSCULAR; SOFT TISSUE ONCE
Status: COMPLETED | OUTPATIENT
Start: 2025-07-24 | End: 2025-07-24

## 2025-07-24 RX ADMIN — LIDOCAINE HYDROCHLORIDE 5 ML: 10 INJECTION, SOLUTION INFILTRATION; PERINEURAL at 14:28

## 2025-07-24 RX ADMIN — METHYLPREDNISOLONE ACETATE 80 MG: 80 INJECTION, SUSPENSION INTRA-ARTICULAR; INTRALESIONAL; INTRAMUSCULAR; SOFT TISSUE at 14:29

## 2025-07-24 NOTE — POST-PROCEDURE NOTE
[facility]  [address], [address]  [place] [address]  [phone]        PROCEDURE REPORT        DIAGNOSIS:  Left hip osteoarthritis, symptomatic    PROCEDURE: Left hip injection under flouroscopy      Marilu Godoy with date of birth 1990  presents to [facility] Radiology Department today for injection therapy.        Patient presents to [facility] Radiology Department Flouroscopy Suite on 7/24/2025 for planned elective left hip injection under flouroscopy for symptomatic osteoarthritis.    Procedure:     After consent was obtained, and using ethyl chloride topical local anesthetic, the left hip was then prepped and draped with sterile technique. With an anterior hip approach, flouroscopy guidance, and care to stay lateral of the femoral artery, the hip joint was entered via a 20 gauge spinal needle.  An image was saved of the needle within the hip joint space.  A mixture of 80 mg methylprednisolone in one ml plus 5 ml of 1% plain Lidocaine was injected and the needle withdrawn and a band aid applied. The procedure was well tolerated and without complication.  The patient did remain stable and with baseline ambulation. The patient is asked to avoid stressful physical activity for a day or two before resuming full regular activities.  There might be some soreness initially and patient to call for any adverse effect of the injection treatment.  Call or return to clinic if any fever, swelling, persistent pain, lack of anticipated improvement or other symptoms or concerns.    Impression: Symptomatic left hip osteoarthritis.      Recommendations/Plan:      Treatment and patient advice as noted here and in office visit report.  Orthopedic activities and goals reviewed and patient expressed appreciation.  Call or notify for any adverse effect from injection therapy.    Exercise: As tolerated.  No strenuous activity for a few days as appropriate.  Activity:  May perform usual  activities as tolerated.      Patient will return to our clinic at scheduled appointment.  Patient agreeable to call or return sooner for any concerns.

## 2025-07-29 ENCOUNTER — OFFICE VISIT (OUTPATIENT)
Dept: NEUROLOGY | Facility: CLINIC | Age: 35
End: 2025-07-29
Payer: COMMERCIAL

## 2025-07-29 VITALS
HEIGHT: 64 IN | HEART RATE: 85 BPM | OXYGEN SATURATION: 100 % | BODY MASS INDEX: 34.67 KG/M2 | DIASTOLIC BLOOD PRESSURE: 74 MMHG | RESPIRATION RATE: 14 BRPM | TEMPERATURE: 99.1 F | WEIGHT: 203.1 LBS | SYSTOLIC BLOOD PRESSURE: 122 MMHG

## 2025-07-29 DIAGNOSIS — G43.E11 INTRACTABLE CHRONIC MIGRAINE WITH AURA WITH STATUS MIGRAINOSUS: Primary | ICD-10-CM

## 2025-07-29 DIAGNOSIS — G43.909 ACUTE MIGRAINE: ICD-10-CM

## 2025-07-29 PROCEDURE — 1159F MED LIST DOCD IN RCRD: CPT | Performed by: NURSE PRACTITIONER

## 2025-07-29 PROCEDURE — 3078F DIAST BP <80 MM HG: CPT | Performed by: NURSE PRACTITIONER

## 2025-07-29 PROCEDURE — 1160F RVW MEDS BY RX/DR IN RCRD: CPT | Performed by: NURSE PRACTITIONER

## 2025-07-29 PROCEDURE — 99214 OFFICE O/P EST MOD 30 MIN: CPT | Performed by: NURSE PRACTITIONER

## 2025-07-29 PROCEDURE — 3074F SYST BP LT 130 MM HG: CPT | Performed by: NURSE PRACTITIONER

## 2025-07-29 NOTE — PROGRESS NOTES
Follow Up Office Visit      Patient Name: Marilu Godoy  : 1990   MRN: [id number]     Chief Complaint:    Chief Complaint   Patient presents with    Follow-up     Migraine; pt reports 3- HA days       History of Present Illness: Marilu Godoy is a 34 y.o. female who is here today to follow up with neurology for migraine HA. She was last seen in clinic  ([facility]). She is here today with her partner, Juan, who is assisting with history.     MDM 3/30. She reports improvement in HA frequency and intensity. She has been taking Aimovig 140 mg Monthly with next injection due 25; she denies issues at the pharmacy and redness at injection site. She has been using Nurtec 75 mg QID and reports good tolerance and compliance; this will terminate her migraine. She is an established pt of [facility] Botox ([facility]) with FU .     She has been referred to Neurology ([facility]) for paresthesia of right hand for EMG/NCS r/o CTS.     Recent Imaging:     EMG/NCS LUE  - noncontributory  CT Head W/WO  -noncontributory  CTA Neck 10/2022 -noncontributory  CTA Head 10/2022- noncontributory  CT head WO 10/2022 -noncontributory     Pertinent Medical History: Factor V, Factor VIII, Protein S Deficiency, PE with Eliquis use, HL, Obesity, Gastroparesis, Hyperthyroidism, chronic Pain, AMBAR, HTN    Subjective      Review of Systems:   Review of Systems   Constitutional: Negative.    HENT: Negative.     Eyes: Negative.    Respiratory: Negative.     Cardiovascular: Negative.    Gastrointestinal: Negative.    Endocrine: Negative.    Genitourinary: Negative.    Musculoskeletal: Negative.    Skin: Negative.    Allergic/Immunologic: Negative.    Neurological:  Positive for numbness and headache.   Hematological: Negative.    Psychiatric/Behavioral: Negative.     All other systems reviewed and are negative.      I have reviewed and the following portions of the patient's history were updated as  appropriate: past family history, past medical history, past social history, past surgical history and problem list.    Medications:     Current Outpatient Medications:     albuterol (PROVENTIL) (2.5 MG/3ML) 0.083% nebulizer solution, 2.5 mg., Disp: , Rfl:     albuterol sulfate  (90 Base) MCG/ACT inhaler, Inhale 2 puffs Every 4 (Four) Hours As Needed for Wheezing or Shortness of Air., Disp: 18 g, Rfl: 11    apixaban (ELIQUIS) 5 MG tablet tablet, Take 1 tablet by mouth 2 (Two) Times a Day., Disp: 60 tablet, Rfl: 11    aspirin 81 MG EC tablet, Take 1 tablet by mouth Daily., Disp: , Rfl:     B-D ULTRA-FINE 33 LANCETS misc, Check sugars as needed/directed for hypoglycemia., Disp: 100 each, Rfl: 12    Blood Glucose Monitoring Suppl (ONE TOUCH ULTRA 2) w/Device kit, use as needed to check blood sugar for hypoglycemia (diabetes), Disp: , Rfl:     Blood Pressure kit, Monitor blood pressure daily due to labile blood pressures, Disp: 1 each, Rfl: 0    Corlanor 5 MG tablet tablet, Take 1.5 tablets by mouth 2 (two) times a day., Disp: 90 tablet, Rfl: 6    Diclofenac Sodium (VOLTAREN) 1 % gel gel, Apply 4 g topically to the appropriate area as directed 4 (Four) Times a Day As Needed (joint pain)., Disp: 150 g, Rfl: 1    EPINEPHrine (EPIPEN) 0.3 MG/0.3ML solution auto-injector injection, Inject  into the appropriate muscle as directed by prescriber See Admin Instructions. inject syringe as directed for allergic reaction, Disp: , Rfl:     Erenumab-aooe (AIMOVIG) 140 MG/ML auto-injector, Inject 1 mL under the skin into the appropriate area as directed Every 30 (Thirty) Days., Disp: 1 mL, Rfl: 6    folic acid (FOLVITE) 1 MG tablet, Take 1 tablet by mouth Daily., Disp: 90 tablet, Rfl: 3    glucose blood test strip, USE TO CHECK SUGARS DAILY DUE TO hypoglycemia, Disp: 100 each, Rfl: 12    glucose monitor monitoring kit, 1 each As Needed (diabetes). Check sugars as needed/directed for hypoglycemia., Disp: 1 each, Rfl: 0     levocetirizine (XYZAL) 5 MG tablet, Take 1 tablet by mouth every night at bedtime., Disp: , Rfl:     lidocaine (LIDODERM) 5 %, Place 2 patches on the skin as directed by provider Daily. Remove & Discard patch within 12 hours or as directed by MD, Disp: 60 patch, Rfl: 3    methocarbamol (ROBAXIN) 750 MG tablet, Take 1 tablet by mouth At Night As Needed for Muscle Spasms., Disp: 14 tablet, Rfl: 0    mometasone (NASONEX) 50 MCG/ACT nasal spray, 2 sprays into the nostril(s) as directed by provider Daily. APPT NEEDED FOR ADDITIONAL REFILLS, Disp: 3 each, Rfl: 0    omeprazole (priLOSEC) 40 MG capsule, Take 1 capsule by mouth Daily., Disp: , Rfl:     OnabotulinumtoxinA (BOTOX IJ), Inject  as directed Every 3 (Three) Months. For migraines, Disp: , Rfl:     ondansetron ODT (ZOFRAN-ODT) 4 MG disintegrating tablet, DISSOLVE 1 TABLET IN MOUTH EVERY 4 TO 6 HOURS AS NEEDED FOR NAUSEA AND VOMITING, Disp: , Rfl:     pantoprazole (PROTONIX) 40 MG EC tablet, Take 1 tablet by mouth Daily. APPT NEEDED FOR ADDITIONAL REFILLS, Disp: 90 tablet, Rfl: 0    rimegepant sulfate ODT (Nurtec) 75 MG disintegrating tablet, Place 1 tablet under the tongue Every Other Day., Disp: 16 tablet, Rfl: 6    Scopolamine 1 MG/3DAYS patch, Place 1 patch on the skin as directed by provider Every 72 (Seventy-Two) Hours., Disp: , Rfl:     Symbicort 160-4.5 MCG/ACT inhaler, INHALE 2 PUFFS BY MOUTH EVERY 12 HOURS WITH SPACER. RINSE MOUTH AFTER EACH USE., Disp: , Rfl:     tiZANidine (ZANAFLEX) 4 MG tablet, Take 1 tablet 3 times a day by oral route as directed for 30 days., Disp: , Rfl:     vitamin B-12 (CYANOCOBALAMIN) 1000 MCG tablet, Take 1 tablet by mouth Daily., Disp: 90 tablet, Rfl: 3    vitamin D3 (Vitamin D3 Maximum Strength) 125 MCG (5000 UT) capsule capsule, Take 1 capsule by mouth Daily. Indications: Vitamin D Deficiency, Disp: 90 capsule, Rfl: 3    Current Facility-Administered Medications:     OnabotulinumtoxinA 200 Units, 200 Units, Intramuscular, Q3  "Acacia Damon Kellie Alicia, APRN, 155 Units at 05/07/25 1426    Allergies:   Allergies   Allergen Reactions    Ceftin [Cefuroxime Axetil] Shortness Of Breath and Rash     Numbness in mouth and throat    Cefuroxime Hives, Rash and Shortness Of Breath     Numbness in mouth and throat  Numbness in mouth and throat    Iodinated Contrast Media Shortness Of Breath and Rash    Sumatriptan Shortness Of Breath and Nausea Only     Other reaction(s): Nausea Only, Other (See Comments)  Worsening migraine  Worsening migraine    Amoxicillin Rash, Hives and Itching       Objective     Physical Exam:  Vital Signs:   Vitals:    07/29/25 1137   BP: 122/74   BP Location: Left arm   Patient Position: Sitting   Cuff Size: Adult   Pulse: 85   Resp: 14   Temp: 99.1 °F (37.3 °C)   TempSrc: Temporal   SpO2: 100%   Weight: 92.1 kg (203 lb 1.6 oz)   Height: 161.3 cm (63.5\")   PainSc: 0-No pain     Body mass index is 35.41 kg/m².    Physical Exam  Vitals and nursing note reviewed.   Constitutional:       General: She is not in acute distress.     Appearance: Normal appearance.   HENT:      Head: Normocephalic.      Nose: Nose normal.      Mouth/Throat:      Mouth: Mucous membranes are moist.      Pharynx: Oropharynx is clear.   Eyes:      Extraocular Movements: Extraocular movements intact.      Conjunctiva/sclera: Conjunctivae normal.   Musculoskeletal:      Cervical back: Normal range of motion and neck supple.   Skin:     Capillary Refill: Capillary refill takes less than 2 seconds.   Neurological:      Mental Status: She is alert and oriented to person, place, and time.   Psychiatric:         Mood and Affect: Mood normal.         Behavior: Behavior normal.         Neurological Exam  Mental Status  Alert. Oriented to person, place, and time.    Cranial Nerves  CN III, IV, VI: Extraocular movements intact bilaterally.       Assessment / Plan      Assessment/Plan:   Diagnoses and all orders for this visit:    1. Intractable chronic " migraine with aura with status migrainosus (Primary)  -     Erenumab-aooe (AIMOVIG) 140 MG/ML auto-injector; Inject 1 mL under the skin into the appropriate area as directed Every 30 (Thirty) Days.  Dispense: 1 mL; Refill: 6  -     rimegepant sulfate ODT (Nurtec) 75 MG disintegrating tablet; Place 1 tablet under the tongue Every Other Day.  Dispense: 16 tablet; Refill: 6    2. Acute migraine         Follow Up:   Return in about 6 months (around 1/29/2026), or if symptoms worsen or fail to improve.    Anticipatory Guidance and Safety Reviewed  Patient Education Provided  Continue Aimovig 140 mg Monthly (prevention); SE reviewed   Continue Nurtec 75 mg QOD (prevention); SE reviewed. Samples provided.   Keep FU with [facility] for BOTOX Clinic    Encouraged HA journaling for trigger identification and prevention   Educated on need to limit us of Nsaids/Tylenol < 2/week to prevent MOH      RTC PRN or within 6 months or sooner with issues    Barby Wilks, DNP, APRN, FNP-C  [facility]

## 2025-07-30 ENCOUNTER — DISEASE STATE MANAGEMENT VISIT (OUTPATIENT)
Dept: PHARMACY | Facility: HOSPITAL | Age: 35
End: 2025-07-30
Payer: COMMERCIAL

## 2025-07-30 VITALS
HEIGHT: 63 IN | BODY MASS INDEX: 36.11 KG/M2 | OXYGEN SATURATION: 98 % | TEMPERATURE: 98.4 F | WEIGHT: 203.8 LBS | HEART RATE: 123 BPM | DIASTOLIC BLOOD PRESSURE: 80 MMHG | SYSTOLIC BLOOD PRESSURE: 135 MMHG

## 2025-07-30 DIAGNOSIS — G43.009 MIGRAINE WITHOUT AURA AND WITHOUT STATUS MIGRAINOSUS, NOT INTRACTABLE: Primary | ICD-10-CM

## 2025-07-30 PROCEDURE — 64615 CHEMODENERV MUSC MIGRAINE: CPT | Performed by: NURSE PRACTITIONER

## 2025-07-30 PROCEDURE — 25010000002 ONABOTULINUMTOXINA 200 UNITS RECONSTITUTED SOLUTION: Performed by: NURSE PRACTITIONER

## 2025-07-30 RX ORDER — FLUCONAZOLE 150 MG/1
TABLET ORAL
COMMUNITY
Start: 2025-07-29

## 2025-07-30 RX ORDER — PROMETHAZINE HYDROCHLORIDE 25 MG/1
1 TABLET ORAL
COMMUNITY
Start: 2025-06-26

## 2025-07-30 RX ADMIN — ONABOTULINUMTOXINA 165 UNITS: 200 INJECTION, POWDER, LYOPHILIZED, FOR SOLUTION INTRADERMAL; INTRAMUSCULAR at 14:42

## 2025-07-30 NOTE — PROGRESS NOTES
Botox Procedure Note       Patient Name: Marilu Godoy  : 1990   MRN: [id number]     Chief Complaint:  Here for Botox injections and the Botox is facility supplied (will be billed by the hospital).      History of Present Illness: Marilu Godoy is a 34 y.o. female who is here today for Botox injections for migraines and she has had 80% improvement in her migraines with Botox injections.     We have discussed risk and benefits of this Botox procedure and common side effects including headache, neck pain, neck stiffness or weakness, ptosis, flu-like symptoms as well as more serious possible adverse effects including possible dysphagia, respiratory distress or even death (death has only been reported once with adults for Botox for migraines in another state when mixed with lidocaine solution which we do not use lidocaine solution in our practice for mixing Botox). The patient verbally understands and accepts risks and agrees with moving forward with Botox injections for chronic migraine prevention.    Verbal and written consent has been obtained from the patient prior to beginning treatment injections.     Subjective      Review of Systems:   Review of Systems    The following portions of the patient's history were reviewed an updated as appropriate: past family history, past medical history, past social history, past surgical history.     Medications:     Current Outpatient Medications:     albuterol (PROVENTIL) (2.5 MG/3ML) 0.083% nebulizer solution, 2.5 mg., Disp: , Rfl:     albuterol sulfate  (90 Base) MCG/ACT inhaler, Inhale 2 puffs Every 4 (Four) Hours As Needed for Wheezing or Shortness of Air., Disp: 18 g, Rfl: 11    apixaban (ELIQUIS) 5 MG tablet tablet, Take 1 tablet by mouth 2 (Two) Times a Day., Disp: 60 tablet, Rfl: 11    aspirin 81 MG EC tablet, Take 1 tablet by mouth Daily., Disp: , Rfl:     B-D ULTRA-FINE 33 LANCETS misc, Check sugars as needed/directed for  hypoglycemia., Disp: 100 each, Rfl: 12    Blood Glucose Monitoring Suppl (ONE TOUCH ULTRA 2) w/Device kit, use as needed to check blood sugar for hypoglycemia (diabetes), Disp: , Rfl:     Blood Pressure kit, Monitor blood pressure daily due to labile blood pressures, Disp: 1 each, Rfl: 0    Corlanor 5 MG tablet tablet, Take 1.5 tablets by mouth 2 (two) times a day., Disp: 90 tablet, Rfl: 6    Diclofenac Sodium (VOLTAREN) 1 % gel gel, Apply 4 g topically to the appropriate area as directed 4 (Four) Times a Day As Needed (joint pain)., Disp: 150 g, Rfl: 1    EPINEPHrine (EPIPEN) 0.3 MG/0.3ML solution auto-injector injection, Inject  into the appropriate muscle as directed by prescriber See Admin Instructions. inject syringe as directed for allergic reaction, Disp: , Rfl:     Erenumab-aooe (AIMOVIG) 140 MG/ML auto-injector, Inject 1 mL under the skin into the appropriate area as directed Every 30 (Thirty) Days., Disp: 1 mL, Rfl: 6    fluconazole (DIFLUCAN) 150 MG tablet, , Disp: , Rfl:     folic acid (FOLVITE) 1 MG tablet, Take 1 tablet by mouth Daily., Disp: 90 tablet, Rfl: 3    glucose blood test strip, USE TO CHECK SUGARS DAILY DUE TO hypoglycemia, Disp: 100 each, Rfl: 12    glucose monitor monitoring kit, 1 each As Needed (diabetes). Check sugars as needed/directed for hypoglycemia., Disp: 1 each, Rfl: 0    levocetirizine (XYZAL) 5 MG tablet, Take 1 tablet by mouth every night at bedtime., Disp: , Rfl:     lidocaine (LIDODERM) 5 %, Place 2 patches on the skin as directed by provider Daily. Remove & Discard patch within 12 hours or as directed by MD, Disp: 60 patch, Rfl: 3    methocarbamol (ROBAXIN) 750 MG tablet, Take 1 tablet by mouth At Night As Needed for Muscle Spasms., Disp: 14 tablet, Rfl: 0    mometasone (NASONEX) 50 MCG/ACT nasal spray, 2 sprays into the nostril(s) as directed by provider Daily. APPT NEEDED FOR ADDITIONAL REFILLS, Disp: 3 each, Rfl: 0    omeprazole (priLOSEC) 40 MG capsule, Take 1 capsule  by mouth Daily., Disp: , Rfl:     OnabotulinumtoxinA (BOTOX IJ), Inject  as directed Every 3 (Three) Months. For migraines, Disp: , Rfl:     ondansetron ODT (ZOFRAN-ODT) 4 MG disintegrating tablet, DISSOLVE 1 TABLET IN MOUTH EVERY 4 TO 6 HOURS AS NEEDED FOR NAUSEA AND VOMITING, Disp: , Rfl:     pantoprazole (PROTONIX) 40 MG EC tablet, Take 1 tablet by mouth Daily. APPT NEEDED FOR ADDITIONAL REFILLS, Disp: 90 tablet, Rfl: 0    promethazine (PHENERGAN) 25 MG tablet, Take 1 tablet by mouth every 6 (six) to 8 (eight) hours as needed for Moderate Pain., Disp: , Rfl:     rimegepant sulfate ODT (Nurtec) 75 MG disintegrating tablet, Place 1 tablet under the tongue Every Other Day., Disp: 16 tablet, Rfl: 6    Scopolamine 1 MG/3DAYS patch, Place 1 patch on the skin as directed by provider Every 72 (Seventy-Two) Hours., Disp: , Rfl:     Symbicort 160-4.5 MCG/ACT inhaler, INHALE 2 PUFFS BY MOUTH EVERY 12 HOURS WITH SPACER. RINSE MOUTH AFTER EACH USE., Disp: , Rfl:     tiZANidine (ZANAFLEX) 4 MG tablet, Take 1 tablet 3 times a day by oral route as directed for 30 days., Disp: , Rfl:     vitamin B-12 (CYANOCOBALAMIN) 1000 MCG tablet, Take 1 tablet by mouth Daily., Disp: 90 tablet, Rfl: 3    vitamin D3 (Vitamin D3 Maximum Strength) 125 MCG (5000 UT) capsule capsule, Take 1 capsule by mouth Daily. Indications: Vitamin D Deficiency, Disp: 90 capsule, Rfl: 3    Current Facility-Administered Medications:     OnabotulinumtoxinA 200 Units, 200 Units, Intramuscular, Q3 Months, Marylou Roger, ARNIE, 165 Units at 07/30/25 1442    Allergies:   Allergies   Allergen Reactions    Ceftin [Cefuroxime Axetil] Shortness Of Breath and Rash     Numbness in mouth and throat    Cefuroxime Hives, Rash and Shortness Of Breath     Numbness in mouth and throat  Numbness in mouth and throat    Iodinated Contrast Media Shortness Of Breath and Rash    Sumatriptan Shortness Of Breath and Nausea Only     Other reaction(s): Nausea Only, Other (See  "Comments)  Worsening migraine  Worsening migraine    Amoxicillin Rash, Hives and Itching       Objective     Physical Exam:  Vital Signs:   Vitals:    07/30/25 1432   BP: 135/80   Pulse: (!) 123   Temp: 98.4 °F (36.9 °C)   SpO2: 98%   Weight: 92.4 kg (203 lb 12.8 oz)   Height: 160 cm (63\")   PainSc: 0-No pain     Body mass index is 36.1 kg/m².     Physical Exam      BOTOX PROCEDURE:     Date of Last Injection:   Response: Good  Side Effects: None  : Allergan  Lot #: [id number]  Expiration Date: 10/2027  NDC: 0312001290    200 unit vial Botox was re-constituted with 4 mL 0.9 NS to 5 unit/0.1 mL using standard techniques.  10 units were given at the  muscle in 2 divided sites  5 units were given at the Procerus muscle  20 units were given at the Frontalis muscle in 4 divided sites  40 units were given at the Temporalis muscle in 8 divided sites  30 units were given at the Occipitalis muscle in 6 divided sites  20 units were given at the Cervical paraspinal muscle in 4 divided sites  30 units were given at the Trapezius muscle in 6 divided sites  10 units were given at the Masseter muscle in 2 divided sites.   For a total of 165 units divided between 33 sites and 35 units of wastage    Patient tolerated procedure well with no immediate complications.     Assessment / Plan      Assessment/Plan:   Diagnoses and all orders for this visit:    1. Migraine without aura and without status migrainosus, not intractable (Primary)         Follow Up:   It has been determined that Marilu Godoy has chronic migraine headaches. We will proceed with a 12 week regimen of 200 units of Botox injections, to treat the patient's chronic migraines.      Return in about 3 months (around 10/30/2025) for Botox.      ARNIE Gaxiola, FNP-C  [facility]   "

## 2025-07-31 ENCOUNTER — PROCEDURE VISIT (OUTPATIENT)
Dept: NEUROLOGY | Facility: CLINIC | Age: 35
End: 2025-07-31
Payer: COMMERCIAL

## 2025-07-31 DIAGNOSIS — M79.641 RIGHT HAND PAIN: Primary | ICD-10-CM

## 2025-07-31 NOTE — PROGRESS NOTES
[facility], [facility]   Electrodiagnostic Laboratory    Nerve Conduction & EMG Report        Patient: Marilu Godoy   Patient ID: [id number]   YOB: 1990  Sex: female      Exam Physician:  Sharan Singh MD  Refer Physician:  MARÍA Ruiz*    Electromyogram and Nerve Conduction Velocity Procedure Note    Hx: 34 y.o. right handed female with complaint of pain involving the right arm. Symptoms have been present for several months and were provoked by activity. Significant past medical history includes nothing suggestive of neuropathy. Family history no family history of nerve or muscle disease.    Exam: Motor power is normal. There is no atrophy. There are no fasciculations. Deep tendon reflexes are present and symmetrical. Sensory exam is normal.      Edx studies of the R UE were performed to evaluate for peripheral nerve entrapment.     NCS Examination   For sensory nerve conduction studies, the amplitude is measured peak-to-peak, the latency reported is the distal peak latency, and the conduction velocity, if measured, is determined from onset latencies and is over the forearm.   For motor nerve conduction studies, the amplitude is measured baseline-to-peak, the latency reported is the distal onset latency, the conduction velocity is calculated over the forearm, and the F wave latency is the minimum latency.   Unless otherwise noted, the hand temperature was monitored continuously and remained between 32°C and 36°C during the performance of the NCSs.      Nerve Conduction Studies  Anti Sensory Summary Table     Stim Site NR Norm Peak (ms) O-P Amp (µV) Norm O-P Amp Onset (ms) Site1 Site2 Delta-0 (ms) Dist (cm) John (m/s) Norm John (m/s)   Right Median Anti Sensory (2nd Digit)   Wrist    <3.6 51.4 >10 2.4 Wrist 2nd Digit 2.4 14.0 58    Right Radial Anti Sensory (Base 1st Digit)   Wrist    <3.1 36.0  1.3 Wrist Base 1st Digit 1.3 0.0     Right Ulnar Anti Sensory (5th Digit)    Wrist    <3.7 58.1 >15.0 2.0 Wrist 5th Digit 2.0 0.0       Motor Summary Table     Stim Site NR Onset (ms) Norm Onset (ms) O-P Amp (mV) Norm O-P Amp Site1 Site2 Delta-0 (ms) Dist (cm) John (m/s) Norm John (m/s)   Right Median Motor (Abd Poll Brev)   Wrist    3.2 <4.2 10.4 >5 Elbow Wrist 3.7 19.0 51 >50   Elbow    6.9  6.5          Right Ulnar Motor (Abd Dig Minimi)   Wrist    2.5 <4.2 4.7 >3 B Elbow Wrist 4.0 21.0 53 >53   B Elbow    6.5  3.3  A Elbow B Elbow 1.2 9.0 75 >53   A Elbow    7.7  3.2            F Wave Studies     NR F-Lat (ms) Lat Norm (ms) L-R F-Lat (ms) L-R Lat Norm   Right Median (Mrkrs) (Abd Poll Brev)      24.51 <33  <2.2   Right Ulnar (Mrkrs) (Abd Dig Min)      26.41 <36  <2.5     EMG Examination   The study was performed with a concentric needle electrode. Fibrillation and fasciculation activity is graded from none (0) to continuous (4+). The configuration and recruitment pattern of motor unit action potentials under voluntary control, if not normal, are described below     Side Muscle Nerve Root Ins Act Fibs Psw Amp Dur Poly Recrt Int Pat Comment   Right Deltoid Axillary C5-6 Nml Nml Nml Nml Nml 0 Nml Nml    Right Triceps Radial C6-7-8 Nml Nml Nml Nml Nml 0 Nml Nml    Right Biceps Musculocut C5-6 Nml Nml Nml Nml Nml 0 Nml Nml    Right PronatorTeres Median C6-7 Nml Nml Nml Nml Nml 0 Nml Nml    Right 1stDorInt Ulnar C8-T1 Nml Nml Nml Nml Nml 0 Nml Nml    Right Abd Poll Brev Median C8-T1 Nml Nml Nml Nml Nml 0 Nml Nml              NCV FINDINGS:  All nerve conduction studies (as indicated in the following tables) were within normal limits.  All F Wave latencies were within normal limits.      EMG FINDINGS:  All examined muscles (as indicated in the following table) showed no evidence of electrical instability.    Conclusion: Normal NCV and EMG of the right upper extremity          Instrument used:  Teca Synergy        Performed by:          Sharan Singh MD

## 2025-08-01 ENCOUNTER — HOSPITAL ENCOUNTER (OUTPATIENT)
Dept: CARDIOLOGY | Facility: HOSPITAL | Age: 35
Discharge: HOME | End: 2025-08-01
Payer: COMMERCIAL

## 2025-08-01 VITALS — HEIGHT: 63 IN | WEIGHT: 203.71 LBS | BODY MASS INDEX: 36.09 KG/M2

## 2025-08-01 DIAGNOSIS — R60.0 BILATERAL LOWER EXTREMITY EDEMA: ICD-10-CM

## 2025-08-01 PROCEDURE — 93970 EXTREMITY STUDY: CPT

## 2025-08-03 LAB
BH CV LEFT LOWER VAS AA GSV TRANS DIAMETER: 0.4 CM
BH CV LEFT LOWER VAS COMMON FEMORAL REFLUX TIME: 1.3 SEC
BH CV LEFT LOWER VAS GSV KNEE TRANSVERSE DIAMETER: 0.5 CM
BH CV LEFT LOWER VAS GSV PROX TRANSVERSE DIAMETER: 0.8 CM
BH CV LEFT LOWER VAS GSVBELOW KNEE TRANSVERSE DIAMETER: 0.3 CM
BH CV LEFT LOWER VAS SAPHENOFEMORAL JUNCTION TRANSVERSE DIAMETER: 0.7 CM
BH CV LEFT LOWER VAS SSV MID CALF TRANS DIAMETER: 0.3 CM
BH CV LEFT LOWER VAS SSV PROX CALF TRANS DIAMETER: 0.4 CM
BH CV LOW VAS LEFT COMMON FEM NOT VIS SCRIPTING: 1
BH CV LOWER VAS LEFT GSV DIST THIGH COMPRESSIBILTY: NORMAL
BH CV LOWER VAS RIGHT GSV DIST THIGH COMPRESSIBILTY: NORMAL
BH CV LOWER VASCULAR LEFT AA GSV COMPETENT: NORMAL
BH CV LOWER VASCULAR LEFT AA GSV COMPRESS: NORMAL
BH CV LOWER VASCULAR LEFT COMMON FEMORAL AUGMENT: NORMAL
BH CV LOWER VASCULAR LEFT COMMON FEMORAL COMPETENT: NORMAL
BH CV LOWER VASCULAR LEFT COMMON FEMORAL COMPRESS: NORMAL
BH CV LOWER VASCULAR LEFT COMMON FEMORAL PHASIC: NORMAL
BH CV LOWER VASCULAR LEFT COMMON FEMORAL SPONT: NORMAL
BH CV LOWER VASCULAR LEFT DISTAL FEMORAL AUGMENT: NORMAL
BH CV LOWER VASCULAR LEFT DISTAL FEMORAL COMPETENT: NORMAL
BH CV LOWER VASCULAR LEFT DISTAL FEMORAL COMPRESS: NORMAL
BH CV LOWER VASCULAR LEFT DISTAL FEMORAL PHASIC: NORMAL
BH CV LOWER VASCULAR LEFT DISTAL FEMORAL SPONT: NORMAL
BH CV LOWER VASCULAR LEFT GREATER SAPH AK COMPETENT: NORMAL
BH CV LOWER VASCULAR LEFT GREATER SAPH AK COMPRESS: NORMAL
BH CV LOWER VASCULAR LEFT GREATER SAPH BK COMPETENT: NORMAL
BH CV LOWER VASCULAR LEFT GREATER SAPH BK COMPRESS: NORMAL
BH CV LOWER VASCULAR LEFT GSV DIST THIGH COMPETENT: NORMAL
BH CV LOWER VASCULAR LEFT LESSER SAPH COMPETENT: NORMAL
BH CV LOWER VASCULAR LEFT LESSER SAPH COMPRESS: NORMAL
BH CV LOWER VASCULAR LEFT MID FEMORAL AUGMENT: NORMAL
BH CV LOWER VASCULAR LEFT MID FEMORAL COMPETENT: NORMAL
BH CV LOWER VASCULAR LEFT MID FEMORAL COMPRESS: NORMAL
BH CV LOWER VASCULAR LEFT MID FEMORAL PHASIC: NORMAL
BH CV LOWER VASCULAR LEFT MID FEMORAL SPONT: NORMAL
BH CV LOWER VASCULAR LEFT PERONEAL COMPRESS: NORMAL
BH CV LOWER VASCULAR LEFT POPLITEAL AUGMENT: NORMAL
BH CV LOWER VASCULAR LEFT POPLITEAL COMPETENT: NORMAL
BH CV LOWER VASCULAR LEFT POPLITEAL COMPRESS: NORMAL
BH CV LOWER VASCULAR LEFT POPLITEAL PHASIC: NORMAL
BH CV LOWER VASCULAR LEFT POPLITEAL SPONT: NORMAL
BH CV LOWER VASCULAR LEFT POSTERIOR TIBIAL COMPRESS: NORMAL
BH CV LOWER VASCULAR LEFT PROFUNDA FEMORAL AUGMENT: NORMAL
BH CV LOWER VASCULAR LEFT PROFUNDA FEMORAL COMPETENT: NORMAL
BH CV LOWER VASCULAR LEFT PROFUNDA FEMORAL COMPRESS: NORMAL
BH CV LOWER VASCULAR LEFT PROFUNDA FEMORAL PHASIC: NORMAL
BH CV LOWER VASCULAR LEFT PROFUNDA FEMORAL SPONT: NORMAL
BH CV LOWER VASCULAR LEFT PROXIMAL FEMORAL AUGMENT: NORMAL
BH CV LOWER VASCULAR LEFT PROXIMAL FEMORAL COMPETENT: NORMAL
BH CV LOWER VASCULAR LEFT PROXIMAL FEMORAL COMPRESS: NORMAL
BH CV LOWER VASCULAR LEFT PROXIMAL FEMORAL PHASIC: NORMAL
BH CV LOWER VASCULAR LEFT PROXIMAL FEMORAL SPONT: NORMAL
BH CV LOWER VASCULAR LEFT SAPHENOFEMORAL JUNCTION AUGMENT: NORMAL
BH CV LOWER VASCULAR LEFT SAPHENOFEMORAL JUNCTION COMPETENT: NORMAL
BH CV LOWER VASCULAR LEFT SAPHENOFEMORAL JUNCTION COMPRESS: NORMAL
BH CV LOWER VASCULAR LEFT SAPHENOFEMORAL JUNCTION PHASIC: NORMAL
BH CV LOWER VASCULAR LEFT SAPHENOFEMORAL JUNCTION SPONT: NORMAL
BH CV LOWER VASCULAR LEFT SSV MID CALF COMPETENT: NORMAL
BH CV LOWER VASCULAR LEFT SSV MID CALF COMPRESS: NORMAL
BH CV LOWER VASCULAR RIGHT AA GSV COMPETENT: NORMAL
BH CV LOWER VASCULAR RIGHT AA GSV COMPRESS: NORMAL
BH CV LOWER VASCULAR RIGHT COMMON FEMORAL AUGMENT: NORMAL
BH CV LOWER VASCULAR RIGHT COMMON FEMORAL COMPETENT: NORMAL
BH CV LOWER VASCULAR RIGHT COMMON FEMORAL COMPRESS: NORMAL
BH CV LOWER VASCULAR RIGHT COMMON FEMORAL PHASIC: NORMAL
BH CV LOWER VASCULAR RIGHT COMMON FEMORAL SPONT: NORMAL
BH CV LOWER VASCULAR RIGHT DISTAL FEMORAL AUGMENT: NORMAL
BH CV LOWER VASCULAR RIGHT DISTAL FEMORAL COMPETENT: NORMAL
BH CV LOWER VASCULAR RIGHT DISTAL FEMORAL COMPRESS: NORMAL
BH CV LOWER VASCULAR RIGHT DISTAL FEMORAL PHASIC: NORMAL
BH CV LOWER VASCULAR RIGHT DISTAL FEMORAL SPONT: NORMAL
BH CV LOWER VASCULAR RIGHT GREATER SAPH AK COMPETENT: NORMAL
BH CV LOWER VASCULAR RIGHT GREATER SAPH BK COMPETENT: NORMAL
BH CV LOWER VASCULAR RIGHT GREATER SAPH BK COMPRESS: NORMAL
BH CV LOWER VASCULAR RIGHT GSV DIST THIGH COMPETENT: NORMAL
BH CV LOWER VASCULAR RIGHT LESSER SAPH COMPETENT: NORMAL
BH CV LOWER VASCULAR RIGHT LESSER SAPH COMPRESS: NORMAL
BH CV LOWER VASCULAR RIGHT MID FEMORAL AUGMENT: NORMAL
BH CV LOWER VASCULAR RIGHT MID FEMORAL COMPETENT: NORMAL
BH CV LOWER VASCULAR RIGHT MID FEMORAL COMPRESS: NORMAL
BH CV LOWER VASCULAR RIGHT MID FEMORAL PHASIC: NORMAL
BH CV LOWER VASCULAR RIGHT MID FEMORAL SPONT: NORMAL
BH CV LOWER VASCULAR RIGHT PERONEAL COMPRESS: NORMAL
BH CV LOWER VASCULAR RIGHT POPLITEAL AUGMENT: NORMAL
BH CV LOWER VASCULAR RIGHT POPLITEAL COMPETENT: NORMAL
BH CV LOWER VASCULAR RIGHT POPLITEAL COMPRESS: NORMAL
BH CV LOWER VASCULAR RIGHT POPLITEAL PHASIC: NORMAL
BH CV LOWER VASCULAR RIGHT POPLITEAL SPONT: NORMAL
BH CV LOWER VASCULAR RIGHT POSTERIOR TIBIAL COMPRESS: NORMAL
BH CV LOWER VASCULAR RIGHT PROFUNDA FEMORAL AUGMENT: NORMAL
BH CV LOWER VASCULAR RIGHT PROFUNDA FEMORAL COMPETENT: NORMAL
BH CV LOWER VASCULAR RIGHT PROFUNDA FEMORAL COMPRESS: NORMAL
BH CV LOWER VASCULAR RIGHT PROFUNDA FEMORAL PHASIC: NORMAL
BH CV LOWER VASCULAR RIGHT PROFUNDA FEMORAL SPONT: NORMAL
BH CV LOWER VASCULAR RIGHT PROXIMAL FEMORAL AUGMENT: NORMAL
BH CV LOWER VASCULAR RIGHT PROXIMAL FEMORAL COMPETENT: NORMAL
BH CV LOWER VASCULAR RIGHT PROXIMAL FEMORAL COMPRESS: NORMAL
BH CV LOWER VASCULAR RIGHT PROXIMAL FEMORAL PHASIC: NORMAL
BH CV LOWER VASCULAR RIGHT PROXIMAL FEMORAL SPONT: NORMAL
BH CV LOWER VASCULAR RIGHT SAPHENOFEMORAL JUNCTION AUGMENT: NORMAL
BH CV LOWER VASCULAR RIGHT SAPHENOFEMORAL JUNCTION COMPETENT: NORMAL
BH CV LOWER VASCULAR RIGHT SAPHENOFEMORAL JUNCTION COMPRESS: NORMAL
BH CV LOWER VASCULAR RIGHT SAPHENOFEMORAL JUNCTION PHASIC: NORMAL
BH CV LOWER VASCULAR RIGHT SAPHENOFEMORAL JUNCTION SPONT: NORMAL
BH CV LOWER VASCULAR RIGHT SAPHENOPOP JX AUGMENT: NORMAL
BH CV LOWER VASCULAR RIGHT SAPHENOPOP JX COMPETENT: NORMAL
BH CV LOWER VASCULAR RIGHT SAPHENOPOP JX COMPRESS: NORMAL
BH CV LOWER VASCULAR RIGHT SAPHENOPOP JX PHASIC: NORMAL
BH CV LOWER VASCULAR RIGHT SAPHENOPOP JX SPONT: NORMAL
BH CV LOWER VASCULAR RIGHT SSV MID CALF COMPETENT: NORMAL
BH CV LOWER VASCULAR RIGHT SSV MID CALF COMPRESS: NORMAL
BH CV RIGHT LOWER VAS AA GSV TRANS DIAMETER: 0.5 CM
BH CV RIGHT LOWER VAS GSV KNEE TRANS DIAMETER: 0.5 CM
BH CV RIGHT LOWER VAS GSV PROX CALF TRANS DIAMETER: 0.3 CM
BH CV RIGHT LOWER VAS GSV PROX THIGH TRANS DIAMETER: 0.5 CM
BH CV RIGHT LOWER VAS SAPHENOFEM JUNCTION TRANSVERSE DIAMETER: 0.6 CM
BH CV RIGHT LOWER VAS SPJ TRANS DIAMETER: 0.4 CM
BH CV RIGHT LOWER VAS SSV MID CALF TRANS DIAMETER: 0.4 CM
BH CV RIGHT LOWER VAS SSV PROX CALF TRANS DIAMETER: 0.6 CM
BH CV VAS RIGHT GSV PROXIMAL HIDDEN LRR COMPRESSIBILTY: NORMAL

## 2025-08-06 ENCOUNTER — TELEPHONE (OUTPATIENT)
Age: 35
End: 2025-08-06
Payer: COMMERCIAL

## 2025-08-07 ENCOUNTER — TELEPHONE (OUTPATIENT)
Age: 35
End: 2025-08-07
Payer: COMMERCIAL

## 2025-08-07 ENCOUNTER — HOSPITAL ENCOUNTER (OUTPATIENT)
Facility: HOSPITAL | Age: 35
Discharge: HOME | End: 2025-08-07
Payer: COMMERCIAL

## 2025-08-07 VITALS — BODY MASS INDEX: 36.09 KG/M2 | HEIGHT: 63 IN | WEIGHT: 203.71 LBS

## 2025-08-07 DIAGNOSIS — R60.0 BILATERAL LOWER EXTREMITY EDEMA: ICD-10-CM

## 2025-08-07 PROCEDURE — 93925 LOWER EXTREMITY STUDY: CPT

## 2025-08-10 LAB
BH CV GRAFT BRACHIAL PRESSURE LEFT: 104 MMHG
BH CV GRAFT BRACHIAL PRESSURE RIGHT: 107 MMHG
BH CV LEA LEFT ANT TIBIAL A DISTAL PSV: 47.9 CM/S
BH CV LEA LEFT CFA DISTAL PSV: 109 CM/S
BH CV LEA LEFT DFA PROX PSV: 83.9 CM/S
BH CV LEA LEFT DPA PRESSURE: 128 MMHG
BH CV LEA LEFT PERONEAL  MID PSV: 55.9 CM/S
BH CV LEA LEFT POPITEAL A  DISTAL PSV: 66.8 CM/S
BH CV LEA LEFT POPITEAL A  PROX PSV: 52.1 CM/S
BH CV LEA LEFT PTA DISTAL PSV: 87 CM/S
BH CV LEA LEFT PTA PRESSURE: 144 MMHG
BH CV LEA LEFT SFA DISTAL PSV: 87 CM/S
BH CV LEA LEFT SFA MID PSV: 113 CM/S
BH CV LEA LEFT SFA PROX PSV: 118 CM/S
BH CV LEA LEFT TIBEOPERONEAL PSV: 85.1 CM/S
BH CV LEA RIGHT ANT TIBIAL A DISTAL PSV: 39.2 CM/S
BH CV LEA RIGHT CFA DISTAL PSV: 136 CM/S
BH CV LEA RIGHT DFA PROX PSV: 80.7 CM/S
BH CV LEA RIGHT DPA PRESSURE: 122 MMHG
BH CV LEA RIGHT PERONEAL  MID PSV: 40.8 CM/S
BH CV LEA RIGHT POPITEAL A  DISTAL PSV: 19.3 CM/S
BH CV LEA RIGHT POPITEAL A  PROX PSV: 59.3 CM/S
BH CV LEA RIGHT PTA DISTAL PSV: 101 CM/S
BH CV LEA RIGHT PTA PRESSURE: 140 MMHG
BH CV LEA RIGHT SFA DISTAL PSV: 87.8 CM/S
BH CV LEA RIGHT SFA MID PSV: 111 CM/S
BH CV LEA RIGHT SFA PROX PSV: 120 CM/S
BH CV LEA RIGHT TIBEOPERONEAL PSV: 86.2 CM/S
BH CV LOWER ARTERIAL LEFT ABI RATIO: 1.34
BH CV LOWER ARTERIAL RIGHT ABI RATIO: 1.32
LEFT GROIN CFA SYS: 109 CM/SEC
RIGHT GROIN CFA SYS: 136 CM/SEC

## 2025-08-11 ENCOUNTER — OFFICE VISIT (OUTPATIENT)
Dept: OBSTETRICS AND GYNECOLOGY | Facility: CLINIC | Age: 35
End: 2025-08-11
Payer: COMMERCIAL

## 2025-08-11 VITALS
DIASTOLIC BLOOD PRESSURE: 76 MMHG | SYSTOLIC BLOOD PRESSURE: 120 MMHG | WEIGHT: 203.2 LBS | HEIGHT: 63 IN | BODY MASS INDEX: 36 KG/M2

## 2025-08-11 DIAGNOSIS — D25.1 INTRAMURAL LEIOMYOMA OF UTERUS: Primary | ICD-10-CM

## 2025-08-11 PROCEDURE — 99213 OFFICE O/P EST LOW 20 MIN: CPT | Performed by: PHYSICIAN ASSISTANT

## 2025-08-11 PROCEDURE — 1159F MED LIST DOCD IN RCRD: CPT | Performed by: PHYSICIAN ASSISTANT

## 2025-08-11 PROCEDURE — 3078F DIAST BP <80 MM HG: CPT | Performed by: PHYSICIAN ASSISTANT

## 2025-08-11 PROCEDURE — 1160F RVW MEDS BY RX/DR IN RCRD: CPT | Performed by: PHYSICIAN ASSISTANT

## 2025-08-11 PROCEDURE — 3074F SYST BP LT 130 MM HG: CPT | Performed by: PHYSICIAN ASSISTANT

## 2025-08-12 ENCOUNTER — HOSPITAL ENCOUNTER (EMERGENCY)
Facility: HOSPITAL | Age: 35
Discharge: HOME | End: 2025-08-12
Attending: EMERGENCY MEDICINE
Payer: MEDICAID

## 2025-08-12 ENCOUNTER — TELEPHONE (OUTPATIENT)
Facility: HOSPITAL | Age: 35
End: 2025-08-12
Payer: COMMERCIAL

## 2025-08-12 VITALS
HEIGHT: 63 IN | DIASTOLIC BLOOD PRESSURE: 85 MMHG | TEMPERATURE: 98.2 F | OXYGEN SATURATION: 100 % | SYSTOLIC BLOOD PRESSURE: 130 MMHG | HEART RATE: 90 BPM | RESPIRATION RATE: 18 BRPM | WEIGHT: 202 LBS | BODY MASS INDEX: 35.79 KG/M2

## 2025-08-12 DIAGNOSIS — Z51.89 VISIT FOR WOUND CHECK: Primary | ICD-10-CM

## 2025-08-12 PROCEDURE — 99282 EMERGENCY DEPT VISIT SF MDM: CPT

## 2025-08-12 ASSESSMENT — ENCOUNTER SYMPTOMS
VOICE CHANGE: 0
TROUBLE SWALLOWING: 0
NAUSEA: 0
VOMITING: 0
SHORTNESS OF BREATH: 0
DIARRHEA: 0

## 2025-08-12 ASSESSMENT — LIFESTYLE VARIABLES: HOW MANY STANDARD DRINKS CONTAINING ALCOHOL DO YOU HAVE ON A TYPICAL DAY: PATIENT DOES NOT DRINK

## 2025-08-15 ENCOUNTER — OFFICE VISIT (OUTPATIENT)
Age: 35
End: 2025-08-15
Payer: COMMERCIAL

## 2025-08-15 ENCOUNTER — TELEPHONE (OUTPATIENT)
Dept: INTERNAL MEDICINE | Facility: CLINIC | Age: 35
End: 2025-08-15
Payer: COMMERCIAL

## 2025-08-15 ENCOUNTER — LAB (OUTPATIENT)
Facility: HOSPITAL | Age: 35
End: 2025-08-15
Payer: COMMERCIAL

## 2025-08-15 VITALS
HEIGHT: 63 IN | WEIGHT: 203 LBS | BODY MASS INDEX: 35.97 KG/M2 | HEART RATE: 103 BPM | SYSTOLIC BLOOD PRESSURE: 127 MMHG | DIASTOLIC BLOOD PRESSURE: 84 MMHG

## 2025-08-15 DIAGNOSIS — D68.51 HETEROZYGOUS FACTOR V LEIDEN MUTATION: ICD-10-CM

## 2025-08-15 DIAGNOSIS — E78.5 HYPERLIPIDEMIA LDL GOAL <100: ICD-10-CM

## 2025-08-15 DIAGNOSIS — Z86.711 HISTORY OF PULMONARY EMBOLISM: ICD-10-CM

## 2025-08-15 DIAGNOSIS — R60.0 BILATERAL LOWER EXTREMITY EDEMA: Primary | ICD-10-CM

## 2025-08-15 LAB
ALBUMIN SERPL-MCNC: 4.3 G/DL (ref 3.5–5.2)
ALP SERPL-CCNC: 104 U/L (ref 39–117)
ALT SERPL W P-5'-P-CCNC: 15 U/L (ref 1–33)
AST SERPL-CCNC: 18 U/L (ref 1–32)
BILIRUB CONJ SERPL-MCNC: 0.2 MG/DL (ref 0–0.3)
BILIRUB INDIRECT SERPL-MCNC: 0.3 MG/DL
BILIRUB SERPL-MCNC: 0.5 MG/DL (ref 0–1.2)
CHOLEST SERPL-MCNC: 223 MG/DL (ref 0–200)
CRP SERPL-MCNC: 9.69 MG/DL (ref 0.01–0.5)
HDLC SERPL-MCNC: 44 MG/DL (ref 40–60)
LDLC SERPL CALC-MCNC: 164 MG/DL (ref 0–100)
LDLC/HDLC SERPL: 3.68 {RATIO}
PROT SERPL-MCNC: 8.2 G/DL (ref 6–8.5)
TRIGL SERPL-MCNC: 85 MG/DL (ref 0–150)
VLDLC SERPL-MCNC: 15 MG/DL (ref 5–40)

## 2025-08-15 PROCEDURE — 83695 ASSAY OF LIPOPROTEIN(A): CPT

## 2025-08-15 PROCEDURE — 80061 LIPID PANEL: CPT

## 2025-08-15 PROCEDURE — 36415 COLL VENOUS BLD VENIPUNCTURE: CPT

## 2025-08-15 PROCEDURE — 80076 HEPATIC FUNCTION PANEL: CPT

## 2025-08-15 PROCEDURE — 86141 C-REACTIVE PROTEIN HS: CPT

## 2025-08-15 RX ORDER — ROSUVASTATIN CALCIUM 40 MG/1
40 TABLET, COATED ORAL DAILY
Qty: 90 TABLET | Refills: 3 | Status: SHIPPED | OUTPATIENT
Start: 2025-08-15 | End: 2025-08-15

## 2025-08-16 ENCOUNTER — OFFICE VISIT (OUTPATIENT)
Dept: INTERNAL MEDICINE | Facility: CLINIC | Age: 35
End: 2025-08-16
Payer: COMMERCIAL

## 2025-08-16 VITALS
TEMPERATURE: 98.5 F | WEIGHT: 197.8 LBS | HEIGHT: 63 IN | BODY MASS INDEX: 35.05 KG/M2 | HEART RATE: 88 BPM | SYSTOLIC BLOOD PRESSURE: 120 MMHG | OXYGEN SATURATION: 100 % | DIASTOLIC BLOOD PRESSURE: 78 MMHG

## 2025-08-16 DIAGNOSIS — R20.0 ANTERIOR THIGH NUMBNESS: ICD-10-CM

## 2025-08-16 DIAGNOSIS — J01.40 ACUTE NON-RECURRENT PANSINUSITIS: ICD-10-CM

## 2025-08-16 DIAGNOSIS — B37.9 ANTIBIOTIC-INDUCED YEAST INFECTION: ICD-10-CM

## 2025-08-16 DIAGNOSIS — M79.651 PAIN OF RIGHT THIGH: Primary | ICD-10-CM

## 2025-08-16 DIAGNOSIS — T36.95XA ANTIBIOTIC-INDUCED YEAST INFECTION: ICD-10-CM

## 2025-08-16 DIAGNOSIS — J18.9 ATYPICAL PNEUMONIA: ICD-10-CM

## 2025-08-16 PROBLEM — R07.9 CHEST PAIN: Status: RESOLVED | Noted: 2021-06-22 | Resolved: 2025-08-16

## 2025-08-16 PROBLEM — R79.9 ABNORMAL BLOOD CHEMISTRY: Status: RESOLVED | Noted: 2022-08-12 | Resolved: 2025-08-16

## 2025-08-16 PROBLEM — M79.89 MASS OF SOFT TISSUE: Status: RESOLVED | Noted: 2021-12-08 | Resolved: 2025-08-16

## 2025-08-16 RX ORDER — DOXYCYCLINE 100 MG/1
100 CAPSULE ORAL 2 TIMES DAILY
Qty: 14 CAPSULE | Refills: 0 | Status: SHIPPED | OUTPATIENT
Start: 2025-08-16 | End: 2025-08-23

## 2025-08-16 RX ORDER — FLUCONAZOLE 150 MG/1
TABLET ORAL
Qty: 2 TABLET | Refills: 0 | Status: SHIPPED | OUTPATIENT
Start: 2025-08-16

## 2025-08-18 LAB — LPA SERPL-SCNC: <8.4 NMOL/L

## 2025-08-19 ENCOUNTER — PATIENT MESSAGE (OUTPATIENT)
Dept: INTERNAL MEDICINE | Facility: CLINIC | Age: 35
End: 2025-08-19
Payer: COMMERCIAL

## 2025-08-19 DIAGNOSIS — B37.9 ANTIBIOTIC-INDUCED YEAST INFECTION: ICD-10-CM

## 2025-08-19 DIAGNOSIS — T36.95XA ANTIBIOTIC-INDUCED YEAST INFECTION: ICD-10-CM

## 2025-08-19 RX ORDER — FLUCONAZOLE 150 MG/1
150 TABLET ORAL
Qty: 3 TABLET | Refills: 0 | Status: SHIPPED | OUTPATIENT
Start: 2025-08-19 | End: 2025-08-26

## 2025-08-20 ENCOUNTER — OFFICE VISIT (OUTPATIENT)
Dept: OBSTETRICS AND GYNECOLOGY | Facility: CLINIC | Age: 35
End: 2025-08-20
Payer: COMMERCIAL

## 2025-08-20 VITALS
WEIGHT: 203.8 LBS | SYSTOLIC BLOOD PRESSURE: 116 MMHG | BODY MASS INDEX: 36.11 KG/M2 | DIASTOLIC BLOOD PRESSURE: 70 MMHG | HEIGHT: 63 IN

## 2025-08-20 DIAGNOSIS — N76.0 ACUTE VAGINITIS: Primary | ICD-10-CM

## 2025-08-20 PROCEDURE — 99213 OFFICE O/P EST LOW 20 MIN: CPT | Performed by: PHYSICIAN ASSISTANT

## 2025-08-20 PROCEDURE — 1160F RVW MEDS BY RX/DR IN RCRD: CPT | Performed by: PHYSICIAN ASSISTANT

## 2025-08-20 PROCEDURE — 3078F DIAST BP <80 MM HG: CPT | Performed by: PHYSICIAN ASSISTANT

## 2025-08-20 PROCEDURE — 3074F SYST BP LT 130 MM HG: CPT | Performed by: PHYSICIAN ASSISTANT

## 2025-08-20 PROCEDURE — 1159F MED LIST DOCD IN RCRD: CPT | Performed by: PHYSICIAN ASSISTANT

## 2025-08-20 RX ORDER — ROSUVASTATIN CALCIUM 40 MG/1
1 TABLET, COATED ORAL DAILY
COMMUNITY
Start: 2025-08-15

## 2025-08-20 RX ORDER — CLOTRIMAZOLE AND BETAMETHASONE DIPROPIONATE 10; .64 MG/G; MG/G
1 CREAM TOPICAL 2 TIMES DAILY
Qty: 15 G | Refills: 0 | Status: SHIPPED | OUTPATIENT
Start: 2025-08-20

## 2025-08-21 ENCOUNTER — OFFICE VISIT (OUTPATIENT)
Dept: ORTHOPEDIC SURGERY | Facility: CLINIC | Age: 35
End: 2025-08-21
Payer: COMMERCIAL

## 2025-08-21 VITALS
WEIGHT: 204 LBS | SYSTOLIC BLOOD PRESSURE: 110 MMHG | BODY MASS INDEX: 36.14 KG/M2 | HEIGHT: 63 IN | DIASTOLIC BLOOD PRESSURE: 78 MMHG

## 2025-08-21 DIAGNOSIS — M25.531 RIGHT WRIST PAIN: ICD-10-CM

## 2025-08-21 DIAGNOSIS — R20.2 PARESTHESIAS IN RIGHT HAND: Primary | ICD-10-CM

## 2025-08-21 PROCEDURE — 1160F RVW MEDS BY RX/DR IN RCRD: CPT | Performed by: PHYSICIAN ASSISTANT

## 2025-08-21 PROCEDURE — 1159F MED LIST DOCD IN RCRD: CPT | Performed by: PHYSICIAN ASSISTANT

## 2025-08-21 PROCEDURE — 3078F DIAST BP <80 MM HG: CPT | Performed by: PHYSICIAN ASSISTANT

## 2025-08-21 PROCEDURE — 99214 OFFICE O/P EST MOD 30 MIN: CPT | Performed by: PHYSICIAN ASSISTANT

## 2025-08-21 PROCEDURE — 3074F SYST BP LT 130 MM HG: CPT | Performed by: PHYSICIAN ASSISTANT

## 2025-08-22 LAB
A VAGINAE DNA VAG QL NAA+PROBE: ABNORMAL SCORE
BVAB2 DNA VAG QL NAA+PROBE: ABNORMAL SCORE
C ALBICANS DNA VAG QL NAA+PROBE: POSITIVE
C GLABRATA DNA VAG QL NAA+PROBE: POSITIVE
C TRACH DNA SPEC QL NAA+PROBE: NEGATIVE
MEGA1 DNA VAG QL NAA+PROBE: ABNORMAL SCORE
N GONORRHOEA DNA VAG QL NAA+PROBE: NEGATIVE
T VAGINALIS DNA VAG QL NAA+PROBE: NEGATIVE

## 2025-08-22 RX ORDER — TERCONAZOLE 4 MG/G
1 CREAM VAGINAL NIGHTLY
Qty: 1 EACH | Refills: 0 | Status: SHIPPED | OUTPATIENT
Start: 2025-08-22

## 2025-08-28 ENCOUNTER — TELEPHONE (OUTPATIENT)
Facility: HOSPITAL | Age: 35
End: 2025-08-28
Payer: COMMERCIAL

## (undated) DEVICE — ELECTROSURGICAL SUCTION COAGULATOR 10FR

## (undated) DEVICE — NDL HYPO ECLPS SFTY 25G 1 1/2IN

## (undated) DEVICE — GLV SURG SENSICARE W/ALOE PF LF 8 STRL

## (undated) DEVICE — ENDOPATH XCEL BLADELESS TROCARS WITH STABILITY SLEEVES: Brand: ENDOPATH XCEL

## (undated) DEVICE — SUT VIC 2/0 SH 27IN

## (undated) DEVICE — SUTURE GUT CHROMIC 3/0 912H

## (undated) DEVICE — DISPOSABLE MONOPOLAR ENDOSCOPIC CORD 10 FT. (3M): Brand: KIRWAN

## (undated) DEVICE — ST IRR CYSTO W/SPK 77IN LF

## (undated) DEVICE — BLD CLIP UNIV SURG GRY

## (undated) DEVICE — GLV SURG BIOGEL M LTX PF 6 1/2

## (undated) DEVICE — SYR LL TP 10ML STRL

## (undated) DEVICE — PROB ABL ENDOMTRL NOVASURE/G3 IMPEDENCE 1P/U

## (undated) DEVICE — SOL NACL 0.9PCT 1000ML

## (undated) DEVICE — SUT PDS 0 CT 36IN DYED Z358T

## (undated) DEVICE — HDRST POSTN SLOTTED A/

## (undated) DEVICE — 2000CC GUARDIAN II: Brand: GUARDIAN

## (undated) DEVICE — PAD SANI MAXI W/ADHS SNG WRP 11IN

## (undated) DEVICE — SUT GUT CHRM 1 CTX 36IN 905H

## (undated) DEVICE — UNDYED BRAIDED (POLYGLACTIN 910), SYNTHETIC ABSORBABLE SUTURE: Brand: COATED VICRYL

## (undated) DEVICE — RICH MINOR LITHOTOMY: Brand: MEDLINE INDUSTRIES, INC.

## (undated) DEVICE — RICH MAJOR PROCEDURE: Brand: MEDLINE INDUSTRIES, INC.

## (undated) DEVICE — PROXIMATE SKIN STAPLERS (35 WIDE) CONTAINS 35 STAINLESS STEEL STAPLES (FIXED HEAD): Brand: PROXIMATE

## (undated) DEVICE — PAD,ABDOMINAL,5"X9",STERILE,LF,1/PK: Brand: MEDLINE INDUSTRIES, INC.

## (undated) DEVICE — SOL IRR H2O BTL 1000ML STRL

## (undated) DEVICE — DRSNG TELFA PAD NONADH STR 1S 3X8IN

## (undated) DEVICE — DUAL LUMEN STOMACH TUBE,ANTI-REFLUX VALVE: Brand: SALEM SUMP

## (undated) DEVICE — SUT VIC 3/0 SH 27IN J416H

## (undated) DEVICE — NDL HYPO PRECISIONGLIDE/REG 27G 1.5IN GRY

## (undated) DEVICE — FRCP BIOP COLD ENDOJAW ALLGTR W/NDL 2.8X2300MM BLU

## (undated) DEVICE — APPL CHLORAPREP W/TINT 26ML ORNG

## (undated) DEVICE — GOWN,SIRUS,NON REINFRCD,LARGE,SET IN SL: Brand: MEDLINE

## (undated) DEVICE — ENDOPATH XCEL UNIVERSAL TROCAR STABLILITY SLEEVES: Brand: ENDOPATH XCEL

## (undated) DEVICE — CATHETER,URETHRAL,REDRUBBER,STRL,16FR: Brand: MEDLINE

## (undated) DEVICE — ENDOGATOR AUXILIARY WATER JET CONNECTOR: Brand: ENDOGATOR

## (undated) DEVICE — 3M™ WARMING BLANKET, LOWER BODY, 10 PER CASE, 42568: Brand: BAIR HUGGER™

## (undated) DEVICE — SUT GUT CHRM 2/0 SH 27IN G123H

## (undated) DEVICE — NEURO SPONGES: Brand: DEROYAL

## (undated) DEVICE — RICH LAVH: Brand: MEDLINE INDUSTRIES, INC.

## (undated) DEVICE — BLADE 1884080EM TRICUT 4MMX13CM M4 ROHS: Brand: FUSION®

## (undated) DEVICE — SUT ETHLN 4/0 PS2 PLSTC 1667G

## (undated) DEVICE — PAD GRND REM POLYHESIVE A/ DISP

## (undated) DEVICE — GLV SURG ULTRATOUCH BIOGEL/COAT PF LF SZ6 STRL

## (undated) DEVICE — PENCL ES MEGADINE EZ/CLEAN BUTN W/HOLSTR 10FT

## (undated) DEVICE — DRSNG WND GZ PAD BORDERED LF 4X5IN STRL

## (undated) DEVICE — DRSNG PAD ABD 8X10IN STRL

## (undated) DEVICE — Device

## (undated) DEVICE — SPNG GZ WOVN 4X4IN 12PLY 10/BX STRL

## (undated) DEVICE — CATH IV ANGIOCATH FEP 14GA 1.88IN ORNG

## (undated) DEVICE — CUFF SCD HEMOFORCE SEQ CALF STD MD

## (undated) DEVICE — MARKR SKIN W/RULR

## (undated) DEVICE — BLANKT WARM LOWR/BDY 100X120CM

## (undated) DEVICE — BLADE 1882040HR 5PK M4 INF TURB 2MM ROT: Brand: STRAIGHTSHOT

## (undated) DEVICE — 2, DISPOSABLE SUCTION/IRRIGATOR WITHOUT DISPOSABLE TIP: Brand: STRYKEFLOW

## (undated) DEVICE — DEFOGGER!" ANTI FOG KIT: Brand: DEROYAL

## (undated) DEVICE — ELECTRD LAP L HK 5MM 33CM STRL DISP

## (undated) DEVICE — 3M™ STERI-DRAPE™ INSTRUMENT POUCH 1018: Brand: STERI-DRAPE™

## (undated) DEVICE — CUP EXTRCT VAC

## (undated) DEVICE — STRIP,CLOSURE,WOUND,MEDI-STRIP,1/2X4: Brand: MEDLINE

## (undated) DEVICE — CONMED SCOPE SAVER BITE BLOCK, 20X27 MM: Brand: SCOPE SAVER

## (undated) DEVICE — ADHS LIQ MASTISOL 2/3ML

## (undated) DEVICE — FIAPC® PROBE W/ FILTER 2200 SC OD 2.3MM/6.9FR; L 2.2M/7.2FT: Brand: ERBE

## (undated) DEVICE — SHEET,DRAPE,70X100,STERILE: Brand: MEDLINE

## (undated) DEVICE — COVER,MAYO STAND,STERILE: Brand: MEDLINE

## (undated) DEVICE — JELLY,LUBE,STERILE,FLIP TOP,TUBE,2-OZ: Brand: MEDLINE

## (undated) DEVICE — DALE NASAL DRESSING HOLDER, FITS MOST: Brand: DALE NASAL DRESSING HOLDER

## (undated) DEVICE — PATIENT RETURN ELECTRODE, SINGLE-USE, CONTACT QUALITY MONITORING, ADULT, WITH 9FT CORD, FOR PATIENTS WEIGING OVER 33LBS. (15KG): Brand: MEGADYNE

## (undated) DEVICE — CONN TBG Y 5 IN 1 LF STRL

## (undated) DEVICE — SYR CONTRL LUERLOK 10CC

## (undated) DEVICE — LARGE, DISPOSABLE ALEXIS O C-SECTION PROTECTOR - RETRACTOR: Brand: ALEXIS ® O C-SECTION PROTECTOR - RETRACTOR

## (undated) DEVICE — GLV SURG SENSICARE GREEN W/ALOE PF LF 6 STRL

## (undated) DEVICE — PAD,EYE,1-5/8X2 5/8,STERILE,LF,1/PK: Brand: MEDLINE

## (undated) DEVICE — RICH C-SECTION: Brand: MEDLINE INDUSTRIES, INC.

## (undated) DEVICE — PK MAJ ENT 10

## (undated) DEVICE — GLV SURG BIOGEL M LTX PF 8